# Patient Record
Sex: MALE | Race: OTHER | Employment: UNEMPLOYED | URBAN - METROPOLITAN AREA
[De-identification: names, ages, dates, MRNs, and addresses within clinical notes are randomized per-mention and may not be internally consistent; named-entity substitution may affect disease eponyms.]

---

## 2018-04-27 ENCOUNTER — HOSPITAL ENCOUNTER (OUTPATIENT)
Dept: LAB | Age: 58
Discharge: HOME | End: 2018-04-27

## 2018-04-27 LAB
ALBUMIN SERPL-MCNC: 4 G/DL (ref 3.5–5)
ALBUMIN/GLOB SERPL: 1.2 {RATIO} (ref 1.1–2.2)
ALP SERPL-CCNC: 93 U/L (ref 45–117)
ALT SERPL-CCNC: 31 U/L (ref 12–78)
ANION GAP SERPL CALC-SCNC: 6 MMOL/L (ref 5–15)
AST SERPL-CCNC: 19 U/L (ref 15–37)
BILIRUB SERPL-MCNC: 0.3 MG/DL (ref 0.2–1)
BUN SERPL-MCNC: 16 MG/DL (ref 6–20)
BUN/CREAT SERPL: 26 (ref 12–20)
CALCIUM SERPL-MCNC: 9 MG/DL (ref 8.5–10.1)
CHLORIDE SERPL-SCNC: 107 MMOL/L (ref 97–108)
CO2 SERPL-SCNC: 28 MMOL/L (ref 21–32)
CREAT SERPL-MCNC: 0.62 MG/DL (ref 0.7–1.3)
GLOBULIN SER CALC-MCNC: 3.4 G/DL (ref 2–4)
GLUCOSE SERPL-MCNC: 146 MG/DL (ref 65–100)
POTASSIUM SERPL-SCNC: 4.2 MMOL/L (ref 3.5–5.1)
PROT SERPL-MCNC: 7.4 G/DL (ref 6.4–8.2)
SODIUM SERPL-SCNC: 141 MMOL/L (ref 136–145)
TSH SERPL DL<=0.05 MIU/L-ACNC: 1.3 UIU/ML (ref 0.36–3.74)

## 2018-04-27 PROCEDURE — 80053 COMPREHEN METABOLIC PANEL: CPT | Performed by: NURSE PRACTITIONER

## 2018-04-27 PROCEDURE — 84443 ASSAY THYROID STIM HORMONE: CPT | Performed by: NURSE PRACTITIONER

## 2018-06-12 ENCOUNTER — HOSPITAL ENCOUNTER (OUTPATIENT)
Dept: DIABETES SERVICES | Age: 58
Discharge: HOME | End: 2018-06-12
Payer: MEDICAID

## 2018-06-12 DIAGNOSIS — E11.69 TYPE 2 DIABETES MELLITUS WITH OTHER SPECIFIED COMPLICATION, UNSPECIFIED WHETHER LONG TERM INSULIN USE (HCC): ICD-10-CM

## 2018-06-12 PROCEDURE — G0108 DIAB MANAGE TRN  PER INDIV: HCPCS | Performed by: DIETITIAN, REGISTERED

## 2018-11-14 ENCOUNTER — OFFICE VISIT (OUTPATIENT)
Dept: FAMILY MEDICINE CLINIC | Age: 58
End: 2018-11-14

## 2018-11-14 VITALS
HEART RATE: 75 BPM | WEIGHT: 158 LBS | SYSTOLIC BLOOD PRESSURE: 174 MMHG | TEMPERATURE: 98 F | OXYGEN SATURATION: 100 % | BODY MASS INDEX: 23.95 KG/M2 | RESPIRATION RATE: 16 BRPM | DIASTOLIC BLOOD PRESSURE: 80 MMHG | HEIGHT: 68 IN

## 2018-11-14 DIAGNOSIS — F32.9 REACTIVE DEPRESSION: ICD-10-CM

## 2018-11-14 DIAGNOSIS — E11.65 UNCONTROLLED TYPE 2 DIABETES MELLITUS WITH HYPERGLYCEMIA (HCC): ICD-10-CM

## 2018-11-14 DIAGNOSIS — Z23 ENCOUNTER FOR IMMUNIZATION: ICD-10-CM

## 2018-11-14 DIAGNOSIS — I10 ESSENTIAL HYPERTENSION: Primary | ICD-10-CM

## 2018-11-14 PROBLEM — G62.9 PERIPHERAL NEUROPATHY: Status: ACTIVE | Noted: 2018-11-14

## 2018-11-14 PROBLEM — Z79.4 CONTROLLED TYPE 2 DIABETES MELLITUS WITH OPHTHALMIC COMPLICATION, WITH LONG-TERM CURRENT USE OF INSULIN (HCC): Status: ACTIVE | Noted: 2018-11-14

## 2018-11-14 PROBLEM — E11.39 CONTROLLED TYPE 2 DIABETES MELLITUS WITH OPHTHALMIC COMPLICATION, WITH LONG-TERM CURRENT USE OF INSULIN (HCC): Status: RESOLVED | Noted: 2018-11-14 | Resolved: 2018-11-14

## 2018-11-14 PROBLEM — Z79.4 CONTROLLED TYPE 2 DIABETES MELLITUS WITH OPHTHALMIC COMPLICATION, WITH LONG-TERM CURRENT USE OF INSULIN (HCC): Status: RESOLVED | Noted: 2018-11-14 | Resolved: 2018-11-14

## 2018-11-14 PROBLEM — E11.39 CONTROLLED TYPE 2 DIABETES MELLITUS WITH OPHTHALMIC COMPLICATION, WITH LONG-TERM CURRENT USE OF INSULIN (HCC): Status: ACTIVE | Noted: 2018-11-14

## 2018-11-14 RX ORDER — ROSUVASTATIN CALCIUM 10 MG/1
10 TABLET, COATED ORAL
Qty: 90 TAB | Refills: 1 | Status: SHIPPED | OUTPATIENT
Start: 2018-11-14 | End: 2022-02-07

## 2018-11-14 RX ORDER — AMLODIPINE BESYLATE 10 MG/1
TABLET ORAL DAILY
COMMUNITY
End: 2018-11-14 | Stop reason: SDUPTHER

## 2018-11-14 RX ORDER — GABAPENTIN 300 MG/1
300 CAPSULE ORAL 3 TIMES DAILY
COMMUNITY
End: 2018-11-14 | Stop reason: SDUPTHER

## 2018-11-14 RX ORDER — LISINOPRIL 40 MG/1
40 TABLET ORAL DAILY
COMMUNITY
End: 2018-11-14 | Stop reason: SDUPTHER

## 2018-11-14 RX ORDER — LISINOPRIL 40 MG/1
40 TABLET ORAL DAILY
Qty: 90 TAB | Refills: 1 | Status: SHIPPED | OUTPATIENT
Start: 2018-11-14 | End: 2021-12-01

## 2018-11-14 RX ORDER — INSULIN GLARGINE 100 [IU]/ML
INJECTION, SOLUTION SUBCUTANEOUS
Qty: 1 VIAL | Refills: 2 | Status: SHIPPED | OUTPATIENT
Start: 2018-11-14 | End: 2019-02-21 | Stop reason: SDUPTHER

## 2018-11-14 RX ORDER — GABAPENTIN 300 MG/1
300 CAPSULE ORAL 3 TIMES DAILY
Qty: 270 CAP | Refills: 1 | Status: SHIPPED | OUTPATIENT
Start: 2018-11-14 | End: 2022-02-07

## 2018-11-14 RX ORDER — AMLODIPINE BESYLATE 10 MG/1
10 TABLET ORAL DAILY
Qty: 90 TAB | Refills: 1 | Status: ON HOLD | OUTPATIENT
Start: 2018-11-14 | End: 2021-11-30 | Stop reason: SDUPTHER

## 2018-11-14 RX ORDER — HYDROCHLOROTHIAZIDE 12.5 MG/1
12.5 TABLET ORAL DAILY
COMMUNITY
End: 2018-11-14 | Stop reason: SDUPTHER

## 2018-11-14 RX ORDER — METFORMIN HYDROCHLORIDE 1000 MG/1
1000 TABLET, FILM COATED, EXTENDED RELEASE ORAL 2 TIMES DAILY
Qty: 180 TAB | Refills: 1 | Status: SHIPPED | OUTPATIENT
Start: 2018-11-14 | End: 2018-11-26 | Stop reason: ALTCHOICE

## 2018-11-14 RX ORDER — SERTRALINE HYDROCHLORIDE 25 MG/1
TABLET, FILM COATED ORAL
Qty: 90 TAB | Refills: 1 | Status: SHIPPED | OUTPATIENT
Start: 2018-11-14 | End: 2018-12-18 | Stop reason: DRUGHIGH

## 2018-11-14 RX ORDER — INSULIN GLARGINE 100 [IU]/ML
INJECTION, SOLUTION SUBCUTANEOUS
Refills: 3 | COMMUNITY
Start: 2018-10-08 | End: 2018-11-14

## 2018-11-14 RX ORDER — HYDROCHLOROTHIAZIDE 12.5 MG/1
12.5 TABLET ORAL DAILY
Qty: 90 TAB | Refills: 1 | Status: SHIPPED | OUTPATIENT
Start: 2018-11-14 | End: 2018-12-18 | Stop reason: SDUPTHER

## 2018-11-14 RX ORDER — METFORMIN HYDROCHLORIDE 1000 MG/1
TABLET, FILM COATED, EXTENDED RELEASE ORAL 2 TIMES DAILY
COMMUNITY
End: 2018-11-14 | Stop reason: SDUPTHER

## 2018-11-14 NOTE — PROGRESS NOTES
Jeancarlos Tello is a 62 y.o. male      No chief complaint on file. 1. Have you been to the ER, urgent care clinic since your last visit? Hospitalized since your last visit? no      2. Have you seen or consulted any other health care providers outside of the [address] [place] [address] since your last visit? Include any pap smears or colon screening.   no

## 2018-11-14 NOTE — PROGRESS NOTES
[facility]    [place]   [address]  Phone:  [phone]  Fax:  [phone]    Name: Fish Interiano  :  1960  MRN:  [id number]  Encounter Date:  2018      History of Present Illness:  Fish Interiano is a 62 y.o. male. He is here for DM and HTN and depression. Diabetes Mellitus:  He has diabetes mellitus, and hypertension. Diabetic ROS - medication compliance: noncompliant much of the time, diabetic diet compliance: noncompliant some of the time, home glucose monitoring: fasting values range 89-270s , further diabetic ROS: no polyuria or polydipsia, no chest pain, dyspnea or TIA's, last eye exam approximately 1 week ago. Metformin 1000bid  Lantus 16 nightly  Peripheral neuropathy   A1c 2018 7.7  Micro/cr 2018 36.3  Sometimes he gets diarrhea from the metformin. He was on crestor 10mg daily but ran out several months ago so hasn't been taking it. Cardiovascular Review:  The patient has diabetes and hypertension. Diet and Lifestyle: generally follows a low fat low cholesterol diet  Home BP Monitoring: is not controlled at home, ranging 130s-160s's/100's. Pertinent ROS: not taking medications regularly as instructed, no TIA's, no chest pain on exertion, no dyspnea on exertion, no swelling of ankles. Meds include HCTZ 12.5, lisinopril 40, amlodipine 10  BP at previous visits were at goal.  He has not taken his PO medicines in about a month because he was feeling depressed. Depression  Patient complains of depression. He complains of depressed mood, anhedonia, insomnia, hypersomnia, fatigue, feelings of worthlessness/guilt, difficulty concentrating, and impaired memory. Onset was approximately 1 month ago, unchanged since that time. He denies current suicidal and homicidal plan or intent. Possible organic causes contributing are: endocrine/metabolic.   Risk factors: negative life event vision problems (is being followed by ophtho) Previous treatment includes none. He says he is depressed because of his poor vision due to cataracts and glaucoma. He saw the eye doctor last week and has eye surgery scheduled for December. He has had fleeting suicidal thoughts but he does not have a plan. He thinks of his children and his family and the thoughts go away. He does not currently have those thoughts. 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, with long-term current use of insulin ([facility]) E11.39, Z79.4    Essential hypertension I10    Reactive depression F32.9    Peripheral neuropathy G62.9       Past Medical History:   Diagnosis Date    Diabetes ([facility] [address]      Past Surgical History:   Procedure Laterality Date    HX OTHER SURGICAL      5th toe Metatarsal amputation 12/2017      Social History     Socioeconomic History    Marital status:      Spouse name: Not on file    Number of children: Not on file    Years of education: Not on file    Highest education level: Not on file   Social Needs    Financial resource strain: Not on file    Food insecurity - worry: Not on file    Food insecurity - inability: Not on file   [employer] [employer] needs - medical: Not on file   Workboard needs - non-medical: Not on file   Occupational History    Not on file   Tobacco Use    Smoking status: Never Smoker    Smokeless tobacco: Never Used   Substance and Sexual Activity    Alcohol use: No     Frequency: Never    Drug use: No    Sexual activity: Not Currently   Other Topics Concern    Not on file   Social History Narrative    Not on file     Family History   Problem Relation Age of Onset    Diabetes Mother     No Known Problems Father          Review of Systems   Constitutional: Negative for chills and fever. Eyes: Positive for blurred vision. Negative for redness. Respiratory: Negative for shortness of breath.     Cardiovascular: Negative for chest pain, palpitations and leg swelling. Gastrointestinal: Negative for abdominal pain, nausea and vomiting. Neurological: Negative for dizziness. Psychiatric/Behavioral: Positive for depression and suicidal ideas (fleeting). Negative for hallucinations and substance abuse. Physical Exam:  Visit Vitals  /80 (BP 1 Location: Right arm, BP Patient Position: Sitting)   Pulse 75   Temp 98 °F (36.7 °C) (Oral)   Resp 16   Ht 5' 8\" (1.727 m)   Wt 158 lb (71.7 kg)   SpO2 100%   BMI 24.02 kg/m²     Physical Exam   Constitutional: He is oriented to person, place, and time. No distress. Cardiovascular: Normal rate, regular rhythm and normal heart sounds. Pulmonary/Chest: Effort normal and breath sounds normal.   Neurological: He is alert and oriented to person, place, and time. Psychiatric: He exhibits a depressed mood. PHQ-9 score is 21    Reviewed: Medications, allergies, clinical lab test results and imaging results have been reviewed. Any abnormal findings have been addressed. Assessment/Plan:  1. Essential hypertension  Uncontrolled. He has been noncompliant with his medications. Discussed importance of taking his medications, and the risks of uncontrolled blood pressure including heart attack, stroke, and death.   - METABOLIC PANEL, BASIC  - amLODIPine (NORVASC) 10 mg tablet; Take 1 Tab by mouth daily. Dispense: 90 Tab; Refill: 1  - hydroCHLOROthiazide (HYDRODIURIL) 12.5 mg tablet; Take 1 Tab by mouth daily. Dispense: 90 Tab; Refill: 1  - lisinopril (PRINIVIL, ZESTRIL) 40 mg tablet; Take 1 Tab by mouth daily. Dispense: 90 Tab; Refill: 1    2. Unontrolled type 2 diabetes mellitus  Fasting sugars have been high. He has not been taking his metformin. Discussed importance of taking his medications. Will add back crestor.   - HEMOGLOBIN A1C WITH EAG  - MICROALBUMIN, UR, RAND W/ MICROALB/CREAT RATIO  - LIPID PANEL  - rosuvastatin (CRESTOR) 10 mg tablet; Take 1 Tab by mouth nightly. Dispense: 90 Tab;  Refill: 1  - gabapentin (NEURONTIN) 300 mg capsule; Take 1 Cap by mouth three (3) times daily. Dispense: 270 Cap; Refill: 1  - insulin glargine (LANTUS U-100 INSULIN) 100 unit/mL injection; Take 16 units daily  Dispense: 1 Vial; Refill: 2  - metFORMIN (GLUMETZA) 1,000 mg TG24 24 hour tablet; Take 1,000 mg by mouth two (2) times a day. Dispense: 180 Tab; Refill: 1    3. Encounter for immunization  - INFLUENZA VACCINE QUADRIVALENT VIAL, SPLIT, 3 YRS PLUS IM  - PNEUMOCOCCAL POLYSACCHARIDE VACCINE, 23-VALENT, ADULT OR IMMUNOSUPPRESSED PT DOSE,    4. Reactive depression  Fleeting suicidal thoughts but no active plans or current thoughts. Start sertraline. I gave him information for [address] [place] [address]. Close follow up. - sertraline (ZOLOFT) 25 mg tablet; Take 25mg (1 tablet) by mouth daily for one week then increase to 50mg (2 tablets) daily  Dispense: 90 Tab; Refill: 1      Follow-up Disposition:  Return in 2 weeks (on 11/28/2018). Diagnoses, tests, plan, and follow up discussed with patient. I have given to and reviewed with the patient the after visit summary. I have reviewed medication side effects and precautions. Patient expressed agreement and understanding. All questions were answered. 4 = completely dependent

## 2018-11-14 NOTE — PATIENT INSTRUCTIONS
Vaccine Information Statement    Influenza (Flu) Vaccine (Inactivated or Recombinant): What you need to know    Many Vaccine Information Statements are available in [place]ic and other languages. See www.immunize.org/vis  Hojas de Información Sobre Vacunas están disponibles en Español y en muchos otros idiomas. Visite www.immunize.org/vis    1. Why get vaccinated? Influenza (flu) is a contagious disease that spreads around the [place] every year, usually between October and May. Flu is caused by influenza viruses, and is spread mainly by coughing, sneezing, and close contact. Anyone can get flu. Flu strikes suddenly and can last several days. Symptoms vary by age, but can include:   fever/chills   sore throat   muscle aches   fatigue   cough   headache    runny or stuffy nose    Flu can also lead to pneumonia and blood infections, and cause diarrhea and seizures in children. If you have a medical condition, such as heart or lung disease, flu can make it worse. Flu is more dangerous for some people. Infants and young children, people 72years of age and older, pregnant women, and people with certain health conditions or a weakened immune system are at greatest risk. Each year thousands of people in the [facility] die from flu, and many more are hospitalized. Flu vaccine can:   keep you from getting flu,   make flu less severe if you do get it, and   keep you from spreading flu to your family and other people. 2. Inactivated and recombinant flu vaccines    A dose of flu vaccine is recommended every flu season. Children 6 months through 6years of age may need two doses during the same flu season. Everyone else needs only one dose each flu season.        Some inactivated flu vaccines contain a very small amount of a mercury-based preservative called thimerosal. Studies have not shown thimerosal in vaccines to be harmful, but flu vaccines that do not contain thimerosal are available. There is no live flu virus in flu shots. They cannot cause the flu. There are many flu viruses, and they are always changing. Each year a new flu vaccine is made to protect against three or four viruses that are likely to cause disease in the upcoming flu season. But even when the vaccine doesnt exactly match these viruses, it may still provide some protection    Flu vaccine cannot prevent:   flu that is caused by a virus not covered by the vaccine, or   illnesses that look like flu but are not. It takes about 2 weeks for protection to develop after vaccination, and protection lasts through the flu season. 3. Some people should not get this vaccine    Tell the person who is giving you the vaccine:     If you have any severe, life-threatening allergies. If you ever had a life-threatening allergic reaction after a dose of flu vaccine, or have a severe allergy to any part of this vaccine, you may be advised not to get vaccinated. Most, but not all, types of flu vaccine contain a small amount of egg protein.  If you ever had Guillain-Barré Syndrome (also called GBS). Some people with a history of GBS should not get this vaccine. This should be discussed with your doctor.  If you are not feeling well. It is usually okay to get flu vaccine when you have a mild illness, but you might be asked to come back when you feel better. 4. Risks of a vaccine reaction    With any medicine, including vaccines, there is a chance of reactions. These are usually mild and go away on their own, but serious reactions are also possible. Most people who get a flu shot do not have any problems with it.      Minor problems following a flu shot include:    soreness, redness, or swelling where the shot was given     hoarseness   sore, red or itchy eyes   cough   fever   aches   headache   itching   fatigue  If these problems occur, they usually begin soon after the shot and last 1 or 2 days. More serious problems following a flu shot can include the following:     There may be a small increased risk of Guillain-Barré Syndrome (GBS) after inactivated flu vaccine. This risk has been estimated at 1 or 2 additional cases per million people vaccinated. This is much lower than the risk of severe complications from flu, which can be prevented by flu vaccine.  Young children who get the flu shot along with pneumococcal vaccine (PCV13) and/or DTaP vaccine at the same time might be slightly more likely to have a seizure caused by fever. Ask your doctor for more information. Tell your doctor if a child who is getting flu vaccine has ever had a seizure. Problems that could happen after any injected vaccine:      People sometimes faint after a medical procedure, including vaccination. Sitting or lying down for about 15 minutes can help prevent fainting, and injuries caused by a fall. Tell your doctor if you feel dizzy, or have vision changes or ringing in the ears.  Some people get severe pain in the shoulder and have difficulty moving the arm where a shot was given. This happens very rarely.  Any medication can cause a severe allergic reaction. Such reactions from a vaccine are very rare, estimated at about 1 in a million doses, and would happen within a few minutes to a few hours after the vaccination. As with any medicine, there is a very remote chance of a vaccine causing a serious injury or death. The safety of vaccines is always being monitored. For more information, visit: www.cdc.gov/vaccinesafety/    5. What if there is a serious reaction? What should I look for?  Look for anything that concerns you, such as signs of a severe allergic reaction, very high fever, or unusual behavior.     Signs of a severe allergic reaction can include hives, swelling of the face and throat, difficulty breathing, a fast heartbeat, dizziness, and weakness - usually within a few minutes to a few hours after the vaccination. What should I do?  If you think it is a severe allergic reaction or other emergency that cant wait, call 9-1-1 and get the person to the nearest hospital. Otherwise, call your doctor.  Reactions should be reported to the Vaccine Adverse Event Reporting System (VAERS). Your doctor should file this report, or you can do it yourself through  the VAERS web site at www.vaers. [facility].gov, or by calling 1-124.917.1018. VAERS does not give medical advice. 6. The National Vaccine Injury Compensation Program    The [facility] Vaccine Injury Compensation Program (VICP) is a federal program that was created to compensate people who may have been injured by certain vaccines. Persons who believe they may have been injured by a vaccine can learn about the program and about filing a claim by calling 3-258.317.7354 or visiting the [employer] website at www.[facility].gov/vaccinecompensation. There is a time limit to file a claim for compensation. 7. How can I learn more?  Ask your healthcare provider. He or she can give you the vaccine package insert or suggest other sources of information.  Call your local or state health department.  Contact the Centers for Disease Control and Prevention (CDC):  - Call 8-705.691.9497 (6-252-QUS-INFO) or  - Visit CDCs website at www.cdc.gov/flu    Vaccine Information Statement   Inactivated Influenza Vaccine   8/7/2015  42 DOM Rosales 346CS-02    Department of Health and Human Services  Centers for Disease Control and Prevention    Office Use Only       Pneumococcal Polysaccharide Vaccine: Care Instructions  Your Care Instructions    The pneumococcal polysaccharide vaccine (PPSV) can prevent some of the serious complications of pneumonia. This includes infection in the bloodstream (bacteremia) or throughout the body (septicemia). PPSV is recommended for people ages 72 years and older.  People ages 3 to 59 who have a long-term illness should also get the vaccine. This includes people with diabetes, heart disease, liver disease, or lung disease. PPSV can also help people who have a weakened immune system. This includes cancer patients and people who don't have a spleen. The immune system helps your body fight infection and other illnesses. PPSV is given as a shot. It's usually given in the arm. Healthy older adults get the shot once. Other people may need to have a second dose. The shot may cause pain and redness at the site. It may also cause a mild fever for a short time. Follow-up care is a key part of your treatment and safety. Be sure to make and go to all appointments, and call your doctor if you are having problems. It's also a good idea to know your test results and keep a list of the medicines you take. How can you care for yourself at home? · Take an over-the-counter pain medicine, such as acetaminophen (Tylenol), ibuprofen (Advil, Motrin), or naproxen (Aleve), if your arm is sore after the shot. Be safe with medicines. Read and follow all instructions on the label. · Give acetaminophen (Tylenol) or ibuprofen (Advil, Motrin) to your child for pain or fussiness after the shot. Read and follow all instructions on the label. Do not give aspirin to anyone younger than 20. It has been linked to Reye syndrome, a serious illness. · Put ice or a cold pack on the sore area for 10 to 20 minutes at a time. Put a thin cloth between the ice and your skin. When should you call for help? Call 911 anytime you think you may need emergency care. For example, call if:    · You have a seizure.     · You have symptoms of a severe allergic reaction. These may include:  ? Sudden raised, red areas (hives) all over the body. ? Swelling of the throat, mouth, lips, or tongue. ? Trouble breathing. ? Passing out (losing consciousness).  Or you may feel very lightheaded or suddenly feel weak, confused, or restless.    Call your doctor now or seek immediate medical care if:    · You have symptoms of an allergic reaction, such as:  ? A rash or hives (raised, red areas on the skin). ? Itching. ? Swelling. ? Belly pain, nausea, or vomiting.     · You have a high fever.    Watch closely for changes in your health, and be sure to contact your doctor if you have any problems. Where can you learn more? Go to http://laura-joey.info/. Enter T225 in the search box to learn more about \"Pneumococcal Polysaccharide Vaccine: Care Instructions. \"  Current as of: June 18, 2018  Content Version: 11.8  © 8500-1707 [employer]. Care instructions adapted under license by [employer] (which disclaims liability or warranty for this information). If you have questions about a medical condition or this instruction, always ask your healthcare professional. [address] any warranty or liability for your use of this information. Sertraline (Zoloft) - (By mouth)   Why this medicine is used:   Treats depression, obsessive-compulsive disorder (OCD), posttraumatic stress disorder (PTSD), premenstrual dysphoric disorder (PMDD), social anxiety, and panic disorder. Contact a nurse or doctor right away if you have:  · Blistering, peeling, red skin rash  · Thoughts of hurting yourself, seeing or hearing things that are not there  · Anxiety, restlessness, fast heartbeat, fever, sweating  · Bloody vomit or vomit that looks like coffee grounds; bloody or black, tarry stools  · Tremors, muscle twitching, uncontrollable movements     Common side effects:  · Sleepiness, trouble sleeping  · Sexual problems  · Mild diarrhea, nausea, vomiting, dry mouth  © 2017 [facility] Information is for End User's use only and may not be sold, redistributed or otherwise used for commercial purposes.     [address]  110.730.6454    National Suicide Prevention Hotline  1-191.841.1475

## 2018-11-15 LAB
ALBUMIN/CREAT UR: 422.1 MG/G CREAT (ref 0–30)
BUN SERPL-MCNC: 16 MG/DL (ref 6–24)
BUN/CREAT SERPL: 22 (ref 9–20)
CALCIUM SERPL-MCNC: 9.5 MG/DL (ref 8.7–10.2)
CHLORIDE SERPL-SCNC: 100 MMOL/L (ref 96–106)
CHOLEST SERPL-MCNC: 165 MG/DL (ref 100–199)
CO2 SERPL-SCNC: 25 MMOL/L (ref 20–29)
CREAT SERPL-MCNC: 0.73 MG/DL (ref 0.76–1.27)
CREAT UR-MCNC: 72.4 MG/DL
EST. AVERAGE GLUCOSE BLD GHB EST-MCNC: 217 MG/DL
GLUCOSE SERPL-MCNC: 168 MG/DL (ref 65–99)
HBA1C MFR BLD: 9.2 % (ref 4.8–5.6)
HDLC SERPL-MCNC: 58 MG/DL
LDLC SERPL CALC-MCNC: 94 MG/DL (ref 0–99)
MICROALBUMIN UR-MCNC: 305.6 UG/ML
POTASSIUM SERPL-SCNC: 4.8 MMOL/L (ref 3.5–5.2)
SODIUM SERPL-SCNC: 138 MMOL/L (ref 134–144)
TRIGL SERPL-MCNC: 66 MG/DL (ref 0–149)
VLDLC SERPL CALC-MCNC: 13 MG/DL (ref 5–40)

## 2018-11-21 ENCOUNTER — TELEPHONE (OUTPATIENT)
Dept: FAMILY MEDICINE CLINIC | Age: 58
End: 2018-11-21

## 2018-11-21 NOTE — TELEPHONE ENCOUNTER
Attempted to call patient regarding lab results. Voice mailbox was full so was unable to leave message. He does have an appointment coming up 5 days from now.

## 2018-11-25 PROBLEM — Z79.4 TYPE 2 DIABETES MELLITUS WITH OPHTHALMIC COMPLICATION, WITH LONG-TERM CURRENT USE OF INSULIN (HCC): Status: ACTIVE | Noted: 2018-11-25

## 2018-11-25 PROBLEM — E11.39 TYPE 2 DIABETES MELLITUS WITH OPHTHALMIC COMPLICATION, WITH LONG-TERM CURRENT USE OF INSULIN (HCC): Status: ACTIVE | Noted: 2018-11-25

## 2018-11-26 ENCOUNTER — OFFICE VISIT (OUTPATIENT)
Dept: FAMILY MEDICINE CLINIC | Age: 58
End: 2018-11-26

## 2018-11-26 VITALS
TEMPERATURE: 97.9 F | BODY MASS INDEX: 23.95 KG/M2 | HEIGHT: 68 IN | RESPIRATION RATE: 16 BRPM | SYSTOLIC BLOOD PRESSURE: 109 MMHG | OXYGEN SATURATION: 100 % | WEIGHT: 158 LBS | HEART RATE: 78 BPM | DIASTOLIC BLOOD PRESSURE: 64 MMHG

## 2018-11-26 DIAGNOSIS — F32.9 REACTIVE DEPRESSION: ICD-10-CM

## 2018-11-26 DIAGNOSIS — I10 ESSENTIAL HYPERTENSION: Primary | ICD-10-CM

## 2018-11-26 DIAGNOSIS — R80.9 MICROALBUMINURIA: ICD-10-CM

## 2018-11-26 DIAGNOSIS — Z79.4 TYPE 2 DIABETES MELLITUS WITH DIABETIC CATARACT, WITH LONG-TERM CURRENT USE OF INSULIN (HCC): ICD-10-CM

## 2018-11-26 DIAGNOSIS — E11.36 TYPE 2 DIABETES MELLITUS WITH DIABETIC CATARACT, WITH LONG-TERM CURRENT USE OF INSULIN (HCC): ICD-10-CM

## 2018-11-26 RX ORDER — METFORMIN HYDROCHLORIDE 1000 MG/1
1000 TABLET ORAL 2 TIMES DAILY WITH MEALS
Qty: 180 TAB | Refills: 1 | Status: SHIPPED | OUTPATIENT
Start: 2018-11-26 | End: 2022-02-07

## 2018-11-26 NOTE — PATIENT INSTRUCTIONS
Albumin Urine Test: About This Test  What is it? An albumin urine test checks urine for a protein called albumin. This protein is normally found in the blood. When the kidneys are damaged, small amounts of albumin leak into the urine. This is called albuminuria. If the amount of albumin is very small, but still abnormal, it is called microalbuminuria. You might provide a urine sample for your doctor during a visit. Your doctor might also ask you for a one-time sample at home or over a specific period of time, such as over 4 hours or 24 hours. Your doctor will tell you what to do. Why is this test done? This test is done to check for albumin in the urine. It helps tell your doctor how well your kidneys are working. This test is done most often to check the kidneys in people with diabetes. Other conditions also cause albuminuria. These conditions include high blood pressure, heart failure, and cirrhosis. The sooner your doctor knows you have kidney damage, the more your doctor can do to protect your kidneys. How can you prepare for the test?  · Do not exercise just before the test.  · Tell your doctor if you are having your period or have vaginal discharge. · Tell your doctor about all the nonprescription and prescription medicines and herbs or other supplements you take. Some of these can affect the results of this test.  What happens before the test?  · Your doctor or the lab likely will give you the container you need to hold the urine. You will get instructions on when and how to collect the urine. This might be a one-time sample or a number of samples over a period of time. What happens during the test?  One-time urine collection  · Wash your hands before you start. · If the collection cup you are given has a lid, remove it carefully. Set it down with the inner surface up. Do not touch the inside of the cup with your fingers. · Clean the area around your genitals.   ? For men: Pull back the foreskin, if present, and clean the head of the penis with medicated towelettes or swabs. ? For women: Spread open the genital folds of skin with one hand. Then use medicated towelettes or swabs in your other hand to clean the area where urine comes out (the urethra). Wipe the area from front to back. · Start urinating into the toilet or urinal. A woman should hold apart the genital folds of skin while she urinates. · After the urine has flowed for several seconds, place the cup into the urine stream. Collect about 2 fluid ounces of this \"midstream\" urine without stopping your flow of urine. · Don't touch the rim of the cup to your genital area. Don't get toilet paper, pubic hair, stool (feces), menstrual blood, or anything else in the urine sample. · Finish urinating into the toilet or urinal.  · Carefully replace and tighten the lid on the cup, and then return it to the lab. If you are collecting the urine at home and can't get it to the lab in an hour, refrigerate it. Urine collection over time  You collect your urine for a period of time, such as over 4 or 24 hours. · You start collecting your urine in the morning. When you first get up, empty your bladder. But do not save this urine. Write down the time that you began. · For the set period of time, collect all your urine. Urinate into a small, clean container. Then pour the urine into the large container. Don't touch the inside of the container with your fingers. · Keep the collected urine in the refrigerator for the collection time. Empty your bladder for the last time at or just before the end of the collection period. Add this urine to the large container. Then write down the time. What else should you know about the test?  · If your results are higher than normal, your doctor may check your urine more often to watch for kidney damage. · If your test shows that you may have kidney damage, you may get other tests.   What happens after the test?  · Follow your doctor's instructions for taking the urine to the doctor's office or lab. · You can go back to your usual activities right away. When should you call for help? Watch closely for changes in your health, and be sure to contact your doctor if you have any problems. Follow-up care is a key part of your treatment and safety. Be sure to make and go to all appointments, and call your doctor if you are having problems. It's also a good idea to keep a list of the medicines you take. Ask your doctor when you can expect to have your test results. Where can you learn more? Go to http://laura-joey.info/. Enter L729 in the search box to learn more about \"Albumin Urine Test: About This Test.\"  Current as of: March 15, 2018  Content Version: 11.8  © 3989-7699 [employer]. Care instructions adapted under license by [employer] (which disclaims liability or warranty for this information). If you have questions about a medical condition or this instruction, always ask your healthcare professional. [address] any warranty or liability for your use of this information.

## 2018-11-26 NOTE — PROGRESS NOTES
Arlene Jewell is a 62 y.o. male      Chief Complaint   Patient presents with    Follow-up     medication and labs          1. Have you been to the ER, urgent care clinic since your last visit? Hospitalized since your last visit? No      2. Have you seen or consulted any other health care providers outside of the [address] [place] [address] since your last visit? Include any pap smears or colon screening.   No

## 2018-11-26 NOTE — PROGRESS NOTES
[facility]    [address]  Phone:  [phone]  Fax:  [phone]    Name: Gabriele NOYOLAO.B.:  1960  MRN:  [id number]  Encounter Date:  2018      History of Present Illness:  Gabriele Levin is a 62 y.o. male. He is here for diabetes, HTN and depression. Regarding his diabetes, at last visit he had not been taking his metformin due to depressive symptoms but had been taking his 16 units of lantus daily. He was also noncompliant with his diet. Today, he notes he has been taking his metformin 1000 bid and and lantus 16 nightly. His A1c in 2018 was 7.7. His A1c 2018 is 9.2. His creatinine was 0.73; his GFR was 102. His urine micro/cr was 422.1. His cholesterol panel was normal. Since last visit, his fasting sugars have been 120s-200s. He says he has been unable to get his metformin refill because it requires insurance approval. I have not received any prior auth for this. He is not yet out of metformin. Regarding his HTN, he had not been taking his medications, lisinopril, HCTZ, and amlodipine, at last visit due to depressive symptoms. He has been taking his medications this time. Home BP is 629T-801M systolic; he is unsure of what the diastolic has been. He denies headaches, chest pain, shortness of breath, lower extremity edema. His BP was up last time, though he was noncompliant with his medicines at that time. Today, his BP is 109/64. Regarding his depression, this was diagnosed at last visit almost two weeks ago. He was started on zoloft. He has been taking it. He denies side effects. The zoloft has been helping his symptoms a little bit. He attributed the symptoms largely to his vision problems reportedly due to cataracts and glaucoma; he has eye surgery scheduled for December. He denies substance abuse. He denies suicidal thoughts and homicidal thoughts; he has no suicidal or homicidal plans.  Symptoms today include feeling down, decreased energy, trouble concentrating. He did not set up an appointment with psychiatry/counseling. PHQ-9 score on 11/14/18 was 21; today it is 16. Not on File  Current Outpatient Medications   Medication Sig Dispense Refill    metFORMIN (GLUCOPHAGE) 1,000 mg tablet Take 1 Tab by mouth two (2) times daily (with meals). 180 Tab 1    sertraline (ZOLOFT) 25 mg tablet Take 25mg (1 tablet) by mouth daily for one week then increase to 50mg (2 tablets) daily 90 Tab 1    rosuvastatin (CRESTOR) 10 mg tablet Take 1 Tab by mouth nightly. 90 Tab 1    amLODIPine (NORVASC) 10 mg tablet Take 1 Tab by mouth daily. 90 Tab 1    gabapentin (NEURONTIN) 300 mg capsule Take 1 Cap by mouth three (3) times daily. 270 Cap 1    hydroCHLOROthiazide (HYDRODIURIL) 12.5 mg tablet Take 1 Tab by mouth daily. 90 Tab 1    insulin glargine (LANTUS U-100 INSULIN) 100 unit/mL injection Take 16 units daily 1 Vial 2    lisinopril (PRINIVIL, ZESTRIL) 40 mg tablet Take 1 Tab by mouth daily.  90 Tab 1     Patient Active Problem List   Diagnosis Code    Essential hypertension I10    Reactive depression F32.9    Peripheral neuropathy G62.9    Type 2 diabetes mellitus with ophthalmic complication, with long-term current use of insulin ([address] E11.39, Z79.4       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, with long-term current use of insulin ([address] 11/14/2018    Diabetes ([address]      Past Surgical History:   Procedure Laterality Date    HX OTHER SURGICAL      5th toe Metatarsal amputation 12/2017      Social History     Socioeconomic History    Marital status:      Spouse name: Not on file    Number of children: Not on file    Years of education: Not on file    Highest education level: Not on file   Tobacco Use    Smoking status: Never Smoker    Smokeless tobacco: Never Used   Substance and Sexual Activity    Alcohol use: No     Frequency: Never    Drug use: No    Sexual activity: Not Currently     Family History   Problem Relation Age of Onset    Diabetes Mother     No Known Problems Father          Review of Systems   Eyes: Positive for blurred vision. Respiratory: Negative for shortness of breath. Cardiovascular: Negative for chest pain. Gastrointestinal: Negative for abdominal pain, nausea and vomiting. Neurological: Negative for dizziness and headaches. Psychiatric/Behavioral: Positive for depression. Negative for substance abuse and suicidal ideas. Physical Exam:  Visit Vitals  /64 (BP 1 Location: Left arm, BP Patient Position: Sitting)   Pulse 78   Temp 97.9 °F (36.6 °C) (Oral)   Resp 16   Ht 5' 8\" (1.727 m)   Wt 158 lb (71.7 kg)   SpO2 100%   BMI 24.02 kg/m²     Physical Exam   Constitutional: No distress. Cardiovascular: Normal rate, regular rhythm and normal heart sounds. Pulmonary/Chest: Effort normal and breath sounds normal.   Psychiatric: He has a normal mood and affect. PHQ-9 score is 16. Reviewed: Medications, allergies, clinical lab test results and imaging results have been reviewed. Any abnormal findings have been addressed. Assessment/Plan:  1. Essential hypertension  Controlled    2. Reactive depression  Improving. Continue zoloft. Close follow up. Encouraged him to make an appointment psychiatry/counseling. 3. Type 2 diabetes mellitus with diabetic cataract, with long-term current use of insulin (HCC)  Uncontrolled. I suspect now that he has restarted his metformin, he will get better control of his diabetes. Check A1c again in 3 months. - metFORMIN (GLUCOPHAGE) 1,000 mg tablet; Take 1 Tab by mouth two (2) times daily (with meals). Dispense: 180 Tab; Refill: 1  - He will let me know if he is unable to fill the metformin. 4. Microalbuminuria  Likely due to diabetes. - REFERRAL TO NEPHROLOGY        Follow-up Disposition:  Return in about 4 weeks (around 12/24/2018) for depression.      Diagnoses, tests, plan, and follow up discussed with patient. I have given to and reviewed with the patient the after visit summary. I have reviewed medication side effects and precautions. Patient expressed agreement and understanding. All questions were answered.

## 2018-11-30 NOTE — TELEPHONE ENCOUNTER
Placed Metformin ER PA for [facility] on Dr. Hutchinson Kieler desk. Per Dr. Jose Sparks'  She has already given him a new medication for Metformin and the PA was not necessary. I spoke to Mr. Al Memos and he said the Pharmacy called him to let him know that it was ready to .

## 2018-12-17 NOTE — PROGRESS NOTES
[facility]    [facility]   [address]  Phone:  [phone]  Fax:  [phone]    Name: Valente Rajput  :  1960  MRN:  [id number]  Encounter Date:  2018      History of Present Illness:  Valente Rajput is a 62 y.o. male. He is here for depression follow up. Regarding his depression, this was diagnosed on 18 at which time he was started on zoloft. He has been taking 50mg daily. He denies side effects. It has been helping his symptoms. He attributed the symptoms largely to his vision problems reportedly due to cataracts and glaucoma; he has eye surgery scheduled for . He denies suicidal ideations and homicidal ideations. Symptoms today include [place], feeling down, decreased energy, feeling bad about himself, trouble concentrating. He has not set up an appointment with psychiatry/counseling. PHQ-9 score on 18 was 21, on 18 was 16; today it is, 19. He has not set up an appointment with nephrology for his microalbuminuria. BP is up today at 156/81. He says at home it's 603R-601F systolic. He says the diastolic at home as been 419L-369T (I suspect the diastolic is not actually this high). He notes a blister on the top of his left foot. He wore a new pair of boots about 3 weeks ago and then developed a blister on the top of his foot later that day. He denies fevers, chills, drainage. He says there was some redness around the site but this has improved. His last A1c was 9.2 on 18 though he was noncompliant with his medications at that time. Since restarting his meds his fasting sugars have mostly been in the 90s-130s. No Known Allergies  Current Outpatient Medications   Medication Sig Dispense Refill    hydroCHLOROthiazide (HYDRODIURIL) 12.5 mg tablet Take 1 Tabs by mouth daily. 90 Tab 1    sertraline (ZOLOFT) 25 mg tablet Take 2 Tab by mouth daily.  90 Tab 1    metFORMIN (GLUCOPHAGE) 1,000 mg tablet Take 1 Tab by mouth two (2) times daily (with meals). 180 Tab 1    rosuvastatin (CRESTOR) 10 mg tablet Take 1 Tab by mouth nightly. 90 Tab 1    amLODIPine (NORVASC) 10 mg tablet Take 1 Tab by mouth daily. 90 Tab 1    gabapentin (NEURONTIN) 300 mg capsule Take 1 Cap by mouth three (3) times daily. 270 Cap 1    insulin glargine (LANTUS U-100 INSULIN) 100 unit/mL injection Take 16 units daily 1 Vial 2    lisinopril (PRINIVIL, ZESTRIL) 40 mg tablet Take 1 Tab by mouth daily. 90 Tab 1     Patient Active Problem List   Diagnosis Code    Essential hypertension I10    Reactive depression F32.9    Peripheral neuropathy G62.9    Type 2 diabetes mellitus with ophthalmic complication, with long-term current use of insulin ([facility] E11.39, Z79.4       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, with long-term current use of insulin ([facility] 11/14/2018    Diabetes ([facility]      Past Surgical History:   Procedure Laterality Date    HX OTHER SURGICAL      5th toe Metatarsal amputation 12/2017      Social History     Socioeconomic History    Marital status:      Spouse name: Not on file    Number of children: Not on file    Years of education: Not on file    Highest education level: Not on file   Tobacco Use    Smoking status: Never Smoker    Smokeless tobacco: Never Used   Substance and Sexual Activity    Alcohol use: No     Frequency: Never    Drug use: No    Sexual activity: Not Currently     Family History   Problem Relation Age of Onset    Diabetes Mother     No Known Problems Father          Review of Systems   Constitutional: Negative for chills and fever. Respiratory: Negative for shortness of breath. Cardiovascular: Negative for chest pain. Skin:        See HPI   Psychiatric/Behavioral: Positive for depression. Negative for substance abuse and suicidal ideas.         See HPI       Physical Exam:  Visit Vitals  /81 (BP 1 Location: Right arm, BP Patient Position: Sitting)   Pulse 75   Temp 97.5 °F (36.4 °C) (Oral)   Resp 16   Ht 5' 8\" (1.727 m)   Wt 156 lb 8 oz (71 kg)   SpO2 100%   BMI 23.80 kg/m²     Physical Exam   Constitutional: He appears well-developed and well-nourished. No distress. Cardiovascular: Normal rate, regular rhythm and normal heart sounds. Pulmonary/Chest: Effort normal and breath sounds normal.   Skin:   Blister over bone prominence of left 3rd - 4th metatarsal; no drainage or erythema; normal color and temperature   Psychiatric: His speech is normal and behavior is normal. He exhibits a depressed mood. PHQ-9 score is 19. Reviewed: Medications, allergies, clinical lab test results and imaging results have been reviewed. Any abnormal findings have been addressed. Assessment/Plan:  1. Reactive depression  Still with symptoms and PHQ-9 score has gone slightly up. Will increase zoloft from 50mg daily to 100mg daily. Follow up in 4 weeks. - sertraline (ZOLOFT) 100 mg tablet; Take 1 Tab by mouth daily. Dispense: 90 Tab; Refill: 1    2. Type 2 diabetes mellitus with foot ulcer, with long-term current use of insulin (HCC)  No signs of infection. ADvised to keep clean with soap and water twice daily and can put vaseline over it and keep it covered. Wear shoes that fit well and do not cause pain or rub on any areas. Will refer to podiatry. REviewed reasons to RTC or go to urgent care or ER - fever, chills, drainage, surrounding redness.  - REFERRAL TO PODIATRY    3. Essential hypertension  Uncontrolled. Increase HCTZ from 12.5mg daily to 25mg daily. Follow up in 4 weeks with home BP log and cuff. - hydroCHLOROthiazide (HYDRODIURIL) 12.5 mg tablet; Take 2 Tabs by mouth daily. Dispense: 90 Tab; Refill: 1      Follow-up Disposition:  Return in about 4 weeks (around 1/15/2019). Diagnoses, tests, plan, and follow up discussed with patient. I have given to and reviewed with the patient the after visit summary.  I have reviewed medication side effects and precautions. Patient expressed agreement and understanding. All questions were answered. Discussed and examined with Dr. Mervin Soto who agrees.

## 2018-12-18 ENCOUNTER — TELEPHONE (OUTPATIENT)
Dept: FAMILY MEDICINE CLINIC | Age: 58
End: 2018-12-18

## 2018-12-18 ENCOUNTER — OFFICE VISIT (OUTPATIENT)
Dept: FAMILY MEDICINE CLINIC | Age: 58
End: 2018-12-18

## 2018-12-18 VITALS
DIASTOLIC BLOOD PRESSURE: 81 MMHG | OXYGEN SATURATION: 100 % | TEMPERATURE: 97.5 F | BODY MASS INDEX: 23.72 KG/M2 | SYSTOLIC BLOOD PRESSURE: 156 MMHG | HEIGHT: 68 IN | WEIGHT: 156.5 LBS | RESPIRATION RATE: 16 BRPM | HEART RATE: 75 BPM

## 2018-12-18 DIAGNOSIS — F32.9 REACTIVE DEPRESSION: Primary | ICD-10-CM

## 2018-12-18 DIAGNOSIS — E11.621 TYPE 2 DIABETES MELLITUS WITH FOOT ULCER, WITH LONG-TERM CURRENT USE OF INSULIN (HCC): ICD-10-CM

## 2018-12-18 DIAGNOSIS — Z79.4 TYPE 2 DIABETES MELLITUS WITH FOOT ULCER, WITH LONG-TERM CURRENT USE OF INSULIN (HCC): ICD-10-CM

## 2018-12-18 DIAGNOSIS — L97.509 TYPE 2 DIABETES MELLITUS WITH FOOT ULCER, WITH LONG-TERM CURRENT USE OF INSULIN (HCC): ICD-10-CM

## 2018-12-18 DIAGNOSIS — I10 ESSENTIAL HYPERTENSION: ICD-10-CM

## 2018-12-18 RX ORDER — SERTRALINE HYDROCHLORIDE 100 MG/1
100 TABLET, FILM COATED ORAL DAILY
Qty: 90 TAB | Refills: 1 | Status: SHIPPED | OUTPATIENT
Start: 2018-12-18 | End: 2019-01-22 | Stop reason: SDUPTHER

## 2018-12-18 RX ORDER — HYDROCHLOROTHIAZIDE 12.5 MG/1
25 TABLET ORAL DAILY
Qty: 90 TAB | Refills: 1 | Status: SHIPPED | OUTPATIENT
Start: 2018-12-18 | End: 2019-02-21 | Stop reason: SDUPTHER

## 2018-12-18 RX ORDER — SERTRALINE HYDROCHLORIDE 50 MG/1
50 TABLET, FILM COATED ORAL DAILY
Qty: 90 TAB | Refills: 1 | Status: SHIPPED | OUTPATIENT
Start: 2018-12-18 | End: 2018-12-18 | Stop reason: DRUGHIGH

## 2018-12-18 NOTE — PROGRESS NOTES
Identified pt with two pt identifiers(name and ). Reviewed record in preparation for visit and have obtained necessary documentation. Chief Complaint   Patient presents with    Depression    Blister     pt c/o blister on left foot        Coordination of Care Questionnaire:  :   1) Have you been to an emergency room, urgent care, or hospitalized since your last visit?   no   If yes, where when, and reason for visit? 2. Have seen or consulted any other health care provider since your last visit? NO  If yes, where when, and reason for visit? Patient is accompanied by self I have received verbal consent from Valente Rajput to discuss any/all medical information while they are present in the room.

## 2018-12-18 NOTE — PATIENT INSTRUCTIONS
Diabetes Foot Health: Care Instructions  Your Care Instructions    When you have diabetes, your feet need extra care and attention. Diabetes can damage the nerve endings and blood vessels in your feet, making you less likely to notice when your feet are injured. Diabetes also limits your body's ability to fight infection and get blood to areas that need it. If you get a minor foot injury, it could become an ulcer or a serious infection. With good foot care, you can prevent most of these problems. Caring for your feet can be quick and easy. Most of the care can be done when you are bathing or getting ready for bed. Follow-up care is a key part of your treatment and safety. Be sure to make and go to all appointments, and call your doctor if you are having problems. It's also a good idea to know your test results and keep a list of the medicines you take. How can you care for yourself at home? · Keep your blood sugar close to normal by watching what and how much you eat, monitoring blood sugar, taking medicines if prescribed, and getting regular exercise. · Do not smoke. Smoking affects blood flow and can make foot problems worse. If you need help quitting, talk to your doctor about stop-smoking programs and medicines. These can increase your chances of quitting for good. · Eat a diet that is low in fats. High fat intake can cause fat to build up in your blood vessels and decrease blood flow. · Inspect your feet daily for blisters, cuts, cracks, or sores. If you cannot see well, use a mirror or have someone help you. · Take care of your feet:  ? Wash your feet every day. Use warm (not hot) water. Check the water temperature with your wrists or other part of your body, not your feet. ? Dry your feet well. Pat them dry. Do not rub the skin on your feet too hard. Dry well between your toes. If the skin on your feet stays moist, bacteria or a fungus can grow, which can lead to infection. ?  Keep your skin soft. Use moisturizing skin cream to keep the skin on your feet soft and prevent calluses and cracks. But do not put the cream between your toes, and stop using any cream that causes a rash. ? Clean underneath your toenails carefully. Do not use a sharp object to clean underneath your toenails. Use the blunt end of a nail file or other rounded tool. ? Trim and file your toenails straight across to prevent ingrown toenails. Use a nail clipper, not scissors. Use an emery board to smooth the edges. · Change socks daily. Socks without seams are best, because seams often rub the feet. You can find socks for people with diabetes from specialty catalogs. · Look inside your shoes every day for things like gravel or torn linings, which could cause blisters or sores. · Buy shoes that fit well:  ? Look for shoes that have plenty of space around the toes. This helps prevent bunions and blisters. ? Try on shoes while wearing the kind of socks you will usually wear with the shoes. ? Avoid plastic shoes. They may rub your feet and cause blisters. Good shoes should be made of materials that are flexible and breathable, such as leather or cloth. ? Break in new shoes slowly by wearing them for no more than an hour a day for several days. Take extra time to check your feet for red areas, blisters, or other problems after you wear new shoes. · Do not go barefoot. Do not wear sandals, and do not wear shoes with very thin soles. Thin soles are easy to puncture. They also do not protect your feet from hot pavement or cold weather. · Have your doctor check your feet during each visit. If you have a foot problem, see your doctor. Do not try to treat an early foot problem at home. Home remedies or treatments that you can buy without a prescription (such as corn removers) can be harmful. · Always get early treatment for foot problems. A minor irritation can lead to a major problem if not properly cared for early.   When should you call for help? Call your doctor now or seek immediate medical care if:    · You have a foot sore, an ulcer or break in the skin that is not healing after 4 days, bleeding corns or calluses, or an ingrown toenail.     · You have blue or black areas, which can mean bruising or blood flow problems.     · You have peeling skin or tiny blisters between your toes or cracking or oozing of the skin.     · You have a fever for more than 24 hours and a foot sore.     · You have new numbness or tingling in your feet that does not go away after you move your feet or change positions.     · You have unexplained or unusual swelling of the foot or ankle.    Watch closely for changes in your health, and be sure to contact your doctor if:    · You cannot do proper foot care. Where can you learn more? Go to http://laura-joey.info/. Enter A739 in the search box to learn more about \"Diabetes Foot Health: Care Instructions. \"  Current as of: December 7, 2017  Content Version: 11.8  © 8268-2267 [employer]. Care instructions adapted under license by [employer] (which disclaims liability or warranty for this information). If you have questions about a medical condition or this instruction, always ask your healthcare professional. [address] any warranty or liability for your use of this information.

## 2019-01-22 ENCOUNTER — OFFICE VISIT (OUTPATIENT)
Dept: FAMILY MEDICINE CLINIC | Age: 59
End: 2019-01-22

## 2019-01-22 VITALS
SYSTOLIC BLOOD PRESSURE: 142 MMHG | RESPIRATION RATE: 18 BRPM | TEMPERATURE: 98 F | HEIGHT: 68 IN | DIASTOLIC BLOOD PRESSURE: 80 MMHG | BODY MASS INDEX: 24.55 KG/M2 | WEIGHT: 162 LBS | OXYGEN SATURATION: 97 % | HEART RATE: 78 BPM

## 2019-01-22 DIAGNOSIS — I10 ESSENTIAL HYPERTENSION: ICD-10-CM

## 2019-01-22 DIAGNOSIS — L30.0 NUMMULAR ECZEMA: ICD-10-CM

## 2019-01-22 DIAGNOSIS — E11.36 TYPE 2 DIABETES MELLITUS WITH DIABETIC CATARACT, WITH LONG-TERM CURRENT USE OF INSULIN (HCC): ICD-10-CM

## 2019-01-22 DIAGNOSIS — F32.9 REACTIVE DEPRESSION: Primary | ICD-10-CM

## 2019-01-22 DIAGNOSIS — R80.9 PROTEINURIA, UNSPECIFIED TYPE: ICD-10-CM

## 2019-01-22 DIAGNOSIS — Z79.4 TYPE 2 DIABETES MELLITUS WITH DIABETIC CATARACT, WITH LONG-TERM CURRENT USE OF INSULIN (HCC): ICD-10-CM

## 2019-01-22 RX ORDER — TRIAMCINOLONE ACETONIDE 1 MG/G
CREAM TOPICAL 2 TIMES DAILY
Qty: 30 G | Refills: 0 | Status: SHIPPED | OUTPATIENT
Start: 2019-01-22 | End: 2019-04-03

## 2019-01-22 RX ORDER — SERTRALINE HYDROCHLORIDE 100 MG/1
150 TABLET, FILM COATED ORAL DAILY
Qty: 90 TAB | Refills: 1
Start: 2019-01-22 | End: 2019-02-21 | Stop reason: SDUPTHER

## 2019-01-22 NOTE — PROGRESS NOTES
1. Have you been to the ER, urgent care clinic since your last visit? Hospitalized since your last visit? No    2. Have you seen or consulted any other health care providers outside of the [address] [place] [address] since your last visit? Include any pap smears or colon screening.  Yes, Dr Ion Casas

## 2019-01-22 NOTE — PROGRESS NOTES
CC: depression, HTN, rash    History of Present Illness:  Lydia Liu is a 62 y.o. male. Regarding his depression, this was diagnosed on 11/14/18 at which time he was started on zoloft. At last visit on 12/18/18, zoloft was increased from 50mg daily to 100mg daily; he has been taking it. He denies side effects. It has not been helping his symptoms. He attributed the symptoms largely to his vision problems reportedly due to cataracts and glaucoma; he has eye surgery scheduled for February 28th. He denies homicidal ideations. He does have fleeting suicidal thoughts which last only a few seconds; he thinks of his children which make his suicidal thoughts disappear. He has no plan and has not had a plan to harm himself. Symptoms today include an[place], feeling down, trouble sleeping, decreased energy, feeling bad about himself, trouble concentrating. He has not set up an appointment with psychiatry/counseling. PHQ-9 score on 11/14/18 was 21, on 11/26/18 was 16; on 12/18/18 was 19; today, it is 19. He has not set up an appointment with nephrology for his microalbuminuria. BP is 165/78. He says at home it's 100s-130s/50s-80s. At last visit on 12/18/18, HCTZ was increased from 12.5mg daily to 25mg daily. His BP cuff is used to check his BP here and it is not far off from our cuff here at the clinic. Clinic machine - 165/78, 123/72, 151/79  Clinic manual cuff - 126/80  Patient's manual cuff - 142/80    His fasting sugars at home have ranged from 100s - 300s, more often in the mid-upper 100s. He says he is taking his diabetes medicines. He does say he has had some diarrhea since restarting the metformin a couple months ago. He does not always take it with food. He denies abdominal pain, nausea, vomiting, fevers, blood in his stool, dark tarry stool. The blister he had on the top of his foot has resolved. He denies fevers, chills. He notes a rash on his left lower leg for a month.  It is not pruritic or painful. It does not drain. He has tried to put OTC lotion on it which has not helped. No Known Allergies  Current Outpatient Medications   Medication Sig Dispense Refill    hydroCHLOROthiazide (HYDRODIURIL) 12.5 mg tablet Take 2 Tabs by mouth daily. 90 Tab 1    sertraline (ZOLOFT) 100 mg tablet Take 1 Tab by mouth daily. 90 Tab 1    metFORMIN (GLUCOPHAGE) 1,000 mg tablet Take 1 Tab by mouth two (2) times daily (with meals). 180 Tab 1    rosuvastatin (CRESTOR) 10 mg tablet Take 1 Tab by mouth nightly. 90 Tab 1    amLODIPine (NORVASC) 10 mg tablet Take 1 Tab by mouth daily. 90 Tab 1    gabapentin (NEURONTIN) 300 mg capsule Take 1 Cap by mouth three (3) times daily. 270 Cap 1    insulin glargine (LANTUS U-100 INSULIN) 100 unit/mL injection Take 16 units daily 1 Vial 2    lisinopril (PRINIVIL, ZESTRIL) 40 mg tablet Take 1 Tab by mouth daily.  90 Tab 1     Patient Active Problem List   Diagnosis Code    Essential hypertension I10    Reactive depression F32.9    Peripheral neuropathy G62.9    Type 2 diabetes mellitus with ophthalmic complication, with long-term current use of insulin ([facility] E11.39, Z79.4       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, with long-term current use of insulin ([address] 11/14/2018    Diabetes ([facility] [address]      Past Surgical History:   Procedure Laterality Date    HX OTHER SURGICAL      5th toe Metatarsal amputation 12/2017      Social History     Socioeconomic History    Marital status:      Spouse name: Not on file    Number of children: Not on file    Years of education: Not on file    Highest education level: Not on file   Tobacco Use    Smoking status: Never Smoker    Smokeless tobacco: Never Used   Substance and Sexual Activity    Alcohol use: No     Frequency: Never    Drug use: No    Sexual activity: Not Currently     Family History   Problem Relation Age of Onset    Diabetes Mother     No Known Problems Father          Review of Systems   Constitutional: Negative for chills and fever. Respiratory: Negative for shortness of breath. Cardiovascular: Negative for chest pain, palpitations and leg swelling. Gastrointestinal: Negative for abdominal pain, nausea and vomiting. Skin: Positive for rash. Negative for itching. See HPI   Psychiatric/Behavioral: Positive for depression. Negative for substance abuse and suicidal ideas. The patient has insomnia. See HPI       Physical Exam:  Visit Vitals  /80 (BP 1 Location: Left arm, BP Patient Position: Sitting) Comment: patient's cuff   Pulse 78   Temp 98 °F (36.7 °C) (Oral)   Resp 18   Ht 5' 8\" (1.727 m)   Wt 162 lb (73.5 kg)   SpO2 97%   BMI 24.63 kg/m²     Physical Exam   Constitutional: He is oriented to person, place, and time. He appears well-developed and well-nourished. No distress. Cardiovascular: Normal rate, regular rhythm and normal heart sounds. Pulmonary/Chest: Effort normal and breath sounds normal.   Abdominal: Soft. Bowel sounds are normal. He exhibits no distension and no mass. There is no tenderness. There is no rebound and no guarding. Musculoskeletal: He exhibits no edema. Neurological: He is alert and oriented to person, place, and time. Skin: Rash (circular patches of dry skin on lateral and medial lower left leg) noted. Psychiatric: His speech is normal and behavior is normal. He exhibits a depressed mood. PHQ-9 score is 19. Assessment/Plan:  1. Reactive depression  Not controlled. Fleeting suicidal thoughts but no plan; he is not actively suicidal. I have given him the number for the suicide prevention hotline. Increase zoloft to 150mg daily. Close follow up. I stressed the importance of him seeing a therapist or psychiatrist for his depression. I gave him a list of behavioral health offices in the area and urged him to make an appointment with one of them. He tells me he will do so.  His son accompanies him to the visit today and says he will help make sure his father dose this. - sertraline (ZOLOFT) 100 mg tablet; Take 1.5 Tabs by mouth daily. Dispense: 90 Tab; Refill: 1    2. Proteinuria, unspecified type  I stressed the importance of seeing a nephrologist. I gave him another referral. He tells me he will call today.   - REFERRAL TO NEPHROLOGY    3. Nummular eczema  - triamcinolone acetonide (KENALOG) 0.1 % topical cream; Apply  to affected area two (2) times a day. use thin layer  Dispense: 30 g; Refill: 0    4. Essential hypertension  His home numbers are controlled, and his home cuff is reading not too far off some of our numbers obtained with clinic equipment though even some of these numbers are varying based on the time and method used. Will not adjust medicine at this time as some of his home numbers are actually on the lower side. 5. Type 2 diabetes mellitus with diabetic cataract, with long-term current use of insulin ([address]  Recommended trying to take the metformin with food to see if this helps with the diarrhea. Reviewed diabetic diet and importance of adherence. Again urged him to see nephrology. Will check labs at next visit. Follow-up Disposition:  Return in about 2 weeks (around 2/5/2019). Diagnoses, tests, plan, and follow up discussed with patient. I have given to and reviewed with the patient the after visit summary. I have reviewed medication side effects and precautions. Patient expressed agreement and understanding. All questions were answered. Discussed and examined with Dr. Vamshi Shirley who agrees.

## 2019-01-22 NOTE — PATIENT INSTRUCTIONS
Triamcinolone (On the skin)   Triamcinolone (trye-am-SIN-oh-lone)  Treats skin itching, swelling, and other discomfort. This medicine is a corticosteroid. Brand Name(s): DermaSilkRx SDS Frank, DermacinRx SilaPak, DermacinRx [employer], Dermasorb TA Complete Kit, Dermazone, MARTTILA, [facility], NuTriaRx, [place], [place], [place], [place] TDPak, [place] Trilasil Frank   There may be other brand names for this medicine. When This Medicine Should Not Be Used: You should not use this medicine if you have had an allergic reaction to triamcinolone. How to Use This Medicine:   Cream, Lotion, Ointment, Spray  · Your doctor will tell you how much medicine to use. Do not use more than directed. · Use this medicine only on your skin. Rinse it off right away if it gets on a cut or scrape. Do not get the medicine in your eyes, nose, or mouth. · If you or your child are using the spray form on or near the face, protect your nose to avoid breathing it in and make sure that your eyes are covered. · Do not use this medicine on the face, neck, groin, or underarms unless directed to do so by your doctor. · Wash your hands with soap and water before and after you use this medicine. · Apply a thin layer of the medicine to the affected area. Rub it in gently. · Do not cover the treated area with a bandage unless directed by your doctor. · If the medicine is applied to the diaper area of an infant, do not use tight-fitting diapers or plastic pants unless directed to do so by your doctor. · The spray form is flammable until it dries on the skin. Do not use it near heat, an open flame, or while smoking. Do not puncture, break, or burn the aerosol can. · Read and follow the patient instructions that come with this medicine. Talk to your doctor or pharmacist if you have any questions. If a dose is missed:   · Apply a dose as soon as you can.  If it is almost time for your next dose, wait until then and apply a regular dose. Do not apply extra medicine to make up for a missed dose. How to Store and Dispose of This Medicine:   · Store the medicine in a closed container at room temperature, away from heat, moisture, and direct light. · Ask your pharmacist or doctor how to dispose of the medicine container and any leftover or  medicine. · Keep all medicine out of the reach of children. Never share your medicine with anyone. Drugs and Foods to Avoid:   Ask your doctor or pharmacist before using any other medicine, including over-the-counter medicines, vitamins, and herbal products. · Do not put cosmetics or skin care products on the treated skin. · Do not use this medication with other corticosteroid (eg, hydrocortisone) containing products without checking with your doctor first.  Warnings While Using This Medicine:   · Make sure your doctor knows if you are pregnant or breastfeeding, or if you have diabetes, glaucoma, increased pressure in the head, skin infection or problems, or an adrenal problem called Cushing's syndrome. · Using too much of this medicine or using it for a long time may increase your risk of having adrenal gland problems. The risk is greater for children and patients who use large amounts for a long time. Talk to your doctor right away if you or your child have more than one of these symptoms while you are using this medicine: blurred vision; dizziness or fainting; a fast, uneven, or pounding heartbeat; increased thirst or urination; irritability; or unusual tiredness or weakness. · Stop using this medicine and check with your doctor right away if you or your child have a skin rash, burning, stinging, swelling, or irritation on the skin. · You should not use this medicine for your child without a doctor's approval.  · Do not use this medicine to treat a skin problem your doctor has not examined. · Call your doctor if your symptoms do not improve or if they get worse.   · Your doctor will check your progress and the effects of this medicine at regular visits. Keep all appointments. Blood and urine tests may be needed to check for unwanted effects. Possible Side Effects While Using This Medicine:   Call your doctor right away if you notice any of these side effects:  · Allergic reaction: Itching or hives, swelling in your face or hands, swelling or tingling in your mouth or throat, chest tightness, trouble breathing  · Itching, flaking, or dryness of the treated skin area. · Severe burning, pain, redness, swelling, or irritation of the treated skin areas. · Symptoms of skin infection such as redness, swelling, drainage, or pus. If you notice these less serious side effects, talk with your doctor:   · Acne or tiny pimples on the skin. · Changes in the color of the treated skin. · Excessive hair growth. · Itching and redness around your lips. · Mild burning, dryness, irritation, redness, or itching. · Mild, temporary stinging. · Raised spots on the skin. · Thinning of the skin or bruising. If you notice other side effects that you think are caused by this medicine, tell your doctor. Call your doctor for medical advice about side effects. You may report side effects to FDA at 9-205-FDA-9321  © 2017 [facility] Information is for End User's use only and may not be sold, redistributed or otherwise used for commercial purposes. The above information is an  only. It is not intended as medical advice for individual conditions or treatments. Talk to your doctor, nurse or pharmacist before following any medical regimen to see if it is safe and effective for you.

## 2019-01-23 NOTE — PROGRESS NOTES
I reviewed the patient's medical history, the resident's findings on physical examination, the patient's diagnoses, and treatment plan as documented in the resident note. I saw the patient with the resident reviewing history and physical findings in person and participating in assessment and patient instruction. I concur with the treatment plan as documented.

## 2019-02-07 ENCOUNTER — OFFICE VISIT (OUTPATIENT)
Dept: FAMILY MEDICINE CLINIC | Age: 59
End: 2019-02-07

## 2019-02-07 VITALS
OXYGEN SATURATION: 99 % | TEMPERATURE: 97.7 F | HEIGHT: 68 IN | WEIGHT: 162 LBS | HEART RATE: 72 BPM | BODY MASS INDEX: 24.55 KG/M2 | DIASTOLIC BLOOD PRESSURE: 71 MMHG | RESPIRATION RATE: 16 BRPM | SYSTOLIC BLOOD PRESSURE: 137 MMHG

## 2019-02-07 DIAGNOSIS — E11.36 TYPE 2 DIABETES MELLITUS WITH DIABETIC CATARACT, WITH LONG-TERM CURRENT USE OF INSULIN (HCC): Primary | ICD-10-CM

## 2019-02-07 DIAGNOSIS — Z79.4 TYPE 2 DIABETES MELLITUS WITH DIABETIC CATARACT, WITH LONG-TERM CURRENT USE OF INSULIN (HCC): Primary | ICD-10-CM

## 2019-02-07 DIAGNOSIS — F32.9 REACTIVE DEPRESSION: ICD-10-CM

## 2019-02-07 RX ORDER — SYRINGE AND NEEDLE,INSULIN,1ML 31GX15/64"
1 SYRINGE, EMPTY DISPOSABLE MISCELLANEOUS 3 TIMES DAILY
Qty: 90 PEN NEEDLE | Refills: 3 | Status: SHIPPED | OUTPATIENT
Start: 2019-02-07 | End: 2019-11-22 | Stop reason: SDUPTHER

## 2019-02-07 NOTE — PROGRESS NOTES
Chief Complaint   Patient presents with    Physical     PRE OP PE   LASIK SURGERY 02/28/19    [address]    BILATERAL EYES        Health Maintenance Due   Topic    Hepatitis C Screening     FOOT EXAM Q1     EYE EXAM RETINAL OR DILATED     Shingrix Vaccine Age 50> (1 of 2)    FOBT Q 1 YEAR AGE 50-75        Wt Readings from Last 3 Encounters:   02/07/19 162 lb (73.5 kg)   01/22/19 162 lb (73.5 kg)   12/18/18 156 lb 8 oz (71 kg)     Temp Readings from Last 3 Encounters:   02/07/19 97.7 °F (36.5 °C) (Oral)   01/22/19 98 °F (36.7 °C) (Oral)   12/18/18 97.5 °F (36.4 °C) (Oral)     BP Readings from Last 3 Encounters:   02/07/19 137/71   01/22/19 142/80   12/18/18 156/81     Pulse Readings from Last 3 Encounters:   02/07/19 72   01/22/19 78   12/18/18 75         Learning Assessment:  :     Learning Assessment 1/22/2019 11/14/2018   PRIMARY LEARNER Patient Patient   HIGHEST LEVEL OF EDUCATION - PRIMARY LEARNER  - GRADUATED HIGH SCHOOL OR GED   PRIMARY LANGUAGE ENGLISH ENGLISH   LEARNER PREFERENCE PRIMARY VIDEOS DEMONSTRATION   ANSWERED BY patinet self   RELATIONSHIP SELF SELF       Depression Screening:  :     PHQ over the last two weeks 1/22/2019   Little interest or pleasure in doing things Several days   Feeling down, depressed, irritable, or hopeless Nearly every day   Total Score PHQ 2 4   Trouble falling or staying asleep, or sleeping too much More than half the days   Feeling tired or having little energy Nearly every day   Poor appetite, weight loss, or overeating Nearly every day   Feeling bad about yourself - or that you are a failure or have let yourself or your family down Nearly every day   Trouble concentrating on things such as school, work, reading, or watching TV Nearly every day   Moving or speaking so slowly that other people could have noticed; or the opposite being so fidgety that others notice Nearly every day   Thoughts of being better off dead, or hurting yourself in some way Nearly every day   PHQ 9 Score 24   How difficult have these problems made it for you to do your work, take care of your home and get along with others Somewhat difficult       Fall Risk Assessment:  :     No flowsheet data found. Abuse Screening:  :     Abuse Screening Questionnaire 1/22/2019   Do you ever feel afraid of your partner? N   Are you in a relationship with someone who physically or mentally threatens you? N   Is it safe for you to go home? Y       Coordination of Care Questionnaire:  :     1) Have you been to an emergency room, urgent care clinic since your last visit? NO   Hospitalized since your last visit? NO             2) Have you seen or consulted any other health care providers outside of [address] [place] [address] since your last visit? NO  (Include any pap smears or colon screenings in this section.) NO    Patient is accompanied by son I have received verbal consent from Sara Pretty to discuss any/all medical information while they are present in the room.

## 2019-02-07 NOTE — PATIENT INSTRUCTIONS
Hemoglobin A1c: About This Test  What is it? Hemoglobin A1c is a blood test that checks your average blood sugar level over the past 2 to 3 months. This test also is called a glycohemoglobin test or an A1c test.  Why is this test done? The A1c test is done to check how well your diabetes has been controlled over the past 2 to 3 months. Your doctor can use this information to adjust your medicine and diabetes treatment, if needed. This test is also one of the tests used to diagnose diabetes in adults. How can you prepare for the test?  You don't need to stop eating before you have an A1c test. This test can be done at any time during the day, even after a meal.  What happens during the test?  The health professional taking a sample of your blood will:  · Wrap an elastic band around your upper arm. This makes the veins below the band larger so it is easier to put a needle into the vein. · Clean the needle site with alcohol. · Put the needle into the vein. · Attach a tube to the needle to fill it with blood. · Remove the band from your arm when enough blood is collected. · Put a gauze pad or cotton ball over the needle site as the needle is removed. · Put pressure on the site and then put on a bandage. What else should you know about the test?  The test result is usually given as a percentage. The normal A1c is less than 5.7%. You have a higher risk for diabetes if your A1c is 5.7% to 6.4%. If your level is 6.5% or higher, you have diabetes. The A1c test result also can be used to find your estimated average glucose, or eAG. Your eAG and A1c show the same thing in two different ways. They both help you learn more about your average blood sugar range over the past 2 to 3 months. A1c is shown as a percentage, while eAG uses the same units (mg/dl) as your glucose meter.   Examples:  · 6% A1c = 126 mg/dL  · 7% A1c = 154 mg/dL  · 8% A1c = 183 mg/dL  · 9% A1c = 212 mg/dL  · 10% A1c = 240 mg/dL  · 11% A1c = 269 mg/dL  · 12% A1c = 298 mg/dL  Where can you learn more? Go to http://laura-joey.info/. Enter U216 in the search box to learn more about \"Hemoglobin A1c: About This Test.\"  Current as of: July 25, 2018  Content Version: 11.9  © 7675-1693 [employer]. Care instructions adapted under license by [employer] (which disclaims liability or warranty for this information). If you have questions about a medical condition or this instruction, always ask your healthcare professional. [address] any warranty or liability for your use of this information.

## 2019-02-07 NOTE — PROGRESS NOTES
2/7/2019  Chief Complaint   Patient presents with    Physical     PRE OP PE   LEFT CATARACT SURGERY 02/28/19    [facility]         HPI:   Edelmira Vyas is a 62 y.o. male referred for evaluation by: Dr. Amilcar Davis for Pre- Op Evaluation. Please see encounter details and orders for consultative summary. Type of surgery and indication: Left cataract  Surgery site : left eye  Anesthesia type: regional  Date of procedure:  2/28/19    He has known depression treated with zoloft 150mg daily. He did go to [address] [place] [address] for counseling. He says he has seen them twice and he thinks it did help some. He plans to continue going there for counseling. He and his son both believe his depression symptoms have improved. Symptoms include sleep disturbance, feeling down, anhedonia. He does get fleeting suicidal thoughts; however, he adamantly denies to me any active suicidal or homicidal ideations. He says he has no plans to hurt himself and has not had any plans to hurt himself. He attributes much of his depression to his poor sight from his cataracts which he is hopeful will improve after his surgery. Review of Systems   Review of Systems   Constitutional: Negative for chills, fever and weight loss. Respiratory: Negative for cough and shortness of breath. Cardiovascular: Negative for chest pain, palpitations and leg swelling. Gastrointestinal: Negative for abdominal pain, nausea and vomiting. Genitourinary: Negative for dysuria. Skin: Negative for rash. Neurological: Negative for dizziness.        Inherent Risk of Surgery   Surgical risk:  Low  Low:  Cataract, breast, dental, endoscopy, superficial  Intermediate:  Orthopedic, abdominal, thoracic, carotid endarterctomy, prostate, head and neck  High:  Vascular, aortic, emergent, high risk of blood loss, anticipated prolonged    Patient Cardiac Risk Assessment   Revised Cardiac Risk Index (RCRI)  High Risk Surgery  Hx of Heart Failure  Hx of ischemic heart disease  Hx of CVD  DM requiring insulin therapy  Preoperative serum Cr >2.0 mg/dl    Rate if cardiac death, nonfatal MI, nonfatal cardiac arrest by number of risk factor  None - 0.4%  1  1.0%  2 2.4%  3+ 5.4%    RALPH/AHA 2007 Guidelines:   1) Surgery Emergency, Non-cardiac -> to surgery  2) If not, look at clinical predictors  Major Intermediate Minor   Unstable CAD (recent MI, severe angina) Mild angina Advanced Age   Decompensated CHF Prior MI Abnormal EKG   Severe Valvular Disease Compensated/Prior CHF Rhythm other than sinus   Arrhythmias (AV block, uncontrolled vent rate, sxs) Diabetes Low METS (<4)    Renal Insuficiency Hx of CVA     Uncontrolled HTN       METS     <4 METS >4 METS   Care for self Climb a flight of stairs or a hill   Walk indoors around housse Walk on level ground at 4 mph   Walk 2-3 blocks on level ground (2-3 mph) Run a short distance   Light work around house (dust, dishes) Heavy work around house (scrub floors, move furniture)       Other Risk Factors:   Screening for ETOH use:  Done and low risk  Smoking status:  Former smoker    Personal or FH of bleeding problems:  no  Personal or FH of blood clots:  no  Personal or FH of anesthesia problems:  no    Pulmonary Risk:  Asthma or COPD:  no  Surgery close to diaphragm:  no  Known WILFRED:  no  BUN normal      Past Medical, Surgical, Social History   Allergies: No Known Allergies  Latex allergy: no  Prior reactions to anesthesia:  None      Current Outpatient Medications   Medication Sig    sertraline (ZOLOFT) 100 mg tablet Take 1.5 Tabs by mouth daily.  triamcinolone acetonide (KENALOG) 0.1 % topical cream Apply  to affected area two (2) times a day. use thin layer    hydroCHLOROthiazide (HYDRODIURIL) 12.5 mg tablet Take 2 Tabs by mouth daily.  metFORMIN (GLUCOPHAGE) 1,000 mg tablet Take 1 Tab by mouth two (2) times daily (with meals).  rosuvastatin (CRESTOR) 10 mg tablet Take 1 Tab by mouth nightly.     amLODIPine (NORVASC) 10 mg tablet Take 1 Tab by mouth daily.  gabapentin (NEURONTIN) 300 mg capsule Take 1 Cap by mouth three (3) times daily.  insulin glargine (LANTUS U-100 INSULIN) 100 unit/mL injection Take 16 units daily    lisinopril (PRINIVIL, ZESTRIL) 40 mg tablet Take 1 Tab by mouth daily. No current facility-administered medications for this visit. 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, with long-term current use of insulin ([facility] 11/14/2018    Diabetes ([facility]      Past Surgical History:   Procedure Laterality Date    HX OTHER SURGICAL      5th toe Metatarsal amputation 12/2017      Social History     Tobacco Use    Smoking status: Never Smoker    Smokeless tobacco: Never Used   Substance Use Topics    Alcohol use: No     Frequency: Never    Drug use: No       Objective     Vitals:    02/07/19 0828   BP: 137/71   Pulse: 72   Resp: 16   Temp: 97.7 °F (36.5 °C)   TempSrc: Oral   SpO2: 99%   Weight: 162 lb (73.5 kg)   Height: 5' 8\" (1.727 m)       Constitutional:  Appears well,  No Acute Distress, Vitals noted  Psychiatric:   Affect normal, Alert and Oriented to person/place/time    Eyes:   Pupils equally round and reactive, EOMI, conjunctiva clear, eyelids normal  ENT:   External ears and nose normal/lips, teeth=OK/gums normal, TMs and Orophyarynx normal  Neck:   general inspection and Thyroid normal.  No abnormal cervical or supraclavicular nodes    Lungs:   clear to auscultation, good respiratory effort  Heart: Ausculation normal.  Regular rhythm. No cardiac murmurs.   No carotid bruits or palpable thrills  Chest wall normal    Extremities:   without edema, good peripheral pulses  Skin:   Warm to palpation, without rashes, bruising, or suspicious lesions       Assessment an PLan     Assessment/Plan:     1) Preoperative Clearance  Per RCRI, the patient has a 1.0% risk of cardiac death, nonfatal MI, nonfatal cardiac arrest based on risk factors of diabetes requiring insulin therapy. Per ACC/AHA guidelines, patient is intermediate risk for a(n) low risk surgery. His diabetes and is not under control at last check; will check A1c to better determine surgical risk. 2) Depression  Subjectively improving. Is in counseling now. Instructed to continue his zoloft and counseling. He adamantly denies active suicidal thoughts or plans; I have instructed him to go to the ER should this change in any way. Follow up in 2 weeks. Diagnoses, tests, plan, and follow up discussed with patient. I have given to and reviewed with the patient the after visit summary. Patient expressed agreement and understanding. All questions were answered. Discussed with Dr. Evelyn Head.

## 2019-02-08 ENCOUNTER — TELEPHONE (OUTPATIENT)
Dept: FAMILY MEDICINE CLINIC | Age: 59
End: 2019-02-08

## 2019-02-08 LAB
EST. AVERAGE GLUCOSE BLD GHB EST-MCNC: 232 MG/DL
HBA1C MFR BLD: 9.7 % (ref 4.8–5.6)

## 2019-02-08 NOTE — TELEPHONE ENCOUNTER
A1c is up. Increase Lantus from 16 units to 19 units daily. Call me in 3 days with fasting sugars from now until then. He understands and agrees. He was appreciative of the call.

## 2019-02-19 ENCOUNTER — TELEPHONE (OUTPATIENT)
Dept: FAMILY MEDICINE CLINIC | Age: 59
End: 2019-02-19

## 2019-02-19 NOTE — TELEPHONE ENCOUNTER
Called patient. In the morning fasting sugars are 220, 240 though he did have one in the 140s. He denies episodes of hypoglycemia. Increase lantus from 19 units to 22 units daily. Reviewed hypoglycemia and what to do should this occur. Emphasized importance of diabetic diet. Asked him to call me in two days with fasting sugars. Emphasized sugars need to be better to decrease surgical risk. He understands and agrees and was appreciative of the call.

## 2019-02-20 NOTE — PROGRESS NOTES
CC: diabetes    History of Present Illness:  Kate Correa is a 62 y.o. male. Has diabetes treated with metformin 1000mg bid and Lantus. Have been titrating lantus to get better diabetic control. Two days ago, Lantus was increased to 22 units daily. Since then his fasting sugars have been 267 and 241. He denies episodes of hypoglycemia. Has eye surgery scheduled for 2/28/19. He has HTN treated with HCTZ 25mg, amlodipine 10mg, lisinopril 40mg. BP is a little up today at 162/84. He tells me at he is only taking 12.5mg of of the HCTZ. He has depression treated with zoloft 150mg daily. He tells me he is only taking 100mg of the sertraline. He says his depression is doing better. He sees a counselor every two weeks which is helping. He denies suicidal and homicidal thoughts; he denies any plans to hurt himself or anyone else. Symptoms include feeling down, sleep disturbance, decreased energy, trouble concentrating. PHQ-9 on 1/22/19 was 19. Today, it is 18. No Known Allergies  Current Outpatient Medications   Medication Sig Dispense Refill    insulin syringe-needle U-100 (BD INSULIN SYRINGE ULTRA-FINE) 0.3 mL 31 gauge x 15/64\" syrg 1 Units by Does Not Apply route three (3) times daily. 90 Pen Needle 3    sertraline (ZOLOFT) 100 mg tablet Take 1.5 Tabs by mouth daily. 90 Tab 1    triamcinolone acetonide (KENALOG) 0.1 % topical cream Apply  to affected area two (2) times a day. use thin layer 30 g 0    hydroCHLOROthiazide (HYDRODIURIL) 12.5 mg tablet Take 2 Tabs by mouth daily. 90 Tab 1    metFORMIN (GLUCOPHAGE) 1,000 mg tablet Take 1 Tab by mouth two (2) times daily (with meals). 180 Tab 1    rosuvastatin (CRESTOR) 10 mg tablet Take 1 Tab by mouth nightly. 90 Tab 1    amLODIPine (NORVASC) 10 mg tablet Take 1 Tab by mouth daily. 90 Tab 1    gabapentin (NEURONTIN) 300 mg capsule Take 1 Cap by mouth three (3) times daily.  270 Cap 1    insulin glargine (LANTUS U-100 INSULIN) 100 unit/mL injection Take 22 units daily 1 Vial 2    lisinopril (PRINIVIL, ZESTRIL) 40 mg tablet Take 1 Tab by mouth daily. 90 Tab 1     Patient Active Problem List   Diagnosis Code    Essential hypertension I10    Reactive depression F32.9    Peripheral neuropathy G62.9    Type 2 diabetes mellitus with ophthalmic complication, with long-term current use of insulin ([facility] E11.39, Z79.4       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, with long-term current use of insulin ([facility] 11/14/2018    Diabetes ([facility]      Past Surgical History:   Procedure Laterality Date    HX OTHER SURGICAL      5th toe Metatarsal amputation 12/2017      Social History     Socioeconomic History    Marital status:      Spouse name: Not on file    Number of children: Not on file    Years of education: Not on file    Highest education level: Not on file   Tobacco Use    Smoking status: Never Smoker    Smokeless tobacco: Never Used   Substance and Sexual Activity    Alcohol use: No     Frequency: Never    Drug use: No    Sexual activity: Not Currently     Family History   Problem Relation Age of Onset    Diabetes Mother     No Known Problems Father          Review of Systems   Constitutional: Negative for chills and fever. Respiratory: Negative for shortness of breath. Cardiovascular: Negative for chest pain. Neurological: Negative for dizziness and tremors. Endo/Heme/Allergies: Negative for polydipsia. Psychiatric/Behavioral: Positive for depression. Negative for suicidal ideas. Physical Exam:  Visit Vitals  /82 (BP 1 Location: Left arm, BP Patient Position: Sitting)   Pulse 72   Temp 97.6 °F (36.4 °C) (Oral)   Resp 18   Ht 5' 8\" (1.727 m)   Wt 162 lb (73.5 kg)   SpO2 99%   BMI 24.63 kg/m²     Physical Exam   Constitutional: No distress. Cardiovascular: Normal rate, regular rhythm and normal heart sounds.    Pulmonary/Chest: Effort normal and breath sounds normal. Psychiatric: He has a normal mood and affect. PHQ-9: 18    Assessment/Plan:  1. Essential hypertension  Uncontrolled, though he has not been taking his HCTZ as prescribed. Instructed to take 25mg daily. - hydroCHLOROthiazide (HYDRODIURIL) 25 mg tablet; Take 1 Tab by mouth daily. Dispense: 90 Tab; Refill: 1    2. Depression, unspecified depression type  Subjectively improving with the counseling; PHQ-9 slightly better at 18 from 19. He has not been taking the zoloft correctly; instructed to take 150mg daily and to continue with counseling.   - sertraline (ZOLOFT) 100 mg tablet; Take 1.5 Tabs by mouth daily. Dispense: 135 Tab; Refill: 1    3. Uncontrolled type 2 diabetes mellitus with hyperglycemia (HCC)  Increase Lantus to 25 units daily. If fasting sugars are above 120 in three days, increase Lantus by 2 units. Call on Monday with fasting sugars and Lantus dose. Discussed the risks of surgery with uncontrolled blood sugars, including infection, complications, and increased risk of recurrence; he understands. - insulin glargine (LANTUS U-100 INSULIN) 100 unit/mL injection; Take 25 units daily  Dispense: 1 Vial; Refill: 2        Follow-up Disposition:  Return in about 4 weeks (around 3/21/2019). Diagnoses, tests, plan, and follow up discussed with patient. I have given to and reviewed with the patient the after visit summary. I have reviewed medication side effects and precautions. Patient expressed agreement and understanding. All questions were answered.      Discussed with Dr. Jonnathan Nicole.

## 2019-02-21 ENCOUNTER — OFFICE VISIT (OUTPATIENT)
Dept: FAMILY MEDICINE CLINIC | Age: 59
End: 2019-02-21

## 2019-02-21 VITALS
TEMPERATURE: 97.6 F | OXYGEN SATURATION: 99 % | SYSTOLIC BLOOD PRESSURE: 160 MMHG | HEIGHT: 68 IN | DIASTOLIC BLOOD PRESSURE: 82 MMHG | WEIGHT: 162 LBS | HEART RATE: 72 BPM | RESPIRATION RATE: 18 BRPM | BODY MASS INDEX: 24.55 KG/M2

## 2019-02-21 DIAGNOSIS — E11.65 UNCONTROLLED TYPE 2 DIABETES MELLITUS WITH HYPERGLYCEMIA (HCC): ICD-10-CM

## 2019-02-21 DIAGNOSIS — F32.A DEPRESSION, UNSPECIFIED DEPRESSION TYPE: ICD-10-CM

## 2019-02-21 DIAGNOSIS — I10 ESSENTIAL HYPERTENSION: Primary | ICD-10-CM

## 2019-02-21 RX ORDER — INSULIN GLARGINE 100 [IU]/ML
INJECTION, SOLUTION SUBCUTANEOUS
Qty: 1 VIAL | Refills: 2
Start: 2019-02-21 | End: 2019-04-03 | Stop reason: SDUPTHER

## 2019-02-21 RX ORDER — SERTRALINE HYDROCHLORIDE 100 MG/1
150 TABLET, FILM COATED ORAL DAILY
Qty: 135 TAB | Refills: 1 | Status: SHIPPED | OUTPATIENT
Start: 2019-02-21 | End: 2020-05-12 | Stop reason: SDUPTHER

## 2019-02-21 RX ORDER — HYDROCHLOROTHIAZIDE 25 MG/1
25 TABLET ORAL DAILY
Qty: 90 TAB | Refills: 1 | Status: SHIPPED | OUTPATIENT
Start: 2019-02-21 | End: 2020-08-07 | Stop reason: SDUPTHER

## 2019-02-21 NOTE — PROGRESS NOTES
Tita Severin is a 62 y.o. male    Chief Complaint   Patient presents with    Diabetes     follow up     Hypertension    Depression     reactive        1. Have you been to the ER, urgent care clinic since your last visit? Hospitalized since your last visit? No  M  2. Have you seen or consulted any other health care providers outside of the [address] [place] [address] since your last visit? Include any pap smears or colon screening. No      Visit Vitals  /82 (BP 1 Location: Left arm, BP Patient Position: Sitting)   Pulse 72   Temp 97.6 °F (36.4 °C) (Oral)   Resp 18   Ht 5' 8\" (1.727 m)   Wt 162 lb (73.5 kg)   SpO2 99%   BMI 24.63 kg/m²           Health Maintenance Due   Topic Date Due    Hepatitis C Screening  1960    FOOT EXAM Q1  10/26/1970    EYE EXAM RETINAL OR DILATED  10/26/1970    Shingrix Vaccine Age 50> (1 of 2) 10/26/2010    FOBT Q 1 YEAR AGE 50-75  10/26/2010         Medication Reconciliation completed, changes noted.   Please  Update medication list.

## 2019-02-21 NOTE — PROGRESS NOTES
[address] Medicine Residency Attending Addendum:  I saw and evaluated the patient on the day of the encounter with Evangelina Martin MD, performing the key elements of the service. I discussed the findings, assessment and plan with the resident and agree with the resident's findings and plan as documented in the resident's note.       Martine Schaumann, MD, CAQSM, RMSK

## 2019-02-21 NOTE — PATIENT INSTRUCTIONS
If fasting blood sugars are above 120 in three days, then increase Lantus by 2 units. Call on Monday with your blood sugars. Learning About Diabetes Food Guidelines  Your Care Instructions    Meal planning is important to manage diabetes. It helps keep your blood sugar at a target level (which you set with your doctor). You don't have to eat special foods. You can eat what your family eats, including sweets once in a while. But you do have to pay attention to how often you eat and how much you eat of certain foods. You may want to work with a dietitian or a certified diabetes educator (CDE) to help you plan meals and snacks. A dietitian or CDE can also help you lose weight if that is one of your goals. What should you know about eating carbs? Managing the amount of carbohydrate (carbs) you eat is an important part of healthy meals when you have diabetes. Carbohydrate is found in many foods. · Learn which foods have carbs. And learn the amounts of carbs in different foods. ? Bread, cereal, pasta, and rice have about 15 grams of carbs in a serving. A serving is 1 slice of bread (1 ounce), ½ cup of cooked cereal, or 1/3 cup of cooked pasta or rice. ? Fruits have 15 grams of carbs in a serving. A serving is 1 small fresh fruit, such as an apple or orange; ½ of a banana; ½ cup of cooked or canned fruit; ½ cup of fruit juice; 1 cup of melon or raspberries; or 2 tablespoons of dried fruit. ? Milk and no-sugar-added yogurt have 15 grams of carbs in a serving. A serving is 1 cup of milk or 2/3 cup of no-sugar-added yogurt. ? Starchy vegetables have 15 grams of carbs in a serving. A serving is ½ cup of mashed potatoes or sweet potato; 1 cup winter squash; ½ of a small baked potato; ½ cup of cooked beans; or ½ cup cooked corn or green peas. · Learn how much carbs to eat each day and at each meal. A dietitian or CDE can teach you how to keep track of the amount of carbs you eat.  This is called carbohydrate counting. · If you are not sure how to count carbohydrate grams, use the Plate Method to plan meals. It is a good, quick way to make sure that you have a balanced meal. It also helps you spread carbs throughout the day. ? Divide your plate by types of foods. Put non-starchy vegetables on half the plate, meat or other protein food on one-quarter of the plate, and a grain or starchy vegetable in the final quarter of the plate. To this you can add a small piece of fruit and 1 cup of milk or yogurt, depending on how many carbs you are supposed to eat at a meal.  · Try to eat about the same amount of carbs at each meal. Do not \"save up\" your daily allowance of carbs to eat at one meal.  · Proteins have very little or no carbs per serving. Examples of proteins are beef, chicken, turkey, fish, eggs, tofu, cheese, cottage cheese, and peanut butter. A serving size of meat is 3 ounces, which is about the size of a deck of cards. Examples of meat substitute serving sizes (equal to 1 ounce of meat) are 1/4 cup of cottage cheese, 1 egg, 1 tablespoon of peanut butter, and ½ cup of tofu. How can you eat out and still eat healthy? · Learn to estimate the serving sizes of foods that have carbohydrate. If you measure food at home, it will be easier to estimate the amount in a serving of restaurant food. · If the meal you order has too much carbohydrate (such as potatoes, corn, or baked beans), ask to have a low-carbohydrate food instead. Ask for a salad or green vegetables. · If you use insulin, check your blood sugar before and after eating out to help you plan how much to eat in the future. · If you eat more carbohydrate at a meal than you had planned, take a walk or do other exercise. This will help lower your blood sugar. What else should you know? · Limit saturated fat, such as the fat from meat and dairy products. This is a healthy choice because people who have diabetes are at higher risk of heart disease.  So choose lean cuts of meat and nonfat or low-fat dairy products. Use olive or canola oil instead of butter or shortening when cooking. · Don't skip meals. Your blood sugar may drop too low if you skip meals and take insulin or certain medicines for diabetes. · Check with your doctor before you drink alcohol. Alcohol can cause your blood sugar to drop too low. Alcohol can also cause a bad reaction if you take certain diabetes medicines. Follow-up care is a key part of your treatment and safety. Be sure to make and go to all appointments, and call your doctor if you are having problems. It's also a good idea to know your test results and keep a list of the medicines you take. Where can you learn more? Go to http://laura-joey.info/. Enter M233 in the search box to learn more about \"Learning About Diabetes Food Guidelines. \"  Current as of: July 25, 2018  Content Version: 11.9  © 4781-0296 [employer]. Care instructions adapted under license by [employer] (which disclaims liability or warranty for this information). If you have questions about a medical condition or this instruction, always ask your healthcare professional. [place] [address] any warranty or liability for your use of this information.

## 2019-03-20 NOTE — PROGRESS NOTES
Encounter Date:  3/21/2019    CC: rash, diabetes, chest pain    History of Present Illness:  Kate Correa is a 62 y.o. male. He has diabetes and had been titrating up his lantus. He is now taking 27 units daily. Fasting blood sugars have been 90s - 140s. A1c on 2/7/19 was 9.7. He has had two episodes of \"shaky and dizzy\" at which time sugar was 80; he ate some cereal and he felt errol.r     He notes a rash \"all over\" on his arms, chest, back, and legs. It was there yesterday and went away and came back this morning. It is pruritic. He has had it before in the distant past. His wife began using a new laundry detergent two days ago. No fevers, chills, joint pains. He notes some epigastric pain that radiates to his chest intermittently for about 5 days. Has had heartburn problems in the past. Does not notice that it is exacerbated by eating or by certain foods and certain positions. No chest pain or shortness of breath with exertion. He believes his depression is improving. Does have occasional fleeting suicidal thoughts but notes that it only lasts for \"a second and then goes out of my mind. \" He adamantly denies any plans of harming himself. He denies HI. He says he is taking the zoloft 150mg now. He is still seeing the counselor which he thinks is helping. He says going to [employer] with his wife also helps. He thinks once he is able to get his cataract surgery, now rescheduled for 4/11/19, his depression will get even better. He declines antidepressant adjustment today. No Known Allergies  Current Outpatient Medications   Medication Sig Dispense Refill    hydroCHLOROthiazide (HYDRODIURIL) 25 mg tablet Take 1 Tab by mouth daily. 90 Tab 1    sertraline (ZOLOFT) 100 mg tablet Take 1.5 Tabs by mouth daily.  135 Tab 1    insulin glargine (LANTUS U-100 INSULIN) 100 unit/mL injection Take 27 units daily 1 Vial 2    insulin syringe-needle U-100 (BD INSULIN SYRINGE ULTRA-FINE) 0.3 mL 31 gauge x 15/64\" syrg 1 Units by Does Not Apply route three (3) times daily. 90 Pen Needle 3    triamcinolone acetonide (KENALOG) 0.1 % topical cream Apply  to affected area two (2) times a day. use thin layer 30 g 0    metFORMIN (GLUCOPHAGE) 1,000 mg tablet Take 1 Tab by mouth two (2) times daily (with meals). 180 Tab 1    rosuvastatin (CRESTOR) 10 mg tablet Take 1 Tab by mouth nightly. 90 Tab 1    amLODIPine (NORVASC) 10 mg tablet Take 1 Tab by mouth daily. 90 Tab 1    gabapentin (NEURONTIN) 300 mg capsule Take 1 Cap by mouth three (3) times daily. 270 Cap 1    lisinopril (PRINIVIL, ZESTRIL) 40 mg tablet Take 1 Tab by mouth daily. 90 Tab 1     Patient Active Problem List   Diagnosis Code    Essential hypertension I10    Reactive depression F32.9    Peripheral neuropathy G62.9    Type 2 diabetes mellitus with ophthalmic complication, with long-term current use of insulin ([facility] [address] E11.39, Z79.4       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, with long-term current use of insulin ([address] 11/14/2018    Diabetes ([facility] [address]      Past Surgical History:   Procedure Laterality Date    HX OTHER SURGICAL      5th toe Metatarsal amputation 12/2017      Social History     Socioeconomic History    Marital status:      Spouse name: Not on file    Number of children: Not on file    Years of education: Not on file    Highest education level: Not on file   Tobacco Use    Smoking status: Never Smoker    Smokeless tobacco: Never Used   Substance and Sexual Activity    Alcohol use: No     Frequency: Never    Drug use: No    Sexual activity: Not Currently     Family History   Problem Relation Age of Onset    Diabetes Mother     No Known Problems Father          Review of Systems   Constitutional: Negative for chills and fever. Respiratory: Negative for cough, hemoptysis and shortness of breath. Cardiovascular: Positive for chest pain.  Negative for palpitations, orthopnea and leg swelling. Gastrointestinal: Positive for heartburn. Negative for abdominal pain, blood in stool, melena, nausea and vomiting. Musculoskeletal: Negative for joint pain. Skin: Positive for itching and rash. Neurological: Positive for dizziness (see HPI). Negative for tingling, sensory change, speech change, focal weakness, weakness and headaches. Psychiatric/Behavioral: Positive for depression. Physical Exam:  Visit Vitals  /78 (BP 1 Location: Right arm, BP Patient Position: Sitting)   Pulse 80   Temp 97.6 °F (36.4 °C) (Oral)   Resp 16   Ht 5' 8\" (1.727 m)   Wt 163 lb (73.9 kg)   SpO2 100%   BMI 24.78 kg/m²     Physical Exam   Constitutional: He is oriented to person, place, and time. No distress. Cardiovascular: Normal rate, regular rhythm and normal heart sounds. Pulmonary/Chest: Effort normal and breath sounds normal. No respiratory distress. He exhibits no tenderness. Abdominal: Soft. Bowel sounds are normal. He exhibits no distension and no mass. There is no tenderness. There is no rebound and no guarding. Musculoskeletal: He exhibits no edema. Neurological: He is alert and oriented to person, place, and time. Skin: Rash (ertyhematous plaques, some round, some oval, on arms, back, chest, and legs) noted. Psychiatric: He has a normal mood and affect. PHQ-9 :21  EKG: rate 74, normal sinus rhythm, normal axis, normal ST-T segments    Assessment/Plan:  1. Urticaria  Stop new laundry detergent. Cetirizine prn for itching.  - cetirizine (ZYRTEC) 5 mg tablet; Take 1 Tab by mouth daily as needed for Itching. Dispense: 30 Tab; Refill: 0    2. Atypical chest pain  With GERD symptoms. History of GERD. EKG is reassuring. Trial omeprazole with close follow up. ER precautions given. - AMB POC EKG ROUTINE W/ 12 LEADS, INTER & REP    4. Gastroesophageal reflux disease, esophagitis presence not specified  Trial omeprazole.  Avoid spicy foods, chocolate, alcohol.   - Omeprazole delayed release (PRILOSEC D/R) 20 mg tablet; Take 1 Tab by mouth daily. Dispense: 30 Tab; Refill: 0    5. Diabetes  Fasting sugars are improving with some up to the 140s. Two episodes of symptomatic hypoglycemia to the 80s. Will avoid increasing lantus more at this point to avoid hypoglycemia. If hypoglycemia becomes more frequent, decrease Lantus to 26 units and call. Reviewed signs and symptoms of hypoglycemia and what to do should this occur. Advised eating a high-protein snack before bed. 6. Depression  Subjectively improving though PHQ-9 has slightly increased. Still with some fleeting suicidal thoughts but without plans or intentions. He has told me he will not harm himself. I advised him to call 911 or go to the emergency room should this change in any way. Continue with counseling. He declines increase in zoloft at this time. Follow up in 2 weeks. Diagnoses, tests, plan, and follow up discussed with patient. I have given to and reviewed with the patient the after visit summary. I have reviewed medication side effects and precautions. Patient expressed agreement and understanding. All questions were answered. Discussed with Dr. Michael Pritchard.

## 2019-03-21 ENCOUNTER — OFFICE VISIT (OUTPATIENT)
Dept: FAMILY MEDICINE CLINIC | Age: 59
End: 2019-03-21

## 2019-03-21 VITALS
DIASTOLIC BLOOD PRESSURE: 78 MMHG | HEART RATE: 80 BPM | WEIGHT: 163 LBS | RESPIRATION RATE: 16 BRPM | SYSTOLIC BLOOD PRESSURE: 154 MMHG | OXYGEN SATURATION: 100 % | HEIGHT: 68 IN | BODY MASS INDEX: 24.71 KG/M2 | TEMPERATURE: 97.6 F

## 2019-03-21 DIAGNOSIS — F32.9 REACTIVE DEPRESSION: ICD-10-CM

## 2019-03-21 DIAGNOSIS — L50.9 URTICARIA: Primary | ICD-10-CM

## 2019-03-21 DIAGNOSIS — K21.9 GASTROESOPHAGEAL REFLUX DISEASE, ESOPHAGITIS PRESENCE NOT SPECIFIED: ICD-10-CM

## 2019-03-21 DIAGNOSIS — E11.36 TYPE 2 DIABETES MELLITUS WITH DIABETIC CATARACT, WITH LONG-TERM CURRENT USE OF INSULIN (HCC): ICD-10-CM

## 2019-03-21 DIAGNOSIS — R07.89 ATYPICAL CHEST PAIN: ICD-10-CM

## 2019-03-21 DIAGNOSIS — R19.8 SYMPTOMS OF GASTROESOPHAGEAL REFLUX: ICD-10-CM

## 2019-03-21 DIAGNOSIS — Z79.4 TYPE 2 DIABETES MELLITUS WITH DIABETIC CATARACT, WITH LONG-TERM CURRENT USE OF INSULIN (HCC): ICD-10-CM

## 2019-03-21 RX ORDER — CETIRIZINE HYDROCHLORIDE 5 MG/1
5 TABLET ORAL
Qty: 30 TAB | Refills: 0
Start: 2019-03-21 | End: 2022-02-07

## 2019-03-21 RX ORDER — PHENOL/SODIUM PHENOLATE
20 AEROSOL, SPRAY (ML) MUCOUS MEMBRANE DAILY
Qty: 30 TAB | Refills: 0
Start: 2019-03-21 | End: 2019-04-25 | Stop reason: ALTCHOICE

## 2019-04-02 NOTE — PROGRESS NOTES
4/3/2019  Chief Complaint   Patient presents with    Diabetes     FOLLOW UP ON DIABETES. NEXT WEEK HAVING EYE SURGERY. DR BISHOP       HPI:   Cintia Solorzano is a 62 y.o. male referred for evaluation by:Dr. Urban El for Pre- Op Evaluation. Please see encounter details and orders for consultative summary. Type of surgery and indication: left cataract   Surgery site : left eye  Anesthesia type: regional  Date of procedure:  4/11/19    Has diabetes. Takes Lantus 27 units daily. Last A1c on 2/7/19 was 9.7. Fasting glucoses at home have been 180 on average. No episodes of hypoglycemia. At last visit on 3/21/19 was started on omeprazole for GERD symptoms. Since then his symptoms have improved. Has depression treated with zoloft. He says it is doing \"pretty good. \" Is seeing counselor. Some fleeting suicidal thoughts but no plans. Protective factors include family. He goes to [employer] which he says is helping his depression. PHQ-9 on 3/21/19 was 21. He notes two wounds on his left anterior shin. One he hit on something. THe other he is unsure how it got there. Present for about 2 weeks. Has been using kenalog cream. No fevers, chills, surrounding redness, warmth, drainage. Review of Systems   Review of Systems   Constitutional: Negative for chills and fever. Respiratory: Negative for shortness of breath. Cardiovascular: Negative for chest pain, palpitations and leg swelling. Skin:        Wounds on left shin   Neurological: Negative for dizziness. Psychiatric/Behavioral: Positive for depression.        Inherent Risk of Surgery   Surgical risk:  low      Patient Cardiac Risk Assessment   Revised Cardiac Risk Index (RCRI)  High Risk Surgery  Hx of Heart Failure  Hx of ischemic heart disease  Hx of CVD  DM requiring insulin therapy  Preoperative serum Cr >2.0 mg/dl    Rate if cardiac death, nonfatal MI, nonfatal cardiac arrest by number of risk factor  None - 0.4%  1  1.0%  2 2.4%  3+ 5.4%    RALPH/AHA 2007 Guidelines:   1) Surgery Emergency, Non-cardiac -> to surgery  2) If not, look at clinical predictors  Major Intermediate Minor   Unstable CAD (recent MI, severe angina) Mild angina Advanced Age   Decompensated CHF Prior MI Abnormal EKG   Severe Valvular Disease Compensated/Prior CHF Rhythm other than sinus   Arrhythmias (AV block, uncontrolled vent rate, sxs) Diabetes Low METS (<4)    Renal Insuficiency Hx of CVA     Uncontrolled HTN       METS     <4 METS >4 METS   Care for self Climb a flight of stairs or a hill   Walk indoors around housse Walk on level ground at 4 mph   Walk 2-3 blocks on level ground (2-3 mph) Run a short distance   Light work around house (dust, dishes) Heavy work around house (scrub floors, move furniture)     Prior cardiac evaluation:   EKG 3/21/19: rate 74, normal sinus rhythm, normal axis, normal ST-T segments    Other Risk Factors:   Screening for ETOH use:  Done and low risk  Smoking status:  nonsmoker    Personal or FH of bleeding problems:  no  Personal or FH of blood clots:  no  Personal or FH of anesthesia problems:  no    Pulmonary Risk:  Asthma or COPD:  no  Surgery close to diaphragm:  no  Known WILFRED:  no        Past Medical, Surgical, Social History   Allergies: No Known Allergies  Latex allergy: no  Prior reactions to anesthesia:  None      Current Outpatient Medications   Medication Sig    insulin glargine (LANTUS U-100 INSULIN) 100 unit/mL injection Take 27 units daily    cetirizine (ZYRTEC) 5 mg tablet Take 1 Tab by mouth daily as needed for Itching.  Omeprazole delayed release (PRILOSEC D/R) 20 mg tablet Take 1 Tab by mouth daily.  hydroCHLOROthiazide (HYDRODIURIL) 25 mg tablet Take 1 Tab by mouth daily.  sertraline (ZOLOFT) 100 mg tablet Take 1.5 Tabs by mouth daily.  insulin syringe-needle U-100 (BD INSULIN SYRINGE ULTRA-FINE) 0.3 mL 31 gauge x 15/64\" syrg 1 Units by Does Not Apply route three (3) times daily.     metFORMIN (GLUCOPHAGE) 1,000 mg tablet Take 1 Tab by mouth two (2) times daily (with meals).  rosuvastatin (CRESTOR) 10 mg tablet Take 1 Tab by mouth nightly.  amLODIPine (NORVASC) 10 mg tablet Take 1 Tab by mouth daily.  gabapentin (NEURONTIN) 300 mg capsule Take 1 Cap by mouth three (3) times daily. Kenalog cream     lisinopril (PRINIVIL, ZESTRIL) 40 mg tablet Take 1 Tab by mouth daily. No current facility-administered medications for this visit. 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, with long-term current use of insulin ([facility] [address] 11/14/2018    Diabetes ([facility] [address]      Past Surgical History:   Procedure Laterality Date    HX OTHER SURGICAL      5th toe Metatarsal amputation 12/2017      Social History     Tobacco Use    Smoking status: Never Smoker    Smokeless tobacco: Never Used   Substance Use Topics    Alcohol use: No     Frequency: Never    Drug use: No       Objective     Vitals:    04/03/19 0831   BP: 135/66   Pulse: 74   Resp: 16   Temp: 98.1 °F (36.7 °C)   TempSrc: Oral   SpO2: 100%   Weight: 161 lb (73 kg)   Height: 5' 8\" (1.727 m)       Constitutional:  Appears well,  No Acute Distress, Vitals noted  Psychiatric:   Affect normal, Alert and Oriented to person/place/time    Eyes:   Pupils equally round and reactive, EOMI, conjunctiva clear, eyelids normal  ENT:   External ears and nose normal/lips, teeth, OK/gums normal, Orophyarynx normal  Neck:  general inspection and Thyroid normal.  No abnormal cervical or supraclavicular nodes    Lungs:  clear to auscultation, good respiratory effort  Heart: Ausculation normal.  Regular rhythm. No cardiac murmurs.   No carotid bruits or palpable thrills  Chest wall normal    Extremities:  without edema, good peripheral pulses; two wounds noted on left shin; one is circular, approximately 1cm in diameter covered in black eschar, no surrounding erythema or warmth; the other is linear approximately 2cm in length with new skin beginning to grow over top, no drainage, erythema or warmth    PHQ-9 score today is 16    Assessment an PLan     Assessment/Plan:     1. Preop examination  Per RCRI, the patient has a 1.0% risk of cardiac death, nonfatal MI, nonfatal cardiac arrest based on risk factors of DM requiring insulin therapy. Per ACC/AHA guidelines, patient is intermediate risk for a(n) low risk surgery; fasting sugars are slightly elevated. Need to get under better control prior to surgery. Increase lantus to 29 units daily. Call in two days with fasting sugars with plans to titrate insulin if needed. 2. Wound of left lower extremity, initial encounter  Stop Kenalog cream. No signs of infection today. Keep clean with soap and water twice daily. Can apply OTC bacitracin. He does have a podiatrist; call them for appt. Call if any fevers, chills, streaking, erythema, warmth, drainage.  - REFERRAL TO PODIATRY    3. Uncontrolled type 2 diabetes mellitus with hyperglycemia (HCC)  Increase Lantus to 29 units daily. Hypoglycemia precautions given. - insulin glargine (LANTUS U-100 INSULIN) 100 unit/mL injection; Take 29 units daily  Dispense: 1 Vial; Refill: 2    4. Reactive depression  PHQ-9 improving from 21 to 16. Some fleeting SI but no plan; is seeing therapy and has protective factors. Continue with zoloft. Continue with therapy. He is subjectively feeling better. 5. Polyneuropathy associated with underlying disease ([facility] [address]  He is to see podiatry    6. Gastroesophageal reflux disease, esophagitis presence not specified  Improved on omeprazole. Continue omeprazole for two more weeks. Plan to de-escalate at follow up. Consider GI referral for EGD. Follow up in 2 weeks. Diagnoses, tests, plan, and follow up discussed with patient. I have given to and reviewed with the patient the after visit summary. I have reviewed medication side effects and precautions. Patient expressed agreement and understanding.  All questions were answered. Discussed with Dr. Madonna Naqvi. ADDENDUM:  1:26 PM  04/08/19  Fasting sugars reportedly elevated to 160s. Increase Lantus to 32 units daily. Diabetes not well controlled at this time but Lantus is being increased today. Surgical risk otherwise as above.

## 2019-04-03 ENCOUNTER — OFFICE VISIT (OUTPATIENT)
Dept: FAMILY MEDICINE CLINIC | Age: 59
End: 2019-04-03

## 2019-04-03 VITALS
OXYGEN SATURATION: 100 % | WEIGHT: 161 LBS | HEART RATE: 74 BPM | RESPIRATION RATE: 16 BRPM | TEMPERATURE: 98.1 F | DIASTOLIC BLOOD PRESSURE: 66 MMHG | BODY MASS INDEX: 24.4 KG/M2 | HEIGHT: 68 IN | SYSTOLIC BLOOD PRESSURE: 135 MMHG

## 2019-04-03 DIAGNOSIS — K21.9 GASTROESOPHAGEAL REFLUX DISEASE, ESOPHAGITIS PRESENCE NOT SPECIFIED: ICD-10-CM

## 2019-04-03 DIAGNOSIS — F32.9 REACTIVE DEPRESSION: ICD-10-CM

## 2019-04-03 DIAGNOSIS — Z01.818 PREOP EXAMINATION: Primary | ICD-10-CM

## 2019-04-03 DIAGNOSIS — G63 POLYNEUROPATHY ASSOCIATED WITH UNDERLYING DISEASE (HCC): ICD-10-CM

## 2019-04-03 DIAGNOSIS — E11.65 UNCONTROLLED TYPE 2 DIABETES MELLITUS WITH HYPERGLYCEMIA (HCC): ICD-10-CM

## 2019-04-03 DIAGNOSIS — S81.802A WOUND OF LEFT LOWER EXTREMITY, INITIAL ENCOUNTER: ICD-10-CM

## 2019-04-03 RX ORDER — INSULIN GLARGINE 100 [IU]/ML
INJECTION, SOLUTION SUBCUTANEOUS
Qty: 1 VIAL | Refills: 2
Start: 2019-04-03 | End: 2019-11-22 | Stop reason: SDUPTHER

## 2019-04-03 NOTE — PATIENT INSTRUCTIONS
Wound Care: After Your Visit  Your Care Instructions  Taking good care of your wound at home will help it heal quickly and reduce your chance of infection. The doctor has checked you carefully, but problems can develop later. If you notice any problems or new symptoms, get medical treatment right away. Follow-up care is a key part of your treatment and safety. Be sure to make and go to all appointments, and call your doctor if you are having problems. It's also a good idea to know your test results and keep a list of the medicines you take. How can you care for yourself at home? · Clean the area with soap and water 2 times a day unless your doctor gives you different instructions. Don't use hydrogen peroxide or alcohol, which can slow healing. ¨ You may cover the wound with a thin layer of antibiotic ointment, such as bacitracin, and a nonstick bandage. ¨ Apply more ointment and replace the bandage as needed. · Take pain medicines exactly as directed. Some pain is normal with a wound, but do not ignore pain that is getting worse instead of better. You could have an infection. ¨ If the doctor gave you a prescription medicine for pain, take it as prescribed. ¨ If you are not taking a prescription pain medicine, ask your doctor if you can take an over-the-counter medicine. · Your doctor may have closed your wound with stitches (sutures), staples, or skin glue. ¨ If you have stitches, your doctor may remove them after several days to 2 weeks. Or you may have stitches that dissolve on their own. ¨ If you have staples, your doctor may remove them after 7 to 10 days. ¨ If your wound was closed with skin glue, the glue will wear off in a few days to 2 weeks. When should you call for help? Call your doctor now or seek immediate medical care if:  · You have signs of infection, such as:  ¨ Increased pain, swelling, warmth, or redness near the wound. ¨ Red streaks leading from the wound.   ¨ Pus draining from the wound. ¨ A fever. · You bleed so much from your incision that you soak one or more bandages over 2 to 4 hours. Watch closely for changes in your health, and be sure to contact your doctor if:  · The wound is not getting better each day. Where can you learn more? Go to [employer]  Enter M973 in the search box to learn more about \"Wound Care: After Your Visit. \"   © 2528-0126 [employer]. Care instructions adapted under license by [facility] (which disclaims liability or warranty for this information). This care instruction is for use with your licensed healthcare professional. If you have questions about a medical condition or this instruction, always ask your healthcare professional. [address] any warranty or liability for your use of this information. Content Version: 88.2.435085;  Last Revised: April 23, 2012

## 2019-04-03 NOTE — PROGRESS NOTES
Chief Complaint   Patient presents with    Diabetes     FOLLOW UP ON DIABETES. NEXT WEEK HAVING EYE SURGERY.    DR BISHOP        Health Maintenance Due   Topic    Hepatitis C Screening     FOOT EXAM Q1     EYE EXAM RETINAL OR DILATED     Shingrix Vaccine Age 50> (1 of 2)    FOBT Q 1 YEAR AGE 50-75        Wt Readings from Last 3 Encounters:   04/03/19 161 lb (73 kg)   03/21/19 163 lb (73.9 kg)   02/21/19 162 lb (73.5 kg)     Temp Readings from Last 3 Encounters:   03/21/19 97.6 °F (36.4 °C) (Oral)   02/21/19 97.6 °F (36.4 °C) (Oral)   02/07/19 97.7 °F (36.5 °C) (Oral)     BP Readings from Last 3 Encounters:   03/21/19 154/78   02/21/19 160/82   02/07/19 137/71     Pulse Readings from Last 3 Encounters:   03/21/19 80   02/21/19 72   02/07/19 72         Learning Assessment:  :     Learning Assessment 1/22/2019 11/14/2018   PRIMARY LEARNER Patient Patient   HIGHEST LEVEL OF EDUCATION - PRIMARY LEARNER  - GRADUATED HIGH SCHOOL OR GED   PRIMARY LANGUAGE ENGLISH ENGLISH   LEARNER PREFERENCE PRIMARY VIDEOS DEMONSTRATION   ANSWERED BY patinet self   RELATIONSHIP SELF SELF       Depression Screening:  :     3 most recent PHQ Screens 1/22/2019   Little interest or pleasure in doing things Several days   Feeling down, depressed, irritable, or hopeless Nearly every day   Total Score PHQ 2 4   Trouble falling or staying asleep, or sleeping too much More than half the days   Feeling tired or having little energy Nearly every day   Poor appetite, weight loss, or overeating Nearly every day   Feeling bad about yourself - or that you are a failure or have let yourself or your family down Nearly every day   Trouble concentrating on things such as school, work, reading, or watching TV Nearly every day   Moving or speaking so slowly that other people could have noticed; or the opposite being so fidgety that others notice Nearly every day   Thoughts of being better off dead, or hurting yourself in some way Nearly every day   PHQ 9 Score 24   How difficult have these problems made it for you to do your work, take care of your home and get along with others Somewhat difficult       Fall Risk Assessment:  :     No flowsheet data found. Abuse Screening:  :     Abuse Screening Questionnaire 1/22/2019   Do you ever feel afraid of your partner? N   Are you in a relationship with someone who physically or mentally threatens you? N   Is it safe for you to go home? Y       Coordination of Care Questionnaire:  :     1) Have you been to an emergency room, urgent care clinic since your last visit? Hospitalized since your last visit? NO             2) Have you seen or consulted any other health care providers outside of [address] [place] [address] since your last visit? NO    Patient is accompanied by son I have received verbal consent from Jess Jimenez to discuss any/all medical information while they are present in the room.

## 2019-04-08 ENCOUNTER — TELEPHONE (OUTPATIENT)
Dept: FAMILY MEDICINE CLINIC | Age: 59
End: 2019-04-08

## 2019-04-08 NOTE — TELEPHONE ENCOUNTER
Call to patient. Fasting BG 160s. Increase from 29 units of lantus to 32 units of lantus. Call in 2 days with fasting sugars.

## 2019-04-10 ENCOUNTER — TELEPHONE (OUTPATIENT)
Dept: FAMILY MEDICINE CLINIC | Age: 59
End: 2019-04-10

## 2019-04-24 NOTE — PROGRESS NOTES
CC: diabetes    History of Present Illness:  Pierrette Apley is a 62 y.o. male. Was last seen on 4/3/19. Lantus was increased to 29 units daily and then subsequently increased to 31 unis daily based on fasting sugars. He is now taking 31 uints. Fasting sugars have been 100s-190s. Denies episodes of hypoglycemia. Is on zoloft 150mg daily for depression. PHQ-9 at last visit had improved form 21 to 16. Since then, his depression has been \"better. \" It is worse when he is alone. Goes to counseling intermittently. Does feel somewhat better with the improvement in his eye sight. He denies suicidal and homicidal ideations. He was recently started on omperazole for GERD. Symptoms have improved. He did have his cataract eye surgery on 4/11/19. Reports his vision is better. Denies eye problems, pain. Is due to see the eye doctor next week. Was told he will need glasses. Had two wounds on his left anterior shin at last visit. Reportedly saw podiatry and was told to return if he develops any redness around the wounds. He noticed some redness this morning and plans to go to the podiatry office today after this appointment. Denies fevers, chills. No Known Allergies  Current Outpatient Medications   Medication Sig Dispense Refill    ketorolac (ACULAR) 0.5 % ophthalmic solution       insulin glargine (LANTUS U-100 INSULIN) 100 unit/mL injection Take 29 units daily 1 Vial 2    cetirizine (ZYRTEC) 5 mg tablet Take 1 Tab by mouth daily as needed for Itching. 30 Tab 0    Omeprazole delayed release (PRILOSEC D/R) 20 mg tablet Take 1 Tab by mouth daily. 30 Tab 0    hydroCHLOROthiazide (HYDRODIURIL) 25 mg tablet Take 1 Tab by mouth daily. 90 Tab 1    sertraline (ZOLOFT) 100 mg tablet Take 1.5 Tabs by mouth daily. 135 Tab 1    insulin syringe-needle U-100 (BD INSULIN SYRINGE ULTRA-FINE) 0.3 mL 31 gauge x 15/64\" syrg 1 Units by Does Not Apply route three (3) times daily.  90 Pen Needle 3    metFORMIN (GLUCOPHAGE) 1,000 mg tablet Take 1 Tab by mouth two (2) times daily (with meals). 180 Tab 1    rosuvastatin (CRESTOR) 10 mg tablet Take 1 Tab by mouth nightly. 90 Tab 1    amLODIPine (NORVASC) 10 mg tablet Take 1 Tab by mouth daily. 90 Tab 1    gabapentin (NEURONTIN) 300 mg capsule Take 1 Cap by mouth three (3) times daily. 270 Cap 1    lisinopril (PRINIVIL, ZESTRIL) 40 mg tablet Take 1 Tab by mouth daily. 90 Tab 1     Patient Active Problem List   Diagnosis Code    Essential hypertension I10    Reactive depression F32.9    Peripheral neuropathy G62.9    Type 2 diabetes mellitus with ophthalmic complication, with long-term current use of insulin ([facility] [address] E11.39, Z79.4       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, with long-term current use of insulin ([facility] [address] 11/14/2018    Diabetes ([facility] [address]      Past Surgical History:   Procedure Laterality Date    HX OTHER SURGICAL      5th toe Metatarsal amputation 12/2017      Social History     Socioeconomic History    Marital status:      Spouse name: Not on file    Number of children: Not on file    Years of education: Not on file    Highest education level: Not on file   Tobacco Use    Smoking status: Never Smoker    Smokeless tobacco: Never Used   Substance and Sexual Activity    Alcohol use: No     Frequency: Never    Drug use: No    Sexual activity: Not Currently     Family History   Problem Relation Age of Onset    Diabetes Mother     No Known Problems Father          Review of Systems   Constitutional: Negative for chills and fever. Eyes: Negative for photophobia, pain, discharge and redness. Respiratory: Negative for shortness of breath. Cardiovascular: Negative for chest pain, palpitations and leg swelling. Gastrointestinal: Positive for heartburn. Negative for abdominal pain, blood in stool, melena, nausea and vomiting. Skin:        Shin wounds   Neurological: Negative for dizziness. Psychiatric/Behavioral: Positive for depression. Negative for suicidal ideas. Physical Exam:  Visit Vitals  /68   Pulse 73   Temp 97.8 °F (36.6 °C) (Oral)   Resp 16   Ht 5' 8\" (1.727 m)   Wt 163 lb (73.9 kg)   SpO2 98%   BMI 24.78 kg/m²     Physical Exam   Constitutional: He is oriented to person, place, and time. No distress. Cardiovascular: Normal rate, regular rhythm and normal heart sounds. Pulmonary/Chest: Effort normal and breath sounds normal.   Musculoskeletal: He exhibits no edema. Neurological: He is alert and oriented to person, place, and time. Psychiatric: He has a normal mood and affect. His speech is normal and behavior is normal. Thought content normal.   Extremities:  without edema, good peripheral pulses; two wounds noted on left shin; one is circular, approximately 1cm in diameter covered in black eschar with moderate surrounding erythema and minimal warmth; the other is linear approximately 2cm in length with new skin beginning to grow over top, no drainage, erythema or warmth      PHQ-9 16     Assessment/Plan:  1. Type 2 diabetes mellitus with diabetic cataract, with long-term current use of insulin ([facility])  Fasting sugars improving. Continue current insulin regimen. Check A1c in about one month. 2. Multiple wounds of skin  One does appear to be infected; his plan is to go to podiatry after this appt to have this treated. I told him if for whatever reason he is unable to see podiatry today, to call the office here. 3. Gastroesophageal reflux disease, esophagitis presence not specified  De-escalate from omeprazole to ranitidine. Referral to GI for EGD considering his age.   - raNITIdine (ZANTAC) 150 mg tablet; Take 1 Tab by mouth two (2) times a day. Dispense: 30 Tab; Refill: 0  - REFERRAL TO GASTROENTEROLOGY    4. Reactive depression  Stable. He declines mediation increase at this time.  Although the PHQ-9 is unchanged from last visit, he does seem to be doing better subjectively and on my exam. Encouraged to continue to go to counseling. If any SI or HI, call 911 immediately. He understands. Follow-up and Dispositions    · Return in about 1 month (around 5/23/2019). Diagnoses, tests, plan, and follow up discussed with patient. I have given to and reviewed with the patient the after visit summary. I have reviewed medication side effects and precautions. Patient expressed agreement and understanding. All questions were answered. Discussed with Dr. Joseph Alva.

## 2019-04-25 ENCOUNTER — OFFICE VISIT (OUTPATIENT)
Dept: FAMILY MEDICINE CLINIC | Age: 59
End: 2019-04-25

## 2019-04-25 VITALS
RESPIRATION RATE: 16 BRPM | BODY MASS INDEX: 24.71 KG/M2 | HEIGHT: 68 IN | DIASTOLIC BLOOD PRESSURE: 68 MMHG | OXYGEN SATURATION: 98 % | TEMPERATURE: 97.8 F | HEART RATE: 73 BPM | WEIGHT: 163 LBS | SYSTOLIC BLOOD PRESSURE: 119 MMHG

## 2019-04-25 DIAGNOSIS — E11.36 TYPE 2 DIABETES MELLITUS WITH DIABETIC CATARACT, WITH LONG-TERM CURRENT USE OF INSULIN (HCC): Primary | ICD-10-CM

## 2019-04-25 DIAGNOSIS — Z79.4 TYPE 2 DIABETES MELLITUS WITH DIABETIC CATARACT, WITH LONG-TERM CURRENT USE OF INSULIN (HCC): Primary | ICD-10-CM

## 2019-04-25 DIAGNOSIS — T14.8XXA MULTIPLE WOUNDS OF SKIN: ICD-10-CM

## 2019-04-25 DIAGNOSIS — K21.9 GASTROESOPHAGEAL REFLUX DISEASE, ESOPHAGITIS PRESENCE NOT SPECIFIED: ICD-10-CM

## 2019-04-25 DIAGNOSIS — F32.9 REACTIVE DEPRESSION: ICD-10-CM

## 2019-04-25 RX ORDER — KETOROLAC TROMETHAMINE 5 MG/ML
SOLUTION OPHTHALMIC
COMMUNITY
Start: 2019-04-24 | End: 2019-11-22 | Stop reason: ALTCHOICE

## 2019-04-25 RX ORDER — RANITIDINE 150 MG/1
150 TABLET, FILM COATED ORAL 2 TIMES DAILY
Qty: 30 TAB | Refills: 0
Start: 2019-04-25 | End: 2021-12-01

## 2019-04-25 NOTE — PROGRESS NOTES
Chief Complaint   Patient presents with    Medication Refill     METFORMIN FOLLOW UP          Health Maintenance Due   Topic    Hepatitis C Screening     FOOT EXAM Q1     Shingrix Vaccine Age 50> (1 of 2)    FOBT Q 1 YEAR AGE 50-75        Wt Readings from Last 3 Encounters:   04/25/19 163 lb (73.9 kg)   04/03/19 161 lb (73 kg)   03/21/19 163 lb (73.9 kg)     Temp Readings from Last 3 Encounters:   04/25/19 97.8 °F (36.6 °C) (Oral)   04/03/19 98.1 °F (36.7 °C) (Oral)   03/21/19 97.6 °F (36.4 °C) (Oral)     BP Readings from Last 3 Encounters:   04/25/19 119/68   04/03/19 135/66   03/21/19 154/78     Pulse Readings from Last 3 Encounters:   04/25/19 73   04/03/19 74   03/21/19 80         Learning Assessment:  :     Learning Assessment 1/22/2019 11/14/2018   PRIMARY LEARNER Patient Patient   HIGHEST LEVEL OF EDUCATION - PRIMARY LEARNER  - GRADUATED HIGH SCHOOL OR GED   PRIMARY LANGUAGE ENGLISH ENGLISH   LEARNER PREFERENCE PRIMARY VIDEOS DEMONSTRATION   ANSWERED BY vince self   RELATIONSHIP SELF SELF       Depression Screening:  :     3 most recent PHQ Screens 1/22/2019   Little interest or pleasure in doing things Several days   Feeling down, depressed, irritable, or hopeless Nearly every day   Total Score PHQ 2 4   Trouble falling or staying asleep, or sleeping too much More than half the days   Feeling tired or having little energy Nearly every day   Poor appetite, weight loss, or overeating Nearly every day   Feeling bad about yourself - or that you are a failure or have let yourself or your family down Nearly every day   Trouble concentrating on things such as school, work, reading, or watching TV Nearly every day   Moving or speaking so slowly that other people could have noticed; or the opposite being so fidgety that others notice Nearly every day   Thoughts of being better off dead, or hurting yourself in some way Nearly every day   PHQ 9 Score 24   How difficult have these problems made it for you to do your work, take care of your home and get along with others Somewhat difficult       Fall Risk Assessment:  :     No flowsheet data found. Abuse Screening:  :     Abuse Screening Questionnaire 1/22/2019   Do you ever feel afraid of your partner? N   Are you in a relationship with someone who physically or mentally threatens you? N   Is it safe for you to go home? Y       Coordination of Care Questionnaire:  :     1) Have you been to an emergency room, urgent care clinic since your last visit? NO  Hospitalized since your last visit? NO             2) Have you seen or consulted any other health care providers outside of [address] [place] [address] since your last visit? NO    Patient is accompanied by son I have received verbal consent from Apolinar Walter to discuss any/all medical information while they are present in the room.

## 2019-04-25 NOTE — PATIENT INSTRUCTIONS
Ranitidine (By mouth)   Ranitidine Hydrochloride (bj-ZK-nf-donovan julio-droe-KLOR-porsche)  Treats and prevents heartburn. Also treats stomach ulcers, gastroesophageal reflux disease (GERD), and conditions that cause too much stomach acid. Brand Name(s): Acid Reducer, DermaSilkRx Anodynexa Frank, DermacinRx Inflammatral Frank, [facility] Acid Control 150, [facility] Acid Reducer, Good Sense Acid Reducer, Leader Acid Control, Leader raNITIdine HCl, [facility] Acid Reducer, [facility] Acid Reducer, [facility] Acid Reducer, TopCare Heartburn Relief 150, TopCare Heartburn Relief 75, Zantac, Zantac 150   There may be other brand names for this medicine. When This Medicine Should Not Be Used: This medicine is not right for everyone. Do not use it if you had an allergic reaction to ranitidine. How to Use This Medicine:   Capsule, Tablet, Liquid, Fizzy Tablet, Liquid Filled Capsule, Granule  · Your doctor will tell you how much medicine to use. Do not use more than directed. · Follow the instructions on the medicine label if you are using this medicine without a prescription. · Measure the oral liquid medicine with a marked measuring spoon, oral syringe, or medicine cup. · The effervescent tablet should not be chewed, swallowed whole, or dissolved on the tongue. The Zantac 25 EFFERdose Tablet should be mixed in 1 teaspoon (or more) of water. Do not drink the liquid until the tablet is completely dissolved. · If you are using this medicine to prevent heartburn, take it 30 to 60 minutes before you eat or drink anything that causes you to have heartburn. · Missed dose: Take a dose as soon as you remember. If it is almost time for your next dose, wait until then and take a regular dose. Do not take extra medicine to make up for a missed dose. · Store the medicine in a closed container at room temperature, away from heat, moisture, and direct light.  You may store the oral liquid in the refrigerator. Drugs and Foods to Avoid:   Ask your doctor or pharmacist before using any other medicine, including over-the-counter medicines, vitamins, and herbal products. · Some medicines can affect how ranitidine works. Tell your doctor if you are using the following:   ¨ Atazanavir  ¨ Delavirdine  ¨ Gefitinib  ¨ Glipizide  ¨ Ketoconazole  ¨ Midazolam  ¨ Triazolam  ¨ Warfarin  Warnings While Using This Medicine:   · Tell your doctor if you are pregnant or breastfeeding, or if you have kidney disease, liver disease, or a history of acute porphyria. · EFFERdose® tablets contain phenylalanine. If you have phenylketonuria (PKU), talk to your doctor before you use this medicine. · Tell any doctor or dentist who treats you that you are using this medicine. This medicine may affect certain medical test results. · Keep all medicine out of the reach of children. Never share your medicine with anyone. Possible Side Effects While Using This Medicine:   Call your doctor right away if you notice any of these side effects:  · Allergic reaction: Itching or hives, swelling in your face or hands, swelling or tingling in your mouth or throat, chest tightness, trouble breathing  · Blistering, peeling, red skin rash  · Dark urine or pale stools, nausea, vomiting, loss of appetite, stomach pain, yellow skin or eyes  · Fast, slow, or uneven heartbeat  · Unusual bleeding, bruising, or weakness  If you notice these less serious side effects, talk with your doctor:   · Constipation or diarrhea  · Headache  · Mild nausea, vomiting, or stomach pain  If you notice other side effects that you think are caused by this medicine, tell your doctor. Call your doctor for medical advice about side effects. You may report side effects to FDA at 5-193-FDA-9853  © 2017 [facility] Information is for End User's use only and may not be sold, redistributed or otherwise used for commercial purposes.   The above information is an  only. It is not intended as medical advice for individual conditions or treatments. Talk to your doctor, nurse or pharmacist before following any medical regimen to see if it is safe and effective for you.

## 2019-05-04 LAB — CREATININE, EXTERNAL: 0.97

## 2019-05-05 ENCOUNTER — TELEPHONE (OUTPATIENT)
Dept: OBGYN | Age: 59
End: 2019-05-05

## 2019-05-05 NOTE — TELEPHONE ENCOUNTER
Received phone call from [facility]    Patient called because his BG this evening was 500. He is a diabetic and he is currently on Lantus 31u at night, which he did take yesterday, but not today yet (usually before bed). He did not take his FBG yesterday or this morning because e has been feeling sick. He has not been feeling well for 2 days, thinks it is the cold with symptoms: fever (subj, did not take T), headache, sore throat, muscle ache. He took theraflu and aspirin. No chest pain, sob, urinary issues, changes in mentation, or blurry vision. Advised patient to take his nighttime insulin and go to the ED to be evaluated. Patient understands and agrees with the plan.

## 2019-05-22 ENCOUNTER — OFFICE VISIT (OUTPATIENT)
Dept: FAMILY MEDICINE CLINIC | Age: 59
End: 2019-05-22

## 2019-05-22 VITALS
TEMPERATURE: 97.7 F | HEART RATE: 69 BPM | BODY MASS INDEX: 24.86 KG/M2 | DIASTOLIC BLOOD PRESSURE: 76 MMHG | OXYGEN SATURATION: 100 % | RESPIRATION RATE: 16 BRPM | WEIGHT: 164 LBS | SYSTOLIC BLOOD PRESSURE: 142 MMHG | HEIGHT: 68 IN

## 2019-05-22 DIAGNOSIS — K21.9 GASTROESOPHAGEAL REFLUX DISEASE, ESOPHAGITIS PRESENCE NOT SPECIFIED: ICD-10-CM

## 2019-05-22 DIAGNOSIS — F32.A DEPRESSION, UNSPECIFIED DEPRESSION TYPE: ICD-10-CM

## 2019-05-22 DIAGNOSIS — E11.65 UNCONTROLLED TYPE 2 DIABETES MELLITUS WITH HYPERGLYCEMIA (HCC): Primary | ICD-10-CM

## 2019-05-22 DIAGNOSIS — R31.21 ASYMPTOMATIC MICROSCOPIC HEMATURIA: ICD-10-CM

## 2019-05-22 NOTE — PATIENT INSTRUCTIONS

## 2019-05-22 NOTE — PROGRESS NOTES
CC: hyperglycemia, GERD    History of Present Illness:  Leesa Almendarez is a 62 y.o. male. Went to the ER about two weeks ago with high blood sugar. Was \"feeling funny\" and checked his sugar and it was in the 450s so he took lantus and went to ER where he tells me they gave him fluids and it came down to 200s. We do not have the hospital note but we will request records. We do have the hospital labs which show glucose of 422, creatinine of 0.97, normal betahydroxybutyrate, and glucosuria. He is taking lantus 29 units daily; he says he does not miss a dose. Normally fasting glucose at home is 130s - 140s. It was 130s this morning. A1c in 2/2019 was 9.7. He has been taking the metformin tid. Is seeing podiatry for leg ulcer; has appointment tomorrow. Does have some sweets occasionally. Hospital labs also show microscopic hematuria, he has not seen any blood in his urine. He denies dysuria, increased frequency or urgency. Denies fevers, chills. He has GERD treated with zantac 150mg twice daily. He is not having GERD symptoms anymore. He saw GI and has EGD and colonoscopy scheduled for Jocelin 3. He says he was referred to cardiology by GI to rule out cardiac causes. He says his depression is doing well. Is taking zoloft 150mg daily. Denies SI/HI. Is going to counseling. No Known Allergies  Current Outpatient Medications   Medication Sig Dispense Refill    raNITIdine (ZANTAC) 150 mg tablet Take 1 Tab by mouth two (2) times a day. 30 Tab 0    insulin glargine (LANTUS U-100 INSULIN) 100 unit/mL injection Take 29 units daily (Patient taking differently: by SubCUTAneous route daily. Take 29 units daily) 1 Vial 2    cetirizine (ZYRTEC) 5 mg tablet Take 1 Tab by mouth daily as needed for Itching. 30 Tab 0    hydroCHLOROthiazide (HYDRODIURIL) 25 mg tablet Take 1 Tab by mouth daily. 90 Tab 1    sertraline (ZOLOFT) 100 mg tablet Take 1.5 Tabs by mouth daily.  135 Tab 1    insulin syringe-needle U-100 (BD INSULIN SYRINGE ULTRA-FINE) 0.3 mL 31 gauge x 15/64\" syrg 1 Units by Does Not Apply route three (3) times daily. 90 Pen Needle 3    metFORMIN (GLUCOPHAGE) 1,000 mg tablet Take 1 Tab by mouth two (2) times daily (with meals). (Patient taking differently: Take 1,000 mg by mouth two (2) times daily (with meals). Indications: Pt takes it 3 time daily) 180 Tab 1    rosuvastatin (CRESTOR) 10 mg tablet Take 1 Tab by mouth nightly. 90 Tab 1    amLODIPine (NORVASC) 10 mg tablet Take 1 Tab by mouth daily. 90 Tab 1    gabapentin (NEURONTIN) 300 mg capsule Take 1 Cap by mouth three (3) times daily. 270 Cap 1    lisinopril (PRINIVIL, ZESTRIL) 40 mg tablet Take 1 Tab by mouth daily.  90 Tab 1    ketorolac (ACULAR) 0.5 % ophthalmic solution        Patient Active Problem List   Diagnosis Code    Essential hypertension I10    Reactive depression F32.9    Peripheral neuropathy G62.9    Type 2 diabetes mellitus with ophthalmic complication, with long-term current use of insulin ([address] E11.39, Z79.4       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, with long-term current use of insulin ([address] 2018    Diabetes ([address]      Past Surgical History:   Procedure Laterality Date    HX OTHER SURGICAL      5th toe Metatarsal amputation 2017      Social History     Socioeconomic History    Marital status:      Spouse name: Not on file    Number of children: Not on file    Years of education: Not on file    Highest education level: Not on file   Tobacco Use    Smoking status: Former Smoker     Last attempt to quit: 2001     Years since quittin.0    Smokeless tobacco: Never Used   Substance and Sexual Activity    Alcohol use: No     Frequency: Never    Drug use: No    Sexual activity: Yes     Partners: Female     Birth control/protection: Condom     Family History   Problem Relation Age of Onset    Diabetes Mother     No Known Problems Father Review of Systems   Constitutional: Negative for chills, fever and weight loss. Respiratory: Negative for shortness of breath. Cardiovascular: Negative for chest pain and leg swelling. Genitourinary: Negative for dysuria and hematuria. Neurological: Negative for dizziness and headaches. Psychiatric/Behavioral: Negative for depression and suicidal ideas. Physical Exam:  Visit Vitals  /76 (BP 1 Location: Right arm, BP Patient Position: Sitting)   Pulse 69   Temp 97.7 °F (36.5 °C) (Oral)   Resp 16   Ht 5' 8\" (1.727 m)   Wt 164 lb (74.4 kg)   SpO2 100%   BMI 24.94 kg/m²     Physical Exam   Constitutional: He is oriented to person, place, and time. No distress. Cardiovascular: Normal rate, regular rhythm and normal heart sounds. Pulmonary/Chest: Effort normal and breath sounds normal.   Abdominal: Soft. Bowel sounds are normal. He exhibits no distension. There is no tenderness. Musculoskeletal: He exhibits no edema. Anterior left shin with bandage over ulcer   Neurological: He is alert and oriented to person, place, and time. Psychiatric: He has a normal mood and affect. Assessment/Plan:  1. Uncontrolled type 2 diabetes mellitus with hyperglycemia (HCC)  Update A1c. Instructed to take metformin twice daily rather than three times daily. Reinforced importance of diabetic diet.   - HEMOGLOBIN A1C WITH EAG  - CBC W/O DIFF    2. Asymptomatic microscopic hematuria  Recheck. If hematuria, will need to refer to urology. - URINALYSIS W/ RFLX MICROSCOPIC  - CBC W/O DIFF    3. Gastroesophageal reflux disease, esophagitis presence not specified  Has upcoming EGD and colonoscopy. Symptoms stable on zantac. 4. Depression, unspecified depression type  Stable symptoms. His affect does appear improved. No SI/HI. Continue with zoloft and counseling.        Follow-up and Dispositions    · Return in about 1 month (around 6/19/2019) for annual physical.          Diagnoses, tests, plan, and follow up discussed with patient. I have given to and reviewed with the patient the after visit summary. Patient expressed agreement and understanding. All questions were answered.      Discussed with Dr. Mikhail Gomez.

## 2019-05-22 NOTE — PROGRESS NOTES
Identified pt with two pt identifiers(name and ). Reviewed record in preparation for visit and have obtained necessary documentation. All patient medications has been reviewed. Chief Complaint   Patient presents with    Blood sugar problem    Heartburn     follow up , Pt states he is going to see cardiology on 19 to rule out any heart issues        Health Maintenance Due   Topic    Hepatitis C Screening     FOOT EXAM Q1     Shingrix Vaccine Age 50> (1 of 2)    FOBT Q 1 YEAR AGE 50-75        Vitals:    19 0903   BP: 142/76   Pulse: 69   Resp: 16   Temp: 97.7 °F (36.5 °C)   TempSrc: Oral   SpO2: 100%   Weight: 164 lb (74.4 kg)   Height: 5' 8\" (1.727 m)   PainSc:   0 - No pain       Coordination of Care Questionnaire:   1) Have you been to an emergency room, urgent care, or hospitalized since your last visit? [facility] ER 3 weeks ago, pt states he was having chills and shaking, pt states his blood sugar was 495.     2. Have seen or consulted any other health care provider since your last visit? No     3) Do you have an Advanced Directive/ Living Will in place? NO  If yes, do we have a copy on file NO  If no, would you like information YES    Patient is accompanied by self I have received verbal consent from Flor Jacobsen to discuss any/all medical information while they are present in the room.

## 2019-05-22 NOTE — PROGRESS NOTES
[address] Medicine Residency Attending Addendum:  Patient encounter was discussed on the day of the encounter with Genny Buchanan MD, performing the key elements of the service. I discussed the findings, assessment and plan with the resident and agree with the resident's findings and plan as documented in the resident's note.       Arlene Whelan MD, CAQSM, RMSK

## 2019-05-23 ENCOUNTER — TELEPHONE (OUTPATIENT)
Dept: FAMILY MEDICINE CLINIC | Age: 59
End: 2019-05-23

## 2019-05-23 LAB
APPEARANCE UR: CLEAR
BACTERIA #/AREA URNS HPF: NORMAL /[HPF]
BILIRUB UR QL STRIP: NEGATIVE
CASTS URNS QL MICRO: NORMAL /LPF
COLOR UR: YELLOW
EPI CELLS #/AREA URNS HPF: NORMAL /HPF (ref 0–10)
ERYTHROCYTE [DISTWIDTH] IN BLOOD BY AUTOMATED COUNT: 14.6 % (ref 12.3–15.4)
EST. AVERAGE GLUCOSE BLD GHB EST-MCNC: 217 MG/DL
GLUCOSE UR QL: ABNORMAL
HBA1C MFR BLD: 9.2 % (ref 4.8–5.6)
HCT VFR BLD AUTO: 36.6 % (ref 37.5–51)
HGB BLD-MCNC: 12.4 G/DL (ref 13–17.7)
HGB UR QL STRIP: NEGATIVE
KETONES UR QL STRIP: NEGATIVE
LEUKOCYTE ESTERASE UR QL STRIP: NEGATIVE
MCH RBC QN AUTO: 28.7 PG (ref 26.6–33)
MCHC RBC AUTO-ENTMCNC: 33.9 G/DL (ref 31.5–35.7)
MCV RBC AUTO: 85 FL (ref 79–97)
MICRO URNS: ABNORMAL
MUCOUS THREADS URNS QL MICRO: PRESENT
NITRITE UR QL STRIP: NEGATIVE
PH UR STRIP: 5.5 [PH] (ref 5–7.5)
PLATELET # BLD AUTO: 128 X10E3/UL (ref 150–450)
PROT UR QL STRIP: ABNORMAL
RBC # BLD AUTO: 4.32 X10E6/UL (ref 4.14–5.8)
RBC #/AREA URNS HPF: NORMAL /HPF (ref 0–2)
SP GR UR: 1.02 (ref 1–1.03)
UROBILINOGEN UR STRIP-MCNC: 0.2 MG/DL (ref 0.2–1)
WBC # BLD AUTO: 5 X10E3/UL (ref 3.4–10.8)
WBC #/AREA URNS HPF: NORMAL /HPF (ref 0–5)

## 2019-08-05 ENCOUNTER — OFFICE VISIT (OUTPATIENT)
Dept: FAMILY MEDICINE CLINIC | Age: 59
End: 2019-08-05

## 2019-08-05 VITALS
BODY MASS INDEX: 25.01 KG/M2 | WEIGHT: 165 LBS | DIASTOLIC BLOOD PRESSURE: 67 MMHG | HEIGHT: 68 IN | OXYGEN SATURATION: 100 % | SYSTOLIC BLOOD PRESSURE: 121 MMHG | TEMPERATURE: 98.1 F | RESPIRATION RATE: 16 BRPM | HEART RATE: 70 BPM

## 2019-08-05 DIAGNOSIS — E11.65 UNCONTROLLED TYPE 2 DIABETES MELLITUS WITH HYPERGLYCEMIA (HCC): Primary | ICD-10-CM

## 2019-08-05 DIAGNOSIS — R80.9 MICROALBUMINURIA: ICD-10-CM

## 2019-08-05 DIAGNOSIS — E11.21 TYPE 2 DIABETES WITH NEPHROPATHY (HCC): ICD-10-CM

## 2019-08-05 DIAGNOSIS — F32.A DEPRESSION, UNSPECIFIED DEPRESSION TYPE: ICD-10-CM

## 2019-08-05 PROBLEM — E11.40 TYPE 2 DIABETES MELLITUS WITH DIABETIC NEUROPATHY (HCC): Status: ACTIVE | Noted: 2019-08-05

## 2019-08-05 RX ORDER — GLIPIZIDE 5 MG/1
10 TABLET ORAL DAILY
Qty: 90 TAB | Refills: 1 | Status: SHIPPED | OUTPATIENT
Start: 2019-08-05 | End: 2022-02-07

## 2019-08-05 NOTE — PROGRESS NOTES
Assessment/Plan:     Diagnoses and all orders for this visit:    1. Uncontrolled type 2 diabetes mellitus with hyperglycemia (HCC)  -     HEMOGLOBIN A1C WITH EAG; Future  -     CBC WITH AUTOMATED DIFF; Future  -     LIPID PANEL; Future  -     glipiZIDE (GLUCOTROL) 5 mg tablet; Take 2 Tabs by mouth daily. - unchanged. Last A1c 9.2 in May. Labs for future. Start glipizide as directed, side effects discussed. Educated on risk for hypoglycemia and signs and symptoms. Continue to monitor BG. RTC in 3 months for follow up    2. Depression, unspecified depression type   -stable today. Continue medication. Make apt with psychiatrist as directed from [address] [place] [address]. Bring in medications. Reasons to go to ED discussed. 3. Microalbuminuria  -     MICROALBUMIN, UR, RAND W/ MICROALB/CREAT RATIO; Future  - Presumed stable, labs in 1 month. Follow-up and Dispositions    · Return in about 3 months (around 11/5/2019) for Follow Up. Discussed expected course/resolution/complications of diagnosis in detail with patient. Medication risks/benefits/costs/interactions/alternatives discussed with patient. Pt was given after visit summary which includes diagnoses, current medications & vitals. Pt expressed understanding with the diagnosis and plan        Subjective:      Jackeline Norris is a 62 y.o. male who presents for had concerns including Hospital Follow Up ([address]) and Diabetes (FOLLOW UP). Depression  Here today for follow up on depression. Was in [place] for 7 days. Was started on medication but forgot to bring the medication. Has family support system. Denies SI/HI today. States he has been trying to do better. He states he has been overwhelmed with life, bills etc.  Better today. Is supposed to make follow up apt with psychiatrist.  States he does not drink alcohol. Diabetes  Checks BG at home and gets 145, 180, 200. States depends on what he eats.   Takes Lantus 29 units at night. In [facility] was given meal time insulin but does not take that outside of the hospital.  Also takes metformin 2000mg daily. Last A1c in May was 9.2. Had some microalbuminuria. Denies CP, SOB, palpitations or leg swelling. Taking rosuvastatin 10mg daily as directed without side effects. Current Outpatient Medications   Medication Sig Dispense Refill    glipiZIDE (GLUCOTROL) 5 mg tablet Take 2 Tabs by mouth daily. 90 Tab 1    raNITIdine (ZANTAC) 150 mg tablet Take 1 Tab by mouth two (2) times a day. 30 Tab 0    insulin glargine (LANTUS U-100 INSULIN) 100 unit/mL injection Take 29 units daily (Patient taking differently: by SubCUTAneous route daily. Take 29 units daily) 1 Vial 2    cetirizine (ZYRTEC) 5 mg tablet Take 1 Tab by mouth daily as needed for Itching. 30 Tab 0    hydroCHLOROthiazide (HYDRODIURIL) 25 mg tablet Take 1 Tab by mouth daily. 90 Tab 1    sertraline (ZOLOFT) 100 mg tablet Take 1.5 Tabs by mouth daily. 135 Tab 1    metFORMIN (GLUCOPHAGE) 1,000 mg tablet Take 1 Tab by mouth two (2) times daily (with meals). (Patient taking differently: Take 1,000 mg by mouth two (2) times daily (with meals). Indications: Pt takes it 3 time daily) 180 Tab 1    rosuvastatin (CRESTOR) 10 mg tablet Take 1 Tab by mouth nightly. 90 Tab 1    amLODIPine (NORVASC) 10 mg tablet Take 1 Tab by mouth daily. 90 Tab 1    gabapentin (NEURONTIN) 300 mg capsule Take 1 Cap by mouth three (3) times daily. 270 Cap 1    lisinopril (PRINIVIL, ZESTRIL) 40 mg tablet Take 1 Tab by mouth daily. 90 Tab 1    ketorolac (ACULAR) 0.5 % ophthalmic solution       insulin syringe-needle U-100 (BD INSULIN SYRINGE ULTRA-FINE) 0.3 mL 31 gauge x 15/64\" syrg 1 Units by Does Not Apply route three (3) times daily.  90 Pen Needle 3       No Known Allergies  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, with long-term current use of insulin ([facility]) 2018    Diabetes ([facility] [address]      Past Surgical History:   Procedure Laterality Date    HX OTHER SURGICAL      5th toe Metatarsal amputation 2017      Family History   Problem Relation Age of Onset    Diabetes Mother     No Known Problems Father      Social History     Socioeconomic History    Marital status:      Spouse name: Not on file    Number of children: Not on file    Years of education: Not on file    Highest education level: Not on file   Occupational History    Not on file   Social Needs    Financial resource strain: Not on file    Food insecurity:     Worry: Not on file     Inability: Not on file    Transportation needs:     Medical: Not on file     Non-medical: Not on file   Tobacco Use    Smoking status: Former Smoker     Last attempt to quit: 2001     Years since quittin.2    Smokeless tobacco: Never Used   Substance and Sexual Activity    Alcohol use: No     Frequency: Never    Drug use: No    Sexual activity: Yes     Partners: Female     Birth control/protection: Condom   Lifestyle    Physical activity:     Days per week: Not on file     Minutes per session: Not on file    Stress: Not on file   Relationships    Social connections:     Talks on phone: Not on file     Gets together: Not on file     Attends Gnosticism service: Not on file     Active member of club or organization: Not on file     Attends meetings of clubs or organizations: Not on file     Relationship status: Not on file    Intimate partner violence:     Fear of current or ex partner: Not on file     Emotionally abused: Not on file     Physically abused: Not on file     Forced sexual activity: Not on file   Other Topics Concern    Not on file   Social History Narrative    Not on file       HPI      ROS:   Review of Systems   Constitutional: Negative for chills, fever, malaise/fatigue and weight loss. Eyes: Negative for blurred vision. Respiratory: Negative for cough and shortness of breath. Cardiovascular: Negative for chest pain, palpitations and leg swelling. Gastrointestinal: Negative for constipation, nausea and vomiting. Genitourinary: Negative for dysuria and frequency. Musculoskeletal: Negative for joint pain. Skin: Negative for rash. Neurological: Negative for dizziness and headaches. Endo/Heme/Allergies: Negative for polydipsia. Psychiatric/Behavioral: Positive for depression. Negative for hallucinations, substance abuse and suicidal ideas. The patient is not nervous/anxious and does not have insomnia. Objective:     Visit Vitals  /67   Pulse 70   Temp 98.1 °F (36.7 °C) (Oral)   Resp 16   Ht 5' 8\" (1.727 m)   Wt 165 lb (74.8 kg)   SpO2 100%   BMI 25.09 kg/m²         Vitals and Nurse Documentation reviewed. Physical Exam   Constitutional: He is oriented to person, place, and time and well-developed, well-nourished, and in no distress. Vital signs are normal. No distress. HENT:   Head: Normocephalic and atraumatic. Cardiovascular: Normal rate, regular rhythm and normal heart sounds. Exam reveals no gallop and no friction rub. No murmur heard. Pulmonary/Chest: Effort normal and breath sounds normal. No respiratory distress. He has no wheezes. He has no rales. Neurological: He is alert and oriented to person, place, and time. Skin: Skin is warm, dry and intact. He is not diaphoretic. No cyanosis. No pallor. Psychiatric: Affect normal. His mood appears not anxious. His affect is not inappropriate. He is not agitated. He does not exhibit a depressed mood. He expresses no homicidal and no suicidal ideation. He expresses no homicidal plans. He exhibits ordered thought content. He does not have a flat affect.        Results for orders placed or performed in visit on 05/22/19   HEMOGLOBIN A1C WITH EAG   Result Value Ref Range    Hemoglobin A1c 9.2 (H) 4.8 - 5.6 %    Estimated average glucose 217 mg/dL   URINALYSIS W/ RFLX MICROSCOPIC   Result Value Ref Range Specific Gravity 1.024 1.005 - 1.030    pH (UA) 5.5 5.0 - 7.5    Color Yellow Yellow    Appearance Clear Clear    Leukocyte Esterase Negative Negative    Protein 2+ (A) Negative/Trace    Glucose 3+ (A) Negative    Ketone Negative Negative    Blood Negative Negative    Bilirubin Negative Negative    Urobilinogen 0.2 0.2 - 1.0 mg/dL    Nitrites Negative Negative    Microscopic Examination See additional order    CBC W/O DIFF   Result Value Ref Range    WBC 5.0 3.4 - 10.8 x10E3/uL    RBC 4.32 4.14 - 5.80 x10E6/uL    HGB 12.4 (L) 13.0 - 17.7 g/dL    HCT 36.6 (L) 37.5 - 51.0 %    MCV 85 79 - 97 fL    MCH 28.7 26.6 - 33.0 pg    MCHC 33.9 31.5 - 35.7 g/dL    RDW 14.6 12.3 - 15.4 %    PLATELET 890 (L) 223 - 450 x10E3/uL   MICROSCOPIC EXAMINATION   Result Value Ref Range    WBC 0-5 0 - 5 /hpf    RBC 0-2 0 - 2 /hpf    Epithelial cells 0-10 0 - 10 /hpf    Casts None seen None seen /lpf    Mucus Present Not Estab.     Bacteria None seen None seen/Few

## 2019-08-05 NOTE — PROGRESS NOTES
PATIENT STATED NAME &     Chief Complaint   Patient presents with   [facility] Follow Up     [address]     Diabetes     FOLLOW UP          Health Maintenance Due   Topic    Hepatitis C Screening     FOOT EXAM Q1     Shingrix Vaccine Age 50> (1 of 2)    FOBT Q 1 YEAR AGE 54-65     Influenza Age 5 to Adult        Wt Readings from Last 3 Encounters:   19 165 lb (74.8 kg)   19 164 lb (74.4 kg)   19 163 lb (73.9 kg)     Temp Readings from Last 3 Encounters:   19 98.1 °F (36.7 °C) (Oral)   19 97.7 °F (36.5 °C) (Oral)   19 97.8 °F (36.6 °C) (Oral)     BP Readings from Last 3 Encounters:   19 121/67   19 142/76   19 119/68     Pulse Readings from Last 3 Encounters:   19 70   19 69   19 73         Learning Assessment:  :     Learning Assessment 2018   PRIMARY LEARNER Patient Patient Patient   HIGHEST LEVEL OF EDUCATION - PRIMARY LEARNER  - - GRADUATED HIGH SCHOOL OR GED   BARRIERS PRIMARY LEARNER NONE - -   CO-LEARNER CAREGIVER No - -   PRIMARY LANGUAGE ENGLISH ENGLISH ENGLISH   LEARNER PREFERENCE PRIMARY VIDEOS VIDEOS DEMONSTRATION   ANSWERED BY SELF patinet self   RELATIONSHIP SELF SELF SELF       Depression Screening:  :     3 most recent PHQ Screens 2019   Little interest or pleasure in doing things Several days   Feeling down, depressed, irritable, or hopeless Nearly every day   Total Score PHQ 2 4   Trouble falling or staying asleep, or sleeping too much More than half the days   Feeling tired or having little energy Nearly every day   Poor appetite, weight loss, or overeating Nearly every day   Feeling bad about yourself - or that you are a failure or have let yourself or your family down Nearly every day   Trouble concentrating on things such as school, work, reading, or watching TV Nearly every day   Moving or speaking so slowly that other people could have noticed; or the opposite being so fidgety that others notice Nearly every day   Thoughts of being better off dead, or hurting yourself in some way Nearly every day   PHQ 9 Score 24   How difficult have these problems made it for you to do your work, take care of your home and get along with others Somewhat difficult       Fall Risk Assessment:  :     No flowsheet data found. Abuse Screening:  :     Abuse Screening Questionnaire 1/22/2019   Do you ever feel afraid of your partner? N   Are you in a relationship with someone who physically or mentally threatens you? N   Is it safe for you to go home? Y       Coordination of Care Questionnaire:  :     1) Have you been to an emergency room, urgent care clinic since your last visit? NO    Hospitalized since your last visit? [facility] A[facility]             2) Have you seen or consulted any other health care providers outside of [address] [place] [address] since your last visit? (Include any pap smears or colon screenings in this section.)    Patient is accompanied by self I have received verbal consent from Frandy Angel to discuss any/all medical information while they are present in the room.

## 2019-08-05 NOTE — PATIENT INSTRUCTIONS
Learning About Diabetes Food Guidelines  Your Care Instructions    Meal planning is important to manage diabetes. It helps keep your blood sugar at a target level (which you set with your doctor). You don't have to eat special foods. You can eat what your family eats, including sweets once in a while. But you do have to pay attention to how often you eat and how much you eat of certain foods. You may want to work with a dietitian or a certified diabetes educator (CDE) to help you plan meals and snacks. A dietitian or CDE can also help you lose weight if that is one of your goals. What should you know about eating carbs? Managing the amount of carbohydrate (carbs) you eat is an important part of healthy meals when you have diabetes. Carbohydrate is found in many foods. · Learn which foods have carbs. And learn the amounts of carbs in different foods. ? Bread, cereal, pasta, and rice have about 15 grams of carbs in a serving. A serving is 1 slice of bread (1 ounce), ½ cup of cooked cereal, or 1/3 cup of cooked pasta or rice. ? Fruits have 15 grams of carbs in a serving. A serving is 1 small fresh fruit, such as an apple or orange; ½ of a banana; ½ cup of cooked or canned fruit; ½ cup of fruit juice; 1 cup of melon or raspberries; or 2 tablespoons of dried fruit. ? Milk and no-sugar-added yogurt have 15 grams of carbs in a serving. A serving is 1 cup of milk or 2/3 cup of no-sugar-added yogurt. ? Starchy vegetables have 15 grams of carbs in a serving. A serving is ½ cup of mashed potatoes or sweet potato; 1 cup winter squash; ½ of a small baked potato; ½ cup of cooked beans; or ½ cup cooked corn or green peas. · Learn how much carbs to eat each day and at each meal. A dietitian or CDE can teach you how to keep track of the amount of carbs you eat. This is called carbohydrate counting. · If you are not sure how to count carbohydrate grams, use the Plate Method to plan meals.  It is a good, quick way to make sure that you have a balanced meal. It also helps you spread carbs throughout the day. ? Divide your plate by types of foods. Put non-starchy vegetables on half the plate, meat or other protein food on one-quarter of the plate, and a grain or starchy vegetable in the final quarter of the plate. To this you can add a small piece of fruit and 1 cup of milk or yogurt, depending on how many carbs you are supposed to eat at a meal.  · Try to eat about the same amount of carbs at each meal. Do not \"save up\" your daily allowance of carbs to eat at one meal.  · Proteins have very little or no carbs per serving. Examples of proteins are beef, chicken, turkey, fish, eggs, tofu, cheese, cottage cheese, and peanut butter. A serving size of meat is 3 ounces, which is about the size of a deck of cards. Examples of meat substitute serving sizes (equal to 1 ounce of meat) are 1/4 cup of cottage cheese, 1 egg, 1 tablespoon of peanut butter, and ½ cup of tofu. How can you eat out and still eat healthy? · Learn to estimate the serving sizes of foods that have carbohydrate. If you measure food at home, it will be easier to estimate the amount in a serving of restaurant food. · If the meal you order has too much carbohydrate (such as potatoes, corn, or baked beans), ask to have a low-carbohydrate food instead. Ask for a salad or green vegetables. · If you use insulin, check your blood sugar before and after eating out to help you plan how much to eat in the future. · If you eat more carbohydrate at a meal than you had planned, take a walk or do other exercise. This will help lower your blood sugar. What else should you know? · Limit saturated fat, such as the fat from meat and dairy products. This is a healthy choice because people who have diabetes are at higher risk of heart disease. So choose lean cuts of meat and nonfat or low-fat dairy products.  Use olive or canola oil instead of butter or shortening when cooking. · Don't skip meals. Your blood sugar may drop too low if you skip meals and take insulin or certain medicines for diabetes. · Check with your doctor before you drink alcohol. Alcohol can cause your blood sugar to drop too low. Alcohol can also cause a bad reaction if you take certain diabetes medicines. Follow-up care is a key part of your treatment and safety. Be sure to make and go to all appointments, and call your doctor if you are having problems. It's also a good idea to know your test results and keep a list of the medicines you take. Where can you learn more? Go to http://laura-joey.info/. Enter T317 in the search box to learn more about \"Learning About Diabetes Food Guidelines. \"  Current as of: July 25, 2018  Content Version: 12.1  © 9243-4836 [employer]. Care instructions adapted under license by [employer] (which disclaims liability or warranty for this information). If you have questions about a medical condition or this instruction, always ask your healthcare professional. [address] any warranty or liability for your use of this information. Learning About Meal Planning for Diabetes  Why plan your meals? Meal planning can be a key part of managing diabetes. Planning meals and snacks with the right balance of carbohydrate, protein, and fat can help you keep your blood sugar at the target level you set with your doctor. You don't have to eat special foods. You can eat what your family eats, including sweets once in a while. But you do have to pay attention to how often you eat and how much you eat of certain foods. You may want to work with a dietitian or a certified diabetes educator. He or she can give you tips and meal ideas and can answer your questions about meal planning. This health professional can also help you reach a healthy weight if that is one of your goals. What plan is right for you?   Your dietitian or diabetes educator may suggest that you start with the plate format or carbohydrate counting. The plate format  The plate format is a simple way to help you manage how you eat. You plan meals by learning how much space each food should take on a plate. Using the plate format helps you spread carbohydrate throughout the day. It can make it easier to keep your blood sugar level within your target range. It also helps you see if you're eating healthy portion sizes. To use the plate format, you put non-starchy vegetables on half your plate. Add meat or meat substitutes on one-quarter of the plate. Put a grain or starchy vegetable (such as brown rice or a potato) on the final quarter of the plate. You can add a small piece of fruit and some low-fat or fat-free milk or yogurt, depending on your carbohydrate goal for each meal.  Here are some tips for using the plate format:  · Make sure that you are not using an oversized plate. A 9-inch plate is best. Many restaurants use larger plates. · Get used to using the plate format at home. Then you can use it when you eat out. · Write down your questions about using the plate format. Talk to your doctor, a dietitian, or a diabetes educator about your concerns. Carbohydrate counting  With carbohydrate counting, you plan meals based on the amount of carbohydrate in each food. Carbohydrate raises blood sugar higher and more quickly than any other nutrient. It is found in desserts, breads and cereals, and fruit. It's also found in starchy vegetables such as potatoes and corn, grains such as rice and pasta, and milk and yogurt. Spreading carbohydrate throughout the day helps keep your blood sugar levels within your target range. Your daily amount depends on several things, including your weight, how active you are, which diabetes medicines you take, and what your goals are for your blood sugar levels.  A registered dietitian or diabetes educator can help you plan how much carbohydrate to include in each meal and snack. A guideline for your daily amount of carbohydrate is:  · 45 to 60 grams at each meal. That's about the same as 3 to 4 carbohydrate servings. · 15 to 20 grams at each snack. That's about the same as 1 carbohydrate serving. The Nutrition Facts label on packaged foods tells you how much carbohydrate is in a serving of the food. First, look at the serving size on the food label. Is that the amount you eat in a serving? All of the nutrition information on a food label is based on that serving size. So if you eat more or less than that, you'll need to adjust the other numbers. Total carbohydrate is the next thing you need to look for on the label. If you count carbohydrate servings, one serving of carbohydrate is 15 grams. For foods that don't come with labels, such as fresh fruits and vegetables, you'll need a guide that lists carbohydrate in these foods. Ask your doctor, dietitian, or diabetes educator about books or other nutrition guides you can use. If you take insulin, you need to know how many grams of carbohydrate are in a meal. This lets you know how much rapid-acting insulin to take before you eat. If you use an insulin pump, you get a constant rate of insulin during the day. So the pump must be programmed at meals to give you extra insulin to cover the rise in blood sugar after meals. When you know how much carbohydrate you will eat, you can take the right amount of insulin. Or, if you always use the same amount of insulin, you need to make sure that you eat the same amount of carbohydrate at meals. If you need more help to understand carbohydrate counting and food labels, ask your doctor, dietitian, or diabetes educator. How do you get started with meal planning? Here are some tips to get started:  · Plan your meals a week at a time. Don't forget to include snacks too. · Use cookbooks or online recipes to plan several main meals.  Plan some quick meals for busy nights. You also can double some recipes that freeze well. Then you can save half for other busy nights when you don't have time to cook. · Make sure you have the ingredients you need for your recipes. If you're running low on basic items, put these items on your shopping list too. · List foods that you use to make breakfasts, lunches, and snacks. List plenty of fruits and vegetables. · Post this list on the refrigerator. Add to it as you think of more things you need. · Take the list to the store to do your weekly shopping. Follow-up care is a key part of your treatment and safety. Be sure to make and go to all appointments, and call your doctor if you are having problems. It's also a good idea to know your test results and keep a list of the medicines you take. Where can you learn more? Go to http://laura-joye.info/. Bonny Concepcion in the search box to learn more about \"Learning About Meal Planning for Diabetes. \"  Current as of: July 25, 2018  Content Version: 12.1  © 7104-4493 [employer]. Care instructions adapted under license by [employer] (which disclaims liability or warranty for this information). If you have questions about a medical condition or this instruction, always ask your healthcare professional. [address] any warranty or liability for your use of this information.

## 2019-08-23 ENCOUNTER — OFFICE VISIT (OUTPATIENT)
Dept: FAMILY MEDICINE CLINIC | Age: 59
End: 2019-08-23

## 2019-08-23 VITALS
DIASTOLIC BLOOD PRESSURE: 78 MMHG | TEMPERATURE: 98.1 F | HEIGHT: 68 IN | RESPIRATION RATE: 16 BRPM | OXYGEN SATURATION: 98 % | WEIGHT: 164 LBS | BODY MASS INDEX: 24.86 KG/M2 | SYSTOLIC BLOOD PRESSURE: 138 MMHG | HEART RATE: 72 BPM

## 2019-08-23 DIAGNOSIS — E11.9 ENCOUNTER FOR DIABETIC FOOT EXAM (HCC): ICD-10-CM

## 2019-08-23 DIAGNOSIS — E11.36 TYPE 2 DIABETES MELLITUS WITH DIABETIC CATARACT, WITH LONG-TERM CURRENT USE OF INSULIN (HCC): Primary | ICD-10-CM

## 2019-08-23 DIAGNOSIS — Z79.4 TYPE 2 DIABETES MELLITUS WITH DIABETIC CATARACT, WITH LONG-TERM CURRENT USE OF INSULIN (HCC): Primary | ICD-10-CM

## 2019-08-23 NOTE — PATIENT INSTRUCTIONS

## 2019-08-23 NOTE — PROGRESS NOTES
Assessment/Plan:     Diagnoses and all orders for this visit:    1. Type 2 diabetes mellitus with diabetic cataract, with long-term current use of insulin ([facility])  -      DIABETES FOOT EXAM    2. Encounter for diabetic foot exam ([facility]        Follow-up and Dispositions    · Return in about 3 months (around 11/23/2019) for Follow Up. Discussed expected course/resolution/complications of diagnosis in detail with patient. Medication risks/benefits/costs/interactions/alternatives discussed with patient. Pt was given after visit summary which includes diagnoses, current medications & vitals. Pt expressed understanding with the diagnosis and plan        Subjective:      Frandy Angel is a 62 y.o. male who presents for had concerns including Diabetes (f/u;  foot exam due, order pended). Here today for follow up on diabetes. Checks BG in the morning and gets 140-150. Takes 2000mg of metformin, takes 29 units of lantus, 10mg of glipizide daily as directed with no reported side effects. No reported hypoglycemias. Eats a healthy diet. Lives active lifestyle. Did not get labwork done from previous visit. Depression  States has improved and doing well. Denies SI/HI. States [facility] family support system. Takes medications as prescribed with no reported side effects. Current Outpatient Medications   Medication Sig Dispense Refill    glipiZIDE (GLUCOTROL) 5 mg tablet Take 2 Tabs by mouth daily. 90 Tab 1    raNITIdine (ZANTAC) 150 mg tablet Take 1 Tab by mouth two (2) times a day. 30 Tab 0    insulin glargine (LANTUS U-100 INSULIN) 100 unit/mL injection Take 29 units daily (Patient taking differently: by SubCUTAneous route daily. Take 29 units daily) 1 Vial 2    cetirizine (ZYRTEC) 5 mg tablet Take 1 Tab by mouth daily as needed for Itching. 30 Tab 0    hydroCHLOROthiazide (HYDRODIURIL) 25 mg tablet Take 1 Tab by mouth daily.  90 Tab 1    insulin syringe-needle U-100 (BD INSULIN SYRINGE ULTRA-FINE) 0.3 mL 31 gauge x 15/64\" syrg 1 Units by Does Not Apply route three (3) times daily. 90 Pen Needle 3    metFORMIN (GLUCOPHAGE) 1,000 mg tablet Take 1 Tab by mouth two (2) times daily (with meals). 180 Tab 1    rosuvastatin (CRESTOR) 10 mg tablet Take 1 Tab by mouth nightly. 90 Tab 1    amLODIPine (NORVASC) 10 mg tablet Take 1 Tab by mouth daily. 90 Tab 1    gabapentin (NEURONTIN) 300 mg capsule Take 1 Cap by mouth three (3) times daily. 270 Cap 1    lisinopril (PRINIVIL, ZESTRIL) 40 mg tablet Take 1 Tab by mouth daily. 90 Tab 1    ketorolac (ACULAR) 0.5 % ophthalmic solution       sertraline (ZOLOFT) 100 mg tablet Take 1.5 Tabs by mouth daily.  135 Tab 1       No Known Allergies  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, with long-term current use of insulin ([facility] 2018    Diabetes ([facility]      Past Surgical History:   Procedure Laterality Date    HX OTHER SURGICAL      5th toe Metatarsal amputation 2017      Family History   Problem Relation Age of Onset    Diabetes Mother     No Known Problems Father      Social History     Socioeconomic History    Marital status:      Spouse name: Not on file    Number of children: Not on file    Years of education: Not on file    Highest education level: Not on file   Occupational History    Not on file   Social Needs    Financial resource strain: Not on file    Food insecurity:     Worry: Not on file     Inability: Not on file    Transportation needs:     Medical: Not on file     Non-medical: Not on file   Tobacco Use    Smoking status: Former Smoker     Last attempt to quit: 2001     Years since quittin.2    Smokeless tobacco: Never Used   Substance and Sexual Activity    Alcohol use: No     Frequency: Never    Drug use: No    Sexual activity: Yes     Partners: Female     Birth control/protection: Condom   Lifestyle    Physical activity:     Days per week: Not on file     Minutes per session: Not on file    Stress: Not on file   Relationships    Social connections:     Talks on phone: Not on file     Gets together: Not on file     Attends Roman Catholic service: Not on file     Active member of club or organization: Not on file     Attends meetings of clubs or organizations: Not on file     Relationship status: Not on file    Intimate partner violence:     Fear of current or ex partner: Not on file     Emotionally abused: Not on file     Physically abused: Not on file     Forced sexual activity: Not on file   Other Topics Concern    Not on file   Social History Narrative    Not on file       HPI      ROS:   Review of Systems   Constitutional: Negative for chills, fever and malaise/fatigue. Respiratory: Negative for cough and shortness of breath. Cardiovascular: Negative for chest pain, palpitations and leg swelling. Neurological: Negative for dizziness and headaches. Psychiatric/Behavioral: Negative for depression. The patient is not nervous/anxious and does not have insomnia. Objective:     Visit Vitals  /78   Pulse 72   Temp 98.1 °F (36.7 °C) (Oral)   Resp 16   Ht 5' 8\" (1.727 m)   Wt 164 lb (74.4 kg)   SpO2 98%   BMI 24.94 kg/m²         Vitals and Nurse Documentation reviewed. Physical Exam   Constitutional: He is oriented to person, place, and time and well-developed, well-nourished, and in no distress. Vital signs are normal. No distress. HENT:   Head: Normocephalic and atraumatic. Cardiovascular: Normal rate, regular rhythm and normal heart sounds. Exam reveals no gallop and no friction rub. No murmur heard. Pulmonary/Chest: Effort normal and breath sounds normal. No respiratory distress. He has no wheezes. He has no rales. Neurological: He is alert and oriented to person, place, and time. Skin: Skin is warm, dry and intact. He is not diaphoretic. No cyanosis. No pallor.    Psychiatric: Affect normal. His mood appears not anxious. He is not agitated. He does not exhibit a depressed mood. He expresses no homicidal and no suicidal ideation. Diabetic foot exam performed by Christiano Lozano, NP     Measurement  Response Nurse Comment Physician Comment   Monofilament  R - 1/6  L - 0/6     Pulse DP R - 2+ (normal)  L - present     Pulse TP R - present  L - present     Structural deformity R - Yes - amputated last toe  L - None     Skin Integrity / Deformity R - None  L - None        Reviewed by:         Results for orders placed or performed in visit on 05/22/19   HEMOGLOBIN A1C WITH EAG   Result Value Ref Range    Hemoglobin A1c 9.2 (H) 4.8 - 5.6 %    Estimated average glucose 217 mg/dL   URINALYSIS W/ RFLX MICROSCOPIC   Result Value Ref Range    Specific Gravity 1.024 1.005 - 1.030    pH (UA) 5.5 5.0 - 7.5    Color Yellow Yellow    Appearance Clear Clear    Leukocyte Esterase Negative Negative    Protein 2+ (A) Negative/Trace    Glucose 3+ (A) Negative    Ketone Negative Negative    Blood Negative Negative    Bilirubin Negative Negative    Urobilinogen 0.2 0.2 - 1.0 mg/dL    Nitrites Negative Negative    Microscopic Examination See additional order    CBC W/O DIFF   Result Value Ref Range    WBC 5.0 3.4 - 10.8 x10E3/uL    RBC 4.32 4.14 - 5.80 x10E6/uL    HGB 12.4 (L) 13.0 - 17.7 g/dL    HCT 36.6 (L) 37.5 - 51.0 %    MCV 85 79 - 97 fL    MCH 28.7 26.6 - 33.0 pg    MCHC 33.9 31.5 - 35.7 g/dL    RDW 14.6 12.3 - 15.4 %    PLATELET 138 (L) 255 - 450 x10E3/uL   MICROSCOPIC EXAMINATION   Result Value Ref Range    WBC 0-5 0 - 5 /hpf    RBC 0-2 0 - 2 /hpf    Epithelial cells 0-10 0 - 10 /hpf    Casts None seen None seen /lpf    Mucus Present Not Estab.     Bacteria None seen None seen/Few

## 2019-08-23 NOTE — PROGRESS NOTES
Identified pt with two pt identifiers(name and ). Reviewed record in preparation for visit and have obtained necessary documentation. Chief Complaint   Patient presents with    Diabetes     f/u; HM foot exam due, order pended        Health Maintenance Due   Topic    Hepatitis C Screening     FOOT EXAM Q1     Shingrix Vaccine Age 50> (1 of 2)    FOBT Q 1 YEAR AGE 54-65     Influenza Age 5 to Adult        Coordination of Care Questionnaire:  :   1) Have you been to an emergency room, urgent care, or hospitalized since your last visit? If yes, where when, and reason for visit? no       2. Have seen or consulted any other health care provider since your last visit? If yes, where when, and reason for visit? NO      Patient is accompanied by self I have received verbal consent from Frandy Angel to discuss any/all medical information while they are present in the room.

## 2019-09-05 DIAGNOSIS — E11.65 UNCONTROLLED TYPE 2 DIABETES MELLITUS WITH HYPERGLYCEMIA (HCC): ICD-10-CM

## 2019-09-05 DIAGNOSIS — R80.9 MICROALBUMINURIA: ICD-10-CM

## 2019-11-22 ENCOUNTER — OFFICE VISIT (OUTPATIENT)
Dept: FAMILY MEDICINE CLINIC | Age: 59
End: 2019-11-22

## 2019-11-22 VITALS
HEART RATE: 74 BPM | DIASTOLIC BLOOD PRESSURE: 79 MMHG | WEIGHT: 162 LBS | SYSTOLIC BLOOD PRESSURE: 154 MMHG | HEIGHT: 68 IN | RESPIRATION RATE: 16 BRPM | BODY MASS INDEX: 24.55 KG/M2 | TEMPERATURE: 98.3 F | OXYGEN SATURATION: 100 %

## 2019-11-22 DIAGNOSIS — Z79.4 TYPE 2 DIABETES MELLITUS WITH DIABETIC CATARACT, WITH LONG-TERM CURRENT USE OF INSULIN (HCC): Primary | ICD-10-CM

## 2019-11-22 DIAGNOSIS — I10 ESSENTIAL HYPERTENSION: ICD-10-CM

## 2019-11-22 DIAGNOSIS — Z23 ENCOUNTER FOR IMMUNIZATION: ICD-10-CM

## 2019-11-22 DIAGNOSIS — R80.9 MICROALBUMINURIA: ICD-10-CM

## 2019-11-22 DIAGNOSIS — E11.36 TYPE 2 DIABETES MELLITUS WITH DIABETIC CATARACT, WITH LONG-TERM CURRENT USE OF INSULIN (HCC): Primary | ICD-10-CM

## 2019-11-22 RX ORDER — METOPROLOL SUCCINATE 25 MG/1
25 TABLET, EXTENDED RELEASE ORAL DAILY
Qty: 90 TAB | Refills: 1 | Status: SHIPPED | OUTPATIENT
Start: 2019-11-22 | End: 2020-07-01 | Stop reason: SDUPTHER

## 2019-11-22 RX ORDER — SYRINGE AND NEEDLE,INSULIN,1ML 31GX15/64"
1 SYRINGE, EMPTY DISPOSABLE MISCELLANEOUS 3 TIMES DAILY
Qty: 90 PEN NEEDLE | Refills: 3 | Status: SHIPPED | OUTPATIENT
Start: 2019-11-22 | End: 2020-05-13 | Stop reason: SDUPTHER

## 2019-11-22 RX ORDER — INSULIN GLARGINE 100 [IU]/ML
INJECTION, SOLUTION SUBCUTANEOUS
Qty: 3 VIAL | Refills: 4 | Status: SHIPPED | OUTPATIENT
Start: 2019-11-22 | End: 2020-07-01 | Stop reason: SDUPTHER

## 2019-11-22 NOTE — PROGRESS NOTES
Identified pt with two pt identifiers(name and ). Reviewed record in preparation for visit and have obtained necessary documentation. Chief Complaint   Patient presents with    Diabetes        Health Maintenance Due   Topic    Hepatitis C Screening     Shingrix Vaccine Age 50> (1 of 2)    FOBT Q 1 YEAR AGE 54-65     Influenza Age 5 to Adult     MICROALBUMIN Q1     LIPID PANEL Q1     HEMOGLOBIN A1C Q6M        Coordination of Care Questionnaire:  :   1) Have you been to an emergency room, urgent care, or hospitalized since your last visit? If yes, where when, and reason for visit? No    2. Have seen or consulted any other health care provider since your last visit? If yes, where when, and reason for visit?  No

## 2019-11-22 NOTE — PROGRESS NOTES
Assessment/Plan:     Diagnoses and all orders for this visit:    1. Type 2 diabetes mellitus with diabetic cataract, with long-term current use of insulin ([facility])  -     insulin syringe-needle U-100 (BD INSULIN SYRINGE ULTRA-FINE) 0.3 mL 31 gauge x 15/64\" syrg; 1 Units by Does Not Apply route three (3) times daily. -     insulin glargine (LANTUS U-100 INSULIN) 100 unit/mL injection; E11.65 Take 29 units daily SubQ at night  -     glucose blood VI test strips (ASCENSIA AUTODISC VI, ONE TOUCH ULTRA TEST VI) strip; E11.65 test fasting glucose in the morning and before meals as needed  - Presumed stable, last A1c was 9.2. Labs today. Refill of insulin please take as directed and monitor BG. Bring log back to next visit. 2. Microalbuminuria   -presumed stable, labs today    3. Essential hypertension  -     metoprolol succinate (TOPROL-XL) 25 mg XL tablet; Take 1 Tab by mouth daily. - Worsening, start metoprolol today as directed. Side effects discussed. Follow up in 2-3 weeks with BP log. 4. Encounter for immunization  -     INFLUENZA VIRUS VAC QUAD,SPLIT,PRESV FREE SYRINGE IM            Discussed expected course/resolution/complications of diagnosis in detail with patient. Medication risks/benefits/costs/interactions/alternatives discussed with patient. Pt was given after visit summary which includes diagnoses, current medications & vitals. Pt expressed understanding with the diagnosis and plan        Subjective:      Ezekiel Barajas is a 61 y.o. male who presents for had concerns including Diabetes. Here today to follow up on diabetes. States been without insulin for almost 2 weeks. Has not checked BG in 2 weeks. Also takes glipizide 5mg daily. HTN  BP is 154/79 today. States having elevated blood pressure readings at home and seeing some numbers in the 180's. Takes amlodipine 10mg, lisinopril 40mg and HCTZ 25 mg daily as directed with no reported side effects.   Denies CP, SOB, palpitations or leg swelling. State depression is doing well and stable. Current Outpatient Medications   Medication Sig Dispense Refill    insulin syringe-needle U-100 (BD INSULIN SYRINGE ULTRA-FINE) 0.3 mL 31 gauge x 15/64\" syrg 1 Units by Does Not Apply route three (3) times daily. 90 Pen Needle 3    insulin glargine (LANTUS U-100 INSULIN) 100 unit/mL injection E11.65 Take 29 units daily SubQ at night 3 Vial 4    glucose blood VI test strips (ASCENSIA AUTODISC VI, ONE TOUCH ULTRA TEST VI) strip E11.65 test fasting glucose in the morning and before meals as needed 200 Strip 3    metoprolol succinate (TOPROL-XL) 25 mg XL tablet Take 1 Tab by mouth daily. 90 Tab 1    glipiZIDE (GLUCOTROL) 5 mg tablet Take 2 Tabs by mouth daily. 90 Tab 1    raNITIdine (ZANTAC) 150 mg tablet Take 1 Tab by mouth two (2) times a day. 30 Tab 0    cetirizine (ZYRTEC) 5 mg tablet Take 1 Tab by mouth daily as needed for Itching. 30 Tab 0    hydroCHLOROthiazide (HYDRODIURIL) 25 mg tablet Take 1 Tab by mouth daily. 90 Tab 1    sertraline (ZOLOFT) 100 mg tablet Take 1.5 Tabs by mouth daily. 135 Tab 1    metFORMIN (GLUCOPHAGE) 1,000 mg tablet Take 1 Tab by mouth two (2) times daily (with meals). 180 Tab 1    rosuvastatin (CRESTOR) 10 mg tablet Take 1 Tab by mouth nightly. 90 Tab 1    amLODIPine (NORVASC) 10 mg tablet Take 1 Tab by mouth daily. 90 Tab 1    gabapentin (NEURONTIN) 300 mg capsule Take 1 Cap by mouth three (3) times daily. 270 Cap 1    lisinopril (PRINIVIL, ZESTRIL) 40 mg tablet Take 1 Tab by mouth daily.  80 Tab 1       No Known Allergies  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, with long-term current use of insulin ([address] 11/14/2018    Diabetes ([facility]      Past Surgical History:   Procedure Laterality Date    HX OTHER SURGICAL      5th toe Metatarsal amputation 12/2017      Family History   Problem Relation Age of Onset    Diabetes Mother     No Known Problems Father      Social History     Socioeconomic History    Marital status:      Spouse name: Not on file    Number of children: Not on file    Years of education: Not on file    Highest education level: Not on file   Occupational History    Not on file   Social Needs    Financial resource strain: Not on file    Food insecurity:     Worry: Not on file     Inability: Not on file    Transportation needs:     Medical: Not on file     Non-medical: Not on file   Tobacco Use    Smoking status: Former Smoker     Last attempt to quit: 2001     Years since quittin.5    Smokeless tobacco: Never Used   Substance and Sexual Activity    Alcohol use: No     Frequency: Never    Drug use: No    Sexual activity: Yes     Partners: Female     Birth control/protection: Condom   Lifestyle    Physical activity:     Days per week: Not on file     Minutes per session: Not on file    Stress: Not on file   Relationships    Social connections:     Talks on phone: Not on file     Gets together: Not on file     Attends Buddhism service: Not on file     Active member of club or organization: Not on file     Attends meetings of clubs or organizations: Not on file     Relationship status: Not on file    Intimate partner violence:     Fear of current or ex partner: Not on file     Emotionally abused: Not on file     Physically abused: Not on file     Forced sexual activity: Not on file   Other Topics Concern    Not on file   Social History Narrative    Not on file       HPI      ROS:   Review of Systems   Constitutional: Negative for chills, fever and malaise/fatigue. Respiratory: Negative for cough and shortness of breath. Cardiovascular: Negative for chest pain, palpitations and leg swelling. Genitourinary: Negative for frequency. Endo/Heme/Allergies: Negative for polydipsia. Psychiatric/Behavioral: Negative for depression.        Objective:     Visit Vitals  /79 (BP 1 Location: Left arm) Pulse 74   Temp 98.3 °F (36.8 °C) (Oral)   Resp 16   Ht 5' 8\" (1.727 m)   Wt 162 lb (73.5 kg)   SpO2 100%   BMI 24.63 kg/m²         Vitals and Nurse Documentation reviewed. Physical Exam  Constitutional:       General: He is not in acute distress. Appearance: He is not diaphoretic. HENT:      Head: Normocephalic and atraumatic. Cardiovascular:      Rate and Rhythm: Normal rate and regular rhythm. Heart sounds: Normal heart sounds. No murmur. No friction rub. No gallop. Pulmonary:      Effort: Pulmonary effort is normal. No respiratory distress. Breath sounds: Normal breath sounds. No wheezing or rales. Skin:     General: Skin is warm and dry. Coloration: Skin is not pale. Neurological:      Mental Status: He is alert and oriented to person, place, and time. Psychiatric:         Mood and Affect: Affect normal. Mood is not anxious or depressed. Behavior: Behavior is not agitated. Thought Content: Thought content does not include homicidal or suicidal ideation.          Results for orders placed or performed in visit on 05/22/19   HEMOGLOBIN A1C WITH EAG   Result Value Ref Range    Hemoglobin A1c 9.2 (H) 4.8 - 5.6 %    Estimated average glucose 217 mg/dL   URINALYSIS W/ RFLX MICROSCOPIC   Result Value Ref Range    Specific Gravity 1.024 1.005 - 1.030    pH (UA) 5.5 5.0 - 7.5    Color Yellow Yellow    Appearance Clear Clear    Leukocyte Esterase Negative Negative    Protein 2+ (A) Negative/Trace    Glucose 3+ (A) Negative    Ketone Negative Negative    Blood Negative Negative    Bilirubin Negative Negative    Urobilinogen 0.2 0.2 - 1.0 mg/dL    Nitrites Negative Negative    Microscopic Examination See additional order    CBC W/O DIFF   Result Value Ref Range    WBC 5.0 3.4 - 10.8 x10E3/uL    RBC 4.32 4.14 - 5.80 x10E6/uL    HGB 12.4 (L) 13.0 - 17.7 g/dL    HCT 36.6 (L) 37.5 - 51.0 %    MCV 85 79 - 97 fL    MCH 28.7 26.6 - 33.0 pg    MCHC 33.9 31.5 - 35.7 g/dL    RDW 14.6 12.3 - 15.4 %    PLATELET 902 (L) 118 - 450 x10E3/uL   MICROSCOPIC EXAMINATION   Result Value Ref Range    WBC 0-5 0 - 5 /hpf    RBC 0-2 0 - 2 /hpf    Epithelial cells 0-10 0 - 10 /hpf    Casts None seen None seen /lpf    Mucus Present Not Estab.     Bacteria None seen None seen/Few

## 2019-11-23 LAB
ALBUMIN/CREAT UR: 1731 MG/G CREAT (ref 0–30)
BASOPHILS # BLD AUTO: 0 X10E3/UL (ref 0–0.2)
BASOPHILS NFR BLD AUTO: 1 %
CHOLEST SERPL-MCNC: 176 MG/DL (ref 100–199)
CREAT UR-MCNC: 72.2 MG/DL
EOSINOPHIL # BLD AUTO: 0.1 X10E3/UL (ref 0–0.4)
EOSINOPHIL NFR BLD AUTO: 2 %
ERYTHROCYTE [DISTWIDTH] IN BLOOD BY AUTOMATED COUNT: 13 % (ref 12.3–15.4)
EST. AVERAGE GLUCOSE BLD GHB EST-MCNC: 255 MG/DL
HBA1C MFR BLD: 10.5 % (ref 4.8–5.6)
HCT VFR BLD AUTO: 38.2 % (ref 37.5–51)
HDLC SERPL-MCNC: 45 MG/DL
HGB BLD-MCNC: 12.9 G/DL (ref 13–17.7)
IMM GRANULOCYTES # BLD AUTO: 0 X10E3/UL (ref 0–0.1)
IMM GRANULOCYTES NFR BLD AUTO: 0 %
LDLC SERPL CALC-MCNC: 112 MG/DL (ref 0–99)
LYMPHOCYTES # BLD AUTO: 1.5 X10E3/UL (ref 0.7–3.1)
LYMPHOCYTES NFR BLD AUTO: 36 %
MCH RBC QN AUTO: 29.9 PG (ref 26.6–33)
MCHC RBC AUTO-ENTMCNC: 33.8 G/DL (ref 31.5–35.7)
MCV RBC AUTO: 89 FL (ref 79–97)
MICROALBUMIN UR-MCNC: 1249.8 UG/ML
MONOCYTES # BLD AUTO: 0.4 X10E3/UL (ref 0.1–0.9)
MONOCYTES NFR BLD AUTO: 9 %
NEUTROPHILS # BLD AUTO: 2.2 X10E3/UL (ref 1.4–7)
NEUTROPHILS NFR BLD AUTO: 52 %
PLATELET # BLD AUTO: 154 X10E3/UL (ref 150–450)
RBC # BLD AUTO: 4.31 X10E6/UL (ref 4.14–5.8)
TRIGL SERPL-MCNC: 94 MG/DL (ref 0–149)
VLDLC SERPL CALC-MCNC: 19 MG/DL (ref 5–40)
WBC # BLD AUTO: 4.1 X10E3/UL (ref 3.4–10.8)

## 2019-11-29 ENCOUNTER — TELEPHONE (OUTPATIENT)
Dept: FAMILY MEDICINE CLINIC | Age: 59
End: 2019-11-29

## 2019-11-29 NOTE — TELEPHONE ENCOUNTER
----- Message from Saintclair Lane, NP sent at 11/29/2019  6:40 AM EST -----  Please have patient return for an apt.   Kidney function and high A1c

## 2019-11-29 NOTE — TELEPHONE ENCOUNTER
TC- to Mr. Caryn Alvarez,        Patient is not available to accept phone calls, not able to leave a message

## 2019-12-06 NOTE — PROGRESS NOTES
LM- to call  Back recent results- Mj      We have tried  To get patient on the phone , if possible can you do a results letter  ?   [id number] [facility]

## 2020-05-12 ENCOUNTER — TELEPHONE (OUTPATIENT)
Dept: FAMILY MEDICINE CLINIC | Age: 60
End: 2020-05-12

## 2020-05-12 ENCOUNTER — VIRTUAL VISIT (OUTPATIENT)
Dept: FAMILY MEDICINE CLINIC | Age: 60
End: 2020-05-12

## 2020-05-12 DIAGNOSIS — F32.A DEPRESSION, UNSPECIFIED DEPRESSION TYPE: ICD-10-CM

## 2020-05-12 RX ORDER — ISOSORBIDE DINITRATE 20 MG/1
20 TABLET ORAL 3 TIMES DAILY
Status: ON HOLD | COMMUNITY
End: 2021-11-15

## 2020-05-12 RX ORDER — SERTRALINE HYDROCHLORIDE 100 MG/1
200 TABLET, FILM COATED ORAL DAILY
Qty: 180 TAB | Refills: 1 | Status: SHIPPED | OUTPATIENT
Start: 2020-05-12 | End: 2020-08-07 | Stop reason: SDUPTHER

## 2020-05-12 RX ORDER — FUROSEMIDE 40 MG/1
TABLET ORAL DAILY
COMMUNITY
End: 2020-08-07 | Stop reason: SDUPTHER

## 2020-05-12 NOTE — TELEPHONE ENCOUNTER
Stephanie Correia from [employer] called to report pts level - patient health questionnaire depression scale - he was at level 11, since it is over 10 they needed to report to a doctor  [phone] her biggest concern is he did say he thinks about hurting himself quite often- doesn't think he will but does think about it some.

## 2020-05-12 NOTE — PROGRESS NOTES
Patient stated  Name &   Chief Complaint   Patient presents with   [facility] Follow Up     Depression     Ayleen     Discharged 2 days ago        Health Maintenance Due   Topic    Hepatitis C Screening     Shingrix Vaccine Age 50> (1 of 2)    FOBT Q1Y Age 54-65     A1C test (Diabetic or Prediabetic)        Wt Readings from Last 3 Encounters:   19 162 lb (73.5 kg)   19 164 lb (74.4 kg)   19 165 lb (74.8 kg)     Temp Readings from Last 3 Encounters:   19 98.3 °F (36.8 °C) (Oral)   19 98.1 °F (36.7 °C) (Oral)   19 98.1 °F (36.7 °C) (Oral)     BP Readings from Last 3 Encounters:   19 154/79   19 138/78   19 121/67     Pulse Readings from Last 3 Encounters:   19 74   19 72   19 70         Learning Assessment:  :     Learning Assessment 2018   PRIMARY LEARNER Patient Patient Patient   HIGHEST LEVEL OF EDUCATION - PRIMARY LEARNER  - - GRADUATED HIGH SCHOOL OR GED   BARRIERS PRIMARY LEARNER NONE - -   CO-LEARNER CAREGIVER No - -   PRIMARY LANGUAGE ENGLISH ENGLISH ENGLISH   LEARNER PREFERENCE PRIMARY VIDEOS VIDEOS DEMONSTRATION   ANSWERED BY SELF patinet self   RELATIONSHIP SELF SELF SELF       Depression Screening:  :     3 most recent PHQ Screens 2019   Little interest or pleasure in doing things Several days   Feeling down, depressed, irritable, or hopeless Nearly every day   Total Score PHQ 2 4   Trouble falling or staying asleep, or sleeping too much More than half the days   Feeling tired or having little energy Nearly every day   Poor appetite, weight loss, or overeating Nearly every day   Feeling bad about yourself - or that you are a failure or have let yourself or your family down Nearly every day   Trouble concentrating on things such as school, work, reading, or watching TV Nearly every day   Moving or speaking so slowly that other people could have noticed; or the opposite being so fidgety that others notice Nearly every day   Thoughts of being better off dead, or hurting yourself in some way Nearly every day   PHQ 9 Score 24   How difficult have these problems made it for you to do your work, take care of your home and get along with others Somewhat difficult       Fall Risk Assessment:  :     No flowsheet data found. Abuse Screening:  :     Abuse Screening Questionnaire 1/22/2019   Do you ever feel afraid of your partner? N   Are you in a relationship with someone who physically or mentally threatens you? N   Is it safe for you to go home? Y       Coordination of Care Questionnaire:  :     1) Have you been to an emergency room, urgent care clinic since your last visit? No  Hospitalized since your last visit? Yes  Depression  D/C 2 days ago             2) Have you seen or consulted any other health care providers outside of [address] [place] [address] since your last visit? no  (Include any pap smears or colon screenings in this section.)    Patient is accompanied by virtual visit I have received verbal consent from Pato Rincon to discuss any/all medical information while they are present in the room.

## 2020-05-12 NOTE — PROGRESS NOTES
Marylu Rizzo is a 61 y.o. male who was seen by synchronous (real-time) audio-video technology on 5/12/2020. Consent: Mraylu Rizzo, who was seen by synchronous (real-time) audio-video technology, and/or his healthcare decision maker, is aware that this patient-initiated, Telehealth encounter on 5/12/2020 is a billable service, with coverage as determined by his insurance carrier. He is aware that he may receive a bill and has provided verbal consent to proceed: Yes. Assessment & Plan:   Diagnoses and all orders for this visit:    1. Depression, unspecified depression type  -     sertraline (ZOLOFT) 100 mg tablet; Take 2 Tabs by mouth daily. - Worsening, will increase zoloft to 200mg a day. Side effects discussed. , patient agreed to seek urgent care if he has SI again. Has family support and is doing better. Home health nurse is calling office and he has apt set up with surgeon. I spent at least 16 minutes on this visit with this established patient. Subjective:   Marylu Rizzo is a 61 y.o. male who was seen for Hospital Follow Up (Depression     [place]     Discharged 2 days ago)    VV today for depression. Was having chest pain and infection in blood and is currently on antibiotic for 10 days and will need bypass surgery with in 2 weeks. Has a PICC line. Has home health nurse coming. Lives with wife and kids and has supports. Seeing today for depression. States from all these health issues he feels useless and he can't go anywhere and his eyesight is bad (currently seeing ophthalmologist). These things make him sad. He has had some suicidal thoughts with no plan. Prior to Admission medications    Medication Sig Start Date End Date Taking? Authorizing Provider   pot bicarb/potassium cit/ca (POTASSIUM BICARBONATE PO) Take 20 mEq by mouth. Yes Provider, Historical   furosemide (LASIX) 40 mg tablet Take  by mouth daily.    Yes Provider, Historical   isosorbide dinitrate (ISORDIL) 20 mg tablet Take 20 mg by mouth three (3) times daily. Yes Provider, Historical   insulin glargine (LANTUS U-100 INSULIN) 100 unit/mL injection E11.65 Take 29 units daily SubQ at night 11/22/19  Yes Syed Wynne NP   metoprolol succinate (TOPROL-XL) 25 mg XL tablet Take 1 Tab by mouth daily. Patient taking differently: Take 50 mg by mouth daily. 11/22/19  Yes Syed Wynne NP   glipiZIDE (GLUCOTROL) 5 mg tablet Take 2 Tabs by mouth daily. 8/5/19  Yes Syed Wynne NP   raNITIdine (ZANTAC) 150 mg tablet Take 1 Tab by mouth two (2) times a day. 4/25/19  Yes Con Lomas MD   hydroCHLOROthiazide (HYDRODIURIL) 25 mg tablet Take 1 Tab by mouth daily. 2/21/19  Yes Con Lomas MD   sertraline (ZOLOFT) 100 mg tablet Take 1.5 Tabs by mouth daily. 2/21/19  Yes Con Lomas MD   metFORMIN (GLUCOPHAGE) 1,000 mg tablet Take 1 Tab by mouth two (2) times daily (with meals). 11/26/18  Yes Con Lomas MD   rosuvastatin (CRESTOR) 10 mg tablet Take 1 Tab by mouth nightly. 11/14/18  Yes Con Lomas MD   amLODIPine (NORVASC) 10 mg tablet Take 1 Tab by mouth daily. 11/14/18  Yes Con Lomas MD   gabapentin (NEURONTIN) 300 mg capsule Take 1 Cap by mouth three (3) times daily. 11/14/18  Yes Con Lomas MD   lisinopril (PRINIVIL, ZESTRIL) 40 mg tablet Take 1 Tab by mouth daily. 11/14/18  Yes Con Lomas MD   insulin syringe-needle U-100 (BD INSULIN SYRINGE ULTRA-FINE) 0.3 mL 31 gauge x 15/64\" syrg 1 Units by Does Not Apply route three (3) times daily.  11/22/19   Syed Wynne NP   glucose blood VI test strips (ASCENSIA AUTODISC VI, ONE TOUCH ULTRA TEST VI) strip E11.65 test fasting glucose in the morning and before meals as needed 11/22/19   Anthony Beltre NP   cetirizine (ZYRTEC) 5 mg tablet Take 1 Tab by mouth daily as needed for Itching. 3/21/19   Con Lomas MD     No Known Allergies    Patient Active Problem List   Diagnosis Code    Essential hypertension I10    Reactive depression F32.9    Peripheral neuropathy G62.9    Type 2 diabetes mellitus with ophthalmic complication, with long-term current use of insulin ([facility]) E11.39, Z79.4    Type 2 diabetes with nephropathy ([facility] [address] E11.21    Type 2 diabetes mellitus with diabetic neuropathy ([facility] E11.40       Review of Systems   Constitutional: Negative for chills, fever and malaise/fatigue. Eyes: Positive for blurred vision. Respiratory: Negative for cough and shortness of breath. Cardiovascular: Negative for chest pain, palpitations and leg swelling. Neurological: Negative for dizziness and headaches. Psychiatric/Behavioral: Positive for depression and suicidal ideas. Negative for hallucinations, memory loss and substance abuse. The patient is not nervous/anxious and does not have insomnia. Objective:   Vital Signs: (As obtained by patient/caregiver at home)  There were no vitals taken for this visit.      [INSTRUCTIONS:  \"[x]\" Indicates a positive item  \"[]\" Indicates a negative item  -- DELETE ALL ITEMS NOT EXAMINED]    Constitutional: [x] Appears well-developed and well-nourished [x] No apparent distress      [] Abnormal -     Mental status: [x] Alert and awake  [x] Oriented to person/place/time [x] Able to follow commands    [] Abnormal -     Eyes:   EOM    [x]  Normal    [] Abnormal -   Sclera  [x]  Normal    [] Abnormal -          Discharge [x]  None visible   [] Abnormal -     HENT: [x] Normocephalic, atraumatic  [] Abnormal -   [x] Mouth/Throat: Mucous membranes are moist    External Ears [x] Normal  [] Abnormal -    Neck: [x] No visualized mass [] Abnormal -     Pulmonary/Chest: [x] Respiratory effort normal   [x] No visualized signs of difficulty breathing or respiratory distress        [] Abnormal -      Musculoskeletal:   [x] Normal gait with no signs of ataxia         [x] Normal range of motion of neck        [] Abnormal -     Neurological:        [x] No Facial Asymmetry (Cranial nerve 7 motor function) (limited exam due to video visit)          [x] No gaze palsy        [] Abnormal -          Skin:        [x] No significant exanthematous lesions or discoloration noted on facial skin         [] Abnormal -            Psychiatric:       [x] Normal Affect [] Abnormal -        [x] No Hallucinations    Other pertinent observable physical exam findings:-        We discussed the expected course, resolution and complications of the diagnosis(es) in detail. Medication risks, benefits, costs, interactions, and alternatives were discussed as indicated. I advised him to contact the office if his condition worsens, changes or fails to improve as anticipated. He expressed understanding with the diagnosis(es) and plan. Pato Rincon is a 61 y.o. male who was evaluated by a video visit encounter for concerns as above. Patient identification was verified prior to start of the visit. A caregiver was present when appropriate. Due to this being a TeleHealth encounter (During CAOOQ-02 public health emergency), evaluation of the following organ systems was limited: Vitals/Constitutional/EENT/Resp/CV/GI//MS/Neuro/Skin/Heme-Lymph-Imm. Pursuant to the emergency declaration under the [facility], [facility]5 waiver authority and the [employer] Act, this Virtual  Visit was conducted, with patient's (and/or legal guardian's) consent, to reduce the patient's risk of exposure to COVID-19 and provide necessary medical care. Services were provided through a video synchronous discussion virtually to substitute for in-person clinic visit. Patient and provider were located at their individual homes.       Romeo Whyte NP

## 2020-05-13 DIAGNOSIS — E11.36 TYPE 2 DIABETES MELLITUS WITH DIABETIC CATARACT, WITH LONG-TERM CURRENT USE OF INSULIN (HCC): ICD-10-CM

## 2020-05-13 DIAGNOSIS — Z79.4 TYPE 2 DIABETES MELLITUS WITH DIABETIC CATARACT, WITH LONG-TERM CURRENT USE OF INSULIN (HCC): ICD-10-CM

## 2020-05-13 RX ORDER — SYRINGE AND NEEDLE,INSULIN,1ML 31GX15/64"
1 SYRINGE, EMPTY DISPOSABLE MISCELLANEOUS 3 TIMES DAILY
Qty: 90 PEN NEEDLE | Refills: 3 | Status: SHIPPED | OUTPATIENT
Start: 2020-05-13 | End: 2022-02-07

## 2020-06-03 LAB — SARS-COV-2, NAA: NEGATIVE

## 2020-07-01 ENCOUNTER — VIRTUAL VISIT (OUTPATIENT)
Dept: FAMILY MEDICINE CLINIC | Age: 60
End: 2020-07-01

## 2020-07-01 DIAGNOSIS — E11.36 TYPE 2 DIABETES MELLITUS WITH DIABETIC CATARACT, WITH LONG-TERM CURRENT USE OF INSULIN (HCC): Primary | ICD-10-CM

## 2020-07-01 DIAGNOSIS — G47.00 INSOMNIA, UNSPECIFIED TYPE: ICD-10-CM

## 2020-07-01 DIAGNOSIS — I10 ESSENTIAL HYPERTENSION: ICD-10-CM

## 2020-07-01 DIAGNOSIS — Z79.4 TYPE 2 DIABETES MELLITUS WITH DIABETIC CATARACT, WITH LONG-TERM CURRENT USE OF INSULIN (HCC): Primary | ICD-10-CM

## 2020-07-01 DIAGNOSIS — F41.9 ANXIETY: ICD-10-CM

## 2020-07-01 RX ORDER — CLOPIDOGREL BISULFATE 75 MG/1
75 TABLET ORAL
Status: ON HOLD | COMMUNITY
End: 2021-11-15

## 2020-07-01 RX ORDER — BUSPIRONE HYDROCHLORIDE 5 MG/1
5 TABLET ORAL 2 TIMES DAILY
Qty: 180 TAB | Refills: 1 | Status: SHIPPED | OUTPATIENT
Start: 2020-07-01 | End: 2021-11-14

## 2020-07-01 RX ORDER — BUSPIRONE HYDROCHLORIDE 5 MG/1
5 TABLET ORAL 2 TIMES DAILY
COMMUNITY
End: 2020-07-01 | Stop reason: SDUPTHER

## 2020-07-01 RX ORDER — TRAZODONE HYDROCHLORIDE 50 MG/1
50 TABLET ORAL
Qty: 90 TAB | Refills: 1 | Status: SHIPPED | OUTPATIENT
Start: 2020-07-01 | End: 2022-02-07

## 2020-07-01 RX ORDER — METOPROLOL SUCCINATE 50 MG/1
50 TABLET, EXTENDED RELEASE ORAL DAILY
Qty: 90 TAB | Refills: 1 | Status: SHIPPED | OUTPATIENT
Start: 2020-07-01 | End: 2022-02-07

## 2020-07-01 RX ORDER — INSULIN GLARGINE 100 [IU]/ML
INJECTION, SOLUTION SUBCUTANEOUS
Qty: 3 VIAL | Refills: 4 | Status: SHIPPED | OUTPATIENT
Start: 2020-07-01 | End: 2020-08-07

## 2020-07-01 NOTE — PROGRESS NOTES
Identified pt with two pt identifiers(name and ). Reviewed record in preparation for visit and have obtained necessary documentation. Chief Complaint   Patient presents with    High Blood Sugar     Pt states that sugar level readings 139,199,200    Medication Refill        Health Maintenance Due   Topic    Hepatitis C Screening     Shingrix Vaccine Age 50> (1 of 2)    FOBT Q1Y Age 54-65     A1C test (Diabetic or Prediabetic)        Coordination of Care Questionnaire:  :   1) Have you been to an emergency room, urgent care, or hospitalized since your last visit? If yes, where when, and reason for visit? Yes, [facility] for bypass of heart       2. Have seen or consulted any other health care provider since your last visit? If yes, where when, and reason for visit?  no        Patient is accompanied by self I have received verbal consent from Barbara Harris to discuss any/all medical information while they are present in the room.

## 2020-07-01 NOTE — PROGRESS NOTES
Kevin Dickey is a 61 y.o. male who was seen by synchronous (real-time) audio-video technology on 7/1/2020 for High Blood Sugar (Pt states that sugar level readings 139,199,200) and Medication Refill        Assessment & Plan:   Diagnoses and all orders for this visit:    1. Type 2 diabetes mellitus with diabetic cataract, with long-term current use of insulin (HCC)  -     insulin glargine (Lantus U-100 Insulin) 100 unit/mL injection; E11.65 Take 29 units daily SubQ at night  - Advised to work on diet and if no improvement will need to add meal time insulin. Continue medications as directed, could also consider splitting Lantus dose to morning and evening. Follow up 1 month    2. Essential hypertension  -     metoprolol succinate (TOPROL-XL) 50 mg XL tablet; Take 1 Tab by mouth daily.  - Presumed stable, refill, follow up with cardiology    3. Insomnia, unspecified type  -     traZODone (DESYREL) 50 mg tablet; Take 1 Tab by mouth nightly. - Stable, continue current therapy    4. Anxiety  -     busPIRone (BUSPAR) 5 mg tablet; Take 1 Tab by mouth two (2) times a day. - Stable, continue current therapy, refill today. I spent at least 15 minutes on this visit with this established patient. Subjective:   VV today for elevated blood sugar. Fasting blood sugars 130, yesterday 190, today is 200. States he did have cookies and a tortilla yesterday and typically only has one of the other. States a couple of weeks ago fasting BG was in the 90's and low 100's.     was started on Buspar in the hospital and would like to continue. Needs refill. Seeing cardiologist next week. 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 Currently in cardiac rehab. Prior to Admission medications    Medication Sig Start Date End Date Taking? Authorizing Provider   metoprolol succinate (TOPROL-XL) 50 mg XL tablet Take 1 Tab by mouth daily.  7/1/20  Yes Matt Wynne, NP   insulin glargine (Lantus U-100 Insulin) 100 unit/mL injection E11.65 Take 29 units daily SubQ at night 7/1/20  Yes Radha Wynne, NP   clopidogreL (PLAVIX) 75 mg tab Take 75 mg by mouth. Yes Provider, Historical   traZODone (DESYREL) 50 mg tablet Take 1 Tab by mouth nightly. 7/1/20  Yes Radha Wynne, NP   busPIRone (BUSPAR) 5 mg tablet Take 1 Tab by mouth two (2) times a day. 7/1/20  Yes Radha Wynne, NP   insulin syringe-needle U-100 (BD Insulin Syringe Ultra-Fine) 0.3 mL 31 gauge x 15/64\" syrg 1 Units by Does Not Apply route three (3) times daily. 5/13/20  Yes Radha Wynne NP   pot bicarb/potassium cit/ca (POTASSIUM BICARBONATE PO) Take 20 mEq by mouth. Yes Provider, Historical   furosemide (LASIX) 40 mg tablet Take  by mouth daily. Yes Provider, Historical   isosorbide dinitrate (ISORDIL) 20 mg tablet Take 20 mg by mouth three (3) times daily. Yes Provider, Historical   sertraline (ZOLOFT) 100 mg tablet Take 2 Tabs by mouth daily. 5/12/20  Yes Radha Wynne NP   glucose blood VI test strips (ASCENSIA AUTODISC VI, ONE TOUCH ULTRA TEST VI) strip E11.65 test fasting glucose in the morning and before meals as needed 11/22/19  Yes Radha Wynne NP   glipiZIDE (GLUCOTROL) 5 mg tablet Take 2 Tabs by mouth daily. 8/5/19  Yes aRdha Wynne NP   raNITIdine (ZANTAC) 150 mg tablet Take 1 Tab by mouth two (2) times a day. 4/25/19  Yes Orly Cooper MD   cetirizine (ZYRTEC) 5 mg tablet Take 1 Tab by mouth daily as needed for Itching. 3/21/19  Yes Orly Cooper MD   hydroCHLOROthiazide (HYDRODIURIL) 25 mg tablet Take 1 Tab by mouth daily. 2/21/19  Yes Orly Cooper MD   metFORMIN (GLUCOPHAGE) 1,000 mg tablet Take 1 Tab by mouth two (2) times daily (with meals). 11/26/18  Yes Orly Cooper MD   rosuvastatin (CRESTOR) 10 mg tablet Take 1 Tab by mouth nightly. 11/14/18  Yes Orly Cooper MD   amLODIPine (NORVASC) 10 mg tablet Take 1 Tab by mouth daily.  11/14/18  Yes Orly Cooper MD   gabapentin (NEURONTIN) 300 mg capsule Take 1 Cap by mouth three (3) times daily. 11/14/18  Yes Phillip De Luna MD   lisinopril (PRINIVIL, ZESTRIL) 40 mg tablet Take 1 Tab by mouth daily. 11/14/18  Yes Phillip De Luna MD   busPIRone (BUSPAR) 5 mg tablet Take 5 mg by mouth two (2) times a day. 7/1/20  Provider, Historical   insulin glargine (LANTUS U-100 INSULIN) 100 unit/mL injection E11.65 Take 29 units daily SubQ at night 11/22/19 7/1/20  Olesya Wynne, NP   metoprolol succinate (TOPROL-XL) 25 mg XL tablet Take 1 Tab by mouth daily. Patient taking differently: Take 50 mg by mouth daily. 11/22/19 7/1/20  Alec Markham NP     Patient Active Problem List   Diagnosis Code    Essential hypertension I10    Reactive depression F32.9    Peripheral neuropathy G62.9    Type 2 diabetes mellitus with ophthalmic complication, with long-term current use of insulin ([facility]) E11.39, Z79.4    Type 2 diabetes with nephropathy ([facility]) E11.21    Type 2 diabetes mellitus with diabetic neuropathy ([facility]) E11.40       Review of Systems   Constitutional: Negative for chills, fever and malaise/fatigue. Eyes: Negative for blurred vision. Respiratory: Negative for cough and shortness of breath. Cardiovascular: Negative for chest pain, palpitations and leg swelling. Neurological: Negative for dizziness and headaches.        Objective:     Patient-Reported Vitals 7/1/2020   Patient-Reported Weight 179lb   Patient-Reported Height 5ft8in        [INSTRUCTIONS:  \"[x]\" Indicates a positive item  \"[]\" Indicates a negative item  -- DELETE ALL ITEMS NOT EXAMINED]    Constitutional: [x] Appears well-developed and well-nourished [x] No apparent distress      [] Abnormal -     Mental status: [x] Alert and awake  [x] Oriented to person/place/time [x] Able to follow commands    [] Abnormal -     Eyes:   EOM    [x]  Normal    [] Abnormal -   Sclera  [x]  Normal    [] Abnormal -          Discharge [x]  None visible   [] Abnormal -     HENT: [x] Normocephalic, atraumatic  [] Abnormal -   [x] Mouth/Throat: Mucous membranes are moist    External Ears [x] Normal  [] Abnormal -    Neck: [x] No visualized mass [] Abnormal -     Pulmonary/Chest: [x] Respiratory effort normal   [x] No visualized signs of difficulty breathing or respiratory distress        [] Abnormal -      Musculoskeletal:   [x] Normal gait with no signs of ataxia         [x] Normal range of motion of neck        [] Abnormal -     Neurological:        [x] No Facial Asymmetry (Cranial nerve 7 motor function) (limited exam due to video visit)          [x] No gaze palsy        [] Abnormal -          Skin:        [x] No significant exanthematous lesions or discoloration noted on facial skin         [] Abnormal -            Psychiatric:       [x] Normal Affect [] Abnormal -        [x] No Hallucinations    Other pertinent observable physical exam findings:-        We discussed the expected course, resolution and complications of the diagnosis(es) in detail. Medication risks, benefits, costs, interactions, and alternatives were discussed as indicated. I advised him to contact the office if his condition worsens, changes or fails to improve as anticipated. He expressed understanding with the diagnosis(es) and plan. Jade Wallace, who was evaluated through a patient-initiated, synchronous (real-time) audio-video encounter, and/or his healthcare decision maker, is aware that it is a billable service, with coverage as determined by his insurance carrier. He provided verbal consent to proceed: Yes, and patient identification was verified. It was conducted pursuant to the emergency declaration under the [address] [place] [address], [address] [place] [address] authority and the [employer] Act. A caregiver was present when appropriate. Ability to conduct physical exam was limited. I was at home. The patient was at home.       Mervin Bauman NP

## 2020-08-07 ENCOUNTER — OFFICE VISIT (OUTPATIENT)
Dept: FAMILY MEDICINE CLINIC | Age: 60
End: 2020-08-07
Payer: MEDICAID

## 2020-08-07 VITALS
OXYGEN SATURATION: 100 % | SYSTOLIC BLOOD PRESSURE: 149 MMHG | HEIGHT: 68 IN | HEART RATE: 69 BPM | DIASTOLIC BLOOD PRESSURE: 69 MMHG | RESPIRATION RATE: 16 BRPM | WEIGHT: 184.2 LBS | BODY MASS INDEX: 27.92 KG/M2 | TEMPERATURE: 97.7 F

## 2020-08-07 DIAGNOSIS — I10 ESSENTIAL HYPERTENSION: ICD-10-CM

## 2020-08-07 DIAGNOSIS — M25.471 BILATERAL SWELLING OF FEET AND ANKLES: Primary | ICD-10-CM

## 2020-08-07 DIAGNOSIS — M25.475 BILATERAL SWELLING OF FEET AND ANKLES: Primary | ICD-10-CM

## 2020-08-07 DIAGNOSIS — F32.A DEPRESSION, UNSPECIFIED DEPRESSION TYPE: ICD-10-CM

## 2020-08-07 DIAGNOSIS — Z79.4 TYPE 2 DIABETES MELLITUS WITH DIABETIC CATARACT, WITH LONG-TERM CURRENT USE OF INSULIN (HCC): ICD-10-CM

## 2020-08-07 DIAGNOSIS — M25.472 BILATERAL SWELLING OF FEET AND ANKLES: Primary | ICD-10-CM

## 2020-08-07 DIAGNOSIS — M25.474 BILATERAL SWELLING OF FEET AND ANKLES: Primary | ICD-10-CM

## 2020-08-07 DIAGNOSIS — E11.36 TYPE 2 DIABETES MELLITUS WITH DIABETIC CATARACT, WITH LONG-TERM CURRENT USE OF INSULIN (HCC): ICD-10-CM

## 2020-08-07 PROCEDURE — 99214 OFFICE O/P EST MOD 30 MIN: CPT | Performed by: NURSE PRACTITIONER

## 2020-08-07 RX ORDER — TRAMADOL HYDROCHLORIDE 50 MG/1
TABLET ORAL
COMMUNITY
Start: 2020-06-10 | End: 2021-12-01

## 2020-08-07 RX ORDER — TRAMADOL HYDROCHLORIDE 50 MG/1
TABLET ORAL
Status: CANCELLED | OUTPATIENT
Start: 2020-08-07

## 2020-08-07 RX ORDER — FUROSEMIDE 40 MG/1
40 TABLET ORAL DAILY
Qty: 5 TAB | Refills: 0 | Status: SHIPPED | OUTPATIENT
Start: 2020-08-07 | End: 2021-12-01

## 2020-08-07 RX ORDER — INSULIN GLARGINE 100 [IU]/ML
INJECTION, SOLUTION SUBCUTANEOUS
Qty: 3 VIAL | Refills: 4 | Status: ON HOLD | OUTPATIENT
Start: 2020-08-07 | End: 2021-11-30 | Stop reason: SDUPTHER

## 2020-08-07 RX ORDER — SERTRALINE HYDROCHLORIDE 100 MG/1
200 TABLET, FILM COATED ORAL DAILY
Qty: 180 TAB | Refills: 1 | Status: SHIPPED | OUTPATIENT
Start: 2020-08-07 | End: 2022-02-07

## 2020-08-07 RX ORDER — POTASSIUM CHLORIDE 1500 MG/1
TABLET, FILM COATED, EXTENDED RELEASE ORAL
COMMUNITY
Start: 2020-05-10 | End: 2020-08-07 | Stop reason: SDUPTHER

## 2020-08-07 RX ORDER — HYDROCHLOROTHIAZIDE 25 MG/1
25 TABLET ORAL DAILY
Qty: 90 TAB | Refills: 1 | Status: SHIPPED | OUTPATIENT
Start: 2020-08-07 | End: 2021-12-01

## 2020-08-07 NOTE — PROGRESS NOTES
Identified pt with two pt identifiers(name and ). Reviewed record in preparation for visit and have obtained necessary documentation. Chief Complaint   Patient presents with    Diabetes     Pt states that bilateral calfs/foot get swollen         Health Maintenance Due   Topic    Hepatitis C Screening     Shingrix Vaccine Age 50> (1 of 2)    FOBT Q1Y Age 54-65     A1C test (Diabetic or Prediabetic)     Influenza Age 5 to Adult     Foot Exam Q1        Coordination of Care Questionnaire:  :   1) Have you been to an emergency room, urgent care, or hospitalized since your last visit? If yes, where when, and reason for visit? no      2. Have seen or consulted any other health care provider since your last visit? If yes, where when, and reason for visit? Yes, Cardiologist- unsure of Doctor name        Patient is accompanied by self I have received verbal consent from Fili Bors to discuss any/all medical information while they are present in the room.

## 2020-08-07 NOTE — PROGRESS NOTES
Assessment/Plan:     Diagnoses and all orders for this visit:    1. Bilateral swelling of feet and ankles  -     furosemide (LASIX) 40 mg tablet; Take 1 Tab by mouth daily. - Worsening, start Lasix as directed, call cardiologist ASAP for an apt for further evaluation. Elevated extremities    2. Type 2 diabetes mellitus with diabetic cataract, with long-term current use of insulin (HCC)  -     insulin glargine (Lantus U-100 Insulin) 100 unit/mL injection; E11.65 Take 25 units daily SubQ at night  -     REFERRAL TO ENDOCRINOLOGY  - Unchanged, A1c reflects jacobo BG, fasting sugars reported are within range. Referral to Endo for further evaluation.  sending records to Endo for apt. 3. Depression, unspecified depression type  -     sertraline (ZOLOFT) 100 mg tablet; Take 2 Tabs by mouth daily.  - Stable, continue current therapy, refill today    4. Essential hypertension  -     hydroCHLOROthiazide (HYDRODIURIL) 25 mg tablet; Take 1 Tab by mouth daily. -     potassium bicarbonate (KLYTE) 25 mEq tablet; Take 1 Tab by mouth daily. -stable, refill today, follow up with cardiologist            Discussed expected course/resolution/complications of diagnosis in detail with patient. Medication risks/benefits/costs/interactions/alternatives discussed with patient. Pt was given after visit summary which includes diagnoses, current medications & vitals. Pt expressed understanding with the diagnosis and plan        Subjective:      Simin Foley is a 61 y.o. male who presents for had concerns including Diabetes (Pt states that bilateral calfs/foot get swollen ). Bilateral foot swelling. States been going on a month. Was in the hospital for about a week for heart attack and had bypass surgery. States he wakes up in the morning and feet are swollen. Takes amlodipine and saw cardiologist and saw him last month. Has not gone back to work yet.   States when he walks to get his exercise the swelling gets worse.  States has occasional CP. Denies CP, fever. Diabetes  BG at home is running about 120. Has gotten 68-70 and has changed his insulin dose to 25 units and now getting 80's-120. A1c in the hospital in June 2020, was 11.4. Patient has never seen Endocrinologist but have discussed sending for further evaluation as A1c does not reflect reported readings       Current Outpatient Medications   Medication Sig Dispense Refill    traMADoL (ULTRAM) 50 mg tablet TAKE 1 TABLET BY MOUTH EVERY 4 HOURS AS NEEDED FOR PAIN      insulin glargine (Lantus U-100 Insulin) 100 unit/mL injection E11.65 Take 25 units daily SubQ at night 3 Vial 4    sertraline (ZOLOFT) 100 mg tablet Take 2 Tabs by mouth daily. 180 Tab 1    potassium bicarbonate (KLYTE) 25 mEq tablet Take 1 Tab by mouth daily. 90 Each 1    hydroCHLOROthiazide (HYDRODIURIL) 25 mg tablet Take 1 Tab by mouth daily. 90 Tab 1    furosemide (LASIX) 40 mg tablet Take 1 Tab by mouth daily. 5 Tab 0    metoprolol succinate (TOPROL-XL) 50 mg XL tablet Take 1 Tab by mouth daily. 90 Tab 1    clopidogreL (PLAVIX) 75 mg tab Take 75 mg by mouth.  traZODone (DESYREL) 50 mg tablet Take 1 Tab by mouth nightly. 90 Tab 1    busPIRone (BUSPAR) 5 mg tablet Take 1 Tab by mouth two (2) times a day. 180 Tab 1    insulin syringe-needle U-100 (BD Insulin Syringe Ultra-Fine) 0.3 mL 31 gauge x 15/64\" syrg 1 Units by Does Not Apply route three (3) times daily. 90 Pen Needle 3    isosorbide dinitrate (ISORDIL) 20 mg tablet Take 20 mg by mouth three (3) times daily.  glucose blood VI test strips (ASCENSIA AUTODISC VI, ONE TOUCH ULTRA TEST VI) strip E11.65 test fasting glucose in the morning and before meals as needed 200 Strip 3    glipiZIDE (GLUCOTROL) 5 mg tablet Take 2 Tabs by mouth daily. 90 Tab 1    raNITIdine (ZANTAC) 150 mg tablet Take 1 Tab by mouth two (2) times a day. 30 Tab 0    cetirizine (ZYRTEC) 5 mg tablet Take 1 Tab by mouth daily as needed for Itching. 30 Tab 0    metFORMIN (GLUCOPHAGE) 1,000 mg tablet Take 1 Tab by mouth two (2) times daily (with meals). 180 Tab 1    rosuvastatin (CRESTOR) 10 mg tablet Take 1 Tab by mouth nightly. 90 Tab 1    amLODIPine (NORVASC) 10 mg tablet Take 1 Tab by mouth daily. 90 Tab 1    gabapentin (NEURONTIN) 300 mg capsule Take 1 Cap by mouth three (3) times daily. 270 Cap 1    lisinopril (PRINIVIL, ZESTRIL) 40 mg tablet Take 1 Tab by mouth daily.  80 Tab 1       No Known Allergies  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, with long-term current use of insulin ([facility] 2018    Diabetes ([facility] [address]      Past Surgical History:   Procedure Laterality Date    HX ARTERIAL BYPASS      HX OTHER SURGICAL      5th toe Metatarsal amputation 2017      Family History   Problem Relation Age of Onset    Diabetes Mother     No Known Problems Father      Social History     Socioeconomic History    Marital status:      Spouse name: Not on file    Number of children: Not on file    Years of education: Not on file    Highest education level: Not on file   Occupational History    Not on file   Social Needs    Financial resource strain: Not on file    Food insecurity     Worry: Not on file     Inability: Not on file    Transportation needs     Medical: Not on file     Non-medical: Not on file   Tobacco Use    Smoking status: Former Smoker     Last attempt to quit: 2001     Years since quittin.2    Smokeless tobacco: Never Used   Substance and Sexual Activity    Alcohol use: No     Frequency: Never    Drug use: No    Sexual activity: Yes     Partners: Female     Birth control/protection: Condom   Lifestyle    Physical activity     Days per week: Not on file     Minutes per session: Not on file    Stress: Not on file   Relationships    Social connections     Talks on phone: Not on file     Gets together: Not on file     Attends Confucianism service: Not on file     Active member of club or organization: Not on file     Attends meetings of clubs or organizations: Not on file     Relationship status: Not on file    Intimate partner violence     Fear of current or ex partner: Not on file     Emotionally abused: Not on file     Physically abused: Not on file     Forced sexual activity: Not on file   Other Topics Concern    Not on file   Social History Narrative    Not on file       HPI      ROS:   Review of Systems   Constitutional: Negative for chills, fever and malaise/fatigue. Eyes: Negative for blurred vision. Respiratory: Negative for cough and shortness of breath. Cardiovascular: Positive for leg swelling. Negative for chest pain and palpitations. Neurological: Negative for dizziness and headaches. Objective:     Visit Vitals  /69   Pulse 69   Temp 97.7 °F (36.5 °C) (Oral)   Resp 16   Ht 5' 8\" (1.727 m)   Wt 184 lb 3.2 oz (83.6 kg)   SpO2 100%   BMI 28.01 kg/m²         Vitals and Nurse Documentation reviewed. Physical Exam  Constitutional:       General: He is not in acute distress. Appearance: He is not diaphoretic. HENT:      Head: Normocephalic and atraumatic. Cardiovascular:      Rate and Rhythm: Normal rate and regular rhythm. Pulses:           Dorsalis pedis pulses are 1+ on the right side and 1+ on the left side. Posterior tibial pulses are 1+ on the right side and 1+ on the left side. Heart sounds: Normal heart sounds. No murmur. No friction rub. No gallop. Comments: 1-2+ pitting edema bilater LE  Pulmonary:      Effort: Pulmonary effort is normal. No respiratory distress. Breath sounds: Normal breath sounds. No wheezing or rales. Musculoskeletal:      Right lower leg: Edema present. Left lower leg: Edema present. Skin:     General: Skin is warm and dry. Coloration: Skin is not pale. Neurological:      Mental Status: He is alert and oriented to person, place, and time. Psychiatric:         Mood and Affect: Affect normal. Mood is not anxious or depressed. Behavior: Behavior is not agitated. Thought Content: Thought content does not include homicidal or suicidal ideation.          Results for orders placed or performed in visit on 06/23/20   NOVEL CORONAVIRUS (COVID-19)   Result Value Ref Range    SARS-CoV-2, YARELIS Negative

## 2020-12-13 LAB
SARS-COV-2, NAA: NEGATIVE
SARS-COV-2, NAA: NORMAL

## 2021-05-07 ENCOUNTER — TELEPHONE (OUTPATIENT)
Dept: FAMILY MEDICINE CLINIC | Age: 61
End: 2021-05-07

## 2021-05-07 NOTE — TELEPHONE ENCOUNTER
Reached out to [address] to inform them that pt will need to schedule with provider befor Verbal is given or to sign off

## 2021-05-07 NOTE — TELEPHONE ENCOUNTER
----- Message from Patricia Pham sent at 5/7/2021  8:56 AM EDT -----  Regarding: Hugo Huffman / telephone  General Message/Vendor Calls    Caller's first and last name: Marko keating/Srikanth [facility]      Reason for call:pt about to be released wants to see if will follow and sign home health orders. Callback required yes/no and why: yes to advise      Best contact number(s): [phone]      Details to clarify the request:pt is at [facility] and will be released for home health. Wanting to make sure she will follow and sign home health orders.  Just needs a verbal.      Patricia Pham

## 2021-06-28 ENCOUNTER — TELEPHONE (OUTPATIENT)
Dept: FAMILY MEDICINE CLINIC | Age: 61
End: 2021-06-28

## 2021-06-28 NOTE — TELEPHONE ENCOUNTER
----- Message from Candace Forrester sent at 6/24/2021  2:58 PM EDT -----  Regarding: Cindi Farris / telephone  General Message/Vendor Calls    Caller's first and last name: Shadi Figueroa from [facility] and hosp[facility]      Reason for call: Pt is being discharged an they are wanting to know that If the doctor will follow and sign for home health care in the community.       Callback required yes/no and why:yes to advise       Best contact number(s): [phone]      Details to clarify the request:      Candace Forrester

## 2021-11-11 PROCEDURE — 5A1935Z RESPIRATORY VENTILATION, LESS THAN 24 CONSECUTIVE HOURS: ICD-10-PCS | Performed by: PSYCHIATRY & NEUROLOGY

## 2021-11-14 ENCOUNTER — APPOINTMENT (OUTPATIENT)
Dept: GENERAL RADIOLOGY | Age: 61
DRG: 194 | End: 2021-11-14
Attending: EMERGENCY MEDICINE
Payer: MEDICAID

## 2021-11-14 ENCOUNTER — APPOINTMENT (OUTPATIENT)
Dept: ULTRASOUND IMAGING | Age: 61
DRG: 194 | End: 2021-11-14
Attending: NURSE PRACTITIONER
Payer: MEDICAID

## 2021-11-14 ENCOUNTER — HOSPITAL ENCOUNTER (INPATIENT)
Age: 61
LOS: 17 days | Discharge: HOME | DRG: 194 | End: 2021-12-01
Attending: EMERGENCY MEDICINE | Admitting: HOSPITALIST
Payer: MEDICAID

## 2021-11-14 ENCOUNTER — APPOINTMENT (OUTPATIENT)
Dept: CT IMAGING | Age: 61
DRG: 194 | End: 2021-11-14
Attending: NURSE PRACTITIONER
Payer: MEDICAID

## 2021-11-14 DIAGNOSIS — R18.8 CIRRHOSIS OF LIVER WITH ASCITES, UNSPECIFIED HEPATIC CIRRHOSIS TYPE (HCC): ICD-10-CM

## 2021-11-14 DIAGNOSIS — K74.60 CIRRHOSIS OF LIVER WITH ASCITES, UNSPECIFIED HEPATIC CIRRHOSIS TYPE (HCC): ICD-10-CM

## 2021-11-14 DIAGNOSIS — D69.6 THROMBOCYTOPENIA (HCC): ICD-10-CM

## 2021-11-14 DIAGNOSIS — R60.1 ANASARCA: ICD-10-CM

## 2021-11-14 DIAGNOSIS — I10 ESSENTIAL HYPERTENSION: ICD-10-CM

## 2021-11-14 DIAGNOSIS — Z79.4 TYPE 2 DIABETES MELLITUS WITH DIABETIC CATARACT, WITH LONG-TERM CURRENT USE OF INSULIN (HCC): ICD-10-CM

## 2021-11-14 DIAGNOSIS — I20.9 ANGINA PECTORIS (HCC): ICD-10-CM

## 2021-11-14 DIAGNOSIS — I46.9 CARDIAC ARREST (HCC): ICD-10-CM

## 2021-11-14 DIAGNOSIS — I50.9 ACUTE ON CHRONIC CONGESTIVE HEART FAILURE, UNSPECIFIED HEART FAILURE TYPE (HCC): Primary | ICD-10-CM

## 2021-11-14 DIAGNOSIS — R10.84 ABDOMINAL PAIN, GENERALIZED: ICD-10-CM

## 2021-11-14 DIAGNOSIS — K76.1 CHRONIC PASSIVE HEPATIC CONGESTION: ICD-10-CM

## 2021-11-14 DIAGNOSIS — N18.9 CHRONIC KIDNEY DISEASE, UNSPECIFIED CKD STAGE: ICD-10-CM

## 2021-11-14 DIAGNOSIS — R18.8 OTHER ASCITES: ICD-10-CM

## 2021-11-14 DIAGNOSIS — E11.65 UNCONTROLLED TYPE 2 DIABETES MELLITUS WITH HYPERGLYCEMIA (HCC): ICD-10-CM

## 2021-11-14 DIAGNOSIS — R41.89 UNRESPONSIVE EPISODE: ICD-10-CM

## 2021-11-14 DIAGNOSIS — E11.36 TYPE 2 DIABETES MELLITUS WITH DIABETIC CATARACT, WITH LONG-TERM CURRENT USE OF INSULIN (HCC): ICD-10-CM

## 2021-11-14 LAB
ALBUMIN SERPL-MCNC: 3.1 G/DL (ref 3.5–5)
ALBUMIN/GLOB SERPL: 0.6 {RATIO} (ref 1.1–2.2)
ALP SERPL-CCNC: 150 U/L (ref 45–117)
ALT SERPL-CCNC: 27 U/L (ref 12–78)
ANION GAP SERPL CALC-SCNC: 9 MMOL/L (ref 5–15)
APPEARANCE UR: CLEAR
AST SERPL-CCNC: 20 U/L (ref 15–37)
ATRIAL RATE: 80 BPM
BACTERIA URNS QL MICRO: NEGATIVE /HPF
BASOPHILS # BLD: 0 K/UL (ref 0–0.1)
BASOPHILS NFR BLD: 1 % (ref 0–1)
BILIRUB SERPL-MCNC: 0.5 MG/DL (ref 0.2–1)
BILIRUB UR QL: NEGATIVE
BNP SERPL-MCNC: ABNORMAL PG/ML
BUN SERPL-MCNC: 69 MG/DL (ref 6–20)
BUN/CREAT SERPL: 21 (ref 12–20)
CALCIUM SERPL-MCNC: 8.9 MG/DL (ref 8.5–10.1)
CALCULATED P AXIS, ECG09: 79 DEGREES
CALCULATED R AXIS, ECG10: 65 DEGREES
CALCULATED T AXIS, ECG11: 62 DEGREES
CHLORIDE SERPL-SCNC: 111 MMOL/L (ref 97–108)
CO2 SERPL-SCNC: 19 MMOL/L (ref 21–32)
COLOR UR: ABNORMAL
COMMENT, HOLDF: NORMAL
COMMENT, HOLDF: NORMAL
COVID-19 RAPID TEST, COVR: NOT DETECTED
CREAT SERPL-MCNC: 3.34 MG/DL (ref 0.7–1.3)
DIAGNOSIS, 93000: NORMAL
DIFFERENTIAL METHOD BLD: ABNORMAL
EOSINOPHIL # BLD: 0.2 K/UL (ref 0–0.4)
EOSINOPHIL NFR BLD: 5 % (ref 0–7)
EPITH CASTS URNS QL MICRO: ABNORMAL /LPF
ERYTHROCYTE [DISTWIDTH] IN BLOOD BY AUTOMATED COUNT: 14.9 % (ref 11.5–14.5)
GLOBULIN SER CALC-MCNC: 4.8 G/DL (ref 2–4)
GLUCOSE SERPL-MCNC: 136 MG/DL (ref 65–100)
GLUCOSE UR STRIP.AUTO-MCNC: 500 MG/DL
HCT VFR BLD AUTO: 26.2 % (ref 36.6–50.3)
HEMOCCULT STL QL: NEGATIVE
HGB BLD-MCNC: 8.6 G/DL (ref 12.1–17)
HGB UR QL STRIP: ABNORMAL
IMM GRANULOCYTES # BLD AUTO: 0 K/UL (ref 0–0.04)
IMM GRANULOCYTES NFR BLD AUTO: 0 % (ref 0–0.5)
KETONES UR QL STRIP.AUTO: NEGATIVE MG/DL
LEUKOCYTE ESTERASE UR QL STRIP.AUTO: NEGATIVE
LIPASE SERPL-CCNC: 95 U/L (ref 73–393)
LYMPHOCYTES # BLD: 0.7 K/UL (ref 0.8–3.5)
LYMPHOCYTES NFR BLD: 17 % (ref 12–49)
MAGNESIUM SERPL-MCNC: 2.4 MG/DL (ref 1.6–2.4)
MCH RBC QN AUTO: 29.5 PG (ref 26–34)
MCHC RBC AUTO-ENTMCNC: 32.8 G/DL (ref 30–36.5)
MCV RBC AUTO: 89.7 FL (ref 80–99)
MONOCYTES # BLD: 0.4 K/UL (ref 0–1)
MONOCYTES NFR BLD: 8 % (ref 5–13)
NEUTS SEG # BLD: 3.1 K/UL (ref 1.8–8)
NEUTS SEG NFR BLD: 69 % (ref 32–75)
NITRITE UR QL STRIP.AUTO: NEGATIVE
NRBC # BLD: 0 K/UL (ref 0–0.01)
NRBC BLD-RTO: 0 PER 100 WBC
P-R INTERVAL, ECG05: 132 MS
PH UR STRIP: 5.5 [PH] (ref 5–8)
PLATELET # BLD AUTO: 93 K/UL (ref 150–400)
PMV BLD AUTO: 11.7 FL (ref 8.9–12.9)
POTASSIUM SERPL-SCNC: 4.3 MMOL/L (ref 3.5–5.1)
PROT SERPL-MCNC: 7.9 G/DL (ref 6.4–8.2)
PROT UR STRIP-MCNC: >300 MG/DL
Q-T INTERVAL, ECG07: 398 MS
QRS DURATION, ECG06: 86 MS
QTC CALCULATION (BEZET), ECG08: 459 MS
RBC # BLD AUTO: 2.92 M/UL (ref 4.1–5.7)
RBC #/AREA URNS HPF: ABNORMAL /HPF (ref 0–5)
RBC MORPH BLD: ABNORMAL
SAMPLES BEING HELD,HOLD: NORMAL
SAMPLES BEING HELD,HOLD: NORMAL
SODIUM SERPL-SCNC: 139 MMOL/L (ref 136–145)
SOURCE, COVRS: NORMAL
SP GR UR REFRACTOMETRY: 1.02 (ref 1–1.03)
TROPONIN-HIGH SENSITIVITY: 29 NG/L (ref 0–76)
UR CULT HOLD, URHOLD: NORMAL
UROBILINOGEN UR QL STRIP.AUTO: 0.2 EU/DL (ref 0.2–1)
VENTRICULAR RATE, ECG03: 80 BPM
WBC # BLD AUTO: 4.4 K/UL (ref 4.1–11.1)
WBC URNS QL MICRO: ABNORMAL /HPF (ref 0–4)

## 2021-11-14 PROCEDURE — 83735 ASSAY OF MAGNESIUM: CPT

## 2021-11-14 PROCEDURE — 74011000250 HC RX REV CODE- 250: Performed by: HOSPITALIST

## 2021-11-14 PROCEDURE — 76705 ECHO EXAM OF ABDOMEN: CPT

## 2021-11-14 PROCEDURE — 84484 ASSAY OF TROPONIN QUANT: CPT

## 2021-11-14 PROCEDURE — 93005 ELECTROCARDIOGRAM TRACING: CPT

## 2021-11-14 PROCEDURE — 96375 TX/PRO/DX INJ NEW DRUG ADDON: CPT

## 2021-11-14 PROCEDURE — 65660000000 HC RM CCU STEPDOWN

## 2021-11-14 PROCEDURE — 96374 THER/PROPH/DIAG INJ IV PUSH: CPT

## 2021-11-14 PROCEDURE — 71046 X-RAY EXAM CHEST 2 VIEWS: CPT

## 2021-11-14 PROCEDURE — 74011250637 HC RX REV CODE- 250/637: Performed by: NURSE PRACTITIONER

## 2021-11-14 PROCEDURE — 87635 SARS-COV-2 COVID-19 AMP PRB: CPT

## 2021-11-14 PROCEDURE — 80053 COMPREHEN METABOLIC PANEL: CPT

## 2021-11-14 PROCEDURE — 85025 COMPLETE CBC W/AUTO DIFF WBC: CPT

## 2021-11-14 PROCEDURE — 74176 CT ABD & PELVIS W/O CONTRAST: CPT

## 2021-11-14 PROCEDURE — 36415 COLL VENOUS BLD VENIPUNCTURE: CPT

## 2021-11-14 PROCEDURE — 82272 OCCULT BLD FECES 1-3 TESTS: CPT

## 2021-11-14 PROCEDURE — 83880 ASSAY OF NATRIURETIC PEPTIDE: CPT

## 2021-11-14 PROCEDURE — 74011250637 HC RX REV CODE- 250/637: Performed by: HOSPITALIST

## 2021-11-14 PROCEDURE — 74011250636 HC RX REV CODE- 250/636: Performed by: NURSE PRACTITIONER

## 2021-11-14 PROCEDURE — 81001 URINALYSIS AUTO W/SCOPE: CPT

## 2021-11-14 PROCEDURE — C9113 INJ PANTOPRAZOLE SODIUM, VIA: HCPCS | Performed by: HOSPITALIST

## 2021-11-14 PROCEDURE — 74011250636 HC RX REV CODE- 250/636: Performed by: HOSPITALIST

## 2021-11-14 PROCEDURE — 83690 ASSAY OF LIPASE: CPT

## 2021-11-14 PROCEDURE — 99285 EMERGENCY DEPT VISIT HI MDM: CPT

## 2021-11-14 RX ORDER — AMLODIPINE BESYLATE 5 MG/1
10 TABLET ORAL DAILY
Status: DISCONTINUED | OUTPATIENT
Start: 2021-11-15 | End: 2021-11-18

## 2021-11-14 RX ORDER — TRAZODONE HYDROCHLORIDE 50 MG/1
50 TABLET ORAL
Status: DISCONTINUED | OUTPATIENT
Start: 2021-11-14 | End: 2021-12-01 | Stop reason: HOSPADM

## 2021-11-14 RX ORDER — BUSPIRONE HYDROCHLORIDE 10 MG/1
10 TABLET ORAL 2 TIMES DAILY
COMMUNITY
End: 2022-02-07

## 2021-11-14 RX ORDER — MAGNESIUM SULFATE 100 %
4 CRYSTALS MISCELLANEOUS AS NEEDED
Status: DISCONTINUED | OUTPATIENT
Start: 2021-11-14 | End: 2021-12-01 | Stop reason: HOSPADM

## 2021-11-14 RX ORDER — FUROSEMIDE 10 MG/ML
40 INJECTION INTRAMUSCULAR; INTRAVENOUS 2 TIMES DAILY
Status: DISCONTINUED | OUTPATIENT
Start: 2021-11-14 | End: 2021-11-15

## 2021-11-14 RX ORDER — SERTRALINE HYDROCHLORIDE 50 MG/1
200 TABLET, FILM COATED ORAL DAILY
Status: DISCONTINUED | OUTPATIENT
Start: 2021-11-15 | End: 2021-12-01 | Stop reason: HOSPADM

## 2021-11-14 RX ORDER — TRAMADOL HYDROCHLORIDE 50 MG/1
50 TABLET ORAL
Status: DISCONTINUED | OUTPATIENT
Start: 2021-11-14 | End: 2021-12-01 | Stop reason: HOSPADM

## 2021-11-14 RX ORDER — INSULIN GLARGINE 100 [IU]/ML
10 INJECTION, SOLUTION SUBCUTANEOUS DAILY
Status: DISCONTINUED | OUTPATIENT
Start: 2021-11-15 | End: 2021-11-27

## 2021-11-14 RX ORDER — METOPROLOL SUCCINATE 50 MG/1
50 TABLET, EXTENDED RELEASE ORAL DAILY
Status: DISCONTINUED | OUTPATIENT
Start: 2021-11-15 | End: 2021-11-17

## 2021-11-14 RX ORDER — MORPHINE SULFATE 4 MG/ML
4 INJECTION INTRAVENOUS
Status: DISCONTINUED | OUTPATIENT
Start: 2021-11-14 | End: 2021-11-15 | Stop reason: SDUPTHER

## 2021-11-14 RX ORDER — BUSPIRONE HYDROCHLORIDE 10 MG/1
10 TABLET ORAL 2 TIMES DAILY
Status: DISCONTINUED | OUTPATIENT
Start: 2021-11-14 | End: 2021-12-01 | Stop reason: HOSPADM

## 2021-11-14 RX ORDER — DEXTROSE 50 % IN WATER (D50W) INTRAVENOUS SYRINGE
25-50 AS NEEDED
Status: DISCONTINUED | OUTPATIENT
Start: 2021-11-14 | End: 2021-12-01 | Stop reason: HOSPADM

## 2021-11-14 RX ORDER — ROSUVASTATIN CALCIUM 10 MG/1
10 TABLET, COATED ORAL
Status: DISCONTINUED | OUTPATIENT
Start: 2021-11-14 | End: 2021-12-01 | Stop reason: HOSPADM

## 2021-11-14 RX ORDER — ISOSORBIDE DINITRATE 20 MG/1
20 TABLET ORAL 3 TIMES DAILY
Status: DISCONTINUED | OUTPATIENT
Start: 2021-11-14 | End: 2021-11-16

## 2021-11-14 RX ORDER — MORPHINE SULFATE 4 MG/ML
4 INJECTION INTRAVENOUS
Status: COMPLETED | OUTPATIENT
Start: 2021-11-14 | End: 2021-11-14

## 2021-11-14 RX ORDER — ACETAMINOPHEN 325 MG/1
650 TABLET ORAL
Status: DISCONTINUED | OUTPATIENT
Start: 2021-11-14 | End: 2021-12-01 | Stop reason: HOSPADM

## 2021-11-14 RX ORDER — CLOPIDOGREL BISULFATE 75 MG/1
75 TABLET ORAL DAILY
Status: DISCONTINUED | OUTPATIENT
Start: 2021-11-15 | End: 2021-11-16

## 2021-11-14 RX ORDER — GABAPENTIN 300 MG/1
300 CAPSULE ORAL 3 TIMES DAILY
Status: DISCONTINUED | OUTPATIENT
Start: 2021-11-14 | End: 2021-11-19

## 2021-11-14 RX ORDER — INSULIN LISPRO 100 [IU]/ML
INJECTION, SOLUTION INTRAVENOUS; SUBCUTANEOUS
Status: DISCONTINUED | OUTPATIENT
Start: 2021-11-15 | End: 2021-11-18

## 2021-11-14 RX ORDER — HYDRALAZINE HYDROCHLORIDE 20 MG/ML
10 INJECTION INTRAMUSCULAR; INTRAVENOUS
Status: DISCONTINUED | OUTPATIENT
Start: 2021-11-14 | End: 2021-11-18

## 2021-11-14 RX ORDER — ONDANSETRON 2 MG/ML
4 INJECTION INTRAMUSCULAR; INTRAVENOUS
Status: DISCONTINUED | OUTPATIENT
Start: 2021-11-14 | End: 2021-12-01 | Stop reason: HOSPADM

## 2021-11-14 RX ORDER — ONDANSETRON 2 MG/ML
4 INJECTION INTRAMUSCULAR; INTRAVENOUS
Status: COMPLETED | OUTPATIENT
Start: 2021-11-14 | End: 2021-11-14

## 2021-11-14 RX ADMIN — TRAZODONE HYDROCHLORIDE 50 MG: 50 TABLET ORAL at 23:56

## 2021-11-14 RX ADMIN — HYDRALAZINE HYDROCHLORIDE 10 MG: 20 INJECTION INTRAMUSCULAR; INTRAVENOUS at 20:59

## 2021-11-14 RX ADMIN — GABAPENTIN 300 MG: 300 CAPSULE ORAL at 23:56

## 2021-11-14 RX ADMIN — MORPHINE SULFATE 4 MG: 4 INJECTION INTRAVENOUS at 10:23

## 2021-11-14 RX ADMIN — FUROSEMIDE 40 MG: 10 INJECTION, SOLUTION INTRAMUSCULAR; INTRAVENOUS at 23:56

## 2021-11-14 RX ADMIN — BUSPIRONE HYDROCHLORIDE 10 MG: 10 TABLET ORAL at 23:56

## 2021-11-14 RX ADMIN — MORPHINE SULFATE 4 MG: 4 INJECTION INTRAVENOUS at 15:15

## 2021-11-14 RX ADMIN — PANTOPRAZOLE SODIUM 40 MG: 40 INJECTION, POWDER, FOR SOLUTION INTRAVENOUS at 15:14

## 2021-11-14 RX ADMIN — PANTOPRAZOLE SODIUM 40 MG: 40 INJECTION, POWDER, FOR SOLUTION INTRAVENOUS at 20:59

## 2021-11-14 RX ADMIN — ONDANSETRON 4 MG: 2 INJECTION INTRAMUSCULAR; INTRAVENOUS at 17:27

## 2021-11-14 RX ADMIN — ONDANSETRON 4 MG: 2 INJECTION INTRAMUSCULAR; INTRAVENOUS at 10:23

## 2021-11-14 RX ADMIN — TRAMADOL HYDROCHLORIDE 50 MG: 50 TABLET ORAL at 23:56

## 2021-11-14 RX ADMIN — NITROGLYCERIN 1 INCH: 20 OINTMENT TOPICAL at 10:23

## 2021-11-14 NOTE — ED NOTES
Pt ambulatory with cane to ED with c/o CP, SOB, bilateral pedal edema, nausea and abdominal pain x 2 weeks. Pt reports \"I haven't been well since heart surgery 7/20\".  Hx of CHF

## 2021-11-14 NOTE — H&P
History & Physical    Primary Care Provider: Leonor Quiroz MD  Source of Information: Patient     History of Presenting Illness:   Esperanza Torres is a 64 y.o. male who presents with abdominal pain      This is a 59-year-old male with past medical history of type 2 diabetes, hypertension, CKD (baseline cr around 3.2 per lab in 7/3180), diastolic HF, and CAD (cardiac bypass about 1 year ago; currently on DAPT) who presents to the ER today for subsequent evaluation of abdominal pain. Pain locates epigastric and mid upper abd, worse by eating, has intermittent vomiting, denied blood in vomitus. Also with chronic exertional chest pain, shortness of breath after mild exertion, palpitations, and easy fatigability. He also reports formed dark black stool x 1 week in duration. Denied diarrhea, more constipated. Patient states that his symptoms started 2 to 3 weeks ago and have continued to progress in nature since its onset. He was hospitalized  at [facility] twice last month for CHF and SEVERIANO on CKD. Currently he is on lasix 40mg po daily at home, he feels not working well, not enough urine output and still swelling in legs. Patient also endorses left-sided chest pain that does not seem to be present at rest but becomes aggravated with minimal physical exertion.        Review of Systems:  General: HPI, no fever, low appetite,slightly weight gain  HEENT: no headache, no vision changes, no nose discharge, no hearing changes   RES: HPI   CVS: HPI   Muscular: no joint swelling, no muscle pain, chronic swelling  Skin: no rash, no itching   GI: HPI   : no dysuria, no hematuria  Hemo: no gum bleeding, no petechial   Neuro: no sensation changes, no focal weakness   Endo: no polydipsia   Psych: denied depression     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, with long-term current use of insulin [facility] 11/14/2018  Diabetes [facility])       Past Surgical History:   Procedure Laterality Date    HX ARTERIAL BYPASS      HX OTHER SURGICAL      5th toe Metatarsal amputation 12/2017      Prior to Admission medications    Medication Sig Start Date End Date Taking? Authorizing Provider   traMADoL (ULTRAM) 50 mg tablet TAKE 1 TABLET BY MOUTH EVERY 4 HOURS AS NEEDED FOR PAIN 6/10/20   Provider, Historical   insulin glargine (Lantus U-100 Insulin) 100 unit/mL injection E11.65 Take 25 units daily SubQ at night 8/7/20   Carmen Wynne NP   sertraline (ZOLOFT) 100 mg tablet Take 2 Tabs by mouth daily. 8/7/20   Tracy Wynne NP   potassium bicarbonate (KLYTE) 25 mEq tablet Take 1 Tab by mouth daily. 8/7/20   Combs, Sable Hashimoto, NP   hydroCHLOROthiazide (HYDRODIURIL) 25 mg tablet Take 1 Tab by mouth daily. 8/7/20   Combs, Sable Hashimoto, NP   furosemide (LASIX) 40 mg tablet Take 1 Tab by mouth daily. 8/7/20   Combs, Sable Hashimoto, NP   metoprolol succinate (TOPROL-XL) 50 mg XL tablet Take 1 Tab by mouth daily. 7/1/20   Combs, Sable Hashimoto, NP   clopidogreL (PLAVIX) 75 mg tab Take 75 mg by mouth. Provider, Historical   traZODone (DESYREL) 50 mg tablet Take 1 Tab by mouth nightly. 7/1/20   Combs, Sable Hashimoto, NP   busPIRone (BUSPAR) 5 mg tablet Take 1 Tab by mouth two (2) times a day. 7/1/20   Combs, Sable Hashimoto, NP   insulin syringe-needle U-100 (BD Insulin Syringe Ultra-Fine) 0.3 mL 31 gauge x 15/64\" syrg 1 Units by Does Not Apply route three (3) times daily. 5/13/20   Combs, Sable Hashimoto, NP   isosorbide dinitrate (ISORDIL) 20 mg tablet Take 20 mg by mouth three (3) times daily. Provider, Historical   glucose blood VI test strips (ASCENSIA AUTODISC VI, ONE TOUCH ULTRA TEST VI) strip E11.65 test fasting glucose in the morning and before meals as needed 11/22/19   Combs, Sable Hashimoto, NP   glipiZIDE (GLUCOTROL) 5 mg tablet Take 2 Tabs by mouth daily.  8/5/19   Combs, Sable Hashimoto, NP   raNITIdine (ZANTAC) 150 mg tablet Take 1 Tab by mouth two (2) times a day. 4/25/19   Chanda Salinas MD   cetirizine (ZYRTEC) 5 mg tablet Take 1 Tab by mouth daily as needed for Itching. 3/21/19   Chanda Salinas MD   metFORMIN (GLUCOPHAGE) 1,000 mg tablet Take 1 Tab by mouth two (2) times daily (with meals). 11/26/18   Chanda Salinas MD   rosuvastatin (CRESTOR) 10 mg tablet Take 1 Tab by mouth nightly. 11/14/18   Chanda Salinas MD   amLODIPine (NORVASC) 10 mg tablet Take 1 Tab by mouth daily. 11/14/18   Chanda Salinas MD   gabapentin (NEURONTIN) 300 mg capsule Take 1 Cap by mouth three (3) times daily. 11/14/18   Chanda Salinas MD   lisinopril (PRINIVIL, ZESTRIL) 40 mg tablet Take 1 Tab by mouth daily. 11/14/18   Chanda Salinas MD     No Known Allergies   Family History   Problem Relation Age of Onset    Diabetes Mother     No Known Problems Father         SOCIAL HISTORY:  Patient resides:  Independently x   Assisted Living    SNF    With family care       Smoking history:   None x   Former    Chronic      Alcohol history:   None x   Social    Chronic      Ambulates:   Independently    w/cane x   w/walker    w/wc    CODE STATUS:  DNR    Full x   Other      Objective:     Physical Exam:     Visit Vitals  BP (!) 177/78   Pulse 76   Temp 98.1 °F (36.7 °C)   Resp 24   Ht 5' 9\" (1.753 m)   Wt 89.3 kg (196 lb 13.9 oz)   SpO2 95%   BMI 29.07 kg/m²      O2 Device: None (Room air)    General:  Alert, cooperative, no distress, appears stated age. Head:  Normocephalic, without obvious abnormality, atraumatic. Eyes:  Conjunctivae/corneas clear. PERRL, EOMs intact. Nose: Nares normal. Septum midline. Mucosa normal. No drainage or sinus tenderness. Throat: Lips, mucosa, and tongue normal. Teeth and gums normal.   Neck: Supple, symmetrical, trachea midline, no adenopathy, thyroid: no enlargement/tenderness/nodules, no carotid bruit and no JVD. Back:   Symmetric, no curvature. ROM normal. No CVA tenderness. Lungs:   Clear to auscultation bilaterally.    Chest wall: No tenderness or deformity. Heart:  Regular rate and rhythm, S1, S2 normal, no murmur, click, rub or gallop. Abdomen:   Soft, epigastric tenderenss, mild distended, Bowel sounds normal. No masses    Extremities: Extremities normal, atraumatic, +-++ edema. Pulses: 2+ and symmetric all extremities. Skin: Skin color, texture, turgor normal. No rashes or lesions   Neurologic: CNII-XII intact. None focal                Data Review:     Recent Days:  Recent Labs     11/14/21 0922   WBC 4.4   HGB 8.6*   HCT 26.2*   PLT 93*     Recent Labs     11/14/21 0922      K 4.3   *   CO2 19*   *   BUN 69*   CREA 3.34*   CA 8.9   MG 2.4   ALB 3.1*   ALT 27     No results for input(s): PH, PCO2, PO2, HCO3, FIO2 in the last 72 hours. 24 Hour Results:  Recent Results (from the past 24 hour(s))   EKG, 12 LEAD, INITIAL    Collection Time: 11/14/21  9:18 AM   Result Value Ref Range    Ventricular Rate 80 BPM    Atrial Rate 80 BPM    P-R Interval 132 ms    QRS Duration 86 ms    Q-T Interval 398 ms    QTC Calculation (Bezet) 459 ms    Calculated P Axis 79 degrees    Calculated R Axis 65 degrees    Calculated T Axis 62 degrees    Diagnosis       Normal sinus rhythm  Normal ECG  No previous ECGs available     SAMPLES BEING HELD    Collection Time: 11/14/21  9:22 AM   Result Value Ref Range    SAMPLES BEING HELD 1 RED     COMMENT        Add-on orders for these samples will be processed based on acceptable specimen integrity and analyte stability, which may vary by analyte.    CBC WITH AUTOMATED DIFF    Collection Time: 11/14/21  9:22 AM   Result Value Ref Range    WBC 4.4 4.1 - 11.1 K/uL    RBC 2.92 (L) 4.10 - 5.70 M/uL    HGB 8.6 (L) 12.1 - 17.0 g/dL    HCT 26.2 (L) 36.6 - 50.3 %    MCV 89.7 80.0 - 99.0 FL    MCH 29.5 26.0 - 34.0 PG    MCHC 32.8 30.0 - 36.5 g/dL    RDW 14.9 (H) 11.5 - 14.5 %    PLATELET 93 (L) 725 - 400 K/uL    MPV 11.7 8.9 - 12.9 FL    NRBC 0.0 0  WBC    ABSOLUTE NRBC 0.00 0.00 - 0.01 K/uL NEUTROPHILS 69 32 - 75 %    LYMPHOCYTES 17 12 - 49 %    MONOCYTES 8 5 - 13 %    EOSINOPHILS 5 0 - 7 %    BASOPHILS 1 0 - 1 %    IMMATURE GRANULOCYTES 0 0.0 - 0.5 %    ABS. NEUTROPHILS 3.1 1.8 - 8.0 K/UL    ABS. LYMPHOCYTES 0.7 (L) 0.8 - 3.5 K/UL    ABS. MONOCYTES 0.4 0.0 - 1.0 K/UL    ABS. EOSINOPHILS 0.2 0.0 - 0.4 K/UL    ABS. BASOPHILS 0.0 0.0 - 0.1 K/UL    ABS. IMM. GRANS. 0.0 0.00 - 0.04 K/UL    DF SMEAR SCANNED      RBC COMMENTS NORMOCYTIC, NORMOCHROMIC     METABOLIC PANEL, COMPREHENSIVE    Collection Time: 11/14/21  9:22 AM   Result Value Ref Range    Sodium 139 136 - 145 mmol/L    Potassium 4.3 3.5 - 5.1 mmol/L    Chloride 111 (H) 97 - 108 mmol/L    CO2 19 (L) 21 - 32 mmol/L    Anion gap 9 5 - 15 mmol/L    Glucose 136 (H) 65 - 100 mg/dL    BUN 69 (H) 6 - 20 MG/DL    Creatinine 3.34 (H) 0.70 - 1.30 MG/DL    BUN/Creatinine ratio 21 (H) 12 - 20      GFR est AA 23 (L) >60 ml/min/1.73m2    GFR est non-AA 19 (L) >60 ml/min/1.73m2    Calcium 8.9 8.5 - 10.1 MG/DL    Bilirubin, total 0.5 0.2 - 1.0 MG/DL    ALT (SGPT) 27 12 - 78 U/L    AST (SGOT) 20 15 - 37 U/L    Alk.  phosphatase 150 (H) 45 - 117 U/L    Protein, total 7.9 6.4 - 8.2 g/dL    Albumin 3.1 (L) 3.5 - 5.0 g/dL    Globulin 4.8 (H) 2.0 - 4.0 g/dL    A-G Ratio 0.6 (L) 1.1 - 2.2     TROPONIN-HIGH SENSITIVITY    Collection Time: 11/14/21  9:22 AM   Result Value Ref Range    Troponin-High Sensitivity 29 0 - 76 ng/L   LIPASE    Collection Time: 11/14/21  9:22 AM   Result Value Ref Range    Lipase 95 73 - 393 U/L   MAGNESIUM    Collection Time: 11/14/21  9:22 AM   Result Value Ref Range    Magnesium 2.4 1.6 - 2.4 mg/dL   NT-PRO BNP    Collection Time: 11/14/21  9:22 AM   Result Value Ref Range    NT pro-BNP 11,967 (H) <125 PG/ML   URINALYSIS W/MICROSCOPIC    Collection Time: 11/14/21 10:31 AM   Result Value Ref Range    Color YELLOW/STRAW      Appearance CLEAR CLEAR      Specific gravity 1.019 1.003 - 1.030      pH (UA) 5.5 5.0 - 8.0      Protein >300 (A) NEG mg/dL    Glucose 500 (A) NEG mg/dL    Ketone Negative NEG mg/dL    Bilirubin Negative NEG      Blood SMALL (A) NEG      Urobilinogen 0.2 0.2 - 1.0 EU/dL    Nitrites Negative NEG      Leukocyte Esterase Negative NEG      WBC 0-4 0 - 4 /hpf    RBC 0-5 0 - 5 /hpf    Epithelial cells FEW FEW /lpf    Bacteria Negative NEG /hpf   URINE CULTURE HOLD SAMPLE    Collection Time: 11/14/21 10:31 AM    Specimen: Serum; Urine   Result Value Ref Range    Urine culture hold        Urine on hold in Microbiology dept for 2 days. If unpreserved urine is submitted, it cannot be used for addtional testing after 24 hours, recollection will be required. SAMPLES BEING HELD    Collection Time: 11/14/21 10:31 AM   Result Value Ref Range    SAMPLES BEING HELD 1PST,1LAV,1BLU     COMMENT        Add-on orders for these samples will be processed based on acceptable specimen integrity and analyte stability, which may vary by analyte. OCCULT BLOOD, STOOL    Collection Time: 11/14/21 10:55 AM   Result Value Ref Range    Occult blood, stool Negative NEG           Imaging:   XR CHEST PA LAT    Result Date: 11/14/2021  Mild pulmonary edema with small bilateral pleural effusions and underlying atelectasis. CT ABD PELV WO CONT    Result Date: 11/14/2021  1. Moderate volume ascites. Moderate bilateral pleural effusions. Anasarca. 2.  Several thick-walled loops of small bowel in the midabdomen, may be due to underdistention or hypoproteinemic state, correlate for infectious or inflammatory enteritis. US ABD LTD    Result Date: 11/14/2021  1. Hepatic parenchymal disease with cirrhotic morphology. Perihepatic ascites and right pleural effusion noted. Pulsatile main portal vein waveform, likely sequela of hepatic parenchymal disease. 2.  Evidence of right-sided medical renal disease without hydronephrosis. 3.  Normal sonographic evaluation of the gallbladder.        Assessment:     Active Problems:    CHF exacerbation ([address] (11/14/2021) Plan:     1. Abdominal pain: may due to none bleeding PUD, may due to CHF/passive live congestion. ABD CT and US result reviewed. Start IV PPI, pain control. GI consult, npo after midnight. 2. Acute on chronic bv diastolic congestive heart failure: NYHA3. HFpEF, had stress test in [facility], EF 50% per reprot. With diffuse anasarca, fluid retained due to his advanced CKD. Continue lasix 40mg bid and follow cr, weight, urine output. Cardiologist consult   3. Advanced CKD: cr mild worse than baseline likely, pt is still volume overloaded, continue lasix, renal consult. Hold lisinopirl due to advanced ckd.    4. Dm: LANTUS AND ssi, will adjust        Signed By: Avni Welch MD     November 14, 2021

## 2021-11-14 NOTE — ED PROVIDER NOTES
This is a 70-year-old male with past medical history of type 2 diabetes, hypertension, CKD (ukknown stage), diastolic HF, and CAD (cardiac bypass about 1 year ago; currently on DAPT) who presents to the ER today for subsequent evaluation of abdominal pain, intermittent vomiting, exertional chest pain, shortness of breath, palpitations, and easy fatigability. Patient states that his symptoms started 2 to 3 weeks ago and have continued to progress in nature since its onset. He was seen at [facility] twice and was told \"that there might be a problem with my kidneys. They gave me fluid pills which are making me pee more but do not seem to be helping with anything else\". He describes his abdominal pain as being epigastric with aggravating factors including eating meals. He reports occasional emesis. He also reports formed dark black stool x 1 week in duration. Patient also endorses left-sided chest pain that does not seem to be present at rest but becomes aggravated with minimal physical exertion. His cardiologist is Dr. Jesus Manuel Weaver.            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, with long-term current use of insulin ([address] 2018    Diabetes ([address]        Past Surgical History:   Procedure Laterality Date    HX ARTERIAL BYPASS      HX OTHER SURGICAL      5th toe Metatarsal amputation 2017          Family History:   Problem Relation Age of Onset    Diabetes Mother     No Known Problems Father        Social History     Socioeconomic History    Marital status:      Spouse name: Not on file    Number of children: Not on file    Years of education: Not on file    Highest education level: Not on file   Occupational History    Not on file   Tobacco Use    Smoking status: Former Smoker     Quit date: 2001     Years since quittin.4    Smokeless tobacco: Never Used   Substance and Sexual Activity    Alcohol use: No    Drug use: No    Sexual activity: Yes     Partners: Female     Birth control/protection: Condom   Other Topics Concern    Not on file   Social History Narrative    Not on file     Social Determinants of Health     Financial Resource Strain:     Difficulty of Paying Living Expenses: Not on file   Food Insecurity:     Worried About Running Out of Food in the Last Year: Not on file    Hardik of Food in the Last Year: Not on file   Transportation Needs:     Lack of Transportation (Medical): Not on file    Lack of Transportation (Non-Medical): Not on file   Physical Activity:     Days of Exercise per Week: Not on file    Minutes of Exercise per Session: Not on file   Stress:     Feeling of Stress : Not on file   Social Connections:     Frequency of Communication with Friends and Family: Not on file    Frequency of Social Gatherings with Friends and Family: Not on file    Attends [employer] Services: Not on file    Active Member of [address] [place] [address] or Organizations: Not on file    Attends Club or Organization Meetings: Not on file    Marital Status: Not on file   Intimate Partner Violence:     Fear of Current or Ex-Partner: Not on file    Emotionally Abused: Not on file    Physically Abused: Not on file    Sexually Abused: Not on file   Housing Stability:     Unable to Pay for Housing in the Last Year: Not on file    Number of [place] in the Last Year: Not on file    Unstable Housing in the Last Year: Not on file         ALLERGIES: Patient has no known allergies. Review of Systems   Constitutional: Positive for chills and fatigue. HENT: Negative for sore throat. Eyes: Negative for visual disturbance. Respiratory: Positive for shortness of breath. Cardiovascular: Positive for chest pain, palpitations and leg swelling. Gastrointestinal: Positive for abdominal distention, abdominal pain, nausea and vomiting. Negative for diarrhea. Genitourinary: Negative for dysuria. Musculoskeletal: Negative for myalgias. Skin: Negative for rash. Neurological: Negative for dizziness. Psychiatric/Behavioral: Positive for dysphoric mood and sleep disturbance. The patient is nervous/anxious. Vitals:    21 0914   BP: (!) 179/84   Pulse: 81   Resp: 22   Temp: 98.1 °F (36.7 °C)   SpO2: 96%   Weight: 85 kg (187 lb 6.3 oz)   Height: 5' 9\" (1.753 m)            Physical Exam  Vitals and nursing note reviewed. Constitutional:       General: He is in acute distress. Appearance: Normal appearance. He is obese. He is ill-appearing. HENT:      Head: Normocephalic and atraumatic. Nose: Nose normal.      Mouth/Throat:      Mouth: Mucous membranes are moist.      Pharynx: Oropharynx is clear. Eyes:      Extraocular Movements: Extraocular movements intact. Neck:      Vascular: Hepatojugular reflux and JVD present. Cardiovascular:      Rate and Rhythm: Normal rate and regular rhythm. Pulses: Normal pulses. Heart sounds: Murmur heard. Pulmonary:      Effort: Tachypnea present. Breath sounds: Decreased breath sounds and rales present. Chest:      Chest wall: No tenderness. Abdominal:      General: Bowel sounds are normal.      Palpations: Abdomen is soft. Tenderness: There is generalized abdominal tenderness and tenderness in the right upper quadrant, epigastric area and left upper quadrant. There is guarding. There is no right CVA tenderness or left CVA tenderness. Hernia: No hernia is present. Musculoskeletal:         General: Normal range of motion. Cervical back: Neck supple. Right lower le+ Pitting Edema present. Left lower le+ Pitting Edema present. Skin:     General: Skin is warm and dry. Neurological:      Mental Status: He is alert and oriented to person, place, and time. Mental status is at baseline. Psychiatric:         Mood and Affect: Mood is anxious.          Behavior: Behavior normal. [facility]      VITAL SIGNS:  Patient Vitals for the past 4 hrs:   Temp Pulse Resp BP SpO2   11/14/21 1030  76 24 (!) 177/78 95 %   11/14/21 0914 98.1 °F (36.7 °C) 81 22 (!) 179/84 96 %         LABS:  Recent Results (from the past 6 hour(s))   EKG, 12 LEAD, INITIAL    Collection Time: 11/14/21  9:18 AM   Result Value Ref Range    Ventricular Rate 80 BPM    Atrial Rate 80 BPM    P-R Interval 132 ms    QRS Duration 86 ms    Q-T Interval 398 ms    QTC Calculation (Bezet) 459 ms    Calculated P Axis 79 degrees    Calculated R Axis 65 degrees    Calculated T Axis 62 degrees    Diagnosis       Normal sinus rhythm  Normal ECG  No previous ECGs available     SAMPLES BEING HELD    Collection Time: 11/14/21  9:22 AM   Result Value Ref Range    SAMPLES BEING HELD 1 RED     COMMENT        Add-on orders for these samples will be processed based on acceptable specimen integrity and analyte stability, which may vary by analyte. CBC WITH AUTOMATED DIFF    Collection Time: 11/14/21  9:22 AM   Result Value Ref Range    WBC 4.4 4.1 - 11.1 K/uL    RBC 2.92 (L) 4.10 - 5.70 M/uL    HGB 8.6 (L) 12.1 - 17.0 g/dL    HCT 26.2 (L) 36.6 - 50.3 %    MCV 89.7 80.0 - 99.0 FL    MCH 29.5 26.0 - 34.0 PG    MCHC 32.8 30.0 - 36.5 g/dL    RDW 14.9 (H) 11.5 - 14.5 %    PLATELET 93 (L) 672 - 400 K/uL    MPV 11.7 8.9 - 12.9 FL    NRBC 0.0 0  WBC    ABSOLUTE NRBC 0.00 0.00 - 0.01 K/uL    NEUTROPHILS 69 32 - 75 %    LYMPHOCYTES 17 12 - 49 %    MONOCYTES 8 5 - 13 %    EOSINOPHILS 5 0 - 7 %    BASOPHILS 1 0 - 1 %    IMMATURE GRANULOCYTES 0 0.0 - 0.5 %    ABS. NEUTROPHILS 3.1 1.8 - 8.0 K/UL    ABS. LYMPHOCYTES 0.7 (L) 0.8 - 3.5 K/UL    ABS. MONOCYTES 0.4 0.0 - 1.0 K/UL    ABS. EOSINOPHILS 0.2 0.0 - 0.4 K/UL    ABS. BASOPHILS 0.0 0.0 - 0.1 K/UL    ABS. IMM.  GRANS. 0.0 0.00 - 0.04 K/UL    DF SMEAR SCANNED      RBC COMMENTS NORMOCYTIC, NORMOCHROMIC     METABOLIC PANEL, COMPREHENSIVE    Collection Time: 11/14/21  9:22 AM   Result Value Ref Range    Sodium 139 136 - 145 mmol/L    Potassium 4.3 3.5 - 5.1 mmol/L    Chloride 111 (H) 97 - 108 mmol/L    CO2 19 (L) 21 - 32 mmol/L    Anion gap 9 5 - 15 mmol/L    Glucose 136 (H) 65 - 100 mg/dL    BUN 69 (H) 6 - 20 MG/DL    Creatinine 3.34 (H) 0.70 - 1.30 MG/DL    BUN/Creatinine ratio 21 (H) 12 - 20      GFR est AA 23 (L) >60 ml/min/1.73m2    GFR est non-AA 19 (L) >60 ml/min/1.73m2    Calcium 8.9 8.5 - 10.1 MG/DL    Bilirubin, total 0.5 0.2 - 1.0 MG/DL    ALT (SGPT) 27 12 - 78 U/L    AST (SGOT) 20 15 - 37 U/L    Alk. phosphatase 150 (H) 45 - 117 U/L    Protein, total 7.9 6.4 - 8.2 g/dL    Albumin 3.1 (L) 3.5 - 5.0 g/dL    Globulin 4.8 (H) 2.0 - 4.0 g/dL    A-G Ratio 0.6 (L) 1.1 - 2.2     TROPONIN-HIGH SENSITIVITY    Collection Time: 11/14/21  9:22 AM   Result Value Ref Range    Troponin-High Sensitivity 29 0 - 76 ng/L   LIPASE    Collection Time: 11/14/21  9:22 AM   Result Value Ref Range    Lipase 95 73 - 393 U/L   MAGNESIUM    Collection Time: 11/14/21  9:22 AM   Result Value Ref Range    Magnesium 2.4 1.6 - 2.4 mg/dL   NT-PRO BNP    Collection Time: 11/14/21  9:22 AM   Result Value Ref Range    NT pro-BNP 11,967 (H) <125 PG/ML   URINALYSIS W/MICROSCOPIC    Collection Time: 11/14/21 10:31 AM   Result Value Ref Range    Color YELLOW/STRAW      Appearance CLEAR CLEAR      Specific gravity 1.019 1.003 - 1.030      pH (UA) 5.5 5.0 - 8.0      Protein >300 (A) NEG mg/dL    Glucose 500 (A) NEG mg/dL    Ketone Negative NEG mg/dL    Bilirubin Negative NEG      Blood SMALL (A) NEG      Urobilinogen 0.2 0.2 - 1.0 EU/dL    Nitrites Negative NEG      Leukocyte Esterase Negative NEG      WBC 0-4 0 - 4 /hpf    RBC 0-5 0 - 5 /hpf    Epithelial cells FEW FEW /lpf    Bacteria Negative NEG /hpf   URINE CULTURE HOLD SAMPLE    Collection Time: 11/14/21 10:31 AM    Specimen: Serum; Urine   Result Value Ref Range    Urine culture hold        Urine on hold in Microbiology dept for 2 days.   If unpreserved urine is submitted, it cannot be used for addtional testing after 24 hours, recollection will be required. SAMPLES BEING HELD    Collection Time: 11/14/21 10:31 AM   Result Value Ref Range    SAMPLES BEING HELD 1PST,1LAV,1BLU     COMMENT        Add-on orders for these samples will be processed based on acceptable specimen integrity and analyte stability, which may vary by analyte. OCCULT BLOOD, STOOL    Collection Time: 11/14/21 10:55 AM   Result Value Ref Range    Occult blood, stool Negative NEG          IMAGING:  US ABD LTD   Final Result   1. Hepatic parenchymal disease with cirrhotic morphology. Perihepatic ascites   and right pleural effusion noted. Pulsatile main portal vein waveform, likely   sequela of hepatic parenchymal disease. 2.  Evidence of right-sided medical renal disease without hydronephrosis. 3.  Normal sonographic evaluation of the gallbladder. CT ABD PELV WO CONT   Final Result   1. Moderate volume ascites. Moderate bilateral pleural effusions. Anasarca. 2.  Several thick-walled loops of small bowel in the midabdomen, may be due to   underdistention or hypoproteinemic state, correlate for infectious or   inflammatory enteritis. XR CHEST PA LAT   Final Result   Mild pulmonary edema with small bilateral pleural effusions and   underlying atelectasis. Medications During Visit:  Medications   morphine injection 4 mg (4 mg IntraVENous Given 11/14/21 1023)   ondansetron (ZOFRAN) injection 4 mg (4 mg IntraVENous Given 11/14/21 1023)   nitroglycerin (NITROBID) 2 % ointment 1 Inch (1 Inch Topical Given 11/14/21 1023)         DECISION MAKING:  Jacinto Butt is a 64 y.o. male who comes in as above. Work-up remarkable for CHF exacerbation and several chronic progressive problems including CKD, anasarca, and anemia. Patient has symptoms concerning for progressive angina and fluid retention not responding to outpatient therapy. Will consult medicine for admission.     Perfect Serve Consult for Admission  12:54 PM    ED Room Number: ER10/10  Patient Name and age:  Pato Rincon 64 y.o.  male  Working Diagnosis:   1. Acute on chronic congestive heart failure, unspecified heart failure type (HCC)    2. Abdominal pain, generalized    3. Angina pectoris ([address]    4. Anasarca    5. Chronic kidney disease, unspecified CKD stage        COVID-19 Suspicion:  no  Sepsis present:  no  Reassessment needed: no  Code Status:  Full Code  Readmission: no  Isolation Requirements:  no  Recommended Level of Care:  telemetry  Department:[facility] Adult ED - [phone]  Other:  CHF exacerbation, abd pain, concerns for worsening angina. CABG x 3 about 1 year ago. IMPRESSION:  1. Acute on chronic congestive heart failure, unspecified heart failure type (HCC)    2. Abdominal pain, generalized    3. Angina pectoris ([address]    4. Anasarca    5.  Chronic kidney disease, unspecified CKD stage        DISPOSITION:  Admitted

## 2021-11-15 LAB
ALBUMIN SERPL-MCNC: 2.8 G/DL (ref 3.5–5)
ALBUMIN/GLOB SERPL: 0.7 {RATIO} (ref 1.1–2.2)
ALP SERPL-CCNC: 111 U/L (ref 45–117)
ALT SERPL-CCNC: 15 U/L (ref 12–78)
ANION GAP SERPL CALC-SCNC: 6 MMOL/L (ref 5–15)
AST SERPL-CCNC: 14 U/L (ref 15–37)
BASOPHILS # BLD: 0 K/UL (ref 0–0.1)
BASOPHILS NFR BLD: 1 % (ref 0–1)
BILIRUB SERPL-MCNC: 0.5 MG/DL (ref 0.2–1)
BUN SERPL-MCNC: 65 MG/DL (ref 6–20)
BUN/CREAT SERPL: 19 (ref 12–20)
CALCIUM SERPL-MCNC: 8.6 MG/DL (ref 8.5–10.1)
CHLORIDE SERPL-SCNC: 115 MMOL/L (ref 97–108)
CO2 SERPL-SCNC: 21 MMOL/L (ref 21–32)
CREAT SERPL-MCNC: 3.36 MG/DL (ref 0.7–1.3)
DIFFERENTIAL METHOD BLD: ABNORMAL
EOSINOPHIL # BLD: 0.3 K/UL (ref 0–0.4)
EOSINOPHIL NFR BLD: 7 % (ref 0–7)
ERYTHROCYTE [DISTWIDTH] IN BLOOD BY AUTOMATED COUNT: 15.1 % (ref 11.5–14.5)
GLOBULIN SER CALC-MCNC: 4.3 G/DL (ref 2–4)
GLUCOSE BLD STRIP.AUTO-MCNC: 136 MG/DL (ref 65–117)
GLUCOSE BLD STRIP.AUTO-MCNC: 154 MG/DL (ref 65–117)
GLUCOSE BLD STRIP.AUTO-MCNC: 68 MG/DL (ref 65–117)
GLUCOSE BLD STRIP.AUTO-MCNC: 80 MG/DL (ref 65–117)
GLUCOSE BLD STRIP.AUTO-MCNC: 89 MG/DL (ref 65–117)
GLUCOSE SERPL-MCNC: 78 MG/DL (ref 65–100)
HCT VFR BLD AUTO: 24 % (ref 36.6–50.3)
HGB BLD-MCNC: 7.7 G/DL (ref 12.1–17)
IMM GRANULOCYTES # BLD AUTO: 0 K/UL (ref 0–0.04)
IMM GRANULOCYTES NFR BLD AUTO: 1 % (ref 0–0.5)
IRON SATN MFR SERPL: 21 % (ref 20–50)
IRON SERPL-MCNC: 55 UG/DL (ref 35–150)
LYMPHOCYTES # BLD: 0.8 K/UL (ref 0.8–3.5)
LYMPHOCYTES NFR BLD: 21 % (ref 12–49)
MAGNESIUM SERPL-MCNC: 2.5 MG/DL (ref 1.6–2.4)
MCH RBC QN AUTO: 29.5 PG (ref 26–34)
MCHC RBC AUTO-ENTMCNC: 32.1 G/DL (ref 30–36.5)
MCV RBC AUTO: 92 FL (ref 80–99)
MONOCYTES # BLD: 0.5 K/UL (ref 0–1)
MONOCYTES NFR BLD: 12 % (ref 5–13)
NEUTS SEG # BLD: 2.4 K/UL (ref 1.8–8)
NEUTS SEG NFR BLD: 58 % (ref 32–75)
NRBC # BLD: 0 K/UL (ref 0–0.01)
NRBC BLD-RTO: 0 PER 100 WBC
PHOSPHATE SERPL-MCNC: 4.6 MG/DL (ref 2.6–4.7)
PLATELET # BLD AUTO: 93 K/UL (ref 150–400)
PMV BLD AUTO: 11.2 FL (ref 8.9–12.9)
POTASSIUM SERPL-SCNC: 4.4 MMOL/L (ref 3.5–5.1)
PROT SERPL-MCNC: 7.1 G/DL (ref 6.4–8.2)
RBC # BLD AUTO: 2.61 M/UL (ref 4.1–5.7)
RBC MORPH BLD: ABNORMAL
SERVICE CMNT-IMP: ABNORMAL
SERVICE CMNT-IMP: ABNORMAL
SERVICE CMNT-IMP: NORMAL
SODIUM SERPL-SCNC: 142 MMOL/L (ref 136–145)
TIBC SERPL-MCNC: 268 UG/DL (ref 250–450)
WBC # BLD AUTO: 4 K/UL (ref 4.1–11.1)

## 2021-11-15 PROCEDURE — 74011250637 HC RX REV CODE- 250/637: Performed by: HOSPITALIST

## 2021-11-15 PROCEDURE — 82962 GLUCOSE BLOOD TEST: CPT

## 2021-11-15 PROCEDURE — 80053 COMPREHEN METABOLIC PANEL: CPT

## 2021-11-15 PROCEDURE — 74011250637 HC RX REV CODE- 250/637: Performed by: INTERNAL MEDICINE

## 2021-11-15 PROCEDURE — 65660000000 HC RM CCU STEPDOWN

## 2021-11-15 PROCEDURE — 83540 ASSAY OF IRON: CPT

## 2021-11-15 PROCEDURE — 36415 COLL VENOUS BLD VENIPUNCTURE: CPT

## 2021-11-15 PROCEDURE — 85025 COMPLETE CBC W/AUTO DIFF WBC: CPT

## 2021-11-15 PROCEDURE — 74011250636 HC RX REV CODE- 250/636: Performed by: INTERNAL MEDICINE

## 2021-11-15 PROCEDURE — 74011250636 HC RX REV CODE- 250/636: Performed by: HOSPITALIST

## 2021-11-15 PROCEDURE — 83735 ASSAY OF MAGNESIUM: CPT

## 2021-11-15 PROCEDURE — 74011000250 HC RX REV CODE- 250: Performed by: HOSPITALIST

## 2021-11-15 PROCEDURE — C9113 INJ PANTOPRAZOLE SODIUM, VIA: HCPCS | Performed by: HOSPITALIST

## 2021-11-15 PROCEDURE — 84100 ASSAY OF PHOSPHORUS: CPT

## 2021-11-15 RX ORDER — EPLERENONE 25 MG/1
25 TABLET, FILM COATED ORAL DAILY
Status: DISCONTINUED | OUTPATIENT
Start: 2021-11-16 | End: 2021-11-17

## 2021-11-15 RX ORDER — MORPHINE SULFATE 2 MG/ML
4 INJECTION, SOLUTION INTRAMUSCULAR; INTRAVENOUS
Status: DISCONTINUED | OUTPATIENT
Start: 2021-11-15 | End: 2021-11-21

## 2021-11-15 RX ORDER — CLONIDINE HYDROCHLORIDE 0.2 MG/1
0.2 TABLET ORAL
Status: COMPLETED | OUTPATIENT
Start: 2021-11-15 | End: 2021-11-15

## 2021-11-15 RX ORDER — FUROSEMIDE 10 MG/ML
80 INJECTION INTRAMUSCULAR; INTRAVENOUS 2 TIMES DAILY
Status: DISCONTINUED | OUTPATIENT
Start: 2021-11-15 | End: 2021-11-17

## 2021-11-15 RX ORDER — LOSARTAN POTASSIUM 50 MG/1
100 TABLET ORAL
Status: COMPLETED | OUTPATIENT
Start: 2021-11-15 | End: 2021-11-15

## 2021-11-15 RX ORDER — ISOSORBIDE MONONITRATE 30 MG/1
60 TABLET, EXTENDED RELEASE ORAL DAILY
COMMUNITY
End: 2021-12-01

## 2021-11-15 RX ORDER — LOSARTAN POTASSIUM 50 MG/1
100 TABLET ORAL DAILY
Status: DISCONTINUED | OUTPATIENT
Start: 2021-11-16 | End: 2021-11-19

## 2021-11-15 RX ORDER — HEPARIN SODIUM 5000 [USP'U]/ML
5000 INJECTION, SOLUTION INTRAVENOUS; SUBCUTANEOUS EVERY 8 HOURS
Status: DISCONTINUED | OUTPATIENT
Start: 2021-11-15 | End: 2021-11-19

## 2021-11-15 RX ORDER — BUTALBITAL, ACETAMINOPHEN AND CAFFEINE 50; 325; 40 MG/1; MG/1; MG/1
1 TABLET ORAL
Status: DISCONTINUED | OUTPATIENT
Start: 2021-11-15 | End: 2021-12-01 | Stop reason: HOSPADM

## 2021-11-15 RX ADMIN — MORPHINE SULFATE 4 MG: 2 INJECTION, SOLUTION INTRAMUSCULAR; INTRAVENOUS at 15:22

## 2021-11-15 RX ADMIN — FUROSEMIDE 40 MG: 10 INJECTION, SOLUTION INTRAMUSCULAR; INTRAVENOUS at 09:59

## 2021-11-15 RX ADMIN — HYDRALAZINE HYDROCHLORIDE 10 MG: 20 INJECTION INTRAMUSCULAR; INTRAVENOUS at 05:25

## 2021-11-15 RX ADMIN — PANTOPRAZOLE SODIUM 40 MG: 40 INJECTION, POWDER, FOR SOLUTION INTRAVENOUS at 20:18

## 2021-11-15 RX ADMIN — CLONIDINE HYDROCHLORIDE 0.2 MG: 0.2 TABLET ORAL at 17:18

## 2021-11-15 RX ADMIN — PANTOPRAZOLE SODIUM 40 MG: 40 INJECTION, POWDER, FOR SOLUTION INTRAVENOUS at 09:59

## 2021-11-15 RX ADMIN — EPOETIN ALFA-EPBX 20000 UNITS: 20000 INJECTION, SOLUTION INTRAVENOUS; SUBCUTANEOUS at 23:22

## 2021-11-15 RX ADMIN — ROSUVASTATIN 10 MG: 10 TABLET, FILM COATED ORAL at 02:59

## 2021-11-15 RX ADMIN — FUROSEMIDE 80 MG: 10 INJECTION, SOLUTION INTRAMUSCULAR; INTRAVENOUS at 17:17

## 2021-11-15 RX ADMIN — MORPHINE SULFATE 4 MG: 4 INJECTION INTRAVENOUS at 02:59

## 2021-11-15 RX ADMIN — MORPHINE SULFATE 4 MG: 2 INJECTION, SOLUTION INTRAMUSCULAR; INTRAVENOUS at 10:50

## 2021-11-15 RX ADMIN — LOSARTAN POTASSIUM 100 MG: 50 TABLET, FILM COATED ORAL at 17:18

## 2021-11-15 RX ADMIN — BUSPIRONE HYDROCHLORIDE 10 MG: 10 TABLET ORAL at 17:18

## 2021-11-15 RX ADMIN — HEPARIN SODIUM 5000 UNITS: 5000 INJECTION INTRAVENOUS; SUBCUTANEOUS at 12:00

## 2021-11-15 RX ADMIN — ROSUVASTATIN 10 MG: 10 TABLET, FILM COATED ORAL at 23:22

## 2021-11-15 RX ADMIN — GABAPENTIN 300 MG: 300 CAPSULE ORAL at 23:22

## 2021-11-15 RX ADMIN — HEPARIN SODIUM 5000 UNITS: 5000 INJECTION INTRAVENOUS; SUBCUTANEOUS at 20:18

## 2021-11-15 RX ADMIN — TRAZODONE HYDROCHLORIDE 50 MG: 50 TABLET ORAL at 23:22

## 2021-11-15 RX ADMIN — GABAPENTIN 300 MG: 300 CAPSULE ORAL at 17:18

## 2021-11-15 NOTE — CONSULTS
CARDIOLOGY CONSULT                 Assessment:     Assessment:       Active Problems:    CHF exacerbation ([facility] (11/14/2021)         Plan:    1. Abdominal pain improved   2. CKD 4   Baseline cr 3.5   -Biopsy-proven advanced diabetic nephropathy with nodular glomerulosclerosis and class IV interstitial nephritis  3. HTN  4. Anemia severe  hct 24   5. CAD  Hx cabg 5/2020  6. CHF acute on chronic diastolic  Last echo 95/07/0394   Left ventricle: The cavity size was normal. Wall thickness was     normal. Systolic function was normal. The estimated ejection     fraction was 60-65%. Wall motion was normal; there were no     regional wall motion abnormalities. Tricuspid valve: Moderate pulmonary hypertension with a     pulmonary artery systolic pressure of 50 mm Hg. There was     mild-moderate regurgitation. Pulmonary arteries: PA peak pressure: 50mm Hg (S). Pericardium, extracardiac: There was a left pleural effusion. Pro btnp 11,967    Rec:   Volume management   Now net 1500 cc out lasix 80 iv bid   Strict salt restriction. 7.  Thrombocytopenia plt 93K per Medicine  8. Chest discomfort: This is chronic and uncharged since cabg  No intervention or further eval indicated at this time        Subjective:    Date of  Admission: 11/14/2021  9:36 AM     Admission type:Emergency    Sergo Chi is a 64 y.o. male admitted for CHF exacerbation ([facility] [I50. 9]Hx of DM, diabetic nephropathy and cad with cabg 20220 with presentation with abdominal pain. Pain locates epigastric and mid upper abd, worse by eating, has intermittent vomiting, denied blood in vomitus. Also with chronic exertional chest pain, shortness of breath after mild exertion, palpitations, and easy fatigability. He also reports formed dark black stool x 1 week in duration. Denied diarrhea, more constipated. 1. HFpEF. EF 60% with grade III diastolic dysfunction  2. CAD s/p 3V CABG 6/5/2020  3. Hypertension  4. Hyperlipidemia  5.. CKD stage III  6.  DM Type II  7. Anemia of chronic disease. He was recently admitted at the beginning of ofor hypertensive urgency and acute on chronic diastolic heart failure. He has had multiple admissions over the past year for the same.         Patient Active Problem List    Diagnosis Date Noted    CHF exacerbation ([facility] 11/14/2021    Type 2 diabetes with nephropathy ([facility] 08/05/2019    Type 2 diabetes mellitus with diabetic neuropathy ([facility] 08/05/2019    Type 2 diabetes mellitus with ophthalmic complication, with long-term current use of insulin ([facility] 11/25/2018    Essential hypertension 11/14/2018    Reactive depression 11/14/2018    Peripheral neuropathy 11/14/2018      Asad Pike MD  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, with long-term current use of insulin ([facility] 11/14/2018    Diabetes ([facility]       Past Surgical History:   Procedure Laterality Date    HX ARTERIAL BYPASS      HX OTHER SURGICAL      5th toe Metatarsal amputation 12/2017      No Known Allergies   Family History   Problem Relation Age of Onset    Diabetes Mother     No Known Problems Father       Current Facility-Administered Medications   Medication Dose Route Frequency    morphine injection 4 mg  4 mg IntraVENous Q4H PRN    heparin (porcine) injection 5,000 Units  5,000 Units SubCUTAneous Q8H    [START ON 11/16/2021] losartan (COZAAR) tablet 100 mg  100 mg Oral DAILY    [START ON 11/16/2021] eplerenone (INSPRA) tablet 25 mg  25 mg Oral DAILY    [START ON 11/16/2021] sodium zirconium cyclosilicate (LOKELMA) powder packet 10 g  10 g Oral DAILY    epoetin ericka-epbx (RETACRIT) injection 20,000 Units  20,000 Units SubCUTAneous Q MON, WED & FRI    butalbital-acetaminophen-caffeine (FIORICET, ESGIC) -40 mg per tablet 1 Tablet  1 Tablet Oral Q4H PRN    furosemide (LASIX) injection 80 mg  80 mg IntraVENous BID    pantoprazole (PROTONIX) 40 mg in 0.9% sodium chloride 10 mL injection  40 mg IntraVENous Q12H    ondansetron (ZOFRAN) injection 4 mg  4 mg IntraVENous Q4H PRN    amLODIPine (NORVASC) tablet 10 mg  10 mg Oral DAILY    busPIRone (BUSPAR) tablet 10 mg  10 mg Oral BID    clopidogreL (PLAVIX) tablet 75 mg  75 mg Oral DAILY    gabapentin (NEURONTIN) capsule 300 mg  300 mg Oral TID    isosorbide dinitrate (ISORDIL) tablet 20 mg  20 mg Oral TID    metoprolol succinate (TOPROL-XL) XL tablet 50 mg  50 mg Oral DAILY    rosuvastatin (CRESTOR) tablet 10 mg  10 mg Oral QHS    sertraline (ZOLOFT) tablet 200 mg  200 mg Oral DAILY    traMADoL (ULTRAM) tablet 50 mg  50 mg Oral Q6H PRN    traZODone (DESYREL) tablet 50 mg  50 mg Oral QHS    acetaminophen (TYLENOL) tablet 650 mg  650 mg Oral Q6H PRN    glucose chewable tablet 16 g  4 Tablet Oral PRN    dextrose (D50W) injection syrg 12.5-25 g  25-50 mL IntraVENous PRN    glucagon (GLUCAGEN) injection 1 mg  1 mg IntraMUSCular PRN    insulin glargine (LANTUS) injection 10 Units  10 Units SubCUTAneous DAILY    insulin lispro (HUMALOG) injection   SubCUTAneous AC&HS    hydrALAZINE (APRESOLINE) 20 mg/mL injection 10 mg  10 mg IntraVENous Q6H PRN      SHX:  Quit smoking 5/22/2021. No etoh use      Review of Symptoms:  A comprehensive review of systems was negative. No hemoptysis, hematemesis, epistaxis, melena, hematuria. No fevers,  Rashes, seizures, visual disturbances, difficulty walking, no abdominal pain         Physical Exam    Visit Vitals  BP (!) 173/78 (BP 1 Location: Right upper arm, BP Patient Position: Sitting)   Pulse 82   Temp 97.6 °F (36.4 °C)   Resp 14   Ht 5' 9\" (1.753 m)   Wt 188 lb 1.6 oz (85.3 kg)   SpO2 94%   BMI 27.78 kg/m²     Skin warm and dry  PERRLA, EOMI  Oropharynx without exudate. Mallampati 2  Neck supple, thyroid not enlarged  Lungs clear  PMI non displaced.   Normal S1/ S2   No Mummurs, click or Rubs  No S3 or S4  Abdomen soft and non tender, No Hepatosplenomegaly  Pulses 2+ throughout,    Neuro: normal facial grimace,  Moves all extremities. AAAO  unanxious      Cardiographics    Telemetry: nsr  ECG:  Nsr.  Echocardiogram: Not done    Labs:   Recent Results (from the past 24 hour(s))   CBC WITH AUTOMATED DIFF    Collection Time: 11/15/21  6:34 AM   Result Value Ref Range    WBC 4.0 (L) 4.1 - 11.1 K/uL    RBC 2.61 (L) 4.10 - 5.70 M/uL    HGB 7.7 (L) 12.1 - 17.0 g/dL    HCT 24.0 (L) 36.6 - 50.3 %    MCV 92.0 80.0 - 99.0 FL    MCH 29.5 26.0 - 34.0 PG    MCHC 32.1 30.0 - 36.5 g/dL    RDW 15.1 (H) 11.5 - 14.5 %    PLATELET 93 (L) 157 - 400 K/uL    MPV 11.2 8.9 - 12.9 FL    NRBC 0.0 0  WBC    ABSOLUTE NRBC 0.00 0.00 - 0.01 K/uL    NEUTROPHILS 58 32 - 75 %    LYMPHOCYTES 21 12 - 49 %    MONOCYTES 12 5 - 13 %    EOSINOPHILS 7 0 - 7 %    BASOPHILS 1 0 - 1 %    IMMATURE GRANULOCYTES 1 (H) 0.0 - 0.5 %    ABS. NEUTROPHILS 2.4 1.8 - 8.0 K/UL    ABS. LYMPHOCYTES 0.8 0.8 - 3.5 K/UL    ABS. MONOCYTES 0.5 0.0 - 1.0 K/UL    ABS. EOSINOPHILS 0.3 0.0 - 0.4 K/UL    ABS. BASOPHILS 0.0 0.0 - 0.1 K/UL    ABS. IMM. GRANS. 0.0 0.00 - 0.04 K/UL    DF SMEAR SCANNED      RBC COMMENTS ANISOCYTOSIS  1+       METABOLIC PANEL, COMPREHENSIVE    Collection Time: 11/15/21  6:34 AM   Result Value Ref Range    Sodium 142 136 - 145 mmol/L    Potassium 4.4 3.5 - 5.1 mmol/L    Chloride 115 (H) 97 - 108 mmol/L    CO2 21 21 - 32 mmol/L    Anion gap 6 5 - 15 mmol/L    Glucose 78 65 - 100 mg/dL    BUN 65 (H) 6 - 20 MG/DL    Creatinine 3.36 (H) 0.70 - 1.30 MG/DL    BUN/Creatinine ratio 19 12 - 20      GFR est AA 23 (L) >60 ml/min/1.73m2    GFR est non-AA 19 (L) >60 ml/min/1.73m2    Calcium 8.6 8.5 - 10.1 MG/DL    Bilirubin, total 0.5 0.2 - 1.0 MG/DL    ALT (SGPT) 15 12 - 78 U/L    AST (SGOT) 14 (L) 15 - 37 U/L    Alk.  phosphatase 111 45 - 117 U/L    Protein, total 7.1 6.4 - 8.2 g/dL    Albumin 2.8 (L) 3.5 - 5.0 g/dL    Globulin 4.3 (H) 2.0 - 4.0 g/dL    A-G Ratio 0.7 (L) 1.1 - 2.2     MAGNESIUM    Collection Time: 11/15/21  6:34 AM   Result Value Ref Range    Magnesium 2.5 (H) 1.6 - 2.4 mg/dL   PHOSPHORUS    Collection Time: 11/15/21  6:34 AM   Result Value Ref Range    Phosphorus 4.6 2.6 - 4.7 MG/DL   IRON PROFILE    Collection Time: 11/15/21  6:34 AM   Result Value Ref Range    Iron 55 35 - 150 ug/dL    TIBC 268 250 - 450 ug/dL    Iron % saturation 21 20 - 50 %   GLUCOSE, POC    Collection Time: 11/15/21  8:06 AM   Result Value Ref Range    Glucose (POC) 80 65 - 117 mg/dL    Performed by Urban Brunner    GLUCOSE, POC    Collection Time: 11/15/21 10:10 AM   Result Value Ref Range    Glucose (POC) 89 65 - 117 mg/dL    Performed by Breanna Patel    GLUCOSE, POC    Collection Time: 11/15/21  5:28 PM   Result Value Ref Range    Glucose (POC) 68 65 - 117 mg/dL    Performed by Marleny Ko

## 2021-11-15 NOTE — PROGRESS NOTES
[facility]                                                                                          Hospitalist Progress Note  Christian Mullins MD  Answering service: [phone] from in house phone        Date of Service:  11/15/2021  NAME:  Esperanza Torres  :  1960  MRN:  [id number]      Admission Summary:   Copied form admit: \" Esperanza Torres is a 64 y.o. male who presents with abdominal pain       This is a 59-year-old male with past medical history of type 2 diabetes, hypertension, CKD (baseline cr around 3.2 per lab in 4310), diastolic HF, and CAD (cardiac bypass about 1 year ago; currently on DAPT) who presents to the ER today for subsequent evaluation of abdominal pain. Pain locates epigastric and mid upper abd, worse by eating, has intermittent vomiting, denied blood in vomitus. Also with chronic exertional chest pain, shortness of breath after mild exertion, palpitations, and easy fatigability. He also reports formed dark black stool x 1 week in duration. Denied diarrhea, more constipated.      Patient states that his symptoms started 2 to 3 weeks ago and have continued to progress in nature since its onset. He was hospitalized  at [facility] twice last month for CHF and SEVERIANO on CKD. Currently he is on lasix 40mg po daily at home, he feels not working well, not enough urine output and still swelling in legs.      Patient also endorses left-sided chest pain that does not seem to be present at rest but becomes aggravated with minimal physical exertion. \"    Interval history / Subjective:     11/15/2021 :    Cont w sob   No procedure in view by late afternoon rounds; for diet   Cont to monitor renal numbers w nephrology    abd pain of admission improved   Elias Davis as below        Assessment & Plan:       1. Abdominal pain: may due to none bleeding PUD, may due to CHF/passive live congestion. ABD CT and US result reviewed. Start IV PPI, pain control.  GI consult, npo after midnight. - improved pain as of rounds 11/15:  - stools neg for occult blood    - CT: \"IMPRESSION  1. Moderate volume ascites. Moderate bilateral pleural effusions. Anasarca. 2.  Several thick-walled loops of small bowel in the midabdomen, may be due to  underdistention or hypoproteinemic state, correlate for infectious or  inflammatory enteritis. - US:  \" Hepatic parenchymal disease with cirrhotic morphology. Perihepatic ascites  and right pleural effusion noted. Pulsatile main portal vein waveform, likely  sequela of hepatic parenchymal disease. \"  Will ask hepatology to follow     2. Acute on chronic bv diastolic congestive heart failure: NYHA3. HFpEF, had stress test in [facility], EF 50% per reprot. With diffuse anasarca, fluid retained due to his advanced CKD. Continue lasix 40mg bid and follow cr, weight, urine output. Cardiologist consult = done:   Per card:   \"CHF acute on chronic diastolic  Last echo 37/98/0353   Left ventricle: The cavity size was normal. Wall thickness was     normal. Systolic function was normal. The estimated ejection     fraction was 60-65%. Wall motion was normal; there were no     regional wall motion abnormalities. Tricuspid valve: Moderate pulmonary hypertension with a     pulmonary artery systolic pressure of 50 mm Hg. There was     mild-moderate regurgitation. Pulmonary arteries: PA peak pressure: 50mm Hg (S). Pericardium, extracardiac: There was a left pleural effusion. Pro btnp 11,967    Rec:   Volume management   Now net 1500 cc out lasix 80 iv bid   Strict salt restriction. \"    No additions to above after review 11/15   3. Advanced CKD: cr mild worse than baseline likely, pt is still volume overloaded, continue lasix, renal consult. Hold lisinopirl due to advanced ckd. - per nephrology stable, for cont on ARB's and eplerenone     4.  Dm: LANTUS AND ssi, will adjust           Hospital Problems  Date Reviewed: 8/7/2020          Codes Class Noted POA    CHF exacerbation ([facility] [address] ICD-10-CM: I50.9  ICD-9-CM: 428.0  11/14/2021 Unknown                  After personally interviewing pt at bedside the following is noted on 11/15/2021 :    Review of Systems   Constitutional: Positive for malaise/fatigue. Respiratory: Positive for shortness of breath. Cardiovascular: Negative. Gastrointestinal: Negative for abdominal pain, constipation, diarrhea and vomiting. Feels hungry    All other systems reviewed and are negative. I had a face to face encounter with patient on 11/15/2021 at bedside for the following physical exam:     PHYSICAL EXAM:    Visit Vitals  BP (!) 179/85 (BP 1 Location: Right upper arm, BP Patient Position: At rest)   Pulse 82   Temp 97.6 °F (36.4 °C)   Resp 14   Ht 5' 9\" (1.753 m)   Wt 89.3 kg (196 lb 13.9 oz)   SpO2 98%   BMI 29.07 kg/m²          Physical Exam  Constitutional:       General: He is not in acute distress. Appearance: Normal appearance. He is normal weight. He is not ill-appearing, toxic-appearing or diaphoretic. HENT:      Head: Normocephalic and atraumatic. Right Ear: External ear normal.      Left Ear: External ear normal.      Nose: Nose normal.      Mouth/Throat:      Mouth: Mucous membranes are moist.      Pharynx: Oropharynx is clear. Eyes:      Extraocular Movements: Extraocular movements intact. Conjunctiva/sclera: Conjunctivae normal.      Pupils: Pupils are equal, round, and reactive to light. Cardiovascular:      Rate and Rhythm: Normal rate and regular rhythm. Pulses: Normal pulses. Heart sounds: Normal heart sounds. Pulmonary:      Effort: Pulmonary effort is normal.      Breath sounds: Normal breath sounds. Abdominal:      General: Abdomen is flat. Bowel sounds are normal.      Palpations: Abdomen is soft. Musculoskeletal:         General: No swelling or deformity. Cervical back: Normal range of motion and neck supple.    Skin:     General: Skin is warm and dry. Neurological:      General: No focal deficit present. Mental Status: He is alert. Mental status is at baseline. Psychiatric:         Mood and Affect: Mood normal.         Behavior: Behavior normal.                     Data Review:    Review and/or order of clinical lab test      Labs:     Recent Labs     11/15/21  0634 11/14/21  0922   WBC 4.0* 4.4   HGB 7.7* 8.6*   HCT 24.0* 26.2*   PLT 93* 93*     Recent Labs     11/15/21  0634 11/14/21  0922    139   K 4.4 4.3   * 111*   CO2 21 19*   BUN 65* 69*   CREA 3.36* 3.34*   GLU 78 136*   CA 8.6 8.9   MG 2.5* 2.4   PHOS 4.6  --      Recent Labs     11/15/21  0634 11/14/21  0922   ALT 15 27    150*   TBILI 0.5 0.5   TP 7.1 7.9   ALB 2.8* 3.1*   GLOB 4.3* 4.8*   LPSE  --  95     No results for input(s): INR, PTP, APTT, INREXT in the last 72 hours. No results for input(s): FE, TIBC, PSAT, FERR in the last 72 hours. No results found for: FOL, RBCF   No results for input(s): PH, PCO2, PO2 in the last 72 hours. No results for input(s): CPK, CKNDX, TROIQ in the last 72 hours.     No lab exists for component: CPKMB  Lab Results   Component Value Date/Time    Cholesterol, total 176 11/22/2019 09:36 AM    HDL Cholesterol 45 11/22/2019 09:36 AM    LDL, calculated 112 (H) 11/22/2019 09:36 AM    Triglyceride 94 11/22/2019 09:36 AM     Lab Results   Component Value Date/Time    Glucose (POC) 89 11/15/2021 10:10 AM    Glucose (POC) 80 11/15/2021 08:06 AM     Lab Results   Component Value Date/Time    Color YELLOW/STRAW 11/14/2021 10:31 AM    Appearance CLEAR 11/14/2021 10:31 AM    Specific gravity 1.019 11/14/2021 10:31 AM    pH (UA) 5.5 11/14/2021 10:31 AM    Protein >300 (A) 11/14/2021 10:31 AM    Glucose 500 (A) 11/14/2021 10:31 AM    Ketone Negative 11/14/2021 10:31 AM    Bilirubin Negative 11/14/2021 10:31 AM    Urobilinogen 0.2 11/14/2021 10:31 AM    Nitrites Negative 11/14/2021 10:31 AM    Leukocyte Esterase Negative 11/14/2021 10:31 AM    Epithelial cells FEW 11/14/2021 10:31 AM    Bacteria Negative 11/14/2021 10:31 AM    WBC 0-4 11/14/2021 10:31 AM    RBC 0-5 11/14/2021 10:31 AM         Medications Reviewed:     Current Facility-Administered Medications   Medication Dose Route Frequency    morphine injection 4 mg  4 mg IntraVENous Q4H PRN    pantoprazole (PROTONIX) 40 mg in 0.9% sodium chloride 10 mL injection  40 mg IntraVENous Q12H    ondansetron (ZOFRAN) injection 4 mg  4 mg IntraVENous Q4H PRN    amLODIPine (NORVASC) tablet 10 mg  10 mg Oral DAILY    busPIRone (BUSPAR) tablet 10 mg  10 mg Oral BID    clopidogreL (PLAVIX) tablet 75 mg  75 mg Oral DAILY    furosemide (LASIX) injection 40 mg  40 mg IntraVENous BID    gabapentin (NEURONTIN) capsule 300 mg  300 mg Oral TID    isosorbide dinitrate (ISORDIL) tablet 20 mg  20 mg Oral TID    metoprolol succinate (TOPROL-XL) XL tablet 50 mg  50 mg Oral DAILY    rosuvastatin (CRESTOR) tablet 10 mg  10 mg Oral QHS    sertraline (ZOLOFT) tablet 200 mg  200 mg Oral DAILY    traMADoL (ULTRAM) tablet 50 mg  50 mg Oral Q6H PRN    traZODone (DESYREL) tablet 50 mg  50 mg Oral QHS    acetaminophen (TYLENOL) tablet 650 mg  650 mg Oral Q6H PRN    glucose chewable tablet 16 g  4 Tablet Oral PRN    dextrose (D50W) injection syrg 12.5-25 g  25-50 mL IntraVENous PRN    glucagon (GLUCAGEN) injection 1 mg  1 mg IntraMUSCular PRN    insulin glargine (LANTUS) injection 10 Units  10 Units SubCUTAneous DAILY    insulin lispro (HUMALOG) injection   SubCUTAneous AC&HS    hydrALAZINE (APRESOLINE) 20 mg/mL injection 10 mg  10 mg IntraVENous Q6H PRN     ______________________________________________________________________  EXPECTED LENGTH OF STAY: - - -  ACTUAL LENGTH OF STAY:          1                 Daquan Anton MD

## 2021-11-15 NOTE — ED NOTES
Emergency Room Nursing Communication Tool        Verbal shift change report given to Rosa RN (incoming nurse) by Clarita Vergara RN (outgoing nurse) on Ad Del Valle a 64 y.o. male and born 1960 who arrived at the hospital on 11/14/2021  9:36 AM. Report included the following information SBAR, Kardex, [facility] and Recent Results. Significant changes during shift: Patient's BP elevated given 2 doses of Hydralazine IV PRN. Issues for physician to address: none            Code Status: No Order     Admit Diagnosis: CHF exacerbation ([facility] [address] [I50.9]     Surgery: * No surgery found *     Infections: No current active infections     Allergies: Patient has no known allergies.      Current diet: DIET NPO     Lines:   Peripheral IV 11/14/21 Left Antecubital (Active)   Site Assessment Clean, dry, & intact 11/14/21 0927   Phlebitis Assessment 0 11/14/21 0927   Infiltration Assessment 0 11/14/21 0927   Dressing Status Clean, dry, & intact 11/14/21 0927   Dressing Type Transparent 11/14/21 0927   Hub Color/Line Status Pink 11/14/21 0927   Action Taken Blood drawn 11/14/21 0927   Alcohol Cap Used No 11/14/21 0927                Vital Signs:     Patient Vitals for the past 12 hrs:   Pulse Resp BP SpO2   11/15/21 0730 78 9 (!) 149/93 90 %   11/15/21 0700 84 14 (!) 165/84 92 %   11/15/21 0600 77 10 (!) 175/90 93 %   11/15/21 0530 81 24 (!) 178/96 95 %   11/15/21 0430 73 11 (!) 188/102 92 %   11/15/21 0300 77 16 (!) 175/71 94 %   11/15/21 0130 72 9 (!) 178/91    11/15/21 0000 79 13 (!) 176/77 95 %   11/14/21 2330 79 17 (!) 172/83 96 %   11/14/21 2309 81 21  97 %   11/14/21 2305 82 25  96 %   11/14/21 2230 77 12 (!) 157/88 97 %   11/14/21 2130 80 15 (!) 156/60 94 %   11/14/21 2118    (!) 88 %      Intake & Output:       Intake/Output Summary (Last 24 hours) at 11/15/2021 0800  Last data filed at 11/15/2021 0600  Gross per 24 hour   Intake    Output 1500 ml   Net -1500 ml      Laboratory Results: Recent Results (from the past 12 hour(s))   CBC WITH AUTOMATED DIFF    Collection Time: 11/15/21  6:34 AM   Result Value Ref Range    WBC 4.0 (L) 4.1 - 11.1 K/uL    RBC 2.61 (L) 4.10 - 5.70 M/uL    HGB 7.7 (L) 12.1 - 17.0 g/dL    HCT 24.0 (L) 36.6 - 50.3 %    MCV 92.0 80.0 - 99.0 FL    MCH 29.5 26.0 - 34.0 PG    MCHC 32.1 30.0 - 36.5 g/dL    RDW 15.1 (H) 11.5 - 14.5 %    PLATELET 93 (L) 221 - 400 K/uL    MPV 11.2 8.9 - 12.9 FL    NRBC 0.0 0  WBC    ABSOLUTE NRBC 0.00 0.00 - 0.01 K/uL    NEUTROPHILS 58 32 - 75 %    LYMPHOCYTES 21 12 - 49 %    MONOCYTES 12 5 - 13 %    EOSINOPHILS 7 0 - 7 %    BASOPHILS 1 0 - 1 %    IMMATURE GRANULOCYTES 1 (H) 0.0 - 0.5 %    ABS. NEUTROPHILS 2.4 1.8 - 8.0 K/UL    ABS. LYMPHOCYTES 0.8 0.8 - 3.5 K/UL    ABS. MONOCYTES 0.5 0.0 - 1.0 K/UL    ABS. EOSINOPHILS 0.3 0.0 - 0.4 K/UL    ABS. BASOPHILS 0.0 0.0 - 0.1 K/UL    ABS. IMM. GRANS. 0.0 0.00 - 0.04 K/UL    DF SMEAR SCANNED      RBC COMMENTS ANISOCYTOSIS  1+       METABOLIC PANEL, COMPREHENSIVE    Collection Time: 11/15/21  6:34 AM   Result Value Ref Range    Sodium 142 136 - 145 mmol/L    Potassium 4.4 3.5 - 5.1 mmol/L    Chloride 115 (H) 97 - 108 mmol/L    CO2 21 21 - 32 mmol/L    Anion gap 6 5 - 15 mmol/L    Glucose 78 65 - 100 mg/dL    BUN 65 (H) 6 - 20 MG/DL    Creatinine 3.36 (H) 0.70 - 1.30 MG/DL    BUN/Creatinine ratio 19 12 - 20      GFR est AA 23 (L) >60 ml/min/1.73m2    GFR est non-AA 19 (L) >60 ml/min/1.73m2    Calcium 8.6 8.5 - 10.1 MG/DL    Bilirubin, total 0.5 0.2 - 1.0 MG/DL    ALT (SGPT) 15 12 - 78 U/L    AST (SGOT) 14 (L) 15 - 37 U/L    Alk.  phosphatase 111 45 - 117 U/L    Protein, total 7.1 6.4 - 8.2 g/dL    Albumin 2.8 (L) 3.5 - 5.0 g/dL    Globulin 4.3 (H) 2.0 - 4.0 g/dL    A-G Ratio 0.7 (L) 1.1 - 2.2     MAGNESIUM    Collection Time: 11/15/21  6:34 AM   Result Value Ref Range    Magnesium 2.5 (H) 1.6 - 2.4 mg/dL   PHOSPHORUS    Collection Time: 11/15/21  6:34 AM   Result Value Ref Range    Phosphorus 4.6 2.6 - 4.7 MG/DL            Opportunity for questions and clarifications were given to the incoming nurse. Patient's bed locked and is in low position, side rails up x2, door open PRN, call bell within reach of patient and patient not in distress.       Signed by: Ricardo Chirinos, RN, BSN, [address] [place] [address] Drive                       11/15/2021 at 8:00 AM

## 2021-11-15 NOTE — NURSE NAVIGATOR
Chart reviewed by Heart Failure Nurse Navigator. Heart Failure database completed. EF:  Reported EF from [facility] 50%    ACEi/ARB/ARNi: lisinopril 40 mg daily    BB: toprol xl 50 mg daily    Aldosterone Antagonist: not currently indicated    Obstructive Sleep Apnea Screening: screening priority 1   STOP-BANG score:   Referred to Sleep Medicine:     CRT not currently indicated    NYHA Functional Class documentation requested. Heart Failure Teach Back in Patient Education. Heart Failure Avoiding Triggers on Discharge Instructions. Cardiologist: Dr. Arnoldo Bianchi ([facility]) consulted      Post discharge follow up phone call to be made within 48-72 hours of discharge.

## 2021-11-15 NOTE — ED NOTES
Bedside and Verbal shift change report given to Rosa (oncoming nurse) by Margret Huitron RN (offgoing nurse). Report given with SBAR, Kardex, Intake/Output, MAR and Recent Results.

## 2021-11-15 NOTE — PROGRESS NOTES
CM placed contact call to patient's room; roommate answered and stated patient is with the doctor at this time. CM to attempt to speak with patient again.      Wade Wilkins, CRM

## 2021-11-15 NOTE — ROUTINE PROCESS
TRANSFER - OUT REPORT:    Verbal report given to Sandra DE LA PAZ(name) on Omaira Enter  being transferred to (unit) for routine progression of care       Report consisted of patients Situation, Background, Assessment and   Recommendations(SBAR). Information from the following report(s) SBAR, ED Summary, Procedure Summary, MAR and Recent Results was reviewed with the receiving nurse. Lines:   Peripheral IV 11/14/21 Left Antecubital (Active)   Site Assessment Clean, dry, & intact 11/14/21 0927   Phlebitis Assessment 0 11/14/21 0927   Infiltration Assessment 0 11/14/21 0927   Dressing Status Clean, dry, & intact 11/14/21 0927   Dressing Type Transparent 11/14/21 0927   Hub Color/Line Status Pink 11/14/21 0927   Action Taken Blood drawn 11/14/21 0927   Alcohol Cap Used No 11/14/21 8773        Opportunity for questions and clarification was provided.       Patient transported with:  RN

## 2021-11-15 NOTE — CONSULTS
[facility]   [address] [facility], [address] [place] [address]  Phone: [phone]   [phone] NOTE     Patient: Esme Dumont MRN: [id number]  PCP: Asad Pike MD   :     1960  Age:   64 y.o. Sex:  male      Referring physician: Jeyson Hahn MD  Reason for consultation: 64 y.o. male with CHF exacerbation ([facility] [address] [M64.5] complicated by CKD 4  Admission Date: 2021  9:36 AM  LOS: 1 day      ASSESSMENT and PLAN :   CKD 4   -Biopsy-proven advanced diabetic nephropathy with nodular glomerulosclerosis and class IV interstitial nephritis  -Baseline creatinine 3.5 mg/dL  -Creatinine close to baseline  -Continue losartan and eplerenone  -No emergent need for dialysis  -Daily labs    Hyperkalemia:  -Resume daily Lokelma    Uncontrolled HTN  -Suspect noncompliance with meds  -Restarted ARB, MRB  -Goal BP less than 130/80 mmHg    Severe anemia:  -Suspect CKD playing a role  -Work-up per primary team  -If work-up negative, start RANDY    Recurrent abdominal pain  -Work-up per primary team    CAD s/p CABG May 2020  HFpEF  -Last echo: Preserved LVEF, moderate pulmonary hypertension PASP 50 mmHg        Care Plan discussed with: Patient        Thank you for consulting [facility] in the care of your patient. Subjective:   HPI: Esme Dumont is a 64 y.o.  male who has been admitted to the hospital for recurrent nausea and abdominal pain. In ER his serum creatinine was 3.3 mg/dL and nephrology has been asked to evaluate and manage her CKD stage IV. Mr. Leydi Joshi reports multiple hospitalizations at [facility] since  with the same symptoms. With regards to his CKD, he is followed by Dr. Daniela Sommers in our practice and underwent underwent CT-guided kidney biopsy in 2021 that showed advanced diabetic nephropathy with nodular glomerulosclerosis as well as a class IV interstitial fibrosis.   He had uncontrolled hypertension on all his hospitalizations. On his last admission he was started on minoxidil. He was also on eplerenone as well as losartan. He has developed hyperkalemia and has required ANGELICA SINGH [facility] maintenance therapy. Mr. Cameron Cortez has very poor insight into his medical problems. 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 He has never used smokeless tobacco. He reports that he does not drink alcohol and does not use drugs. Family History   Problem Relation Age of Onset    Diabetes Mother     No Known Problems Father        Review of Systems:  A thorough twelve point review of system was performed today. Pertinent positives and negatives are mentioned in the HPI. The reminder of the ROS is negative and noncontributory. Objective:    Vitals:    Vitals:    11/15/21 1205 11/15/21 1230 11/15/21 1403 11/15/21 1512   BP:  (!) 170/76     Pulse: 85 83 81    Resp:  14     Temp:  97.4 °F (36.3 °C)     SpO2:  95%     Weight:    85.3 kg (188 lb 1.6 oz)   Height:         I&O's:  11/14 0701 - 11/15 0700  In: -   Out: 1500 [Urine:1500]  Visit Vitals  BP (!) 170/76 (BP 1 Location: Right upper arm, BP Patient Position: At rest)   Pulse 81   Temp 97.4 °F (36.3 °C)   Resp 14   Ht 5' 9\" (1.753 m)   Wt 85.3 kg (188 lb 1.6 oz)   SpO2 95%   BMI 27.78 kg/m²       Physical Exam:  General:  No apparent Distress  HEENT: PERRL,  ++ Pallor , No Icterus  Neck: Supple,no mass palpable  Lungs : CTA  CVS: RRR, S1 S2 normal, No murmur   Abdomen: Soft, NT, BS +  Extremities:2+ Edema  Skin: No rash or lesions.   MS: No joint swelling, erythema, warmth  Neurologic: non focal, AAO x 3  Psych: normal affect/    Laboratory Results:    Recent Labs     11/15/21  0634 11/14/21 0922    139   K 4.4 4.3   * 111*   CO2 21 19*   GLU 78 136*   BUN 65* 69*   CREA 3.36* 3.34*   CA 8.6 8.9   MG 2.5* 2.4   PHOS 4.6  --    ALB 2.8* 3.1*   ALT 15 27     Recent Labs     11/15/21  0634 11/14/21  0922   WBC 4.0* 4.4   HGB 7.7* 8.6*   HCT 24.0* 26.2*   PLT 93* 93*     No results found for: SDES  No results found for: CULT  Recent Results (from the past 24 hour(s))   COVID-19 RAPID TEST    Collection Time: 11/14/21  5:30 PM   Result Value Ref Range    Specimen source Nasopharyngeal      COVID-19 rapid test Not detected NOTD     CBC WITH AUTOMATED DIFF    Collection Time: 11/15/21  6:34 AM   Result Value Ref Range    WBC 4.0 (L) 4.1 - 11.1 K/uL    RBC 2.61 (L) 4.10 - 5.70 M/uL    HGB 7.7 (L) 12.1 - 17.0 g/dL    HCT 24.0 (L) 36.6 - 50.3 %    MCV 92.0 80.0 - 99.0 FL    MCH 29.5 26.0 - 34.0 PG    MCHC 32.1 30.0 - 36.5 g/dL    RDW 15.1 (H) 11.5 - 14.5 %    PLATELET 93 (L) 576 - 400 K/uL    MPV 11.2 8.9 - 12.9 FL    NRBC 0.0 0  WBC    ABSOLUTE NRBC 0.00 0.00 - 0.01 K/uL    NEUTROPHILS 58 32 - 75 %    LYMPHOCYTES 21 12 - 49 %    MONOCYTES 12 5 - 13 %    EOSINOPHILS 7 0 - 7 %    BASOPHILS 1 0 - 1 %    IMMATURE GRANULOCYTES 1 (H) 0.0 - 0.5 %    ABS. NEUTROPHILS 2.4 1.8 - 8.0 K/UL    ABS. LYMPHOCYTES 0.8 0.8 - 3.5 K/UL    ABS. MONOCYTES 0.5 0.0 - 1.0 K/UL    ABS. EOSINOPHILS 0.3 0.0 - 0.4 K/UL    ABS. BASOPHILS 0.0 0.0 - 0.1 K/UL    ABS. IMM.  GRANS. 0.0 0.00 - 0.04 K/UL    DF SMEAR SCANNED      RBC COMMENTS ANISOCYTOSIS  1+       METABOLIC PANEL, COMPREHENSIVE    Collection Time: 11/15/21  6:34 AM   Result Value Ref Range    Sodium 142 136 - 145 mmol/L    Potassium 4.4 3.5 - 5.1 mmol/L    Chloride 115 (H) 97 - 108 mmol/L    CO2 21 21 - 32 mmol/L    Anion gap 6 5 - 15 mmol/L    Glucose 78 65 - 100 mg/dL    BUN 65 (H) 6 - 20 MG/DL    Creatinine 3.36 (H) 0.70 - 1.30 MG/DL BUN/Creatinine ratio 19 12 - 20      GFR est AA 23 (L) >60 ml/min/1.73m2    GFR est non-AA 19 (L) >60 ml/min/1.73m2    Calcium 8.6 8.5 - 10.1 MG/DL    Bilirubin, total 0.5 0.2 - 1.0 MG/DL    ALT (SGPT) 15 12 - 78 U/L    AST (SGOT) 14 (L) 15 - 37 U/L    Alk. phosphatase 111 45 - 117 U/L    Protein, total 7.1 6.4 - 8.2 g/dL    Albumin 2.8 (L) 3.5 - 5.0 g/dL    Globulin 4.3 (H) 2.0 - 4.0 g/dL    A-G Ratio 0.7 (L) 1.1 - 2.2     MAGNESIUM    Collection Time: 11/15/21  6:34 AM   Result Value Ref Range    Magnesium 2.5 (H) 1.6 - 2.4 mg/dL   PHOSPHORUS    Collection Time: 11/15/21  6:34 AM   Result Value Ref Range    Phosphorus 4.6 2.6 - 4.7 MG/DL   GLUCOSE, POC    Collection Time: 11/15/21  8:06 AM   Result Value Ref Range    Glucose (POC) 80 65 - 117 mg/dL    Performed by Henri Sneed    GLUCOSE, POC    Collection Time: 11/15/21 10:10 AM   Result Value Ref Range    Glucose (POC) 89 65 - 117 mg/dL    Performed by Mouna Jarvis          Urine dipstick:   Lab Results   Component Value Date/Time    Color YELLOW/STRAW 11/14/2021 10:31 AM    Appearance CLEAR 11/14/2021 10:31 AM    Specific gravity 1.019 11/14/2021 10:31 AM    pH (UA) 5.5 11/14/2021 10:31 AM    Protein >300 (A) 11/14/2021 10:31 AM    Glucose 500 (A) 11/14/2021 10:31 AM    Ketone Negative 11/14/2021 10:31 AM    Bilirubin Negative 11/14/2021 10:31 AM    Urobilinogen 0.2 11/14/2021 10:31 AM    Nitrites Negative 11/14/2021 10:31 AM    Leukocyte Esterase Negative 11/14/2021 10:31 AM    Epithelial cells FEW 11/14/2021 10:31 AM    Bacteria Negative 11/14/2021 10:31 AM    WBC 0-4 11/14/2021 10:31 AM    RBC 0-5 11/14/2021 10:31 AM       I have reviewed the following: All pertinent labs, microbiology data, radiology imaging for my assessment     Medications list Personally Reviewed   [x]      Yes     []               No       Medications:  Prior to Admission medications    Medication Sig Start Date End Date Taking?  Authorizing Provider   isosorbide mononitrate ER (IMDUR) 30 mg tablet Take 60 mg by mouth daily. Yes Provider, Historical   busPIRone (BUSPAR) 10 mg tablet Take 10 mg by mouth two (2) times a day. Yes Provider, Historical   traMADoL (ULTRAM) 50 mg tablet TAKE 1 TABLET BY MOUTH EVERY 4 HOURS AS NEEDED FOR PAIN 6/10/20   Provider, Historical   insulin glargine (Lantus U-100 Insulin) 100 unit/mL injection E11.65 Take 25 units daily SubQ at night 8/7/20   Carmen Wynne NP   sertraline (ZOLOFT) 100 mg tablet Take 2 Tabs by mouth daily. 8/7/20   Hanane Wynne NP   hydroCHLOROthiazide (HYDRODIURIL) 25 mg tablet Take 1 Tab by mouth daily. 8/7/20   Hanane Wynne NP   furosemide (LASIX) 40 mg tablet Take 1 Tab by mouth daily. 8/7/20   Hanane Wynne NP   metoprolol succinate (TOPROL-XL) 50 mg XL tablet Take 1 Tab by mouth daily. 7/1/20   Hanane Wynne NP   traZODone (DESYREL) 50 mg tablet Take 1 Tab by mouth nightly. 7/1/20   Hanane Wynne NP   insulin syringe-needle U-100 (BD Insulin Syringe Ultra-Fine) 0.3 mL 31 gauge x 15/64\" syrg 1 Units by Does Not Apply route three (3) times daily. 5/13/20   Ramy Wynne NP   glucose blood VI test strips (ASCENSIA AUTODISC VI, ONE TOUCH ULTRA TEST VI) strip E11.65 test fasting glucose in the morning and before meals as needed 11/22/19   Hanane Wynne NP   glipiZIDE (GLUCOTROL) 5 mg tablet Take 2 Tabs by mouth daily. 8/5/19   Hanane Wynne NP   raNITIdine (ZANTAC) 150 mg tablet Take 1 Tab by mouth two (2) times a day. 4/25/19   Drew Gong MD   cetirizine (ZYRTEC) 5 mg tablet Take 1 Tab by mouth daily as needed for Itching. 3/21/19   Drew Gong MD   metFORMIN (GLUCOPHAGE) 1,000 mg tablet Take 1 Tab by mouth two (2) times daily (with meals). 11/26/18   Drew Gong MD   rosuvastatin (CRESTOR) 10 mg tablet Take 1 Tab by mouth nightly. 11/14/18   Drew Gong MD   amLODIPine (NORVASC) 10 mg tablet Take 1 Tab by mouth daily.  11/14/18   Drew Gong MD   gabapentin (NEURONTIN) 300 mg capsule Take 1 Cap by mouth three (3) times daily. 11/14/18   Manisha Mcghee MD   lisinopril (PRINIVIL, ZESTRIL) 40 mg tablet Take 1 Tab by mouth daily. 11/14/18   Manisha Mcghee MD        Thank you for allowing us to participate in the care of this patient. We will follow patient. Please dont hesitate to call with any questions    Bishnu Davis MD  [facility] [address]  Phone - [phone]   Fax - [phone]  [email or web address]

## 2021-11-16 ENCOUNTER — ANESTHESIA EVENT (OUTPATIENT)
Dept: ENDOSCOPY | Age: 61
DRG: 194 | End: 2021-11-16
Payer: MEDICAID

## 2021-11-16 ENCOUNTER — ANESTHESIA (OUTPATIENT)
Dept: ENDOSCOPY | Age: 61
DRG: 194 | End: 2021-11-16
Payer: MEDICAID

## 2021-11-16 ENCOUNTER — APPOINTMENT (OUTPATIENT)
Dept: ULTRASOUND IMAGING | Age: 61
DRG: 194 | End: 2021-11-16
Attending: INTERNAL MEDICINE
Payer: MEDICAID

## 2021-11-16 LAB
ALBUMIN SERPL-MCNC: 2.7 G/DL (ref 3.5–5)
ALBUMIN/GLOB SERPL: 0.6 {RATIO} (ref 1.1–2.2)
ALP SERPL-CCNC: 105 U/L (ref 45–117)
ALT SERPL-CCNC: 21 U/L (ref 12–78)
ANION GAP SERPL CALC-SCNC: 6 MMOL/L (ref 5–15)
AST SERPL-CCNC: 18 U/L (ref 15–37)
BASOPHILS # BLD: 0 K/UL (ref 0–0.1)
BASOPHILS NFR BLD: 0 % (ref 0–1)
BILIRUB DIRECT SERPL-MCNC: 0.3 MG/DL (ref 0–0.2)
BILIRUB SERPL-MCNC: 0.5 MG/DL (ref 0.2–1)
BUN SERPL-MCNC: 69 MG/DL (ref 6–20)
BUN/CREAT SERPL: 19 (ref 12–20)
CALCIUM SERPL-MCNC: 8.4 MG/DL (ref 8.5–10.1)
CHLORIDE SERPL-SCNC: 114 MMOL/L (ref 97–108)
CO2 SERPL-SCNC: 20 MMOL/L (ref 21–32)
CREAT SERPL-MCNC: 3.6 MG/DL (ref 0.7–1.3)
DIFFERENTIAL METHOD BLD: ABNORMAL
EOSINOPHIL # BLD: 0.3 K/UL (ref 0–0.4)
EOSINOPHIL NFR BLD: 8 % (ref 0–7)
ERYTHROCYTE [DISTWIDTH] IN BLOOD BY AUTOMATED COUNT: 15.3 % (ref 11.5–14.5)
EST. AVERAGE GLUCOSE BLD GHB EST-MCNC: 194 MG/DL
GLOBULIN SER CALC-MCNC: 4.3 G/DL (ref 2–4)
GLUCOSE BLD STRIP.AUTO-MCNC: 112 MG/DL (ref 65–117)
GLUCOSE BLD STRIP.AUTO-MCNC: 152 MG/DL (ref 65–117)
GLUCOSE BLD STRIP.AUTO-MCNC: 182 MG/DL (ref 65–117)
GLUCOSE BLD STRIP.AUTO-MCNC: 55 MG/DL (ref 65–117)
GLUCOSE BLD STRIP.AUTO-MCNC: 81 MG/DL (ref 65–117)
GLUCOSE SERPL-MCNC: 81 MG/DL (ref 65–100)
HBA1C MFR BLD: 8.4 % (ref 4–5.6)
HCT VFR BLD AUTO: 23.3 % (ref 36.6–50.3)
HGB BLD-MCNC: 7.5 G/DL (ref 12.1–17)
IMM GRANULOCYTES # BLD AUTO: 0 K/UL (ref 0–0.04)
IMM GRANULOCYTES NFR BLD AUTO: 0 % (ref 0–0.5)
INR PPP: 1.1 (ref 0.9–1.1)
LYMPHOCYTES # BLD: 0.7 K/UL (ref 0.8–3.5)
LYMPHOCYTES NFR BLD: 21 % (ref 12–49)
MCH RBC QN AUTO: 29.5 PG (ref 26–34)
MCHC RBC AUTO-ENTMCNC: 32.2 G/DL (ref 30–36.5)
MCV RBC AUTO: 91.7 FL (ref 80–99)
MONOCYTES # BLD: 0.4 K/UL (ref 0–1)
MONOCYTES NFR BLD: 13 % (ref 5–13)
NEUTS SEG # BLD: 2 K/UL (ref 1.8–8)
NEUTS SEG NFR BLD: 58 % (ref 32–75)
NRBC # BLD: 0 K/UL (ref 0–0.01)
NRBC BLD-RTO: 0 PER 100 WBC
PLATELET # BLD AUTO: 81 K/UL (ref 150–400)
PMV BLD AUTO: 11 FL (ref 8.9–12.9)
POTASSIUM SERPL-SCNC: 4.8 MMOL/L (ref 3.5–5.1)
PROT SERPL-MCNC: 7 G/DL (ref 6.4–8.2)
PROTHROMBIN TIME: 11.9 SEC (ref 9–11.1)
RBC # BLD AUTO: 2.54 M/UL (ref 4.1–5.7)
RBC MORPH BLD: ABNORMAL
SERVICE CMNT-IMP: ABNORMAL
SERVICE CMNT-IMP: NORMAL
SERVICE CMNT-IMP: NORMAL
SODIUM SERPL-SCNC: 140 MMOL/L (ref 136–145)
WBC # BLD AUTO: 3.4 K/UL (ref 4.1–11.1)

## 2021-11-16 PROCEDURE — 2709999900 HC NON-CHARGEABLE SUPPLY: Performed by: INTERNAL MEDICINE

## 2021-11-16 PROCEDURE — 85610 PROTHROMBIN TIME: CPT

## 2021-11-16 PROCEDURE — 82962 GLUCOSE BLOOD TEST: CPT

## 2021-11-16 PROCEDURE — 83036 HEMOGLOBIN GLYCOSYLATED A1C: CPT

## 2021-11-16 PROCEDURE — 99223 1ST HOSP IP/OBS HIGH 75: CPT | Performed by: HOSPITALIST

## 2021-11-16 PROCEDURE — 80048 BASIC METABOLIC PNL TOTAL CA: CPT

## 2021-11-16 PROCEDURE — 0DJ08ZZ INSPECTION OF UPPER INTESTINAL TRACT, VIA NATURAL OR ARTIFICIAL OPENING ENDOSCOPIC: ICD-10-PCS | Performed by: INTERNAL MEDICINE

## 2021-11-16 PROCEDURE — 74011250637 HC RX REV CODE- 250/637: Performed by: HOSPITALIST

## 2021-11-16 PROCEDURE — 74011250636 HC RX REV CODE- 250/636: Performed by: INTERNAL MEDICINE

## 2021-11-16 PROCEDURE — 74011250636 HC RX REV CODE- 250/636: Performed by: HOSPITALIST

## 2021-11-16 PROCEDURE — 94760 N-INVAS EAR/PLS OXIMETRY 1: CPT

## 2021-11-16 PROCEDURE — 74011000258 HC RX REV CODE- 258: Performed by: ANESTHESIOLOGY

## 2021-11-16 PROCEDURE — 76040000019: Performed by: INTERNAL MEDICINE

## 2021-11-16 PROCEDURE — 74011250636 HC RX REV CODE- 250/636: Performed by: ANESTHESIOLOGY

## 2021-11-16 PROCEDURE — 80076 HEPATIC FUNCTION PANEL: CPT

## 2021-11-16 PROCEDURE — 65660000000 HC RM CCU STEPDOWN

## 2021-11-16 PROCEDURE — 77010033678 HC OXYGEN DAILY

## 2021-11-16 PROCEDURE — 85025 COMPLETE CBC W/AUTO DIFF WBC: CPT

## 2021-11-16 PROCEDURE — 76705 ECHO EXAM OF ABDOMEN: CPT

## 2021-11-16 PROCEDURE — 36415 COLL VENOUS BLD VENIPUNCTURE: CPT

## 2021-11-16 PROCEDURE — 74011000250 HC RX REV CODE- 250: Performed by: ANESTHESIOLOGY

## 2021-11-16 PROCEDURE — C9113 INJ PANTOPRAZOLE SODIUM, VIA: HCPCS | Performed by: HOSPITALIST

## 2021-11-16 PROCEDURE — 76060000031 HC ANESTHESIA FIRST 0.5 HR: Performed by: INTERNAL MEDICINE

## 2021-11-16 PROCEDURE — 74011250637 HC RX REV CODE- 250/637: Performed by: INTERNAL MEDICINE

## 2021-11-16 PROCEDURE — 74011000250 HC RX REV CODE- 250: Performed by: HOSPITALIST

## 2021-11-16 RX ORDER — SODIUM CHLORIDE 0.9 % (FLUSH) 0.9 %
5-40 SYRINGE (ML) INJECTION AS NEEDED
Status: DISCONTINUED | OUTPATIENT
Start: 2021-11-16 | End: 2021-12-01 | Stop reason: HOSPADM

## 2021-11-16 RX ORDER — EPHEDRINE SULFATE/0.9% NACL/PF 50 MG/5 ML
SYRINGE (ML) INTRAVENOUS AS NEEDED
Status: DISCONTINUED | OUTPATIENT
Start: 2021-11-16 | End: 2021-11-16 | Stop reason: HOSPADM

## 2021-11-16 RX ORDER — PANTOPRAZOLE SODIUM 40 MG/1
40 TABLET, DELAYED RELEASE ORAL
Status: DISCONTINUED | OUTPATIENT
Start: 2021-11-17 | End: 2021-11-18

## 2021-11-16 RX ORDER — FLUMAZENIL 0.1 MG/ML
0.2 INJECTION INTRAVENOUS
Status: DISCONTINUED | OUTPATIENT
Start: 2021-11-16 | End: 2021-11-16 | Stop reason: HOSPADM

## 2021-11-16 RX ORDER — EPINEPHRINE 0.1 MG/ML
1 INJECTION INTRACARDIAC; INTRAVENOUS
Status: DISCONTINUED | OUTPATIENT
Start: 2021-11-16 | End: 2021-11-16 | Stop reason: HOSPADM

## 2021-11-16 RX ORDER — ISOSORBIDE MONONITRATE 60 MG/1
60 TABLET, EXTENDED RELEASE ORAL DAILY
Status: DISCONTINUED | OUTPATIENT
Start: 2021-11-16 | End: 2021-11-24

## 2021-11-16 RX ORDER — NALOXONE HYDROCHLORIDE 0.4 MG/ML
0.4 INJECTION, SOLUTION INTRAMUSCULAR; INTRAVENOUS; SUBCUTANEOUS
Status: DISCONTINUED | OUTPATIENT
Start: 2021-11-16 | End: 2021-11-16 | Stop reason: HOSPADM

## 2021-11-16 RX ORDER — ATROPINE SULFATE 0.1 MG/ML
0.5 INJECTION INTRAVENOUS
Status: DISCONTINUED | OUTPATIENT
Start: 2021-11-16 | End: 2021-11-16 | Stop reason: HOSPADM

## 2021-11-16 RX ORDER — DEXTROMETHORPHAN/PSEUDOEPHED 2.5-7.5/.8
1.2 DROPS ORAL
Status: DISCONTINUED | OUTPATIENT
Start: 2021-11-16 | End: 2021-11-16 | Stop reason: HOSPADM

## 2021-11-16 RX ORDER — SODIUM CHLORIDE 9 MG/ML
50 INJECTION, SOLUTION INTRAVENOUS CONTINUOUS
Status: DISPENSED | OUTPATIENT
Start: 2021-11-16 | End: 2021-11-16

## 2021-11-16 RX ORDER — PROPOFOL 10 MG/ML
INJECTION, EMULSION INTRAVENOUS AS NEEDED
Status: DISCONTINUED | OUTPATIENT
Start: 2021-11-16 | End: 2021-11-16 | Stop reason: HOSPADM

## 2021-11-16 RX ORDER — LIDOCAINE HYDROCHLORIDE 20 MG/ML
INJECTION, SOLUTION EPIDURAL; INFILTRATION; INTRACAUDAL; PERINEURAL AS NEEDED
Status: DISCONTINUED | OUTPATIENT
Start: 2021-11-16 | End: 2021-11-16 | Stop reason: HOSPADM

## 2021-11-16 RX ORDER — SODIUM CHLORIDE 0.9 % (FLUSH) 0.9 %
5-40 SYRINGE (ML) INJECTION EVERY 8 HOURS
Status: DISCONTINUED | OUTPATIENT
Start: 2021-11-16 | End: 2021-12-01 | Stop reason: HOSPADM

## 2021-11-16 RX ORDER — SODIUM CHLORIDE 9 MG/ML
INJECTION, SOLUTION INTRAVENOUS
Status: DISCONTINUED | OUTPATIENT
Start: 2021-11-16 | End: 2021-11-16 | Stop reason: HOSPADM

## 2021-11-16 RX ADMIN — GABAPENTIN 300 MG: 300 CAPSULE ORAL at 09:40

## 2021-11-16 RX ADMIN — GABAPENTIN 300 MG: 300 CAPSULE ORAL at 15:40

## 2021-11-16 RX ADMIN — HEPARIN SODIUM 5000 UNITS: 5000 INJECTION INTRAVENOUS; SUBCUTANEOUS at 19:28

## 2021-11-16 RX ADMIN — FUROSEMIDE 80 MG: 10 INJECTION, SOLUTION INTRAMUSCULAR; INTRAVENOUS at 09:41

## 2021-11-16 RX ADMIN — HEPARIN SODIUM 5000 UNITS: 5000 INJECTION INTRAVENOUS; SUBCUTANEOUS at 05:26

## 2021-11-16 RX ADMIN — ONDANSETRON 4 MG: 2 INJECTION INTRAMUSCULAR; INTRAVENOUS at 19:32

## 2021-11-16 RX ADMIN — AMLODIPINE BESYLATE 10 MG: 5 TABLET ORAL at 09:40

## 2021-11-16 RX ADMIN — MORPHINE SULFATE 4 MG: 2 INJECTION, SOLUTION INTRAMUSCULAR; INTRAVENOUS at 15:40

## 2021-11-16 RX ADMIN — DEXTROSE MONOHYDRATE 25 G: 25 INJECTION, SOLUTION INTRAVENOUS at 11:46

## 2021-11-16 RX ADMIN — MORPHINE SULFATE 4 MG: 2 INJECTION, SOLUTION INTRAMUSCULAR; INTRAVENOUS at 05:39

## 2021-11-16 RX ADMIN — BUSPIRONE HYDROCHLORIDE 10 MG: 10 TABLET ORAL at 09:40

## 2021-11-16 RX ADMIN — PROPOFOL 30 MG: 10 INJECTION, EMULSION INTRAVENOUS at 12:53

## 2021-11-16 RX ADMIN — SODIUM CHLORIDE: 9 INJECTION, SOLUTION INTRAVENOUS at 12:38

## 2021-11-16 RX ADMIN — LOSARTAN POTASSIUM 100 MG: 50 TABLET, FILM COATED ORAL at 09:40

## 2021-11-16 RX ADMIN — ISOSORBIDE MONONITRATE 60 MG: 60 TABLET ORAL at 09:40

## 2021-11-16 RX ADMIN — GABAPENTIN 300 MG: 300 CAPSULE ORAL at 22:37

## 2021-11-16 RX ADMIN — FUROSEMIDE 80 MG: 10 INJECTION, SOLUTION INTRAMUSCULAR; INTRAVENOUS at 19:27

## 2021-11-16 RX ADMIN — MORPHINE SULFATE 4 MG: 2 INJECTION, SOLUTION INTRAMUSCULAR; INTRAVENOUS at 23:37

## 2021-11-16 RX ADMIN — PROPOFOL 20 MG: 10 INJECTION, EMULSION INTRAVENOUS at 12:46

## 2021-11-16 RX ADMIN — PROPOFOL 20 MG: 10 INJECTION, EMULSION INTRAVENOUS at 12:55

## 2021-11-16 RX ADMIN — PROPOFOL 20 MG: 10 INJECTION, EMULSION INTRAVENOUS at 12:49

## 2021-11-16 RX ADMIN — MORPHINE SULFATE 4 MG: 2 INJECTION, SOLUTION INTRAMUSCULAR; INTRAVENOUS at 19:34

## 2021-11-16 RX ADMIN — SODIUM ZIRCONIUM CYCLOSILICATE 10 G: 10 POWDER, FOR SUSPENSION ORAL at 09:41

## 2021-11-16 RX ADMIN — TRAZODONE HYDROCHLORIDE 50 MG: 50 TABLET ORAL at 22:37

## 2021-11-16 RX ADMIN — BUSPIRONE HYDROCHLORIDE 10 MG: 10 TABLET ORAL at 19:28

## 2021-11-16 RX ADMIN — PROPOFOL 50 MG: 10 INJECTION, EMULSION INTRAVENOUS at 12:39

## 2021-11-16 RX ADMIN — PROPOFOL 30 MG: 10 INJECTION, EMULSION INTRAVENOUS at 12:42

## 2021-11-16 RX ADMIN — ROSUVASTATIN 10 MG: 10 TABLET, FILM COATED ORAL at 22:37

## 2021-11-16 RX ADMIN — METOPROLOL SUCCINATE 50 MG: 50 TABLET, EXTENDED RELEASE ORAL at 09:40

## 2021-11-16 RX ADMIN — LIDOCAINE HYDROCHLORIDE 100 MG: 20 INJECTION, SOLUTION EPIDURAL; INFILTRATION; INTRACAUDAL; PERINEURAL at 12:38

## 2021-11-16 RX ADMIN — SERTRALINE 200 MG: 50 TABLET, FILM COATED ORAL at 09:40

## 2021-11-16 RX ADMIN — PANTOPRAZOLE SODIUM 40 MG: 40 INJECTION, POWDER, FOR SOLUTION INTRAVENOUS at 09:41

## 2021-11-16 RX ADMIN — Medication 10 MG: at 12:49

## 2021-11-16 RX ADMIN — MORPHINE SULFATE 4 MG: 2 INJECTION, SOLUTION INTRAMUSCULAR; INTRAVENOUS at 09:39

## 2021-11-16 RX ADMIN — EPLERENONE 25 MG: 25 TABLET, FILM COATED ORAL at 09:40

## 2021-11-16 NOTE — PROGRESS NOTES
Lab Results   Component Value Date/Time    Glucose 78 11/15/2021 06:34 AM    Glucose (POC) 68 11/15/2021 05:28 PM    provided patient 120 ml of orange juice and will recheck blood glucose

## 2021-11-16 NOTE — ANESTHESIA POSTPROCEDURE EVALUATION
Post-Anesthesia Evaluation and Assessment    Patient: Fabrizio Martin MRN: [id number]  SSN: [id number]    YOB: 1960  Age: 64 y.o. Sex: male      I have evaluated the patient and they are stable and ready for discharge from the PACU. Cardiovascular Function/Vital Signs  Visit Vitals  BP (!) 168/78 (BP 1 Location: Right upper arm, BP Patient Position: Sitting)   Pulse 68   Temp 36.3 °C (97.4 °F)   Resp 12   Ht 5' 9\" (1.753 m)   Wt 85 kg (187 lb 4.8 oz)   SpO2 94%   BMI 27.66 kg/m²       Patient is status post MAC anesthesia for Procedure(s):  ESOPHAGOGASTRODUODENOSCOPY (EGD). Nausea/Vomiting: None    Postoperative hydration reviewed and adequate. Pain:  Pain Scale 1: Numeric (0 - 10) (11/16/21 1320)  Pain Intensity 1: 0 (11/16/21 1320)   Managed    Neurological Status:   Neuro  Neurologic State: Alert (11/16/21 0150)  Orientation Level: Oriented X4 (11/16/21 0150)  Cognition: Appropriate decision making; Appropriate for age attention/concentration; Follows commands (11/16/21 0150)  Speech: Clear (11/16/21 0150)  LUE Motor Response: Purposeful (11/16/21 0150)  LLE Motor Response: Purposeful (11/16/21 0150)  RUE Motor Response: Purposeful (11/16/21 0150)  RLE Motor Response: Purposeful (11/16/21 0150)   At baseline    Mental Status, Level of Consciousness: Alert and  oriented to person, place, and time    Pulmonary Status:   O2 Device: Nasal cannula (11/16/21 1320)   Adequate oxygenation and airway patent    Complications related to anesthesia: None    Post-anesthesia assessment completed. No concerns    Signed By: Silvina Hwang MD     November 16, 2021              Procedure(s):  ESOPHAGOGASTRODUODENOSCOPY (EGD).     MAC    <BSHSIANPOST>    INITIAL Post-op Vital signs:   Vitals Value Taken Time   /78 11/16/21 1339   Temp 36.3 °C (97.4 °F) 11/16/21 1339   Pulse 68 11/16/21 1339   Resp 12 11/16/21 1339   SpO2 94 % 11/16/21 0567

## 2021-11-16 NOTE — PROGRESS NOTES
[facility]   [address] [facility], [address] [place] [address], [facility]  Phone: [phone]   [phone]       Nephrology Progress Note  Jennifer Graves     1960     [id number]  Date of Admission : 11/14/2021 11/16/21    CC: Follow up for CKD 4       Assessment and Plan   CKD 4   -Biopsy-proven advanced diabetic nephropathy with nodular glomerulosclerosis and class IV interstitial nephritis  -Baseline creatinine 3.5 mg/dL  -Creatinine close to baseline  -Continue losartan and eplerenone  -No emergent need for dialysis  -Daily labs     Hyperkalemia:  - daily Lokelma     Uncontrolled HTN  -Suspect noncompliance with meds  -Restarted ARB, MRB  - changed nitrate to imdur   -Goal BP less than 130/80 mmHg     Severe anemia:  -Suspect CKD playing a role  -Work-up per primary team  - started RANDY     Recurrent abdominal pain  -Work-up per primary team     CAD s/p CABG May 2020  HFpEF  -Last echo: Preserved LVEF, moderate pulmonary hypertension PASP 50 mmHg           Care Plan discussed with: Patient        Interval History:  Seen and examined. No new sx. BP high. Cr up a little after resumeing ARB, MRB      Review of Systems: A comprehensive review of systems was negative except for that written in the HPI.     Current Medications:   Current Facility-Administered Medications   Medication Dose Route Frequency    isosorbide mononitrate ER (IMDUR) tablet 60 mg  60 mg Oral DAILY    morphine injection 4 mg  4 mg IntraVENous Q4H PRN    heparin (porcine) injection 5,000 Units  5,000 Units SubCUTAneous Q8H    losartan (COZAAR) tablet 100 mg  100 mg Oral DAILY    eplerenone (INSPRA) tablet 25 mg  25 mg Oral DAILY    sodium zirconium cyclosilicate (LOKELMA) powder packet 10 g  10 g Oral DAILY    epoetin ericka-epbx (RETACRIT) injection 20,000 Units  20,000 Units SubCUTAneous Q MON, WED & FRI    butalbital-acetaminophen-caffeine (FIORICET, ESGIC) -40 mg per tablet 1 Tablet  1 Tablet Oral Q4H PRN    furosemide (LASIX) injection 80 mg  80 mg IntraVENous BID    pantoprazole (PROTONIX) 40 mg in 0.9% sodium chloride 10 mL injection  40 mg IntraVENous Q12H    ondansetron (ZOFRAN) injection 4 mg  4 mg IntraVENous Q4H PRN    amLODIPine (NORVASC) tablet 10 mg  10 mg Oral DAILY    busPIRone (BUSPAR) tablet 10 mg  10 mg Oral BID    clopidogreL (PLAVIX) tablet 75 mg  75 mg Oral DAILY    gabapentin (NEURONTIN) capsule 300 mg  300 mg Oral TID    metoprolol succinate (TOPROL-XL) XL tablet 50 mg  50 mg Oral DAILY    rosuvastatin (CRESTOR) tablet 10 mg  10 mg Oral QHS    sertraline (ZOLOFT) tablet 200 mg  200 mg Oral DAILY    traMADoL (ULTRAM) tablet 50 mg  50 mg Oral Q6H PRN    traZODone (DESYREL) tablet 50 mg  50 mg Oral QHS    acetaminophen (TYLENOL) tablet 650 mg  650 mg Oral Q6H PRN    glucose chewable tablet 16 g  4 Tablet Oral PRN    dextrose (D50W) injection syrg 12.5-25 g  25-50 mL IntraVENous PRN    glucagon (GLUCAGEN) injection 1 mg  1 mg IntraMUSCular PRN    insulin glargine (LANTUS) injection 10 Units  10 Units SubCUTAneous DAILY    insulin lispro (HUMALOG) injection   SubCUTAneous AC&HS    hydrALAZINE (APRESOLINE) 20 mg/mL injection 10 mg  10 mg IntraVENous Q6H PRN      No Known Allergies    Objective:  Vitals:    Vitals:    11/16/21 0354 11/16/21 0430 11/16/21 0557 11/16/21 0755   BP:  (!) 147/74  (!) 180/89   Pulse: 79 69 72 71   Resp:  18  18   Temp:  98.2 °F (36.8 °C)  97.9 °F (36.6 °C)   SpO2:  97%  99%   Weight:       Height:         Intake and Output:  11/16 0701 - 11/16 1900  In: -   Out: 600 [Urine:600]  11/14 1901 - 11/16 0700  In: -   Out: 4081 [Urine:1650]    Physical Examination:    General: NAD,Conversant   Neck:  Supple, no mass  Resp:  Lungs CTA B/L, no wheezing , normal respiratory effort  CV:  RRR,  no murmur or rub,trace LE edema  GI:  Soft, NT, + Bowel sounds, no hepatosplenomegaly  Neurologic:  Non focal  Psych:             AAO x 3 appropriate affect   Skin:  No Rash      [] High complexity decision making was performed  []    Patient is at high-risk of decompensation with multiple organ involvement    Lab Data Personally Reviewed: I have reviewed all the pertinent labs, microbiology data and radiology studies during assessment.     Recent Labs     11/16/21  0533 11/15/21  0634 11/14/21  0922    142 139   K 4.8 4.4 4.3   * 115* 111*   CO2 20* 21 19*   GLU 81 78 136*   BUN 69* 65* 69*   CREA 3.60* 3.36* 3.34*   CA 8.4* 8.6 8.9   MG  --  2.5* 2.4   PHOS  --  4.6  --    ALB  --  2.8* 3.1*   ALT  --  15 27     Recent Labs     11/16/21  0533 11/15/21  0634 11/14/21  0922   WBC 3.4* 4.0* 4.4   HGB 7.5* 7.7* 8.6*   HCT 23.3* 24.0* 26.2*   PLT 81* 93* 93*     No results found for: SDES  No results found for: CULT  Recent Results (from the past 24 hour(s))   GLUCOSE, POC    Collection Time: 11/15/21 10:10 AM   Result Value Ref Range    Glucose (POC) 89 65 - 117 mg/dL    Performed by Verrobert Martin    GLUCOSE, POC    Collection Time: 11/15/21  5:28 PM   Result Value Ref Range    Glucose (POC) 68 65 - 117 mg/dL    Performed by Jeancarlos Queen 1160, POC    Collection Time: 11/15/21  7:29 PM   Result Value Ref Range    Glucose (POC) 154 (H) 65 - 117 mg/dL    Performed by Verrobert Martin    GLUCOSE, POC    Collection Time: 11/15/21 10:11 PM   Result Value Ref Range    Glucose (POC) 136 (H) 65 - 117 mg/dL    Performed by Sara Quiroz    HEMOGLOBIN A1C WITH EAG    Collection Time: 11/16/21  5:33 AM   Result Value Ref Range    Hemoglobin A1c 8.4 (H) 4.0 - 5.6 %    Est. average glucose 498 mg/dL   METABOLIC PANEL, BASIC    Collection Time: 11/16/21  5:33 AM   Result Value Ref Range    Sodium 140 136 - 145 mmol/L    Potassium 4.8 3.5 - 5.1 mmol/L    Chloride 114 (H) 97 - 108 mmol/L    CO2 20 (L) 21 - 32 mmol/L    Anion gap 6 5 - 15 mmol/L    Glucose 81 65 - 100 mg/dL    BUN 69 (H) 6 - 20 MG/DL    Creatinine 3.60 (H) 0.70 - 1.30 MG/DL    BUN/Creatinine ratio 19 12 - 20      GFR est AA 21 (L) >60 ml/min/1.73m2    GFR est non-AA 17 (L) >60 ml/min/1.73m2    Calcium 8.4 (L) 8.5 - 10.1 MG/DL   CBC WITH AUTOMATED DIFF    Collection Time: 11/16/21  5:33 AM   Result Value Ref Range    WBC 3.4 (L) 4.1 - 11.1 K/uL    RBC 2.54 (L) 4.10 - 5.70 M/uL    HGB 7.5 (L) 12.1 - 17.0 g/dL    HCT 23.3 (L) 36.6 - 50.3 %    MCV 91.7 80.0 - 99.0 FL    MCH 29.5 26.0 - 34.0 PG    MCHC 32.2 30.0 - 36.5 g/dL    RDW 15.3 (H) 11.5 - 14.5 %    PLATELET 81 (L) 232 - 400 K/uL    MPV 11.0 8.9 - 12.9 FL    NRBC 0.0 0  WBC    ABSOLUTE NRBC 0.00 0.00 - 0.01 K/uL    NEUTROPHILS 58 32 - 75 %    LYMPHOCYTES 21 12 - 49 %    MONOCYTES 13 5 - 13 %    EOSINOPHILS 8 (H) 0 - 7 %    BASOPHILS 0 0 - 1 %    IMMATURE GRANULOCYTES 0 0.0 - 0.5 %    ABS. NEUTROPHILS 2.0 1.8 - 8.0 K/UL    ABS. LYMPHOCYTES 0.7 (L) 0.8 - 3.5 K/UL    ABS. MONOCYTES 0.4 0.0 - 1.0 K/UL    ABS. EOSINOPHILS 0.3 0.0 - 0.4 K/UL    ABS. BASOPHILS 0.0 0.0 - 0.1 K/UL    ABS. IMM. GRANS. 0.0 0.00 - 0.04 K/UL    DF SMEAR SCANNED      RBC COMMENTS ANISOCYTOSIS  1+       GLUCOSE, POC    Collection Time: 11/16/21  7:30 AM   Result Value Ref Range    Glucose (POC) 81 65 - 117 mg/dL    Performed by Eloy Clemens (PCT)            Total time spent with patient:  xxx   min. Care Plan discussed with:  Patient     Family      RN      Consulting Physician [facility],         I have reviewed the flowsheets. Chart and Pertinent Notes have been reviewed. No change in PMH ,family and social history from Consult note.       Saskia Benavides MD

## 2021-11-16 NOTE — PROGRESS NOTES
Debbie High  1960  [id number]    Situation:  Verbal report received from: Krupa procedural RN  Procedure: Procedure(s):  ESOPHAGOGASTRODUODENOSCOPY (EGD)    Background:    Preoperative diagnosis: Melena  Abdominal pain  Postoperative diagnosis:  Gastric erosion    :  Dr. Guillermo Naranjo  Assistant(s): Endoscopy RN-1: Anthony Harvey RN  Endoscopy RN-2: Nate Chandra RN    Specimens: * No specimens in log *  H. Pylori  no    Anesthesia gave intra-procedure sedation and medications, see anesthesia flow sheet yes    Intravenous fluids: NS@ KVO     Vital signs stable Yes    Abdominal assessment: round and soft Yes    Recommendation:    Return to floor 209

## 2021-11-16 NOTE — CONSULTS
[facility]  174 [facility], [address] [place] [address]                Gastrointestinal Abdominal Pain    Subjective:      Esperanza Torres is a 64 y.o. male with history of  1.  CAD S/P bypass on DAPT  2. Diastolic heart failure  3. DM  4. HTN  5.  CKD baseline 3.2    Presented to ED on 11/14 for abdominal pain localized to epigastrium and mid upper abdomen, partly worsened by eating and did endorse some vomiting. Was also having exertional chest pain and dyspnea. There is chart documentation of concerns for melena, patient is unsure if his stools were melenic or not this past few weeks. He is a very vague historian. He denies NSAIDs  His last Plavix was Saturday 11/13    Admission labs notable for Hgb 8.6, baseline 12.9 measured in 2019  Platelets 93, 590 in 2019  Serum creatinine 3.3, BUN 69  Total bili 0.5, AST 20 ALT 27 alk phos 150, lipase normal  Noncontrasted CT abdomen pelvis notable for moderate bilateral pleural effusions, atrophic pancreas, unremarkable stomach, thick walled small bowel loops in the midabdomen but otherwise no dilatation, no colonic abnormality, moderate volume ascites. Ultrasound abdomen had discordant interpretation of ascites; reported no ascites, but hepatic parenchymal disease concerning for cirrhosis, no bile duct abnormality or cholelithiasis      Home Medications:  Prior to Admission Medications   Prescriptions Last Dose Informant Patient Reported? Taking? amLODIPine (NORVASC) 10 mg tablet   No No   Sig: Take 1 Tab by mouth daily. busPIRone (BUSPAR) 10 mg tablet   Yes Yes   Sig: Take 10 mg by mouth two (2) times a day. cetirizine (ZYRTEC) 5 mg tablet   No No   Sig: Take 1 Tab by mouth daily as needed for Itching. furosemide (LASIX) 40 mg tablet   No No   Sig: Take 1 Tab by mouth daily. gabapentin (NEURONTIN) 300 mg capsule   No No   Sig: Take 1 Cap by mouth three (3) times daily.    glipiZIDE (GLUCOTROL) 5 mg tablet   No No   Sig: Take 2 Tabs by mouth daily. glucose blood VI test strips (ASCENSIA AUTODISC VI, ONE TOUCH ULTRA TEST VI) strip   No No   Sig: E11.65 test fasting glucose in the morning and before meals as needed   hydroCHLOROthiazide (HYDRODIURIL) 25 mg tablet   No No   Sig: Take 1 Tab by mouth daily. insulin glargine (Lantus U-100 Insulin) 100 unit/mL injection   No No   Sig: E11.65 Take 25 units daily SubQ at night   insulin syringe-needle U-100 (BD Insulin Syringe Ultra-Fine) 0.3 mL 31 gauge x 1564\" syrg   No No   Si Units by Does Not Apply route three (3) times daily. isosorbide mononitrate ER (IMDUR) 30 mg tablet   Yes Yes   Sig: Take 60 mg by mouth daily. lisinopril (PRINIVIL, ZESTRIL) 40 mg tablet   No No   Sig: Take 1 Tab by mouth daily. metFORMIN (GLUCOPHAGE) 1,000 mg tablet   No No   Sig: Take 1 Tab by mouth two (2) times daily (with meals). metoprolol succinate (TOPROL-XL) 50 mg XL tablet   No No   Sig: Take 1 Tab by mouth daily. raNITIdine (ZANTAC) 150 mg tablet   No No   Sig: Take 1 Tab by mouth two (2) times a day. rosuvastatin (CRESTOR) 10 mg tablet   No No   Sig: Take 1 Tab by mouth nightly. sertraline (ZOLOFT) 100 mg tablet   No No   Sig: Take 2 Tabs by mouth daily. traMADoL (ULTRAM) 50 mg tablet   Yes No   Sig: TAKE 1 TABLET BY MOUTH EVERY 4 HOURS AS NEEDED FOR PAIN   traZODone (DESYREL) 50 mg tablet   No No   Sig: Take 1 Tab by mouth nightly.       Facility-Administered Medications: None       Hospital Medications:  Current Facility-Administered Medications   Medication Dose Route Frequency    isosorbide mononitrate ER (IMDUR) tablet 60 mg  60 mg Oral DAILY    morphine injection 4 mg  4 mg IntraVENous Q4H PRN    heparin (porcine) injection 5,000 Units  5,000 Units SubCUTAneous Q8H    losartan (COZAAR) tablet 100 mg  100 mg Oral DAILY    eplerenone (INSPRA) tablet 25 mg  25 mg Oral DAILY    sodium zirconium cyclosilicate (LOKELMA) powder packet 10 g  10 g Oral DAILY    epoetin ericka-epbx (RETACRIT) injection 20,000 Units  20,000 Units SubCUTAneous Q MON, WED & FRI    butalbital-acetaminophen-caffeine (FIORICET, ESGIC) -40 mg per tablet 1 Tablet  1 Tablet Oral Q4H PRN    furosemide (LASIX) injection 80 mg  80 mg IntraVENous BID    pantoprazole (PROTONIX) 40 mg in 0.9% sodium chloride 10 mL injection  40 mg IntraVENous Q12H    ondansetron (ZOFRAN) injection 4 mg  4 mg IntraVENous Q4H PRN    amLODIPine (NORVASC) tablet 10 mg  10 mg Oral DAILY    busPIRone (BUSPAR) tablet 10 mg  10 mg Oral BID    clopidogreL (PLAVIX) tablet 75 mg  75 mg Oral DAILY    gabapentin (NEURONTIN) capsule 300 mg  300 mg Oral TID    metoprolol succinate (TOPROL-XL) XL tablet 50 mg  50 mg Oral DAILY    rosuvastatin (CRESTOR) tablet 10 mg  10 mg Oral QHS    sertraline (ZOLOFT) tablet 200 mg  200 mg Oral DAILY    traMADoL (ULTRAM) tablet 50 mg  50 mg Oral Q6H PRN    traZODone (DESYREL) tablet 50 mg  50 mg Oral QHS    acetaminophen (TYLENOL) tablet 650 mg  650 mg Oral Q6H PRN    glucose chewable tablet 16 g  4 Tablet Oral PRN    dextrose (D50W) injection syrg 12.5-25 g  25-50 mL IntraVENous PRN    glucagon (GLUCAGEN) injection 1 mg  1 mg IntraMUSCular PRN    insulin glargine (LANTUS) injection 10 Units  10 Units SubCUTAneous DAILY    insulin lispro (HUMALOG) injection   SubCUTAneous AC&HS    hydrALAZINE (APRESOLINE) 20 mg/mL injection 10 mg  10 mg IntraVENous Q6H PRN           Prior Surgeries:  Reportedly a history of a liver biopsy, results not obtainable at this time  No prior EGD  He did have a colonoscopy in the past but he is unsure when and what results were    Family History:  No history of colon, gastric, or pancreatic malignancy    Social History:  No ETOH  No Tobacco  No illicits    Review of Systems:  14 point review of systems completed and otherwise negative unless mentioned above.     Objective:        Patient Vitals for the past 8 hrs:   BP Temp Pulse Resp SpO2   11/16/21 0755 (!) 180/89 97.9 °F (36.6 °C) 71 18 99 %   11/16/21 0557   72     11/16/21 0430 (!) 147/74 98.2 °F (36.8 °C) 69 18 97 %   11/16/21 0354   79     11/16/21 0206   66     11/16/21 0115 (!) 181/87 98 °F (36.7 °C) 70 18 97 %       Physical Exam:  CONSTITUTIONAL: Conversant, well developed Male in NAD. EYES: Anicteric sclerae; no lid-lag or proptosis. RESPIRATORY: Normal respiratory effort. CARDIOVASCULAR: No peripheral edema. ABDOMEN: no abdominal tenderness,  Moderate abdominal distention  SKIN: No rash, lesions or ulcers. MUSCULOSKELETAL No digital cyanosis. Moving extremities spontaneously  NEURO: Cranial nerves IIXII grossly intact. PSYCH: Intact judgment and insight. A&OX3 with a cordial affect      Laboratory:   Reviewed in chart      Assessment:   27-year-old male with history of CHF is admitted for complaints of subacute abdominal pain, possible melena, exertional chest pain/dyspnea, and found to be anemic from baseline from 2019 labs with cross-sectional imaging and ultrasound discordant for abdominal ascites and hepatopathy.     Plan:   #1 abdominal pain  We will proceed with EGD today to exclude PUD or other obstructive process  May also have SBP considering ascites noted on imaging, will obtain ultrasound-guided paracentesis and review SAAG ratio with ascites protein to determine ascites etiology  Will obtain H pylori stool Ag    #2 anemia  Unclear if having overt bleeding based on his history and labs lack a recent hemoglobin to compare  Will evaluate for potential etiologies for blood loss anemia on EGD today  Will be still on antiplatelet therapy (last APA was on 11/13) so if endoscopic intervention required will need to delay    #3 hepatopathy  Based on imaging alone  Mild cholestasis pattern more suggestive of cardiac etiology  Will review paracentesis labs       Signed By: Hailey Joiner MD                       November 16, 2021

## 2021-11-16 NOTE — H&P
Pre-Endoscopy H&P   Chief complaint: abdominal symptoms    HPI:  Patient presents for procedure. The indication for the procedure, the patient's history and the patient's current medications are reviewed prior to the procedure and that data is reported on the endoscopy note in the chart to which this document is attached. Any significant complaints with regard to organ systems will be noted, and if not mentioned then a review of systems is not contributory. 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, with long-term current use of insulin ([facility] 2018    Diabetes ([facility] [address] [facility]       Past Surgical History:   Procedure Laterality Date    HX ARTERIAL BYPASS      HX OTHER SURGICAL      5th toe Metatarsal amputation 2017      Social   Social History     Tobacco Use    Smoking status: Former Smoker     Quit date: 2001     Years since quittin.5    Smokeless tobacco: Never Used   Substance Use Topics    Alcohol use: No      Family History   Problem Relation Age of Onset    Diabetes Mother     No Known Problems Father       No Known Allergies   Prior to Admission Medications   Prescriptions Last Dose Informant Patient Reported? Taking? amLODIPine (NORVASC) 10 mg tablet   No No   Sig: Take 1 Tab by mouth daily. busPIRone (BUSPAR) 10 mg tablet   Yes Yes   Sig: Take 10 mg by mouth two (2) times a day. cetirizine (ZYRTEC) 5 mg tablet   No No   Sig: Take 1 Tab by mouth daily as needed for Itching. furosemide (LASIX) 40 mg tablet   No No   Sig: Take 1 Tab by mouth daily. gabapentin (NEURONTIN) 300 mg capsule   No No   Sig: Take 1 Cap by mouth three (3) times daily. glipiZIDE (GLUCOTROL) 5 mg tablet   No No   Sig: Take 2 Tabs by mouth daily.    glucose blood VI test strips (ASCENSIA AUTODISC VI, ONE TOUCH ULTRA TEST VI) strip   No No   Sig: E11.65 test fasting glucose in the morning and before meals as needed   hydroCHLOROthiazide (HYDRODIURIL) 25 mg tablet   No No   Sig: Take 1 Tab by mouth daily. insulin glargine (Lantus U-100 Insulin) 100 unit/mL injection   No No   Sig: E11.65 Take 25 units daily SubQ at night   insulin syringe-needle U-100 (BD Insulin Syringe Ultra-Fine) 0.3 mL 31 gauge x 15/64\" syrg   No No   Si Units by Does Not Apply route three (3) times daily. isosorbide mononitrate ER (IMDUR) 30 mg tablet   Yes Yes   Sig: Take 60 mg by mouth daily. lisinopril (PRINIVIL, ZESTRIL) 40 mg tablet   No No   Sig: Take 1 Tab by mouth daily. metFORMIN (GLUCOPHAGE) 1,000 mg tablet   No No   Sig: Take 1 Tab by mouth two (2) times daily (with meals). metoprolol succinate (TOPROL-XL) 50 mg XL tablet   No No   Sig: Take 1 Tab by mouth daily. raNITIdine (ZANTAC) 150 mg tablet   No No   Sig: Take 1 Tab by mouth two (2) times a day. rosuvastatin (CRESTOR) 10 mg tablet   No No   Sig: Take 1 Tab by mouth nightly. sertraline (ZOLOFT) 100 mg tablet   No No   Sig: Take 2 Tabs by mouth daily. traMADoL (ULTRAM) 50 mg tablet   Yes No   Sig: TAKE 1 TABLET BY MOUTH EVERY 4 HOURS AS NEEDED FOR PAIN   traZODone (DESYREL) 50 mg tablet   No No   Sig: Take 1 Tab by mouth nightly. Facility-Administered Medications: None       PHYSICAL EXAM:  The patient is examined immediately prior to the procedure. Visit Vitals  BP (!) 180/89 (BP 1 Location: Right upper arm, BP Patient Position: At rest)   Pulse 68   Temp 97.9 °F (36.6 °C)   Resp 18   Ht 5' 9\" (1.753 m)   Wt 85 kg (187 lb 4.8 oz)   SpO2 99%   BMI 27.66 kg/m²     Gen: Appears comfortable, no distress. Pulm: comfortable respirations with no abnormal audible breath sounds  CV: heart regular, well perfused  GI: abdomen flat. ASSESSMENT:  Patient here for procedure.  The indication for the procedure, the patient's history and the patient's current medications are reviewed prior to the procedure and that data is reported on the endoscopy note in the chart to which this document is attached. PLAN:  Informed consent discussion held, patient afforded an opportunity to ask questions and all questions answered. After being advised of the risks, benefits, and alternatives, the patient requested that we proceed and indicated so on a written consent form. Will proceed with procedure as planned.   Ceci Figueroa MD

## 2021-11-16 NOTE — PERIOP NOTES
Embermelitonn Snare TRANSFER - IN REPORT:    1130: Verbal report received from Lincoln(name) on Garland Dixon  being received from [address](unit) for ordered procedure      Report consisted of patients Situation, Background, Assessment and   Recommendations(SBAR). Information from the following report(s) SBAR, Kardex and MAR was reviewed with the receiving nurse. Opportunity for questions and clarification was provided. Pt's blood sugar will be treated before pt comes to endo. 1220: Pt brought to endo via stretcher and oxygen    1230: Pt uses toilet pre procedure: staw colored urine noted in toilet. 1240: . Patient has been evaluated by anesthesia pre-procedure. Patient alert and oriented. Vital signs will not be charted by the Endoscopy nurse. All vitals, airway, and loc are monitored by anesthesia staff throughout procedure. .Endoscope will pre-cleaned at bedside immediately following procedure by Gaurang Lucio    : US informed that pt is in recovery. 1310: US delays procedure due to timing. 1311: TRANSFER - OUT REPORT:    Verbal report given to Lincoln(name) on Garland Dixon  being transferred to 209(unit) for routine post - op       Report consisted of patients Situation, Background, Assessment and   Recommendations(SBAR). Information from the following report(s) Procedure Summary was reviewed with the receiving nurse. Lines:   Peripheral IV 11/14/21 Left Antecubital (Active)   Site Assessment Clean, dry, & intact 11/16/21 0430   Phlebitis Assessment 0 11/16/21 0430   Infiltration Assessment 0 11/16/21 0430   Dressing Status Clean, dry, & intact 11/16/21 0430   Dressing Type Transparent 11/16/21 0430   Hub Color/Line Status Pink 11/16/21 0430   Action Taken Blood drawn 11/14/21 5348   Alcohol Cap Used No 11/14/21 2047        Opportunity for questions and clarification was provided.

## 2021-11-16 NOTE — PROCEDURES
[facility].  [address] [facility] [address]  [place], [address]  [phone]                            NAME:  Casandra Orozco   :   1960   MRN:   [id number]     Date/Time:  2021 1:05 PM    Esophagogastroduodenoscopy (EGD) Procedure Note    :  Jovita Curtis MD    Staff: Endoscopy RN-1: Roopa Pablo RN  Endoscopy RN-2: Emil Malik RN    Referring Provider:  Renata Dodge MD    Anethesia/Sedation:  RAFAELA CAROLINE [facility]    Procedure Details   After infomed consent was obtained for the procedure, with all risks and benefits of procedure explained the patient was taken to the endoscopy suite and placed in the left lateral decubitus position. Following sequential administration of sedation as per above, the gastroscope was inserted into the mouth and advanced under direct vision to second portion of the duodenum. A careful inspection was made as the gastroscope was withdrawn, including a retroflexed view of the proximal stomach; findings and interventions are described below. Findings:  Esophagus: No varices. No ulceration or hiatal hernia. Otherwise normal.  Stomach: Patchy erythema with mild atrophy in antrum and body. Single, 2-3mm erosion in the proximal body without bleeding or overt ulceration. No cardiofundal varices. Duodenum: acute angulation of duodenum required multiple, careful withdrawals for complete examination, no ulcer identified, no bleeding    Interventions:  none           Specimens Removed:  * No specimens in log *    Complications: None.      EBL:  none    Impression:    See Postoperative diagnosis above    Recommendations:   - Follow up H pylori stool antigen  - No gross etiology for abdominal pain identified on this exam  - Follow up paracentesis cell counts to exclude SBP and determine etiology of ascites (cardiac vs hepatic vs non-portal HTN)  - Will keep on a standard dose oral PPI for gastric erosions pending H pylori  - Resume normal medications. - Resume previous diet.     Discharge disposition:  floor    Steve Figueroa MD

## 2021-11-16 NOTE — PROGRESS NOTES
Bedside shift change report given to Kiana Hardy RN (oncoming nurse) by Weston Tate RN (offgoing nurse). Report included the following information SBAR and Kardex.

## 2021-11-16 NOTE — ANESTHESIA PREPROCEDURE EVALUATION
Relevant Problems   NEUROLOGY   (+) Reactive depression      CARDIOVASCULAR   (+) CHF exacerbation (HCC)   (+) Essential hypertension      ENDOCRINE   (+) Type 2 diabetes mellitus with diabetic neuropathy (HCC)   (+) Type 2 diabetes mellitus with ophthalmic complication, with long-term current use of insulin (HCC)   (+) Type 2 diabetes with nephropathy (HCC)       Anesthetic History   No history of anesthetic complications            Review of Systems / Medical History  Patient summary reviewed, nursing notes reviewed and pertinent labs reviewed    Pulmonary          Smoker         Neuro/Psych         Psychiatric history     Cardiovascular    Hypertension          CABG    Exercise tolerance: <4 METS     GI/Hepatic/Renal         Renal disease: CRI  Liver disease     Endo/Other    Diabetes: type 2, using insulin    Anemia     Other Findings              Physical Exam    Airway  Mallampati: II  TM Distance: > 6 cm  Neck ROM: normal range of motion   Mouth opening: Normal     Cardiovascular  Regular rate and rhythm,  S1 and S2 normal,  no murmur, click, rub, or gallop             Dental    Dentition: Poor dentition     Pulmonary  Breath sounds clear to auscultation               Abdominal  GI exam deferred       Other Findings            Anesthetic Plan    ASA: 3  Anesthesia type: MAC          Induction: Intravenous  Anesthetic plan and risks discussed with: Patient

## 2021-11-16 NOTE — PROGRESS NOTES
Rounded on [facility] patients and provided Anointing of the Sick at request of patient. Fr. Renata Cooper.

## 2021-11-16 NOTE — PROGRESS NOTES
Lab Results   Component Value Date/Time    Glucose 78 11/15/2021 06:34 AM    Glucose (POC) 154 (H) 11/15/2021 07:29 PM

## 2021-11-16 NOTE — PROGRESS NOTES
[address] [facility] Adult  Hospitalist Group                                                                                          Hospitalist Progress Note  Susan Diaz MD  Answering service: [phone] or [phone] from in house phone        Date of Service:  2021  NAME:  Mary Youngblood  :  1960  MRN:  [id number]      Admission Summary:   Copied form admit: \" Mary Youngblood is a 64 y.o. male who presents with abdominal pain       This is a 27-year-old male with past medical history of type 2 diabetes, hypertension, CKD (baseline cr around 3.2 per lab in 1515), diastolic HF, and CAD (cardiac bypass about 1 year ago; currently on DAPT) who presents to the ER today for subsequent evaluation of abdominal pain. Pain locates epigastric and mid upper abd, worse by eating, has intermittent vomiting, denied blood in vomitus. Also with chronic exertional chest pain, shortness of breath after mild exertion, palpitations, and easy fatigability. He also reports formed dark black stool x 1 week in duration. Denied diarrhea, more constipated.      Patient states that his symptoms started 2 to 3 weeks ago and have continued to progress in nature since its onset. He was hospitalized  at [facility] twice last month for CHF and SEVERIANO on CKD. Currently he is on lasix 40mg po daily at home, he feels not working well, not enough urine output and still swelling in legs.      Patient also endorses left-sided chest pain that does not seem to be present at rest but becomes aggravated with minimal physical exertion. \"    Interval history / Subjective:     2021 :    Sob better   Leg edema w strong statis component as worst w sitting/standing    For f/u w hepatology, had liver bx a [facility] 2 months ago    This writer will try to discuss both liver and renal and general health w wife and pt tomorrow at 2 pm    Pt w poor understanding of his condition        Assessment & Plan:       1.  Abdominal pain: may due to none bleeding PUD, may due to CHF/passive live congestion. ABD CT and US result reviewed. Start IV PPI, pain control. GI consult, npo after midnight. - improved pain as of rounds 11/15:  - stools neg for occult blood    - CT: \"IMPRESSION  1. Moderate volume ascites. Moderate bilateral pleural effusions. Anasarca. 2.  Several thick-walled loops of small bowel in the midabdomen, may be due to  underdistention or hypoproteinemic state, correlate for infectious or  inflammatory enteritis. - US:  \" Hepatic parenchymal disease with cirrhotic morphology. Perihepatic ascites  and right pleural effusion noted. Pulsatile main portal vein waveform, likely  sequela of hepatic parenchymal disease. \"  Will ask hepatology to follow = had bx liver 2 months ago at [facility]     2. Acute on chronic bv diastolic congestive heart failure: NYHA3. HFpEF, had stress test in [facility], EF 50% per reprot. With diffuse anasarca, fluid retained due to his advanced CKD. Continue lasix 40mg bid and follow cr, weight, urine output. Cardiologist consult = done:   Per card:   \"CHF acute on chronic diastolic  Last echo 66/45/6616   Left ventricle: The cavity size was normal. Wall thickness was     normal. Systolic function was normal. The estimated ejection     fraction was 60-65%. Wall motion was normal; there were no     regional wall motion abnormalities. Tricuspid valve: Moderate pulmonary hypertension with a     pulmonary artery systolic pressure of 50 mm Hg. There was     mild-moderate regurgitation. Pulmonary arteries: PA peak pressure: 50mm Hg (S). Pericardium, extracardiac: There was a left pleural effusion. Pro btnp 11,967    Rec:   Volume management   Now net 1500 cc out lasix 80 iv bid   Strict salt restriction. \"  - no acute hf s/s 11/16/2021 on am rounds. 3. Advanced CKD: cr mild worse than baseline likely, pt is still volume overloaded, continue lasix, renal consult.  Hold lisinopirl due to advanced ckd. - per nephrology stable, for cont on ARB's and eplerenone   - case discussed with nephrolog,  Lab Results   Component Value Date/Time    Creatinine 3.60 (H) 11/16/2021 05:33 AM     lasix dose increased  -> S Cr rises in response, edema/sob better. 4. Dm: LANTUS AND ssi, will adjust   Lab Results   Component Value Date/Time    Glucose 81 11/16/2021 05:33 AM    Glucose (POC) 81 11/16/2021 07:30 AM              Hospital Problems  Date Reviewed: 8/7/2020          Codes Class Noted POA    CHF exacerbation ([facility] [address] ICD-10-CM: I50.9  ICD-9-CM: 428.0  11/14/2021 Unknown                  After personally interviewing pt at bedside the following is noted on 11/16/2021 :    Review of Systems   Constitutional: Positive for malaise/fatigue. Respiratory: Negative for shortness of breath. Cardiovascular: Positive for leg swelling. Gastrointestinal: Negative for abdominal pain, constipation, diarrhea and vomiting. All other systems reviewed and are negative. I had a face to face encounter with patient on 11/16/2021 at bedside for the following physical exam:     PHYSICAL EXAM:    Visit Vitals  BP (!) 147/74 (BP 1 Location: Right upper arm, BP Patient Position: At rest)   Pulse 72   Temp 98.2 °F (36.8 °C)   Resp 18   Ht 5' 9\" (1.753 m)   Wt 85.3 kg (188 lb 1.6 oz)   SpO2 97%   BMI 27.78 kg/m²          Physical Exam  Constitutional:       General: He is not in acute distress. Appearance: Normal appearance. He is normal weight. He is not ill-appearing, toxic-appearing or diaphoretic. HENT:      Head: Normocephalic and atraumatic. Right Ear: External ear normal.      Left Ear: External ear normal.      Nose: Nose normal.      Mouth/Throat:      Mouth: Mucous membranes are moist.      Pharynx: Oropharynx is clear. Eyes:      Extraocular Movements: Extraocular movements intact.       Conjunctiva/sclera: Conjunctivae normal.      Pupils: Pupils are equal, round, and reactive to light. Cardiovascular:      Rate and Rhythm: Normal rate and regular rhythm. Pulses: Normal pulses. Heart sounds: Normal heart sounds. Pulmonary:      Effort: Pulmonary effort is normal.      Breath sounds: Normal breath sounds. Abdominal:      General: Abdomen is flat. Bowel sounds are normal.      Palpations: Abdomen is soft. Musculoskeletal:         General: Swelling present. No tenderness or deformity. Cervical back: Normal range of motion and neck supple. Skin:     General: Skin is warm and dry. Neurological:      General: No focal deficit present. Mental Status: He is alert. Mental status is at baseline. Psychiatric:         Mood and Affect: Mood normal.         Behavior: Behavior normal.                     Data Review:    Review and/or order of clinical lab test      Labs:     Recent Labs     11/16/21  0533 11/15/21  0634   WBC 3.4* 4.0*   HGB 7.5* 7.7*   HCT 23.3* 24.0*   PLT 81* 93*     Recent Labs     11/16/21  0533 11/15/21  0634 11/14/21  0922    142 139   K 4.8 4.4 4.3   * 115* 111*   CO2 20* 21 19*   BUN 69* 65* 69*   CREA 3.60* 3.36* 3.34*   GLU 81 78 136*   CA 8.4* 8.6 8.9   MG  --  2.5* 2.4   PHOS  --  4.6  --      Recent Labs     11/15/21  0634 11/14/21  0922   ALT 15 27    150*   TBILI 0.5 0.5   TP 7.1 7.9   ALB 2.8* 3.1*   GLOB 4.3* 4.8*   LPSE  --  95     No results for input(s): INR, PTP, APTT, INREXT, INREXT in the last 72 hours. Recent Labs     11/15/21  0634   TIBC 268   PSAT 21      No results found for: FOL, RBCF   No results for input(s): PH, PCO2, PO2 in the last 72 hours. No results for input(s): CPK, CKNDX, TROIQ in the last 72 hours.     No lab exists for component: CPKMB  Lab Results   Component Value Date/Time    Cholesterol, total 176 11/22/2019 09:36 AM    HDL Cholesterol 45 11/22/2019 09:36 AM    LDL, calculated 112 (H) 11/22/2019 09:36 AM    Triglyceride 94 11/22/2019 09:36 AM     Lab Results   Component Value Date/Time Glucose (POC) 81 11/16/2021 07:30 AM    Glucose (POC) 136 (H) 11/15/2021 10:11 PM    Glucose (POC) 154 (H) 11/15/2021 07:29 PM    Glucose (POC) 68 11/15/2021 05:28 PM    Glucose (POC) 89 11/15/2021 10:10 AM     Lab Results   Component Value Date/Time    Color YELLOW/STRAW 11/14/2021 10:31 AM    Appearance CLEAR 11/14/2021 10:31 AM    Specific gravity 1.019 11/14/2021 10:31 AM    pH (UA) 5.5 11/14/2021 10:31 AM    Protein >300 (A) 11/14/2021 10:31 AM    Glucose 500 (A) 11/14/2021 10:31 AM    Ketone Negative 11/14/2021 10:31 AM    Bilirubin Negative 11/14/2021 10:31 AM    Urobilinogen 0.2 11/14/2021 10:31 AM    Nitrites Negative 11/14/2021 10:31 AM    Leukocyte Esterase Negative 11/14/2021 10:31 AM    Epithelial cells FEW 11/14/2021 10:31 AM    Bacteria Negative 11/14/2021 10:31 AM    WBC 0-4 11/14/2021 10:31 AM    RBC 0-5 11/14/2021 10:31 AM         Medications Reviewed:     Current Facility-Administered Medications   Medication Dose Route Frequency    morphine injection 4 mg  4 mg IntraVENous Q4H PRN    heparin (porcine) injection 5,000 Units  5,000 Units SubCUTAneous Q8H    losartan (COZAAR) tablet 100 mg  100 mg Oral DAILY    eplerenone (INSPRA) tablet 25 mg  25 mg Oral DAILY    sodium zirconium cyclosilicate (LOKELMA) powder packet 10 g  10 g Oral DAILY    epoetin ericka-epbx (RETACRIT) injection 20,000 Units  20,000 Units SubCUTAneous Q MON, WED & FRI    butalbital-acetaminophen-caffeine (FIORICET, ESGIC) -40 mg per tablet 1 Tablet  1 Tablet Oral Q4H PRN    furosemide (LASIX) injection 80 mg  80 mg IntraVENous BID    pantoprazole (PROTONIX) 40 mg in 0.9% sodium chloride 10 mL injection  40 mg IntraVENous Q12H    ondansetron (ZOFRAN) injection 4 mg  4 mg IntraVENous Q4H PRN    amLODIPine (NORVASC) tablet 10 mg  10 mg Oral DAILY    busPIRone (BUSPAR) tablet 10 mg  10 mg Oral BID    clopidogreL (PLAVIX) tablet 75 mg  75 mg Oral DAILY    gabapentin (NEURONTIN) capsule 300 mg  300 mg Oral TID    isosorbide dinitrate (ISORDIL) tablet 20 mg  20 mg Oral TID    metoprolol succinate (TOPROL-XL) XL tablet 50 mg  50 mg Oral DAILY    rosuvastatin (CRESTOR) tablet 10 mg  10 mg Oral QHS    sertraline (ZOLOFT) tablet 200 mg  200 mg Oral DAILY    traMADoL (ULTRAM) tablet 50 mg  50 mg Oral Q6H PRN    traZODone (DESYREL) tablet 50 mg  50 mg Oral QHS    acetaminophen (TYLENOL) tablet 650 mg  650 mg Oral Q6H PRN    glucose chewable tablet 16 g  4 Tablet Oral PRN    dextrose (D50W) injection syrg 12.5-25 g  25-50 mL IntraVENous PRN    glucagon (GLUCAGEN) injection 1 mg  1 mg IntraMUSCular PRN    insulin glargine (LANTUS) injection 10 Units  10 Units SubCUTAneous DAILY    insulin lispro (HUMALOG) injection   SubCUTAneous AC&HS    hydrALAZINE (APRESOLINE) 20 mg/mL injection 10 mg  10 mg IntraVENous Q6H PRN     ______________________________________________________________________  EXPECTED LENGTH OF STAY: - - -  ACTUAL LENGTH OF STAY:          2                 Asif Fuentes MD

## 2021-11-16 NOTE — CONSULTS
Elsi Gonzales MD, Leesa Jon MD, MPH      MARY Gross, [facility]-BC     April S Rony, [facility]   Abhijeet Abdiel, FNP-C    Morris Greenfield, [facility]       Reji Lowe [facility] [address]    at [address], [address] Jaya Baldwin  22.    [phone]    FAX: [address] [place] [address], [address]    [phone]    FAX: [phone]     HEPATOLOGY CONSULT NOTE  I was asked to see this patient in consultation by Dr Lucie Thayer for management of cirrhosis  I have reviewed the Emergency room note, Hospital admission note,  Notes by all other physicians who have seen the patient during this hospitalization to date. I have reviewed the problem list and the reason for this hospitalization. I have reviewed the allergies and the medications the patient was taking at home prior to this hospitalization. HISTORY:  Evens Caraballo is a 64year old male without history of prior liver disease. He tells me he had a liver biopsy and a kidney biopsy at [facility]. He was told he did not have liver disease    He presented to the ER on 11/14 due to shortness of breath, nausea, and abdominal pain. Labs performed in the ER showed Hb 8.6, PLT 93, BUN 69, Scr 3.34, total bili 0.5, ALT 27, AST 20,     Cross sectional imaging of the liver performed with CT scan demonstrated moderate ascites, bilateral pleural effusion, anasarca. Ultrasound of the liver showed hepatic parenchymal disease with cirrhotic morphology. Peripheral ascites and right pleural effusion. Pulsatile main portal vein waveform. The most recent echocardiogram performed in 10/2021 demonstrated EF 60-65%.  There was mod pulmonary HTN with PAP of 50 mm Hg. He has been started on Lasix 80 mg IV BID and diuresing very well. Abdominal and lower extremity swelling improving      ASSESSMENT AND PLAN:    Ascites  The etiology of ascites is not particularly clear. Suspect due to CHF  Ideally we would have obtained diagnostic paracentesis with ascites fluid analysis including total protein, albumin, cell count and differential, gram stain and culture prior to starting lasix  It appears there may not be enough ascites fluid to tap  We will obtain fibro scan of the liver on an outpatient basis to assess for fibrosis. Continue lasix 80 mg I.V BID as per cardiology  Serial BMP, mag  Strict I's and O's  Daily weight    Passive hepatic congestion  Suspect patient may have passive hepatic congestion due to pulmonary hypertension, RV dysfunction, and TR. It is not clear if the patient also has cirrhosis. Imaging of the liver in the setting of passive hepatic congestion can make the liver look like cirrhosis is present when it is not. He previously had a liver biopsy 2-3 months ago. Recommend obtaining result/slides of Liver biopsy for review   F/u on repeat 2 D echocardiography    Abdominal pain:   Based on CT abdomen, there are several thick walled loops of small bowel in the midabdomen ? Due to infectious enteritis vs other  GI in consult    Bilateral pleural effusion  Consider thoracentesis if pleural effusion does not improve on aggressive diuresis  Suspect ascites to be transudate due to CHF    Anemia   This is due to anemia of kidney disease    Will obtain FE panel to assess for iron stores. Check stool occult blood  Transfuse PRN Hb < 7  We will perform EGD to evaluate for Esophageal varices    Thrombocytopenia  This is possibly due to portal hypertension as a consequence of cirrhosis.   Ultrasound of liver shows cirrhosis morphology  Obtain result of liver biopsy  No evidence of active bleeding    CAD s/p CABG    Bleeding: No current evidence of bleeding. Hepatic encephalopathy prophylaxis:  Monitor electrolytes, specifically goal K> 4 as hypokalemia can lead to increased ammonia production. Minimize opiates, benzodiazepines, and other sedating/constipating medications. # Nutrition  - 2 g sodium diet  - Ensure 1 to 1.5 g/kg protein daily  - Fluid restriction if sodium less than 125  - Consult nutrition for optimization of diets and patient education    # Health maintenance:  -Hepatitis A virus vaccination:  -Hepatitis B virus vaccination  -Variceal screening  -Hepatology follow-up appointment    Thank you for this consult. Please do not hesitate to call with questions or concerns    SYSTEM REVIEW:  Constitution systems: Negative for fever, chills, weight gain, weight loss. Eyes: Negative for visual changes. ENT: Negative for sore throat, painful swallowing. Respiratory: Negative for cough, hemoptysis, SOB. Cardiology: Negative for chest pain, palpitations. GI:  Negative for constipation or diarrhea. : Negative for urinary frequency, dysuria, hematuria, nocturia. Skin: Negative for rash. Hematology: Negative for easy bruising, blood clots. Musculo-skelatal: Negative for back pain, muscle pain, weakness. Neurologic: Negative for headaches, dizziness, vertigo, memory problems not related to HE. Psychology: Negative for anxiety, depression. FAMILY HISTORY:  There is no family history of liver disease. There is no family history of immune disorders. SOCIAL HISTORY:  The patient is . The patient has 2  children. The patient does not smoke cigarettes  The patient has previously consumed significant amount of alcohol. He stopped drinking 6 years ago. The patient does not work    PHYSICAL EXAMINATION:  VS: per nursing note  General: No acute distress. Eyes: Sclera anicteric. ENT:  No oral lesions. Thyroid normal.  Nodes:  No adenopathy. Skin: No spider angiomata. No jaundice.  No palmar erythema. Respiratory: Reduced breath sounds bi basal lung field  Cardiovascular: Regular heart rate. No JVD. Abdomen: Soft non-tender, Distended with some ascites. Extremities: 3+ lower extremity edema. Neurologic: Alert and oriented. Cranial nerves grossly intact. No asterixis. LABORATORY:  Results for Malou Sheets (MRN [id number]) as of 11/16/2021 09:53   Ref. Range 11/15/2021 06:34 11/16/2021 05:33   WBC Latest Ref Range: 4.1 - 11.1 K/uL 4.0 (L) 3.4 (L)   HGB Latest Ref Range: 12.1 - 17.0 g/dL 7.7 (L) 7.5 (L)   PLATELET Latest Ref Range: 150 - 400 K/uL 93 (L) 81 (L)   Sodium Latest Ref Range: 136 - 145 mmol/L 142 140   Potassium Latest Ref Range: 3.5 - 5.1 mmol/L 4.4 4.8   Chloride Latest Ref Range: 97 - 108 mmol/L 115 (H) 114 (H)   CO2 Latest Ref Range: 21 - 32 mmol/L 21 20 (L)   Anion gap Latest Ref Range: 5 - 15 mmol/L 6 6   Glucose Latest Ref Range: 65 - 100 mg/dL 78 81   BUN Latest Ref Range: 6 - 20 MG/DL 65 (H) 69 (H)   Creatinine Latest Ref Range: 0.70 - 1.30 MG/DL 3.36 (H) 3.60 (H)   Magnesium Latest Ref Range: 1.6 - 2.4 mg/dL 2.5 (H)    Bilirubin, total Latest Ref Range: 0.2 - 1.0 MG/DL 0.5 0.5   Bilirubin, direct Latest Ref Range: 0.0 - 0.2 MG/DL  0.3 (H)   Protein, total Latest Ref Range: 6.4 - 8.2 g/dL 7.1 7.0   Albumin Latest Ref Range: 3.5 - 5.0 g/dL 2.8 (L) 2.7 (L)   ALT Latest Ref Range: 12 - 78 U/L 15 21   AST Latest Ref Range: 15 - 37 U/L 14 (L) 18   Alk. phosphatase Latest Ref Range: 45 - 117 U/L 111 105   Hemoglobin A1c, (calculated) Latest Ref Range: 4.0 - 5.6 %  8.4 (H)   Iron Latest Ref Range: 35 - 150 ug/dL 55    TIBC Latest Ref Range: 250 - 450 ug/dL 268    Iron % saturation Latest Ref Range: 20 - 50 % 21        RADIOLOGY:  11/14: Ct abdomen: Moderate volume ascites. Moderate bilateral pleural effusions. Anasarca.   2.  Several thick-walled loops of small bowel in the midabdomen, may be due to underdistention or hypoproteinemic state, correlate for infectious or inflammatory enteritis. 11/14: Liver US:  Hepatic parenchymal disease with cirrhotic morphology. Perihepatic ascites  and right pleural effusion noted. Pulsatile main portal vein waveform, likely sequela of hepatic parenchymal disease. Evidence of right-sided medical renal disease without hydronephrosis. Normal sonographic evaluation of the gallbladder.     Thalia Watson MD, MPH  Advanced Hepatology  [facility] [address] [address] [place] [address].  [phone]  [address] [place] [address]

## 2021-11-17 ENCOUNTER — APPOINTMENT (OUTPATIENT)
Dept: NON INVASIVE DIAGNOSTICS | Age: 61
DRG: 194 | End: 2021-11-17
Attending: INTERNAL MEDICINE
Payer: MEDICAID

## 2021-11-17 LAB
ALBUMIN SERPL-MCNC: 2.7 G/DL (ref 3.5–5)
ANION GAP SERPL CALC-SCNC: 5 MMOL/L (ref 5–15)
BASOPHILS # BLD: 0 K/UL (ref 0–0.1)
BASOPHILS NFR BLD: 0 % (ref 0–1)
BUN SERPL-MCNC: 68 MG/DL (ref 6–20)
BUN/CREAT SERPL: 17 (ref 12–20)
CALCIUM SERPL-MCNC: 8.3 MG/DL (ref 8.5–10.1)
CHLORIDE SERPL-SCNC: 112 MMOL/L (ref 97–108)
CO2 SERPL-SCNC: 21 MMOL/L (ref 21–32)
CREAT SERPL-MCNC: 3.94 MG/DL (ref 0.7–1.3)
DIFFERENTIAL METHOD BLD: ABNORMAL
ECHO AO ROOT DIAM: 2.67 CM
ECHO AV PEAK GRADIENT: 8.41 MMHG
ECHO AV PEAK VELOCITY: 145.01 CM/S
ECHO EST RA PRESSURE: 15 MMHG
ECHO LA MAJOR AXIS: 4.89 CM
ECHO LA MINOR AXIS: 2.43 CM
ECHO LV EDV A2C: 74.76 ML
ECHO LV EDV A4C: 102.3 ML
ECHO LV EDV BP: 93.14 ML (ref 67–155)
ECHO LV EDV INDEX A4C: 50.9 ML/M2
ECHO LV EDV INDEX BP: 46.3 ML/M2
ECHO LV EDV NDEX A2C: 37.2 ML/M2
ECHO LV EJECTION FRACTION A2C: 48 PERCENT
ECHO LV EJECTION FRACTION A4C: 53 PERCENT
ECHO LV EJECTION FRACTION BIPLANE: 52.7 PERCENT (ref 55–100)
ECHO LV ESV A2C: 38.62 ML
ECHO LV ESV A4C: 48.02 ML
ECHO LV ESV BP: 44.1 ML (ref 22–58)
ECHO LV ESV INDEX A2C: 19.2 ML/M2
ECHO LV ESV INDEX A4C: 23.9 ML/M2
ECHO LV ESV INDEX BP: 21.9 ML/M2
ECHO LV INTERNAL DIMENSION DIASTOLIC: 4.89 CM (ref 4.2–5.9)
ECHO LV INTERNAL DIMENSION SYSTOLIC: 3.61 CM
ECHO LV IVSD: 1.29 CM (ref 0.6–1)
ECHO LV MASS 2D: 233.7 G (ref 88–224)
ECHO LV MASS INDEX 2D: 116.3 G/M2 (ref 49–115)
ECHO LV POSTERIOR WALL DIASTOLIC: 1.17 CM (ref 0.6–1)
ECHO LVOT PEAK GRADIENT: 3.47 MMHG
ECHO LVOT PEAK VELOCITY: 93.08 CM/S
ECHO MV A VELOCITY: 45.33 CM/S
ECHO MV AREA PHT: 3.62 CM2
ECHO MV E DECELERATION TIME (DT): 209.49 MS
ECHO MV E VELOCITY: 109.86 CM/S
ECHO MV E/A RATIO: 2.42
ECHO MV PRESSURE HALF TIME (PHT): 60.75 MS
ECHO PV PEAK INSTANTANEOUS GRADIENT SYSTOLIC: 6.26 MMHG
ECHO RIGHT VENTRICULAR SYSTOLIC PRESSURE (RVSP): 50.23 MMHG
ECHO RV INTERNAL DIMENSION: 4.07 CM
ECHO RV TAPSE: 0.96 CM (ref 1.5–2)
ECHO TV REGURGITANT MAX VELOCITY: 296.76 CM/S
ECHO TV REGURGITANT PEAK GRADIENT: 35.23 MMHG
EOSINOPHIL # BLD: 0.2 K/UL (ref 0–0.4)
EOSINOPHIL NFR BLD: 5 % (ref 0–7)
ERYTHROCYTE [DISTWIDTH] IN BLOOD BY AUTOMATED COUNT: 15.1 % (ref 11.5–14.5)
GLUCOSE BLD STRIP.AUTO-MCNC: 103 MG/DL (ref 65–117)
GLUCOSE BLD STRIP.AUTO-MCNC: 134 MG/DL (ref 65–117)
GLUCOSE BLD STRIP.AUTO-MCNC: 159 MG/DL (ref 65–117)
GLUCOSE BLD STRIP.AUTO-MCNC: 92 MG/DL (ref 65–117)
GLUCOSE SERPL-MCNC: 127 MG/DL (ref 65–100)
HCT VFR BLD AUTO: 23.3 % (ref 36.6–50.3)
HGB BLD-MCNC: 7.4 G/DL (ref 12.1–17)
IMM GRANULOCYTES # BLD AUTO: 0 K/UL (ref 0–0.04)
IMM GRANULOCYTES NFR BLD AUTO: 0 % (ref 0–0.5)
LYMPHOCYTES # BLD: 0.7 K/UL (ref 0.8–3.5)
LYMPHOCYTES NFR BLD: 16 % (ref 12–49)
MCH RBC QN AUTO: 29.6 PG (ref 26–34)
MCHC RBC AUTO-ENTMCNC: 31.8 G/DL (ref 30–36.5)
MCV RBC AUTO: 93.2 FL (ref 80–99)
MONOCYTES # BLD: 0.5 K/UL (ref 0–1)
MONOCYTES NFR BLD: 11 % (ref 5–13)
NEUTS SEG # BLD: 2.9 K/UL (ref 1.8–8)
NEUTS SEG NFR BLD: 68 % (ref 32–75)
NRBC # BLD: 0 K/UL (ref 0–0.01)
NRBC BLD-RTO: 0 PER 100 WBC
PHOSPHATE SERPL-MCNC: 5.3 MG/DL (ref 2.6–4.7)
PLATELET # BLD AUTO: 70 K/UL (ref 150–400)
PMV BLD AUTO: 11.8 FL (ref 8.9–12.9)
POTASSIUM SERPL-SCNC: 4.8 MMOL/L (ref 3.5–5.1)
RBC # BLD AUTO: 2.5 M/UL (ref 4.1–5.7)
RBC MORPH BLD: ABNORMAL
SERVICE CMNT-IMP: ABNORMAL
SERVICE CMNT-IMP: ABNORMAL
SERVICE CMNT-IMP: NORMAL
SERVICE CMNT-IMP: NORMAL
SODIUM SERPL-SCNC: 138 MMOL/L (ref 136–145)
WBC # BLD AUTO: 4.3 K/UL (ref 4.1–11.1)

## 2021-11-17 PROCEDURE — 77010033678 HC OXYGEN DAILY

## 2021-11-17 PROCEDURE — 36415 COLL VENOUS BLD VENIPUNCTURE: CPT

## 2021-11-17 PROCEDURE — 74011250637 HC RX REV CODE- 250/637: Performed by: INTERNAL MEDICINE

## 2021-11-17 PROCEDURE — 93306 TTE W/DOPPLER COMPLETE: CPT

## 2021-11-17 PROCEDURE — 74011250636 HC RX REV CODE- 250/636: Performed by: INTERNAL MEDICINE

## 2021-11-17 PROCEDURE — 74011636637 HC RX REV CODE- 636/637: Performed by: HOSPITALIST

## 2021-11-17 PROCEDURE — P9047 ALBUMIN (HUMAN), 25%, 50ML: HCPCS | Performed by: INTERNAL MEDICINE

## 2021-11-17 PROCEDURE — 65660000000 HC RM CCU STEPDOWN

## 2021-11-17 PROCEDURE — 85025 COMPLETE CBC W/AUTO DIFF WBC: CPT

## 2021-11-17 PROCEDURE — 74011000250 HC RX REV CODE- 250: Performed by: INTERNAL MEDICINE

## 2021-11-17 PROCEDURE — 93306 TTE W/DOPPLER COMPLETE: CPT | Performed by: SPECIALIST

## 2021-11-17 PROCEDURE — 82962 GLUCOSE BLOOD TEST: CPT

## 2021-11-17 PROCEDURE — 74011250637 HC RX REV CODE- 250/637: Performed by: HOSPITALIST

## 2021-11-17 PROCEDURE — 74011250637 HC RX REV CODE- 250/637: Performed by: NURSE PRACTITIONER

## 2021-11-17 PROCEDURE — 74011250636 HC RX REV CODE- 250/636: Performed by: HOSPITALIST

## 2021-11-17 PROCEDURE — 80069 RENAL FUNCTION PANEL: CPT

## 2021-11-17 PROCEDURE — 74011636637 HC RX REV CODE- 636/637: Performed by: INTERNAL MEDICINE

## 2021-11-17 PROCEDURE — 94760 N-INVAS EAR/PLS OXIMETRY 1: CPT

## 2021-11-17 RX ORDER — CALCIUM CARBONATE 200(500)MG
200 TABLET,CHEWABLE ORAL
Status: DISCONTINUED | OUTPATIENT
Start: 2021-11-17 | End: 2021-12-01 | Stop reason: HOSPADM

## 2021-11-17 RX ORDER — METOPROLOL SUCCINATE 50 MG/1
100 TABLET, EXTENDED RELEASE ORAL DAILY
Status: DISCONTINUED | OUTPATIENT
Start: 2021-11-17 | End: 2021-11-18

## 2021-11-17 RX ORDER — ALBUMIN HUMAN 250 G/1000ML
25 SOLUTION INTRAVENOUS EVERY 6 HOURS
Status: COMPLETED | OUTPATIENT
Start: 2021-11-17 | End: 2021-11-19

## 2021-11-17 RX ORDER — FUROSEMIDE 10 MG/ML
60 INJECTION INTRAMUSCULAR; INTRAVENOUS 3 TIMES DAILY
Status: DISCONTINUED | OUTPATIENT
Start: 2021-11-17 | End: 2021-11-17

## 2021-11-17 RX ORDER — BUMETANIDE 0.25 MG/ML
2 INJECTION INTRAMUSCULAR; INTRAVENOUS EVERY 12 HOURS
Status: DISCONTINUED | OUTPATIENT
Start: 2021-11-17 | End: 2021-11-26

## 2021-11-17 RX ORDER — EPLERENONE 25 MG/1
25 TABLET, FILM COATED ORAL DAILY
Status: DISCONTINUED | OUTPATIENT
Start: 2021-11-18 | End: 2021-12-01 | Stop reason: HOSPADM

## 2021-11-17 RX ORDER — POLYETHYLENE GLYCOL 3350 17 G/17G
17 POWDER, FOR SOLUTION ORAL 2 TIMES DAILY
Status: DISCONTINUED | OUTPATIENT
Start: 2021-11-17 | End: 2021-12-01 | Stop reason: HOSPADM

## 2021-11-17 RX ORDER — EPLERENONE 25 MG/1
50 TABLET, FILM COATED ORAL DAILY
Status: DISCONTINUED | OUTPATIENT
Start: 2021-11-17 | End: 2021-11-17

## 2021-11-17 RX ORDER — GUAIFENESIN 100 MG/5ML
81 LIQUID (ML) ORAL DAILY
Status: DISCONTINUED | OUTPATIENT
Start: 2021-11-17 | End: 2021-12-01 | Stop reason: HOSPADM

## 2021-11-17 RX ADMIN — HEPARIN SODIUM 5000 UNITS: 5000 INJECTION INTRAVENOUS; SUBCUTANEOUS at 12:47

## 2021-11-17 RX ADMIN — FUROSEMIDE 60 MG: 10 INJECTION, SOLUTION INTRAMUSCULAR; INTRAVENOUS at 09:25

## 2021-11-17 RX ADMIN — EPOETIN ALFA-EPBX 20000 UNITS: 20000 INJECTION, SOLUTION INTRAVENOUS; SUBCUTANEOUS at 21:35

## 2021-11-17 RX ADMIN — ISOSORBIDE MONONITRATE 60 MG: 60 TABLET ORAL at 09:24

## 2021-11-17 RX ADMIN — INSULIN LISPRO 2 UNITS: 100 INJECTION, SOLUTION INTRAVENOUS; SUBCUTANEOUS at 12:49

## 2021-11-17 RX ADMIN — ROSUVASTATIN 10 MG: 10 TABLET, FILM COATED ORAL at 21:34

## 2021-11-17 RX ADMIN — BUTALBITAL, ACETAMINOPHEN, AND CAFFEINE 1 TABLET: 50; 325; 40 TABLET ORAL at 12:37

## 2021-11-17 RX ADMIN — MORPHINE SULFATE 4 MG: 2 INJECTION, SOLUTION INTRAMUSCULAR; INTRAVENOUS at 18:35

## 2021-11-17 RX ADMIN — MORPHINE SULFATE 4 MG: 2 INJECTION, SOLUTION INTRAMUSCULAR; INTRAVENOUS at 22:36

## 2021-11-17 RX ADMIN — HEPARIN SODIUM 5000 UNITS: 5000 INJECTION INTRAVENOUS; SUBCUTANEOUS at 04:28

## 2021-11-17 RX ADMIN — TRAZODONE HYDROCHLORIDE 50 MG: 50 TABLET ORAL at 21:34

## 2021-11-17 RX ADMIN — Medication 10 ML: at 04:29

## 2021-11-17 RX ADMIN — LOSARTAN POTASSIUM 100 MG: 50 TABLET, FILM COATED ORAL at 09:22

## 2021-11-17 RX ADMIN — INSULIN GLARGINE 10 UNITS: 100 INJECTION, SOLUTION SUBCUTANEOUS at 09:20

## 2021-11-17 RX ADMIN — EPLERENONE 50 MG: 25 TABLET, FILM COATED ORAL at 09:23

## 2021-11-17 RX ADMIN — ALBUMIN (HUMAN) 25 G: 0.25 INJECTION, SOLUTION INTRAVENOUS at 18:35

## 2021-11-17 RX ADMIN — GABAPENTIN 300 MG: 300 CAPSULE ORAL at 18:34

## 2021-11-17 RX ADMIN — AMLODIPINE BESYLATE 10 MG: 5 TABLET ORAL at 09:23

## 2021-11-17 RX ADMIN — Medication 10 ML: at 21:34

## 2021-11-17 RX ADMIN — SODIUM ZIRCONIUM CYCLOSILICATE 10 G: 5 POWDER, FOR SUSPENSION ORAL at 09:18

## 2021-11-17 RX ADMIN — GABAPENTIN 300 MG: 300 CAPSULE ORAL at 21:34

## 2021-11-17 RX ADMIN — METOPROLOL SUCCINATE 100 MG: 50 TABLET, EXTENDED RELEASE ORAL at 09:23

## 2021-11-17 RX ADMIN — MORPHINE SULFATE 4 MG: 2 INJECTION, SOLUTION INTRAMUSCULAR; INTRAVENOUS at 09:14

## 2021-11-17 RX ADMIN — Medication 10 ML: at 14:50

## 2021-11-17 RX ADMIN — MORPHINE SULFATE 4 MG: 2 INJECTION, SOLUTION INTRAMUSCULAR; INTRAVENOUS at 12:38

## 2021-11-17 RX ADMIN — POLYETHYLENE GLYCOL 3350 17 G: 17 POWDER, FOR SOLUTION ORAL at 18:34

## 2021-11-17 RX ADMIN — PANTOPRAZOLE SODIUM 40 MG: 40 TABLET, DELAYED RELEASE ORAL at 07:33

## 2021-11-17 RX ADMIN — BUSPIRONE HYDROCHLORIDE 10 MG: 10 TABLET ORAL at 09:24

## 2021-11-17 RX ADMIN — ASPIRIN 81 MG CHEWABLE TABLET 81 MG: 81 TABLET CHEWABLE at 09:24

## 2021-11-17 RX ADMIN — BUMETANIDE 2 MG: 0.25 INJECTION INTRAMUSCULAR; INTRAVENOUS at 14:49

## 2021-11-17 RX ADMIN — TRAMADOL HYDROCHLORIDE 50 MG: 50 TABLET ORAL at 14:49

## 2021-11-17 RX ADMIN — CALCIUM CARBONATE (ANTACID) CHEW TAB 500 MG 200 MG: 500 CHEW TAB at 16:51

## 2021-11-17 RX ADMIN — POLYETHYLENE GLYCOL 3350 17 G: 17 POWDER, FOR SOLUTION ORAL at 12:45

## 2021-11-17 RX ADMIN — BUMETANIDE 2 MG: 0.25 INJECTION INTRAMUSCULAR; INTRAVENOUS at 21:34

## 2021-11-17 RX ADMIN — BUSPIRONE HYDROCHLORIDE 10 MG: 10 TABLET ORAL at 18:32

## 2021-11-17 RX ADMIN — SERTRALINE 200 MG: 50 TABLET, FILM COATED ORAL at 09:22

## 2021-11-17 RX ADMIN — GABAPENTIN 300 MG: 300 CAPSULE ORAL at 09:25

## 2021-11-17 NOTE — PROGRESS NOTES
[address]  [address]  [place], [address]  [phone]                     GI PROGRESS NOTE    Patient Name: Dawit Vaughn      : 1960      MRN: [id number]  Admit Date: 2021  Today's Date: 2021    Subjective:     Eating breakfast with no issue. No change in epigastric discomfort; no N, V.  C/o no BM since admission    Objective:     Blood pressure (!) 175/85, pulse 74, temperature 97.8 °F (36.6 °C), resp. rate 16, height 5' 9\" (1.753 m), weight 85 kg (187 lb 4.8 oz), SpO2 96 %. Physical Exam:  General appearance: cooperative, no distress  Skin: No jaundice, pallor, rashes   HEENT: Sclerae anicteric. Cardiovascular: Regular rate and rhythm. Respiratory: Comfortable breathing . GI: Abdomen nondistended, soft, and nontender. Normal active bowel sounds. No enlargement of the liver or spleen. No masses palpable. Neurological:  Patient is alert and oriented. Psychiatric: Mood appropriate with good judgement. No anxiety or agitation.     Data Review:    Recent Results (from the past 24 hour(s))   GLUCOSE, POC    Collection Time: 21 11:37 AM   Result Value Ref Range    Glucose (POC) 55 (L) 65 - 117 mg/dL    Performed by Deidra Roque    GLUCOSE, POC    Collection Time: 21 12:05 PM   Result Value Ref Range    Glucose (POC) 152 (H) 65 - 117 mg/dL    Performed by Alvino De La Vega    PROTHROMBIN TIME + INR    Collection Time: 21  2:30 PM   Result Value Ref Range    INR 1.1 0.9 - 1.1      Prothrombin time 11.9 (H) 9.0 - 11.1 sec   GLUCOSE, POC    Collection Time: 21  5:18 PM   Result Value Ref Range    Glucose (POC) 112 65 - 117 mg/dL    Performed by Alvino De La Vega    GLUCOSE, POC    Collection Time: 21 10:17 PM   Result Value Ref Range    Glucose (POC) 182 (H) 65 - 117 mg/dL    Performed by Raffy Barron    GLUCOSE, POC    Collection Time: 21  6:41 AM   Result Value Ref Range    Glucose (POC) 134 (H) 65 - 117 mg/dL    Performed by Merline Linsey / Plan :     Abdominal pain  Melena  60-year-old male with history of CHF is admitted for complaints of subacute abdominal pain, possible melena, exertional chest pain/dyspnea, and found to be anemic from baseline from 2019 labs with cross-sectional imaging and ultrasound discordant for abdominal ascites and hepatopathy    #1 abdominal pain  We will proceed with EGD today to exclude PUD or other obstructive process  May also have SBP considering ascites noted on imaging, will obtain ultrasound-guided paracentesis and review SAAG ratio with ascites protein to determine ascites etiology  Will obtain H pylori stool Ag  EGD 11/16/21: Esophagus normal. Patchy erythema with mild atrophy in antrum and body. Single, 2-3mm erosion in the proximal body without bleeding or overt ulceration. No cardiofundal varices. Acute angulation of duodenum required multiple, careful withdrawals for complete examination, no ulcer identified, no bleeding  US 11/16/21: No significant abdominopelvic free fluid suitable for paracentesis    - We will follow-up gastric bx for H. pylori (on PPI)  - Cotninue once dialy PPI pending H. pylori result  - Will adde Miralax for c/o no BM since admission     #2 anemia  Unclear if having overt bleeding based on his history and labs lack a recent hemoglobin to compare  - No active bleeding on EGD, one 2-3mm gastric erosion not likely to cause this level of anemia  - Monitor Hgb and for signs overt bleeding    #3 hepatopathy  Based on imaging alone  Mild cholestasis pattern more suggestive of cardiac etiology  Hepatology following, will defer management to their expertise     GI will signoff.  Please do not hesitate to call with other GI issues this admission        Patient [address] Problem List:   Active Problems:    CHF exacerbation ([address] (11/14/2021)      ATTENDING ADDENDUM OF PARIRS NOTE:  I saw and evaluated Fabrizio Forteeron on the above date of service and discussed the care with the nurse practitioner. I agree with the findings and plan as documented in the note above and have edited it to reflect my findings and plan.

## 2021-11-17 NOTE — CARDIO/PULMONARY
Cardiac Rehab: Consult received and chart reviewed. Echo on 10/22/2021 reports EF 60-65% (per note on 11/15/2021 from Dr. Elayne Scott) therefore patient does not meet the following criteria and is not a candidate for cardiac rehab.  EF ? 35% at time of enrollment   Stable class 2-4 NYHA heart failure   Optimal medical therapy for > 6 weeks   No major hospitalizations within the last 6 weeks   No major hospitalizations within the next 6 weeks  Sheryle Loron, RN

## 2021-11-17 NOTE — PROGRESS NOTES
Bedside and Verbal shift change report given to Elsy Maldonado RN (oncoming nurse) by Paul Gann (offgoing nurse). Report included the following information SBAR, Kardex, MAR, Recent Results and Cardiac Rhythm NSR.

## 2021-11-17 NOTE — PROGRESS NOTES
Spiritual Care Assessment/Progress Note  [facility], [place]      NAME: Beryle Marseille      MRN: [id number]  AGE: 64 y.o. SEX: male  Rastafari Affiliation: Amish   Language: English     11/17/2021     Total Time (in minutes): 5     Spiritual Assessment begun in [address] [place] [address] MED SURG through conversation with:         [x]Patient        [] Family    [] Friend(s)        Reason for Consult: [facility]     Spiritual beliefs: (Please include comment if needed)     [x] Identifies with a jelly tradition:  Amish       [x] Supported by a jelly community:            [] Claims no spiritual orientation:           [] Seeking spiritual identity:                [] Adheres to an individual form of spirituality:           [] Not able to assess:                           Identified resources for coping:      [x] Prayer                               [x] Music                  [] Guided Imagery     [x] Family/friends                 [] Pet visits     [] Devotional reading                         [] Unknown     [] Other:                                               Interventions offered during this visit: (See comments for more details)    Patient Interventions: Affirmation of jelly, Communion (Amish), Initial/Spiritual assessment, patient floor, Prayer (actual), Prayer (assurance of)           Plan of Care:     [x] Support spiritual and/or cultural needs    [] Support AMD and/or advance care planning process      [] Support grieving process   [] Coordinate Rites and/or Rituals    [] Coordination with community clergy   [] No spiritual needs identified at this time   [] Detailed Plan of Care below (See Comments)  [] Make referral to Music Therapy  [] Make referral to Pet Therapy     [] Make referral to Addiction services  [] Make referral to [facility]  [] Make referral to Spiritual Care Partner  [] No future visits requested        [] Contact Spiritual Care for further referrals     Comments: Mr. Jennette Meigs was in bed.   Prayer and communion offered. He does understand [place].     MAGGY Erwin, RN, ACSW, LCSW   Page:  [phone]

## 2021-11-17 NOTE — PROGRESS NOTES
Problem: Falls - Risk of  Goal: *Absence of Falls  Description: Document Ajithkailash Morales Fall Risk and appropriate interventions in the flowsheet. Outcome: Progressing Towards Goal  Note: Fall Risk Interventions:  Mobility Interventions: Utilize walker, cane, or other assistive device         Medication Interventions: Patient to call before getting OOB                   Problem: Pressure Injury - Risk of  Goal: *Prevention of pressure injury  Description: Document Erik Scale and appropriate interventions in the flowsheet. Outcome: Progressing Towards Goal  Note: Pressure Injury Interventions:             Activity Interventions: PT/OT evaluation    Mobility Interventions: PT/OT evaluation    Nutrition Interventions: Offer support with meals,snacks and hydration

## 2021-11-17 NOTE — PROGRESS NOTES
Bedside shift change report given to Petra Wheeler (oncoming nurse) by Carlos Salomon, RN (offgoing nurse). Report included the following information SBAR and Kardex.

## 2021-11-17 NOTE — PROGRESS NOTES
TRANSITION OF CARE  RUR--16%  Disposition--Home with family assist.  Resume home oxygen (patient could not remember provider name). Transport--Family    Patient's primary family contact: girl friend Shanti Sanchez. Care Management Interventions  PCP Verified by CM: Yes  Transition of Care Consult (CM Consult): Other (None)  Physical Therapy Consult: No  Occupational Therapy Consult: No  Speech Therapy Consult: No  Support Systems: Spouse/Significant Other  Confirm Follow Up Transport: Family  The Plan for Transition of Care is Related to the Following Treatment Goals : Home with family assist.  Discharge Location  Discharge Placement: Home with family assistance    Reason for Admission:  Abdominal pain with EGD 11/16/21            CHF exacerbate            Advanced CKD, DM                   RUR Score:            16%             Plan for utilizing home health:    TBD      PCP: First and Last name:   Patient is need of a new PCP. CM gave patient a copy of the [address] [facility])  physician list as a means of facilitating selection of a new PCP. Name of Practice:    Are you a current patient:     Approximate date of last visit:    Can you participate in a virtual visit with your PCP:                     Current Advanced Directive/Advance Care Plan: No Order      Healthcare Decision Maker:   Click here to complete [address] including selection of the Healthcare Decision Maker Relationship (ie \"Primary\")                           Transition of Care Plan:      CM met with patient who lives with his significant other Dustin Alonso in a one story home. Patient reports good family /social support. Patient is ambulatory and expects return to independent functioning. Patient confirmed PCP, health insurance, and prescription coverage.    Previous home health : [facility]  Previous IPR/ SNF rehab : [address] [facility]  DME : cane, walker [employer]

## 2021-11-17 NOTE — PROGRESS NOTES
[facility]   [address] [facility], [address] [place] [address] Rd [address], [facility]  Phone: [phone]   [phone]       Nephrology Progress Note  Lola Ramesh     1960     [id number]  Date of Admission : 11/14/2021 11/17/21    CC: Follow up for CKD 4       Assessment and Plan   CKD 4   -Biopsy-proven advanced diabetic nephropathy with nodular glomerulosclerosis and class IV interstitial nephritis  -Baseline creatinine 3.5 mg/dL  -Creatinine close to baseline  -Continue losartan and eplerenone  -No emergent need for dialysis  -Daily labs==> LABS PENDING      Hyperkalemia:  - daily Lokelma    Volume overload:  - 2/2 Nephrotic syndrome   - changed lasix to Bumex and added IV albumin   - continue Eplerenone      Uncontrolled HTN  -improving      Anemia in CKD   - continue RANDY     Recurrent abdominal pain  -Work-up per primary team     CAD s/p CABG May 2020  HFpEF  -Last echo: Preserved LVEF, moderate pulmonary hypertension PASP 50 mmHg           Care Plan discussed with: Patient        Interval History:  Seen and examined. No ascites on US and not needed tap. Thoracentesis planned per nursing staff. No renal labs today. Noted changes mace to diuretic regimen. BP improving . Continues to have R sided flan/ chest discomfort    Review of Systems: A comprehensive review of systems was negative except for that written in the HPI.     Current Medications:   Current Facility-Administered Medications   Medication Dose Route Frequency    metoprolol succinate (TOPROL-XL) XL tablet 100 mg  100 mg Oral DAILY    aspirin chewable tablet 81 mg  81 mg Oral DAILY    polyethylene glycol (MIRALAX) packet 17 g  17 g Oral BID    [START ON 11/18/2021] eplerenone (INSPRA) tablet 25 mg  25 mg Oral DAILY    bumetanide (BUMEX) injection 2 mg  2 mg IntraVENous Q12H    albumin human 25% (BUMINATE) solution 25 g  25 g IntraVENous Q6H    isosorbide mononitrate ER (IMDUR) tablet 60 mg  60 mg Oral DAILY    sodium chloride (NS) flush 5-40 mL  5-40 mL IntraVENous Q8H    sodium chloride (NS) flush 5-40 mL  5-40 mL IntraVENous PRN    pantoprazole (PROTONIX) tablet 40 mg  40 mg Oral ACB    sodium zirconium cyclosilicate (LOKELMA) powder packet 10 g  10 g Oral DAILY    morphine injection 4 mg  4 mg IntraVENous Q4H PRN    heparin (porcine) injection 5,000 Units  5,000 Units SubCUTAneous Q8H    losartan (COZAAR) tablet 100 mg  100 mg Oral DAILY    epoetin ericka-epbx (RETACRIT) injection 20,000 Units  20,000 Units SubCUTAneous Q MON, WED & FRI    butalbital-acetaminophen-caffeine (FIORICET, ESGIC) -40 mg per tablet 1 Tablet  1 Tablet Oral Q4H PRN    ondansetron (ZOFRAN) injection 4 mg  4 mg IntraVENous Q4H PRN    amLODIPine (NORVASC) tablet 10 mg  10 mg Oral DAILY    busPIRone (BUSPAR) tablet 10 mg  10 mg Oral BID    gabapentin (NEURONTIN) capsule 300 mg  300 mg Oral TID    rosuvastatin (CRESTOR) tablet 10 mg  10 mg Oral QHS    sertraline (ZOLOFT) tablet 200 mg  200 mg Oral DAILY    traMADoL (ULTRAM) tablet 50 mg  50 mg Oral Q6H PRN    traZODone (DESYREL) tablet 50 mg  50 mg Oral QHS    acetaminophen (TYLENOL) tablet 650 mg  650 mg Oral Q6H PRN    glucose chewable tablet 16 g  4 Tablet Oral PRN    dextrose (D50W) injection syrg 12.5-25 g  25-50 mL IntraVENous PRN    glucagon (GLUCAGEN) injection 1 mg  1 mg IntraMUSCular PRN    insulin glargine (LANTUS) injection 10 Units  10 Units SubCUTAneous DAILY    insulin lispro (HUMALOG) injection   SubCUTAneous AC&HS    hydrALAZINE (APRESOLINE) 20 mg/mL injection 10 mg  10 mg IntraVENous Q6H PRN      No Known Allergies    Objective:  Vitals:    Vitals:    11/17/21 0821 11/17/21 1000 11/17/21 1126 11/17/21 1200   BP: (!) 175/85  (!) 145/70    Pulse: 74 67  62   Resp: 16  16    Temp: 97.8 °F (36.6 °C)      SpO2: 96%  97%    Weight:       Height:         Intake and Output:  11/17 0701 - 11/17 1900  In: -   Out: 500 [Urine:500]  11/15 1901 - 11/17 0700  In: 100 [I.V.:100]  Out: 600 [Urine:600]    Physical Examination:    General: NAD,Conversant   Neck:  Supple, no mass  Resp:  Lungs CTA B/L, no wheezing , normal respiratory effort  CV:  RRR,  no murmur or rub,trace LE edema  GI:  Soft, NT, + Bowel sounds, no hepatosplenomegaly  Neurologic:  Non focal  Psych:             AAO x 3 appropriate affect   Skin:  No Rash      []    High complexity decision making was performed  []    Patient is at high-risk of decompensation with multiple organ involvement    Lab Data Personally Reviewed: I have reviewed all the pertinent labs, microbiology data and radiology studies during assessment.     Recent Labs     11/16/21  1430 11/16/21  0533 11/15/21  0634   NA  --  140 142   K  --  4.8 4.4   CL  --  114* 115*   CO2  --  20* 21   GLU  --  81 78   BUN  --  69* 65*   CREA  --  3.60* 3.36*   CA  --  8.4* 8.6   MG  --   --  2.5*   PHOS  --   --  4.6   ALB  --  2.7* 2.8*   ALT  --  21 15   INR 1.1  --   --      Recent Labs     11/17/21  1151 11/16/21  0533 11/15/21  0634   WBC 4.3 3.4* 4.0*   HGB 7.4* 7.5* 7.7*   HCT 23.3* 23.3* 24.0*   PLT 70* 81* 93*     No results found for: SDES  No results found for: CULT  Recent Results (from the past 24 hour(s))   PROTHROMBIN TIME + INR    Collection Time: 11/16/21  2:30 PM   Result Value Ref Range    INR 1.1 0.9 - 1.1      Prothrombin time 11.9 (H) 9.0 - 11.1 sec   GLUCOSE, POC    Collection Time: 11/16/21  5:18 PM   Result Value Ref Range    Glucose (POC) 112 65 - 117 mg/dL    Performed by Natacha Avila    GLUCOSE, POC    Collection Time: 11/16/21 10:17 PM   Result Value Ref Range    Glucose (POC) 182 (H) 65 - 117 mg/dL    Performed by Taurus Shelley    GLUCOSE, POC    Collection Time: 11/17/21  6:41 AM   Result Value Ref Range    Glucose (POC) 134 (H) 65 - 117 mg/dL    Performed by Taurus Shelley    GLUCOSE, POC    Collection Time: 11/17/21 11:23 AM   Result Value Ref Range    Glucose (POC) 159 (H) 65 - 117 mg/dL    Performed by Raquel Rodriguez    CBC WITH AUTOMATED DIFF Collection Time: 11/17/21 11:51 AM   Result Value Ref Range    WBC 4.3 4.1 - 11.1 K/uL    RBC 2.50 (L) 4.10 - 5.70 M/uL    HGB 7.4 (L) 12.1 - 17.0 g/dL    HCT 23.3 (L) 36.6 - 50.3 %    MCV 93.2 80.0 - 99.0 FL    MCH 29.6 26.0 - 34.0 PG    MCHC 31.8 30.0 - 36.5 g/dL    RDW 15.1 (H) 11.5 - 14.5 %    PLATELET 70 (L) 477 - 400 K/uL    MPV 11.8 8.9 - 12.9 FL    NRBC 0.0 0  WBC    ABSOLUTE NRBC 0.00 0.00 - 0.01 K/uL    NEUTROPHILS 68 32 - 75 %    LYMPHOCYTES 16 12 - 49 %    MONOCYTES 11 5 - 13 %    EOSINOPHILS 5 0 - 7 %    BASOPHILS 0 0 - 1 %    IMMATURE GRANULOCYTES 0 0.0 - 0.5 %    ABS. NEUTROPHILS 2.9 1.8 - 8.0 K/UL    ABS. LYMPHOCYTES 0.7 (L) 0.8 - 3.5 K/UL    ABS. MONOCYTES 0.5 0.0 - 1.0 K/UL    ABS. EOSINOPHILS 0.2 0.0 - 0.4 K/UL    ABS. BASOPHILS 0.0 0.0 - 0.1 K/UL    ABS. IMM. GRANS. 0.0 0.00 - 0.04 K/UL    DF SMEAR SCANNED      RBC COMMENTS ANISOCYTOSIS  1+               Total time spent with patient:  xxx   min. Care Plan discussed with:  Patient     Family      RN      Consulting Physician [facility]        I have reviewed the flowsheets. Chart and Pertinent Notes have been reviewed. No change in PMH ,family and social history from Consult note.       Alicia Orta MD

## 2021-11-17 NOTE — PROGRESS NOTES
[facility]  Hospitalist Group                                                                                          Hospitalist Progress Note  Kelvin Elkins MD  Answering service: [phone] from in house phone        Date of Service:  2021  NAME:  Pato Rincon  :  1960  MRN:  [id number]      Admission Summary:   Copied form admit: \" Pato Rincon is a 64 y.o. male who presents with abdominal pain       This is a 75-year-old male with past medical history of type 2 diabetes, hypertension, CKD (baseline cr around 3.2 per lab in 9304), diastolic HF, and CAD (cardiac bypass about 1 year ago; currently on DAPT) who presents to the ER today for subsequent evaluation of abdominal pain. Pain locates epigastric and mid upper abd, worse by eating, has intermittent vomiting, denied blood in vomitus. Also with chronic exertional chest pain, shortness of breath after mild exertion, palpitations, and easy fatigability. He also reports formed dark black stool x 1 week in duration. Denied diarrhea, more constipated.      Patient states that his symptoms started 2 to 3 weeks ago and have continued to progress in nature since its onset. He was hospitalized  at [facility] twice last month for CHF and SEVERIANO on CKD. Currently he is on lasix 40mg po daily at home, he feels not working well, not enough urine output and still swelling in legs.      Patient also endorses left-sided chest pain that does not seem to be present at rest but becomes aggravated with minimal physical exertion. \"    Interval history / Subjective:   2021. No acute events overnight. Still complaining of orthopnea and paroxysmal nocturnal dyspnea, reporting mild improvement of epigastric pain, p.o. tolerant, having normal bowel bladder movements, denies any fever, chills, nausea, vomiting, reporting adequate urine output, patient worried about being on too many medications. Patient is pending thoracentesis. Assessment & Plan:       Dyspepsia:   Improving. P.o. tolerant. Likely due to gastritis, passive hepatic congestion due to acute CHF is also possible. Status post EGD 11/16/2021. Patchy erythema with mild atrophy in antrum and body. Single to 3 mm erosions in the proximal body without bleeding or overt ulceration. Continue supportive measures, follow-up H. pylori antigen and biopsy results. CAD:  History of CABG x1 year. Denies any anginal symptoms. Continue antiplatelets, ARB, beta-blockers, statins. Hepatic cirrhosis:  No history of cirrhosis, likely due to passive hepatic congestion caused by underlying   Reports recent liver biopsy, as outpatient. Unsuccessful paracentesis. Pending thoracentesis. CT abdomen on admission:  1. Moderate volume ascites. Moderate bilateral pleural effusions. Anasarca. 2.  Several thick-walled loops of small bowel in the midabdomen, may be due to  underdistention or hypoproteinemic state, correlate for infectious or inflammatory enteritis. US abdomen on admission: Hepatic parenchymal disease with cirrhotic morphology. Perihepatic ascites and right pleural effusion noted. Pulsatile main portal vein waveform, likely sequela of hepatic parenchymal disease. \"    Acute on chronic diastolic congestive heart failure:   NYHA class III, HFpEF. Reporting orthopnea proximal nocturnal dyspnea, evidenced by anasarca. Presented with diffuse anasarca, hypertension, shortness of breath in the setting of worsening CKD. Reporting recent stressors and in [facility], EF 50% per reprot. Continue Lasix 60 mg IV 3 times daily. Continue ARB, uptitrate as tolerated. Nephrology okay with continuing ARB. Continue beta-blockers, uptitrate as tolerates. Continue with strict ins and outs, fluid restriction, daily weight, low-sodium diet. Cardiology on board, recommendations noted. CKD stage IV. Nonoliguric, denies any uremic symptoms. Electrolytes WNL, volume overloaded. Nephrology on board, no hemodialysis planned. Renally dose medications, monitor volume status and electrolytes, replace electrolytes as needed. Nephrology on board. Recommendations noted. Diabetes type 2:  Diabetic diet, basal, sliding scale, prandial insulin. Accu-Chek, hypoglycemia protocol. Resume home meds upon discharge. Outpatient PCP follow-up. Lab Results   Component Value Date/Time    Creatinine 3.94 (H) 11/17/2021 01:25 PM     lasix dose increased  -> S Cr rises in response, edema/sob better. 4. Dm: LANTUS AND ssi, will adjust   Lab Results   Component Value Date/Time    Glucose 127 (H) 11/17/2021 01:25 PM    Glucose (POC) 159 (H) 11/17/2021 11:23 AM              Hospital Problems  Date Reviewed: 8/7/2020          Codes Class Noted POA    CHF exacerbation ([facility] [address] ICD-10-CM: I50.9  ICD-9-CM: 428.0  11/14/2021 Unknown                  After personally interviewing pt at bedside the following is noted on 11/17/2021 :    Review of Systems   Constitutional: Positive for malaise/fatigue. Respiratory: Negative for shortness of breath. Cardiovascular: Positive for orthopnea, leg swelling and PND. Gastrointestinal: Positive for abdominal pain. Genitourinary: Negative for dysuria, flank pain, frequency, hematuria and urgency. All other systems reviewed and are negative. I had a face to face encounter with patient on 11/17/2021 at bedside for the following physical exam:     PHYSICAL EXAM:    Visit Vitals  BP (!) 145/70 (BP 1 Location: Right upper arm, BP Patient Position: At rest)   Pulse 62   Temp 97.8 °F (36.6 °C)   Resp 16   Ht 5' 9\" (1.753 m)   Wt 85 kg (187 lb 4.8 oz)   SpO2 97%   BMI 27.66 kg/m²          Physical Exam  Constitutional:       General: He is not in acute distress. Appearance: Normal appearance. He is normal weight. He is not ill-appearing, toxic-appearing or diaphoretic.    HENT:      Head: Normocephalic and atraumatic. Right Ear: External ear normal.      Left Ear: External ear normal.      Nose: Nose normal.      Mouth/Throat:      Mouth: Mucous membranes are moist.      Pharynx: Oropharynx is clear. Eyes:      Extraocular Movements: Extraocular movements intact. Conjunctiva/sclera: Conjunctivae normal.      Pupils: Pupils are equal, round, and reactive to light. Cardiovascular:      Rate and Rhythm: Normal rate and regular rhythm. Pulses: Normal pulses. Heart sounds: Normal heart sounds, S1 normal and S2 normal.   Pulmonary:      Effort: Pulmonary effort is normal.      Breath sounds: Decreased breath sounds present. Abdominal:      General: Abdomen is flat. Bowel sounds are normal.      Palpations: Abdomen is soft. Tenderness: There is abdominal tenderness in the epigastric area. Musculoskeletal:         General: Swelling present. No tenderness or deformity. Cervical back: Normal range of motion and neck supple. Right lower le+ Edema present. Left lower le+ Edema present. Skin:     General: Skin is warm and dry. Neurological:      General: No focal deficit present. Mental Status: He is alert. Mental status is at baseline.    Psychiatric:         Mood and Affect: Mood normal.         Behavior: Behavior normal.                     Data Review:    Review and/or order of clinical lab test      Labs:     Recent Labs     21  1151 21  0533   WBC 4.3 3.4*   HGB 7.4* 7.5*   HCT 23.3* 23.3*   PLT 70* 81*     Recent Labs     21  1325 21  0533 11/15/21  0634    140 142   K 4.8 4.8 4.4   * 114* 115*   CO2 21 20* 21   BUN 68* 69* 65*   CREA 3.94* 3.60* 3.36*   * 81 78   CA 8.3* 8.4* 8.6   MG  --   --  2.5*   PHOS 5.3*  --  4.6     Recent Labs     21  1325 21  0533 11/15/21  0634   ALT  --   15   AP  --  105 111   TBILI  --  0.5 0.5   TP  --  7.0 7.1   ALB 2.7* 2.7* 2.8*   GLOB  --  4.3* 4.3* Recent Labs     11/16/21  1430   INR 1.1   PTP 11.9*      Recent Labs     11/15/21  0634   TIBC 268   PSAT 21      No results found for: FOL, RBCF   No results for input(s): PH, PCO2, PO2 in the last 72 hours. No results for input(s): CPK, CKNDX, TROIQ in the last 72 hours.     No lab exists for component: CPKMB  Lab Results   Component Value Date/Time    Cholesterol, total 176 11/22/2019 09:36 AM    HDL Cholesterol 45 11/22/2019 09:36 AM    LDL, calculated 112 (H) 11/22/2019 09:36 AM    Triglyceride 94 11/22/2019 09:36 AM     Lab Results   Component Value Date/Time    Glucose (POC) 159 (H) 11/17/2021 11:23 AM    Glucose (POC) 134 (H) 11/17/2021 06:41 AM    Glucose (POC) 182 (H) 11/16/2021 10:17 PM    Glucose (POC) 112 11/16/2021 05:18 PM    Glucose (POC) 152 (H) 11/16/2021 12:05 PM     Lab Results   Component Value Date/Time    Color YELLOW/STRAW 11/14/2021 10:31 AM    Appearance CLEAR 11/14/2021 10:31 AM    Specific gravity 1.019 11/14/2021 10:31 AM    pH (UA) 5.5 11/14/2021 10:31 AM    Protein >300 (A) 11/14/2021 10:31 AM    Glucose 500 (A) 11/14/2021 10:31 AM    Ketone Negative 11/14/2021 10:31 AM    Bilirubin Negative 11/14/2021 10:31 AM    Urobilinogen 0.2 11/14/2021 10:31 AM    Nitrites Negative 11/14/2021 10:31 AM    Leukocyte Esterase Negative 11/14/2021 10:31 AM    Epithelial cells FEW 11/14/2021 10:31 AM    Bacteria Negative 11/14/2021 10:31 AM    WBC 0-4 11/14/2021 10:31 AM    RBC 0-5 11/14/2021 10:31 AM         Medications Reviewed:     Current Facility-Administered Medications   Medication Dose Route Frequency    metoprolol succinate (TOPROL-XL) XL tablet 100 mg  100 mg Oral DAILY    aspirin chewable tablet 81 mg  81 mg Oral DAILY    polyethylene glycol (MIRALAX) packet 17 g  17 g Oral BID    [START ON 11/18/2021] eplerenone (INSPRA) tablet 25 mg  25 mg Oral DAILY    bumetanide (BUMEX) injection 2 mg  2 mg IntraVENous Q12H    albumin human 25% (BUMINATE) solution 25 g  25 g IntraVENous Q6H  isosorbide mononitrate ER (IMDUR) tablet 60 mg  60 mg Oral DAILY    sodium chloride (NS) flush 5-40 mL  5-40 mL IntraVENous Q8H    sodium chloride (NS) flush 5-40 mL  5-40 mL IntraVENous PRN    pantoprazole (PROTONIX) tablet 40 mg  40 mg Oral ACB    sodium zirconium cyclosilicate (LOKELMA) powder packet 10 g  10 g Oral DAILY    morphine injection 4 mg  4 mg IntraVENous Q4H PRN    heparin (porcine) injection 5,000 Units  5,000 Units SubCUTAneous Q8H    losartan (COZAAR) tablet 100 mg  100 mg Oral DAILY    epoetin ericka-epbx (RETACRIT) injection 20,000 Units  20,000 Units SubCUTAneous Q MON, WED & FRI    butalbital-acetaminophen-caffeine (FIORICET, ESGIC) -40 mg per tablet 1 Tablet  1 Tablet Oral Q4H PRN    ondansetron (ZOFRAN) injection 4 mg  4 mg IntraVENous Q4H PRN    amLODIPine (NORVASC) tablet 10 mg  10 mg Oral DAILY    busPIRone (BUSPAR) tablet 10 mg  10 mg Oral BID    gabapentin (NEURONTIN) capsule 300 mg  300 mg Oral TID    rosuvastatin (CRESTOR) tablet 10 mg  10 mg Oral QHS    sertraline (ZOLOFT) tablet 200 mg  200 mg Oral DAILY    traMADoL (ULTRAM) tablet 50 mg  50 mg Oral Q6H PRN    traZODone (DESYREL) tablet 50 mg  50 mg Oral QHS    acetaminophen (TYLENOL) tablet 650 mg  650 mg Oral Q6H PRN    glucose chewable tablet 16 g  4 Tablet Oral PRN    dextrose (D50W) injection syrg 12.5-25 g  25-50 mL IntraVENous PRN    glucagon (GLUCAGEN) injection 1 mg  1 mg IntraMUSCular PRN    insulin glargine (LANTUS) injection 10 Units  10 Units SubCUTAneous DAILY    insulin lispro (HUMALOG) injection   SubCUTAneous AC&HS    hydrALAZINE (APRESOLINE) 20 mg/mL injection 10 mg  10 mg IntraVENous Q6H PRN     ______________________________________________________________________  EXPECTED LENGTH OF STAY: 3d 19h  ACTUAL LENGTH OF STAY:          3                 Bright Ferris MD

## 2021-11-17 NOTE — PROGRESS NOTES
HYPOGLYCEMIC EPISODE DOCUMENTATION    Patient with hypoglycemic episode(s) at 1137(time) on 11/16(date). BG value(s) pre-treatment 54    Was patient symptomatic? [] yes, [x] no  Patient was treated with the following rescue medications/treatments: [x] D50                [] Glucose tablets                [] Glucagon                [] 4oz juice                [] 6oz reg soda                [] 8oz low fat milk  BG value post-treatment: 152  Once BG treated and value greater than 80mg/dl, pt was provided with the following:  [] snack  [] meal  NPO for procedure.     Name of MD notified:[place]  The following orders were received: Per protocol

## 2021-11-18 ENCOUNTER — APPOINTMENT (OUTPATIENT)
Dept: GENERAL RADIOLOGY | Age: 61
DRG: 194 | End: 2021-11-18
Attending: INTERNAL MEDICINE
Payer: MEDICAID

## 2021-11-18 ENCOUNTER — APPOINTMENT (OUTPATIENT)
Dept: ULTRASOUND IMAGING | Age: 61
DRG: 194 | End: 2021-11-18
Attending: INTERNAL MEDICINE
Payer: MEDICAID

## 2021-11-18 ENCOUNTER — APPOINTMENT (OUTPATIENT)
Dept: CT IMAGING | Age: 61
DRG: 194 | End: 2021-11-18
Payer: MEDICAID

## 2021-11-18 ENCOUNTER — APPOINTMENT (OUTPATIENT)
Dept: GENERAL RADIOLOGY | Age: 61
DRG: 194 | End: 2021-11-18
Attending: PHYSICIAN ASSISTANT
Payer: MEDICAID

## 2021-11-18 ENCOUNTER — APPOINTMENT (OUTPATIENT)
Dept: GENERAL RADIOLOGY | Age: 61
DRG: 194 | End: 2021-11-18
Payer: MEDICAID

## 2021-11-18 LAB
ALBUMIN FLD-MCNC: 1.1 G/DL
ALBUMIN SERPL-MCNC: 2.9 G/DL (ref 3.5–5)
ALBUMIN SERPL-MCNC: 3.2 G/DL (ref 3.5–5)
ALBUMIN/GLOB SERPL: 0.7 {RATIO} (ref 1.1–2.2)
ALBUMIN/GLOB SERPL: 0.8 {RATIO} (ref 1.1–2.2)
ALP SERPL-CCNC: 104 U/L (ref 45–117)
ALP SERPL-CCNC: 117 U/L (ref 45–117)
ALT SERPL-CCNC: 21 U/L (ref 12–78)
ALT SERPL-CCNC: 57 U/L (ref 12–78)
ANION GAP SERPL CALC-SCNC: 6 MMOL/L (ref 5–15)
ANION GAP SERPL CALC-SCNC: 8 MMOL/L (ref 5–15)
APPEARANCE FLD: ABNORMAL
APTT PPP: 26.8 SEC (ref 22.1–31)
ARTERIAL PATENCY WRIST A: ABNORMAL
ARTERIAL PATENCY WRIST A: YES
AST SERPL-CCNC: 16 U/L (ref 15–37)
AST SERPL-CCNC: 69 U/L (ref 15–37)
BASE DEFICIT BLD-SCNC: 5.4 MMOL/L
BASE DEFICIT BLDA-SCNC: 7.4 MMOL/L
BASOPHILS # BLD: 0 K/UL (ref 0–0.1)
BASOPHILS NFR BLD: 0 % (ref 0–1)
BDY SITE: ABNORMAL
BDY SITE: ABNORMAL
BILIRUB SERPL-MCNC: 0.5 MG/DL (ref 0.2–1)
BILIRUB SERPL-MCNC: 0.8 MG/DL (ref 0.2–1)
BNP SERPL-MCNC: 9531 PG/ML
BUN SERPL-MCNC: 71 MG/DL (ref 6–20)
BUN SERPL-MCNC: 74 MG/DL (ref 6–20)
BUN/CREAT SERPL: 16 (ref 12–20)
BUN/CREAT SERPL: 17 (ref 12–20)
CALCIUM SERPL-MCNC: 7.9 MG/DL (ref 8.5–10.1)
CALCIUM SERPL-MCNC: 8.1 MG/DL (ref 8.5–10.1)
CHLORIDE SERPL-SCNC: 110 MMOL/L (ref 97–108)
CHLORIDE SERPL-SCNC: 111 MMOL/L (ref 97–108)
CK SERPL-CCNC: 189 U/L (ref 39–308)
CO2 SERPL-SCNC: 21 MMOL/L (ref 21–32)
CO2 SERPL-SCNC: 21 MMOL/L (ref 21–32)
COLOR FLD: YELLOW
CREAT SERPL-MCNC: 4.34 MG/DL (ref 0.7–1.3)
CREAT SERPL-MCNC: 4.45 MG/DL (ref 0.7–1.3)
D DIMER PPP FEU-MCNC: 7.36 MG/L FEU (ref 0–0.65)
DIFFERENTIAL METHOD BLD: ABNORMAL
EOSINOPHIL # BLD: 0.1 K/UL (ref 0–0.4)
EOSINOPHIL NFR BLD: 2 % (ref 0–7)
ERYTHROCYTE [DISTWIDTH] IN BLOOD BY AUTOMATED COUNT: 14.9 % (ref 11.5–14.5)
ERYTHROCYTE [DISTWIDTH] IN BLOOD BY AUTOMATED COUNT: 15.2 % (ref 11.5–14.5)
FIBRINOGEN PPP-MCNC: 417 MG/DL (ref 200–475)
GAS FLOW.O2 O2 DELIVERY SYS: ABNORMAL L/MIN
GAS FLOW.O2 SETTING OXYMISER: 20 BPM
GAS FLOW.O2 SETTING OXYMISER: 24 L/MIN
GLOBULIN SER CALC-MCNC: 3.8 G/DL (ref 2–4)
GLOBULIN SER CALC-MCNC: 4.4 G/DL (ref 2–4)
GLUCOSE BLD STRIP.AUTO-MCNC: 105 MG/DL (ref 65–117)
GLUCOSE BLD STRIP.AUTO-MCNC: 65 MG/DL (ref 65–117)
GLUCOSE BLD STRIP.AUTO-MCNC: 73 MG/DL (ref 65–117)
GLUCOSE BLD STRIP.AUTO-MCNC: 79 MG/DL (ref 65–117)
GLUCOSE BLD STRIP.AUTO-MCNC: 96 MG/DL (ref 65–117)
GLUCOSE SERPL-MCNC: 72 MG/DL (ref 65–100)
GLUCOSE SERPL-MCNC: 95 MG/DL (ref 65–100)
HCO3 BLD-SCNC: 22.2 MMOL/L (ref 22–26)
HCO3 BLDA-SCNC: 19 MMOL/L (ref 22–26)
HCT VFR BLD AUTO: 22.7 % (ref 36.6–50.3)
HCT VFR BLD AUTO: 26.1 % (ref 36.6–50.3)
HGB BLD-MCNC: 7 G/DL (ref 12.1–17)
HGB BLD-MCNC: 8.2 G/DL (ref 12.1–17)
IMM GRANULOCYTES # BLD AUTO: 0 K/UL (ref 0–0.04)
IMM GRANULOCYTES NFR BLD AUTO: 0 % (ref 0–0.5)
INR PPP: 1.2 (ref 0.9–1.1)
LACTATE SERPL-SCNC: 0.3 MMOL/L (ref 0.4–2)
LACTATE SERPL-SCNC: 2.1 MMOL/L (ref 0.4–2)
LYMPHOCYTES # BLD: 1 K/UL (ref 0.8–3.5)
LYMPHOCYTES NFR BLD: 21 % (ref 12–49)
LYMPHOCYTES NFR FLD: 14 %
MAGNESIUM SERPL-MCNC: 2.4 MG/DL (ref 1.6–2.4)
MAGNESIUM SERPL-MCNC: 2.4 MG/DL (ref 1.6–2.4)
MCH RBC QN AUTO: 29.3 PG (ref 26–34)
MCH RBC QN AUTO: 30 PG (ref 26–34)
MCHC RBC AUTO-ENTMCNC: 30.8 G/DL (ref 30–36.5)
MCHC RBC AUTO-ENTMCNC: 31.4 G/DL (ref 30–36.5)
MCV RBC AUTO: 95 FL (ref 80–99)
MCV RBC AUTO: 95.6 FL (ref 80–99)
MESOTHL CELL NFR FLD: 1 %
MONOCYTES # BLD: 0.6 K/UL (ref 0–1)
MONOCYTES NFR BLD: 12 % (ref 5–13)
MONOS+MACROS NFR FLD: 82 %
NEUTROPHILS NFR FLD: 3 %
NEUTS SEG # BLD: 3.1 K/UL (ref 1.8–8)
NEUTS SEG NFR BLD: 65 % (ref 32–75)
NRBC # BLD: 0 K/UL (ref 0–0.01)
NRBC # BLD: 0 K/UL (ref 0–0.01)
NRBC BLD-RTO: 0 PER 100 WBC
NRBC BLD-RTO: 0 PER 100 WBC
NUC CELL # FLD: 223 /CU MM
O2/TOTAL GAS SETTING VFR VENT: 100 %
PCO2 BLD: 55.1 MMHG (ref 35–45)
PCO2 BLDA: 39 MMHG (ref 35–45)
PEEP RESPIRATORY: 5 CMH2O
PH BLD: 7.21 [PH] (ref 7.35–7.45)
PH BLDA: 7.29 [PH] (ref 7.35–7.45)
PHOSPHATE SERPL-MCNC: 6.6 MG/DL (ref 2.6–4.7)
PHOSPHATE SERPL-MCNC: 7.1 MG/DL (ref 2.6–4.7)
PLATELET # BLD AUTO: 68 K/UL (ref 150–400)
PLATELET # BLD AUTO: 78 K/UL (ref 150–400)
PMV BLD AUTO: 11.5 FL (ref 8.9–12.9)
PMV BLD AUTO: 11.9 FL (ref 8.9–12.9)
PO2 BLD: 346 MMHG (ref 80–100)
PO2 BLDA: 68 MMHG (ref 80–100)
POTASSIUM SERPL-SCNC: 4.6 MMOL/L (ref 3.5–5.1)
POTASSIUM SERPL-SCNC: 5.3 MMOL/L (ref 3.5–5.1)
PROT FLD-MCNC: 2.7 G/DL
PROT SERPL-MCNC: 7 G/DL (ref 6.4–8.2)
PROT SERPL-MCNC: 7.3 G/DL (ref 6.4–8.2)
PROTHROMBIN TIME: 12.2 SEC (ref 9–11.1)
RBC # BLD AUTO: 2.39 M/UL (ref 4.1–5.7)
RBC # BLD AUTO: 2.73 M/UL (ref 4.1–5.7)
RBC # FLD: >100 /CU MM
RBC MORPH BLD: ABNORMAL
RBC MORPH BLD: ABNORMAL
SAO2 % BLD: 92 % (ref 92–97)
SAO2 % BLD: 99.9 % (ref 92–97)
SAO2% DEVICE SAO2% SENSOR NAME: ABNORMAL
SERVICE CMNT-IMP: ABNORMAL
SERVICE CMNT-IMP: NORMAL
SODIUM SERPL-SCNC: 137 MMOL/L (ref 136–145)
SODIUM SERPL-SCNC: 140 MMOL/L (ref 136–145)
SPECIMEN SITE: ABNORMAL
SPECIMEN SOURCE FLD: ABNORMAL
SPECIMEN SOURCE FLD: NORMAL
SPECIMEN SOURCE FLD: NORMAL
SPECIMEN TYPE: ABNORMAL
THERAPEUTIC RANGE,PTTT: NORMAL SECS (ref 58–77)
TROPONIN-HIGH SENSITIVITY: 1447 NG/L (ref 0–76)
TROPONIN-HIGH SENSITIVITY: 167 NG/L (ref 0–76)
TROPONIN-HIGH SENSITIVITY: 815 NG/L (ref 0–76)
TSH SERPL DL<=0.05 MIU/L-ACNC: 3.52 UIU/ML (ref 0.36–3.74)
VENTILATION MODE VENT: ABNORMAL
VENTILATION MODE VENT: ABNORMAL
VT SETTING VENT: 500 ML
WBC # BLD AUTO: 3.6 K/UL (ref 4.1–11.1)
WBC # BLD AUTO: 4.8 K/UL (ref 4.1–11.1)

## 2021-11-18 PROCEDURE — 95816 EEG AWAKE AND DROWSY: CPT | Performed by: NURSE PRACTITIONER

## 2021-11-18 PROCEDURE — 65610000006 HC RM INTENSIVE CARE

## 2021-11-18 PROCEDURE — 94002 VENT MGMT INPAT INIT DAY: CPT

## 2021-11-18 PROCEDURE — 36415 COLL VENOUS BLD VENIPUNCTURE: CPT

## 2021-11-18 PROCEDURE — 74018 RADEX ABDOMEN 1 VIEW: CPT

## 2021-11-18 PROCEDURE — 84100 ASSAY OF PHOSPHORUS: CPT

## 2021-11-18 PROCEDURE — 74011250636 HC RX REV CODE- 250/636: Performed by: INTERNAL MEDICINE

## 2021-11-18 PROCEDURE — 74011000250 HC RX REV CODE- 250: Performed by: INTERNAL MEDICINE

## 2021-11-18 PROCEDURE — 74011250637 HC RX REV CODE- 250/637: Performed by: NURSE PRACTITIONER

## 2021-11-18 PROCEDURE — 74011250636 HC RX REV CODE- 250/636: Performed by: NURSE PRACTITIONER

## 2021-11-18 PROCEDURE — 85730 THROMBOPLASTIN TIME PARTIAL: CPT

## 2021-11-18 PROCEDURE — 71045 X-RAY EXAM CHEST 1 VIEW: CPT

## 2021-11-18 PROCEDURE — 74011000250 HC RX REV CODE- 250: Performed by: NURSE PRACTITIONER

## 2021-11-18 PROCEDURE — 85610 PROTHROMBIN TIME: CPT

## 2021-11-18 PROCEDURE — 82550 ASSAY OF CK (CPK): CPT

## 2021-11-18 PROCEDURE — 82803 BLOOD GASES ANY COMBINATION: CPT

## 2021-11-18 PROCEDURE — 85384 FIBRINOGEN ACTIVITY: CPT

## 2021-11-18 PROCEDURE — 83735 ASSAY OF MAGNESIUM: CPT

## 2021-11-18 PROCEDURE — 88112 CYTOPATH CELL ENHANCE TECH: CPT

## 2021-11-18 PROCEDURE — 88305 TISSUE EXAM BY PATHOLOGIST: CPT

## 2021-11-18 PROCEDURE — P9047 ALBUMIN (HUMAN), 25%, 50ML: HCPCS | Performed by: INTERNAL MEDICINE

## 2021-11-18 PROCEDURE — 82962 GLUCOSE BLOOD TEST: CPT

## 2021-11-18 PROCEDURE — 83605 ASSAY OF LACTIC ACID: CPT

## 2021-11-18 PROCEDURE — 74011000250 HC RX REV CODE- 250: Performed by: HOSPITALIST

## 2021-11-18 PROCEDURE — 95816 EEG AWAKE AND DROWSY: CPT | Performed by: PSYCHIATRY & NEUROLOGY

## 2021-11-18 PROCEDURE — 70450 CT HEAD/BRAIN W/O DYE: CPT

## 2021-11-18 PROCEDURE — C1729 CATH, DRAINAGE: HCPCS

## 2021-11-18 PROCEDURE — 84484 ASSAY OF TROPONIN QUANT: CPT

## 2021-11-18 PROCEDURE — 82042 OTHER SOURCE ALBUMIN QUAN EA: CPT

## 2021-11-18 PROCEDURE — 77030040212 HC SYS EVAC ASEPT DISP BBMI -A

## 2021-11-18 PROCEDURE — 80053 COMPREHEN METABOLIC PANEL: CPT

## 2021-11-18 PROCEDURE — 74011250637 HC RX REV CODE- 250/637: Performed by: HOSPITALIST

## 2021-11-18 PROCEDURE — 74011000250 HC RX REV CODE- 250: Performed by: PHYSICIAN ASSISTANT

## 2021-11-18 PROCEDURE — 93005 ELECTROCARDIOGRAM TRACING: CPT

## 2021-11-18 PROCEDURE — 87015 SPECIMEN INFECT AGNT CONCNTJ: CPT

## 2021-11-18 PROCEDURE — 87205 SMEAR GRAM STAIN: CPT

## 2021-11-18 PROCEDURE — 83615 LACTATE (LD) (LDH) ENZYME: CPT

## 2021-11-18 PROCEDURE — 84157 ASSAY OF PROTEIN OTHER: CPT

## 2021-11-18 PROCEDURE — 99223 1ST HOSP IP/OBS HIGH 75: CPT | Performed by: PSYCHIATRY & NEUROLOGY

## 2021-11-18 PROCEDURE — 89050 BODY FLUID CELL COUNT: CPT

## 2021-11-18 PROCEDURE — 32555 ASPIRATE PLEURA W/ IMAGING: CPT

## 2021-11-18 PROCEDURE — 85027 COMPLETE CBC AUTOMATED: CPT

## 2021-11-18 PROCEDURE — C9113 INJ PANTOPRAZOLE SODIUM, VIA: HCPCS | Performed by: INTERNAL MEDICINE

## 2021-11-18 PROCEDURE — 36600 WITHDRAWAL OF ARTERIAL BLOOD: CPT

## 2021-11-18 PROCEDURE — 84443 ASSAY THYROID STIM HORMONE: CPT

## 2021-11-18 PROCEDURE — 83880 ASSAY OF NATRIURETIC PEPTIDE: CPT

## 2021-11-18 PROCEDURE — 85379 FIBRIN DEGRADATION QUANT: CPT

## 2021-11-18 PROCEDURE — 85025 COMPLETE CBC W/AUTO DIFF WBC: CPT

## 2021-11-18 RX ORDER — FENTANYL CITRATE 50 UG/ML
INJECTION, SOLUTION INTRAMUSCULAR; INTRAVENOUS
Status: DISPENSED
Start: 2021-11-18 | End: 2021-11-18

## 2021-11-18 RX ORDER — FENTANYL CITRATE 50 UG/ML
50 INJECTION, SOLUTION INTRAMUSCULAR; INTRAVENOUS ONCE
Status: COMPLETED | OUTPATIENT
Start: 2021-11-18 | End: 2021-11-18

## 2021-11-18 RX ORDER — CHLORHEXIDINE GLUCONATE 1.2 MG/ML
15 RINSE ORAL EVERY 12 HOURS
Status: DISCONTINUED | OUTPATIENT
Start: 2021-11-18 | End: 2021-11-18 | Stop reason: SDUPTHER

## 2021-11-18 RX ORDER — LIDOCAINE HYDROCHLORIDE 10 MG/ML
10 INJECTION, SOLUTION EPIDURAL; INFILTRATION; INTRACAUDAL; PERINEURAL
Status: COMPLETED | OUTPATIENT
Start: 2021-11-18 | End: 2021-11-18

## 2021-11-18 RX ORDER — SODIUM BICARBONATE 42 MG/ML
2 INJECTION, SOLUTION INTRAVENOUS
Status: COMPLETED | OUTPATIENT
Start: 2021-11-18 | End: 2021-11-18

## 2021-11-18 RX ORDER — CHLORHEXIDINE GLUCONATE 0.12 MG/ML
15 RINSE ORAL EVERY 12 HOURS
Status: DISCONTINUED | OUTPATIENT
Start: 2021-11-18 | End: 2021-11-19

## 2021-11-18 RX ORDER — LABETALOL HYDROCHLORIDE 5 MG/ML
10 INJECTION, SOLUTION INTRAVENOUS ONCE
Status: COMPLETED | OUTPATIENT
Start: 2021-11-18 | End: 2021-11-18

## 2021-11-18 RX ORDER — PROPOFOL 10 MG/ML
0-50 VIAL (ML) INTRAVENOUS
Status: DISCONTINUED | OUTPATIENT
Start: 2021-11-18 | End: 2021-11-19

## 2021-11-18 RX ORDER — AMLODIPINE BESYLATE 5 MG/1
10 TABLET ORAL DAILY
Status: DISCONTINUED | OUTPATIENT
Start: 2021-11-18 | End: 2021-11-19

## 2021-11-18 RX ORDER — METOPROLOL TARTRATE 25 MG/1
25 TABLET, FILM COATED ORAL EVERY 12 HOURS
Status: DISCONTINUED | OUTPATIENT
Start: 2021-11-18 | End: 2021-11-27

## 2021-11-18 RX ORDER — HYDRALAZINE HYDROCHLORIDE 20 MG/ML
10 INJECTION INTRAMUSCULAR; INTRAVENOUS
Status: DISCONTINUED | OUTPATIENT
Start: 2021-11-18 | End: 2021-11-19

## 2021-11-18 RX ORDER — INSULIN LISPRO 100 [IU]/ML
INJECTION, SOLUTION INTRAVENOUS; SUBCUTANEOUS EVERY 6 HOURS
Status: DISCONTINUED | OUTPATIENT
Start: 2021-11-18 | End: 2021-11-19

## 2021-11-18 RX ADMIN — Medication 10 ML: at 13:32

## 2021-11-18 RX ADMIN — ALBUMIN (HUMAN) 25 G: 0.25 INJECTION, SOLUTION INTRAVENOUS at 00:54

## 2021-11-18 RX ADMIN — POLYETHYLENE GLYCOL 3350 17 G: 17 POWDER, FOR SOLUTION ORAL at 18:10

## 2021-11-18 RX ADMIN — SODIUM BICARBONATE 84 MG: 42 INJECTION, SOLUTION INTRAVENOUS at 11:37

## 2021-11-18 RX ADMIN — METOPROLOL TARTRATE 25 MG: 25 TABLET, FILM COATED ORAL at 21:57

## 2021-11-18 RX ADMIN — ALBUMIN (HUMAN) 25 G: 0.25 INJECTION, SOLUTION INTRAVENOUS at 13:30

## 2021-11-18 RX ADMIN — CHLORHEXIDINE GLUCONATE 15 ML: 1.2 RINSE ORAL at 08:22

## 2021-11-18 RX ADMIN — Medication 10 ML: at 06:00

## 2021-11-18 RX ADMIN — LIDOCAINE HYDROCHLORIDE 10 ML: 10 INJECTION, SOLUTION EPIDURAL; INFILTRATION; INTRACAUDAL; PERINEURAL at 11:38

## 2021-11-18 RX ADMIN — ALBUMIN (HUMAN) 25 G: 0.25 INJECTION, SOLUTION INTRAVENOUS at 08:06

## 2021-11-18 RX ADMIN — HYDRALAZINE HYDROCHLORIDE 10 MG: 20 INJECTION INTRAMUSCULAR; INTRAVENOUS at 14:31

## 2021-11-18 RX ADMIN — Medication 10 ML: at 21:57

## 2021-11-18 RX ADMIN — LABETALOL HYDROCHLORIDE 10 MG: 5 INJECTION INTRAVENOUS at 18:12

## 2021-11-18 RX ADMIN — AMLODIPINE BESYLATE 10 MG: 5 TABLET ORAL at 13:40

## 2021-11-18 RX ADMIN — ALBUMIN (HUMAN) 25 G: 0.25 INJECTION, SOLUTION INTRAVENOUS at 18:08

## 2021-11-18 RX ADMIN — FENTANYL CITRATE 50 MCG: 50 INJECTION INTRAMUSCULAR; INTRAVENOUS at 11:36

## 2021-11-18 RX ADMIN — DEXTROSE MONOHYDRATE 25 G: 25 INJECTION, SOLUTION INTRAVENOUS at 18:06

## 2021-11-18 RX ADMIN — PANTOPRAZOLE SODIUM 40 MG: 40 INJECTION, POWDER, FOR SOLUTION INTRAVENOUS at 08:06

## 2021-11-18 RX ADMIN — ROSUVASTATIN 10 MG: 10 TABLET, FILM COATED ORAL at 21:57

## 2021-11-18 RX ADMIN — CHLORHEXIDINE GLUCONATE 15 ML: 0.12 RINSE ORAL at 21:00

## 2021-11-18 NOTE — PROGRESS NOTES
[facility]  Hospitalist Group     ICU Transfer/Accept Summary     This patient is being transferred INTO THE ICU  DATE OF TRANSFER: 11/18/2021       PATIENT ID: Torin Nieves  MRN: [id number]   YOB: 1960    PRIMARY CARE PROVIDER: Saundra Magdaleno MD   DATE OF ADMISSION: 11/14/2021  9:36 AM    ATTENDING PHYSICIAN: Raquel Zendejas NP  CONSULTATIONS:   IP CONSULT TO CARDIOLOGY  IP CONSULT TO NEPHROLOGY  IP CONSULT TO GASTROENTEROLOGY  IP CONSULT TO HEPATOLOGY    PROCEDURES/SURGERIES:   Procedure(s):  ESOPHAGOGASTRODUODENOSCOPY (EGD)    REASON FOR ADMISSION: <principal problem not specified>     HOSPITAL PROBLEM LIST:  Patient Active Problem List   Diagnosis Code    Essential hypertension I10    Reactive depression F32.9    Peripheral neuropathy G62.9    Type 2 diabetes mellitus with ophthalmic complication, with long-term current use of insulin (HCC) E11.39, Z79.4    Type 2 diabetes with nephropathy ([facility] [address] E11.21    Type 2 diabetes mellitus with diabetic neuropathy ([facility] [address] E11.40    CHF exacerbation ([facility] [address] I50.9         Brief HPI and Hospital Course:      Rapid response was called at 0636 for unresponsiveness. Upon initial evaluation, patient was found to be pulseless and CPR was initiated. After several rounds of ACLS and high-quality CPR, ROSC was achieved. Patient was intubated by anesthesia. 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 He had a CABG approximately a year ago. He was originally admitted 11/14/2021 for abdominal pain and chronic exertional chest pain with shortness of breath after mild exertion palpitations. He was hospitalized at [facility] twice last month for CHF and SEVERIANO on CKD. Cardiology was consulted for assistance with management of volume overload.   No intervention for chest discomfort was indicated. GI was consulted for evaluation management of abdominal pain. EGD was done. Erosions in the proximal body of the stomach without bleeding or ulceration was noted there were no varices. H. pylori is pending, PPI was initiated    Hepatology is following. Recent ultrasound of the liver showed hepatic parenchymal disease with cirrhotic morphology, peripheral ascites and right pleural effusion. Plan is for outpatient FibroScan of the liver to assess for fibrosis. Unable to do diagnostic paracentesis    Nephrology is following    Assessment and Plan:    Exacerbation of CHF. HFpEF.  EF 60% with grade III diastolic dysfunction  CKD 4  Hypertension  Anemia of chronic disease  CAD  CHF  Thrombocytopenia  T2DM  Hepatic encephalopathy  Bilateral pleural effusions  Gastric erosions without evidence of varices                     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, with long-term current use of insulin ([address] 2018    Diabetes ([address]        Past Surgical History:   Procedure Laterality Date    HX ARTERIAL BYPASS      HX OTHER SURGICAL      5th toe Metatarsal amputation 2017        Family History   Problem Relation Age of Onset    Diabetes Mother     No Known Problems Father        Social History     Socioeconomic History    Marital status:      Spouse name: Not on file    Number of children: Not on file    Years of education: Not on file    Highest education level: Not on file   Occupational History    Not on file   Tobacco Use    Smoking status: Former Smoker     Quit date: 2001     Years since quittin.5    Smokeless tobacco: Never Used   Substance and Sexual Activity    Alcohol use: No    Drug use: No    Sexual activity: Yes     Partners: Female     Birth control/protection: Condom   Other Topics Concern    Not on file   Social History Narrative    Not on file     Social Determinants of Health     Financial Resource Strain:     Difficulty of Paying Living Expenses: Not on file   Food Insecurity:     Worried About Running Out of Food in the Last Year: Not on file    Hardik of Food in the Last Year: Not on file   Transportation Needs:     Lack of Transportation (Medical): Not on file    Lack of Transportation (Non-Medical): Not on file   Physical Activity:     Days of Exercise per Week: Not on file    Minutes of Exercise per Session: Not on file   Stress:     Feeling of Stress : Not on file   Social Connections:     Frequency of Communication with Friends and Family: Not on file    Frequency of Social Gatherings with Friends and Family: Not on file    Attends Taoism Services: Not on file    Active Member of [address] [place] [address] [employer] or Organizations: Not on file    Attends Club or Organization Meetings: Not on file    Marital Status: Not on file   Intimate Partner Violence:     Fear of Current or Ex-Partner: Not on file    Emotionally Abused: Not on file    Physically Abused: Not on file    Sexually Abused: Not on file   Housing Stability:     Unable to Pay for Housing in the Last Year: Not on file    Number of [place] in the Last Year: Not on file    Unstable Housing in the Last Year: Not on file       Patients last menstrual period was      PHYSICAL EXAMINATION:  Visit Vitals  /66 (BP 1 Location: Left upper arm, BP Patient Position: At rest)   Pulse (!) 44   Temp 97.7 °F (36.5 °C)   Resp 16   Ht 5' 9\" (1.753 m)   Wt 85 kg (187 lb 6.3 oz)   SpO2 94%   BMI 27.67 kg/m²       General:           Obtunded, post cardiac arrest  HEENT:           Atraumatic, MMM            Neck:               Supple, symmetrical,  thyroid: non tender  Lungs:             Clear to auscultation bilaterally. No Wheezing or Rhonchi. No rales. Heart:              Regular  rhythm,  No  murmur   No edema  Abdomen:        Softly distended, Bowel sounds hypoactive  Extremities:     No cyanosis.   No clubbing,   thready pulses, no edema  Skin:                Not pale. Not Jaundiced  No rashes   Psych:             Not anxious or agitated. Neurologic:       Unresponsive post cardiac arrest  CODE STATUS:  x Full Code    DNR    Partial    Comfort Care       IMPENDING DETERIORATION -Airway, Respiratory and Cardiovascular    ASSOCIATED RISK FACTORS - Hypotension, Dysrhythmia and CNS Decompensation    MANAGEMENT- Bedside Assessment, Supervision of Care and Transfer    INTERPRETATION -  ECG and Blood Pressure    INTERVENTIONS - hemodynamic mngmt, vent mngmt and CPR    CASE REVIEW - Hospitalist/Intensivist, Nursing and Family    TREATMENT RESPONSE -Improved    PERFORMED BY - Self      This patient is unstable and critically ill. Due to a high probability of clinically significant, life threatening deterioration, the patient required my highest level of preparedness to intervene emergently and I personally spent   31 minutes of critical care time directly and personally managing the patient, and was immediately available to the patient. This critical care time included obtaining a history; examining the patient; pulse oximetry; ordering and review of labs/studies; arranging urgent treatment with development of a management plan; evaluation of patient's response to treatment; frequent reassessment; and, discussions with other providers and/or family. This critical care time was performed to assess and manage the high probability of imminent, life-threatening deterioration that could result in multi-organ failure and death. Gayle Seip, MPH, MSN, RN, NP-C  [phone] or via Perfect [employer]         Signed:   Gayle Seip, NP  Date of Service:  11/18/2021  6:57 AM    This document was created using voice dictation software. Any misspellings and/or medical errors should be excused.   If clarification on any part of this document is needed, please do not hesitate to contact the provider directly

## 2021-11-18 NOTE — PROGRESS NOTES
4374 Patient's heart rate sustaining in the 30's. Went into patient  room to assess. Patient found unresponsive. 6587 Initiated RRT. Upon arrival of the RRT, patient was found pulseless and CPR was initiated. 8034 Epi and Bicarb administered. ROSC was obtained but patient lost pulse again. Another round of CPR initiated. 5231 patient is intubated and another epi and bicarb was administered. ROSC is achieved. Patient is transferred to room 6 in the ICU for further management.   Bedside report given to \[facility]\""

## 2021-11-18 NOTE — PROCEDURES
Intubation Note    Called to bedside secondary to Code Blue. Patient pre-oxygenated with 100% oxygen. Patient unresponsive and chest compressions in progress. DVL x 1 atraumatic with Pereyra. Posterior pharynx was clear at the time of OETT placement. 8.0 ETT taped and secured at 22 cm at the teeth.    + Bilateral BS, + Chest rise, + ETCO2, dentition unchanged. VSS. CXR pending.     Care turned over to covering ICU Attending MD.

## 2021-11-18 NOTE — PROGRESS NOTES
0730 Bedside and Verbal shift change report given to [address] (oncoming nurse) by Jun Davis RN (offgoing nurse). Report included the following information SBAR, Kardex, Procedure Summary, Intake/Output, MAR, Recent Results and Dual Neuro Assessment. Grant, OGT, IV's placed post respiratory arrest and transferred to ICU intubated. Family aware. [id number]   -Stat head CT completed. Uneventful, fairly tolerated. (** negative)  -Neuro consult placed, pt. Seen at bedside. EEG pending. Possible MRI if needed in the future.  -Cardiology paged to make aware of occurrences. Will be by to see today. Trending troponin's, continued to increase, Card MD stated expected this increase based on today's events. -EEG at bedside being completed. Awaiting results. -KUB to confirm OGT. (**correct placement)  -thoracentesis consent obtained by RICKEY RODAS, procedure completed. 750 mL obtained and specemin sent to laboratory. Follow up chest xray completed. -Nephro MD obtained consent for HD and Alexandre line placement, will possibly start in next 24 hrs. Will continue to monitor.   -trending lactic's. (0721 lactic 2.1, at 1206 lactic 0.3)  -echo ordered and awaiting completion. 1800 Patient becoming more alert. Eyes opening to voice and tracking. Nodding intermittently appropriately. Pupils equal and reactive. Intermittently following simple commands such as wiggling toes or fingers. NP aware. 1930 Bedside and Verbal shift change report given to BRAIN Monsalve (oncoming nurse) by JOHNNY [address] (offgoing nurse). Report included the following information SBAR, Kardex, Procedure Summary, Intake/Output, MAR, Recent Results and Dual Neuro Assessment.

## 2021-11-18 NOTE — CONSULTS
[facility]    ICU TEAM Consult Note    Name: Heather Wilson   : 1960   MRN: [id number]   Date: 2021      Patient is asked to be seen by Dr. Hans Turcios for cardiac arrest, necessitating the need for possible ICU care. Reason for ICU Admission: Cardiac arrest    HPI:  Heather Wilson is a 64 y.o.  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 He had a CABG approximately a year ago. He was originally admitted 2021 for abdominal pain and chronic exertional chest pain with shortness of breath after mild exertion palpitations. He was hospitalized at [facility] twice last month for CHF and SEVERIANO on CKD. Cardiology was consulted for assistance with management of volume overload. No intervention for chest discomfort was indicated. GI was consulted for evaluation management of abdominal pain. EGD was done. Erosions in the proximal body of the stomach without bleeding or ulceration was noted there were no varices. H. pylori is pending, PPI was initiated. Hepatology is following. Recent ultrasound of the liver showed hepatic parenchymal disease with cirrhotic morphology, peripheral ascites and right pleural effusion. Plan was for outpatient FibroScan of the liver to assess for fibrosis. Unable to do diagnostic paracentesis. Diagnostic thoracentesis scheduled for today.  - EGD done by GI. Erosions in the proximal body of the stomach without bleeding or ulceration was noted there were no varices. 21  This am patient was found unresponsive when checked for a HR sustained in the 30's. Found to be in PEA arrest. Code Blue was called and patient was resuscitated, see code note for details. 12 minutes to ROSC documented. Patient was intubated by anesthesia and transferred to ICU. Girlfriend is at bedside.  Daughter is next of kin and has been updated on events. Subjective:   Overnight Events:   11/18/2021     POD:  2 Days Post-Op    S/P:   Procedure(s):  ESOPHAGOGASTRODUODENOSCOPY (EGD)    Assessment:     ICU Problems:  PEA Arrest 12 mins to ROSC  Acute respiratory failure 2' to above on mechanical ventilation  Exacerbation of CHF. HFpEF. EF 60% with grade III diastolic dysfunction  CKD 4  Anemia of chronic disease  CAD  Thrombocytopenia  T2DM  Hepatic encephalopathy  Bilateral pleural effusions  Gastric erosions without evidence of varices  Hx Hypertension    ICU Comprehensive Plan of Care:     Plans for this Shift:     Admit to ICU   1. Neuro:   a. Stat Head CT  b. EEG today  a. Neurology consult  b. Keep normothermic, prevent hyperthermia  2. Pulm:   a. Mechanical ventilation   b. Chest xray and ABG now.  c. Vent adjusted, Repeat ABG 1200  d. Pleural effusions - US to eval for thoracentesis today  2. Cardiac:   a. Call cardiology to update - re-consult  b. Limited Echo   c. Epi gtt at 1, wean off as able  d. Trend trops x3  e. EKG - NSR - no ST elevation  3. Renal:  a. Nephrology following.   b. Trend renal indices  c. May need dialysis if worsening indices and or becomes oliguric. 4. GI:   a. OGT clamp, may give meds and start on TF this evening.  b. Hepatology following  c. PPI for erosions. d. H. pylori is pending  e. Hepatology is following. Recent ultrasound of the liver showed hepatic parenchymal disease with cirrhotic morphology. 5. ID:   a. Send sputum cx   b. Plural fluid for cx  6. Heme:  a. Trend CBC and coags  b. Monitor platelet count daily  7. Endo:   a. SSI q 6hrs PRN   8. MSK:    a. check CK     9. SBP Goal of: > 90 mmHg  10. MAP Goal of: > 65 mmHg  11. Epinephrine - For above SBP/MAP goals  12. IVFs: None  13. Transfusion Trigger (Hgb): <7 g/dL  14. Respiratory Goals:  a. Chlorhexidine   b. Optimize PEEP/Ventilation/Oxygenation  c. Goal Tidal Volume 6 cc/kg based on IBW  d. Aim for lung protective ventilation  e.  Head of bed > 30 degrees  15. Pulmonary toilet: PRN   16. SpO2 Goal: > 92%  17. Keep K>4; Mg>2   18. PT/OT: NA   19. Discussed Plan of Care/Code Status: Full Code  20. [facility]  Neurology, Nephrology, Cardiology  21. Discussed Care Plan with Bedside RN  22. Documentation of Current Medications  23. Rest of Plan Below:    F - Feeding:  Yes Start TF  A - Analgesia: None  S - Sedation: None  T - DVT Prophylaxis: SCD's or Sequential Compression Device   H - Head of Bed: > 30 Degrees  U - Ulcer Prophylaxis: Protonix (pantoprazole)   G - Glycemic Control: Insulin q 6hrs PRN SSI  S - Spontaneous Breathing Trial: No  B - Bowel Regimen: MiraLax  I - Indwelling Catheter:   Tubes: ETT and Orogastric Tube  Lines: Peripheral IV  Drains: Grant Catheter  D - De-escalation of Antibiotics: None    Active Problem List:     Problem List  Date Reviewed: 8/7/2020          Codes Class    CHF exacerbation (HCC) ICD-10-CM: I50.9  ICD-9-CM: 428.0         Type 2 diabetes with nephropathy (HCC) ICD-10-CM: E11.21  ICD-9-CM: 250.40, 583.81         Type 2 diabetes mellitus with diabetic neuropathy (HCC) ICD-10-CM: E11.40  ICD-9-CM: 250.60, 357.2         Type 2 diabetes mellitus with ophthalmic complication, with long-term current use of insulin (HCC) ICD-10-CM: E11.39, Z79.4  ICD-9-CM: 250.50, V58.67         Essential hypertension ICD-10-CM: I10  ICD-9-CM: 401.9         Reactive depression ICD-10-CM: F32.9  ICD-9-CM: 300.4         Peripheral neuropathy ICD-10-CM: G62.9  ICD-9-CM: 356.9               Past Medical History:      has a past medical history of Controlled type 2 diabetes mellitus with ophthalmic complication, with long-term current use of insulin ([facility] [address] (11/14/2018) and Diabetes ([facility] [address] Past Surgical History:      has a past surgical history that includes hx other surgical and hx arterial bypass. Home Medications:     Prior to Admission medications    Medication Sig Start Date End Date Taking?  Authorizing Provider   isosorbide mononitrate ER (IMDUR) 30 mg tablet Take 60 mg by mouth daily. Yes Provider, Historical   busPIRone (BUSPAR) 10 mg tablet Take 10 mg by mouth two (2) times a day. Yes Provider, Historical   traMADoL (ULTRAM) 50 mg tablet TAKE 1 TABLET BY MOUTH EVERY 4 HOURS AS NEEDED FOR PAIN 6/10/20   Provider, Historical   insulin glargine (Lantus U-100 Insulin) 100 unit/mL injection E11.65 Take 25 units daily SubQ at night 8/7/20   Carmen Wynne NP   sertraline (ZOLOFT) 100 mg tablet Take 2 Tabs by mouth daily. 8/7/20   Berta Wynne NP   hydroCHLOROthiazide (HYDRODIURIL) 25 mg tablet Take 1 Tab by mouth daily. 8/7/20   Berta Wynne NP   furosemide (LASIX) 40 mg tablet Take 1 Tab by mouth daily. 8/7/20   Berta Wynne NP   metoprolol succinate (TOPROL-XL) 50 mg XL tablet Take 1 Tab by mouth daily. 7/1/20   Berta Wynne NP   traZODone (DESYREL) 50 mg tablet Take 1 Tab by mouth nightly. 7/1/20   Berta Wynne NP   insulin syringe-needle U-100 (BD Insulin Syringe Ultra-Fine) 0.3 mL 31 gauge x 15/64\" syrg 1 Units by Does Not Apply route three (3) times daily. 5/13/20   Michelle Wynne NP   glucose blood VI test strips (ASCENSIA AUTODISC VI, ONE TOUCH ULTRA TEST VI) strip E11.65 test fasting glucose in the morning and before meals as needed 11/22/19   Berta Wynne NP   glipiZIDE (GLUCOTROL) 5 mg tablet Take 2 Tabs by mouth daily. 8/5/19   Berta Wynne NP   raNITIdine (ZANTAC) 150 mg tablet Take 1 Tab by mouth two (2) times a day. 4/25/19   Jean Shankar MD   cetirizine (ZYRTEC) 5 mg tablet Take 1 Tab by mouth daily as needed for Itching. 3/21/19   Jean Shankar MD   metFORMIN (GLUCOPHAGE) 1,000 mg tablet Take 1 Tab by mouth two (2) times daily (with meals). 11/26/18   Jean Shankar MD   rosuvastatin (CRESTOR) 10 mg tablet Take 1 Tab by mouth nightly. 11/14/18   Jean Shankar MD   amLODIPine (NORVASC) 10 mg tablet Take 1 Tab by mouth daily.  11/14/18   Jean Shankar MD gabapentin (NEURONTIN) 300 mg capsule Take 1 Cap by mouth three (3) times daily. 18   Justina Clement MD   lisinopril (PRINIVIL, ZESTRIL) 40 mg tablet Take 1 Tab by mouth daily. 18   Justina Clement MD       Allergies/Social/Family History:     No Known Allergies   Social History     Tobacco Use    Smoking status: Former Smoker     Quit date: 2001     Years since quittin.5    Smokeless tobacco: Never Used   Substance Use Topics    Alcohol use: No      Family History   Problem Relation Age of Onset    Diabetes Mother     No Known Problems Father        Review of Systems:     Review of systems not obtained due to patient factors. Objective:   Vital Signs:  Visit Vitals  /60   Pulse 65   Temp 98 °F (36.7 °C)   Resp 20   Ht 5' 9\" (1.753 m)   Wt 85 kg (187 lb 6.3 oz)   SpO2 100%   BMI 27.67 kg/m²    O2 Flow Rate (L/min): 2 l/min O2 Device: Ventilator Temp (24hrs), Av.8 °F (36.6 °C), Min:97.6 °F (36.4 °C), Max:98 °F (36.7 °C)           Intake/Output:     Intake/Output Summary (Last 24 hours) at 2021 1015  Last data filed at 2021 0900  Gross per 24 hour   Intake 540 ml   Output 600 ml   Net -60 ml       Physical Exam:  General: appears stated age, unresponsive  Eye: conjunctivae/corneas clear. Pupils are equal, round and very sluggish. Neurologic: decerebrate posturing noted on arrival to ICU, does not respond to voice, does not open eyes. Lymphatic: Cervical, supraclavicular, and axillary nodes normal.   Neck:  normal and no erythema or exudates noted. Lungs:  clear to auscultation bilaterally  Heart:  regular rate and rhythm, S1, S2 normal, no murmur, click, rub or gallop  Abdomen:  soft, non-tender.  Bowel sounds normal. No masses,  no organomegaly  Cardiovascular:  Regular rate and rhythm, S1S2 present, without murmur or extra heart sounds, pedal pulses normal and no edema  Skin:  Normal. and no rash or abnormalities    LABS AND  DATA: Personally reviewed  Recent Labs     11/18/21 0721 11/18/21  0059   WBC 4.8 3.6*   HGB 8.2* 7.0*   HCT 26.1* 22.7*   PLT 78* 68*     Recent Labs     11/18/21 0721 11/18/21  0059    137   K 4.6 5.3*   * 110*   CO2 21 21   BUN 71* 74*   CREA 4.45* 4.34*   GLU 95 72   CA 7.9* 8.1*   MG 2.4 2.4   PHOS 7.1* 6.6*     Recent Labs     11/18/21 0721 11/18/21 0059    104   TP 7.0 7.3   ALB 3.2* 2.9*   GLOB 3.8 4.4*     Recent Labs     11/18/21 0721 11/16/21  1430   INR 1.2* 1.1   PTP 12.2* 11.9*   APTT 26.8  --       No results for input(s): PHI, PCO2I, PO2I, FIO2I in the last 72 hours. No results for input(s): CPK, CKMB, TROIQ, BNPP in the last 72 hours. Hemodynamics:   PAP:   CO:     Wedge:   CI:     CVP:    SVR:       PVR:       Ventilator Settings:  Mode Rate Tidal Volume Pressure FiO2 PEEP   Assist control, Volume control   500 ml    60 % 5 cm H20     Peak airway pressure: 25 cm H2O    Minute ventilation: 9.86 l/min        MEDS: Reviewed    Imaging:    CXR Results  (Last 48 hours)               11/18/21 0718  XR CHEST PORT Final result    Impression:  1. ET tube is in satisfactory position. 2. Left pleural effusion has increased as has atelectasis at the left lung base. There is persistent interstitial edema. Narrative:  EXAM: XR CHEST PORT       INDICATION: ett placement       COMPARISON: 11/14/2021       FINDINGS: A portable AP radiograph of the chest was obtained at 0716 hours. The   patient is on a cardiac monitor. The ET tube is in satisfactory position. There   is a small left pleural effusion which has increased in the interval. Small   right pleural effusion is unchanged. Perihilar pulmonary edema is unchanged. There is increasing atelectasis in left lower lobe. CT Results  (Last 48 hours)               11/18/21 0849  CT HEAD WO CONT Final result    Impression:      No acute process.        Narrative:  INDICATION: Unresponsive, seizure like activity       EXAM:  HEAD CT WITHOUT CONTRAST COMPARISON: None       TECHNIQUE:  Routine noncontrast axial head CT was performed. Sagittal and   coronal reconstructions were generated. CT dose reduction was achieved through use of a standardized protocol tailored   for this examination and automatic exposure control for dose modulation. FINDINGS:       Ventricles: Midline, no hydrocephalus. Intracranial Hemorrhage: None. Brain Parenchyma/Brainstem: Normal for age. Basal Cisterns: Normal.   Paranasal Sinuses: Chronic peripheral mucosal thickening in the maxillary and   sphenoid sinuses. Additional Comments: N/A.               ECHO: (Repeat echo pending)  Interpretation Summary    Result status: Final result  · LV: Calculated LVEF is 53%. Normal cavity size. Mild concentric hypertrophy. Globally reduced systolic function. Low normal systolic function. Wall motion: normal.  · RV: Moderately reduced systolic function. · TV: Moderate to severe tricuspid valve regurgitation is present. Right Ventricular Arterial Pressure (RVSP) is 35 mmHg. Pulmonary hypertension not suggested by Doppler findings. · LA: Moderately dilated left atrium. · Pericardium: There is a large left pleural effusion. Multidisciplinary Rounds Completed: Yes    ABCDEF Bundle/Checklist Completed:  Yes    SPECIAL EQUIPMENT  None    DISPOSITION  Stay in ICU    CRITICAL CARE CONSULTANT NOTE  I had a face to face encounter with the patient, reviewed and interpreted patient data including clinical events, labs, images, vital signs, I/O's, and examined patient. I have discussed the case and the plan and management of the patient's care with the consulting services, the bedside nurses and the respiratory therapist.      NOTE OF PERSONAL INVOLVEMENT IN CARE   This patient has a high probability of imminent, clinically significant deterioration, which requires the highest level of preparedness to intervene urgently.  I participated in the decision-making and personally managed or directed the management of the following life and organ supporting interventions that required my frequent assessment to treat or prevent imminent deterioration. I personally spent 60 minutes of critical care time. This is time spent at this critically ill patient's bedside actively involved in patient care as well as the coordination of care and discussions with the patient's family. This does not include any procedural time which has been billed separately.     Duyen Holt [facility]     [address] [facility]

## 2021-11-18 NOTE — CONSULTS
INPATIENT NEUROLOGY CONSULTATION  2021     Consulted by: Christianne Vegas MD        Patient ID:  Casandra Orozco  [id number]  64 y.o.  1960    CC: Richardean Eisenmenger HPI    Aime Laboy is a 55-year-old gentleman who was admitted on  for abdominal pain. Neurology was consulted because of the rapid response earlier this morning in which he was found unresponsive for an unknown period of time. He had no pulse. CPR initiated for about 12 minutes and he had ROSC. He is now in the ICU intubated. No sedation. Girlfriend at the bedside. He had EGD done without bleed prior to this. Head CT about an hour ago completely benign. 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, with long-term current use of insulin ([facility] [address] [facility] 2018    Diabetes ([facility] [address] [facility]      Family History   Problem Relation Age of Onset    Diabetes Mother     No Known Problems Father      Social History     Socioeconomic History    Marital status:      Spouse name: Not on file    Number of children: Not on file    Years of education: Not on file    Highest education level: Not on file   Occupational History    Not on file   Tobacco Use    Smoking status: Former Smoker     Quit date: 2001     Years since quittin.5    Smokeless tobacco: Never Used   Substance and Sexual Activity    Alcohol use: No    Drug use: No    Sexual activity: Yes     Partners: Female     Birth control/protection: Condom   Other Topics Concern    Not on file   Social History Narrative    Not on file     Social Determinants of Health     Financial Resource Strain:     Difficulty of Paying Living Expenses: Not on file   Food Insecurity:     Worried About [address] in the Last Year: Not on file    [address] in the Last Year: Not on file   Transportation Needs:     Lack of Transportation (Medical):  Not on file    Lack of Transportation (Non-Medical):  Not on file   Physical Activity:     Days of Exercise per Week: Not on file    Minutes of Exercise per Session: Not on file   Stress:     Feeling of Stress : Not on file   Social Connections:     Frequency of Communication with Friends and Family: Not on file    Frequency of Social Gatherings with Friends and Family: Not on file    Attends Taoist Services: Not on file    Active Member of [address] [place] [address] or Organizations: Not on file    Attends Club or Organization Meetings: Not on file    Marital Status: Not on file   Intimate Partner Violence:     Fear of Current or Ex-Partner: Not on file    Emotionally Abused: Not on file    Physically Abused: Not on file    Sexually Abused: Not on file   Housing Stability:     Unable to Pay for Housing in the Last Year: Not on file    Number of [place] in the Last Year: Not on file    Unstable Housing in the Last Year: Not on file     Current Facility-Administered Medications   Medication Dose Route Frequency    chlorhexidine (PERIDEX) 0.12 % mouthwash 15 mL  15 mL Oral Q12H    pantoprazole (PROTONIX) 40 mg in 0.9% sodium chloride 10 mL injection  40 mg IntraVENous DAILY    lidocaine (PF) (XYLOCAINE) 10 mg/mL (1 %) injection 10 mL  10 mL SubCUTAneous RAD ONCE    sodium bicarbonate (4.2%) injection 84 mg  2 mL SubCUTAneous RAD ONCE    [Held by provider] metoprolol succinate (TOPROL-XL) XL tablet 100 mg  100 mg Oral DAILY    aspirin chewable tablet 81 mg  81 mg Oral DAILY    polyethylene glycol (MIRALAX) packet 17 g  17 g Oral BID    [Held by provider] eplerenone (INSPRA) tablet 25 mg  25 mg Oral DAILY    [Held by provider] bumetanide (BUMEX) injection 2 mg  2 mg IntraVENous Q12H    albumin human 25% (BUMINATE) solution 25 g  25 g IntraVENous Q6H    [Held by provider] calcium carbonate (TUMS) chewable tablet 200 mg [elemental]  200 mg Oral TID WITH MEALS    [Held by provider] isosorbide mononitrate ER (IMDUR) tablet 60 mg  60 mg Oral DAILY    sodium chloride (NS) flush 5-40 mL  5-40 mL IntraVENous Q8H    sodium chloride (NS) flush 5-40 mL  5-40 mL IntraVENous PRN    [Held by provider] sodium zirconium cyclosilicate (LOKELMA) powder packet 10 g  10 g Oral DAILY    morphine injection 4 mg  4 mg IntraVENous Q4H PRN    [Held by provider] heparin (porcine) injection 5,000 Units  5,000 Units SubCUTAneous Q8H    [Held by provider] losartan (COZAAR) tablet 100 mg  100 mg Oral DAILY    epoetin ericka-epbx (RETACRIT) injection 20,000 Units  20,000 Units SubCUTAneous Q MON, WED & FRI    butalbital-acetaminophen-caffeine (FIORICET, ESGIC) -40 mg per tablet 1 Tablet  1 Tablet Oral Q4H PRN    ondansetron (ZOFRAN) injection 4 mg  4 mg IntraVENous Q4H PRN    [Held by provider] amLODIPine (NORVASC) tablet 10 mg  10 mg Oral DAILY    [Held by provider] busPIRone (BUSPAR) tablet 10 mg  10 mg Oral BID    [Held by provider] gabapentin (NEURONTIN) capsule 300 mg  300 mg Oral TID    rosuvastatin (CRESTOR) tablet 10 mg  10 mg Oral QHS    [Held by provider] sertraline (ZOLOFT) tablet 200 mg  200 mg Oral DAILY    traMADoL (ULTRAM) tablet 50 mg  50 mg Oral Q6H PRN    [Held by provider] traZODone (DESYREL) tablet 50 mg  50 mg Oral QHS    acetaminophen (TYLENOL) tablet 650 mg  650 mg Oral Q6H PRN    glucose chewable tablet 16 g  4 Tablet Oral PRN    dextrose (D50W) injection syrg 12.5-25 g  25-50 mL IntraVENous PRN    glucagon (GLUCAGEN) injection 1 mg  1 mg IntraMUSCular PRN    insulin glargine (LANTUS) injection 10 Units  10 Units SubCUTAneous DAILY    insulin lispro (HUMALOG) injection   SubCUTAneous AC&HS    hydrALAZINE (APRESOLINE) 20 mg/mL injection 10 mg  10 mg IntraVENous Q6H PRN     No Known Allergies    Visit Vitals  /60   Pulse 65   Temp 98 °F (36.7 °C)   Resp 20   Ht 5' 9\" (1.753 m)   Wt 187 lb 6.3 oz (85 kg)   SpO2 100%   BMI 27.67 kg/m²     Physical Exam  Vitals reviewed.    Constitutional: Appearance: Normal appearance. Neurologic Exam     Mental Status   Adult gentleman in bed eyes closed. Nonverbal.  Intubated. No sedation. Pupils appear slightly asymmetric right more than left but reactive minimally. Face appears grossly symmetric  He has occasional posturing of the arms and legs more noticeable with pain testing. No abnormal movements otherwise. Lab Results   Component Value Date/Time    WBC 4.8 11/18/2021 07:21 AM    HGB 8.2 (L) 11/18/2021 07:21 AM    HCT 26.1 (L) 11/18/2021 07:21 AM    PLATELET 78 (L) 66/86/5022 07:21 AM    MCV 95.6 11/18/2021 07:21 AM     Lab Results   Component Value Date/Time    Hemoglobin A1c 8.4 (H) 11/16/2021 05:33 AM    Hemoglobin A1c 10.5 (H) 11/22/2019 09:36 AM    Hemoglobin A1c 9.2 (H) 05/22/2019 09:46 AM    Glucose 95 11/18/2021 07:21 AM    Glucose (POC) 96 11/18/2021 06:39 AM    Microalb/Creat ratio (ug/mg creat.) 1,731.0 (H) 11/22/2019 09:36 AM    LDL, calculated 112 (H) 11/22/2019 09:36 AM    Creatinine 4.45 (H) 11/18/2021 07:21 AM      Lab Results   Component Value Date/Time    Cholesterol, total 176 11/22/2019 09:36 AM    HDL Cholesterol 45 11/22/2019 09:36 AM    LDL, calculated 112 (H) 11/22/2019 09:36 AM    Triglyceride 94 11/22/2019 09:36 AM        CT Results (maximum last 3): Results from [place] encounter on 11/14/21    CT HEAD WO CONT    Narrative  INDICATION: Unresponsive, seizure like activity    EXAM:  HEAD CT WITHOUT CONTRAST    COMPARISON: None    TECHNIQUE:  Routine noncontrast axial head CT was performed. Sagittal and  coronal reconstructions were generated. CT dose reduction was achieved through use of a standardized protocol tailored  for this examination and automatic exposure control for dose modulation. FINDINGS:    Ventricles: Midline, no hydrocephalus. Intracranial Hemorrhage: None. Brain Parenchyma/Brainstem: Normal for age.   Basal Cisterns: Normal.  Paranasal Sinuses: Chronic peripheral mucosal thickening in the maxillary and  sphenoid sinuses. Additional Comments: N/A. Impression  No acute process. CT ABD PELV WO CONT    Narrative  EXAM: CT ABD PELV WO CONT    INDICATION: Abd pain, vomiting    COMPARISON: None available    CONTRAST:  None. TECHNIQUE:  Thin axial images were obtained through the abdomen and pelvis. Coronal and  sagittal reformats were generated. Oral contrast was not administered. CT dose  reduction was achieved through use of a standardized protocol tailored for this  examination and automatic exposure control for dose modulation. The absence of intravenous contrast material reduces the sensitivity for  evaluation of the vasculature and solid organs. FINDINGS:  LOWER THORAX: Moderate bilateral pleural effusions  LIVER: No definite mass. BILIARY TREE: Gallbladder is within normal limits. CBD is not dilated. SPLEEN: within normal limits. PANCREAS: Atrophic. No focal abnormality. ADRENALS: Unremarkable. KIDNEYS/URETERS: No calculus or hydronephrosis. STOMACH: Unremarkable. SMALL BOWEL: Periampullary duodenal diverticulum. No dilatation. Several  thick-walled loops of small bowel in the midabdomen. COLON: No dilatation or wall thickening. APPENDIX: Normal  PERITONEUM: Moderate volume ascites. No pneumoperitoneum. RETROPERITONEUM: No lymphadenopathy or aortic aneurysm. Atherosclerosis. REPRODUCTIVE ORGANS: Prostate is not enlarged. URINARY BLADDER: No mass or calculus. BONES: No destructive bone lesion. ABDOMINAL WALL: No mass or hernia. Anasarca. ADDITIONAL COMMENTS: N/A    Impression  1. Moderate volume ascites. Moderate bilateral pleural effusions. Anasarca. 2.  Several thick-walled loops of small bowel in the midabdomen, may be due to  underdistention or hypoproteinemic state, correlate for infectious or  inflammatory enteritis. MRI Results (maximum last 3): No results found for this or any previous visit.       VAS/US/Carotid Doppler Results (maximum last 3): No results found for this or any previous visit. PET Results (maximum last 3): No results found for this or any previous visit. Assessment and Plan        58-year-old gentleman who has suffered cardiac arrest.  He is now in ICU without sedation in a coma. He is having some posturing with pain. I am highly concerned for an anoxic event. His daughter reportedly is the decision maker in this case. He might benefit from an MRI brain for further diagnostic and prognostic clarification but we should hold off until daughter arrives and make further decisions. EEG to be done this morning to rule out status. This clinical note was dictated with an electronic dictation software that can make unintentional errors. If there are any questions, please contact me directly for clarification.       [facility],   NEUROLOGIST  [employer]ipl[employer]  11/18/2021

## 2021-11-18 NOTE — PROGRESS NOTES
Provided pastoral care visit to [facility] patient. Did not include sacramental care.     Marie Fitzgerald

## 2021-11-18 NOTE — PROGRESS NOTES
PCCM Code Blue    Responded to [employer] called overhead. Arrived w/ CPR in progress. See nursing notes, Code flow sheets and documentation for details and timing. Pt rec'd 2 amps epi, bicarb w/ ROSC. Intubated by Anesthesia w/ 8.0 ETT to 24 cm @ teeth. Epi gtt started. Txfr'd to ICU for further mgmnt.      Code time 12 min    CCT = 42 min    Turner Cazares DO

## 2021-11-18 NOTE — PROGRESS NOTES
HYPOGLYCEMIC EPISODE DOCUMENTATION    Patient with hypoglycemic episode(s) at 1806(time) on 11/18/21(date). BG value(s) pre-treatment 61    Was patient symptomatic? [] yes, [x] no  Patient was treated with the following rescue medications/treatments: [x] D50                [] Glucose tablets                [] Glucagon                [] 4oz juice                [] 6oz reg soda                [] 8oz low fat milk  BG value post-treatment: 105  Once BG treated and value greater than 80mg/dl, pt was provided with the following:  [] snack  [] meal  Name of MD notified:KALEIGH Sarmiento  The following orders were received: Start tube feedings when available and continue to monitor.

## 2021-11-18 NOTE — PROGRESS NOTES
[facility]   [address] [facility], [facility] [address], [facility]  Phone: [phone]   [phone]       Nephrology Progress Note  Jennifer Graves     1960     [id number]  Date of Admission : 11/14/2021 11/18/21    CC: Follow up for CKD 4       Assessment and Plan   SEVERIANO on CKD  -Progressive diabetic nephropathy  -Anticipate needing dialysis in the next 24 hours  -Discussed with daughter Asha over the phone and consented for Alexandre catheter and RRT  -Currently no emergent need for dialysis  -Repeat labs in p.m.  -Strict I's and O's  -Continue to hold losartan and eplerenone  -Keep Grant catheter    CKD 4   -Biopsy-proven advanced diabetic nephropathy with nodular glomerulosclerosis and class IV interstitial nephritis  -Baseline creatinine 3.5 mg/dL     Volume overload:  Pleural effusions  - 2/2 Nephrotic syndrome   -Paracentesis planned     HTN  -Stable     Anemia in CKD   - continue RANDY     Recurrent abdominal pain  -Work-up per primary team     CAD s/p CABG May 2020  HFpEF  -Last echo: Preserved LVEF, moderate pulmonary hypertension PASP 50 mmHg     Acute respiratory failure: On vent        Care Plan discussed with: Patient        Interval History:  Seen and examined. Patient is currently intubated and ventilated. He suffered a respiratory arrest earlier this morning. Stroke code was called and excluded. He is currently getting EEG to rule out seizures. Patient reported some dizziness yesterday. He has been hemodynamically stable. His creatinine was worse on a.m. labs. This was after attempted diuresis for bilateral pleural effusions in the last 24 hours. I had a long discussion with the patient's daughter over the phone as well as his girlfriend at bedside. Daughter mentioned that dialysis has been discussed in the past.    Review of Systems: Review of systems not obtained due to patient factors.     Current Medications:   Current Facility-Administered Medications   Medication Dose Route Frequency    chlorhexidine (PERIDEX) 0.12 % mouthwash 15 mL  15 mL Oral Q12H    insulin lispro (HUMALOG) injection   SubCUTAneous Q6H    [START ON 11/19/2021] pantoprazole (PROTONIX) 40 mg in 0.9% sodium chloride 10 mL injection  40 mg IntraVENous DAILY    [Held by provider] metoprolol succinate (TOPROL-XL) XL tablet 100 mg  100 mg Oral DAILY    aspirin chewable tablet 81 mg  81 mg Oral DAILY    polyethylene glycol (MIRALAX) packet 17 g  17 g Oral BID    [Held by provider] eplerenone (INSPRA) tablet 25 mg  25 mg Oral DAILY    [Held by provider] bumetanide (BUMEX) injection 2 mg  2 mg IntraVENous Q12H    albumin human 25% (BUMINATE) solution 25 g  25 g IntraVENous Q6H    [Held by provider] calcium carbonate (TUMS) chewable tablet 200 mg [elemental]  200 mg Oral TID WITH MEALS    [Held by provider] isosorbide mononitrate ER (IMDUR) tablet 60 mg  60 mg Oral DAILY    sodium chloride (NS) flush 5-40 mL  5-40 mL IntraVENous Q8H    sodium chloride (NS) flush 5-40 mL  5-40 mL IntraVENous PRN    [Held by provider] sodium zirconium cyclosilicate (LOKELMA) powder packet 10 g  10 g Oral DAILY    morphine injection 4 mg  4 mg IntraVENous Q4H PRN    [Held by provider] heparin (porcine) injection 5,000 Units  5,000 Units SubCUTAneous Q8H    [Held by provider] losartan (COZAAR) tablet 100 mg  100 mg Oral DAILY    epoetin ericka-epbx (RETACRIT) injection 20,000 Units  20,000 Units SubCUTAneous Q MON, WED & FRI    butalbital-acetaminophen-caffeine (FIORICET, ESGIC) -40 mg per tablet 1 Tablet  1 Tablet Oral Q4H PRN    ondansetron (ZOFRAN) injection 4 mg  4 mg IntraVENous Q4H PRN    [Held by provider] amLODIPine (NORVASC) tablet 10 mg  10 mg Oral DAILY    [Held by provider] busPIRone (BUSPAR) tablet 10 mg  10 mg Oral BID    [Held by provider] gabapentin (NEURONTIN) capsule 300 mg  300 mg Oral TID    rosuvastatin (CRESTOR) tablet 10 mg  10 mg Oral QHS    [Held by provider] sertraline (ZOLOFT) tablet 200 mg  200 mg Oral DAILY    traMADoL (ULTRAM) tablet 50 mg  50 mg Oral Q6H PRN    [Held by provider] traZODone (DESYREL) tablet 50 mg  50 mg Oral QHS    acetaminophen (TYLENOL) tablet 650 mg  650 mg Oral Q6H PRN    glucose chewable tablet 16 g  4 Tablet Oral PRN    dextrose (D50W) injection syrg 12.5-25 g  25-50 mL IntraVENous PRN    glucagon (GLUCAGEN) injection 1 mg  1 mg IntraMUSCular PRN    [Held by provider] insulin glargine (LANTUS) injection 10 Units  10 Units SubCUTAneous DAILY    hydrALAZINE (APRESOLINE) 20 mg/mL injection 10 mg  10 mg IntraVENous Q6H PRN      No Known Allergies    Objective:  Vitals:    Vitals:    11/18/21 0800 11/18/21 0830 11/18/21 0909 11/18/21 1137   BP: (!) 129/55  133/60    Pulse: 69 67 65 65   Resp: 17 20 20 24   Temp: 98 °F (36.7 °C)      SpO2: 100% 100% 100% 98%   Weight:       Height:         Intake and Output:  11/18 0701 - 11/18 1900  In: 300 [I.V.:300]  Out: 100 [Urine:100]  11/16 1901 - 11/18 0700  In: 240 [P.O.:240]  Out: 500 [Urine:500]    Physical Examination:    General: On vent  Neck:  Supple, no mass  Resp:  Lungs CTA B/L, no wheezing , normal respiratory effort  CV:  RRR,  no murmur or rub,trace LE edema  GI:  Soft, NT, + Bowel sounds, no hepatosplenomegaly  Neurologic:  Non focal  Psych:             Unable to assess  : Grant      []    High complexity decision making was performed  []    Patient is at high-risk of decompensation with multiple organ involvement    Lab Data Personally Reviewed: I have reviewed all the pertinent labs, microbiology data and radiology studies during assessment.     Recent Labs     11/18/21  0721 11/18/21  0059 11/17/21  1325 11/16/21  1430 11/16/21  0533    137 138  --  140   K 4.6 5.3* 4.8  --  4.8   * 110* 112*  --  114*   CO2 21 21 21  --  20*   GLU 95 72 127*  --  81   BUN 71* 74* 68*  --  69*   CREA 4.45* 4.34* 3.94*  --  3.60*   CA 7.9* 8.1* 8.3*  --  8.4*   MG 2.4 2.4  --   --   --    PHOS 7.1* 6.6* 5.3* --   --    ALB 3.2* 2.9* 2.7*  --  2.7*   ALT 57 21  --   --  21   INR 1.2*  --   --  1.1  --      Recent Labs     11/18/21  0721 11/18/21  0059 11/17/21  1151 11/16/21  0533   WBC 4.8 3.6* 4.3 3.4*   HGB 8.2* 7.0* 7.4* 7.5*   HCT 26.1* 22.7* 23.3* 23.3*   PLT 78* 68* 70* 81*     No results found for: SDES  No results found for: CULT  Recent Results (from the past 24 hour(s))   CBC WITH AUTOMATED DIFF    Collection Time: 11/17/21 11:51 AM   Result Value Ref Range    WBC 4.3 4.1 - 11.1 K/uL    RBC 2.50 (L) 4.10 - 5.70 M/uL    HGB 7.4 (L) 12.1 - 17.0 g/dL    HCT 23.3 (L) 36.6 - 50.3 %    MCV 93.2 80.0 - 99.0 FL    MCH 29.6 26.0 - 34.0 PG    MCHC 31.8 30.0 - 36.5 g/dL    RDW 15.1 (H) 11.5 - 14.5 %    PLATELET 70 (L) 410 - 400 K/uL    MPV 11.8 8.9 - 12.9 FL    NRBC 0.0 0  WBC    ABSOLUTE NRBC 0.00 0.00 - 0.01 K/uL    NEUTROPHILS 68 32 - 75 %    LYMPHOCYTES 16 12 - 49 %    MONOCYTES 11 5 - 13 %    EOSINOPHILS 5 0 - 7 %    BASOPHILS 0 0 - 1 %    IMMATURE GRANULOCYTES 0 0.0 - 0.5 %    ABS. NEUTROPHILS 2.9 1.8 - 8.0 K/UL    ABS. LYMPHOCYTES 0.7 (L) 0.8 - 3.5 K/UL    ABS. MONOCYTES 0.5 0.0 - 1.0 K/UL    ABS. EOSINOPHILS 0.2 0.0 - 0.4 K/UL    ABS. BASOPHILS 0.0 0.0 - 0.1 K/UL    ABS. IMM.  GRANS. 0.0 0.00 - 0.04 K/UL    DF SMEAR SCANNED      RBC COMMENTS ANISOCYTOSIS  1+       RENAL FUNCTION PANEL    Collection Time: 11/17/21  1:25 PM   Result Value Ref Range    Sodium 138 136 - 145 mmol/L    Potassium 4.8 3.5 - 5.1 mmol/L    Chloride 112 (H) 97 - 108 mmol/L    CO2 21 21 - 32 mmol/L    Anion gap 5 5 - 15 mmol/L    Glucose 127 (H) 65 - 100 mg/dL    BUN 68 (H) 6 - 20 MG/DL    Creatinine 3.94 (H) 0.70 - 1.30 MG/DL    BUN/Creatinine ratio 17 12 - 20      GFR est AA 19 (L) >60 ml/min/1.73m2    GFR est non-AA 16 (L) >60 ml/min/1.73m2    Calcium 8.3 (L) 8.5 - 10.1 MG/DL    Phosphorus 5.3 (H) 2.6 - 4.7 MG/DL    Albumin 2.7 (L) 3.5 - 5.0 g/dL   ECHO ADULT COMPLETE    Collection Time: 11/17/21  3:11 PM   Result Value Ref Range IVSd 1.29 (A) 0.6 - 1.0 cm    LVIDd 4.89 4.2 - 5.9 cm    LVIDs 3.61 cm    LVPWd 1.17 (A) 0.6 - 1.0 cm    BP EF 52.7 (A) 55 - 100 percent    LV Ejection Fraction MOD 2C 48 percent    LV Ejection Fraction MOD 4C 53 percent    LV ED Vol A2C 74.76 mL    LV ED Vol A4C 102.30 mL    LV ED Vol BP 93.14 67 - 155 mL    LV ES Vol A2C 38.62 mL    LV ES Vol A4C 48.02 mL    LV ES Vol BP 44.10 22 - 58 mL    LVOT Peak Gradient 3.47 mmHg    LVOT Peak Velocity 93.08 cm/s    RVIDd 4.07 cm    RVSP 50.23 mmHg    Left Atrium Major Axis 4.89 cm    Est. RA Pressure 15.00 mmHg    AoV PG 8.41 mmHg    Aortic Valve Systolic Peak Velocity 382.38 cm/s    MV A Jamari 45.33 cm/s    Mitral Valve E Wave Deceleration Time 209.49 ms    MV E Jamari 109.86 cm/s    Mitral Valve Pressure Half-time 60.75 ms    MVA (PHT) 3.62 cm2    Pulmonic Valve Systolic Peak Instantaneous Gradient 6.26 mmHg    Tapse 0.96 (A) 1.5 - 2.0 cm    Triscuspid Valve Regurgitation Peak Gradient 35.23 mmHg    TR Max Velocity 296.76 cm/s    Ao Root D 2.67 cm    MV E/A 2.42     LV Mass .7 88 - 224 g    LV Mass AL Index 116.3 49 - 115 g/m2    LVES Vol Index BP 21.9 mL/m2    LVED Vol Index BP 46.3 mL/m2    Left Atrium Minor Axis 2.43 cm    LVED Vol Index A4C 50.9 mL/m2    LVED Vol Index A2C 37.2 mL/m2    LVES Vol Index A4C 23.9 mL/m2    LVES Vol Index A2C 19.2 mL/m2   GLUCOSE, POC    Collection Time: 11/17/21  4:40 PM   Result Value Ref Range    Glucose (POC) 103 65 - 117 mg/dL    Performed by Jaylon Aldridge    GLUCOSE, POC    Collection Time: 11/17/21  9:06 PM   Result Value Ref Range    Glucose (POC) 92 65 - 117 mg/dL    Performed by Radhika Valerio, COMPREHENSIVE    Collection Time: 11/18/21 12:59 AM   Result Value Ref Range    Sodium 137 136 - 145 mmol/L    Potassium 5.3 (H) 3.5 - 5.1 mmol/L    Chloride 110 (H) 97 - 108 mmol/L    CO2 21 21 - 32 mmol/L    Anion gap 6 5 - 15 mmol/L    Glucose 72 65 - 100 mg/dL    BUN 74 (H) 6 - 20 MG/DL    Creatinine 4.34 (H) 0.70 - 1.30 MG/DL    BUN/Creatinine ratio 17 12 - 20      GFR est AA 17 (L) >60 ml/min/1.73m2    GFR est non-AA 14 (L) >60 ml/min/1.73m2    Calcium 8.1 (L) 8.5 - 10.1 MG/DL    Bilirubin, total 0.5 0.2 - 1.0 MG/DL    ALT (SGPT) 21 12 - 78 U/L    AST (SGOT) 16 15 - 37 U/L    Alk. phosphatase 104 45 - 117 U/L    Protein, total 7.3 6.4 - 8.2 g/dL    Albumin 2.9 (L) 3.5 - 5.0 g/dL    Globulin 4.4 (H) 2.0 - 4.0 g/dL    A-G Ratio 0.7 (L) 1.1 - 2.2     CBC W/O DIFF    Collection Time: 11/18/21 12:59 AM   Result Value Ref Range    WBC 3.6 (L) 4.1 - 11.1 K/uL    RBC 2.39 (L) 4.10 - 5.70 M/uL    HGB 7.0 (L) 12.1 - 17.0 g/dL    HCT 22.7 (L) 36.6 - 50.3 %    MCV 95.0 80.0 - 99.0 FL    MCH 29.3 26.0 - 34.0 PG    MCHC 30.8 30.0 - 36.5 g/dL    RDW 14.9 (H) 11.5 - 14.5 %    PLATELET 68 (L) 103 - 400 K/uL    MPV 11.5 8.9 - 12.9 FL    NRBC 0.0 0  WBC    ABSOLUTE NRBC 0.00 0.00 - 0.01 K/uL   MAGNESIUM    Collection Time: 11/18/21 12:59 AM   Result Value Ref Range    Magnesium 2.4 1.6 - 2.4 mg/dL   PHOSPHORUS    Collection Time: 11/18/21 12:59 AM   Result Value Ref Range    Phosphorus 6.6 (H) 2.6 - 4.7 MG/DL   GLUCOSE, POC    Collection Time: 11/18/21  6:39 AM   Result Value Ref Range    Glucose (POC) 96 65 - 117 mg/dL    Performed by Cecille Huerta    CBC WITH AUTOMATED DIFF    Collection Time: 11/18/21  7:21 AM   Result Value Ref Range    WBC 4.8 4.1 - 11.1 K/uL    RBC 2.73 (L) 4.10 - 5.70 M/uL    HGB 8.2 (L) 12.1 - 17.0 g/dL    HCT 26.1 (L) 36.6 - 50.3 %    MCV 95.6 80.0 - 99.0 FL    MCH 30.0 26.0 - 34.0 PG    MCHC 31.4 30.0 - 36.5 g/dL    RDW 15.2 (H) 11.5 - 14.5 %    PLATELET 78 (L) 337 - 400 K/uL    MPV 11.9 8.9 - 12.9 FL    NRBC 0.0 0  WBC    ABSOLUTE NRBC 0.00 0.00 - 0.01 K/uL    NEUTROPHILS 65 32 - 75 %    LYMPHOCYTES 21 12 - 49 %    MONOCYTES 12 5 - 13 %    EOSINOPHILS 2 0 - 7 %    BASOPHILS 0 0 - 1 %    IMMATURE GRANULOCYTES 0 0.0 - 0.5 %    ABS. NEUTROPHILS 3.1 1.8 - 8.0 K/UL    ABS.  LYMPHOCYTES 1.0 0.8 - 3.5 K/UL ABS. MONOCYTES 0.6 0.0 - 1.0 K/UL    ABS. EOSINOPHILS 0.1 0.0 - 0.4 K/UL    ABS. BASOPHILS 0.0 0.0 - 0.1 K/UL    ABS. IMM. GRANS. 0.0 0.00 - 0.04 K/UL    DF SMEAR SCANNED      RBC COMMENTS ANISOCYTOSIS  1+        RBC COMMENTS MACROCYTOSIS  1+       MAGNESIUM    Collection Time: 11/18/21  7:21 AM   Result Value Ref Range    Magnesium 2.4 1.6 - 2.4 mg/dL   METABOLIC PANEL, COMPREHENSIVE    Collection Time: 11/18/21  7:21 AM   Result Value Ref Range    Sodium 140 136 - 145 mmol/L    Potassium 4.6 3.5 - 5.1 mmol/L    Chloride 111 (H) 97 - 108 mmol/L    CO2 21 21 - 32 mmol/L    Anion gap 8 5 - 15 mmol/L    Glucose 95 65 - 100 mg/dL    BUN 71 (H) 6 - 20 MG/DL    Creatinine 4.45 (H) 0.70 - 1.30 MG/DL    BUN/Creatinine ratio 16 12 - 20      GFR est AA 16 (L) >60 ml/min/1.73m2    GFR est non-AA 14 (L) >60 ml/min/1.73m2    Calcium 7.9 (L) 8.5 - 10.1 MG/DL    Bilirubin, total 0.8 0.2 - 1.0 MG/DL    ALT (SGPT) 57 12 - 78 U/L    AST (SGOT) 69 (H) 15 - 37 U/L    Alk.  phosphatase 117 45 - 117 U/L    Protein, total 7.0 6.4 - 8.2 g/dL    Albumin 3.2 (L) 3.5 - 5.0 g/dL    Globulin 3.8 2.0 - 4.0 g/dL    A-G Ratio 0.8 (L) 1.1 - 2.2     PHOSPHORUS    Collection Time: 11/18/21  7:21 AM   Result Value Ref Range    Phosphorus 7.1 (H) 2.6 - 4.7 MG/DL   LACTIC ACID    Collection Time: 11/18/21  7:21 AM   Result Value Ref Range    Lactic acid 2.1 (HH) 0.4 - 2.0 MMOL/L   D DIMER    Collection Time: 11/18/21  7:21 AM   Result Value Ref Range    D-dimer 7.36 (H) 0.00 - 0.65 mg/L FEU   FIBRINOGEN    Collection Time: 11/18/21  7:21 AM   Result Value Ref Range    Fibrinogen 417 200 - 475 mg/dL   PTT    Collection Time: 11/18/21  7:21 AM   Result Value Ref Range    aPTT 26.8 22.1 - 31.0 sec    aPTT, therapeutic range     58.0 - 77.0 SECS   PROTHROMBIN TIME + INR    Collection Time: 11/18/21  7:21 AM   Result Value Ref Range    INR 1.2 (H) 0.9 - 1.1      Prothrombin time 12.2 (H) 9.0 - 11.1 sec   NT-PRO BNP    Collection Time: 11/18/21  7:21 AM Result Value Ref Range    NT pro-BNP 9,531 (H) <125 PG/ML   TROPONIN-HIGH SENSITIVITY    Collection Time: 11/18/21  7:23 AM   Result Value Ref Range    Troponin-High Sensitivity 167 (HH) 0 - 76 ng/L   EKG, 12 LEAD, INITIAL    Collection Time: 11/18/21  7:55 AM   Result Value Ref Range    Ventricular Rate 70 BPM    Atrial Rate 70 BPM    P-R Interval 114 ms    QRS Duration 88 ms    Q-T Interval 406 ms    QTC Calculation (Bezet) 438 ms    Calculated P Axis 45 degrees    Calculated R Axis 47 degrees    Calculated T Axis 55 degrees    Diagnosis       Normal sinus rhythm  Low voltage QRS  Nonspecific T wave abnormality  When compared with ECG of 14-NOV-2021 09:18,  Nonspecific T wave abnormality, worse in Anterolateral leads     POC G3 - PUL    Collection Time: 11/18/21  9:45 AM   Result Value Ref Range    FIO2 (POC) 100 %    pH (POC) 7.21 (LL) 7.35 - 7.45      pCO2 (POC) 55.1 (H) 35.0 - 45.0 MMHG    pO2 (POC) 346 (H) 80 - 100 MMHG    HCO3 (POC) 22.2 22 - 26 MMOL/L    sO2 (POC) 99.9 (H) 92 - 97 %    Base deficit (POC) 5.4 mmol/L    Site RIGHT RADIAL      Device: ET TUBE      Mode ASSIST CONTROL      Tidal volume 500 ml    Set Rate 20 bpm    PEEP/CPAP (POC) 5 cmH2O    Allens test (POC) NOT APPLICABLE      Specimen type (POC) ARTERIAL      Critical value read back DENNIS FARRIS    TROPONIN-HIGH SENSITIVITY    Collection Time: 11/18/21 10:03 AM   Result Value Ref Range    Troponin-High Sensitivity 815 (HH) 0 - 76 ng/L   CK    Collection Time: 11/18/21 10:03 AM   Result Value Ref Range     39 - 308 U/L   TSH 3RD GENERATION    Collection Time: 11/18/21 10:03 AM   Result Value Ref Range    TSH 3.52 0.36 - 3.74 uIU/mL           Total time spent with patient:  xxx   min. Care Plan discussed with:  Patient     Family      RN      Consulting Physician [facility],         I have reviewed the flowsheets. Chart and Pertinent Notes have been reviewed.    No change in PMH ,family and social history from Consult note.       Stephan Francois MD

## 2021-11-18 NOTE — PROGRESS NOTES
Cardiology Progress Note  2021     Admit Date: 2021  Admit Diagnosis: CHF exacerbation ([facility] [address] [I50.9]  CC: none currently    Assessment:   Active Problems:    CHF exacerbation ([facility] [address] (2021)      Plan:     Limited echo ordered  Cont to trend troponin, elevation thus far is appropriate due to cardiac arrest  EKG does not show any injury/ischemic changes  Restarting cardiac meds as able    CCT 31-74 minutes    Subjective:      Omaira Enter intubated    Objective:    Physical Exam:  Overall VSSAF;    Visit Vitals  BP (!) 156/63   Pulse 70   Temp 97.6 °F (36.4 °C)   Resp 24   Ht 5' 9\" (1.753 m)   Wt 85 kg (187 lb 6.3 oz)   SpO2 100%   BMI 27.67 kg/m²     Temp (24hrs), Av.7 °F (36.5 °C), Min:97.6 °F (36.4 °C), Max:98 °F (36.7 °C)    Patient Vitals for the past 8 hrs:   Pulse   21 1540 70   21 1500 69   21 1400 72   21 1300 67   21 1200 66   21 1137 65   21 1100 68   21 1000 65   21 0909 65   21 0900 68   21 0830 67   21 0800 69    Patient Vitals for the past 8 hrs:   Resp   21 1540 24   21 1500 11   21 1400 24   21 1300 (!) 0   21 1200 24   21 1137 24   21 1100 24   21 1000 (!) 0   21 0909 20   21 0830 20   21 0800 17    Patient Vitals for the past 8 hrs:   BP   21 1500 (!) 156/63   21 1400 (!) 173/71   21 1300 (!) 165/71   21 1200 (!) 152/69   21 1100 (!) 152/67   21 1000 135/68   21 0909 133/60   21 0800 (!) 129/55       1901 -  0700  In: 240 [P.O.:240]  Out: 500 [Urine:500]      General Appearance: Well developed, well nourished, no acute distress. Ears/Nose/Mouth/Throat:   Normal MM; anicteric. JVP: WNL   Resp:   Lungs clear to auscultation bilaterally. Nl resp effort. Cardiovascular:  RRR, S1, S2 normal, no new murmur. No gallop or rub. Abdomen:   Soft, non-tender, bowel sounds are present. Extremities: No edema bilaterally. Skin:  Neuro: Warm and dry. Unresponsive                         Data Review:     Telemetry independently reviewed :   normal sinus rhythm       ECG independently reviewed: NSR  Labs:   Recent Results (from the past 24 hour(s))   GLUCOSE, POC    Collection Time: 11/17/21  4:40 PM   Result Value Ref Range    Glucose (POC) 103 65 - 117 mg/dL    Performed by Elliott Pritchett    GLUCOSE, POC    Collection Time: 11/17/21  9:06 PM   Result Value Ref Range    Glucose (POC) 92 65 - 117 mg/dL    Performed by Radhika Valerio COMPREHENSIVE    Collection Time: 11/18/21 12:59 AM   Result Value Ref Range    Sodium 137 136 - 145 mmol/L    Potassium 5.3 (H) 3.5 - 5.1 mmol/L    Chloride 110 (H) 97 - 108 mmol/L    CO2 21 21 - 32 mmol/L    Anion gap 6 5 - 15 mmol/L    Glucose 72 65 - 100 mg/dL    BUN 74 (H) 6 - 20 MG/DL    Creatinine 4.34 (H) 0.70 - 1.30 MG/DL    BUN/Creatinine ratio 17 12 - 20      GFR est AA 17 (L) >60 ml/min/1.73m2    GFR est non-AA 14 (L) >60 ml/min/1.73m2    Calcium 8.1 (L) 8.5 - 10.1 MG/DL    Bilirubin, total 0.5 0.2 - 1.0 MG/DL    ALT (SGPT) 21 12 - 78 U/L    AST (SGOT) 16 15 - 37 U/L    Alk.  phosphatase 104 45 - 117 U/L    Protein, total 7.3 6.4 - 8.2 g/dL    Albumin 2.9 (L) 3.5 - 5.0 g/dL    Globulin 4.4 (H) 2.0 - 4.0 g/dL    A-G Ratio 0.7 (L) 1.1 - 2.2     CBC W/O DIFF    Collection Time: 11/18/21 12:59 AM   Result Value Ref Range    WBC 3.6 (L) 4.1 - 11.1 K/uL    RBC 2.39 (L) 4.10 - 5.70 M/uL    HGB 7.0 (L) 12.1 - 17.0 g/dL    HCT 22.7 (L) 36.6 - 50.3 %    MCV 95.0 80.0 - 99.0 FL    MCH 29.3 26.0 - 34.0 PG    MCHC 30.8 30.0 - 36.5 g/dL    RDW 14.9 (H) 11.5 - 14.5 %    PLATELET 68 (L) 437 - 400 K/uL    MPV 11.5 8.9 - 12.9 FL    NRBC 0.0 0  WBC    ABSOLUTE NRBC 0.00 0.00 - 0.01 K/uL   MAGNESIUM    Collection Time: 11/18/21 12:59 AM   Result Value Ref Range    Magnesium 2.4 1.6 - 2.4 mg/dL   PHOSPHORUS    Collection Time: 11/18/21 12:59 AM Result Value Ref Range    Phosphorus 6.6 (H) 2.6 - 4.7 MG/DL   GLUCOSE, POC    Collection Time: 11/18/21  6:39 AM   Result Value Ref Range    Glucose (POC) 96 65 - 117 mg/dL    Performed by Cecille Huerta    CBC WITH AUTOMATED DIFF    Collection Time: 11/18/21  7:21 AM   Result Value Ref Range    WBC 4.8 4.1 - 11.1 K/uL    RBC 2.73 (L) 4.10 - 5.70 M/uL    HGB 8.2 (L) 12.1 - 17.0 g/dL    HCT 26.1 (L) 36.6 - 50.3 %    MCV 95.6 80.0 - 99.0 FL    MCH 30.0 26.0 - 34.0 PG    MCHC 31.4 30.0 - 36.5 g/dL    RDW 15.2 (H) 11.5 - 14.5 %    PLATELET 78 (L) 346 - 400 K/uL    MPV 11.9 8.9 - 12.9 FL    NRBC 0.0 0  WBC    ABSOLUTE NRBC 0.00 0.00 - 0.01 K/uL    NEUTROPHILS 65 32 - 75 %    LYMPHOCYTES 21 12 - 49 %    MONOCYTES 12 5 - 13 %    EOSINOPHILS 2 0 - 7 %    BASOPHILS 0 0 - 1 %    IMMATURE GRANULOCYTES 0 0.0 - 0.5 %    ABS. NEUTROPHILS 3.1 1.8 - 8.0 K/UL    ABS. LYMPHOCYTES 1.0 0.8 - 3.5 K/UL    ABS. MONOCYTES 0.6 0.0 - 1.0 K/UL    ABS. EOSINOPHILS 0.1 0.0 - 0.4 K/UL    ABS. BASOPHILS 0.0 0.0 - 0.1 K/UL    ABS. IMM. GRANS. 0.0 0.00 - 0.04 K/UL    DF SMEAR SCANNED      RBC COMMENTS ANISOCYTOSIS  1+        RBC COMMENTS MACROCYTOSIS  1+       MAGNESIUM    Collection Time: 11/18/21  7:21 AM   Result Value Ref Range    Magnesium 2.4 1.6 - 2.4 mg/dL   METABOLIC PANEL, COMPREHENSIVE    Collection Time: 11/18/21  7:21 AM   Result Value Ref Range    Sodium 140 136 - 145 mmol/L    Potassium 4.6 3.5 - 5.1 mmol/L    Chloride 111 (H) 97 - 108 mmol/L    CO2 21 21 - 32 mmol/L    Anion gap 8 5 - 15 mmol/L    Glucose 95 65 - 100 mg/dL    BUN 71 (H) 6 - 20 MG/DL    Creatinine 4.45 (H) 0.70 - 1.30 MG/DL    BUN/Creatinine ratio 16 12 - 20      GFR est AA 16 (L) >60 ml/min/1.73m2    GFR est non-AA 14 (L) >60 ml/min/1.73m2    Calcium 7.9 (L) 8.5 - 10.1 MG/DL    Bilirubin, total 0.8 0.2 - 1.0 MG/DL    ALT (SGPT) 57 12 - 78 U/L    AST (SGOT) 69 (H) 15 - 37 U/L    Alk.  phosphatase 117 45 - 117 U/L    Protein, total 7.0 6.4 - 8.2 g/dL    Albumin 3.2 (L) 3.5 - 5.0 g/dL    Globulin 3.8 2.0 - 4.0 g/dL    A-G Ratio 0.8 (L) 1.1 - 2.2     PHOSPHORUS    Collection Time: 11/18/21  7:21 AM   Result Value Ref Range    Phosphorus 7.1 (H) 2.6 - 4.7 MG/DL   LACTIC ACID    Collection Time: 11/18/21  7:21 AM   Result Value Ref Range    Lactic acid 2.1 (HH) 0.4 - 2.0 MMOL/L   D DIMER    Collection Time: 11/18/21  7:21 AM   Result Value Ref Range    D-dimer 7.36 (H) 0.00 - 0.65 mg/L FEU   FIBRINOGEN    Collection Time: 11/18/21  7:21 AM   Result Value Ref Range    Fibrinogen 417 200 - 475 mg/dL   PTT    Collection Time: 11/18/21  7:21 AM   Result Value Ref Range    aPTT 26.8 22.1 - 31.0 sec    aPTT, therapeutic range     58.0 - 77.0 SECS   PROTHROMBIN TIME + INR    Collection Time: 11/18/21  7:21 AM   Result Value Ref Range    INR 1.2 (H) 0.9 - 1.1      Prothrombin time 12.2 (H) 9.0 - 11.1 sec   NT-PRO BNP    Collection Time: 11/18/21  7:21 AM   Result Value Ref Range    NT pro-BNP 9,531 (H) <125 PG/ML   TROPONIN-HIGH SENSITIVITY    Collection Time: 11/18/21  7:23 AM   Result Value Ref Range    Troponin-High Sensitivity 167 (HH) 0 - 76 ng/L   EKG, 12 LEAD, INITIAL    Collection Time: 11/18/21  7:55 AM   Result Value Ref Range    Ventricular Rate 70 BPM    Atrial Rate 70 BPM    P-R Interval 114 ms    QRS Duration 88 ms    Q-T Interval 406 ms    QTC Calculation (Bezet) 438 ms    Calculated P Axis 45 degrees    Calculated R Axis 47 degrees    Calculated T Axis 55 degrees    Diagnosis       Normal sinus rhythm  Low voltage QRS  Nonspecific T wave abnormality  When compared with ECG of 14-NOV-2021 09:18,  Nonspecific T wave abnormality, worse in Anterolateral leads     POC G3 - PUL    Collection Time: 11/18/21  9:45 AM   Result Value Ref Range    FIO2 (POC) 100 %    pH (POC) 7.21 (LL) 7.35 - 7.45      pCO2 (POC) 55.1 (H) 35.0 - 45.0 MMHG    pO2 (POC) 346 (H) 80 - 100 MMHG    HCO3 (POC) 22.2 22 - 26 MMOL/L    sO2 (POC) 99.9 (H) 92 - 97 %    Base deficit (POC) 5.4 mmol/L    Site RIGHT RADIAL      Device: ET TUBE      Mode ASSIST CONTROL      Tidal volume 500 ml    Set Rate 20 bpm    PEEP/CPAP (POC) 5 cmH2O    Allens test (POC) NOT APPLICABLE      Specimen type (POC) ARTERIAL      Critical value read back DENNIS KALEIGH    TROPONIN-HIGH SENSITIVITY    Collection Time: 11/18/21 10:03 AM   Result Value Ref Range    Troponin-High Sensitivity 815 (HH) 0 - 76 ng/L   CK    Collection Time: 11/18/21 10:03 AM   Result Value Ref Range     39 - 308 U/L   TSH 3RD GENERATION    Collection Time: 11/18/21 10:03 AM   Result Value Ref Range    TSH 3.52 0.36 - 3.74 uIU/mL   CULTURE, BODY FLUID W GRAM STAIN    Collection Time: 11/18/21 11:40 AM    Specimen: Pericardial Fluid; Body Fluid   Result Value Ref Range    Special Requests: NO SPECIAL REQUESTS      GRAM STAIN FEW WBCS SEEN      GRAM STAIN NO DEFINITE ORGANISM SEEN      Culture result: PENDING    CELL COUNT, BODY FLUID    Collection Time: 11/18/21 11:40 AM   Result Value Ref Range    BODY FLUID TYPE PLEURAL FLUID      FLUID COLOR YELLOW      FLUID APPEARANCE CLOUDY      FLUID RBC CT. >100 (H) 0 /cu mm    FLUID NUCLEATED CELLS 223 /cu mm    FLD NEUTROPHILS 3 (A) NRRE %    FLD LYMPHS 14 (A) NRRE %    FLD MONO/MACROPHAGES 82 (A) NRRE %    FLUID MESOTHELIAL 1 (A) NRRE %   PROTEIN TOTAL, FLUID    Collection Time: 11/18/21 11:40 AM   Result Value Ref Range    Fluid Type: PLEURAL FLUID      Protein total, body fld. 2.7 g/dL   ALBUMIN, FLUID    Collection Time: 11/18/21 11:40 AM   Result Value Ref Range    Fluid Type: PLEURAL FLUID      Albumin, body fld.  1.1 g/dL   BLOOD GAS, ARTERIAL    Collection Time: 11/18/21 11:55 AM   Result Value Ref Range    pH 7.29 (L) 7.35 - 7.45      PCO2 39 35 - 45 mmHg    PO2 68 (L) 80 - 100 mmHg    O2 SAT 92 92 - 97 %    BICARBONATE 19 (L) 22 - 26 mmol/L    BASE DEFICIT 7.4 mmol/L    O2 METHOD VENT      MODE ASSIST CONTROL      SET RATE 24      Sample source ARTERIAL      SITE RIGHT RADIAL      PAT'S TEST YES     GLUCOSE, POC    Collection Time: 11/18/21 12:01 PM   Result Value Ref Range    Glucose (POC) 79 65 - 117 mg/dL    Performed by Ramsey Wood SENSITIVITY    Collection Time: 11/18/21 12:05 PM   Result Value Ref Range    Troponin-High Sensitivity 1,447 (HH) 0 - 76 ng/L   LACTIC ACID    Collection Time: 11/18/21 12:06 PM   Result Value Ref Range    Lactic acid 0.3 (L) 0.4 - 2.0 MMOL/L   GLUCOSE, POC    Collection Time: 11/18/21  2:31 PM   Result Value Ref Range    Glucose (POC) 73 65 - 117 mg/dL    Performed by Purnima Heard       Current medications reviewed       Dorothye Siemens, MD

## 2021-11-18 NOTE — PROGRESS NOTES
Transition of Care Plan   RUR-   Medium    DISPOSITION: pending medical progression   F/U with PCP/Specialist     Transport: TBD   Patient transferred to ICU s/p PEA arrest patient is intubated. Neurology and cardiology have been consulted. Patient's daughter Kristina Mendoza is [phone] is Duc Mak. Care Management will continue to follow.  Lucy Chavez RN,Care Management

## 2021-11-18 NOTE — PROGRESS NOTES
Renal labs noted   Losartan and Inspra held   Will see him later today      [facility]  Office :[phone]  Fax: [phone]

## 2021-11-18 NOTE — PROGRESS NOTES
for follow up visit as pt came to ICU this morning. Pt's girlfriend was at bedside and appreciative of  support. Let her know of  availability. Please contact [phone] [facility] for further support.      [address] [place], M[place]   [phone]

## 2021-11-18 NOTE — PROGRESS NOTES
6192: TRANSFER - IN REPORT:    Verbal report received from [employer] (name) on Kevyn Patient  being received from 2N (unit) for change in patient condition(s/p cardiac arrest)      Report consisted of patients Situation, Background, Assessment and   Recommendations(SBAR). Information from the following report(s) SBAR, Intake/Output, MAR and Recent Results was reviewed with the receiving nurse. Opportunity for questions and clarification was provided. Assessment completed upon patients arrival to unit and care assumed. Shift Summary: Patient arrived s/p cardiac arrest on 2N. Patient on no sedation. Patient does not withdraw in any extremity and showing concerning seizure/posturing movements. Doneta Arnol, NP aware. EEG & STAT head CT ordered. OGT, 2nd PIV, and Folely placed. Thick yellow secretions suctioned from ETT. Daughter Darren Perez updated by RN. Per Darren Perez the patient is not legally  and she is the oldest child. The other children are much younger - 25-23s per Darren Perez. 0730: Bedside shift change report given to Sarkis Bettencourt RN (oncoming nurse) by Joy Vee (offgoing nurse). Report included the following information SBAR, Intake/Output, MAR and Recent Results.

## 2021-11-18 NOTE — PROGRESS NOTES
11/18/21 0909   Vent Settings   FIO2 (%) 60 %   SpO2/FIO2 Ratio 166.67   CMV Rate Set 24   Back-Up Rate 24   Changers per NP Jamar.

## 2021-11-19 ENCOUNTER — APPOINTMENT (OUTPATIENT)
Dept: GENERAL RADIOLOGY | Age: 61
DRG: 194 | End: 2021-11-19
Payer: MEDICAID

## 2021-11-19 ENCOUNTER — APPOINTMENT (OUTPATIENT)
Dept: INTERVENTIONAL RADIOLOGY/VASCULAR | Age: 61
DRG: 194 | End: 2021-11-19
Attending: INTERNAL MEDICINE
Payer: MEDICAID

## 2021-11-19 ENCOUNTER — APPOINTMENT (OUTPATIENT)
Dept: NON INVASIVE DIAGNOSTICS | Age: 61
DRG: 194 | End: 2021-11-19
Payer: MEDICAID

## 2021-11-19 LAB
ALBUMIN SERPL-MCNC: 3.4 G/DL (ref 3.5–5)
ALBUMIN SERPL-MCNC: 3.4 G/DL (ref 3.5–5)
ALBUMIN/GLOB SERPL: 0.9 {RATIO} (ref 1.1–2.2)
ALP SERPL-CCNC: 95 U/L (ref 45–117)
ALT SERPL-CCNC: 40 U/L (ref 12–78)
ANION GAP SERPL CALC-SCNC: 10 MMOL/L (ref 5–15)
ANION GAP SERPL CALC-SCNC: 10 MMOL/L (ref 5–15)
ARTERIAL PATENCY WRIST A: POSITIVE
AST SERPL-CCNC: 41 U/L (ref 15–37)
BASE DEFICIT BLD-SCNC: 4.8 MMOL/L
BDY SITE: ABNORMAL
BILIRUB SERPL-MCNC: 0.8 MG/DL (ref 0.2–1)
BUN SERPL-MCNC: 76 MG/DL (ref 6–20)
BUN SERPL-MCNC: 79 MG/DL (ref 6–20)
BUN/CREAT SERPL: 16 (ref 12–20)
BUN/CREAT SERPL: 17 (ref 12–20)
CALCIUM SERPL-MCNC: 7.9 MG/DL (ref 8.5–10.1)
CALCIUM SERPL-MCNC: 8.2 MG/DL (ref 8.5–10.1)
CHLORIDE SERPL-SCNC: 109 MMOL/L (ref 97–108)
CHLORIDE SERPL-SCNC: 109 MMOL/L (ref 97–108)
CO2 SERPL-SCNC: 20 MMOL/L (ref 21–32)
CO2 SERPL-SCNC: 21 MMOL/L (ref 21–32)
CREAT SERPL-MCNC: 4.59 MG/DL (ref 0.7–1.3)
CREAT SERPL-MCNC: 4.81 MG/DL (ref 0.7–1.3)
ECHO LV INTERNAL DIMENSION DIASTOLIC: 5.43 CM (ref 4.2–5.9)
ECHO LV INTERNAL DIMENSION SYSTOLIC: 3.49 CM
ERYTHROCYTE [DISTWIDTH] IN BLOOD BY AUTOMATED COUNT: 15.3 % (ref 11.5–14.5)
GAS FLOW.O2 O2 DELIVERY SYS: ABNORMAL L/MIN
GLOBULIN SER CALC-MCNC: 3.6 G/DL (ref 2–4)
GLUCOSE BLD STRIP.AUTO-MCNC: 113 MG/DL (ref 65–117)
GLUCOSE BLD STRIP.AUTO-MCNC: 127 MG/DL (ref 65–117)
GLUCOSE BLD STRIP.AUTO-MCNC: 62 MG/DL (ref 65–117)
GLUCOSE BLD STRIP.AUTO-MCNC: 73 MG/DL (ref 65–117)
GLUCOSE BLD STRIP.AUTO-MCNC: 93 MG/DL (ref 65–117)
GLUCOSE SERPL-MCNC: 79 MG/DL (ref 65–100)
GLUCOSE SERPL-MCNC: 97 MG/DL (ref 65–100)
HAV IGM SER QL: NONREACTIVE
HBV CORE IGM SER QL: NONREACTIVE
HBV SURFACE AB SER QL: NONREACTIVE
HBV SURFACE AB SER-ACNC: <3.1 MIU/ML
HBV SURFACE AG SER QL: <0.1 INDEX
HBV SURFACE AG SER QL: NEGATIVE
HCO3 BLD-SCNC: 21.5 MMOL/L (ref 22–26)
HCT VFR BLD AUTO: 24 % (ref 36.6–50.3)
HCV AB SERPL QL IA: NONREACTIVE
HGB BLD-MCNC: 7.7 G/DL (ref 12.1–17)
INR PPP: 1.3 (ref 0.9–1.1)
LDH FLD L TO P-CCNC: 87 U/L
LDH SERPL L TO P-CCNC: 169 U/L (ref 85–241)
MCH RBC QN AUTO: 29.5 PG (ref 26–34)
MCHC RBC AUTO-ENTMCNC: 32.1 G/DL (ref 30–36.5)
MCV RBC AUTO: 92 FL (ref 80–99)
NRBC # BLD: 0 K/UL (ref 0–0.01)
NRBC BLD-RTO: 0 PER 100 WBC
O2/TOTAL GAS SETTING VFR VENT: 30 %
PCO2 BLD: 44.7 MMHG (ref 35–45)
PEEP RESPIRATORY: 5 CMH2O
PH BLD: 7.29 [PH] (ref 7.35–7.45)
PHOSPHATE SERPL-MCNC: 6.2 MG/DL (ref 2.6–4.7)
PLATELET # BLD AUTO: 91 K/UL (ref 150–400)
PMV BLD AUTO: 11.1 FL (ref 8.9–12.9)
PO2 BLD: 79 MMHG (ref 80–100)
POTASSIUM SERPL-SCNC: 4.4 MMOL/L (ref 3.5–5.1)
POTASSIUM SERPL-SCNC: 4.4 MMOL/L (ref 3.5–5.1)
PRESSURE SUPPORT SETTING VENT: 5 CMH2O
PROT SERPL-MCNC: 7 G/DL (ref 6.4–8.2)
PROTHROMBIN TIME: 13.9 SEC (ref 9–11.1)
RBC # BLD AUTO: 2.61 M/UL (ref 4.1–5.7)
SAO2 % BLD: 93.9 % (ref 92–97)
SERVICE CMNT-IMP: ABNORMAL
SERVICE CMNT-IMP: ABNORMAL
SERVICE CMNT-IMP: NORMAL
SODIUM SERPL-SCNC: 139 MMOL/L (ref 136–145)
SODIUM SERPL-SCNC: 140 MMOL/L (ref 136–145)
SP1: NORMAL
SP2: NORMAL
SP3: NORMAL
SPECIMEN SOURCE FLD: NORMAL
SPECIMEN TYPE: ABNORMAL
TROPONIN-HIGH SENSITIVITY: 2064 NG/L (ref 0–76)
VENTILATION MODE VENT: ABNORMAL
WBC # BLD AUTO: 5.1 K/UL (ref 4.1–11.1)

## 2021-11-19 PROCEDURE — 85610 PROTHROMBIN TIME: CPT

## 2021-11-19 PROCEDURE — 82803 BLOOD GASES ANY COMBINATION: CPT

## 2021-11-19 PROCEDURE — 74011000250 HC RX REV CODE- 250: Performed by: HOSPITALIST

## 2021-11-19 PROCEDURE — 74011250637 HC RX REV CODE- 250/637: Performed by: INTERNAL MEDICINE

## 2021-11-19 PROCEDURE — 82962 GLUCOSE BLOOD TEST: CPT

## 2021-11-19 PROCEDURE — 77010033678 HC OXYGEN DAILY

## 2021-11-19 PROCEDURE — 93308 TTE F-UP OR LMTD: CPT

## 2021-11-19 PROCEDURE — 74011250636 HC RX REV CODE- 250/636: Performed by: NURSE PRACTITIONER

## 2021-11-19 PROCEDURE — APPSS30 APP SPLIT SHARED TIME 16-30 MINUTES: Performed by: NURSE PRACTITIONER

## 2021-11-19 PROCEDURE — 2709999900 HC NON-CHARGEABLE SUPPLY

## 2021-11-19 PROCEDURE — C1894 INTRO/SHEATH, NON-LASER: HCPCS

## 2021-11-19 PROCEDURE — 86704 HEP B CORE ANTIBODY TOTAL: CPT

## 2021-11-19 PROCEDURE — 5A1D70Z PERFORMANCE OF URINARY FILTRATION, INTERMITTENT, LESS THAN 6 HOURS PER DAY: ICD-10-PCS | Performed by: PSYCHIATRY & NEUROLOGY

## 2021-11-19 PROCEDURE — 80074 ACUTE HEPATITIS PANEL: CPT

## 2021-11-19 PROCEDURE — 74011250637 HC RX REV CODE- 250/637: Performed by: NURSE PRACTITIONER

## 2021-11-19 PROCEDURE — 0W993ZZ DRAINAGE OF RIGHT PLEURAL CAVITY, PERCUTANEOUS APPROACH: ICD-10-PCS | Performed by: RADIOLOGY

## 2021-11-19 PROCEDURE — 74011250637 HC RX REV CODE- 250/637: Performed by: HOSPITALIST

## 2021-11-19 PROCEDURE — 84484 ASSAY OF TROPONIN QUANT: CPT

## 2021-11-19 PROCEDURE — 74011250636 HC RX REV CODE- 250/636: Performed by: STUDENT IN AN ORGANIZED HEALTH CARE EDUCATION/TRAINING PROGRAM

## 2021-11-19 PROCEDURE — 77030002996 HC SUT SLK J&J -A

## 2021-11-19 PROCEDURE — 99232 SBSQ HOSP IP/OBS MODERATE 35: CPT | Performed by: PSYCHIATRY & NEUROLOGY

## 2021-11-19 PROCEDURE — 74011000250 HC RX REV CODE- 250: Performed by: STUDENT IN AN ORGANIZED HEALTH CARE EDUCATION/TRAINING PROGRAM

## 2021-11-19 PROCEDURE — 83615 LACTATE (LD) (LDH) ENZYME: CPT

## 2021-11-19 PROCEDURE — 85027 COMPLETE CBC AUTOMATED: CPT

## 2021-11-19 PROCEDURE — 80053 COMPREHEN METABOLIC PANEL: CPT

## 2021-11-19 PROCEDURE — 90935 HEMODIALYSIS ONE EVALUATION: CPT

## 2021-11-19 PROCEDURE — 71045 X-RAY EXAM CHEST 1 VIEW: CPT

## 2021-11-19 PROCEDURE — 74011250636 HC RX REV CODE- 250/636: Performed by: INTERNAL MEDICINE

## 2021-11-19 PROCEDURE — 74011000250 HC RX REV CODE- 250: Performed by: NURSE PRACTITIONER

## 2021-11-19 PROCEDURE — P9047 ALBUMIN (HUMAN), 25%, 50ML: HCPCS | Performed by: INTERNAL MEDICINE

## 2021-11-19 PROCEDURE — C9113 INJ PANTOPRAZOLE SODIUM, VIA: HCPCS | Performed by: NURSE PRACTITIONER

## 2021-11-19 PROCEDURE — 36415 COLL VENOUS BLD VENIPUNCTURE: CPT

## 2021-11-19 PROCEDURE — C1752 CATH,HEMODIALYSIS,SHORT-TERM: HCPCS

## 2021-11-19 PROCEDURE — 36600 WITHDRAWAL OF ARTERIAL BLOOD: CPT

## 2021-11-19 PROCEDURE — 74011000250 HC RX REV CODE- 250: Performed by: INTERNAL MEDICINE

## 2021-11-19 PROCEDURE — 80069 RENAL FUNCTION PANEL: CPT

## 2021-11-19 PROCEDURE — 74011250636 HC RX REV CODE- 250/636: Performed by: HOSPITALIST

## 2021-11-19 PROCEDURE — 65610000006 HC RM INTENSIVE CARE

## 2021-11-19 PROCEDURE — 86706 HEP B SURFACE ANTIBODY: CPT

## 2021-11-19 PROCEDURE — 76937 US GUIDE VASCULAR ACCESS: CPT

## 2021-11-19 RX ORDER — LOSARTAN POTASSIUM 50 MG/1
50 TABLET ORAL
Status: DISCONTINUED | OUTPATIENT
Start: 2021-11-19 | End: 2021-11-20

## 2021-11-19 RX ORDER — GABAPENTIN 100 MG/1
100 CAPSULE ORAL 3 TIMES DAILY
Status: DISCONTINUED | OUTPATIENT
Start: 2021-11-19 | End: 2021-12-01 | Stop reason: HOSPADM

## 2021-11-19 RX ORDER — LIDOCAINE HYDROCHLORIDE 20 MG/ML
20 INJECTION, SOLUTION INFILTRATION; PERINEURAL
Status: COMPLETED | OUTPATIENT
Start: 2021-11-19 | End: 2021-11-19

## 2021-11-19 RX ORDER — INSULIN LISPRO 100 [IU]/ML
INJECTION, SOLUTION INTRAVENOUS; SUBCUTANEOUS
Status: DISCONTINUED | OUTPATIENT
Start: 2021-11-19 | End: 2021-12-01 | Stop reason: HOSPADM

## 2021-11-19 RX ORDER — DEXTROSE MONOHYDRATE 100 MG/ML
30 INJECTION, SOLUTION INTRAVENOUS CONTINUOUS
Status: DISCONTINUED | OUTPATIENT
Start: 2021-11-19 | End: 2021-11-19

## 2021-11-19 RX ORDER — HEPARIN SODIUM 1000 [USP'U]/ML
10000 INJECTION, SOLUTION INTRAVENOUS; SUBCUTANEOUS
Status: COMPLETED | OUTPATIENT
Start: 2021-11-19 | End: 2021-11-19

## 2021-11-19 RX ORDER — HEPARIN SODIUM 1000 [USP'U]/ML
1400 INJECTION, SOLUTION INTRAVENOUS; SUBCUTANEOUS
Status: DISCONTINUED | OUTPATIENT
Start: 2021-11-19 | End: 2021-11-24

## 2021-11-19 RX ORDER — HEPARIN SODIUM 1000 [USP'U]/ML
1100 INJECTION, SOLUTION INTRAVENOUS; SUBCUTANEOUS
Status: DISCONTINUED | OUTPATIENT
Start: 2021-11-19 | End: 2021-11-24

## 2021-11-19 RX ORDER — HYDRALAZINE HYDROCHLORIDE 20 MG/ML
10-20 INJECTION INTRAMUSCULAR; INTRAVENOUS
Status: DISCONTINUED | OUTPATIENT
Start: 2021-11-19 | End: 2021-12-01 | Stop reason: HOSPADM

## 2021-11-19 RX ADMIN — Medication 10 ML: at 22:00

## 2021-11-19 RX ADMIN — METOPROLOL TARTRATE 25 MG: 25 TABLET, FILM COATED ORAL at 08:16

## 2021-11-19 RX ADMIN — LOSARTAN POTASSIUM 50 MG: 50 TABLET, FILM COATED ORAL at 21:13

## 2021-11-19 RX ADMIN — POLYETHYLENE GLYCOL 3350 17 G: 17 POWDER, FOR SOLUTION ORAL at 08:16

## 2021-11-19 RX ADMIN — ROSUVASTATIN 10 MG: 10 TABLET, FILM COATED ORAL at 21:13

## 2021-11-19 RX ADMIN — TRAMADOL HYDROCHLORIDE 50 MG: 50 TABLET ORAL at 04:18

## 2021-11-19 RX ADMIN — BUTALBITAL, ACETAMINOPHEN, AND CAFFEINE 1 TABLET: 50; 325; 40 TABLET ORAL at 01:01

## 2021-11-19 RX ADMIN — LIDOCAINE HYDROCHLORIDE 400 MG: 20 INJECTION, SOLUTION INFILTRATION; PERINEURAL at 08:41

## 2021-11-19 RX ADMIN — TRAMADOL HYDROCHLORIDE 50 MG: 50 TABLET ORAL at 20:16

## 2021-11-19 RX ADMIN — HEPARIN SODIUM 10000 UNITS: 1000 INJECTION, SOLUTION INTRAVENOUS; SUBCUTANEOUS at 09:00

## 2021-11-19 RX ADMIN — ONDANSETRON 4 MG: 2 INJECTION INTRAMUSCULAR; INTRAVENOUS at 05:33

## 2021-11-19 RX ADMIN — DEXTROSE MONOHYDRATE 12.5 G: 25 INJECTION, SOLUTION INTRAVENOUS at 00:56

## 2021-11-19 RX ADMIN — BUTALBITAL, ACETAMINOPHEN, AND CAFFEINE 1 TABLET: 50; 325; 40 TABLET ORAL at 16:48

## 2021-11-19 RX ADMIN — METOPROLOL TARTRATE 25 MG: 25 TABLET, FILM COATED ORAL at 20:16

## 2021-11-19 RX ADMIN — ASPIRIN 81 MG CHEWABLE TABLET 81 MG: 81 TABLET CHEWABLE at 08:16

## 2021-11-19 RX ADMIN — CALCIUM CARBONATE (ANTACID) CHEW TAB 500 MG 200 MG: 500 CHEW TAB at 16:49

## 2021-11-19 RX ADMIN — BUTALBITAL, ACETAMINOPHEN, AND CAFFEINE 1 TABLET: 50; 325; 40 TABLET ORAL at 22:01

## 2021-11-19 RX ADMIN — BUMETANIDE 2 MG: 0.25 INJECTION INTRAMUSCULAR; INTRAVENOUS at 20:16

## 2021-11-19 RX ADMIN — EPOETIN ALFA-EPBX 20000 UNITS: 20000 INJECTION, SOLUTION INTRAVENOUS; SUBCUTANEOUS at 21:57

## 2021-11-19 RX ADMIN — HYDRALAZINE HYDROCHLORIDE 20 MG: 20 INJECTION INTRAMUSCULAR; INTRAVENOUS at 15:06

## 2021-11-19 RX ADMIN — HEPARIN SODIUM 1400 UNITS: 1000 INJECTION INTRAVENOUS; SUBCUTANEOUS at 14:18

## 2021-11-19 RX ADMIN — ALBUMIN (HUMAN) 25 G: 0.25 INJECTION, SOLUTION INTRAVENOUS at 00:58

## 2021-11-19 RX ADMIN — CALCIUM CARBONATE (ANTACID) CHEW TAB 500 MG 200 MG: 500 CHEW TAB at 08:16

## 2021-11-19 RX ADMIN — SODIUM CHLORIDE 40 MG: 9 INJECTION INTRAMUSCULAR; INTRAVENOUS; SUBCUTANEOUS at 08:15

## 2021-11-19 RX ADMIN — BUMETANIDE 2 MG: 0.25 INJECTION INTRAMUSCULAR; INTRAVENOUS at 08:15

## 2021-11-19 RX ADMIN — CALCIUM CARBONATE (ANTACID) CHEW TAB 500 MG 200 MG: 500 CHEW TAB at 12:08

## 2021-11-19 RX ADMIN — Medication 10 ML: at 13:03

## 2021-11-19 RX ADMIN — HEPARIN SODIUM 1100 UNITS: 1000 INJECTION INTRAVENOUS; SUBCUTANEOUS at 14:17

## 2021-11-19 RX ADMIN — POLYETHYLENE GLYCOL 3350 17 G: 17 POWDER, FOR SOLUTION ORAL at 18:44

## 2021-11-19 RX ADMIN — DEXTROSE MONOHYDRATE 30 ML/HR: 100 INJECTION, SOLUTION INTRAVENOUS at 05:32

## 2021-11-19 NOTE — PROGRESS NOTES
Patient placed on spontaneous mode with pressure support of 5 and PEEP 5 per intensivist and nurse's recommendation due to the patient being awake. Intensivist recommends 20 minutes on spontaneous mode and ABG post to assess respiratory status to indicate if patient is extubatable.

## 2021-11-19 NOTE — PROGRESS NOTES
Transition of Care Plan   RUR-  Medium    DISPOSITION: pending medical progression   F/U with PCP/Specialist     Transport: Family  Patient transferred to ICU s/p PEA arrest. Patient is now extubated, awake and alert. Will request PT/OT to evaluate. Care management will continue to follow.   Lucy Chavez RN,Care Management

## 2021-11-19 NOTE — PROGRESS NOTES
[facility]   [address] [facility], [address] [place] [address], [facility]  Phone: [phone]   [phone]       Nephrology Progress Note  Debbie High     1960     [id number]  Date of Admission : 11/14/2021 11/19/21    CC: Follow up for CKD 4       Assessment and Plan   SEVERIANO on CKD  - progressive diabetic Nephropathy   - Now w/ superimposed ATN from PEA/ resp arrest   - Non oliguric w/ significant volume overload   - Place Alexandre and start RRT   - d/w daughter and patient   - Daily HD X3  - daily labs     CKD 4   -Biopsy-proven advanced diabetic nephropathy  -Baseline creatinine 3.5 mg/dL     Volume overload:  Pleural effusions  - 2/2 Nephrotic syndrome   - ? Thoracentesis      HTN  -Stable     Anemia in CKD   - continue RANDY      CAD s/p CABG May 2020  HFpEF  -Last echo: Preserved LVEF, Mod Pulm HTN-PASP 50 mmHg     Resp / PEA arrest   - Intubated and extubated 11/18    AMS   ? Hypoxic Brain injury         Care Plan discussed with: Patient        Interval History:  Seen and examined. Extubated. On high flow. When discussed about initiating dialysis, he was able to understand. He was hoping he wont need dialysis. Discussed w/ daughter Murry Spatz and agreed to proceed with dialysis . Non oliguric     Review of Systems: Review of systems not obtained due to patient factors.     Current Medications:   Current Facility-Administered Medications   Medication Dose Route Frequency    dextrose 10% infusion  30 mL/hr IntraVENous CONTINUOUS    insulin lispro (HUMALOG) injection   SubCUTAneous Q6H    pantoprazole (PROTONIX) 40 mg in 0.9% sodium chloride 10 mL injection  40 mg IntraVENous DAILY    chlorhexidine (ORAL CARE KIT) 0.12 % mouthwash 15 mL  15 mL Oral Q12H    amLODIPine (NORVASC) tablet 10 mg  10 mg Oral DAILY    hydrALAZINE (APRESOLINE) 20 mg/mL injection 10 mg  10 mg IntraVENous Q6H PRN    metoprolol tartrate (LOPRESSOR) tablet 25 mg  25 mg Oral Q12H    propofol (DIPRIVAN) 10 mg/mL infusion 0-50 mcg/kg/min IntraVENous TITRATE    aspirin chewable tablet 81 mg  81 mg Oral DAILY    polyethylene glycol (MIRALAX) packet 17 g  17 g Oral BID    [Held by provider] eplerenone (INSPRA) tablet 25 mg  25 mg Oral DAILY    [Held by provider] bumetanide (BUMEX) injection 2 mg  2 mg IntraVENous Q12H    [Held by provider] calcium carbonate (TUMS) chewable tablet 200 mg [elemental]  200 mg Oral TID WITH MEALS    [Held by provider] isosorbide mononitrate ER (IMDUR) tablet 60 mg  60 mg Oral DAILY    sodium chloride (NS) flush 5-40 mL  5-40 mL IntraVENous Q8H    sodium chloride (NS) flush 5-40 mL  5-40 mL IntraVENous PRN    [Held by provider] sodium zirconium cyclosilicate (LOKELMA) powder packet 10 g  10 g Oral DAILY    morphine injection 4 mg  4 mg IntraVENous Q4H PRN    [Held by provider] heparin (porcine) injection 5,000 Units  5,000 Units SubCUTAneous Q8H    [Held by provider] losartan (COZAAR) tablet 100 mg  100 mg Oral DAILY    epoetin ericka-epbx (RETACRIT) injection 20,000 Units  20,000 Units SubCUTAneous Q MON, WED & FRI    butalbital-acetaminophen-caffeine (FIORICET, ESGIC) -40 mg per tablet 1 Tablet  1 Tablet Oral Q4H PRN    ondansetron (ZOFRAN) injection 4 mg  4 mg IntraVENous Q4H PRN    [Held by provider] busPIRone (BUSPAR) tablet 10 mg  10 mg Oral BID    [Held by provider] gabapentin (NEURONTIN) capsule 300 mg  300 mg Oral TID    rosuvastatin (CRESTOR) tablet 10 mg  10 mg Oral QHS    [Held by provider] sertraline (ZOLOFT) tablet 200 mg  200 mg Oral DAILY    traMADoL (ULTRAM) tablet 50 mg  50 mg Oral Q6H PRN    [Held by provider] traZODone (DESYREL) tablet 50 mg  50 mg Oral QHS    acetaminophen (TYLENOL) tablet 650 mg  650 mg Oral Q6H PRN    glucose chewable tablet 16 g  4 Tablet Oral PRN    dextrose (D50W) injection syrg 12.5-25 g  25-50 mL IntraVENous PRN    glucagon (GLUCAGEN) injection 1 mg  1 mg IntraMUSCular PRN    [Held by provider] insulin glargine (LANTUS) injection 10 Units  10 Units SubCUTAneous DAILY      No Known Allergies    Objective:  Vitals:    Vitals:    11/19/21 0216 11/19/21 0300 11/19/21 0400 11/19/21 0500   BP:  (!) 150/59 (!) 145/74 (!) 147/66   Pulse:  68 68 68   Resp:  11 10 12   Temp:   98.3 °F (36.8 °C)    SpO2: 96% 95% 96% 96%   Weight:       Height:         Intake and Output:  11/18 1901 - 11/19 0700  In: 200 [I.V.:200]  Out: 1025 [Urine:1025]  11/17 0701 - 11/18 1900  In: 640 [P.O.:240; I.V.:400]  Out: 56 [Urine:630]    Physical Examination:    General: On high flow   Neck:  Supple, no mass  Resp:  Diminished at bases   CV:  RRR,  no murmur or rub,trace LE edema  GI:  Soft, NT, + Bowel sounds, no hepatosplenomegaly  Neurologic:  Confused   : Grant      []    High complexity decision making was performed  []    Patient is at high-risk of decompensation with multiple organ involvement    Lab Data Personally Reviewed: I have reviewed all the pertinent labs, microbiology data and radiology studies during assessment.     Recent Labs     11/19/21 0245 11/18/21 0721 11/18/21 0059 11/17/21  1325 11/16/21  1430    140 137 138  --    K 4.4 4.6 5.3* 4.8  --    * 111* 110* 112*  --    CO2 20* 21 21 21  --    GLU 79 95 72 127*  --    BUN 76* 71* 74* 68*  --    CREA 4.59* 4.45* 4.34* 3.94*  --    CA 8.2* 7.9* 8.1* 8.3*  --    MG  --  2.4 2.4  --   --    PHOS  --  7.1* 6.6* 5.3*  --    ALB 3.4* 3.2* 2.9* 2.7*  --    ALT 40 57 21  --   --    INR 1.3* 1.2*  --   --  1.1     Recent Labs     11/19/21 0245 11/18/21 0721 11/18/21 0059 11/17/21  1151   WBC 5.1 4.8 3.6* 4.3   HGB 7.7* 8.2* 7.0* 7.4*   HCT 24.0* 26.1* 22.7* 23.3*   PLT 91* 78* 68* 70*     No results found for: SDES  Lab Results   Component Value Date/Time    Culture result: PENDING 11/18/2021 11:40 AM     Recent Results (from the past 24 hour(s))   CBC WITH AUTOMATED DIFF    Collection Time: 11/18/21  7:21 AM   Result Value Ref Range    WBC 4.8 4.1 - 11.1 K/uL    RBC 2.73 (L) 4.10 - 5.70 M/uL HGB 8.2 (L) 12.1 - 17.0 g/dL    HCT 26.1 (L) 36.6 - 50.3 %    MCV 95.6 80.0 - 99.0 FL    MCH 30.0 26.0 - 34.0 PG    MCHC 31.4 30.0 - 36.5 g/dL    RDW 15.2 (H) 11.5 - 14.5 %    PLATELET 78 (L) 282 - 400 K/uL    MPV 11.9 8.9 - 12.9 FL    NRBC 0.0 0  WBC    ABSOLUTE NRBC 0.00 0.00 - 0.01 K/uL    NEUTROPHILS 65 32 - 75 %    LYMPHOCYTES 21 12 - 49 %    MONOCYTES 12 5 - 13 %    EOSINOPHILS 2 0 - 7 %    BASOPHILS 0 0 - 1 %    IMMATURE GRANULOCYTES 0 0.0 - 0.5 %    ABS. NEUTROPHILS 3.1 1.8 - 8.0 K/UL    ABS. LYMPHOCYTES 1.0 0.8 - 3.5 K/UL    ABS. MONOCYTES 0.6 0.0 - 1.0 K/UL    ABS. EOSINOPHILS 0.1 0.0 - 0.4 K/UL    ABS. BASOPHILS 0.0 0.0 - 0.1 K/UL    ABS. IMM. GRANS. 0.0 0.00 - 0.04 K/UL    DF SMEAR SCANNED      RBC COMMENTS ANISOCYTOSIS  1+        RBC COMMENTS MACROCYTOSIS  1+       MAGNESIUM    Collection Time: 11/18/21  7:21 AM   Result Value Ref Range    Magnesium 2.4 1.6 - 2.4 mg/dL   METABOLIC PANEL, COMPREHENSIVE    Collection Time: 11/18/21  7:21 AM   Result Value Ref Range    Sodium 140 136 - 145 mmol/L    Potassium 4.6 3.5 - 5.1 mmol/L    Chloride 111 (H) 97 - 108 mmol/L    CO2 21 21 - 32 mmol/L    Anion gap 8 5 - 15 mmol/L    Glucose 95 65 - 100 mg/dL    BUN 71 (H) 6 - 20 MG/DL    Creatinine 4.45 (H) 0.70 - 1.30 MG/DL    BUN/Creatinine ratio 16 12 - 20      GFR est AA 16 (L) >60 ml/min/1.73m2    GFR est non-AA 14 (L) >60 ml/min/1.73m2    Calcium 7.9 (L) 8.5 - 10.1 MG/DL    Bilirubin, total 0.8 0.2 - 1.0 MG/DL    ALT (SGPT) 57 12 - 78 U/L    AST (SGOT) 69 (H) 15 - 37 U/L    Alk.  phosphatase 117 45 - 117 U/L    Protein, total 7.0 6.4 - 8.2 g/dL    Albumin 3.2 (L) 3.5 - 5.0 g/dL    Globulin 3.8 2.0 - 4.0 g/dL    A-G Ratio 0.8 (L) 1.1 - 2.2     PHOSPHORUS    Collection Time: 11/18/21  7:21 AM   Result Value Ref Range    Phosphorus 7.1 (H) 2.6 - 4.7 MG/DL   LACTIC ACID    Collection Time: 11/18/21  7:21 AM   Result Value Ref Range    Lactic acid 2.1 (HH) 0.4 - 2.0 MMOL/L   D DIMER    Collection Time: 11/18/21 7:21 AM   Result Value Ref Range    D-dimer 7.36 (H) 0.00 - 0.65 mg/L FEU   FIBRINOGEN    Collection Time: 11/18/21  7:21 AM   Result Value Ref Range    Fibrinogen 417 200 - 475 mg/dL   PTT    Collection Time: 11/18/21  7:21 AM   Result Value Ref Range    aPTT 26.8 22.1 - 31.0 sec    aPTT, therapeutic range     58.0 - 77.0 SECS   PROTHROMBIN TIME + INR    Collection Time: 11/18/21  7:21 AM   Result Value Ref Range    INR 1.2 (H) 0.9 - 1.1      Prothrombin time 12.2 (H) 9.0 - 11.1 sec   NT-PRO BNP    Collection Time: 11/18/21  7:21 AM   Result Value Ref Range    NT pro-BNP 9,531 (H) <125 PG/ML   TROPONIN-HIGH SENSITIVITY    Collection Time: 11/18/21  7:23 AM   Result Value Ref Range    Troponin-High Sensitivity 167 (HH) 0 - 76 ng/L   EKG, 12 LEAD, INITIAL    Collection Time: 11/18/21  7:55 AM   Result Value Ref Range    Ventricular Rate 70 BPM    Atrial Rate 70 BPM    P-R Interval 114 ms    QRS Duration 88 ms    Q-T Interval 406 ms    QTC Calculation (Bezet) 438 ms    Calculated P Axis 45 degrees    Calculated R Axis 47 degrees    Calculated T Axis 55 degrees    Diagnosis       Normal sinus rhythm  Low voltage QRS  Nonspecific T wave abnormality  When compared with ECG of 14-NOV-2021 09:18,  Nonspecific T wave abnormality, worse in Anterolateral leads     POC G3 - PUL    Collection Time: 11/18/21  9:45 AM   Result Value Ref Range    FIO2 (POC) 100 %    pH (POC) 7.21 (LL) 7.35 - 7.45      pCO2 (POC) 55.1 (H) 35.0 - 45.0 MMHG    pO2 (POC) 346 (H) 80 - 100 MMHG    HCO3 (POC) 22.2 22 - 26 MMOL/L    sO2 (POC) 99.9 (H) 92 - 97 %    Base deficit (POC) 5.4 mmol/L    Site RIGHT RADIAL      Device: ET TUBE      Mode ASSIST CONTROL      Tidal volume 500 ml    Set Rate 20 bpm    PEEP/CPAP (POC) 5 cmH2O    Allens test (POC) NOT APPLICABLE      Specimen type (POC) ARTERIAL      Critical value read back DENNIS FARRIS    TROPONIN-HIGH SENSITIVITY    Collection Time: 11/18/21 10:03 AM   Result Value Ref Range    Troponin-High Sensitivity 815 (HH) 0 - 76 ng/L   CK    Collection Time: 11/18/21 10:03 AM   Result Value Ref Range     39 - 308 U/L   TSH 3RD GENERATION    Collection Time: 11/18/21 10:03 AM   Result Value Ref Range    TSH 3.52 0.36 - 3.74 uIU/mL   CULTURE, BODY FLUID W GRAM STAIN    Collection Time: 11/18/21 11:40 AM    Specimen: Pericardial Fluid; Body Fluid   Result Value Ref Range    Special Requests: NO SPECIAL REQUESTS      GRAM STAIN FEW WBCS SEEN      GRAM STAIN NO DEFINITE ORGANISM SEEN      Culture result: PENDING    CELL COUNT, BODY FLUID    Collection Time: 11/18/21 11:40 AM   Result Value Ref Range    BODY FLUID TYPE PLEURAL FLUID      FLUID COLOR YELLOW      FLUID APPEARANCE CLOUDY      FLUID RBC CT. >100 (H) 0 /cu mm    FLUID NUCLEATED CELLS 223 /cu mm    FLD NEUTROPHILS 3 (A) NRRE %    FLD LYMPHS 14 (A) NRRE %    FLD MONO/MACROPHAGES 82 (A) NRRE %    FLUID MESOTHELIAL 1 (A) NRRE %   PROTEIN TOTAL, FLUID    Collection Time: 11/18/21 11:40 AM   Result Value Ref Range    Fluid Type: PLEURAL FLUID      Protein total, body fld. 2.7 g/dL   ALBUMIN, FLUID    Collection Time: 11/18/21 11:40 AM   Result Value Ref Range    Fluid Type: PLEURAL FLUID      Albumin, body fld.  1.1 g/dL   BLOOD GAS, ARTERIAL    Collection Time: 11/18/21 11:55 AM   Result Value Ref Range    pH 7.29 (L) 7.35 - 7.45      PCO2 39 35 - 45 mmHg    PO2 68 (L) 80 - 100 mmHg    O2 SAT 92 92 - 97 %    BICARBONATE 19 (L) 22 - 26 mmol/L    BASE DEFICIT 7.4 mmol/L    O2 METHOD VENT      MODE ASSIST CONTROL      SET RATE 24      Sample source ARTERIAL      SITE RIGHT RADIAL      PAT'S TEST YES     GLUCOSE, POC    Collection Time: 11/18/21 12:01 PM   Result Value Ref Range    Glucose (POC) 79 65 - 117 mg/dL    Performed by Bela Morris SENSITIVITY    Collection Time: 11/18/21 12:05 PM   Result Value Ref Range    Troponin-High Sensitivity 1,447 (HH) 0 - 76 ng/L   LACTIC ACID    Collection Time: 11/18/21 12:06 PM   Result Value Ref Range    Lactic acid 0.3 (L) 0.4 - 2.0 MMOL/L   GLUCOSE, POC    Collection Time: 11/18/21  2:31 PM   Result Value Ref Range    Glucose (POC) 73 65 - 117 mg/dL    Performed by Roma Palma. GLUCOSE, POC    Collection Time: 11/18/21  6:03 PM   Result Value Ref Range    Glucose (POC) 65 65 - 117 mg/dL    Performed by Roma Palma. GLUCOSE, POC    Collection Time: 11/18/21  6:22 PM   Result Value Ref Range    Glucose (POC) 105 65 - 117 mg/dL    Performed by Roma Palma. POC G3 - PUL    Collection Time: 11/19/21 12:34 AM   Result Value Ref Range    FIO2 (POC) 30 %    pH (POC) 7.29 (L) 7.35 - 7.45      pCO2 (POC) 44.7 35.0 - 45.0 MMHG    pO2 (POC) 79 (L) 80 - 100 MMHG    HCO3 (POC) 21.5 (L) 22 - 26 MMOL/L    sO2 (POC) 93.9 92 - 97 %    Base deficit (POC) 4.8 mmol/L    Site LEFT RADIAL      Device: ADULT VENT      Mode CPAP/PS      PEEP/CPAP (POC) 5 cmH2O    Pressure support 5 cmH2O    Allens test (POC) Positive      Specimen type (POC) ARTERIAL     GLUCOSE, POC    Collection Time: 11/19/21 12:49 AM   Result Value Ref Range    Glucose (POC) 62 (L) 65 - 117 mg/dL    Performed by Veronica MCGINNIS    Collection Time: 11/19/21  2:45 AM   Result Value Ref Range     85 - 846 U/L   METABOLIC PANEL, COMPREHENSIVE    Collection Time: 11/19/21  2:45 AM   Result Value Ref Range    Sodium 139 136 - 145 mmol/L    Potassium 4.4 3.5 - 5.1 mmol/L    Chloride 109 (H) 97 - 108 mmol/L    CO2 20 (L) 21 - 32 mmol/L    Anion gap 10 5 - 15 mmol/L    Glucose 79 65 - 100 mg/dL    BUN 76 (H) 6 - 20 MG/DL    Creatinine 4.59 (H) 0.70 - 1.30 MG/DL    BUN/Creatinine ratio 17 12 - 20      GFR est AA 16 (L) >60 ml/min/1.73m2    GFR est non-AA 13 (L) >60 ml/min/1.73m2    Calcium 8.2 (L) 8.5 - 10.1 MG/DL    Bilirubin, total 0.8 0.2 - 1.0 MG/DL    ALT (SGPT) 40 12 - 78 U/L    AST (SGOT) 41 (H) 15 - 37 U/L    Alk.  phosphatase 95 45 - 117 U/L    Protein, total 7.0 6.4 - 8.2 g/dL    Albumin 3.4 (L) 3.5 - 5.0 g/dL    Globulin 3.6 2.0 - 4.0 g/dL    A-G Ratio 0.9 (L) 1.1 - 2.2     CBC W/O DIFF    Collection Time: 11/19/21  2:45 AM   Result Value Ref Range    WBC 5.1 4.1 - 11.1 K/uL    RBC 2.61 (L) 4.10 - 5.70 M/uL    HGB 7.7 (L) 12.1 - 17.0 g/dL    HCT 24.0 (L) 36.6 - 50.3 %    MCV 92.0 80.0 - 99.0 FL    MCH 29.5 26.0 - 34.0 PG    MCHC 32.1 30.0 - 36.5 g/dL    RDW 15.3 (H) 11.5 - 14.5 %    PLATELET 91 (L) 801 - 400 K/uL    MPV 11.1 8.9 - 12.9 FL    NRBC 0.0 0  WBC    ABSOLUTE NRBC 0.00 0.00 - 0.01 K/uL   PROTHROMBIN TIME + INR    Collection Time: 11/19/21  2:45 AM   Result Value Ref Range    INR 1.3 (H) 0.9 - 1.1      Prothrombin time 13.9 (H) 9.0 - 11.1 sec   TROPONIN-HIGH SENSITIVITY    Collection Time: 11/19/21  2:45 AM   Result Value Ref Range    Troponin-High Sensitivity 2,064 (HH) 0 - 76 ng/L   GLUCOSE, POC    Collection Time: 11/19/21  6:21 AM   Result Value Ref Range    Glucose (POC) 73 65 - 117 mg/dL    Performed by Rehana Nathan            Total time spent with patient:  xxx   min. Care Plan discussed with:  Patient     Family      RN      Consulting Physician [address] [place] [address]        I have reviewed the flowsheets. Chart and Pertinent Notes have been reviewed. No change in PMH ,family and social history from Consult note.       Kodak Farfan MD

## 2021-11-19 NOTE — PROGRESS NOTES
Patient extubated to high flow nasal cannula 40 liters 95% with the plan of weaning throughout the night. Spontaneous trial completed with some difficulty in instruction due to a language barrier. Patient is tolerating high flow cannula well with no respiratory distress at this time. Will continue to monitor. Ventilator on standby bed bedside.

## 2021-11-19 NOTE — PROGRESS NOTES
0730 Bedside and Verbal shift change report given to JOHNNY [address] (oncoming nurse) by BRAIN Monsalve (offgoing nurse). Report included the following information SBAR, Kardex, Procedure Summary, Intake/Output, MAR, Recent Results and Dual Neuro Assessment. 0900 Zhao catheter placed at bedside by IR. Chest xray ordered to confirm placement. (**confirmed, ready to use)    1100 HD at bedside. (** 1500: 2L removed by dialysis. )    1530 Bedside and Verbal shift change report given to Leonora Young RN (oncoming nurse) by JOHNNY [address] (offgoing nurse). Report included the following information SBAR, Kardex, Procedure Summary, Intake/Output, MAR, Recent Results and Dual Neuro Assessment.

## 2021-11-19 NOTE — PROGRESS NOTES
11/19/21 0820   Oxygen Therapy   O2 Sat (%) 97 %   Pulse via Oximetry 72 beats per minute   O2 Device Nasal cannula   O2 Flow Rate (L/min) 6 l/min   FIO2 (%) 44 %   Patient weaned to 6 liters   RR 15

## 2021-11-19 NOTE — DIALYSIS
Hemodialysis / [phone]    Vitals Pre Post Assessment Pre Post   /86 156/87 LOC axox4 No change   HR 66 71 Lungs clear No change   Resp 16 16 Cardiac nsr No change   Temp 98.5 98.6 Skin Wdi No change   Weight    Edema generalized Decreased lower extremity   Tele status remote remote Pain Pain Intensity 1: 0 (11/19/21 0800)      Orders   Duration: Start: 1115 End: 1415 Total: 3.0   Dialyzer:  revaclear   K Bath: 3.0   Ca Bath:  2.5   Na:  140   Bicarb:  35   Target Fluid Removal:  2500     Access   Type & Location:  RIJ CVC: Dressing CDI. No s/s of infection. Both lumens aspirate & flush well. Running well at . Comments:                                        Labs   HBsAg (Antigen) / date: Neg 11/19/21     HBsAb (Antibody) / date: Susc 11/19/21   Source:    Obtained/Reviewed  Critical Results Called HGB   Date Value Ref Range Status   11/19/2021 7.7 (L) 12.1 - 17.0 g/dL Final     Potassium   Date Value Ref Range Status   11/19/2021 4.4 3.5 - 5.1 mmol/L Final     Calcium   Date Value Ref Range Status   11/19/2021 7.9 (L) 8.5 - 10.1 MG/DL Final     BUN   Date Value Ref Range Status   11/19/2021 79 (H) 6 - 20 MG/DL Final     Creatinine   Date Value Ref Range Status   11/19/2021 4.81 (H) 0.70 - 1.30 MG/DL Final        Meds Given   Name Dose Route   Heparin 1.1ml art   Heparin 1.4ml duane          Adequacy / Fluid    Total Liters Process: 59   Net Fluid Removed: 2500 ml      Comments   Time Out Done:   (Time) 1045 Yes   Admitting Diagnosis: SOB   Consent obtained/signed:  Yes   Machine / RO #  [id number]   Primary Nurse Rpt Pre: Valente Salas RN   Primary Nurse Rpt Post: Valente Salas RN   Pt Education: Access care   Care Plan: Follow MD [address]   Pts outpatient clinic: [facility]     Tx Summary     SBAR received from Primary RN. Pt  A&Ox4. Consent signed & on file. 1100: Each catheter limb disinfected per p&p, caps removed, hubs disinfected per p&p.  Each lumen aspirated for blood return and flushed with Normal Saline per policy. 1115 VSS. Dialysis Tx initiated. 1415: Tx ended. VSS. Each dialysis catheter limb disinfected per p&p, all possible blood returned per p&p, and each dialysis hub disinfected per p&p. Each lumen flushed, post dialysis catheter Heparin dwell instilled per order, and caps applied. Bed locked and in the lowest position, call bell and belongings in reach. SBAR given to Primary, RN. Patient is stable at time of my departure. All Dialysis related medications have been reviewed.        Comments:

## 2021-11-19 NOTE — PROCEDURES
[address] [place]   EEG    Name:  Pedrito Gonzales  MR#:  [id number]  :  1960  ACCOUNT #:  [de-identified]  DATE OF SERVICE:  2021      REQUESTING PHYSICIAN:  Carlos Salomon NP.    HISTORY:  The patient is a 59-year-old male who is being evaluated after recent cardiac arrest with CPR for approximately 12 minutes. DESCRIPTION:  This is an 18-channel EEG performed on an intubated and unresponsive patient. There is no clear dominant background rhythm. Background activity essentially consists of electrical interference artifact throughout the recording. No true biological rhythms were seen over two to three microvolts. Drowsiness and sleep architecture were not noted. Photic stimulation did not elicit driving response. Stimulation of the patient did not produce any clear reactivity on EEG. EEG SUMMARY:  Markedly abnormal EEG due to severe attenuation of the background rhythms and voltage. CLINICAL INTERPRETATION:  This EEG is suggestive of severe encephalopathic process and may represent brain injury related to hypoxia/ischemia. No lateralizing or epileptiform features were noted. No seizure was recorded. Please correlate clinically.         Yoselyn Hopkins MD      [id number] [id number]  D:  2021 14:17  T:  2021 16:21  JOB #:  [id number]

## 2021-11-19 NOTE — PROGRESS NOTES
Neurology Progress Note  Benji Diggs NP    Admit Date: 2021   LOS: 5 days      Daily Progress Note: 2021    HPI: Beryle Marseille is a 64 y.o. M with a pmh of type 2 diabetes, hypertension, CKD (baseline cr around 3.2 per lab in ), diastolic HF, and CAD (cardiac bypass about 1 year ago; currently on DAPT)  who presented to the ED on 21 due to abd pain. He was hospitalized  at [facility] twice last month for CHF and SEVERIANO on CKD. On 21 a RR was called after pt was found unresponsive for an unknown period of time, was then found to be in PEA arrest so Code Blue was then called with ROSC after approx 12 minutes of ACLS. He was then transferred to ICU and neurology has been consulted as he was in a comatose state post arrest concerning for anoxic brain injury. Subjective:     Pt was extubated overnight and is doing well, is alert and interactive with staff. Is Lao speaking but appears he has returned his is baseline. No Known Allergies    Review of Systems:  Review of systems not obtained due to patient factors. 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, with long-term current use of insulin ([facility] [address] 2018    Diabetes ([facility] [address]      Family History   Problem Relation Age of Onset    Diabetes Mother     No Known Problems Father      Social History     Tobacco Use    Smoking status: Former Smoker     Quit date: 2001     Years since quittin.5    Smokeless tobacco: Never Used   Substance Use Topics    Alcohol use: No      Prior to Admission Medications   Prescriptions Last Dose Informant Patient Reported? Taking? amLODIPine (NORVASC) 10 mg tablet   No No   Sig: Take 1 Tab by mouth daily. busPIRone (BUSPAR) 10 mg tablet   Yes Yes   Sig: Take 10 mg by mouth two (2) times a day. cetirizine (ZYRTEC) 5 mg tablet   No No   Sig: Take 1 Tab by mouth daily as needed for Itching.    furosemide (LASIX) 40 mg tablet   No No   Sig: Take 1 Tab by mouth daily. gabapentin (NEURONTIN) 300 mg capsule   No No   Sig: Take 1 Cap by mouth three (3) times daily. glipiZIDE (GLUCOTROL) 5 mg tablet   No No   Sig: Take 2 Tabs by mouth daily. glucose blood VI test strips (ASCENSIA AUTODISC VI, ONE TOUCH ULTRA TEST VI) strip   No No   Sig: E11.65 test fasting glucose in the morning and before meals as needed   hydroCHLOROthiazide (HYDRODIURIL) 25 mg tablet   No No   Sig: Take 1 Tab by mouth daily. insulin glargine (Lantus U-100 Insulin) 100 unit/mL injection   No No   Sig: E11.65 Take 25 units daily SubQ at night   insulin syringe-needle U-100 (BD Insulin Syringe Ultra-Fine) 0.3 mL 31 gauge x 15\" syrg   No No   Si Units by Does Not Apply route three (3) times daily. isosorbide mononitrate ER (IMDUR) 30 mg tablet   Yes Yes   Sig: Take 60 mg by mouth daily. lisinopril (PRINIVIL, ZESTRIL) 40 mg tablet   No No   Sig: Take 1 Tab by mouth daily. metFORMIN (GLUCOPHAGE) 1,000 mg tablet   No No   Sig: Take 1 Tab by mouth two (2) times daily (with meals). metoprolol succinate (TOPROL-XL) 50 mg XL tablet   No No   Sig: Take 1 Tab by mouth daily. raNITIdine (ZANTAC) 150 mg tablet   No No   Sig: Take 1 Tab by mouth two (2) times a day. rosuvastatin (CRESTOR) 10 mg tablet   No No   Sig: Take 1 Tab by mouth nightly. sertraline (ZOLOFT) 100 mg tablet   No No   Sig: Take 2 Tabs by mouth daily. traMADoL (ULTRAM) 50 mg tablet   Yes No   Sig: TAKE 1 TABLET BY MOUTH EVERY 4 HOURS AS NEEDED FOR PAIN   traZODone (DESYREL) 50 mg tablet   No No   Sig: Take 1 Tab by mouth nightly. Facility-Administered Medications: None       Objective:   Vital signs  Temp (24hrs), Av °F (36.7 °C), Min:97.6 °F (36.4 °C), Max:98.9 °F (37.2 °C)   11/1901 -  0700  In: 100 [I.V.:100]  Out: 800 [Urine:800]  701 - 1900  In: 640 [P.O.:240;  I.V.:400]  Out: 630 [Urine:630]  Visit Vitals  BP (!) 164/64 (BP 1 Location: Left upper arm, BP Patient Position: At rest)   Pulse 69   Temp 98 °F (36.7 °C)   Resp 18   Ht 5' 9\" (1.753 m)   Wt 187 lb 6.3 oz (85 kg)   SpO2 96%   BMI 27.67 kg/m²    O2 Flow Rate (L/min): 40 l/min O2 Device: Heated, Hi flow nasal cannula   Vitals:    11/19/21 0005 11/19/21 0045 11/19/21 0125 11/19/21 0216   BP:       Pulse: 70 69     Resp: 18 18     Temp:       SpO2: 97% 100% 100% 96%   Weight:       Height:            Physical Exam:  GENERAL: Calm, cooperative, NAD  SKIN: Warm, dry, color appropriate for ethnicity. NEURO: A&Ox4, although Macedonian speaking so difficult to follow assess. Follows commands. Speech appears fluent in [address] and nod head yes/no appropriately. Affect normal. PERRL, 3 mm bilaterally. Blinks to threat. No disconjugate gaze present. EOMI. Face symmetric. Urszula spontaneously and with equal strength. Bulk and tone normal. No involuntary movements. Gait deferred. Labs:  Lab Results   Component Value Date/Time    WBC 4.8 11/18/2021 07:21 AM    HGB 8.2 (L) 11/18/2021 07:21 AM    HCT 26.1 (L) 11/18/2021 07:21 AM    PLATELET 78 (L) 12/86/7307 07:21 AM    MCV 95.6 11/18/2021 07:21 AM     Lab Results   Component Value Date/Time    Sodium 140 11/18/2021 07:21 AM    Potassium 4.6 11/18/2021 07:21 AM    Chloride 111 (H) 11/18/2021 07:21 AM    CO2 21 11/18/2021 07:21 AM    Anion gap 8 11/18/2021 07:21 AM    Glucose 95 11/18/2021 07:21 AM    BUN 71 (H) 11/18/2021 07:21 AM    Creatinine 4.45 (H) 11/18/2021 07:21 AM    BUN/Creatinine ratio 16 11/18/2021 07:21 AM    GFR est AA 16 (L) 11/18/2021 07:21 AM    GFR est non-AA 14 (L) 11/18/2021 07:21 AM    Calcium 7.9 (L) 11/18/2021 07:21 AM     Imaging:  CT Results (maximum last 3): Results from [facility] encounter on 11/14/21    CT HEAD WO CONT    Narrative  INDICATION: Unresponsive, seizure like activity    EXAM:  HEAD CT WITHOUT CONTRAST    COMPARISON: None    TECHNIQUE:  Routine noncontrast axial head CT was performed.   Sagittal and  coronal reconstructions were generated. CT dose reduction was achieved through use of a standardized protocol tailored  for this examination and automatic exposure control for dose modulation. FINDINGS:    Ventricles: Midline, no hydrocephalus. Intracranial Hemorrhage: None. Brain Parenchyma/Brainstem: Normal for age. Basal Cisterns: Normal.  Paranasal Sinuses: Chronic peripheral mucosal thickening in the maxillary and  sphenoid sinuses. Additional Comments: N/A. Impression  No acute process. Assessment:   Active Problems:    CHF exacerbation ([facility] [address] (11/14/2021)      Plan:     Hypoxic ischemic encephalopathy   - s/p PEA arrest with unknown downtime, unresponsive post arrest   - CT head negative for acute process   - EEG 11/18/21 showed suggestive of severe encephalopathic process and may represent brain injury related to hypoxia/ischemia with no lateralizing or epileptiform features noted   - Mental status improving, will hold off on MRI brain     Further recommendations to follow from Dr. Carmelo Duvall. Author KALEIGH Calhoun  Neurocritical Care Nurse Practitioner    Neurology staff:    I examined the patient at bedside. I agree with the plans and documentation above from NP Caterina. Pascual Fabian is a 60-year-old gentleman I evaluated yesterday when he was found unresponsive and pulseless. He received CPR. Yesterday morning he was comatose. As of late yesterday he has regained consciousness. On exam, he is awake and alert on room air. Talking. Follows commands. Pupils equal reactive symmetric. Face appears grossly symmetric on smile. Moving extremities on command but globally weak 4+. Gait deferred due to condition. 60-year-old gentleman who was found pulseless requiring CPR. I do think he had some transient anoxia to the brain going to the comatose state yesterday but fortunately has recovered neurologically. No need for MRI at this time. No need for repeat EEG. We will sign off.   Please call if needed.   2 [facility],   Neurologist  Diplomate, American Board of Psychiatry and Neurology  Board Certified, Adult Neurology and Brain Injury Medicine

## 2021-11-19 NOTE — PROGRESS NOTES
I prayed with Kwan Ibarra and his girl friend and celebrated with Kwan Ibarra the [address].   Father Kalen Kessler

## 2021-11-19 NOTE — PROGRESS NOTES
Cardiology Progress Note                                        Admit Date: 11/14/2021    Assessment/Plan:     1. PEA arrest   Most likely respiratory related   Now awake and alert / extubated   2. 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 HTN  4. Anemia severe  hct 24   5. CAD  Hx cabg 5/2020  6. CHF acute on chronic diastolic  Last echo 19/03/1079   ( pending new study)   Left ventricle: The cavity size was normal. Wall thickness was     normal. Systolic function was normal. The estimated ejection     fraction was 60-65%. Wall motion was normal; there were no     regional wall motion abnormalities. Tricuspid valve: Moderate pulmonary hypertension with a     pulmonary artery systolic pressure of 50 mm Hg. There was     mild-moderate regurgitation. Pulmonary arteries: PA peak pressure: 50mm Hg (S). Pericardium, extracardiac: There was a left pleural effusion. Pro btnp 11,967    Rec:   Volume management      7. Thrombocytopenia plt 91K per Medicine  8. Prob passive congestion of liver   Has phtn  9. Elevated hs troponin:   Most  likely secondary to arrest   Pending echo          Ad Del Valle is a 64 y.o. male with      PROBLEM LIST:  Patient Active Problem List    Diagnosis Date Noted    CHF exacerbation ([facility] [address] 11/14/2021    Type 2 diabetes with nephropathy ([facility] [address] 08/05/2019    Type 2 diabetes mellitus with diabetic neuropathy ([facility] [address] 08/05/2019    Type 2 diabetes mellitus with ophthalmic complication, with long-term current use of insulin ([facility] [address] 11/25/2018    Essential hypertension 11/14/2018    Reactive depression 11/14/2018    Peripheral neuropathy 11/14/2018         Subjective:     Ad Del Valle denies chest pain.     Visit Vitals  BP (!) 147/66   Pulse 68   Temp 98.3 °F (36.8 °C)   Resp 12   Ht 5' 9\" (1.753 m)   Wt 187 lb 6.3 oz (85 kg)   SpO2 96%   BMI 27.67 kg/m² Intake/Output Summary (Last 24 hours) at 11/19/2021 0742  Last data filed at 11/19/2021 0400  Gross per 24 hour   Intake 600 ml   Output 1155 ml   Net -555 ml       Objective:      Physical Exam:  HEENT: Perrla, EOMI  Neck: No JVD,  No thyroidmegaly  Resp: CTA bilaterally;  No wheezes or rales  CV: RRR s1s2 No murmur no s3  Abd:Soft, Nontender  Ext: No edema  Neuro: Alert and oriented; Nonfocal  Skin: Warm, Dry, Intact  Pulses: 2+ DP/PT/Rad      Telemetry: normal sinus rhythm    Current Facility-Administered Medications   Medication Dose Route Frequency    dextrose 10% infusion  30 mL/hr IntraVENous CONTINUOUS    losartan (COZAAR) tablet 50 mg  50 mg Oral QHS    insulin lispro (HUMALOG) injection   SubCUTAneous Q6H    pantoprazole (PROTONIX) 40 mg in 0.9% sodium chloride 10 mL injection  40 mg IntraVENous DAILY    chlorhexidine (ORAL CARE KIT) 0.12 % mouthwash 15 mL  15 mL Oral Q12H    hydrALAZINE (APRESOLINE) 20 mg/mL injection 10 mg  10 mg IntraVENous Q6H PRN    metoprolol tartrate (LOPRESSOR) tablet 25 mg  25 mg Oral Q12H    propofol (DIPRIVAN) 10 mg/mL infusion  0-50 mcg/kg/min IntraVENous TITRATE    aspirin chewable tablet 81 mg  81 mg Oral DAILY    polyethylene glycol (MIRALAX) packet 17 g  17 g Oral BID    [Held by provider] eplerenone (INSPRA) tablet 25 mg  25 mg Oral DAILY    bumetanide (BUMEX) injection 2 mg  2 mg IntraVENous Q12H    calcium carbonate (TUMS) chewable tablet 200 mg [elemental]  200 mg Oral TID WITH MEALS    [Held by provider] isosorbide mononitrate ER (IMDUR) tablet 60 mg  60 mg Oral DAILY    sodium chloride (NS) flush 5-40 mL  5-40 mL IntraVENous Q8H    sodium chloride (NS) flush 5-40 mL  5-40 mL IntraVENous PRN    [Held by provider] sodium zirconium cyclosilicate (LOKELMA) powder packet 10 g  10 g Oral DAILY    morphine injection 4 mg  4 mg IntraVENous Q4H PRN    [Held by provider] heparin (porcine) injection 5,000 Units  5,000 Units SubCUTAneous Q8H    epoetin ericka-epbx (RETACRIT) injection 20,000 Units  20,000 Units SubCUTAneous Q MON, WED & FRI    butalbital-acetaminophen-caffeine (FIORICET, ESGIC) -40 mg per tablet 1 Tablet  1 Tablet Oral Q4H PRN    ondansetron (ZOFRAN) injection 4 mg  4 mg IntraVENous Q4H PRN    [Held by provider] busPIRone (BUSPAR) tablet 10 mg  10 mg Oral BID    [Held by provider] gabapentin (NEURONTIN) capsule 300 mg  300 mg Oral TID    rosuvastatin (CRESTOR) tablet 10 mg  10 mg Oral QHS    [Held by provider] sertraline (ZOLOFT) tablet 200 mg  200 mg Oral DAILY    traMADoL (ULTRAM) tablet 50 mg  50 mg Oral Q6H PRN    [Held by provider] traZODone (DESYREL) tablet 50 mg  50 mg Oral QHS    acetaminophen (TYLENOL) tablet 650 mg  650 mg Oral Q6H PRN    glucose chewable tablet 16 g  4 Tablet Oral PRN    dextrose (D50W) injection syrg 12.5-25 g  25-50 mL IntraVENous PRN    glucagon (GLUCAGEN) injection 1 mg  1 mg IntraMUSCular PRN    [Held by provider] insulin glargine (LANTUS) injection 10 Units  10 Units SubCUTAneous DAILY         Data Review:   Labs:    Recent Results (from the past 24 hour(s))   EKG, 12 LEAD, INITIAL    Collection Time: 11/18/21  7:55 AM   Result Value Ref Range    Ventricular Rate 70 BPM    Atrial Rate 70 BPM    P-R Interval 114 ms    QRS Duration 88 ms    Q-T Interval 406 ms    QTC Calculation (Bezet) 438 ms    Calculated P Axis 45 degrees    Calculated R Axis 47 degrees    Calculated T Axis 55 degrees    Diagnosis       Normal sinus rhythm  Low voltage QRS  Nonspecific T wave abnormality  When compared with ECG of 14-NOV-2021 09:18,  Nonspecific T wave abnormality, worse in Anterolateral leads     POC G3 - PUL    Collection Time: 11/18/21  9:45 AM   Result Value Ref Range    FIO2 (POC) 100 %    pH (POC) 7.21 (LL) 7.35 - 7.45      pCO2 (POC) 55.1 (H) 35.0 - 45.0 MMHG    pO2 (POC) 346 (H) 80 - 100 MMHG    HCO3 (POC) 22.2 22 - 26 MMOL/L    sO2 (POC) 99.9 (H) 92 - 97 %    Base deficit (POC) 5.4 mmol/L    Site RIGHT RADIAL      Device: ET TUBE      Mode ASSIST CONTROL      Tidal volume 500 ml    Set Rate 20 bpm    PEEP/CPAP (POC) 5 cmH2O    Allens test (POC) NOT APPLICABLE      Specimen type (POC) ARTERIAL      Critical value read back DENNIS KALEIGH    TROPONIN-HIGH SENSITIVITY    Collection Time: 11/18/21 10:03 AM   Result Value Ref Range    Troponin-High Sensitivity 815 (HH) 0 - 76 ng/L   CK    Collection Time: 11/18/21 10:03 AM   Result Value Ref Range     39 - 308 U/L   TSH 3RD GENERATION    Collection Time: 11/18/21 10:03 AM   Result Value Ref Range    TSH 3.52 0.36 - 3.74 uIU/mL   CULTURE, BODY FLUID W GRAM STAIN    Collection Time: 11/18/21 11:40 AM    Specimen: Pericardial Fluid; Body Fluid   Result Value Ref Range    Special Requests: NO SPECIAL REQUESTS      GRAM STAIN FEW WBCS SEEN      GRAM STAIN NO DEFINITE ORGANISM SEEN      Culture result: PENDING    CELL COUNT, BODY FLUID    Collection Time: 11/18/21 11:40 AM   Result Value Ref Range    BODY FLUID TYPE PLEURAL FLUID      FLUID COLOR YELLOW      FLUID APPEARANCE CLOUDY      FLUID RBC CT. >100 (H) 0 /cu mm    FLUID NUCLEATED CELLS 223 /cu mm    FLD NEUTROPHILS 3 (A) NRRE %    FLD LYMPHS 14 (A) NRRE %    FLD MONO/MACROPHAGES 82 (A) NRRE %    FLUID MESOTHELIAL 1 (A) NRRE %   PROTEIN TOTAL, FLUID    Collection Time: 11/18/21 11:40 AM   Result Value Ref Range    Fluid Type: PLEURAL FLUID      Protein total, body fld. 2.7 g/dL   ALBUMIN, FLUID    Collection Time: 11/18/21 11:40 AM   Result Value Ref Range    Fluid Type: PLEURAL FLUID      Albumin, body fld.  1.1 g/dL   BLOOD GAS, ARTERIAL    Collection Time: 11/18/21 11:55 AM   Result Value Ref Range    pH 7.29 (L) 7.35 - 7.45      PCO2 39 35 - 45 mmHg    PO2 68 (L) 80 - 100 mmHg    O2 SAT 92 92 - 97 %    BICARBONATE 19 (L) 22 - 26 mmol/L    BASE DEFICIT 7.4 mmol/L    O2 METHOD VENT      MODE ASSIST CONTROL      SET RATE 24      Sample source ARTERIAL      SITE RIGHT RADIAL      PAT'S TEST YES     GLUCOSE, POC Collection Time: 11/18/21 12:01 PM   Result Value Ref Range    Glucose (POC) 79 65 - 117 mg/dL    Performed by Annel Piper SENSITIVITY    Collection Time: 11/18/21 12:05 PM   Result Value Ref Range    Troponin-High Sensitivity 1,447 (HH) 0 - 76 ng/L   LACTIC ACID    Collection Time: 11/18/21 12:06 PM   Result Value Ref Range    Lactic acid 0.3 (L) 0.4 - 2.0 MMOL/L   GLUCOSE, POC    Collection Time: 11/18/21  2:31 PM   Result Value Ref Range    Glucose (POC) 73 65 - 117 mg/dL    Performed by Pamela Din. GLUCOSE, POC    Collection Time: 11/18/21  6:03 PM   Result Value Ref Range    Glucose (POC) 65 65 - 117 mg/dL    Performed by Pamela Din. GLUCOSE, POC    Collection Time: 11/18/21  6:22 PM   Result Value Ref Range    Glucose (POC) 105 65 - 117 mg/dL    Performed by Pamela Din.     POC G3 - PUL    Collection Time: 11/19/21 12:34 AM   Result Value Ref Range    FIO2 (POC) 30 %    pH (POC) 7.29 (L) 7.35 - 7.45      pCO2 (POC) 44.7 35.0 - 45.0 MMHG    pO2 (POC) 79 (L) 80 - 100 MMHG    HCO3 (POC) 21.5 (L) 22 - 26 MMOL/L    sO2 (POC) 93.9 92 - 97 %    Base deficit (POC) 4.8 mmol/L    Site LEFT RADIAL      Device: ADULT VENT      Mode CPAP/PS      PEEP/CPAP (POC) 5 cmH2O    Pressure support 5 cmH2O    Allens test (POC) Positive      Specimen type (POC) ARTERIAL     GLUCOSE, POC    Collection Time: 11/19/21 12:49 AM   Result Value Ref Range    Glucose (POC) 62 (L) 65 - 117 mg/dL    Performed by [facility]    LD    Collection Time: 11/19/21  2:45 AM   Result Value Ref Range     85 - 903 U/L   METABOLIC PANEL, COMPREHENSIVE    Collection Time: 11/19/21  2:45 AM   Result Value Ref Range    Sodium 139 136 - 145 mmol/L    Potassium 4.4 3.5 - 5.1 mmol/L    Chloride 109 (H) 97 - 108 mmol/L    CO2 20 (L) 21 - 32 mmol/L    Anion gap 10 5 - 15 mmol/L    Glucose 79 65 - 100 mg/dL    BUN 76 (H) 6 - 20 MG/DL    Creatinine 4.59 (H) 0.70 - 1.30 MG/DL    BUN/Creatinine ratio 17 12 - 20 GFR est AA 16 (L) >60 ml/min/1.73m2    GFR est non-AA 13 (L) >60 ml/min/1.73m2    Calcium 8.2 (L) 8.5 - 10.1 MG/DL    Bilirubin, total 0.8 0.2 - 1.0 MG/DL    ALT (SGPT) 40 12 - 78 U/L    AST (SGOT) 41 (H) 15 - 37 U/L    Alk.  phosphatase 95 45 - 117 U/L    Protein, total 7.0 6.4 - 8.2 g/dL    Albumin 3.4 (L) 3.5 - 5.0 g/dL    Globulin 3.6 2.0 - 4.0 g/dL    A-G Ratio 0.9 (L) 1.1 - 2.2     CBC W/O DIFF    Collection Time: 11/19/21  2:45 AM   Result Value Ref Range    WBC 5.1 4.1 - 11.1 K/uL    RBC 2.61 (L) 4.10 - 5.70 M/uL    HGB 7.7 (L) 12.1 - 17.0 g/dL    HCT 24.0 (L) 36.6 - 50.3 %    MCV 92.0 80.0 - 99.0 FL    MCH 29.5 26.0 - 34.0 PG    MCHC 32.1 30.0 - 36.5 g/dL    RDW 15.3 (H) 11.5 - 14.5 %    PLATELET 91 (L) 440 - 400 K/uL    MPV 11.1 8.9 - 12.9 FL    NRBC 0.0 0  WBC    ABSOLUTE NRBC 0.00 0.00 - 0.01 K/uL   PROTHROMBIN TIME + INR    Collection Time: 11/19/21  2:45 AM   Result Value Ref Range    INR 1.3 (H) 0.9 - 1.1      Prothrombin time 13.9 (H) 9.0 - 11.1 sec   TROPONIN-HIGH SENSITIVITY    Collection Time: 11/19/21  2:45 AM   Result Value Ref Range    Troponin-High Sensitivity 2,064 (HH) 0 - 76 ng/L   GLUCOSE, POC    Collection Time: 11/19/21  6:21 AM   Result Value Ref Range    Glucose (POC) 73 65 - 117 mg/dL    Performed by Damir Stockton

## 2021-11-19 NOTE — PROGRESS NOTES
[facility]    ICU TEAM Progress Note    Name: Kevyn Rain   : 1960   MRN: [id number]   Date: 2021      Patient is asked to be seen by Dr. Maddy Scruggs for cardiac arrest, necessitating the need for possible ICU care. Reason for ICU Admission: Cardiac arrest    HPI:  Kevyn Rain is a 64 y.o.  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 He had a CABG approximately a year ago. He was originally admitted 2021 for abdominal pain and chronic exertional chest pain with shortness of breath after mild exertion palpitations. He was hospitalized at [facility] twice last month for CHF and SEVERIANO on CKD. Cardiology was consulted for assistance with management of volume overload. No intervention for chest discomfort was indicated. GI was consulted for evaluation management of abdominal pain. EGD was done. Erosions in the proximal body of the stomach without bleeding or ulceration was noted there were no varices. H. pylori is pending, PPI was initiated. Hepatology is following. Recent ultrasound of the liver showed hepatic parenchymal disease with cirrhotic morphology, peripheral ascites and right pleural effusion. Plan was for outpatient FibroScan of the liver to assess for fibrosis. Unable to do diagnostic paracentesis. Code Blue/Intubation . Diagnostic thoracentesis . Extubated .     - EGD done by GI. Erosions in the proximal body of the stomach without bleeding or ulceration was noted there were no varices.  - Code Blue,12 minutes to ROSC. Intubated by anesthesia and transferred to ICU. CT head neg, EEG done. Neurology and cardiology consulted.  - patient woke up overnight. Extubated to NC. Passed swallow and eating this am. Dialysis started today. Line placed by IR.     Subjective:   Overnight Events:   2021 POD:  2 Days Post-Op    S/P:   Procedure(s):  ESOPHAGOGASTRODUODENOSCOPY (EGD)    Assessment:     ICU Problems:  PEA Arrest 12 mins to ROSC  Acute respiratory failure - now extubated on NC  Exacerbation of CHF. HFpEF. EF 60% with grade III diastolic dysfunction  CKD 4 - started Dialysis 11/19  Anemia of chronic disease  CAD  Thrombocytopenia  T2DM  Hepatic encephalopathy  Bilateral pleural effusions  Gastric erosions without evidence of varices  Hx Hypertension    ICU Comprehensive Plan of Care:     Plans for this Shift:   Neuro:   Neurology following  Keep normothermic, prevent hyperthermia  Pulm:   Extubated to NC  Pleural effusions - Left thoracentesis 11/18 - 750 cc drained  Send pleural LDH  Follow up cultures, cytology and labs  Cardiac:   Cardiology following  Echo post arrest: LV: Estimated LVEF is 60 - 65%. Mild concentric hypertrophy. RV: Mildly dilated right ventricle. Mildly reduced systolic function. Off Epi  Renal:  Nephrology following. Trend renal indices  Dialysis started today. IR placed right IJ Alexandre for access. GI:   Passed STAND, may have diet. PPI for erosions. H. pylori is pending  Hepatology is following. Recent ultrasound of the liver showed hepatic parenchymal disease with cirrhotic morphology. ID:   Follow plural fluid for cx and cytology  Heme:  Trend CBC and coags  Monitor platelet count daily  Endo:   SSI ACHS  MSK:    PT as tolerated  OOB to chair daily    1. SBP Goal of: > 90 mmHg  2. MAP Goal of: > 65 mmHg  3. Epinephrine - For above SBP/MAP goals  4. IVFs: None  5. Transfusion Trigger (Hgb): <7 g/dL  6. Respiratory Goals:  a. Aggressive bronchopulmonary hygiene  b. Incentive spirometry  7. Pulmonary toilet: PRN   8. SpO2 Goal: > 92%  9. Keep K>4; Mg>2   10. PT/OT: PT consulted and on board   11. Discussed Plan of Care/Code Status: Full Code  12. [facility]  Neurology, Nephrology, Cardiology  13. Discussed Care Plan with Bedside RN  14.  Documentation of Current Medications  15. Rest of Plan Below:    F - Feeding:  Yes Regular   A - Analgesia: None  S - Sedation: None  T - DVT Prophylaxis: SCD's or Sequential Compression Device   H - Head of Bed: > 30 Degrees  U - Ulcer Prophylaxis: Protonix (pantoprazole)   G - Glycemic Control: Insulin q 6hrs PRN SSI  S - Spontaneous Breathing Trial: No  B - Bowel Regimen: MiraLax  I - Indwelling Catheter:   Tubes: None  Lines: Peripheral IV and Alexandre  Drains: Grant Catheter  D - De-escalation of Antibiotics: None    Active Problem List:     Problem List  Date Reviewed: 8/7/2020          Codes Class    CHF exacerbation (HCC) ICD-10-CM: I50.9  ICD-9-CM: 428.0         Type 2 diabetes with nephropathy (HCC) ICD-10-CM: E11.21  ICD-9-CM: 250.40, 583.81         Type 2 diabetes mellitus with diabetic neuropathy ([facility]) ICD-10-CM: E11.40  ICD-9-CM: 250.60, 357.2         Type 2 diabetes mellitus with ophthalmic complication, with long-term current use of insulin ([facility]) ICD-10-CM: E11.39, Z79.4  ICD-9-CM: 250.50, V58.67         Essential hypertension ICD-10-CM: I10  ICD-9-CM: 401.9         Reactive depression ICD-10-CM: F32.9  ICD-9-CM: 300.4         Peripheral neuropathy ICD-10-CM: G62.9  ICD-9-CM: 356.9               Past Medical History:      has a past medical history of Controlled type 2 diabetes mellitus with ophthalmic complication, with long-term current use of insulin ([address] (11/14/2018) and Diabetes ([address] Past Surgical History:      has a past surgical history that includes hx other surgical; hx arterial bypass; and ir insert non tunl cvc over 5 yrs (11/19/2021). Home Medications:     Prior to Admission medications    Medication Sig Start Date End Date Taking? Authorizing Provider   isosorbide mononitrate ER (IMDUR) 30 mg tablet Take 60 mg by mouth daily. Yes Provider, Historical   busPIRone (BUSPAR) 10 mg tablet Take 10 mg by mouth two (2) times a day.    Yes Provider, Historical   traMADoL (ULTRAM) 50 mg tablet TAKE 1 TABLET BY MOUTH EVERY 4 HOURS AS NEEDED FOR PAIN 6/10/20   Provider, Katherine   insulin glargine (Lantus U-100 Insulin) 100 unit/mL injection E11.65 Take 25 units daily SubQ at night 8/7/20   Oneyda Wynne NP   sertraline (ZOLOFT) 100 mg tablet Take 2 Tabs by mouth daily. 8/7/20   Dimitris Wynne NP   hydroCHLOROthiazide (HYDRODIURIL) 25 mg tablet Take 1 Tab by mouth daily. 8/7/20   Dimitris Wynne NP   furosemide (LASIX) 40 mg tablet Take 1 Tab by mouth daily. 8/7/20   Dimitris Wynne NP   metoprolol succinate (TOPROL-XL) 50 mg XL tablet Take 1 Tab by mouth daily. 7/1/20   Dimitris Wynne NP   traZODone (DESYREL) 50 mg tablet Take 1 Tab by mouth nightly. 7/1/20   Dimitris Wynne NP   insulin syringe-needle U-100 (BD Insulin Syringe Ultra-Fine) 0.3 mL 31 gauge x 15/64\" syrg 1 Units by Does Not Apply route three (3) times daily. 5/13/20   Oneyda Wynne NP   glucose blood VI test strips (ASCENSIA AUTODISC VI, ONE TOUCH ULTRA TEST VI) strip E11.65 test fasting glucose in the morning and before meals as needed 11/22/19   Dimitris Wynne NP   glipiZIDE (GLUCOTROL) 5 mg tablet Take 2 Tabs by mouth daily. 8/5/19   Dimitris Wynne NP   raNITIdine (ZANTAC) 150 mg tablet Take 1 Tab by mouth two (2) times a day. 4/25/19   Chanda Salinas MD   cetirizine (ZYRTEC) 5 mg tablet Take 1 Tab by mouth daily as needed for Itching. 3/21/19   Chanda Salinas MD   metFORMIN (GLUCOPHAGE) 1,000 mg tablet Take 1 Tab by mouth two (2) times daily (with meals). 11/26/18   Chanda Salinas MD   rosuvastatin (CRESTOR) 10 mg tablet Take 1 Tab by mouth nightly. 11/14/18   Chanda Salinas MD   amLODIPine (NORVASC) 10 mg tablet Take 1 Tab by mouth daily. 11/14/18   Chanda Salinas MD   gabapentin (NEURONTIN) 300 mg capsule Take 1 Cap by mouth three (3) times daily. 11/14/18   Chanda Salinas MD   lisinopril (PRINIVIL, ZESTRIL) 40 mg tablet Take 1 Tab by mouth daily.  11/14/18   Chanda Salinas MD Allergies/Social/Family History:     No Known Allergies   Social History     Tobacco Use    Smoking status: Former Smoker     Quit date: 2001     Years since quittin.5    Smokeless tobacco: Never Used   Substance Use Topics    Alcohol use: No      Family History   Problem Relation Age of Onset    Diabetes Mother     No Known Problems Father        Review of Systems:     Review of systems not obtained due to patient factors. Objective:   Vital Signs:  Visit Vitals  BP (!) 157/68   Pulse 73   Temp 98.6 °F (37 °C) Comment: pt resging, eyes closed, family in room   Resp 17   Ht 5' 9\" (1.753 m)   Wt 84.8 kg (187 lb)   SpO2 97%   BMI 27.62 kg/m²    O2 Flow Rate (L/min): 6 l/min O2 Device: Nasal cannula Temp (24hrs), Av.5 °F (36.9 °C), Min:98 °F (36.7 °C), Max:98.9 °F (37.2 °C)           Intake/Output:     Intake/Output Summary (Last 24 hours) at 2021 1707  Last data filed at 2021 1649  Gross per 24 hour   Intake 684 ml   Output 3825 ml   Net -3141 ml       Physical Exam:  General: appears stated age, awake alert, oriented  Eye: conjunctivae/corneas clear. PERLLA  Neurologic: Opens eyes, ROSARIO, follows commands appropriately. Lymphatic: Cervical, supraclavicular, and axillary nodes normal.   Neck:  normal and no erythema or exudates noted. Lungs:  clear to auscultation bilaterally  Heart:  regular rate and rhythm, S1, S2 normal, no murmur, click, rub or gallop  Abdomen:  soft, non-tender.  Bowel sounds normal. No masses,  no organomegaly  Cardiovascular:  Regular rate and rhythm, S1S2 present, without murmur or extra heart sounds, pedal pulses normal and no edema  Skin:  Normal. and no rash or abnormalities    LABS AND  DATA: Personally reviewed  Recent Labs     21  0245 21  0721   WBC 5.1 4.8   HGB 7.7* 8.2*   HCT 24.0* 26.1*   PLT 91* 78*     Recent Labs     21  0840 21  0245 21  0721 21  0721 21  0059 21  0059    139   < > 140 < > 137   K 4.4 4.4   < > 4.6   < > 5.3*   * 109*   < > 111*   < > 110*   CO2 21 20*   < > 21   < > 21   BUN 79* 76*   < > 71*   < > 74*   CREA 4.81* 4.59*   < > 4.45*   < > 4.34*   GLU 97 79   < > 95   < > 72   CA 7.9* 8.2*   < > 7.9*   < > 8.1*   MG  --   --   --  2.4  --  2.4   PHOS 6.2*  --   --  7.1*   < > 6.6*    < > = values in this interval not displayed. Recent Labs     11/19/21  0840 11/19/21  0245 11/18/21  0721 11/18/21  0721   AP  --  95  --  117   TP  --  7.0  --  7.0   ALB 3.4* 3.4*   < > 3.2*   GLOB  --  3.6  --  3.8    < > = values in this interval not displayed. Recent Labs     11/19/21  0245 11/18/21  0721   INR 1.3* 1.2*   PTP 13.9* 12.2*   APTT  --  26.8      Recent Labs     11/19/21  0034 11/18/21  0945   PHI 7.29* 7.21*   PCO2I 44.7 55.1*   PO2I 79* 346*   FIO2I 30 100     Recent Labs     11/18/21  1003          Hemodynamics:   PAP:   CO:     Wedge:   CI:     CVP:    SVR:       PVR:       Ventilator Settings:  Mode Rate Tidal Volume Pressure FiO2 PEEP   Spontaneous   500 ml  5 cm H2O 44 % 5 cm H20     Peak airway pressure: 11 cm H2O    Minute ventilation: 10.4 l/min        MEDS: Reviewed    Imaging:    CXR Results  (Last 48 hours)               11/19/21 0920  XR CHEST PORT Final result    Impression:      Increased moderate bilateral pulmonary edema. Dialysis catheter terminates in   the right atrium. No pneumothorax. Narrative:  EXAM: XR CHEST PORT       INDICATION: Dialysis catheter placement. Recent extubation. Hospitalization for   respiratory failure, CHF exacerbation, renal failure, and hepatic   encephalopathy. COMPARISON: Portable chest on 11/18/2021. Chest views on 11/14/2021. TECHNIQUE: Upright portable chest AP view       FINDINGS: Right jugular dialysis catheter extends into the right atrium. Cardiac   monitoring wires overlie the thorax. Sternotomy wires are unchanged. Cardiomegaly is unchanged. Aortic arch is not enlarged.  Pulmonary vascular   moderate prominence is unchanged. Bilateral moderate pulmonary edema is increased since yesterday and 5 days ago. No pneumothorax. Low lung volumes. Endotracheal tube has been removed. Osteopenia is unchanged. 11/18/21 1235  XR CHEST PORT Final result    Impression:  1. No pneumothorax   2. Persistent left pleural effusion and atelectasis in left lower lobe. Left   pleural effusion has increased when compared with earlier the same day. 3. The ET tube is 2 cm above the naomi this could be retracted 1 to 2 cm. Narrative:  EXAM: XR CHEST PORT       INDICATION: s/p thoracentesis right       COMPARISON: 11/18/2021 at 0716       FINDINGS: A portable AP radiograph of the chest was obtained at 1235 hours. The   patient is on a cardiac monitor. The ET tube is 2 cm above the naomi. This could be retracted 1 to 2 cm. NG tube   courses into the stomach. The distal tip is not seen. Right hemithorax reveals   no significant pleural fluid present. No pneumothorax. There continues to be a small moderate left pleural effusion which has increased   when compared with the examination from earlier the same day. This causes   increasing atelectasis in left lower lobe. 11/18/21 0718  XR CHEST PORT Final result    Impression:  1. ET tube is in satisfactory position. 2. Left pleural effusion has increased as has atelectasis at the left lung base. There is persistent interstitial edema. Narrative:  EXAM: XR CHEST PORT       INDICATION: ett placement       COMPARISON: 11/14/2021       FINDINGS: A portable AP radiograph of the chest was obtained at 0716 hours. The   patient is on a cardiac monitor. The ET tube is in satisfactory position. There   is a small left pleural effusion which has increased in the interval. Small   right pleural effusion is unchanged. Perihilar pulmonary edema is unchanged. There is increasing atelectasis in left lower lobe.                  CT Results  (Last 48 hours)               11/18/21 0849  CT HEAD WO CONT Final result    Impression:      No acute process. Narrative:  INDICATION: Unresponsive, seizure like activity       EXAM:  HEAD CT WITHOUT CONTRAST       COMPARISON: None       TECHNIQUE:  Routine noncontrast axial head CT was performed. Sagittal and   coronal reconstructions were generated. CT dose reduction was achieved through use of a standardized protocol tailored   for this examination and automatic exposure control for dose modulation. FINDINGS:       Ventricles: Midline, no hydrocephalus. Intracranial Hemorrhage: None. Brain Parenchyma/Brainstem: Normal for age. Basal Cisterns: Normal.   Paranasal Sinuses: Chronic peripheral mucosal thickening in the maxillary and   sphenoid sinuses. Additional Comments: N/A.               ECHO: (Repeat echo pending)  Interpretation Summary    Result status: Final result  · LV: Calculated LVEF is 53%. Normal cavity size. Mild concentric hypertrophy. Globally reduced systolic function. Low normal systolic function. Wall motion: normal.  · RV: Moderately reduced systolic function. · TV: Moderate to severe tricuspid valve regurgitation is present. Right Ventricular Arterial Pressure (RVSP) is 35 mmHg. Pulmonary hypertension not suggested by Doppler findings. · LA: Moderately dilated left atrium. · Pericardium: There is a large left pleural effusion. Multidisciplinary Rounds Completed: Yes    ABCDEF Bundle/Checklist Completed:  Yes    SPECIAL EQUIPMENT  None    DISPOSITION  Stay in ICU    CRITICAL CARE CONSULTANT NOTE  I had a face to face encounter with the patient, reviewed and interpreted patient data including clinical events, labs, images, vital signs, I/O's, and examined patient.   I have discussed the case and the plan and management of the patient's care with the consulting services, the bedside nurses and the respiratory therapist.      NOTE OF PERSONAL INVOLVEMENT IN CARE   This patient has a high probability of imminent, clinically significant deterioration, which requires the highest level of preparedness to intervene urgently. I participated in the decision-making and personally managed or directed the management of the following life and organ supporting interventions that required my frequent assessment to treat or prevent imminent deterioration. I personally spent 60 minutes of critical care time. This is time spent at this critically ill patient's bedside actively involved in patient care as well as the coordination of care and discussions with the patient's family. This does not include any procedural time which has been billed separately.     Pilar De Luna [facility]-BC     [facility]

## 2021-11-20 ENCOUNTER — APPOINTMENT (OUTPATIENT)
Dept: GENERAL RADIOLOGY | Age: 61
DRG: 194 | End: 2021-11-20
Attending: HOSPITALIST
Payer: MEDICAID

## 2021-11-20 LAB
ANION GAP SERPL CALC-SCNC: 6 MMOL/L (ref 5–15)
ATRIAL RATE: 69 BPM
ATRIAL RATE: 70 BPM
BUN SERPL-MCNC: 45 MG/DL (ref 6–20)
BUN/CREAT SERPL: 14 (ref 12–20)
CALCIUM SERPL-MCNC: 8.3 MG/DL (ref 8.5–10.1)
CALCULATED P AXIS, ECG09: 45 DEGREES
CALCULATED P AXIS, ECG09: 55 DEGREES
CALCULATED R AXIS, ECG10: 35 DEGREES
CALCULATED R AXIS, ECG10: 47 DEGREES
CALCULATED T AXIS, ECG11: 45 DEGREES
CALCULATED T AXIS, ECG11: 55 DEGREES
CHLORIDE SERPL-SCNC: 104 MMOL/L (ref 97–108)
CO2 SERPL-SCNC: 26 MMOL/L (ref 21–32)
CREAT SERPL-MCNC: 3.28 MG/DL (ref 0.7–1.3)
DIAGNOSIS, 93000: NORMAL
DIAGNOSIS, 93000: NORMAL
ERYTHROCYTE [DISTWIDTH] IN BLOOD BY AUTOMATED COUNT: 15.2 % (ref 11.5–14.5)
GLUCOSE BLD STRIP.AUTO-MCNC: 123 MG/DL (ref 65–117)
GLUCOSE BLD STRIP.AUTO-MCNC: 137 MG/DL (ref 65–117)
GLUCOSE BLD STRIP.AUTO-MCNC: 170 MG/DL (ref 65–117)
GLUCOSE BLD STRIP.AUTO-MCNC: 201 MG/DL (ref 65–117)
GLUCOSE SERPL-MCNC: 126 MG/DL (ref 65–100)
HBV CORE AB SERPL QL IA: NEGATIVE
HCT VFR BLD AUTO: 23.3 % (ref 36.6–50.3)
HGB BLD-MCNC: 7.5 G/DL (ref 12.1–17)
MCH RBC QN AUTO: 29.3 PG (ref 26–34)
MCHC RBC AUTO-ENTMCNC: 32.2 G/DL (ref 30–36.5)
MCV RBC AUTO: 91 FL (ref 80–99)
NRBC # BLD: 0 K/UL (ref 0–0.01)
NRBC BLD-RTO: 0 PER 100 WBC
P-R INTERVAL, ECG05: 114 MS
P-R INTERVAL, ECG05: 144 MS
PLATELET # BLD AUTO: 83 K/UL (ref 150–400)
PMV BLD AUTO: 11.4 FL (ref 8.9–12.9)
POTASSIUM SERPL-SCNC: 4 MMOL/L (ref 3.5–5.1)
Q-T INTERVAL, ECG07: 406 MS
Q-T INTERVAL, ECG07: 414 MS
QRS DURATION, ECG06: 82 MS
QRS DURATION, ECG06: 88 MS
QTC CALCULATION (BEZET), ECG08: 438 MS
QTC CALCULATION (BEZET), ECG08: 443 MS
RBC # BLD AUTO: 2.56 M/UL (ref 4.1–5.7)
SERVICE CMNT-IMP: ABNORMAL
SODIUM SERPL-SCNC: 136 MMOL/L (ref 136–145)
VENTRICULAR RATE, ECG03: 69 BPM
VENTRICULAR RATE, ECG03: 70 BPM
WBC # BLD AUTO: 3.3 K/UL (ref 4.1–11.1)

## 2021-11-20 PROCEDURE — 82962 GLUCOSE BLOOD TEST: CPT

## 2021-11-20 PROCEDURE — 74011250637 HC RX REV CODE- 250/637: Performed by: NURSE PRACTITIONER

## 2021-11-20 PROCEDURE — 85027 COMPLETE CBC AUTOMATED: CPT

## 2021-11-20 PROCEDURE — 74011250637 HC RX REV CODE- 250/637: Performed by: INTERNAL MEDICINE

## 2021-11-20 PROCEDURE — 71110 X-RAY EXAM RIBS BIL 3 VIEWS: CPT

## 2021-11-20 PROCEDURE — 74011000250 HC RX REV CODE- 250: Performed by: INTERNAL MEDICINE

## 2021-11-20 PROCEDURE — 97530 THERAPEUTIC ACTIVITIES: CPT

## 2021-11-20 PROCEDURE — 74011636637 HC RX REV CODE- 636/637: Performed by: NURSE PRACTITIONER

## 2021-11-20 PROCEDURE — 65660000001 HC RM ICU INTERMED STEPDOWN

## 2021-11-20 PROCEDURE — 74011250636 HC RX REV CODE- 250/636: Performed by: NURSE PRACTITIONER

## 2021-11-20 PROCEDURE — 80048 BASIC METABOLIC PNL TOTAL CA: CPT

## 2021-11-20 PROCEDURE — 77010033678 HC OXYGEN DAILY

## 2021-11-20 PROCEDURE — 74011000250 HC RX REV CODE- 250: Performed by: NURSE PRACTITIONER

## 2021-11-20 PROCEDURE — 74011250637 HC RX REV CODE- 250/637: Performed by: HOSPITALIST

## 2021-11-20 PROCEDURE — 97162 PT EVAL MOD COMPLEX 30 MIN: CPT

## 2021-11-20 PROCEDURE — 74011250636 HC RX REV CODE- 250/636: Performed by: HOSPITALIST

## 2021-11-20 PROCEDURE — 36415 COLL VENOUS BLD VENIPUNCTURE: CPT

## 2021-11-20 PROCEDURE — C9113 INJ PANTOPRAZOLE SODIUM, VIA: HCPCS | Performed by: NURSE PRACTITIONER

## 2021-11-20 PROCEDURE — 99232 SBSQ HOSP IP/OBS MODERATE 35: CPT | Performed by: INTERNAL MEDICINE

## 2021-11-20 RX ORDER — HYDRALAZINE HYDROCHLORIDE 50 MG/1
50 TABLET, FILM COATED ORAL 3 TIMES DAILY
Status: DISCONTINUED | OUTPATIENT
Start: 2021-11-20 | End: 2021-11-21

## 2021-11-20 RX ORDER — LOSARTAN POTASSIUM 50 MG/1
100 TABLET ORAL
Status: DISCONTINUED | OUTPATIENT
Start: 2021-11-20 | End: 2021-12-01 | Stop reason: HOSPADM

## 2021-11-20 RX ADMIN — Medication 10 ML: at 21:11

## 2021-11-20 RX ADMIN — Medication 10 ML: at 13:20

## 2021-11-20 RX ADMIN — HYDRALAZINE HYDROCHLORIDE 50 MG: 50 TABLET, FILM COATED ORAL at 13:19

## 2021-11-20 RX ADMIN — ROSUVASTATIN 10 MG: 10 TABLET, FILM COATED ORAL at 21:11

## 2021-11-20 RX ADMIN — Medication 10 ML: at 07:45

## 2021-11-20 RX ADMIN — HYDRALAZINE HYDROCHLORIDE 50 MG: 50 TABLET, FILM COATED ORAL at 21:11

## 2021-11-20 RX ADMIN — CALCIUM CARBONATE (ANTACID) CHEW TAB 500 MG 200 MG: 500 CHEW TAB at 11:00

## 2021-11-20 RX ADMIN — METOPROLOL TARTRATE 25 MG: 25 TABLET, FILM COATED ORAL at 21:11

## 2021-11-20 RX ADMIN — BUSPIRONE HYDROCHLORIDE 10 MG: 10 TABLET ORAL at 17:59

## 2021-11-20 RX ADMIN — HYDRALAZINE HYDROCHLORIDE 20 MG: 20 INJECTION INTRAMUSCULAR; INTRAVENOUS at 04:28

## 2021-11-20 RX ADMIN — SERTRALINE 200 MG: 50 TABLET, FILM COATED ORAL at 09:05

## 2021-11-20 RX ADMIN — POLYETHYLENE GLYCOL 3350 17 G: 17 POWDER, FOR SOLUTION ORAL at 09:05

## 2021-11-20 RX ADMIN — MORPHINE SULFATE 4 MG: 2 INJECTION, SOLUTION INTRAMUSCULAR; INTRAVENOUS at 09:41

## 2021-11-20 RX ADMIN — METOPROLOL TARTRATE 25 MG: 25 TABLET, FILM COATED ORAL at 09:05

## 2021-11-20 RX ADMIN — POLYETHYLENE GLYCOL 3350 17 G: 17 POWDER, FOR SOLUTION ORAL at 18:00

## 2021-11-20 RX ADMIN — INSULIN LISPRO 3 UNITS: 100 INJECTION, SOLUTION INTRAVENOUS; SUBCUTANEOUS at 18:00

## 2021-11-20 RX ADMIN — BUMETANIDE 2 MG: 0.25 INJECTION INTRAMUSCULAR; INTRAVENOUS at 21:11

## 2021-11-20 RX ADMIN — CALCIUM CARBONATE (ANTACID) CHEW TAB 500 MG 200 MG: 500 CHEW TAB at 09:05

## 2021-11-20 RX ADMIN — CALCIUM CARBONATE (ANTACID) CHEW TAB 500 MG 200 MG: 500 CHEW TAB at 17:59

## 2021-11-20 RX ADMIN — HYDRALAZINE HYDROCHLORIDE 20 MG: 20 INJECTION INTRAMUSCULAR; INTRAVENOUS at 23:25

## 2021-11-20 RX ADMIN — BUMETANIDE 2 MG: 0.25 INJECTION INTRAMUSCULAR; INTRAVENOUS at 09:04

## 2021-11-20 RX ADMIN — MORPHINE SULFATE 4 MG: 2 INJECTION, SOLUTION INTRAMUSCULAR; INTRAVENOUS at 21:17

## 2021-11-20 RX ADMIN — ASPIRIN 81 MG CHEWABLE TABLET 81 MG: 81 TABLET CHEWABLE at 09:05

## 2021-11-20 RX ADMIN — LOSARTAN POTASSIUM 100 MG: 50 TABLET, FILM COATED ORAL at 21:11

## 2021-11-20 RX ADMIN — ISOSORBIDE MONONITRATE 60 MG: 60 TABLET ORAL at 09:05

## 2021-11-20 RX ADMIN — HYDRALAZINE HYDROCHLORIDE 50 MG: 50 TABLET, FILM COATED ORAL at 17:59

## 2021-11-20 RX ADMIN — TRAMADOL HYDROCHLORIDE 50 MG: 50 TABLET ORAL at 02:41

## 2021-11-20 RX ADMIN — ACETAMINOPHEN 650 MG: 325 TABLET ORAL at 02:41

## 2021-11-20 RX ADMIN — SODIUM CHLORIDE 40 MG: 9 INJECTION INTRAMUSCULAR; INTRAVENOUS; SUBCUTANEOUS at 09:05

## 2021-11-20 NOTE — CONSULTS
Beverly Howard MD, Marj Jaramillo MD, MPH      Faye Abdul, MARY Alvarez, [facility] [place]     April S Rony, [facility]   Bala Bell, FNSUZY-EULALIO Toscano, [facility]       Reji Lowe [facility] [address]    at [address], [address] Jaya Baldwin  22.    [phone]    FAX: [address] [place] [address]    at [address], [address]    [phone]    FAX: [phone]       HEPATOLOGY PROGRESS NOTE  Scott Agustin is a 64year old male with CAD, CABG in 2020 and recent onset SEVERIANO. He is not know to have liver disease. He was recently hospitalized at [facility] and apparently had a liver and a kidney biopsy. He was told he did not have any liver disease. The current hospitalization was for shortness of breath, nausea, and abdominal pain. Labs performed in the ER showed Hb 8.6, PLT 93, BUN 69, Scr 3.34, total bili 0.5, ALT 27, AST 20,   . Cross sectional imaging of the liver performed with CT scan demonstrated moderate ascites, bilateral pleural effusion, anasarca. Ultrasound of the liver showed hepatic parenchymal disease with cirrhotic morphology, ascites,  right pleural effusion and pulsatile main portal vein waveform. The most recent echocardiogram performed in 10/2021 demonstrated EF 60-65%. There was mod pulmonary HTN with PAP of 50 mm Hg and RV dilation. Hospital course:  He has treated with diuretics. Abdominal distention and abdominal pain have resolved. Lower edema has resolved. We have requested that clerical staff obtain the liver biopsy result from [facility] but that has never arrived.       ASSESSMENT AND PLAN:  Passive hepatic congestion  Suspect the patient has passive hepatic congestion due to pulmonary hypertension, RV dysfunction, and TR. It is not clear if the patient also has cirrhosis. Imaging of the liver in the setting of passive hepatic congestion can make the liver look like cirrhosis is present when it is not. Repeat ECHO showed RV dilation but no TR. He previously had a liver biopsy 2-3 months ago. There was no reason to repeat this. All we need to do is communicate with [facility] and get a copy of the Pathology report. Ascites  Suspect this is due to passive hepatic congestion   There was not enough ascites for paracentesis. Continue diuretics per cardiology. Abdominal pain:   Based on CT abdomen, there are several thick walled loops of small bowel in the midabdomen ? Due to infectious enteritis vs other  GI in consult    Bilateral pleural effusion  He had a thoracentesis. SAAG of pleural fluid was 3.2-1.1=2.1 which is consistent with transudate from either CHF or ascites. Anemia   This is due to anemia of kidney disease    FE saturation was 21%  EGD was performed by GI.  NO esophageal varices present. Thrombocytopenia  This is possibly due to portal hypertension as a consequence of cirrhosis or passive hepatic congestion  Ultrasound of liver shows cirrhosis morphology  We need results of liver biopsy to know if he has cirrhosis or not. No evidence of active bleeding    CAD s/p CABG      PHYSICAL EXAMINATION:  VS: per nursing note  General: No acute distress. Eyes: Sclera anicteric. ENT:  No oral lesions. Thyroid normal.  Nodes:  No adenopathy. Skin: No spider angiomata. No jaundice. No palmar erythema. Respiratory: Reduced breath sounds bi basal lung field  Cardiovascular: Regular heart rate. No JVD. Abdomen: Soft non-tender, Distended with some ascites. Extremities: 3+ lower extremity edema. Neurologic: Alert and oriented. Cranial nerves grossly intact. No asterixis.       LABORATORY:  Results for Toyin Dec (MRN [id number]) as of 11/20/2021 14:44   Ref. Range 11/19/2021 02:45 11/19/2021 08:40 11/20/2021 01:20   WBC Latest Ref Range: 4.1 - 11.1 K/uL 5.1  3.3 (L)   HGB Latest Ref Range: 12.1 - 17.0 g/dL 7.7 (L)  7.5 (L)   PLATELET Latest Ref Range: 150 - 400 K/uL 91 (L)  83 (L)   INR Latest Ref Range: 0.9 - 1.1   1.3 (H)     Sodium Latest Ref Range: 136 - 145 mmol/L 139 140 136   Potassium Latest Ref Range: 3.5 - 5.1 mmol/L 4.4 4.4 4.0   Chloride Latest Ref Range: 97 - 108 mmol/L 109 (H) 109 (H) 104   CO2 Latest Ref Range: 21 - 32 mmol/L 20 (L) 21 26   Glucose Latest Ref Range: 65 - 100 mg/dL 79 97 126 (H)   BUN Latest Ref Range: 6 - 20 MG/DL 76 (H) 79 (H) 45 (H)   Creatinine Latest Ref Range: 0.70 - 1.30 MG/DL 4.59 (H) 4.81 (H) 3.28 (H)   Bilirubin, total Latest Ref Range: 0.2 - 1.0 MG/DL 0.8     Albumin Latest Ref Range: 3.5 - 5.0 g/dL 3.4 (L) 3.4 (L)    ALT Latest Ref Range: 12 - 78 U/L 40     AST Latest Ref Range: 15 - 37 U/L 41 (H)     Alk. phosphatase Latest Ref Range: 45 - 117 U/L 95         RADIOLOGY:  11/14: Ct abdomen: Moderate volume ascites. Moderate bilateral pleural effusions. Anasarca. 2.  Several thick-walled loops of small bowel in the midabdomen, may be due to underdistention or hypoproteinemic state, correlate for infectious or inflammatory enteritis. 11/14: Liver US:  Hepatic parenchymal disease with cirrhotic morphology. Perihepatic ascites  and right pleural effusion noted. Pulsatile main portal vein waveform, likely sequela of hepatic parenchymal disease. Evidence of right-sided medical renal disease without hydronephrosis. Normal sonographic evaluation of the gallbladder.       Valma Dakins, MD  [facility] of [address] [place] [address] A, [address] [place] [address], [facility] 22.  [phone]  [address] [facility]

## 2021-11-20 NOTE — PROGRESS NOTES
0210: TRANSFER - IN REPORT:    Verbal report received from [place] (name) on Heather Wilson  being received from ICU (unit) for routine progression of care      Report consisted of patients Situation, Background, Assessment and   Recommendations(SBAR). Information from the following report(s) SBAR, Kardex, Intake/Output, MAR, Recent Results and Cardiac Rhythm NSR was reviewed with the receiving nurse. Opportunity for questions and clarification was provided. Assessment completed upon patients arrival to unit and care assumed. 0430: Pt's /80. PRN hydralazine given. 0519: Patient's /59.    0730: Bedside and Verbal shift change report given to Martha ePnn RN (oncoming nurse) by Carlos Epstein RN (offgoing nurse). Report included the following information SBAR, Kardex, Intake/Output, MAR, Recent Results and Cardiac Rhythm NSR.

## 2021-11-20 NOTE — PROGRESS NOTES
1930: Bedside shift change report given to Jadon Oviedo (oncoming nurse) by Nitesh Campoverde (offgoing nurse). Report included the following information SBAR, Kardex, Intake/Output, MAR, Accordion, Recent Results, Med Rec Status and Cardiac Rhythm SR.     0140: TRANSFER - OUT REPORT:    Verbal report given to Nayely(name) casper Carcamo  being transferred to CVSU(unit) for routine progression of care       Report consisted of patients Situation, Background, Assessment and   Recommendations(SBAR). Information from the following report(s) SBAR, Kardex, Intake/Output, MAR, Accordion, Recent Results, Med Rec Status and Cardiac Rhythm SR was reviewed with the receiving nurse. Lines:   Clifford [place] Zhao Catheter 11/19/21 Anterior; Right Neck (Active)   Central Line Being Utilized Yes 11/19/21 2000   Criteria for Appropriate Use Dialysis/apheresis 11/19/21 2000   Site Assessment Clean, dry, & intact 11/19/21 2000   Infiltration Assessment 0 11/19/21 2000   Affected Extremity/Extremities Color distal to insertion site pink (or appropriate for race); Pulses palpable; Range of motion performed 11/19/21 2000   Date of Last Dressing Change 11/19/21 11/19/21 2000   Dressing Status Clean, dry, & intact 11/19/21 2000   Dressing Type Disk with Chlorhexadine gluconate (CHG); Transparent; Tape 11/19/21 2000   Action Taken Open ports on tubing capped 11/19/21 2000   Proximal Hub Color/Line Status Red; Capped 11/19/21 2000   Medial 1 Hub Color/Line Status Blue; Capped 11/19/21 2000   Alcohol Cap Used Yes 11/19/21 2000       Peripheral IV 11/19/21 Anterior;  Left Forearm (Active)   Site Assessment Clean, dry, & intact 11/19/21 2000   Phlebitis Assessment 0 11/19/21 2000   Infiltration Assessment 0 11/19/21 2000   Dressing Status Clean, dry, & intact 11/19/21 2000   Dressing Type Transparent; Tape 11/19/21 2000   Hub Color/Line Status Infusing; Green 11/19/21 2000   Action Taken Open ports on tubing capped 11/19/21 2000   Alcohol Cap Used Yes 11/19/21 2000       Peripheral IV 11/20/21 Anterior; Left; Proximal Forearm (Active)   Site Assessment Clean, dry, & intact 11/20/21 0117   Phlebitis Assessment 0 11/20/21 0117   Infiltration Assessment 0 11/20/21 0117   Dressing Status Clean, dry, & intact 11/20/21 0117   Dressing Type Transparent; Tape 11/20/21 0117   Hub Color/Line Status Green; Flushed; Capped 11/20/21 0117   Action Taken Open ports on tubing capped 11/20/21 0117   Alcohol Cap Used Yes 11/20/21 0117        Opportunity for questions and clarification was provided.       Patient transported with:   Monitor  O2 @ 6 liters  Registered Nurse  [facility]

## 2021-11-20 NOTE — PROGRESS NOTES
[facility] Adult  Hospitalist Group     ICU Transfer/Accept Summary     [place] [address] [place]  DATE OF TRANSFER: 11/20/2021       PATIENT ID: Kevyn Patient  MRN: [id number]   YOB: 1960    PRIMARY CARE PROVIDER: Susi Arteaga MD   DATE OF ADMISSION: 11/14/2021  9:36 AM    ATTENDING PHYSICIAN: Miranda Enriquez MD  CONSULTATIONS:   IP CONSULT TO CARDIOLOGY  IP CONSULT TO NEPHROLOGY  IP CONSULT TO GASTROENTEROLOGY  IP CONSULT TO HEPATOLOGY  IP CONSULT TO NEUROLOGY    PROCEDURES/SURGERIES:   Procedure(s):  ESOPHAGOGASTRODUODENOSCOPY (EGD)    REASON FOR ADMISSION: GI bleed, and volume overload secondary to cardiorenal syndrome    HOSPITAL PROBLEM LIST:  Patient Active Problem List   Diagnosis Code    Essential hypertension I10    Reactive depression F32.9    Peripheral neuropathy G62.9    Type 2 diabetes mellitus with ophthalmic complication, with long-term current use of insulin ([facility] [address] E11.39, Z79.4    Type 2 diabetes with nephropathy ([facility] [address] E11.21    Type 2 diabetes mellitus with diabetic neuropathy ([facility] [address] E11.40    CHF exacerbation (HCC) I50.9         Brief HPI and Hospital Course:      Mr. Erik Blake is a 66-year-old male with a past medical history of CAD status post CABG on dAPT, HFpEF,  DM 2, and CKD V who was admitted on November 14 for GI bleed, and overload secondary to cardiorenal syndrome. He had been hospitalized at [facility] prior to this for acute on chronic heart failure, and started to develop bilateral lower extremity edema with melena upon discharge. He underwent EGD which showed nonbleeding erosive gastritis, and was started on PPI. Nephrology, and cardiology were consulted for volume management, and he was diuresed with IV Lasix. On November 18, patient sustained PEA arrest.  After 12 minutes of CPR, ROSC was achieved, and he was intubated and transferred to the ICU.   His volume status did not improve with IV Lasix, and Alexandre catheter was placed with plan for 3 days of dialysis. He was extubated, and neurology was consulted due altered mental status suspected secondary to anoxic brain injury, but this improved, and his mental status ultimately returned back to baseline with no imaging or intervention recommended. Assessment and Plan:    #Cardiorenal syndrome  #HFpEF  #CKD 5  Continue IV Bumex, TUMS, retacrit, and losartan  Cardiology, nephrology on board, appreciate recommendations  Currently with right IJ dialysis catheter for inpatient dialysis per nephrology  Avoid renal toxins, and hypotension. #Erosive gastritis  Presented with melena, and normocytic anemia with hemoglobin 7-8  EGD on 11/17 with nonbleeding erosive gastritis  Continue PPI per gastroenterology  -now on ASA monotherapy    #DM II  -lispro ISS    #CAD s/p CABG  -continue metoprolol, crestor, imdur, and ASA    #Depression  -continue zoloft    #MSK pain   -s/p CPR  -has tramadol, and morphine on board    FEN: caridac  dvt ppx: SCD  MPOA: daughter  Code: Full                       PHYSICAL EXAMINATION:  Visit Vitals  BP (!) 143/75   Pulse 67   Temp 97.5 °F (36.4 °C)   Resp 15   Ht 5' 9\" (1.753 m)   Wt 84.8 kg (187 lb)   SpO2 96%   BMI 27.62 kg/m²       General:          Alert, cooperative, no distress  HEENT:           Atraumatic, MMM            Neck:               Supple, symmetrical,  thyroid: non tender  Lungs:             Clear to auscultation bilaterally. No Wheezing or Rhonchi. No rales. Heart:              Regular  rhythm,  No  murmur   No edema  Abdomen:       Soft, non-tender. Not distended. Bowel sounds normal  Extremities:     No cyanosis. No clubbing,  +2 distal pulses  Skin:                Not pale. Not Jaundiced  No rashes   Psych:             Not anxious or agitated. Neurologic:      Alert, moves all extremities, oriented X 3.      Labs:     Recent Labs     11/19/21  0245 11/18/21  0721   WBC 5.1 4.8   HGB 7.7* 8.2*   HCT 24.0* 26.1*   PLT 91* 78* Recent Labs     11/19/21  0840 11/19/21 0245 11/18/21 0721 11/18/21 0059 11/18/21 0059    139 140   < > 137   K 4.4 4.4 4.6   < > 5.3*   * 109* 111*   < > 110*   CO2 21 20* 21   < > 21   BUN 79* 76* 71*   < > 74*   CREA 4.81* 4.59* 4.45*   < > 4.34*   GLU 97 79 95   < > 72   CA 7.9* 8.2* 7.9*   < > 8.1*   MG  --   --  2.4  --  2.4   PHOS 6.2*  --  7.1*  --  6.6*    < > = values in this interval not displayed. Recent Labs     11/19/21  0840 11/19/21 0245 11/18/21 0721 11/18/21 0059 11/18/21 0059   ALT  --  40 57  --  21   AP  --  95 117  --  104   TBILI  --  0.8 0.8  --  0.5   TP  --  7.0 7.0  --  7.3   ALB 3.4* 3.4* 3.2*   < > 2.9*   GLOB  --  3.6 3.8  --  4.4*    < > = values in this interval not displayed. Recent Labs     11/19/21 0245 11/18/21 0721   INR 1.3* 1.2*   PTP 13.9* 12.2*   APTT  --  26.8      No results for input(s): FE, TIBC, PSAT, FERR in the last 72 hours.    No results found for: FOL, RBCF   Recent Labs     11/18/21  1155   PH 7.29*   PCO2 39   PO2 68*     Recent Labs     11/18/21  1003        Lab Results   Component Value Date/Time    Cholesterol, total 176 11/22/2019 09:36 AM    HDL Cholesterol 45 11/22/2019 09:36 AM    LDL, calculated 112 (H) 11/22/2019 09:36 AM    Triglyceride 94 11/22/2019 09:36 AM     Lab Results   Component Value Date/Time    Glucose (POC) 127 (H) 11/19/2021 09:16 PM    Glucose (POC) 113 11/19/2021 04:45 PM    Glucose (POC) 93 11/19/2021 12:04 PM    Glucose (POC) 73 11/19/2021 06:21 AM    Glucose (POC) 62 (L) 11/19/2021 12:49 AM     Lab Results   Component Value Date/Time    Color YELLOW/STRAW 11/14/2021 10:31 AM    Appearance CLEAR 11/14/2021 10:31 AM    Specific gravity 1.019 11/14/2021 10:31 AM    pH (UA) 5.5 11/14/2021 10:31 AM    Protein >300 (A) 11/14/2021 10:31 AM    Glucose 500 (A) 11/14/2021 10:31 AM    Ketone Negative 11/14/2021 10:31 AM    Bilirubin Negative 11/14/2021 10:31 AM    Urobilinogen 0.2 11/14/2021 10:31 AM Nitrites Negative 11/14/2021 10:31 AM    Leukocyte Esterase Negative 11/14/2021 10:31 AM    Epithelial cells FEW 11/14/2021 10:31 AM    Bacteria Negative 11/14/2021 10:31 AM    WBC 0-4 11/14/2021 10:31 AM    RBC 0-5 11/14/2021 10:31 AM         CODE STATUS:  x Full Code    DNR    Partial    Comfort Care       Signed:   Lenny Mahmood MD  Date of Service:  11/20/2021  2:13 AM

## 2021-11-20 NOTE — PROGRESS NOTES
[facility]   [address] [address], [address] [facility]  Phone: [phone]   [phone]       Nephrology Progress Note  Radha Wu     1960     [id number]  Date of Admission : 11/14/2021 11/20/21    CC: Follow up for nic ON CKD 4       Assessment and Plan   NIC on CKD  - progressive diabetic Nephropathy   - Now w/ superimposed ATN from PEA/ resp arrest   - HD TODAY  - OKAY TO REMOVE LUCAS CATH    CKD 4   -Biopsy-proven advanced diabetic nephropathy  -Baseline creatinine 3.5 mg/dL     Volume overload:  Pleural effusions  - 2/2 Nephrotic syndrome   - ? Thoracentesis      HTN  -Stable     Anemia in CKD   - continue RANDY      CAD s/p CABG May 2020  HFpEF  -Last echo: Preserved LVEF, Mod Pulm HTN-PASP 50 mmHg     Resp / PEA arrest   - Intubated and extubated 11/18    AMS   ? Hypoxic Brain injury         Care Plan discussed with: Patient        Interval History:  Seen and examined. Out of icu  Received hd yesterday  Review of Systems: Review of systems not obtained due to patient factors.     Current Medications:   Current Facility-Administered Medications   Medication Dose Route Frequency    losartan (COZAAR) tablet 100 mg  100 mg Oral QHS    hydrALAZINE (APRESOLINE) tablet 50 mg  50 mg Oral TID    [Held by provider] gabapentin (NEURONTIN) capsule 100 mg  100 mg Oral TID    insulin lispro (HUMALOG) injection   SubCUTAneous AC&HS    hydrALAZINE (APRESOLINE) 20 mg/mL injection 10-20 mg  10-20 mg IntraVENous Q6H PRN    heparin (porcine) 1,000 unit/mL injection 1,100 Units  1,100 Units Hemodialysis DIALYSIS PRN    heparin (porcine) 1,000 unit/mL injection 1,400 Units  1,400 Units Hemodialysis DIALYSIS PRN    pantoprazole (PROTONIX) 40 mg in 0.9% sodium chloride 10 mL injection  40 mg IntraVENous DAILY    metoprolol tartrate (LOPRESSOR) tablet 25 mg  25 mg Oral Q12H    aspirin chewable tablet 81 mg  81 mg Oral DAILY    polyethylene glycol (MIRALAX) packet 17 g  17 g Oral BID    [Held by provider] eplerenone (INSPRA) tablet 25 mg  25 mg Oral DAILY    bumetanide (BUMEX) injection 2 mg  2 mg IntraVENous Q12H    calcium carbonate (TUMS) chewable tablet 200 mg [elemental]  200 mg Oral TID WITH MEALS    isosorbide mononitrate ER (IMDUR) tablet 60 mg  60 mg Oral DAILY    sodium chloride (NS) flush 5-40 mL  5-40 mL IntraVENous Q8H    sodium chloride (NS) flush 5-40 mL  5-40 mL IntraVENous PRN    morphine injection 4 mg  4 mg IntraVENous Q4H PRN    epoetin ericka-epbx (RETACRIT) injection 20,000 Units  20,000 Units SubCUTAneous Q MON, WED & FRI    butalbital-acetaminophen-caffeine (FIORICET, ESGIC) -40 mg per tablet 1 Tablet  1 Tablet Oral Q4H PRN    ondansetron (ZOFRAN) injection 4 mg  4 mg IntraVENous Q4H PRN    busPIRone (BUSPAR) tablet 10 mg  10 mg Oral BID    rosuvastatin (CRESTOR) tablet 10 mg  10 mg Oral QHS    sertraline (ZOLOFT) tablet 200 mg  200 mg Oral DAILY    traMADoL (ULTRAM) tablet 50 mg  50 mg Oral Q6H PRN    [Held by provider] traZODone (DESYREL) tablet 50 mg  50 mg Oral QHS    acetaminophen (TYLENOL) tablet 650 mg  650 mg Oral Q6H PRN    glucose chewable tablet 16 g  4 Tablet Oral PRN    dextrose (D50W) injection syrg 12.5-25 g  25-50 mL IntraVENous PRN    glucagon (GLUCAGEN) injection 1 mg  1 mg IntraMUSCular PRN    [Held by provider] insulin glargine (LANTUS) injection 10 Units  10 Units SubCUTAneous DAILY      No Known Allergies    Objective:  Vitals:    Vitals:    11/20/21 0555 11/20/21 0740 11/20/21 1000 11/20/21 1100   BP:  (!) 171/78  (!) 189/83   Pulse: 65 70 66 63   Resp:  11  14   Temp:    97.7 °F (36.5 °C)   SpO2:    96%   Weight:       Height:         Intake and Output:  11/20 0701 - 11/20 1900  In: 240 [P.O.:240]  Out: 900 [Urine:900]  11/18 1901 - 11/20 0700  In: 5186 [P.O.:550;  I.V.:683]  Out: 4725 [Urine:2225]    Physical Examination:    General: NAD  Neck:  Supple, no mass  Resp:  Clear b/l  CV:  RRR, s1,s2  Neurologic:  Non focal  Grant +        []    High complexity decision making was performed  []    Patient is at high-risk of decompensation with multiple organ involvement    Lab Data Personally Reviewed: I have reviewed all the pertinent labs, microbiology data and radiology studies during assessment. Recent Labs     11/20/21  0120 11/19/21  0840 11/19/21  0245 11/18/21  0721 11/18/21  0059 11/17/21  1325 11/17/21  1325    140 139 140 137   < > 138   K 4.0 4.4 4.4 4.6 5.3*   < > 4.8    109* 109* 111* 110*   < > 112*   CO2 26 21 20* 21 21   < > 21   * 97 79 95 72   < > 127*   BUN 45* 79* 76* 71* 74*   < > 68*   CREA 3.28* 4.81* 4.59* 4.45* 4.34*   < > 3.94*   CA 8.3* 7.9* 8.2* 7.9* 8.1*   < > 8.3*   MG  --   --   --  2.4 2.4  --   --    PHOS  --  6.2*  --  7.1* 6.6*  --  5.3*   ALB  --  3.4* 3.4* 3.2* 2.9*  --  2.7*   ALT  --   --  40 57 21  --   --    INR  --   --  1.3* 1.2*  --   --   --     < > = values in this interval not displayed.      Recent Labs     11/20/21  0120 11/19/21  0245 11/18/21 0721 11/18/21 0059   WBC 3.3* 5.1 4.8 3.6*   HGB 7.5* 7.7* 8.2* 7.0*   HCT 23.3* 24.0* 26.1* 22.7*   PLT 83* 91* 78* 68*     No results found for: SDES  Lab Results   Component Value Date/Time    Culture result: NO GROWTH THUS FAR 11/18/2021 11:40 AM     Recent Results (from the past 24 hour(s))   GLUCOSE, POC    Collection Time: 11/19/21  4:45 PM   Result Value Ref Range    Glucose (POC) 113 65 - 117 mg/dL    Performed by Lesli Beltran    GLUCOSE, POC    Collection Time: 11/19/21  9:16 PM   Result Value Ref Range    Glucose (POC) 127 (H) 65 - 117 mg/dL    Performed by Nayeli Shell, BASIC    Collection Time: 11/20/21  1:20 AM   Result Value Ref Range    Sodium 136 136 - 145 mmol/L    Potassium 4.0 3.5 - 5.1 mmol/L    Chloride 104 97 - 108 mmol/L    CO2 26 21 - 32 mmol/L    Anion gap 6 5 - 15 mmol/L    Glucose 126 (H) 65 - 100 mg/dL    BUN 45 (H) 6 - 20 MG/DL    Creatinine 3.28 (H) 0.70 - 1.30 MG/DL    BUN/Creatinine ratio 14 12 - 20      GFR est AA 23 (L) >60 ml/min/1.73m2    GFR est non-AA 19 (L) >60 ml/min/1.73m2    Calcium 8.3 (L) 8.5 - 10.1 MG/DL   CBC W/O DIFF    Collection Time: 11/20/21  1:20 AM   Result Value Ref Range    WBC 3.3 (L) 4.1 - 11.1 K/uL    RBC 2.56 (L) 4.10 - 5.70 M/uL    HGB 7.5 (L) 12.1 - 17.0 g/dL    HCT 23.3 (L) 36.6 - 50.3 %    MCV 91.0 80.0 - 99.0 FL    MCH 29.3 26.0 - 34.0 PG    MCHC 32.2 30.0 - 36.5 g/dL    RDW 15.2 (H) 11.5 - 14.5 %    PLATELET 83 (L) 871 - 400 K/uL    MPV 11.4 8.9 - 12.9 FL    NRBC 0.0 0  WBC    ABSOLUTE NRBC 0.00 0.00 - 0.01 K/uL   GLUCOSE, POC    Collection Time: 11/20/21  7:45 AM   Result Value Ref Range    Glucose (POC) 123 (H) 65 - 117 mg/dL    Performed by Cathie Seals            Total time spent with patient:  xxx   min. Care Plan discussed with:  Patient     Family      RN      Consulting Physician [facility],         I have reviewed the flowsheets. Chart and Pertinent Notes have been reviewed. No change in PMH ,family and social history from Consult note.       Kay Leiva MD

## 2021-11-20 NOTE — PROGRESS NOTES
[facility] Cardiology Progress Note    Date of service: 11/20/2021    Subjective:   Mr Nic Delatorre has no complaints this AM.    Objective:    Visit Vitals  BP (!) 171/78 (BP 1 Location: Right arm, BP Patient Position: At rest)   Pulse 70   Temp 97.3 °F (36.3 °C)   Resp 11   Ht 5' 9\" (1.753 m)   Wt 82.5 kg (181 lb 14.1 oz)   SpO2 99%   BMI 26.86 kg/m²       Physical Exam  Constitutional:       Appearance: He is well-developed. HENT:      Head: Normocephalic and atraumatic. Eyes:      General: No scleral icterus. Conjunctiva/sclera: Conjunctivae normal.   Neck:      Vascular: No JVD. Cardiovascular:      Rate and Rhythm: Normal rate and regular rhythm. Heart sounds: Normal heart sounds. No murmur heard. No gallop. Pulmonary:      Effort: Pulmonary effort is normal. No respiratory distress. Breath sounds: Normal breath sounds. No stridor. No wheezing or rales. Abdominal:      General: There is no distension. Palpations: Abdomen is soft. Musculoskeletal:         General: No deformity. Skin:     General: Skin is warm and dry. Neurological:      Mental Status: He is alert and oriented to person, place, and time.    Psychiatric:         Behavior: Behavior normal.         Data reviewed:  Recent Results (from the past 12 hour(s))   GLUCOSE, POC    Collection Time: 11/19/21  9:16 PM   Result Value Ref Range    Glucose (POC) 127 (H) 65 - 117 mg/dL    Performed by Nayeli Shell, BASIC    Collection Time: 11/20/21  1:20 AM   Result Value Ref Range    Sodium 136 136 - 145 mmol/L    Potassium 4.0 3.5 - 5.1 mmol/L    Chloride 104 97 - 108 mmol/L    CO2 26 21 - 32 mmol/L    Anion gap 6 5 - 15 mmol/L    Glucose 126 (H) 65 - 100 mg/dL    BUN 45 (H) 6 - 20 MG/DL    Creatinine 3.28 (H) 0.70 - 1.30 MG/DL    BUN/Creatinine ratio 14 12 - 20      GFR est AA 23 (L) >60 ml/min/1.73m2    GFR est non-AA 19 (L) >60 ml/min/1.73m2    Calcium 8.3 (L) 8.5 - 10.1 MG/DL   CBC W/O DIFF    Collection Time: 11/20/21  1:20 AM   Result Value Ref Range    WBC 3.3 (L) 4.1 - 11.1 K/uL    RBC 2.56 (L) 4.10 - 5.70 M/uL    HGB 7.5 (L) 12.1 - 17.0 g/dL    HCT 23.3 (L) 36.6 - 50.3 %    MCV 91.0 80.0 - 99.0 FL    MCH 29.3 26.0 - 34.0 PG    MCHC 32.2 30.0 - 36.5 g/dL    RDW 15.2 (H) 11.5 - 14.5 %    PLATELET 83 (L) 656 - 400 K/uL    MPV 11.4 8.9 - 12.9 FL    NRBC 0.0 0  WBC    ABSOLUTE NRBC 0.00 0.00 - 0.01 K/uL   GLUCOSE, POC    Collection Time: 11/20/21  7:45 AM   Result Value Ref Range    Glucose (POC) 123 (H) 65 - 117 mg/dL    Performed by Neha Gaspar      Telemetry: NSR    11/14/21    ECHO ADULT FOLLOW-UP OR LIMITED 11/19/2021 11/19/2021    Interpretation Summary  · LV: Estimated LVEF is 60 - 65%. Mild concentric hypertrophy. · RV: Mildly dilated right ventricle. Mildly reduced systolic function. Signed by: Anai Gregg MD on 11/19/2021 11:32 AM        Assessment:    1. PEA cardiac arrest - likely from ARF  2. Non-MI troponin elevation, likely from cardiac arrest  3. Hypertensive kidney disease with CKD stage 4  BP not well controlled  4. Diabetic kidney disease  5. SEVERIANO on CKD stage IV   Has been started on HD  6. Pancytopenia  7. CAD s/p CABG    Plan:    Continue medical therapies  ASA, statin, metoprolol, losartan, ISMN    Signed:  Carrillo Gaviria MD  Interventional Cardiology  11/20/2021

## 2021-11-20 NOTE — PROGRESS NOTES
[id number] [facility] Adult  Hospitalist Group                                                                                          Hospitalist Progress Note  Lasha Gao MD  Answering service: [phone] from in house phone        Date of Service:  2021  NAME:  Chiara Figueroa  :  1960  MRN:  [id number]      Admission Summary:   70-year-old male with a past medical history of CAD status post CABG on dAPT, HFpEF,  DM 2, and CKD V who was admitted on  for GI bleed, and overload secondary to cardiorenal syndrome. He had been hospitalized at [facility] prior to this for acute on chronic heart failure, and started to develop bilateral lower extremity edema with melena upon discharge. He underwent EGD which showed nonbleeding erosive gastritis, and was started on PPI. Nephrology, and cardiology were consulted for volume management, and he was diuresed with IV Lasix. On , patient sustained PEA arrest.  After 12 minutes of CPR, ROSC was achieved, and he was intubated and transferred to the ICU. His volume status did not improve with IV Lasix, and Alexandre catheter was placed with plan for 3 days of dialysis. He was extubated, and neurology was consulted due altered mental status suspected secondary to anoxic brain injury, but this improved, and his mental status ultimately returned back to baseline with no imaging or intervention recommended. Transferred out of ICU on 21    Interval history / Subjective:     Pt seen and examined  C/o chest pain from CPR  No n/v, headaches     Assessment & Plan:     #SEVERIANO on CKD 4, now on HD due to ATN from PEA arrest  #HFpEF  Continue IV Bumex, TUMS, retacrit, and losartan  Cardiology, nephrology on board, appreciate recommendations  Currently with right IJ dialysis catheter for inpatient dialysis per nephrology  Avoid renal toxins, and hypotension.   --c/w HD     S/p PEA Arrest suspect from respiratory distress  --s/p intubation/extubation      #Erosive gastritis  Presented with melena, and normocytic anemia with hemoglobin 7-8  EGD on 11/17 with nonbleeding erosive gastritis  Continue PPI per gastroenterology  -now on ASA monotherapy    Probable Anoxic brain injury due to Cardiac arrest  - down for 12 minutes  - seen by Neurology  - supportive care     #DM II  -lispro ISS     #CAD s/p CABG  -continue metoprolol, crestor, imdur, and ASA     #Depression  -continue zoloft     #MSK pain   -s/p CPR  -has tramadol, and morphine on board     FEN: caridac  dvt ppx: SCD  MPOA: daughter  Code: Full    Care Plan discussed with: Patient/Family and Nurse  Anticipated Disposition: [facility]/Rehab  Anticipated Discharge: Greater than 48 hours     Hospital Problems  Date Reviewed: 8/7/2020          Codes Class Noted POA    CHF exacerbation ([facility] [address] ICD-10-CM: I50.9  ICD-9-CM: 428.0  11/14/2021 Unknown                Review of Systems:   A comprehensive review of systems was negative except for that written in the HPI. Vital Signs:    Last 24hrs VS reviewed since prior progress note. Most recent are:  Visit Vitals  BP (!) 189/83 (BP 1 Location: Right arm, BP Patient Position: At rest)   Pulse 63   Temp 97.7 °F (36.5 °C)   Resp 14   Ht 5' 9\" (1.753 m)   Wt 82.5 kg (181 lb 14.1 oz)   SpO2 96%   BMI 26.86 kg/m²         Intake/Output Summary (Last 24 hours) at 11/20/2021 1142  Last data filed at 11/20/2021 1100  Gross per 24 hour   Intake 1199 ml   Output 4475 ml   Net -3276 ml        Physical Examination:     I had a face to face encounter with this patient and independently examined them on 11/20/2021 as outlined below:          Constitutional:  No acute distress, cooperative, pleasant    ENT:  Oral mucosa moist, oropharynx benign. Resp:  CTA bilaterally. CV:  Regular rhythm, normal rate,+CHEST WALL TENDERNESS    GI:  Soft, non distended, non tender.  normoactive bowel sounds,    Musculoskeletal:  No edema, warm, 2+ pulses throughout Neurologic:  Moves all extremities. AAOx3, CN II-XII reviewed            Data Review:    Review and/or order of clinical lab test  Review and/or order of tests in the radiology section of CPT  Review and/or order of tests in the medicine section of CPT      Labs:     Recent Labs     11/20/21 0120 11/19/21 0245   WBC 3.3* 5.1   HGB 7.5* 7.7*   HCT 23.3* 24.0*   PLT 83* 91*     Recent Labs     11/20/21  0120 11/19/21  0840 11/19/21 0245 11/18/21 0721 11/18/21 0721 11/18/21 0059 11/18/21 0059    140 139   < > 140   < > 137   K 4.0 4.4 4.4   < > 4.6   < > 5.3*    109* 109*   < > 111*   < > 110*   CO2 26 21 20*   < > 21   < > 21   BUN 45* 79* 76*   < > 71*   < > 74*   CREA 3.28* 4.81* 4.59*   < > 4.45*   < > 4.34*   * 97 79   < > 95   < > 72   CA 8.3* 7.9* 8.2*   < > 7.9*   < > 8.1*   MG  --   --   --   --  2.4  --  2.4   PHOS  --  6.2*  --   --  7.1*  --  6.6*    < > = values in this interval not displayed. Recent Labs     11/19/21  0840 11/19/21 0245 11/18/21 0721 11/18/21 0059 11/18/21 0059   ALT  --  40 57  --  21   AP  --  95 117  --  104   TBILI  --  0.8 0.8  --  0.5   TP  --  7.0 7.0  --  7.3   ALB 3.4* 3.4* 3.2*   < > 2.9*   GLOB  --  3.6 3.8  --  4.4*    < > = values in this interval not displayed. Recent Labs     11/19/21 0245 11/18/21 0721   INR 1.3* 1.2*   PTP 13.9* 12.2*   APTT  --  26.8      No results for input(s): FE, TIBC, PSAT, FERR in the last 72 hours.    No results found for: FOL, RBCF   Recent Labs     11/18/21  1155   PH 7.29*   PCO2 39   PO2 68*     Recent Labs     11/18/21  1003        Lab Results   Component Value Date/Time    Cholesterol, total 176 11/22/2019 09:36 AM    HDL Cholesterol 45 11/22/2019 09:36 AM    LDL, calculated 112 (H) 11/22/2019 09:36 AM    Triglyceride 94 11/22/2019 09:36 AM     Lab Results   Component Value Date/Time    Glucose (POC) 123 (H) 11/20/2021 07:45 AM    Glucose (POC) 127 (H) 11/19/2021 09:16 PM    Glucose (POC) 113 11/19/2021 04:45 PM    Glucose (POC) 93 11/19/2021 12:04 PM    Glucose (POC) 73 11/19/2021 06:21 AM     Lab Results   Component Value Date/Time    Color YELLOW/STRAW 11/14/2021 10:31 AM    Appearance CLEAR 11/14/2021 10:31 AM    Specific gravity 1.019 11/14/2021 10:31 AM    pH (UA) 5.5 11/14/2021 10:31 AM    Protein >300 (A) 11/14/2021 10:31 AM    Glucose 500 (A) 11/14/2021 10:31 AM    Ketone Negative 11/14/2021 10:31 AM    Bilirubin Negative 11/14/2021 10:31 AM    Urobilinogen 0.2 11/14/2021 10:31 AM    Nitrites Negative 11/14/2021 10:31 AM    Leukocyte Esterase Negative 11/14/2021 10:31 AM    Epithelial cells FEW 11/14/2021 10:31 AM    Bacteria Negative 11/14/2021 10:31 AM    WBC 0-4 11/14/2021 10:31 AM    RBC 0-5 11/14/2021 10:31 AM         Medications Reviewed:     Current Facility-Administered Medications   Medication Dose Route Frequency    losartan (COZAAR) tablet 100 mg  100 mg Oral QHS    hydrALAZINE (APRESOLINE) tablet 50 mg  50 mg Oral TID    [Held by provider] gabapentin (NEURONTIN) capsule 100 mg  100 mg Oral TID    insulin lispro (HUMALOG) injection   SubCUTAneous AC&HS    hydrALAZINE (APRESOLINE) 20 mg/mL injection 10-20 mg  10-20 mg IntraVENous Q6H PRN    heparin (porcine) 1,000 unit/mL injection 1,100 Units  1,100 Units Hemodialysis DIALYSIS PRN    heparin (porcine) 1,000 unit/mL injection 1,400 Units  1,400 Units Hemodialysis DIALYSIS PRN    pantoprazole (PROTONIX) 40 mg in 0.9% sodium chloride 10 mL injection  40 mg IntraVENous DAILY    metoprolol tartrate (LOPRESSOR) tablet 25 mg  25 mg Oral Q12H    aspirin chewable tablet 81 mg  81 mg Oral DAILY    polyethylene glycol (MIRALAX) packet 17 g  17 g Oral BID    [Held by provider] eplerenone (INSPRA) tablet 25 mg  25 mg Oral DAILY    bumetanide (BUMEX) injection 2 mg  2 mg IntraVENous Q12H    calcium carbonate (TUMS) chewable tablet 200 mg [elemental]  200 mg Oral TID WITH MEALS    isosorbide mononitrate ER (IMDUR) tablet 60 mg  60 mg Oral DAILY    sodium chloride (NS) flush 5-40 mL  5-40 mL IntraVENous Q8H    sodium chloride (NS) flush 5-40 mL  5-40 mL IntraVENous PRN    morphine injection 4 mg  4 mg IntraVENous Q4H PRN    epoetin ericka-epbx (RETACRIT) injection 20,000 Units  20,000 Units SubCUTAneous Q MON, WED & FRI    butalbital-acetaminophen-caffeine (FIORICET, ESGIC) -40 mg per tablet 1 Tablet  1 Tablet Oral Q4H PRN    ondansetron (ZOFRAN) injection 4 mg  4 mg IntraVENous Q4H PRN    busPIRone (BUSPAR) tablet 10 mg  10 mg Oral BID    rosuvastatin (CRESTOR) tablet 10 mg  10 mg Oral QHS    sertraline (ZOLOFT) tablet 200 mg  200 mg Oral DAILY    traMADoL (ULTRAM) tablet 50 mg  50 mg Oral Q6H PRN    [Held by provider] traZODone (DESYREL) tablet 50 mg  50 mg Oral QHS    acetaminophen (TYLENOL) tablet 650 mg  650 mg Oral Q6H PRN    glucose chewable tablet 16 g  4 Tablet Oral PRN    dextrose (D50W) injection syrg 12.5-25 g  25-50 mL IntraVENous PRN    glucagon (GLUCAGEN) injection 1 mg  1 mg IntraMUSCular PRN    [Held by provider] insulin glargine (LANTUS) injection 10 Units  10 Units SubCUTAneous DAILY     ______________________________________________________________________  EXPECTED LENGTH OF STAY: 3d 19h  ACTUAL LENGTH OF STAY:          6                 Eloise Garcia MD

## 2021-11-20 NOTE — PROGRESS NOTES
Problem: Mobility Impaired (Adult and Pediatric)  Goal: *Acute Goals and Plan of Care (Insert Text)  Outcome: Not Met  Note: FUNCTIONAL STATUS PRIOR TO ADMISSION: Patient was independent without use of DME. However, patient's sons assist with lawn care, cooking, cleaning, grocery shopping, and transportation. Poor vision at baseline per patient report. HOME SUPPORT PRIOR TO ADMISSION: The patient lived with his 3 sons and girlfriend. Physical Therapy Goals  Initiated 11/20/2021  1. Patient will move from supine to sit and sit to supine, scoot up and down, and roll side to side in bed with supervision/set-up within 7 day(s). 2.  Patient will transfer from bed to chair and chair to bed with minimal assistance/contact guard assist using the least restrictive device within 7 day(s). 3.  Patient will perform sit to stand with minimal assistance/contact guard assist within 7 day(s). 4.  Patient will ambulate with moderate assistance for 50 feet with the least restrictive device within 7 day(s). 5.  Patient will ascend/descend 2 stairs with bilateral handrail(s) with maximal assistance within 7 day(s). PHYSICAL THERAPY EVALUATION  Patient: Mary Youngblood (17 y.o. male)  Date: 11/20/2021  Primary Diagnosis: CHF exacerbation (HCC) [I50.9]  Procedure(s) (LRB):  ESOPHAGOGASTRODUODENOSCOPY (EGD) (N/A) 4 Days Post-Op   Precautions:  Fall, Bed Alarm    ASSESSMENT  Based on the objective data described below, the patient presents with impaired balance, gross LE weakness (~ 3/5 bilaterally), impaired cardiopulmonary tolerance, elevated BP, intermittent drowsiness, inability to displace feet from floor in standing with assisted weight-shifting, and decreased functional endurance (standing tolerance ~ 1 minute, ++ LBP). Patient transferred from the ICU following PEA arrest (+ 12 minutes CPR). Anoxic brain injury (noted slower cognitive processing and inability to recall hospital events).  Overall, patient required minimal assistance x 1-2 for bed mobility, minimal assistance x 2 for sit <> stand transfers, and up to moderate assist x 2 for dynamic standing balance. Noted bilateral knee buckling (R > L) with prolonged static standing and with attempted side-stepping. Anticipate extensive rehabilitation needs at this time. Current Level of Function Impacting Discharge (mobility/balance): A x 2    Functional Outcome Measure: The patient scored 25/100 on the Barthel Index outcome measure which is indicative of a a 75% impaired ability to independently perform ADLs and functional tasks. Other factors to consider for discharge: Requiring assist x 2 for transfers, Unable to ambulate 2* weakness and significant gait deficits, HIGH fall risk, Significantly below functional baseline     Patient will benefit from skilled therapy intervention to address the above noted impairments. PLAN :  Recommendations and Planned Interventions: bed mobility training, transfer training, gait training, therapeutic exercises, neuromuscular re-education, patient and family training/education, and therapeutic activities      Frequency/Duration: Patient will be followed by physical therapy:  5 times a week to address goals. Recommendation for discharge: (in order for the patient to meet his/her long term goals)  Therapy 3 hours per day 5-7 days per week    This discharge recommendation:  Has not yet been discussed the attending provider and/or case management    IF patient discharges home will need the following DME: to be determined (TBD)         SUBJECTIVE:   Patient stated I am just so weak. 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, with long-term current use of insulin ([address] 11/14/2018    Diabetes ([address]      Past Surgical History:   Procedure Laterality Date    HX ARTERIAL BYPASS      HX OTHER SURGICAL      5th toe Metatarsal amputation 2017     IR INSERT NON TUNL CVC OVER 5 YRS  2021       Personal factors and/or comorbidities impacting plan of care: PMH    Home Situation  Living Alone: No  Support Systems: Spouse/Significant Other  Patient Expects to be Discharged to[de-identified] Rehabilitation facility  Current DME Used/Available at Home: None    EXAMINATION/PRESENTATION/DECISION MAKING:   Critical Behavior:  Neurologic State: Drowsy  Orientation Level: Oriented X4  Cognition: Appropriate decision making, Follows commands  Safety/Judgement: Decreased insight into deficits, Awareness of environment    Range Of Motion:  AROM: Within functional limits           PROM: Within functional limits           Strength:    Strength: Generally decreased, functional (Grossly 3/5 B LEs)                    Tone & Sensation:                  Sensation: Impaired (B feet tingling (intermittent))               Coordination:  Coordination: Generally decreased, functional  Vision:   Reading glasses  Functional Mobility:  Bed Mobility:  Rolling: Minimum assistance; Additional time  Supine to Sit: Minimum assistance; Additional time  Sit to Supine: Minimum assistance; Additional time     Transfers:  Sit to Stand: Assist x2; Minimum assistance (+ elevated bed height; B knee buckling + post LOB)  Stand to Sit: Assist x2; Minimum assistance        Bed to Chair:  (Unable to displace feet from floor)              Balance:   Sitting: Impaired; Without support  Sitting - Static: Good (unsupported)  Sitting - Dynamic: Fair (occasional)  Standing: Impaired;  Without support  Standing - Static: Fair; Unsupported  Standing - Dynamic : Poor; Constant support    Therapeutic Exercises:   Seated:  LAQs x 5 each   Heel raises x 5 each    Functional Measure:  Barthel Index:    Bathin  Bladder: 0  Bowels: 5  Groomin  Dressin  Feedin  Mobility: 0  Stairs: 0  Toilet Use: 0  Transfer (Bed to Chair and Back): 5  Total: 25/100       The Barthel ADL Index: Guidelines  1. The index should be used as a record of what a patient does, not as a record of what a patient could do. 2. The main aim is to establish degree of independence from any help, physical or verbal, however minor and for whatever reason. 3. The need for supervision renders the patient not independent. 4. A patient's performance should be established using the best available evidence. Asking the patient, friends/relatives and nurses are the usual sources, but direct observation and common sense are also important. However direct testing is not needed. 5. Usually the patient's performance over the preceding 24-48 hours is important, but occasionally longer periods will be relevant. 6. Middle categories imply that the patient supplies over 50 per cent of the effort. 7. Use of aids to be independent is allowed. Score Interpretation (from [address] [address])    Independent   60-79 Minimally independent   40-59 Partially dependent   20-39 Very dependent   <20 Totally dependent     -Kalyan Lyn., Barthel, DGeneW. (1965). Functional evaluation: the Barthel Index. [address] [facility] ([address] (1997). The Barthel activities of daily living index: self-reporting versus actual performance in the old (> or = 75 years). Journal of [address] [place] [address] 45(7), [facility] [employer], JJESSICA, Anjum Goodman., Estela Lyle. (1999). Measuring the change in disability after inpatient rehabilitation; comparison of the responsiveness of the Barthel Index and Functional Daggett Measure. Journal of Neurology, Neurosurgery, and Psychiatry, 66(4), 443-641. CARLENE Bentley.ELENITA, MATT Brown, & Meliton Barrios MGeneA. (2004) Assessment of post-stroke quality of life in cost-effectiveness studies: The usefulness of the Barthel Index and the EuroQoL-5D.  Quality of Life Research, 13, 610-10       Based on the above components, the patient evaluation is determined to be of the following complexity level: MEDIUM    Pain Rating:  LBP: Up to 6/10 (RN aware of same)    Activity Tolerance:   Fair, desaturates with exertion and requires oxygen, and observed SOB with activity - On 4 L/min O2 via NC    After treatment patient left in no apparent distress:   Call bell within reach and Bed / chair alarm activated, Sitting on EOB (RN aware)    COMMUNICATION/EDUCATION:   The patients plan of care was discussed with: Registered nurse and Rehabilitation technician. Fall prevention education was provided and the patient/caregiver indicated understanding., Patient/family have participated as able in goal setting and plan of care. , and Patient/family agree to work toward stated goals and plan of care.     Thank you for this referral.  Dilip Mcclure, PT, DPT   Time Calculation: 34 mins

## 2021-11-20 NOTE — PROGRESS NOTES
0730 Bedside and Verbal shift change report given to BRAIN Edmonds (oncoming nurse) by Carlos Gregg (offgoing nurse). Report included the following information SBAR, Kardex, Intake/Output, MAR, Recent Results and Cardiac Rhythm SR .     1310 Since 0800 this morning; I have been weaning pt off the O2 slowly. Patient has been tolerating well. Pt is now on 3L NC oxygen, saturation at 100%. 200 Weaned pt off the 02. He is saturating at 97% on RA.     1930 Bedside and Verbal shift change report given to Carlos Gregg (oncoming nurse) by Rivka Rao (offgoing nurse).  Report included the following information SBAR, Kardex, Intake/Output, MAR, Recent Results and Cardiac Rhythm SR.

## 2021-11-21 LAB
ALBUMIN SERPL-MCNC: 3.2 G/DL (ref 3.5–5)
ANION GAP SERPL CALC-SCNC: 5 MMOL/L (ref 5–15)
ANION GAP SERPL CALC-SCNC: 7 MMOL/L (ref 5–15)
BUN SERPL-MCNC: 29 MG/DL (ref 6–20)
BUN SERPL-MCNC: 30 MG/DL (ref 6–20)
BUN/CREAT SERPL: 13 (ref 12–20)
BUN/CREAT SERPL: 13 (ref 12–20)
CALCIUM SERPL-MCNC: 8.8 MG/DL (ref 8.5–10.1)
CALCIUM SERPL-MCNC: 9 MG/DL (ref 8.5–10.1)
CHLORIDE SERPL-SCNC: 101 MMOL/L (ref 97–108)
CHLORIDE SERPL-SCNC: 101 MMOL/L (ref 97–108)
CO2 SERPL-SCNC: 27 MMOL/L (ref 21–32)
CO2 SERPL-SCNC: 29 MMOL/L (ref 21–32)
CREAT SERPL-MCNC: 2.24 MG/DL (ref 0.7–1.3)
CREAT SERPL-MCNC: 2.25 MG/DL (ref 0.7–1.3)
ERYTHROCYTE [DISTWIDTH] IN BLOOD BY AUTOMATED COUNT: 15 % (ref 11.5–14.5)
GLUCOSE BLD STRIP.AUTO-MCNC: 114 MG/DL (ref 65–117)
GLUCOSE BLD STRIP.AUTO-MCNC: 117 MG/DL (ref 65–117)
GLUCOSE BLD STRIP.AUTO-MCNC: 117 MG/DL (ref 65–117)
GLUCOSE BLD STRIP.AUTO-MCNC: 153 MG/DL (ref 65–117)
GLUCOSE SERPL-MCNC: 110 MG/DL (ref 65–100)
GLUCOSE SERPL-MCNC: 114 MG/DL (ref 65–100)
HCT VFR BLD AUTO: 25.5 % (ref 36.6–50.3)
HGB BLD-MCNC: 8.2 G/DL (ref 12.1–17)
MCH RBC QN AUTO: 29.2 PG (ref 26–34)
MCHC RBC AUTO-ENTMCNC: 32.2 G/DL (ref 30–36.5)
MCV RBC AUTO: 90.7 FL (ref 80–99)
NRBC # BLD: 0.03 K/UL (ref 0–0.01)
NRBC BLD-RTO: 0.8 PER 100 WBC
PHOSPHATE SERPL-MCNC: 2.3 MG/DL (ref 2.6–4.7)
PLATELET # BLD AUTO: 108 K/UL (ref 150–400)
PMV BLD AUTO: 11.6 FL (ref 8.9–12.9)
POTASSIUM SERPL-SCNC: 3.6 MMOL/L (ref 3.5–5.1)
POTASSIUM SERPL-SCNC: 3.6 MMOL/L (ref 3.5–5.1)
RBC # BLD AUTO: 2.81 M/UL (ref 4.1–5.7)
SERVICE CMNT-IMP: ABNORMAL
SERVICE CMNT-IMP: NORMAL
SODIUM SERPL-SCNC: 135 MMOL/L (ref 136–145)
SODIUM SERPL-SCNC: 135 MMOL/L (ref 136–145)
WBC # BLD AUTO: 3.8 K/UL (ref 4.1–11.1)

## 2021-11-21 PROCEDURE — 82962 GLUCOSE BLOOD TEST: CPT

## 2021-11-21 PROCEDURE — C9113 INJ PANTOPRAZOLE SODIUM, VIA: HCPCS | Performed by: NURSE PRACTITIONER

## 2021-11-21 PROCEDURE — 36415 COLL VENOUS BLD VENIPUNCTURE: CPT

## 2021-11-21 PROCEDURE — 74011000250 HC RX REV CODE- 250: Performed by: NURSE PRACTITIONER

## 2021-11-21 PROCEDURE — 74011000250 HC RX REV CODE- 250: Performed by: INTERNAL MEDICINE

## 2021-11-21 PROCEDURE — 74011250637 HC RX REV CODE- 250/637: Performed by: INTERNAL MEDICINE

## 2021-11-21 PROCEDURE — 85027 COMPLETE CBC AUTOMATED: CPT

## 2021-11-21 PROCEDURE — 74011250637 HC RX REV CODE- 250/637: Performed by: NURSE PRACTITIONER

## 2021-11-21 PROCEDURE — 74011250637 HC RX REV CODE- 250/637: Performed by: HOSPITALIST

## 2021-11-21 PROCEDURE — 74011250636 HC RX REV CODE- 250/636: Performed by: NURSE PRACTITIONER

## 2021-11-21 PROCEDURE — 90935 HEMODIALYSIS ONE EVALUATION: CPT

## 2021-11-21 PROCEDURE — 65660000000 HC RM CCU STEPDOWN

## 2021-11-21 PROCEDURE — 74011000250 HC RX REV CODE- 250: Performed by: HOSPITALIST

## 2021-11-21 PROCEDURE — 65660000001 HC RM ICU INTERMED STEPDOWN

## 2021-11-21 PROCEDURE — 74011250636 HC RX REV CODE- 250/636: Performed by: HOSPITALIST

## 2021-11-21 PROCEDURE — 74011250636 HC RX REV CODE- 250/636: Performed by: INTERNAL MEDICINE

## 2021-11-21 PROCEDURE — 80048 BASIC METABOLIC PNL TOTAL CA: CPT

## 2021-11-21 PROCEDURE — 80069 RENAL FUNCTION PANEL: CPT

## 2021-11-21 RX ORDER — CLONIDINE HYDROCHLORIDE 0.2 MG/1
0.2 TABLET ORAL 3 TIMES DAILY
Status: DISCONTINUED | OUTPATIENT
Start: 2021-11-21 | End: 2021-12-01 | Stop reason: HOSPADM

## 2021-11-21 RX ORDER — LABETALOL HYDROCHLORIDE 5 MG/ML
15 INJECTION, SOLUTION INTRAVENOUS
Status: DISCONTINUED | OUTPATIENT
Start: 2021-11-21 | End: 2021-12-01 | Stop reason: HOSPADM

## 2021-11-21 RX ORDER — CLONIDINE HYDROCHLORIDE 0.1 MG/1
0.1 TABLET ORAL 3 TIMES DAILY
Status: DISCONTINUED | OUTPATIENT
Start: 2021-11-21 | End: 2021-11-21

## 2021-11-21 RX ORDER — HYDRALAZINE HYDROCHLORIDE 50 MG/1
100 TABLET, FILM COATED ORAL 3 TIMES DAILY
Status: DISCONTINUED | OUTPATIENT
Start: 2021-11-21 | End: 2021-12-01 | Stop reason: HOSPADM

## 2021-11-21 RX ORDER — HYDRALAZINE HYDROCHLORIDE 20 MG/ML
20 INJECTION INTRAMUSCULAR; INTRAVENOUS ONCE
Status: COMPLETED | OUTPATIENT
Start: 2021-11-21 | End: 2021-11-21

## 2021-11-21 RX ORDER — SUCRALFATE 1 G/1
1 TABLET ORAL
Status: DISCONTINUED | OUTPATIENT
Start: 2021-11-21 | End: 2021-12-01 | Stop reason: HOSPADM

## 2021-11-21 RX ADMIN — HYDRALAZINE HYDROCHLORIDE 100 MG: 50 TABLET, FILM COATED ORAL at 09:34

## 2021-11-21 RX ADMIN — METOPROLOL TARTRATE 25 MG: 25 TABLET, FILM COATED ORAL at 20:48

## 2021-11-21 RX ADMIN — HYDRALAZINE HYDROCHLORIDE 100 MG: 50 TABLET, FILM COATED ORAL at 15:53

## 2021-11-21 RX ADMIN — ONDANSETRON 4 MG: 2 INJECTION INTRAMUSCULAR; INTRAVENOUS at 16:26

## 2021-11-21 RX ADMIN — ONDANSETRON 4 MG: 2 INJECTION INTRAMUSCULAR; INTRAVENOUS at 08:13

## 2021-11-21 RX ADMIN — METOPROLOL TARTRATE 25 MG: 25 TABLET, FILM COATED ORAL at 09:30

## 2021-11-21 RX ADMIN — CALCIUM CARBONATE (ANTACID) CHEW TAB 500 MG 200 MG: 500 CHEW TAB at 16:26

## 2021-11-21 RX ADMIN — BUMETANIDE 2 MG: 0.25 INJECTION INTRAMUSCULAR; INTRAVENOUS at 08:07

## 2021-11-21 RX ADMIN — POLYETHYLENE GLYCOL 3350 17 G: 17 POWDER, FOR SOLUTION ORAL at 17:33

## 2021-11-21 RX ADMIN — MORPHINE SULFATE 4 MG: 2 INJECTION, SOLUTION INTRAMUSCULAR; INTRAVENOUS at 15:53

## 2021-11-21 RX ADMIN — ROSUVASTATIN 10 MG: 10 TABLET, FILM COATED ORAL at 21:37

## 2021-11-21 RX ADMIN — NICARDIPINE HYDROCHLORIDE 5 MG/HR: 25 INJECTION, SOLUTION INTRAVENOUS at 16:21

## 2021-11-21 RX ADMIN — Medication 10 ML: at 11:55

## 2021-11-21 RX ADMIN — BUMETANIDE 2 MG: 0.25 INJECTION INTRAMUSCULAR; INTRAVENOUS at 20:48

## 2021-11-21 RX ADMIN — BUSPIRONE HYDROCHLORIDE 10 MG: 10 TABLET ORAL at 17:33

## 2021-11-21 RX ADMIN — ASPIRIN 81 MG CHEWABLE TABLET 81 MG: 81 TABLET CHEWABLE at 09:30

## 2021-11-21 RX ADMIN — ISOSORBIDE MONONITRATE 60 MG: 60 TABLET ORAL at 09:30

## 2021-11-21 RX ADMIN — POLYETHYLENE GLYCOL 3350 17 G: 17 POWDER, FOR SOLUTION ORAL at 09:30

## 2021-11-21 RX ADMIN — HEPARIN SODIUM 1400 UNITS: 1000 INJECTION INTRAVENOUS; SUBCUTANEOUS at 02:54

## 2021-11-21 RX ADMIN — NICARDIPINE HYDROCHLORIDE 5 MG/HR: 25 INJECTION, SOLUTION INTRAVENOUS at 10:14

## 2021-11-21 RX ADMIN — CLONIDINE HYDROCHLORIDE 0.2 MG: 0.2 TABLET ORAL at 22:00

## 2021-11-21 RX ADMIN — HYDRALAZINE HYDROCHLORIDE 20 MG: 20 INJECTION INTRAMUSCULAR; INTRAVENOUS at 04:37

## 2021-11-21 RX ADMIN — SERTRALINE 200 MG: 50 TABLET, FILM COATED ORAL at 09:30

## 2021-11-21 RX ADMIN — Medication 10 ML: at 21:37

## 2021-11-21 RX ADMIN — HYDRALAZINE HYDROCHLORIDE 100 MG: 50 TABLET, FILM COATED ORAL at 21:48

## 2021-11-21 RX ADMIN — SUCRALFATE 1 G: 1 TABLET ORAL at 21:36

## 2021-11-21 RX ADMIN — LOSARTAN POTASSIUM 100 MG: 50 TABLET, FILM COATED ORAL at 21:48

## 2021-11-21 RX ADMIN — CALCIUM CARBONATE (ANTACID) CHEW TAB 500 MG 200 MG: 500 CHEW TAB at 11:55

## 2021-11-21 RX ADMIN — CALCIUM CARBONATE (ANTACID) CHEW TAB 500 MG 200 MG: 500 CHEW TAB at 09:30

## 2021-11-21 RX ADMIN — CLONIDINE HYDROCHLORIDE 0.1 MG: 0.1 TABLET ORAL at 15:53

## 2021-11-21 RX ADMIN — NICARDIPINE HYDROCHLORIDE 5 MG/HR: 25 INJECTION, SOLUTION INTRAVENOUS at 21:33

## 2021-11-21 RX ADMIN — HYDRALAZINE HYDROCHLORIDE 20 MG: 20 INJECTION INTRAMUSCULAR; INTRAVENOUS at 04:03

## 2021-11-21 RX ADMIN — TRAMADOL HYDROCHLORIDE 50 MG: 50 TABLET ORAL at 01:38

## 2021-11-21 RX ADMIN — CLONIDINE HYDROCHLORIDE 0.1 MG: 0.1 TABLET ORAL at 09:34

## 2021-11-21 RX ADMIN — HEPARIN SODIUM 1100 UNITS: 1000 INJECTION INTRAVENOUS; SUBCUTANEOUS at 02:54

## 2021-11-21 RX ADMIN — ONDANSETRON 4 MG: 2 INJECTION INTRAMUSCULAR; INTRAVENOUS at 04:00

## 2021-11-21 RX ADMIN — NICARDIPINE HYDROCHLORIDE 7.5 MG/HR: 25 INJECTION, SOLUTION INTRAVENOUS at 15:18

## 2021-11-21 RX ADMIN — BUSPIRONE HYDROCHLORIDE 10 MG: 10 TABLET ORAL at 09:30

## 2021-11-21 RX ADMIN — SODIUM CHLORIDE 40 MG: 9 INJECTION INTRAMUSCULAR; INTRAVENOUS; SUBCUTANEOUS at 08:07

## 2021-11-21 RX ADMIN — Medication 10 ML: at 05:16

## 2021-11-21 NOTE — PROGRESS NOTES
0730: Bedside and verbal shift change report received by Nayely (offgoing nurse). Report included the following information SBAR, Kardex, Procedure Summary, Intake/Output, MAR, Recent Results and Cardiac Rhythm SR.     0829: Notified Dr. Campos Ventura via [employer] that /81, but when going to go give him his AM PO BP meds, the patient was dry heaving. Administered IV protonix, Zofran, and bumex. Pt is c/o epigastric pain as well. Per the orders, IV hydralazine can't be administered again until 1030. MD verbalized understanding; orders received. 1930: Pt moved from 451 to 464 this afternoon to be closer to the nurses' station due to pt self-disconnecting IV lines and witnessed attempts to mess with the pump. Pt is also on the bed alarm, is impulsive at times and is unsteady on his feet. Bedside and Verbal shift change report given to Kelsey (oncoming nurse).  Report included the following information SBAR, Kardex, Procedure Summary, Intake/Output, MAR, Recent Results and Cardiac Rhythm SR.

## 2021-11-21 NOTE — PROGRESS NOTES
[id number] Adult  Hospitalist Group                                                                                          Hospitalist Progress Note  Kee Mejia MD  Answering service: [phone] OR 36 from in house phone        Date of Service:  2021  NAME:  Serog Chi  :  1960  MRN:  [id number]      Admission Summary:   57-year-old male with a past medical history of CAD status post CABG on dAPT, HFpEF,  DM 2, and CKD V who was admitted on  for GI bleed, and overload secondary to cardiorenal syndrome. He had been hospitalized at [facility] prior to this for acute on chronic heart failure, and started to develop bilateral lower extremity edema with melena upon discharge. He underwent EGD which showed nonbleeding erosive gastritis, and was started on PPI. Nephrology, and cardiology were consulted for volume management, and he was diuresed with IV Lasix. On , patient sustained PEA arrest.  After 12 minutes of CPR, ROSC was achieved, and he was intubated and transferred to the ICU. His volume status did not improve with IV Lasix, and Alexandre catheter was placed with plan for 3 days of dialysis. He was extubated, and neurology was consulted due altered mental status suspected secondary to anoxic brain injury, but this improved, and his mental status ultimately returned back to baseline with no imaging or intervention recommended. Transferred out of ICU on 21    Interval history / Subjective:     Pt seen and examined  C/o chest pain from CPR  No n/v, headaches     Assessment & Plan:     #SEVERIANO on CKD 4, now on HD due to ATN from PEA arrest  #HFpEF  Continue IV Bumex, TUMS, retacrit, and losartan  Cardiology, nephrology on board, appreciate recommendations  Currently with right IJ dialysis catheter for inpatient dialysis per nephrology  Avoid renal toxins, and hypotension.   --c/w HD     Headache/Nausea/Vomiting  - Hypertensive urgency  - c/w Clonidine, Hydralazine, Lopressor, cozaar  - cardene gtt due to SBP>200 persistently and symptomatic  - monitor     S/p PEA Arrest suspect from respiratory distress  --s/p intubation/extubation      #Erosive gastritis  Presented with melena, and normocytic anemia with hemoglobin 7-8  EGD on 11/17 with nonbleeding erosive gastritis  Continue PPI per gastroenterology  -now on ASA monotherapy    Probable Anoxic brain injury due to Cardiac arrest  - down for 12 minutes  - seen by Neurology  - supportive care     #DM II  -lispro ISS     #CAD s/p CABG  -continue metoprolol, crestor, imdur, and ASA     #Depression  -continue zoloft     #MSK pain   -s/p CPR  -has tramadol, and morphine on board     FEN: caridac  dvt ppx: SCD  MPOA: daughter  Code: Full    Care Plan discussed with: Patient/Family and Nurse  Anticipated Disposition: [facility]/Rehab  Anticipated Discharge: Greater than 48 hours     Hospital Problems  Date Reviewed: 8/7/2020          Codes Class Noted POA    CHF exacerbation ([facility] [address] ICD-10-CM: I50.9  ICD-9-CM: 428.0  11/14/2021 Unknown                Review of Systems:   A comprehensive review of systems was negative except for that written in the HPI. Vital Signs:    Last 24hrs VS reviewed since prior progress note. Most recent are:  Visit Vitals  BP (!) 155/52   Pulse 64   Temp 97.8 °F (36.6 °C)   Resp 9   Ht 5' 9\" (1.753 m)   Wt 88 kg (194 lb 0.1 oz)   SpO2 95%   BMI 28.65 kg/m²         Intake/Output Summary (Last 24 hours) at 11/21/2021 1203  Last data filed at 11/21/2021 1100  Gross per 24 hour   Intake 1408.33 ml   Output 4175 ml   Net -2766.67 ml        Physical Examination:     I had a face to face encounter with this patient and independently examined them on 11/21/2021 as outlined below:          Constitutional:  No acute distress, cooperative, pleasant    ENT:  Oral mucosa moist, oropharynx benign. Resp:  CTA bilaterally.     CV:  Regular rhythm, normal rate,+CHEST WALL TENDERNESS    GI:  Soft, non distended, non tender. normoactive bowel sounds,    Musculoskeletal:  No edema, warm, 2+ pulses throughout    Neurologic:  Moves all extremities. AAOx3, CN II-XII reviewed            Data Review:    Review and/or order of clinical lab test  Review and/or order of tests in the radiology section of CPT  Review and/or order of tests in the medicine section of [facility]      Labs:     Recent Labs     11/21/21 0431 11/20/21  0120   WBC 3.8* 3.3*   HGB 8.2* 7.5*   HCT 25.5* 23.3*   * 83*     Recent Labs     11/21/21 0435 11/21/21 0431 11/20/21  0120 11/19/21  0840 11/19/21  0840   * 135* 136   < > 140   K 3.6 3.6 4.0   < > 4.4    101 104   < > 109*   CO2 29 27 26   < > 21   BUN 29* 30* 45*   < > 79*   CREA 2.25* 2.24* 3.28*   < > 4.81*   * 114* 126*   < > 97   CA 8.8 9.0 8.3*   < > 7.9*   PHOS 2.3*  --   --   --  6.2*    < > = values in this interval not displayed. Recent Labs     11/21/21 0435 11/19/21  0840 11/19/21  0245   ALT  --   --  40   AP  --   --  95   TBILI  --   --  0.8   TP  --   --  7.0   ALB 3.2* 3.4* 3.4*   GLOB  --   --  3.6     Recent Labs     11/19/21  0245   INR 1.3*   PTP 13.9*      No results for input(s): FE, TIBC, PSAT, FERR in the last 72 hours. No results found for: FOL, RBCF   No results for input(s): PH, PCO2, PO2 in the last 72 hours. No results for input(s): CPK, CKNDX, TROIQ in the last 72 hours.     No lab exists for component: CPKMB  Lab Results   Component Value Date/Time    Cholesterol, total 176 11/22/2019 09:36 AM    HDL Cholesterol 45 11/22/2019 09:36 AM    LDL, calculated 112 (H) 11/22/2019 09:36 AM    Triglyceride 94 11/22/2019 09:36 AM     Lab Results   Component Value Date/Time    Glucose (POC) 117 11/21/2021 11:23 AM    Glucose (POC) 117 11/21/2021 06:05 AM    Glucose (POC) 170 (H) 11/20/2021 09:08 PM    Glucose (POC) 201 (H) 11/20/2021 05:56 PM    Glucose (POC) 137 (H) 11/20/2021 01:05 PM     Lab Results   Component Value Date/Time    Color YELLOW/STRAW 11/14/2021 10:31 AM    Appearance CLEAR 11/14/2021 10:31 AM    Specific gravity 1.019 11/14/2021 10:31 AM    pH (UA) 5.5 11/14/2021 10:31 AM    Protein >300 (A) 11/14/2021 10:31 AM    Glucose 500 (A) 11/14/2021 10:31 AM    Ketone Negative 11/14/2021 10:31 AM    Bilirubin Negative 11/14/2021 10:31 AM    Urobilinogen 0.2 11/14/2021 10:31 AM    Nitrites Negative 11/14/2021 10:31 AM    Leukocyte Esterase Negative 11/14/2021 10:31 AM    Epithelial cells FEW 11/14/2021 10:31 AM    Bacteria Negative 11/14/2021 10:31 AM    WBC 0-4 11/14/2021 10:31 AM    RBC 0-5 11/14/2021 10:31 AM         Medications Reviewed:     Current Facility-Administered Medications   Medication Dose Route Frequency    cloNIDine HCL (CATAPRES) tablet 0.1 mg  0.1 mg Oral TID    labetaloL (NORMODYNE;TRANDATE) injection 15 mg  15 mg IntraVENous Q4H PRN    hydrALAZINE (APRESOLINE) tablet 100 mg  100 mg Oral TID    niCARdipine (CARDENE) 25 mg in 0.9% sodium chloride 250 mL infusion  0-15 mg/hr IntraVENous TITRATE    losartan (COZAAR) tablet 100 mg  100 mg Oral QHS    [Held by provider] gabapentin (NEURONTIN) capsule 100 mg  100 mg Oral TID    insulin lispro (HUMALOG) injection   SubCUTAneous AC&HS    hydrALAZINE (APRESOLINE) 20 mg/mL injection 10-20 mg  10-20 mg IntraVENous Q6H PRN    heparin (porcine) 1,000 unit/mL injection 1,100 Units  1,100 Units Hemodialysis DIALYSIS PRN    heparin (porcine) 1,000 unit/mL injection 1,400 Units  1,400 Units Hemodialysis DIALYSIS PRN    pantoprazole (PROTONIX) 40 mg in 0.9% sodium chloride 10 mL injection  40 mg IntraVENous DAILY    metoprolol tartrate (LOPRESSOR) tablet 25 mg  25 mg Oral Q12H    aspirin chewable tablet 81 mg  81 mg Oral DAILY    polyethylene glycol (MIRALAX) packet 17 g  17 g Oral BID    [Held by provider] eplerenone (INSPRA) tablet 25 mg  25 mg Oral DAILY    bumetanide (BUMEX) injection 2 mg  2 mg IntraVENous Q12H    calcium carbonate (TUMS) chewable tablet 200 mg [elemental]  200 mg Oral TID WITH MEALS    isosorbide mononitrate ER (IMDUR) tablet 60 mg  60 mg Oral DAILY    sodium chloride (NS) flush 5-40 mL  5-40 mL IntraVENous Q8H    sodium chloride (NS) flush 5-40 mL  5-40 mL IntraVENous PRN    morphine injection 4 mg  4 mg IntraVENous Q4H PRN    epoetin ericka-epbx (RETACRIT) injection 20,000 Units  20,000 Units SubCUTAneous Q MON, WED & FRI    butalbital-acetaminophen-caffeine (FIORICET, ESGIC) -40 mg per tablet 1 Tablet  1 Tablet Oral Q4H PRN    ondansetron (ZOFRAN) injection 4 mg  4 mg IntraVENous Q4H PRN    busPIRone (BUSPAR) tablet 10 mg  10 mg Oral BID    rosuvastatin (CRESTOR) tablet 10 mg  10 mg Oral QHS    sertraline (ZOLOFT) tablet 200 mg  200 mg Oral DAILY    traMADoL (ULTRAM) tablet 50 mg  50 mg Oral Q6H PRN    [Held by provider] traZODone (DESYREL) tablet 50 mg  50 mg Oral QHS    acetaminophen (TYLENOL) tablet 650 mg  650 mg Oral Q6H PRN    glucose chewable tablet 16 g  4 Tablet Oral PRN    dextrose (D50W) injection syrg 12.5-25 g  25-50 mL IntraVENous PRN    glucagon (GLUCAGEN) injection 1 mg  1 mg IntraMUSCular PRN    [Held by provider] insulin glargine (LANTUS) injection 10 Units  10 Units SubCUTAneous DAILY     ______________________________________________________________________  EXPECTED LENGTH OF STAY: 3d 19h  ACTUAL LENGTH OF STAY:          7                 Yeny Puga MD

## 2021-11-21 NOTE — PROGRESS NOTES
[facility] Cardiology Progress Note    Date of service: 11/21/2021    Subjective:   Mr Cameron Older complaints of headache and pain in the left axillary / lateral rib area. BP running high. Objective:    Visit Vitals  BP (!) 215/81   Pulse 75   Temp 98.6 °F (37 °C)   Resp 13   Ht 5' 9\" (1.753 m)   Wt 88 kg (194 lb 0.1 oz)   SpO2 96%   BMI 28.65 kg/m²       Physical Exam  Constitutional:       Appearance: He is well-developed. HENT:      Head: Normocephalic and atraumatic. Eyes:      General: No scleral icterus. Conjunctiva/sclera: Conjunctivae normal.   Neck:      Vascular: No JVD. Cardiovascular:      Rate and Rhythm: Normal rate and regular rhythm. Heart sounds: Normal heart sounds. No murmur heard. No gallop. Pulmonary:      Effort: Pulmonary effort is normal. No respiratory distress. Breath sounds: Normal breath sounds. No stridor. No wheezing or rales. Abdominal:      General: There is no distension. Palpations: Abdomen is soft. Musculoskeletal:         General: No deformity. Skin:     General: Skin is warm and dry. Neurological:      Mental Status: He is alert and oriented to person, place, and time.    Psychiatric:         Behavior: Behavior normal.         Data reviewed:  Recent Results (from the past 12 hour(s))   GLUCOSE, POC    Collection Time: 11/20/21  9:08 PM   Result Value Ref Range    Glucose (POC) 170 (H) 65 - 117 mg/dL    Performed by Escobar Hood (JAGDEEP)    METABOLIC PANEL, BASIC    Collection Time: 11/21/21  4:31 AM   Result Value Ref Range    Sodium 135 (L) 136 - 145 mmol/L    Potassium 3.6 3.5 - 5.1 mmol/L    Chloride 101 97 - 108 mmol/L    CO2 27 21 - 32 mmol/L    Anion gap 7 5 - 15 mmol/L    Glucose 114 (H) 65 - 100 mg/dL    BUN 30 (H) 6 - 20 MG/DL    Creatinine 2.24 (H) 0.70 - 1.30 MG/DL    BUN/Creatinine ratio 13 12 - 20      GFR est AA 36 (L) >60 ml/min/1.73m2    GFR est non-AA 30 (L) >60 ml/min/1.73m2    Calcium 9.0 8.5 - 10.1 MG/DL   CBC W/O DIFF    Collection Time: 11/21/21  4:31 AM   Result Value Ref Range    WBC 3.8 (L) 4.1 - 11.1 K/uL    RBC 2.81 (L) 4.10 - 5.70 M/uL    HGB 8.2 (L) 12.1 - 17.0 g/dL    HCT 25.5 (L) 36.6 - 50.3 %    MCV 90.7 80.0 - 99.0 FL    MCH 29.2 26.0 - 34.0 PG    MCHC 32.2 30.0 - 36.5 g/dL    RDW 15.0 (H) 11.5 - 14.5 %    PLATELET 549 (L) 510 - 400 K/uL    MPV 11.6 8.9 - 12.9 FL    NRBC 0.8 (H) 0  WBC    ABSOLUTE NRBC 0.03 (H) 0.00 - 0.01 K/uL   RENAL FUNCTION PANEL    Collection Time: 11/21/21  4:35 AM   Result Value Ref Range    Sodium 135 (L) 136 - 145 mmol/L    Potassium 3.6 3.5 - 5.1 mmol/L    Chloride 101 97 - 108 mmol/L    CO2 29 21 - 32 mmol/L    Anion gap 5 5 - 15 mmol/L    Glucose 110 (H) 65 - 100 mg/dL    BUN 29 (H) 6 - 20 MG/DL    Creatinine 2.25 (H) 0.70 - 1.30 MG/DL    BUN/Creatinine ratio 13 12 - 20      GFR est AA 36 (L) >60 ml/min/1.73m2    GFR est non-AA 30 (L) >60 ml/min/1.73m2    Calcium 8.8 8.5 - 10.1 MG/DL    Phosphorus 2.3 (L) 2.6 - 4.7 MG/DL    Albumin 3.2 (L) 3.5 - 5.0 g/dL   GLUCOSE, POC    Collection Time: 11/21/21  6:05 AM   Result Value Ref Range    Glucose (POC) 117 65 - 117 mg/dL    Performed by Jose Raul Tenorio (JAGDEEP)      Telemetry: NSR    11/14/21    ECHO ADULT FOLLOW-UP OR LIMITED 11/19/2021 11/19/2021    Interpretation Summary  · LV: Estimated LVEF is 60 - 65%. Mild concentric hypertrophy. · RV: Mildly dilated right ventricle. Mildly reduced systolic function. Signed by: Leopoldo Moreno MD on 11/19/2021 11:32 AM        Assessment:    1. PEA cardiac arrest - likely from acute respiratory failre  2. Non-MI troponin elevation, likely from cardiac arrest  3. Hypertensive kidney disease with CKD stage 4  BP not well controlled  4. Diabetic kidney disease  5. SEVERIANO on CKD stage IV   Has been started on HD  6. Pancytopenia  7.  CAD s/p CABG    Plan:    Continue medical therapies  ASA, statin, metoprolol, losartan, ISMN  Increase hydralazine to 100mg tid  Continue clonidine  Anti-emetics, pain meds as needed. Signed:  Avelina Osler L. Loraine Sol, MD  Interventional Cardiology  11/21/2021

## 2021-11-21 NOTE — PROCEDURES
Hemodialysis / [phone]    Vitals Pre Post Assessment Pre Post   BP BP: (!) 183/74 (11/21/21 0038) 206/82 LOC A/Ox4 A/Ox4   HR Pulse (Heart Rate): 65 (11/21/21 0038) 72 Lungs Diminished Diminished   Resp Resp Rate: 17 (11/21/21 0038) 14 Cardiac Steady RR Steady RR   Temp Temp: 98.3 °F (36.8 °C) (11/21/21 0038) 97.9 Skin Warm/Dry Warm/Dry   Weight    Edema Trace Trace   Tele status NSR NSR Pain Pain Intensity 1: 0 (11/20/21 2305) 0     Orders   Duration: Start: 0038 End: 0321 Total: 3hrs   Dialyzer: Dialyzer/Set Up Inspection: Revaclear (11/21/21 0038)   K Bath: Dialysate K (mEq/L): 3 (11/21/21 0038)   Ca Bath: Dialysate CA (mEq/L): 2.5 (11/21/21 0038)   Na: Dialysate NA (mEq/L): 138 (11/21/21 0038)   Bicarb: Dialysate HCO3 (mEq/L): 35 (11/21/21 0038)   Target Fluid Removal: Goal/Amount of Fluid to Remove (mL): 2500 mL (11/21/21 0038)     Access   Type & Location:  RIJ CVC: Dressing CDI. No s/s of infection. Both lumens aspirate & flush well. Running well at .     Comments:                                        Labs   HBsAg (Antigen) / date: Negative 11/19/2021                                              HBsAb (Antibody) / date: Susceptible 11/19/2021   Source: Epic   Obtained/Reviewed  Critical Results Called HGB   Date Value Ref Range Status   11/20/2021 7.5 (L) 12.1 - 17.0 g/dL Final     Potassium   Date Value Ref Range Status   11/20/2021 4.0 3.5 - 5.1 mmol/L Final     Calcium   Date Value Ref Range Status   11/20/2021 8.3 (L) 8.5 - 10.1 MG/DL Final     BUN   Date Value Ref Range Status   11/20/2021 45 (H) 6 - 20 MG/DL Final     Comment:     INVESTIGATED PER DELTA CHECK PROTOCOL     Creatinine   Date Value Ref Range Status   11/20/2021 3.28 (H) 0.70 - 1.30 MG/DL Final     Comment:     INVESTIGATED PER DELTA CHECK PROTOCOL        Meds Given   Name Dose Route   Heparin 1:1000 1.1ml & 1.4ml Catheter Dwell               Adequacy / Fluid    Total Liters Process: 58.5L   Net Fluid Removed: 2500ml Comments   Time Out Done:   (Time) 0035   Admitting Diagnosis: CHF Exacerbation   Consent obtained/signed: Informed Consent Verified: Yes (11/21/21 0038)   Machine / RO # Machine Number: [id number] (11/21/21 0038)   Primary Nurse Rpt Pre: Jeff Sullivan RN   Primary Nurse Rpt Post: Jeff Sullivan RN   Pt Education: Procedural   Care Plan: Ongoing   Pts outpatient clinic: N/A     Tx Summary   SBAR received from Primary RN. Arrived to pt room, pt A&Ox4. Consent signed & on file. 0038: Each catheter limb disinfected per p&p, caps removed, hubs disinfected per p&p. Each lumen aspirated for blood return and flushed with Normal Saline per policy. VSS. Dialysis Tx initiated. 0130: Pt c/o of back and side pain. Notified primary RN, who brought pain medication. 0200: Pt's BP elevating with systolic in 156'G/323'X, primary RN made aware. 4328: Tx ended. VSS. Each dialysis catheter limb disinfected per p&p, all possible blood returned per p&p, and each dialysis hub disinfected per p&p. Each lumen flushed, post dialysis catheter Heparin dwell instilled per order, and caps applied. Bed locked and in the lowest position, call bell and belongings in reach. SBAR given to Primary, RN. Patient is stable at time of their/ my departure. All Dialysis related medications have been reviewed.

## 2021-11-22 ENCOUNTER — TELEPHONE (OUTPATIENT)
Dept: HEMATOLOGY | Age: 61
End: 2021-11-22

## 2021-11-22 LAB
ANION GAP SERPL CALC-SCNC: 7 MMOL/L (ref 5–15)
BACTERIA SPEC CULT: NORMAL
BASOPHILS # BLD: 0 K/UL (ref 0–0.1)
BASOPHILS NFR BLD: 0 % (ref 0–1)
BUN SERPL-MCNC: 40 MG/DL (ref 6–20)
BUN/CREAT SERPL: 14 (ref 12–20)
CALCIUM SERPL-MCNC: 8.2 MG/DL (ref 8.5–10.1)
CHLORIDE SERPL-SCNC: 103 MMOL/L (ref 97–108)
CO2 SERPL-SCNC: 25 MMOL/L (ref 21–32)
CREAT SERPL-MCNC: 2.96 MG/DL (ref 0.7–1.3)
DIFFERENTIAL METHOD BLD: ABNORMAL
EOSINOPHIL # BLD: 0 K/UL (ref 0–0.4)
EOSINOPHIL NFR BLD: 1 % (ref 0–7)
ERYTHROCYTE [DISTWIDTH] IN BLOOD BY AUTOMATED COUNT: 15.3 % (ref 11.5–14.5)
GLUCOSE BLD STRIP.AUTO-MCNC: 118 MG/DL (ref 65–117)
GLUCOSE BLD STRIP.AUTO-MCNC: 137 MG/DL (ref 65–117)
GLUCOSE BLD STRIP.AUTO-MCNC: 145 MG/DL (ref 65–117)
GLUCOSE BLD STRIP.AUTO-MCNC: 185 MG/DL (ref 65–117)
GLUCOSE SERPL-MCNC: 135 MG/DL (ref 65–100)
GRAM STN SPEC: NORMAL
GRAM STN SPEC: NORMAL
HCT VFR BLD AUTO: 24.5 % (ref 36.6–50.3)
HGB BLD-MCNC: 7.8 G/DL (ref 12.1–17)
IMM GRANULOCYTES # BLD AUTO: 0 K/UL (ref 0–0.04)
IMM GRANULOCYTES NFR BLD AUTO: 1 % (ref 0–0.5)
LYMPHOCYTES # BLD: 0.6 K/UL (ref 0.8–3.5)
LYMPHOCYTES NFR BLD: 14 % (ref 12–49)
MCH RBC QN AUTO: 29.4 PG (ref 26–34)
MCHC RBC AUTO-ENTMCNC: 31.8 G/DL (ref 30–36.5)
MCV RBC AUTO: 92.5 FL (ref 80–99)
MONOCYTES # BLD: 0.5 K/UL (ref 0–1)
MONOCYTES NFR BLD: 12 % (ref 5–13)
NEUTS SEG # BLD: 3.3 K/UL (ref 1.8–8)
NEUTS SEG NFR BLD: 72 % (ref 32–75)
NRBC # BLD: 0.09 K/UL (ref 0–0.01)
NRBC BLD-RTO: 2 PER 100 WBC
PLATELET # BLD AUTO: 121 K/UL (ref 150–400)
PMV BLD AUTO: 10.6 FL (ref 8.9–12.9)
POTASSIUM SERPL-SCNC: 3.7 MMOL/L (ref 3.5–5.1)
RBC # BLD AUTO: 2.65 M/UL (ref 4.1–5.7)
RBC MORPH BLD: ABNORMAL
RBC MORPH BLD: ABNORMAL
SERVICE CMNT-IMP: ABNORMAL
SERVICE CMNT-IMP: NORMAL
SODIUM SERPL-SCNC: 135 MMOL/L (ref 136–145)
WBC # BLD AUTO: 4.4 K/UL (ref 4.1–11.1)

## 2021-11-22 PROCEDURE — 74011250636 HC RX REV CODE- 250/636: Performed by: INTERNAL MEDICINE

## 2021-11-22 PROCEDURE — C9113 INJ PANTOPRAZOLE SODIUM, VIA: HCPCS | Performed by: NURSE PRACTITIONER

## 2021-11-22 PROCEDURE — 65660000001 HC RM ICU INTERMED STEPDOWN

## 2021-11-22 PROCEDURE — 90935 HEMODIALYSIS ONE EVALUATION: CPT

## 2021-11-22 PROCEDURE — 74011000250 HC RX REV CODE- 250: Performed by: NURSE PRACTITIONER

## 2021-11-22 PROCEDURE — 74011636637 HC RX REV CODE- 636/637: Performed by: NURSE PRACTITIONER

## 2021-11-22 PROCEDURE — 74011250636 HC RX REV CODE- 250/636: Performed by: HOSPITALIST

## 2021-11-22 PROCEDURE — 74011250637 HC RX REV CODE- 250/637: Performed by: HOSPITALIST

## 2021-11-22 PROCEDURE — 74011250636 HC RX REV CODE- 250/636: Performed by: NURSE PRACTITIONER

## 2021-11-22 PROCEDURE — 74011000250 HC RX REV CODE- 250: Performed by: INTERNAL MEDICINE

## 2021-11-22 PROCEDURE — 74011250637 HC RX REV CODE- 250/637: Performed by: NURSE PRACTITIONER

## 2021-11-22 PROCEDURE — 74011250637 HC RX REV CODE- 250/637: Performed by: INTERNAL MEDICINE

## 2021-11-22 PROCEDURE — 36415 COLL VENOUS BLD VENIPUNCTURE: CPT

## 2021-11-22 PROCEDURE — 80048 BASIC METABOLIC PNL TOTAL CA: CPT

## 2021-11-22 PROCEDURE — 85025 COMPLETE CBC W/AUTO DIFF WBC: CPT

## 2021-11-22 PROCEDURE — 82962 GLUCOSE BLOOD TEST: CPT

## 2021-11-22 PROCEDURE — 74011000250 HC RX REV CODE- 250: Performed by: HOSPITALIST

## 2021-11-22 PROCEDURE — 65660000000 HC RM CCU STEPDOWN

## 2021-11-22 RX ADMIN — SUCRALFATE 1 G: 1 TABLET ORAL at 07:13

## 2021-11-22 RX ADMIN — NICARDIPINE HYDROCHLORIDE 5 MG/HR: 25 INJECTION, SOLUTION INTRAVENOUS at 02:15

## 2021-11-22 RX ADMIN — HYDRALAZINE HYDROCHLORIDE 100 MG: 50 TABLET, FILM COATED ORAL at 17:27

## 2021-11-22 RX ADMIN — INSULIN LISPRO 2 UNITS: 100 INJECTION, SOLUTION INTRAVENOUS; SUBCUTANEOUS at 17:07

## 2021-11-22 RX ADMIN — BUMETANIDE 2 MG: 0.25 INJECTION INTRAMUSCULAR; INTRAVENOUS at 11:55

## 2021-11-22 RX ADMIN — EPOETIN ALFA-EPBX 20000 UNITS: 20000 INJECTION, SOLUTION INTRAVENOUS; SUBCUTANEOUS at 21:56

## 2021-11-22 RX ADMIN — METOPROLOL TARTRATE 25 MG: 25 TABLET, FILM COATED ORAL at 21:39

## 2021-11-22 RX ADMIN — BUMETANIDE 2 MG: 0.25 INJECTION INTRAMUSCULAR; INTRAVENOUS at 21:39

## 2021-11-22 RX ADMIN — SODIUM CHLORIDE 40 MG: 9 INJECTION INTRAMUSCULAR; INTRAVENOUS; SUBCUTANEOUS at 11:57

## 2021-11-22 RX ADMIN — NICARDIPINE HYDROCHLORIDE 7.5 MG/HR: 25 INJECTION, SOLUTION INTRAVENOUS at 18:17

## 2021-11-22 RX ADMIN — HYDRALAZINE HYDROCHLORIDE 100 MG: 50 TABLET, FILM COATED ORAL at 12:02

## 2021-11-22 RX ADMIN — SUCRALFATE 1 G: 1 TABLET ORAL at 11:50

## 2021-11-22 RX ADMIN — Medication 10 ML: at 21:39

## 2021-11-22 RX ADMIN — ASPIRIN 81 MG CHEWABLE TABLET 81 MG: 81 TABLET CHEWABLE at 11:50

## 2021-11-22 RX ADMIN — CLONIDINE HYDROCHLORIDE 0.2 MG: 0.2 TABLET ORAL at 17:05

## 2021-11-22 RX ADMIN — CLONIDINE HYDROCHLORIDE 0.2 MG: 0.2 TABLET ORAL at 11:51

## 2021-11-22 RX ADMIN — LOSARTAN POTASSIUM 100 MG: 50 TABLET, FILM COATED ORAL at 21:39

## 2021-11-22 RX ADMIN — METOPROLOL TARTRATE 25 MG: 25 TABLET, FILM COATED ORAL at 12:02

## 2021-11-22 RX ADMIN — ROSUVASTATIN 10 MG: 10 TABLET, FILM COATED ORAL at 21:39

## 2021-11-22 RX ADMIN — Medication 10 ML: at 14:00

## 2021-11-22 RX ADMIN — BUSPIRONE HYDROCHLORIDE 10 MG: 10 TABLET ORAL at 17:05

## 2021-11-22 RX ADMIN — HYDRALAZINE HYDROCHLORIDE 100 MG: 50 TABLET, FILM COATED ORAL at 21:39

## 2021-11-22 RX ADMIN — BUSPIRONE HYDROCHLORIDE 10 MG: 10 TABLET ORAL at 11:50

## 2021-11-22 RX ADMIN — ISOSORBIDE MONONITRATE 60 MG: 60 TABLET ORAL at 11:50

## 2021-11-22 RX ADMIN — SUCRALFATE 1 G: 1 TABLET ORAL at 21:39

## 2021-11-22 RX ADMIN — SUCRALFATE 1 G: 1 TABLET ORAL at 17:05

## 2021-11-22 RX ADMIN — CLONIDINE HYDROCHLORIDE 0.2 MG: 0.2 TABLET ORAL at 21:39

## 2021-11-22 RX ADMIN — Medication 10 ML: at 07:13

## 2021-11-22 RX ADMIN — HEPARIN SODIUM 1100 UNITS: 1000 INJECTION INTRAVENOUS; SUBCUTANEOUS at 11:20

## 2021-11-22 RX ADMIN — CALCIUM CARBONATE (ANTACID) CHEW TAB 500 MG 200 MG: 500 CHEW TAB at 17:06

## 2021-11-22 RX ADMIN — SERTRALINE 200 MG: 50 TABLET, FILM COATED ORAL at 11:51

## 2021-11-22 RX ADMIN — NICARDIPINE HYDROCHLORIDE 7.5 MG/HR: 25 INJECTION, SOLUTION INTRAVENOUS at 22:40

## 2021-11-22 RX ADMIN — HEPARIN SODIUM 1400 UNITS: 1000 INJECTION INTRAVENOUS; SUBCUTANEOUS at 11:21

## 2021-11-22 RX ADMIN — NICARDIPINE HYDROCHLORIDE 5 MG/HR: 25 INJECTION, SOLUTION INTRAVENOUS at 11:48

## 2021-11-22 NOTE — PROGRESS NOTES
Physical Therapy - defer   Chart reviewed, RN cleared for activity though notes pt declined OT earlier. Received patient sleeping in bed, wakes to voice. Pt declined therapy activity stating he's tired, prefers to hold off until tomorrow. Offered to assist repositioning him in the bed to eat lunch. Pt declined stating he does not want to eat. Will follow up tomorrow for therapy session.

## 2021-11-22 NOTE — PROGRESS NOTES
[facility]   [address] [facility], [address] [facility]  Phone: [phone]   [phone]       Nephrology Progress Note  Jennifer Graves     1960     [id number]  Date of Admission : 11/14/2021 11/22/21    CC: Follow up for nic ON CKD 4       Assessment and Plan   NIC on CKD  - progressive diabetic Nephropathy   - Now w/ superimposed ATN from PEA/ resp arrest   - HD today then MWF this week  - UF goal 2kg    CKD 4   -Biopsy-proven advanced diabetic nephropathy  -Baseline creatinine 3.5 mg/dL     Volume overload:  Pleural effusions  - 2/2 Nephrotic syndrome   - ? Thoracentesis      HTN  - wean off cardene today     Anemia in CKD   - continue RANDY      CAD s/p CABG May 2020  HFpEF  -Last echo: Preserved LVEF, Mod Pulm HTN-PASP 50 mmHg     Resp / PEA arrest   - Intubated and extubated 11/18    AMS   ? Hypoxic Brain injury      Interval History:  Seen and examined on dialysis. Awake, alert. On cardene drip. Tolerating HD. Poor UOP. Review of Systems: Review of systems not obtained due to patient factors.     Current Medications:   Current Facility-Administered Medications   Medication Dose Route Frequency    labetaloL (NORMODYNE;TRANDATE) injection 15 mg  15 mg IntraVENous Q4H PRN    hydrALAZINE (APRESOLINE) tablet 100 mg  100 mg Oral TID    niCARdipine (CARDENE) 25 mg in 0.9% sodium chloride 250 mL infusion  0-15 mg/hr IntraVENous TITRATE    cloNIDine HCL (CATAPRES) tablet 0.2 mg  0.2 mg Oral TID    sucralfate (CARAFATE) tablet 1 g  1 g Oral AC&HS    losartan (COZAAR) tablet 100 mg  100 mg Oral QHS    [Held by provider] gabapentin (NEURONTIN) capsule 100 mg  100 mg Oral TID    insulin lispro (HUMALOG) injection   SubCUTAneous AC&HS    hydrALAZINE (APRESOLINE) 20 mg/mL injection 10-20 mg  10-20 mg IntraVENous Q6H PRN    heparin (porcine) 1,000 unit/mL injection 1,100 Units  1,100 Units Hemodialysis DIALYSIS PRN    heparin (porcine) 1,000 unit/mL injection 1,400 Units 1,400 Units Hemodialysis DIALYSIS PRN    pantoprazole (PROTONIX) 40 mg in 0.9% sodium chloride 10 mL injection  40 mg IntraVENous DAILY    metoprolol tartrate (LOPRESSOR) tablet 25 mg  25 mg Oral Q12H    aspirin chewable tablet 81 mg  81 mg Oral DAILY    polyethylene glycol (MIRALAX) packet 17 g  17 g Oral BID    [Held by provider] eplerenone (INSPRA) tablet 25 mg  25 mg Oral DAILY    bumetanide (BUMEX) injection 2 mg  2 mg IntraVENous Q12H    calcium carbonate (TUMS) chewable tablet 200 mg [elemental]  200 mg Oral TID WITH MEALS    isosorbide mononitrate ER (IMDUR) tablet 60 mg  60 mg Oral DAILY    sodium chloride (NS) flush 5-40 mL  5-40 mL IntraVENous Q8H    sodium chloride (NS) flush 5-40 mL  5-40 mL IntraVENous PRN    epoetin ericka-epbx (RETACRIT) injection 20,000 Units  20,000 Units SubCUTAneous Q MON, WED & FRI    butalbital-acetaminophen-caffeine (FIORICET, ESGIC) -40 mg per tablet 1 Tablet  1 Tablet Oral Q4H PRN    ondansetron (ZOFRAN) injection 4 mg  4 mg IntraVENous Q4H PRN    busPIRone (BUSPAR) tablet 10 mg  10 mg Oral BID    rosuvastatin (CRESTOR) tablet 10 mg  10 mg Oral QHS    sertraline (ZOLOFT) tablet 200 mg  200 mg Oral DAILY    traMADoL (ULTRAM) tablet 50 mg  50 mg Oral Q6H PRN    [Held by provider] traZODone (DESYREL) tablet 50 mg  50 mg Oral QHS    acetaminophen (TYLENOL) tablet 650 mg  650 mg Oral Q6H PRN    glucose chewable tablet 16 g  4 Tablet Oral PRN    dextrose (D50W) injection syrg 12.5-25 g  25-50 mL IntraVENous PRN    glucagon (GLUCAGEN) injection 1 mg  1 mg IntraMUSCular PRN    [Held by provider] insulin glargine (LANTUS) injection 10 Units  10 Units SubCUTAneous DAILY      No Known Allergies    Objective:  Vitals:    Vitals:    11/22/21 0828 11/22/21 0830 11/22/21 0845 11/22/21 0900   BP: (!) 175/57 (!) 175/57 (!) 177/57 (!) 172/65   Pulse: 68 68 69 70   Resp: 16 11 16 17   Temp: 98 °F (36.7 °C)      TempSrc: Oral      SpO2: 98% 95% 98% 95%   Weight: Height:         Intake and Output:  11/22 0701 - 11/22 1900  In: -   Out: 481 [AZWXQ:960]  11/20 1901 - 11/22 0700  In: 888.3 [P.O.:840; I.V.:48.3]  Out: 4200 [Urine:1700]    Physical Examination:    General: NAD  Neck:  Supple, no mass  Resp:  Clear b/l  CV:  RRR, s1,s2  Neurologic:  Non focal  :                  Grant +        []    High complexity decision making was performed  []    Patient is at high-risk of decompensation with multiple organ involvement    Lab Data Personally Reviewed: I have reviewed all the pertinent labs, microbiology data and radiology studies during assessment.     Recent Labs     11/22/21 0319 11/21/21 0435 11/21/21 0431 11/20/21  0120   * 135* 135* 136   K 3.7 3.6 3.6 4.0    101 101 104   CO2 25 29 27 26   * 110* 114* 126*   BUN 40* 29* 30* 45*   CREA 2.96* 2.25* 2.24* 3.28*   CA 8.2* 8.8 9.0 8.3*   PHOS  --  2.3*  --   --    ALB  --  3.2*  --   --      Recent Labs     11/22/21 0318 11/21/21 0431 11/20/21  0120   WBC 4.4 3.8* 3.3*   HGB 7.8* 8.2* 7.5*   HCT 24.5* 25.5* 23.3*   * 108* 83*     No results found for: SDES  Lab Results   Component Value Date/Time    Culture result: NO GROWTH 4 DAYS 11/18/2021 11:40 AM     Recent Results (from the past 24 hour(s))   GLUCOSE, POC    Collection Time: 11/21/21 11:23 AM   Result Value Ref Range    Glucose (POC) 117 65 - 117 mg/dL    Performed by Daniele BENSON    GLUCOSE, POC    Collection Time: 11/21/21  4:12 PM   Result Value Ref Range    Glucose (POC) 114 65 - 117 mg/dL    Performed by Flako POST, POC    Collection Time: 11/21/21  9:42 PM   Result Value Ref Range    Glucose (POC) 153 (H) 65 - 117 mg/dL    Performed by Debo ARITA (CON)    CBC WITH AUTOMATED DIFF    Collection Time: 11/22/21  3:18 AM   Result Value Ref Range    WBC 4.4 4.1 - 11.1 K/uL    RBC 2.65 (L) 4.10 - 5.70 M/uL    HGB 7.8 (L) 12.1 - 17.0 g/dL    HCT 24.5 (L) 36.6 - 50.3 %    MCV 92.5 80.0 - 99.0 FL    MCH 29.4 26.0 - 34.0 PG    MCHC 31.8 30.0 - 36.5 g/dL    RDW 15.3 (H) 11.5 - 14.5 %    PLATELET 561 (L) 560 - 400 K/uL    MPV 10.6 8.9 - 12.9 FL    NRBC 2.0 (H) 0  WBC    ABSOLUTE NRBC 0.09 (H) 0.00 - 0.01 K/uL    NEUTROPHILS 72 32 - 75 %    LYMPHOCYTES 14 12 - 49 %    MONOCYTES 12 5 - 13 %    EOSINOPHILS 1 0 - 7 %    BASOPHILS 0 0 - 1 %    IMMATURE GRANULOCYTES 1 (H) 0.0 - 0.5 %    ABS. NEUTROPHILS 3.3 1.8 - 8.0 K/UL    ABS. LYMPHOCYTES 0.6 (L) 0.8 - 3.5 K/UL    ABS. MONOCYTES 0.5 0.0 - 1.0 K/UL    ABS. EOSINOPHILS 0.0 0.0 - 0.4 K/UL    ABS. BASOPHILS 0.0 0.0 - 0.1 K/UL    ABS. IMM. GRANS. 0.0 0.00 - 0.04 K/UL    DF SMEAR SCANNED      RBC COMMENTS ANISOCYTOSIS  1+        RBC COMMENTS POLYCHROMASIA  1+       METABOLIC PANEL, BASIC    Collection Time: 11/22/21  3:19 AM   Result Value Ref Range    Sodium 135 (L) 136 - 145 mmol/L    Potassium 3.7 3.5 - 5.1 mmol/L    Chloride 103 97 - 108 mmol/L    CO2 25 21 - 32 mmol/L    Anion gap 7 5 - 15 mmol/L    Glucose 135 (H) 65 - 100 mg/dL    BUN 40 (H) 6 - 20 MG/DL    Creatinine 2.96 (H) 0.70 - 1.30 MG/DL    BUN/Creatinine ratio 14 12 - 20      GFR est AA 26 (L) >60 ml/min/1.73m2    GFR est non-AA 22 (L) >60 ml/min/1.73m2    Calcium 8.2 (L) 8.5 - 10.1 MG/DL   GLUCOSE, POC    Collection Time: 11/22/21  6:42 AM   Result Value Ref Range    Glucose (POC) 137 (H) 65 - 117 mg/dL    Performed by Abelardo Howell    PCT            Total time spent with patient:  xxx   min. Care Plan discussed with:  Patient     Family      RN      Consulting Physician [facility],         I have reviewed the flowsheets. Chart and Pertinent Notes have been reviewed. No change in PMH ,family and social history from Consult note.       Ruma Fraga MD

## 2021-11-22 NOTE — TELEPHONE ENCOUNTER
----- Message from Stevenson Edwards MD sent at 11/22/2021  8:27 AM EST -----  Regarding: He is In-Pt. Had LBX at [facility] within past 1 month. Please get that report and slides sent. Need report now. Give  report to NA when it comes in.        [email or web address] Faxed over STAT request for liver bx slides and report form [address] at [phone]. (EVON)    [email or web address] Faxed order again to obtain liver bx report and slides from. [facility]. (EVON)    [email or web address] Rec'd biopsy report only no slides afte x3 request. Will given to  in the morning when in office. (EVON)

## 2021-11-22 NOTE — PROGRESS NOTES
Transitions of Care Plan   RUR: 16% - moderate   Admission Dx:  CHF   Consults: Cardiology; Nephrology; Therapy   Baseline: ambulates with cane; resides with girlfriend and her sons   Barrier(s) to Discharge: medical; OP HD set up   Disposition: home w home health and OP HD set up   Estimated Discharge Date: 11/24/21      Clinical update per chart review and patient discussed during Interdisciplinary Rounds:    Patient is not medically stable for discharge due to ongoing medical needs - HD today; therapy ordered for disposition needs; HTN; anoxic brain injury s/p cardiac arrest.    Disposition:    HD - Attending MD advised CM that patient will need OP HD set up. CM spoke with patient and girlfriend Amy Chino) - both communicated understanding of OP HD chair. Patient's girlfriend prefers [facility] for HD and TTS schedule if possible. She would like to transport patient but knows that he has option for Medicaid transportation. IPR vs HH - CM advised patient and girlfriend that treatment team recommends inpatient rehab after hospital discharge. Both the patient and his girlfriend would like patient to discharge home. Girlfriend states she has three grown sons that will help patient along with herself after discharge. Agreeable to home health if ordered. CM sent referral to [facility] via [employer].     Chiquis Anand, MPH  Care Manager l [employer]  Available via [employer] or

## 2021-11-22 NOTE — PROGRESS NOTES
Occupational Therapy:   11/22/2021    Orders acknowledged, chart reviewed in preparation for OT evaluation. RN cleared for therapy and noted having received dialysis this AM. Pt received with bed in modified chair position, set up for lunch. Educated on benefits of OT services and given multiple attempts to engage in ADL tasks, however pt refusing d/t nausea and generalized fatigue. RN notified. Will follow up as able and appropriate.      Thank you,   Angie Crocker, OTD, OTR/L

## 2021-11-22 NOTE — PROCEDURES
[facility]                      [phone]  Vitals Pre Post Assessment Pre Post   BP BP: (!) 175/57 (11/22/21 0828) 192/60 LOC A/OX4 Same as Pre   HR Pulse (Heart Rate): 68 (11/22/21 0828) 73 Lungs Clear. Not in distress Clear   Resp Resp Rate: 16 (11/22/21 0828) 16 Cardiac NRRR Same   Temp Temp: 98 °F (36.7 °C) (11/22/21 0828) 98.1 Skin WDI WDI   Weight 80.2 kg 78.2 kg Edema BLL +2 BLL+2   Pain Pain Intensity 1:  (unable to quantify besides \"better\") (11/21/21 1650)  Tele Status On cardiac monitor On cardiac monitor     Orders   Duration: Start: 0825 End: 1130 Total: 3 hr   Dialyzer: Dialyzer/Set Up Inspection: Valdemar Vallejo (11/22/21 0828)   Anival Buffy Bath: Dialysate K (mEq/L): 3 (11/22/21 0828)   Ca Bath: Dialysate CA (mEq/L): 2.5 (11/22/21 0828)   Na: Dialysate NA (mEq/L): 140 (11/22/21 0828)   Bicarb:  35   Target Fluid Removal: Goal/Amount of Fluid to Remove (mL): 2000 mL (11/22/21 0828)     Access   Type & Location: [address] [place] [address]   Comments: Access Dressing is clean and intact, no evidence of used and dated. Each catheter limb disinfected per p&p, caps removed, hubs disinfected per p&p. Both lumen flushes without issues.                                        Labs   HBsAg (Antigen) / date:  Negative (11/19/2021)                                 HBsAb (Antibody) / date: Susceptible (11/19/2021)   Source: [facility]   Obtained/Reviewed  Critical Results Called HGB   Date Value Ref Range Status   11/22/2021 7.8 (L) 12.1 - 17.0 g/dL Final     Potassium   Date Value Ref Range Status   11/22/2021 3.7 3.5 - 5.1 mmol/L Final     Calcium   Date Value Ref Range Status   11/22/2021 8.2 (L) 8.5 - 10.1 MG/DL Final     BUN   Date Value Ref Range Status   11/22/2021 40 (H) 6 - 20 MG/DL Final     Creatinine   Date Value Ref Range Status   11/22/2021 2.96 (H) 0.70 - 1.30 MG/DL Final        Meds Given   Name Dose Route   Heparin 1100 unit  1400 unit Arterial Dwel  Venous dwell               Adequacy / Fluid    Total Liters Process: 72. 7 Liters   Net Fluid Removed: 2000 ml      Comments   Time Out Done: 0820   Admitting Diagnosis: CHF exacerbation, CKD 4, Hypertension   Consent obtained/signed: Informed Consent Verified: Yes (11/22/21 0359)   Machine / RO # Machine Number: [id number] (11/22/21 0086)   Primary Nurse Rpt Pre:  Ligia Hdz RN   Primary Nurse Rpt Post: Thomas Luis RN   Pt Education: Procedural   Care Plan: HD s ordered   Pts outpatient clinic: unknown     Tx Summary   Comments:       Time out done. 0825: Treatment started with slow flow, gradually increased to ordered BF. Monitored closely. 8351: S/B Dr. Segundo Urban, patient might have another session on Wednesday. Cardene drip is temporarily off as suggested by Dr. Segundo Urban. BP monitored  After 30 mins, BP was high, informed PN, started with Cardene again again. Extracorporeal lines flushed with 100CC NS to monitor for clots. None seen. 1000: Patient is able to eat breakfast without feeling nauseated. Patients vitally stable. 1130: Treatment completed, all blood returned. Each dialysis catheter limb disinfected per p&p,  post dialysis catheter dwell instilled per order, and caps applied. Dressing kept intact and dated. Treatment tolerated well. Comfort and care provided, needs attended, questions answered. Call bell within reach, bed to lowest position. Patient is awake and coherent when I left. 1140: Reports given to Thomas Luis RN.

## 2021-11-22 NOTE — PROGRESS NOTES
[address] [facility]  Hospitalist Group                                                                                          Hospitalist Progress Note  Gilbert Saunders MD  Answering service: [phone] from in house phone        Date of Service:  2021  NAME:  Pato Rincon  :  1960  MRN:  [id number]      Admission Summary:   22-year-old male with a past medical history of CAD status post CABG on dAPT, HFpEF,  DM 2, and CKD V who was admitted on  for GI bleed, and overload secondary to cardiorenal syndrome. He had been hospitalized at [facility] prior to this for acute on chronic heart failure, and started to develop bilateral lower extremity edema with melena upon discharge. He underwent EGD which showed nonbleeding erosive gastritis, and was started on PPI. Nephrology, and cardiology were consulted for volume management, and he was diuresed with IV Lasix. On , patient sustained PEA arrest.  After 12 minutes of CPR, ROSC was achieved, and he was intubated and transferred to the ICU. His volume status did not improve with IV Lasix, and Alexandre catheter was placed with plan for 3 days of dialysis. He was extubated, and neurology was consulted due altered mental status suspected secondary to anoxic brain injury, but this improved, and his mental status ultimately returned back to baseline with no imaging or intervention recommended. Transferred out of ICU on 21    Interval history / Subjective:     Pt seen and examined  C/o chest pain from CPR  Seen at HD  Headache and nausea improved thisAM        Assessment & Plan:     #SEVERIANO on CKD 4, now on HD due to ATN from PEA arrest  #HFpEF  Continue IV Bumex, TUMS, retacrit, and losartan  Cardiology, nephrology on board, appreciate recommendations  Currently with right IJ dialysis catheter for inpatient dialysis per nephrology  Avoid renal toxins, and hypotension.   --c/w HD , d/w renal, will start looking into outpatient HD unit    Headache/Nausea/Vomiting  - Hypertensive urgency  - c/w Clonidine, Hydralazine, Lopressor, cozaar  - cardene gtt on hold this AM while on HD , d/w Renal to adjust meds   - monitor     S/p PEA Arrest suspect from respiratory distress  --s/p intubation/extubation      #Erosive gastritis  Presented with melena, and normocytic anemia with hemoglobin 7-8  EGD on 11/17 with nonbleeding erosive gastritis  Continue PPI per gastroenterology  -now on ASA monotherapy    Probable Anoxic brain injury due to Cardiac arrest  - down for 12 minutes  - seen by Neurology  - supportive care     #DM II  -lispro ISS     #CAD s/p CABG  -continue metoprolol, crestor, imdur, and ASA     #Depression  -continue zoloft     #MSK pain   -s/p CPR  -has tramadol, and morphine on board     FEN: caridac  dvt ppx: SCD  MPOA: daughter  Code: Full    Care Plan discussed with: Patient/Family and Nurse  Anticipated Disposition: SAH/Rehab  Anticipated Discharge: Greater than 48 hours     Hospital Problems  Date Reviewed: 8/7/2020          Codes Class Noted POA    CHF exacerbation ([address] [address] ICD-10-CM: I50.9  ICD-9-CM: 428.0  11/14/2021 Unknown                Review of Systems:   A comprehensive review of systems was negative except for that written in the HPI. Vital Signs:    Last 24hrs VS reviewed since prior progress note. Most recent are:  Visit Vitals  BP (!) 188/61   Pulse 70   Temp 98 °F (36.7 °C) (Oral)   Resp 21   Ht 5' 9\" (1.753 m)   Wt 80.2 kg (176 lb 12.9 oz)   SpO2 95%   BMI 26.11 kg/m²         Intake/Output Summary (Last 24 hours) at 11/22/2021 1121  Last data filed at 11/22/2021 0931  Gross per 24 hour   Intake 200 ml   Output 800 ml   Net -600 ml        Physical Examination:     I had a face to face encounter with this patient and independently examined them on 11/22/2021 as outlined below:          Constitutional:  No acute distress, cooperative, pleasant    ENT:  Oral mucosa moist, oropharynx benign. Resp: CTA bilaterally. CV:  Regular rhythm, normal rate    GI:  Soft, non distended, non tender. normoactive bowel sounds,    Musculoskeletal:  trace edema, warm, 2+ pulses throughout    Neurologic:  Moves all extremities. AAOx3, CN II-XII reviewed            Data Review:    Review and/or order of clinical lab test  Review and/or order of tests in the radiology section of [facility]  Review and/or order of tests in the medicine section of [facility]      Labs:     Recent Labs     11/22/21 0318 11/21/21 0431   WBC 4.4 3.8*   HGB 7.8* 8.2*   HCT 24.5* 25.5*   * 108*     Recent Labs     11/22/21 0319 11/21/21 0435 11/21/21 0431   * 135* 135*   K 3.7 3.6 3.6    101 101   CO2 25 29 27   BUN 40* 29* 30*   CREA 2.96* 2.25* 2.24*   * 110* 114*   CA 8.2* 8.8 9.0   PHOS  --  2.3*  --      Recent Labs     11/21/21 0435   ALB 3.2*     No results for input(s): INR, PTP, APTT, INREXT, INREXT in the last 72 hours. No results for input(s): FE, TIBC, PSAT, FERR in the last 72 hours. No results found for: FOL, RBCF   No results for input(s): PH, PCO2, PO2 in the last 72 hours. No results for input(s): CPK, CKNDX, TROIQ in the last 72 hours.     No lab exists for component: CPKMB  Lab Results   Component Value Date/Time    Cholesterol, total 176 11/22/2019 09:36 AM    HDL Cholesterol 45 11/22/2019 09:36 AM    LDL, calculated 112 (H) 11/22/2019 09:36 AM    Triglyceride 94 11/22/2019 09:36 AM     Lab Results   Component Value Date/Time    Glucose (POC) 118 (H) 11/22/2021 11:16 AM    Glucose (POC) 137 (H) 11/22/2021 06:42 AM    Glucose (POC) 153 (H) 11/21/2021 09:42 PM    Glucose (POC) 114 11/21/2021 04:12 PM    Glucose (POC) 117 11/21/2021 11:23 AM     Lab Results   Component Value Date/Time    Color YELLOW/STRAW 11/14/2021 10:31 AM    Appearance CLEAR 11/14/2021 10:31 AM    Specific gravity 1.019 11/14/2021 10:31 AM    pH (UA) 5.5 11/14/2021 10:31 AM    Protein >300 (A) 11/14/2021 10:31 AM    Glucose 500 (A) 11/14/2021 10:31 AM    Ketone Negative 11/14/2021 10:31 AM    Bilirubin Negative 11/14/2021 10:31 AM    Urobilinogen 0.2 11/14/2021 10:31 AM    Nitrites Negative 11/14/2021 10:31 AM    Leukocyte Esterase Negative 11/14/2021 10:31 AM    Epithelial cells FEW 11/14/2021 10:31 AM    Bacteria Negative 11/14/2021 10:31 AM    WBC 0-4 11/14/2021 10:31 AM    RBC 0-5 11/14/2021 10:31 AM         Medications Reviewed:     Current Facility-Administered Medications   Medication Dose Route Frequency    labetaloL (NORMODYNE;TRANDATE) injection 15 mg  15 mg IntraVENous Q4H PRN    hydrALAZINE (APRESOLINE) tablet 100 mg  100 mg Oral TID    niCARdipine (CARDENE) 25 mg in 0.9% sodium chloride 250 mL infusion  0-15 mg/hr IntraVENous TITRATE    cloNIDine HCL (CATAPRES) tablet 0.2 mg  0.2 mg Oral TID    sucralfate (CARAFATE) tablet 1 g  1 g Oral AC&HS    losartan (COZAAR) tablet 100 mg  100 mg Oral QHS    [Held by provider] gabapentin (NEURONTIN) capsule 100 mg  100 mg Oral TID    insulin lispro (HUMALOG) injection   SubCUTAneous AC&HS    hydrALAZINE (APRESOLINE) 20 mg/mL injection 10-20 mg  10-20 mg IntraVENous Q6H PRN    heparin (porcine) 1,000 unit/mL injection 1,100 Units  1,100 Units Hemodialysis DIALYSIS PRN    heparin (porcine) 1,000 unit/mL injection 1,400 Units  1,400 Units Hemodialysis DIALYSIS PRN    pantoprazole (PROTONIX) 40 mg in 0.9% sodium chloride 10 mL injection  40 mg IntraVENous DAILY    metoprolol tartrate (LOPRESSOR) tablet 25 mg  25 mg Oral Q12H    aspirin chewable tablet 81 mg  81 mg Oral DAILY    polyethylene glycol (MIRALAX) packet 17 g  17 g Oral BID    [Held by provider] eplerenone (INSPRA) tablet 25 mg  25 mg Oral DAILY    bumetanide (BUMEX) injection 2 mg  2 mg IntraVENous Q12H    calcium carbonate (TUMS) chewable tablet 200 mg [elemental]  200 mg Oral TID WITH MEALS    isosorbide mononitrate ER (IMDUR) tablet 60 mg  60 mg Oral DAILY    sodium chloride (NS) flush 5-40 mL  5-40 mL IntraVENous Q8H    sodium chloride (NS) flush 5-40 mL  5-40 mL IntraVENous PRN    epoetin ericka-epbx (RETACRIT) injection 20,000 Units  20,000 Units SubCUTAneous Q MON, WED & FRI    butalbital-acetaminophen-caffeine (FIORICET, ESGIC) -40 mg per tablet 1 Tablet  1 Tablet Oral Q4H PRN    ondansetron (ZOFRAN) injection 4 mg  4 mg IntraVENous Q4H PRN    busPIRone (BUSPAR) tablet 10 mg  10 mg Oral BID    rosuvastatin (CRESTOR) tablet 10 mg  10 mg Oral QHS    sertraline (ZOLOFT) tablet 200 mg  200 mg Oral DAILY    traMADoL (ULTRAM) tablet 50 mg  50 mg Oral Q6H PRN    [Held by provider] traZODone (DESYREL) tablet 50 mg  50 mg Oral QHS    acetaminophen (TYLENOL) tablet 650 mg  650 mg Oral Q6H PRN    glucose chewable tablet 16 g  4 Tablet Oral PRN    dextrose (D50W) injection syrg 12.5-25 g  25-50 mL IntraVENous PRN    glucagon (GLUCAGEN) injection 1 mg  1 mg IntraMUSCular PRN    [Held by provider] insulin glargine (LANTUS) injection 10 Units  10 Units SubCUTAneous DAILY     ______________________________________________________________________  EXPECTED LENGTH OF STAY: 3d 19h  ACTUAL LENGTH OF STAY:          8                 Kee Mejia MD

## 2021-11-23 ENCOUNTER — APPOINTMENT (OUTPATIENT)
Dept: GENERAL RADIOLOGY | Age: 61
DRG: 194 | End: 2021-11-23
Attending: HOSPITALIST
Payer: MEDICAID

## 2021-11-23 PROBLEM — E43 SEVERE PROTEIN-CALORIE MALNUTRITION (HCC): Status: ACTIVE | Noted: 2021-11-23

## 2021-11-23 LAB
ANION GAP SERPL CALC-SCNC: 8 MMOL/L (ref 5–15)
BUN SERPL-MCNC: 26 MG/DL (ref 6–20)
BUN/CREAT SERPL: 11 (ref 12–20)
CALCIUM SERPL-MCNC: 8.2 MG/DL (ref 8.5–10.1)
CHLORIDE SERPL-SCNC: 102 MMOL/L (ref 97–108)
CO2 SERPL-SCNC: 25 MMOL/L (ref 21–32)
COMMENT, HOLDF: NORMAL
CREAT SERPL-MCNC: 2.43 MG/DL (ref 0.7–1.3)
GLUCOSE BLD STRIP.AUTO-MCNC: 138 MG/DL (ref 65–117)
GLUCOSE BLD STRIP.AUTO-MCNC: 184 MG/DL (ref 65–117)
GLUCOSE BLD STRIP.AUTO-MCNC: 199 MG/DL (ref 65–117)
GLUCOSE BLD STRIP.AUTO-MCNC: 234 MG/DL (ref 65–117)
GLUCOSE SERPL-MCNC: 142 MG/DL (ref 65–100)
POTASSIUM SERPL-SCNC: 3.5 MMOL/L (ref 3.5–5.1)
SAMPLES BEING HELD,HOLD: NORMAL
SERVICE CMNT-IMP: ABNORMAL
SODIUM SERPL-SCNC: 135 MMOL/L (ref 136–145)

## 2021-11-23 PROCEDURE — 74011000250 HC RX REV CODE- 250: Performed by: HOSPITALIST

## 2021-11-23 PROCEDURE — 74011250636 HC RX REV CODE- 250/636: Performed by: NURSE PRACTITIONER

## 2021-11-23 PROCEDURE — 80048 BASIC METABOLIC PNL TOTAL CA: CPT

## 2021-11-23 PROCEDURE — 97116 GAIT TRAINING THERAPY: CPT

## 2021-11-23 PROCEDURE — 36415 COLL VENOUS BLD VENIPUNCTURE: CPT

## 2021-11-23 PROCEDURE — 97530 THERAPEUTIC ACTIVITIES: CPT

## 2021-11-23 PROCEDURE — 74011250637 HC RX REV CODE- 250/637: Performed by: HOSPITALIST

## 2021-11-23 PROCEDURE — 74011250636 HC RX REV CODE- 250/636: Performed by: HOSPITALIST

## 2021-11-23 PROCEDURE — 74011636637 HC RX REV CODE- 636/637: Performed by: NURSE PRACTITIONER

## 2021-11-23 PROCEDURE — 74011250637 HC RX REV CODE- 250/637: Performed by: NURSE PRACTITIONER

## 2021-11-23 PROCEDURE — 65660000001 HC RM ICU INTERMED STEPDOWN

## 2021-11-23 PROCEDURE — C9113 INJ PANTOPRAZOLE SODIUM, VIA: HCPCS | Performed by: NURSE PRACTITIONER

## 2021-11-23 PROCEDURE — 74011250637 HC RX REV CODE- 250/637: Performed by: INTERNAL MEDICINE

## 2021-11-23 PROCEDURE — 74011000250 HC RX REV CODE- 250: Performed by: NURSE PRACTITIONER

## 2021-11-23 PROCEDURE — 71045 X-RAY EXAM CHEST 1 VIEW: CPT

## 2021-11-23 PROCEDURE — 74011000250 HC RX REV CODE- 250: Performed by: INTERNAL MEDICINE

## 2021-11-23 PROCEDURE — 82962 GLUCOSE BLOOD TEST: CPT

## 2021-11-23 PROCEDURE — 65660000000 HC RM CCU STEPDOWN

## 2021-11-23 PROCEDURE — 97165 OT EVAL LOW COMPLEX 30 MIN: CPT

## 2021-11-23 RX ORDER — LIDOCAINE 4 G/100G
1 PATCH TOPICAL EVERY 24 HOURS
Status: DISCONTINUED | OUTPATIENT
Start: 2021-11-23 | End: 2021-12-01 | Stop reason: HOSPADM

## 2021-11-23 RX ORDER — PRAZOSIN HYDROCHLORIDE 1 MG/1
2 CAPSULE ORAL 2 TIMES DAILY
Status: DISCONTINUED | OUTPATIENT
Start: 2021-11-23 | End: 2021-11-29

## 2021-11-23 RX ORDER — NIFEDIPINE 60 MG/1
60 TABLET, EXTENDED RELEASE ORAL DAILY
Status: DISCONTINUED | OUTPATIENT
Start: 2021-11-23 | End: 2021-11-24

## 2021-11-23 RX ADMIN — METOPROLOL TARTRATE 25 MG: 25 TABLET, FILM COATED ORAL at 08:41

## 2021-11-23 RX ADMIN — ACETAMINOPHEN 650 MG: 325 TABLET ORAL at 03:50

## 2021-11-23 RX ADMIN — INSULIN LISPRO 2 UNITS: 100 INJECTION, SOLUTION INTRAVENOUS; SUBCUTANEOUS at 11:52

## 2021-11-23 RX ADMIN — SERTRALINE 200 MG: 50 TABLET, FILM COATED ORAL at 08:44

## 2021-11-23 RX ADMIN — CLONIDINE HYDROCHLORIDE 0.2 MG: 0.2 TABLET ORAL at 21:53

## 2021-11-23 RX ADMIN — INSULIN LISPRO 2 UNITS: 100 INJECTION, SOLUTION INTRAVENOUS; SUBCUTANEOUS at 21:54

## 2021-11-23 RX ADMIN — HYDRALAZINE HYDROCHLORIDE 100 MG: 50 TABLET, FILM COATED ORAL at 08:41

## 2021-11-23 RX ADMIN — CLONIDINE HYDROCHLORIDE 0.2 MG: 0.2 TABLET ORAL at 08:41

## 2021-11-23 RX ADMIN — ONDANSETRON 4 MG: 2 INJECTION INTRAMUSCULAR; INTRAVENOUS at 09:09

## 2021-11-23 RX ADMIN — NICARDIPINE HYDROCHLORIDE 7.5 MG/HR: 25 INJECTION, SOLUTION INTRAVENOUS at 04:03

## 2021-11-23 RX ADMIN — SUCRALFATE 1 G: 1 TABLET ORAL at 07:24

## 2021-11-23 RX ADMIN — TRAMADOL HYDROCHLORIDE 50 MG: 50 TABLET ORAL at 20:12

## 2021-11-23 RX ADMIN — INSULIN LISPRO 2 UNITS: 100 INJECTION, SOLUTION INTRAVENOUS; SUBCUTANEOUS at 17:06

## 2021-11-23 RX ADMIN — BUMETANIDE 2 MG: 0.25 INJECTION INTRAMUSCULAR; INTRAVENOUS at 21:54

## 2021-11-23 RX ADMIN — ROSUVASTATIN 10 MG: 10 TABLET, FILM COATED ORAL at 21:52

## 2021-11-23 RX ADMIN — SODIUM CHLORIDE 40 MG: 9 INJECTION INTRAMUSCULAR; INTRAVENOUS; SUBCUTANEOUS at 08:50

## 2021-11-23 RX ADMIN — PRAZOSIN HYDROCHLORIDE 2 MG: 1 CAPSULE ORAL at 21:53

## 2021-11-23 RX ADMIN — BUSPIRONE HYDROCHLORIDE 10 MG: 10 TABLET ORAL at 08:43

## 2021-11-23 RX ADMIN — BUMETANIDE 2 MG: 0.25 INJECTION INTRAMUSCULAR; INTRAVENOUS at 08:47

## 2021-11-23 RX ADMIN — SUCRALFATE 1 G: 1 TABLET ORAL at 17:03

## 2021-11-23 RX ADMIN — ASPIRIN 81 MG CHEWABLE TABLET 81 MG: 81 TABLET CHEWABLE at 08:42

## 2021-11-23 RX ADMIN — SUCRALFATE 1 G: 1 TABLET ORAL at 21:53

## 2021-11-23 RX ADMIN — LOSARTAN POTASSIUM 100 MG: 50 TABLET, FILM COATED ORAL at 21:53

## 2021-11-23 RX ADMIN — NICARDIPINE HYDROCHLORIDE 7.5 MG/HR: 25 INJECTION, SOLUTION INTRAVENOUS at 08:33

## 2021-11-23 RX ADMIN — CALCIUM CARBONATE (ANTACID) CHEW TAB 500 MG 200 MG: 500 CHEW TAB at 11:51

## 2021-11-23 RX ADMIN — TRAMADOL HYDROCHLORIDE 50 MG: 50 TABLET ORAL at 11:49

## 2021-11-23 RX ADMIN — Medication 10 ML: at 21:54

## 2021-11-23 RX ADMIN — Medication 10 ML: at 07:24

## 2021-11-23 RX ADMIN — PRAZOSIN HYDROCHLORIDE 2 MG: 1 CAPSULE ORAL at 11:49

## 2021-11-23 RX ADMIN — Medication 10 ML: at 14:00

## 2021-11-23 RX ADMIN — NIFEDIPINE 60 MG: 60 TABLET, EXTENDED RELEASE ORAL at 08:43

## 2021-11-23 RX ADMIN — HYDRALAZINE HYDROCHLORIDE 100 MG: 50 TABLET, FILM COATED ORAL at 17:03

## 2021-11-23 RX ADMIN — BUSPIRONE HYDROCHLORIDE 10 MG: 10 TABLET ORAL at 17:03

## 2021-11-23 RX ADMIN — HYDRALAZINE HYDROCHLORIDE 100 MG: 50 TABLET, FILM COATED ORAL at 21:53

## 2021-11-23 RX ADMIN — POLYETHYLENE GLYCOL 3350 17 G: 17 POWDER, FOR SOLUTION ORAL at 17:05

## 2021-11-23 RX ADMIN — SUCRALFATE 1 G: 1 TABLET ORAL at 11:49

## 2021-11-23 RX ADMIN — ISOSORBIDE MONONITRATE 60 MG: 60 TABLET ORAL at 08:41

## 2021-11-23 RX ADMIN — CLONIDINE HYDROCHLORIDE 0.2 MG: 0.2 TABLET ORAL at 17:03

## 2021-11-23 RX ADMIN — NICARDIPINE HYDROCHLORIDE 7.5 MG/HR: 25 INJECTION, SOLUTION INTRAVENOUS at 07:25

## 2021-11-23 NOTE — PROGRESS NOTES
[facility]   [address] [address], [address] [facility]  Phone: [phone]   [id number]       Nephrology Progress Note  Beryle Marseille     1960     [id number]  Date of Admission : 11/14/2021 11/23/21    CC: Follow up for severiano ON CKD 4       Assessment and Plan   SEVERIANO on CKD  - progressive diabetic Nephropathy   - Now w/ superimposed ATN from PEA/ resp arrest   - HD MWF  - will make NPO for PC tomorrow  - CM to start working on outpt chair    CKD 4 at baseline:  -Biopsy-proven advanced diabetic nephropathy  -Baseline creatinine 3.5 mg/dL     Volume overload:  Pleural effusions  - 2/2 Nephrotic syndrome   - ? Thoracentesis      HTN:  - add prazosin today  - cont his other meds and try to wean off cardene     Anemia in CKD   - continue RANDY      CAD s/p CABG May 2020  HFpEF  -Last echo: Preserved LVEF, Mod Pulm HTN-PASP 50 mmHg     Resp / PEA arrest   - Intubated and extubated 11/18    AMS   ? Hypoxic Brain injury      Interval History:  Seen and examined. Feeling ok, no HA. C/o chest discomfort. Back on cardene. No SOB, n/v/d reported. Review of Systems: Review of systems not obtained due to patient factors.     Current Medications:   Current Facility-Administered Medications   Medication Dose Route Frequency    NIFEdipine ER (PROCARDIA XL) tablet 60 mg  60 mg Oral DAILY    lidocaine 4 % patch 1 Patch  1 Patch TransDERmal Q24H    prazosin (MINIPRESS) capsule 2 mg  2 mg Oral BID    labetaloL (NORMODYNE;TRANDATE) injection 15 mg  15 mg IntraVENous Q4H PRN    hydrALAZINE (APRESOLINE) tablet 100 mg  100 mg Oral TID    niCARdipine (CARDENE) 25 mg in 0.9% sodium chloride 250 mL infusion  0-15 mg/hr IntraVENous TITRATE    cloNIDine HCL (CATAPRES) tablet 0.2 mg  0.2 mg Oral TID    sucralfate (CARAFATE) tablet 1 g  1 g Oral AC&HS    losartan (COZAAR) tablet 100 mg  100 mg Oral QHS    [Held by provider] gabapentin (NEURONTIN) capsule 100 mg  100 mg Oral TID    insulin lispro (HUMALOG) injection   SubCUTAneous AC&HS    hydrALAZINE (APRESOLINE) 20 mg/mL injection 10-20 mg  10-20 mg IntraVENous Q6H PRN    heparin (porcine) 1,000 unit/mL injection 1,100 Units  1,100 Units Hemodialysis DIALYSIS PRN    heparin (porcine) 1,000 unit/mL injection 1,400 Units  1,400 Units Hemodialysis DIALYSIS PRN    pantoprazole (PROTONIX) 40 mg in 0.9% sodium chloride 10 mL injection  40 mg IntraVENous DAILY    metoprolol tartrate (LOPRESSOR) tablet 25 mg  25 mg Oral Q12H    aspirin chewable tablet 81 mg  81 mg Oral DAILY    polyethylene glycol (MIRALAX) packet 17 g  17 g Oral BID    [Held by provider] eplerenone (INSPRA) tablet 25 mg  25 mg Oral DAILY    bumetanide (BUMEX) injection 2 mg  2 mg IntraVENous Q12H    calcium carbonate (TUMS) chewable tablet 200 mg [elemental]  200 mg Oral TID WITH MEALS    isosorbide mononitrate ER (IMDUR) tablet 60 mg  60 mg Oral DAILY    sodium chloride (NS) flush 5-40 mL  5-40 mL IntraVENous Q8H    sodium chloride (NS) flush 5-40 mL  5-40 mL IntraVENous PRN    epoetin ericka-epbx (RETACRIT) injection 20,000 Units  20,000 Units SubCUTAneous Q MON, WED & FRI    butalbital-acetaminophen-caffeine (FIORICET, ESGIC) -40 mg per tablet 1 Tablet  1 Tablet Oral Q4H PRN    ondansetron (ZOFRAN) injection 4 mg  4 mg IntraVENous Q4H PRN    busPIRone (BUSPAR) tablet 10 mg  10 mg Oral BID    rosuvastatin (CRESTOR) tablet 10 mg  10 mg Oral QHS    sertraline (ZOLOFT) tablet 200 mg  200 mg Oral DAILY    traMADoL (ULTRAM) tablet 50 mg  50 mg Oral Q6H PRN    [Held by provider] traZODone (DESYREL) tablet 50 mg  50 mg Oral QHS    acetaminophen (TYLENOL) tablet 650 mg  650 mg Oral Q6H PRN    glucose chewable tablet 16 g  4 Tablet Oral PRN    dextrose (D50W) injection syrg 12.5-25 g  25-50 mL IntraVENous PRN    glucagon (GLUCAGEN) injection 1 mg  1 mg IntraMUSCular PRN    [Held by provider] insulin glargine (LANTUS) injection 10 Units  10 Units SubCUTAneous DAILY      No Known Allergies    Objective:  Vitals:    Vitals:    11/23/21 0205 11/23/21 0407 11/23/21 0604 11/23/21 0759   BP:    (!) 158/57   Pulse: 60 61 60 61   Resp:    20   Temp:       TempSrc:       SpO2:    98%   Weight:       Height:         Intake and Output:  No intake/output data recorded. 11/21 1901 - 11/23 0700  In: 200 [P.O.:200]  Out: 875 [Urine:875]    Physical Examination:    General: NAD  Neck:  Supple, no mass  Resp:  Clear b/l  CV:  RRR, s1,s2  Neurologic:  Non focal  :                  Grant +        []    High complexity decision making was performed  []    Patient is at high-risk of decompensation with multiple organ involvement    Lab Data Personally Reviewed: I have reviewed all the pertinent labs, microbiology data and radiology studies during assessment.     Recent Labs     11/23/21  0347 11/22/21 0319 11/21/21  0435 11/21/21  0431   * 135* 135* 135*   K 3.5 3.7 3.6 3.6    103 101 101   CO2 25 25 29 27   * 135* 110* 114*   BUN 26* 40* 29* 30*   CREA 2.43* 2.96* 2.25* 2.24*   CA 8.2* 8.2* 8.8 9.0   PHOS  --   --  2.3*  --    ALB  --   --  3.2*  --      Recent Labs     11/22/21 0318 11/21/21 0431   WBC 4.4 3.8*   HGB 7.8* 8.2*   HCT 24.5* 25.5*   * 108*     No results found for: SDES  Lab Results   Component Value Date/Time    Culture result: NO GROWTH 4 DAYS 11/18/2021 11:40 AM     Recent Results (from the past 24 hour(s))   GLUCOSE, POC    Collection Time: 11/22/21 11:16 AM   Result Value Ref Range    Glucose (POC) 118 (H) 65 - 117 mg/dL    Performed by Taylor Aly, POC    Collection Time: 11/22/21  4:01 PM   Result Value Ref Range    Glucose (POC) 145 (H) 65 - 117 mg/dL    Performed by Sam Gudino (CON)    GLUCOSE, POC    Collection Time: 11/22/21  9:38 PM   Result Value Ref Range    Glucose (POC) 185 (H) 65 - 117 mg/dL    Performed by Malachi ARITA (CON)    METABOLIC PANEL, BASIC    Collection Time: 11/23/21  3:47 AM   Result Value Ref Range Sodium 135 (L) 136 - 145 mmol/L    Potassium 3.5 3.5 - 5.1 mmol/L    Chloride 102 97 - 108 mmol/L    CO2 25 21 - 32 mmol/L    Anion gap 8 5 - 15 mmol/L    Glucose 142 (H) 65 - 100 mg/dL    BUN 26 (H) 6 - 20 MG/DL    Creatinine 2.43 (H) 0.70 - 1.30 MG/DL    BUN/Creatinine ratio 11 (L) 12 - 20      GFR est AA 33 (L) >60 ml/min/1.73m2    GFR est non-AA 27 (L) >60 ml/min/1.73m2    Calcium 8.2 (L) 8.5 - 10.1 MG/DL   SAMPLES BEING HELD    Collection Time: 11/23/21  3:47 AM   Result Value Ref Range    SAMPLES BEING HELD 1LAV     COMMENT        Add-on orders for these samples will be processed based on acceptable specimen integrity and analyte stability, which may vary by analyte. GLUCOSE, POC    Collection Time: 11/23/21  6:33 AM   Result Value Ref Range    Glucose (POC) 138 (H) 65 - 117 mg/dL    Performed by Imelda Moctezuma            Total time spent with patient:  xxx   min. Care Plan discussed with:  Patient     Family      RN      Consulting Physician [address] [facility],         I have reviewed the flowsheets. Chart and Pertinent Notes have been reviewed. No change in PMH ,family and social history from Consult note.       Kirill Davis MD

## 2021-11-23 NOTE — PROGRESS NOTES
Comprehensive Nutrition Assessment    Type and Reason for Visit: Reassess    Nutrition Recommendations/Plan:    1. Encourage PO intake with all meals and supplements    - family may bring foods from home  2. Weekly weights     Nutrition Assessment:    Pt admitted with CHF exacerbation. PMHx: CAD, DM2, HTN, CKD4, CHF. GI symptoms 2-3 weeks PTA including melena, abdominal pain and intermittent vomiting. EGD with gastritis - PPI in place. Ascites and (R) pleural effusion - s/p diagnostic thoracentesis on 11/18. PEA arrest on 11/18, extubated on 11/19 with HD started that same day. Permacath planned tomorrow with HD on discharge. Pt visited today. Eating well until 3 weeks PTA with abd pain. Nausea this admit intermittently, mostly in the morning per discussion with MD. Getting zofran only intermittently over past week. Spoke with pt to ask for anti-emetics PRN and verbalizing understanding. He confirms PO intake has been poor but unable to site any reason. Has drank some strawberry Ensure brought in by family so will add BID (700kcal, 40g protein) and encouraged family to bring foods from home. Will also liberalize diet to just consistent CHO, low sodium for more options. Noted he doesn't work d/t blindness 2/2 diabetes. A1c indicates poor BG control. Wt down from admit with improvement in fluid status since starting HD. Temporal wasting observed.      Malnutrition Assessment:  Malnutrition Status:  Severe malnutrition  Context:  Acute illness     Findings of the 6 clinical characteristics of malnutrition:   Energy Intake:  7 - 50% or less of est energy requirements for 5 or more days  Weight Loss:  Unable to assess     Body Fat Loss:  No significant body fat loss,     Muscle Mass Loss:  7 - Moderate muscle mass loss, Temples (temporalis)  Fluid Accumulation:  1 - Mild, Extremities   Strength:  Not performed     Nutritionally Significant Medications: ASA, bumex, tums, retacrit, SSI, procardia, protonix, miralax, zoloft, carafate, cardene drip    Estimated Daily Nutrient Needs:  Energy (kcal): 2000-2400kcal (25-30kcal/kg); Weight Used for Energy Requirements: Current (80kg)  Protein (g): 96-112g (1.2-1.4g/kg); Increase to 1.2-2.0g/kg if starts RRT Weight Used for Protein Requirements: Current (89kg)  Fluid (ml/day): 2000 - or per renal; Method Used for Fluid Requirements: 1 ml/kcal    Nutrition Related Findings:       BM: 11/20  Edema: 1+ BLE  Wounds:  None       Current Nutrition Therapies:   Diet: cardiac  Supplements: none  Meal intake:   Patient Vitals for the past 168 hrs:   % Diet Eaten   11/20/21 1800 1 - 25%   11/20/21 1319 26 - 50%   11/20/21 0904 76 - 100%   11/19/21 1500 1 - 25%   11/19/21 1200 0%   11/19/21 0800 0%   11/17/21 1432 51 - 75%     Anthropometric Measures:  · Height:  5' 9\" (175.3 cm)  · Current Body Wt:  80 kg (176 lb 5.9 oz)   · Admission Body Wt:  187 lb 6.3 oz    · Ideal Body Wt:  160:  110.2 %   · BMI Categories:  Overweight (BMI 25.0-29. 9)     Wt Readings from Last 10 Encounters:   11/22/21 80.2 kg (176 lb 12.9 oz)   08/07/20 83.6 kg (184 lb 3.2 oz)   11/22/19 73.5 kg (162 lb)   08/23/19 74.4 kg (164 lb)   08/05/19 74.8 kg (165 lb)   05/22/19 74.4 kg (164 lb)   04/25/19 73.9 kg (163 lb)   04/03/19 73 kg (161 lb)   03/21/19 73.9 kg (163 lb)   02/21/19 73.5 kg (162 lb)     Nutrition Diagnosis:   · Inadequate oral intake related to cognitive or neurological impairment, impaired respiratory function as evidenced by intake 26-50%    · Increased nutrient needs related to renal dysfunction, increased demand for energy/nutrients as evidenced by dialysis    Nutrition Interventions:   Food and/or Nutrient Delivery: Modify current diet, Start oral nutrition supplement  Nutrition Education and Counseling: No recommendations at this time  Coordination of Nutrition Care: Continue to monitor while inpatient    Goals:  Consumption of at least 60% meals and 2 supplements per day in 5-7 days Nutrition Monitoring and Evaluation:   Behavioral-Environmental Outcomes: None identified  Food/Nutrient Intake Outcomes: Food and nutrient intake, Supplement intake  Physical Signs/Symptoms Outcomes: Weight, Biochemical data, Nausea/vomiting    Discharge Planning:    Continue current diet, Continue oral nutrition supplement     Jeremy Quinones RD CNSC  Contact: [employer]

## 2021-11-23 NOTE — PROGRESS NOTES
Rounded on [employer] and provided Anointing of the Sick at request of patient.     Mehul Sabillon

## 2021-11-23 NOTE — PROGRESS NOTES
[address] [facility]  Hospitalist Group                                                                                          Hospitalist Progress Note  Sabi Robledo MD  Answering service: [phone] from in house phone        Date of Service:  2021  NAME:  Yusuf Lugo  :  1960  MRN:  [id number]      Admission Summary:   59-year-old male with a past medical history of CAD status post CABG on dAPT, HFpEF,  DM 2, and CKD V who was admitted on  for GI bleed, and overload secondary to cardiorenal syndrome. He had been hospitalized at [facility] prior to this for acute on chronic heart failure, and started to develop bilateral lower extremity edema with melena upon discharge. He underwent EGD which showed nonbleeding erosive gastritis, and was started on PPI. Nephrology, and cardiology were consulted for volume management, and he was diuresed with IV Lasix. On , patient sustained PEA arrest.  After 12 minutes of CPR, ROSC was achieved, and he was intubated and transferred to the ICU. His volume status did not improve with IV Lasix, and Alexandre catheter was placed with plan for 3 days of dialysis. He was extubated, and neurology was consulted due altered mental status suspected secondary to anoxic brain injury, but this improved, and his mental status ultimately returned back to baseline with no imaging or intervention recommended.  Transferred out of ICU on 21    Interval history / Subjective:     Pt seen and examined  C/o chest pain from CPR  Headache better  BP okay on cardene gtt       Assessment & Plan:     #SEVERIANO on CKD 4, now on HD due to ATN from PEA arrest  #HFpEF  Continue IV Bumex, TUMS, retacrit, and losartan  Cardiology, nephrology on board, appreciate recommendations  Currently with right IJ dialysis catheter for inpatient dialysis per nephrology  Avoid renal toxins, and hypotension.  --HD MWF  --c/w HD , d/w renal, will start looking into outpatient HD unit  --Perm-a-cath in AM , npo at MN    Headache/Nausea/Vomiting  - Hypertensive urgency  - c/w Clonidine, Hydralazine, Lopressor, cozaar  - added Procardia XL and Prazosin 11/23  - wean cardene today    S/p PEA Arrest suspect from respiratory distress  --s/p intubation/extubation      #Erosive gastritis  Presented with melena, and normocytic anemia with hemoglobin 7-8  EGD on 11/17 with nonbleeding erosive gastritis  Continue PPI per gastroenterology  -now on ASA monotherapy    Probable Anoxic brain injury due to Cardiac arrest  - down for 12 minutes  - seen by Neurology  - supportive care     #DM II  -lispro ISS     #CAD s/p CABG  -continue metoprolol, crestor, imdur, and ASA     #Depression  -continue zoloft     #MSK pain   -s/p CPR  -has tramadol,  -Lidocaine patch added     FEN: caridac  dvt ppx: SCD  MPOA: daughter  Code: Full    Care Plan discussed with: Patient/Family and Nurse  Anticipated Disposition: [facility]/Rehab  Anticipated Discharge: Greater than 48 hours     Hospital Problems  Date Reviewed: 8/7/2020          Codes Class Noted POA    CHF exacerbation ([facility] [address] ICD-10-CM: I50.9  ICD-9-CM: 428.0  11/14/2021 Unknown                Review of Systems:   A comprehensive review of systems was negative except for that written in the HPI. Vital Signs:    Last 24hrs VS reviewed since prior progress note.  Most recent are:  Visit Vitals  BP (!) 158/57 (BP 1 Location: Right arm, BP Patient Position: At rest)   Pulse 61   Temp 98 °F (36.7 °C)   Resp 20   Ht 5' 9\" (1.753 m)   Wt 80.2 kg (176 lb 12.9 oz)   SpO2 98%   BMI 26.11 kg/m²         Intake/Output Summary (Last 24 hours) at 11/23/2021 1904  Last data filed at 11/22/2021 1727  Gross per 24 hour   Intake    Output 200 ml   Net -200 ml        Physical Examination:     I had a face to face encounter with this patient and independently examined them on 11/23/2021 as outlined below:          Constitutional:  No acute distress, cooperative, pleasant    ENT:  Oral mucosa moist, oropharynx benign. Resp:  CTA bilaterally. CV:  Regular rhythm, normal rate    GI:  Soft, non distended, non tender. normoactive bowel sounds,    Musculoskeletal:  trace edema, warm, 2+ pulses throughout    Neurologic:  Moves all extremities. AAOx3, CN II-XII reviewed            Data Review:    Review and/or order of clinical lab test  Review and/or order of tests in the radiology section of CPT  Review and/or order of tests in the medicine section of CPT      Labs:     Recent Labs     11/22/21 0318 11/21/21 0431   WBC 4.4 3.8*   HGB 7.8* 8.2*   HCT 24.5* 25.5*   * 108*     Recent Labs     11/23/21 0347 11/22/21 0319 11/21/21 0435   * 135* 135*   K 3.5 3.7 3.6    103 101   CO2 25 25 29   BUN 26* 40* 29*   CREA 2.43* 2.96* 2.25*   * 135* 110*   CA 8.2* 8.2* 8.8   PHOS  --   --  2.3*     Recent Labs     11/21/21 0435   ALB 3.2*     No results for input(s): INR, PTP, APTT, INREXT, INREXT in the last 72 hours. No results for input(s): FE, TIBC, PSAT, FERR in the last 72 hours. No results found for: FOL, RBCF   No results for input(s): PH, PCO2, PO2 in the last 72 hours. No results for input(s): CPK, CKNDX, TROIQ in the last 72 hours.     No lab exists for component: CPKMB  Lab Results   Component Value Date/Time    Cholesterol, total 176 11/22/2019 09:36 AM    HDL Cholesterol 45 11/22/2019 09:36 AM    LDL, calculated 112 (H) 11/22/2019 09:36 AM    Triglyceride 94 11/22/2019 09:36 AM     Lab Results   Component Value Date/Time    Glucose (POC) 138 (H) 11/23/2021 06:33 AM    Glucose (POC) 185 (H) 11/22/2021 09:38 PM    Glucose (POC) 145 (H) 11/22/2021 04:01 PM    Glucose (POC) 118 (H) 11/22/2021 11:16 AM    Glucose (POC) 137 (H) 11/22/2021 06:42 AM     Lab Results   Component Value Date/Time    Color YELLOW/STRAW 11/14/2021 10:31 AM    Appearance CLEAR 11/14/2021 10:31 AM    Specific gravity 1.019 11/14/2021 10:31 AM    pH (UA) 5.5 11/14/2021 10:31 AM    Protein >300 (A) 11/14/2021 10:31 AM    Glucose 500 (A) 11/14/2021 10:31 AM    Ketone Negative 11/14/2021 10:31 AM    Bilirubin Negative 11/14/2021 10:31 AM    Urobilinogen 0.2 11/14/2021 10:31 AM    Nitrites Negative 11/14/2021 10:31 AM    Leukocyte Esterase Negative 11/14/2021 10:31 AM    Epithelial cells FEW 11/14/2021 10:31 AM    Bacteria Negative 11/14/2021 10:31 AM    WBC 0-4 11/14/2021 10:31 AM    RBC 0-5 11/14/2021 10:31 AM         Medications Reviewed:     Current Facility-Administered Medications   Medication Dose Route Frequency    NIFEdipine ER (PROCARDIA XL) tablet 60 mg  60 mg Oral DAILY    lidocaine 4 % patch 1 Patch  1 Patch TransDERmal Q24H    prazosin (MINIPRESS) capsule 2 mg  2 mg Oral BID    labetaloL (NORMODYNE;TRANDATE) injection 15 mg  15 mg IntraVENous Q4H PRN    hydrALAZINE (APRESOLINE) tablet 100 mg  100 mg Oral TID    niCARdipine (CARDENE) 25 mg in 0.9% sodium chloride 250 mL infusion  0-15 mg/hr IntraVENous TITRATE    cloNIDine HCL (CATAPRES) tablet 0.2 mg  0.2 mg Oral TID    sucralfate (CARAFATE) tablet 1 g  1 g Oral AC&HS    losartan (COZAAR) tablet 100 mg  100 mg Oral QHS    [Held by provider] gabapentin (NEURONTIN) capsule 100 mg  100 mg Oral TID    insulin lispro (HUMALOG) injection   SubCUTAneous AC&HS    hydrALAZINE (APRESOLINE) 20 mg/mL injection 10-20 mg  10-20 mg IntraVENous Q6H PRN    heparin (porcine) 1,000 unit/mL injection 1,100 Units  1,100 Units Hemodialysis DIALYSIS PRN    heparin (porcine) 1,000 unit/mL injection 1,400 Units  1,400 Units Hemodialysis DIALYSIS PRN    pantoprazole (PROTONIX) 40 mg in 0.9% sodium chloride 10 mL injection  40 mg IntraVENous DAILY    metoprolol tartrate (LOPRESSOR) tablet 25 mg  25 mg Oral Q12H    aspirin chewable tablet 81 mg  81 mg Oral DAILY    polyethylene glycol (MIRALAX) packet 17 g  17 g Oral BID    [Held by provider] eplerenone (INSPRA) tablet 25 mg  25 mg Oral DAILY    bumetanide (BUMEX) injection 2 mg  2 mg IntraVENous Q12H    calcium carbonate (TUMS) chewable tablet 200 mg [elemental]  200 mg Oral TID WITH MEALS    isosorbide mononitrate ER (IMDUR) tablet 60 mg  60 mg Oral DAILY    sodium chloride (NS) flush 5-40 mL  5-40 mL IntraVENous Q8H    sodium chloride (NS) flush 5-40 mL  5-40 mL IntraVENous PRN    epoetin ericka-epbx (RETACRIT) injection 20,000 Units  20,000 Units SubCUTAneous Q MON, WED & FRI    butalbital-acetaminophen-caffeine (FIORICET, ESGIC) -40 mg per tablet 1 Tablet  1 Tablet Oral Q4H PRN    ondansetron (ZOFRAN) injection 4 mg  4 mg IntraVENous Q4H PRN    busPIRone (BUSPAR) tablet 10 mg  10 mg Oral BID    rosuvastatin (CRESTOR) tablet 10 mg  10 mg Oral QHS    sertraline (ZOLOFT) tablet 200 mg  200 mg Oral DAILY    traMADoL (ULTRAM) tablet 50 mg  50 mg Oral Q6H PRN    [Held by provider] traZODone (DESYREL) tablet 50 mg  50 mg Oral QHS    acetaminophen (TYLENOL) tablet 650 mg  650 mg Oral Q6H PRN    glucose chewable tablet 16 g  4 Tablet Oral PRN    dextrose (D50W) injection syrg 12.5-25 g  25-50 mL IntraVENous PRN    glucagon (GLUCAGEN) injection 1 mg  1 mg IntraMUSCular PRN    [Held by provider] insulin glargine (LANTUS) injection 10 Units  10 Units SubCUTAneous DAILY     ______________________________________________________________________  EXPECTED LENGTH OF STAY: 3d 19h  ACTUAL LENGTH OF STAY:          9                 Noble Carmichael MD

## 2021-11-23 NOTE — PROGRESS NOTES
Problem: Mobility Impaired (Adult and Pediatric)  Goal: *Acute Goals and Plan of Care (Insert Text)  Outcome: Progressing Towards Goal    PHYSICAL THERAPY TREATMENT  Patient: Torin Nieves (89 y.o. male)  Date: 11/23/2021  Diagnosis: CHF exacerbation (HCC) [I50.9]   <principal problem not specified>  Procedure(s) (LRB):  ESOPHAGOGASTRODUODENOSCOPY (EGD) (N/A) 7 Days Post-Op  Precautions: Fall, Bed Alarm  Chart, physical therapy assessment, plan of care and goals were reviewed. ASSESSMENT  Patient continues with skilled PT services and is progressing towards goals. Pt received supine in bed, agreeable to therapy. Pt. Performed bed mobility with SBA, able to assist with donning gown. Pt. Seated at EOB with good static balance, Pt. Performed sit to stand with min A x 2 after taking a couple steps with HHA treatment was modified and RW was used to increase steadiness and improve gait. Pt. Ambulated 20ft with noted LE buckling and complaints of dizziness. Pt. Returned to recliner BP taken in seated: 117/47. Pt performed sit to stand, BP taken in standing 112/42. Pt. Returned to seated. Current Level of Function Impacting Discharge (mobility/balance): min A x 2 to CGA with RW for mobility, balance fair with constant support    Other factors to consider for discharge: vision, decreased activity tolerance, LE weakness         PLAN :  Patient continues to benefit from skilled intervention to address the above impairments. Continue treatment per established plan of care. to address goals.     Recommendation for discharge: (in order for the patient to meet his/her long term goals)  Therapy 3 hours per day 5-7 days per week if pt is agreeable,  Other wise HHPT with assistance with safety with mobility    This discharge recommendation:  Has not yet been discussed the attending provider and/or case management    IF patient discharges home will need the following DME: patient owns DME required for discharge SUBJECTIVE:   Patient stated if you left the room I would not be able to recognize you again.  (re: his vision)    OBJECTIVE DATA SUMMARY:   Critical Behavior:  Neurologic State: Alert, Drowsy  Orientation Level: Oriented X4  Cognition: Follows commands  Safety/Judgement: Decreased insight into deficits  Functional Mobility Training:  Bed Mobility:     Supine to Sit: Stand-by assistance              Transfers:  Sit to Stand: Minimum assistance; Assist x2  Stand to Sit: Contact guard assistance        Bed to Chair: Minimum assistance                    Balance:  Sitting: Impaired; Without support  Sitting - Static: Good (unsupported)  Standing: Impaired; With support  Standing - Static: Good  Standing - Dynamic : Fair  Ambulation/Gait Training:  Distance (ft): 20 Feet (ft)  Assistive Device: Gait belt; Walker, rolling  Ambulation - Level of Assistance: Contact guard assistance; Minimal assistance                 Base of Support: Narrowed     Speed/Myla: Slow  Step Length: Left shortened; Right shortened                    Stairs: Therapeutic Exercises:     Pain Rating:  Chest pain / upper back pain no rating given    Activity Tolerance:   Poor    After treatment patient left in no apparent distress:   Sitting in chair, Call bell within reach, and Bed / chair alarm activated    COMMUNICATION/COLLABORATION:   The patients plan of care was discussed with: Registered nurse.      MOE Green

## 2021-11-23 NOTE — PROGRESS NOTES
Transitions of Care Plan  · RUR: 14% - moderate  · Admission Dx:  CHF  · Consults: Cardiology; Nephrology; Therapy  · Baseline: ambulates with cane; resides with girlfriend and her sons  · Barrier(s) to Discharge: medical; OP HD set up  · Disposition: home w home health and OP HD set up  · Estimated Discharge Date: 11/25/21  · SMAART-E Discharge Patient - does not apply if going to acute rehab    CM participated in [address] [place] [address] with attending MD and therapy - goal is for patient to discharge to acute rehab facility that can provide HD. CM and MD to follow up with patient after IDR. CM spoke with patient - deferred acute rehab and provided CM permission to speak with family. CM spoke with patient's daughter, Erlinda Still, who is agreeable for patient to go to inpatient rehab. Queenie Puga has good insight into the concerns should patient go home with home health at this standpoint. She advised CM that patient would not have the adequate support at home that he believes he will have because patient's girlfriend and 2 of 3 children work and the third child will not assist patient as needed. Queenie Puga provided consent for CM to send referrals to any acute rehabs for review and start authorization process this afternoon. CM also explained that patient will still need insurance authorization if accepted by inpatient rehab facility which could take a day or more. IPR - referrals sent to [address], [facility]. HD - patient accepted to [facility] on [facility] 2nd shift with start date of 11/29/21. This will be pushed back should patient discharge to IPR. Disposition:  IPR - referrals sent and authorization needed  [facility] - backup plan if patient/rehab/insurance decline patient    CM will continue to follow.     Corinne Thompson, MPH  Care Manager [facility]  Available via [address] [place] [address] or

## 2021-11-23 NOTE — PROGRESS NOTES
Problem: Self Care Deficits Care Plan (Adult)  Goal: *Acute Goals and Plan of Care (Insert Text)  Description: FUNCTIONAL STATUS PRIOR TO ADMISSION: Patient was independent without use of DME. However, patient's sons assist with lawn care, cooking, cleaning, grocery shopping, and transportation. Poor vision at baseline per patient report. Pt wears reading glasses. He used to work on cars. Pt is independent in ADLs. HOME SUPPORT PRIOR TO ADMISSION: The patient lived with his 3 sons and girlfriend. Occupational Therapy Goals  Initiated 11/23/2021  1. Patient will perform standing ADLs in bathroom with least restrictive DME with modified independence within 7 day(s). 2.  Patient will perform upper body dressing and lower body dressing with modified independence within 7 day(s). 3.  Patient will perform bathing with modified independence within 7 day(s). 4.  Patient will perform toilet transfers with modified independence within 7 day(s). 5.  Patient will perform all aspects of toileting with modified independence within 7 day(s). 6.  Patient will utilize energy conservation techniques during functional activities with verbal cues within 7 day(s). Outcome: Progressing Towards Goal     OCCUPATIONAL THERAPY EVALUATION  Patient: Radha Wu (15 y.o. male)  Date: 11/23/2021  Primary Diagnosis: CHF exacerbation (HCC) [I50.9]  Procedure(s) (LRB):  ESOPHAGOGASTRODUODENOSCOPY (EGD) (N/A) 7 Days Post-Op   Precautions:   Fall, Bed Alarm    ASSESSMENT  Based on the objective data described below, the patient presents with c/o muscular pain of chest and pain from chest compressions, impaired higher level balance, decreased activity tolerance, dizziness and decline in functional status for ADL. Pt with code blue 11/18 resulting in CPR x 12 minutes. This date, pt required MIN A x 2 to stand and progressing to [address] from chair.  He currently requires RW for stability and balance, as well as assist in standing for dynamic ADLs. BP fluctuating with low DBP (see below). At this time, pt would best benefit from IPR to address above deficits. Patient Vitals for the past 4 hrs:   Pulse Resp BP SpO2   11/23/21 1129 (!) 57  (!) 112/42 97 %   11/23/21 1121 (!) 57  (!) 117/47 97 %   11/23/21 1100 (!) 57 15 (!) 136/46 95 %        Current Level of Function Impacting Discharge (ADLs/self-care): CGA to min A to stand, CGA to min A ADLs, balance deficits, fall risk    Functional Outcome Measure: The patient scored Total: 55/100 on the Barthel Index outcome measure which is indicative of being partialyl dpeendent in basic self-care. Other factors to consider for discharge: from home     Patient will benefit from skilled therapy intervention to address the above noted impairments. PLAN :  Recommendations and Planned Interventions: self care training, functional mobility training, therapeutic exercise, balance training, therapeutic activities, endurance activities, patient education and home safety training    Frequency/Duration: Patient will be followed by occupational therapy 5 times a week to address goals. Recommendation for discharge: (in order for the patient to meet his/her long term goals)  Therapy 3 hours per day 5-7 days per week    This discharge recommendation:  Has not yet been discussed the attending provider and/or case management    IF patient discharges home will need the following DME: None       SUBJECTIVE:   Patient stated I am sore here and over here.     OBJECTIVE DATA SUMMARY:   HISTORY:   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, with long-term current use of insulin ([address] 11/14/2018    Diabetes ([address]      Past Surgical History:   Procedure Laterality Date    HX ARTERIAL BYPASS      HX OTHER SURGICAL      5th toe Metatarsal amputation 12/2017     IR INSERT NON TUNL CVC OVER 5 YRS  11/19/2021       Expanded or extensive additional review of patient history:     Home Situation  Living Alone: No  Support Systems: Spouse/Significant Other  Patient Expects to be Discharged to[de-identified] Rehabilitation facility  Current DME Used/Available at Home: None    Hand dominance: Right    EXAMINATION OF PERFORMANCE DEFICITS:  Cognitive/Behavioral Status:        Cognition: Follows commands  Perception: Appears intact  Perseveration: No perseveration noted  Safety/Judgement: Decreased insight into deficits    Skin: intact    Edema: none noted    Hearing:       Vision/Perceptual:                           Acuity: Impaired near vision    Corrective Lenses: Reading glasses    Range of Motion:    AROM: Within functional limits                         Strength:  Strength: Generally decreased, functional                    Coordination:  Coordination: Generally decreased, functional  Fine Motor Skills-Upper: Left Intact; Right Intact    Gross Motor Skills-Upper: Left Intact; Right Intact    Tone & Sensation:       Sensation: Impaired (c/o B feet tingling)                      Balance:  Sitting: Impaired; Without support  Sitting - Static: Good (unsupported)  Standing: Impaired; With support  Standing - Static: Good  Standing - Dynamic : Fair    Functional Mobility and Transfers for ADLs:  Bed Mobility:  Supine to Sit: Stand-by assistance    Transfers:  Sit to Stand: Minimum assistance; Assist x2; Additional time (from EOB, progressing to CGA from chair)  Stand to Sit: Contact guard assistance; Adaptive equipment; Additional time (cues to reach back)  Bed to Chair: Minimum assistance; Assist x1; Additional time;  Adaptive equipment (fatigue, c/o dizziness)  Toilet Transfer : Minimum assistance (inferred)    ADL Assessment:  Feeding: Independent    Oral Facial Hygiene/Grooming: Contact guard assistance    Bathing: Minimum assistance    Upper Body Dressing: Setup    Lower Body Dressing: Minimum assistance (setup don sock in bed, A in standing for safety/balance)    Toileting: Minimum assistance (inferred, from lower surfaces)                ADL Intervention and task modifications:                                     Cognitive Retraining  Safety/Judgement: Decreased insight into deficits    Functional Measure:    Barthel Index:  Bathin  Bladder: 10  Bowels: 10  Groomin  Dressin  Feeding: 10  Mobility: 0  Stairs: 0  Toilet Use: 5  Transfer (Bed to Chair and Back): 10  Total: 55/100      The Barthel ADL Index: Guidelines  1. The index should be used as a record of what a patient does, not as a record of what a patient could do. 2. The main aim is to establish degree of independence from any help, physical or verbal, however minor and for whatever reason. 3. The need for supervision renders the patient not independent. 4. A patient's performance should be established using the best available evidence. Asking the patient, friends/relatives and nurses are the usual sources, but direct observation and common sense are also important. However direct testing is not needed. 5. Usually the patient's performance over the preceding 24-48 hours is important, but occasionally longer periods will be relevant. 6. Middle categories imply that the patient supplies over 50 per cent of the effort. 7. Use of aids to be independent is allowed. Score Interpretation (from [facility])    Independent   60-79 Minimally independent   40-59 Partially dependent   20-39 Very dependent   <20 Totally dependent     -Kalyan Lyn., Barthel, D.W. (1965). Functional evaluation: the Barthel Index. [facility] ([address] (1997). The Barthel activities of daily living index: self-reporting versus actual performance in the old (> or = 75 years). Journal of [address] [place] [address] 45(7), 14 [facility], JANDREF, Myrla Holter., Meño Haley. (1999).  Measuring the change in disability after inpatient rehabilitation; comparison of the responsiveness of the Barthel Index and Functional [place] Measure. Journal of Neurology, Neurosurgery, and Psychiatry, 66(4), 444-486. PERFECTO Bailey, MATT Brown, & Vicente Nettles M.A. (2004) Assessment of post-stroke quality of life in cost-effectiveness studies: The usefulness of the Barthel Index and the EuroQoL-5D. Quality of Life Research, 15, 431-18     Occupational Therapy Evaluation Charge Determination   History Examination Decision-Making   MEDIUM Complexity : Expanded review of history including physical, cognitive and psychosocial  history  MEDIUM Complexity : 3-5 performance deficits relating to physical, cognitive , or psychosocial skils that result in activity limitations and / or participation restrictions MEDIUM Complexity : Patient may present with comorbidities that affect occupational performnce. Miniml to moderate modification of tasks or assistance (eg, physical or verbal ) with assesment(s) is necessary to enable patient to complete evaluation       Based on the above components, the patient evaluation is determined to be of the following complexity level: MEDIUM  Pain Rating:  10/10     Activity Tolerance:   Fair    After treatment patient left in no apparent distress:    Sitting in chair, Call bell within reach and Bed / chair alarm activated    COMMUNICATION/EDUCATION:   The patients plan of care was discussed with: Physical therapy assistant and Registered nurse. Patient/family have participated as able in goal setting and plan of care. This patients plan of care is appropriate for delegation to [facility] [place].     Thank you for this referral.  Apoorva Rebolledo OT  Time Calculation: 25 mins

## 2021-11-24 ENCOUNTER — HOSPITAL ENCOUNTER (OUTPATIENT)
Dept: INTERVENTIONAL RADIOLOGY/VASCULAR | Age: 61
Discharge: HOME | DRG: 194 | End: 2021-11-24
Attending: INTERNAL MEDICINE
Payer: MEDICAID

## 2021-11-24 LAB
ALBUMIN SERPL-MCNC: 2.6 G/DL (ref 3.5–5)
ANION GAP SERPL CALC-SCNC: 9 MMOL/L (ref 5–15)
ANION GAP SERPL CALC-SCNC: 9 MMOL/L (ref 5–15)
BUN SERPL-MCNC: 31 MG/DL (ref 6–20)
BUN SERPL-MCNC: 32 MG/DL (ref 6–20)
BUN/CREAT SERPL: 10 (ref 12–20)
BUN/CREAT SERPL: 10 (ref 12–20)
CALCIUM SERPL-MCNC: 7.9 MG/DL (ref 8.5–10.1)
CALCIUM SERPL-MCNC: 8 MG/DL (ref 8.5–10.1)
CHLORIDE SERPL-SCNC: 100 MMOL/L (ref 97–108)
CHLORIDE SERPL-SCNC: 99 MMOL/L (ref 97–108)
CO2 SERPL-SCNC: 23 MMOL/L (ref 21–32)
CO2 SERPL-SCNC: 23 MMOL/L (ref 21–32)
COMMENT, HOLDF: NORMAL
CREAT SERPL-MCNC: 3.09 MG/DL (ref 0.7–1.3)
CREAT SERPL-MCNC: 3.11 MG/DL (ref 0.7–1.3)
GLUCOSE BLD STRIP.AUTO-MCNC: 121 MG/DL (ref 65–117)
GLUCOSE BLD STRIP.AUTO-MCNC: 135 MG/DL (ref 65–117)
GLUCOSE BLD STRIP.AUTO-MCNC: 148 MG/DL (ref 65–117)
GLUCOSE BLD STRIP.AUTO-MCNC: 181 MG/DL (ref 65–117)
GLUCOSE SERPL-MCNC: 170 MG/DL (ref 65–100)
GLUCOSE SERPL-MCNC: 173 MG/DL (ref 65–100)
PHOSPHATE SERPL-MCNC: 2.9 MG/DL (ref 2.6–4.7)
POTASSIUM SERPL-SCNC: 3.7 MMOL/L (ref 3.5–5.1)
POTASSIUM SERPL-SCNC: 3.8 MMOL/L (ref 3.5–5.1)
SAMPLES BEING HELD,HOLD: NORMAL
SERVICE CMNT-IMP: ABNORMAL
SODIUM SERPL-SCNC: 131 MMOL/L (ref 136–145)
SODIUM SERPL-SCNC: 132 MMOL/L (ref 136–145)

## 2021-11-24 PROCEDURE — 90935 HEMODIALYSIS ONE EVALUATION: CPT

## 2021-11-24 PROCEDURE — 65660000000 HC RM CCU STEPDOWN

## 2021-11-24 PROCEDURE — 80069 RENAL FUNCTION PANEL: CPT

## 2021-11-24 PROCEDURE — C1750 CATH, HEMODIALYSIS,LONG-TERM: HCPCS

## 2021-11-24 PROCEDURE — 74011250637 HC RX REV CODE- 250/637: Performed by: NURSE PRACTITIONER

## 2021-11-24 PROCEDURE — 80048 BASIC METABOLIC PNL TOTAL CA: CPT

## 2021-11-24 PROCEDURE — 74011250637 HC RX REV CODE- 250/637: Performed by: HOSPITALIST

## 2021-11-24 PROCEDURE — 74011250637 HC RX REV CODE- 250/637: Performed by: INTERNAL MEDICINE

## 2021-11-24 PROCEDURE — 74011000250 HC RX REV CODE- 250: Performed by: HOSPITALIST

## 2021-11-24 PROCEDURE — 77030002996 HC SUT SLK J&J -A

## 2021-11-24 PROCEDURE — 74011250636 HC RX REV CODE- 250/636: Performed by: STUDENT IN AN ORGANIZED HEALTH CARE EDUCATION/TRAINING PROGRAM

## 2021-11-24 PROCEDURE — 74011000250 HC RX REV CODE- 250: Performed by: INTERNAL MEDICINE

## 2021-11-24 PROCEDURE — 97116 GAIT TRAINING THERAPY: CPT

## 2021-11-24 PROCEDURE — 97530 THERAPEUTIC ACTIVITIES: CPT

## 2021-11-24 PROCEDURE — 36415 COLL VENOUS BLD VENIPUNCTURE: CPT

## 2021-11-24 PROCEDURE — 02HV33Z INSERTION OF INFUSION DEVICE INTO SUPERIOR VENA CAVA, PERCUTANEOUS APPROACH: ICD-10-PCS | Performed by: STUDENT IN AN ORGANIZED HEALTH CARE EDUCATION/TRAINING PROGRAM

## 2021-11-24 PROCEDURE — 74011250636 HC RX REV CODE- 250/636: Performed by: INTERNAL MEDICINE

## 2021-11-24 PROCEDURE — C9113 INJ PANTOPRAZOLE SODIUM, VIA: HCPCS | Performed by: NURSE PRACTITIONER

## 2021-11-24 PROCEDURE — 77030010507 HC ADH SKN DERMBND J&J -B

## 2021-11-24 PROCEDURE — 74011000250 HC RX REV CODE- 250: Performed by: STUDENT IN AN ORGANIZED HEALTH CARE EDUCATION/TRAINING PROGRAM

## 2021-11-24 PROCEDURE — 74011636637 HC RX REV CODE- 636/637: Performed by: NURSE PRACTITIONER

## 2021-11-24 PROCEDURE — 65660000001 HC RM ICU INTERMED STEPDOWN

## 2021-11-24 PROCEDURE — 74011250636 HC RX REV CODE- 250/636: Performed by: NURSE PRACTITIONER

## 2021-11-24 PROCEDURE — 0JH63XZ INSERTION OF TUNNELED VASCULAR ACCESS DEVICE INTO CHEST SUBCUTANEOUS TISSUE AND FASCIA, PERCUTANEOUS APPROACH: ICD-10-PCS | Performed by: STUDENT IN AN ORGANIZED HEALTH CARE EDUCATION/TRAINING PROGRAM

## 2021-11-24 PROCEDURE — 74011000250 HC RX REV CODE- 250: Performed by: NURSE PRACTITIONER

## 2021-11-24 PROCEDURE — 82962 GLUCOSE BLOOD TEST: CPT

## 2021-11-24 PROCEDURE — 2709999900 HC NON-CHARGEABLE SUPPLY

## 2021-11-24 PROCEDURE — 36558 INSERT TUNNELED CV CATH: CPT

## 2021-11-24 RX ORDER — HEPARIN SODIUM 1000 [USP'U]/ML
1900 INJECTION, SOLUTION INTRAVENOUS; SUBCUTANEOUS
Status: DISCONTINUED | OUTPATIENT
Start: 2021-11-24 | End: 2021-12-01 | Stop reason: HOSPADM

## 2021-11-24 RX ORDER — HEPARIN SODIUM 1000 [USP'U]/ML
10000 INJECTION, SOLUTION INTRAVENOUS; SUBCUTANEOUS
Status: COMPLETED | OUTPATIENT
Start: 2021-11-24 | End: 2021-11-24

## 2021-11-24 RX ORDER — NALOXONE HYDROCHLORIDE 0.4 MG/ML
0.4 INJECTION, SOLUTION INTRAMUSCULAR; INTRAVENOUS; SUBCUTANEOUS AS NEEDED
Status: DISCONTINUED | OUTPATIENT
Start: 2021-11-24 | End: 2021-11-24 | Stop reason: HOSPADM

## 2021-11-24 RX ORDER — MIDAZOLAM HYDROCHLORIDE 1 MG/ML
5 INJECTION, SOLUTION INTRAMUSCULAR; INTRAVENOUS
Status: DISCONTINUED | OUTPATIENT
Start: 2021-11-24 | End: 2021-11-24 | Stop reason: HOSPADM

## 2021-11-24 RX ORDER — NALOXONE HYDROCHLORIDE 0.4 MG/ML
0.4 INJECTION, SOLUTION INTRAMUSCULAR; INTRAVENOUS; SUBCUTANEOUS AS NEEDED
Status: CANCELLED | OUTPATIENT
Start: 2021-11-24

## 2021-11-24 RX ORDER — FENTANYL CITRATE 50 UG/ML
200 INJECTION, SOLUTION INTRAMUSCULAR; INTRAVENOUS
Status: CANCELLED | OUTPATIENT
Start: 2021-11-24

## 2021-11-24 RX ORDER — NIFEDIPINE 30 MG/1
30 TABLET, EXTENDED RELEASE ORAL DAILY
Status: DISCONTINUED | OUTPATIENT
Start: 2021-11-25 | End: 2021-11-26

## 2021-11-24 RX ORDER — LIDOCAINE HYDROCHLORIDE 20 MG/ML
20 INJECTION, SOLUTION INFILTRATION; PERINEURAL
Status: COMPLETED | OUTPATIENT
Start: 2021-11-24 | End: 2021-11-24

## 2021-11-24 RX ORDER — MIDAZOLAM HYDROCHLORIDE 1 MG/ML
5 INJECTION, SOLUTION INTRAMUSCULAR; INTRAVENOUS
Status: CANCELLED | OUTPATIENT
Start: 2021-11-24

## 2021-11-24 RX ORDER — SODIUM CHLORIDE 9 MG/ML
50 INJECTION, SOLUTION INTRAVENOUS CONTINUOUS
Status: DISCONTINUED | OUTPATIENT
Start: 2021-11-24 | End: 2021-11-24 | Stop reason: HOSPADM

## 2021-11-24 RX ORDER — FLUMAZENIL 0.1 MG/ML
0.5 INJECTION INTRAVENOUS ONCE
Status: DISCONTINUED | OUTPATIENT
Start: 2021-11-24 | End: 2021-11-24 | Stop reason: HOSPADM

## 2021-11-24 RX ORDER — FLUMAZENIL 0.1 MG/ML
0.5 INJECTION INTRAVENOUS ONCE
Status: CANCELLED | OUTPATIENT
Start: 2021-11-24 | End: 2021-11-24

## 2021-11-24 RX ORDER — SODIUM CHLORIDE 9 MG/ML
50 INJECTION, SOLUTION INTRAVENOUS CONTINUOUS
Status: CANCELLED | OUTPATIENT
Start: 2021-11-24

## 2021-11-24 RX ORDER — ISOSORBIDE MONONITRATE 60 MG/1
120 TABLET, EXTENDED RELEASE ORAL DAILY
Status: DISCONTINUED | OUTPATIENT
Start: 2021-11-24 | End: 2021-12-01 | Stop reason: HOSPADM

## 2021-11-24 RX ORDER — CEFAZOLIN SODIUM 1 G/3ML
INJECTION, POWDER, FOR SOLUTION INTRAMUSCULAR; INTRAVENOUS
Status: DISPENSED
Start: 2021-11-24 | End: 2021-11-24

## 2021-11-24 RX ORDER — FENTANYL CITRATE 50 UG/ML
200 INJECTION, SOLUTION INTRAMUSCULAR; INTRAVENOUS
Status: DISCONTINUED | OUTPATIENT
Start: 2021-11-24 | End: 2021-11-24 | Stop reason: HOSPADM

## 2021-11-24 RX ORDER — HEPARIN SODIUM 1000 [USP'U]/ML
INJECTION, SOLUTION INTRAVENOUS; SUBCUTANEOUS
Status: DISPENSED
Start: 2021-11-24 | End: 2021-11-24

## 2021-11-24 RX ADMIN — CLONIDINE HYDROCHLORIDE 0.2 MG: 0.2 TABLET ORAL at 18:15

## 2021-11-24 RX ADMIN — INSULIN LISPRO 2 UNITS: 100 INJECTION, SOLUTION INTRAVENOUS; SUBCUTANEOUS at 11:52

## 2021-11-24 RX ADMIN — SUCRALFATE 1 G: 1 TABLET ORAL at 10:43

## 2021-11-24 RX ADMIN — Medication 10 ML: at 21:57

## 2021-11-24 RX ADMIN — FENTANYL CITRATE 25 MCG: 0.05 INJECTION, SOLUTION INTRAMUSCULAR; INTRAVENOUS at 09:41

## 2021-11-24 RX ADMIN — HEPARIN SODIUM 1900 UNITS: 1000 INJECTION INTRAVENOUS; SUBCUTANEOUS at 17:21

## 2021-11-24 RX ADMIN — ROSUVASTATIN 10 MG: 10 TABLET, FILM COATED ORAL at 21:56

## 2021-11-24 RX ADMIN — Medication 10 ML: at 07:23

## 2021-11-24 RX ADMIN — PRAZOSIN HYDROCHLORIDE 2 MG: 1 CAPSULE ORAL at 10:46

## 2021-11-24 RX ADMIN — BUMETANIDE 2 MG: 0.25 INJECTION INTRAMUSCULAR; INTRAVENOUS at 10:49

## 2021-11-24 RX ADMIN — LOSARTAN POTASSIUM 100 MG: 50 TABLET, FILM COATED ORAL at 21:56

## 2021-11-24 RX ADMIN — PRAZOSIN HYDROCHLORIDE 2 MG: 1 CAPSULE ORAL at 21:56

## 2021-11-24 RX ADMIN — Medication 10 ML: at 14:00

## 2021-11-24 RX ADMIN — HEPARIN SODIUM 3200 UNITS: 1000 INJECTION INTRAVENOUS; SUBCUTANEOUS at 09:52

## 2021-11-24 RX ADMIN — METOPROLOL TARTRATE 25 MG: 25 TABLET, FILM COATED ORAL at 21:56

## 2021-11-24 RX ADMIN — CEFAZOLIN SODIUM 2 G: 1 INJECTION, POWDER, FOR SOLUTION INTRAMUSCULAR; INTRAVENOUS at 08:37

## 2021-11-24 RX ADMIN — SUCRALFATE 1 G: 1 TABLET ORAL at 18:15

## 2021-11-24 RX ADMIN — HYDRALAZINE HYDROCHLORIDE 100 MG: 50 TABLET, FILM COATED ORAL at 18:15

## 2021-11-24 RX ADMIN — HYDRALAZINE HYDROCHLORIDE 100 MG: 50 TABLET, FILM COATED ORAL at 23:39

## 2021-11-24 RX ADMIN — BUSPIRONE HYDROCHLORIDE 10 MG: 10 TABLET ORAL at 10:42

## 2021-11-24 RX ADMIN — BUMETANIDE 2 MG: 0.25 INJECTION INTRAMUSCULAR; INTRAVENOUS at 21:56

## 2021-11-24 RX ADMIN — POLYETHYLENE GLYCOL 3350 17 G: 17 POWDER, FOR SOLUTION ORAL at 18:16

## 2021-11-24 RX ADMIN — ASPIRIN 81 MG CHEWABLE TABLET 81 MG: 81 TABLET CHEWABLE at 10:42

## 2021-11-24 RX ADMIN — CLONIDINE HYDROCHLORIDE 0.2 MG: 0.2 TABLET ORAL at 23:39

## 2021-11-24 RX ADMIN — SUCRALFATE 1 G: 1 TABLET ORAL at 21:56

## 2021-11-24 RX ADMIN — HYDRALAZINE HYDROCHLORIDE 100 MG: 50 TABLET, FILM COATED ORAL at 10:45

## 2021-11-24 RX ADMIN — SERTRALINE 200 MG: 50 TABLET, FILM COATED ORAL at 10:43

## 2021-11-24 RX ADMIN — SODIUM CHLORIDE 40 MG: 9 INJECTION INTRAMUSCULAR; INTRAVENOUS; SUBCUTANEOUS at 10:52

## 2021-11-24 RX ADMIN — MIDAZOLAM HYDROCHLORIDE 1 MG: 1 INJECTION, SOLUTION INTRAMUSCULAR; INTRAVENOUS at 09:41

## 2021-11-24 RX ADMIN — HEPARIN SODIUM 1900 UNITS: 1000 INJECTION INTRAVENOUS; SUBCUTANEOUS at 17:19

## 2021-11-24 RX ADMIN — LIDOCAINE HYDROCHLORIDE 10 ML: 20 INJECTION, SOLUTION INFILTRATION; PERINEURAL at 09:52

## 2021-11-24 RX ADMIN — FENTANYL CITRATE 25 MCG: 0.05 INJECTION, SOLUTION INTRAMUSCULAR; INTRAVENOUS at 09:44

## 2021-11-24 RX ADMIN — INSULIN LISPRO 2 UNITS: 100 INJECTION, SOLUTION INTRAVENOUS; SUBCUTANEOUS at 07:28

## 2021-11-24 RX ADMIN — BUSPIRONE HYDROCHLORIDE 10 MG: 10 TABLET ORAL at 18:15

## 2021-11-24 RX ADMIN — ISOSORBIDE MONONITRATE 120 MG: 60 TABLET, EXTENDED RELEASE ORAL at 10:43

## 2021-11-24 RX ADMIN — HYDRALAZINE HYDROCHLORIDE 20 MG: 20 INJECTION INTRAMUSCULAR; INTRAVENOUS at 03:38

## 2021-11-24 RX ADMIN — SUCRALFATE 1 G: 1 TABLET ORAL at 07:23

## 2021-11-24 RX ADMIN — NIFEDIPINE 60 MG: 60 TABLET, EXTENDED RELEASE ORAL at 07:23

## 2021-11-24 RX ADMIN — SODIUM CHLORIDE 50 ML/HR: 9 INJECTION, SOLUTION INTRAVENOUS at 08:36

## 2021-11-24 RX ADMIN — EPOETIN ALFA-EPBX 20000 UNITS: 20000 INJECTION, SOLUTION INTRAVENOUS; SUBCUTANEOUS at 22:04

## 2021-11-24 RX ADMIN — CLONIDINE HYDROCHLORIDE 0.2 MG: 0.2 TABLET ORAL at 07:23

## 2021-11-24 NOTE — PROGRESS NOTES
INTERVENTIONAL RADIOLOGY  Preoperative History and Physical      Patient:  Radha Wu  :  1960  Age:  64 y.o. MRN:  [id number]  Today's Date:  2021      CC / HPI   Radha Wu is a 64 y.o. male with a history of renal failure who presents for permcath placement. PAST MEDICAL HISTORY  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, with long-term current use of insulin ([address] 2018    Diabetes ([facility] [address]      PAST SURGICAL HISTORY  Past Surgical History:   Procedure Laterality Date    HX ARTERIAL BYPASS      HX OTHER SURGICAL      5th toe Metatarsal amputation 2017     IR INSERT NON TUNL CVC OVER 5 YRS  2021     SOCIAL HISTORY  Social History     Socioeconomic History    Marital status:      Spouse name: Not on file    Number of children: Not on file    Years of education: Not on file    Highest education level: Not on file   Occupational History    Not on file   Tobacco Use    Smoking status: Former Smoker     Quit date: 2001     Years since quittin.5    Smokeless tobacco: Never Used   Substance and Sexual Activity    Alcohol use: No    Drug use: No    Sexual activity: Yes     Partners: Female     Birth control/protection: Condom   Other Topics Concern    Not on file   Social History Narrative    Not on file     Social Determinants of Health     Financial Resource Strain:     Difficulty of Paying Living Expenses: Not on file   Food Insecurity:     Worried About Running Out of Food in the Last Year: Not on file    Hardik of Food in the Last Year: Not on file   Transportation Needs:     Lack of Transportation (Medical): Not on file    Lack of Transportation (Non-Medical):  Not on file   Physical Activity:     Days of Exercise per Week: Not on file    Minutes of Exercise per Session: Not on file   Stress:     Feeling of Stress : Not on file   Social Connections:     Frequency of Communication with Friends and Family: Not on file    Frequency of Social Gatherings with Friends and Family: Not on file    Attends Congregational Services: Not on file    Active Member of Clubs or Organizations: Not on file    Attends Club or Organization Meetings: Not on file    Marital Status: Not on file   Intimate Partner Violence:     Fear of Current or Ex-Partner: Not on file    Emotionally Abused: Not on file    Physically Abused: Not on file    Sexually Abused: Not on file   Housing Stability:     Unable to Pay for Housing in the Last Year: Not on file    Number of [place] in the Last Year: Not on file    Unstable Housing in the Last Year: Not on file     FAMILY HISTORY  Family History   Problem Relation Age of Onset    Diabetes Mother     No Known Problems Father      CURRENT MEDICATIONS  Current Facility-Administered Medications   Medication Dose Route Frequency Provider Last Rate Last Admin    lidocaine (XYLOCAINE) 20 mg/mL (2 %) injection 400 mg  20 mL SubCUTAneous RAD ONCE Doyle Paul MD        heparin (porcine) 1,000 unit/mL injection 10,000 Units  10,000 Units Hemodialysis RAD ONCE Doyle Paul MD        heparin (porcine) 1,000 unit/mL injection              Facility-Administered Medications Ordered in Other Encounters   Medication Dose Route Frequency Provider Last Rate Last Admin    isosorbide mononitrate ER (IMDUR) tablet 120 mg  120 mg Oral DAILY Daniella Walsh MD        0.9% sodium chloride infusion  50 mL/hr IntraVENous CONTINUOUS Doyle Paul MD        ceFAZolin (ANCEF) 2 g in sterile water (preservative free) 20 mL IV syringe  2 g IntraVENous ONCE Liban Pérez MD        fentaNYL citrate (PF) injection 200 mcg  200 mcg IntraVENous Rad Multiple Liban Pérez MD        flumazeniL (ROMAZICON) 0.1 mg/mL injection 0.5 mg  0.5 mg IntraVENous ONCE Liban Pérez MD        midazolam (VERSED) injection 5 mg  5 mg IntraVENous Rad Multiple Beto Leslie Ross MD        naloxone [facility]) injection 0.4 mg  0.4 mg IntraVENous PRN Heath Brooks MD        ceFAZolin (ANCEF) 1 gram injection             NIFEdipine ER (PROCARDIA XL) tablet 60 mg  60 mg Oral DAILY Turner Joel MD   60 mg at 11/24/21 0723    lidocaine 4 % patch 1 Patch  1 Patch TransDERmal Q24H Violeta Bang MD   1 Patch at 11/23/21 0905    prazosin (MINIPRESS) capsule 2 mg  2 mg Oral BID Gia Matthews MD   2 mg at 11/23/21 2153    labetaloL (NORMODYNE;TRANDATE) injection 15 mg  15 mg IntraVENous Q4H PRN Violeta Bang MD        hydrALAZINE (APRESOLINE) tablet 100 mg  100 mg Oral TID Leo Bianchi MD   100 mg at 11/23/21 2153    niCARdipine (CARDENE) 25 mg in 0.9% sodium chloride 250 mL infusion  0-15 mg/hr IntraVENous TITRATE Violeta Bang MD   Paused at 11/23/21 1234    cloNIDine HCL (CATAPRES) tablet 0.2 mg  0.2 mg Oral TID Violeta Bang MD   0.2 mg at 11/24/21 0723    sucralfate (CARAFATE) tablet 1 g  1 g Oral AC&HS Violeta Bang MD   1 g at 11/24/21 0723    losartan (COZAAR) tablet 100 mg  100 mg Oral QHS Turner Joel MD   100 mg at 11/23/21 2153    [Held by provider] gabapentin (NEURONTIN) capsule 100 mg  100 mg Oral TID [place] Dose, NP-C        insulin lispro (HUMALOG) injection   SubCUTAneous AC&HS [place] Dose, NP-C   2 Units at 11/24/21 4102    hydrALAZINE (APRESOLINE) 20 mg/mL injection 10-20 mg  10-20 mg IntraVENous Q6H PRN Clearance Brunette R, NP-C   20 mg at 11/24/21 0338    heparin (porcine) 1,000 unit/mL injection 1,100 Units  1,100 Units Hemodialysis DIALYSIS PRN Porfirio Hdz MD   1,100 Units at 11/22/21 1120    heparin (porcine) 1,000 unit/mL injection 1,400 Units  1,400 Units Hemodialysis DIALYSIS PRN Porfirio Hdz MD   1,400 Units at 11/22/21 1121    pantoprazole (PROTONIX) 40 mg in 0.9% sodium chloride 10 mL injection  40 mg IntraVENous DAILY Stan Mayo, NP-C 40 mg at 11/23/21 0850    metoprolol tartrate (LOPRESSOR) tablet 25 mg  25 mg Oral Q12H KEM Shepherd   25 mg at 11/23/21 4273    aspirin chewable tablet 81 mg  81 mg Oral DAILY Terrance Mcknight MD   81 mg at 11/23/21 0842    polyethylene glycol (MIRALAX) packet 17 g  17 g Oral BID Johannah Bence, NP   17 g at 11/23/21 1705    [Held by provider] eplerenone (INSPRA) tablet 25 mg  25 mg Oral DAILY George Covarrubias MD        bumetanide (BUMEX) injection 2 mg  2 mg IntraVENous Q12H George Covarrubias MD   2 mg at 11/23/21 2154    calcium carbonate (TUMS) chewable tablet 200 mg [elemental]  200 mg Oral TID WITH MEALS Bright Ferris MD   200 mg at 11/23/21 1151    sodium chloride (NS) flush 5-40 mL  5-40 mL IntraVENous Q8H Mercedes Wyatt MD   10 mL at 11/24/21 0723    sodium chloride (NS) flush 5-40 mL  5-40 mL IntraVENous PRN Mercedes Wyatt MD        epoetin ericka-epbx (RETACRIT) injection 20,000 Units  20,000 Units SubCUTAneous Q MON, WED & Jose Luis Villanueva MD   20,000 Units at 11/22/21 2156    butalbital-acetaminophen-caffeine (FIORICET, ESGIC) -40 mg per tablet 1 Tablet  1 Tablet Oral Q4H PRN Roman Landis MD   1 Tablet at 11/19/21 2201    ondansetron (ZOFRAN) injection 4 mg  4 mg IntraVENous Q4H PRN Christina Magana MD   4 mg at 11/23/21 3586    busPIRone (BUSPAR) tablet 10 mg  10 mg Oral BID Nupur Jacob MD   10 mg at 11/23/21 1703    rosuvastatin (CRESTOR) tablet 10 mg  10 mg Oral QHS Nupur Jacob MD   10 mg at 11/23/21 2152    sertraline (ZOLOFT) tablet 200 mg  200 mg Oral DAILY Christina Magana MD   200 mg at 11/23/21 0844    traMADoL (ULTRAM) tablet 50 mg  50 mg Oral Q6H PRN Christina Magana MD   50 mg at 11/23/21 2012    [Held by provider] traZODone (DESYREL) tablet 50 mg  50 mg Oral QHS Christina Magana MD   50 mg at 11/17/21 2134    acetaminophen (TYLENOL) tablet 650 mg  650 mg Oral Q6H PRN Christina Magana MD   650 mg at 11/23/21 0350    glucose chewable tablet 16 g  4 Tablet Oral PRN Christina Magana, MD        dextrose (D50W) injection syrg 12.5-25 g  25-50 mL IntraVENous PRN Jeyson Hahn MD   12.5 g at 21 0056    glucagon (GLUCAGEN) injection 1 mg  1 mg IntraMUSCular PRN Jeyson Hahn MD        [Held by provider] insulin glargine (LANTUS) injection 10 Units  10 Units SubCUTAneous DAILY Radha Moon MD   10 Units at 21 0920     ALLERGIES  No Known Allergies    DIAGNOSTIC STUDIES   IMAGING STUDIES  I personally reviewed the following imaging studies:    IR Results (most recent):  Results from [place] encounter on 21    IR INSERT NON TUNL CVC OVER 5 YRS    Narrative  PATIENT NAME: Jacinto Broderick  AGE, : 64 years, 1960  MRN: [id number]  DATE OF SERVICE: 2021 10:55 AM    SUPERVISING PHYSICIAN: Aneta Suresh MD  PERFORMING PROVIDER: Raeann Romano PA-C    PROCEDURE(S): ULTRASOUND-GUIDED TEMPORARY HEMODIALYSIS CATHETER PLACEMENT  WITHOUT FLUOROSCOPY    HISTORY: Acute renal insufficiency    SEDATION: None. TECHNIQUE:  Informed written consent was obtained from the patient's consenting party  following discussion of the risks, benefits, and alternatives to the procedure. This procedure was performed at the bedside without fluoroscopic guidance. A  timeout was performed. The right neck was prepped and draped using maximum sterile barrier technique  which includes: cap and mask, sterile gown, sterile gloves, and sterile body  drape. The ultrasound transducer was prepped using sterile ultrasound technique  which includes: sterile gel and probe cover. A sonographic evaluation was performed which demonstrated a patent and  compressible right internal jugular vein deemed suitable for central venous  access attempt. Permanent images were saved. The skin and underlying soft tissues were anesthetized with 3cc of 2% lidocaine. Using ultrasound guidance, a micropuncture needle was advanced into the target  vessel.  A two part transitional sheath was advanced over the microwire allowing  for the subsequent advancement of a J-wire into the central circulation. The  tract and venotomy were serially dilated. Subsequently, a 20 cm temporary  hemodialysis catheter was advanced over the wire into the central circulation. The lumens of the catheter were aspirated, flushed, and locked with heparin. The  catheter was then secured to the skin with silk suture. A Biopatch and sterile  dressing was applied. A postprocedure chest x-ray was obtained demonstrating the catheter in the right  atrium. COMPLICATIONS: None. Impression  Technically successful placement of an ultrasound guided temporary  dialysis catheter via the right internal jugular vein, as described above. LABS  Lab Results   Component Value Date/Time    WBC 4.4 11/22/2021 03:18 AM    HGB 7.8 (L) 11/22/2021 03:18 AM    HCT 24.5 (L) 11/22/2021 03:18 AM    PLATELET 805 (L) 75/89/2956 03:18 AM    MCV 92.5 11/22/2021 03:18 AM     Lab Results   Component Value Date/Time    Sodium 132 (L) 11/24/2021 03:30 AM    Potassium 3.8 11/24/2021 03:30 AM    Chloride 100 11/24/2021 03:30 AM    CO2 23 11/24/2021 03:30 AM    Anion gap 9 11/24/2021 03:30 AM    Glucose 173 (H) 11/24/2021 03:30 AM    BUN 31 (H) 11/24/2021 03:30 AM    Creatinine 3.11 (H) 11/24/2021 03:30 AM    BUN/Creatinine ratio 10 (L) 11/24/2021 03:30 AM    GFR est AA 25 (L) 11/24/2021 03:30 AM    GFR est non-AA 21 (L) 11/24/2021 03:30 AM    Calcium 7.9 (L) 11/24/2021 03:30 AM     Lab Results   Component Value Date/Time    INR 1.3 (H) 11/19/2021 02:45 AM    Prothrombin time 13.9 (H) 11/19/2021 02:45 AM     PHYSICAL EXAM   There were no vitals taken for this visit. General:  NAD  Heart:  RRR  Lungs:  NWOB  Neurological:  AAOX3    PLAN   Procedure to be performed:   Tunneled dialysis catheter placement  Plan for sedation:  moderate  Post procedure plan:  observation per protocol  Informed consent:  risks, benefits, and alternatives reviewed with the patient / family who agree to proceed  Code status:  Full Code      Cleo Tsai., NP  [facility] Radiology, [place].

## 2021-11-24 NOTE — DIALYSIS
Hemodialysis / [phone]    Vitals Pre Post Assessment Pre Post   BP BP: (!) 124/56 (11/24/21 1351) 182/59 LOC Alert and oriented *4 No change   HR Pulse (Heart Rate): (!) 57 (11/24/21 1351) 66   Lungs coarse No change   Resp Resp Rate: 14 (11/24/21 1351) 16 Cardiac Regular monitored in room No change   Temp Temp: 97.3 °F (36.3 °C) (11/24/21 1351) 98.3 Skin Warm dry and intact No change   Weight Pre-Dialysis Weight: 80.2 kg (176 lb 12.9 oz) (11/22/21 0828)  Edema In lower ext No change   Tele status   Pain Pain Intensity 1: 0 (11/24/21 1015)      Orders   Duration: Start: 9790 End: 1721 Total: 3.5 hours   Dialyzer: Dialyzer/Set Up Inspection: Revaclear (11/24/21 1351)   K Bath: Dialysate K (mEq/L): 2 (11/24/21 1351)   Ca Bath: Dialysate CA (mEq/L): 2.5 (11/24/21 1351)   Na: Dialysate NA (mEq/L): 138 (11/24/21 1351)   Bicarb: Dialysate HCO3 (mEq/L): 35 (11/24/21 1351)   Target Fluid Removal: Goal/Amount of Fluid to Remove (mL): 3000 mL (11/24/21 1351)     Access   Type & Location: RIJ CVC: Dressing CDI. No s/s of infection. Both lumens aspirate & flush well. Running well at .       Comments:                                        Labs   HBsAg (Antigen) / date:  Negative as of 11/19/21                                             HBsAb (Antibody) / date: suspectible as of 11/19/21   Source:    Obtained/Reviewed  Critical Results Called HGB   Date Value Ref Range Status   11/22/2021 7.8 (L) 12.1 - 17.0 g/dL Final     Potassium   Date Value Ref Range Status   11/24/2021 3.8 3.5 - 5.1 mmol/L Final     Calcium   Date Value Ref Range Status   11/24/2021 7.9 (L) 8.5 - 10.1 MG/DL Final     BUN   Date Value Ref Range Status   11/24/2021 31 (H) 6 - 20 MG/DL Final     Creatinine   Date Value Ref Range Status   11/24/2021 3.11 (H) 0.70 - 1.30 MG/DL Final        Meds Given   Name Dose Route                    Adequacy / Fluid    Total Liters Process: 80.2   Net Fluid Removed: 3000 ml      Comments   Time Out Done: (8538)    Admitting Diagnosis:    Consent obtained/signed: Informed Consent Verified: Yes (11/24/21 1351)   Machine / Kalpesh Mantel # Machine Number: [facility] (11/24/21 1351)   Primary Nurse Rpt Pre: Keyon Gordon   Primary Nurse Rpt Post: Keyon Gordon   Pt Education: yes   Care Plan: Continue hd   Pts outpatient clinic:      Tx Summary    SBAR received from Primary RN. Consent signed & on file. 1351: Each catheter limb disinfected per p&p, caps removed, hubs disinfected per p&p. Each lumen aspirated for blood return and flushed with Normal Saline per policy. Labs drawn per request/ order. VSS. Dialysis Tx initiated. 1721: Tx ended. VSS. Each dialysis catheter limb disinfected per p&p, all possible blood returned per p&p, and each dialysis hub disinfected per p&p. Each lumen flushed, post dialysis catheter Heparin dwell instilled per order, and caps applied. Bed locked and in the lowest position, call bell and belongings in reach. SBAR given to Primary, RN. Patient is stable at time of their/ my departure. All Dialysis related medications have been reviewed.        Comments:

## 2021-11-24 NOTE — TELEPHONE ENCOUNTER
----- Message from Cynthia Arriaga MD sent at 11/22/2021  8:27 AM EST -----  Regarding: He is In-Pt. Had LBX at [facility] within past 1 month. Please get that report and slides sent. Need report now. Give  report to NA when it comes in.

## 2021-11-24 NOTE — PROGRESS NOTES
[facility]   [address] [address], [address] [facility]  Phone: [phone]   [phone]       Nephrology Progress Note  Jacinto Longest     1960     [id number]  Date of Admission : 11/14/2021 11/24/21    CC: Follow up for severiano ON CKD 4       Assessment and Plan   SEVERIANO on CKD  - progressive diabetic Nephropathy   - Now w/ superimposed ATN from PEA/ resp arrest   - HD MWF  - PC placed 11/24  - CM to start working on outpt chair    CKD 4 at baseline:  -Biopsy-proven advanced diabetic nephropathy  -Baseline creatinine 3.5 mg/dL     HTN:  - much better  - cont present meds     Anemia in CKD   - continue RANDY      CAD s/p CABG May 2020  HFpEF  -Last echo: Preserved LVEF, Mod Pulm HTN-PASP 50 mmHg     Resp / PEA arrest     AMS   ? Hypoxic Brain injury   - improving     Interval History:  Seen and examined. BP much better. Off cardene. Had PC placed this AM.  Working on rehab and dialysis unit placement now. Review of Systems: Review of systems not obtained due to patient factors.     Current Medications:   Current Facility-Administered Medications   Medication Dose Route Frequency    isosorbide mononitrate ER (IMDUR) tablet 120 mg  120 mg Oral DAILY    ceFAZolin (ANCEF) 1 gram injection        NIFEdipine ER (PROCARDIA XL) tablet 60 mg  60 mg Oral DAILY    lidocaine 4 % patch 1 Patch  1 Patch TransDERmal Q24H    prazosin (MINIPRESS) capsule 2 mg  2 mg Oral BID    labetaloL (NORMODYNE;TRANDATE) injection 15 mg  15 mg IntraVENous Q4H PRN    hydrALAZINE (APRESOLINE) tablet 100 mg  100 mg Oral TID    niCARdipine (CARDENE) 25 mg in 0.9% sodium chloride 250 mL infusion  0-15 mg/hr IntraVENous TITRATE    cloNIDine HCL (CATAPRES) tablet 0.2 mg  0.2 mg Oral TID    sucralfate (CARAFATE) tablet 1 g  1 g Oral AC&HS    losartan (COZAAR) tablet 100 mg  100 mg Oral QHS    [Held by provider] gabapentin (NEURONTIN) capsule 100 mg  100 mg Oral TID    insulin lispro (HUMALOG) injection SubCUTAneous AC&HS    hydrALAZINE (APRESOLINE) 20 mg/mL injection 10-20 mg  10-20 mg IntraVENous Q6H PRN    heparin (porcine) 1,000 unit/mL injection 1,100 Units  1,100 Units Hemodialysis DIALYSIS PRN    heparin (porcine) 1,000 unit/mL injection 1,400 Units  1,400 Units Hemodialysis DIALYSIS PRN    pantoprazole (PROTONIX) 40 mg in 0.9% sodium chloride 10 mL injection  40 mg IntraVENous DAILY    metoprolol tartrate (LOPRESSOR) tablet 25 mg  25 mg Oral Q12H    aspirin chewable tablet 81 mg  81 mg Oral DAILY    polyethylene glycol (MIRALAX) packet 17 g  17 g Oral BID    [Held by provider] eplerenone (INSPRA) tablet 25 mg  25 mg Oral DAILY    bumetanide (BUMEX) injection 2 mg  2 mg IntraVENous Q12H    calcium carbonate (TUMS) chewable tablet 200 mg [elemental]  200 mg Oral TID WITH MEALS    sodium chloride (NS) flush 5-40 mL  5-40 mL IntraVENous Q8H    sodium chloride (NS) flush 5-40 mL  5-40 mL IntraVENous PRN    epoetin ericka-epbx (RETACRIT) injection 20,000 Units  20,000 Units SubCUTAneous Q MON, WED & FRI    butalbital-acetaminophen-caffeine (FIORICET, ESGIC) -40 mg per tablet 1 Tablet  1 Tablet Oral Q4H PRN    ondansetron (ZOFRAN) injection 4 mg  4 mg IntraVENous Q4H PRN    busPIRone (BUSPAR) tablet 10 mg  10 mg Oral BID    rosuvastatin (CRESTOR) tablet 10 mg  10 mg Oral QHS    sertraline (ZOLOFT) tablet 200 mg  200 mg Oral DAILY    traMADoL (ULTRAM) tablet 50 mg  50 mg Oral Q6H PRN    [Held by provider] traZODone (DESYREL) tablet 50 mg  50 mg Oral QHS    acetaminophen (TYLENOL) tablet 650 mg  650 mg Oral Q6H PRN    glucose chewable tablet 16 g  4 Tablet Oral PRN    dextrose (D50W) injection syrg 12.5-25 g  25-50 mL IntraVENous PRN    glucagon (GLUCAGEN) injection 1 mg  1 mg IntraMUSCular PRN    [Held by provider] insulin glargine (LANTUS) injection 10 Units  10 Units SubCUTAneous DAILY     Facility-Administered Medications Ordered in Other Encounters   Medication Dose Route Frequency  heparin (porcine) 1,000 unit/mL injection          No Known Allergies    Objective:  Vitals:    Vitals:    11/24/21 0955 11/24/21 1000 11/24/21 1015 11/24/21 1045   BP: (!) 163/68 (!) 144/68 127/60 (!) 133/59   Pulse: (!) 53 (!) 54 (!) 53 (!) 54   Resp: 15 14 12 18   Temp:       TempSrc:       SpO2: 98% 96% 96%    Weight:       Height:         Intake and Output:  No intake/output data recorded. 11/22 1901 - 11/24 0700  In: 300 [P.O.:300]  Out: 300 [Urine:300]    Physical Examination:    General: NAD  Neck:  Supple, no mass  Resp:  Clear b/l  CV:  RRR, s1,s2  Neurologic:  Non focal  :                  Grant +        []    High complexity decision making was performed  []    Patient is at high-risk of decompensation with multiple organ involvement    Lab Data Personally Reviewed: I have reviewed all the pertinent labs, microbiology data and radiology studies during assessment.     Recent Labs     11/24/21  0330 11/24/21  0328 11/23/21  0347 11/22/21 0319   * 131* 135* 135*   K 3.8 3.7 3.5 3.7    99 102 103   CO2 23 23 25 25   * 170* 142* 135*   BUN 31* 32* 26* 40*   CREA 3.11* 3.09* 2.43* 2.96*   CA 7.9* 8.0* 8.2* 8.2*   PHOS 2.9  --   --   --    ALB 2.6*  --   --   --      Recent Labs     11/22/21 0318   WBC 4.4   HGB 7.8*   HCT 24.5*   *     No results found for: SDES  Lab Results   Component Value Date/Time    Culture result: NO GROWTH 4 DAYS 11/18/2021 11:40 AM     Recent Results (from the past 24 hour(s))   GLUCOSE, POC    Collection Time: 11/23/21 11:42 AM   Result Value Ref Range    Glucose (POC) 184 (H) 65 - 117 mg/dL    Performed by Dayana Shook (CON)    GLUCOSE, POC    Collection Time: 11/23/21  4:18 PM   Result Value Ref Range    Glucose (POC) 199 (H) 65 - 117 mg/dL    Performed by Slackeron Kris  PCT    GLUCOSE, POC    Collection Time: 11/23/21  9:26 PM   Result Value Ref Range    Glucose (POC) 234 (H) 65 - 117 mg/dL    Performed by Leonardo Caban PANEL, BASIC    Collection Time: 11/24/21  3:28 AM   Result Value Ref Range    Sodium 131 (L) 136 - 145 mmol/L    Potassium 3.7 3.5 - 5.1 mmol/L    Chloride 99 97 - 108 mmol/L    CO2 23 21 - 32 mmol/L    Anion gap 9 5 - 15 mmol/L    Glucose 170 (H) 65 - 100 mg/dL    BUN 32 (H) 6 - 20 MG/DL    Creatinine 3.09 (H) 0.70 - 1.30 MG/DL    BUN/Creatinine ratio 10 (L) 12 - 20      GFR est AA 25 (L) >60 ml/min/1.73m2    GFR est non-AA 21 (L) >60 ml/min/1.73m2    Calcium 8.0 (L) 8.5 - 10.1 MG/DL   SAMPLES BEING HELD    Collection Time: 11/24/21  3:28 AM   Result Value Ref Range    SAMPLES BEING HELD 1LAV     COMMENT        Add-on orders for these samples will be processed based on acceptable specimen integrity and analyte stability, which may vary by analyte. RENAL FUNCTION PANEL    Collection Time: 11/24/21  3:30 AM   Result Value Ref Range    Sodium 132 (L) 136 - 145 mmol/L    Potassium 3.8 3.5 - 5.1 mmol/L    Chloride 100 97 - 108 mmol/L    CO2 23 21 - 32 mmol/L    Anion gap 9 5 - 15 mmol/L    Glucose 173 (H) 65 - 100 mg/dL    BUN 31 (H) 6 - 20 MG/DL    Creatinine 3.11 (H) 0.70 - 1.30 MG/DL    BUN/Creatinine ratio 10 (L) 12 - 20      GFR est AA 25 (L) >60 ml/min/1.73m2    GFR est non-AA 21 (L) >60 ml/min/1.73m2    Calcium 7.9 (L) 8.5 - 10.1 MG/DL    Phosphorus 2.9 2.6 - 4.7 MG/DL    Albumin 2.6 (L) 3.5 - 5.0 g/dL   GLUCOSE, POC    Collection Time: 11/24/21  7:22 AM   Result Value Ref Range    Glucose (POC) 181 (H) 65 - 117 mg/dL    Performed by Anayeli joya RN    GLUCOSE, POC    Collection Time: 11/24/21 11:05 AM   Result Value Ref Range    Glucose (POC) 148 (H) 65 - 117 mg/dL    Performed by Mohinder Campoverde            Total time spent with patient:  xxx   min. Care Plan discussed with:  Patient     Family      RN      Consulting Physician [facility],         I have reviewed the flowsheets. Chart and Pertinent Notes have been reviewed.    No change in PMH ,family and social history from Consult note.       Steven Staff, MD

## 2021-11-24 NOTE — PROGRESS NOTES
Cardiology Progress Note                                        Admit Date: 11/14/2021    Assessment/Plan:     1. PEA arrest   Most likely respiratory related   Resolved   2. SEVERIANO on CKD   -Biopsy-proven advanced diabetic nephropathy with nodular glomerulosclerosis and class IV interstitial nephritis  Now with need for dialysis  3. HTN:  On hydralazine 100 tid,  Clonidine 0.2 tid,nifeddopine 60 xl daily, imdur 60, praxosin 2 bid ,      Per nephrology   4. Anemia    hct 24   5. CAD  Hx cabg 5/2020  6. CHF acute on chronic diastolic  Last echo 95/18/8409   Ef 60%      Volume per hemodialysis      7.  Thrombocytopenia plt 121   Improved     Will see as needed          Torin Nieves is a 64 y.o. male with     PROBLEM LIST:  Patient Active Problem List    Diagnosis Date Noted    Severe protein-calorie malnutrition ([facility] 11/23/2021    CHF exacerbation ([facility] 11/14/2021    Type 2 diabetes with nephropathy ([facility] 08/05/2019    Type 2 diabetes mellitus with diabetic neuropathy ([facility] 08/05/2019    Type 2 diabetes mellitus with ophthalmic complication, with long-term current use of insulin ([facility] 11/25/2018    Essential hypertension 11/14/2018    Reactive depression 11/14/2018    Peripheral neuropathy 11/14/2018         Subjective:     Torin Nieves denies chest pain. Visit Vitals  BP (!) 178/58 (BP 1 Location: Left arm, BP Patient Position: At rest;Sitting)   Pulse 61   Temp 98 °F (36.7 °C)   Resp 18   Ht 5' 9\" (1.753 m)   Wt 193 lb 9 oz (87.8 kg)   SpO2 96%   BMI 28.58 kg/m²       Intake/Output Summary (Last 24 hours) at 11/24/2021 0807  Last data filed at 11/24/2021 0319  Gross per 24 hour   Intake 300 ml   Output 300 ml   Net 0 ml       Objective:      Physical Exam:  HEENT: Perrla, EOMI  Neck: No JVD,  No thyroidmegaly  Resp: CTA bilaterally;  No wheezes or rales  CV: RRR s1s2 No murmur no s3  Abd:Soft, Nontender  Ext: No edema  Neuro: Alert and oriented; Nonfocal  Skin: Warm, Dry, Intact  Pulses: 2+ DP/PT/Rad      Telemetry: normal sinus rhythm    Current Facility-Administered Medications   Medication Dose Route Frequency    NIFEdipine ER (PROCARDIA XL) tablet 60 mg  60 mg Oral DAILY    lidocaine 4 % patch 1 Patch  1 Patch TransDERmal Q24H    prazosin (MINIPRESS) capsule 2 mg  2 mg Oral BID    labetaloL (NORMODYNE;TRANDATE) injection 15 mg  15 mg IntraVENous Q4H PRN    hydrALAZINE (APRESOLINE) tablet 100 mg  100 mg Oral TID    niCARdipine (CARDENE) 25 mg in 0.9% sodium chloride 250 mL infusion  0-15 mg/hr IntraVENous TITRATE    cloNIDine HCL (CATAPRES) tablet 0.2 mg  0.2 mg Oral TID    sucralfate (CARAFATE) tablet 1 g  1 g Oral AC&HS    losartan (COZAAR) tablet 100 mg  100 mg Oral QHS    [Held by provider] gabapentin (NEURONTIN) capsule 100 mg  100 mg Oral TID    insulin lispro (HUMALOG) injection   SubCUTAneous AC&HS    hydrALAZINE (APRESOLINE) 20 mg/mL injection 10-20 mg  10-20 mg IntraVENous Q6H PRN    heparin (porcine) 1,000 unit/mL injection 1,100 Units  1,100 Units Hemodialysis DIALYSIS PRN    heparin (porcine) 1,000 unit/mL injection 1,400 Units  1,400 Units Hemodialysis DIALYSIS PRN    pantoprazole (PROTONIX) 40 mg in 0.9% sodium chloride 10 mL injection  40 mg IntraVENous DAILY    metoprolol tartrate (LOPRESSOR) tablet 25 mg  25 mg Oral Q12H    aspirin chewable tablet 81 mg  81 mg Oral DAILY    polyethylene glycol (MIRALAX) packet 17 g  17 g Oral BID    [Held by provider] eplerenone (INSPRA) tablet 25 mg  25 mg Oral DAILY    bumetanide (BUMEX) injection 2 mg  2 mg IntraVENous Q12H    calcium carbonate (TUMS) chewable tablet 200 mg [elemental]  200 mg Oral TID WITH MEALS    isosorbide mononitrate ER (IMDUR) tablet 60 mg  60 mg Oral DAILY    sodium chloride (NS) flush 5-40 mL  5-40 mL IntraVENous Q8H    sodium chloride (NS) flush 5-40 mL  5-40 mL IntraVENous PRN    epoetin ericka-epbx (RETACRIT) injection 20,000 Units  20,000 Units SubCUTAneous Q MON, WED & FRI    butalbital-acetaminophen-caffeine (FIORICET, ESGIC) -40 mg per tablet 1 Tablet  1 Tablet Oral Q4H PRN    ondansetron (ZOFRAN) injection 4 mg  4 mg IntraVENous Q4H PRN    busPIRone (BUSPAR) tablet 10 mg  10 mg Oral BID    rosuvastatin (CRESTOR) tablet 10 mg  10 mg Oral QHS    sertraline (ZOLOFT) tablet 200 mg  200 mg Oral DAILY    traMADoL (ULTRAM) tablet 50 mg  50 mg Oral Q6H PRN    [Held by provider] traZODone (DESYREL) tablet 50 mg  50 mg Oral QHS    acetaminophen (TYLENOL) tablet 650 mg  650 mg Oral Q6H PRN    glucose chewable tablet 16 g  4 Tablet Oral PRN    dextrose (D50W) injection syrg 12.5-25 g  25-50 mL IntraVENous PRN    glucagon (GLUCAGEN) injection 1 mg  1 mg IntraMUSCular PRN    [Held by provider] insulin glargine (LANTUS) injection 10 Units  10 Units SubCUTAneous DAILY     Facility-Administered Medications Ordered in Other Encounters   Medication Dose Route Frequency    lidocaine (XYLOCAINE) 20 mg/mL (2 %) injection 400 mg  20 mL SubCUTAneous RAD ONCE    heparin (porcine) 1,000 unit/mL injection 10,000 Units  10,000 Units Hemodialysis RAD ONCE    heparin (porcine) 1,000 unit/mL injection             Data Review:   Labs:    Recent Results (from the past 24 hour(s))   GLUCOSE, POC    Collection Time: 11/23/21 11:42 AM   Result Value Ref Range    Glucose (POC) 184 (H) 65 - 117 mg/dL    Performed by Samaria Conklin (CON)    GLUCOSE, POC    Collection Time: 11/23/21  4:18 PM   Result Value Ref Range    Glucose (POC) 199 (H) 65 - 117 mg/dL    Performed by Pari BENSON    GLUCOSE, POC    Collection Time: 11/23/21  9:26 PM   Result Value Ref Range    Glucose (POC) 234 (H) 65 - 117 mg/dL    Performed by Denys Pelletier, BASIC    Collection Time: 11/24/21  3:28 AM   Result Value Ref Range    Sodium 131 (L) 136 - 145 mmol/L    Potassium 3.7 3.5 - 5.1 mmol/L    Chloride 99 97 - 108 mmol/L    CO2 23 21 - 32 mmol/L    Anion gap 9 5 - 15 mmol/L    Glucose 170 (H) 65 - 100 mg/dL    BUN 32 (H) 6 - 20 MG/DL    Creatinine 3.09 (H) 0.70 - 1.30 MG/DL    BUN/Creatinine ratio 10 (L) 12 - 20      GFR est AA 25 (L) >60 ml/min/1.73m2    GFR est non-AA 21 (L) >60 ml/min/1.73m2    Calcium 8.0 (L) 8.5 - 10.1 MG/DL   SAMPLES BEING HELD    Collection Time: 11/24/21  3:28 AM   Result Value Ref Range    SAMPLES BEING HELD 1LAV     COMMENT        Add-on orders for these samples will be processed based on acceptable specimen integrity and analyte stability, which may vary by analyte.    RENAL FUNCTION PANEL    Collection Time: 11/24/21  3:30 AM   Result Value Ref Range    Sodium 132 (L) 136 - 145 mmol/L    Potassium 3.8 3.5 - 5.1 mmol/L    Chloride 100 97 - 108 mmol/L    CO2 23 21 - 32 mmol/L    Anion gap 9 5 - 15 mmol/L    Glucose 173 (H) 65 - 100 mg/dL    BUN 31 (H) 6 - 20 MG/DL    Creatinine 3.11 (H) 0.70 - 1.30 MG/DL    BUN/Creatinine ratio 10 (L) 12 - 20      GFR est AA 25 (L) >60 ml/min/1.73m2    GFR est non-AA 21 (L) >60 ml/min/1.73m2    Calcium 7.9 (L) 8.5 - 10.1 MG/DL    Phosphorus 2.9 2.6 - 4.7 MG/DL    Albumin 2.6 (L) 3.5 - 5.0 g/dL   GLUCOSE, POC    Collection Time: 11/24/21  7:22 AM   Result Value Ref Range    Glucose (POC) 181 (H) 65 - 117 mg/dL    Performed by Robina joya RN

## 2021-11-24 NOTE — ROUTINE PROCESS
Pt arrives via stretcher to angio department accompanied by transport for tunneled dialysis catheter insertion procedure. All assessments completed and consent was reviewed. Education given was regarding procedure, moderate sedation, post-procedure care and  management/follow-up. Opportunity for questions was provided and all questions and concerns were addressed. 1008: TRANSFER - OUT REPORT:    Verbal report given to Mingo Adam RN(name) on Ashely Hicks  being transferred to back to room 464(unit) for routine post - op       Report consisted of patients Situation, Background, Assessment and   Recommendations(SBAR). Information from the following report(s) SBAR, Procedure Summary and MAR was reviewed with the receiving nurse. Lines:   Peripheral IV 11/19/21 Anterior; Left Forearm (Active)   Site Assessment Clean, dry, & intact 11/24/21 0319   Phlebitis Assessment 0 11/24/21 0319   Infiltration Assessment 0 11/24/21 0319   Dressing Status Clean, dry, & intact 11/24/21 0319   Dressing Type Transparent; Tape 11/24/21 0319   Hub Color/Line Status Capped 11/24/21 0319   Action Taken Open ports on tubing capped 11/24/21 0319   Alcohol Cap Used Yes 11/24/21 0319       Peripheral IV 11/20/21 Anterior; Left; Proximal Forearm (Active)   Site Assessment Clean, dry, & intact 11/24/21 0319   Phlebitis Assessment 0 11/24/21 0319   Infiltration Assessment 0 11/24/21 0319   Dressing Status Clean, dry, & intact 11/24/21 0319   Dressing Type Tape; Transparent 11/24/21 0319   Hub Color/Line Status Capped 11/24/21 0319   Action Taken Open ports on tubing capped 11/24/21 0319   Alcohol Cap Used Yes 11/24/21 0319        Opportunity for questions and clarification was provided. Patient transported with:   Tech/Transport    1019: Transport at bedside at this time. Patient in NAD.

## 2021-11-24 NOTE — PROGRESS NOTES
Problem: Mobility Impaired (Adult and Pediatric)  Goal: *Acute Goals and Plan of Care (Insert Text)  Outcome: Progressing Towards Goal    Note: FUNCTIONAL STATUS PRIOR TO ADMISSION: Patient was independent without use of DME. However, patient's sons assist with lawn care, cooking, cleaning, grocery shopping, and transportation. Poor vision at baseline per patient report. HOME SUPPORT PRIOR TO ADMISSION: The patient lived with his 3 sons and girlfriend. Physical Therapy Goals  Initiated 11/20/2021  1. Patient will move from supine to sit and sit to supine, scoot up and down, and roll side to side in bed with supervision/set-up within 7 day(s). 2.  Patient will transfer from bed to chair and chair to bed with minimal assistance/contact guard assist using the least restrictive device within 7 day(s). 3.  Patient will perform sit to stand with minimal assistance/contact guard assist within 7 day(s). 4.  Patient will ambulate with moderate assistance for 50 feet with the least restrictive device within 7 day(s). 5.  Patient will ascend/descend 2 stairs with bilateral handrail(s) with maximal assistance within 7 day(s). PHYSICAL THERAPY TREATMENT  Patient: Jacinto Butt (22 y.o. male)  Date: 11/24/2021  Diagnosis: CHF exacerbation ([facility] [I50.9]   CHF exacerbation (HCC)  Procedure(s) (LRB):  ESOPHAGOGASTRODUODENOSCOPY (EGD) (N/A) 8 Days Post-Op  Precautions: Fall, Bed Alarm  Chart, physical therapy assessment, plan of care and goals were reviewed. ASSESSMENT  Patient continues with skilled PT services and is progressing towards goals. Pt reports fatigued and slight dizziness. Pt was able to display improve mobility with decrease assistance. Pt reports LE weakness and decrease stability. Pt is tolerating short distance due to fatigue. PTA pt was independent with no A.D. and is below baseline. Pt could benefit from inpt rehab at discharge.      Current Level of Function Impacting Discharge (mobility/balance): min A     Other factors to consider for discharge: decrease balance, decrease mobility, decrease independence          PLAN :  Patient continues to benefit from skilled intervention to address the above impairments. Continue treatment per established plan of care. to address goals. Recommendation for discharge: (in order for the patient to meet his/her long term goals)  Therapy 3 hours per day 5-7 days per week    This discharge recommendation:  Has not yet been discussed the attending provider and/or case management    IF patient discharges home will need the following DME: patient owns DME required for discharge       SUBJECTIVE:   Patient stated I am hungry .     OBJECTIVE DATA SUMMARY:   Critical Behavior:  Neurologic State: Alert  Orientation Level: Oriented X4  Cognition: Follows commands  Safety/Judgement: Decreased insight into deficits  Functional Mobility Training:  Bed Mobility:                    Transfers:  Sit to Stand: Contact guard assistance  Stand to Sit: Contact guard assistance                             Balance:  Sitting: Intact; With support  Standing: Impaired  Standing - Static: Fair  Standing - Dynamic : Fair  Ambulation/Gait Training:  Distance (ft): 50 Feet (ft) (x2 trials seated rest break)  Assistive Device: Cane, straight; Gait belt; Walker, rolling  Ambulation - Level of Assistance: Contact guard assistance; Minimal assistance        Gait Abnormalities: Decreased step clearance              Speed/Myla: Pace decreased (<100 feet/min); Slow  Step Length: Left shortened; Right shortened                   Stairs: Therapeutic Exercises:     Pain Rating:  Left scapula     Activity Tolerance:   requires rest breaks    After treatment patient left in no apparent distress:   Sitting in chair, Call bell within reach, and Bed / chair alarm activated    COMMUNICATION/COLLABORATION:   The patients plan of care was discussed with: Registered nurse VINAY Sue Nino, PTA   Time Calculation: 25 mins

## 2021-11-24 NOTE — PROGRESS NOTES
Physical therapy   Chart reviewed, discussed with RN pt currently off the floor, will continue to follow.     MOE Marte

## 2021-11-24 NOTE — PROGRESS NOTES
[facility] [facility]  Hospitalist Group                                                                                          Hospitalist Progress Note  Yeny Puga MD  Answering service: [phone] from in house phone        Date of Service:  2021  NAME:  Esperanza Torres  :  1960  MRN:  [id number]      Admission Summary:   19-year-old male with a past medical history of CAD status post CABG on dAPT, HFpEF,  DM 2, and CKD V who was admitted on  for GI bleed, and overload secondary to cardiorenal syndrome. He had been hospitalized at [facility] prior to this for acute on chronic heart failure, and started to develop bilateral lower extremity edema with melena upon discharge. He underwent EGD which showed nonbleeding erosive gastritis, and was started on PPI. Nephrology, and cardiology were consulted for volume management, and he was diuresed with IV Lasix. On , patient sustained PEA arrest.  After 12 minutes of CPR, ROSC was achieved, and he was intubated and transferred to the ICU. His volume status did not improve with IV Lasix, and Alexandre catheter was placed with plan for 3 days of dialysis. He was extubated, and neurology was consulted due altered mental status suspected secondary to anoxic brain injury, but this improved, and his mental status ultimately returned back to baseline with no imaging or intervention recommended.  Transferred out of ICU on 21    Interval history / Subjective:     Pt seen and examined  C/o chest pain from CPR  No headache today, nausea improving   S/p perm-a-cah        Assessment & Plan:     #SEVERIANO on CKD 4, now on HD due to ATN from PEA arrest  #HFpEF  Continue IV Bumex, TUMS, retacrit, and losartan  Cardiology, nephrology on board, appreciate recommendations  Avoid renal toxins, and hypotension.  --HD MWF  --c/w HD , d/w renal, will start looking into outpatient HD unit  --Perm-a-cath placed on 11/24    Headache/Nausea/Vomiting  - Hypertensive urgency  - c/w Clonidine 0.2mg TID, Cozaar 100mg, Imdur increased to 120mg daily, Lopressor, Hydralazine   - added Procardia XL and Prazosin 11/23, procardia decreased to 30mg daily   - off Cardene gtt    S/p PEA Arrest suspect from respiratory distress  --s/p intubation/extubation      #Erosive gastritis  Presented with melena, and normocytic anemia with hemoglobin 7-8  EGD on 11/17 with nonbleeding erosive gastritis  Continue PPI per gastroenterology  -now on ASA monotherapy    Probable Anoxic brain injury due to Cardiac arrest  - down for 12 minutes  - seen by Neurology  - supportive care     #DM II  -lispro ISS     #CAD s/p CABG  -continue metoprolol, crestor, imdur, and ASA     #Depression  -continue zoloft     #MSK pain   -s/p CPR  -has tramadol,  -Lidocaine patch added     FEN: caridac  dvt ppx: SCD  MPOA: daughter  Code: Full    Care Plan discussed with: Patient/Family and Nurse  Anticipated Disposition: SAH/Rehab  Anticipated Discharge: Greater than 48 hours, IPR pending, needs [facility] Problems  Date Reviewed: 8/7/2020          Codes Class Noted POA    Severe protein-calorie malnutrition ([facility] ICD-10-CM: N21  ICD-9-CM: 357  11/23/2021 Yes        * (Principal) CHF exacerbation ([facility] ICD-10-CM: I50.9  ICD-9-CM: 428.0  11/14/2021 Unknown                Review of Systems:   A comprehensive review of systems was negative except for that written in the HPI. Vital Signs:    Last 24hrs VS reviewed since prior progress note.  Most recent are:  Visit Vitals  BP (!) 101/45 (BP Patient Position: Sitting)   Pulse (!) 58   Temp 98 °F (36.7 °C)   Resp 18   Ht 5' 9\" (1.753 m)   Wt 87.8 kg (193 lb 9 oz)   SpO2 96%   BMI 28.58 kg/m²         Intake/Output Summary (Last 24 hours) at 11/24/2021 1252  Last data filed at 11/24/2021 0319  Gross per 24 hour   Intake 300 ml   Output 300 ml   Net 0 ml        Physical Examination:     I had a face to face encounter with this patient and independently examined them on 11/24/2021 as outlined below:          Constitutional:  No acute distress, cooperative, pleasant    ENT:  Oral mucosa moist, oropharynx benign. Resp:  CTA bilaterally. CV:  Regular rhythm, normal rate    GI:  Soft, non distended, non tender. normoactive bowel sounds,    Musculoskeletal:  trace edema, warm, 2+ pulses throughout    Neurologic:  Moves all extremities. AAOx3, CN II-XII reviewed            Data Review:    Review and/or order of clinical lab test  Review and/or order of tests in the radiology section of CPT  Review and/or order of tests in the medicine section of CPT      Labs:     Recent Labs     11/22/21 0318   WBC 4.4   HGB 7.8*   HCT 24.5*   *     Recent Labs     11/24/21  0330 11/24/21  0328 11/23/21  0347   * 131* 135*   K 3.8 3.7 3.5    99 102   CO2 23 23 25   BUN 31* 32* 26*   CREA 3.11* 3.09* 2.43*   * 170* 142*   CA 7.9* 8.0* 8.2*   PHOS 2.9  --   --      Recent Labs     11/24/21 0330   ALB 2.6*     No results for input(s): INR, PTP, APTT, INREXT, INREXT in the last 72 hours. No results for input(s): FE, TIBC, PSAT, FERR in the last 72 hours. No results found for: FOL, RBCF   No results for input(s): PH, PCO2, PO2 in the last 72 hours. No results for input(s): CPK, CKNDX, TROIQ in the last 72 hours.     No lab exists for component: CPKMB  Lab Results   Component Value Date/Time    Cholesterol, total 176 11/22/2019 09:36 AM    HDL Cholesterol 45 11/22/2019 09:36 AM    LDL, calculated 112 (H) 11/22/2019 09:36 AM    Triglyceride 94 11/22/2019 09:36 AM     Lab Results   Component Value Date/Time    Glucose (POC) 148 (H) 11/24/2021 11:05 AM    Glucose (POC) 181 (H) 11/24/2021 07:22 AM    Glucose (POC) 234 (H) 11/23/2021 09:26 PM    Glucose (POC) 199 (H) 11/23/2021 04:18 PM    Glucose (POC) 184 (H) 11/23/2021 11:42 AM     Lab Results   Component Value Date/Time    Color YELLOW/STRAW 11/14/2021 10:31 AM Appearance CLEAR 11/14/2021 10:31 AM    Specific gravity 1.019 11/14/2021 10:31 AM    pH (UA) 5.5 11/14/2021 10:31 AM    Protein >300 (A) 11/14/2021 10:31 AM    Glucose 500 (A) 11/14/2021 10:31 AM    Ketone Negative 11/14/2021 10:31 AM    Bilirubin Negative 11/14/2021 10:31 AM    Urobilinogen 0.2 11/14/2021 10:31 AM    Nitrites Negative 11/14/2021 10:31 AM    Leukocyte Esterase Negative 11/14/2021 10:31 AM    Epithelial cells FEW 11/14/2021 10:31 AM    Bacteria Negative 11/14/2021 10:31 AM    WBC 0-4 11/14/2021 10:31 AM    RBC 0-5 11/14/2021 10:31 AM         Medications Reviewed:     Current Facility-Administered Medications   Medication Dose Route Frequency    isosorbide mononitrate ER (IMDUR) tablet 120 mg  120 mg Oral DAILY    ceFAZolin (ANCEF) 1 gram injection        [START ON 11/25/2021] NIFEdipine ER (PROCARDIA XL) tablet 30 mg  30 mg Oral DAILY    lidocaine 4 % patch 1 Patch  1 Patch TransDERmal Q24H    prazosin (MINIPRESS) capsule 2 mg  2 mg Oral BID    labetaloL (NORMODYNE;TRANDATE) injection 15 mg  15 mg IntraVENous Q4H PRN    hydrALAZINE (APRESOLINE) tablet 100 mg  100 mg Oral TID    niCARdipine (CARDENE) 25 mg in 0.9% sodium chloride 250 mL infusion  0-15 mg/hr IntraVENous TITRATE    cloNIDine HCL (CATAPRES) tablet 0.2 mg  0.2 mg Oral TID    sucralfate (CARAFATE) tablet 1 g  1 g Oral AC&HS    losartan (COZAAR) tablet 100 mg  100 mg Oral QHS    [Held by provider] gabapentin (NEURONTIN) capsule 100 mg  100 mg Oral TID    insulin lispro (HUMALOG) injection   SubCUTAneous AC&HS    hydrALAZINE (APRESOLINE) 20 mg/mL injection 10-20 mg  10-20 mg IntraVENous Q6H PRN    heparin (porcine) 1,000 unit/mL injection 1,100 Units  1,100 Units Hemodialysis DIALYSIS PRN    heparin (porcine) 1,000 unit/mL injection 1,400 Units  1,400 Units Hemodialysis DIALYSIS PRN    pantoprazole (PROTONIX) 40 mg in 0.9% sodium chloride 10 mL injection  40 mg IntraVENous DAILY    metoprolol tartrate (LOPRESSOR) tablet 25 mg  25 mg Oral Q12H    aspirin chewable tablet 81 mg  81 mg Oral DAILY    polyethylene glycol (MIRALAX) packet 17 g  17 g Oral BID    [Held by provider] eplerenone (INSPRA) tablet 25 mg  25 mg Oral DAILY    bumetanide (BUMEX) injection 2 mg  2 mg IntraVENous Q12H    calcium carbonate (TUMS) chewable tablet 200 mg [elemental]  200 mg Oral TID WITH MEALS    sodium chloride (NS) flush 5-40 mL  5-40 mL IntraVENous Q8H    sodium chloride (NS) flush 5-40 mL  5-40 mL IntraVENous PRN    epoetin ericka-epbx (RETACRIT) injection 20,000 Units  20,000 Units SubCUTAneous Q MON, WED & FRI    butalbital-acetaminophen-caffeine (FIORICET, ESGIC) -40 mg per tablet 1 Tablet  1 Tablet Oral Q4H PRN    ondansetron (ZOFRAN) injection 4 mg  4 mg IntraVENous Q4H PRN    busPIRone (BUSPAR) tablet 10 mg  10 mg Oral BID    rosuvastatin (CRESTOR) tablet 10 mg  10 mg Oral QHS    sertraline (ZOLOFT) tablet 200 mg  200 mg Oral DAILY    traMADoL (ULTRAM) tablet 50 mg  50 mg Oral Q6H PRN    [Held by provider] traZODone (DESYREL) tablet 50 mg  50 mg Oral QHS    acetaminophen (TYLENOL) tablet 650 mg  650 mg Oral Q6H PRN    glucose chewable tablet 16 g  4 Tablet Oral PRN    dextrose (D50W) injection syrg 12.5-25 g  25-50 mL IntraVENous PRN    glucagon (GLUCAGEN) injection 1 mg  1 mg IntraMUSCular PRN    [Held by provider] insulin glargine (LANTUS) injection 10 Units  10 Units SubCUTAneous DAILY     Facility-Administered Medications Ordered in Other Encounters   Medication Dose Route Frequency    heparin (porcine) 1,000 unit/mL injection         ______________________________________________________________________  EXPECTED LENGTH OF STAY: 3d 19h  ACTUAL LENGTH OF STAY:          Marcus Ramírez MD

## 2021-11-24 NOTE — PROGRESS NOTES
Chart reviewed, patient on dialysis at this time. Patient is performing ADLs with nursing as able, therefore tolerating 3 hours of therapy. Patient still too weak to return home alone during day, complete basic ADLs, and dialysis MWF, family works. Recommend IPR. Thank you.

## 2021-11-24 NOTE — PROGRESS NOTES
Transitions of Care Plan   RUR: 14% - low   Admission Dx: CHF Exacerbation   Consults: Cardiology; Nephrology; Therapy   Baseline: ambulates with cane; resides with girlfriend and her sons   Barriers to Discharge: medical; insurance authorization   Disposition: [facility] pending insurance authorization vs [facility]   Estimated Discharge Date: 11/26/21  [facility] Discharge should patient discharge home    CM received update from [facility] facilities:  [facility] - no bed available  [facility] - medically accepted and starting insurance authorization    CM spoke with Laxmi oKng with [facility] - confirmed patient medically accepted for inpatient rehab and facility is starting Medicaid authorization today for patient. CM spoke with PT - patient is doing well - could progress home with home health by the weekend if he continues to do well. CM updated attending MD. Dang Yuan should patient not receive Medicaid authorization - home with home health Friday after HD. CM attempted to call daughter, no answer. CM spoke with [facility]. Adivsed patient will likely discharge Friday afternoon either to inpatient rehab pending insurance authorization or home with home health pending patient progression. CM will notify [facility] on Friday of patient's status. For CM covering Friday:  If patient does not receive insurance authorization for [facility] Gailen Cockayne is covering), please set up home health (order is in the system) and notify [facility] that patient will be there Monday for [facility] chair time at 11am. Family or Medicaid to transport patient. If patient receives Medicaid authorization - please notify [facility] through [employer] that patient is going to [facility].     Shahid Ji, MPH  Care Manager [facility] [employer]  Available via [address] [place] [address] or

## 2021-11-25 LAB
ANION GAP SERPL CALC-SCNC: 7 MMOL/L (ref 5–15)
BUN SERPL-MCNC: 19 MG/DL (ref 6–20)
BUN/CREAT SERPL: 8 (ref 12–20)
CALCIUM SERPL-MCNC: 8.5 MG/DL (ref 8.5–10.1)
CHLORIDE SERPL-SCNC: 99 MMOL/L (ref 97–108)
CO2 SERPL-SCNC: 27 MMOL/L (ref 21–32)
CREAT SERPL-MCNC: 2.37 MG/DL (ref 0.7–1.3)
GLUCOSE BLD STRIP.AUTO-MCNC: 144 MG/DL (ref 65–117)
GLUCOSE BLD STRIP.AUTO-MCNC: 186 MG/DL (ref 65–117)
GLUCOSE BLD STRIP.AUTO-MCNC: 195 MG/DL (ref 65–117)
GLUCOSE BLD STRIP.AUTO-MCNC: 203 MG/DL (ref 65–117)
GLUCOSE SERPL-MCNC: 143 MG/DL (ref 65–100)
POTASSIUM SERPL-SCNC: 3.6 MMOL/L (ref 3.5–5.1)
SERVICE CMNT-IMP: ABNORMAL
SODIUM SERPL-SCNC: 133 MMOL/L (ref 136–145)

## 2021-11-25 PROCEDURE — 80048 BASIC METABOLIC PNL TOTAL CA: CPT

## 2021-11-25 PROCEDURE — 65660000000 HC RM CCU STEPDOWN

## 2021-11-25 PROCEDURE — 74011636637 HC RX REV CODE- 636/637: Performed by: NURSE PRACTITIONER

## 2021-11-25 PROCEDURE — 82962 GLUCOSE BLOOD TEST: CPT

## 2021-11-25 PROCEDURE — 74011250637 HC RX REV CODE- 250/637: Performed by: HOSPITALIST

## 2021-11-25 PROCEDURE — 36415 COLL VENOUS BLD VENIPUNCTURE: CPT

## 2021-11-25 PROCEDURE — 74011250637 HC RX REV CODE- 250/637: Performed by: INTERNAL MEDICINE

## 2021-11-25 PROCEDURE — 74011000250 HC RX REV CODE- 250: Performed by: HOSPITALIST

## 2021-11-25 PROCEDURE — 74011250636 HC RX REV CODE- 250/636: Performed by: NURSE PRACTITIONER

## 2021-11-25 PROCEDURE — 65660000001 HC RM ICU INTERMED STEPDOWN

## 2021-11-25 PROCEDURE — C9113 INJ PANTOPRAZOLE SODIUM, VIA: HCPCS | Performed by: NURSE PRACTITIONER

## 2021-11-25 PROCEDURE — 74011250637 HC RX REV CODE- 250/637: Performed by: NURSE PRACTITIONER

## 2021-11-25 PROCEDURE — 74011250636 HC RX REV CODE- 250/636: Performed by: INTERNAL MEDICINE

## 2021-11-25 PROCEDURE — 74011250636 HC RX REV CODE- 250/636: Performed by: HOSPITALIST

## 2021-11-25 PROCEDURE — 74011000250 HC RX REV CODE- 250: Performed by: NURSE PRACTITIONER

## 2021-11-25 PROCEDURE — 74011000250 HC RX REV CODE- 250: Performed by: INTERNAL MEDICINE

## 2021-11-25 RX ORDER — HYDRALAZINE HYDROCHLORIDE 20 MG/ML
20 INJECTION INTRAMUSCULAR; INTRAVENOUS ONCE
Status: COMPLETED | OUTPATIENT
Start: 2021-11-25 | End: 2021-11-25

## 2021-11-25 RX ORDER — MORPHINE SULFATE 2 MG/ML
2 INJECTION, SOLUTION INTRAMUSCULAR; INTRAVENOUS
Status: DISCONTINUED | OUTPATIENT
Start: 2021-11-25 | End: 2021-12-01 | Stop reason: HOSPADM

## 2021-11-25 RX ADMIN — SUCRALFATE 1 G: 1 TABLET ORAL at 06:34

## 2021-11-25 RX ADMIN — MORPHINE SULFATE 2 MG: 2 INJECTION, SOLUTION INTRAMUSCULAR; INTRAVENOUS at 18:47

## 2021-11-25 RX ADMIN — BUSPIRONE HYDROCHLORIDE 10 MG: 10 TABLET ORAL at 08:22

## 2021-11-25 RX ADMIN — NICARDIPINE HYDROCHLORIDE 5 MG/HR: 25 INJECTION, SOLUTION INTRAVENOUS at 18:36

## 2021-11-25 RX ADMIN — BUMETANIDE 2 MG: 0.25 INJECTION INTRAMUSCULAR; INTRAVENOUS at 21:45

## 2021-11-25 RX ADMIN — HYDRALAZINE HYDROCHLORIDE 100 MG: 50 TABLET, FILM COATED ORAL at 21:45

## 2021-11-25 RX ADMIN — POLYETHYLENE GLYCOL 3350 17 G: 17 POWDER, FOR SOLUTION ORAL at 08:22

## 2021-11-25 RX ADMIN — CALCIUM CARBONATE (ANTACID) CHEW TAB 500 MG 200 MG: 500 CHEW TAB at 11:33

## 2021-11-25 RX ADMIN — CLONIDINE HYDROCHLORIDE 0.2 MG: 0.2 TABLET ORAL at 21:45

## 2021-11-25 RX ADMIN — ISOSORBIDE MONONITRATE 120 MG: 60 TABLET, EXTENDED RELEASE ORAL at 08:22

## 2021-11-25 RX ADMIN — SUCRALFATE 1 G: 1 TABLET ORAL at 21:45

## 2021-11-25 RX ADMIN — LABETALOL HYDROCHLORIDE 15 MG: 5 INJECTION INTRAVENOUS at 14:50

## 2021-11-25 RX ADMIN — CALCIUM CARBONATE (ANTACID) CHEW TAB 500 MG 200 MG: 500 CHEW TAB at 16:57

## 2021-11-25 RX ADMIN — TRAMADOL HYDROCHLORIDE 50 MG: 50 TABLET ORAL at 15:37

## 2021-11-25 RX ADMIN — Medication 10 ML: at 21:46

## 2021-11-25 RX ADMIN — HYDRALAZINE HYDROCHLORIDE 20 MG: 20 INJECTION INTRAMUSCULAR; INTRAVENOUS at 02:44

## 2021-11-25 RX ADMIN — SUCRALFATE 1 G: 1 TABLET ORAL at 11:33

## 2021-11-25 RX ADMIN — METOPROLOL TARTRATE 25 MG: 25 TABLET, FILM COATED ORAL at 21:45

## 2021-11-25 RX ADMIN — SODIUM CHLORIDE 40 MG: 9 INJECTION INTRAMUSCULAR; INTRAVENOUS; SUBCUTANEOUS at 08:22

## 2021-11-25 RX ADMIN — ROSUVASTATIN 10 MG: 10 TABLET, FILM COATED ORAL at 21:45

## 2021-11-25 RX ADMIN — PRAZOSIN HYDROCHLORIDE 2 MG: 1 CAPSULE ORAL at 21:45

## 2021-11-25 RX ADMIN — BUSPIRONE HYDROCHLORIDE 10 MG: 10 TABLET ORAL at 17:00

## 2021-11-25 RX ADMIN — Medication 10 ML: at 11:40

## 2021-11-25 RX ADMIN — INSULIN LISPRO 2 UNITS: 100 INJECTION, SOLUTION INTRAVENOUS; SUBCUTANEOUS at 16:57

## 2021-11-25 RX ADMIN — Medication 10 ML: at 06:01

## 2021-11-25 RX ADMIN — CLONIDINE HYDROCHLORIDE 0.2 MG: 0.2 TABLET ORAL at 08:22

## 2021-11-25 RX ADMIN — BUMETANIDE 2 MG: 0.25 INJECTION INTRAMUSCULAR; INTRAVENOUS at 08:22

## 2021-11-25 RX ADMIN — SERTRALINE 200 MG: 50 TABLET, FILM COATED ORAL at 08:22

## 2021-11-25 RX ADMIN — SUCRALFATE 1 G: 1 TABLET ORAL at 16:57

## 2021-11-25 RX ADMIN — HYDRALAZINE HYDROCHLORIDE 100 MG: 50 TABLET, FILM COATED ORAL at 08:22

## 2021-11-25 RX ADMIN — CLONIDINE HYDROCHLORIDE 0.2 MG: 0.2 TABLET ORAL at 15:30

## 2021-11-25 RX ADMIN — ASPIRIN 81 MG CHEWABLE TABLET 81 MG: 81 TABLET CHEWABLE at 08:22

## 2021-11-25 RX ADMIN — TRAMADOL HYDROCHLORIDE 50 MG: 50 TABLET ORAL at 02:36

## 2021-11-25 RX ADMIN — CALCIUM CARBONATE (ANTACID) CHEW TAB 500 MG 200 MG: 500 CHEW TAB at 08:22

## 2021-11-25 RX ADMIN — MORPHINE SULFATE 2 MG: 2 INJECTION, SOLUTION INTRAMUSCULAR; INTRAVENOUS at 06:00

## 2021-11-25 RX ADMIN — HYDRALAZINE HYDROCHLORIDE 100 MG: 50 TABLET, FILM COATED ORAL at 15:30

## 2021-11-25 RX ADMIN — PRAZOSIN HYDROCHLORIDE 2 MG: 1 CAPSULE ORAL at 08:22

## 2021-11-25 RX ADMIN — NIFEDIPINE 30 MG: 30 TABLET, FILM COATED, EXTENDED RELEASE ORAL at 08:22

## 2021-11-25 RX ADMIN — LOSARTAN POTASSIUM 100 MG: 50 TABLET, FILM COATED ORAL at 21:45

## 2021-11-25 RX ADMIN — HYDRALAZINE HYDROCHLORIDE 20 MG: 20 INJECTION INTRAMUSCULAR; INTRAVENOUS at 06:00

## 2021-11-25 RX ADMIN — INSULIN LISPRO 3 UNITS: 100 INJECTION, SOLUTION INTRAVENOUS; SUBCUTANEOUS at 11:33

## 2021-11-25 RX ADMIN — MORPHINE SULFATE 2 MG: 2 INJECTION, SOLUTION INTRAMUSCULAR; INTRAVENOUS at 11:39

## 2021-11-25 RX ADMIN — METOPROLOL TARTRATE 25 MG: 25 TABLET, FILM COATED ORAL at 08:22

## 2021-11-25 RX ADMIN — INSULIN LISPRO 2 UNITS: 100 INJECTION, SOLUTION INTRAVENOUS; SUBCUTANEOUS at 06:34

## 2021-11-25 RX ADMIN — LABETALOL HYDROCHLORIDE 15 MG: 5 INJECTION INTRAVENOUS at 04:59

## 2021-11-25 NOTE — PROGRESS NOTES
0730 Bedside shift change report given to BRAIN Edmonds (oncoming nurse) by Alejandra Varner (offgoing nurse). Report included the following information SBAR, Kardex, Intake/Output, MAR, Recent Results and Cardiac Rhythm SR.     0800 Notified MD of patient's /71. MD said to give scheduled morning meds and reassess. 0900 Pt's /67 post scheduled meds. 1450 PRN Labetalol given due to SBP in the 200's. MD notified of Patient's BP trends. 1515 Gave patient his scheduled BP meds. Rechecked BP in 30 mins; BP is 209/69. MD notified. [address] started; pt's /58.    1915 Cardene gtt stopped, /58.     1930 Bedside and Verbal shift change report given to Alejandra Varner (oncoming nurse) by Luly Knowles (offgoing nurse).  Report included the following information SBAR, Kardex, Intake/Output, MAR, Recent Results, Med Rec Status and Cardiac Rhythm SR.

## 2021-11-25 NOTE — ROUTINE PROCESS
Pt hypertensive all night despite use of prn meds. Pain 10/10 with use of tramadol. MD Danyel Esparza notified. 1x dose hydralazine ordered and morphine ordered.

## 2021-11-26 LAB
ANION GAP SERPL CALC-SCNC: 6 MMOL/L (ref 5–15)
BUN SERPL-MCNC: 23 MG/DL (ref 6–20)
BUN/CREAT SERPL: 8 (ref 12–20)
CALCIUM SERPL-MCNC: 7.9 MG/DL (ref 8.5–10.1)
CHLORIDE SERPL-SCNC: 99 MMOL/L (ref 97–108)
CO2 SERPL-SCNC: 27 MMOL/L (ref 21–32)
CREAT SERPL-MCNC: 2.81 MG/DL (ref 0.7–1.3)
GLUCOSE BLD STRIP.AUTO-MCNC: 142 MG/DL (ref 65–117)
GLUCOSE BLD STRIP.AUTO-MCNC: 160 MG/DL (ref 65–117)
GLUCOSE BLD STRIP.AUTO-MCNC: 160 MG/DL (ref 65–117)
GLUCOSE BLD STRIP.AUTO-MCNC: 242 MG/DL (ref 65–117)
GLUCOSE SERPL-MCNC: 182 MG/DL (ref 65–100)
POTASSIUM SERPL-SCNC: 3.4 MMOL/L (ref 3.5–5.1)
SERVICE CMNT-IMP: ABNORMAL
SODIUM SERPL-SCNC: 132 MMOL/L (ref 136–145)

## 2021-11-26 PROCEDURE — 74011250636 HC RX REV CODE- 250/636: Performed by: NURSE PRACTITIONER

## 2021-11-26 PROCEDURE — 74011250637 HC RX REV CODE- 250/637: Performed by: HOSPITALIST

## 2021-11-26 PROCEDURE — 97116 GAIT TRAINING THERAPY: CPT

## 2021-11-26 PROCEDURE — 74011250636 HC RX REV CODE- 250/636: Performed by: INTERNAL MEDICINE

## 2021-11-26 PROCEDURE — 97535 SELF CARE MNGMENT TRAINING: CPT

## 2021-11-26 PROCEDURE — 74011636637 HC RX REV CODE- 636/637: Performed by: NURSE PRACTITIONER

## 2021-11-26 PROCEDURE — C9113 INJ PANTOPRAZOLE SODIUM, VIA: HCPCS | Performed by: NURSE PRACTITIONER

## 2021-11-26 PROCEDURE — 74011250637 HC RX REV CODE- 250/637: Performed by: INTERNAL MEDICINE

## 2021-11-26 PROCEDURE — 74011000250 HC RX REV CODE- 250: Performed by: NURSE PRACTITIONER

## 2021-11-26 PROCEDURE — 65660000001 HC RM ICU INTERMED STEPDOWN

## 2021-11-26 PROCEDURE — 80048 BASIC METABOLIC PNL TOTAL CA: CPT

## 2021-11-26 PROCEDURE — 90935 HEMODIALYSIS ONE EVALUATION: CPT

## 2021-11-26 PROCEDURE — 74011000250 HC RX REV CODE- 250: Performed by: INTERNAL MEDICINE

## 2021-11-26 PROCEDURE — 82962 GLUCOSE BLOOD TEST: CPT

## 2021-11-26 PROCEDURE — 74011250637 HC RX REV CODE- 250/637: Performed by: NURSE PRACTITIONER

## 2021-11-26 PROCEDURE — 36415 COLL VENOUS BLD VENIPUNCTURE: CPT

## 2021-11-26 PROCEDURE — 74011250636 HC RX REV CODE- 250/636: Performed by: HOSPITALIST

## 2021-11-26 PROCEDURE — 74011000250 HC RX REV CODE- 250: Performed by: HOSPITALIST

## 2021-11-26 PROCEDURE — 97530 THERAPEUTIC ACTIVITIES: CPT

## 2021-11-26 RX ORDER — NIFEDIPINE 60 MG/1
60 TABLET, EXTENDED RELEASE ORAL 2 TIMES DAILY
Status: DISCONTINUED | OUTPATIENT
Start: 2021-11-26 | End: 2021-11-29

## 2021-11-26 RX ORDER — BUMETANIDE 1 MG/1
2 TABLET ORAL 2 TIMES DAILY
Status: DISCONTINUED | OUTPATIENT
Start: 2021-11-26 | End: 2021-11-30

## 2021-11-26 RX ADMIN — ISOSORBIDE MONONITRATE 120 MG: 60 TABLET, EXTENDED RELEASE ORAL at 08:27

## 2021-11-26 RX ADMIN — SUCRALFATE 1 G: 1 TABLET ORAL at 21:38

## 2021-11-26 RX ADMIN — MORPHINE SULFATE 2 MG: 2 INJECTION, SOLUTION INTRAMUSCULAR; INTRAVENOUS at 01:28

## 2021-11-26 RX ADMIN — CALCIUM CARBONATE (ANTACID) CHEW TAB 500 MG 200 MG: 500 CHEW TAB at 16:23

## 2021-11-26 RX ADMIN — HYDRALAZINE HYDROCHLORIDE 100 MG: 50 TABLET, FILM COATED ORAL at 21:38

## 2021-11-26 RX ADMIN — BUMETANIDE 2 MG: 0.25 INJECTION INTRAMUSCULAR; INTRAVENOUS at 08:26

## 2021-11-26 RX ADMIN — MORPHINE SULFATE 2 MG: 2 INJECTION, SOLUTION INTRAMUSCULAR; INTRAVENOUS at 07:28

## 2021-11-26 RX ADMIN — BUSPIRONE HYDROCHLORIDE 10 MG: 10 TABLET ORAL at 17:25

## 2021-11-26 RX ADMIN — PRAZOSIN HYDROCHLORIDE 2 MG: 1 CAPSULE ORAL at 21:38

## 2021-11-26 RX ADMIN — BUMETANIDE 2 MG: 1 TABLET ORAL at 17:25

## 2021-11-26 RX ADMIN — Medication 10 ML: at 07:19

## 2021-11-26 RX ADMIN — EPOETIN ALFA-EPBX 20000 UNITS: 20000 INJECTION, SOLUTION INTRAVENOUS; SUBCUTANEOUS at 22:00

## 2021-11-26 RX ADMIN — HYDRALAZINE HYDROCHLORIDE 100 MG: 50 TABLET, FILM COATED ORAL at 08:27

## 2021-11-26 RX ADMIN — HYDRALAZINE HYDROCHLORIDE 20 MG: 20 INJECTION INTRAMUSCULAR; INTRAVENOUS at 18:06

## 2021-11-26 RX ADMIN — NICARDIPINE HYDROCHLORIDE 5 MG/HR: 25 INJECTION, SOLUTION INTRAVENOUS at 01:26

## 2021-11-26 RX ADMIN — SUCRALFATE 1 G: 1 TABLET ORAL at 12:06

## 2021-11-26 RX ADMIN — HYDRALAZINE HYDROCHLORIDE 100 MG: 50 TABLET, FILM COATED ORAL at 16:23

## 2021-11-26 RX ADMIN — CLONIDINE HYDROCHLORIDE 0.2 MG: 0.2 TABLET ORAL at 21:38

## 2021-11-26 RX ADMIN — INSULIN LISPRO 2 UNITS: 100 INJECTION, SOLUTION INTRAVENOUS; SUBCUTANEOUS at 22:00

## 2021-11-26 RX ADMIN — Medication 10 ML: at 12:06

## 2021-11-26 RX ADMIN — CALCIUM CARBONATE (ANTACID) CHEW TAB 500 MG 200 MG: 500 CHEW TAB at 12:06

## 2021-11-26 RX ADMIN — MORPHINE SULFATE 2 MG: 2 INJECTION, SOLUTION INTRAMUSCULAR; INTRAVENOUS at 21:38

## 2021-11-26 RX ADMIN — Medication 10 ML: at 21:39

## 2021-11-26 RX ADMIN — CLONIDINE HYDROCHLORIDE 0.2 MG: 0.2 TABLET ORAL at 16:23

## 2021-11-26 RX ADMIN — LOSARTAN POTASSIUM 100 MG: 50 TABLET, FILM COATED ORAL at 21:38

## 2021-11-26 RX ADMIN — LABETALOL HYDROCHLORIDE 15 MG: 5 INJECTION INTRAVENOUS at 15:20

## 2021-11-26 RX ADMIN — LABETALOL HYDROCHLORIDE 15 MG: 5 INJECTION INTRAVENOUS at 23:34

## 2021-11-26 RX ADMIN — SERTRALINE 200 MG: 50 TABLET, FILM COATED ORAL at 08:27

## 2021-11-26 RX ADMIN — NIFEDIPINE 60 MG: 60 TABLET, EXTENDED RELEASE ORAL at 17:25

## 2021-11-26 RX ADMIN — SUCRALFATE 1 G: 1 TABLET ORAL at 07:19

## 2021-11-26 RX ADMIN — BUMETANIDE 2 MG: 1 TABLET ORAL at 12:06

## 2021-11-26 RX ADMIN — INSULIN LISPRO 2 UNITS: 100 INJECTION, SOLUTION INTRAVENOUS; SUBCUTANEOUS at 17:25

## 2021-11-26 RX ADMIN — PRAZOSIN HYDROCHLORIDE 2 MG: 1 CAPSULE ORAL at 08:27

## 2021-11-26 RX ADMIN — CALCIUM CARBONATE (ANTACID) CHEW TAB 500 MG 200 MG: 500 CHEW TAB at 07:19

## 2021-11-26 RX ADMIN — NICARDIPINE HYDROCHLORIDE 5 MG/HR: 25 INJECTION, SOLUTION INTRAVENOUS at 05:19

## 2021-11-26 RX ADMIN — NIFEDIPINE 30 MG: 30 TABLET, FILM COATED, EXTENDED RELEASE ORAL at 08:27

## 2021-11-26 RX ADMIN — ROSUVASTATIN 10 MG: 10 TABLET, FILM COATED ORAL at 21:38

## 2021-11-26 RX ADMIN — METOPROLOL TARTRATE 25 MG: 25 TABLET, FILM COATED ORAL at 08:27

## 2021-11-26 RX ADMIN — BUSPIRONE HYDROCHLORIDE 10 MG: 10 TABLET ORAL at 08:27

## 2021-11-26 RX ADMIN — SUCRALFATE 1 G: 1 TABLET ORAL at 16:23

## 2021-11-26 RX ADMIN — ASPIRIN 81 MG CHEWABLE TABLET 81 MG: 81 TABLET CHEWABLE at 08:27

## 2021-11-26 RX ADMIN — INSULIN LISPRO 2 UNITS: 100 INJECTION, SOLUTION INTRAVENOUS; SUBCUTANEOUS at 07:19

## 2021-11-26 RX ADMIN — CLONIDINE HYDROCHLORIDE 0.2 MG: 0.2 TABLET ORAL at 08:27

## 2021-11-26 RX ADMIN — INSULIN LISPRO 2 UNITS: 100 INJECTION, SOLUTION INTRAVENOUS; SUBCUTANEOUS at 12:05

## 2021-11-26 RX ADMIN — SODIUM CHLORIDE 40 MG: 9 INJECTION INTRAMUSCULAR; INTRAVENOUS; SUBCUTANEOUS at 08:26

## 2021-11-26 NOTE — PROGRESS NOTES
Problem: Self Care Deficits Care Plan (Adult)  Goal: *Acute Goals and Plan of Care (Insert Text)  Description: FUNCTIONAL STATUS PRIOR TO ADMISSION: Patient was independent without use of DME. However, patient's sons assist with lawn care, cooking, cleaning, grocery shopping, and transportation. Poor vision at baseline per patient report. Pt wears reading glasses. He used to work on cars. Pt is independent in ADLs. HOME SUPPORT PRIOR TO ADMISSION: The patient lived with his 3 sons and girlfriend. Occupational Therapy Goals  Initiated 11/23/2021  1. Patient will perform standing ADLs in bathroom with least restrictive DME with modified independence within 7 day(s). 2.  Patient will perform upper body dressing and lower body dressing with modified independence within 7 day(s). 3.  Patient will perform bathing with modified independence within 7 day(s). 4.  Patient will perform toilet transfers with modified independence within 7 day(s). 5.  Patient will perform all aspects of toileting with modified independence within 7 day(s). 6.  Patient will utilize energy conservation techniques during functional activities with verbal cues within 7 day(s). Outcome: Progressing Towards Goal   OCCUPATIONAL THERAPY TREATMENT  Patient: Jero Philippe (4 y.o. male)  Date: 11/26/2021  [facility]: [facility] [I50.9]   CHF exacerbation (HCC)  Procedure(s) (LRB):  ESOPHAGOGASTRODUODENOSCOPY (EGD) (N/A) 10 Days Post-Op  Precautions: Fall, Bed Alarm  Chart, occupational therapy assessment, plan of care, and goals were reviewed. ASSESSMENT  Patient continues with skilled OT services and is progressing towards goals. Patient sleeping supine in bed upon OT arrival, easily arousable but initially groggy, agreeable to participate with therapy.  Patient BP taken in supine, patient then transferred to chair and taken again, taken in chair for third time following ambulation to and from bathroom, see below:     Vitals:   11/26/21 1000 11/26/21 1029 11/26/21 1030 11/26/21 1033   BP:   (!) 168/68 (!) 159/63 (!) 159/61   BP 1 Location:   Right upper arm Right upper arm Right upper arm   BP Patient Position:   Supine; At rest Sitting  Comment: after transfer bed to chair Sitting  Comment: after ambulating to and from bathroom   Pulse: (!) 58         Temp:           TempSrc:           Resp:           Height:           Weight:           SpO2:       95%     Patient able to perform lower extremity dressing sitting in recliner with cueing for grippers on bottom of socks for safety during mobility. Patient ambulating to and from bathroom and stood at sink ~5 minutes for grooming to brush teeth and wash face, then performed toilet transfer and grooming at sink additional minute to wash hands following toileting. Patient overall with fair standing balance but requiring increased time for transfers and reaching out for surfaces during ambulation, no LOB noted. Patient would benefit from skilled OT services during admission to improve independence with self care and functional mobility/transfers. Recommend discharge to [facility] at this time. Current Level of Function Impacting Discharge (ADLs): SBA to CGA for activities of daily living, SBA to CGA with increased time for functional mobility/transfers    Other factors to consider for discharge: time since onset, prior level of function, family support         PLAN :  Patient continues to benefit from skilled intervention to address the above impairments. Continue treatment per established plan of care to address goals. Recommend with staff: Recommend with nursing, ADLs with assist PRN, OOB to chair 3x/day via gait belt and toileting via functional mobility to and from bathroom. Thank you for completing as able in order to maintain patient strength, endurance and independence.      Recommendation for discharge: (in order for the patient to meet his/her long term goals)  Therapy 3 hours per day 5-7 days per week    This discharge recommendation:  Has been made in collaboration with the attending provider and/or case management    IF patient discharges home will need the following DME: TBD       SUBJECTIVE:   Patient stated I feel a little dizzy.  Initially upon being woken up. BP taken with positional changes. OBJECTIVE DATA SUMMARY:   Cognitive/Behavioral Status:  Neurologic State: Alert  Orientation Level: Oriented X4  Cognition: Appropriate for age attention/concentration; Follows commands             Functional Mobility and Transfers for ADLs:  Bed Mobility:  Rolling: Stand-by assistance  Supine to Sit: Stand-by assistance    Transfers:  Sit to Stand: Contact guard assistance  Functional Transfers  Bathroom Mobility: Contact guard assistance  Toilet Transfer : Contact guard assistance (using grab bar)  Bed to Chair: Contact guard assistance    Balance:  Sitting: Intact; Without support  Standing: Impaired; Without support  Standing - Static: Fair; Occasional  Standing - Dynamic : Fair; Occasional    ADL Intervention:       Grooming  Grooming Assistance: Contact guard assistance (for standing balance)  Position Performed: Standing (at sink)  Washing Face: Contact guard assistance  Washing Hands: Contact guard assistance  Brushing Teeth: Contact guard assistance  Cues: Verbal cues provided                   Lower Body Dressing Assistance  Socks: Stand-by assistance  Leg Crossed Method Used: Yes  Position Performed: Seated in chair    Toileting  Bladder Hygiene: Stand-by assistance  Clothing Management: Stand-by assistance  Cues: Verbal cues provided           Pain:  0/10 reported    Activity Tolerance:   Fair and requires rest breaks    After treatment patient left in no apparent distress:   Sitting in chair, Call bell within reach and Bed / chair alarm activated    COMMUNICATION/COLLABORATION:   The patients plan of care was discussed with: Registered nurseGene Rojas Calculation: 23 mins

## 2021-11-26 NOTE — PROGRESS NOTES
[address] [facility] Adult  Hospitalist Group                                                                                          Hospitalist Progress Note  Lissette Singh MD  Answering service: [phone] from in house phone        Date of Service:  2021  NAME:  Sergo Chi  :  1960  MRN:  [id number]      Admission Summary:   70-year-old male with a past medical history of CAD status post CABG on dAPT, HFpEF,  DM 2, and CKD V who was admitted on  for GI bleed, and overload secondary to cardiorenal syndrome. He had been hospitalized at [facility] prior to this for acute on chronic heart failure, and started to develop bilateral lower extremity edema with melena upon discharge. He underwent EGD which showed nonbleeding erosive gastritis, and was started on PPI. Nephrology, and cardiology were consulted for volume management, and he was diuresed with IV Lasix. On , patient sustained PEA arrest.  After 12 minutes of CPR, ROSC was achieved, and he was intubated and transferred to the ICU. His volume status did not improve with IV Lasix, and Alexandre catheter was placed with plan for 3 days of dialysis. He was extubated, and neurology was consulted due altered mental status suspected secondary to anoxic brain injury, but this improved, and his mental status ultimately returned back to baseline with no imaging or intervention recommended.  Transferred out of ICU on 21    Interval history / Subjective:     Pt seen and examined  C/o chest pain from CPR  Mild headache         Assessment & Plan:     #SEVERIANO on CKD 4, now on HD due to ATN from PEA arrest  #HFpEF  Continue IV Bumex, TUMS, retacrit, and losartan  Cardiology, nephrology on board, appreciate recommendations  Avoid renal toxins, and hypotension.  --HD MWF  --c/w HD , d/w renal, will start looking into outpatient HD unit  --Perm-a-cath placed on     Headache/Nausea/Vomiting  - Hypertensive urgency  - c/w Clonidine 0.2mg TID, Cozaar 100mg, Imdur increased to 120mg daily, Lopressor, Hydralazine   - added Procardia XL and Prazosin 11/23, procardia decreased to 30mg daily   - resume  Cardene gtt as BP still high    S/p PEA Arrest suspect from respiratory distress  --s/p intubation/extubation      #Erosive gastritis  Presented with melena, and normocytic anemia with hemoglobin 7-8  EGD on 11/17 with nonbleeding erosive gastritis  Continue PPI per gastroenterology  -now on ASA monotherapy    Probable Anoxic brain injury due to Cardiac arrest  - down for 12 minutes  - seen by Neurology  - supportive care     #DM II  -lispro ISS     #CAD s/p CABG  -continue metoprolol, crestor, imdur, and ASA     #Depression  -continue zoloft     #MSK pain   -s/p CPR  -has tramadol,  -Lidocaine patch added     FEN: caridac  dvt ppx: SCD  MPOA: daughter  Code: Full    Care Plan discussed with: Patient/Family and Nurse  Anticipated Disposition: [facility]/Rehab  Anticipated Discharge: Greater than 48 hours, IPR pending, needs [facility] Problems  Date Reviewed: 8/7/2020          Codes Class Noted POA    Severe protein-calorie malnutrition ([facility] ICD-10-CM: I38  ICD-9-CM: 523  11/23/2021 Yes        * (Principal) [facility] ICD-10-CM: I50.9  ICD-9-CM: 428.0  11/14/2021 Unknown                Review of Systems:   A comprehensive review of systems was negative except for that written in the HPI. Vital Signs:    Last 24hrs VS reviewed since prior progress note.  Most recent are:  Visit Vitals  BP (!) 181/69   Pulse 64   Temp 98 °F (36.7 °C)   Resp 18   Ht 5' 9\" (1.753 m)   Wt 77.8 kg (171 lb 8.3 oz)   SpO2 96%   BMI 25.33 kg/m²         Intake/Output Summary (Last 24 hours) at 11/25/2021 2334  Last data filed at 11/25/2021 2259  Gross per 24 hour   Intake 960 ml   Output 1715 ml   Net -755 ml        Physical Examination:     I had a face to face encounter with this patient and independently examined them on 11/25/2021 as outlined below:          Constitutional:  No acute distress, cooperative, pleasant    ENT:  Oral mucosa moist, oropharynx benign. Resp:  CTA bilaterally. CV:  Regular rhythm, normal rate    GI:  Soft, non distended, non tender. normoactive bowel sounds,    Musculoskeletal:  trace edema, warm, 2+ pulses throughout    Neurologic:  Moves all extremities. AAO            Data Review:    Review and/or order of clinical lab test  Review and/or order of tests in the radiology section of CPT  Review and/or order of tests in the medicine section of CPT      Labs:     No results for input(s): WBC, HGB, HCT, PLT, HGBEXT, HCTEXT, PLTEXT, HGBEXT, HCTEXT, PLTEXT in the last 72 hours. Recent Labs     11/25/21  0236 11/24/21  0330 11/24/21  0328   * 132* 131*   K 3.6 3.8 3.7   CL 99 100 99   CO2 27 23 23   BUN 19 31* 32*   CREA 2.37* 3.11* 3.09*   * 173* 170*   CA 8.5 7.9* 8.0*   PHOS  --  2.9  --      Recent Labs     11/24/21  0330   ALB 2.6*     No results for input(s): INR, PTP, APTT, INREXT, INREXT in the last 72 hours. No results for input(s): FE, TIBC, PSAT, FERR in the last 72 hours. No results found for: FOL, RBCF   No results for input(s): PH, PCO2, PO2 in the last 72 hours. No results for input(s): CPK, CKNDX, TROIQ in the last 72 hours.     No lab exists for component: CPKMB  Lab Results   Component Value Date/Time    Cholesterol, total 176 11/22/2019 09:36 AM    HDL Cholesterol 45 11/22/2019 09:36 AM    LDL, calculated 112 (H) 11/22/2019 09:36 AM    Triglyceride 94 11/22/2019 09:36 AM     Lab Results   Component Value Date/Time    Glucose (POC) 195 (H) 11/25/2021 09:56 PM    Glucose (POC) 186 (H) 11/25/2021 04:14 PM    Glucose (POC) 203 (H) 11/25/2021 11:18 AM    Glucose (POC) 144 (H) 11/25/2021 06:25 AM    Glucose (POC) 135 (H) 11/24/2021 09:25 PM     Lab Results   Component Value Date/Time    Color YELLOW/STRAW 11/14/2021 10:31 AM    Appearance CLEAR 11/14/2021 10:31 AM Specific gravity 1.019 11/14/2021 10:31 AM    pH (UA) 5.5 11/14/2021 10:31 AM    Protein >300 (A) 11/14/2021 10:31 AM    Glucose 500 (A) 11/14/2021 10:31 AM    Ketone Negative 11/14/2021 10:31 AM    Bilirubin Negative 11/14/2021 10:31 AM    Urobilinogen 0.2 11/14/2021 10:31 AM    Nitrites Negative 11/14/2021 10:31 AM    Leukocyte Esterase Negative 11/14/2021 10:31 AM    Epithelial cells FEW 11/14/2021 10:31 AM    Bacteria Negative 11/14/2021 10:31 AM    WBC 0-4 11/14/2021 10:31 AM    RBC 0-5 11/14/2021 10:31 AM         Medications Reviewed:     Current Facility-Administered Medications   Medication Dose Route Frequency    morphine injection 2 mg  2 mg IntraVENous Q3H PRN    isosorbide mononitrate ER (IMDUR) tablet 120 mg  120 mg Oral DAILY    NIFEdipine ER (PROCARDIA XL) tablet 30 mg  30 mg Oral DAILY    heparin (porcine) 1,000 unit/mL injection 1,900 Units  1,900 Units Hemodialysis DIALYSIS PRN    heparin (porcine) 1,000 unit/mL injection 1,900 Units  1,900 Units Hemodialysis DIALYSIS PRN    lidocaine 4 % patch 1 Patch  1 Patch TransDERmal Q24H    prazosin (MINIPRESS) capsule 2 mg  2 mg Oral BID    labetaloL (NORMODYNE;TRANDATE) injection 15 mg  15 mg IntraVENous Q4H PRN    hydrALAZINE (APRESOLINE) tablet 100 mg  100 mg Oral TID    niCARdipine (CARDENE) 25 mg in 0.9% sodium chloride 250 mL infusion  0-15 mg/hr IntraVENous TITRATE    cloNIDine HCL (CATAPRES) tablet 0.2 mg  0.2 mg Oral TID    sucralfate (CARAFATE) tablet 1 g  1 g Oral AC&HS    losartan (COZAAR) tablet 100 mg  100 mg Oral QHS    [Held by provider] gabapentin (NEURONTIN) capsule 100 mg  100 mg Oral TID    insulin lispro (HUMALOG) injection   SubCUTAneous AC&HS    hydrALAZINE (APRESOLINE) 20 mg/mL injection 10-20 mg  10-20 mg IntraVENous Q6H PRN    pantoprazole (PROTONIX) 40 mg in 0.9% sodium chloride 10 mL injection  40 mg IntraVENous DAILY    metoprolol tartrate (LOPRESSOR) tablet 25 mg  25 mg Oral Q12H    aspirin chewable tablet 81 mg  81 mg Oral DAILY    polyethylene glycol (MIRALAX) packet 17 g  17 g Oral BID    [Held by provider] eplerenone (INSPRA) tablet 25 mg  25 mg Oral DAILY    bumetanide (BUMEX) injection 2 mg  2 mg IntraVENous Q12H    calcium carbonate (TUMS) chewable tablet 200 mg [elemental]  200 mg Oral TID WITH MEALS    sodium chloride (NS) flush 5-40 mL  5-40 mL IntraVENous Q8H    sodium chloride (NS) flush 5-40 mL  5-40 mL IntraVENous PRN    epoetin ericka-epbx (RETACRIT) injection 20,000 Units  20,000 Units SubCUTAneous Q MON, WED & FRI    butalbital-acetaminophen-caffeine (FIORICET, ESGIC) -40 mg per tablet 1 Tablet  1 Tablet Oral Q4H PRN    ondansetron (ZOFRAN) injection 4 mg  4 mg IntraVENous Q4H PRN    busPIRone (BUSPAR) tablet 10 mg  10 mg Oral BID    rosuvastatin (CRESTOR) tablet 10 mg  10 mg Oral QHS    sertraline (ZOLOFT) tablet 200 mg  200 mg Oral DAILY    traMADoL (ULTRAM) tablet 50 mg  50 mg Oral Q6H PRN    [Held by provider] traZODone (DESYREL) tablet 50 mg  50 mg Oral QHS    acetaminophen (TYLENOL) tablet 650 mg  650 mg Oral Q6H PRN    glucose chewable tablet 16 g  4 Tablet Oral PRN    dextrose (D50W) injection syrg 12.5-25 g  25-50 mL IntraVENous PRN    glucagon (GLUCAGEN) injection 1 mg  1 mg IntraMUSCular PRN    [Held by provider] insulin glargine (LANTUS) injection 10 Units  10 Units SubCUTAneous DAILY     ______________________________________________________________________  EXPECTED LENGTH OF STAY: 3d 19h  ACTUAL LENGTH OF STAY:          11                 Cally Meredith MD

## 2021-11-26 NOTE — PROGRESS NOTES
Problem: Mobility Impaired (Adult and Pediatric)  Goal: *Acute Goals and Plan of Care (Insert Text)  Outcome: Progressing Towards Goal     PHYSICAL THERAPY TREATMENT  Patient: Daya Leiva (50 y.o. male)  Date: 11/26/2021  Diagnosis: CHF exacerbation ([facility] [I50.9]   CHF exacerbation (HCC)  Procedure(s) (LRB):  ESOPHAGOGASTRODUODENOSCOPY (EGD) (N/A) 10 Days Post-Op  Precautions: Fall, Bed Alarm  Chart, physical therapy assessment, plan of care and goals were reviewed. ASSESSMENT  Patient continues with skilled PT services and is progressing towards goals. Pt displayed improved mobility. Pt still present with decrease stability and unable to ambulate with cane. Pt was able to ambulate with rolling walker with no LOB. Pt mobilized to the bathroom and toilet with supervision. Dialysis present pt returned supine. Current Level of Function Impacting Discharge (mobility/balance): *CGA    Other factors to consider for discharge: *decrease mobility          PLAN :  Patient continues to benefit from skilled intervention to address the above impairments. Continue treatment per established plan of care. to address goals. Recommendation for discharge: (in order for the patient to meet his/her long term goals)  Pt has progressed with mobility and too high level with for inpt rehab. Pt could benefit from HHPT with assistance as needed     This discharge recommendation:  Has not yet been discussed the attending provider and/or case management    IF patient discharges home will need the following DME: patient owns DME required for discharge       SUBJECTIVE:   Patient stated I am always cold .     OBJECTIVE DATA SUMMARY:   Critical Behavior:  Neurologic State: Alert  Orientation Level: Oriented X4  Cognition: Appropriate for age attention/concentration, Follows commands  Safety/Judgement: Decreased insight into deficits  Functional Mobility Training:  Bed Mobility:  Rolling: Stand-by assistance  Supine to Sit: Stand-by assistance  Sit to Supine: Stand-by assistance           Transfers:  Sit to Stand: Stand-by assistance  Stand to Sit: Stand-by assistance        Bed to Chair: Contact guard assistance                    Balance:  Sitting: Intact  Standing: Impaired  Standing - Static: Good  Standing - Dynamic : Good  Ambulation/Gait Training:  Distance (ft): 150 Feet (ft)  Assistive Device: Gait belt; Walker, rolling  Ambulation - Level of Assistance: Stand-by assistance; Contact guard assistance        Gait Abnormalities: Decreased step clearance              Speed/Myla: Pace decreased (<100 feet/min)  Step Length: Left shortened; Right shortened            Therapeutic Exercises:     Pain Rating:  No complaints     Activity Tolerance:   Improving     After treatment patient left in no apparent distress:   Supine in bed, Call bell within reach, and dialysis present     COMMUNICATION/COLLABORATION:   The patients plan of care was discussed with: Registered nurse.      Christopher Llanos PTA   Time Calculation: 24 mins

## 2021-11-26 NOTE — PROGRESS NOTES
[facility]   [address] [address], [address] [facility]  Phone: [phone]   [phone]       Nephrology Progress Note  Jenni Carcamo     1960     [id number]  Date of Admission : 11/14/2021 11/26/21    CC: Follow up for severiano ON CKD 4       Assessment and Plan   SEVERIANO on CKD  - progressive diabetic Nephropathy   - Now w/ superimposed ATN from PEA/ resp arrest   - HD MWF, plan for HD today  - PC placed 11/24  - CM to start working on outpt chair  -changed bumex to PO    CKD 4 at baseline:  -Biopsy-proven advanced diabetic nephropathy  -Baseline creatinine 3.5 mg/dL     HTN:  - Will change procardia XL to 60 mg BID     Anemia in CKD   - continue RANDY      CAD s/p CABG May 2020  HFpEF  -Last echo: Preserved LVEF, Mod Pulm HTN-PASP 50 mmHg     Resp / PEA arrest     AMS   ? Hypoxic Brain injury   - improving     Interval History:  Seen and examined. BP much better. Off cardene. Review of Systems: Review of systems not obtained due to patient factors.     Current Medications:   Current Facility-Administered Medications   Medication Dose Route Frequency    morphine injection 2 mg  2 mg IntraVENous Q3H PRN    isosorbide mononitrate ER (IMDUR) tablet 120 mg  120 mg Oral DAILY    NIFEdipine ER (PROCARDIA XL) tablet 30 mg  30 mg Oral DAILY    heparin (porcine) 1,000 unit/mL injection 1,900 Units  1,900 Units Hemodialysis DIALYSIS PRN    heparin (porcine) 1,000 unit/mL injection 1,900 Units  1,900 Units Hemodialysis DIALYSIS PRN    lidocaine 4 % patch 1 Patch  1 Patch TransDERmal Q24H    prazosin (MINIPRESS) capsule 2 mg  2 mg Oral BID    labetaloL (NORMODYNE;TRANDATE) injection 15 mg  15 mg IntraVENous Q4H PRN    hydrALAZINE (APRESOLINE) tablet 100 mg  100 mg Oral TID    niCARdipine (CARDENE) 25 mg in 0.9% sodium chloride 250 mL infusion  0-15 mg/hr IntraVENous TITRATE    cloNIDine HCL (CATAPRES) tablet 0.2 mg  0.2 mg Oral TID    sucralfate (CARAFATE) tablet 1 g  1 g Oral AC&HS    losartan (COZAAR) tablet 100 mg  100 mg Oral QHS    [Held by provider] gabapentin (NEURONTIN) capsule 100 mg  100 mg Oral TID    insulin lispro (HUMALOG) injection   SubCUTAneous AC&HS    hydrALAZINE (APRESOLINE) 20 mg/mL injection 10-20 mg  10-20 mg IntraVENous Q6H PRN    pantoprazole (PROTONIX) 40 mg in 0.9% sodium chloride 10 mL injection  40 mg IntraVENous DAILY    metoprolol tartrate (LOPRESSOR) tablet 25 mg  25 mg Oral Q12H    aspirin chewable tablet 81 mg  81 mg Oral DAILY    polyethylene glycol (MIRALAX) packet 17 g  17 g Oral BID    [Held by provider] eplerenone (INSPRA) tablet 25 mg  25 mg Oral DAILY    bumetanide (BUMEX) injection 2 mg  2 mg IntraVENous Q12H    calcium carbonate (TUMS) chewable tablet 200 mg [elemental]  200 mg Oral TID WITH MEALS    sodium chloride (NS) flush 5-40 mL  5-40 mL IntraVENous Q8H    sodium chloride (NS) flush 5-40 mL  5-40 mL IntraVENous PRN    epoetin ericka-epbx (RETACRIT) injection 20,000 Units  20,000 Units SubCUTAneous Q MON, WED & FRI    butalbital-acetaminophen-caffeine (FIORICET, ESGIC) -40 mg per tablet 1 Tablet  1 Tablet Oral Q4H PRN    ondansetron (ZOFRAN) injection 4 mg  4 mg IntraVENous Q4H PRN    busPIRone (BUSPAR) tablet 10 mg  10 mg Oral BID    rosuvastatin (CRESTOR) tablet 10 mg  10 mg Oral QHS    sertraline (ZOLOFT) tablet 200 mg  200 mg Oral DAILY    traMADoL (ULTRAM) tablet 50 mg  50 mg Oral Q6H PRN    [Held by provider] traZODone (DESYREL) tablet 50 mg  50 mg Oral QHS    acetaminophen (TYLENOL) tablet 650 mg  650 mg Oral Q6H PRN    glucose chewable tablet 16 g  4 Tablet Oral PRN    dextrose (D50W) injection syrg 12.5-25 g  25-50 mL IntraVENous PRN    glucagon (GLUCAGEN) injection 1 mg  1 mg IntraMUSCular PRN    [Held by provider] insulin glargine (LANTUS) injection 10 Units  10 Units SubCUTAneous DAILY      No Known Allergies    Objective:  Vitals:    Vitals:    11/26/21 1000 11/26/21 1029 11/26/21 1030 11/26/21 1033   BP: (!) 168/68 (!) 159/63 (!) 159/61   Pulse: (!) 58      Resp:       Temp:       TempSrc:       SpO2:    95%   Weight:       Height:         Intake and Output:  11/26 0701 - 11/26 1900  In: 240 [P.O.:240]  Out: 400 [Urine:400]  11/24 1901 - 11/26 0700  In: 1680 [P.O.:1680]  Out: 2015 [Urine:2015]    Physical Examination:    General: NAD  Neck:  Supple, no mass  Resp:  Clear b/l  CV:  RRR, s1,s2  Neurologic:  Non focal  :                  Grant +        []    High complexity decision making was performed  []    Patient is at high-risk of decompensation with multiple organ involvement    Lab Data Personally Reviewed: I have reviewed all the pertinent labs, microbiology data and radiology studies during assessment. Recent Labs     11/26/21  0125 11/25/21  0236 11/24/21  0330 11/24/21  0328   * 133* 132* 131*   K 3.4* 3.6 3.8 3.7   CL 99 99 100 99   CO2 27 27 23 23   * 143* 173* 170*   BUN 23* 19 31* 32*   CREA 2.81* 2.37* 3.11* 3.09*   CA 7.9* 8.5 7.9* 8.0*   PHOS  --   --  2.9  --    ALB  --   --  2.6*  --      No results for input(s): WBC, HGB, HCT, PLT, HGBEXT, HCTEXT, PLTEXT, HGBEXT, HCTEXT, PLTEXT in the last 72 hours.   No results found for: [facility]  Lab Results   Component Value Date/Time    Culture result: NO GROWTH 4 DAYS 11/18/2021 11:40 AM     Recent Results (from the past 24 hour(s))   GLUCOSE, POC    Collection Time: 11/25/21 11:18 AM   Result Value Ref Range    Glucose (POC) 203 (H) 65 - 117 mg/dL    Performed by Олег Shanks, POC    Collection Time: 11/25/21  4:14 PM   Result Value Ref Range    Glucose (POC) 186 (H) 65 - 117 mg/dL    Performed by Claudine Franks (LPN)    GLUCOSE, POC    Collection Time: 11/25/21  9:56 PM   Result Value Ref Range    Glucose (POC) 195 (H) 65 - 117 mg/dL    Performed by [facility]    METABOLIC PANEL, BASIC    Collection Time: 11/26/21  1:25 AM   Result Value Ref Range    Sodium 132 (L) 136 - 145 mmol/L    Potassium 3.4 (L) 3.5 - 5.1 mmol/L Chloride 99 97 - 108 mmol/L    CO2 27 21 - 32 mmol/L    Anion gap 6 5 - 15 mmol/L    Glucose 182 (H) 65 - 100 mg/dL    BUN 23 (H) 6 - 20 MG/DL    Creatinine 2.81 (H) 0.70 - 1.30 MG/DL    BUN/Creatinine ratio 8 (L) 12 - 20      GFR est AA 28 (L) >60 ml/min/1.73m2    GFR est non-AA 23 (L) >60 ml/min/1.73m2    Calcium 7.9 (L) 8.5 - 10.1 MG/DL   GLUCOSE, POC    Collection Time: 11/26/21  6:41 AM   Result Value Ref Range    Glucose (POC) 160 (H) 65 - 117 mg/dL    Performed by Kimi Ferrer            Total time spent with patient:  xxx   min. Care Plan discussed with:  Patient     Family      RN      Consulting Physician [facility]        I have reviewed the flowsheets. Chart and Pertinent Notes have been reviewed. No change in PMH ,family and social history from Consult note.       Juan Sprague MD

## 2021-11-26 NOTE — PROGRESS NOTES
0730 Bedside and Verbal shift change report given to BRAIN Edmonds (oncoming nurse) by Edith Peabody (offgoing nurse). Report included the following information SBAR, Kardex, Intake/Output, MAR, Recent Results, Med Rec Status and Cardiac Rhythm SR.     0737  Cardene gtt; Rate change to 2.5 mg/hr. 7510 Stopped cardene gtt due to HR in the 50's. Francisco Olivarezy notified. BP is 171/65.     1029 Pt worked with PT/OT. Pt is sitting up in the chair. 1521 PRN Labetalol given; /69.     1600 Post Labetalol /64. MD notified. MD said, Nephrology will adjust  meds to help with BP.     1807 PRN hydralazine 20mg IV given; /67. Pt is also complaining of headache. MD notified. No orders received. 1930 Bedside and Verbal shift change report given to [facility] (oncoming nurse) by Annika Cary (offgoing nurse).  Report included the following information SBAR, Kardex, Intake/Output, MAR, Recent Results and Cardiac Rhythm SR.

## 2021-11-26 NOTE — PROCEDURES
Hemodialysis / [phone]    Vitals Pre Post Assessment Pre Post   BP BP: (!) 189/71 (11/26/21 1200) 197/65 LOC AxOx4 AxOx4   HR Pulse (Heart Rate): (!) 59 (11/26/21 1200) 72 Lungs CTA Even and unlabored   Resp Resp Rate: 20 (11/26/21 1200) 20 Cardiac SB --> NSR NSR / RRR   Temp Temp: 97.6 °F (36.4 °C) (11/26/21 1200) 98.4 F Skin CDI CDI   Weight Pre-Dialysis Weight: 80.2 kg (176 lb 12.9 oz) (11/22/21 0828) n/a Edema Generalized Generalized   Tele status Bedside monitoring Bedside monitoring Pain Pain Intensity 1: 0 (11/26/21 1100) 0     Orders   Duration: Start: 1200 End: 1535 Total: 3.5 hrs   Dialyzer: Dialyzer/Set Up Inspection: Revaclear (11/26/21 1200)   K Bath: Dialysate K (mEq/L): 4 (11/26/21 1200)   Ca Bath: Dialysate CA (mEq/L): 2.5 (11/26/21 1200)   Na: Dialysate NA (mEq/L): 140 (11/26/21 1200)   Bicarb: Dialysate HCO3 (mEq/L): 35 (11/26/21 1200)   Target Fluid Removal: Goal/Amount of Fluid to Remove (mL): 3000 mL (11/26/21 1200)     Access   Type & Location: R chest CVC   Comments:                                     Right tunneled CVC, placed Wednesday. Post procedure dressing removed and new applied; dated 11/26/21. Each catheter limb disinfected for 60 seconds per limb with alcohol swabs.  Caps removed, dialysis CVC hub scrubbed with Prevantics for 5 seconds, followed by a 5 second dry time per Hospital P&P.   +aspirated/+flushed       Labs   HBsAg (Antigen) / date: 11/19/21 negative                                              HBsAb (Antibody) / date: 11/19/21 susceptible   Source: Epic   Obtained/Reviewed  Critical Results Called HGB   Date Value Ref Range Status   11/22/2021 7.8 (L) 12.1 - 17.0 g/dL Final     Potassium   Date Value Ref Range Status   11/26/2021 3.4 (L) 3.5 - 5.1 mmol/L Final     Calcium   Date Value Ref Range Status   11/26/2021 7.9 (L) 8.5 - 10.1 MG/DL Final     BUN   Date Value Ref Range Status   11/26/2021 23 (H) 6 - 20 MG/DL Final     Creatinine   Date Value Ref Range Status 11/26/2021 2.81 (H) 0.70 - 1.30 MG/DL Final        Meds Given   Name Dose Route   Heparin 1:1000 A 1.9 mL / V 1.9 mL               Adequacy / Fluid    Total Liters Process: 78.1 L   Net Fluid Removed: 3000 mL      Comments   Time Out Done:   (Time) 1155   Admitting Diagnosis: CHF exacerbation   Consent obtained/signed: Informed Consent Verified: Yes (11/26/21 1200)   Machine / RO # Machine Number: [id number] (11/26/21 1200)   Primary Nurse Rpt Pre: Vick Farrar RN   Primary Nurse Rpt Post: Vick Farrar RN   Pt Education: Procedural / infection control   Care Plan: Continue current HD plan of care   Pts outpatient clinic: TBD     Tx Summary   Comments:                         1200 HD treatment initiated per physicians order. 32 61 16 HD treatment completed per physician order. All possible blood returned to patient. Each catheter limb disinfected for 60 seconds per limb with alcohol swabs. Dialysis CVC hub scrubbed with Prevantics for 15 seconds, followed by a 5 second dry time per Hospital P&P.   +flushed/+heplock/+capped.

## 2021-11-27 LAB
ANION GAP SERPL CALC-SCNC: 3 MMOL/L (ref 5–15)
BUN SERPL-MCNC: 14 MG/DL (ref 6–20)
BUN/CREAT SERPL: 6 (ref 12–20)
CALCIUM SERPL-MCNC: 7.9 MG/DL (ref 8.5–10.1)
CHLORIDE SERPL-SCNC: 101 MMOL/L (ref 97–108)
CO2 SERPL-SCNC: 29 MMOL/L (ref 21–32)
CREAT SERPL-MCNC: 2.16 MG/DL (ref 0.7–1.3)
GLUCOSE BLD STRIP.AUTO-MCNC: 173 MG/DL (ref 65–117)
GLUCOSE BLD STRIP.AUTO-MCNC: 177 MG/DL (ref 65–117)
GLUCOSE BLD STRIP.AUTO-MCNC: 204 MG/DL (ref 65–117)
GLUCOSE BLD STRIP.AUTO-MCNC: 233 MG/DL (ref 65–117)
GLUCOSE SERPL-MCNC: 220 MG/DL (ref 65–100)
POTASSIUM SERPL-SCNC: 3.6 MMOL/L (ref 3.5–5.1)
SERVICE CMNT-IMP: ABNORMAL
SODIUM SERPL-SCNC: 133 MMOL/L (ref 136–145)

## 2021-11-27 PROCEDURE — 74011250637 HC RX REV CODE- 250/637: Performed by: INTERNAL MEDICINE

## 2021-11-27 PROCEDURE — 74011250637 HC RX REV CODE- 250/637: Performed by: NURSE PRACTITIONER

## 2021-11-27 PROCEDURE — 80048 BASIC METABOLIC PNL TOTAL CA: CPT

## 2021-11-27 PROCEDURE — C9113 INJ PANTOPRAZOLE SODIUM, VIA: HCPCS | Performed by: NURSE PRACTITIONER

## 2021-11-27 PROCEDURE — 74011000250 HC RX REV CODE- 250: Performed by: NURSE PRACTITIONER

## 2021-11-27 PROCEDURE — 74011250636 HC RX REV CODE- 250/636: Performed by: INTERNAL MEDICINE

## 2021-11-27 PROCEDURE — 65660000001 HC RM ICU INTERMED STEPDOWN

## 2021-11-27 PROCEDURE — 74011000250 HC RX REV CODE- 250: Performed by: HOSPITALIST

## 2021-11-27 PROCEDURE — 74011250637 HC RX REV CODE- 250/637: Performed by: HOSPITALIST

## 2021-11-27 PROCEDURE — 82962 GLUCOSE BLOOD TEST: CPT

## 2021-11-27 PROCEDURE — 74011250636 HC RX REV CODE- 250/636: Performed by: NURSE PRACTITIONER

## 2021-11-27 PROCEDURE — 74011250636 HC RX REV CODE- 250/636: Performed by: HOSPITALIST

## 2021-11-27 PROCEDURE — 74011636637 HC RX REV CODE- 636/637: Performed by: NURSE PRACTITIONER

## 2021-11-27 PROCEDURE — 36415 COLL VENOUS BLD VENIPUNCTURE: CPT

## 2021-11-27 RX ORDER — INSULIN GLARGINE 100 [IU]/ML
6 INJECTION, SOLUTION SUBCUTANEOUS DAILY
Status: DISCONTINUED | OUTPATIENT
Start: 2021-11-28 | End: 2021-12-01 | Stop reason: HOSPADM

## 2021-11-27 RX ORDER — METOPROLOL TARTRATE 25 MG/1
12.5 TABLET, FILM COATED ORAL EVERY 12 HOURS
Status: DISCONTINUED | OUTPATIENT
Start: 2021-11-27 | End: 2021-12-01 | Stop reason: HOSPADM

## 2021-11-27 RX ADMIN — NICARDIPINE HYDROCHLORIDE 5 MG/HR: 25 INJECTION, SOLUTION INTRAVENOUS at 00:44

## 2021-11-27 RX ADMIN — ISOSORBIDE MONONITRATE 120 MG: 60 TABLET, EXTENDED RELEASE ORAL at 09:21

## 2021-11-27 RX ADMIN — SERTRALINE 200 MG: 50 TABLET, FILM COATED ORAL at 09:21

## 2021-11-27 RX ADMIN — NIFEDIPINE 60 MG: 60 TABLET, EXTENDED RELEASE ORAL at 17:45

## 2021-11-27 RX ADMIN — CALCIUM CARBONATE (ANTACID) CHEW TAB 500 MG 200 MG: 500 CHEW TAB at 08:00

## 2021-11-27 RX ADMIN — BUMETANIDE 2 MG: 1 TABLET ORAL at 17:45

## 2021-11-27 RX ADMIN — BUSPIRONE HYDROCHLORIDE 10 MG: 10 TABLET ORAL at 09:21

## 2021-11-27 RX ADMIN — NIFEDIPINE 60 MG: 60 TABLET, EXTENDED RELEASE ORAL at 09:23

## 2021-11-27 RX ADMIN — TRAZODONE HYDROCHLORIDE 50 MG: 50 TABLET ORAL at 21:37

## 2021-11-27 RX ADMIN — HYDRALAZINE HYDROCHLORIDE 100 MG: 50 TABLET, FILM COATED ORAL at 09:21

## 2021-11-27 RX ADMIN — CLONIDINE HYDROCHLORIDE 0.2 MG: 0.2 TABLET ORAL at 16:03

## 2021-11-27 RX ADMIN — LOSARTAN POTASSIUM 100 MG: 50 TABLET, FILM COATED ORAL at 21:37

## 2021-11-27 RX ADMIN — SUCRALFATE 1 G: 1 TABLET ORAL at 11:52

## 2021-11-27 RX ADMIN — Medication 10 ML: at 06:00

## 2021-11-27 RX ADMIN — HYDRALAZINE HYDROCHLORIDE 100 MG: 50 TABLET, FILM COATED ORAL at 16:03

## 2021-11-27 RX ADMIN — POLYETHYLENE GLYCOL 3350 17 G: 17 POWDER, FOR SOLUTION ORAL at 17:44

## 2021-11-27 RX ADMIN — CALCIUM CARBONATE (ANTACID) CHEW TAB 500 MG 200 MG: 500 CHEW TAB at 11:59

## 2021-11-27 RX ADMIN — CALCIUM CARBONATE (ANTACID) CHEW TAB 500 MG 200 MG: 500 CHEW TAB at 17:45

## 2021-11-27 RX ADMIN — BUMETANIDE 2 MG: 1 TABLET ORAL at 09:21

## 2021-11-27 RX ADMIN — PRAZOSIN HYDROCHLORIDE 2 MG: 1 CAPSULE ORAL at 09:21

## 2021-11-27 RX ADMIN — Medication 10 ML: at 15:49

## 2021-11-27 RX ADMIN — PRAZOSIN HYDROCHLORIDE 2 MG: 1 CAPSULE ORAL at 21:37

## 2021-11-27 RX ADMIN — ROSUVASTATIN 10 MG: 10 TABLET, FILM COATED ORAL at 21:37

## 2021-11-27 RX ADMIN — MORPHINE SULFATE 2 MG: 2 INJECTION, SOLUTION INTRAMUSCULAR; INTRAVENOUS at 09:46

## 2021-11-27 RX ADMIN — CLONIDINE HYDROCHLORIDE 0.2 MG: 0.2 TABLET ORAL at 09:21

## 2021-11-27 RX ADMIN — INSULIN LISPRO 3 UNITS: 100 INJECTION, SOLUTION INTRAVENOUS; SUBCUTANEOUS at 11:58

## 2021-11-27 RX ADMIN — Medication 10 ML: at 21:38

## 2021-11-27 RX ADMIN — BUSPIRONE HYDROCHLORIDE 10 MG: 10 TABLET ORAL at 17:45

## 2021-11-27 RX ADMIN — ASPIRIN 81 MG CHEWABLE TABLET 81 MG: 81 TABLET CHEWABLE at 09:21

## 2021-11-27 RX ADMIN — HYDRALAZINE HYDROCHLORIDE 100 MG: 50 TABLET, FILM COATED ORAL at 21:37

## 2021-11-27 RX ADMIN — POLYETHYLENE GLYCOL 3350 17 G: 17 POWDER, FOR SOLUTION ORAL at 09:24

## 2021-11-27 RX ADMIN — SUCRALFATE 1 G: 1 TABLET ORAL at 21:37

## 2021-11-27 RX ADMIN — SUCRALFATE 1 G: 1 TABLET ORAL at 17:45

## 2021-11-27 RX ADMIN — MORPHINE SULFATE 2 MG: 2 INJECTION, SOLUTION INTRAMUSCULAR; INTRAVENOUS at 21:38

## 2021-11-27 RX ADMIN — SUCRALFATE 1 G: 1 TABLET ORAL at 06:49

## 2021-11-27 RX ADMIN — MORPHINE SULFATE 2 MG: 2 INJECTION, SOLUTION INTRAMUSCULAR; INTRAVENOUS at 16:04

## 2021-11-27 RX ADMIN — SODIUM CHLORIDE 40 MG: 9 INJECTION INTRAMUSCULAR; INTRAVENOUS; SUBCUTANEOUS at 09:20

## 2021-11-27 RX ADMIN — CLONIDINE HYDROCHLORIDE 0.2 MG: 0.2 TABLET ORAL at 21:37

## 2021-11-27 RX ADMIN — METOPROLOL TARTRATE 12.5 MG: 25 TABLET, FILM COATED ORAL at 21:38

## 2021-11-27 RX ADMIN — INSULIN LISPRO 3 UNITS: 100 INJECTION, SOLUTION INTRAVENOUS; SUBCUTANEOUS at 06:51

## 2021-11-27 RX ADMIN — INSULIN LISPRO 2 UNITS: 100 INJECTION, SOLUTION INTRAVENOUS; SUBCUTANEOUS at 17:45

## 2021-11-27 NOTE — PROGRESS NOTES
[address] [facility]                                                                                          Hospitalist Progress Note  Ezra Russo MD  Answering service: [phone] from in house phone        Date of Service:  2021  NAME:  Carolee Carey  :  1960  MRN:  [id number]      Admission Summary:   70-year-old male with a past medical history of CAD status post CABG on dAPT, HFpEF,  DM 2, and CKD V who was admitted on  for GI bleed, and overload secondary to cardiorenal syndrome. He had been hospitalized at [facility] prior to this for acute on chronic heart failure, and started to develop bilateral lower extremity edema with melena upon discharge. He underwent EGD which showed nonbleeding erosive gastritis, and was started on PPI. Nephrology, and cardiology were consulted for volume management, and he was diuresed with IV Lasix. On , patient sustained PEA arrest.  After 12 minutes of CPR, ROSC was achieved, and he was intubated and transferred to the ICU. His volume status did not improve with IV Lasix, and Alexandre catheter was placed with plan for 3 days of dialysis. He was extubated, and neurology was consulted due altered mental status suspected secondary to anoxic brain injury, but this improved, and his mental status ultimately returned back to baseline with no imaging or intervention recommended.  Transferred out of ICU on 21    Interval history / Subjective:     Pt seen and examined  Blood pressure currently better today discussed with the patient and RN at bedside  Patient is resistant hypertension on multiple medication     Assessment & Plan:     #SEVERIANO on CKD 4, now on HD due to ATN from PEA arrest  #HFpEF  on  Bumex, TUMS, retacrit, and losartan  Cardiology, nephrology on board, appreciate recommendations  Avoid renal toxins, and hypotension.  --HD MWF  --c/w HD , need outpatient HD unit set up case management consult  --Perm-a-cath placed on 11/24 hemodialysis per nephrology    Headache/Nausea/Vomiting-improved  - Hypertensive urgency-resistant HTN  -BP high despite Clonidine 0.2mg TID, Cozaar 100mg, Imdur 120mg daily, Lopressor, Hydralazine , Procardia XL and Prazosin   -discussed with nephrologist - meds adjusted -increased doses today  -HR borderline,hold metoprolol may resume if heart rate goes up above 70      S/p PEA Arrest suspect from respiratory distress  --s/p intubation/extubation      #Erosive gastritis  Presented with melena, and normocytic anemia with hemoglobin 7-8  EGD on 11/17 with nonbleeding erosive gastritis  Continue PPI per gastroenterology  - now on ASA monotherapy    Probable Anoxic brain injury due to Cardiac arrest  - down for 12 minutes  - seen by Neurology  - supportive care     #DM II  -lispro ISS     #CAD s/p CABG  -continue metoprolol, crestor, imdur, and ASA     #Depression  -continue zoloft     #MSK pain   -s/p CPR  -has tramadol,  -Lidocaine patch added     FEN: caridac  DVT  ppx: SCD  MPOA: daughter  Code: Full    Care Plan discussed with: Patient/Family and Nurse  Anticipated Disposition: [facility]/Rehab  Anticipated Discharge: Greater than 48 hours, IPR pending, needs auth discussed in length with the patient and RN at bedside verbalized understanding     Hospital Problems  Date Reviewed: 8/7/2020          Codes Class Noted POA    Severe protein[facility]cal[facility] ICD-10-CM: B82  ICD-9-CM: 968  11/23/2021 Yes        * (Principal) CHF exacerbation ([facility] ICD-10-CM: I50.9  ICD-9-CM: 428.0  11/14/2021 Unknown                Review of Systems:   A comprehensive review of systems was negative except for that written in the HPI. Vital Signs:    Last 24hrs VS reviewed since prior progress note.  Most recent are:  Visit Vitals  BP (!) 141/56   Pulse 66   Temp 98.4 °F (36.9 °C)   Resp 16   Ht 5' 9\" (1.753 m)   Wt 74.3 kg (163 lb 12.8 oz)   SpO2 96%   BMI 24.19 kg/m² Intake/Output Summary (Last 24 hours) at 11/27/2021 1004  Last data filed at 11/26/2021 1814  Gross per 24 hour   Intake 720 ml   Output 200 ml   Net 520 ml        Physical Examination:     I had a face to face encounter with this patient and independently examined them on 11/27/2021 as outlined below:          Constitutional:  No acute distress, cooperative, pleasant    ENT:  Oral mucosa moist, EOMI,anicteric sclera    Resp:  CTA bilaterally. CV:  Regular rhythm, normal rate, S1,S 2 wnl    GI:  Soft, non distended, non tender, normoactive bowel sounds,    Musculoskeletal:  trace edema    Neurologic:  Moves all extremities. AAO            Data Review:    Review and/or order of clinical lab test  Review and/or order of tests in the medicine section of CPT      Labs:     No results for input(s): WBC, HGB, HCT, PLT, HGBEXT, HCTEXT, PLTEXT, HGBEXT, HCTEXT, PLTEXT in the last 72 hours. Recent Labs     11/27/21  0045 11/26/21  0125 11/25/21  0236   * 132* 133*   K 3.6 3.4* 3.6    99 99   CO2 29 27 27   BUN 14 23* 19   CREA 2.16* 2.81* 2.37*   * 182* 143*   CA 7.9* 7.9* 8.5     No results for input(s): ALT, AP, TBIL, TBILI, TP, ALB, GLOB, GGT, AML, LPSE in the last 72 hours. No lab exists for component: SGOT, GPT, AMYP, HLPSE  No results for input(s): INR, PTP, APTT, INREXT, INREXT in the last 72 hours. No results for input(s): FE, TIBC, PSAT, FERR in the last 72 hours. No results found for: FOL, RBCF   No results for input(s): PH, PCO2, PO2 in the last 72 hours. No results for input(s): CPK, CKNDX, TROIQ in the last 72 hours.     No lab exists for component: CPKMB  Lab Results   Component Value Date/Time    Cholesterol, total 176 11/22/2019 09:36 AM    HDL Cholesterol 45 11/22/2019 09:36 AM    LDL, calculated 112 (H) 11/22/2019 09:36 AM    Triglyceride 94 11/22/2019 09:36 AM     Lab Results   Component Value Date/Time    Glucose (POC) 233 (H) 11/27/2021 06:48 AM    Glucose (POC) 242 (H) 11/26/2021 09:56 PM    Glucose (POC) 142 (H) 11/26/2021 04:47 PM    Glucose (POC) 160 (H) 11/26/2021 11:52 AM    Glucose (POC) 160 (H) 11/26/2021 06:41 AM     Lab Results   Component Value Date/Time    Color YELLOW/STRAW 11/14/2021 10:31 AM    Appearance CLEAR 11/14/2021 10:31 AM    Specific gravity 1.019 11/14/2021 10:31 AM    pH (UA) 5.5 11/14/2021 10:31 AM    Protein >300 (A) 11/14/2021 10:31 AM    Glucose 500 (A) 11/14/2021 10:31 AM    Ketone Negative 11/14/2021 10:31 AM    Bilirubin Negative 11/14/2021 10:31 AM    Urobilinogen 0.2 11/14/2021 10:31 AM    Nitrites Negative 11/14/2021 10:31 AM    Leukocyte Esterase Negative 11/14/2021 10:31 AM    Epithelial cells FEW 11/14/2021 10:31 AM    Bacteria Negative 11/14/2021 10:31 AM    WBC 0-4 11/14/2021 10:31 AM    RBC 0-5 11/14/2021 10:31 AM         Medications Reviewed:     Current Facility-Administered Medications   Medication Dose Route Frequency    bumetanide (BUMEX) tablet 2 mg  2 mg Oral BID    NIFEdipine ER (PROCARDIA XL) tablet 60 mg  60 mg Oral BID    morphine injection 2 mg  2 mg IntraVENous Q3H PRN    isosorbide mononitrate ER (IMDUR) tablet 120 mg  120 mg Oral DAILY    heparin (porcine) 1,000 unit/mL injection 1,900 Units  1,900 Units Hemodialysis DIALYSIS PRN    heparin (porcine) 1,000 unit/mL injection 1,900 Units  1,900 Units Hemodialysis DIALYSIS PRN    lidocaine 4 % patch 1 Patch  1 Patch TransDERmal Q24H    prazosin (MINIPRESS) capsule 2 mg  2 mg Oral BID    labetaloL (NORMODYNE;TRANDATE) injection 15 mg  15 mg IntraVENous Q4H PRN    hydrALAZINE (APRESOLINE) tablet 100 mg  100 mg Oral TID    niCARdipine (CARDENE) 25 mg in 0.9% sodium chloride 250 mL infusion  0-15 mg/hr IntraVENous TITRATE    cloNIDine HCL (CATAPRES) tablet 0.2 mg  0.2 mg Oral TID    sucralfate (CARAFATE) tablet 1 g  1 g Oral AC&HS    losartan (COZAAR) tablet 100 mg  100 mg Oral QHS    [Held by provider] gabapentin (NEURONTIN) capsule 100 mg  100 mg Oral TID    insulin lispro (HUMALOG) injection   SubCUTAneous AC&HS    hydrALAZINE (APRESOLINE) 20 mg/mL injection 10-20 mg  10-20 mg IntraVENous Q6H PRN    pantoprazole (PROTONIX) 40 mg in 0.9% sodium chloride 10 mL injection  40 mg IntraVENous DAILY    [Held by provider] metoprolol tartrate (LOPRESSOR) tablet 25 mg  25 mg Oral Q12H    aspirin chewable tablet 81 mg  81 mg Oral DAILY    polyethylene glycol (MIRALAX) packet 17 g  17 g Oral BID    [Held by provider] eplerenone (INSPRA) tablet 25 mg  25 mg Oral DAILY    calcium carbonate (TUMS) chewable tablet 200 mg [elemental]  200 mg Oral TID WITH MEALS    sodium chloride (NS) flush 5-40 mL  5-40 mL IntraVENous Q8H    sodium chloride (NS) flush 5-40 mL  5-40 mL IntraVENous PRN    epoetin ericka-epbx (RETACRIT) injection 20,000 Units  20,000 Units SubCUTAneous Q MON, WED & FRI    butalbital-acetaminophen-caffeine (FIORICET, ESGIC) -40 mg per tablet 1 Tablet  1 Tablet Oral Q4H PRN    ondansetron (ZOFRAN) injection 4 mg  4 mg IntraVENous Q4H PRN    busPIRone (BUSPAR) tablet 10 mg  10 mg Oral BID    rosuvastatin (CRESTOR) tablet 10 mg  10 mg Oral QHS    sertraline (ZOLOFT) tablet 200 mg  200 mg Oral DAILY    traMADoL (ULTRAM) tablet 50 mg  50 mg Oral Q6H PRN    [Held by provider] traZODone (DESYREL) tablet 50 mg  50 mg Oral QHS    acetaminophen (TYLENOL) tablet 650 mg  650 mg Oral Q6H PRN    glucose chewable tablet 16 g  4 Tablet Oral PRN    dextrose (D50W) injection syrg 12.5-25 g  25-50 mL IntraVENous PRN    glucagon (GLUCAGEN) injection 1 mg  1 mg IntraMUSCular PRN    [Held by provider] insulin glargine (LANTUS) injection 10 Units  10 Units SubCUTAneous DAILY     ______________________________________________________________________  EXPECTED LENGTH OF STAY: 3d 19h  ACTUAL LENGTH OF STAY:          Nalini Carmichael MD

## 2021-11-27 NOTE — PROGRESS NOTES
0730: Bedside shift change report given to Mil Henao (oncoming nurse) by Adore Shine (offgoing nurse). Report included the following information SBAR, Kardex, ED Summary, OR Summary, Intake/Output, MAR and Recent Results. 1204: Restarted Cardene drip due to SBP greater than 160.    1415: Stopped Cardene drip. SPB less than 160.    1930: Bedside shift change report given to Natan (oncoming nurse) by Mil Henao (offgoing nurse). Report included the following information SBAR, Kardex, ED Summary, Intake/Output, MAR and Recent Results.

## 2021-11-28 LAB
ALBUMIN SERPL-MCNC: 2.8 G/DL (ref 3.5–5)
ANION GAP SERPL CALC-SCNC: 6 MMOL/L (ref 5–15)
ANION GAP SERPL CALC-SCNC: 7 MMOL/L (ref 5–15)
BUN SERPL-MCNC: 18 MG/DL (ref 6–20)
BUN SERPL-MCNC: 18 MG/DL (ref 6–20)
BUN/CREAT SERPL: 7 (ref 12–20)
BUN/CREAT SERPL: 7 (ref 12–20)
CALCIUM SERPL-MCNC: 8.5 MG/DL (ref 8.5–10.1)
CALCIUM SERPL-MCNC: 8.5 MG/DL (ref 8.5–10.1)
CHLORIDE SERPL-SCNC: 101 MMOL/L (ref 97–108)
CHLORIDE SERPL-SCNC: 102 MMOL/L (ref 97–108)
CO2 SERPL-SCNC: 25 MMOL/L (ref 21–32)
CO2 SERPL-SCNC: 26 MMOL/L (ref 21–32)
CREAT SERPL-MCNC: 2.67 MG/DL (ref 0.7–1.3)
CREAT SERPL-MCNC: 2.71 MG/DL (ref 0.7–1.3)
GLUCOSE BLD STRIP.AUTO-MCNC: 142 MG/DL (ref 65–117)
GLUCOSE BLD STRIP.AUTO-MCNC: 165 MG/DL (ref 65–117)
GLUCOSE BLD STRIP.AUTO-MCNC: 174 MG/DL (ref 65–117)
GLUCOSE BLD STRIP.AUTO-MCNC: 176 MG/DL (ref 65–117)
GLUCOSE SERPL-MCNC: 173 MG/DL (ref 65–100)
GLUCOSE SERPL-MCNC: 174 MG/DL (ref 65–100)
PHOSPHATE SERPL-MCNC: 1.7 MG/DL (ref 2.6–4.7)
POTASSIUM SERPL-SCNC: 4.1 MMOL/L (ref 3.5–5.1)
POTASSIUM SERPL-SCNC: 4.1 MMOL/L (ref 3.5–5.1)
SERVICE CMNT-IMP: ABNORMAL
SODIUM SERPL-SCNC: 133 MMOL/L (ref 136–145)
SODIUM SERPL-SCNC: 134 MMOL/L (ref 136–145)

## 2021-11-28 PROCEDURE — 74011250637 HC RX REV CODE- 250/637: Performed by: INTERNAL MEDICINE

## 2021-11-28 PROCEDURE — 74011250636 HC RX REV CODE- 250/636: Performed by: HOSPITALIST

## 2021-11-28 PROCEDURE — 82962 GLUCOSE BLOOD TEST: CPT

## 2021-11-28 PROCEDURE — 36415 COLL VENOUS BLD VENIPUNCTURE: CPT

## 2021-11-28 PROCEDURE — 80048 BASIC METABOLIC PNL TOTAL CA: CPT

## 2021-11-28 PROCEDURE — 74011250636 HC RX REV CODE- 250/636: Performed by: INTERNAL MEDICINE

## 2021-11-28 PROCEDURE — 65660000001 HC RM ICU INTERMED STEPDOWN

## 2021-11-28 PROCEDURE — 74011250636 HC RX REV CODE- 250/636: Performed by: NURSE PRACTITIONER

## 2021-11-28 PROCEDURE — 74011250637 HC RX REV CODE- 250/637: Performed by: NURSE PRACTITIONER

## 2021-11-28 PROCEDURE — 74011250637 HC RX REV CODE- 250/637: Performed by: HOSPITALIST

## 2021-11-28 PROCEDURE — 80069 RENAL FUNCTION PANEL: CPT

## 2021-11-28 PROCEDURE — 74011000250 HC RX REV CODE- 250: Performed by: HOSPITALIST

## 2021-11-28 PROCEDURE — 74011636637 HC RX REV CODE- 636/637: Performed by: HOSPITALIST

## 2021-11-28 PROCEDURE — 74011636637 HC RX REV CODE- 636/637: Performed by: NURSE PRACTITIONER

## 2021-11-28 PROCEDURE — 74011000250 HC RX REV CODE- 250: Performed by: NURSE PRACTITIONER

## 2021-11-28 PROCEDURE — C9113 INJ PANTOPRAZOLE SODIUM, VIA: HCPCS | Performed by: NURSE PRACTITIONER

## 2021-11-28 RX ADMIN — SUCRALFATE 1 G: 1 TABLET ORAL at 06:59

## 2021-11-28 RX ADMIN — Medication 10 ML: at 21:28

## 2021-11-28 RX ADMIN — Medication 10 ML: at 07:00

## 2021-11-28 RX ADMIN — MORPHINE SULFATE 2 MG: 2 INJECTION, SOLUTION INTRAMUSCULAR; INTRAVENOUS at 10:51

## 2021-11-28 RX ADMIN — NICARDIPINE HYDROCHLORIDE 5 MG/HR: 25 INJECTION, SOLUTION INTRAVENOUS at 00:00

## 2021-11-28 RX ADMIN — TRAZODONE HYDROCHLORIDE 50 MG: 50 TABLET ORAL at 21:28

## 2021-11-28 RX ADMIN — INSULIN LISPRO 2 UNITS: 100 INJECTION, SOLUTION INTRAVENOUS; SUBCUTANEOUS at 12:55

## 2021-11-28 RX ADMIN — ONDANSETRON 4 MG: 2 INJECTION INTRAMUSCULAR; INTRAVENOUS at 03:07

## 2021-11-28 RX ADMIN — CLONIDINE HYDROCHLORIDE 0.2 MG: 0.2 TABLET ORAL at 10:15

## 2021-11-28 RX ADMIN — INSULIN LISPRO 2 UNITS: 100 INJECTION, SOLUTION INTRAVENOUS; SUBCUTANEOUS at 18:09

## 2021-11-28 RX ADMIN — NIFEDIPINE 60 MG: 60 TABLET, EXTENDED RELEASE ORAL at 10:17

## 2021-11-28 RX ADMIN — POLYETHYLENE GLYCOL 3350 17 G: 17 POWDER, FOR SOLUTION ORAL at 18:09

## 2021-11-28 RX ADMIN — PRAZOSIN HYDROCHLORIDE 2 MG: 1 CAPSULE ORAL at 21:29

## 2021-11-28 RX ADMIN — CALCIUM CARBONATE (ANTACID) CHEW TAB 500 MG 200 MG: 500 CHEW TAB at 18:09

## 2021-11-28 RX ADMIN — ISOSORBIDE MONONITRATE 120 MG: 60 TABLET, EXTENDED RELEASE ORAL at 10:17

## 2021-11-28 RX ADMIN — ASPIRIN 81 MG CHEWABLE TABLET 81 MG: 81 TABLET CHEWABLE at 10:15

## 2021-11-28 RX ADMIN — CLONIDINE HYDROCHLORIDE 0.2 MG: 0.2 TABLET ORAL at 21:29

## 2021-11-28 RX ADMIN — SODIUM CHLORIDE 40 MG: 9 INJECTION INTRAMUSCULAR; INTRAVENOUS; SUBCUTANEOUS at 10:15

## 2021-11-28 RX ADMIN — HYDRALAZINE HYDROCHLORIDE 100 MG: 50 TABLET, FILM COATED ORAL at 21:29

## 2021-11-28 RX ADMIN — CLONIDINE HYDROCHLORIDE 0.2 MG: 0.2 TABLET ORAL at 16:35

## 2021-11-28 RX ADMIN — INSULIN LISPRO 2 UNITS: 100 INJECTION, SOLUTION INTRAVENOUS; SUBCUTANEOUS at 06:59

## 2021-11-28 RX ADMIN — CALCIUM CARBONATE (ANTACID) CHEW TAB 500 MG 200 MG: 500 CHEW TAB at 12:43

## 2021-11-28 RX ADMIN — PRAZOSIN HYDROCHLORIDE 2 MG: 1 CAPSULE ORAL at 10:15

## 2021-11-28 RX ADMIN — CALCIUM CARBONATE (ANTACID) CHEW TAB 500 MG 200 MG: 500 CHEW TAB at 07:00

## 2021-11-28 RX ADMIN — LOSARTAN POTASSIUM 100 MG: 50 TABLET, FILM COATED ORAL at 21:28

## 2021-11-28 RX ADMIN — METOPROLOL TARTRATE 12.5 MG: 25 TABLET, FILM COATED ORAL at 21:29

## 2021-11-28 RX ADMIN — SUCRALFATE 1 G: 1 TABLET ORAL at 21:29

## 2021-11-28 RX ADMIN — POLYETHYLENE GLYCOL 3350 17 G: 17 POWDER, FOR SOLUTION ORAL at 10:15

## 2021-11-28 RX ADMIN — SUCRALFATE 1 G: 1 TABLET ORAL at 16:35

## 2021-11-28 RX ADMIN — MORPHINE SULFATE 2 MG: 2 INJECTION, SOLUTION INTRAMUSCULAR; INTRAVENOUS at 03:11

## 2021-11-28 RX ADMIN — HYDRALAZINE HYDROCHLORIDE 100 MG: 50 TABLET, FILM COATED ORAL at 10:15

## 2021-11-28 RX ADMIN — BUMETANIDE 2 MG: 1 TABLET ORAL at 18:11

## 2021-11-28 RX ADMIN — ROSUVASTATIN 10 MG: 10 TABLET, FILM COATED ORAL at 21:29

## 2021-11-28 RX ADMIN — BUSPIRONE HYDROCHLORIDE 10 MG: 10 TABLET ORAL at 18:11

## 2021-11-28 RX ADMIN — SUCRALFATE 1 G: 1 TABLET ORAL at 12:43

## 2021-11-28 RX ADMIN — INSULIN GLARGINE 6 UNITS: 100 INJECTION, SOLUTION SUBCUTANEOUS at 10:16

## 2021-11-28 RX ADMIN — BUSPIRONE HYDROCHLORIDE 10 MG: 10 TABLET ORAL at 10:16

## 2021-11-28 RX ADMIN — BUMETANIDE 2 MG: 1 TABLET ORAL at 10:15

## 2021-11-28 RX ADMIN — HYDRALAZINE HYDROCHLORIDE 100 MG: 50 TABLET, FILM COATED ORAL at 16:35

## 2021-11-28 RX ADMIN — HYDRALAZINE HYDROCHLORIDE 20 MG: 20 INJECTION INTRAMUSCULAR; INTRAVENOUS at 23:26

## 2021-11-28 RX ADMIN — MORPHINE SULFATE 2 MG: 2 INJECTION, SOLUTION INTRAMUSCULAR; INTRAVENOUS at 21:40

## 2021-11-28 RX ADMIN — ONDANSETRON 4 MG: 2 INJECTION INTRAMUSCULAR; INTRAVENOUS at 10:50

## 2021-11-28 RX ADMIN — METOPROLOL TARTRATE 12.5 MG: 25 TABLET, FILM COATED ORAL at 10:16

## 2021-11-28 RX ADMIN — NIFEDIPINE 60 MG: 60 TABLET, EXTENDED RELEASE ORAL at 18:12

## 2021-11-28 RX ADMIN — HYDRALAZINE HYDROCHLORIDE 20 MG: 20 INJECTION INTRAMUSCULAR; INTRAVENOUS at 07:09

## 2021-11-28 RX ADMIN — SERTRALINE 200 MG: 50 TABLET, FILM COATED ORAL at 10:16

## 2021-11-28 RX ADMIN — Medication 10 ML: at 16:16

## 2021-11-28 NOTE — PROGRESS NOTES
[address] [facility] Adult  Hospitalist Group                                                                                          Hospitalist Progress Note  Abdulaziz Sky MD  Answering service: [phone] from in house phone        Date of Service:  2021  NAME:  Garland Dixon  :  1960  MRN:  [id number]      Admission Summary:   55-year-old male with a past medical history of CAD status post CABG on dAPT, HFpEF,  DM 2, and CKD V who was admitted on  for GI bleed, and overload secondary to cardiorenal syndrome. He had been hospitalized at [facility] prior to this for acute on chronic heart failure, and started to develop bilateral lower extremity edema with melena upon discharge. He underwent EGD which showed nonbleeding erosive gastritis, and was started on PPI. Nephrology, and cardiology were consulted for volume management, and he was diuresed with IV Lasix. On , patient sustained PEA arrest.  After 12 minutes of CPR, ROSC was achieved, and he was intubated and transferred to the ICU. His volume status did not improve with IV Lasix, and Alexandre catheter was placed with plan for 3 days of dialysis. He was extubated, and neurology was consulted due altered mental status suspected secondary to anoxic brain injury, but this improved, and his mental status ultimately returned back to baseline with no imaging or intervention recommended.  Transferred out of ICU on 21    Interval history / Subjective:     Pt seen and examined  Blood pressure  again went up  Patient is resistant hypertension on multiple medication and on dialysis 3 times a week     Assessment & Plan:     #SEVERIANO on CKD 4, now on HD due to ATN from PEA arrest  #HFpEF  on  Bumex, TUMS, retacrit, and losartan  Cardiology, nephrology on board, appreciate recommendations  Avoid renal toxins, and hypotension.  --HD MWF hemodialysis tomorrow  --c/w HD , need outpatient HD unit set up case management consult  --Perm-a-cath placed on 11/24 hemodialysis per nephrology    Headache/Nausea/Vomiting-improved  - Hypertensive urgency-resistant HTN  -BP high despite Clonidine 0.2mg TID, Cozaar 100mg, Imdur 120mg daily, Lopressor, Hydralazine , Procardia XL and Prazosin   -discussed with nephrologist - meds adjusted  -HR borderline he is on metoprolol    S/p PEA Arrest suspect from respiratory distress  --s/p intubation/extubation      #Erosive gastritis  Presented with melena, and normocytic anemia with hemoglobin 7-8  EGD on 11/17 with nonbleeding erosive gastritis  Continue PPI per gastroenterology  - now on ASA monotherapy    Probable Anoxic brain injury due to Cardiac arrest  - down for 12 minutes  - seen by Neurology  - supportive care     #DM II  -lispro ISS     #CAD s/p CABG  -continue metoprolol, crestor, imdur, and ASA     #Depression  -continue zoloft     #MSK pain   -s/p CPR  -has tramadol,  -Lidocaine patch added     FEN: caridac  DVT  ppx: SCD  MPOA: daughter  Code: Full    Care Plan discussed with: Patient/Family and Nurse  Anticipated Disposition: SAH/Rehab  Anticipated Discharge: Greater than 48 hours, IPR pending, needs auth discussed in length with the patient and RN at bedside verbalized understanding     Hospital Problems  Date Reviewed: 8/7/2020          Codes Class Noted POA    Se[facility] protein[facility]cal[facility] ICD-10-CM: E48  ICD-9-CM: 677  11/23/2021 Yes        * (Principal) CHF exacerbation ([facility] ICD-10-CM: I50.9  ICD-9-CM: 428.0  11/14/2021 Unknown                Review of Systems:   A comprehensive review of systems was negative except for that written in the HPI. Vital Signs:    Last 24hrs VS reviewed since prior progress note.  Most recent are:  Visit Vitals  BP (!) 166/57 (BP 1 Location: Right arm, BP Patient Position: Lying)   Pulse 64   Temp 98 °F (36.7 °C)   Resp 18   Ht 5' 9\" (1.753 m)   Wt 74.4 kg (164 lb 0.4 oz)   SpO2 98%   BMI 24.22 kg/m²         Intake/Output Summary (Last 24 hours) at 11/28/2021 1055  Last data filed at 11/28/2021 0950  Gross per 24 hour   Intake 1061.67 ml   Output 400 ml   Net 661.67 ml        Physical Examination:     I had a face to face encounter with this patient and independently examined them on 11/28/2021 as outlined below:          Constitutional:  No acute distress, cooperative, pleasant    ENT:  Oral mucosa moist, EOMI,anicteric sclera    Resp:  CTA bilaterally. CV:  Regular rhythm, normal rate, S1,S 2 wnl    GI:  Soft, non distended, non tender, normoactive bowel sounds,    Musculoskeletal:  trace edema    Neurologic:  Moves all extremities. AAO            Data Review:    Review and/or order of clinical lab test  Review and/or order of tests in the medicine section of CPT      Labs:     No results for input(s): WBC, HGB, HCT, PLT, HGBEXT, HCTEXT, PLTEXT, HGBEXT, HCTEXT, PLTEXT in the last 72 hours. Recent Labs     11/28/21  0328 11/27/21  0045 11/26/21  0125   *  133* 133* 132*   K 4.1  4.1 3.6 3.4*     101 101 99   CO2 25  26 29 27   BUN 18  18 14 23*   CREA 2.67*  2.71* 2.16* 2.81*   *  174* 220* 182*   CA 8.5  8.5 7.9* 7.9*   PHOS 1.7*  --   --      Recent Labs     11/28/21 0328   ALB 2.8*     No results for input(s): INR, PTP, APTT, INREXT, INREXT in the last 72 hours. No results for input(s): FE, TIBC, PSAT, FERR in the last 72 hours. No results found for: FOL, RBCF   No results for input(s): PH, PCO2, PO2 in the last 72 hours. No results for input(s): CPK, CKNDX, TROIQ in the last 72 hours.     No lab exists for component: CPKMB  Lab Results   Component Value Date/Time    Cholesterol, total 176 11/22/2019 09:36 AM    HDL Cholesterol 45 11/22/2019 09:36 AM    LDL, calculated 112 (H) 11/22/2019 09:36 AM    Triglyceride 94 11/22/2019 09:36 AM     Lab Results   Component Value Date/Time    Glucose (POC) 176 (H) 11/28/2021 06:16 AM    Glucose (POC) 173 (H) 11/27/2021 09:29 PM    Glucose (POC) 177 (H) 11/27/2021 05:35 PM    Glucose (POC) 204 (H) 11/27/2021 11:54 AM    Glucose (POC) 233 (H) 11/27/2021 06:48 AM     Lab Results   Component Value Date/Time    Color YELLOW/STRAW 11/14/2021 10:31 AM    Appearance CLEAR 11/14/2021 10:31 AM    Specific gravity 1.019 11/14/2021 10:31 AM    pH (UA) 5.5 11/14/2021 10:31 AM    Protein >300 (A) 11/14/2021 10:31 AM    Glucose 500 (A) 11/14/2021 10:31 AM    Ketone Negative 11/14/2021 10:31 AM    Bilirubin Negative 11/14/2021 10:31 AM    Urobilinogen 0.2 11/14/2021 10:31 AM    Nitrites Negative 11/14/2021 10:31 AM    Leukocyte Esterase Negative 11/14/2021 10:31 AM    Epithelial cells FEW 11/14/2021 10:31 AM    Bacteria Negative 11/14/2021 10:31 AM    WBC 0-4 11/14/2021 10:31 AM    RBC 0-5 11/14/2021 10:31 AM         Medications Reviewed:     Current Facility-Administered Medications   Medication Dose Route Frequency    insulin glargine (LANTUS) injection 6 Units  6 Units SubCUTAneous DAILY    metoprolol tartrate (LOPRESSOR) tablet 12.5 mg  12.5 mg Oral Q12H    bumetanide (BUMEX) tablet 2 mg  2 mg Oral BID    NIFEdipine ER (PROCARDIA XL) tablet 60 mg  60 mg Oral BID    morphine injection 2 mg  2 mg IntraVENous Q3H PRN    isosorbide mononitrate ER (IMDUR) tablet 120 mg  120 mg Oral DAILY    heparin (porcine) 1,000 unit/mL injection 1,900 Units  1,900 Units Hemodialysis DIALYSIS PRN    heparin (porcine) 1,000 unit/mL injection 1,900 Units  1,900 Units Hemodialysis DIALYSIS PRN    lidocaine 4 % patch 1 Patch  1 Patch TransDERmal Q24H    prazosin (MINIPRESS) capsule 2 mg  2 mg Oral BID    labetaloL (NORMODYNE;TRANDATE) injection 15 mg  15 mg IntraVENous Q4H PRN    hydrALAZINE (APRESOLINE) tablet 100 mg  100 mg Oral TID    niCARdipine (CARDENE) 25 mg in 0.9% sodium chloride 250 mL infusion  0-15 mg/hr IntraVENous TITRATE    cloNIDine HCL (CATAPRES) tablet 0.2 mg  0.2 mg Oral TID    sucralfate (CARAFATE) tablet 1 g  1 g Oral AC&HS    losartan (COZAAR) tablet 100 mg  100 mg Oral QHS    [Held by provider] gabapentin (NEURONTIN) capsule 100 mg  100 mg Oral TID    insulin lispro (HUMALOG) injection   SubCUTAneous AC&HS    hydrALAZINE (APRESOLINE) 20 mg/mL injection 10-20 mg  10-20 mg IntraVENous Q6H PRN    pantoprazole (PROTONIX) 40 mg in 0.9% sodium chloride 10 mL injection  40 mg IntraVENous DAILY    aspirin chewable tablet 81 mg  81 mg Oral DAILY    polyethylene glycol (MIRALAX) packet 17 g  17 g Oral BID    [Held by provider] eplerenone (INSPRA) tablet 25 mg  25 mg Oral DAILY    calcium carbonate (TUMS) chewable tablet 200 mg [elemental]  200 mg Oral TID WITH MEALS    sodium chloride (NS) flush 5-40 mL  5-40 mL IntraVENous Q8H    sodium chloride (NS) flush 5-40 mL  5-40 mL IntraVENous PRN    epoetin ericka-epbx (RETACRIT) injection 20,000 Units  20,000 Units SubCUTAneous Q MON, WED & FRI    butalbital-acetaminophen-caffeine (FIORICET, ESGIC) -40 mg per tablet 1 Tablet  1 Tablet Oral Q4H PRN    ondansetron (ZOFRAN) injection 4 mg  4 mg IntraVENous Q4H PRN    busPIRone (BUSPAR) tablet 10 mg  10 mg Oral BID    rosuvastatin (CRESTOR) tablet 10 mg  10 mg Oral QHS    sertraline (ZOLOFT) tablet 200 mg  200 mg Oral DAILY    traMADoL (ULTRAM) tablet 50 mg  50 mg Oral Q6H PRN    traZODone (DESYREL) tablet 50 mg  50 mg Oral QHS    acetaminophen (TYLENOL) tablet 650 mg  650 mg Oral Q6H PRN    glucose chewable tablet 16 g  4 Tablet Oral PRN    dextrose (D50W) injection syrg 12.5-25 g  25-50 mL IntraVENous PRN    glucagon (GLUCAGEN) injection 1 mg  1 mg IntraMUSCular PRN     ______________________________________________________________________  EXPECTED LENGTH OF STAY: 3d 19h  ACTUAL LENGTH OF STAY:          Flynn Elena MD

## 2021-11-29 LAB
ANION GAP SERPL CALC-SCNC: 7 MMOL/L (ref 5–15)
BASOPHILS # BLD: 0 K/UL (ref 0–0.1)
BASOPHILS NFR BLD: 0 % (ref 0–1)
BUN SERPL-MCNC: 23 MG/DL (ref 6–20)
BUN/CREAT SERPL: 8 (ref 12–20)
CALCIUM SERPL-MCNC: 8.9 MG/DL (ref 8.5–10.1)
CHLORIDE SERPL-SCNC: 101 MMOL/L (ref 97–108)
CO2 SERPL-SCNC: 27 MMOL/L (ref 21–32)
COMMENT, HOLDF: NORMAL
CREAT SERPL-MCNC: 2.92 MG/DL (ref 0.7–1.3)
DIFFERENTIAL METHOD BLD: ABNORMAL
EOSINOPHIL # BLD: 0.2 K/UL (ref 0–0.4)
EOSINOPHIL NFR BLD: 4 % (ref 0–7)
ERYTHROCYTE [DISTWIDTH] IN BLOOD BY AUTOMATED COUNT: 18.2 % (ref 11.5–14.5)
GLUCOSE BLD STRIP.AUTO-MCNC: 142 MG/DL (ref 65–117)
GLUCOSE BLD STRIP.AUTO-MCNC: 142 MG/DL (ref 65–117)
GLUCOSE BLD STRIP.AUTO-MCNC: 170 MG/DL (ref 65–117)
GLUCOSE BLD STRIP.AUTO-MCNC: 90 MG/DL (ref 65–117)
GLUCOSE SERPL-MCNC: 129 MG/DL (ref 65–100)
HCT VFR BLD AUTO: 29 % (ref 36.6–50.3)
HGB BLD-MCNC: 9 G/DL (ref 12.1–17)
IMM GRANULOCYTES # BLD AUTO: 0 K/UL (ref 0–0.04)
IMM GRANULOCYTES NFR BLD AUTO: 0 % (ref 0–0.5)
LYMPHOCYTES # BLD: 0.9 K/UL (ref 0.8–3.5)
LYMPHOCYTES NFR BLD: 17 % (ref 12–49)
MCH RBC QN AUTO: 29.7 PG (ref 26–34)
MCHC RBC AUTO-ENTMCNC: 31 G/DL (ref 30–36.5)
MCV RBC AUTO: 95.7 FL (ref 80–99)
MONOCYTES # BLD: 0.4 K/UL (ref 0–1)
MONOCYTES NFR BLD: 8 % (ref 5–13)
NEUTS SEG # BLD: 3.7 K/UL (ref 1.8–8)
NEUTS SEG NFR BLD: 71 % (ref 32–75)
NRBC # BLD: 0 K/UL (ref 0–0.01)
NRBC BLD-RTO: 0 PER 100 WBC
PLATELET # BLD AUTO: 113 K/UL (ref 150–400)
PMV BLD AUTO: 10.6 FL (ref 8.9–12.9)
POTASSIUM SERPL-SCNC: 3.9 MMOL/L (ref 3.5–5.1)
RBC # BLD AUTO: 3.03 M/UL (ref 4.1–5.7)
RBC MORPH BLD: ABNORMAL
SAMPLES BEING HELD,HOLD: NORMAL
SERVICE CMNT-IMP: ABNORMAL
SERVICE CMNT-IMP: NORMAL
SODIUM SERPL-SCNC: 135 MMOL/L (ref 136–145)
WBC # BLD AUTO: 5.2 K/UL (ref 4.1–11.1)

## 2021-11-29 PROCEDURE — 74011250637 HC RX REV CODE- 250/637: Performed by: INTERNAL MEDICINE

## 2021-11-29 PROCEDURE — 85025 COMPLETE CBC W/AUTO DIFF WBC: CPT

## 2021-11-29 PROCEDURE — 74011250637 HC RX REV CODE- 250/637: Performed by: HOSPITALIST

## 2021-11-29 PROCEDURE — 90935 HEMODIALYSIS ONE EVALUATION: CPT

## 2021-11-29 PROCEDURE — 65660000001 HC RM ICU INTERMED STEPDOWN

## 2021-11-29 PROCEDURE — 74011000250 HC RX REV CODE- 250: Performed by: HOSPITALIST

## 2021-11-29 PROCEDURE — 80048 BASIC METABOLIC PNL TOTAL CA: CPT

## 2021-11-29 PROCEDURE — 74011250636 HC RX REV CODE- 250/636: Performed by: HOSPITALIST

## 2021-11-29 PROCEDURE — 74011250636 HC RX REV CODE- 250/636: Performed by: NURSE PRACTITIONER

## 2021-11-29 PROCEDURE — 82962 GLUCOSE BLOOD TEST: CPT

## 2021-11-29 PROCEDURE — 74011636637 HC RX REV CODE- 636/637: Performed by: NURSE PRACTITIONER

## 2021-11-29 PROCEDURE — 74011250636 HC RX REV CODE- 250/636: Performed by: INTERNAL MEDICINE

## 2021-11-29 PROCEDURE — 97535 SELF CARE MNGMENT TRAINING: CPT

## 2021-11-29 PROCEDURE — 36415 COLL VENOUS BLD VENIPUNCTURE: CPT

## 2021-11-29 RX ORDER — NIFEDIPINE 60 MG/1
120 TABLET, EXTENDED RELEASE ORAL DAILY
Status: DISCONTINUED | OUTPATIENT
Start: 2021-11-29 | End: 2021-12-01 | Stop reason: HOSPADM

## 2021-11-29 RX ORDER — DOXAZOSIN 2 MG/1
4 TABLET ORAL
Status: DISCONTINUED | OUTPATIENT
Start: 2021-11-29 | End: 2021-11-30

## 2021-11-29 RX ADMIN — INSULIN LISPRO 2 UNITS: 100 INJECTION, SOLUTION INTRAVENOUS; SUBCUTANEOUS at 17:43

## 2021-11-29 RX ADMIN — CLONIDINE HYDROCHLORIDE 0.2 MG: 0.2 TABLET ORAL at 16:45

## 2021-11-29 RX ADMIN — NIFEDIPINE 120 MG: 60 TABLET, EXTENDED RELEASE ORAL at 11:28

## 2021-11-29 RX ADMIN — SUCRALFATE 1 G: 1 TABLET ORAL at 21:01

## 2021-11-29 RX ADMIN — MORPHINE SULFATE 2 MG: 2 INJECTION, SOLUTION INTRAMUSCULAR; INTRAVENOUS at 12:38

## 2021-11-29 RX ADMIN — EPLERENONE 25 MG: 25 TABLET, FILM COATED ORAL at 11:27

## 2021-11-29 RX ADMIN — ROSUVASTATIN 10 MG: 10 TABLET, FILM COATED ORAL at 21:01

## 2021-11-29 RX ADMIN — SERTRALINE 200 MG: 50 TABLET, FILM COATED ORAL at 11:29

## 2021-11-29 RX ADMIN — HYDRALAZINE HYDROCHLORIDE 100 MG: 50 TABLET, FILM COATED ORAL at 11:27

## 2021-11-29 RX ADMIN — HYDRALAZINE HYDROCHLORIDE 100 MG: 50 TABLET, FILM COATED ORAL at 16:44

## 2021-11-29 RX ADMIN — DOXAZOSIN 4 MG: 2 TABLET ORAL at 21:01

## 2021-11-29 RX ADMIN — CALCIUM CARBONATE (ANTACID) CHEW TAB 500 MG 200 MG: 500 CHEW TAB at 11:26

## 2021-11-29 RX ADMIN — CLONIDINE HYDROCHLORIDE 0.2 MG: 0.2 TABLET ORAL at 11:26

## 2021-11-29 RX ADMIN — MORPHINE SULFATE 2 MG: 2 INJECTION, SOLUTION INTRAMUSCULAR; INTRAVENOUS at 22:59

## 2021-11-29 RX ADMIN — ASPIRIN 81 MG CHEWABLE TABLET 81 MG: 81 TABLET CHEWABLE at 11:25

## 2021-11-29 RX ADMIN — SUCRALFATE 1 G: 1 TABLET ORAL at 11:29

## 2021-11-29 RX ADMIN — NICARDIPINE HYDROCHLORIDE 5 MG/HR: 25 INJECTION, SOLUTION INTRAVENOUS at 00:31

## 2021-11-29 RX ADMIN — SUCRALFATE 1 G: 1 TABLET ORAL at 07:16

## 2021-11-29 RX ADMIN — METOPROLOL TARTRATE 12.5 MG: 25 TABLET, FILM COATED ORAL at 11:28

## 2021-11-29 RX ADMIN — SUCRALFATE 1 G: 1 TABLET ORAL at 16:50

## 2021-11-29 RX ADMIN — Medication 10 ML: at 07:15

## 2021-11-29 RX ADMIN — HYDRALAZINE HYDROCHLORIDE 20 MG: 20 INJECTION INTRAMUSCULAR; INTRAVENOUS at 20:29

## 2021-11-29 RX ADMIN — BUMETANIDE 2 MG: 1 TABLET ORAL at 09:00

## 2021-11-29 RX ADMIN — ISOSORBIDE MONONITRATE 120 MG: 60 TABLET, EXTENDED RELEASE ORAL at 11:27

## 2021-11-29 RX ADMIN — BUSPIRONE HYDROCHLORIDE 10 MG: 10 TABLET ORAL at 11:26

## 2021-11-29 RX ADMIN — Medication 10 ML: at 16:44

## 2021-11-29 RX ADMIN — Medication 10 ML: at 21:03

## 2021-11-29 RX ADMIN — TRAZODONE HYDROCHLORIDE 50 MG: 50 TABLET ORAL at 21:01

## 2021-11-29 RX ADMIN — LOSARTAN POTASSIUM 100 MG: 50 TABLET, FILM COATED ORAL at 21:00

## 2021-11-29 RX ADMIN — HYDRALAZINE HYDROCHLORIDE 100 MG: 50 TABLET, FILM COATED ORAL at 21:01

## 2021-11-29 RX ADMIN — CLONIDINE HYDROCHLORIDE 0.2 MG: 0.2 TABLET ORAL at 21:01

## 2021-11-29 RX ADMIN — NICARDIPINE HYDROCHLORIDE 5 MG/HR: 25 INJECTION, SOLUTION INTRAVENOUS at 22:59

## 2021-11-29 RX ADMIN — BUMETANIDE 2 MG: 1 TABLET ORAL at 17:01

## 2021-11-29 RX ADMIN — METOPROLOL TARTRATE 12.5 MG: 25 TABLET, FILM COATED ORAL at 21:01

## 2021-11-29 RX ADMIN — BUSPIRONE HYDROCHLORIDE 10 MG: 10 TABLET ORAL at 17:02

## 2021-11-29 RX ADMIN — EPOETIN ALFA-EPBX 20000 UNITS: 20000 INJECTION, SOLUTION INTRAVENOUS; SUBCUTANEOUS at 21:09

## 2021-11-29 RX ADMIN — INSULIN LISPRO 2 UNITS: 100 INJECTION, SOLUTION INTRAVENOUS; SUBCUTANEOUS at 07:16

## 2021-11-29 RX ADMIN — HYDRALAZINE HYDROCHLORIDE 20 MG: 20 INJECTION INTRAMUSCULAR; INTRAVENOUS at 12:38

## 2021-11-29 RX ADMIN — CALCIUM CARBONATE (ANTACID) CHEW TAB 500 MG 200 MG: 500 CHEW TAB at 16:44

## 2021-11-29 NOTE — PROGRESS NOTES
1930 Bedside and Verbal shift change report given to Dirk Rajput RN (oncoming nurse) by Raina Juarez RN (offgoing nurse). Report included the following information SBAR, Kardex, Intake/Output, MAR, Recent Results and Cardiac Rhythm NSR.     2326 HR 67, /64. PRN IV Hydralazine 20mg administered. 0007 /66  0011 Restarted Cardene at 5 mg/hr  0051 HR 61, /61. Cardene dose decreased to 2.5 mg/hr. 0447 HR 58, /58. Cardene held. 0730 Bedside and Verbal shift change report given to Nathaniel Munson RN (oncoming nurse) by Dirk Rajput RN (offgoing nurse). Report included the following information SBAR, Kardex, Intake/Output, MAR, Recent Results and Cardiac Rhythm NSR, SB. Problem: Falls - Risk of  Goal: *Absence of Falls  Description: Document Jasmine Fontana Fall Risk and appropriate interventions in the flowsheet. Outcome: Progressing Towards Goal  Note: Fall Risk Interventions:  Mobility Interventions: Bed/chair exit alarm         Medication Interventions: Bed/chair exit alarm    Elimination Interventions: Call light in reach, Bed/chair exit alarm              Problem: Diabetes Self-Management  Goal: *Disease process and treatment process  Description: Define diabetes and identify own type of diabetes; list 3 options for treating diabetes. Outcome: Progressing Towards Goal  Goal: *Developing strategies to promote health/change behavior  Description: Define the ABC's of diabetes; identify appropriate screenings, schedule and personal plan for screenings. Outcome: Progressing Towards Goal  Goal: *Using medications safely  Description: State effect of diabetes medications on diabetes; name diabetes medication taking, action and side effects. Outcome: Progressing Towards Goal  Goal: *Prevention, detection, treatment of acute complications  Description: List symptoms of hyper- and hypoglycemia; describe how to treat low blood sugar and actions for lowering  high blood glucose level.   Outcome: Progressing Towards Goal Problem: Pressure Injury - Risk of  Goal: *Prevention of pressure injury  Description: Document Erik Scale and appropriate interventions in the flowsheet.   Outcome: Progressing Towards Goal  Note: Pressure Injury Interventions:  Sensory Interventions: Float heels, Minimize linen layers    Moisture Interventions: Minimize layers    Activity Interventions: Increase time out of bed, PT/OT evaluation    Mobility Interventions: Chair cushion    Nutrition Interventions: Document food/fluid/supplement intake    Friction and Shear Interventions: HOB 30 degrees or less, Minimize layers                Problem: Patient Education: Go to Patient Education Activity  Goal: Patient/Family Education  Outcome: Progressing Towards Goal     Problem: Nutrition Deficit  Goal: *Optimize nutritional status  Outcome: Progressing Towards Goal

## 2021-11-29 NOTE — PROGRESS NOTES
Physical Therapy  11/29/2021    Chart reviewed. Patient currently on HD at bedside. Hold PT and f/u later as able. Thank you.     Vera Benson, PT, DPT

## 2021-11-29 NOTE — PROGRESS NOTES
0730: Bedside shift change report given to Gris Du (oncoming nurse) by Walter Dickey (offgoing nurse). Report included the following information SBAR, Kardex, MAR, Recent Results and Cardiac Rhythm SR.     0800: Pt in dialysis. All AM meds held. 1235: HR 65 /69. PRN 20mg hydralazine given. 1400: /58.    1930: Bedside shift change report given to [address] [place] [address] (oncoming nurse) by Gris Du (offgoing nurse). Report included the following information SBAR, Kardex, MAR, Recent Results and Cardiac Rhythm SR. Unable to have accurate output as pt has been using bathroom. Pt educated on importance of accurate I/O. Pt verbalized understanding.

## 2021-11-29 NOTE — PROCEDURES
Hemodialysis / [phone]    Vitals Pre Post Assessment Pre Post   BP BP: (!) 149/68 (11/29/21 0841) 184/64 LOC AxOx4 AxOx4   HR Pulse (Heart Rate): (!) 59 (11/29/21 0841) 66 Lungs CTA Even and unlabored   Resp Resp Rate: 18 (11/29/21 0841) 18 Cardiac SB - NSR BSR   Temp Temp: 97.8 °F (36.6 °C) (11/29/21 0716) 97.8 Skin CDI CDI   Weight Pre-Dialysis Weight: 80.2 kg (176 lb 12.9 oz) (11/22/21 0828) n/a Edema Warm/dry Warm/dry   Tele status remote Remote Pain Pain Intensity 1: 4 (11/28/21 2241) 0     Orders   Duration: Start: 7583 End: 8327 Total: 3.5 hrs   Dialyzer: Dialyzer/Set Up Inspection: Germain Aguiar (11/29/21 0841)   K Bath: Dialysate K (mEq/L): 4 (11/29/21 0841)   Ca Bath: Dialysate CA (mEq/L): 2.5 (11/29/21 0841)   Na: Dialysate NA (mEq/L): 140 (11/29/21 0841)   Bicarb: Dialysate HCO3 (mEq/L): 35 (11/29/21 0841)   Target Fluid Removal: Goal/Amount of Fluid to Remove (mL): 3000 mL (11/29/21 0841)     Access   Type & Location: R chest CVC   Comments:                                     Right tunneled CVC, dressing dated 11/26/21. Each catheter limb disinfected for 60 seconds per limb with alcohol swabs.  Caps removed, dialysis CVC hub scrubbed with Prevantics for 15 seconds, followed by a 5 second dry time per Hospital P&P.   +aspirated/+flushed     Labs   HBsAg (Antigen) / date: 11/19/21 negative                                              HBsAb (Antibody) / date: 11/19/21 susceptible   Source: Epic   Obtained/Reviewed  Critical Results Called HGB   Date Value Ref Range Status   11/29/2021 9.0 (L) 12.1 - 17.0 g/dL Final     Potassium   Date Value Ref Range Status   11/29/2021 3.9 3.5 - 5.1 mmol/L Final     Calcium   Date Value Ref Range Status   11/29/2021 8.9 8.5 - 10.1 MG/DL Final     BUN   Date Value Ref Range Status   11/29/2021 23 (H) 6 - 20 MG/DL Final     Creatinine   Date Value Ref Range Status   11/29/2021 2.92 (H) 0.70 - 1.30 MG/DL Final        Meds Given   Name Dose Route   Heparin 1:1000 A 1.9 mL / V 1.9 mL               Adequacy / Fluid    Total Liters Process: 78.1 L   Net Fluid Removed: 3000 mL      Comments   Time Out Done:   (Time) 4435   Admitting Diagnosis: CHF Exacerbation   Consent obtained/signed: Informed Consent Verified: Yes (11/29/21 4270)   Machine / [place] [facility] # Machine Number: [id number] (11/29/21 1354)   Primary Nurse Rpt Pre: Syd Epps RN   Primary Nurse Rpt Post: Amanda Ross RN   Pt Education: Procedural / infection control   Care Plan: Continue current HD plan of care   Pts outpatient clinic: TBD     Tx Summary   Comments:            2601 HD treatment initiated per physicians order. 1125 RN at bedside to give morning meds. 1150 Patient c/o nausea. UF paused. BP stable. 183/71  1152 Patient vomiting. 1155 UF resumed. 1215 HD treatment completed per physician order. All possible blood returned to patient. Each catheter limb disinfected for 60 seconds per limb with alcohol swabs. Dialysis CVC hub scrubbed with Prevantics for 15 seconds, followed by a 5 second dry time per Hospital P&P.   +flushed/+heplock/+capped.

## 2021-11-29 NOTE — PROGRESS NOTES
[facility]   [address], [address] [facility]  Phone: [phone]   [phone]       Nephrology Progress Note  Chiara Figueroa     1960     [id number]  Date of Admission : 11/14/2021 11/29/21    CC: Follow up for severiano ON CKD 4       Assessment and Plan   SEVERIANO on CKD  - progressive diabetic Nephropathy   - Now w/ superimposed ATN from PEA/ resp arrest   - HD MWF, plan for HD today. Check CBC  - PC placed 11/24  - CM to arrange out pt dialysis at [facility] w/ Dr Brissa Byrd      HTN:  - restarted Inspra and changed Prazosin to Cardura      Anemia in CKD   - continue RANDY      CAD s/p CABG May 2020  HFpEF  -Last echo: Preserved LVEF, Mod Pulm HTN-PASP 50 mmHg     Resp / PEA arrest   - encephalopathy resolved      Interval History:  Seen and examined. BP remains elevated. No new sx. No CBC in a week - ordered labs     Review of Systems: Review of systems not obtained due to patient factors.     Current Medications:   Current Facility-Administered Medications   Medication Dose Route Frequency    doxazosin (CARDURA) tablet 4 mg  4 mg Oral QHS    insulin glargine (LANTUS) injection 6 Units  6 Units SubCUTAneous DAILY    metoprolol tartrate (LOPRESSOR) tablet 12.5 mg  12.5 mg Oral Q12H    bumetanide (BUMEX) tablet 2 mg  2 mg Oral BID    NIFEdipine ER (PROCARDIA XL) tablet 60 mg  60 mg Oral BID    morphine injection 2 mg  2 mg IntraVENous Q3H PRN    isosorbide mononitrate ER (IMDUR) tablet 120 mg  120 mg Oral DAILY    heparin (porcine) 1,000 unit/mL injection 1,900 Units  1,900 Units Hemodialysis DIALYSIS PRN    heparin (porcine) 1,000 unit/mL injection 1,900 Units  1,900 Units Hemodialysis DIALYSIS PRN    lidocaine 4 % patch 1 Patch  1 Patch TransDERmal Q24H    labetaloL (NORMODYNE;TRANDATE) injection 15 mg  15 mg IntraVENous Q4H PRN    hydrALAZINE (APRESOLINE) tablet 100 mg  100 mg Oral TID    niCARdipine (CARDENE) 25 mg in 0.9% sodium chloride 250 mL infusion  0-15 mg/hr IntraVENous TITRATE    cloNIDine HCL (CATAPRES) tablet 0.2 mg  0.2 mg Oral TID    sucralfate (CARAFATE) tablet 1 g  1 g Oral AC&HS    losartan (COZAAR) tablet 100 mg  100 mg Oral QHS    [Held by provider] gabapentin (NEURONTIN) capsule 100 mg  100 mg Oral TID    insulin lispro (HUMALOG) injection   SubCUTAneous AC&HS    hydrALAZINE (APRESOLINE) 20 mg/mL injection 10-20 mg  10-20 mg IntraVENous Q6H PRN    pantoprazole (PROTONIX) 40 mg in 0.9% sodium chloride 10 mL injection  40 mg IntraVENous DAILY    aspirin chewable tablet 81 mg  81 mg Oral DAILY    polyethylene glycol (MIRALAX) packet 17 g  17 g Oral BID    eplerenone (INSPRA) tablet 25 mg  25 mg Oral DAILY    calcium carbonate (TUMS) chewable tablet 200 mg [elemental]  200 mg Oral TID WITH MEALS    sodium chloride (NS) flush 5-40 mL  5-40 mL IntraVENous Q8H    sodium chloride (NS) flush 5-40 mL  5-40 mL IntraVENous PRN    epoetin ericka-epbx (RETACRIT) injection 20,000 Units  20,000 Units SubCUTAneous Q MON, WED & FRI    butalbital-acetaminophen-caffeine (FIORICET, ESGIC) -40 mg per tablet 1 Tablet  1 Tablet Oral Q4H PRN    ondansetron (ZOFRAN) injection 4 mg  4 mg IntraVENous Q4H PRN    busPIRone (BUSPAR) tablet 10 mg  10 mg Oral BID    rosuvastatin (CRESTOR) tablet 10 mg  10 mg Oral QHS    sertraline (ZOLOFT) tablet 200 mg  200 mg Oral DAILY    traMADoL (ULTRAM) tablet 50 mg  50 mg Oral Q6H PRN    traZODone (DESYREL) tablet 50 mg  50 mg Oral QHS    acetaminophen (TYLENOL) tablet 650 mg  650 mg Oral Q6H PRN    glucose chewable tablet 16 g  4 Tablet Oral PRN    dextrose (D50W) injection syrg 12.5-25 g  25-50 mL IntraVENous PRN    glucagon (GLUCAGEN) injection 1 mg  1 mg IntraMUSCular PRN      No Known Allergies    Objective:  Vitals:    Vitals:    11/29/21 0355 11/29/21 0447 11/29/21 0554 11/29/21 0716   BP:  (!) 133/58  (!) 143/68   Pulse: (!) 59 (!) 58 60 60   Resp:    16   Temp:    97.8 °F (36.6 °C)   TempSrc:       SpO2: 95%   Weight:       Height:         Intake and Output:  No intake/output data recorded. 11/27 1901 - 11/29 0700  In: 1551.7 [P.O.:1340; I.V.:211.7]  Out: 400 [Urine:400]    Physical Examination:    General: NAD  Neck:  Supple, no mass  Resp:  Clear b/l  CV:  RRR, s1,s2  Neurologic:  Non focal  :                  Grant +        []    High complexity decision making was performed  []    Patient is at high-risk of decompensation with multiple organ involvement    Lab Data Personally Reviewed: I have reviewed all the pertinent labs, microbiology data and radiology studies during assessment. Recent Labs     11/29/21  0353 11/28/21  0328 11/27/21  0045   * 134*  133* 133*   K 3.9 4.1  4.1 3.6    102  101 101   CO2 27 25  26 29   * 173*  174* 220*   BUN 23* 18  18 14   CREA 2.92* 2.67*  2.71* 2.16*   CA 8.9 8.5  8.5 7.9*   PHOS  --  1.7*  --    ALB  --  2.8*  --      No results for input(s): WBC, HGB, HCT, PLT, HGBEXT, HCTEXT, PLTEXT, HGBEXT, HCTEXT, PLTEXT in the last 72 hours.   No results found for: [facility]  Lab Results   Component Value Date/Time    Culture result: NO GROWTH 4 DAYS 11/18/2021 11:40 AM     Recent Results (from the past 24 hour(s))   GLUCOSE, POC    Collection Time: 11/28/21 12:36 PM   Result Value Ref Range    Glucose (POC) 165 (H) 65 - 117 mg/dL    Performed by Toma Darden, POC    Collection Time: 11/28/21  5:51 PM   Result Value Ref Range    Glucose (POC) 174 (H) 65 - 117 mg/dL    Performed by Nisha Ruiz    GLUCOSE, POC    Collection Time: 11/28/21  9:35 PM   Result Value Ref Range    Glucose (POC) 142 (H) 65 - 117 mg/dL    Performed by Tirso Gamez, BASIC    Collection Time: 11/29/21  3:53 AM   Result Value Ref Range    Sodium 135 (L) 136 - 145 mmol/L    Potassium 3.9 3.5 - 5.1 mmol/L    Chloride 101 97 - 108 mmol/L    CO2 27 21 - 32 mmol/L    Anion gap 7 5 - 15 mmol/L    Glucose 129 (H) 65 - 100 mg/dL    BUN 23 (H) 6 - 20 MG/DL Creatinine 2.92 (H) 0.70 - 1.30 MG/DL    BUN/Creatinine ratio 8 (L) 12 - 20      GFR est AA 27 (L) >60 ml/min/1.73m2    GFR est non-AA 22 (L) >60 ml/min/1.73m2    Calcium 8.9 8.5 - 10.1 MG/DL   SAMPLES BEING HELD    Collection Time: 11/29/21  3:53 AM   Result Value Ref Range    SAMPLES BEING HELD 1LAV     COMMENT        Add-on orders for these samples will be processed based on acceptable specimen integrity and analyte stability, which may vary by analyte. GLUCOSE, POC    Collection Time: 11/29/21  7:10 AM   Result Value Ref Range    Glucose (POC) 142 (H) 65 - 117 mg/dL    Performed by Denver Peat            Total time spent with patient:  xxx   min. Care Plan discussed with:  Patient     Family      RN      Consulting Physician [facility],         I have reviewed the flowsheets. Chart and Pertinent Notes have been reviewed. No change in PMH ,family and social history from Consult note.       Ben Winkler MD

## 2021-11-29 NOTE — PROGRESS NOTES
Problem: Self Care Deficits Care Plan (Adult)  Goal: *Acute Goals and Plan of Care (Insert Text)  Description: FUNCTIONAL STATUS PRIOR TO ADMISSION: Patient was independent without use of DME. However, patient's sons assist with lawn care, cooking, cleaning, grocery shopping, and transportation. Poor vision at baseline per patient report. Pt wears reading glasses. He used to work on cars. Pt is independent in ADLs. HOME SUPPORT PRIOR TO ADMISSION: The patient lived with his 3 sons and girlfriend. Occupational Therapy Goals  Initiated 11/23/2021  1. Patient will perform standing ADLs in bathroom with least restrictive DME with modified independence within 7 day(s). 2.  Patient will perform upper body dressing and lower body dressing with modified independence within 7 day(s). 3.  Patient will perform bathing with modified independence within 7 day(s). 4.  Patient will perform toilet transfers with modified independence within 7 day(s). 5.  Patient will perform all aspects of toileting with modified independence within 7 day(s). 6.  Patient will utilize energy conservation techniques during functional activities with verbal cues within 7 day(s). Outcome: Progressing Towards Goal   OCCUPATIONAL THERAPY TREATMENT  Patient: Chiara Figueroa (89 y.o. male)  Date: 11/29/2021  Diagnosis: CHF exacerbation ([facility] [I50.9]   CHF exacerbation ([facility]  Procedure(s) (LRB):  ESOPHAGOGASTRODUODENOSCOPY (EGD) (N/A) 13 Days Post-Op  Precautions: Fall, Bed Alarm  Chart, occupational therapy assessment, plan of care, and goals were reviewed. ASSESSMENT  Patient continues with skilled OT services and is progressing towards goals. Patient is largely supervision for all ADL and IADL tasks at this time. Anticipate no OT at discharge.       Current Level of Function Impacting Discharge (ADLs): supervision     Other factors to consider for discharge: supportive family         PLAN :  Patient continues to benefit from skilled intervention to address the above impairments. Continue treatment per established plan of care to address goals. Recommend with staff: OOB x 3 to chair    Recommend next OT session: standing bathing task    Recommendation for discharge: (in order for the patient to meet his/her long term goals)  No skilled occupational therapy/ follow up rehabilitation needs identified at this time. This discharge recommendation:  Has been made in collaboration with the attending provider and/or case management    IF patient discharges home will need the following DME: none       SUBJECTIVE:   Patient stated I am ready to go home.     OBJECTIVE DATA SUMMARY:   Cognitive/Behavioral Status:  Neurologic State: Alert  Orientation Level: Oriented X4  Cognition: Appropriate for age attention/concentration  Perception: Appears intact  Perseveration: No perseveration noted  Safety/Judgement: Insight into deficits    Functional Mobility and Transfers for ADLs:  Bed Mobility:  Supine to Sit: Supervision    Transfers:  Sit to Stand: Supervision     Bed to Chair: Supervision    Balance:  Sitting: Intact  Sitting - Static: Good (unsupported)  Sitting - Dynamic: Good (unsupported)  Standing: Intact  Standing - Static: Good  Standing - Dynamic : Good    ADL Intervention:     Grooming  Grooming Assistance: Supervision  Position Performed: Standing  Washing Hands: Supervision    Cognitive Retraining  Safety/Judgement: Insight into deficits    Pain:  No complaints    Activity Tolerance:   Good    After treatment patient left in no apparent distress:   Sitting in chair, Call bell within reach, and Bed / chair alarm activated    COMMUNICATION/COLLABORATION:   The patients plan of care was discussed with: Physical therapist and Registered nurse.      Sean Montaño  Time Calculation: 15 mins

## 2021-11-29 NOTE — PROGRESS NOTES
0730: Bedside shift change report given to Mil Henao (oncoming nurse) by Wagner Castillo (offgoing nurse). Report included the following information SBAR, Kardex, Intake/Output, MAR and Recent Results. 1057: Restarted Cardene drip at 5mg/hr for increased BP.    1230: Stopped Cardene drip. BP stable. 1930: Bedside shift change report given to Mae Carver (oncoming nurse) by Mil Henao (offgoing nurse). Report included the following information SBAR, Kardex, ED Summary, Procedure Summary, Intake/Output, MAR and Recent Results.

## 2021-11-29 NOTE — PROGRESS NOTES
[address] visit. Mr. Kimberly Kelley asked for communion later in day. Returned and Mr. Kimberly Kelley was asleep in his chair. Prayer for spiritual communion offered.     MAGGY Samuel, RN, ACSW, LCSW   Page:  195-[facility]([phone])

## 2021-11-29 NOTE — PROGRESS NOTES
[address] [facility] Adult  Hospitalist Group                                                                                          Hospitalist Progress Note  Maria D Varma MD  Answering service: [phone] from in house phone        Date of Service:  2021  NAME:  Maxi Lerner  :  1960  MRN:  [id number]      Admission Summary:   49-year-old male with a past medical history of CAD status post CABG on dAPT, HFpEF,  DM 2, and CKD V who was admitted on  for GI bleed, and overload secondary to cardiorenal syndrome. He had been hospitalized at [facility] prior to this for acute on chronic heart failure, and started to develop bilateral lower extremity edema with melena upon discharge. He underwent EGD which showed nonbleeding erosive gastritis, and was started on PPI. Nephrology, and cardiology were consulted for volume management, and he was diuresed with IV Lasix. On , patient sustained PEA arrest.  After 12 minutes of CPR, ROSC was achieved, and he was intubated and transferred to the ICU. His volume status did not improve with IV Lasix, and Alexandre catheter was placed with plan for 3 days of dialysis. He was extubated, and neurology was consulted due altered mental status suspected secondary to anoxic brain injury, but this improved, and his mental status ultimately returned back to baseline with no imaging or intervention recommended. Transferred out of ICU on 21    Interval history / Subjective:     Pt seen and examined  For dialysis today,.   To adjust blood pressure meds  CM to arrange out pt dialysis at [facility] w/ Dr Oshea Danger:     #SEVERIANO on CKD 4, now on HD due to ATN from PEA arrest  #HFpEF  Cardiology, nephrology on board, appreciate recommendations  Avoid renal toxins, and hypotension.  --HD MWF   - On Bumex clonidine doxazosin Inspra hydralazine Imdur losartan metoprolol and nifedipine  --c/w HD , need outpatient HD unit set up case management consult-- CM to arrange out pt dialysis at [facility] w/ Dr Singh Lopez   --Perm-a-cath placed on 11/24 hemodialysis per nephrology    Headache/Nausea/Vomiting-improved  - Hypertensive urgency-resistant HTN  -BP high despite Clonidine 0.2mg TID, Cozaar 100mg, Imdur 120mg daily, Lopressor, Hydralazine , Procardia XL and Prazosin   -discussed with nephrologist - meds adjusted    S/p PEA Arrest suspect from respiratory distress  --s/p intubation/extubation      #Erosive gastritis  Presented with melena, and normocytic anemia with hemoglobin 9  EGD on 11/17 with nonbleeding erosive gastritis  Continue PPI per gastroenterology  - now on ASA monotherapy    Probable Anoxic brain injury due to Cardiac arrest  - down for 12 minutes  - seen by Neurology  - supportive care     #DM II  -lispro ISS     #CAD s/p CABG  -continue metoprolol, crestor, imdur, and ASA     #Depression  -continue zoloft     #MSK pain   -s/p CPR  -has tramadol,  -Lidocaine patch added     FEN: caridac  DVT  ppx: SCD  MPOA: daughter  Code: Full    Care Plan discussed with: Patient/Family and Nurse  Anticipated Disposition: SAH/Rehab  Anticipated Discharge: Greater than 48 hours, IPR pending, needs auth discussed in length with the patient and RN at bedside verbalized understanding     Hospital Problems  Date Reviewed: 8/7/2020          Codes Class Noted POA    Severe protein-calorie malnutrition ([facility] ICD-10-CM: H05  ICD-9-CM: 688  11/23/2021 Yes        * (Principal) CHF exacerbation ([facility] ICD-10-CM: I50.9  ICD-9-CM: 428.0  11/14/2021 Unknown                Review of Systems:   A comprehensive review of systems was negative except for that written in the HPI. Vital Signs:    Last 24hrs VS reviewed since prior progress note.  Most recent are:  Visit Vitals  BP (!) 182/68   Pulse 61   Temp 97.8 °F (36.6 °C)   Resp 18   Ht 5' 9\" (1.753 m)   Wt 74.4 kg (164 lb 0.4 oz)   SpO2 95%   BMI 24.22 kg/m²         Intake/Output Summary (Last 24 hours) at 11/29/2021 1044  Last data filed at 11/29/2021 0345  Gross per 24 hour   Intake 1166.67 ml   Output    Net 1166.67 ml        Physical Examination:     I had a face to face encounter with this patient and independently examined them on 11/29/2021 as outlined below:          Constitutional:  No acute distress, cooperative, pleasant    ENT:  Oral mucosa moist, EOMI,anicteric sclera    Resp:  CTA bilaterally. CV:  Regular rhythm, normal rate, S1,S 2 wnl    GI:  Soft, non distended, non tender, normoactive bowel sounds,    Musculoskeletal:  trace edema    Neurologic:  Moves all extremities. AAO            Data Review:    Review and/or order of clinical lab test  Review and/or order of tests in the medicine section of CPT      Labs:     Recent Labs     11/29/21 0353   WBC 5.2   HGB 9.0*   HCT 29.0*   *     Recent Labs     11/29/21  0353 11/28/21  0328 11/27/21  0045   * 134*  133* 133*   K 3.9 4.1  4.1 3.6    102  101 101   CO2 27 25  26 29   BUN 23* 18  18 14   CREA 2.92* 2.67*  2.71* 2.16*   * 173*  174* 220*   CA 8.9 8.5  8.5 7.9*   PHOS  --  1.7*  --      Recent Labs     11/28/21 0328   ALB 2.8*     No results for input(s): INR, PTP, APTT, INREXT, INREXT in the last 72 hours. No results for input(s): FE, TIBC, PSAT, FERR in the last 72 hours. No results found for: FOL, RBCF   No results for input(s): PH, PCO2, PO2 in the last 72 hours. No results for input(s): CPK, CKNDX, TROIQ in the last 72 hours.     No lab exists for component: CPKMB  Lab Results   Component Value Date/Time    Cholesterol, total 176 11/22/2019 09:36 AM    HDL Cholesterol 45 11/22/2019 09:36 AM    LDL, calculated 112 (H) 11/22/2019 09:36 AM    Triglyceride 94 11/22/2019 09:36 AM     Lab Results   Component Value Date/Time    Glucose (POC) 142 (H) 11/29/2021 07:10 AM    Glucose (POC) 142 (H) 11/28/2021 09:35 PM    Glucose (POC) 174 (H) 11/28/2021 05:51 PM    Glucose (POC) 165 (H) 11/28/2021 12:36 PM    Glucose (POC) 176 (H) 11/28/2021 06:16 AM     Lab Results   Component Value Date/Time    Color YELLOW/STRAW 11/14/2021 10:31 AM    Appearance CLEAR 11/14/2021 10:31 AM    Specific gravity 1.019 11/14/2021 10:31 AM    pH (UA) 5.5 11/14/2021 10:31 AM    Protein >300 (A) 11/14/2021 10:31 AM    Glucose 500 (A) 11/14/2021 10:31 AM    Ketone Negative 11/14/2021 10:31 AM    Bilirubin Negative 11/14/2021 10:31 AM    Urobilinogen 0.2 11/14/2021 10:31 AM    Nitrites Negative 11/14/2021 10:31 AM    Leukocyte Esterase Negative 11/14/2021 10:31 AM    Epithelial cells FEW 11/14/2021 10:31 AM    Bacteria Negative 11/14/2021 10:31 AM    WBC 0-4 11/14/2021 10:31 AM    RBC 0-5 11/14/2021 10:31 AM         Medications Reviewed:     Current Facility-Administered Medications   Medication Dose Route Frequency    doxazosin (CARDURA) tablet 4 mg  4 mg Oral QHS    NIFEdipine ER (PROCARDIA XL) tablet 120 mg  120 mg Oral DAILY    insulin glargine (LANTUS) injection 6 Units  6 Units SubCUTAneous DAILY    metoprolol tartrate (LOPRESSOR) tablet 12.5 mg  12.5 mg Oral Q12H    bumetanide (BUMEX) tablet 2 mg  2 mg Oral BID    morphine injection 2 mg  2 mg IntraVENous Q3H PRN    isosorbide mononitrate ER (IMDUR) tablet 120 mg  120 mg Oral DAILY    heparin (porcine) 1,000 unit/mL injection 1,900 Units  1,900 Units Hemodialysis DIALYSIS PRN    heparin (porcine) 1,000 unit/mL injection 1,900 Units  1,900 Units Hemodialysis DIALYSIS PRN    lidocaine 4 % patch 1 Patch  1 Patch TransDERmal Q24H    labetaloL (NORMODYNE;TRANDATE) injection 15 mg  15 mg IntraVENous Q4H PRN    hydrALAZINE (APRESOLINE) tablet 100 mg  100 mg Oral TID    niCARdipine (CARDENE) 25 mg in 0.9% sodium chloride 250 mL infusion  0-15 mg/hr IntraVENous TITRATE    cloNIDine HCL (CATAPRES) tablet 0.2 mg  0.2 mg Oral TID    sucralfate (CARAFATE) tablet 1 g  1 g Oral AC&HS    losartan (COZAAR) tablet 100 mg  100 mg Oral QHS    [Held by provider] gabapentin (NEURONTIN) capsule 100 mg  100 mg Oral TID    insulin lispro (HUMALOG) injection   SubCUTAneous AC&HS    hydrALAZINE (APRESOLINE) 20 mg/mL injection 10-20 mg  10-20 mg IntraVENous Q6H PRN    pantoprazole (PROTONIX) 40 mg in 0.9% sodium chloride 10 mL injection  40 mg IntraVENous DAILY    aspirin chewable tablet 81 mg  81 mg Oral DAILY    polyethylene glycol (MIRALAX) packet 17 g  17 g Oral BID    eplerenone (INSPRA) tablet 25 mg  25 mg Oral DAILY    calcium carbonate (TUMS) chewable tablet 200 mg [elemental]  200 mg Oral TID WITH MEALS    sodium chloride (NS) flush 5-40 mL  5-40 mL IntraVENous Q8H    sodium chloride (NS) flush 5-40 mL  5-40 mL IntraVENous PRN    epoetin ericka-epbx (RETACRIT) injection 20,000 Units  20,000 Units SubCUTAneous Q MON, WED & FRI    butalbital-acetaminophen-caffeine (FIORICET, ESGIC) -40 mg per tablet 1 Tablet  1 Tablet Oral Q4H PRN    ondansetron (ZOFRAN) injection 4 mg  4 mg IntraVENous Q4H PRN    busPIRone (BUSPAR) tablet 10 mg  10 mg Oral BID    rosuvastatin (CRESTOR) tablet 10 mg  10 mg Oral QHS    sertraline (ZOLOFT) tablet 200 mg  200 mg Oral DAILY    traMADoL (ULTRAM) tablet 50 mg  50 mg Oral Q6H PRN    traZODone (DESYREL) tablet 50 mg  50 mg Oral QHS    acetaminophen (TYLENOL) tablet 650 mg  650 mg Oral Q6H PRN    glucose chewable tablet 16 g  4 Tablet Oral PRN    dextrose (D50W) injection syrg 12.5-25 g  25-50 mL IntraVENous PRN    glucagon (GLUCAGEN) injection 1 mg  1 mg IntraMUSCular PRN     ______________________________________________________________________  EXPECTED LENGTH OF STAY: 3d 19h  ACTUAL LENGTH OF STAY:          15                 Marcela Tellez MD

## 2021-11-30 ENCOUNTER — APPOINTMENT (OUTPATIENT)
Dept: VASCULAR SURGERY | Age: 61
DRG: 194 | End: 2021-11-30
Attending: INTERNAL MEDICINE
Payer: MEDICAID

## 2021-11-30 ENCOUNTER — TELEPHONE (OUTPATIENT)
Dept: FAMILY MEDICINE CLINIC | Age: 61
End: 2021-11-30

## 2021-11-30 LAB
ALBUMIN SERPL-MCNC: 2.9 G/DL (ref 3.5–5)
ANION GAP SERPL CALC-SCNC: 11 MMOL/L (ref 5–15)
BUN SERPL-MCNC: 17 MG/DL (ref 6–20)
BUN/CREAT SERPL: 8 (ref 12–20)
CALCIUM SERPL-MCNC: 8.6 MG/DL (ref 8.5–10.1)
CHLORIDE SERPL-SCNC: 103 MMOL/L (ref 97–108)
CO2 SERPL-SCNC: 22 MMOL/L (ref 21–32)
COMMENT, HOLDF: NORMAL
CREAT SERPL-MCNC: 2.16 MG/DL (ref 0.7–1.3)
GLUCOSE BLD STRIP.AUTO-MCNC: 145 MG/DL (ref 65–117)
GLUCOSE BLD STRIP.AUTO-MCNC: 153 MG/DL (ref 65–117)
GLUCOSE BLD STRIP.AUTO-MCNC: 163 MG/DL (ref 65–117)
GLUCOSE BLD STRIP.AUTO-MCNC: 188 MG/DL (ref 65–117)
GLUCOSE SERPL-MCNC: 136 MG/DL (ref 65–100)
PHOSPHATE SERPL-MCNC: 2 MG/DL (ref 2.6–4.7)
POTASSIUM SERPL-SCNC: 4.1 MMOL/L (ref 3.5–5.1)
SAMPLES BEING HELD,HOLD: NORMAL
SERVICE CMNT-IMP: ABNORMAL
SODIUM SERPL-SCNC: 136 MMOL/L (ref 136–145)

## 2021-11-30 PROCEDURE — 74011250636 HC RX REV CODE- 250/636: Performed by: NURSE PRACTITIONER

## 2021-11-30 PROCEDURE — 36415 COLL VENOUS BLD VENIPUNCTURE: CPT

## 2021-11-30 PROCEDURE — 74011250637 HC RX REV CODE- 250/637: Performed by: HOSPITALIST

## 2021-11-30 PROCEDURE — 65660000001 HC RM ICU INTERMED STEPDOWN

## 2021-11-30 PROCEDURE — 74011250637 HC RX REV CODE- 250/637: Performed by: INTERNAL MEDICINE

## 2021-11-30 PROCEDURE — 74011250636 HC RX REV CODE- 250/636: Performed by: INTERNAL MEDICINE

## 2021-11-30 PROCEDURE — C9113 INJ PANTOPRAZOLE SODIUM, VIA: HCPCS | Performed by: NURSE PRACTITIONER

## 2021-11-30 PROCEDURE — 74011636637 HC RX REV CODE- 636/637: Performed by: NURSE PRACTITIONER

## 2021-11-30 PROCEDURE — 80069 RENAL FUNCTION PANEL: CPT

## 2021-11-30 PROCEDURE — 74011250636 HC RX REV CODE- 250/636: Performed by: HOSPITALIST

## 2021-11-30 PROCEDURE — 82962 GLUCOSE BLOOD TEST: CPT

## 2021-11-30 PROCEDURE — 74011000250 HC RX REV CODE- 250: Performed by: HOSPITALIST

## 2021-11-30 PROCEDURE — 93975 VASCULAR STUDY: CPT

## 2021-11-30 PROCEDURE — 97116 GAIT TRAINING THERAPY: CPT

## 2021-11-30 PROCEDURE — 74011000250 HC RX REV CODE- 250: Performed by: NURSE PRACTITIONER

## 2021-11-30 RX ORDER — DOXAZOSIN 4 MG/1
4 TABLET ORAL
Qty: 30 TABLET | Refills: 0 | Status: SHIPPED | OUTPATIENT
Start: 2021-11-30 | End: 2021-12-01

## 2021-11-30 RX ORDER — LOSARTAN POTASSIUM 100 MG/1
100 TABLET ORAL
Qty: 30 TABLET | Refills: 0 | Status: SHIPPED | OUTPATIENT
Start: 2021-11-30 | End: 2021-12-30

## 2021-11-30 RX ORDER — PANTOPRAZOLE SODIUM 40 MG/1
40 TABLET, DELAYED RELEASE ORAL DAILY
Qty: 30 TABLET | Refills: 0 | Status: SHIPPED | OUTPATIENT
Start: 2021-11-30 | End: 2021-12-30

## 2021-11-30 RX ORDER — BUMETANIDE 2 MG/1
2 TABLET ORAL 2 TIMES DAILY
Qty: 60 TABLET | Refills: 0 | Status: SHIPPED | OUTPATIENT
Start: 2021-11-30 | End: 2021-12-01

## 2021-11-30 RX ORDER — ISOSORBIDE MONONITRATE 120 MG/1
120 TABLET, EXTENDED RELEASE ORAL DAILY
Qty: 30 TABLET | Refills: 0 | Status: SHIPPED | OUTPATIENT
Start: 2021-11-30 | End: 2021-12-30

## 2021-11-30 RX ORDER — GUAIFENESIN 100 MG/5ML
81 LIQUID (ML) ORAL DAILY
Qty: 30 TABLET | Refills: 0 | Status: SHIPPED | OUTPATIENT
Start: 2021-11-30 | End: 2021-12-30

## 2021-11-30 RX ORDER — MINOXIDIL 2.5 MG/1
2.5 TABLET ORAL DAILY
Qty: 30 TABLET | Refills: 0 | Status: SHIPPED | OUTPATIENT
Start: 2021-12-01 | End: 2021-12-01

## 2021-11-30 RX ORDER — NIFEDIPINE 60 MG/1
120 TABLET, EXTENDED RELEASE ORAL DAILY
Qty: 60 TABLET | Refills: 0 | Status: SHIPPED | OUTPATIENT
Start: 2021-11-30 | End: 2021-12-30

## 2021-11-30 RX ORDER — SUCRALFATE 1 G/1
1 TABLET ORAL
Qty: 60 TABLET | Refills: 0 | Status: SHIPPED | OUTPATIENT
Start: 2021-11-30 | End: 2021-12-15

## 2021-11-30 RX ORDER — DOXAZOSIN 2 MG/1
4 TABLET ORAL 2 TIMES DAILY
Status: DISCONTINUED | OUTPATIENT
Start: 2021-11-30 | End: 2021-12-01 | Stop reason: HOSPADM

## 2021-11-30 RX ORDER — BUMETANIDE 1 MG/1
2 TABLET ORAL DAILY
Status: DISCONTINUED | OUTPATIENT
Start: 2021-11-30 | End: 2021-12-01 | Stop reason: HOSPADM

## 2021-11-30 RX ORDER — MINOXIDIL 2.5 MG/1
5 TABLET ORAL 2 TIMES DAILY
Status: DISCONTINUED | OUTPATIENT
Start: 2021-11-30 | End: 2021-11-30

## 2021-11-30 RX ORDER — AMLODIPINE BESYLATE 10 MG/1
10 TABLET ORAL DAILY
Qty: 30 TABLET | Refills: 0 | Status: SHIPPED | OUTPATIENT
Start: 2021-11-30 | End: 2021-12-30

## 2021-11-30 RX ORDER — EPLERENONE 25 MG/1
25 TABLET, FILM COATED ORAL DAILY
Qty: 30 TABLET | Refills: 0 | Status: SHIPPED | OUTPATIENT
Start: 2021-11-30 | End: 2021-12-30

## 2021-11-30 RX ORDER — CLONIDINE HYDROCHLORIDE 0.2 MG/1
0.2 TABLET ORAL 3 TIMES DAILY
Qty: 90 TABLET | Refills: 0 | Status: SHIPPED | OUTPATIENT
Start: 2021-11-30 | End: 2021-12-30

## 2021-11-30 RX ORDER — MINOXIDIL 2.5 MG/1
5 TABLET ORAL 2 TIMES DAILY
Status: DISCONTINUED | OUTPATIENT
Start: 2021-11-30 | End: 2021-12-01 | Stop reason: HOSPADM

## 2021-11-30 RX ORDER — INSULIN GLARGINE 100 [IU]/ML
10 INJECTION, SOLUTION SUBCUTANEOUS
Qty: 3 ML | Refills: 0 | Status: SHIPPED | OUTPATIENT
Start: 2021-11-30 | End: 2021-12-30

## 2021-11-30 RX ORDER — HYDRALAZINE HYDROCHLORIDE 100 MG/1
100 TABLET, FILM COATED ORAL 3 TIMES DAILY
Qty: 90 TABLET | Refills: 0 | Status: SHIPPED | OUTPATIENT
Start: 2021-11-30 | End: 2021-12-30

## 2021-11-30 RX ORDER — MINOXIDIL 2.5 MG/1
2.5 TABLET ORAL DAILY
Status: DISCONTINUED | OUTPATIENT
Start: 2021-11-30 | End: 2021-11-30

## 2021-11-30 RX ORDER — MINOXIDIL 2.5 MG/1
2.5 TABLET ORAL
Status: COMPLETED | OUTPATIENT
Start: 2021-11-30 | End: 2021-11-30

## 2021-11-30 RX ADMIN — HYDRALAZINE HYDROCHLORIDE 100 MG: 50 TABLET, FILM COATED ORAL at 10:01

## 2021-11-30 RX ADMIN — Medication 10 ML: at 16:19

## 2021-11-30 RX ADMIN — HYDRALAZINE HYDROCHLORIDE 20 MG: 20 INJECTION INTRAMUSCULAR; INTRAVENOUS at 04:19

## 2021-11-30 RX ADMIN — LOSARTAN POTASSIUM 100 MG: 50 TABLET, FILM COATED ORAL at 22:33

## 2021-11-30 RX ADMIN — ISOSORBIDE MONONITRATE 120 MG: 60 TABLET, EXTENDED RELEASE ORAL at 10:01

## 2021-11-30 RX ADMIN — HYDRALAZINE HYDROCHLORIDE 100 MG: 50 TABLET, FILM COATED ORAL at 22:33

## 2021-11-30 RX ADMIN — BUSPIRONE HYDROCHLORIDE 10 MG: 10 TABLET ORAL at 18:03

## 2021-11-30 RX ADMIN — SODIUM CHLORIDE 40 MG: 9 INJECTION INTRAMUSCULAR; INTRAVENOUS; SUBCUTANEOUS at 10:04

## 2021-11-30 RX ADMIN — BUMETANIDE 2 MG: 1 TABLET ORAL at 10:02

## 2021-11-30 RX ADMIN — ROSUVASTATIN 10 MG: 10 TABLET, FILM COATED ORAL at 22:33

## 2021-11-30 RX ADMIN — LABETALOL HYDROCHLORIDE 15 MG: 5 INJECTION INTRAVENOUS at 02:14

## 2021-11-30 RX ADMIN — DOXAZOSIN 4 MG: 2 TABLET ORAL at 10:01

## 2021-11-30 RX ADMIN — MORPHINE SULFATE 2 MG: 2 INJECTION, SOLUTION INTRAMUSCULAR; INTRAVENOUS at 10:23

## 2021-11-30 RX ADMIN — SUCRALFATE 1 G: 1 TABLET ORAL at 16:28

## 2021-11-30 RX ADMIN — DOXAZOSIN 4 MG: 2 TABLET ORAL at 18:03

## 2021-11-30 RX ADMIN — BUSPIRONE HYDROCHLORIDE 10 MG: 10 TABLET ORAL at 10:01

## 2021-11-30 RX ADMIN — INSULIN LISPRO 2 UNITS: 100 INJECTION, SOLUTION INTRAVENOUS; SUBCUTANEOUS at 12:37

## 2021-11-30 RX ADMIN — NICARDIPINE HYDROCHLORIDE 10.5 MG/HR: 25 INJECTION, SOLUTION INTRAVENOUS at 02:47

## 2021-11-30 RX ADMIN — CALCIUM CARBONATE (ANTACID) CHEW TAB 500 MG 200 MG: 500 CHEW TAB at 10:01

## 2021-11-30 RX ADMIN — MINOXIDIL 2.5 MG: 2.5 TABLET ORAL at 10:01

## 2021-11-30 RX ADMIN — NIFEDIPINE 120 MG: 60 TABLET, EXTENDED RELEASE ORAL at 10:04

## 2021-11-30 RX ADMIN — SERTRALINE 200 MG: 50 TABLET, FILM COATED ORAL at 10:00

## 2021-11-30 RX ADMIN — METOPROLOL TARTRATE 12.5 MG: 25 TABLET, FILM COATED ORAL at 10:00

## 2021-11-30 RX ADMIN — Medication 10 ML: at 07:35

## 2021-11-30 RX ADMIN — HYDRALAZINE HYDROCHLORIDE 100 MG: 50 TABLET, FILM COATED ORAL at 16:28

## 2021-11-30 RX ADMIN — Medication 10 ML: at 22:34

## 2021-11-30 RX ADMIN — METOPROLOL TARTRATE 12.5 MG: 25 TABLET, FILM COATED ORAL at 22:33

## 2021-11-30 RX ADMIN — MORPHINE SULFATE 2 MG: 2 INJECTION, SOLUTION INTRAMUSCULAR; INTRAVENOUS at 22:33

## 2021-11-30 RX ADMIN — MINOXIDIL 2.5 MG: 2.5 TABLET ORAL at 04:19

## 2021-11-30 RX ADMIN — SUCRALFATE 1 G: 1 TABLET ORAL at 07:35

## 2021-11-30 RX ADMIN — SUCRALFATE 1 G: 1 TABLET ORAL at 22:33

## 2021-11-30 RX ADMIN — MORPHINE SULFATE 2 MG: 2 INJECTION, SOLUTION INTRAMUSCULAR; INTRAVENOUS at 16:27

## 2021-11-30 RX ADMIN — CLONIDINE HYDROCHLORIDE 0.2 MG: 0.2 TABLET ORAL at 22:33

## 2021-11-30 RX ADMIN — CLONIDINE HYDROCHLORIDE 0.2 MG: 0.2 TABLET ORAL at 16:28

## 2021-11-30 RX ADMIN — EPLERENONE 25 MG: 25 TABLET, FILM COATED ORAL at 10:01

## 2021-11-30 RX ADMIN — ASPIRIN 81 MG CHEWABLE TABLET 81 MG: 81 TABLET CHEWABLE at 10:00

## 2021-11-30 RX ADMIN — TRAZODONE HYDROCHLORIDE 50 MG: 50 TABLET ORAL at 22:33

## 2021-11-30 RX ADMIN — NICARDIPINE HYDROCHLORIDE 9 MG/HR: 25 INJECTION, SOLUTION INTRAVENOUS at 01:40

## 2021-11-30 RX ADMIN — CLONIDINE HYDROCHLORIDE 0.2 MG: 0.2 TABLET ORAL at 10:00

## 2021-11-30 RX ADMIN — CALCIUM CARBONATE (ANTACID) CHEW TAB 500 MG 200 MG: 500 CHEW TAB at 16:28

## 2021-11-30 RX ADMIN — INSULIN LISPRO 2 UNITS: 100 INJECTION, SOLUTION INTRAVENOUS; SUBCUTANEOUS at 18:04

## 2021-11-30 RX ADMIN — INSULIN LISPRO 2 UNITS: 100 INJECTION, SOLUTION INTRAVENOUS; SUBCUTANEOUS at 07:35

## 2021-11-30 RX ADMIN — MINOXIDIL 5 MG: 2.5 TABLET ORAL at 18:04

## 2021-11-30 NOTE — PROGRESS NOTES
[facility]   [address], [address] [facility]  Phone: [phone]   [phone]       Nephrology Progress Note  Ashely Hicks     1960     [id number]  Date of Admission : 11/14/2021 11/30/21    CC: Follow up for severiano ON CKD 4       Assessment and Plan   SEVERIANO on CKD  - progressive diabetic Nephropathy   - Now w/ superimposed ATN from PEA/ resp arrest   - HD MWF  - PC placed 11/24  - out pt HD set up at [facility]      HTN:  -agree w/ adding Minoxidil   - check duplex scan   - wean off cardene      Anemia in CKD   - continue RANDY      CAD s/p CABG May 2020  HFpEF  -Last echo: Preserved LVEF, Mod Pulm HTN-PASP 50 mmHg     Resp / PEA arrest   - encephalopathy resolved      Interval History:  Seen and examined. BP high since dialysis yesterday and back on cardene gtt. Asymptomatic . Edema resolved     Review of Systems: Review of systems not obtained due to patient factors.     Current Medications:   Current Facility-Administered Medications   Medication Dose Route Frequency    bumetanide (BUMEX) tablet 2 mg  2 mg Oral DAILY    doxazosin (CARDURA) tablet 4 mg  4 mg Oral BID    minoxidiL (LONITEN) tablet 5 mg  5 mg Oral BID    NIFEdipine ER (PROCARDIA XL) tablet 120 mg  120 mg Oral DAILY    insulin glargine (LANTUS) injection 6 Units  6 Units SubCUTAneous DAILY    metoprolol tartrate (LOPRESSOR) tablet 12.5 mg  12.5 mg Oral Q12H    morphine injection 2 mg  2 mg IntraVENous Q3H PRN    isosorbide mononitrate ER (IMDUR) tablet 120 mg  120 mg Oral DAILY    heparin (porcine) 1,000 unit/mL injection 1,900 Units  1,900 Units Hemodialysis DIALYSIS PRN    heparin (porcine) 1,000 unit/mL injection 1,900 Units  1,900 Units Hemodialysis DIALYSIS PRN    lidocaine 4 % patch 1 Patch  1 Patch TransDERmal Q24H    labetaloL (NORMODYNE;TRANDATE) injection 15 mg  15 mg IntraVENous Q4H PRN    hydrALAZINE (APRESOLINE) tablet 100 mg  100 mg Oral TID    niCARdipine (CARDENE) 25 mg in 0.9% sodium chloride 250 mL infusion  0-15 mg/hr IntraVENous TITRATE    cloNIDine HCL (CATAPRES) tablet 0.2 mg  0.2 mg Oral TID    sucralfate (CARAFATE) tablet 1 g  1 g Oral AC&HS    losartan (COZAAR) tablet 100 mg  100 mg Oral QHS    [Held by provider] gabapentin (NEURONTIN) capsule 100 mg  100 mg Oral TID    insulin lispro (HUMALOG) injection   SubCUTAneous AC&HS    hydrALAZINE (APRESOLINE) 20 mg/mL injection 10-20 mg  10-20 mg IntraVENous Q6H PRN    pantoprazole (PROTONIX) 40 mg in 0.9% sodium chloride 10 mL injection  40 mg IntraVENous DAILY    aspirin chewable tablet 81 mg  81 mg Oral DAILY    polyethylene glycol (MIRALAX) packet 17 g  17 g Oral BID    eplerenone (INSPRA) tablet 25 mg  25 mg Oral DAILY    calcium carbonate (TUMS) chewable tablet 200 mg [elemental]  200 mg Oral TID WITH MEALS    sodium chloride (NS) flush 5-40 mL  5-40 mL IntraVENous Q8H    sodium chloride (NS) flush 5-40 mL  5-40 mL IntraVENous PRN    epoetin ericka-epbx (RETACRIT) injection 20,000 Units  20,000 Units SubCUTAneous Q MON, WED & FRI    butalbital-acetaminophen-caffeine (FIORICET, ESGIC) -40 mg per tablet 1 Tablet  1 Tablet Oral Q4H PRN    ondansetron (ZOFRAN) injection 4 mg  4 mg IntraVENous Q4H PRN    busPIRone (BUSPAR) tablet 10 mg  10 mg Oral BID    rosuvastatin (CRESTOR) tablet 10 mg  10 mg Oral QHS    sertraline (ZOLOFT) tablet 200 mg  200 mg Oral DAILY    traMADoL (ULTRAM) tablet 50 mg  50 mg Oral Q6H PRN    traZODone (DESYREL) tablet 50 mg  50 mg Oral QHS    acetaminophen (TYLENOL) tablet 650 mg  650 mg Oral Q6H PRN    glucose chewable tablet 16 g  4 Tablet Oral PRN    dextrose (D50W) injection syrg 12.5-25 g  25-50 mL IntraVENous PRN    glucagon (GLUCAGEN) injection 1 mg  1 mg IntraMUSCular PRN      No Known Allergies    Objective:  Vitals:    Vitals:    11/30/21 0800 11/30/21 0900 11/30/21 1000 11/30/21 1030   BP: (!) 189/62 (!) 189/63     Pulse: 70 68 70    Resp:       Temp:       TempSrc: SpO2:       Weight:    70.8 kg (156 lb 1.4 oz)   Height:         Intake and Output:  11/30 0701 - 11/30 1900  In: 480 [P.O.:480]  Out: -   11/28 1901 - 11/30 0700  In: 1336.7 [P.O.:1120; I.V.:216.7]  Out: 3100 [Urine:100]    Physical Examination:    General: NAD  Neck:  Permacath   Resp:  Clear b/l  CV:  RRR, s1,s2  Neurologic:  Non focal  :             No fam        []    High complexity decision making was performed  []    Patient is at high-risk of decompensation with multiple organ involvement    Lab Data Personally Reviewed: I have reviewed all the pertinent labs, microbiology data and radiology studies during assessment.     Recent Labs     11/30/21  0411 11/29/21  0353 11/28/21  0328    135* 134*  133*   K 4.1 3.9 4.1  4.1    101 102  101   CO2 22 27 25  26   * 129* 173*  174*   BUN 17 23* 18  18   CREA 2.16* 2.92* 2.67*  2.71*   CA 8.6 8.9 8.5  8.5   PHOS 2.0*  --  1.7*   ALB 2.9*  --  2.8*     Recent Labs     11/29/21  0353   WBC 5.2   HGB 9.0*   HCT 29.0*   *     No results found for: SDES  Lab Results   Component Value Date/Time    Culture result: NO GROWTH 4 DAYS 11/18/2021 11:40 AM     Recent Results (from the past 24 hour(s))   GLUCOSE, POC    Collection Time: 11/29/21  5:04 PM   Result Value Ref Range    Glucose (POC) 170 (H) 65 - 117 mg/dL    Performed by Sonali Michelle (CON)    GLUCOSE, POC    Collection Time: 11/29/21  9:49 PM   Result Value Ref Range    Glucose (POC) 142 (H) 65 - 117 mg/dL    Performed by Grace Russell    RENAL FUNCTION PANEL    Collection Time: 11/30/21  4:11 AM   Result Value Ref Range    Sodium 136 136 - 145 mmol/L    Potassium 4.1 3.5 - 5.1 mmol/L    Chloride 103 97 - 108 mmol/L    CO2 22 21 - 32 mmol/L    Anion gap 11 5 - 15 mmol/L    Glucose 136 (H) 65 - 100 mg/dL    BUN 17 6 - 20 MG/DL    Creatinine 2.16 (H) 0.70 - 1.30 MG/DL    BUN/Creatinine ratio 8 (L) 12 - 20      GFR est AA 38 (L) >60 ml/min/1.73m2    GFR est non-AA 31 (L) >60 ml/min/1.73m2    Calcium 8.6 8.5 - 10.1 MG/DL    Phosphorus 2.0 (L) 2.6 - 4.7 MG/DL    Albumin 2.9 (L) 3.5 - 5.0 g/dL   SAMPLES BEING HELD    Collection Time: 11/30/21  4:11 AM   Result Value Ref Range    SAMPLES BEING HELD 1 LAV, 1PST     COMMENT        Add-on orders for these samples will be processed based on acceptable specimen integrity and analyte stability, which may vary by analyte. GLUCOSE, POC    Collection Time: 11/30/21  7:30 AM   Result Value Ref Range    Glucose (POC) 145 (H) 65 - 117 mg/dL    Performed by Yosi Abdul            Total time spent with patient:  xxx   min. Care Plan discussed with:  Patient     Family      RN      Consulting Physician [facility],         I have reviewed the flowsheets. Chart and Pertinent Notes have been reviewed. No change in PMH ,family and social history from Consult note.       Amos Claros MD

## 2021-11-30 NOTE — PROGRESS NOTES
[address] [facility] Adult  Hospitalist Group                                                                                          Hospitalist Progress Note  Edson Colón MD  Answering service: [phone] OR [phone] from in house phone        Date of Service:  2021  NAME:  Torin Nieves  :  1960  MRN:  [id number]      Admission Summary:   70-year-old male with a past medical history of CAD status post CABG on dAPT, HFpEF,  DM 2, and CKD V who was admitted on  for GI bleed, and overload secondary to cardiorenal syndrome. He had been hospitalized at [facility] prior to this for acute on chronic heart failure, and started to develop bilateral lower extremity edema with melena upon discharge. He underwent EGD which showed nonbleeding erosive gastritis, and was started on PPI. Nephrology, and cardiology were consulted for volume management, and he was diuresed with IV Lasix. On , patient sustained PEA arrest.  After 12 minutes of CPR, ROSC was achieved, and he was intubated and transferred to the ICU. His volume status did not improve with IV Lasix, and Alexandre catheter was placed with plan for 3 days of dialysis. He was extubated, and neurology was consulted due altered mental status suspected secondary to anoxic brain injury, but this improved, and his mental status ultimately returned back to baseline with no imaging or intervention recommended.  Transferred out of ICU on 21    Interval history / Subjective:     Pt seen and examined continues to have high blood pressure and on and off Cardene drip for blood pressure systolic around 672 discussed with nephrology will try today adjust the blood pressure medication even further and if can be controlled may be able to go home this afternoon else will be tomorrow after dialysis    CM to arrange out pt dialysis at [facility] w/ Dr Jay Henry:     #SEVERIANO on CKD 4, now on HD due to ATN from PEA arrest  #HFpEF  Cardiology, nephrology on board, appreciate recommendations  Avoid renal toxins, and hypotension.  --HD MWF --out pt dialysis at [facility] w/ Dr Milo Bauman   - On Bumex clonidine doxazosin Inspra hydralazine Imdur losartan metoprolol and nifedipine  --c/w HD , need outpatient HD unit set up case management consult-- CM to arrange out pt dialysis at [facility] w/ Dr Milo Bauman   --Louana Grates placed on 11/24 hemodialysis per nephrology    Headache/Nausea/Vomiting-improved  - Hypertensive urgency-resistant HTN  -BP high despite Clonidine 0.2mg TID, Cozaar 100mg, Imdur 120mg daily, Lopressor, Hydralazine , Procardia XL and Prazosin continue readjusting meds  -discussed with nephrologist    S/p PEA Arrest suspect from respiratory distress  --s/p intubation/extubation      #Erosive gastritis  Presented with melena, and normocytic anemia with hemoglobin 9  EGD on 11/17 with nonbleeding erosive gastritis  Continue PPI and Carafate per gastroenterology  - now on ASA monotherapy    Probable Anoxic brain injury due to Cardiac arrest  - down for 12 minutes  - seen by Neurology  - supportive care patient now alert oriented x3     #DM II  -lispro ISS     #CAD s/p CABG  -continue metoprolol, crestor, imdur, and ASA     #Depression  -continue zoloft     #MSK pain   -s/p CPR  -has tramadol,  -Lidocaine patch added     FEN: caridac  DVT  ppx: SCD  MPOA: daughter  Code: Full    Care Plan discussed with: Patient/Family and Nurse  Anticipated Disposition: With home health tomorrow  Anticipated Discharge:  In 24 hours patient now cleared by physical therapy for going home with home health     Hospital Problems  Date Reviewed: 8/7/2020          Codes Class Noted POA    Severe protein-calorie malnutrition ([facility] [address] ICD-10-CM: N70  ICD-9-CM: 490  11/23/2021 Yes        * (Principal) CHF exacerbation ([facility] [address] ICD-10-CM: I50.9  ICD-9-CM: 428.0  11/14/2021 Unknown                Review of Systems:   A comprehensive review of systems was negative except for that written in the HPI. Vital Signs:    Last 24hrs VS reviewed since prior progress note. Most recent are:  Visit Vitals  BP (!) 189/63   Pulse 68   Temp 98.1 °F (36.7 °C)   Resp 16   Ht 5' 9\" (1.753 m)   Wt 74.4 kg (164 lb 0.4 oz)   SpO2 96%   BMI 24.22 kg/m²         Intake/Output Summary (Last 24 hours) at 11/30/2021 1008  Last data filed at 11/30/2021 0400  Gross per 24 hour   Intake 650 ml   Output 3100 ml   Net -2450 ml        Physical Examination:     I had a face to face encounter with this patient and independently examined them on 11/30/2021 as outlined below:          Constitutional:  No acute distress, cooperative, pleasant    ENT:  Oral mucosa moist, EOMI,anicteric sclera    Resp:  CTA bilaterally. CV:  Regular rhythm, normal rate, S1,S 2 wnl    GI:  Soft, non distended, non tender, normoactive bowel sounds,    Musculoskeletal:  trace edema    Neurologic:  Moves all extremities. AAO            Data Review:    Review and/or order of clinical lab test  Review and/or order of tests in the medicine section of CPT      Labs:     Recent Labs     11/29/21 0353   WBC 5.2   HGB 9.0*   HCT 29.0*   *     Recent Labs     11/30/21 0411 11/29/21 0353 11/28/21 0328    135* 134*  133*   K 4.1 3.9 4.1  4.1    101 102  101   CO2 22 27 25  26   BUN 17 23* 18  18   CREA 2.16* 2.92* 2.67*  2.71*   * 129* 173*  174*   CA 8.6 8.9 8.5  8.5   PHOS 2.0*  --  1.7*     Recent Labs     11/30/21 0411 11/28/21 0328   ALB 2.9* 2.8*     No results for input(s): INR, PTP, APTT, INREXT, INREXT in the last 72 hours. No results for input(s): FE, TIBC, PSAT, FERR in the last 72 hours. No results found for: FOL, RBCF   No results for input(s): PH, PCO2, PO2 in the last 72 hours. No results for input(s): CPK, CKNDX, TROIQ in the last 72 hours.     No lab exists for component: CPKMB  Lab Results   Component Value Date/Time    Cholesterol, total 176 11/22/2019 09:36 AM    HDL Cholesterol 45 11/22/2019 09:36 AM    LDL, calculated 112 (H) 11/22/2019 09:36 AM    Triglyceride 94 11/22/2019 09:36 AM     Lab Results   Component Value Date/Time    Glucose (POC) 145 (H) 11/30/2021 07:30 AM    Glucose (POC) 142 (H) 11/29/2021 09:49 PM    Glucose (POC) 170 (H) 11/29/2021 05:04 PM    Glucose (POC) 90 11/29/2021 11:09 AM    Glucose (POC) 142 (H) 11/29/2021 07:10 AM     Lab Results   Component Value Date/Time    Color YELLOW/STRAW 11/14/2021 10:31 AM    Appearance CLEAR 11/14/2021 10:31 AM    Specific gravity 1.019 11/14/2021 10:31 AM    pH (UA) 5.5 11/14/2021 10:31 AM    Protein >300 (A) 11/14/2021 10:31 AM    Glucose 500 (A) 11/14/2021 10:31 AM    Ketone Negative 11/14/2021 10:31 AM    Bilirubin Negative 11/14/2021 10:31 AM    Urobilinogen 0.2 11/14/2021 10:31 AM    Nitrites Negative 11/14/2021 10:31 AM    Leukocyte Esterase Negative 11/14/2021 10:31 AM    Epithelial cells FEW 11/14/2021 10:31 AM    Bacteria Negative 11/14/2021 10:31 AM    WBC 0-4 11/14/2021 10:31 AM    RBC 0-5 11/14/2021 10:31 AM         Medications Reviewed:     Current Facility-Administered Medications   Medication Dose Route Frequency    bumetanide (BUMEX) tablet 2 mg  2 mg Oral DAILY    doxazosin (CARDURA) tablet 4 mg  4 mg Oral BID    minoxidiL (LONITEN) tablet 5 mg  5 mg Oral BID    NIFEdipine ER (PROCARDIA XL) tablet 120 mg  120 mg Oral DAILY    insulin glargine (LANTUS) injection 6 Units  6 Units SubCUTAneous DAILY    metoprolol tartrate (LOPRESSOR) tablet 12.5 mg  12.5 mg Oral Q12H    morphine injection 2 mg  2 mg IntraVENous Q3H PRN    isosorbide mononitrate ER (IMDUR) tablet 120 mg  120 mg Oral DAILY    heparin (porcine) 1,000 unit/mL injection 1,900 Units  1,900 Units Hemodialysis DIALYSIS PRN    heparin (porcine) 1,000 unit/mL injection 1,900 Units  1,900 Units Hemodialysis DIALYSIS PRN    lidocaine 4 % patch 1 Patch  1 Patch TransDERmal Q24H    labetaloL (NORMODYNE;TRANDATE) injection 15 mg  15 mg IntraVENous Q4H PRN    hydrALAZINE (APRESOLINE) tablet 100 mg  100 mg Oral TID    niCARdipine (CARDENE) 25 mg in 0.9% sodium chloride 250 mL infusion  0-15 mg/hr IntraVENous TITRATE    cloNIDine HCL (CATAPRES) tablet 0.2 mg  0.2 mg Oral TID    sucralfate (CARAFATE) tablet 1 g  1 g Oral AC&HS    losartan (COZAAR) tablet 100 mg  100 mg Oral QHS    [Held by provider] gabapentin (NEURONTIN) capsule 100 mg  100 mg Oral TID    insulin lispro (HUMALOG) injection   SubCUTAneous AC&HS    hydrALAZINE (APRESOLINE) 20 mg/mL injection 10-20 mg  10-20 mg IntraVENous Q6H PRN    pantoprazole (PROTONIX) 40 mg in 0.9% sodium chloride 10 mL injection  40 mg IntraVENous DAILY    aspirin chewable tablet 81 mg  81 mg Oral DAILY    polyethylene glycol (MIRALAX) packet 17 g  17 g Oral BID    eplerenone (INSPRA) tablet 25 mg  25 mg Oral DAILY    calcium carbonate (TUMS) chewable tablet 200 mg [elemental]  200 mg Oral TID WITH MEALS    sodium chloride (NS) flush 5-40 mL  5-40 mL IntraVENous Q8H    sodium chloride (NS) flush 5-40 mL  5-40 mL IntraVENous PRN    epoetin ericka-epbx (RETACRIT) injection 20,000 Units  20,000 Units SubCUTAneous Q MON, WED & FRI    butalbital-acetaminophen-caffeine (FIORICET, ESGIC) -40 mg per tablet 1 Tablet  1 Tablet Oral Q4H PRN    ondansetron (ZOFRAN) injection 4 mg  4 mg IntraVENous Q4H PRN    busPIRone (BUSPAR) tablet 10 mg  10 mg Oral BID    rosuvastatin (CRESTOR) tablet 10 mg  10 mg Oral QHS    sertraline (ZOLOFT) tablet 200 mg  200 mg Oral DAILY    traMADoL (ULTRAM) tablet 50 mg  50 mg Oral Q6H PRN    traZODone (DESYREL) tablet 50 mg  50 mg Oral QHS    acetaminophen (TYLENOL) tablet 650 mg  650 mg Oral Q6H PRN    glucose chewable tablet 16 g  4 Tablet Oral PRN    dextrose (D50W) injection syrg 12.5-25 g  25-50 mL IntraVENous PRN    glucagon (GLUCAGEN) injection 1 mg  1 mg IntraMUSCular PRN ______________________________________________________________________  EXPECTED LENGTH OF STAY: 3d 19h  ACTUAL LENGTH OF STAY:          Jacob Vu MD

## 2021-11-30 NOTE — CALL BACK NOTE
minoxidil 2.5 mg PO daily added as patient continues to have uncontrolled blood pressure despite several interventions. Titrate down Cardene drip if/when  the blood pressure starts responding.     Patient Vitals for the past 24 hrs:   BP Temp Temp src Pulse Resp SpO2   11/30/21 0300 (!) 199/67   73     11/30/21 0250 (!) 187/67   73     11/30/21 0240 (!) 192/72   72     11/30/21 0230 (!) 194/70   72     11/30/21 0220 (!) 179/55   71     11/30/21 0210 (!) 179/80   64     11/30/21 0200 (!) 203/69   65     11/30/21 0150 (!) 199/69   64     11/30/21 0140 (!) 203/68   64     11/30/21 0130 (!) 197/72   67     11/30/21 0120 (!) 201/69   65     11/30/21 0110 (!) 197/64   65     11/30/21 0100 (!) 186/66   64     11/30/21 0050 (!) 175/54   64     11/30/21 0040 (!) 183/54   63     11/30/21 0030 (!) 182/56   65     11/30/21 0020 (!) 199/62   63     11/30/21 0010 (!) 197/61   63     11/30/21 0000 (!) 200/62   63     11/29/21 2350 (!) 197/63   63     11/29/21 2340 (!) 196/67   63     11/29/21 2330 (!) 196/64   63     11/29/21 2320 (!) 203/72   64     11/29/21 2310 (!) 200/61   64     11/29/21 2300 (!) 200/70   66     11/29/21 2259 (!) 200/70   66     11/29/21 2230 (!) 205/71   66     11/29/21 2200 (!) 193/73   66     11/29/21 2130 (!) 205/74   65     11/29/21 2100 (!) 197/66   66     11/29/21 2040 (!) 209/66      11/29/21 2039 (!) 209/66   64     11/29/21 2031 (!) 212/71   64     11/29/21 2029 (!) 212/71   64     11/29/21 2005    64     11/29/21 2000 (!) 194/75   63     11/29/21 1906 (!) 170/54 98.1 °F (36.7 °C)  64 16 96 %   11/29/21 1800    65     11/29/21 1630 (!) 188/64   65     11/29/21 1513 (!) 164/65 98 °F (36.7 °C)  64 18 98 %   11/29/21 1407 (!) 130/58   67     11/29/21 1400    67     11/29/21 1300 (!) 154/60   62     11/29/21 1238 (!) 188/69   65     11/29/21 1215 (!) 184/64 97.8 °F (36.6 °C) Oral 66 18    11/29/21 1200 (!) 180/71   79 18    11/29/21 1145 (!) 194/85   71 18    11/29/21 1130 (!) 196/75   65 18    11/29/21 1115 (!) 191/66   64 18    11/29/21 1113  98 °F (36.7 °C)    97 %   11/29/21 1100 (!) 184/69   62 18    11/29/21 1045 (!) 184/76   61 18    11/29/21 1030 (!) 182/68   61 18    11/29/21 1015 (!) 167/68   67 18    11/29/21 1000 (!) 167/66   60 18    11/29/21 0945 (!) 158/66   61 18    11/29/21 0930 (!) 150/68   60 18    11/29/21 0915 (!) 151/63   60 18    11/29/21 0900 (!) 151/68   (!) 59 18    11/29/21 0845 (!) 151/65   (!) 59 18    11/29/21 0841 (!) 149/68   (!) 59 18    11/29/21 0805    68     11/29/21 0716 (!) 143/68 97.8 °F (36.6 °C)  60 16 95 %   11/29/21 0554    60     11/29/21 0447 (!) 133/58   (!) 58     11/29/21 0355    (!) 59           Current Facility-Administered Medications:     minoxidiL (LONITEN) tablet 2.5 mg, 2.5 mg, Oral, DAILY, Irasema Teixeira H, DO    doxazosin (CARDURA) tablet 4 mg, 4 mg, Oral, QHS, George Covarrubias MD, 4 mg at 11/29/21 2101    NIFEdipine ER (PROCARDIA XL) tablet 120 mg, 120 mg, Oral, DAILY, George Covarrubias MD, 120 mg at 11/29/21 1128    insulin glargine (LANTUS) injection 6 Units, 6 Units, SubCUTAneous, DAILY, Skyler Wagoner MD, 6 Units at 11/28/21 1016    metoprolol tartrate (LOPRESSOR) tablet 12.5 mg, 12.5 mg, Oral, Q12H, Skyler Wagoner MD, 12.5 mg at 11/29/21 2101    bumetanide (BUMEX) tablet 2 mg, 2 mg, Oral, BID, Man Fernandez MD, 2 mg at 11/29/21 1701    morphine injection 2 mg, 2 mg, IntraVENous, Q3H PRN, Sera ARITA DO, 2 mg at 11/29/21 2259    isosorbide mononitrate ER (IMDUR) tablet 120 mg, 120 mg, Oral, DAILY, Turner Joel MD, 120 mg at 11/29/21 1127    heparin (porcine) 1,000 unit/mL injection 1,900 Units, 1,900 Units, Hemodialysis, DIALYSIS PRN, Denton Bauman MD, 1,900 Units at 11/24/21 1721    heparin (porcine) 1,000 unit/mL injection 1,900 Units, 1,900 Units, Hemodialysis, DIALYSIS PRN, Ty Brown MD, 1,900 Units at 11/24/21 1719    lidocaine 4 % patch 1 Patch, 1 Patch, TransDERmal, Q24H, Turner Joel MD, 1 Patch at 11/28/21 1015    labetaloL (NORMODYNE;TRANDATE) injection 15 mg, 15 mg, IntraVENous, Q4H PRN, Turner Joel MD, 15 mg at 11/30/21 0214    hydrALAZINE (APRESOLINE) tablet 100 mg, 100 mg, Oral, TID, Carrillo Greco MD, 100 mg at 11/29/21 2101    niCARdipine (CARDENE) 25 mg in 0.9% sodium chloride 250 mL infusion, 0-15 mg/hr, IntraVENous, TITRATE, Turner Joel MD, Last Rate: 105 mL/hr at 11/30/21 0247, 10.5 mg/hr at 11/30/21 0247    cloNIDine HCL (CATAPRES) tablet 0.2 mg, 0.2 mg, Oral, TID, Turner Joel MD, 0.2 mg at 11/29/21 2101    sucralfate (CARAFATE) tablet 1 g, 1 g, Oral, AC&HS, Katherine Palacio MD, 1 g at 11/29/21 2101    losartan (COZAAR) tablet 100 mg, 100 mg, Oral, QHS, Turner Joel MD, 100 mg at 11/29/21 2100    [Held by provider] gabapentin (NEURONTIN) capsule 100 mg, 100 mg, Oral, TID, Paola GARCIA, NP-C    insulin lispro (HUMALOG) injection, , SubCUTAneous, AC&HS, Paola GARCIA NP-C, 2 Units at 11/29/21 1743    hydrALAZINE (APRESOLINE) 20 mg/mL injection 10-20 mg, 10-20 mg, IntraVENous, Q6H PRN, Paola GARCIA NP-C, 20 mg at 11/29/21 2029    pantoprazole (PROTONIX) 40 mg in 0.9% sodium chloride 10 mL injection, 40 mg, IntraVENous, DAILY, KEM West, 40 mg at 11/28/21 1015    aspirin chewable tablet 81 mg, 81 mg, Oral, DAILY, Bright Ferris MD, 81 mg at 11/29/21 1125    polyethylene glycol (MIRALAX) packet 17 g, 17 g, Oral, BID, Blazeers, Heathermiguel Conteh NP, 17 g at 11/28/21 1809    eplerenone (INSPRA) tablet 25 mg, 25 mg, Oral, DAILY, George Covarrubias MD, 25 mg at 11/29/21 1127    calcium carbonate (TUMS) chewable tablet 200 mg [elemental], 200 mg, Oral, TID WITH MEALS, Bright Ferris MD, 200 mg at 11/29/21 1644    sodium chloride (NS) flush 5-40 mL, 5-40 mL, IntraVENous, Q8H, Russell Srinivasan MD, 10 mL at 11/29/21 2103    sodium chloride (NS) flush 5-40 mL, 5-40 mL, IntraVENous, PRN, Mercedes Wyatt MD    epoetin ericka-epbx (RETACRIT) injection 20,000 Units, 20,000 Units, SubCUTAneous, Q MON, WED & FRI, Marci, George GARCIA MD, 20,000 Units at 11/29/21 2109    butalbital-acetaminophen-caffeine (FIORICET, ESGIC) -40 mg per tablet 1 Tablet, 1 Tablet, Oral, Q4H PRN, Roman Landis MD, 1 Tablet at 11/19/21 2201    ondansetron [facility]) injection 4 mg, 4 mg, IntraVENous, Q4H PRN, Nupur Jacob MD, 4 mg at 11/28/21 1050    busPIRone (BUSPAR) tablet 10 mg, 10 mg, Oral, BID, Nupur Jacob MD, 10 mg at 11/29/21 1702    rosuvastatin (CRESTOR) tablet 10 mg, 10 mg, Oral, QHS, Nupur Jacob MD, 10 mg at 11/29/21 2101    sertraline (ZOLOFT) tablet 200 mg, 200 mg, Oral, DAILY, Uriel Jacob MD, 200 mg at 11/29/21 1129    traMADoL (ULTRAM) tablet 50 mg, 50 mg, Oral, Q6H PRN, Nupur Jacob MD, 50 mg at 11/25/21 1537    traZODone (DESYREL) tablet 50 mg, 50 mg, Oral, QHS, Uriel Jacob MD, 50 mg at 11/29/21 2101    acetaminophen (TYLENOL) tablet 650 mg, 650 mg, Oral, Q6H PRN, Nupur Jacob MD, 650 mg at 11/23/21 0350    glucose chewable tablet 16 g, 4 Tablet, Oral, PRN, Nupur Jacob MD    dextrose (D50W) injection syrg 12.5-25 g, 25-50 mL, IntraVENous, PRN, Uriel Jacob MD, 12.5 g at 11/19/21 0056    glucagon (GLUCAGEN) injection 1 mg, 1 mg, IntraMUSCular, PRN, Christina Magana MD

## 2021-11-30 NOTE — PROGRESS NOTES
Problem: Mobility Impaired (Adult and Pediatric)  Goal: *Acute Goals and Plan of Care (Insert Text)  Description: FUNCTIONAL STATUS PRIOR TO ADMISSION: Patient was independent without use of DME. However, patient's sons assist with lawn care, cooking, cleaning, grocery shopping, and transportation. Poor vision at baseline per patient report. HOME SUPPORT PRIOR TO ADMISSION: The patient lived with his 3 sons and girlfriend. Physical Therapy Goals  Initiated 11/30/2021  1. Patient will move from supine to sit and sit to supine , scoot up and down, and roll side to side in bed with independence within 7 day(s). 2.  Patient will transfer from bed to chair and chair to bed with modified independence using the least restrictive device within 7 day(s). 3.  Patient will perform sit to stand with modified independence within 7 day(s). 4.  Patient will ambulate with modified independence for 150 feet with the least restrictive device within 7 day(s). 5.  Patient will ascend/descend 2 stairs with bilateral handrail(s) with modified independence within 7 day(s). Initiated 11/20/2021  1. Patient will move from supine to sit and sit to supine, scoot up and down, and roll side to side in bed with supervision/set-up within 7 day(s). 2.  Patient will transfer from bed to chair and chair to bed with minimal assistance/contact guard assist using the least restrictive device within 7 day(s). 3.  Patient will perform sit to stand with minimal assistance/contact guard assist within 7 day(s). 4.  Patient will ambulate with moderate assistance for 50 feet with the least restrictive device within 7 day(s). 5.  Patient will ascend/descend 2 stairs with bilateral handrail(s) with maximal assistance within 7 day(s).      Outcome: Progressing Towards Goal   PHYSICAL THERAPY TREATMENT: WEEKLY REASSESSMENT  Patient: Debbie High (44 y.o. male)  Date: 11/30/2021  Primary Diagnosis: CHF exacerbation ([facility] [address] [I50.9]  Procedure(s) (LRB):  ESOPHAGOGASTRODUODENOSCOPY (EGD) (N/A) 14 Days Post-Op   Precautions:   Fall, Bed Alarm      ASSESSMENT  Patient continues with skilled PT services and is progressing towards goals. Patient received sitting EOB with RN present, agreeable to mobility. BP high and patient on cardene gtt throughout. Overall mod I-SBA for mobility including gait with RW. Ambulated to bathroom without device but utilized RW for hallway ambulation. Balance intact with RW and patient without need for standing rest breaks. SpO2 stable on room air and /51 at end of session. Patient's progression toward goals since last assessment: excellent progress towards goals, all goals achieved and updated as above    Current Level of Function Impacting Discharge (mobility/balance): SBA-supervision    Other factors to consider for discharge: lives with many family members, outpatient HD patient         PLAN :  Goals have been updated based on progression since last assessment. Patient continues to benefit from skilled intervention to address the above impairments. Recommendations and Planned Interventions: bed mobility training, transfer training, gait training, therapeutic exercises, neuromuscular re-education, edema management/control, patient and family training/education and therapeutic activities      Frequency/Duration: Patient will be followed by physical therapy:  2 times a week to address goals. Recommendation for discharge: (in order for the patient to meet his/her long term goals)  Physical therapy at least 2 days/week in the home     This discharge recommendation:  Has been made in collaboration with the attending provider and/or case management    IF patient discharges home will need the following DME: patient owns DME required for discharge         SUBJECTIVE:   Patient stated Magalie Gerardo I have one. 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, with long-term current use of insulin ([address] 11/14/2018    Diabetes ([address]      Past Surgical History:   Procedure Laterality Date    HX ARTERIAL BYPASS      HX OTHER SURGICAL      5th toe Metatarsal amputation 12/2017     IR INSERT NON TUNL CVC OVER 5 YRS  11/19/2021    IR INSERT TUNL CVC W/O PORT OVER 5 YR  11/24/2021       Personal factors and/or comorbidities impacting plan of care: PMH    Home Situation  Living Alone: No  Support Systems: Spouse/Significant Other  Patient Expects to be Discharged to[de-identified] Rehabilitation facility  Current DME Used/Available at Home: None    EXAMINATION/PRESENTATION/DECISION MAKING:   Critical Behavior:  Neurologic State: Alert  Orientation Level: Oriented X4  Cognition: Appropriate decision making, Appropriate for age attention/concentration  Safety/Judgement: Insight into deficits  Range Of Motion:  AROM: Within functional limits  Strength:    Strength: Generally decreased, functional  Tone & Sensation:   Tone: Normal  Sensation: Impaired (neuropathy)   Coordination:  Coordination: Within functional limits  Functional Mobility:  Bed Mobility:  Supine to Sit: Modified independent  Transfers:  Sit to Stand: Supervision  Stand to Sit: Supervision  Bed to Chair: Supervision  Balance:   Sitting: Intact  Standing: Intact;  With support  Ambulation/Gait Training:  Distance (ft): 130 Feet (ft)  Assistive Device: Gait belt; Walker, rolling  Ambulation - Level of Assistance: Stand-by assistance; Supervision  Gait Abnormalities: Decreased step clearance  Base of Support: Narrowed  Speed/Myla: Pace decreased (<100 feet/min)  Step Length: Left shortened; Right shortened      Pain Rating:  Denied pain    Activity Tolerance:   Good and SpO2 stable on RA    After treatment patient left in no apparent distress:   Sitting in chair, Call bell within reach, and Bed / chair alarm activated    COMMUNICATION/EDUCATION:   The patients plan of care was discussed with: Registered nurse. Fall prevention education was provided and the patient/caregiver indicated understanding., Patient/family have participated as able in goal setting and plan of care. , and Patient/family agree to work toward stated goals and plan of care.     Thank you for this referral.  Forrest Ferrer, PT, DPT   Time Calculation: 16 mins

## 2021-11-30 NOTE — PROGRESS NOTES
Chart reviewed and noted patient plans to discharge today however BP elevated. Noted new plan to discharge in 24 hours. Entered patient room to attempt OT intervention and patient stated he worked with PT this morning, did not sleep last night, and requested to rest at this time. Will follow up for OT intervention. Thank you.

## 2021-11-30 NOTE — TELEPHONE ENCOUNTER
Needs to be seen first.  We have not seen this patient since 2020.   [facility]

## 2021-11-30 NOTE — PROGRESS NOTES
1930: Bedside and Verbal shift change report given to Weston Chaudhari, RN  (oncoming nurse) by Alyssa Florian RN  (offgoing nurse). Report included the following information SBAR, Intake/Output, MAR, Recent Results, Med Rec Status and Cardiac Rhythm NSR .     2029: /71; 20 mg IV hydralazine given     2040: /66    2100: evening BP meds given (see MAR)      2300: morphine given (see MAR)   Cardene gtt restarted at 5 mg    /70    0040: Cardene increased to 7.5 mg; /54    0140: Cardene increased to 9 mg; /68    0214: 15 mg IV labetalol given; /80    0247: Cardene increased to 10.5 mg: /72    0315: perfect serve sent to on call MD; notified of patient condition; no orders at this time. 0330: asked for advice from CVICU RN about patient condition; advised to talk to ICU NP.     0340: talked to ICU about patient condition; advised to give hydralazine if BP is >190 and the importance of not dropping BP too much too quickly.       0350: on call MD ordered 2.5 mg minoxidil daily; given (see MAR)    0419: hydralazine given; /60    0530: /49

## 2021-11-30 NOTE — PROGRESS NOTES
Provided pastoral care visit to [facility] 5 patient. Did not include sacramental care.     Andrea Owen

## 2021-11-30 NOTE — TELEPHONE ENCOUNTER
Pt was at [facility] and is being discharged.     They would like to know if you will follow pt for Physical Therapy upon discharge    [phone]

## 2021-11-30 NOTE — PROGRESS NOTES
Hospital follow-up PCP transitional care appointment has been scheduled with Valeria Favre, NP for Monday, 12/6/21 at 9:00 a.m. Pending patient discharge.   Herrera Urbina, Care Management Specialist.

## 2021-11-30 NOTE — PROGRESS NOTES
0730: Bedside shift change report given to Mil Henao (oncoming nurse) by Darroll Heatkodi (offgoing nurse). Report included the following information SBAR, Kardex, Intake/Output, MAR and Recent Results. 1413: Cardene turned off.     1930: Bedside shift change report given to Darroll Heatkodi (oncoming nurse) by Mil Henao (offgoing nurse). Report included the following information SBAR, Kardex, Intake/Output, MAR and Recent Results.

## 2021-11-30 NOTE — PROGRESS NOTES
Transitions of Care Plan   RUR: 14% - moderate   Admission Dx: CHF Exacerbation   Consults: Cardiology; Nephrology; Therapy   Baseline:ambulates with cane; resides with girlfriend and her sons   Barrier(s) to Discharge: medical   Disposition: home health & OP HD chair at [address] Estimated Discharge Date: 12/1/21      Clinical update per chart review and patient discussed during Interdisciplinary Rounds:    Patient is not medically stable for discharge due to ongoing medical needs - currently with high BP and on cardene gtt (attempting to wean today); anticipate possible d/c tomorrow. CM continues to follow treatment plan for medical progress and disposition needs. Disposition:  Home Health - referrals sent to [facility]; [facility]; [facility]  OP HD - accepted to [facility] MWF at 11:30am chair time    CM will continue to follow.     Charity Hamilton, MPH  Care Manager [facility]  Available via [address] [place] [address] or

## 2021-12-01 ENCOUNTER — TELEPHONE (OUTPATIENT)
Dept: HEMATOLOGY | Age: 61
End: 2021-12-01

## 2021-12-01 VITALS
BODY MASS INDEX: 23.31 KG/M2 | SYSTOLIC BLOOD PRESSURE: 147 MMHG | DIASTOLIC BLOOD PRESSURE: 48 MMHG | HEART RATE: 69 BPM | HEIGHT: 69 IN | RESPIRATION RATE: 18 BRPM | TEMPERATURE: 98.1 F | WEIGHT: 157.41 LBS | OXYGEN SATURATION: 96 %

## 2021-12-01 LAB
ALBUMIN SERPL-MCNC: 2.6 G/DL (ref 3.5–5)
ANION GAP SERPL CALC-SCNC: 8 MMOL/L (ref 5–15)
BUN SERPL-MCNC: 24 MG/DL (ref 6–20)
BUN/CREAT SERPL: 9 (ref 12–20)
CALCIUM SERPL-MCNC: 8.6 MG/DL (ref 8.5–10.1)
CHLORIDE SERPL-SCNC: 103 MMOL/L (ref 97–108)
CO2 SERPL-SCNC: 24 MMOL/L (ref 21–32)
CREAT SERPL-MCNC: 2.56 MG/DL (ref 0.7–1.3)
GLUCOSE BLD STRIP.AUTO-MCNC: 119 MG/DL (ref 65–117)
GLUCOSE BLD STRIP.AUTO-MCNC: 142 MG/DL (ref 65–117)
GLUCOSE BLD STRIP.AUTO-MCNC: 149 MG/DL (ref 65–117)
GLUCOSE SERPL-MCNC: 148 MG/DL (ref 65–100)
PHOSPHATE SERPL-MCNC: 2.8 MG/DL (ref 2.6–4.7)
POTASSIUM SERPL-SCNC: 4.2 MMOL/L (ref 3.5–5.1)
SERVICE CMNT-IMP: ABNORMAL
SODIUM SERPL-SCNC: 135 MMOL/L (ref 136–145)

## 2021-12-01 PROCEDURE — 80069 RENAL FUNCTION PANEL: CPT

## 2021-12-01 PROCEDURE — 74011000250 HC RX REV CODE- 250: Performed by: NURSE PRACTITIONER

## 2021-12-01 PROCEDURE — 74011250637 HC RX REV CODE- 250/637: Performed by: HOSPITALIST

## 2021-12-01 PROCEDURE — 74011250637 HC RX REV CODE- 250/637: Performed by: INTERNAL MEDICINE

## 2021-12-01 PROCEDURE — 74011250636 HC RX REV CODE- 250/636: Performed by: HOSPITALIST

## 2021-12-01 PROCEDURE — 74011000250 HC RX REV CODE- 250: Performed by: HOSPITALIST

## 2021-12-01 PROCEDURE — C9113 INJ PANTOPRAZOLE SODIUM, VIA: HCPCS | Performed by: NURSE PRACTITIONER

## 2021-12-01 PROCEDURE — 90935 HEMODIALYSIS ONE EVALUATION: CPT

## 2021-12-01 PROCEDURE — 74011250636 HC RX REV CODE- 250/636: Performed by: INTERNAL MEDICINE

## 2021-12-01 PROCEDURE — 74011636637 HC RX REV CODE- 636/637: Performed by: NURSE PRACTITIONER

## 2021-12-01 PROCEDURE — 82962 GLUCOSE BLOOD TEST: CPT

## 2021-12-01 PROCEDURE — 36415 COLL VENOUS BLD VENIPUNCTURE: CPT

## 2021-12-01 PROCEDURE — 74011250636 HC RX REV CODE- 250/636: Performed by: NURSE PRACTITIONER

## 2021-12-01 RX ORDER — BUMETANIDE 2 MG/1
2 TABLET ORAL DAILY
Qty: 30 TABLET | Refills: 0 | Status: SHIPPED | OUTPATIENT
Start: 2021-12-01 | End: 2021-12-31

## 2021-12-01 RX ORDER — DOXAZOSIN 4 MG/1
4 TABLET ORAL 2 TIMES DAILY
Qty: 60 TABLET | Refills: 0 | Status: SHIPPED | OUTPATIENT
Start: 2021-12-01 | End: 2021-12-31

## 2021-12-01 RX ORDER — MINOXIDIL 2.5 MG/1
5 TABLET ORAL 2 TIMES DAILY
Qty: 120 TABLET | Refills: 0 | Status: SHIPPED | OUTPATIENT
Start: 2021-12-01 | End: 2021-12-31

## 2021-12-01 RX ADMIN — MORPHINE SULFATE 2 MG: 2 INJECTION, SOLUTION INTRAMUSCULAR; INTRAVENOUS at 10:36

## 2021-12-01 RX ADMIN — ISOSORBIDE MONONITRATE 120 MG: 60 TABLET, EXTENDED RELEASE ORAL at 15:50

## 2021-12-01 RX ADMIN — BUSPIRONE HYDROCHLORIDE 10 MG: 10 TABLET ORAL at 18:55

## 2021-12-01 RX ADMIN — INSULIN LISPRO 2 UNITS: 100 INJECTION, SOLUTION INTRAVENOUS; SUBCUTANEOUS at 07:16

## 2021-12-01 RX ADMIN — HEPARIN SODIUM 1900 UNITS: 1000 INJECTION INTRAVENOUS; SUBCUTANEOUS at 14:49

## 2021-12-01 RX ADMIN — SUCRALFATE 1 G: 1 TABLET ORAL at 18:56

## 2021-12-01 RX ADMIN — MINOXIDIL 5 MG: 2.5 TABLET ORAL at 15:50

## 2021-12-01 RX ADMIN — HYDRALAZINE HYDROCHLORIDE 20 MG: 20 INJECTION INTRAMUSCULAR; INTRAVENOUS at 18:07

## 2021-12-01 RX ADMIN — CALCIUM CARBONATE (ANTACID) CHEW TAB 500 MG 200 MG: 500 CHEW TAB at 18:55

## 2021-12-01 RX ADMIN — BUSPIRONE HYDROCHLORIDE 10 MG: 10 TABLET ORAL at 15:50

## 2021-12-01 RX ADMIN — MINOXIDIL 5 MG: 2.5 TABLET ORAL at 18:56

## 2021-12-01 RX ADMIN — ONDANSETRON 4 MG: 2 INJECTION INTRAMUSCULAR; INTRAVENOUS at 10:36

## 2021-12-01 RX ADMIN — SERTRALINE 200 MG: 50 TABLET, FILM COATED ORAL at 15:50

## 2021-12-01 RX ADMIN — DOXAZOSIN 4 MG: 2 TABLET ORAL at 10:37

## 2021-12-01 RX ADMIN — CLONIDINE HYDROCHLORIDE 0.2 MG: 0.2 TABLET ORAL at 15:50

## 2021-12-01 RX ADMIN — SUCRALFATE 1 G: 1 TABLET ORAL at 07:16

## 2021-12-01 RX ADMIN — CLONIDINE HYDROCHLORIDE 0.2 MG: 0.2 TABLET ORAL at 19:02

## 2021-12-01 RX ADMIN — EPLERENONE 25 MG: 25 TABLET, FILM COATED ORAL at 10:39

## 2021-12-01 RX ADMIN — METOPROLOL TARTRATE 12.5 MG: 25 TABLET, FILM COATED ORAL at 15:50

## 2021-12-01 RX ADMIN — HYDRALAZINE HYDROCHLORIDE 100 MG: 50 TABLET, FILM COATED ORAL at 19:02

## 2021-12-01 RX ADMIN — NIFEDIPINE 120 MG: 60 TABLET, EXTENDED RELEASE ORAL at 15:50

## 2021-12-01 RX ADMIN — Medication 10 ML: at 07:16

## 2021-12-01 RX ADMIN — SODIUM CHLORIDE 40 MG: 9 INJECTION INTRAMUSCULAR; INTRAVENOUS; SUBCUTANEOUS at 15:50

## 2021-12-01 RX ADMIN — HYDRALAZINE HYDROCHLORIDE 100 MG: 50 TABLET, FILM COATED ORAL at 15:50

## 2021-12-01 RX ADMIN — BUMETANIDE 2 MG: 1 TABLET ORAL at 15:50

## 2021-12-01 RX ADMIN — HEPARIN SODIUM 1900 UNITS: 1000 INJECTION INTRAVENOUS; SUBCUTANEOUS at 14:50

## 2021-12-01 RX ADMIN — ASPIRIN 81 MG CHEWABLE TABLET 81 MG: 81 TABLET CHEWABLE at 15:50

## 2021-12-01 RX ADMIN — DOXAZOSIN 4 MG: 2 TABLET ORAL at 18:56

## 2021-12-01 NOTE — DISCHARGE INSTRUCTIONS
Discharge Instructions       PATIENT ID: Daya Leiva  MRN: [id number]   YOB: 1960    DATE OF ADMISSION: 11/14/2021  9:36 AM    DATE OF DISCHARGE: 12/1/21  PRIMARY CARE PROVIDER: Katherine Garland MD     ATTENDING PHYSICIAN: Sepideh Purvis MD  DISCHARGING PROVIDER: Yoko Melissa MD    To contact this individual call [phone] and ask the  to page. If unavailable ask to be transferred the Adult Hospitalist Department. DISCHARGE DIAGNOSES  ESRD  Now on HD PEA arrest    CONSULTATIONS: IP CONSULT TO CARDIOLOGY  IP CONSULT TO NEPHROLOGY  IP CONSULT TO GASTROENTEROLOGY  IP CONSULT TO HEPATOLOGY  IP CONSULT TO NEUROLOGY    PROCEDURES/SURGERIES: Procedure(s):  ESOPHAGOGASTRODUODENOSCOPY (EGD)    PENDING TEST RESULTS:   At the time of discharge the following test results are still pending:     FOLLOW UP APPOINTMENTS:   Follow-up Information     Follow up With Specialties Details Why Contact Info    Katherine Garland MD Family Medicine In 1 week  [address] [place] [address]      Kira Walker MD Nephrology In 2 weeks  [facility]  [phone]             ADDITIONAL CARE RECOMMENDATIONS:     DIET: Renal Diet    ACTIVITY: Activity as tolerated    WOUND CARE:     EQUIPMENT needed:       Radiology      DISCHARGE MEDICATIONS:   See Medication Reconciliation Form    · It is important that you take the medication exactly as they are prescribed. · Keep your medication in the bottles provided by the pharmacist and keep a list of the medication names, dosages, and times to be taken in your wallet. · Do not take other medications without consulting your doctor. NOTIFY YOUR PHYSICIAN FOR ANY OF THE FOLLOWING:   Fever over 101 degrees for 24 hours. Chest pain, shortness of breath, fever, chills, nausea, vomiting, diarrhea, change in mentation, falling, weakness, bleeding.  Severe pain or pain not relieved by medications. Or, any other signs or symptoms that you may have questions about.       DISPOSITION:  x  Home With:  x OT x PT x HH  RN       SNF/Inpatient Rehab/LTAC    Independent/assisted living    Hospice    Other:     CDMP Checked:   Yes x     PROBLEM LIST Updated:  Yes x       Signed:   Pennie Saucedo MD  11/30/2021  7:44 AM

## 2021-12-01 NOTE — TELEPHONE ENCOUNTER
----- Message from Nolan Cushing, MD sent at 11/22/2021  8:27 AM EST -----  Regarding: He is In-Pt. Had LBX at [facility] within past 1 month. Please get that report and slides sent. Need report now. Give  report to NA when it comes in.

## 2021-12-01 NOTE — PROGRESS NOTES
Transitions of Care Plan    · RUR: 16% - moderate  · Admission Dx: CHF Exacerbation  · Consults: Cardiology; Nephrology; Therapy  · Baseline:ambulates with cane; resides with girlfriend and her sons  · Barrier(s) to Discharge: medical  · Disposition: home health & OP HD chair at [facility]  · Estimated Discharge Date: 12/1/21    CM spoke with attending MD. Patient medically stable for hospital discharge today. CM participated in [facility] Discharge for HF dx with bedside nursing and Attending MD.  All questions answered. SMAART-E checklist completed and placed in appropriate folder.     Disposition:    1) Home Health: none - unable to obtain because patient was lost to follow up with his PCP; PCP is only agreeable to following and writing [place] orders after patient sees him on 12/6/21    2) Oupatient HD: [facility] on MWF at 11:30am - notified facility today of anticipated discharge    3) Transportation: Family Laraie Paget, [address]  Available via [facility] or

## 2021-12-01 NOTE — PROGRESS NOTES
Attempted to see patient for OT intervention however patient on dialysis at bedside prior to discharge at this time. Will follow up as able. Thank you.

## 2021-12-01 NOTE — PROGRESS NOTES
[facility] visit. Mr Allen Her was asleep and undergoing a procedure in his room. Prayer for spiritual communion offered for his well-being.     MAGGY Lou, RN, ACSW, LCSW   Page:  [phone]

## 2021-12-01 NOTE — DISCHARGE SUMMARY
Discharge Summary       PATIENT ID: Diego Nathan  MRN: [id number]   YOB: 1960    DATE OF ADMISSION: 11/14/2021  9:36 AM    DATE OF DISCHARGE: 12/1/21   PRIMARY CARE PROVIDER: Rose Lara MD     ATTENDING PHYSICIAN: Bishnu Garduno  DISCHARGING PROVIDER: Yajaira Barnett MD    To contact this individual call [phone] and ask the  to page. If unavailable ask to be transferred the Adult Hospitalist Department. CONSULTATIONS: IP CONSULT TO CARDIOLOGY  IP CONSULT TO NEPHROLOGY  IP CONSULT TO GASTROENTEROLOGY  IP CONSULT TO HEPATOLOGY  IP CONSULT TO NEUROLOGY    PROCEDURES/SURGERIES: Procedure(s):  ESOPHAGOGASTRODUODENOSCOPY (EGD)    ADMITTING DIAGNOSES & HOSPITAL COURSE:   43-year-old male with a past medical history of CAD status post CABG on dAPT, HFpEF,  DM 2, and CKD V who was admitted on November 14 for GI bleed, and overload secondary to cardiorenal syndrome.  He had been hospitalized at [facility] prior to this for acute on chronic heart failure, and started to develop bilateral lower extremity edema with melena upon discharge. Lauraine Holter underwent EGD which showed nonbleeding erosive gastritis, and was started on PPI.  Nephrology, and cardiology were consulted for volume management, and he was diuresed with IV Lasix.  On November 18, patient sustained PEA arrest.  After 12 minutes of CPR, ROSC was achieved, and he was intubated and transferred to the ICU.  His volume status did not improve with IV Lasix, and Alexandre catheter was placed with plan for 3 days of dialysis. Lauraine Holter was extubated, and neurology was consulted due altered mental status suspected secondary to anoxic brain injury, but this improved, and his mental status ultimately returned back to baseline with no imaging or intervention recommended.  Transferred out of ICU on 11/20/21      Assessment & Plan:      #SEVERIANO on CKD 4, now on HD due to ATN from PEA arrest  #HFpEF  --HD MWF --out pt dialysis at [facility] w/  [facility]   --Perm-a-cath placed on 11/24 hemodialysis per nephrology     Headache/Nausea/Vomiting-improved  - Hypertensive urgency-resistant HTN  -Blood pressure well controlled now with multiple medication see discharge medication list  --Patient able to manage medication by himself using a pillbox discussed with him     S/p PEA Arrest suspect from respiratory distress  --s/p intubation/extubation      #Erosive gastritis  Discharged on PPI and Carafate  - now on ASA monotherapy     Probable Anoxic brain injury due to Cardiac arrest  - down for 12 minutes  -Supportive care resolved encephalopathy     #DM II  -lispro ISS     #CAD s/p CABG  -continue metoprolol, crestor, imdur, and ASA     #Depression  -continue zoloft     #MSK pain   -s/p CPR now resolved         DISCHARGE DIAGNOSES / PLAN:      1. Discharge home follow-up with PCP follow-up with nephrology     ADDITIONAL CARE RECOMMENDATIONS:        PENDING TEST RESULTS:   At the time of discharge the following test results are still pending:     FOLLOW UP APPOINTMENTS:    Follow-up Information     Follow up With Specialties Details Why Contact Info    Trinda Boxer, MD Family Medicine On 12/6/2021 Hospital follow up PCP appointment Monday, 12/6/21 at 9:00 a.m. with David Tejada NP [address]      Sanjiv Choi MD Nephrology In 2 weeks  [address] [place] [address] on 12/3/2021 Outpatient dialysis; arrive at 11am for your first appointment; HD is MWF at 11:30am [address]             DIET: Renal Diet    ACTIVITY: Activity as tolerated    WOUND CARE:     EQUIPMENT needed:       DISCHARGE MEDICATIONS:  Current Discharge Medication List      START taking these medications    Details   doxazosin (CARDURA) 4 mg tablet Take 1 Tablet by mouth two (2) times a day for 30 days.   Qty: 60 Tablet, Refills: 0  Start date: 12/1/2021, End date: 12/31/2021      minoxidiL (LONITEN) 2.5 mg tablet Take 2 Tablets by mouth two (2) times a day for 30 days. Qty: 120 Tablet, Refills: 0  Start date: 12/1/2021, End date: 12/31/2021      bumetanide (BUMEX) 2 mg tablet Take 1 Tablet by mouth daily for 30 days. Qty: 30 Tablet, Refills: 0  Start date: 12/1/2021, End date: 12/31/2021      aspirin 81 mg chewable tablet Take 1 Tablet by mouth daily for 30 days. Qty: 30 Tablet, Refills: 0  Start date: 11/30/2021, End date: 12/30/2021      cloNIDine HCL (CATAPRES) 0.2 mg tablet Take 1 Tablet by mouth three (3) times daily for 30 days. Qty: 90 Tablet, Refills: 0  Start date: 11/30/2021, End date: 12/30/2021      eplerenone (INSPRA) 25 mg tablet Take 1 Tablet by mouth daily for 30 days. Qty: 30 Tablet, Refills: 0  Start date: 11/30/2021, End date: 12/30/2021      hydrALAZINE (APRESOLINE) 100 mg tablet Take 1 Tablet by mouth three (3) times daily for 30 days. Qty: 90 Tablet, Refills: 0  Start date: 11/30/2021, End date: 12/30/2021      losartan (COZAAR) 100 mg tablet Take 1 Tablet by mouth nightly for 30 days. Qty: 30 Tablet, Refills: 0  Start date: 11/30/2021, End date: 12/30/2021      NIFEdipine ER (PROCARDIA XL) 60 mg ER tablet Take 2 Tablets by mouth daily for 30 days. Qty: 60 Tablet, Refills: 0  Start date: 11/30/2021, End date: 12/30/2021      !! pantoprazole (PROTONIX) 40 mg tablet Take 1 Tablet by mouth daily for 30 days. Qty: 30 Tablet, Refills: 0  Start date: 11/30/2021, End date: 12/30/2021      sucralfate (CARAFATE) 1 gram tablet Take 1 Tablet by mouth Before breakfast, lunch, dinner and at bedtime for 15 days. Qty: 60 Tablet, Refills: 0  Start date: 11/30/2021, End date: 12/15/2021      !! pantoprazole (PROTONIX) 40 mg tablet Take 1 Tablet by mouth daily for 30 days. Qty: 30 Tablet, Refills: 0  Start date: 11/30/2021, End date: 12/30/2021       !! - Potential duplicate medications found.  Please discuss with provider. CONTINUE these medications which have CHANGED    Details   amLODIPine (NORVASC) 10 mg tablet Take 1 Tablet by mouth daily for 30 days. Qty: 30 Tablet, Refills: 0  Start date: 11/30/2021, End date: 12/30/2021    Associated Diagnoses: Essential hypertension      insulin glargine (Lantus U-100 Insulin) 100 unit/mL injection 10 Units by SubCUTAneous route nightly for 30 days. E11.65 Take 25 units daily SubQ at night  Qty: 3 mL, Refills: 0  Start date: 11/30/2021, End date: 12/30/2021    Associated Diagnoses: Type 2 diabetes mellitus with diabetic cataract, with long-term current use of insulin ([facility])      isosorbide mononitrate ER (IMDUR) 120 mg CR tablet Take 1 Tablet by mouth daily for 30 days. Qty: 30 Tablet, Refills: 0  Start date: 11/30/2021, End date: 12/30/2021         CONTINUE these medications which have NOT CHANGED    Details   busPIRone (BUSPAR) 10 mg tablet Take 10 mg by mouth two (2) times a day. sertraline (ZOLOFT) 100 mg tablet Take 2 Tabs by mouth daily. Qty: 180 Tab, Refills: 1    Associated Diagnoses: Depression, unspecified depression type      metoprolol succinate (TOPROL-XL) 50 mg XL tablet Take 1 Tab by mouth daily. Qty: 90 Tab, Refills: 1    Associated Diagnoses: Essential hypertension      traZODone (DESYREL) 50 mg tablet Take 1 Tab by mouth nightly. Qty: 90 Tab, Refills: 1    Associated Diagnoses: Insomnia, unspecified type      insulin syringe-needle U-100 (BD Insulin Syringe Ultra-Fine) 0.3 mL 31 gauge x 15/64\" syrg 1 Units by Does Not Apply route three (3) times daily.   Qty: 90 Pen Needle, Refills: 3    Associated Diagnoses: Type 2 diabetes mellitus with diabetic cataract, with long-term current use of insulin ([facility])      glucose blood VI test strips (ASCENSIA AUTODISC VI, ONE TOUCH ULTRA TEST VI) strip E11.65 test fasting glucose in the morning and before meals as needed  Qty: 200 Strip, Refills: 3    Associated Diagnoses: Type 2 diabetes mellitus with diabetic cataract, with long-term current use of insulin (HCC)      glipiZIDE (GLUCOTROL) 5 mg tablet Take 2 Tabs by mouth daily. Qty: 90 Tab, Refills: 1    Associated Diagnoses: Uncontrolled type 2 diabetes mellitus with hyperglycemia (HCC)      cetirizine (ZYRTEC) 5 mg tablet Take 1 Tab by mouth daily as needed for Itching. Qty: 30 Tab, Refills: 0    Associated Diagnoses: Urticaria      metFORMIN (GLUCOPHAGE) 1,000 mg tablet Take 1 Tab by mouth two (2) times daily (with meals). Qty: 180 Tab, Refills: 1    Associated Diagnoses: Type 2 diabetes mellitus with diabetic cataract, with long-term current use of insulin (HCC)      rosuvastatin (CRESTOR) 10 mg tablet Take 1 Tab by mouth nightly. Qty: 90 Tab, Refills: 1    Associated Diagnoses: Uncontrolled type 2 diabetes mellitus with hyperglycemia (HCC)      gabapentin (NEURONTIN) 300 mg capsule Take 1 Cap by mouth three (3) times daily. Qty: 270 Cap, Refills: 1    Associated Diagnoses: Uncontrolled type 2 diabetes mellitus with hyperglycemia (HCC)         STOP taking these medications       traMADoL (ULTRAM) 50 mg tablet Comments:   Reason for Stopping:         hydroCHLOROthiazide (HYDRODIURIL) 25 mg tablet Comments:   Reason for Stopping:         furosemide (LASIX) 40 mg tablet Comments:   Reason for Stopping:         potassium bicarbonate (KLYTE) 25 mEq tablet Comments:   Reason for Stopping:         raNITIdine (ZANTAC) 150 mg tablet Comments:   Reason for Stopping:         lisinopril (PRINIVIL, ZESTRIL) 40 mg tablet Comments:   Reason for Stopping:                 NOTIFY YOUR PHYSICIAN FOR ANY OF THE FOLLOWING:   Fever over 101 degrees for 24 hours. Chest pain, shortness of breath, fever, chills, nausea, vomiting, diarrhea, change in mentation, falling, weakness, bleeding. Severe pain or pain not relieved by medications. Or, any other signs or symptoms that you may have questions about.     DISPOSITION:   x Home With:   OT  PT  HH  RN       Long term SNF/Inpatient Rehab    Independent/assisted living    Hospice    Other:       PATIENT CONDITION AT DISCHARGE:     Functional status    Poor    x Deconditioned     Independent      Cognition   x  Lucid     Forgetful     Dementia      Catheters/lines (plus indication)    Grant     PICC     PEG    x None      Code status    x Full code     DNR      PHYSICAL EXAMINATION AT DISCHARGE:  General:          Alert, cooperative, no distress, appears stated age. HEENT:           Atraumatic, anicteric sclerae, pink conjunctivae                          No oral ulcers, mucosa moist, throat clear, dentition fair  Neck:               Supple, symmetrical  Lungs:             Clear to auscultation bilaterally. No Wheezing or Rhonchi. No rales. Chest wall:      No tenderness  No Accessory muscle use. Heart:              Regular  rhythm,  No  murmur   No edema  Abdomen:        Soft, non-tender. Not distended. Bowel sounds normal  Extremities:     No cyanosis. No clubbing,                            Skin turgor normal, Capillary refill normal  Skin:                Not pale. Not Jaundiced  No rashes   Psych:             Not anxious or agitated.   Neurologic:      Alert, moves all extremities, answers questions appropriately and responds to commands       CHRONIC MEDICAL DIAGNOSES:  Problem List as of 12/1/2021 Date Reviewed: 8/7/2020          Codes Class Noted - Resolved    Severe protein-calorie malnutrition ([facility] ICD-10-CM: E43  ICD-9-CM: 262  11/23/2021 - Present        * (Principal) CHF exacerbation ([facility] ICD-10-CM: I50.9  ICD-9-CM: 428.0  11/14/2021 - Present        Type 2 diabetes with nephropathy ([facility] ICD-10-CM: E11.21  ICD-9-CM: 250.40, 583.81  8/5/2019 - Present        Type 2 diabetes mellitus with diabetic neuropathy ([facility] ICD-10-CM: E11.40  ICD-9-CM: 250.60, 357.2  8/5/2019 - Present        Type 2 diabetes mellitus with ophthalmic complication, with long-term current use of insulin (HCC) ICD-10-CM: E11.39, Z79.4  ICD-9-CM: 250.50, V58.67  11/25/2018 - Present        Essential hypertension ICD-10-CM: I10  ICD-9-CM: 401.9  11/14/2018 - Present        Reactive depression ICD-10-CM: F32.9  ICD-9-CM: 300.4  11/14/2018 - Present        Peripheral neuropathy ICD-10-CM: G62.9  ICD-9-CM: 356.9  11/14/2018 - Present        RESOLVED: Controlled type 2 diabetes mellitus with ophthalmic complication, with long-term current use of insulin ([facility] ICD-10-CM: E11.39, Z79.4  ICD-9-CM: 250.50, V58.67  11/14/2018 - 11/14/2018              Greater than 30  minutes were spent with the patient on counseling and coordination of care    Signed:   Marcela Tellez MD  12/1/2021  9:50 AM

## 2021-12-01 NOTE — PROGRESS NOTES
0730: Bedside shift change report given to Channing Sewell (oncoming nurse) by Joslyn David (offgoing nurse). Report included the following information SBAR, Kardex, Intake/Output, MAR and Recent Results. 1854: Pt off tele for discharge. I have reviewed discharge instructions with the patient. The patient verbalized understanding. Discharge medications reviewed with patient and appropriate educational materials and side effects teaching were provided. Current Discharge Medication List      START taking these medications    Details   doxazosin (CARDURA) 4 mg tablet Take 1 Tablet by mouth two (2) times a day for 30 days. Qty: 60 Tablet, Refills: 0  Start date: 12/1/2021, End date: 12/31/2021      minoxidiL (LONITEN) 2.5 mg tablet Take 2 Tablets by mouth two (2) times a day for 30 days. Qty: 120 Tablet, Refills: 0  Start date: 12/1/2021, End date: 12/31/2021      bumetanide (BUMEX) 2 mg tablet Take 1 Tablet by mouth daily for 30 days. Qty: 30 Tablet, Refills: 0  Start date: 12/1/2021, End date: 12/31/2021      aspirin 81 mg chewable tablet Take 1 Tablet by mouth daily for 30 days. Qty: 30 Tablet, Refills: 0  Start date: 11/30/2021, End date: 12/30/2021      cloNIDine HCL (CATAPRES) 0.2 mg tablet Take 1 Tablet by mouth three (3) times daily for 30 days. Qty: 90 Tablet, Refills: 0  Start date: 11/30/2021, End date: 12/30/2021      eplerenone (INSPRA) 25 mg tablet Take 1 Tablet by mouth daily for 30 days. Qty: 30 Tablet, Refills: 0  Start date: 11/30/2021, End date: 12/30/2021      hydrALAZINE (APRESOLINE) 100 mg tablet Take 1 Tablet by mouth three (3) times daily for 30 days. Qty: 90 Tablet, Refills: 0  Start date: 11/30/2021, End date: 12/30/2021      losartan (COZAAR) 100 mg tablet Take 1 Tablet by mouth nightly for 30 days. Qty: 30 Tablet, Refills: 0  Start date: 11/30/2021, End date: 12/30/2021      NIFEdipine ER (PROCARDIA XL) 60 mg ER tablet Take 2 Tablets by mouth daily for 30 days.   Qty: 60 Tablet, Refills: 0  Start date: 11/30/2021, End date: 12/30/2021      !! pantoprazole (PROTONIX) 40 mg tablet Take 1 Tablet by mouth daily for 30 days. Qty: 30 Tablet, Refills: 0  Start date: 11/30/2021, End date: 12/30/2021      sucralfate (CARAFATE) 1 gram tablet Take 1 Tablet by mouth Before breakfast, lunch, dinner and at bedtime for 15 days. Qty: 60 Tablet, Refills: 0  Start date: 11/30/2021, End date: 12/15/2021      !! pantoprazole (PROTONIX) 40 mg tablet Take 1 Tablet by mouth daily for 30 days. Qty: 30 Tablet, Refills: 0  Start date: 11/30/2021, End date: 12/30/2021       !! - Potential duplicate medications found. Please discuss with provider. CONTINUE these medications which have CHANGED    Details   amLODIPine (NORVASC) 10 mg tablet Take 1 Tablet by mouth daily for 30 days. Qty: 30 Tablet, Refills: 0  Start date: 11/30/2021, End date: 12/30/2021    Associated Diagnoses: Essential hypertension      insulin glargine (Lantus U-100 Insulin) 100 unit/mL injection 10 Units by SubCUTAneous route nightly for 30 days. E11.65 Take 25 units daily SubQ at night  Qty: 3 mL, Refills: 0  Start date: 11/30/2021, End date: 12/30/2021    Associated Diagnoses: Type 2 diabetes mellitus with diabetic cataract, with long-term current use of insulin ([facility])      isosorbide mononitrate ER (IMDUR) 120 mg CR tablet Take 1 Tablet by mouth daily for 30 days. Qty: 30 Tablet, Refills: 0  Start date: 11/30/2021, End date: 12/30/2021         CONTINUE these medications which have NOT CHANGED    Details   busPIRone (BUSPAR) 10 mg tablet Take 10 mg by mouth two (2) times a day. sertraline (ZOLOFT) 100 mg tablet Take 2 Tabs by mouth daily. Qty: 180 Tab, Refills: 1    Associated Diagnoses: Depression, unspecified depression type      metoprolol succinate (TOPROL-XL) 50 mg XL tablet Take 1 Tab by mouth daily.   Qty: 90 Tab, Refills: 1    Associated Diagnoses: Essential hypertension      traZODone (DESYREL) 50 mg tablet Take 1 Tab by mouth nightly. Qty: 90 Tab, Refills: 1    Associated Diagnoses: Insomnia, unspecified type      insulin syringe-needle U-100 (BD Insulin Syringe Ultra-Fine) 0.3 mL 31 gauge x 15/64\" syrg 1 Units by Does Not Apply route three (3) times daily. Qty: 90 Pen Needle, Refills: 3    Associated Diagnoses: Type 2 diabetes mellitus with diabetic cataract, with long-term current use of insulin ([facility])      glucose blood VI test strips (ASCENSIA AUTODISC VI, ONE TOUCH ULTRA TEST VI) strip E11.65 test fasting glucose in the morning and before meals as needed  Qty: 200 Strip, Refills: 3    Associated Diagnoses: Type 2 diabetes mellitus with diabetic cataract, with long-term current use of insulin ([facility])      glipiZIDE (GLUCOTROL) 5 mg tablet Take 2 Tabs by mouth daily. Qty: 90 Tab, Refills: 1    Associated Diagnoses: Uncontrolled type 2 diabetes mellitus with hyperglycemia ([facility])      cetirizine (ZYRTEC) 5 mg tablet Take 1 Tab by mouth daily as needed for Itching. Qty: 30 Tab, Refills: 0    Associated Diagnoses: Urticaria      metFORMIN (GLUCOPHAGE) 1,000 mg tablet Take 1 Tab by mouth two (2) times daily (with meals). Qty: 180 Tab, Refills: 1    Associated Diagnoses: Type 2 diabetes mellitus with diabetic cataract, with long-term current use of insulin ([facility])      rosuvastatin (CRESTOR) 10 mg tablet Take 1 Tab by mouth nightly. Qty: 90 Tab, Refills: 1    Associated Diagnoses: Uncontrolled type 2 diabetes mellitus with hyperglycemia ([facility])      gabapentin (NEURONTIN) 300 mg capsule Take 1 Cap by mouth three (3) times daily.   Qty: 270 Cap, Refills: 1    Associated Diagnoses: Uncontrolled type 2 diabetes mellitus with hyperglycemia ([facility])         STOP taking these medications       traMADoL (ULTRAM) 50 mg tablet Comments:   Reason for Stopping:         hydroCHLOROthiazide (HYDRODIURIL) 25 mg tablet Comments:   Reason for Stopping:         furosemide (LASIX) 40 mg tablet Comments:   Reason for Stopping:         potassium bicarbonate (KLYTE) 25 mEq tablet Comments:   Reason for Stopping:         raNITIdine (ZANTAC) 150 mg tablet Comments:   Reason for Stopping:         lisinopril (PRINIVIL, ZESTRIL) 40 mg tablet Comments:   Reason for Stopping:

## 2021-12-01 NOTE — DIALYSIS
Hemodialysis / [phone]    Vitals Pre Post Assessment Pre Post   BP BP: (!) 221/71 (12/01/21 1205) 236/86 LOC AxO X4 AxO X4   HR Pulse (Heart Rate): 63 (12/01/21 1205) 74 Lungs Unlabored, RA Unlabored, RA   Resp Resp Rate: 16 (12/01/21 1205) 16 Cardiac RRR RRR   Temp Temp: 98 °F (36.7 °C) (12/01/21 1205) 98.1 Skin Warm, dry Warm, dry   Weight Pre-Dialysis Weight: 80.2 kg (176 lb 12.9 oz) (11/22/21 0828) NA Edema Genralized Generalized   Tele status Bedside Tele Bedside Tele Pain 0 0     Orders   Duration: Start: 1369 End: 1535 Total: 3.5 hrs   Dialyzer: Dialyzer/Set Up Inspection: Revaclear (12/01/21 1205)   K Bath: Dialysate K (mEq/L): 4 (12/01/21 1205)   Ca Bath: Dialysate CA (mEq/L): 2.5 (12/01/21 1205)   Na: Dialysate NA (mEq/L): 140 (12/01/21 1205)   Bicarb: Dialysate HCO3 (mEq/L): 35 (12/01/21 1205)   Target Fluid Removal: Goal/Amount of Fluid to Remove (mL): 2000 mL (12/01/21 1205)     Access   Type & Location: R chest permacath   Comments: CVC dressing CDI.  Each catheter limb disinfected per p&p, caps removed, hubs disinfected per p&p. +aspiration/+fush x2 ports                                 Labs   HBsAg (Antigen) / date: Negative 11/19/21                                              HBsAb (Antibody) / date: Susceptible 11/19/21   Source: [facility]   Obtained/Reviewed  Critical Results Called HGB   Date Value Ref Range Status   11/29/2021 9.0 (L) 12.1 - 17.0 g/dL Final     Potassium   Date Value Ref Range Status   12/01/2021 4.2 3.5 - 5.1 mmol/L Final     Calcium   Date Value Ref Range Status   12/01/2021 8.6 8.5 - 10.1 MG/DL Final     BUN   Date Value Ref Range Status   12/01/2021 24 (H) 6 - 20 MG/DL Final     Creatinine   Date Value Ref Range Status   12/01/2021 2.56 (H) 0.70 - 1.30 MG/DL Final        Meds Given   Name Dose Route   Heparin 1.9 ml Red port dwell   Heparin 1.9 ml Blue port dwell          Adequacy / Fluid    Total Liters Process:    Net Fluid Removed: 2000 ml      Comments   Time Out Done:   (Time) 1130   Admitting Diagnosis: CHF   Consent obtained/signed: Informed Consent Verified: Yes (12/01/21 1205)   Machine / RO # Machine Number: [id number] (12/01/21 9573)   Primary Nurse Rpt Pre: Terence Rodriguez RN   Primary Nurse Rpt Post: Terence Rodriguez, RN   Pt Education: CVC infection prevention & control. Care Plan: Continue current hemodialysis plan of care. Pts outpatient clinic: [facility]   Comments: 3487 Hemodialysis initiated. 1535 Hemodialysis completed. All possible blood returned. Each dialysis catheter limb disinfected per p&p, blood returned per p&p, each dialysis hub disinfected per p&p, post dialysis catheter Heparin dwell instilled per order, and caps applied. CVC dressing CDI. SBAR report given to primary RN. Pt to discharge home today and start HD outpatient Friday @ [address] [facility].

## 2021-12-01 NOTE — PROGRESS NOTES
12/01/21 1205   Vitals   Temp 98 °F (36.7 °C)   Temp Source Oral   Pulse (Heart Rate) 63   Heart Rate Source Monitor   Resp Rate 16   O2 Sat (%) 97 %   Level of Consciousness Alert (0)   BP (!) 221/71   MAP (Calculated) 121   BP 1 Location Right upper arm   BP 1 Method Automatic   BP Patient Position At rest   Cardiac Rhythm Sinus Rhythm   MEWS Score 3   Alarms Set and Audible Cardiac alarms   Box Number 12   Electrodes Replaced No   Pain 1   Pain Scale 1 Numeric (0 - 10)   Pain Intensity 1 0   Patient Stated Pain Goal 0   Oxygen Therapy   O2 Device None (Room air)   Patient Observation   Special Appliances/Equipment Bed (comment)   Repositioned Head of bed elevated (degrees)   Patient Turned Turns self   Observations Reassessment   Ambulate No (Comment)   Activity In bed   Mode of Transportation Wheelchair     MD aware.

## 2021-12-01 NOTE — PROGRESS NOTES
[facility]   [address] [address] [facility], [address] [facility]  Phone: [phone]   [phone]       Nephrology Progress Note  Maxi Lerner     1960     [id number]  Date of Admission : 11/14/2021 12/01/21    CC: Follow up for severiano ON CKD 4       Assessment and Plan   SEVERIANO on CKD  - progressive diabetic Nephropathy   - Now w/ superimposed ATN from PEA/ resp arrest   - HD MWF  -HD this morning prior to discharge  - PC placed 11/24  - out pt HD set up at [facility]      HTN:  -Discharged home on current antihypertensives     Anemia in CKD   - continue RANDY      CAD s/p CABG May 2020  HFpEF  -Last echo: Preserved LVEF, Mod Pulm HTN-PASP 50 mmHg     Resp / PEA arrest   - encephalopathy resolved      Interval History:  Blood pressure improved. Denies any new symptoms. He is for discharge today. Jacki notified about dialysis in a.m. prior to discharge    Review of Systems: Review of systems not obtained due to patient factors.     Current Medications:   Current Facility-Administered Medications   Medication Dose Route Frequency    bumetanide (BUMEX) tablet 2 mg  2 mg Oral DAILY    doxazosin (CARDURA) tablet 4 mg  4 mg Oral BID    minoxidiL (LONITEN) tablet 5 mg  5 mg Oral BID    NIFEdipine ER (PROCARDIA XL) tablet 120 mg  120 mg Oral DAILY    insulin glargine (LANTUS) injection 6 Units  6 Units SubCUTAneous DAILY    metoprolol tartrate (LOPRESSOR) tablet 12.5 mg  12.5 mg Oral Q12H    morphine injection 2 mg  2 mg IntraVENous Q3H PRN    isosorbide mononitrate ER (IMDUR) tablet 120 mg  120 mg Oral DAILY    heparin (porcine) 1,000 unit/mL injection 1,900 Units  1,900 Units Hemodialysis DIALYSIS PRN    heparin (porcine) 1,000 unit/mL injection 1,900 Units  1,900 Units Hemodialysis DIALYSIS PRN    lidocaine 4 % patch 1 Patch  1 Patch TransDERmal Q24H    labetaloL (NORMODYNE;TRANDATE) injection 15 mg  15 mg IntraVENous Q4H PRN    hydrALAZINE (APRESOLINE) tablet 100 mg  100 mg Oral TID    niCARdipine (CARDENE) 25 mg in 0.9% sodium chloride 250 mL infusion  0-15 mg/hr IntraVENous TITRATE    cloNIDine HCL (CATAPRES) tablet 0.2 mg  0.2 mg Oral TID    sucralfate (CARAFATE) tablet 1 g  1 g Oral AC&HS    losartan (COZAAR) tablet 100 mg  100 mg Oral QHS    [Held by provider] gabapentin (NEURONTIN) capsule 100 mg  100 mg Oral TID    insulin lispro (HUMALOG) injection   SubCUTAneous AC&HS    hydrALAZINE (APRESOLINE) 20 mg/mL injection 10-20 mg  10-20 mg IntraVENous Q6H PRN    pantoprazole (PROTONIX) 40 mg in 0.9% sodium chloride 10 mL injection  40 mg IntraVENous DAILY    aspirin chewable tablet 81 mg  81 mg Oral DAILY    polyethylene glycol (MIRALAX) packet 17 g  17 g Oral BID    eplerenone (INSPRA) tablet 25 mg  25 mg Oral DAILY    calcium carbonate (TUMS) chewable tablet 200 mg [elemental]  200 mg Oral TID WITH MEALS    sodium chloride (NS) flush 5-40 mL  5-40 mL IntraVENous Q8H    sodium chloride (NS) flush 5-40 mL  5-40 mL IntraVENous PRN    epoetin ericka-epbx (RETACRIT) injection 20,000 Units  20,000 Units SubCUTAneous Q MON, WED & FRI    butalbital-acetaminophen-caffeine (FIORICET, ESGIC) -40 mg per tablet 1 Tablet  1 Tablet Oral Q4H PRN    ondansetron (ZOFRAN) injection 4 mg  4 mg IntraVENous Q4H PRN    busPIRone (BUSPAR) tablet 10 mg  10 mg Oral BID    rosuvastatin (CRESTOR) tablet 10 mg  10 mg Oral QHS    sertraline (ZOLOFT) tablet 200 mg  200 mg Oral DAILY    traMADoL (ULTRAM) tablet 50 mg  50 mg Oral Q6H PRN    traZODone (DESYREL) tablet 50 mg  50 mg Oral QHS    acetaminophen (TYLENOL) tablet 650 mg  650 mg Oral Q6H PRN    glucose chewable tablet 16 g  4 Tablet Oral PRN    dextrose (D50W) injection syrg 12.5-25 g  25-50 mL IntraVENous PRN    glucagon (GLUCAGEN) injection 1 mg  1 mg IntraMUSCular PRN      No Known Allergies    Objective:  Vitals:    Vitals:    12/01/21 0359 12/01/21 0400 12/01/21 0556 12/01/21 0717   BP:    (!) 174/58   Pulse: 63  62 65 Resp:       Temp:    98.4 °F (36.9 °C)   TempSrc:       SpO2:    95%   Weight:  71.4 kg (157 lb 6.5 oz)     Height:         Intake and Output:  No intake/output data recorded. 11/29 1901 - 12/01 0700  In: 1110 [P.O.:1110]  Out: 200 [Urine:200]    Physical Examination:    General: NAD  Neck:  Permacath   Resp:  Clear b/l  CV:  RRR, s1,s2  Neurologic:  Non focal  :             No fam        []    High complexity decision making was performed  []    Patient is at high-risk of decompensation with multiple organ involvement    Lab Data Personally Reviewed: I have reviewed all the pertinent labs, microbiology data and radiology studies during assessment.     Recent Labs     12/01/21  0331 11/30/21  0411 11/29/21  0353   * 136 135*   K 4.2 4.1 3.9    103 101   CO2 24 22 27   * 136* 129*   BUN 24* 17 23*   CREA 2.56* 2.16* 2.92*   CA 8.6 8.6 8.9   PHOS 2.8 2.0*  --    ALB 2.6* 2.9*  --      Recent Labs     11/29/21  0353   WBC 5.2   HGB 9.0*   HCT 29.0*   *     No results found for: SDES  Lab Results   Component Value Date/Time    Culture result: NO GROWTH 4 DAYS 11/18/2021 11:40 AM     Recent Results (from the past 24 hour(s))   GLUCOSE, POC    Collection Time: 11/30/21 11:46 AM   Result Value Ref Range    Glucose (POC) 188 (H) 65 - 117 mg/dL    Performed by Amrita WEINSTEIN (CON)    DUPLEX RENAL ART/MANDI BILATERAL    Collection Time: 11/30/21  3:58 PM   Result Value Ref Range    Right renal upper parenchyma max 21.0 cm/s    Right renal upper parenchyma min 4.4 cm/s    Right renal middle parenchyma max 20.4 cm/s    Right renal middle parenchyma min 6.3 cm/s    Right renal lower parenchyma max 16.1 cm/s    Right renal lower parenchyma min 5.6 cm/s    Right renal prox sys 136.5 cm/s    Right renal prox mora 20.2 cm/s    Right renal mid sys 112.4 cm/s    Right renal mid mora 20.2 cm/s    Right renal dist sys 105.8 cm/s    Right renal dist mora 20.2 cm/s    Left renal upper parenchyma max 25.3 cm/s    Left renal upper parenchyma min 7.5 cm/s    Left renal middle parenchyma max 30.3 cm/s    Left renal middle parenchyma min 9.3 cm/s    Left renal lower parenchyma max 25.9 cm/s    Left renal lower parenchyma min 6.3 cm/s    Left renal prox sys 75.3 cm/s    Left renal prox mora 18.6 cm/s    Left renal mid sys 143.0 cm/s    Left renal mid mora 23.8 cm/s    Left renal dist sys 66.1 cm/s    Left renal dist mora 20.5 cm/s    Abdominal prox aorta brenna 97.2 cm/s    Mid aortic trans 1.33 cm    Right kidney length 11.79 cm    Right kidney width 6.10 cm    Left kidney length 11.80 cm    Left kidney width 6.00 cm    Right renal prox rar 1.40     Right renal mid rar 1.16     Right renal dist rar 1.09     Right renal upper parenchyma RI 0.79     Right renal middle parenchyma RI 0.69     Right renal lower parenchyma RI 0.65     Left renal prox rar 0.77     Left renal mid rar 1.47     Left renal dist rar 0.68     Left renal upper parenchyma RI 0.70     Left renal middle parenchyma RI 0.69     Left renal lower parenchyma RI 0.76     Mid Ao Long Diam 1.57 cm    Right Renal Prox RI 0.85     Right Renal Mid RI 0.82     Right Renal Dist RI 0.81     Left Renal Prox RI 0.75     Left Renal Mid RI 0.83     Left Renal Dist RI 0.69    GLUCOSE, POC    Collection Time: 11/30/21  4:26 PM   Result Value Ref Range    Glucose (POC) 163 (H) 65 - 117 mg/dL    Performed by Sergey WEINSTEIN (JAGDEEP)    GLUCOSE, POC    Collection Time: 11/30/21 10:29 PM   Result Value Ref Range    Glucose (POC) 153 (H) 65 - 117 mg/dL    Performed by Stephan Daily    RENAL FUNCTION PANEL    Collection Time: 12/01/21  3:31 AM   Result Value Ref Range    Sodium 135 (L) 136 - 145 mmol/L    Potassium 4.2 3.5 - 5.1 mmol/L    Chloride 103 97 - 108 mmol/L    CO2 24 21 - 32 mmol/L    Anion gap 8 5 - 15 mmol/L    Glucose 148 (H) 65 - 100 mg/dL    BUN 24 (H) 6 - 20 MG/DL    Creatinine 2.56 (H) 0.70 - 1.30 MG/DL    BUN/Creatinine ratio 9 (L) 12 - 20      GFR est AA 31 (L) >60 ml/min/1.73m2    GFR est non-AA 26 (L) >60 ml/min/1.73m2    Calcium 8.6 8.5 - 10.1 MG/DL    Phosphorus 2.8 2.6 - 4.7 MG/DL    Albumin 2.6 (L) 3.5 - 5.0 g/dL   GLUCOSE, POC    Collection Time: 12/01/21  6:52 AM   Result Value Ref Range    Glucose (POC) 142 (H) 65 - 117 mg/dL    Performed by Mary Anne Delarosa            Total time spent with patient:  xxx   min. Care Plan discussed with:  Patient     Family      RN      Consulting Physician [facility]        I have reviewed the flowsheets. Chart and Pertinent Notes have been reviewed. No change in PMH ,family and social history from Consult note.       Carol Chavez MD

## 2021-12-02 ENCOUNTER — TELEPHONE (OUTPATIENT)
Dept: CASE MANAGEMENT | Age: 61
End: 2021-12-02

## 2021-12-02 NOTE — TELEPHONE ENCOUNTER
[facility] attempted to contact patient for post discharge follow up phone call. Patient's phone has message stating [facility] is not able to take calls at this time\", and there was no ability to leave voicemail message.

## 2021-12-03 ENCOUNTER — TELEPHONE (OUTPATIENT)
Dept: CASE MANAGEMENT | Age: 61
End: 2021-12-03

## 2021-12-04 LAB
ABDOMINAL PROX AORTA VEL: 97.2 CM/S
LEFT KIDNEY LENGTH: 11.8 CM
LEFT KIDNEY WIDTH: 6 CM
LEFT RENAL DIST DIAS: 20.5 CM/S
LEFT RENAL DIST RAR: 0.68
LEFT RENAL DIST RI: 0.69
LEFT RENAL DIST SYS: 66.1 CM/S
LEFT RENAL LOWER PARENCHYMA MAX: 25.9 CM/S
LEFT RENAL LOWER PARENCHYMA MIN: 6.3 CM/S
LEFT RENAL LOWER PARENCHYMA RI: 0.76
LEFT RENAL MID DIAS: 23.8 CM/S
LEFT RENAL MID RAR: 1.47
LEFT RENAL MID RI: 0.83
LEFT RENAL MID SYS: 143 CM/S
LEFT RENAL MIDDLE PARENCHYMA MAX: 30.3 CM/S
LEFT RENAL MIDDLE PARENCHYMA MIN: 9.3 CM/S
LEFT RENAL MIDDLE PARENCHYMA RI: 0.69
LEFT RENAL PROX DIAS: 18.6 CM/S
LEFT RENAL PROX RAR: 0.77
LEFT RENAL PROX RI: 0.75
LEFT RENAL PROX SYS: 75.3 CM/S
LEFT RENAL UPPER PARENCHYMA MAX: 25.3 CM/S
LEFT RENAL UPPER PARENCHYMA MIN: 7.5 CM/S
LEFT RENAL UPPER PARENCHYMA RI: 0.7
MID AORTA LONG DIAM: 1.57 CM
MID AORTIC TRANS: 1.33 CM
RIGHT KIDNEY LENGTH: 11.79 CM
RIGHT KIDNEY WIDTH: 6.1 CM
RIGHT RENAL DIST DIAS: 20.2 CM/S
RIGHT RENAL DIST RAR: 1.09
RIGHT RENAL DIST RI: 0.81
RIGHT RENAL DIST SYS: 105.8 CM/S
RIGHT RENAL LOWER PARENCHYMA MAX: 16.1 CM/S
RIGHT RENAL LOWER PARENCHYMA MIN: 5.6 CM/S
RIGHT RENAL LOWER PARENCHYMA RI: 0.65
RIGHT RENAL MID DIAS: 20.2 CM/S
RIGHT RENAL MID RAR: 1.16
RIGHT RENAL MID RI: 0.82
RIGHT RENAL MID SYS: 112.4 CM/S
RIGHT RENAL MIDDLE PARENCHYMA MAX: 20.4 CM/S
RIGHT RENAL MIDDLE PARENCHYMA MIN: 6.3 CM/S
RIGHT RENAL MIDDLE PARENCHYMA RI: 0.69
RIGHT RENAL PROX DIAS: 20.2 CM/S
RIGHT RENAL PROX RAR: 1.4
RIGHT RENAL PROX RI: 0.85
RIGHT RENAL PROX SYS: 136.5 CM/S
RIGHT RENAL UPPER PARENCHYMA MAX: 21 CM/S
RIGHT RENAL UPPER PARENCHYMA MIN: 4.4 CM/S
RIGHT RENAL UPPER PARENCHYMA RI: 0.79

## 2021-12-06 ENCOUNTER — VIRTUAL VISIT (OUTPATIENT)
Dept: FAMILY MEDICINE CLINIC | Age: 61
End: 2021-12-06
Payer: MEDICAID

## 2021-12-06 DIAGNOSIS — Z09 HOSPITAL DISCHARGE FOLLOW-UP: Primary | ICD-10-CM

## 2021-12-06 DIAGNOSIS — E11.40 TYPE 2 DIABETES MELLITUS WITH DIABETIC NEUROPATHY, WITH LONG-TERM CURRENT USE OF INSULIN (HCC): ICD-10-CM

## 2021-12-06 DIAGNOSIS — Z79.4 TYPE 2 DIABETES MELLITUS WITH DIABETIC NEUROPATHY, WITH LONG-TERM CURRENT USE OF INSULIN (HCC): ICD-10-CM

## 2021-12-06 DIAGNOSIS — I50.9 ACUTE ON CHRONIC CONGESTIVE HEART FAILURE, UNSPECIFIED HEART FAILURE TYPE (HCC): ICD-10-CM

## 2021-12-06 DIAGNOSIS — Z99.2 STAGE 5 CHRONIC KIDNEY DISEASE ON CHRONIC DIALYSIS (HCC): ICD-10-CM

## 2021-12-06 DIAGNOSIS — N18.6 STAGE 5 CHRONIC KIDNEY DISEASE ON CHRONIC DIALYSIS (HCC): ICD-10-CM

## 2021-12-06 PROCEDURE — 1111F DSCHRG MED/CURRENT MED MERGE: CPT | Performed by: NURSE PRACTITIONER

## 2021-12-06 PROCEDURE — 99214 OFFICE O/P EST MOD 30 MIN: CPT | Performed by: NURSE PRACTITIONER

## 2021-12-06 NOTE — PROGRESS NOTES
Virtual Video Transitional Care Management Progress Note    Patient: Yadiel Casarez  : 1960  PCP: Leonel Crenshaw MD    Date of office visit: 2021   Date of admission: 21  Date of discharge: 21  Hospital: [facility]    Call initiated w/i 2 business dates of discharge: *No response documented in the last 14 days   Date of the most recent call to the patient: *No documented post hospital discharge outreach found in the last 14 days        Assessment/Plan:   Diagnoses and all orders for this visit:    1. Hospital discharge follow-up  -     SC DISCHARGE MEDS RECONCILED W/ CURRENT OUTPATIENT MED LIST  - Stable, seems to have mild improvement with symptoms advised that symptoms each day should continue to improve if he develops any worsening shortness of breath or chest pain needs to seek urgent care immediately. He states he has a good support system at home. Discussed medication changes that were made in the hospital per hospital note patient verbalized the understanding and taking them correctly    2. Acute on chronic congestive heart failure, unspecified heart failure type ([address]  - Presumed stable today has a follow-up appoint with cardiology advised to keep all follow-up appointments    3. Stage 5 chronic kidney disease on chronic dialysis ([address]  - Has appointment with dialysis this afternoon and is making an appointment with nephrology    4. Type 2 diabetes mellitus with diabetic neuropathy, with long-term current use of insulin ([facility])  -     glucose blood VI test strips (ASCENSIA AUTODISC VI, ONE TOUCH ULTRA TEST VI) strip; E11.65 test fasting glucose in the morning and before meals as needed  - Presumed stable, monitor BG at home, keep all follow-up appointments             Subjective:   Yadiel Casarez is a 64 y.o. male presenting today for follow-up after hospital discharge. This encounter and supporting documentation was reviewed if available.   Medication reconciliation was performed today.  The main problem requiring admission was GI bleed and CHF. Complications during admission: PEA arrest with CPR and coma    Probable Anoxic brain injury due to Cardiac arrest for 12 min. Weight today is 153 lbs. BP last night 134/80. Denies CP, SOB today     Discharged on hemodialysis, making an apt with nephrologist and cardiologist     Diabetes  Checking BG    Discharged on PPE and carafate, now on ASA monotherapy  Interval history/Current status: thanks    Admitting symptoms have: improved      Medications marked \"taking\" at this time:    Review of Systems   Constitutional: Positive for malaise/fatigue. Negative for chills and fever. Eyes: Negative for blurred vision. Respiratory: Negative for cough and shortness of breath. Cardiovascular: Negative for chest pain, palpitations and leg swelling. Neurological: Negative for dizziness and headaches. Home Medications    Medication Sig Start Date End Date Taking? Authorizing Provider   glucose blood VI test strips (ASCENSIA AUTODISC VI, ONE TOUCH ULTRA TEST VI) strip E11.65 test fasting glucose in the morning and before meals as needed 12/6/21  Yes Kalyani Wynne, NP   doxazosin (CARDURA) 4 mg tablet Take 1 Tablet by mouth two (2) times a day for 30 days. 12/1/21 12/31/21  Sophia Rocha MD   minoxidiL (LONITEN) 2.5 mg tablet Take 2 Tablets by mouth two (2) times a day for 30 days. 12/1/21 12/31/21  Sophia Rocha MD   bumetanide (BUMEX) 2 mg tablet Take 1 Tablet by mouth daily for 30 days. 12/1/21 12/31/21  Sophia Rocha MD   amLODIPine (NORVASC) 10 mg tablet Take 1 Tablet by mouth daily for 30 days. 11/30/21 12/30/21  Sophia Rocha MD   aspirin 81 mg chewable tablet Take 1 Tablet by mouth daily for 30 days. 11/30/21 12/30/21  Sophia Rocha MD   cloNIDine HCL (CATAPRES) 0.2 mg tablet Take 1 Tablet by mouth three (3) times daily for 30 days.  11/30/21 12/30/21  Sophai Rocha MD   eplerenone (INSPRA) 25 mg tablet Take 1 Tablet by mouth daily for 30 days. 11/30/21 12/30/21  Luann Hitchcock MD   hydrALAZINE (APRESOLINE) 100 mg tablet Take 1 Tablet by mouth three (3) times daily for 30 days. 11/30/21 12/30/21  Luann Hitchcock MD   insulin glargine (Lantus U-100 Insulin) 100 unit/mL injection 10 Units by SubCUTAneous route nightly for 30 days. E11.65 Take 25 units daily SubQ at night 11/30/21 12/30/21  Luann Hitchcock MD   isosorbide mononitrate ER (IMDUR) 120 mg CR tablet Take 1 Tablet by mouth daily for 30 days. 11/30/21 12/30/21  Luann Hitchcock MD   losartan (COZAAR) 100 mg tablet Take 1 Tablet by mouth nightly for 30 days. 11/30/21 12/30/21  Luann Hitchcock MD   NIFEdipine ER (PROCARDIA XL) 60 mg ER tablet Take 2 Tablets by mouth daily for 30 days. 11/30/21 12/30/21  Luann Hitchcock MD   pantoprazole (PROTONIX) 40 mg tablet Take 1 Tablet by mouth daily for 30 days. 11/30/21 12/30/21  Luann Hitchcock MD   sucralfate (CARAFATE) 1 gram tablet Take 1 Tablet by mouth Before breakfast, lunch, dinner and at bedtime for 15 days. 11/30/21 12/15/21  Luann Hitchcock MD   pantoprazole (PROTONIX) 40 mg tablet Take 1 Tablet by mouth daily for 30 days. 11/30/21 12/30/21  Luann Hitchcock MD   busPIRone (BUSPAR) 10 mg tablet Take 10 mg by mouth two (2) times a day. Provider, Historical   sertraline (ZOLOFT) 100 mg tablet Take 2 Tabs by mouth daily. 8/7/20   Wynne, Karina Schooling, NP   metoprolol succinate (TOPROL-XL) 50 mg XL tablet Take 1 Tab by mouth daily. 7/1/20   Wynne, Karina Schooling, NP   traZODone (DESYREL) 50 mg tablet Take 1 Tab by mouth nightly. 7/1/20   Wynne Karina Lubna, NP   insulin syringe-needle U-100 (BD Insulin Syringe Ultra-Fine) 0.3 mL 31 gauge x 15/64\" syrg 1 Units by Does Not Apply route three (3) times daily.  5/13/20   Wynne, Karina Calvo, NP   glucose blood VI test strips (ASCENSIA AUTODISC VI, ONE TOUCH ULTRA TEST VI) strip E11.65 test fasting glucose in the morning and before meals as needed 11/22/19 12/6/21  Wynne, Tonia Kenny NP   glipiZIDE (GLUCOTROL) 5 mg tablet Take 2 Tabs by mouth daily. 8/5/19   Kenisha Orta NP   cetirizine (ZYRTEC) 5 mg tablet Take 1 Tab by mouth daily as needed for Itching. 3/21/19   Chestine Loss, MD   metFORMIN (GLUCOPHAGE) 1,000 mg tablet Take 1 Tab by mouth two (2) times daily (with meals). 11/26/18   Chestine Loss, MD   rosuvastatin (CRESTOR) 10 mg tablet Take 1 Tab by mouth nightly. 11/14/18   Chestine Loss, MD   gabapentin (NEURONTIN) 300 mg capsule Take 1 Cap by mouth three (3) times daily. 11/14/18   Chestine MD Gume        Review of Systems:         Patient Active Problem List   Diagnosis Code    Essential hypertension I10    Reactive depression F32.9    Peripheral neuropathy G62.9    Type 2 diabetes mellitus with ophthalmic complication, with long-term current use of insulin ([address] E11.39, Z79.4    Type 2 diabetes with nephropathy ([address] E11.21    Type 2 diabetes mellitus with diabetic neuropathy ([address] E11.40    CHF exacerbation ([address] I50.9    Severe protein-calorie malnutrition ([address] E43         Objective:     Patient-Reported Vitals 7/1/2020   Patient-Reported Weight 179lb   Patient-Reported Height 5ft8in      General: alert, cooperative, no distress   Mental  status: normal mood, behavior, speech, dress, motor activity, and thought processes, able to follow commands   HENT: NCAT   Neck: no visualized mass   Resp: no respiratory distress   Neuro: no gross deficits   Skin: no discoloration or lesions of concern on visible areas   Psychiatric: normal affect, consistent with stated mood, no evidence of hallucinations     Additional exam findings: We discussed the expected course, resolution and complications of the diagnosis(es) in detail. Medication risks, benefits, costs, interactions, and alternatives were discussed as indicated. I advised him to contact the office if his condition worsens, changes or fails to improve as anticipated.  He expressed understanding with the diagnosis(es) and plan. Lola Ramesh, who was evaluated through a synchronous (real-time) audio-video encounter and/or his healthcare decision maker, is aware that it is a billable service, with coverage as determined by his insurance carrier. He provided verbal consent to proceed: Yes, and patient identification was verified. It was conducted pursuant to the emergency declaration under the [address] [place] [address] and the [employer] Act. A caregiver was present when appropriate. Ability to conduct physical exam was limited. I was at home. The patient was at home.       Cheyanne Hearing, NP

## 2022-01-19 ENCOUNTER — NURSE TRIAGE (OUTPATIENT)
Dept: OTHER | Facility: CLINIC | Age: 62
End: 2022-01-19

## 2022-01-19 NOTE — TELEPHONE ENCOUNTER
Received call from [address] at [facility] with Red Flag Complaint. Subjective: Caller states \"My stomach feels like it is splitting apart and getting worse and worse. I went to the hospital on Sunday, got a pain killer and the pain went away. Monday I went to Dialysis and I was ok, but yesterday I felt bad again. I don't know if I can do dialysis today\"     Current Symptoms: Vomiting every time eating, black / coffee ground in color. Abdominal pain on upper left side. Dialysis M,W,F. Not able to intake enough fluids due to fluid restrictions and vomiting. Onset: 5 days ago; worsening    Associated Symptoms: reduced activity, reduced food and fluid intake    Pain Severity: 9/10; sharp; constant    Temperature: None at this time N/A    What has been tried: Medication prescribed from last ER visit for pain relief- not working    LMP: NA Pregnant: NA    Recommended disposition: Go to ER NOW    Care advice provided, patient verbalizes understanding; denies any other questions or concerns; instructed to call back for any new or worsening symptoms. Patient proceeding to nearest Emergency Department    Attention Provider: Thank you for allowing me to participate in the care of your patient. The patient was connected to triage in response to information provided to the [facility]. Please do not respond through this encounter as the response is not directed to a shared pool.     Reason for Disposition   Vomiting red blood or black (coffee ground) material    Protocols used: VOMITING-ADULT-[place]

## 2022-02-05 ENCOUNTER — HOSPITAL ENCOUNTER (INPATIENT)
Age: 62
LOS: 1 days | Discharge: HOME | DRG: 199 | End: 2022-02-07
Attending: EMERGENCY MEDICINE | Admitting: FAMILY MEDICINE
Payer: MEDICAID

## 2022-02-05 ENCOUNTER — APPOINTMENT (OUTPATIENT)
Dept: CT IMAGING | Age: 62
DRG: 199 | End: 2022-02-05
Attending: EMERGENCY MEDICINE
Payer: MEDICAID

## 2022-02-05 ENCOUNTER — APPOINTMENT (OUTPATIENT)
Dept: GENERAL RADIOLOGY | Age: 62
DRG: 199 | End: 2022-02-05
Attending: EMERGENCY MEDICINE
Payer: MEDICAID

## 2022-02-05 DIAGNOSIS — I16.0 HYPERTENSIVE URGENCY: ICD-10-CM

## 2022-02-05 DIAGNOSIS — Z99.2 ESRD (END STAGE RENAL DISEASE) ON DIALYSIS (HCC): ICD-10-CM

## 2022-02-05 DIAGNOSIS — R10.819 ABDOMINAL TENDERNESS, REBOUND TENDERNESS PRESENCE NOT SPECIFIED, UNSPECIFIED LOCATION: Primary | ICD-10-CM

## 2022-02-05 DIAGNOSIS — N18.6 ESRD (END STAGE RENAL DISEASE) ON DIALYSIS (HCC): ICD-10-CM

## 2022-02-05 DIAGNOSIS — R60.1 ANASARCA: ICD-10-CM

## 2022-02-05 LAB
ALBUMIN SERPL-MCNC: 2.6 G/DL (ref 3.5–5)
ALBUMIN/GLOB SERPL: 0.7 {RATIO} (ref 1.1–2.2)
ALP SERPL-CCNC: 166 U/L (ref 45–117)
ALT SERPL-CCNC: 22 U/L (ref 12–78)
ANION GAP SERPL CALC-SCNC: 7 MMOL/L (ref 5–15)
APPEARANCE UR: CLEAR
AST SERPL-CCNC: 25 U/L (ref 15–37)
BACTERIA URNS QL MICRO: NEGATIVE /HPF
BASOPHILS # BLD: 0 K/UL (ref 0–0.1)
BASOPHILS NFR BLD: 0 % (ref 0–1)
BILIRUB SERPL-MCNC: 0.8 MG/DL (ref 0.2–1)
BILIRUB UR QL: NEGATIVE
BUN SERPL-MCNC: 40 MG/DL (ref 6–20)
BUN/CREAT SERPL: 19 (ref 12–20)
CALCIUM SERPL-MCNC: 8.3 MG/DL (ref 8.5–10.1)
CHLORIDE SERPL-SCNC: 107 MMOL/L (ref 97–108)
CO2 SERPL-SCNC: 24 MMOL/L (ref 21–32)
COLOR UR: ABNORMAL
CREAT SERPL-MCNC: 2.08 MG/DL (ref 0.7–1.3)
DIFFERENTIAL METHOD BLD: ABNORMAL
EOSINOPHIL # BLD: 0.1 K/UL (ref 0–0.4)
EOSINOPHIL NFR BLD: 2 % (ref 0–7)
EPITH CASTS URNS QL MICRO: ABNORMAL /LPF
ERYTHROCYTE [DISTWIDTH] IN BLOOD BY AUTOMATED COUNT: 15 % (ref 11.5–14.5)
GLOBULIN SER CALC-MCNC: 3.8 G/DL (ref 2–4)
GLUCOSE SERPL-MCNC: 114 MG/DL (ref 65–100)
GLUCOSE UR STRIP.AUTO-MCNC: 250 MG/DL
HCT VFR BLD AUTO: 30.6 % (ref 36.6–50.3)
HGB BLD-MCNC: 10.2 G/DL (ref 12.1–17)
HGB UR QL STRIP: ABNORMAL
HYALINE CASTS URNS QL MICRO: ABNORMAL /LPF (ref 0–5)
IMM GRANULOCYTES # BLD AUTO: 0 K/UL (ref 0–0.04)
IMM GRANULOCYTES NFR BLD AUTO: 1 % (ref 0–0.5)
KETONES UR QL STRIP.AUTO: NEGATIVE MG/DL
LACTATE SERPL-SCNC: 0.6 MMOL/L (ref 0.4–2)
LEUKOCYTE ESTERASE UR QL STRIP.AUTO: NEGATIVE
LIPASE SERPL-CCNC: 70 U/L (ref 73–393)
LYMPHOCYTES # BLD: 1 K/UL (ref 0.8–3.5)
LYMPHOCYTES NFR BLD: 14 % (ref 12–49)
MCH RBC QN AUTO: 27.8 PG (ref 26–34)
MCHC RBC AUTO-ENTMCNC: 33.3 G/DL (ref 30–36.5)
MCV RBC AUTO: 83.4 FL (ref 80–99)
MONOCYTES # BLD: 0.5 K/UL (ref 0–1)
MONOCYTES NFR BLD: 7 % (ref 5–13)
NEUTS SEG # BLD: 5.7 K/UL (ref 1.8–8)
NEUTS SEG NFR BLD: 77 % (ref 32–75)
NITRITE UR QL STRIP.AUTO: NEGATIVE
NRBC # BLD: 0 K/UL (ref 0–0.01)
NRBC BLD-RTO: 0 PER 100 WBC
PH UR STRIP: 7.5 [PH] (ref 5–8)
PLATELET # BLD AUTO: 99 K/UL (ref 150–400)
PMV BLD AUTO: 9 FL (ref 8.9–12.9)
POTASSIUM SERPL-SCNC: 3.6 MMOL/L (ref 3.5–5.1)
PROT SERPL-MCNC: 6.4 G/DL (ref 6.4–8.2)
PROT UR STRIP-MCNC: >300 MG/DL
RBC # BLD AUTO: 3.67 M/UL (ref 4.1–5.7)
RBC #/AREA URNS HPF: ABNORMAL /HPF (ref 0–5)
SODIUM SERPL-SCNC: 138 MMOL/L (ref 136–145)
SP GR UR REFRACTOMETRY: 1.02 (ref 1–1.03)
TROPONIN-HIGH SENSITIVITY: 32 NG/L (ref 0–76)
UR CULT HOLD, URHOLD: NORMAL
UROBILINOGEN UR QL STRIP.AUTO: 1 EU/DL (ref 0.2–1)
WBC # BLD AUTO: 7.4 K/UL (ref 4.1–11.1)
WBC URNS QL MICRO: ABNORMAL /HPF (ref 0–4)

## 2022-02-05 PROCEDURE — 74011250636 HC RX REV CODE- 250/636: Performed by: EMERGENCY MEDICINE

## 2022-02-05 PROCEDURE — 71275 CT ANGIOGRAPHY CHEST: CPT

## 2022-02-05 PROCEDURE — 36415 COLL VENOUS BLD VENIPUNCTURE: CPT

## 2022-02-05 PROCEDURE — 71045 X-RAY EXAM CHEST 1 VIEW: CPT

## 2022-02-05 PROCEDURE — 93005 ELECTROCARDIOGRAM TRACING: CPT

## 2022-02-05 PROCEDURE — 85025 COMPLETE CBC W/AUTO DIFF WBC: CPT

## 2022-02-05 PROCEDURE — 74176 CT ABD & PELVIS W/O CONTRAST: CPT

## 2022-02-05 PROCEDURE — 96372 THER/PROPH/DIAG INJ SC/IM: CPT

## 2022-02-05 PROCEDURE — 74011000636 HC RX REV CODE- 636: Performed by: RADIOLOGY

## 2022-02-05 PROCEDURE — 84484 ASSAY OF TROPONIN QUANT: CPT

## 2022-02-05 PROCEDURE — 99285 EMERGENCY DEPT VISIT HI MDM: CPT

## 2022-02-05 PROCEDURE — 81001 URINALYSIS AUTO W/SCOPE: CPT

## 2022-02-05 PROCEDURE — 96375 TX/PRO/DX INJ NEW DRUG ADDON: CPT

## 2022-02-05 PROCEDURE — 80053 COMPREHEN METABOLIC PANEL: CPT

## 2022-02-05 PROCEDURE — 83735 ASSAY OF MAGNESIUM: CPT

## 2022-02-05 PROCEDURE — 96374 THER/PROPH/DIAG INJ IV PUSH: CPT

## 2022-02-05 PROCEDURE — 80307 DRUG TEST PRSMV CHEM ANLYZR: CPT

## 2022-02-05 PROCEDURE — 83690 ASSAY OF LIPASE: CPT

## 2022-02-05 PROCEDURE — 83605 ASSAY OF LACTIC ACID: CPT

## 2022-02-05 RX ORDER — DICYCLOMINE HYDROCHLORIDE 20 MG/1
20 TABLET ORAL
Qty: 20 TABLET | Refills: 0 | Status: SHIPPED | OUTPATIENT
Start: 2022-02-05 | End: 2022-02-10

## 2022-02-05 RX ORDER — LABETALOL HCL 20 MG/4 ML
20 SYRINGE (ML) INTRAVENOUS ONCE
Status: COMPLETED | OUTPATIENT
Start: 2022-02-05 | End: 2022-02-05

## 2022-02-05 RX ORDER — HYDROMORPHONE HYDROCHLORIDE 1 MG/ML
0.5 INJECTION, SOLUTION INTRAMUSCULAR; INTRAVENOUS; SUBCUTANEOUS
Status: COMPLETED | OUTPATIENT
Start: 2022-02-05 | End: 2022-02-05

## 2022-02-05 RX ORDER — DICYCLOMINE HYDROCHLORIDE 10 MG/ML
20 INJECTION INTRAMUSCULAR
Status: DISCONTINUED | OUTPATIENT
Start: 2022-02-05 | End: 2022-02-07 | Stop reason: HOSPADM

## 2022-02-05 RX ORDER — ONDANSETRON 2 MG/ML
4 INJECTION INTRAMUSCULAR; INTRAVENOUS
Status: COMPLETED | OUTPATIENT
Start: 2022-02-05 | End: 2022-02-05

## 2022-02-05 RX ORDER — MORPHINE SULFATE 4 MG/ML
4 INJECTION INTRAVENOUS
Status: COMPLETED | OUTPATIENT
Start: 2022-02-05 | End: 2022-02-05

## 2022-02-05 RX ORDER — HYDRALAZINE HYDROCHLORIDE 20 MG/ML
20 INJECTION INTRAMUSCULAR; INTRAVENOUS ONCE
Status: COMPLETED | OUTPATIENT
Start: 2022-02-05 | End: 2022-02-05

## 2022-02-05 RX ADMIN — MORPHINE SULFATE 4 MG: 4 INJECTION, SOLUTION INTRAMUSCULAR; INTRAVENOUS at 20:15

## 2022-02-05 RX ADMIN — IOPAMIDOL 100 ML: 755 INJECTION, SOLUTION INTRAVENOUS at 23:47

## 2022-02-05 RX ADMIN — HYDROMORPHONE HYDROCHLORIDE 0.5 MG: 1 INJECTION, SOLUTION INTRAMUSCULAR; INTRAVENOUS; SUBCUTANEOUS at 22:55

## 2022-02-05 RX ADMIN — LABETALOL HYDROCHLORIDE 20 MG: 5 INJECTION, SOLUTION INTRAVENOUS at 22:16

## 2022-02-05 RX ADMIN — HYDRALAZINE HYDROCHLORIDE 20 MG: 20 INJECTION INTRAMUSCULAR; INTRAVENOUS at 22:52

## 2022-02-05 RX ADMIN — ONDANSETRON 4 MG: 2 INJECTION INTRAMUSCULAR; INTRAVENOUS at 22:51

## 2022-02-05 RX ADMIN — DICYCLOMINE HYDROCHLORIDE 20 MG: 20 INJECTION, SOLUTION INTRAMUSCULAR at 21:49

## 2022-02-06 ENCOUNTER — APPOINTMENT (OUTPATIENT)
Dept: GENERAL RADIOLOGY | Age: 62
DRG: 199 | End: 2022-02-06
Attending: STUDENT IN AN ORGANIZED HEALTH CARE EDUCATION/TRAINING PROGRAM
Payer: MEDICAID

## 2022-02-06 PROBLEM — D69.6 THROMBOCYTOPENIA (HCC): Status: ACTIVE | Noted: 2022-02-06

## 2022-02-06 PROBLEM — R10.13 EPIGASTRIC PAIN: Status: ACTIVE | Noted: 2022-02-06

## 2022-02-06 PROBLEM — R77.8 ELEVATED TROPONIN: Status: ACTIVE | Noted: 2022-02-06

## 2022-02-06 PROBLEM — I16.0 HYPERTENSIVE URGENCY: Status: ACTIVE | Noted: 2022-02-06

## 2022-02-06 PROBLEM — Z99.2 ESRD (END STAGE RENAL DISEASE) ON DIALYSIS (HCC): Status: ACTIVE | Noted: 2022-02-06

## 2022-02-06 PROBLEM — N18.6 ESRD (END STAGE RENAL DISEASE) ON DIALYSIS (HCC): Status: ACTIVE | Noted: 2022-02-06

## 2022-02-06 PROBLEM — I50.30 HEART FAILURE WITH PRESERVED EJECTION FRACTION (HCC): Status: ACTIVE | Noted: 2022-02-06

## 2022-02-06 PROBLEM — R60.1 ANASARCA: Status: ACTIVE | Noted: 2022-02-06

## 2022-02-06 PROBLEM — E66.9 OBESITY: Status: ACTIVE | Noted: 2022-02-06

## 2022-02-06 PROBLEM — D64.9 ANEMIA: Status: ACTIVE | Noted: 2022-02-06

## 2022-02-06 PROBLEM — R10.9 ABDOMINAL PAIN: Status: ACTIVE | Noted: 2022-02-06

## 2022-02-06 LAB
ALBUMIN SERPL-MCNC: 2.4 G/DL (ref 3.5–5)
ALBUMIN/GLOB SERPL: 0.6 {RATIO} (ref 1.1–2.2)
ALP SERPL-CCNC: 139 U/L (ref 45–117)
ALT SERPL-CCNC: 21 U/L (ref 12–78)
AMPHET UR QL SCN: NEGATIVE
ANION GAP SERPL CALC-SCNC: 6 MMOL/L (ref 5–15)
AST SERPL-CCNC: 24 U/L (ref 15–37)
BARBITURATES UR QL SCN: NEGATIVE
BASOPHILS # BLD: 0 K/UL (ref 0–0.1)
BASOPHILS NFR BLD: 0 % (ref 0–1)
BENZODIAZ UR QL: NEGATIVE
BILIRUB SERPL-MCNC: 1 MG/DL (ref 0.2–1)
BUN SERPL-MCNC: 39 MG/DL (ref 6–20)
BUN/CREAT SERPL: 18 (ref 12–20)
CALCIUM SERPL-MCNC: 8.1 MG/DL (ref 8.5–10.1)
CANNABINOIDS UR QL SCN: NEGATIVE
CHLORIDE SERPL-SCNC: 107 MMOL/L (ref 97–108)
CO2 SERPL-SCNC: 25 MMOL/L (ref 21–32)
COCAINE UR QL SCN: NEGATIVE
COVID-19 RAPID TEST, COVR: NOT DETECTED
CREAT SERPL-MCNC: 2.14 MG/DL (ref 0.7–1.3)
DIFFERENTIAL METHOD BLD: ABNORMAL
DRUG SCRN COMMENT,DRGCM: ABNORMAL
EOSINOPHIL # BLD: 0.2 K/UL (ref 0–0.4)
EOSINOPHIL NFR BLD: 3 % (ref 0–7)
ERYTHROCYTE [DISTWIDTH] IN BLOOD BY AUTOMATED COUNT: 15.1 % (ref 11.5–14.5)
FERRITIN SERPL-MCNC: 743 NG/ML (ref 26–388)
FOLATE SERPL-MCNC: 8.1 NG/ML (ref 5–21)
GLOBULIN SER CALC-MCNC: 3.7 G/DL (ref 2–4)
GLUCOSE BLD STRIP.AUTO-MCNC: 103 MG/DL (ref 65–117)
GLUCOSE BLD STRIP.AUTO-MCNC: 157 MG/DL (ref 65–117)
GLUCOSE BLD STRIP.AUTO-MCNC: 99 MG/DL (ref 65–117)
GLUCOSE SERPL-MCNC: 102 MG/DL (ref 65–100)
HCT VFR BLD AUTO: 28.3 % (ref 36.6–50.3)
HGB BLD-MCNC: 9.2 G/DL (ref 12.1–17)
IMM GRANULOCYTES # BLD AUTO: 0 K/UL (ref 0–0.04)
IMM GRANULOCYTES NFR BLD AUTO: 0 % (ref 0–0.5)
IRON SATN MFR SERPL: 38 % (ref 20–50)
IRON SERPL-MCNC: 71 UG/DL (ref 35–150)
LYMPHOCYTES # BLD: 0.8 K/UL (ref 0.8–3.5)
LYMPHOCYTES NFR BLD: 12 % (ref 12–49)
MAGNESIUM SERPL-MCNC: 2 MG/DL (ref 1.6–2.4)
MAGNESIUM SERPL-MCNC: 2 MG/DL (ref 1.6–2.4)
MCH RBC QN AUTO: 27.7 PG (ref 26–34)
MCHC RBC AUTO-ENTMCNC: 32.5 G/DL (ref 30–36.5)
MCV RBC AUTO: 85.2 FL (ref 80–99)
METHADONE UR QL: NEGATIVE
MONOCYTES # BLD: 0.5 K/UL (ref 0–1)
MONOCYTES NFR BLD: 8 % (ref 5–13)
NEUTS SEG # BLD: 4.9 K/UL (ref 1.8–8)
NEUTS SEG NFR BLD: 77 % (ref 32–75)
NRBC # BLD: 0 K/UL (ref 0–0.01)
NRBC BLD-RTO: 0 PER 100 WBC
OPIATES UR QL: POSITIVE
PCP UR QL: NEGATIVE
PHOSPHATE SERPL-MCNC: 3.2 MG/DL (ref 2.6–4.7)
PLATELET # BLD AUTO: 88 K/UL (ref 150–400)
PMV BLD AUTO: 9.7 FL (ref 8.9–12.9)
POTASSIUM SERPL-SCNC: 3.4 MMOL/L (ref 3.5–5.1)
PROT SERPL-MCNC: 6.1 G/DL (ref 6.4–8.2)
RBC # BLD AUTO: 3.32 M/UL (ref 4.1–5.7)
RBC MORPH BLD: ABNORMAL
RETICS # AUTO: 0.06 M/UL (ref 0.03–0.1)
RETICS/RBC NFR AUTO: 1.8 % (ref 0.7–2.1)
SERVICE CMNT-IMP: ABNORMAL
SERVICE CMNT-IMP: NORMAL
SERVICE CMNT-IMP: NORMAL
SODIUM SERPL-SCNC: 138 MMOL/L (ref 136–145)
SOURCE, COVRS: NORMAL
TIBC SERPL-MCNC: 186 UG/DL (ref 250–450)
TROPONIN-HIGH SENSITIVITY: 30 NG/L (ref 0–76)
TROPONIN-HIGH SENSITIVITY: 39 NG/L (ref 0–76)
VIT B12 SERPL-MCNC: 407 PG/ML (ref 193–986)
WBC # BLD AUTO: 6.4 K/UL (ref 4.1–11.1)

## 2022-02-06 PROCEDURE — 74011000250 HC RX REV CODE- 250: Performed by: NURSE PRACTITIONER

## 2022-02-06 PROCEDURE — 74011250637 HC RX REV CODE- 250/637: Performed by: STUDENT IN AN ORGANIZED HEALTH CARE EDUCATION/TRAINING PROGRAM

## 2022-02-06 PROCEDURE — 87635 SARS-COV-2 COVID-19 AMP PRB: CPT

## 2022-02-06 PROCEDURE — 94761 N-INVAS EAR/PLS OXIMETRY MLT: CPT

## 2022-02-06 PROCEDURE — 82962 GLUCOSE BLOOD TEST: CPT

## 2022-02-06 PROCEDURE — 74011250636 HC RX REV CODE- 250/636: Performed by: STUDENT IN AN ORGANIZED HEALTH CARE EDUCATION/TRAINING PROGRAM

## 2022-02-06 PROCEDURE — 84484 ASSAY OF TROPONIN QUANT: CPT

## 2022-02-06 PROCEDURE — 74011000250 HC RX REV CODE- 250: Performed by: STUDENT IN AN ORGANIZED HEALTH CARE EDUCATION/TRAINING PROGRAM

## 2022-02-06 PROCEDURE — 74011250637 HC RX REV CODE- 250/637: Performed by: NURSE PRACTITIONER

## 2022-02-06 PROCEDURE — 84100 ASSAY OF PHOSPHORUS: CPT

## 2022-02-06 PROCEDURE — 83735 ASSAY OF MAGNESIUM: CPT

## 2022-02-06 PROCEDURE — 83540 ASSAY OF IRON: CPT

## 2022-02-06 PROCEDURE — 85045 AUTOMATED RETICULOCYTE COUNT: CPT

## 2022-02-06 PROCEDURE — 74011250636 HC RX REV CODE- 250/636: Performed by: EMERGENCY MEDICINE

## 2022-02-06 PROCEDURE — 65660000000 HC RM CCU STEPDOWN

## 2022-02-06 PROCEDURE — 90935 HEMODIALYSIS ONE EVALUATION: CPT

## 2022-02-06 PROCEDURE — 82607 VITAMIN B-12: CPT

## 2022-02-06 PROCEDURE — 85025 COMPLETE CBC W/AUTO DIFF WBC: CPT

## 2022-02-06 PROCEDURE — 71045 X-RAY EXAM CHEST 1 VIEW: CPT

## 2022-02-06 PROCEDURE — 80053 COMPREHEN METABOLIC PANEL: CPT

## 2022-02-06 PROCEDURE — C9113 INJ PANTOPRAZOLE SODIUM, VIA: HCPCS | Performed by: STUDENT IN AN ORGANIZED HEALTH CARE EDUCATION/TRAINING PROGRAM

## 2022-02-06 PROCEDURE — 93005 ELECTROCARDIOGRAM TRACING: CPT

## 2022-02-06 PROCEDURE — 74011636637 HC RX REV CODE- 636/637: Performed by: STUDENT IN AN ORGANIZED HEALTH CARE EDUCATION/TRAINING PROGRAM

## 2022-02-06 PROCEDURE — 5A1D70Z PERFORMANCE OF URINARY FILTRATION, INTERMITTENT, LESS THAN 6 HOURS PER DAY: ICD-10-PCS | Performed by: STUDENT IN AN ORGANIZED HEALTH CARE EDUCATION/TRAINING PROGRAM

## 2022-02-06 PROCEDURE — 82728 ASSAY OF FERRITIN: CPT

## 2022-02-06 PROCEDURE — 36415 COLL VENOUS BLD VENIPUNCTURE: CPT

## 2022-02-06 PROCEDURE — 82746 ASSAY OF FOLIC ACID SERUM: CPT

## 2022-02-06 RX ORDER — LOSARTAN POTASSIUM 50 MG/1
100 TABLET ORAL DAILY
COMMUNITY
Start: 2021-10-30 | End: 2022-02-06

## 2022-02-06 RX ORDER — ISOSORBIDE MONONITRATE 30 MG/1
120 TABLET, EXTENDED RELEASE ORAL
Status: DISCONTINUED | OUTPATIENT
Start: 2022-02-06 | End: 2022-02-07 | Stop reason: HOSPADM

## 2022-02-06 RX ORDER — BUSPIRONE HYDROCHLORIDE 10 MG/1
10 TABLET ORAL 2 TIMES DAILY
Status: DISCONTINUED | OUTPATIENT
Start: 2022-02-06 | End: 2022-02-07

## 2022-02-06 RX ORDER — CLONIDINE HYDROCHLORIDE 0.1 MG/1
0.2 TABLET ORAL 3 TIMES DAILY
Status: DISCONTINUED | OUTPATIENT
Start: 2022-02-06 | End: 2022-02-07 | Stop reason: HOSPADM

## 2022-02-06 RX ORDER — SODIUM CHLORIDE 0.9 % (FLUSH) 0.9 %
5-40 SYRINGE (ML) INJECTION AS NEEDED
Status: DISCONTINUED | OUTPATIENT
Start: 2022-02-06 | End: 2022-02-07 | Stop reason: HOSPADM

## 2022-02-06 RX ORDER — TERAZOSIN 5 MG/1
5 CAPSULE ORAL 2 TIMES DAILY
COMMUNITY
End: 2022-02-07

## 2022-02-06 RX ORDER — NIFEDIPINE 60 MG/1
120 TABLET, EXTENDED RELEASE ORAL DAILY
COMMUNITY
Start: 2021-08-24 | End: 2022-02-06

## 2022-02-06 RX ORDER — ACETAMINOPHEN 325 MG/1
650 TABLET ORAL
Status: DISCONTINUED | OUTPATIENT
Start: 2022-02-06 | End: 2022-02-07 | Stop reason: HOSPADM

## 2022-02-06 RX ORDER — ROSUVASTATIN CALCIUM 10 MG/1
10 TABLET, COATED ORAL
Status: DISCONTINUED | OUTPATIENT
Start: 2022-02-06 | End: 2022-02-06

## 2022-02-06 RX ORDER — ACETAMINOPHEN 650 MG/1
650 SUPPOSITORY RECTAL
Status: DISCONTINUED | OUTPATIENT
Start: 2022-02-06 | End: 2022-02-07 | Stop reason: HOSPADM

## 2022-02-06 RX ORDER — BUMETANIDE 1 MG/1
1 TABLET ORAL 2 TIMES DAILY
COMMUNITY
Start: 2021-10-31 | End: 2022-02-07

## 2022-02-06 RX ORDER — DEXTROSE 50 % IN WATER (D50W) INTRAVENOUS SYRINGE
12.5-25 AS NEEDED
Status: DISCONTINUED | OUTPATIENT
Start: 2022-02-06 | End: 2022-02-07 | Stop reason: HOSPADM

## 2022-02-06 RX ORDER — GABAPENTIN 300 MG/1
300 CAPSULE ORAL 3 TIMES DAILY
Status: DISCONTINUED | OUTPATIENT
Start: 2022-02-06 | End: 2022-02-07 | Stop reason: HOSPADM

## 2022-02-06 RX ORDER — EPLERENONE 25 MG/1
25 TABLET, FILM COATED ORAL DAILY
Status: DISCONTINUED | OUTPATIENT
Start: 2022-02-06 | End: 2022-02-07 | Stop reason: HOSPADM

## 2022-02-06 RX ORDER — BUMETANIDE 1 MG/1
1 TABLET ORAL 2 TIMES DAILY
Status: DISCONTINUED | OUTPATIENT
Start: 2022-02-06 | End: 2022-02-07 | Stop reason: HOSPADM

## 2022-02-06 RX ORDER — TERAZOSIN 5 MG/1
5 CAPSULE ORAL 2 TIMES DAILY
COMMUNITY
End: 2022-02-06

## 2022-02-06 RX ORDER — SUCRALFATE 1 G/1
1 TABLET ORAL 2 TIMES DAILY
Status: DISCONTINUED | OUTPATIENT
Start: 2022-02-06 | End: 2022-02-07 | Stop reason: HOSPADM

## 2022-02-06 RX ORDER — EPLERENONE 25 MG/1
25 TABLET, FILM COATED ORAL DAILY
COMMUNITY
End: 2022-07-29

## 2022-02-06 RX ORDER — BUMETANIDE 0.25 MG/ML
1 INJECTION INTRAMUSCULAR; INTRAVENOUS ONCE
Status: COMPLETED | OUTPATIENT
Start: 2022-02-06 | End: 2022-02-06

## 2022-02-06 RX ORDER — INSULIN LISPRO 100 [IU]/ML
INJECTION, SOLUTION INTRAVENOUS; SUBCUTANEOUS
Status: DISCONTINUED | OUTPATIENT
Start: 2022-02-06 | End: 2022-02-07 | Stop reason: HOSPADM

## 2022-02-06 RX ORDER — ROSUVASTATIN CALCIUM 10 MG/1
10 TABLET, COATED ORAL
Status: DISCONTINUED | OUTPATIENT
Start: 2022-02-06 | End: 2022-02-07 | Stop reason: HOSPADM

## 2022-02-06 RX ORDER — ONDANSETRON 2 MG/ML
4 INJECTION INTRAMUSCULAR; INTRAVENOUS
Status: DISCONTINUED | OUTPATIENT
Start: 2022-02-06 | End: 2022-02-07 | Stop reason: HOSPADM

## 2022-02-06 RX ORDER — SUCRALFATE 1 G/1
1 TABLET ORAL 2 TIMES DAILY
Status: DISCONTINUED | OUTPATIENT
Start: 2022-02-06 | End: 2022-02-06

## 2022-02-06 RX ORDER — BUMETANIDE 1 MG/1
1 TABLET ORAL 2 TIMES DAILY
Status: DISCONTINUED | OUTPATIENT
Start: 2022-02-06 | End: 2022-02-06

## 2022-02-06 RX ORDER — TRAZODONE HYDROCHLORIDE 50 MG/1
50 TABLET ORAL
Status: DISCONTINUED | OUTPATIENT
Start: 2022-02-07 | End: 2022-02-07 | Stop reason: HOSPADM

## 2022-02-06 RX ORDER — METOPROLOL SUCCINATE 25 MG/1
50 TABLET, EXTENDED RELEASE ORAL DAILY
Status: DISCONTINUED | OUTPATIENT
Start: 2022-02-06 | End: 2022-02-07 | Stop reason: HOSPADM

## 2022-02-06 RX ORDER — TRAZODONE HYDROCHLORIDE 50 MG/1
50 TABLET ORAL
Status: DISCONTINUED | OUTPATIENT
Start: 2022-02-06 | End: 2022-02-06

## 2022-02-06 RX ORDER — MAGNESIUM SULFATE 100 %
4 CRYSTALS MISCELLANEOUS AS NEEDED
Status: DISCONTINUED | OUTPATIENT
Start: 2022-02-06 | End: 2022-02-07 | Stop reason: HOSPADM

## 2022-02-06 RX ORDER — ONDANSETRON 4 MG/1
4 TABLET, ORALLY DISINTEGRATING ORAL
Status: DISCONTINUED | OUTPATIENT
Start: 2022-02-06 | End: 2022-02-07 | Stop reason: HOSPADM

## 2022-02-06 RX ORDER — DOXAZOSIN 4 MG/1
4 TABLET ORAL 2 TIMES DAILY
COMMUNITY
End: 2022-02-06

## 2022-02-06 RX ORDER — CLONIDINE HYDROCHLORIDE 0.1 MG/1
0.2 TABLET ORAL 3 TIMES DAILY
Status: DISCONTINUED | OUTPATIENT
Start: 2022-02-06 | End: 2022-02-06

## 2022-02-06 RX ORDER — TERAZOSIN 5 MG/1
5 CAPSULE ORAL 2 TIMES DAILY
Status: DISCONTINUED | OUTPATIENT
Start: 2022-02-06 | End: 2022-02-06

## 2022-02-06 RX ORDER — SERTRALINE HYDROCHLORIDE 50 MG/1
200 TABLET, FILM COATED ORAL DAILY
Status: DISCONTINUED | OUTPATIENT
Start: 2022-02-06 | End: 2022-02-07

## 2022-02-06 RX ORDER — TERAZOSIN 5 MG/1
5 CAPSULE ORAL 2 TIMES DAILY
Status: DISCONTINUED | OUTPATIENT
Start: 2022-02-06 | End: 2022-02-07

## 2022-02-06 RX ORDER — CLONIDINE HYDROCHLORIDE 0.3 MG/1
0.2 TABLET ORAL 3 TIMES DAILY
COMMUNITY
Start: 2021-08-17 | End: 2022-02-07

## 2022-02-06 RX ORDER — HEPARIN SODIUM 5000 [USP'U]/ML
5000 INJECTION, SOLUTION INTRAVENOUS; SUBCUTANEOUS EVERY 8 HOURS
Status: DISCONTINUED | OUTPATIENT
Start: 2022-02-06 | End: 2022-02-07 | Stop reason: HOSPADM

## 2022-02-06 RX ORDER — HYDRALAZINE HYDROCHLORIDE 20 MG/ML
20 INJECTION INTRAMUSCULAR; INTRAVENOUS
Status: DISCONTINUED | OUTPATIENT
Start: 2022-02-06 | End: 2022-02-07 | Stop reason: HOSPADM

## 2022-02-06 RX ORDER — HYDRALAZINE HYDROCHLORIDE 20 MG/ML
10 INJECTION INTRAMUSCULAR; INTRAVENOUS
Status: DISCONTINUED | OUTPATIENT
Start: 2022-02-06 | End: 2022-02-06

## 2022-02-06 RX ORDER — SODIUM CHLORIDE 0.9 % (FLUSH) 0.9 %
5-40 SYRINGE (ML) INJECTION EVERY 8 HOURS
Status: DISCONTINUED | OUTPATIENT
Start: 2022-02-06 | End: 2022-02-07 | Stop reason: HOSPADM

## 2022-02-06 RX ORDER — ISOSORBIDE MONONITRATE 120 MG/1
120 TABLET, EXTENDED RELEASE ORAL
Status: ON HOLD | COMMUNITY
End: 2022-02-15 | Stop reason: SDUPTHER

## 2022-02-06 RX ADMIN — DICYCLOMINE HYDROCHLORIDE 20 MG: 20 INJECTION, SOLUTION INTRAMUSCULAR at 07:51

## 2022-02-06 RX ADMIN — ACETAMINOPHEN 650 MG: 325 TABLET ORAL at 01:45

## 2022-02-06 RX ADMIN — SUCRALFATE 1 G: 1 TABLET ORAL at 08:02

## 2022-02-06 RX ADMIN — HEPARIN SODIUM 5000 UNITS: 5000 INJECTION INTRAVENOUS; SUBCUTANEOUS at 05:23

## 2022-02-06 RX ADMIN — Medication 10 ML: at 22:00

## 2022-02-06 RX ADMIN — SERTRALINE 200 MG: 50 TABLET, FILM COATED ORAL at 08:02

## 2022-02-06 RX ADMIN — BUMETANIDE 1 MG: 1 TABLET ORAL at 08:02

## 2022-02-06 RX ADMIN — HYDRALAZINE HYDROCHLORIDE 10 MG: 20 INJECTION INTRAMUSCULAR; INTRAVENOUS at 12:50

## 2022-02-06 RX ADMIN — DICYCLOMINE HYDROCHLORIDE 20 MG: 20 INJECTION, SOLUTION INTRAMUSCULAR at 12:49

## 2022-02-06 RX ADMIN — ISOSORBIDE MONONITRATE 120 MG: 30 TABLET, EXTENDED RELEASE ORAL at 07:50

## 2022-02-06 RX ADMIN — LIDOCAINE HYDROCHLORIDE 40 ML: 20 SOLUTION OROPHARYNGEAL at 12:49

## 2022-02-06 RX ADMIN — Medication 10 ML: at 05:23

## 2022-02-06 RX ADMIN — CLONIDINE HYDROCHLORIDE 0.2 MG: 0.1 TABLET ORAL at 18:41

## 2022-02-06 RX ADMIN — SODIUM CHLORIDE, PRESERVATIVE FREE 40 MG: 5 INJECTION INTRAVENOUS at 08:02

## 2022-02-06 RX ADMIN — CLONIDINE HYDROCHLORIDE 0.2 MG: 0.1 TABLET ORAL at 08:02

## 2022-02-06 RX ADMIN — BUMETANIDE 1 MG: 0.25 INJECTION INTRAMUSCULAR; INTRAVENOUS at 00:46

## 2022-02-06 RX ADMIN — Medication 10 ML: at 01:46

## 2022-02-06 RX ADMIN — TERAZOSIN HYDROCHLORIDE 5 MG: 5 CAPSULE ORAL at 10:21

## 2022-02-06 RX ADMIN — BUSPIRONE HYDROCHLORIDE 10 MG: 10 TABLET ORAL at 18:41

## 2022-02-06 RX ADMIN — GABAPENTIN 300 MG: 300 CAPSULE ORAL at 18:41

## 2022-02-06 RX ADMIN — LIDOCAINE HYDROCHLORIDE 40 ML: 20 SOLUTION OROPHARYNGEAL at 01:45

## 2022-02-06 RX ADMIN — SUCRALFATE 1 G: 1 TABLET ORAL at 10:21

## 2022-02-06 RX ADMIN — EPLERENONE 25 MG: 25 TABLET, FILM COATED ORAL at 10:32

## 2022-02-06 RX ADMIN — INSULIN LISPRO 2 UNITS: 100 INJECTION, SOLUTION INTRAVENOUS; SUBCUTANEOUS at 12:49

## 2022-02-06 RX ADMIN — HYDRALAZINE HYDROCHLORIDE 20 MG: 20 INJECTION INTRAMUSCULAR; INTRAVENOUS at 21:22

## 2022-02-06 RX ADMIN — CLONIDINE HYDROCHLORIDE 0.2 MG: 0.1 TABLET ORAL at 21:29

## 2022-02-06 RX ADMIN — BUSPIRONE HYDROCHLORIDE 10 MG: 10 TABLET ORAL at 08:02

## 2022-02-06 RX ADMIN — GABAPENTIN 300 MG: 300 CAPSULE ORAL at 21:29

## 2022-02-06 RX ADMIN — ROSUVASTATIN CALCIUM 10 MG: 10 TABLET, COATED ORAL at 21:29

## 2022-02-06 RX ADMIN — BUMETANIDE 1 MG: 1 TABLET ORAL at 18:41

## 2022-02-06 RX ADMIN — SUCRALFATE 1 G: 1 TABLET ORAL at 18:41

## 2022-02-06 RX ADMIN — LIDOCAINE HYDROCHLORIDE 40 ML: 20 SOLUTION OROPHARYNGEAL at 05:22

## 2022-02-06 RX ADMIN — LIDOCAINE HYDROCHLORIDE 40 ML: 20 SOLUTION OROPHARYNGEAL at 22:16

## 2022-02-06 RX ADMIN — HEPARIN SODIUM 5000 UNITS: 5000 INJECTION INTRAVENOUS; SUBCUTANEOUS at 22:15

## 2022-02-06 RX ADMIN — HYDRALAZINE HYDROCHLORIDE 10 MG: 20 INJECTION INTRAMUSCULAR; INTRAVENOUS at 05:31

## 2022-02-06 RX ADMIN — SODIUM CHLORIDE, PRESERVATIVE FREE 40 MG: 5 INJECTION INTRAVENOUS at 18:41

## 2022-02-06 RX ADMIN — METOPROLOL SUCCINATE 50 MG: 25 TABLET, FILM COATED, EXTENDED RELEASE ORAL at 08:02

## 2022-02-06 RX ADMIN — Medication 10 ML: at 15:26

## 2022-02-06 RX ADMIN — TERAZOSIN HYDROCHLORIDE 5 MG: 5 CAPSULE ORAL at 19:11

## 2022-02-06 RX ADMIN — GABAPENTIN 300 MG: 300 CAPSULE ORAL at 08:02

## 2022-02-06 RX ADMIN — HEPARIN SODIUM 5000 UNITS: 5000 INJECTION INTRAVENOUS; SUBCUTANEOUS at 15:12

## 2022-02-06 RX ADMIN — SODIUM CHLORIDE 40 MG: 9 INJECTION, SOLUTION INTRAMUSCULAR; INTRAVENOUS; SUBCUTANEOUS at 01:43

## 2022-02-06 NOTE — ED NOTES
Patient complaining of pain/nausea.  Patient blood pressure still elevated after treatment, Dr Jeannie Guerrero aware, new orders entered

## 2022-02-06 NOTE — ED PROVIDER NOTES
70-year-old male with a history of diabetes, ESRD on dialysis presents with a chief complaint of abdominal pain. Patient reports the pain started on Wednesday. It was initially associated with diarrhea although he states this is now resolved. His last bowel movement was yesterday. He endorses nausea but has not vomited. He denies prior abdominal surgeries. He did not go to dialysis yesterday due to the pain. He has also had some shortness of breath which is chronic. He denies chest pain, black or bloody stools.   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, with long-term current use of insulin ([address] 2018    Diabetes ([address]        Past Surgical History:   Procedure Laterality Date    HX ARTERIAL BYPASS      HX OTHER SURGICAL      5th toe Metatarsal amputation 2017     IR INSERT NON TUNL CVC OVER 5 YRS  2021    IR INSERT TUNL CVC W/O PORT OVER 5 YR  2021         Family History:   Problem Relation Age of Onset    Diabetes Mother     No Known Problems Father        Social History     Socioeconomic History    Marital status:      Spouse name: Not on file    Number of children: Not on file    Years of education: Not on file    Highest education level: Not on file   Occupational History    Not on file   Tobacco Use    Smoking status: Former Smoker     Quit date: 2001     Years since quittin.7    Smokeless tobacco: Never Used   Substance and Sexual Activity    Alcohol use: No    Drug use: No    Sexual activity: Yes     Partners: Female     Birth control/protection: Condom   Other Topics Concern    Not on file   Social History Narrative    Not on file     Social Determinants of Health     Financial Resource Strain:     Difficulty of Paying Living Expenses: Not on file   Food Insecurity:     Worried About [facility] in the Last Year: Not on file    Ran Out of Food in the Last Year: Not on file   Transportation Needs:     Lack of Transportation (Medical): Not on file    Lack of Transportation (Non-Medical): Not on file   Physical Activity:     Days of Exercise per Week: Not on file    Minutes of Exercise per Session: Not on file   Stress:     Feeling of Stress : Not on file   Social Connections:     Frequency of Communication with Friends and Family: Not on file    Frequency of Social Gatherings with Friends and Family: Not on file    Attends Sabianism Services: Not on file    Active Member of [address] [place] [address] Virtual DBS or Organizations: Not on file    Attends Club or Organization Meetings: Not on file    Marital Status: Not on file   Intimate Partner Violence:     Fear of Current or Ex-Partner: Not on file    Emotionally Abused: Not on file    Physically Abused: Not on file    Sexually Abused: Not on file   Housing Stability:     Unable to Pay for Housing in the Last Year: Not on file    Number of [place] in the Last Year: Not on file    Unstable Housing in the Last Year: Not on file         ALLERGIES: Patient has no known allergies. Review of Systems   Constitutional: Negative for fever. HENT: Negative for rhinorrhea. Respiratory: Positive for shortness of breath. Negative for cough. Cardiovascular: Negative for chest pain. Gastrointestinal: Positive for abdominal pain. Genitourinary: Negative for dysuria. Musculoskeletal: Negative for back pain. Skin: Negative for wound. Neurological: Negative for headaches. Psychiatric/Behavioral: Negative for confusion. There were no vitals filed for this visit. Physical Exam  Vitals and nursing note reviewed. Constitutional:       General: He is not in acute distress. Appearance: Normal appearance. He is well-developed. He is not ill-appearing, toxic-appearing or diaphoretic. HENT:      Head: Normocephalic.    Eyes:      Extraocular Movements: Extraocular movements intact. Cardiovascular:      Rate and Rhythm: Normal rate. Pulses: Normal pulses. Heart sounds: Normal heart sounds. Pulmonary:      Effort: Pulmonary effort is normal. No respiratory distress. Breath sounds: Normal breath sounds. Abdominal:      General: Abdomen is flat. There is no distension. Palpations: Abdomen is soft. Tenderness: There is generalized abdominal tenderness. Musculoskeletal:         General: Normal range of motion. Cervical back: Normal range of motion. Skin:     General: Skin is dry. Capillary Refill: Capillary refill takes less than 2 seconds. Neurological:      General: No focal deficit present. Mental Status: He is alert and oriented to person, place, and time. Psychiatric:         Mood and Affect: Mood normal.          MDM  Number of Diagnoses or Management Options  Abdominal tenderness, rebound tenderness presence not specified, unspecified location  Diagnosis management comments:     Patient presents with abdominal pain. Differentials include but not limited to gastroenteritis, abdominal aortic aneurysm, bowel obstruction, urinary tract infection among others. CT was obtained and shows bowel edema consistent with third spacing. There is no other acute pathology noted in the abdomen or pelvis. Labs are consistent with patient's known renal dysfunction and are otherwise unremarkable. Upon reevaluation, the patient continues to complain of severe abdominal pain and states that it feels like someone is stabbing him in the abdomen with a knife and that there is a heartbeat in his abdomen. Given this description I will obtain a CTA of the chest abdomen pelvis to rule out dissection. Patient will be given IV labetalol for blood pressure control    EKG shows sinus rhythm at a rate of 79, normal intervals, normal axis, no ischemic changes    10:12 PM  Change of shift. Care of patient signed over to Dr. Jorie Snellen.   Bedside handoff complete. Awaiting CTA.           Amount and/or Complexity of Data Reviewed  Clinical lab tests: ordered and reviewed  Tests in the radiology section of CPT®: ordered and reviewed           Procedures

## 2022-02-06 NOTE — PROGRESS NOTES
Spoke with nurse about pt c/o abd pain and metallic taste in mouth.  Will give additional GI cocktail    Dimitri Bragg MD

## 2022-02-06 NOTE — PROGRESS NOTES
Senior Resident Admission Note     CC: Abd pain    HPI:  Kip Wallace is a 64 y.o. male w/ a PMHX significant for erosive gastritis, HTN, ESRD on HD MWF, HFpEF who presents to the ER complaining of worsening chronic epigastric abd pain for last 3 days worsened by eating. Does not radiate or worsen with exertion. +Postprandial nausea. Diarrhea earlier in the week has resolved. Denies CP, SOB, dizziness, edema, vision changes, weakness. Missed dialysis on Friday. Chart reviewed. Patient seen, examined, and discussed with Dr. Nam Guzmán (PGY-1). See H&P for more details. A/P: Admit to tele. Code status: Full Code. 1.  HTN urgency: POA /100. No sx of end organ dysfnx. Neuro exam wnl.    - Decrease BP slowly, max 25-30% in first several hours   - Restart home meds: clonidine 0.2mg TID, eplerenone 25mg daily, Imdur 120mg daily, Toprol XL 50mg daily, Terazosin 5mg BID   - Hydralazine 10mg IV q6H prn    - Expect BP to decrease with dialysis in AM  2. Epigastric pain: 2/2 erosive gastritis found on EGD (11/2021). TTP in epigastrium on PE. Non complaint with Protonix & Carafate rec'd after EGD.    - Protonix BID, Carafate   - GI cocktail  3. Non MI trop elevation: Likely 2/2 ESRD. POA Trop 32. EKG with no acute ischemic changes. No CP or SOB. - Trend trop  4. ESRD on HD: Missed HD on Friday. CT c/a/p with diffuse subcutaneous edema, mild free fluid, and mesenteric edema, compatible with third spacing. Bilateral pleural effusions and atelectasis also present.    - Nephrology consulted: HD in AM      I agree with remaining assessment and plan as documented in Dr. Em Carpenter Full H&P. Pt discussed with Dr. Adrian Wyman (on-call attending physician).     Hollie Cary MD  Family Medicine Resident, PGY-2

## 2022-02-06 NOTE — PROGRESS NOTES
CARE MANAGEMENT INITIAL ASSESSEMENT      NAME:   Farideh Persaud   :     1960   MRN:     [id number]       Emergency Contact:  Extended Emergency Contact Information  Primary Emergency Contact: Corwin Fields  Mobile Phone: [phone]  Relation: Daughter  Secondary Emergency Contact: Mone  Mobile Phone: [phone]  Relation: Girlfriend    Advance Directive:  Full Code, does not have an advance directive. Healthcare Decision Maker:   Bharti Smith- [facility]- [phone]    Reason for Admission:  Mr. Dennis Joiner is a 64 y.o. male with history that includes depression, DM, CABG, HTN and ESRD  who was emergently admitted for:  abdominal pain    Patient Active Problem List   Diagnosis Code    Essential hypertension I10    Reactive depression F32.9    Peripheral neuropathy G62.9    Type 2 diabetes mellitus with ophthalmic complication, with long-term current use of insulin ([facility] E11.39, Z79.4    Type 2 diabetes with nephropathy ([facility] E11.21    Type 2 diabetes mellitus with diabetic neuropathy ([facility]) E11.40    CHF exacerbation ([facility]) I50.9    Severe protein-calorie malnutrition ([facility] E43    Anasarca R60.1    Abdominal pain R10.9    ESRD (end stage renal disease) on dialysis ([facility] N18.6, Z99.2    Heart failure with preserved ejection fraction ([facility]) I50.30    Elevated troponin R77.8    Anemia D64.9    Thrombocytopenia ([facility]) D69.6    Epigastric pain R10.13    Hypertensive urgency I16.0    Obesity E66.9       Assessment: In person with patient. RUR:  19%  Risk Level: Moderate  Value-based purchasing:   No  Bundle patient:  No    Residency:  Private residence  Exterior Steps:  3  Interior Steps:  None    Lives With:  Spouse/Significant Other and Adult children Koleen Alford    Prior functioning:  Independent.   Patient requires assistance with:  N/A    Prior DME required:  [employer] Home O2 (2L/Provider Guille)    DME available:  Same as above    Rehab history:  None    Discharge Concerns/Barriers Identified:  None      Insurer:  Payor: Torie Ron / Plan: Alejandro Rasmussen / Product Type: Managed Care Medicaid /     PCP: Jerry Nolen MD   Name of Practice:  [facility]   Current patient: Yes   Approximate date of last visit: 12/6/21   Access to virtual PCP visits:  Yes    Pharmacy:  [facility]   Financial/Difficulty affording medications:  None identified    COVID-19 vaccination status:  Fully vaccinated in July. DC Transport:  Family      Transition of care plan:  Home with outpatient follow-up     Comments:   Pt admitted on 2/6/22 for abdominal pain. CM met with Pt to complete initial assessment. Pt states that he lives at home with his spouse and family. Pt has hx of HH. Pt has home O2 at night through [address] (he thinks). Pt has a cane that he uses outside the house. Pt is independent with ADLs. Pt denies problems with ADLs. Pt [facility]. Pt has a MWF at 11am. Pt reports that his family has been transporting him. Pt states that his son is no longer able in the mornings but his dialysis social worker is working on arranging morning transport. His wife will still transport him in the afternoon. Discharge plan is for Pt to return home. Pt states family will transport him home.   _____________________________________  Trona Rosalio, [facility] Management  2/6/2022   4:19 PM      Care Management Interventions  PCP Verified by CM: Yes Marie Banks MD)  Mode of Transport at Discharge:  Other (see comment) (family)  Transition of Care Consult (CM Consult): Discharge Planning  MyChart Signup: No  Discharge Durable Medical Equipment: No  Physical Therapy Consult: Yes  Occupational Therapy Consult: No  Speech Therapy Consult: No  Support Systems: Spouse/Significant Other,Child(fidelia)  Confirm Follow Up Transport: Family  Discharge Location  Patient Expects to be Discharged to[de-identified] Home with outpatient services

## 2022-02-06 NOTE — CONSULTS
NEPHROLOGY CONSULT NOTE     Patient: Dudley Renee MRN: [id number]  PCP: Carrington Carbone MD   :     1960  Age:   64 y.o. Sex:  male      Referring physician: Vilma Ta MD  Reason for consultation: 64 y.o. male with ESRD on HD  Admission Date: 2022  7:28 PM  LOS: 0 days      ASSESSMENT and PLAN :   ESRD on HD  -Dialyzes MWF at [facility], follows with Dr. Raquel Best  -Secondary to progressive diabetic nephropathy, superimposed ATN from PEA/respiratory arrest in November. CKD progressed to ESRD now  -Missed dialysis Friday due to abdominal pain  -No indication for urgent hemodialysis at this time, not requiring supplemental oxygen, no electrolyte derangement. Received contrast load in the emergency department  -Will arrange for hemodialysis in the morning. HD orders placed, [facility]  notified  -Right permacath    Hypertension  -Above goal at present  -Resume home antihypertensive regimen, as needed IV antihypertensives  -Hemodialysis as above    Anemia of CKD  -RANDY deferred due to hypertensive urgency    Abdominal pain  -Evaluation by the emergency department in progress  -CT abdomen pelvis with contrast formed in the emergency department, awaiting results    CAD status post CABG May 2020  HFpEF  -Last echo preserved LVEF, moderate pulmonary hypertension      Care Plan discussed with: ER physician, ER nurse, [facility]  notified        Thank you for consulting [facility] in the care of your patient. Subjective:   HPI: Dudley Renee is a 64 y.o.  male with past medical history of diabetes, ESRD on HD MWF, hypertension who presented to the emergency department with chief complaint of diffuse abdominal pain beginning Wednesday. Associated diarrhea that has now resolved, nausea without vomiting. Missed hemodialysis on Friday due to abdominal pain. He is being evaluated emergency department, CT abdomen pelvis with contrast in progress.   Nephrology is consulted for management of ESRD on hemodialysis  -Dialyzes Monday Wednesday Friday at [facility], Dr. Hernesto Fox  -Hemodialysis started in November of last year, progression of diabetic nephropathy, status post ATN from PEA/respiratory arrest.  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 He has never used smokeless tobacco. He reports that he does not drink alcohol and does not use drugs. Family History   Problem Relation Age of Onset    Diabetes Mother     No Known Problems Father        Review of Systems   Constitutional: Negative for chills, fever and malaise/fatigue. HENT: Negative for congestion and sore throat. Respiratory: Negative for cough, shortness of breath and wheezing. Cardiovascular: Negative for chest pain, palpitations, orthopnea and leg swelling. Gastrointestinal: Positive for abdominal pain and nausea. Negative for blood in stool, diarrhea, heartburn and vomiting. Genitourinary: Negative for dysuria, flank pain, frequency, hematuria and urgency. Neurological: Negative for dizziness, weakness and headaches. Objective:      I&O's:  No intake/output data recorded. Visit Vitals  BP (!) 200/82   Pulse 81   Temp 98 °F (36.7 °C)   Resp 18   Wt 64.9 kg (143 lb)   SpO2 93%   BMI 21.12 kg/m²       Physical Exam  Vitals and nursing note reviewed. Constitutional:       General: He is not in acute distress. Appearance: He is well-developed. He is not diaphoretic. Comments: Mild pain distress   HENT:      Head: Normocephalic. Eyes:      General:         Right eye: No discharge. Left eye: No discharge. Extraocular Movements: Extraocular movements intact. Cardiovascular:      Rate and Rhythm: Normal rate and regular rhythm. Heart sounds: Normal heart sounds. No murmur heard. No friction rub. No gallop. Pulmonary:      Effort: Pulmonary effort is normal. No respiratory distress. Breath sounds: Normal breath sounds. No wheezing or rales. Abdominal:      General: Bowel sounds are normal.      Palpations: Abdomen is soft. Tenderness: There is abdominal tenderness. Genitourinary:     Comments: No fam  Musculoskeletal:      Right lower leg: No edema. Left lower leg: No edema. Skin:     General: Skin is warm and dry. Capillary Refill: Capillary refill takes less than 2 seconds. Coloration: Skin is not pale. Findings: No erythema or rash. Comments: Right PC   Neurological:      Mental Status: He is alert and oriented to person, place, and time. Psychiatric:         Behavior: Behavior normal.         Thought Content:  Thought content normal.         Judgment: Judgment normal.         Laboratory Results:    Recent Labs     02/05/22 2003      K 3.6      CO2 24   *   BUN 40*   CREA 2.08*   CA 8.3*   ALB 2.6*   ALT 22     Recent Labs     02/05/22 2003   WBC 7.4   HGB 10.2*   HCT 30.6*   PLT 99*     Lab Results   Component Value Date/Time    Color YELLOW/STRAW 02/05/2022 09:34 PM    Appearance CLEAR 02/05/2022 09:34 PM    Specific gravity 1.017 02/05/2022 09:34 PM    pH (UA) 7.5 02/05/2022 09:34 PM    Protein >300 (A) 02/05/2022 09:34 PM    Glucose 250 (A) 02/05/2022 09:34 PM    Ketone Negative 02/05/2022 09:34 PM    Bilirubin Negative 02/05/2022 09:34 PM    Urobilinogen 1.0 02/05/2022 09:34 PM    Nitrites Negative 02/05/2022 09:34 PM    Leukocyte Esterase Negative 02/05/2022 09:34 PM    Epithelial cells FEW 02/05/2022 09:34 PM    Bacteria Negative 02/05/2022 09:34 PM    WBC 0-4 02/05/2022 09:34 PM    RBC 20-50 02/05/2022 09:34 PM     Recent Results (from the past 24 hour(s))   CBC WITH AUTOMATED DIFF    Collection Time: 02/05/22  8:03 PM   Result Value Ref Range    WBC 7.4 4.1 - 11.1 K/uL    RBC 3.67 (L) 4.10 - 5.70 M/uL    HGB 10.2 (L) 12.1 - 17.0 g/dL    HCT 30.6 (L) 36.6 - 50.3 %    MCV 83.4 80.0 - 99.0 FL    MCH 27.8 26.0 - 34.0 PG    MCHC 33.3 30.0 - 36.5 g/dL    RDW 15.0 (H) 11.5 - 14.5 %    PLATELET 99 (L) 732 - 400 K/uL    MPV 9.0 8.9 - 12.9 FL    NRBC 0.0 0  WBC    ABSOLUTE NRBC 0.00 0.00 - 0.01 K/uL    NEUTROPHILS 77 (H) 32 - 75 %    LYMPHOCYTES 14 12 - 49 %    MONOCYTES 7 5 - 13 %    EOSINOPHILS 2 0 - 7 %    BASOPHILS 0 0 - 1 %    IMMATURE GRANULOCYTES 1 (H) 0.0 - 0.5 %    ABS. NEUTROPHILS 5.7 1.8 - 8.0 K/UL    ABS. LYMPHOCYTES 1.0 0.8 - 3.5 K/UL    ABS. MONOCYTES 0.5 0.0 - 1.0 K/UL    ABS. EOSINOPHILS 0.1 0.0 - 0.4 K/UL    ABS. BASOPHILS 0.0 0.0 - 0.1 K/UL    ABS. IMM.  GRANS. 0.0 0.00 - 0.04 K/UL    DF AUTOMATED     METABOLIC PANEL, COMPREHENSIVE    Collection Time: 02/05/22  8:03 PM   Result Value Ref Range    Sodium 138 136 - 145 mmol/L    Potassium 3.6 3.5 - 5.1 mmol/L    Chloride 107 97 - 108 mmol/L    CO2 24 21 - 32 mmol/L    Anion gap 7 5 - 15 mmol/L    Glucose 114 (H) 65 - 100 mg/dL    BUN 40 (H) 6 - 20 MG/DL    Creatinine 2.08 (H) 0.70 - 1.30 MG/DL    BUN/Creatinine ratio 19 12 - 20      GFR est AA 40 (L) >60 ml/min/1.73m2    GFR est non-AA 33 (L) >60 ml/min/1.73m2    Calcium 8.3 (L) 8.5 - 10.1 MG/DL    Bilirubin, total 0.8 0.2 - 1.0 MG/DL    ALT (SGPT) 22 12 - 78 U/L    AST (SGOT) 25 15 - 37 U/L    Alk. phosphatase 166 (H) 45 - 117 U/L    Protein, total 6.4 6.4 - 8.2 g/dL    Albumin 2.6 (L) 3.5 - 5.0 g/dL    Globulin 3.8 2.0 - 4.0 g/dL    A-G Ratio 0.7 (L) 1.1 - 2.2     LIPASE    Collection Time: 02/05/22  8:03 PM   Result Value Ref Range    Lipase 70 (L) 73 - 393 U/L   LACTIC ACID    Collection Time: 02/05/22  8:03 PM   Result Value Ref Range    Lactic acid 0.6 0.4 - 2.0 MMOL/L   TROPONIN-HIGH SENSITIVITY    Collection Time: 02/05/22  8:03 PM   Result Value Ref Range    Troponin-High Sensitivity 32 0 - 76 ng/L   URINALYSIS W/MICROSCOPIC    Collection Time: 02/05/22  9:34 PM   Result Value Ref Range    Color YELLOW/STRAW      Appearance CLEAR CLEAR      Specific gravity 1.017 1.003 - 1.030      pH (UA) 7.5 5.0 - 8.0      Protein >300 (A) NEG mg/dL    Glucose 250 (A) NEG mg/dL    Ketone Negative NEG mg/dL    Bilirubin Negative NEG      Blood MODERATE (A) NEG      Urobilinogen 1.0 0.2 - 1.0 EU/dL    Nitrites Negative NEG      Leukocyte Esterase Negative NEG      WBC 0-4 0 - 4 /hpf    RBC 20-50 0 - 5 /hpf    Epithelial cells FEW FEW /lpf    Bacteria Negative NEG /hpf    Hyaline cast 0-2 0 - 5 /lpf   URINE CULTURE HOLD SAMPLE    Collection Time: 02/05/22  9:34 PM    Specimen: Serum; Urine   Result Value Ref Range    Urine culture hold        Urine on hold in Microbiology dept for 2 days. If unpreserved urine is submitted, it cannot be used for addtional testing after 24 hours, recollection will be required. I have reviewed the following all pertinent labs, microbiology data, radiology imaging, and home medications for my assessment     We will follow patient. Please dont hesitate to call with any questions.     Harry Dong NP, MARY  [facility]      [address]  Phone: [phone]     [phone] [address] [facility] [address], [address] [facility]  Phone: [phone]     [phone]   [facility]  [address] [place], [address] [facility],[address] [place] [address]  Phone: [phone]     [phone]   [address] [place] [address],[address] [facility], [address] [place] [address]  Phone: [phone]     [phone]

## 2022-02-06 NOTE — ED NOTES
Patient's abdominal pain level 5/10, patient requesting more pain medication, Dr Ree De Luna aware, no new orders at this time

## 2022-02-06 NOTE — PROGRESS NOTES
NEPHROLOGY PROGRESS NOTE         Rosan Dubin                   DQN:[id number]   :1960    Chief complaints: f/u [facility] HD MWF    Subjective: c/o abdominal pain    24 hr interval history: BP elevated and good UOP      Objective:  Vitals:    22 0930 22 0945 22 1200 22 1300   BP: (!) 178/73 (!) 184/75 (!) 186/79 (!) 184/71   Pulse: 69 69 69 68   Resp: 13 14 16 17   Temp:       SpO2: 96% 95%  93%   Weight:           Intake/Output Summary (Last 24 hours) at 2022 1430  Last data filed at 2022 1304  Gross per 24 hour   Intake 240 ml   Output 1800 ml   Net -1560 ml       PHYSICAL EXAM:  GENERAL : Lying down in bed with no acute distress  HEENT: AT NC PEERLA   NECK: Supple no JVP  CVS: S1 S2 RRR, no murmur or gallops heard  RS: diminished BS +  ABDOMEN: soft NT ND positive BS  EXTREMITY: Trace edema no clubbing or cyanosis, pedal pulse +  NEUROLOGY: AAA X3, no focal deficit or asterixis  VASCULAR ACCESS: Perm cath    Labs:  CBC:    Lab Results   Component Value Date/Time    WBC 6.4 2022 04:46 AM    HGB 9.2 (L) 2022 04:46 AM    HCT 28.3 (L) 2022 04:46 AM    PLT 88 (L) 2022 04:46 AM     BMP:   Lab Results   Component Value Date/Time     2022 04:46 AM    K 3.4 (L) 2022 04:46 AM     2022 04:46 AM    CO2 25 2022 04:46 AM    AGAP 6 2022 04:46 AM     (H) 2022 04:46 AM    BUN 39 (H) 2022 04:46 AM    CREA 2.14 (H) 2022 04:46 AM    GFRAA 38 (L) 2022 04:46 AM    GFRNA 32 (L) 2022 04:46 AM        Lab Results   Component Value Date/Time    Color YELLOW/STRAW 2022 09:34 PM    Appearance CLEAR 2022 09:34 PM    Specific gravity 1.017 2022 09:34 PM    pH (UA) 7.5 2022 09:34 PM    Protein >300 (A) 2022 09:34 PM    Glucose 250 (A) 2022 09:34 PM    Ketone Negative 2022 09:34 PM    Bilirubin Negative 2022 09:34 PM    Urobilinogen 1.0 2022 09:34 PM Nitrites Negative 02/05/2022 09:34 PM    Leukocyte Esterase Negative 02/05/2022 09:34 PM    Epithelial cells FEW 02/05/2022 09:34 PM    Bacteria Negative 02/05/2022 09:34 PM    WBC 0-4 02/05/2022 09:34 PM    RBC 20-50 02/05/2022 09:34 PM       Imaging study:    Assessment &Plan    [facility] HD MWF  HTN  Volume overload  Anemia of CKD    -Plan for HD today and will attempt net UF:4 kg  -c/w anti hypertensive med's  -order placed in computer and [facility] notified        Care Plan discussed with:   Medical Team    Ольга Perea MD  2/6/2022    [facility]:  [email or web address]  [place] office:  [address] [place] [address], [address] [place] [address],[address]  [address]  Phone: [phone]  Fax :     [phone]     [place] office:  [address] [facility], [address] [place] [address]  Phone - [phone]  Fax - [phone]

## 2022-02-06 NOTE — ED NOTES
10:25  Pt signed out to me by Dr. Iona Florez pending CTA and reevaluation. 11:10 PM  Discussed with nephrology NP, Johan Bal. Advised pt will be getting CT with contrast and will require dialysis after. No emergent need for dialysis at this time. Will arrange for dialysis in the hospital tomorrow. Perfect Serve Consult for Admission  12:11 AM    ED Room Number: ER07/07  Patient Name and age:  Aniya Guerrero 64 y.o.  male  Working Diagnosis:   1. Abdominal tenderness, rebound tenderness presence not specified, unspecified location    2. Anasarca    3.  ESRD (end stage renal disease) on dialysis ([facility] [address]        COVID-19 Suspicion:  no  Sepsis present:  no  Reassessment needed: no  Code Status:  Full Code  Readmission: no  Isolation Requirements:  no  Recommended Level of Care:  telemetry  Department:[facility] ED - [phone]

## 2022-02-06 NOTE — PROGRESS NOTES
Physical Therapy Note:    09:49 Orders acknowledged, chart reviewed, discussed with RN. PT evaluation deferred. Pt with HTN, most recently 184/75 at rest per RN verbal report and not yet due to for PRN BP medication. HD not yet initiated and per chart BP is expected to decrease with HD. Pt not yet appropriate for PT interventions. 16:15 Pt undergoing hemodialysis and not available to participate in PT evaluation. Will continue to follow and proceed with PT evaluation when appropriate.     Campos Fulton, PT, DPT, [place]

## 2022-02-06 NOTE — PROCEDURES
Hemodialysis / [phone]    Vitals Pre Post Assessment Pre Post   BP BP: (!) 188/73 (02/06/22 1500) 203/80 LOC Alert and oriented x 4 No change   HR Pulse (Heart Rate): 66 (02/06/22 1500) 68 Lungs Clear, on room air No change   Resp Resp Rate: 13 (02/06/22 1500) 26 Cardiac IIrregular No change   Temp Temp: 98 °F (36.7 °C) (02/06/22 1500) 98.4 Skin Dry and intact No chage   Weight  n/a n/a Edema none No change   Tele status Bedside Bedside Pain Pain Intensity 1: 10 (02/05/22 1921)      Orders   Duration: Start: 1500 End: 1830 Total: 3.5 hours   Dialyzer: Dialyzer/Set Up Inspection: Revaclear (02/06/22 1500)   K Bath: Dialysate K (mEq/L): 3 (02/06/22 1500)   Ca Bath: Dialysate CA (mEq/L): 2.5 (02/06/22 1500)   Na: Dialysate NA (mEq/L): 138 (02/06/22 1500)   Bicarb: Dialysate HCO3 (mEq/L): 38 (02/06/22 1500)   Target Fluid Removal: Goal/Amount of Fluid to Remove (mL): 2000 mL (02/06/22 1500)     Access   Type & Location: RIJ cvc tunneled, no s/s of infection, flushed and aspirated well, Each catheter limb disinfected per p&p, caps removed, hubs disinfected per p&p.        Comments:                                        Labs   HBsAg (Antigen) / date: Negative 1/17/2022                                              HBsAb (Antibody) / date: UNK   Source: Portal   Obtained/Reviewed  Critical Results Called HGB   Date Value Ref Range Status   02/06/2022 9.2 (L) 12.1 - 17.0 g/dL Final     Potassium   Date Value Ref Range Status   02/06/2022 3.4 (L) 3.5 - 5.1 mmol/L Final     Calcium   Date Value Ref Range Status   02/06/2022 8.1 (L) 8.5 - 10.1 MG/DL Final     BUN   Date Value Ref Range Status   02/06/2022 39 (H) 6 - 20 MG/DL Final     Creatinine   Date Value Ref Range Status   02/06/2022 2.14 (H) 0.70 - 1.30 MG/DL Final        Meds Given   Name Dose Route        none            Adequacy / Fluid    Total Liters Process: 78.9   Net Fluid Removed: 2000 ml      Comments   Time Out Done:   (Time) Yes 6324   Admitting Diagnosis: Abdominal pain   Consent obtained/signed: Informed Consent Verified: Yes (02/06/22 1500)   Machine / RO # Machine Number: [id number] (02/06/22 1500)   Primary Nurse Rpt Pre: Sukhi Maurer Rn   Primary Nurse Rpt Post: Sukhi Maurer RN   Pt Education: PRocedural   Care Plan: Continue scheduled HD   Pts outpatient clinic: [facility]     Tx Summary   Comments:   1500-Labs, order and medications were reviewed. Sbar was given by primary nurse. HD was initiated using RIJ cvc with no issues    1600-Pt tolerating HD well, no complaints    1830-HD was completed and tolerated well. All possible blood was returned without any issues. Each dialysis catheter limb disinfected per p&p, blood returned per p&p, each dialysis hub disinfected per p&p, post dialysis catheter dwell instilled per order, and caps applied. The primary nurse was given a handoff report. Pt remained stable but still hypertensive post HD.

## 2022-02-06 NOTE — ED NOTES
Called and spoke with Dr Jose Alfredo Oreilly regarding Crestor and Trazodone medication order. Also regarding patient request for pain medication. Will change order and order pain medication.

## 2022-02-06 NOTE — ED TRIAGE NOTES
Pt. States  Having abd pain for the past few days, states vomiting, diarrhea, states unable to hold anything down. Pt. Dialysis, MWF. Missed yesterday.

## 2022-02-06 NOTE — H&P
[address] [facility] [address] [place] [address]   Office [phone]  Fax [phone]       Admission H&P     Name: Aniya Guerrero MRN: [id number]  Sex: Male   YOB: 1960  Age: 64 y.o. PCP: Yessenia Bajwa MD     Source of Information: patient, medical records    Chief complaint: abdominal pain     History of Present Illness  Aniya Guerrero is a 64 y.o. male with known ESRD on HD, HTN, HFpEF, T2DM, CAD s/p CABG (May 2020), h/o PEA arrest, erosive gastritis, depression who presents to the ER complaining of worsening epigastric abdominal pain onset three days ago. Patient is a poor historian. States this pain is chronic and intermittent. Pain exacerbated by eating and described as sharp. Does not radiate or worsen with exertion. Also endorses postprandial n/v and diarrhea. Went to [facility] Friday for symptoms, was prescribed pain medication and sent home. Has used Maalox and TUMS without relief. Denies fevers, cough, chest pain, shortness of breath, congestion, blood in stool, lower extremity edema, or dizziness. No new foods. No sick contacts. Thinks may have had EGD but unsure what results were. Does have h/o heavy alcohol use but quit 6-7 years ago. Patient missed dialysis yesterday due to acute pain. Reports being compliant with home BP medication and vomited them yesterday. Has been vaccinated for COVID (J&J). In the ER, vital signs were remarkable for 98F, 224/99, RR 18, 98% RA. Labs were remarkable for WBC 7.4, Hgb 10.2, Plt 99. Na 138, K 3.6, Cl 107, HCO3 24, BUN 40, Cr 2.08 (bl 2.5), ALT 22, AST 25, . Trop 35. LA 0.6. Ca 8.3 (corrected 9.4). Albumin 2.6. CTA c/a/p showed moderate bilateral pleural effusions with underlying atelectasis and minimal pulmonary edema. Mild ascites, mesenteric edema, and subcutaneous edema c/w third spacing. No evidence of aortic dissection or aneurysm.  Pt was treated with S/p Hydral 20mg, Labetalol 20mg, dilaudid 0.5mg, morphine 4mg, and zofran 4mg in the ED. 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, with long-term current use of insulin ([facility] 11/14/2018    Diabetes ([facility]       Patient Vitals for the past 12 hrs:   Temp Pulse Resp BP SpO2   02/06/22 0046 -- 79 -- (!) 184/81 --   02/06/22 0030 -- -- -- (!) 194/64 93 %   02/06/22 0000 -- -- -- (!) 203/62 96 %   02/05/22 2330 -- -- -- (!) 200/82 93 %   02/05/22 2252 -- 81 -- (!) 217/74 --   02/05/22 2236 -- 81 -- (!) 221/71 99 %   02/05/22 2216 -- 80 -- (!) 235/74 94 %   02/05/22 2149 -- -- -- (!) 235/98 95 %   02/05/22 2049 -- -- -- (!) 243/100 95 %   02/05/22 2019 -- -- -- -- 99 %   02/05/22 1921 98 °F (36.7 °C) 81 18 (!) 224/99 98 %       Home Medications   Prior to Admission medications    Medication Sig Start Date End Date Taking? Authorizing Provider   cloNIDine HCL (CATAPRES) 0.3 mg tablet Take 0.2 mg by mouth three (3) times daily. 8/17/21  Yes Provider, Historical   eplerenone (Inspra) 25 mg tablet Take 25 mg by mouth daily. Yes Provider, Historical   isosorbide mononitrate ER (IMDUR) 120 mg CR tablet Take 120 mg by mouth every morning. Yes Provider, Historical   terazosin (HYTRIN) 5 mg capsule Take 5 mg by mouth two (2) times a day. Yes Provider, Historical   dicyclomine (BENTYL) 20 mg tablet Take 1 Tablet by mouth every six (6) hours as needed for Abdominal Cramps or Pain for up to 5 days. 2/5/22 2/10/22 Yes Merly Mccord MD   bumetanide (BUMEX) 1 mg tablet Take 1 mg by mouth two (2) times a day. 10/31/21   Provider, Historical   losartan (COZAAR) 50 mg tablet Take 100 mg by mouth daily. 10/30/21 2/6/22  Provider, Historical   NIFEdipine ER (PROCARDIA XL) 60 mg ER tablet Take 120 mg by mouth daily. 8/24/21 2/6/22  Provider, Historical   terazosin (HYTRIN) 5 mg capsule Take 5 mg by mouth two (2) times a day. 2/6/22  Provider, Historical   doxazosin (CARDURA) 4 mg tablet Take 4 mg by mouth two (2) times a day.   2/6/22 Provider, Historical   glucose blood VI test strips (ASCENSIA AUTODISC VI, ONE TOUCH ULTRA TEST VI) strip E11.65 test fasting glucose in the morning and before meals as needed 12/6/21   Dominga Wynne NP   busPIRone (BUSPAR) 10 mg tablet Take 10 mg by mouth two (2) times a day. Provider, Historical   sertraline (ZOLOFT) 100 mg tablet Take 2 Tabs by mouth daily. 8/7/20   Dominga Wynne NP   metoprolol succinate (TOPROL-XL) 50 mg XL tablet Take 1 Tab by mouth daily. 7/1/20   Dominga Wynne NP   traZODone (DESYREL) 50 mg tablet Take 1 Tab by mouth nightly. 7/1/20   Dominga Wynne NP   insulin syringe-needle U-100 (BD Insulin Syringe Ultra-Fine) 0.3 mL 31 gauge x 15/64\" syrg 1 Units by Does Not Apply route three (3) times daily. 5/13/20   Dominga Wynne NP   glipiZIDE (GLUCOTROL) 5 mg tablet Take 2 Tabs by mouth daily. 8/5/19   Sudhir Houser NP   cetirizine (ZYRTEC) 5 mg tablet Take 1 Tab by mouth daily as needed for Itching. 3/21/19   Gt Adrian MD   metFORMIN (GLUCOPHAGE) 1,000 mg tablet Take 1 Tab by mouth two (2) times daily (with meals). 11/26/18   Gt Adrian MD   rosuvastatin (CRESTOR) 10 mg tablet Take 1 Tab by mouth nightly. 11/14/18   Gt Adrian MD   gabapentin (NEURONTIN) 300 mg capsule Take 1 Cap by mouth three (3) times daily.  11/14/18   Gt Adrian MD       Allergies  No Known Allergies    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, with long-term current use of insulin ([address] 11/14/2018    Diabetes ([address]        Past Surgical History:   Procedure Laterality Date    HX ARTERIAL BYPASS      HX OTHER SURGICAL      5th toe Metatarsal amputation 12/2017     IR INSERT NON TUNL CVC OVER 5 YRS  11/19/2021    IR INSERT TUNL CVC W/O PORT OVER 5 YR  11/24/2021       Family History   Problem Relation Age of Onset    Diabetes Mother     No Known Problems Father        Social History  Social History Socioeconomic History    Marital status:      Spouse name: Not on file    Number of children: Not on file    Years of education: Not on file    Highest education level: Not on file   Occupational History    Not on file   Tobacco Use    Smoking status: Former Smoker     Quit date: 2001     Years since quittin.7    Smokeless tobacco: Never Used   Substance and Sexual Activity    Alcohol use: No    Drug use: No    Sexual activity: Yes     Partners: Female     Birth control/protection: Condom   Other Topics Concern    Not on file   Social History Narrative    Not on file     Social Determinants of Health     Financial Resource Strain:     Difficulty of Paying Living Expenses: Not on file   Food Insecurity:     Worried About [address] in the Last Year: Not on file    Hardik of Food in the Last Year: Not on file   Transportation Needs:     Lack of Transportation (Medical): Not on file    Lack of Transportation (Non-Medical): Not on file   Physical Activity:     Days of Exercise per Week: Not on file    Minutes of Exercise per Session: Not on file   Stress:     Feeling of Stress : Not on file   Social Connections:     Frequency of Communication with Friends and Family: Not on file    Frequency of Social Gatherings with Friends and Family: Not on file    Attends Mu-ism Services: Not on file    Active Member of [address] [place] [address] [employer] or Organizations: Not on file    Attends Club or Organization Meetings: Not on file    Marital Status: Not on file   Intimate Partner Violence:     Fear of Current or Ex-Partner: Not on file    Emotionally Abused: Not on file    Physically Abused: Not on file    Sexually Abused: Not on file   Housing Stability:     Unable to Pay for Housing in the Last Year: Not on file    Number of [place] in the Last Year: Not on file    Unstable Housing in the Last Year: Not on file       Alcohol history: Former, quit 6-7 years ago.  Prior to that drank ~12 beers per day. Smoking history: Non-smoker  Illicit drug history: Not at all    Living arrangement: patient lives with their partner. Ambulates: Independently     Review of Systems   Constitutional: Negative for chills and fever. HENT: Negative for congestion and sore throat. Eyes: Negative for pain and discharge. Respiratory: Negative for cough and shortness of breath. Cardiovascular: Negative for chest pain and leg swelling. Gastrointestinal: Positive for abdominal pain, diarrhea, nausea and vomiting. Negative for blood in stool and constipation. Genitourinary: Negative for dysuria, frequency and urgency. Musculoskeletal: Negative for back pain and neck pain. Skin: Negative for itching and rash. Neurological: Negative for dizziness and headaches. All other systems reviewed and are negative. Physical Exam  Objective:  Vital signs: (most recent): Blood pressure (!) 184/81, pulse 79, temperature 98 °F (36.7 °C), resp. rate 18, weight 143 lb (64.9 kg), SpO2 93 %. No fever. O2 Device: None (Room air)     General:   Alert, cooperative, no acute distress   Head:   Atraumatic   Eyes:   Conjunctivae clear   ENT:  Oral mucosa normal   Neck:  Supple, trachea midline, no adenopathy   No JVD   Back:    No CVA tenderness    Lungs:   Clear to auscultation bilaterally    Heart:   Regular rhythm, no murmur   Abdomen:    Soft, has tenderness to palpation of the epigastrium, no rebound tenderness, or guarding    No obvious masses or organomegaly    Extremities:  No edema or DVT signs   Skin:  Warm and dry    No rashes or lesions   Neurologic:  Alert, Oriented. No focal deficits. CN 2-12 intact. No deficits in strength or sensation.           Laboratory Data  Recent Results (from the past 8 hour(s))   CBC WITH AUTOMATED DIFF    Collection Time: 02/05/22  8:03 PM   Result Value Ref Range    WBC 7.4 4.1 - 11.1 K/uL    RBC 3.67 (L) 4.10 - 5.70 M/uL    HGB 10.2 (L) 12.1 - 17.0 g/dL    HCT 30.6 (L) 36.6 - 50.3 %    MCV 83.4 80.0 - 99.0 FL    MCH 27.8 26.0 - 34.0 PG    MCHC 33.3 30.0 - 36.5 g/dL    RDW 15.0 (H) 11.5 - 14.5 %    PLATELET 99 (L) 403 - 400 K/uL    MPV 9.0 8.9 - 12.9 FL    NRBC 0.0 0  WBC    ABSOLUTE NRBC 0.00 0.00 - 0.01 K/uL    NEUTROPHILS 77 (H) 32 - 75 %    LYMPHOCYTES 14 12 - 49 %    MONOCYTES 7 5 - 13 %    EOSINOPHILS 2 0 - 7 %    BASOPHILS 0 0 - 1 %    IMMATURE GRANULOCYTES 1 (H) 0.0 - 0.5 %    ABS. NEUTROPHILS 5.7 1.8 - 8.0 K/UL    ABS. LYMPHOCYTES 1.0 0.8 - 3.5 K/UL    ABS. MONOCYTES 0.5 0.0 - 1.0 K/UL    ABS. EOSINOPHILS 0.1 0.0 - 0.4 K/UL    ABS. BASOPHILS 0.0 0.0 - 0.1 K/UL    ABS. IMM. GRANS. 0.0 0.00 - 0.04 K/UL    DF AUTOMATED     METABOLIC PANEL, COMPREHENSIVE    Collection Time: 02/05/22  8:03 PM   Result Value Ref Range    Sodium 138 136 - 145 mmol/L    Potassium 3.6 3.5 - 5.1 mmol/L    Chloride 107 97 - 108 mmol/L    CO2 24 21 - 32 mmol/L    Anion gap 7 5 - 15 mmol/L    Glucose 114 (H) 65 - 100 mg/dL    BUN 40 (H) 6 - 20 MG/DL    Creatinine 2.08 (H) 0.70 - 1.30 MG/DL    BUN/Creatinine ratio 19 12 - 20      GFR est AA 40 (L) >60 ml/min/1.73m2    GFR est non-AA 33 (L) >60 ml/min/1.73m2    Calcium 8.3 (L) 8.5 - 10.1 MG/DL    Bilirubin, total 0.8 0.2 - 1.0 MG/DL    ALT (SGPT) 22 12 - 78 U/L    AST (SGOT) 25 15 - 37 U/L    Alk.  phosphatase 166 (H) 45 - 117 U/L    Protein, total 6.4 6.4 - 8.2 g/dL    Albumin 2.6 (L) 3.5 - 5.0 g/dL    Globulin 3.8 2.0 - 4.0 g/dL    A-G Ratio 0.7 (L) 1.1 - 2.2     LIPASE    Collection Time: 02/05/22  8:03 PM   Result Value Ref Range    Lipase 70 (L) 73 - 393 U/L   LACTIC ACID    Collection Time: 02/05/22  8:03 PM   Result Value Ref Range    Lactic acid 0.6 0.4 - 2.0 MMOL/L   TROPONIN-HIGH SENSITIVITY    Collection Time: 02/05/22  8:03 PM   Result Value Ref Range    Troponin-High Sensitivity 32 0 - 76 ng/L   URINALYSIS W/MICROSCOPIC    Collection Time: 02/05/22  9:34 PM   Result Value Ref Range    Color YELLOW/STRAW      Appearance CLEAR CLEAR Specific gravity 1.017 1.003 - 1.030      pH (UA) 7.5 5.0 - 8.0      Protein >300 (A) NEG mg/dL    Glucose 250 (A) NEG mg/dL    Ketone Negative NEG mg/dL    Bilirubin Negative NEG      Blood MODERATE (A) NEG      Urobilinogen 1.0 0.2 - 1.0 EU/dL    Nitrites Negative NEG      Leukocyte Esterase Negative NEG      WBC 0-4 0 - 4 /hpf    RBC 20-50 0 - 5 /hpf    Epithelial cells FEW FEW /lpf    Bacteria Negative NEG /hpf    Hyaline cast 0-2 0 - 5 /lpf   URINE CULTURE HOLD SAMPLE    Collection Time: 02/05/22  9:34 PM    Specimen: Serum; Urine   Result Value Ref Range    Urine culture hold        Urine on hold in Microbiology dept for 2 days. If unpreserved urine is submitted, it cannot be used for addtional testing after 24 hours, recollection will be required. Imaging  CXR Results  (Last 48 hours)               02/05/22 1944  XR CHEST PORT Final result    Impression:  Double lumen catheter in place. Blunting left costophrenic angle. Narrative:  Clinical indication: Shortness of breath. Portable AP view of the chest obtained. Comparison November 23, 2021. A double   lumen catheter is in the right atrium. There is blunting of the left   costophrenic angle. The heart size is normal. There is no acute infiltrate. CT Results  (Last 48 hours)               02/05/22 2352  CTA CHEST ABD PELV W CONT Final result    Impression:  Moderate bilateral pleural effusions with underlying atelectasis and minimal   pulmonary edema. Mild ascites, mesenteric edema, and subcutaneous edema   compatible with third spacing of fluids. No evidence of aortic dissection or   aneurysm. Narrative: Indication: Abdominal pain, evaluate for dissection       2.5 mm axial thin sections were obtained from the lung apices to the pubic   symphysis following the rapid bolus administration of 100 cc Isovue-370. 3D post   processing was performed including MIP and volume rendered imaging.   CT dose   reduction was achieved through use of a standardized protocol tailored for this   examination and automatic exposure control for dose modulation. Comparison to noncontrast abdomen pelvis CT performed earlier the same day       FINDINGS:       THYROID: No nodule. MEDIASTINUM: No mass or lymphadenopathy. RENEE: No mass or lymphadenopathy. THORACIC AORTA: No dissection or aneurysm. PULMONARY ARTERIES: Main pulmonary artery is normal in caliber. No evidence of   acute pulmonary emboli. TRACHEA/BRONCHI: Patent. ESOPHAGUS: No wall thickening or dilatation. HEART: Normal in size. PLEURA: Moderate bilateral pleural effusions are noted. LUNGS: Bilateral lower lobe atelectasis. Minimal pulmonary edema. LIVER: No mass or biliary dilatation. GALLBLADDER: Unremarkable. SPLEEN: No mass. PANCREAS: No mass or ductal dilatation. ADRENALS: Unremarkable. KIDNEYS: No mass, calculus, or hydronephrosis. STOMACH: Unremarkable. SMALL BOWEL: No dilatation or wall thickening. COLON: No dilatation or wall thickening. APPENDIX: Within normal limits. PERITONEUM: Mild ascites and mesenteric edema. RETROPERITONEUM: The abdominal aorta is atherosclerotic without evidence of   aneurysm or dissection. Origins of the celiac axis, SMA, single renal arteries   bilaterally, and MIL are widely patent. REPRODUCTIVE ORGANS: There is no pelvic mass or lymphadenopathy. URINARY BLADDER: No mass or calculus. BONES: No destructive bone lesion. ADDITIONAL COMMENTS: Soft tissue edema is diffusely noted. 02/05/22 2032  CT ABD PELV WO CONT Final result    Impression:  Diffuse subcutaneous edema, mild free fluid, and mesenteric edema, compatible   with third spacing of fluids. Bilateral pleural effusions and underlying   atelectasis. No acute intra-abdominal abnormality.        Narrative:  EXAM: CT ABD PELV WO CONT       INDICATION: Abdominal pain and diarrhea       COMPARISON: 11/14/2021       IV CONTRAST: None. ORAL CONTRAST: None       TECHNIQUE:    Thin axial images were obtained through the abdomen and pelvis. Coronal and   sagittal reformats were generated. CT dose reduction was achieved through use of   a standardized protocol tailored for this examination and automatic exposure   control for dose modulation. The absence of intravenous contrast material reduces the sensitivity for   evaluation of the vasculature and solid organs. FINDINGS:    LOWER THORAX: Moderate bilateral pleural effusions and underlying atelectasis   again noted. LIVER: No mass. BILIARY TREE: Gallbladder is within normal limits. CBD is not dilated. SPLEEN: within normal limits. PANCREAS: No focal abnormality. ADRENALS: Unremarkable. KIDNEYS/URETERS: No calculus or hydronephrosis. STOMACH: Unremarkable. SMALL BOWEL: No dilatation or wall thickening. COLON: No dilatation or wall thickening. APPENDIX: Within normal limits. PERITONEUM: Mild free fluid and mesenteric edema. RETROPERITONEUM: No lymphadenopathy or aortic aneurysm. REPRODUCTIVE ORGANS: There is no pelvic mass or lymphadenopathy. URINARY BLADDER: No mass or calculus. BONES: No destructive bone lesion. ABDOMINAL WALL: Diffuse subcutaneous edema is noted. ADDITIONAL COMMENTS: N/A               EKG: normal sinus rhythm, rate 79, normal axis, normal intervals, no ischemic changes. QTc 451. Assessment and Plan     Durga Rios is a 64 y.o. male with h/o ESRD on HD, HTN, HFpEF, T2DM, CAD s/p CABG (May 2020), h/o PEA arrest, erosive gastritis, depression who is admitted for hypertensive urgency in s/o ESRD on HD and epigastric pain in s/o known erosive esophagitis. Hypertensive urgency: /99. Currently on BP regimen as noted below. Trop elevated likely secondary to renal disease. S/p Hydral 20mg, Labetalol 20mg, dilaudid 0.5mg, morphine 4mg, and zofran 4mg in the ED.  BP improving.  - Admit to telemetry   - VS per unit routine   - Restart home meds (clonidine 0.2mg TID, eplerenone 25mg daily, Imdur 120mg daily, Toprol XL 50mg daily, Terazosin 5mg BID)   - IV Hydral 10mg q6h prn   - daily cbc, cmp, mg, phos   - UDS    ESRD on HD: Permacath placed 11/24. Missed HD yesterday secondary to acute illness. Follows with Dr. Mansi Hair (Nepho). Receives HD MWF. CT c/a/p with diffuse subcutaneous edema, mild free fluid, and mesenteric edema, compatible with third spacing. Bilateral pleural effusions and atelectasis also present. - nephro consulted, appreciate rec's. Plan to receive HD in the morning. Epigastric pain in the setting of known erosive gastritis: Prior admission for GI bleed in 11/2021. 11/16/21 EGD w/ nonbleeding erosive esophagitis. Noncompliant with protonix and carafate. No evidence of active bleeding. S/p GI cocktail.   - Restart Protonix 40mg daily   - Carafate 1g BID   - Follow up with GI as outpatient     Concern for cirrhosis: past h/o heavy alcohol use, stopped 6-7 years ago. US 11/20/21 showing hepatic parenchymal disease with cirrhotic morphology, perihepatic ascites and right pleural effusion. Per chart review, had liver biopsy completed awaiting results. - following with Dr. Darrion Perez (hepatology)   - outpatient follow up with GI     Tropinemia: POA 7. Suspect secondary to poor kidney function. EKG w/ NSR, rate 79. No obvious ischemic changes. No chest pain or shortness of breath. - Trend trop     Diarrhea: Now resolved. Will rule out COVID.   - Rapid COVID pending     Anemia/thrombocytopenia: This is a chronic condition. POA Hgb 10.2 (bl ~7.5) and Plt 99 (bl ~90). Likely secondary to ESRD.   - daily cbc   - iron profile, ferritin, folate, peripheral smear, reticulocyte count, B12 pending   - nephro consulted, holding RANDY due to hypertensive urgency     CAD s/p CABG: May 2020. Follows with Dr. Ally Crews (Cardiology).    - Rosuvastatin 10mg daily     HFpEF:  Echo 11/19/21 with EF 60-65% and reduced systolic function and moderate pulm HTN.   - Bumex 1mg BID   - follow up with cards as outpt     H/o PEA arrest: During previous admission to [facility] (11/20[facility]) suspected secondary to respiratory distress. ROSC achieved after 12 minutes CPR. S/p intubation and extubation with mental status return to baseline. T2DM: Last A1c 8.4% (11/2021). Currently on Glipizide 10mg daily (5mg x2) and Metformin 1000mg BID. - hold Metformin and glipizide   - Lispro sliding scale   - POC gluc ACHS   - hypoglycemic protocols ordered     Diabetic neuropathy: This is a chronic condition.   - Gabapentin 300mg TID     Depression: Chronic condition.    - Zoloft 100mg, 2 tabs daily   - Buspar 10mg BID   - Trazodone 50mg qhs       FEN/GI - GI bland. Potassium/low phos. Activity - Ambulate as tolerated  DVT prophylaxis - Sub Q Heparin  GI prophylaxis - Protonix  Fall prophylaxis - Not indicated at this time. Disposition - Admit to Telemetry. Plan to d/c to Home. Consulting PT/OT/CM  Code Status - Full, discussed with patient / caregivers. Next of Kin Name and [address] [place] [address] [phone]    Patient Dean Dominique will be discussed Dr. Yesenia Seay.     12:55 AM, 02/06/22  Homero Tariq MD  Family Medicine Resident       For Billing    Chief Complaint   Patient presents with    Abdominal Pain    [facility] Problems  Date Reviewed: 12/6/2021          Codes Class Noted POA    Anasarca ICD-10-CM: R60.1  ICD-9-CM: 782.3  2/6/2022 Unknown        Abdominal pain ICD-10-CM: R10.9  ICD-9-CM: 789.00  2/6/2022 Unknown        ESRD (end stage renal disease) on dialysis [facility]) ICD-10-CM: N18.6, Z99.2  ICD-9-CM: 585.6, V45.11  2/6/2022 Unknown        Heart failure with preserved ejection fraction ([facility] ICD-10-CM: I50.30  ICD-9-CM: 428.9  2/6/2022 Unknown        Elevated troponin ICD-10-CM: R77.8  ICD-9-CM: 790.6  2/6/2022 Unknown        Anemia ICD-10-CM: D64.9  ICD-9-CM: 285.9  2/6/2022 Unknown        Thrombocytopenia ([facility] ICD-10-CM: D69.6  ICD-9-CM: 287.5  2/6/2022 Unknown        Epigastric pain ICD-10-CM: R10.13  ICD-9-CM: 789.06  2/6/2022 Unknown        Hypertensive urgency ICD-10-CM: I16.0  ICD-9-CM: 401.9  2/6/2022 Unknown        Obesity ICD-10-CM: E66.9  ICD-9-CM: 278.00  2/6/2022 Unknown        Type 2 diabetes mellitus with diabetic neuropathy ([facility] ICD-10-CM: E11.40  ICD-9-CM: 250.60, 357.2  8/5/2019 Yes        Reactive depression ICD-10-CM: F32.9  ICD-9-CM: 300.4  11/14/2018 Yes

## 2022-02-07 VITALS
BODY MASS INDEX: 21.12 KG/M2 | WEIGHT: 143 LBS | TEMPERATURE: 97.8 F | HEART RATE: 64 BPM | OXYGEN SATURATION: 97 % | SYSTOLIC BLOOD PRESSURE: 139 MMHG | RESPIRATION RATE: 13 BRPM | DIASTOLIC BLOOD PRESSURE: 54 MMHG

## 2022-02-07 LAB
ALBUMIN SERPL-MCNC: 2.2 G/DL (ref 3.5–5)
ALBUMIN/GLOB SERPL: 0.6 {RATIO} (ref 1.1–2.2)
ALP SERPL-CCNC: 134 U/L (ref 45–117)
ALT SERPL-CCNC: 23 U/L (ref 12–78)
ANION GAP SERPL CALC-SCNC: 5 MMOL/L (ref 5–15)
AST SERPL-CCNC: 29 U/L (ref 15–37)
ATRIAL RATE: 69 BPM
ATRIAL RATE: 79 BPM
BASOPHILS # BLD: 0 K/UL (ref 0–0.1)
BASOPHILS NFR BLD: 0 % (ref 0–1)
BILIRUB SERPL-MCNC: 0.9 MG/DL (ref 0.2–1)
BUN SERPL-MCNC: 18 MG/DL (ref 6–20)
BUN/CREAT SERPL: 12 (ref 12–20)
CALCIUM SERPL-MCNC: 7.7 MG/DL (ref 8.5–10.1)
CALCULATED P AXIS, ECG09: 61 DEGREES
CALCULATED P AXIS, ECG09: 65 DEGREES
CALCULATED R AXIS, ECG10: 42 DEGREES
CALCULATED R AXIS, ECG10: 47 DEGREES
CALCULATED T AXIS, ECG11: 28 DEGREES
CALCULATED T AXIS, ECG11: 56 DEGREES
CHLORIDE SERPL-SCNC: 102 MMOL/L (ref 97–108)
CO2 SERPL-SCNC: 31 MMOL/L (ref 21–32)
CREAT SERPL-MCNC: 1.55 MG/DL (ref 0.7–1.3)
DIAGNOSIS, 93000: NORMAL
DIAGNOSIS, 93000: NORMAL
DIFFERENTIAL METHOD BLD: ABNORMAL
EOSINOPHIL # BLD: 0.2 K/UL (ref 0–0.4)
EOSINOPHIL NFR BLD: 4 % (ref 0–7)
ERYTHROCYTE [DISTWIDTH] IN BLOOD BY AUTOMATED COUNT: 15.1 % (ref 11.5–14.5)
GLOBULIN SER CALC-MCNC: 3.6 G/DL (ref 2–4)
GLUCOSE BLD STRIP.AUTO-MCNC: 103 MG/DL (ref 65–117)
GLUCOSE BLD STRIP.AUTO-MCNC: 115 MG/DL (ref 65–117)
GLUCOSE BLD STRIP.AUTO-MCNC: 121 MG/DL (ref 65–117)
GLUCOSE SERPL-MCNC: 88 MG/DL (ref 65–100)
HCT VFR BLD AUTO: 26.7 % (ref 36.6–50.3)
HGB BLD-MCNC: 8.7 G/DL (ref 12.1–17)
IMM GRANULOCYTES # BLD AUTO: 0 K/UL (ref 0–0.04)
IMM GRANULOCYTES NFR BLD AUTO: 0 % (ref 0–0.5)
LYMPHOCYTES # BLD: 1 K/UL (ref 0.8–3.5)
LYMPHOCYTES NFR BLD: 18 % (ref 12–49)
MAGNESIUM SERPL-MCNC: 2.1 MG/DL (ref 1.6–2.4)
MCH RBC QN AUTO: 27.5 PG (ref 26–34)
MCHC RBC AUTO-ENTMCNC: 32.6 G/DL (ref 30–36.5)
MCV RBC AUTO: 84.5 FL (ref 80–99)
MONOCYTES # BLD: 0.5 K/UL (ref 0–1)
MONOCYTES NFR BLD: 9 % (ref 5–13)
NEUTS SEG # BLD: 3.6 K/UL (ref 1.8–8)
NEUTS SEG NFR BLD: 69 % (ref 32–75)
NRBC # BLD: 0 K/UL (ref 0–0.01)
NRBC BLD-RTO: 0 PER 100 WBC
P-R INTERVAL, ECG05: 128 MS
P-R INTERVAL, ECG05: 128 MS
PHOSPHATE SERPL-MCNC: 2.1 MG/DL (ref 2.6–4.7)
PLATELET # BLD AUTO: 90 K/UL (ref 150–400)
PMV BLD AUTO: 9.4 FL (ref 8.9–12.9)
POTASSIUM SERPL-SCNC: 3.4 MMOL/L (ref 3.5–5.1)
PROT SERPL-MCNC: 5.8 G/DL (ref 6.4–8.2)
Q-T INTERVAL, ECG07: 394 MS
Q-T INTERVAL, ECG07: 450 MS
QRS DURATION, ECG06: 76 MS
QRS DURATION, ECG06: 84 MS
QTC CALCULATION (BEZET), ECG08: 451 MS
QTC CALCULATION (BEZET), ECG08: 482 MS
RBC # BLD AUTO: 3.16 M/UL (ref 4.1–5.7)
RBC MORPH BLD: ABNORMAL
SERVICE CMNT-IMP: ABNORMAL
SERVICE CMNT-IMP: NORMAL
SERVICE CMNT-IMP: NORMAL
SODIUM SERPL-SCNC: 138 MMOL/L (ref 136–145)
TROPONIN-HIGH SENSITIVITY: 39 NG/L (ref 0–76)
VENTRICULAR RATE, ECG03: 69 BPM
VENTRICULAR RATE, ECG03: 79 BPM
WBC # BLD AUTO: 5.3 K/UL (ref 4.1–11.1)

## 2022-02-07 PROCEDURE — 74011250637 HC RX REV CODE- 250/637: Performed by: NURSE PRACTITIONER

## 2022-02-07 PROCEDURE — C9113 INJ PANTOPRAZOLE SODIUM, VIA: HCPCS | Performed by: STUDENT IN AN ORGANIZED HEALTH CARE EDUCATION/TRAINING PROGRAM

## 2022-02-07 PROCEDURE — 90935 HEMODIALYSIS ONE EVALUATION: CPT

## 2022-02-07 PROCEDURE — 74011250636 HC RX REV CODE- 250/636: Performed by: STUDENT IN AN ORGANIZED HEALTH CARE EDUCATION/TRAINING PROGRAM

## 2022-02-07 PROCEDURE — 80053 COMPREHEN METABOLIC PANEL: CPT

## 2022-02-07 PROCEDURE — 74011250636 HC RX REV CODE- 250/636: Performed by: EMERGENCY MEDICINE

## 2022-02-07 PROCEDURE — 84484 ASSAY OF TROPONIN QUANT: CPT

## 2022-02-07 PROCEDURE — 74011250637 HC RX REV CODE- 250/637: Performed by: STUDENT IN AN ORGANIZED HEALTH CARE EDUCATION/TRAINING PROGRAM

## 2022-02-07 PROCEDURE — 84100 ASSAY OF PHOSPHORUS: CPT

## 2022-02-07 PROCEDURE — 36415 COLL VENOUS BLD VENIPUNCTURE: CPT

## 2022-02-07 PROCEDURE — 99235 HOSP IP/OBS SAME DATE MOD 70: CPT | Performed by: FAMILY MEDICINE

## 2022-02-07 PROCEDURE — 82962 GLUCOSE BLOOD TEST: CPT

## 2022-02-07 PROCEDURE — 74011250637 HC RX REV CODE- 250/637

## 2022-02-07 PROCEDURE — 99223 1ST HOSP IP/OBS HIGH 75: CPT | Performed by: SPECIALIST

## 2022-02-07 PROCEDURE — 83735 ASSAY OF MAGNESIUM: CPT

## 2022-02-07 PROCEDURE — 74011000250 HC RX REV CODE- 250: Performed by: STUDENT IN AN ORGANIZED HEALTH CARE EDUCATION/TRAINING PROGRAM

## 2022-02-07 PROCEDURE — 85025 COMPLETE CBC W/AUTO DIFF WBC: CPT

## 2022-02-07 RX ORDER — GABAPENTIN 300 MG/1
300 CAPSULE ORAL 2 TIMES DAILY
Status: ON HOLD | COMMUNITY
End: 2022-04-02

## 2022-02-07 RX ORDER — MINOXIDIL 2.5 MG/1
5 TABLET ORAL 2 TIMES DAILY
Status: ON HOLD | COMMUNITY
End: 2022-04-02

## 2022-02-07 RX ORDER — HYDRALAZINE HYDROCHLORIDE 20 MG/ML
20 INJECTION INTRAMUSCULAR; INTRAVENOUS ONCE
Status: COMPLETED | OUTPATIENT
Start: 2022-02-07 | End: 2022-02-07

## 2022-02-07 RX ORDER — HYDRALAZINE HYDROCHLORIDE 25 MG/1
25 TABLET, FILM COATED ORAL 3 TIMES DAILY
Status: DISCONTINUED | OUTPATIENT
Start: 2022-02-07 | End: 2022-02-07 | Stop reason: HOSPADM

## 2022-02-07 RX ORDER — DOXAZOSIN 4 MG/1
4 TABLET ORAL 2 TIMES DAILY
Status: ON HOLD | COMMUNITY
End: 2022-04-02

## 2022-02-07 RX ORDER — CALCIUM CARBONATE 200(500)MG
1 TABLET,CHEWABLE ORAL
Status: ON HOLD | COMMUNITY
End: 2022-04-02

## 2022-02-07 RX ORDER — METOPROLOL SUCCINATE 50 MG/1
50 TABLET, EXTENDED RELEASE ORAL DAILY
Qty: 30 TABLET | Refills: 0 | Status: ON HOLD | OUTPATIENT
Start: 2022-02-08 | End: 2022-04-02

## 2022-02-07 RX ORDER — NIFEDIPINE 30 MG/1
30 TABLET, EXTENDED RELEASE ORAL EVERY 24 HOURS
Status: DISCONTINUED | OUTPATIENT
Start: 2022-02-07 | End: 2022-02-07

## 2022-02-07 RX ORDER — ONDANSETRON 8 MG/1
8 TABLET, ORALLY DISINTEGRATING ORAL
Status: ON HOLD | COMMUNITY
End: 2022-04-02

## 2022-02-07 RX ORDER — BUMETANIDE 2 MG/1
2 TABLET ORAL DAILY
Status: ON HOLD | COMMUNITY
End: 2022-04-02

## 2022-02-07 RX ORDER — NIFEDIPINE 60 MG/1
60 TABLET, EXTENDED RELEASE ORAL DAILY
Status: ON HOLD | COMMUNITY
End: 2022-02-15 | Stop reason: SDUPTHER

## 2022-02-07 RX ORDER — CLONIDINE HYDROCHLORIDE 0.2 MG/1
0.2 TABLET ORAL 3 TIMES DAILY
Status: ON HOLD | COMMUNITY
End: 2022-04-02

## 2022-02-07 RX ORDER — NIFEDIPINE 60 MG/1
60 TABLET, EXTENDED RELEASE ORAL EVERY 12 HOURS
Status: DISCONTINUED | OUTPATIENT
Start: 2022-02-07 | End: 2022-02-07

## 2022-02-07 RX ORDER — AMLODIPINE BESYLATE 10 MG/1
10 TABLET ORAL EVERY MORNING
Status: ON HOLD | COMMUNITY
End: 2022-04-02

## 2022-02-07 RX ORDER — INSULIN GLARGINE 100 [IU]/ML
10 INJECTION, SOLUTION SUBCUTANEOUS DAILY
Status: ON HOLD | COMMUNITY
End: 2022-04-02

## 2022-02-07 RX ORDER — NIFEDIPINE 30 MG/1
30 TABLET, EXTENDED RELEASE ORAL DAILY
Status: DISCONTINUED | OUTPATIENT
Start: 2022-02-07 | End: 2022-02-07

## 2022-02-07 RX ORDER — TRAZODONE HYDROCHLORIDE 50 MG/1
50 TABLET ORAL
COMMUNITY
End: 2022-02-07

## 2022-02-07 RX ORDER — IBUPROFEN 200 MG
200 TABLET ORAL
COMMUNITY
End: 2022-02-07

## 2022-02-07 RX ORDER — METOPROLOL SUCCINATE 25 MG/1
25 TABLET, EXTENDED RELEASE ORAL EVERY MORNING
COMMUNITY
End: 2022-02-07

## 2022-02-07 RX ORDER — ACETAMINOPHEN 500 MG
1000 TABLET ORAL
Status: ON HOLD | COMMUNITY
End: 2022-04-02

## 2022-02-07 RX ORDER — GUAIFENESIN 100 MG/5ML
81 LIQUID (ML) ORAL DAILY
Status: ON HOLD | COMMUNITY
End: 2022-04-02

## 2022-02-07 RX ORDER — ROSUVASTATIN CALCIUM 10 MG/1
10 TABLET, COATED ORAL EVERY MORNING
COMMUNITY

## 2022-02-07 RX ORDER — SUCRALFATE 1 G/1
1 TABLET ORAL 4 TIMES DAILY
Status: ON HOLD | COMMUNITY
End: 2022-04-02

## 2022-02-07 RX ORDER — NIFEDIPINE 60 MG/1
60 TABLET, EXTENDED RELEASE ORAL DAILY
Status: DISCONTINUED | OUTPATIENT
Start: 2022-02-07 | End: 2022-02-07

## 2022-02-07 RX ORDER — PANTOPRAZOLE SODIUM 40 MG/1
40 TABLET, DELAYED RELEASE ORAL DAILY
COMMUNITY
End: 2022-06-21 | Stop reason: SDUPTHER

## 2022-02-07 RX ORDER — HYDRALAZINE HYDROCHLORIDE 100 MG/1
100 TABLET, FILM COATED ORAL 3 TIMES DAILY
Status: ON HOLD | COMMUNITY
End: 2022-04-02

## 2022-02-07 RX ADMIN — DICYCLOMINE HYDROCHLORIDE 20 MG: 20 INJECTION, SOLUTION INTRAMUSCULAR at 17:01

## 2022-02-07 RX ADMIN — SUCRALFATE 1 G: 1 TABLET ORAL at 17:01

## 2022-02-07 RX ADMIN — ACETAMINOPHEN 650 MG: 325 TABLET ORAL at 14:23

## 2022-02-07 RX ADMIN — SODIUM CHLORIDE, PRESERVATIVE FREE 40 MG: 5 INJECTION INTRAVENOUS at 17:02

## 2022-02-07 RX ADMIN — ISOSORBIDE MONONITRATE 120 MG: 30 TABLET, EXTENDED RELEASE ORAL at 08:18

## 2022-02-07 RX ADMIN — GABAPENTIN 300 MG: 300 CAPSULE ORAL at 08:18

## 2022-02-07 RX ADMIN — POTASSIUM BICARBONATE 20 MEQ: 782 TABLET, EFFERVESCENT ORAL at 09:53

## 2022-02-07 RX ADMIN — SERTRALINE 200 MG: 50 TABLET, FILM COATED ORAL at 08:17

## 2022-02-07 RX ADMIN — BUSPIRONE HYDROCHLORIDE 10 MG: 10 TABLET ORAL at 08:18

## 2022-02-07 RX ADMIN — CLONIDINE HYDROCHLORIDE 0.2 MG: 0.1 TABLET ORAL at 17:01

## 2022-02-07 RX ADMIN — Medication 10 ML: at 14:26

## 2022-02-07 RX ADMIN — BUMETANIDE 1 MG: 1 TABLET ORAL at 08:18

## 2022-02-07 RX ADMIN — METOPROLOL SUCCINATE 50 MG: 25 TABLET, FILM COATED, EXTENDED RELEASE ORAL at 08:18

## 2022-02-07 RX ADMIN — HYDRALAZINE HYDROCHLORIDE 20 MG: 20 INJECTION INTRAMUSCULAR; INTRAVENOUS at 01:32

## 2022-02-07 RX ADMIN — NITROGLYCERIN 0.5 INCH: 20 OINTMENT TOPICAL at 02:54

## 2022-02-07 RX ADMIN — HEPARIN SODIUM 5000 UNITS: 5000 INJECTION INTRAVENOUS; SUBCUTANEOUS at 14:23

## 2022-02-07 RX ADMIN — GABAPENTIN 300 MG: 300 CAPSULE ORAL at 17:01

## 2022-02-07 RX ADMIN — CLONIDINE HYDROCHLORIDE 0.2 MG: 0.1 TABLET ORAL at 08:18

## 2022-02-07 RX ADMIN — NIFEDIPINE 30 MG: 30 TABLET, FILM COATED, EXTENDED RELEASE ORAL at 05:27

## 2022-02-07 RX ADMIN — SUCRALFATE 1 G: 1 TABLET ORAL at 08:18

## 2022-02-07 RX ADMIN — SODIUM CHLORIDE, PRESERVATIVE FREE 40 MG: 5 INJECTION INTRAVENOUS at 09:53

## 2022-02-07 RX ADMIN — HEPARIN SODIUM 5000 UNITS: 5000 INJECTION INTRAVENOUS; SUBCUTANEOUS at 05:27

## 2022-02-07 NOTE — DISCHARGE SUMMARY
[address] [facility] [address] [place] [address]   Office [phone]  Fax [phone]       Discharge / Transfer / Off-Service Note     Name: Aniya Guerrero MRN: [id number]  Sex: Male   YOB: 1960  Age: 64 y.o. PCP: Yessenia Bajwa MD     Date of admission: 2/5/2022     Date of discharge/transfer: 2/7/2022    Attending physician at admission: Dr. Zechariah Arellano    Attending physician at discharge/transfer: Dr. Zechariah Arellano    Resident physician at discharge/transfer: Manda Paula DO     Consultants during hospitalization  IP [address]     Admission diagnoses   Abdominal pain, unspecified abdominal location [R10.9]  Anasarca [R60.1]  ESRD (end stage renal disease) on dialysis ([facility] [N18.6, Z99.2]  Hypertensive Urgency     Recommended follow-up after discharge  1. PCP: Yessenia Bajwa MD  2. Hepatology - Dr. Rene Govea   3. Nephrology: Dr. Sonja Garcia      Things to follow up on with PCP:  - Follow up on Anemia labs  - Blood pressure check   - Diabetes medication: Patient reportedly not taking Metformin or Glipizide and taking insulin as needed. Re-evaluate treatment needs. Things to follow up on with GI  - Concern for cirrhosis      Medication Changes:  1. New Medications: None. Ensure that patient is taking his Protonix and Sucralfate. 2. Modified Medications: Now prescribed Metoprolol 50 XL daily      History of Present Illness  Per admitting provider,     Aniya Guerrero is a 64 y.o. male with known ESRD on HD, HTN, HFpEF, T2DM, CAD s/p CABG (May 2020), h/o PEA arrest, erosive gastritis, depression who presents to the ER complaining of worsening epigastric abdominal pain onset three days ago. Patient is a poor historian. States this pain is chronic and intermittent. Pain exacerbated by eating and described as sharp. Does not radiate or worsen with exertion. Also endorses postprandial n/v and diarrhea. Went to [facility] Friday for symptoms, was prescribed pain medication and sent home. Has used Maalox and TUMS without relief. Denies fevers, cough, chest pain, shortness of breath, congestion, blood in stool, lower extremity edema, or dizziness. No new foods. No sick contacts. Thinks may have had EGD but unsure what results were. Does have h/o heavy alcohol use but quit 6-7 years ago. Patient missed dialysis yesterday due to acute pain. Reports being compliant with home BP medication and vomited them yesterday. HOSPITAL COURSE      Dudley Renee was admitted into the Family Medicine Service from 02/06 for HTN urgency. During the course of this admission, the following conditions were addressed/managed; Hypertensive urgency: /99. Trop elevated likely secondary to renal disease. S/p Hydral 20mg, Labetalol 20mg, dilaudid 0.5mg, morphine 4mg, and zofran 4mg in the ED.   - Continue home meds (clonidine 0.2mg TID, eplerenone 25mg daily, Imdur 120mg daily, Toprol XL 50mg daily, Hydralazine 100mg TID)      ESRD on HD: Permacath placed 11/24. Missed HD on Friday (2/4)  CT c/a/p with diffuse subcutaneous edema, mild free fluid, and mesenteric edema, compatible with third spacing. Bilateral pleural effusions and atelectasis also present. HD on 02/06  - Follow op with Dr. Raquel Best (Nepho). Epigastric pain in s/o known erosive gastritis: Improved. Prior admission for GI bleed in 11/2021. 11/16/21 EGD w/ nonbleeding erosive esophagitis. Noncompliant with protonix and carafate. No evidence of active bleeding. S/p GI cocktail.   - Restart Protonix 40mg daily   - Carafate 1g BID   - Follow up with GI as outpatient      Concern for cirrhosis: past h/o heavy alcohol use, stopped 6-7 years ago. US 11/20/21 showing hepatic parenchymal disease with cirrhotic morphology, perihepatic ascites and right pleural effusion.  - Following with Dr. Dav Whelan (hepatology)   - outpatient follow up with GI      Tropinemia: POA 7. Suspect secondary to poor kidney function. EKG w/ NSR, rate 79.  No obvious ischemic changes. No chest pain or shortness of breath. Diarrhea: Now resolved. Will rule out COVID. rCovid negative. Anemia/thrombocytopenia: This is a chronic condition. POA Hgb 10.2 (bl ~7.5) and Plt 99 (bl ~90). Likely secondary to ESRD. - Follow up op on Anemia labs    CAD s/p CABG: May 2020. Follows with Dr. Jefry Newby (Cardiology). - Continue Rosuvastatin 10mg daily      HFpEF:  Echo 11/19/21 with EF 60-65% and reduced systolic function and moderate pulm HTN.   - Bumex 1mg BID - patient not taking  - follow up with cards as outpt      H/o PEA arrest: During previous admission to [facility] (11/2021) suspected secondary to respiratory distress. ROSC achieved after 12 minutes CPR. S/p intubation and extubation with mental status return to baseline. T2DM: Last A1c 8.4% (11/2021). Not on Glipizide 10mg daily (5mg x2) and Metformin 1000mg BID. Patient reports he is taking insulin as needed. - Not taking per patient. Re-evaluate for treatment needs    Diabetic neuropathy: This is a chronic condition.   - Continue home Gabapentin 300mg TID      Depression: Chronic condition.    - Continue Zoloft 100mg, 2 tabs daily   - Continue Buspar 10mg BID     Physical exam at discharge:    Vitals Reviewed. Visit Vitals  BP (!) 139/54   Pulse 64   Temp 97.8 °F (36.6 °C)   Resp 13   Wt 143 lb (64.9 kg)   SpO2 97%   BMI 21.12 kg/m²        General: No acute distress. Alert. Cooperative. Head: Normocephalic. Atraumatic  Respiratory: CTAB. No w/r/r/c.  Cardiovascular: RRR. Normal S1,S2. No m/r/g. Pulses 2+ throughout. GI: + bowel sounds. Tenderness in the epigastric region. . No rebound tenderness or guarding. Nondistended. Neuro: No focal deficit     Condition at discharge: Stable.     Labs  Recent Labs     02/07/22 0227 02/06/22  0446 02/05/22 2003   WBC 5.3 6.4 7.4   HGB 8.7* 9.2* 10.2*   HCT 26.7* 28.3* 30.6*   PLT 90* 88* 99*     Recent Labs     02/07/22  0227 02/06/22  0446 02/05/22 2003    138 138   K 3. 4* 3.4* 3.6    107 107   CO2 31 25 24   BUN 18 39* 40*   CREA 1.55* 2.14* 2.08*   GLU 88 102* 114*   CA 7.7* 8.1* 8.3*   MG 2.1 2.0 2.0   PHOS 2.1* 3.2  --      Recent Labs     02/07/22  0227 02/06/22  0446 02/05/22 2003   ALT 23 21 22   * 139* 166*   TBILI 0.9 1.0 0.8   TP 5.8* 6.1* 6.4   ALB 2.2* 2.4* 2.6*   GLOB 3.6 3.7 3.8   LPSE  --   --  70*     Recent Labs     02/07/22  1619 02/07/22  1150 02/07/22  0748 02/06/22  2220 02/06/22  1149 02/06/22  0737 02/06/22 0446   TIBC  --   --   --   --   --   --  186*   PSAT  --   --   --   --   --   --  38   FERR  --   --   --   --   --   --  743*   GLUCPOC 103 121* 115 99 157*   < >  --     < > = values in this interval not displayed. Imaging  Results from Hospital Encounter encounter on 02/05/22    XR CHEST PORT    Narrative  INDICATION: CP    EXAMINATION:  AP CHEST, PORTABLE    COMPARISON: 2/5/2022    FINDINGS: Single AP portable view of the chest demonstrates unchanged right IJ  permacath. Prior median sternotomy. The cardiomediastinal silhouette is  unchanged. Mild interstitial edema and small pleural effusions left greater than  right, unchanged. No pneumothorax. Impression  No significant change. Results from Hospital Encounter encounter on 02/05/22    CTA CHEST ABD PELV W CONT    Narrative  Indication: Abdominal pain, evaluate for dissection    2.5 mm axial thin sections were obtained from the lung apices to the pubic  symphysis following the rapid bolus administration of 100 cc Isovue-370. 3D post  processing was performed including MIP and volume rendered imaging. CT dose  reduction was achieved through use of a standardized protocol tailored for this  examination and automatic exposure control for dose modulation. Comparison to noncontrast abdomen pelvis CT performed earlier the same day    FINDINGS:    THYROID: No nodule. MEDIASTINUM: No mass or lymphadenopathy. RENEE: No mass or lymphadenopathy.   THORACIC AORTA: No dissection or aneurysm. PULMONARY ARTERIES: Main pulmonary artery is normal in caliber. No evidence of  acute pulmonary emboli. TRACHEA/BRONCHI: Patent. ESOPHAGUS: No wall thickening or dilatation. HEART: Normal in size. PLEURA: Moderate bilateral pleural effusions are noted. LUNGS: Bilateral lower lobe atelectasis. Minimal pulmonary edema. LIVER: No mass or biliary dilatation. GALLBLADDER: Unremarkable. SPLEEN: No mass. PANCREAS: No mass or ductal dilatation. ADRENALS: Unremarkable. KIDNEYS: No mass, calculus, or hydronephrosis. STOMACH: Unremarkable. SMALL BOWEL: No dilatation or wall thickening. COLON: No dilatation or wall thickening. APPENDIX: Within normal limits. PERITONEUM: Mild ascites and mesenteric edema. RETROPERITONEUM: The abdominal aorta is atherosclerotic without evidence of  aneurysm or dissection. Origins of the celiac axis, SMA, single renal arteries  bilaterally, and MIL are widely patent. REPRODUCTIVE ORGANS: There is no pelvic mass or lymphadenopathy. URINARY BLADDER: No mass or calculus. BONES: No destructive bone lesion. ADDITIONAL COMMENTS: Soft tissue edema is diffusely noted. Impression  Moderate bilateral pleural effusions with underlying atelectasis and minimal  pulmonary edema. Mild ascites, mesenteric edema, and subcutaneous edema  compatible with third spacing of fluids. No evidence of aortic dissection or  aneurysm.                Chronic diagnoses   Problem List as of 2/7/2022 Date Reviewed: 12/6/2021          Codes Class Noted - Resolved    Anasarca ICD-10-CM: R60.1  ICD-9-CM: 782.3  2/6/2022 - Present        Abdominal pain ICD-10-CM: R10.9  ICD-9-CM: 789.00  2/6/2022 - Present        ESRD (end stage renal disease) on dialysis [facility]) ICD-10-CM: N18.6, Z99.2  ICD-9-CM: 585.6, V45.11  2/6/2022 - Present        Heart failure with preserved ejection fraction ([facility] ICD-10-CM: I50.30  ICD-9-CM: 428.9  2/6/2022 - Present        Elevated troponin ICD-10-CM: R77.8  ICD-9-CM: 790.6  2/6/2022 - Present        Anemia ICD-10-CM: D64.9  ICD-9-CM: 285.9  2/6/2022 - Present        Thrombocytopenia ([place] [address].) ICD-10-CM: D69.6  ICD-9-CM: 287.5  2/6/2022 - Present        Epigastric pain ICD-10-CM: R10.13  ICD-9-CM: 789.06  2/6/2022 - Present        * (Principal) Hypertensive urgency ICD-10-CM: I16.0  ICD-9-CM: 401.9  2/6/2022 - Present        Obesity ICD-10-CM: E66.9  ICD-9-CM: 278.00  2/6/2022 - Present        Severe protein-calorie malnutrition ([facility] ICD-10-CM: E43  ICD-9-CM: 355  11/23/2021 - Present        CHF exacerbation ([facility] ICD-10-CM: I50.9  ICD-9-CM: 428.0  11/14/2021 - Present        Type 2 diabetes with nephropathy ([facility] ICD-10-CM: E11.21  ICD-9-CM: 250.40, 583.81  8/5/2019 - Present        Type 2 diabetes mellitus with diabetic neuropathy ([facility] ICD-10-CM: E11.40  ICD-9-CM: 250.60, 357.2  8/5/2019 - Present        Type 2 diabetes mellitus with ophthalmic complication, with long-term current use of insulin ([facility] ICD-10-CM: E11.39, Z79.4  ICD-9-CM: 250.50, V58.67  11/25/2018 - Present        Essential hypertension ICD-10-CM: I10  ICD-9-CM: 401.9  11/14/2018 - Present        Reactive depression ICD-10-CM: F32.9  ICD-9-CM: 300.4  11/14/2018 - Present        Peripheral neuropathy ICD-10-CM: G62.9  ICD-9-CM: 356.9  11/14/2018 - Present        RESOLVED: Controlled type 2 diabetes mellitus with ophthalmic complication, with long-term current use of insulin (HCC) ICD-10-CM: E11.39, Z79.4  ICD-9-CM: 250.50, V58.67  11/14/2018 - 11/14/2018              Discharge/Transfer Medications  Discharge Medication List as of 2/7/2022  5:09 PM      START taking these medications    Details   dicyclomine (BENTYL) 20 mg tablet Take 1 Tablet by mouth every six (6) hours as needed for Abdominal Cramps or Pain for up to 5 days. , Normal, Disp-20 Tablet, R-0         CONTINUE these medications which have CHANGED    Details   metoprolol succinate (TOPROL-XL) 50 mg XL tablet Take 1 Tablet by mouth daily., Normal, Disp-30 Tablet, R-0         CONTINUE these medications which have NOT CHANGED    Details   bumetanide (BUMEX) 2 mg tablet Take 2 mg by mouth daily. , Historical Med      pantoprazole (PROTONIX) 40 mg tablet Take 40 mg by mouth daily. , Historical Med      insulin glargine (Lantus U-100 Insulin) 100 unit/mL injection 10 Units by SubCUTAneous route daily. , Historical Med      acetaminophen (Tylenol Extra Strength) 500 mg tablet Take 1,000 mg by mouth every six (6) hours as needed for Pain., Historical Med      rosuvastatin (CRESTOR) 10 mg tablet Take 10 mg by mouth Every morning., Historical Med      cloNIDine HCL (CATAPRES) 0.2 mg tablet Take 0.2 mg by mouth three (3) times daily. , Historical Med      gabapentin (NEURONTIN) 300 mg capsule Take 300 mg by mouth two (2) times a day., Historical Med      amLODIPine (NORVASC) 10 mg tablet Take 10 mg by mouth Every morning., Historical Med      aspirin 81 mg chewable tablet Take 81 mg by mouth daily. , Historical Med      doxazosin (CARDURA) 4 mg tablet Take 4 mg by mouth two (2) times a day., Historical Med      hydrALAZINE (APRESOLINE) 100 mg tablet Take 100 mg by mouth three (3) times daily. , Historical Med      minoxidiL (LONITEN) 2.5 mg tablet Take 5 mg by mouth two (2) times a day., Historical Med      NIFEdipine ER (PROCARDIA XL) 60 mg ER tablet Take 60 mg by mouth daily. , Historical Med      sucralfate (CARAFATE) 1 gram tablet Take 1 g by mouth four (4) times daily. , Historical Med      ondansetron (ZOFRAN ODT) 8 mg disintegrating tablet Take 8 mg by mouth every eight (8) hours as needed for Nausea or Vomiting., Historical Med      calcium carbonate (TUMS) 200 mg calcium (500 mg) chew Take 1 Tablet by mouth daily as needed., Historical Med      eplerenone (Inspra) 25 mg tablet Take 25 mg by mouth daily. , Historical Med      isosorbide mononitrate ER (IMDUR) 120 mg CR tablet Take 120 mg by mouth every morning., Historical Med         STOP taking these medications       traZODone (DESYREL) 50 mg tablet Comments:   Reason for Stopping:         ibuprofen (AdviL) 200 mg tablet Comments:   Reason for Stopping:         traZODone (DESYREL) 50 mg tablet Comments:   Reason for Stopping:                Diet:   Renal diet .     Activity:  As tolerated    Disposition: Home or Self Care    Discharge instructions to patient/family  Please seek medical attention for any new or worsening symptoms particularly fever, chest pain, shortness of breath, abdominal pain, nausea, vomiting    Follow up plans/appointments  Follow-up Information     Follow up With Specialties Details Why Contact Info    Alma Sawant NP Nurse Practitioner Schedule an appointment as soon as possible for a visit on 2/10/2022 Hospital follow up appointment at [facility]  [phone]      [facility] EMERGENCY DEPT Emergency Medicine  As needed, If symptoms worsen [facility]  [phone]    [facility]  Schedule an appointment as soon as possible for a visit  [address] [facility],[address]    Dionisio Gil MD Family Medicine   [address] [facility]  [address] [facility]             Kyleigh Thomson DO  Family Medicine Resident     For Billing    Chief Complaint   Patient presents with    Abdominal Pain    Shortness of Breath       Hospital Problems  Date Reviewed: 12/6/2021          Codes Class Noted POA    Anasarca ICD-10-CM: R60.1  ICD-9-CM: 782.3  2/6/2022 Unknown        Abdominal pain ICD-10-CM: R10.9  ICD-9-CM: 789.00  2/6/2022 Unknown        ESRD (end stage renal disease) on dialysis [facility]) ICD-10-CM: N18.6, Z99.2  ICD-9-CM: 585.6, V45.11  2/6/2022 Unknown        Heart failure with preserved ejection fraction ([facility] ICD-10-CM: I50.30  ICD-9-CM: 428.9  2/6/2022 Unknown        Elevated troponin ICD-10-CM: R77.8  ICD-9-CM: 790.6  2/6/2022 Unknown        Anemia ICD-10-CM: D64.9  ICD-9-CM: 285.9  2/6/2022 Unknown        Thrombocytopenia (HCC) ICD-10-CM: D69.6  ICD-9-CM: 287.5  2/6/2022 Unknown        Epigastric pain ICD-10-CM: R10.13  ICD-9-CM: 789.06  2/6/2022 Unknown        * (Principal) Hypertensive urgency ICD-10-CM: I16.0  ICD-9-CM: 401.9  2/6/2022 Unknown        Obesity ICD-10-CM: E66.9  ICD-9-CM: 278.00  2/6/2022 Unknown        Type 2 diabetes mellitus with diabetic neuropathy ([facility] ICD-10-CM: E11.40  ICD-9-CM: 250.60, 357.2  8/5/2019 Yes        Reactive depression ICD-10-CM: F32.9  ICD-9-CM: 300.4  11/14/2018 Yes

## 2022-02-07 NOTE — PROGRESS NOTES
NEPHROLOGY PROGRESS NOTE         Naeem Canela                   MRW:[id number]   :1960    Chief complaints: f/u ESKD HD MWF    Subjective: c/o abdominal pain    24 hr interval history: BP elevated and good UOP  Had HD 22    Objective:  Vitals:    22 0600 22 0700 22 0800 22 0900   BP: (!) 218/83 (!) 219/76 (!) 207/81 (!) 178/70   Pulse: 70 71 72 70   Resp: 20 16 12 10   Temp:       SpO2: 94% 93%  95%   Weight:           Intake/Output Summary (Last 24 hours) at 2022 0946  Last data filed at 2022 1830  Gross per 24 hour   Intake --   Output 2600 ml   Net -2600 ml       PHYSICAL EXAM:  GENERAL : Lying down in bed with no acute distress  HEENT: AT NC PEERLA   NECK: Supple no JVP  CVS: S1 S2 RRR, no murmur or gallops heard  RS: diminished BS +  ABDOMEN: soft NT ND positive BS  EXTREMITY: Trace edema no clubbing or cyanosis, pedal pulse +  NEUROLOGY: AAA X3, no focal deficit or asterixis  VASCULAR ACCESS: Perm cath    Labs:  CBC:    Lab Results   Component Value Date/Time    WBC 5.3 2022 02:27 AM    HGB 8.7 (L) 2022 02:27 AM    HCT 26.7 (L) 2022 02:27 AM    PLT 90 (L) 2022 02:27 AM     BMP:   Lab Results   Component Value Date/Time     2022 02:27 AM    K 3.4 (L) 2022 02:27 AM     2022 02:27 AM    CO2 31 2022 02:27 AM    AGAP 5 2022 02:27 AM    GLU 88 2022 02:27 AM    BUN 18 2022 02:27 AM    CREA 1.55 (H) 2022 02:27 AM    GFRAA 56 (L) 2022 02:27 AM    GFRNA 46 (L) 2022 02:27 AM        Lab Results   Component Value Date/Time    Color YELLOW/STRAW 2022 09:34 PM    Appearance CLEAR 2022 09:34 PM    Specific gravity 1.017 2022 09:34 PM    pH (UA) 7.5 2022 09:34 PM    Protein >300 (A) 2022 09:34 PM    Glucose 250 (A) 2022 09:34 PM    Ketone Negative 2022 09:34 PM    Bilirubin Negative 2022 09:34 PM    Urobilinogen 1.0 2022 09:34 PM Nitrites Negative 02/05/2022 09:34 PM    Leukocyte Esterase Negative 02/05/2022 09:34 PM    Epithelial cells FEW 02/05/2022 09:34 PM    Bacteria Negative 02/05/2022 09:34 PM    WBC 0-4 02/05/2022 09:34 PM    RBC 20-50 02/05/2022 09:34 PM       Imaging study:    Assessment &Plan    [facility] HD MWF  HTN  Volume overload  Anemia of CKD    -had HD 2/6 with 2 kg off  -Plan for HD today and will attempt net UF:4 kg  -increase Nifedipine 60 mg daily  -order placed in computer and davita notified        Care Plan discussed with:   Medical Team    Ольга Ryan MD  2/7/2022    [facility]:  [email or web address]  Steven Adjutant office:  [address], [address] [place] [address],[address]  [place], [address] [facility]  Phone: [phone]  Fax :     [phone]     [place] office:  [address] [facility], [facility]  Phone - [phone]  Fax - [phone]

## 2022-02-07 NOTE — PROCEDURES
Hemodialysis / [phone]    Vitals Pre Post Assessment Pre Post   BP BP: (!) 183/67 (02/07/22 1030) 106/64 LOC A&OX4 A&OX4   HR Pulse (Heart Rate): 63 (02/07/22 1030) 65 Lungs CTA CTA   Resp Resp Rate: 11 (02/07/22 1030) 13 Cardiac NSR/renny  NSR/renny   Temp Temp: 98.2 °F (36.8 °C) (02/07/22 1019) 97.8 Skin W/D W/D   Weight    Edema None noted None noted   Tele status   Pain Pain Intensity 1: 10 (02/05/22 1921)      Orders   Duration: Start: 10:30 End: 13:57 Total: 3.5   Dialyzer: Dialyzer/Set Up Inspection: Revaclear (02/07/22 1030)   K Bath: Dialysate K (mEq/L): 4 (02/07/22 1030)   Ca Bath: Dialysate CA (mEq/L): 2.5 (02/07/22 1030)   Na: Dialysate NA (mEq/L): 138 (02/07/22 1030)   Bicarb: Dialysate HCO3 (mEq/L): 35 (02/07/22 1030)   Target Fluid Removal: Goal/Amount of Fluid to Remove (mL): 4000 mL (02/07/22 1030)     Access   Type & Location: Right chest port   Comments:                                        Labs   HBsAg (Antigen) / date:      1/17/22 Negative                                         HBsAb (Antibody) / date: unknown   Source: Physicians portal   Obtained/Reviewed  Critical Results Called HGB   Date Value Ref Range Status   02/07/2022 8.7 (L) 12.1 - 17.0 g/dL Final     Potassium   Date Value Ref Range Status   02/07/2022 3.4 (L) 3.5 - 5.1 mmol/L Final     Calcium   Date Value Ref Range Status   02/07/2022 7.7 (L) 8.5 - 10.1 MG/DL Final     BUN   Date Value Ref Range Status   02/07/2022 18 6 - 20 MG/DL Final     Creatinine   Date Value Ref Range Status   02/07/2022 1.55 (H) 0.70 - 1.30 MG/DL Final        Meds Given   Name Dose Route                    Adequacy / Fluid    Total Liters Process: 74.4   Net Fluid Removed: 4000      Comments   Time Out Done:   (Time) 10:28   Admitting Diagnosis: Abdominal pain   Consent obtained/signed: Informed Consent Verified: Yes (02/07/22 1030)   Machine / RO # Machine Number: [id number] (02/07/22 1030)   Primary Nurse Rpt Pre: Michael Bucio RN   Primary Nurse Rpt Post: Franky Mixon RN   Pt Education: Procedural/ kidney function   Care Plan: Possible discharged after HD if BP improves   Pts outpatient clinic: [facility] Summary  Report received from primary RN. Time out and assessment performed and documented. Each catheter limb disinfected per p&p, caps removed, hubs disinfected per p&p. Treatment started at 10:30  No Issues during treatment. Treatment ended at 13:57 All possible blood returned to patient. Each dialysis catheter limb disinfected per p&p, blood returned per p&p, each dialysis hub disinfected per p&p, post dialysis catheter dwell instilled per order, and caps applied.      Comments:

## 2022-02-07 NOTE — ED NOTES
Bedside and Verbal shift change report given to Nathan Pelletier (oncoming nurse) by Toney Flores (offgoing nurse). Report included the following information SBAR and ED Summary.

## 2022-02-07 NOTE — DISCHARGE INSTRUCTIONS
HOME DISCHARGE INSTRUCTIONS    Paty Howell / [id number] : 1960    Admission date: 2022 Discharge date: 2022 4:52 PM     Please bring this form with you to show your care provider at your follow-up appointment. Primary care provider:  Shikha Mercedes MD    Discharging provider:  Lo Conti DO  - Family Medicine Resident  Rich Brewer MD - Family Medicine Attending      You have been admitted to the hospital with the following diagnoses:    ACUTE DIAGNOSES:  Abdominal pain, unspecified abdominal location [R10.9]  Anasarca [R60.1]  ESRD (end stage renal disease) on dialysis ([facility] [address] [N18.6, Z99.2]   Hypertensive Urgency    . . . . . . . . . . . . . . . . . . . . . . . . . . . . . . . . . . . . . . . . . . . . . . . . . . . . . . . . . . . . . . . . . . . . . . . .   You are well enough to be discharged from the hospital. However, because you were inpatient in a hospital, you are at greater risk of having been exposed to the coronavirus. PLEASE stay inside and self-quarantine for 14 days to prevent further spread of the coronavirus. . . . . . . . . . . . . . . . . . . . . . . . . . . . . . . . . . . . . . . . . . . . . . . . . . . . . . . . . . . . . . . . . . . . . . . . .   Medications to [address] all of your home blood pressure medications    Medications to STOP   - Ibuprofen, Motrin, NSAIDs (These medications are not good for your kidneys)   . . . . . . . . . . . . . . . . . . . . . . . . . . . . . . . . . . . . . . . . . . . . . . . . . . . . . . . . . . . . . . . . . . . . . . . [place]      FOLLOW-UP CARE RECOMMENDATIONS:    - IF YOU'RE HAVING STOMACH PAIN - MAKE SURE YOU TAKE YOUR [address] AND YOUR CARAFATE  - Please follow up with your Nephrologist (kidney doctor)  - Please follow up with your Gastroenterologist (stomach doctor)    Appointments  Follow-up Information     Follow up With Specialties Details Why Contact Info    Shikha Mercedes MD Family Medicine Schedule an appointment as soon as possible for a visit   [address] [facility] [address]      [facility] EMERGENCY DEPT Emergency Medicine  As needed, If symptoms worsen [address] [facility]  [phone]    [facility]  Schedule an appointment as soon as possible for a visit  [address] [facility]  [phone]             Follow-up tests needed:   - Complete blood count to monitor your platelet count  - Basic metabolic panel to monitor your potassium level. - Blood pressure check with your primary care physician    Pending test results: At the time of your discharge the following test results are still pending: Peripheral smear. Please make sure you review these results with your outpatient follow-up provider(s). DIET/what to eat:  Renal Diet    ACTIVITY:  Activity as tolerated    Wound care: None    Equipment needed:  None     Specific symptoms to watch for: chest pain, shortness of breath, fever, chills, nausea, vomiting, diarrhea, change in mentation, falling, weakness, bleeding. What to do if new or unexpected symptoms occur? If you experience any of the above symptoms (or should other concerns or questions arise after discharge) please call your primary care physician. Return to the emergency room if you cannot get hold of your doctor. · It is very important that you keep your follow-up appointment(s). · Please bring discharge papers, medication list (and/or medication bottles) to your follow-up appointments for review by your outpatient provider(s). · Please check the list of medications and be sure it includes every medication (even non-prescription medications) that your provider wants you to take. · It is important that you take the medication exactly as they are prescribed.    · Keep your medication in the bottles provided by the pharmacist and keep a list of the medication names, dosages, and times to be taken in your wallet. · Do not take other medications without consulting your doctor. · If you have any questions about your medications or other instructions, please talk to your nurse or care provider before you leave the hospital.     Information obtained by:     I understand that if any problems occur once I am at home I am to contact my physician. These instructions were explained to me and I had the opportunity to ask questions. I understand and acknowledge receipt of the instructions indicated above.                                                                                                                                                Physician's or R.N.'s Signature                                                                  Date/Time                                                                                                                                              Patient or Representative Signature                                                          Date/Time

## 2022-02-07 NOTE — PROGRESS NOTES
[address] [place] [address]   Office [phone]  Fax [phone]          Assessment and Plan     Aniya Guerrero is a 64 y.o. male with h/o ESRD on HD, HTN, HFpEF, T2DM, CAD s/p CABG (May 2020), h/o PEA arrest, erosive gastritis, depression who is admitted for hypertensive urgency in s/o ESRD on HD and epigastric pain in s/o known erosive esophagitis. Patient was admitted on 2/5/2022.    24 Hour Events: HTN emergency overnight, given IV Hydral 20mg x2. Chest pain at the time that had some improvement with lidocaine patch, GI cocktail and Nitrobid. Trops flat, CXR NAP. Cardiology consulted and recommended to add Procardia 30 XL daily.      Hypertensive urgency: /99. Currently on BP regimen as noted below. Trop elevated likely secondary to renal disease. S/p Hydral 20mg, Labetalol 20mg, dilaudid 0.5mg, morphine 4mg, and zofran 4mg in the ED. BP improving.  - Admit to telemetry   - VS per unit routine   - Restart home meds (clonidine 0.2mg TID, eplerenone 25mg daily, Imdur 120mg daily, Toprol XL 50mg daily, Terazosin 5mg BID)   - Procardia incrased to 90mg XL by cardiology this AM and added Hydralazine 25mg tid  - IV Hydral 10mg q6h prn   - daily cbc, cmp, mg, phos   - UDS     ESRD on HD: Permacath placed 11/24. Missed HD yesterday secondary to acute illness. Follows with Dr. Sonja Garcia (Nepho). Receives HD MWF. CT c/a/p with diffuse subcutaneous edema, mild free fluid, and mesenteric edema, compatible with third spacing. Bilateral pleural effusions and atelectasis also present. - nephro consulted, appreciate rec's. Plan to receive HD today to be back on schedule. - Possible d/c after dialysis       Epigastric pain in the setting of known erosive gastritis: Prior admission for GI bleed in 11/2021. 11/16/21 EGD w/ nonbleeding erosive esophagitis. Noncompliant with protonix and carafate. No evidence of active bleeding.  S/p GI cocktail.   - Restart Protonix 40mg daily   - Carafate 1g BID   - Follow up with GI as outpatient      Concern for cirrhosis: past h/o heavy alcohol use, stopped 6-7 years ago. US 11/20/21 showing hepatic parenchymal disease with cirrhotic morphology, perihepatic ascites and right pleural effusion. Per chart review, had liver biopsy completed awaiting results. - following with Dr. Alon Hernandez (hepatology)   - outpatient follow up with GI      Tropinemia: POA 7. Suspect secondary to poor kidney function. EKG w/ NSR, rate 79. No obvious ischemic changes. No chest pain or shortness of breath. - Trend trop      Diarrhea: Now resolved. Will rule out COVID.   - Rapid COVID pending      Anemia/thrombocytopenia: This is a chronic condition. POA Hgb 10.2 (bl ~7.5) and Plt 99 (bl ~90). Likely secondary to ESRD.   - daily cbc   - iron profile, ferritin, folate, peripheral smear, reticulocyte count, B12 pending   - nephro consulted, holding RANDY due to hypertensive urgency      CAD s/p CABG: May 2020. Follows with Dr. Colletta Rings (Cardiology). - Rosuvastatin 10mg daily      HFpEF:  Echo 11/19/21 with EF 60-65% and reduced systolic function and moderate pulm HTN.   - Bumex 1mg BID   - follow up with cards as outpt      H/o PEA arrest: During previous admission to [facility] (11/2021) suspected secondary to respiratory distress. ROSC achieved after 12 minutes CPR. S/p intubation and extubation with mental status return to baseline.      T2DM: Last A1c 8.4% (11/2021). Currently on Glipizide 10mg daily (5mg x2) and Metformin 1000mg BID. - hold Metformin and glipizide   - Lispro sliding scale   - POC gluc ACHS   - hypoglycemic protocols ordered      Diabetic neuropathy: This is a chronic condition.   - Gabapentin 300mg TID      Depression: Chronic condition.    - Zoloft 100mg, 2 tabs daily   - Buspar 10mg BID   - Trazodone 50mg qhs         FEN/GI - GI bland. Potassium/low phos.    Activity - Ambulate as tolerated  DVT prophylaxis - Sub Q Heparin  GI prophylaxis - Protonix  Fall prophylaxis - Not indicated at this time. Disposition - Admit to Telemetry. Plan to d/c to Home. Consulting PT/OT/CM  Code Status - Full, discussed with patient / caregivers. Next of Kin Name and [address] [facility] [phone]     Patient Sheila Mean will be discussed Dr. Lazarus Silversmith, DO  Family Medicine Resident         Subjective / Objective     Subjective  Patient resting comfortably. Reports chest pain still that is worse with palpation. Also having epigastric pain. Respiratory:   O2 Device: None (Room air)   Visit Vitals  BP (!) 183/67   Pulse 63   Temp 98.2 °F (36.8 °C)   Resp 11   Wt 143 lb (64.9 kg)   SpO2 97%   BMI 21.12 kg/m²       General: No acute distress. Alert. Cooperative. Head: Normocephalic. Atraumatic. Eyes: Conjunctiva pink. Sclera white. PERRL. Ears: Ear canals patent. TM non-erythematous. Nose: Septum midline. Mucosa pink. No drainage. Throat: Mucosa pink. Moist mucous membranes. No tonsillar exudates or erythema. Palate movement equal bilaterally. Neck: Supple. Normal ROM. No stiffness. Respiratory: CTAB. No w/r/r/c.  Cardiovascular: RRR. Normal S1,S2. No m/r/g.   GI: + bowel sounds. Nontender. No rebound tenderness or guarding. Nondistended. Extremities:  No LE edema. Distal pulses present. Musculoskeletal: Full ROM in all extremities. Skin: Warm, dry. No rashes. Neuro: CN II-XII grossly intact. Strength 5/5 in all extremities. I/O:  Date 02/06/22 0700 - 02/07/22 0659 02/07/22 0700 - 02/08/22 0659   Shift 6833-4947 8516-1193 24 Hour Total 7205-8371 6480-3891 24 Hour Total   INTAKE   P.O. 240  240        P. O. 240  240      Shift Total(mL/kg) 240(3.7)  240(3.7)      OUTPUT   Urine(mL/kg/hr) 2129(5.4)  2100(1.3)        Urine Voided 2100  2100      Dialysis 2000 2000        NET Fluid Removed (mL) 2000 2000      Shift Total(mL/kg) 4100(63.2)  6580(42.1)      NET -3860  -3860      Weight (kg) 64.9 64.9 64.9 64.9 64.9 64.9       CBC:  Recent Labs     02/07/22  0225 02/06/22 0446 02/05/22 2003   WBC 5.3 6.4 7.4   HGB 8.7* 9.2* 10.2*   HCT 26.7* 28.3* 30.6*   PLT 90* 88* 99*       Metabolic Panel:  Recent Labs     02/07/22 0227 02/06/22 0446 02/05/22 2003    138 138   K 3.4* 3.4* 3.6    107 107   CO2 31 25 24   BUN 18 39* 40*   CREA 1.55* 2.14* 2.08*   GLU 88 102* 114*   CA 7.7* 8.1* 8.3*   MG 2.1 2.0 2.0   PHOS 2.1* 3.2  --    ALB 2.2* 2.4* 2.6*   ALT 23 21 22          For Billing    Chief Complaint   Patient presents with    Abdominal Pain    Shortness of Breath       Hospital Problems  Date Reviewed: 12/6/2021          Codes Class Noted POA    Anasarca ICD-10-CM: R60.1  ICD-9-CM: 782.3  2/6/2022 Unknown        Abdominal pain ICD-10-CM: R10.9  ICD-9-CM: 789.00  2/6/2022 Unknown        ESRD (end stage renal disease) on dialysis [facility]) ICD-10-CM: N18.6, Z99.2  ICD-9-CM: 585.6, V45.11  2/6/2022 Unknown        Heart failure with preserved ejection fraction (HCC) ICD-10-CM: I50.30  ICD-9-CM: 428.9  2/6/2022 Unknown        Elevated troponin ICD-10-CM: R77.8  ICD-9-CM: 790.6  2/6/2022 Unknown        Anemia ICD-10-CM: D64.9  ICD-9-CM: 285.9  2/6/2022 Unknown        Thrombocytopenia ([facility] [address] ICD-10-CM: D69.6  ICD-9-CM: 287.5  2/6/2022 Unknown        Epigastric pain ICD-10-CM: R10.13  ICD-9-CM: 789.06  2/6/2022 Unknown        * (Principal) Hypertensive urgency ICD-10-CM: I16.0  ICD-9-CM: 401.9  2/6/2022 Unknown        Obesity ICD-10-CM: E66.9  ICD-9-CM: 278.00  2/6/2022 Unknown        Type 2 diabetes mellitus with diabetic neuropathy ([facility] ICD-10-CM: E11.40  ICD-9-CM: 250.60, 357.2  8/5/2019 Yes        Reactive depression ICD-10-CM: F32.9  ICD-9-CM: 300.4  11/14/2018 Yes

## 2022-02-07 NOTE — PROGRESS NOTES
[facility]: MED RECONCILIATION    Comments/Recommendations:   Patient is a poor historian. Mr. Yaa Junior says that his wife takes care of his medications, but she will not be reachable until after 3pm. Called and left a HIPAA-compliant voicemail with the pharmacy office phone number. Called his primary pharmacy, [facility], for a list of current medications. He has not filled nifedipine or losartan since November, but he reports taking nifedipine 60 mg ER daily. He was not familiar with losartan. Lantus is prescribed for 10 units SQ every night, but the patient reports using it as needed (BG >200 mg/dL, he takes 5 units), if it is 120 mg/dL, he doesn't take any insulin. It has not been filled since October of 2021. His last dose was 1/31. He reports taking Advil for pain (about 1 tablet once a week, sometimes 2 tablets). Counseled him on the risks of NSAID use in ESRD. He verbalized understanding and says that he usually takes 1000 mg of Tylenol for pain about three days a week, but if he is out of Tylenol, he takes NSAIDs. He does not combine acetaminophen and NSAIDs. Dicyclomine 20 mg prescription filled at his pharmacy, but the patient reports not taking that medication.  It was removed from his med list.      Medications added:     Acetaminophen 1000 mg PRN pain  Advil 200 mg PRN pain  Amlodipine 10 mg every morning  Aspirin 81 mg chewable -- patient was unsure if he's taking this  Doxazosin 4 mg BID  Hydralazine 100 mg TID  Lantus insulin 10 units qHS -- he takes it PRN BG > 200 mg/dL  Minoxidil 5 mg BID    Medications removed:    Buspirone 10 mg every day - patient unfamiliar with medication  Cetirizine 10 mg every day   Dicyclomine 20 mg every 6 hours for abdominal cramps (up to 5 days)  Glipizide 5 mg every day  Metformin 1000 mg BID -- ESRD  Sertraline 200 mg every day (last filled November 2020)  Terazosin 5 mg BID    Medications adjusted:    Clonidine 0.2 mg TID -- not 0.3 mg TID  Gabapentin 300 mg BID -- not taking TID  Nifedipine ER 60 mg every day -- patient reports taking 1 tablet once a day, 60 tablets for 30 day supply filled 11/30/21  Rosuvastatin 10 mg -- takes in the morning, not at night    Information obtained from: Patient, [facility] pharmacy, RX Query    Allergies: Patient has no known allergies. - confirmed    Prior to Admission Medications:   Medication Documentation Review Audit       Reviewed by Talib Lara (Pharmacy Student) on 02/07/22 at 1250      Medication Sig Documenting Provider Last Dose Status Taking?   acetaminophen (Tylenol Extra Strength) 500 mg tablet Take 1,000 mg by mouth every six (6) hours as needed for Pain. Provider, Historical  Active Yes   amLODIPine (NORVASC) 10 mg tablet Take 10 mg by mouth Every morning. Provider, Historical 2/4/2022 0800 Active Yes   aspirin 81 mg chewable tablet Take 81 mg by mouth daily. Provider, Historical  Active Yes   bumetanide (BUMEX) 2 mg tablet Take 2 mg by mouth daily. Provider, Historical 2/4/2022 0800 Active Yes   calcium carbonate (TUMS) 200 mg calcium (500 mg) chew Take 1 Tablet by mouth daily as needed. Provider, Historical  Active Yes   cloNIDine HCL (CATAPRES) 0.2 mg tablet Take 0.2 mg by mouth three (3) times daily. Provider, Historical  Active Yes   doxazosin (CARDURA) 4 mg tablet Take 4 mg by mouth two (2) times a day. Provider, Historical  Active Yes   eplerenone (Inspra) 25 mg tablet Take 25 mg by mouth daily. Provider, Historical  Active Yes   gabapentin (NEURONTIN) 300 mg capsule Take 300 mg by mouth two (2) times a day. Provider, Historical 2/4/2022 0800 Active Yes   hydrALAZINE (APRESOLINE) 100 mg tablet Take 100 mg by mouth three (3) times daily. Provider, Historical  Active Yes   ibuprofen (AdviL) 200 mg tablet Take 200 mg by mouth daily as needed for Pain. Provider, Historical  Active Yes   insulin glargine (Lantus U-100 Insulin) 100 unit/mL injection 10 Units by SubCUTAneous route daily.  Provider, Historical 1/31/2022 0800 Active Yes isosorbide mononitrate ER (IMDUR) 120 mg CR tablet Take 120 mg by mouth every morning. Provider, Historical  Active Yes   metoprolol succinate (Toprol XL) 25 mg XL tablet Take 25 mg by mouth Every morning. Provider, Historical  Active Yes   minoxidiL (LONITEN) 2.5 mg tablet Take 5 mg by mouth two (2) times a day. Provider, Historical  Active Yes   NIFEdipine ER (PROCARDIA XL) 60 mg ER tablet Take 60 mg by mouth daily. Provider, Historical  Active Yes   ondansetron (ZOFRAN ODT) 8 mg disintegrating tablet Take 8 mg by mouth every eight (8) hours as needed for Nausea or Vomiting. Provider, Historical  Active Yes   pantoprazole (PROTONIX) 40 mg tablet Take 40 mg by mouth daily. Provider, Historical 2/4/2022 0800 Active Yes   rosuvastatin (CRESTOR) 10 mg tablet Take 10 mg by mouth Every morning. Provider, Historical 2/4/2022 0800 Active Yes   sucralfate (CARAFATE) 1 gram tablet Take 1 g by mouth four (4) times daily. Provider, Historical  Active Yes   traZODone (DESYREL) 50 mg tablet Take 50 mg by mouth nightly as needed for Sleep.  Provider, Historical 1/28/2022 Unknown time Active Yes                    Paulina Lindquist, PharmD Candidate [id number]

## 2022-02-07 NOTE — PROGRESS NOTES
Pt sleeping when entered room to reevaluate. On awakening c/o \"pain all over\", denies specific location. BP back up to 220/70. No focal neurologic deficits. Gave Hydral 20mg IV (2nd dose tonight). BP initially improved, however continues to be persistently elevated. Cased discussed with on call Cardiologist Dr. Cassie Peralta who rec'd starting po Procardia 30mg XL now.      Yanely Guzman MD

## 2022-02-07 NOTE — PROGRESS NOTES
Spiritual Care Assessment/Progress Note  [address]      NAME: Gary Hall      MRN: [id number]  AGE: 64 y.o. SEX: male  [place] Affiliation: Cheondoism   Language: English     2/7/2022     Total Time (in minutes): 11     Spiritual Assessment begun in [facility] EMERGENCY DEPT through conversation with:         []Patient        [] Family    [] Friend(s)        Reason for Consult: Emergency Department visit     Spiritual beliefs: (Please include comment if needed)     [x] Identifies with a jelly tradition:    Cheondoism according to chart      [] Supported by a jelly community:            [] Claims no spiritual orientation:           [] Seeking spiritual identity:                [] Adheres to an individual form of spirituality:           [x] Not able to assess:                           Identified resources for coping:      [] Prayer                               [] Music                  [] Guided Imagery     [] Family/friends                 [] Pet visits     [] Devotional reading                         [x] Unknown     [] Other:                                              Interventions offered during this visit: (See comments for more details)                Plan of Care:     [] Support spiritual and/or cultural needs    [] Support AMD and/or advance care planning process      [] Support grieving process   [] Coordinate Rites and/or Rituals    [] Coordination with community clergy   [] No spiritual needs identified at this time   [] Detailed Plan of Care below (See Comments)  [] Make referral to Music Therapy  [] Make referral to Pet Therapy     [] Make referral to Addiction services  [] Make referral to [facility]  [] Make referral to Spiritual Care Partner  [] No future visits requested        [x] Contact Spiritual Care for further referrals     Comments:  Spiritual care assessment for Mr. Gary Hall in Emergency Department (ED). Mr. Kevin Sales is sleeping while receiving dialysis treatment.   consulted with attending nurse and advised nurse to contact [facility] for any further referrals.     Geovany Bruce Done ([id number])

## 2022-02-07 NOTE — PROGRESS NOTES
Reviewed chart and noted patient's BP still elevated. Spoke with dialylsis nurse, she is set up in the patient's room and will run dialysis for next 3.5 hrs. Will continue to follow and attempt Physical Therapy Eval at a later time.

## 2022-02-07 NOTE — CONSULTS
[address] [place] [address]., [address] [place] [address], [id number] [place] [address] [phone]; Fax [phone]      Patient: eDan Dominique  : 1960      Today's Date: 2022      HISTORY OF PRESENT ILLNESS:     History of Present Illness:  61yo male with PMH of ESRD on HD, HTN, HFpEF, anemia, T2DM, CAD s/p CABG, erosive gastritis, hx of PEA arrest who presented with epigastric abdominal pain thought to be 2/2 hx of erosive gastritis and found to have hypertensive urgency with very difficult to control HTN. Patient reportedly has not missed any of his medications, but did have emesis shortly after taking his BP meds the day of admission. Has some intermittent, sharp CP that radiates to his back with associated PEARCE. No CP at rest. Denies current nausea, vomiting, abdominal pain, LE edema.           PAST MEDICAL HISTORY:     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, with long-term current use of insulin ([address] 2018    Diabetes ([address]            Past Surgical History:   Procedure Laterality Date    HX ARTERIAL BYPASS      HX OTHER SURGICAL      5th toe Metatarsal amputation 2017     IR INSERT NON TUNL CVC OVER 5 YRS  2021    IR INSERT TUNL CVC W/O PORT OVER 5 YR  2021           Patient Active Problem List   Diagnosis Code    Essential hypertension I10    Reactive depression F32.9    Peripheral neuropathy G62.9    Type 2 diabetes mellitus with ophthalmic complication, with long-term current use of insulin ([address] E11.39, Z79.4    Type 2 diabetes with nephropathy ([address] E11.21    Type 2 diabetes mellitus with diabetic neuropathy ([address] E11.40    CHF exacerbation ([facility]) I50.9    Severe protein-calorie malnutrition ([address] E43    Anasarca R60.1    Abdominal pain R10.9    ESRD (end stage renal disease) on dialysis (HCC) N18.6, Z99.2    Heart failure with preserved ejection fraction (HCC) I50.30    Elevated troponin R77.8    Anemia D64.9    Thrombocytopenia (HCC) D69.6    Epigastric pain R10.13    Hypertensive urgency I16.0    Obesity E66.9             FAMILY HISTORY:     Family History   Problem Relation Age of Onset    Diabetes Mother     No Known Problems Father              SOCIAL HISTORY:     Social History     Tobacco Use    Smoking status: Former Smoker     Quit date: 2001     Years since quittin.7    Smokeless tobacco: Never Used   Substance Use Topics    Alcohol use: No    Drug use: No               REVIEW OF SYMPTOMS:     Review of Symptoms:  Constitutional: Negative for fever, chills  HEENT: Negative for nosebleeds, tinnitus, and vision changes. Respiratory: Negative for cough, wheezing  Cardiovascular: Negative for orthopnea, claudication, syncope, and PND. Positive CP and PEARCE. Gastrointestinal: Negative for abdominal pain, diarrhea, melena. Genitourinary: Negative for dysuria  Musculoskeletal: Negative for myalgias. Skin: Negative for rash  Heme: No problems bleeding. Neurological: Negative for speech change and focal weakness. PHYSICAL EXAM:     Physical Exam:  Visit Vitals  BP (!) 219/76   Pulse 71   Temp 98.4 °F (36.9 °C)   Resp 16   Wt 143 lb (64.9 kg)   SpO2 93%   BMI 21.12 kg/m²     Patient appears generally well, mood and affect are appropriate and pleasant. HEENT:  Hearing intact, non-icteric, normocephalic, atraumatic. Neck Exam: Supple, No JVD or carotid bruits. Lung Exam: Clear to auscultation, even breath sounds. Cardiac Exam: Regular rate and rhythm with no murmur  Abdomen: Soft, non-tender, normal bowel sounds. No bruits or masses. Extremities: Moves all ext well. No lower extremity edema. Vascular: 2+ dorsalis pedis pulses bilaterally.   Psych: Appropriate affect  Neuro - Grossly intact          LABS / OTHER STUDIES reviewed:     Recent Results (from the past 24 hour(s))   GLUCOSE, POC    Collection Time: 22 11:49 AM   Result Value Ref Range    Glucose (POC) 157 (H) 65 - 117 mg/dL    Performed by Landon Parrish    TROPONIN-HIGH SENSITIVITY    Collection Time: 02/06/22 10:10 PM   Result Value Ref Range    Troponin-High Sensitivity 30 0 - 76 ng/L   GLUCOSE, POC    Collection Time: 02/06/22 10:20 PM   Result Value Ref Range    Glucose (POC) 99 65 - 117 mg/dL    Performed by Trent Dickey RN    METABOLIC PANEL, COMPREHENSIVE    Collection Time: 02/07/22  2:27 AM   Result Value Ref Range    Sodium 138 136 - 145 mmol/L    Potassium 3.4 (L) 3.5 - 5.1 mmol/L    Chloride 102 97 - 108 mmol/L    CO2 31 21 - 32 mmol/L    Anion gap 5 5 - 15 mmol/L    Glucose 88 65 - 100 mg/dL    BUN 18 6 - 20 MG/DL    Creatinine 1.55 (H) 0.70 - 1.30 MG/DL    BUN/Creatinine ratio 12 12 - 20      GFR est AA 56 (L) >60 ml/min/1.73m2    GFR est non-AA 46 (L) >60 ml/min/1.73m2    Calcium 7.7 (L) 8.5 - 10.1 MG/DL    Bilirubin, total 0.9 0.2 - 1.0 MG/DL    ALT (SGPT) 23 12 - 78 U/L    AST (SGOT) 29 15 - 37 U/L    Alk. phosphatase 134 (H) 45 - 117 U/L    Protein, total 5.8 (L) 6.4 - 8.2 g/dL    Albumin 2.2 (L) 3.5 - 5.0 g/dL    Globulin 3.6 2.0 - 4.0 g/dL    A-G Ratio 0.6 (L) 1.1 - 2.2     CBC WITH AUTOMATED DIFF    Collection Time: 02/07/22  2:27 AM   Result Value Ref Range    WBC 5.3 4.1 - 11.1 K/uL    RBC 3.16 (L) 4.10 - 5.70 M/uL    HGB 8.7 (L) 12.1 - 17.0 g/dL    HCT 26.7 (L) 36.6 - 50.3 %    MCV 84.5 80.0 - 99.0 FL    MCH 27.5 26.0 - 34.0 PG    MCHC 32.6 30.0 - 36.5 g/dL    RDW 15.1 (H) 11.5 - 14.5 %    PLATELET 90 (L) 153 - 400 K/uL    MPV 9.4 8.9 - 12.9 FL    NRBC 0.0 0  WBC    ABSOLUTE NRBC 0.00 0.00 - 0.01 K/uL    NEUTROPHILS 69 32 - 75 %    LYMPHOCYTES 18 12 - 49 %    MONOCYTES 9 5 - 13 %    EOSINOPHILS 4 0 - 7 %    BASOPHILS 0 0 - 1 %    IMMATURE GRANULOCYTES 0 0.0 - 0.5 %    ABS. NEUTROPHILS 3.6 1.8 - 8.0 K/UL    ABS. LYMPHOCYTES 1.0 0.8 - 3.5 K/UL    ABS. MONOCYTES 0.5 0.0 - 1.0 K/UL    ABS. EOSINOPHILS 0.2 0.0 - 0.4 K/UL    ABS. BASOPHILS 0.0 0.0 - 0.1 K/UL    ABS. IMM. GRANS. 0.0 0.00 - 0.04 K/UL    DF SMEAR SCANNED      RBC COMMENTS NORMOCYTIC, NORMOCHROMIC     PHOSPHORUS    Collection Time: 02/07/22  2:27 AM   Result Value Ref Range    Phosphorus 2.1 (L) 2.6 - 4.7 MG/DL   TROPONIN-HIGH SENSITIVITY    Collection Time: 02/07/22  2:27 AM   Result Value Ref Range    Troponin-High Sensitivity 39 0 - 76 ng/L   GLUCOSE, POC    Collection Time: 02/07/22  7:48 AM   Result Value Ref Range    Glucose (POC) 115 65 - 117 mg/dL    Performed by Christi Olivares              CARDIAC DIAGNOSTICS:     Cardiac Evaluation Includes:    ECHO 11/14/21: EF 60-65%. Mild concentric hypertrophy, mildly dilated right ventricle, mildly reduced systolic function. EKG 2/5/22: NSR, rate 80s, normal axis (~45)        ASSESSMENT AND PLAN:     Assessment and Plan:    1. HTN Urgency: continue clonidine 0.2mg TID, eplerenone 25mg daily, imdur 120mg daily, toprol-XL 50mg daily, terazosin 5mg BID. Increase procardia XL to 90mg daily and add Hydralazine 25mg TID. Needs good pharmacy med rec. HTN should be managed by Nephrology as he sees them most often. If headache persists, can consider cutting back on imdur. 2. ESRD on HD: Nephro following. 3. CAD s/p CABG: on metoprolol, rosuvastatin. Recommend starting asa if no contraindications. Follow up with Dr. Tita Jarrett. 4. T2DM: A1C 8.4. On metformin and glipizide outpatient. Patient discussed with Dr. Yemi Rouse, Cardiology Attending. Nia Mcallister DO  Family Medicine Resident      CARDIOLOGY ATTENDING  Patient personally seen and examined. All the elements of history and examination were personally performed. Assessment and plan was discussed and agree. Hrt RRR, no murmur. Lungs clear. Abd soft, NT    1) Uncontrolled HTN  - high BP in admission likely related to missing dialysis session and inability to keep down PO meds.    - Since BP remains elevated today, have increased Nifedipine and added hydralazine  - Patient is unaware of what meds he is on -- needs Pharmacy to perform med recon   - Long term, antihypertensives best managed by Nephrology as they mange his HD sessions and keep track of him more closely     2) CAD  - has chronic atypical CP without changes lately   - no ischemic EKG changes   - cont BB, statin,   - consider starting ASA 81 mg daily if no contraindication     3) I will sign off and patient can FU with Dr. Lopez Rita outpatient. Please call if any questions.      Soo Marvin MD, [facility]

## 2022-02-07 NOTE — PROGRESS NOTES
Blood pressure elevated at time of discharge to 203/80, with subsequent elevation to 221/78. Gave Hydralazine 20mg IV. Patient also c/o left side chest pain described as sharp and radiating to the back. Obtained EKG with NSR and no acute ischemic changes, QTc 482. Also ordered CXR and troponin. Will consider cards c/s to adjust BP regimen.  No focal neurologic deficits on exam.     Debora Rincon MD  PGY1 Family Medicine Resident

## 2022-02-08 LAB — PERIPHERAL SMEAR,PSM: NORMAL

## 2022-02-09 DIAGNOSIS — N18.6 ESRD (END STAGE RENAL DISEASE) ON DIALYSIS (HCC): ICD-10-CM

## 2022-02-09 DIAGNOSIS — I16.0 HYPERTENSIVE URGENCY: ICD-10-CM

## 2022-02-09 DIAGNOSIS — R10.84 GENERALIZED ABDOMINAL PAIN: ICD-10-CM

## 2022-02-09 DIAGNOSIS — Z99.2 ESRD (END STAGE RENAL DISEASE) ON DIALYSIS (HCC): ICD-10-CM

## 2022-02-09 DIAGNOSIS — I50.9 ACUTE ON CHRONIC CONGESTIVE HEART FAILURE, UNSPECIFIED HEART FAILURE TYPE (HCC): ICD-10-CM

## 2022-02-09 DIAGNOSIS — E11.21 TYPE 2 DIABETES WITH NEPHROPATHY (HCC): ICD-10-CM

## 2022-02-09 DIAGNOSIS — I10 ESSENTIAL HYPERTENSION: ICD-10-CM

## 2022-02-14 ENCOUNTER — APPOINTMENT (OUTPATIENT)
Dept: CT IMAGING | Age: 62
End: 2022-02-14
Attending: EMERGENCY MEDICINE
Payer: MEDICAID

## 2022-02-14 ENCOUNTER — HOSPITAL ENCOUNTER (OUTPATIENT)
Age: 62
Setting detail: OBSERVATION
Discharge: HOME | End: 2022-02-15
Attending: EMERGENCY MEDICINE | Admitting: FAMILY MEDICINE
Payer: MEDICAID

## 2022-02-14 ENCOUNTER — APPOINTMENT (OUTPATIENT)
Dept: GENERAL RADIOLOGY | Age: 62
End: 2022-02-14
Attending: EMERGENCY MEDICINE
Payer: MEDICAID

## 2022-02-14 DIAGNOSIS — E11.36 TYPE 2 DIABETES MELLITUS WITH DIABETIC CATARACT, WITH LONG-TERM CURRENT USE OF INSULIN (HCC): ICD-10-CM

## 2022-02-14 DIAGNOSIS — Z99.2 ESRD (END STAGE RENAL DISEASE) ON DIALYSIS (HCC): Primary | ICD-10-CM

## 2022-02-14 DIAGNOSIS — I16.0 HYPERTENSIVE URGENCY: ICD-10-CM

## 2022-02-14 DIAGNOSIS — N18.6 ESRD (END STAGE RENAL DISEASE) ON DIALYSIS (HCC): Primary | ICD-10-CM

## 2022-02-14 DIAGNOSIS — R60.1 ANASARCA: ICD-10-CM

## 2022-02-14 DIAGNOSIS — R51.9 ACUTE NONINTRACTABLE HEADACHE, UNSPECIFIED HEADACHE TYPE: ICD-10-CM

## 2022-02-14 DIAGNOSIS — Z79.4 TYPE 2 DIABETES MELLITUS WITH DIABETIC CATARACT, WITH LONG-TERM CURRENT USE OF INSULIN (HCC): ICD-10-CM

## 2022-02-14 DIAGNOSIS — R10.9 ABDOMINAL PAIN, UNSPECIFIED ABDOMINAL LOCATION: ICD-10-CM

## 2022-02-14 PROBLEM — E66.9 OBESITY: Status: RESOLVED | Noted: 2022-02-06 | Resolved: 2022-02-14

## 2022-02-14 PROBLEM — I16.1 HYPERTENSIVE EMERGENCY: Status: ACTIVE | Noted: 2022-02-14

## 2022-02-14 LAB
ALBUMIN SERPL-MCNC: 2.6 G/DL (ref 3.5–5)
ALBUMIN/GLOB SERPL: 0.6 {RATIO} (ref 1.1–2.2)
ALP SERPL-CCNC: 172 U/L (ref 45–117)
ALT SERPL-CCNC: 39 U/L (ref 12–78)
ANION GAP SERPL CALC-SCNC: 8 MMOL/L (ref 5–15)
AST SERPL-CCNC: 32 U/L (ref 15–37)
BASOPHILS # BLD: 0.1 K/UL (ref 0–0.1)
BASOPHILS NFR BLD: 1 % (ref 0–1)
BILIRUB SERPL-MCNC: 0.4 MG/DL (ref 0.2–1)
BUN SERPL-MCNC: 48 MG/DL (ref 6–20)
BUN/CREAT SERPL: 21 (ref 12–20)
CALCIUM SERPL-MCNC: 8.5 MG/DL (ref 8.5–10.1)
CHLORIDE SERPL-SCNC: 102 MMOL/L (ref 97–108)
CO2 SERPL-SCNC: 22 MMOL/L (ref 21–32)
COMMENT, HOLDF: NORMAL
CREAT SERPL-MCNC: 2.34 MG/DL (ref 0.7–1.3)
D DIMER PPP FEU-MCNC: 13.86 MG/L FEU (ref 0–0.65)
DIFFERENTIAL METHOD BLD: ABNORMAL
EOSINOPHIL # BLD: 0.2 K/UL (ref 0–0.4)
EOSINOPHIL NFR BLD: 3 % (ref 0–7)
ERYTHROCYTE [DISTWIDTH] IN BLOOD BY AUTOMATED COUNT: 15.7 % (ref 11.5–14.5)
GLOBULIN SER CALC-MCNC: 4.6 G/DL (ref 2–4)
GLUCOSE BLD STRIP.AUTO-MCNC: 271 MG/DL (ref 65–117)
GLUCOSE SERPL-MCNC: 272 MG/DL (ref 65–100)
HCT VFR BLD AUTO: 31.4 % (ref 36.6–50.3)
HGB BLD-MCNC: 10.9 G/DL (ref 12.1–17)
IMM GRANULOCYTES # BLD AUTO: 0.1 K/UL (ref 0–0.04)
IMM GRANULOCYTES NFR BLD AUTO: 1 % (ref 0–0.5)
LIPASE SERPL-CCNC: 123 U/L (ref 73–393)
LYMPHOCYTES # BLD: 1 K/UL (ref 0.8–3.5)
LYMPHOCYTES NFR BLD: 17 % (ref 12–49)
MAGNESIUM SERPL-MCNC: 2.1 MG/DL (ref 1.6–2.4)
MCH RBC QN AUTO: 28.2 PG (ref 26–34)
MCHC RBC AUTO-ENTMCNC: 34.7 G/DL (ref 30–36.5)
MCV RBC AUTO: 81.1 FL (ref 80–99)
MONOCYTES # BLD: 0.5 K/UL (ref 0–1)
MONOCYTES NFR BLD: 8 % (ref 5–13)
NEUTS SEG # BLD: 4.1 K/UL (ref 1.8–8)
NEUTS SEG NFR BLD: 70 % (ref 32–75)
NRBC # BLD: 0 K/UL (ref 0–0.01)
NRBC BLD-RTO: 0 PER 100 WBC
PHOSPHATE SERPL-MCNC: 4.2 MG/DL (ref 2.6–4.7)
PLATELET # BLD AUTO: 98 K/UL (ref 150–400)
PMV BLD AUTO: 9.9 FL (ref 8.9–12.9)
POTASSIUM SERPL-SCNC: 3.7 MMOL/L (ref 3.5–5.1)
PROT SERPL-MCNC: 7.2 G/DL (ref 6.4–8.2)
RBC # BLD AUTO: 3.87 M/UL (ref 4.1–5.7)
RBC MORPH BLD: ABNORMAL
SAMPLES BEING HELD,HOLD: NORMAL
SERVICE CMNT-IMP: ABNORMAL
SODIUM SERPL-SCNC: 132 MMOL/L (ref 136–145)
TROPONIN-HIGH SENSITIVITY: 36 NG/L (ref 0–76)
TROPONIN-HIGH SENSITIVITY: 36 NG/L (ref 0–76)
WBC # BLD AUTO: 6 K/UL (ref 4.1–11.1)

## 2022-02-14 PROCEDURE — 84484 ASSAY OF TROPONIN QUANT: CPT

## 2022-02-14 PROCEDURE — 74011250636 HC RX REV CODE- 250/636: Performed by: STUDENT IN AN ORGANIZED HEALTH CARE EDUCATION/TRAINING PROGRAM

## 2022-02-14 PROCEDURE — 71046 X-RAY EXAM CHEST 2 VIEWS: CPT

## 2022-02-14 PROCEDURE — 74011000250 HC RX REV CODE- 250: Performed by: STUDENT IN AN ORGANIZED HEALTH CARE EDUCATION/TRAINING PROGRAM

## 2022-02-14 PROCEDURE — 94761 N-INVAS EAR/PLS OXIMETRY MLT: CPT

## 2022-02-14 PROCEDURE — 84100 ASSAY OF PHOSPHORUS: CPT

## 2022-02-14 PROCEDURE — 85379 FIBRIN DEGRADATION QUANT: CPT

## 2022-02-14 PROCEDURE — 82962 GLUCOSE BLOOD TEST: CPT

## 2022-02-14 PROCEDURE — 74011636637 HC RX REV CODE- 636/637

## 2022-02-14 PROCEDURE — 74011000636 HC RX REV CODE- 636: Performed by: EMERGENCY MEDICINE

## 2022-02-14 PROCEDURE — 65270000029 HC RM PRIVATE

## 2022-02-14 PROCEDURE — 83690 ASSAY OF LIPASE: CPT

## 2022-02-14 PROCEDURE — 99282 EMERGENCY DEPT VISIT SF MDM: CPT

## 2022-02-14 PROCEDURE — 74011250637 HC RX REV CODE- 250/637: Performed by: STUDENT IN AN ORGANIZED HEALTH CARE EDUCATION/TRAINING PROGRAM

## 2022-02-14 PROCEDURE — 70450 CT HEAD/BRAIN W/O DYE: CPT

## 2022-02-14 PROCEDURE — 85025 COMPLETE CBC W/AUTO DIFF WBC: CPT

## 2022-02-14 PROCEDURE — 74011250637 HC RX REV CODE- 250/637

## 2022-02-14 PROCEDURE — 65660000000 HC RM CCU STEPDOWN

## 2022-02-14 PROCEDURE — 83735 ASSAY OF MAGNESIUM: CPT

## 2022-02-14 PROCEDURE — 36415 COLL VENOUS BLD VENIPUNCTURE: CPT

## 2022-02-14 PROCEDURE — 80053 COMPREHEN METABOLIC PANEL: CPT

## 2022-02-14 PROCEDURE — 71275 CT ANGIOGRAPHY CHEST: CPT

## 2022-02-14 PROCEDURE — 93005 ELECTROCARDIOGRAM TRACING: CPT

## 2022-02-14 PROCEDURE — 74177 CT ABD & PELVIS W/CONTRAST: CPT

## 2022-02-14 RX ORDER — NIFEDIPINE 60 MG/1
60 TABLET, EXTENDED RELEASE ORAL DAILY
Status: DISCONTINUED | OUTPATIENT
Start: 2022-02-15 | End: 2022-02-14

## 2022-02-14 RX ORDER — SODIUM CHLORIDE 0.9 % (FLUSH) 0.9 %
5-40 SYRINGE (ML) INJECTION EVERY 8 HOURS
Status: DISCONTINUED | OUTPATIENT
Start: 2022-02-14 | End: 2022-02-15 | Stop reason: HOSPADM

## 2022-02-14 RX ORDER — PANTOPRAZOLE SODIUM 40 MG/1
40 TABLET, DELAYED RELEASE ORAL
Status: DISCONTINUED | OUTPATIENT
Start: 2022-02-15 | End: 2022-02-15 | Stop reason: HOSPADM

## 2022-02-14 RX ORDER — GABAPENTIN 100 MG/1
100 CAPSULE ORAL 2 TIMES DAILY
Status: DISCONTINUED | OUTPATIENT
Start: 2022-02-14 | End: 2022-02-15 | Stop reason: HOSPADM

## 2022-02-14 RX ORDER — ISOSORBIDE MONONITRATE 30 MG/1
120 TABLET, EXTENDED RELEASE ORAL
Status: DISCONTINUED | OUTPATIENT
Start: 2022-02-15 | End: 2022-02-14

## 2022-02-14 RX ORDER — ROSUVASTATIN CALCIUM 10 MG/1
10 TABLET, COATED ORAL EVERY MORNING
Status: DISCONTINUED | OUTPATIENT
Start: 2022-02-15 | End: 2022-02-15 | Stop reason: HOSPADM

## 2022-02-14 RX ORDER — GUAIFENESIN 100 MG/5ML
81 LIQUID (ML) ORAL DAILY
Status: DISCONTINUED | OUTPATIENT
Start: 2022-02-15 | End: 2022-02-15 | Stop reason: HOSPADM

## 2022-02-14 RX ORDER — LABETALOL HYDROCHLORIDE 5 MG/ML
10 INJECTION, SOLUTION INTRAVENOUS
Status: DISCONTINUED | OUTPATIENT
Start: 2022-02-14 | End: 2022-02-15 | Stop reason: HOSPADM

## 2022-02-14 RX ORDER — SUCRALFATE 1 G/1
1 TABLET ORAL
Status: DISCONTINUED | OUTPATIENT
Start: 2022-02-14 | End: 2022-02-15 | Stop reason: HOSPADM

## 2022-02-14 RX ORDER — MAGNESIUM SULFATE 100 %
4 CRYSTALS MISCELLANEOUS AS NEEDED
Status: DISCONTINUED | OUTPATIENT
Start: 2022-02-14 | End: 2022-02-15 | Stop reason: HOSPADM

## 2022-02-14 RX ORDER — BUTALBITAL, ACETAMINOPHEN AND CAFFEINE 50; 325; 40 MG/1; MG/1; MG/1
1 TABLET ORAL ONCE
Status: COMPLETED | OUTPATIENT
Start: 2022-02-14 | End: 2022-02-14

## 2022-02-14 RX ORDER — HEPARIN SODIUM 5000 [USP'U]/ML
5000 INJECTION, SOLUTION INTRAVENOUS; SUBCUTANEOUS EVERY 8 HOURS
Status: DISCONTINUED | OUTPATIENT
Start: 2022-02-14 | End: 2022-02-15 | Stop reason: HOSPADM

## 2022-02-14 RX ORDER — BUMETANIDE 1 MG/1
2 TABLET ORAL DAILY
Status: DISCONTINUED | OUTPATIENT
Start: 2022-02-15 | End: 2022-02-14

## 2022-02-14 RX ORDER — EPLERENONE 25 MG/1
25 TABLET, FILM COATED ORAL DAILY
Status: DISCONTINUED | OUTPATIENT
Start: 2022-02-14 | End: 2022-02-15 | Stop reason: HOSPADM

## 2022-02-14 RX ORDER — METOPROLOL SUCCINATE 50 MG/1
50 TABLET, EXTENDED RELEASE ORAL DAILY
Status: DISCONTINUED | OUTPATIENT
Start: 2022-02-14 | End: 2022-02-15 | Stop reason: HOSPADM

## 2022-02-14 RX ORDER — DEXTROSE 50 % IN WATER (D50W) INTRAVENOUS SYRINGE
12.5-25 AS NEEDED
Status: DISCONTINUED | OUTPATIENT
Start: 2022-02-14 | End: 2022-02-15 | Stop reason: HOSPADM

## 2022-02-14 RX ORDER — GABAPENTIN 300 MG/1
300 CAPSULE ORAL 2 TIMES DAILY
Status: DISCONTINUED | OUTPATIENT
Start: 2022-02-14 | End: 2022-02-14

## 2022-02-14 RX ORDER — ISOSORBIDE MONONITRATE 60 MG/1
60 TABLET, EXTENDED RELEASE ORAL
Status: DISCONTINUED | OUTPATIENT
Start: 2022-02-14 | End: 2022-02-15 | Stop reason: HOSPADM

## 2022-02-14 RX ORDER — AMLODIPINE BESYLATE 5 MG/1
10 TABLET ORAL EVERY MORNING
Status: DISCONTINUED | OUTPATIENT
Start: 2022-02-14 | End: 2022-02-15 | Stop reason: HOSPADM

## 2022-02-14 RX ORDER — ACETAMINOPHEN 650 MG/1
650 SUPPOSITORY RECTAL
Status: DISCONTINUED | OUTPATIENT
Start: 2022-02-14 | End: 2022-02-15 | Stop reason: HOSPADM

## 2022-02-14 RX ORDER — EPLERENONE 25 MG/1
25 TABLET, FILM COATED ORAL DAILY
Status: DISCONTINUED | OUTPATIENT
Start: 2022-02-15 | End: 2022-02-14

## 2022-02-14 RX ORDER — NIFEDIPINE 30 MG/1
90 TABLET, EXTENDED RELEASE ORAL DAILY
Status: DISCONTINUED | OUTPATIENT
Start: 2022-02-14 | End: 2022-02-15 | Stop reason: HOSPADM

## 2022-02-14 RX ORDER — BUMETANIDE 1 MG/1
2 TABLET ORAL DAILY
Status: DISCONTINUED | OUTPATIENT
Start: 2022-02-14 | End: 2022-02-15 | Stop reason: HOSPADM

## 2022-02-14 RX ORDER — AMLODIPINE BESYLATE 5 MG/1
10 TABLET ORAL EVERY MORNING
Status: DISCONTINUED | OUTPATIENT
Start: 2022-02-15 | End: 2022-02-14

## 2022-02-14 RX ORDER — PROCHLORPERAZINE EDISYLATE 5 MG/ML
5 INJECTION INTRAMUSCULAR; INTRAVENOUS
Status: DISCONTINUED | OUTPATIENT
Start: 2022-02-14 | End: 2022-02-15 | Stop reason: HOSPADM

## 2022-02-14 RX ORDER — INSULIN GLARGINE 100 [IU]/ML
10 INJECTION, SOLUTION SUBCUTANEOUS
Status: DISCONTINUED | OUTPATIENT
Start: 2022-02-14 | End: 2022-02-15 | Stop reason: HOSPADM

## 2022-02-14 RX ORDER — INSULIN LISPRO 100 [IU]/ML
INJECTION, SOLUTION INTRAVENOUS; SUBCUTANEOUS
Status: DISCONTINUED | OUTPATIENT
Start: 2022-02-14 | End: 2022-02-15 | Stop reason: HOSPADM

## 2022-02-14 RX ORDER — SODIUM CHLORIDE 0.9 % (FLUSH) 0.9 %
5-40 SYRINGE (ML) INJECTION AS NEEDED
Status: DISCONTINUED | OUTPATIENT
Start: 2022-02-14 | End: 2022-02-15 | Stop reason: HOSPADM

## 2022-02-14 RX ORDER — CLONIDINE HYDROCHLORIDE 0.1 MG/1
0.2 TABLET ORAL 3 TIMES DAILY
Status: DISCONTINUED | OUTPATIENT
Start: 2022-02-14 | End: 2022-02-15 | Stop reason: HOSPADM

## 2022-02-14 RX ORDER — ACETAMINOPHEN 325 MG/1
650 TABLET ORAL
Status: DISCONTINUED | OUTPATIENT
Start: 2022-02-14 | End: 2022-02-15 | Stop reason: HOSPADM

## 2022-02-14 RX ORDER — METOPROLOL SUCCINATE 25 MG/1
50 TABLET, EXTENDED RELEASE ORAL DAILY
Status: DISCONTINUED | OUTPATIENT
Start: 2022-02-15 | End: 2022-02-14

## 2022-02-14 RX ORDER — HYDRALAZINE HYDROCHLORIDE 25 MG/1
100 TABLET, FILM COATED ORAL 3 TIMES DAILY
Status: DISCONTINUED | OUTPATIENT
Start: 2022-02-14 | End: 2022-02-15 | Stop reason: HOSPADM

## 2022-02-14 RX ADMIN — SODIUM CHLORIDE, PRESERVATIVE FREE 10 ML: 5 INJECTION INTRAVENOUS at 22:20

## 2022-02-14 RX ADMIN — NIFEDIPINE 90 MG: 30 TABLET, FILM COATED, EXTENDED RELEASE ORAL at 18:52

## 2022-02-14 RX ADMIN — GABAPENTIN 100 MG: 100 CAPSULE ORAL at 22:06

## 2022-02-14 RX ADMIN — HYDRALAZINE HYDROCHLORIDE 100 MG: 25 TABLET, FILM COATED ORAL at 22:07

## 2022-02-14 RX ADMIN — SUCRALFATE 1 G: 1 TABLET ORAL at 18:45

## 2022-02-14 RX ADMIN — ACETAMINOPHEN 650 MG: 325 TABLET ORAL at 18:51

## 2022-02-14 RX ADMIN — EPLERENONE 25 MG: 25 TABLET, FILM COATED ORAL at 18:52

## 2022-02-14 RX ADMIN — BUTALBITAL, ACETAMINOPHEN, AND CAFFEINE 1 TABLET: 50; 325; 40 TABLET ORAL at 22:58

## 2022-02-14 RX ADMIN — INSULIN GLARGINE 10 UNITS: 100 INJECTION, SOLUTION SUBCUTANEOUS at 22:18

## 2022-02-14 RX ADMIN — HYDRALAZINE HYDROCHLORIDE 100 MG: 25 TABLET, FILM COATED ORAL at 18:46

## 2022-02-14 RX ADMIN — HEPARIN SODIUM 5000 UNITS: 5000 INJECTION INTRAVENOUS; SUBCUTANEOUS at 22:09

## 2022-02-14 RX ADMIN — CLONIDINE HYDROCHLORIDE 0.2 MG: 0.1 TABLET ORAL at 22:06

## 2022-02-14 RX ADMIN — AMLODIPINE BESYLATE 10 MG: 5 TABLET ORAL at 18:40

## 2022-02-14 RX ADMIN — LIDOCAINE HYDROCHLORIDE 40 ML: 20 SOLUTION TOPICAL at 22:59

## 2022-02-14 RX ADMIN — SUCRALFATE 1 G: 1 TABLET ORAL at 22:08

## 2022-02-14 RX ADMIN — METOPROLOL SUCCINATE 50 MG: 25 TABLET, EXTENDED RELEASE ORAL at 18:42

## 2022-02-14 RX ADMIN — SODIUM CHLORIDE, PRESERVATIVE FREE 10 ML: 5 INJECTION INTRAVENOUS at 18:42

## 2022-02-14 RX ADMIN — IOPAMIDOL 100 ML: 755 INJECTION, SOLUTION INTRAVENOUS at 16:27

## 2022-02-14 RX ADMIN — CLONIDINE HYDROCHLORIDE 0.2 MG: 0.1 TABLET ORAL at 18:41

## 2022-02-14 RX ADMIN — BUMETANIDE 2 MG: 1 TABLET ORAL at 18:45

## 2022-02-14 RX ADMIN — ISOSORBIDE MONONITRATE 60 MG: 60 TABLET, EXTENDED RELEASE ORAL at 18:43

## 2022-02-14 RX ADMIN — INSULIN LISPRO 3 UNITS: 100 INJECTION, SOLUTION INTRAVENOUS; SUBCUTANEOUS at 22:19

## 2022-02-14 NOTE — ED TRIAGE NOTES
Pt presents to ER with c/o worsening abd pain x7 days with chest pain and HA x3 days. Reports that he called his PCP today and skipped dialysis today because of his headache. His  at home and doctor instructed to come to the ER. Denies vision changes, denies dizziness, denies SOB. He has dialysis Mon, Wed, Fri in right chest catheter.

## 2022-02-14 NOTE — PROGRESS NOTES
Care Management Readmission Assessment        NAME:   Florecita Ochoa   :     1960   MRN:     [id number]             RUR Score/Risk Level:       RUR:  22%  Risk Level:  High    Assessment: In person with patient    Reason for Readmission:  Mr. Luis Fernando Silver is a 64 y.o. male with history that includes DM, CAD, CABG, CHF, HTN and ESRD  who was emergently admitted for:  hypertensive emergency c/b elevated trop    Patient Active Problem List   Diagnosis Code    Essential hypertension I10    Reactive depression F32.9    Peripheral neuropathy G62.9    Type 2 diabetes mellitus with ophthalmic complication, with long-term current use of insulin ([facility] E11.39, Z79.4    Type 2 diabetes with nephropathy ([facility] E11.21    Type 2 diabetes mellitus with diabetic neuropathy (HCC) E11.40    CHF exacerbation ([facility]) I50.9    Severe protein-calorie malnutrition ([facility] E43    Anasarca R60.1    ESRD (end stage renal disease) on dialysis ([facility] N18.6, Z99.2    Heart failure with preserved ejection fraction (HCC) I50.30    Elevated troponin R77.8    Anemia D64.9    Thrombocytopenia ([facility]) D69.6    Epigastric pain R10.13    ESRD (end stage renal disease) ([facility] N18.6    Hypertensive emergency I16.1       Has any pertinent information changed since previous Care Management Assessment? Living arrangements:  Pt lives at home with his spouse and children. Pt reports he lives in a single story house with 3 steps to enter. ADLs:  Pt reports he is independent with ADLs and ambulation. Pt denies problems with either. DME:   [employer] [facility] and O[employer]   Pharmacy:  [facility] [place]. Pt denies any problems obtaining and taking meds.      Insurer:  Payor: [facility] / Plan: [place] / Product Type: Managed Care Medicaid /     PCP: Dionisio Gil MD   Name of Practice:  [facility]   Current patient: Yes   Approximate date of last visit: today (virtual visit)   Access to virtual PCP visits:  Yes    Is a Care Conference indicated:   No    Did you attend your follow up appointment(s):  Yes  If not, why not:  N/A    Resources/supports as identified by patient/family:  Pt has supportive family         Top Challenges facing patient (as identified by patient/family and CM): Finances/Medication cost?  None identified  Transportation? None identified       Support system or lack thereof? None identified     Living arrangements? None identified         Self-care/ADLs/Cognition? None identified       Advance Directive:  Full Code, does not have an advance directive. Plan for utilizing home health:  None identified             Transition of Care Plan:      Home with outpatient follow-up    Additional information:  Pt admitted on 2/14/22 for hypertensive emergency c/b elevated trop. CM met with Pt to complete initial assessment. Pt lives at home with his spouse. Pt has no hx of HH. Pt has home O2 through [address] Pt has a cane that he uses outside of the house. Pt reports that he is independent with ADLs and ambulation. Pt receives OP HD at [facility]. Pt has a [facility] 11am chair. Pt states family transports him to/from dialysis. Discharge plan is for Pt to return home.  Pt states family will transport him home.     _____________________________________  Itzel Potts[address] Management  2/14/2022   5:39 PM     Readmission Assessment  Number of days since last admission?: 1-7 days  Previous disposition: Home with Family  Who is being interviewed?: Patient  What was the patient's/caregiver's perception as to why they think they needed to return back to the hospital?: Other (Comment) (MD told him to come)  Did you visit your Primary Care Physician after you left the hospital, before you returned this time?: Yes  Did you see a specialist, such as Cardiac, Pulmonary, Orthopedic Physician, etc. after you left the hospital?: No  Who advised the patient to return to the hospital?: Physician  Does the patient report anything that got in the way of taking their medications?: No  In our efforts to provide the best possible care to you and others like you, can you think of anything that we could have done to help you after you left the hospital the first time, so that you might not have needed to return so soon?: Other (Comment) (no)        Care Management Interventions  PCP Verified by CM: Yes Teresa Lynch MD)  Mode of Transport at Discharge:  Other (see comment) (family)  Transition of Care Consult (CM Consult): Discharge Planning  MyChart Signup: No  Discharge Durable Medical Equipment: No  Physical Therapy Consult: No  Occupational Therapy Consult: No  Speech Therapy Consult: No  Support Systems: Spouse/Significant Other,Child(fidelia)  Confirm Follow Up Transport: Family  Discharge Location  Patient Expects to be Discharged to[de-identified] Home with outpatient services

## 2022-02-14 NOTE — H&P
[address] [address] [place] [address]   Office [phone]  Fax [phone]       Admission H&P     Date of admission: 2/14/2022    Patient name: Juan M Matthew  MRN: [id number]  YOB: 1960  Age: 64 y.o. Primary care provider:  Tori Alexandra MD     Source of Information: patient, medical records    Chief complaint:  Elevated BP, Headache    History of Present Illness  Juan M Matthew is a 64 y.o. male with a known h/o ESRD on HD, HTN, HFpEF, T2DM, CAD s/p CABG (May 2020), h/o PEA arrest, Consuelo Bernheim is admitted for hypertensive emergency in s/o ESRD on HD (MWF) and a band-like headache who presented to the ED complaining of for evaluation of elevated BP and a band-like frontal-parietal headache. Patient was previously admitted from 2/5 to 2/7 with a similar presentation. On that visit, was found to be in hypertensive urgency that was treated with hydral, labetalol, and restarting his home medications. He appears to have poor compliance, despite stating that he takes all his medications as prescribed. Today (2/14), patient states he was expected to be seen by his PCP prior to going to dialysis at 1030. However, he checked his BP at home, found to have a SBP > 200 when he contacted on-call physician at [facility] who advised him to seek evaluation in the ED. Patient subsequently ended up missing his dialysis today. He presents with a headache that is band-like across his frontal-parietal region that has been chronic, but acutely worsened over the last 2-3 days. He describes it as a \"crown\" that constricts in the distribution described. Currently 8-9/10  In terms of severity, improved with tylenol at home. No loss of vision, history of migraines, aura, increased sensitivity to light/sound, acute weakness, or changes to sensation.      Patient otherwise reports mild to moderate epigastric, waxing/waning, chronic pain that is generally improved \"when I drink milk\". He describes the discomfort as \"burning\" to his stomach. Further notes improvement with Pepto-Bismol at home. Similar discomfort extending from R to L chest in linear distribution, non-radiating (to back, jaw, upper extremities). Denies: fever, chills, CP, palpitations, n/v/d, increased urinary frequency/urgency, dysuria, hematuria, SOB, diaphoresis. COVID Vaccinated (J&J x1)    In the ED:   Vitals: 97.3, 70, 192/91, 18, 99% on RA  Labs: Notable for Cr 2.34 (b/l 2.5), GFR 28, glucose 272, Na 132 (corrected to 135). Imaging:  - CT Head w/o Contrast: No acute intracranial process. - CXR: Persistent left pleural effusion.  - CTA Chest w/ and w/o cont and Abd:   1. Bilateral pleural effusions which are slightly decreased compared to February 5, with minimal dependent atelectasis. 2. Anasarca, including edema of the subcutaneous tissues, mesentery, and pelvis. 3. Previously identified pulmonary edema is resolved. 4. Prostatomegaly with findings which raise the possibility of chronic bladder  outlet obstruction. 5. No pulmonary embolism, no acute vascular abnormality. EKG: NSR 69, significant artifact, appears to have normal intervals, no evidence of ischemic changes. Treatment: None in ED    Review of Systems  Review of Systems   Constitutional: Negative. Negative for chills and fever. HENT: Negative. Eyes: Negative. Respiratory: Negative. Negative for cough and shortness of breath. Cardiovascular: Negative. Negative for chest pain. Gastrointestinal: Positive for abdominal pain. Negative for diarrhea, nausea and vomiting. Genitourinary: Negative. Negative for dysuria, frequency and urgency. Musculoskeletal: Negative. Negative for myalgias. Skin: Negative. Neurological: Positive for headaches. Negative for dizziness, focal weakness and weakness. Psychiatric/Behavioral: Negative. All other systems reviewed and are negative.        Home Medications   Prior to Admission medications    Medication Sig Start Date End Date Taking? Authorizing Provider   bumetanide (BUMEX) 2 mg tablet Take 2 mg by mouth daily. Provider, Historical   pantoprazole (PROTONIX) 40 mg tablet Take 40 mg by mouth daily. Provider, Historical   insulin glargine (Lantus U-100 Insulin) 100 unit/mL injection 10 Units by SubCUTAneous route daily. Provider, Historical   acetaminophen (Tylenol Extra Strength) 500 mg tablet Take 1,000 mg by mouth every six (6) hours as needed for Pain. Provider, Historical   rosuvastatin (CRESTOR) 10 mg tablet Take 10 mg by mouth Every morning. Provider, Historical   cloNIDine HCL (CATAPRES) 0.2 mg tablet Take 0.2 mg by mouth three (3) times daily. Provider, Historical   gabapentin (NEURONTIN) 300 mg capsule Take 300 mg by mouth two (2) times a day. Provider, Historical   amLODIPine (NORVASC) 10 mg tablet Take 10 mg by mouth Every morning. Provider, Historical   aspirin 81 mg chewable tablet Take 81 mg by mouth daily. Provider, Historical   doxazosin (CARDURA) 4 mg tablet Take 4 mg by mouth two (2) times a day. Provider, Historical   hydrALAZINE (APRESOLINE) 100 mg tablet Take 100 mg by mouth three (3) times daily. Provider, Historical   minoxidiL (LONITEN) 2.5 mg tablet Take 5 mg by mouth two (2) times a day. Provider, Historical   NIFEdipine ER (PROCARDIA XL) 60 mg ER tablet Take 60 mg by mouth daily. Provider, Historical   sucralfate (CARAFATE) 1 gram tablet Take 1 g by mouth four (4) times daily. Provider, Historical   ondansetron (ZOFRAN ODT) 8 mg disintegrating tablet Take 8 mg by mouth every eight (8) hours as needed for Nausea or Vomiting. Provider, Historical   calcium carbonate (TUMS) 200 mg calcium (500 mg) chew Take 1 Tablet by mouth daily as needed. Provider, Historical   metoprolol succinate (TOPROL-XL) 50 mg XL tablet Take 1 Tablet by mouth daily.  2/8/22   Herminia Adorno,    eplerenone (Inspra) 25 mg tablet Take 25 mg by mouth daily. Provider, Historical   isosorbide mononitrate ER (IMDUR) 120 mg CR tablet Take 120 mg by mouth every morning. 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, with long-term current use of insulin ([address] 2018    Diabetes ([facility] [address]        Past Surgical History:   Procedure Laterality Date    HX ARTERIAL BYPASS      HX OTHER SURGICAL      5th toe Metatarsal amputation 2017     IR INSERT NON TUNL CVC OVER 5 YRS  2021    IR INSERT TUNL CVC W/O PORT OVER 5 YR  2021       Family History   Problem Relation Age of Onset    Diabetes Mother     No Known Problems Father        Social History   Patient resides  x  Lives with wife     With family care      Assisted living      SNF      Ambulates    Independently    x  With cane       Assisted walker      Alcohol history   x  Former (quit 6-7 years ago, previously used to drink 12 beers/day)     Social     Chronic     Smoking history  x  None     Former smoker      Current smoker     Social History     Tobacco Use   Smoking Status Former Smoker    Quit date: 2001    Years since quittin.7   Smokeless Tobacco Never Used       Drug history  x  None     Former drug user     Current drug user     Code status  x  Full code     DNR/DNI     Partial    Code status discussed with the patient/caregivers. Physical Exam  Visit Vitals  BP (!) 179/71   Pulse 74   Temp 97.3 °F (36.3 °C)   Resp 23   Ht 5' 7\" (1.702 m)   Wt 140 lb (63.5 kg)   SpO2 98%   BMI 21.93 kg/m²        General: No acute distress. Alert. Cooperative. Appears older than stated age. Head: Normocephalic. Atraumatic. Eyes:              Conjunctiva pink. Sclera white. PERRL. Ears:              Hearing grossly intact. Nose:             Septum midline. Mucosa pink. No drainage. Throat: Mucosa pink. Moist mucous membranes. Neck: Supple. Normal ROM. No stiffness. Respiratory: CTAB. No w/r/r/c.   Cardiovascular: RRR. Audible 2/6 systolic murmur most audible along LUSB. Pulses 2+ throughout. GI: + bowel sounds. Minimally tender to epigastrium w/o rebound tenderness or guarding. Nondistended. Extremities: No obvious BLE edema. Distal pulses intact. Musculoskeletal: Full ROM in all extremities. Skin: Warm, dry. No rashes. Neuro: CN II-XII grossly intact. Strength and sensation grossly intact to all extremities. Laboratory Data  Recent Results (from the past 24 hour(s))   CBC WITH AUTOMATED DIFF    Collection Time: 02/14/22  2:12 PM   Result Value Ref Range    WBC 6.0 4.1 - 11.1 K/uL    RBC 3.87 (L) 4.10 - 5.70 M/uL    HGB 10.9 (L) 12.1 - 17.0 g/dL    HCT 31.4 (L) 36.6 - 50.3 %    MCV 81.1 80.0 - 99.0 FL    MCH 28.2 26.0 - 34.0 PG    MCHC 34.7 30.0 - 36.5 g/dL    RDW 15.7 (H) 11.5 - 14.5 %    PLATELET 98 (L) 615 - 400 K/uL    MPV 9.9 8.9 - 12.9 FL    NRBC 0.0 0  WBC    ABSOLUTE NRBC 0.00 0.00 - 0.01 K/uL    NEUTROPHILS 70 32 - 75 %    LYMPHOCYTES 17 12 - 49 %    MONOCYTES 8 5 - 13 %    EOSINOPHILS 3 0 - 7 %    BASOPHILS 1 0 - 1 %    IMMATURE GRANULOCYTES 1 (H) 0.0 - 0.5 %    ABS. NEUTROPHILS 4.1 1.8 - 8.0 K/UL    ABS. LYMPHOCYTES 1.0 0.8 - 3.5 K/UL    ABS. MONOCYTES 0.5 0.0 - 1.0 K/UL    ABS. EOSINOPHILS 0.2 0.0 - 0.4 K/UL    ABS. BASOPHILS 0.1 0.0 - 0.1 K/UL    ABS. IMM.  GRANS. 0.1 (H) 0.00 - 0.04 K/UL    DF SMEAR SCANNED      RBC COMMENTS ANISOCYTOSIS  1+       METABOLIC PANEL, COMPREHENSIVE    Collection Time: 02/14/22  2:12 PM   Result Value Ref Range    Sodium 132 (L) 136 - 145 mmol/L    Potassium 3.7 3.5 - 5.1 mmol/L    Chloride 102 97 - 108 mmol/L    CO2 22 21 - 32 mmol/L    Anion gap 8 5 - 15 mmol/L    Glucose 272 (H) 65 - 100 mg/dL    BUN 48 (H) 6 - 20 MG/DL    Creatinine 2.34 (H) 0.70 - 1.30 MG/DL    BUN/Creatinine ratio 21 (H) 12 - 20      GFR est AA 35 (L) >60 ml/min/1.73m2    GFR est non-AA 28 (L) >60 ml/min/1.73m2    Calcium 8.5 8.5 - 10.1 MG/DL    Bilirubin, total 0.4 0.2 - 1.0 MG/DL    ALT (SGPT) 39 12 - 78 U/L    AST (SGOT) 32 15 - 37 U/L    Alk. phosphatase 172 (H) 45 - 117 U/L    Protein, total 7.2 6.4 - 8.2 g/dL    Albumin 2.6 (L) 3.5 - 5.0 g/dL    Globulin 4.6 (H) 2.0 - 4.0 g/dL    A-G Ratio 0.6 (L) 1.1 - 2.2     LIPASE    Collection Time: 02/14/22  2:12 PM   Result Value Ref Range    Lipase 123 73 - 393 U/L   TROPONIN-HIGH SENSITIVITY    Collection Time: 02/14/22  2:12 PM   Result Value Ref Range    Troponin-High Sensitivity 36 0 - 76 ng/L   D DIMER    Collection Time: 02/14/22  2:12 PM   Result Value Ref Range    D-dimer 13.86 (H) 0.00 - 0.65 mg/L FEU   SAMPLES BEING HELD    Collection Time: 02/14/22  2:12 PM   Result Value Ref Range    SAMPLES BEING HELD 1RD,1SST     COMMENT        Add-on orders for these samples will be processed based on acceptable specimen integrity and analyte stability, which may vary by analyte. Imaging  XR CHEST PA LAT    Result Date: 2/14/2022  Persistent left pleural effusion. CT HEAD WO CONT    Result Date: 2/14/2022  No acute intracranial process. CTA CHEST W OR W WO CONT    Result Date: 2/14/2022  1. Bilateral pleural effusions which are slightly decreased compared to February 5, with minimal dependent atelectasis. 2. Anasarca, including edema of the subcutaneous tissues, mesentery, and pelvis. 3. Previously identified pulmonary edema is resolved. 4. Prostatomegaly with findings which raise the possibility of chronic bladder outlet obstruction. 5. No pulmonary embolism, no acute vascular abnormality. CT ABD PELV W CONT    Result Date: 2/14/2022  1. Bilateral pleural effusions which are slightly decreased compared to February 5, with minimal dependent atelectasis. 2. Anasarca, including edema of the subcutaneous tissues, mesentery, and pelvis. 3. Previously identified pulmonary edema is resolved.  4. Prostatomegaly with findings which raise the possibility of chronic bladder outlet obstruction. 5. No pulmonary embolism, no acute vascular abnormality. Assessment and Plan   Mitul Atkinson a 64 y. o. male with a known h/o ESRD on HD, HTN, HFpEF, T2DM, CAD s/p CABG (May 2020), h/o PEA arrest, Kathy Kumar is admitted for hypertensive emergency in s/o ESRD on HD (MWF) and a band-like headache.     Hypertensive Emergency c/b elevated trop: /91, trop 36, w/ headache and CP. Currently on BP regimen as noted below. Unclear if truly compliant. Trop elevated likely secondary to renal disease. No medications in ED. BP rechecked on admission exam with proper technique; 179/71.   - Admit to telemetry, VS per unit routine   - Restart home meds: Norvasc 10 mg daily, Bumex 2 mg daily, clonidine 0.2mg TID, eplerenone 25mg daily, Hydral 100 mg TID, Imdur 60 mg daily, Toprol XL 50mg daily, Procardia 90 mg daily, Terazosin 5mg BID  - IV Labetalol 10mg q4h prn   - Trend trop (repeat pending, then q6h if elevated)  - daily cbc, cmp, mg      ESRD on HD: Permacath placed 11/24. Missed HD on AM of 2/14. Follows with Dr. Rekha Mackenzie (Nepho). Receives HD MWF. CT c/a/p with anasarca, B/L pleural effusions decreased from prior scan on 2/5, prostatomegaly, no PE.   - Nephro consulted, appreciate rec's. Plan to receive HD in the morning.      Epigastric pain in the setting of known erosive gastritis: Prior admission for GI bleed in 11/2021. 11/16/21 EGD w/ nonbleeding erosive esophagitis. Noncompliant with protonix and carafate. No evidence of active bleeding.   - Home Protonix 40m g daily   - Home Carafate 1g QID   - Follow up with GI as outpatient     T2DM c/b peripheral neuropathy: Last A1c 8.4% (11/2021). Currently on Glipizide 10mg daily (5mg x2) and Metformin 1000mg BID.    - hold Metformin and glipizide   - Home glargine 10u qHS  - Lispro sliding scale   - POC gluc ACHS   - hypoglycemic protocols ordered   - For neuropathy: Gabapentin renally dosed to ~ 50% reduction, 300 BID > 200 BID.     Concern for cirrhosis: past h/o heavy alcohol use, stopped 6-7 years ago. US 11/20/21 showing hepatic parenchymal disease with cirrhotic morphology, perihepatic ascites and right pleural effusion. Per chart review, had liver biopsy completed awaiting results. - following with Dr. Barbara Rudd (hepatology)   - outpatient follow up with GI     HFpEF:  Echo 11/19/21 with EF 60-65% and reduced systolic function and moderate pulm HTN.   - Bumex 1mg BID   - follow up with cards as outpt      Anemia/thrombocytopenia: This is a chronic condition. POA Hgb 10.2 (bl ~7.5), normocytic and Plt 99 (bl ~90). Likely secondary to ESRD.   - F/u anemia labs w/ PCP    CAD s/p CABG: May 2020. Follows with Dr. Willow Ledbetter (Cardiology). - Rosuvastatin 10 mg daily     HLD: Chronic. Lab Results   Component Value Date/Time    Cholesterol, total 176 11/22/2019 09:36 AM    HDL Cholesterol 45 11/22/2019 09:36 AM    LDL, calculated 112 (H) 11/22/2019 09:36 AM    VLDL, calculated 19 11/22/2019 09:36 AM    Triglyceride 94 11/22/2019 09:36 AM   - Consider repeat lipid panel outpatient  - Home rosuvastatin 10 mg daily     H/o PEA arrest: During previous admission to [facility] (11/2021) suspected secondary to respiratory distress. ROSC achieved after 12 minutes CPR. S/p intubation and extubation with mental status return to baseline.      FEN/GI - GI bland/Diabetic. Low sodium. FR 1000 mL. Activity - Ambulate as tolerated  DVT prophylaxis - Sub Q Heparin  GI prophylaxis - Protonix  Fall prophylaxis - Not indicated at this time. Disposition - Admit to Telemetry. Plan to d/c to Home. Code Status - Full, discussed with patient / caregivers.   Next of Kin Name and Megha Saver [phone]    CODE STATUS:  Full Code       Patient case will be discussed with Dr. William Staples (Family Medicine Attending)       Laura Urbina, [address] Problems  Date Reviewed: 12/6/2021          Codes Class Noted POA    ESRD (end stage renal disease) ([facility] [address] ICD-10-CM: N18.6  ICD-9-CM: 585.6  2/14/2022 Unknown        Hypertensive urgency ICD-10-CM: I16.0  ICD-9-CM: 401.9  2/6/2022 Unknown

## 2022-02-14 NOTE — CONSULTS
[facility]   [address] [facility], [address] [facility]  Phone: [phone]   [phone]    [email or web address]    NEPHROLOGY CONSULT NOTE     Patient: Gary Hall MRN: [id number]  PCP: Stephon Hou MD   :     1960  Age:   64 y.o. Sex:  male      Referring physician: Trang Benedict MD  Reason for consultation: 64 y.o. male with ESRD (end stage renal disease) ([facility] [address] [N18.6]  Hypertensive urgency [O78.5] complicated by H/A  Admission Date: 2022  1:59 PM  LOS: 0 days      ASSESSMENT and PLAN :   ESKD  accel HTN  Missed HD  · For Hd tonight. · Orders in.  · Home after is fine unless BP no better. Care Plan discussed with:  Dr. Brynn Erazo        Thank you for consulting [facility] in the care of your patient. Subjective:   HPI: Gary Hall is a 64 y.o.  male who has been admitted to the hospital for HTN, H/A and need for RRT. PMH: ESRD on HD, HTN, HFpEF, T2DM, CAD s/p CABG (May 2020), h/o PEA arrest, erosive gastritis, depression  · admitted for hypertensive emergency in s/o ESRD on HD (MWF) and a band-like headache who presented to the ED complaining of for evaluation of elevated BP and a band-like frontal-parietal headache. Missed HD today. Usually runs at [place] [address]. 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 Authorizing Provider   bumetanide (BUMEX) 2 mg tablet Take 2 mg by mouth daily.     Provider, Historical   pantoprazole (PROTONIX) 40 mg tablet Take 40 mg by mouth daily. Provider, Historical   insulin glargine (Lantus U-100 Insulin) 100 unit/mL injection 10 Units by SubCUTAneous route daily. Provider, Historical   acetaminophen (Tylenol Extra Strength) 500 mg tablet Take 1,000 mg by mouth every six (6) hours as needed for Pain. Provider, Historical   rosuvastatin (CRESTOR) 10 mg tablet Take 10 mg by mouth Every morning. Provider, Historical   cloNIDine HCL (CATAPRES) 0.2 mg tablet Take 0.2 mg by mouth three (3) times daily. Provider, Historical   gabapentin (NEURONTIN) 300 mg capsule Take 300 mg by mouth two (2) times a day. Provider, Historical   amLODIPine (NORVASC) 10 mg tablet Take 10 mg by mouth Every morning. Provider, Historical   aspirin 81 mg chewable tablet Take 81 mg by mouth daily. Provider, Historical   doxazosin (CARDURA) 4 mg tablet Take 4 mg by mouth two (2) times a day. Provider, Historical   hydrALAZINE (APRESOLINE) 100 mg tablet Take 100 mg by mouth three (3) times daily. Provider, Historical   minoxidiL (LONITEN) 2.5 mg tablet Take 5 mg by mouth two (2) times a day. Provider, Historical   NIFEdipine ER (PROCARDIA XL) 60 mg ER tablet Take 60 mg by mouth daily. Provider, Historical   sucralfate (CARAFATE) 1 gram tablet Take 1 g by mouth four (4) times daily. Provider, Historical   ondansetron (ZOFRAN ODT) 8 mg disintegrating tablet Take 8 mg by mouth every eight (8) hours as needed for Nausea or Vomiting. Provider, Historical   calcium carbonate (TUMS) 200 mg calcium (500 mg) chew Take 1 Tablet by mouth daily as needed. Provider, Historical   metoprolol succinate (TOPROL-XL) 50 mg XL tablet Take 1 Tablet by mouth daily. 2/8/22   Herminia Adorno DO   eplerenone (Inspra) 25 mg tablet Take 25 mg by mouth daily. Provider, Historical   isosorbide mononitrate ER (IMDUR) 120 mg CR tablet Take 120 mg by mouth every morning.     Provider, Historical No Known Allergies    Social Hx:  reports that he quit smoking about 20 years ago. He has never used smokeless tobacco. He reports that he does not drink alcohol and does not use drugs. Family History   Problem Relation Age of Onset    Diabetes Mother     No Known Problems Father        Review of Systems:  A twelve point review of system was performed today. Pertinent positives and negatives are mentioned in the HPI. The reminder of the ROS is negative and noncontributory. Objective:    Vitals:    Vitals:    02/14/22 1622 02/14/22 1637 02/14/22 1640 02/14/22 1652   BP: (!) 206/91 (!) 198/78  (!) 179/71   Pulse: 89 73  74   Resp: 17 14  23   Temp: 97.3 °F (36.3 °C)      SpO2: 98% 96% 98%    Weight:       Height:         I&O's:  No intake/output data recorded. Visit Vitals  BP (!) 179/71   Pulse 74   Temp 97.3 °F (36.3 °C)   Resp 23   Ht 5' 7\" (1.702 m)   Wt 63.5 kg (140 lb)   SpO2 98%   BMI 21.93 kg/m²       Physical Exam:  General:Alert, No distress,   HEENT: Eyes are PERRL. Conjunctiva without pallor ,erythema. Sclerae: +/- Icterus  Neck: Supple,no mass palpable  Lungs : Clears to auscultation Bilaterally, Normal respiratory effort  CVS: RRR, S1 S2 normal, No rub, no LE edema  Abdomen: Soft, Non tender, No hepatosplenomegaly, bowel sounds present  Extremities: No cyanosis, No clubbing  Skin: No rash or lesions.   Lymph nodes: No palpable nodes  MS: No joint swelling, erythema, warmth  Neurologic: non focal, AAO x 3  Psych: normal affect    Laboratory Results:    Lab Results   Component Value Date    BUN 48 (H) 02/14/2022     (L) 02/14/2022    K 3.7 02/14/2022     02/14/2022    CO2 22 02/14/2022       Lab Results   Component Value Date    BUN 48 (H) 02/14/2022    BUN 18 02/07/2022    BUN 39 (H) 02/06/2022    BUN 40 (H) 02/05/2022    BUN 24 (H) 12/01/2021    K 3.7 02/14/2022    K 3.4 (L) 02/07/2022    K 3.4 (L) 02/06/2022    K 3.6 02/05/2022    K 4.2 12/01/2021       Lab Results Component Value Date    WBC 6.0 02/14/2022    RBC 3.87 (L) 02/14/2022    HGB 10.9 (L) 02/14/2022    HCT 31.4 (L) 02/14/2022    MCV 81.1 02/14/2022    MCH 28.2 02/14/2022    RDW 15.7 (H) 02/14/2022    PLT 98 (L) 02/14/2022       Lab Results   Component Value Date    PHOS 2.1 (L) 02/07/2022       Urine dipstick:   Lab Results   Component Value Date/Time    Color YELLOW/STRAW 02/05/2022 09:34 PM    Appearance CLEAR 02/05/2022 09:34 PM    Specific gravity 1.017 02/05/2022 09:34 PM    pH (UA) 7.5 02/05/2022 09:34 PM    Protein >300 (A) 02/05/2022 09:34 PM    Glucose 250 (A) 02/05/2022 09:34 PM    Ketone Negative 02/05/2022 09:34 PM    Bilirubin Negative 02/05/2022 09:34 PM    Urobilinogen 1.0 02/05/2022 09:34 PM    Nitrites Negative 02/05/2022 09:34 PM    Leukocyte Esterase Negative 02/05/2022 09:34 PM    Epithelial cells FEW 02/05/2022 09:34 PM    Bacteria Negative 02/05/2022 09:34 PM    WBC 0-4 02/05/2022 09:34 PM    RBC 20-50 02/05/2022 09:34 PM       I have reviewed the following: All pertinent labs, microbiology data, radiology imaging for my assessment     ECG- Rev: Yes / No  Xray/CT/US/MRI REV: Yes/ No    Care Plan discussed with:  Pt, Dr. Lee Dhillon. Chart reviewed. Total time spent with patient:  20 minutes, total time 35 minutes. Medications list Personally Reviewed   [x]      Yes     []               No      Thank you for allowing us to participate in the care of this patient. We will follow patient. Please dont hesitate to call with any questions    Gordy Johnson MD  2/14/2022    [facility]  [address], [address]  Phone - [phone]  Fax - [phone]  [email or web address]

## 2022-02-14 NOTE — ED PROVIDER NOTES
Naila Castro is a 65 yo M with chest pain, upper abdominal pain, headache and high blood pressure. HE states the has had upper abdominal pain for the past week and chest pain and headache for the past 3 days. The pains feel sharp and stabbing. He skipped his dialysis today because of the pain. He checked his BP and it was 224 at home. He called his PCP and was advised to come to the ED. 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, with long-term current use of insulin ([address] 2018    Diabetes ([facility]        Past Surgical History:   Procedure Laterality Date    HX ARTERIAL BYPASS      HX OTHER SURGICAL      5th toe Metatarsal amputation 2017     IR INSERT NON TUNL CVC OVER 5 YRS  2021    IR INSERT TUNL CVC W/O PORT OVER 5 YR  2021         Family History:   Problem Relation Age of Onset    Diabetes Mother     No Known Problems Father        Social History     Socioeconomic History    Marital status:      Spouse name: Not on file    Number of children: Not on file    Years of education: Not on file    Highest education level: Not on file   Occupational History    Not on file   Tobacco Use    Smoking status: Former Smoker     Quit date: 2001     Years since quittin.7    Smokeless tobacco: Never Used   Substance and Sexual Activity    Alcohol use: No    Drug use: No    Sexual activity: Yes     Partners: Female     Birth control/protection: Condom   Other Topics Concern    Not on file   Social History Narrative    Not on file     Social Determinants of Health     Financial Resource Strain:     Difficulty of Paying Living Expenses: Not on file   Food Insecurity:     Worried About Running Out of Food in the Last Year: Not on file    Hardik of Food in the Last Year: Not on file   Transportation Needs:     Lack of Transportation (Medical):  Not on file    Lack of Transportation (Non-Medical): Not on file   Physical Activity:     Days of Exercise per Week: Not on file    Minutes of Exercise per Session: Not on file   Stress:     Feeling of Stress : Not on file   Social Connections:     Frequency of Communication with Friends and Family: Not on file    Frequency of Social Gatherings with Friends and Family: Not on file    Attends Orthodoxy Services: Not on file    Active Member of [address] [place] [address] or Organizations: Not on file    Attends Club or Organization Meetings: Not on file    Marital Status: Not on file   Intimate Partner Violence:     Fear of Current or Ex-Partner: Not on file    Emotionally Abused: Not on file    Physically Abused: Not on file    Sexually Abused: Not on file   Housing Stability:     Unable to Pay for Housing in the Last Year: Not on file    Number of [place] in the Last Year: Not on file    Unstable Housing in the Last Year: Not on file         ALLERGIES: Patient has no known allergies. Review of Systems   Constitutional: Negative for fever. HENT: Negative for sore throat. Eyes: Negative for visual disturbance. Respiratory: Negative for cough and shortness of breath. Cardiovascular: Positive for chest pain. Gastrointestinal: Positive for abdominal pain. Genitourinary: Negative for dysuria. Musculoskeletal: Negative for back pain. Skin: Negative for rash. Neurological: Positive for headaches. Vitals:    02/14/22 1354   BP: (!) 192/91   Pulse: 70   Resp: 18   Temp: 97.3 °F (36.3 °C)   SpO2: 99%   Weight: 63.5 kg (140 lb)   Height: 5' 7\" (1.702 m)            Physical Exam  Vitals and nursing note reviewed. Constitutional:       General: He is not in acute distress. Appearance: He is well-developed. HENT:      Head: Normocephalic and atraumatic. Eyes:      Conjunctiva/sclera: Conjunctivae normal.   Neck:      Trachea: Phonation normal.   Cardiovascular:      Rate and Rhythm: Normal rate. Heart sounds: Normal heart sounds. Pulmonary:      Effort: Pulmonary effort is normal. No respiratory distress. Breath sounds: No wheezing or rales. Abdominal:      General: There is no distension. Musculoskeletal:         General: No tenderness. Normal range of motion. Cervical back: Normal range of motion. Skin:     General: Skin is warm and dry. Neurological:      Mental Status: He is alert. He is not disoriented. Motor: No abnormal muscle tone. MDM       Perfect Serve Consult for Admission  3:39 PM    ED Room Number: CW/CW  Patient Name and age:  Namrata [address] 64 y.o.  male  Working Diagnosis:   1. ESRD (end stage renal disease) on dialysis ([facility] [address]    2. Acute nonintractable headache, unspecified headache type    3. Hypertensive urgency        COVID-19 Suspicion:  no  Sepsis present:  no  Reassessment needed: N/A  Code Status:  Full Code  Readmission: yes  Isolation Requirements:  no  Recommended Level of Care:  telemetry  Department:[facility] ED - [phone]  Other:  PAtient with increased abdominal pain as well as chest pain and headache preventing him from sleeping. Skipped dialysis this morning due to pain and states PCP advised him to come to the ED. Trop 36, ddimer 13.86.  at home. 192/91 in the ED. CTA chest, abd pelvis pending.   CT head normal.   Procedures

## 2022-02-15 ENCOUNTER — TELEPHONE (OUTPATIENT)
Dept: FAMILY MEDICINE CLINIC | Age: 62
End: 2022-02-15

## 2022-02-15 VITALS
DIASTOLIC BLOOD PRESSURE: 75 MMHG | HEART RATE: 59 BPM | RESPIRATION RATE: 14 BRPM | OXYGEN SATURATION: 99 % | WEIGHT: 144.7 LBS | BODY MASS INDEX: 22.71 KG/M2 | SYSTOLIC BLOOD PRESSURE: 149 MMHG | HEIGHT: 67 IN | TEMPERATURE: 97.4 F

## 2022-02-15 LAB
ALBUMIN SERPL-MCNC: 2.2 G/DL (ref 3.5–5)
ALBUMIN/GLOB SERPL: 0.6 {RATIO} (ref 1.1–2.2)
ALP SERPL-CCNC: 155 U/L (ref 45–117)
ALT SERPL-CCNC: 32 U/L (ref 12–78)
ANION GAP SERPL CALC-SCNC: 8 MMOL/L (ref 5–15)
AST SERPL-CCNC: 26 U/L (ref 15–37)
ATRIAL RATE: 69 BPM
BILIRUB SERPL-MCNC: 0.6 MG/DL (ref 0.2–1)
BUN SERPL-MCNC: 52 MG/DL (ref 6–20)
BUN/CREAT SERPL: 22 (ref 12–20)
CALCIUM SERPL-MCNC: 7.9 MG/DL (ref 8.5–10.1)
CALCULATED P AXIS, ECG09: 52 DEGREES
CALCULATED R AXIS, ECG10: 41 DEGREES
CALCULATED T AXIS, ECG11: 72 DEGREES
CHLORIDE SERPL-SCNC: 104 MMOL/L (ref 97–108)
CO2 SERPL-SCNC: 23 MMOL/L (ref 21–32)
CREAT SERPL-MCNC: 2.36 MG/DL (ref 0.7–1.3)
DIAGNOSIS, 93000: NORMAL
ERYTHROCYTE [DISTWIDTH] IN BLOOD BY AUTOMATED COUNT: 15.7 % (ref 11.5–14.5)
GLOBULIN SER CALC-MCNC: 3.4 G/DL (ref 2–4)
GLUCOSE BLD STRIP.AUTO-MCNC: 158 MG/DL (ref 65–117)
GLUCOSE BLD STRIP.AUTO-MCNC: 82 MG/DL (ref 65–117)
GLUCOSE SERPL-MCNC: 202 MG/DL (ref 65–100)
HCT VFR BLD AUTO: 21.3 % (ref 36.6–50.3)
HGB BLD-MCNC: 7.4 G/DL (ref 12.1–17)
MAGNESIUM SERPL-MCNC: 2.2 MG/DL (ref 1.6–2.4)
MCH RBC QN AUTO: 28.4 PG (ref 26–34)
MCHC RBC AUTO-ENTMCNC: 34.7 G/DL (ref 30–36.5)
MCV RBC AUTO: 81.6 FL (ref 80–99)
NRBC # BLD: 0 K/UL (ref 0–0.01)
NRBC BLD-RTO: 0 PER 100 WBC
P-R INTERVAL, ECG05: 116 MS
PHOSPHATE SERPL-MCNC: 4.1 MG/DL (ref 2.6–4.7)
PLATELET # BLD AUTO: 72 K/UL (ref 150–400)
PMV BLD AUTO: 9.9 FL (ref 8.9–12.9)
POTASSIUM SERPL-SCNC: 3.6 MMOL/L (ref 3.5–5.1)
PROT SERPL-MCNC: 5.6 G/DL (ref 6.4–8.2)
Q-T INTERVAL, ECG07: 434 MS
QRS DURATION, ECG06: 84 MS
QTC CALCULATION (BEZET), ECG08: 465 MS
RBC # BLD AUTO: 2.61 M/UL (ref 4.1–5.7)
SERVICE CMNT-IMP: ABNORMAL
SERVICE CMNT-IMP: NORMAL
SODIUM SERPL-SCNC: 135 MMOL/L (ref 136–145)
VENTRICULAR RATE, ECG03: 69 BPM
WBC # BLD AUTO: 4.5 K/UL (ref 4.1–11.1)

## 2022-02-15 PROCEDURE — 74011250637 HC RX REV CODE- 250/637

## 2022-02-15 PROCEDURE — 74011250637 HC RX REV CODE- 250/637: Performed by: STUDENT IN AN ORGANIZED HEALTH CARE EDUCATION/TRAINING PROGRAM

## 2022-02-15 PROCEDURE — 74011636637 HC RX REV CODE- 636/637

## 2022-02-15 PROCEDURE — 74011000250 HC RX REV CODE- 250: Performed by: STUDENT IN AN ORGANIZED HEALTH CARE EDUCATION/TRAINING PROGRAM

## 2022-02-15 PROCEDURE — 83735 ASSAY OF MAGNESIUM: CPT

## 2022-02-15 PROCEDURE — 82962 GLUCOSE BLOOD TEST: CPT

## 2022-02-15 PROCEDURE — 36415 COLL VENOUS BLD VENIPUNCTURE: CPT

## 2022-02-15 PROCEDURE — 99222 1ST HOSP IP/OBS MODERATE 55: CPT | Performed by: FAMILY MEDICINE

## 2022-02-15 PROCEDURE — 90935 HEMODIALYSIS ONE EVALUATION: CPT

## 2022-02-15 PROCEDURE — 85027 COMPLETE CBC AUTOMATED: CPT

## 2022-02-15 PROCEDURE — 80053 COMPREHEN METABOLIC PANEL: CPT

## 2022-02-15 PROCEDURE — 74011250636 HC RX REV CODE- 250/636: Performed by: STUDENT IN AN ORGANIZED HEALTH CARE EDUCATION/TRAINING PROGRAM

## 2022-02-15 PROCEDURE — 84100 ASSAY OF PHOSPHORUS: CPT

## 2022-02-15 RX ORDER — NIFEDIPINE 90 MG/1
90 TABLET, EXTENDED RELEASE ORAL DAILY
Qty: 30 TABLET | Refills: 0 | Status: SHIPPED | OUTPATIENT
Start: 2022-02-15 | End: 2022-03-17

## 2022-02-15 RX ORDER — ISOSORBIDE MONONITRATE 120 MG/1
60 TABLET, EXTENDED RELEASE ORAL
Qty: 15 TABLET | Refills: 0 | Status: SHIPPED | OUTPATIENT
Start: 2022-02-15 | End: 2022-03-17

## 2022-02-15 RX ORDER — BUTALBITAL, ACETAMINOPHEN AND CAFFEINE 50; 325; 40 MG/1; MG/1; MG/1
1 TABLET ORAL
Status: COMPLETED | OUTPATIENT
Start: 2022-02-15 | End: 2022-02-15

## 2022-02-15 RX ADMIN — SODIUM CHLORIDE, PRESERVATIVE FREE 10 ML: 5 INJECTION INTRAVENOUS at 06:07

## 2022-02-15 RX ADMIN — ISOSORBIDE MONONITRATE 60 MG: 60 TABLET, EXTENDED RELEASE ORAL at 06:07

## 2022-02-15 RX ADMIN — INSULIN LISPRO 2 UNITS: 100 INJECTION, SOLUTION INTRAVENOUS; SUBCUTANEOUS at 11:26

## 2022-02-15 RX ADMIN — PANTOPRAZOLE SODIUM 40 MG: 40 TABLET, DELAYED RELEASE ORAL at 06:07

## 2022-02-15 RX ADMIN — ACETAMINOPHEN 650 MG: 325 TABLET ORAL at 08:37

## 2022-02-15 RX ADMIN — SUCRALFATE 1 G: 1 TABLET ORAL at 11:26

## 2022-02-15 RX ADMIN — CLONIDINE HYDROCHLORIDE 0.2 MG: 0.1 TABLET ORAL at 08:33

## 2022-02-15 RX ADMIN — BUTALBITAL, ACETAMINOPHEN, AND CAFFEINE 1 TABLET: 50; 325; 40 TABLET ORAL at 06:07

## 2022-02-15 RX ADMIN — GABAPENTIN 100 MG: 100 CAPSULE ORAL at 08:33

## 2022-02-15 RX ADMIN — ASPIRIN 81 MG: 81 TABLET, CHEWABLE ORAL at 08:33

## 2022-02-15 RX ADMIN — SUCRALFATE 1 G: 1 TABLET ORAL at 06:07

## 2022-02-15 RX ADMIN — ROSUVASTATIN 10 MG: 10 TABLET, FILM COATED ORAL at 08:34

## 2022-02-15 RX ADMIN — HEPARIN SODIUM 5000 UNITS: 5000 INJECTION INTRAVENOUS; SUBCUTANEOUS at 06:07

## 2022-02-15 NOTE — PROGRESS NOTES
Nutrition Assessment     Type and Reason for Visit: Positive nutrition screen    Nutrition Recommendations/Plan:   Continue supplements after discharge    Encouraged higher protein intake      Nutrition Assessment:     64year old male admitted for ESRD (end stage renal disease) ([facility] [address] [N18.6]  Hypertensive urgency [I16.0] who  has a past medical history of Controlled type 2 diabetes mellitus with ophthalmic complication, with long-term current use of [facility] (11/14/2018) and Diabetes ([facility] [address] , ESRD on HD (M/W/F). >10 lb wt loss noted since last admit 2 months ago could be related to fluid balance. Visited with pt & reports his wife has gotten him a supplement recently at home but couldn't recall the name. Pt endorses wt loss & reports his wife has been cooking more vegetables lately. Pt has planned discharge today. RD provided education & encouragement to increase protein & calorie intake. Noted elevated BG with hx of DM, however a1c% inaccurate with HD.      Malnutrition Assessment:  Malnutrition Status:  Severe malnutrition    Context:  Acute illness     Findings of the 6 clinical characteristics of malnutrition:   Energy Intake:  Mild decrease in energy intake (specify)  Weight Loss:  7.00 - Greater than 7.5% over 3 months (8.2% wt loss over 3 months)     Body Fat Loss:  7 - Moderate body fat loss, Buccal region,Triceps   Muscle Mass Loss:  7 - Moderate muscle mass loss, Calf,Temples (temporalis),Scapula (trapezius)  Fluid Accumulation:  No significant fluid accumulation,     Strength:  Not performed     Estimated Daily Nutrient Needs:  Energy (kcal):  1640 - 1970 kcal/d (25-30 kcal/kg)  Protein (g):  78 - 92 g (1.2-1.4 g/kg)       Fluid (ml/day):  1500 mL    Nutrition Related Findings:       Lab Results   Component Value Date/Time    Glucose 202 (H) 02/15/2022 12:29 AM    Glucose (POC) 158 (H) 02/15/2022 10:42 AM     Lab Results   Component Value Date/Time    Hemoglobin A1c 8.4 (H) 11/16/2021 05:33 AM Hemoglobin A1c 10.5 (H) 11/22/2019 09:36 AM    Hemoglobin A1c 9.2 (H) 05/22/2019 09:46 AM     Lab Results   Component Value Date/Time    GFR est AA 34 (L) 02/15/2022 12:29 AM    GFR est non-AA 28 (L) 02/15/2022 12:29 AM    Creatinine 2.36 (H) 02/15/2022 12:29 AM    BUN 52 (H) 02/15/2022 12:29 AM    Sodium 135 (L) 02/15/2022 12:29 AM    Potassium 3.6 02/15/2022 12:29 AM    Chloride 104 02/15/2022 12:29 AM    CO2 23 02/15/2022 12:29 AM     Current Nutrition Therapies:  ADULT DIET Regular; 3 carb choices (45 gm/meal); No Salt Added (3-4 gm); 1000 ml;  No Concentrated Sweets    Documented Meal intake:  Patient Vitals for the past 168 hrs:   % Diet Eaten   02/15/22 0400 0%   02/15/22 0001 0%     Documentation of supplement intake:  Patient Vitals for the past 168 hrs:   Supplement intake %   02/15/22 0400 0%   02/15/22 0001 0%     Anthropometric Measures:  · Height:  5' 7\" (170.2 cm)  · Current Body Wt:  65.6 kg (144 lb 10 oz)  · BMI: 22.6    Last 3 Recorded Weights in this Encounter    02/14/22 1354 02/15/22 0001   Weight: 63.5 kg (140 lb) 65.6 kg (144 lb 11.2 oz)     Wt Readings from Last 6 Encounters:   02/15/22 65.6 kg (144 lb 11.2 oz)   02/05/22 64.9 kg (143 lb)   12/01/21 71.4 kg (157 lb 6.5 oz)   08/07/20 83.6 kg (184 lb 3.2 oz)   11/22/19 73.5 kg (162 lb)   08/23/19 74.4 kg (164 lb)     Nutrition Diagnosis:   · Inadequate protein intake,Severe malnutrition related to increased demand for energy/nutrients,renal dysfunction,early satiety as evidenced by weight loss,poor intake prior to admission,weight loss 7.5% in 3 months,moderate loss of subcutaneous fat,moderate muscle loss    Nutrition Intervention:  Food and/or Nutrient Delivery: Continue current diet,Start oral nutrition supplement  Nutrition Education and Counseling: Education completed  Coordination of Nutrition Care: Interdisciplinary rounds    Goals:  Consume at least 1 renal supplement daily       Nutrition Monitoring and Evaluation: Behavioral-Environmental Outcomes: Knowledge or skill  Food/Nutrient Intake Outcomes: Supplement intake  Physical Signs/Symptoms Outcomes: Biochemical data,Meal time behavior,Nutrition focused physical findings,Weight    Discharge Planning:    Continue oral nutrition supplement     Electronically signed by Fitz Red RD, MS on 2/15/2022 at 10:01 AM  Contact Number: [phone]

## 2022-02-15 NOTE — TELEPHONE ENCOUNTER
----- Message from Ariel Dockery sent at 2/15/2022  9:31 AM EST -----  Subject: Appointment Request    Reason for Call: Urgent (Patient Request) Hospital Follow Up    QUESTIONS  Type of Appointment? Established Patient  Reason for appointment request? No appointments available during search  Additional Information for Provider? Dr. Sarah Cote is calling from [facility] to schedule patient for a hospital f/u for HTN emergency. Dr. Sarah Cote is requesting that he is seen this week   ---------------------------------------------------------------------------  --------------  CALL BACK INFO  What is the best way for the office to contact you? OK to leave message on   voicemail  Preferred Call Back Phone Number? [phone]  ---------------------------------------------------------------------------  --------------  SCRIPT ANSWERS  Relationship to Patient? Third Party  Representative Name? Dr. Ramon Vincent  (Patient requests to see provider urgently. )? Yes  (Has the patient been discharged from the hospital within 2 business days   AND does not have a Telephone Encounter  Follow Up From [address] [place] [address]   documented in [address]?)? No  Do you have any questions for your primary care provider that need to be   answered prior to your appointment? (Use RN Triage if question pertains to   anything on the red flag list)? No  (Patient needs follow up visit after hospital discharge) Book first   available appointment within 7 days OF DISCHARGE, if no appt, proceed to   book the next available time slot within 14 days OF DISCHARGE AND Send   Message to Provider. [address] Follow Up appointment cannot be booked   beyond 14 Days and should result in a Message to Provider. ? Yes   Have you been diagnosed with, awaiting test results for, or told that you   are suspected of having COVID-19 (Coronavirus)?  (If patient has tested   negative or was tested as a requirement for work, school, or travel and   not based on symptoms, answer no)? Yes  Did your symptoms begin within the past 10 days or was your positive test   result within the past 10 days? No  Within the past 10 days have you developed any of the following symptoms   (answer no if symptoms have been present longer than 10 days or began   more than 10 days ago)? Fever or Chills, Cough, Shortness of breath or   difficulty breathing, Loss of taste or smell, Sore throat, Nasal   congestion, Sneezing or runny nose, Fatigue or generalized body aches   (answer no if pain is specific to a body part e.g. back pain), Diarrhea,   Headache? No  Have you had close contact with someone with COVID-19 in the last 7 days? No  (Service Expert  click yes below to proceed with [facility] As Usual   Scheduling)?  Yes

## 2022-02-15 NOTE — PROCEDURES
Hemodialysis / [phone]    Vitals Pre Post Assessment Pre Post   /61 132/62 LOC A&O x4 A&O x4   HR 60 62 Lungs clear clear   Resp 16 16 Cardiac regular regular   Temp 97.5 97.5 Skin Dry, warm Dry, warm   Weight    Edema none none   Tele status Monitored remotely Monitored remotely Pain 0 0     Orders   Duration: Start: 0150 End: 7005 Total: 3.5 hrs   Dialyzer: Dialyzer/Set Up Inspection: Revaclear (02/15/22 0150)   K Bath: Dialysate K (mEq/L): 3 (02/15/22 0150)   Ca Bath: Dialysate CA (mEq/L): 2.5 (02/15/22 0150)   Na: Dialysate NA (mEq/L): 138 (02/15/22 0150)   Bicarb: Dialysate HCO3 (mEq/L): 35 (02/15/22 0150)   Target Fluid Removal: Goal/Amount of Fluid to Remove (mL): 3000 mL (02/15/22 0150)     Access   Type & Location: Right PC:Each catheter limb disinfected per p&p, caps removed, hubs disinfected per p&p. Dressing missing. No S&S of infection.    Comments:                                        Labs   HBsAg (Antigen) / date: 01/17/22 negative                                              HBsAb (Antibody) / date: 12/10/21 susceptible   Source:    Obtained/Reviewed  Critical Results Called HGB   Date Value Ref Range Status   02/15/2022 7.4 (L) 12.1 - 17.0 g/dL Final     Comment:     INVESTIGATED PER DELTA CHECK PROTOCOL     Potassium   Date Value Ref Range Status   02/15/2022 3.6 3.5 - 5.1 mmol/L Final     Calcium   Date Value Ref Range Status   02/15/2022 7.9 (L) 8.5 - 10.1 MG/DL Final     BUN   Date Value Ref Range Status   02/15/2022 52 (H) 6 - 20 MG/DL Final     Creatinine   Date Value Ref Range Status   02/15/2022 2.36 (H) 0.70 - 1.30 MG/DL Final        Meds Given   Name Dose Route                    Adequacy / Fluid    Total Liters Process: 86 L   Net Fluid Removed: 3000 cc      Comments   Time Out Done:   (Time) 0145   Admitting Diagnosis: HTN   Consent obtained/signed: Informed Consent Verified: Yes (02/15/22 0150)   Machine / RO # Machine Number: [id number] (02/15/22 0150)   Primary Nurse Rpt Pre: Apoorva Stark RN   Primary Nurse Rpt Post: Apoorva Stark RN   Pt Education: Access care, procedure   Care Plan: Continue HD tx as per MD order   Pts outpatient clinic:      Tx Summary   Comments: Tolerated tx well, at the end remaining blood in circuit rinsed back, port flushed with NS, cleaned per P&P, caps applied.  Dressing changed

## 2022-02-15 NOTE — PROGRESS NOTES
CM Discharge note:    Patient ready for discharge to home. Patient will be transported home via wife by personal car. Home with family assist.  Patient goes to outpatient hemodialysis MWF at [facility]. [employer] medicaid transportation contact information provided to patient. Patient has follow up appointments with PCP. Updated clinicals sent to [facility] via [employer]. Care Management Interventions  PCP Verified by CM: Yes Katharina Nowak MD)  Mode of Transport at Discharge:  Other (see comment) (family)  Transition of Care Consult (CM Consult): Discharge Planning  MyChart Signup: No  Discharge Durable Medical Equipment: No  Physical Therapy Consult: No  Occupational Therapy Consult: No  Speech Therapy Consult: No  Support Systems: Spouse/Significant Other,Child(fidelia)  Confirm Follow Up Transport: Family  Discharge Location  Patient Expects to be Discharged to[de-identified] Home    Jia Osman RN, MSN/Care manager

## 2022-02-15 NOTE — ROUTINE PROCESS
2315 - TRANSFER - IN REPORT:    Verbal report received from Mounika Randolph RN (name) on Paula Poster  being received from ED 06 (unit) for routine progression of care      Report consisted of patients Situation, Background, Assessment and   Recommendations(SBAR). Information from the following report(s) SBAR, Kardex, Intake/Output, MAR and Accordion was reviewed with the receiving nurse. Opportunity for questions and clarification was provided. Assessment completed upon patients arrival to unit and care assumed. 8911 - Patient complaining of abdominal, chest and head pain. RN reached out to [facility] about pain. Per MD, \"wait to see if Saintclair Niemann will help before trying something else. If pain persists, MD will come examine patient. \"    56 - RN reached out to [facility] about patients complaints of head pain. Per patient, he does not want tylenol. It does not work for him. Would like something else. Per family practice, will review chart and put orders in.    0700 - Bedside shift change report given to Wilmar Drake RN (oncoming nurse) by Alonso Ignacio RN (offgoing nurse). Report included the following information SBAR, Kardex, Intake/Output, MAR, Accordion and Recent Results.

## 2022-02-15 NOTE — PROGRESS NOTES
[phone], Jalen Boateng. Scheduled for  at or before 1325 today.  to call [facility] nurses station in route to notify staff. Conf #[id number].     Erika Villalobos RN, MSN/Care manager  [phone]

## 2022-02-15 NOTE — ED NOTES
TRANSFER - OUT REPORT:    Verbal report given to Osvaldo Ware Rn(name) on Asen[address] Route  being transferred to 3rd floor Med surg Rm 317 (unit) for routine progression of care       Report consisted of patients Situation, Background, Assessment and   Recommendations(SBAR). Information from the following report(s) SBAR, ED Summary, [facility] and Recent Results was reviewed with the receiving nurse. Lines:   Peripheral IV 02/14/22 Anterior;Right Forearm (Active)        Opportunity for questions and clarification was provided.       Patient transported with:   [employer]ar

## 2022-02-15 NOTE — PROGRESS NOTES
[facility] Visit for 02/21/2022 at 12:00pm. Dr Hernesto Ceballos will Call Patient on his Cell Phone

## 2022-02-15 NOTE — DISCHARGE INSTRUCTIONS
HOME DISCHARGE INSTRUCTIONS    Danilela Goss / [id number] : 1960    Admission date: 2022 Discharge date: 2/15/2022 7:29 AM     Please bring this form with you to show your care provider at your follow-up appointment. Primary care provider:  Clarita Kenny MD    Discharging provider:  Estela Rincon MD  - Family Medicine Resident  Julien Fonseca MD - Family Medicine Attending      You have been admitted to the hospital with the following diagnoses:    ACUTE DIAGNOSES:  ESRD (end stage renal disease) ([facility] [address] [N18.6]  Hypertensive urgency [I16.0]    . . . . . . . . . . . . . . . . . . . . . . . . . . . . . . . . . . . . . . . . . . . . . . . . . . . . . . . . . . . . . . . . . . . . . . . .   You are well enough to be discharged from the hospital. However, because you were inpatient in a hospital, you are at greater risk of having been exposed to the coronavirus. PLEASE stay inside and self-quarantine for 14 days to prevent further spread of the coronavirus. Medication Changes:  - Imdur 120 mg to 60 mg (please take 1/2 a tablet every morning)  - Nifedipine 90 mg (previously 60 mg, please take 1 tablet daily)  . . . . . . . . . . . . . . . . . . . . . . . . . . . . . . . . . . . . . . . . . . . . . . . . . . . . . . . . . . . . . . . . . . . . . . . Alok Ashby      FOLLOW-UP CARE RECOMMENDATIONS:    Please follow up with your primary care doctor for management of your blood pressure  Please follow up with your nephrologist for further management of Kidney Disease and Dialysis  Please continue to go to Dialysis on a regular basis to avoid hospitalization  Please follow up with your Liver Specialist    We have asked case management to set up referrals for a  and Pharmacist to help with outpatient needs if any for transportation, medication education and cost.    It is very important that you are compliant with your medications, especially clonidine, because missing a dose of clonidine can cause a rebound hypertension, another words just missing one dose of clonidine can cause you blood pressure to be very high after that missed dose. Appointments  Follow-up Information     Follow up With Specialties Details Why Contact Info    Yehuda Mike MD Hepatology Schedule an appointment as soon as possible for a visit in 2 weeks For continued evaluation of findings concerning for cirrhosis [address] [facility] [address]      Billy Cowart MD Urology Schedule an appointment as soon as possible for a visit in 2 weeks For evaluation of prostatomegaly  [address]  [phone]      Clarita Kenny MD Family Medicine Schedule an appointment as soon as possible for a visit Hospital discharge [address]      Sana Almaguer MD Family Medicine Go on 2/18/2022 at 46PM for hospital followup [address] [place] [address] [facility]  [phone]               Follow-up tests needed: None    Pending test results: At the time of your discharge the following test results are still pending: None. Please make sure you review these results with your outpatient follow-up provider(s). DIET/what to eat:  Diabetic Diet    ACTIVITY:  Activity as tolerated    Wound care: N/A    Equipment needed:  N/A    Specific symptoms to watch for: chest pain, shortness of breath, fever, chills, nausea, vomiting, diarrhea, change in mentation, falling, weakness, bleeding. What to do if new or unexpected symptoms occur? If you experience any of the above symptoms (or should other concerns or questions arise after discharge) please call your primary care physician. Return to the emergency room if you cannot get hold of your doctor. · It is very important that you keep your follow-up appointment(s).   · Please bring discharge papers, medication list (and/or medication bottles) to your follow-up appointments for review by your outpatient provider(s). · Please check the list of medications and be sure it includes every medication (even non-prescription medications) that your provider wants you to take. · It is important that you take the medication exactly as they are prescribed. · Keep your medication in the bottles provided by the pharmacist and keep a list of the medication names, dosages, and times to be taken in your wallet. · Do not take other medications without consulting your doctor. · If you have any questions about your medications or other instructions, please talk to your nurse or care provider before you leave the hospital.     Information obtained by:     I understand that if any problems occur once I am at home I am to contact my physician. These instructions were explained to me and I had the opportunity to ask questions. I understand and acknowledge receipt of the instructions indicated above.                                                                                                                                                Physician's or R.N.'s Signature                                                                  Date/Time                                                                                                                                              Patient or Representative Signature                                                          Date/Time

## 2022-02-15 NOTE — DISCHARGE SUMMARY
[facility] [address]   Office [phone]  Fax [phone]       Discharge / Transfer / Off-Service Note     Name: Delroy Hadley MRN: [id number]  Sex: Male   YOB: 1960  Age: 64 y.o. PCP: Perry Mejia MD     Date of admission: 2/14/2022  Date of discharge/transfer: 2/15/2022    Attending physician at admission: Tawanda Benítez. Attending physician at discharge/transfer: Dr. Tawanda Benítez MD  Resident physician at discharge/transfer: Yesenia Davis MD     Consultants during hospitalization  IP CONSULT  [address]     Admission diagnoses   ESRD (end stage renal disease) ([facility] [N18.6]  Hypertensive urgency [I16.0]    Recommended follow-up after discharge  1. Rivka Junior MD  2. Dr. Carmina Reyes (hepatology)   3. Dr. Joe Holman (nephrology) / Continued Dialysis MW  4. Dr. Olesya Aguilera (cardiology)    Things to Follow-up with PCP:  - Routine care following hospitalization  - Continued BP management -- optimize home medications  - Further work-up of resistant HTN  - Discussion regarding prostatomegaly (found incidentally) w/ referral to Urology  - Consider adding EPO for chronic anemia likely 2/2 CKD (Nephro)  - Update lipid panel (last checked 2019)     ----------------------------------------------------------------------------------------------------------------------------  The patient was well enough to be discharged from the hospital. However, because they were inpatient in a hospital, they are at greater risk of having been exposed to the coronavirus.  PLEASE stay inside and self-quarantine for 14 days to prevent further spread of the coronavirus.  ------------------------------------------------------------------------------------------------------------------    Medication Changes:  START None     STOP None     CHANGES   - Imdur 120 mg to 60 mg daily  - Nifedipine 60 to 90 mg daily     No other changes were made to your medications, please take all your other home medicines as previously prescribed. History of Present Illness    As per admitting provider, Dr. Arcos:   Marielena Cox is a 64 y.o. male with a known h/o ESRD on HD, HTN, HFpEF, T2DM, CAD s/p CABG (May 2020), h/o PEA arrest, Renetta Ferminlist is admitted for hypertensive emergency in s/o ESRD on HD (MWF) and a band-like headache who presented to the ED complaining of for evaluation of elevated BP and a band-like frontal-parietal headache.      Patient was previously admitted from 2/5 to 2/7 with a similar presentation. On that visit, was found to be in hypertensive urgency that was treated with hydral, labetalol, and restarting his home medications. He appears to have poor compliance, despite stating that he takes all his medications as prescribed.     Today (2/14), patient states he was expected to be seen by his PCP prior to going to dialysis at 1030. However, he checked his BP at home, found to have a SBP > 200 when he contacted on-call physician at [facility] who advised him to seek evaluation in the ED. Patient subsequently ended up missing his dialysis today. He presents with a headache that is band-like across his frontal-parietal region that has been chronic, but acutely worsened over the last 2-3 days. He describes it as a \"crown\" that constricts in the distribution described. Currently 8-9/10  In terms of severity, improved with tylenol at home. No loss of vision, history of migraines, aura, increased sensitivity to light/sound, acute weakness, or changes to sensation.      Patient otherwise reports mild to moderate epigastric, waxing/waning, chronic pain that is generally improved \"when I drink milk\". He describes the discomfort as \"burning\" to his stomach. Further notes improvement with Pepto-Bismol at home.  Similar discomfort extending from R to L chest in linear distribution, non-radiating (to back, jaw, upper extremities).       Denies: fever, chills, CP, palpitations, n/v/d, increased urinary frequency/urgency, dysuria, hematuria, SOB, diaphoresis. [facility] course  Mr. Pooja Huerta was admitted to the [address] from 2/14 to 2/15 shortly after missing dialysis on 2/14 d/t a markedly elevated BP reading at home. Patient states he was advised to seek evaluation per a NP that works with Dr. Char Andre who advised him to be evaluated in the ED for a SBP > 200. He subsequently missed his HD on 2/14 and presented in hypertensive emergency with an elevated Trop, CP, and a band-like headache. Patient was otherwise negative with regards to an acute MI work-up. He received dialysis on 2/14 and was stable enough for discharge the AM of 2/15 with normotensive BP's for > 8 hours. Appears he likely was likely non-compliant with his home BP medications. Lengthy discussion with patient day of discharge regarding importance of compliance and dialysis. Patient confirms understanding and is agreeable with discharge home.     Hypertensive Emergency c/b elevated trop (Resolved): POA 192/91, trop 36>36. Most recent BP checks normotensive. Likely 2/2 poor compliance with home meds. - Continue home meds: Norvasc 10 mg daily, Bumex 2 mg daily, clonidine 0.2mg TID, eplerenone 25mg daily, Hydral 100 mg TID, Imdur 120>60 mg daily, Toprol XL 50mg daily, Procardia 60>90 mg daily, Terazosin 5mg BID  - F/u with PCP     ESRD on HD: Permacath placed 11/24. Missed HD on AM of 2/14. Follows with Dr. William Fajardo (Nepho). Receives HD MWF. CT c/a/p with anasarca, B/L pleural effusions decreased from prior scan on 2/5, prostatomegaly, no PE.   - S/p dialysis on 2/14  - Continue Dialysis MWF  - F/u with PCP and Nephrologist     Epigastric pain in the setting of known erosive gastritis: Prior admission for GI bleed in 11/2021. 11/16/21 EGD w/ nonbleeding erosive esophagitis. Noncompliant with protonix and carafate.  No evidence of active bleeding.   - Home Protonix 40m g daily   - Home Carafate 1g QID   - Follow up with GI as outpatient      T2DM c/b peripheral neuropathy: Last A1c 8.4% (11/2021). Currently on Glipizide 10mg daily (5mg x2) and Metformin 1000mg BID. Gabapentin for neuropathy.   - Continue home meds  - F/u with PCP     Concern for cirrhosis: past h/o heavy alcohol use, stopped 6-7 years ago. US 11/20/21 showing hepatic parenchymal disease with cirrhotic morphology, perihepatic ascites and right pleural effusion. Per chart review, had liver biopsy completed awaiting results. - following with Dr. Dav Whelan (hepatology)   - outpatient follow up with GI      HFpEF:  Echo 11/19/21 with EF 60-65% and reduced systolic function and moderate pulm HTN.   - Bumex 1mg BID   - follow up with cards as outpt      Anemia/thrombocytopenia: This is a chronic condition. POA Hgb 10.2 (bl ~7.5), normocytic and Plt 99 (bl ~90). Reticulocyte index 0.83 c/w hypoproliferation. Appears to be c/w CKD, advise nephro consider adding EPO agent.    - F/u with nephro     CAD s/p CABG: May 2020. Follows with Dr. Jb Gaitan (Cardiology). - Rosuvastatin 10 mg daily      HLD: Chronic.             Lab Results   Component Value Date/Time     Cholesterol, total 176 11/22/2019 09:36 AM     HDL Cholesterol 45 11/22/2019 09:36 AM     LDL, calculated 112 (H) 11/22/2019 09:36 AM     VLDL, calculated 19 11/22/2019 09:36 AM     Triglyceride 94 11/22/2019 09:36 AM   - Consider repeat lipid panel outpatient  - Home rosuvastatin 10 mg daily     H/o PEA arrest: During previous admission to [facility] (11/2021) suspected secondary to respiratory distress. ROSC achieved after 12 minutes CPR. S/p intubation and extubation with mental status return to baseline. Physical exam at discharge:    Vitals Reviewed.    Patient Vitals for the past 12 hrs:   Temp Pulse Resp BP SpO2   02/15/22 1200  (!) 59      02/15/22 1052 97.4 °F (36.3 °C) (!) 59 14 (!) 149/75 99 %   02/15/22 0750 97.4 °F (36.3 °C) 65 17 121/60 97 %   02/15/22 0525  62 16 132/62    02/15/22 0520  61 16 121/65    02/15/22 0500  (!) 59 16 129/69    02/15/22 0445  60 16 121/66    02/15/22 0430  60 16 124/68    02/15/22 0415  60 16 125/64    02/15/22 0400 97.4 °F (36.3 °C) 60 16 121/66 98 %   02/15/22 0345  61 16 112/63         General: No acute distress. Alert. Cooperative. Appears older than stated age. Head: Normocephalic. Atraumatic. Neck: Supple. Normal ROM. No stiffness. Respiratory: CTAB. No w/r/r/c.   Cardiovascular: RRR. Audible 2/6 systolic murmur most audible along LUSB. Pulses 2+ throughout. GI: + bowel sounds. No sig abdominal TTP. No rebound tenderness or guarding. Nondistended. Extremities: No obvious BLE edema. Distal pulses intact. Skin: Warm, dry. No rashes. Neuro: Awake. Alert     Condition at discharge: Stable. Labs  Recent Labs     02/15/22  0029 02/14/22  1412   WBC 4.5 6.0   HGB 7.4* 10.9*   HCT 21.3* 31.4*   PLT 72* 98*     Recent Labs     02/15/22  0029 02/14/22  1726 02/14/22  1412   *  --  132*   K 3.6  --  3.7     --  102   CO2 23  --  22   BUN 52*  --  48*   CREA 2.36*  --  2.34*   *  --  272*   CA 7.9*  --  8.5   MG 2.2 2.1  --    PHOS 4.1 4.2  --      Recent Labs     02/15/22  0029 02/14/22  1412   ALT 32 39   * 172*   TBILI 0.6 0.4   TP 5.6* 7.2   ALB 2.2* 2.6*   GLOB 3.4 4.6*   LPSE  --  123     Recent Labs     02/15/22  1042 02/15/22  0812 02/14/22  2204   GLUCPOC 158* 82 271*       Micro:  Lab Results   Component Value Date/Time    Culture result: NO GROWTH 4 DAYS 11/18/2021 11:40 AM       Imaging:  XR CHEST PA LAT    Result Date: 2/14/2022  History: Chest pain. 2 views of the chest demonstrate a dialysis catheter in place. The patient is status post median sternotomy. There is atherosclerosis of the aorta. There is a persistent left pleural effusion. There are degenerative changes of the spine. Persistent left pleural effusion.     CT HEAD WO CONT    Result Date: 2/14/2022  CLINICAL HISTORY: headache INDICATION: headache COMPARISON: 11/18/2021. CT dose reduction was achieved through use of a standardized protocol tailored for this examination and automatic exposure control for dose modulation. TECHNIQUE: Serial axial images with a collimation of 5 mm were obtained from the skull base through the vertex  FINDINGS: The sulci and ventricles are within normal limits for patient age. There is no evidence of an acute infarction, hemorrhage, or mass-effect. There is no evidence of midline shift or hydrocephalus. Posterior fossa structures are unremarkable. No extra-axial collections are seen. Mastoid air cells are well pneumatized and clear. There is no evidence of depressed skull fractures of soft tissue swelling. No acute intracranial process. CTA CHEST W OR W WO CONT    Result Date: 2/14/2022  EXAM: CTA CHEST W OR W WO CONT, CT ABD PELV W CONT INDICATION: chest pain, ABD pain, HTN, elevated ddimer, on hemodialysis COMPARISON: CTA chest abdomen pelvis every 5/20/2022 CONTRAST: 100 mL of Isovue-370. TECHNIQUE: CTA chest: Helical thin section chest CT following uneventful intravenous administration of nonionic contrast 100 mL of isovue 370 according to departmental PE protocol. Coronal and sagittal reformats were performed. 3D post processing was performed. CT dose reduction was achieved through the use of a standardized protocol tailored for this examination and automatic exposure control for dose modulation. CT abdomen/pelvis: Following the uneventful intravenous administration of contrast, thin axial images were obtained through the abdomen and pelvis. Coronal and sagittal reconstructions were generated. Oral contrast was not administered. CT dose reduction was achieved through use of a standardized protocol tailored for this examination and automatic exposure control for dose modulation. FINDINGS: This is a good quality study for the evaluation of pulmonary embolism to the first subsegmental arterial level.  There is no pulmonary embolism to this level. Chest wall: Prior sternotomy. Anasarca. THYROID: No nodule. MEDIASTINUM: No mass or lymphadenopathy. RENEE: No mass or lymphadenopathy. THORACIC AORTA: No aneurysm. HEART: Normal in size. Right IJ dialysis catheter terminates in the right atrium. Coronary artery calcifications. Status post coronary artery bypass grafting. ESOPHAGUS: No wall thickening or dilatation. TRACHEA/BRONCHI: Patent. PLEURA: Small bilateral pleural effusions with minimal bibasilar atelectasis, and a few areas of peripheral scarring or subsegmental atelectasis. LUNGS: No nodule, mass, or airspace disease. LIVER: No mass. BILIARY TREE: Gallbladder is within normal limits. CBD is not dilated. SPLEEN: within normal limits. PANCREAS: No mass or ductal dilatation. ADRENALS: Unremarkable. KIDNEYS: No mass, calculus, or hydronephrosis. STOMACH: Unremarkable. SMALL BOWEL: No dilatation or wall thickening. COLON: No dilatation or wall thickening. APPENDIX: Unremarkable. PERITONEUM: No ascites or pneumoperitoneum. Mild mesenteric and presacral edema. RETROPERITONEUM: No lymphadenopathy or aortic aneurysm. REPRODUCTIVE ORGANS: Prostatomegaly. URINARY BLADDER: No mass or calculus. Mild diffuse bladder wall thickening. BONES: No destructive bone lesion. Chronic fracture of left rib 5 ABDOMINAL WALL: No mass or hernia. Anasarca. ADDITIONAL COMMENTS: N/A     1. Bilateral pleural effusions which are slightly decreased compared to February 5, with minimal dependent atelectasis. 2. Anasarca, including edema of the subcutaneous tissues, mesentery, and pelvis. 3. Previously identified pulmonary edema is resolved. 4. Prostatomegaly with findings which raise the possibility of chronic bladder outlet obstruction. 5. No pulmonary embolism, no acute vascular abnormality. CT ABD PELV WO CONT    Result Date: 2/5/2022  EXAM: CT ABD PELV WO CONT INDICATION: Abdominal pain and diarrhea COMPARISON: 11/14/2021 IV CONTRAST: None.  ORAL CONTRAST: None TECHNIQUE: Thin axial images were obtained through the abdomen and pelvis. Coronal and sagittal reformats were generated. CT dose reduction was achieved through use of a standardized protocol tailored for this examination and automatic exposure control for dose modulation. The absence of intravenous contrast material reduces the sensitivity for evaluation of the vasculature and solid organs. FINDINGS: LOWER THORAX: Moderate bilateral pleural effusions and underlying atelectasis again noted. LIVER: No mass. BILIARY TREE: Gallbladder is within normal limits. CBD is not dilated. SPLEEN: within normal limits. PANCREAS: No focal abnormality. ADRENALS: Unremarkable. KIDNEYS/URETERS: No calculus or hydronephrosis. STOMACH: Unremarkable. SMALL BOWEL: No dilatation or wall thickening. COLON: No dilatation or wall thickening. APPENDIX: Within normal limits. PERITONEUM: Mild free fluid and mesenteric edema. RETROPERITONEUM: No lymphadenopathy or aortic aneurysm. REPRODUCTIVE ORGANS: There is no pelvic mass or lymphadenopathy. URINARY BLADDER: No mass or calculus. BONES: No destructive bone lesion. ABDOMINAL WALL: Diffuse subcutaneous edema is noted. ADDITIONAL COMMENTS: N/A     Diffuse subcutaneous edema, mild free fluid, and mesenteric edema, compatible with third spacing of fluids. Bilateral pleural effusions and underlying atelectasis. No acute intra-abdominal abnormality. CT ABD PELV W CONT    Result Date: 2/14/2022  EXAM: CTA CHEST W OR W WO CONT, CT ABD PELV W CONT INDICATION: chest pain, ABD pain, HTN, elevated ddimer, on hemodialysis COMPARISON: CTA chest abdomen pelvis every 5/20/2022 CONTRAST: 100 mL of Isovue-370. TECHNIQUE: CTA chest: Helical thin section chest CT following uneventful intravenous administration of nonionic contrast 100 mL of isovue 370 according to departmental PE protocol. Coronal and sagittal reformats were performed. 3D post processing was performed.   CT dose reduction was achieved through the use of a standardized protocol tailored for this examination and automatic exposure control for dose modulation. CT abdomen/pelvis: Following the uneventful intravenous administration of contrast, thin axial images were obtained through the abdomen and pelvis. Coronal and sagittal reconstructions were generated. Oral contrast was not administered. CT dose reduction was achieved through use of a standardized protocol tailored for this examination and automatic exposure control for dose modulation. FINDINGS: This is a good quality study for the evaluation of pulmonary embolism to the first subsegmental arterial level. There is no pulmonary embolism to this level. Chest wall: Prior sternotomy. Anasarca. THYROID: No nodule. MEDIASTINUM: No mass or lymphadenopathy. RENEE: No mass or lymphadenopathy. THORACIC AORTA: No aneurysm. HEART: Normal in size. Right IJ dialysis catheter terminates in the right atrium. Coronary artery calcifications. Status post coronary artery bypass grafting. ESOPHAGUS: No wall thickening or dilatation. TRACHEA/BRONCHI: Patent. PLEURA: Small bilateral pleural effusions with minimal bibasilar atelectasis, and a few areas of peripheral scarring or subsegmental atelectasis. LUNGS: No nodule, mass, or airspace disease. LIVER: No mass. BILIARY TREE: Gallbladder is within normal limits. CBD is not dilated. SPLEEN: within normal limits. PANCREAS: No mass or ductal dilatation. ADRENALS: Unremarkable. KIDNEYS: No mass, calculus, or hydronephrosis. STOMACH: Unremarkable. SMALL BOWEL: No dilatation or wall thickening. COLON: No dilatation or wall thickening. APPENDIX: Unremarkable. PERITONEUM: No ascites or pneumoperitoneum. Mild mesenteric and presacral edema. RETROPERITONEUM: No lymphadenopathy or aortic aneurysm. REPRODUCTIVE ORGANS: Prostatomegaly. URINARY BLADDER: No mass or calculus. Mild diffuse bladder wall thickening. BONES: No destructive bone lesion.  Chronic fracture of left rib 5 ABDOMINAL WALL: No mass or hernia. Anasarca. ADDITIONAL COMMENTS: N/A     1. Bilateral pleural effusions which are slightly decreased compared to February 5, with minimal dependent atelectasis. 2. Anasarca, including edema of the subcutaneous tissues, mesentery, and pelvis. 3. Previously identified pulmonary edema is resolved. 4. Prostatomegaly with findings which raise the possibility of chronic bladder outlet obstruction. 5. No pulmonary embolism, no acute vascular abnormality. XR CHEST PORT    Result Date: 2/6/2022  INDICATION: CP EXAMINATION:  AP CHEST, PORTABLE COMPARISON: 2/5/2022 FINDINGS: Single AP portable view of the chest demonstrates unchanged right IJ permacath. Prior median sternotomy. The cardiomediastinal silhouette is unchanged. Mild interstitial edema and small pleural effusions left greater than right, unchanged. No pneumothorax. No significant change. XR CHEST PORT    Result Date: 2/5/2022  Clinical indication: Shortness of breath. Portable AP view of the chest obtained. Comparison November 23, 2021. A double lumen catheter is in the right atrium. There is blunting of the left costophrenic angle. The heart size is normal. There is no acute infiltrate. Double lumen catheter in place. Blunting left costophrenic angle. CTA CHEST ABD PELV W CONT    Result Date: 2/6/2022  Indication: Abdominal pain, evaluate for dissection 2.5 mm axial thin sections were obtained from the lung apices to the pubic symphysis following the rapid bolus administration of 100 cc Isovue-370. 3D post processing was performed including MIP and volume rendered imaging. CT dose reduction was achieved through use of a standardized protocol tailored for this examination and automatic exposure control for dose modulation. Comparison to noncontrast abdomen pelvis CT performed earlier the same day FINDINGS: THYROID: No nodule. MEDIASTINUM: No mass or lymphadenopathy. RENEE: No mass or lymphadenopathy.  THORACIC AORTA: No dissection or aneurysm. PULMONARY ARTERIES: Main pulmonary artery is normal in caliber. No evidence of acute pulmonary emboli. TRACHEA/BRONCHI: Patent. ESOPHAGUS: No wall thickening or dilatation. HEART: Normal in size. PLEURA: Moderate bilateral pleural effusions are noted. LUNGS: Bilateral lower lobe atelectasis. Minimal pulmonary edema. LIVER: No mass or biliary dilatation. GALLBLADDER: Unremarkable. SPLEEN: No mass. PANCREAS: No mass or ductal dilatation. ADRENALS: Unremarkable. KIDNEYS: No mass, calculus, or hydronephrosis. STOMACH: Unremarkable. SMALL BOWEL: No dilatation or wall thickening. COLON: No dilatation or wall thickening. APPENDIX: Within normal limits. PERITONEUM: Mild ascites and mesenteric edema. RETROPERITONEUM: The abdominal aorta is atherosclerotic without evidence of aneurysm or dissection. Origins of the celiac axis, SMA, single renal arteries bilaterally, and MIL are widely patent. REPRODUCTIVE ORGANS: There is no pelvic mass or lymphadenopathy. URINARY BLADDER: No mass or calculus. BONES: No destructive bone lesion. ADDITIONAL COMMENTS: Soft tissue edema is diffusely noted. Moderate bilateral pleural effusions with underlying atelectasis and minimal pulmonary edema. Mild ascites, mesenteric edema, and subcutaneous edema compatible with third spacing of fluids. No evidence of aortic dissection or aneurysm.      Chronic diagnoses   Problem List as of 2/15/2022 Date Reviewed: 2/14/2022          Codes Class Noted - Resolved    ESRD [facility]end stage [facility] disease) ([facility] ICD-10-CM: N18.6  ICD-9-CM: 585.6  2/14/2022 - Present        * (Principal) Hypertensive emergency ICD-10-CM: I16.1  ICD-9-CM: 401.9  2/14/2022 - Present        Anasarca ICD-10-CM: R60.1  ICD-9-CM: 782.3  2/6/2022 - Present        ESRD (end stage renal disease) on dialysis [facility]) ICD-10-CM: N18.6, Z99.2  ICD-9-CM: 585.6, V45.11  2/6/2022 - Present        Heart failure with preserved ejection fraction ([facility] ICD-10-CM: I50.30  ICD-9-CM: 428.9  2/6/2022 - Present        Elevated troponin ICD-10-CM: R77.8  ICD-9-CM: 790.6  2/6/2022 - Present        Anemia ICD-10-CM: D64.9  ICD-9-CM: 285.9  2/6/2022 - Present        Thrombocytopenia ([facility]) ICD-10-CM: D69.6  ICD-9-CM: 287.5  2/6/2022 - Present        Epigastric pain ICD-10-CM: R10.13  ICD-9-CM: 789.06  2/6/2022 - Present        Severe protein-calorie malnutrition ([facility] ICD-10-CM: E43  ICD-9-CM: 386  11/23/2021 - Present        CHF exacerbation ([facility] ICD-10-CM: I50.9  ICD-9-CM: 428.0  11/14/2021 - Present        Type 2 diabetes with nephropathy ([facility] ICD-10-CM: E11.21  ICD-9-CM: 250.40, 583.81  8/5/2019 - Present        Type 2 diabetes mellitus with diabetic neuropathy ([facility] ICD-10-CM: E11.40  ICD-9-CM: 250.60, 357.2  8/5/2019 - Present        Type 2 diabetes mellitus with ophthalmic complication, with long-term current use of insulin ([facility] ICD-10-CM: E11.39, Z79.4  ICD-9-CM: 250.50, V58.67  11/25/2018 - Present        Essential hypertension ICD-10-CM: I10  ICD-9-CM: 401.9  11/14/2018 - Present        Reactive depression ICD-10-CM: F32.9  ICD-9-CM: 300.4  11/14/2018 - Present        Peripheral neuropathy ICD-10-CM: G62.9  ICD-9-CM: 356.9  11/14/2018 - Present        RESOLVED: Abdominal pain ICD-10-CM: R10.9  ICD-9-CM: 789.00  2/6/2022 - 2/14/2022        RESOLVED: Hypertensive urgency ICD-10-CM: I16.0  ICD-9-CM: 401.9  2/6/2022 - 2/14/2022        RESOLVED: Obesity ICD-10-CM: E66.9  ICD-9-CM: 278.00  2/6/2022 - 2/14/2022        RESOLVED: Controlled type 2 diabetes mellitus with ophthalmic complication, with long-term current use of insulin ([facility] ICD-10-CM: E11.39, Z79.4  ICD-9-CM: 250.50, V58.67  11/14/2018 - 11/14/2018              Discharge Medication List as of 2/15/2022 10:37 AM      CONTINUE these medications which have CHANGED    Details   NIFEdipine ER (PROCARDIA XL) 90 mg ER tablet Take 1 Tablet by mouth daily for 30 days. , Normal, Disp-30 Tablet, R-0      isosorbide mononitrate ER (IMDUR) 120 mg CR tablet Take 0.5 Tablets by mouth every morning for 30 days. , Normal, Disp-15 Tablet, R-0         CONTINUE these medications which have NOT CHANGED    Details   bumetanide (BUMEX) 2 mg tablet Take 2 mg by mouth daily. , Historical Med      pantoprazole (PROTONIX) 40 mg tablet Take 40 mg by mouth daily. , Historical Med      insulin glargine (Lantus U-100 Insulin) 100 unit/mL injection 10 Units by SubCUTAneous route daily. , Historical Med      acetaminophen (Tylenol Extra Strength) 500 mg tablet Take 1,000 mg by mouth every six (6) hours as needed for Pain., Historical Med      rosuvastatin (CRESTOR) 10 mg tablet Take 10 mg by mouth Every morning., Historical Med      cloNIDine HCL (CATAPRES) 0.2 mg tablet Take 0.2 mg by mouth three (3) times daily. , Historical Med      gabapentin (NEURONTIN) 300 mg capsule Take 300 mg by mouth two (2) times a day., Historical Med      amLODIPine (NORVASC) 10 mg tablet Take 10 mg by mouth Every morning., Historical Med      aspirin 81 mg chewable tablet Take 81 mg by mouth daily. , Historical Med      doxazosin (CARDURA) 4 mg tablet Take 4 mg by mouth two (2) times a day., Historical Med      hydrALAZINE (APRESOLINE) 100 mg tablet Take 100 mg by mouth three (3) times daily. , Historical Med      minoxidiL (LONITEN) 2.5 mg tablet Take 5 mg by mouth two (2) times a day., Historical Med      sucralfate (CARAFATE) 1 gram tablet Take 1 g by mouth four (4) times daily. , Historical Med      ondansetron (ZOFRAN ODT) 8 mg disintegrating tablet Take 8 mg by mouth every eight (8) hours as needed for Nausea or Vomiting., Historical Med      calcium carbonate (TUMS) 200 mg calcium (500 mg) chew Take 1 Tablet by mouth daily as needed., Historical Med      metoprolol succinate (TOPROL-XL) 50 mg XL tablet Take 1 Tablet by mouth daily. , Normal, Disp-30 Tablet, R-0      eplerenone (Inspra) 25 mg tablet Take 25 mg by mouth daily. , Historical Med              Diet:  Diabetic diet    Activity:  As tolerated     Disposition: Home or Self Care    Discharge instructions to patient/family  Please seek medical attention for any new or worsening symptoms particularly fever, chest pain, shortness of breath, abdominal pain, nausea, vomiting    Follow up plans/appointments  Follow-up Information     Follow up With Specialties Details Why Contact Info    Pat Smith MD Hepatology Schedule an appointment as soon as possible for a visit in 2 weeks For continued evaluation of findings concerning for cirrhosis [address] [place] [address] 9      Cally Monet MD Urology Schedule an appointment as soon as possible for a visit in 2 weeks For evaluation of prostatomegaly  [id number] [facility] [employer]  [phone]      Vlad Bustillos MD Family Medicine On 2/21/2022 Hospital discharge Follow Up Appointment at 12:00pm. Dr Nanda Acevedo will Call You on Your Cell Phone [employer]  [phone]      Altaf Myers MD Family Medicine Go on 2/18/2022 at 46PM for hospital followup [address] [place] [address]  [phone]      [employer] transport   Medicaid transportation services [phone]           Mary Aldridge MD  Family Medicine Resident     For Billing    Chief Complaint   Patient presents with    Chest Pain    Headache    Abdominal Pain       Hospital Problems  Date Reviewed: 2/14/2022          Codes Class Noted POA    ESRD (end stage renal disease) ([facility] ICD-10-CM: N18.6  ICD-9-CM: 585.6  2/14/2022 Unknown        * (Principal) Hypertensive emergency ICD-10-CM: I16.1  ICD-9-CM: 401.9  2/14/2022 Unknown        Anasarca ICD-10-CM: R60.1  ICD-9-CM: 782.3  2/6/2022 Yes        ESRD (end stage renal disease) on dialysis [facility]) ICD-10-CM: N18.6, Z99.2  ICD-9-CM: 585.6, V45.11  2/6/2022 Yes        Heart failure with preserved ejection fraction ([facility] ICD-10-CM: I50.30  ICD-9-CM: 428.9 2/6/2022 Yes        Elevated troponin ICD-10-CM: R77.8  ICD-9-CM: 790.6  2/6/2022 Yes        Anemia ICD-10-CM: D64.9  ICD-9-CM: 285.9  2/6/2022 Yes        Thrombocytopenia ([address] ICD-10-CM: D69.6  ICD-9-CM: 287.5  2/6/2022 Yes        Epigastric pain ICD-10-CM: R10.13  ICD-9-CM: 789.06  2/6/2022 Yes        Essential hypertension ICD-10-CM: I10  ICD-9-CM: 401.9  11/14/2018 Yes

## 2022-02-15 NOTE — PROGRESS NOTES
0700: Bedside shift change report given to Michael Robins (oncoming nurse) by Fransisco Stevenson RN (offgoing nurse). Report included the following information SBAR, Kardex, MAR, Accordion and Cardiac Rhythm NSR. This patient was assisted with Intentional Toileting every 2 hours during this shift as appropriate. Documentation of ambulation and output reflected on Flowsheet as appropriate. Purposeful hourly rounding was completed using AIDET and 5Ps. Outcomes of PHR documented as they occurred. Bed alarm in use as appropriate. Dual Suction and ambubag in place. [phone]: Discharge medications reviewed with patient and appropriate educational materials and side effects teaching were provided. Patient has no further questions and is waiting for ride. 1310: Unit called saying patients ride is here. PCT promptly transports patient downstairs to find no car. Patient and PCT waited 10 minutes, no ride was there. Case management notified. Call placed for another ride to come.

## 2022-02-15 NOTE — PROGRESS NOTES
[address] visit attempted. Mr. Kevin Sales was not in his room. He is Zoroastrian. Prayer for spiritual communion offered.     MAGGY Hunt, RN, ACSW, LCSW   Page:  [phone]

## 2022-02-15 NOTE — TELEPHONE ENCOUNTER
Called pt to discuss apt for HFU. Nolberto Orlando said we could schedule him this Friday with Dr. Camilo Zavala or Dr. David Herr and I was calling him to offer but didn't realize he already has a VV scheduled with Elizabeth on Monday for HFU.

## 2022-02-15 NOTE — PROGRESS NOTES
Progress Note     2/15/2022  PCP: Theresa Villanueva MD     Assessment/Plan:     Abdulkadir Gaitan a 64 y. o. male with a known h/o ESRD on HD, HTN, HFpEF, T2DM, CAD s/p CABG (May 2020), h/o PEA arrest, Isabelle Pierce is admitted for hypertensive emergency in s/o ESRD on HD (MWF) and a band-like headache. Overnight Events: NAEO. Tele: Sinus rhythm to 70's. Hypertensive Emergency c/b elevated trop (Resolved): /91, trop 36>36. Most recent BP checks normotensive. Likely 2/2 poor compliance with home meds. - Continue home meds: Norvasc 10 mg daily, Bumex 2 mg daily, clonidine 0.2mg TID, eplerenone 25mg daily, Hydral 100 mg TID, Imdur 120>60 mg daily, Toprol XL 50mg daily, Procardia 60>90 mg daily, Terazosin 5mg BID  - IV Labetalol 10mg q4h prn   - daily cbc, cmp, mg      ESRD on HD: Permacath placed 11/24. Missed HD on AM of 2/14. Follows with Dr. Constanza Melo (Nepho). Receives HD MWF. CT c/a/p with anasarca, B/L pleural effusions decreased from prior scan on 2/5, prostatomegaly, no PE.   - S/p dialysis on 2/14, okay for d/c from nephro standpoint     Epigastric pain in the setting of known erosive gastritis: Prior admission for GI bleed in 11/2021. 11/16/21 EGD w/ nonbleeding erosive esophagitis. Noncompliant with protonix and carafate. No evidence of active bleeding.   - Home Protonix 40m g daily   - Home Carafate 1g QID   - Follow up with GI as outpatient      T2DM c/b peripheral neuropathy: Last A1c 8.4% (11/2021). Currently on Glipizide 10mg daily (5mg x2) and Metformin 1000mg BID. - hold Metformin and glipizide   - Home glargine 10u qHS  - Lispro sliding scale   - POC gluc ACHS   - hypoglycemic protocols ordered   - For neuropathy: Continue home gabapentin    Concern for cirrhosis: past h/o heavy alcohol use, stopped 6-7 years ago. US 11/20/21 showing hepatic parenchymal disease with cirrhotic morphology, perihepatic ascites and right pleural effusion.  Per chart review, had liver biopsy completed awaiting results. - following with Dr. Dav Whelan (hepatology)   - outpatient follow up with GI     HFpEF:  Echo 11/19/21 with EF 60-65% and reduced systolic function and moderate pulm HTN.   - Bumex 1mg BID   - follow up with cards as outpt      Anemia/thrombocytopenia: This is a chronic condition. POA Hgb 10.2 (bl ~7.5), normocytic and Plt 99 (bl ~90). Likely secondary to ESRD.   - F/u anemia labs w/ PCP     CAD s/p CABG: May 2020. Follows with Dr. Jb Gaitan (Cardiology). - Rosuvastatin 10 mg daily      HLD: Chronic. Lab Results   Component Value Date/Time     Cholesterol, total 176 11/22/2019 09:36 AM     HDL Cholesterol 45 11/22/2019 09:36 AM     LDL, calculated 112 (H) 11/22/2019 09:36 AM     VLDL, calculated 19 11/22/2019 09:36 AM     Triglyceride 94 11/22/2019 09:36 AM   - Consider repeat lipid panel outpatient  - Home rosuvastatin 10 mg daily     H/o PEA arrest: During previous admission to [facility] (11/2021) suspected secondary to respiratory distress. ROSC achieved after 12 minutes CPR. S/p intubation and extubation with mental status return to baseline.      FEN/GI - GI bland/Diabetic. Low sodium. FR 1000 mL. Activity - Ambulate as tolerated  DVT prophylaxis - Sub Q Heparin  GI prophylaxis - Protonix  Fall prophylaxis - Not indicated at this time. Disposition - Admitted to Telemetry. Plan to d/c to Home. Code Status - Full, discussed with patient / caregivers. Next of Kin Name Eduardo Kelley [phone]     CODE STATUS:  Full Code         Dudley Renee discussed with Dr. Denys Garcia. Subjective:   Pt was seen and examined at bedside. Afebrile and hemodynamically stable. Concerns overnight include: No significant concerns. Reports band-like headache, slightly improved with fioricet given last night. Requesting morphine, states \"that's the only thing that works\". Denies chest pain, SOB, nausea, vomiting, abdominal pain, dizziness.      Objective:   Physical examination  Patient Vitals for the past 24 hrs:   Temp Pulse Resp BP SpO2   02/15/22 0750 97.4 °F (36.3 °C) 65 17 121/60 97 %   02/15/22 0525  62 16 132/62    02/15/22 0520  61 16 121/65    02/15/22 0500  (!) 59 16 129/69    02/15/22 0445  60 16 121/66    02/15/22 0430  60 16 124/68    02/15/22 0415  60 16 125/64    02/15/22 0400 97.4 °F (36.3 °C) 60 16 121/66 98 %   02/15/22 0345  61 16 112/63    02/15/22 0330  60 16 112/67    02/15/22 0315  60 16 110/65    02/15/22 0300  60 16 113/63    02/15/22 0245  60 16 106/62    02/15/22 0230  60 16 (!) 104/59    02/15/22 0215  60 16 104/60    02/15/22 0200  (!) 59 16 119/63    02/15/22 0150 97.5 °F (36.4 °C) 60 16 131/61    02/15/22 0026  64      02/15/22 0001 97.4 °F (36.3 °C) 63 16 (!) 154/74 99 %   22 2303 97.4 °F (36.3 °C) 66 16 (!) 138/59    22 2107  71 16 (!) 168/65 99 %   22  67 16  97 %   227  66 15 (!) 185/81 97 %   22  67 13 (!) 174/77 97 %   22  68 25 (!) 189/79 97 %   22  70 13 (!) 183/77    22 1937  73 14 (!) 202/84    22 1922  78 15 (!) 211/86    22 1907  79 16 (!) 213/90 99 %   22 1855     100 %   22 1852  81 23 (!) 202 100 %   22 1843  75  (!) 191/83    22 1840  78  (!) 221/89    22 1737  82 22 (!) 194/74 99 %   22 1722  78 20  98 %   22 1707  74 14  100 %   22 1652  74 23 (!) 179/71    22 1640     98 %   22 1637  73 14 (!) 198/78 96 %   22 1622 97.3 °F (36.3 °C) 89 17 (!) 206/91 98 %   22 1354 97.3 °F (36.3 °C) 70 18 (!) 192/91 99 %      Temp (24hrs), Av.4 °F (36.3 °C), Min:97.3 °F (36.3 °C), Max:97.5 °F (36.4 °C)         O2 Device: None (Room air)    Date 22 - 02/15/22 0659 02/15/22 07 - 22 0659   Shift 7447-8816 6979-0380 24 Hour Total 3329-6177 3299-2143 24 Hour Total   INTAKE   P.O.  200 200        P. O. 200 200      I. V.(mL/kg/hr)  0(0) 0(0)        I.V.  0 0      Blood  0 0        Autotransfused  0 0      Other  0 0        Other  0 0      Shift Total(mL/kg)  200(3) 200(3)      OUTPUT   Urine(mL/kg/hr)  775(1) 775(0.5)        Urine Voided  775 775      Dialysis  3000 3000        NET Fluid Removed (mL)  3000 3000      Shift Total(mL/kg)  6181(32.7) 9126(71.9)      NET  -2586 -3570      Weight (kg) 63.5 65.6 65.6 65.6 65.6 65.6     General: No acute distress. Alert. Cooperative. Appears older than stated age. Head: Normocephalic. Atraumatic. Neck: Supple. Normal ROM. No stiffness. Respiratory: CTAB. No w/r/r/c.   Cardiovascular: RRR. Audible 2/6 systolic murmur most audible along LUSB. Pulses 2+ throughout. GI: + bowel sounds. No sig abdominal TTP. No rebound tenderness or guarding. Nondistended. Extremities: No obvious BLE edema. Distal pulses intact. Skin: Warm, dry. No rashes. Neuro: Awake. Alert       Data Review:     CBC:  Recent Labs     02/15/22  0029 02/14/22  1412   WBC 4.5 6.0   HGB 7.4* 10.9*   HCT 21.3* 31.4*   PLT 72* 98*     Metabolic Panel:  Recent Labs     02/15/22  0029 02/14/22  1726 02/14/22  1412   *  --  132*   K 3.6  --  3.7     --  102   CO2 23  --  22   BUN 52*  --  48*   CREA 2.36*  --  2.34*   *  --  272*   CA 7.9*  --  8.5   MG 2.2 2.1  --    PHOS 4.1 4.2  --    ALB 2.2*  --  2.6*   TBILI 0.6  --  0.4   ALT 32  --  39     Micro:  Lab Results   Component Value Date/Time    Culture result: NO GROWTH 4 DAYS 11/18/2021 11:40 AM     Imaging:  XR CHEST PA LAT    Result Date: 2/14/2022  History: Chest pain. 2 views of the chest demonstrate a dialysis catheter in place. The patient is status post median sternotomy. There is atherosclerosis of the aorta. There is a persistent left pleural effusion. There are degenerative changes of the spine. Persistent left pleural effusion.     CT HEAD WO CONT    Result Date: 2/14/2022  CLINICAL HISTORY: headache INDICATION: headache COMPARISON: 11/18/2021. CT dose reduction was achieved through use of a standardized protocol tailored for this examination and automatic exposure control for dose modulation. TECHNIQUE: Serial axial images with a collimation of 5 mm were obtained from the skull base through the vertex  FINDINGS: The sulci and ventricles are within normal limits for patient age. There is no evidence of an acute infarction, hemorrhage, or mass-effect. There is no evidence of midline shift or hydrocephalus. Posterior fossa structures are unremarkable. No extra-axial collections are seen. Mastoid air cells are well pneumatized and clear. There is no evidence of depressed skull fractures of soft tissue swelling. No acute intracranial process. CTA CHEST W OR W WO CONT    Result Date: 2/14/2022  EXAM: CTA CHEST W OR W WO CONT, CT ABD PELV W CONT INDICATION: chest pain, ABD pain, HTN, elevated ddimer, on hemodialysis COMPARISON: CTA chest abdomen pelvis every 5/20/2022 CONTRAST: 100 mL of Isovue-370. TECHNIQUE: CTA chest: Helical thin section chest CT following uneventful intravenous administration of nonionic contrast 100 mL of isovue 370 according to departmental PE protocol. Coronal and sagittal reformats were performed. 3D post processing was performed. CT dose reduction was achieved through the use of a standardized protocol tailored for this examination and automatic exposure control for dose modulation. CT abdomen/pelvis: Following the uneventful intravenous administration of contrast, thin axial images were obtained through the abdomen and pelvis. Coronal and sagittal reconstructions were generated. Oral contrast was not administered. CT dose reduction was achieved through use of a standardized protocol tailored for this examination and automatic exposure control for dose modulation. FINDINGS: This is a good quality study for the evaluation of pulmonary embolism to the first subsegmental arterial level.  There is no pulmonary embolism to this level. Chest wall: Prior sternotomy. Anasarca. THYROID: No nodule. MEDIASTINUM: No mass or lymphadenopathy. RENEE: No mass or lymphadenopathy. THORACIC AORTA: No aneurysm. HEART: Normal in size. Right IJ dialysis catheter terminates in the right atrium. Coronary artery calcifications. Status post coronary artery bypass grafting. ESOPHAGUS: No wall thickening or dilatation. TRACHEA/BRONCHI: Patent. PLEURA: Small bilateral pleural effusions with minimal bibasilar atelectasis, and a few areas of peripheral scarring or subsegmental atelectasis. LUNGS: No nodule, mass, or airspace disease. LIVER: No mass. BILIARY TREE: Gallbladder is within normal limits. CBD is not dilated. SPLEEN: within normal limits. PANCREAS: No mass or ductal dilatation. ADRENALS: Unremarkable. KIDNEYS: No mass, calculus, or hydronephrosis. STOMACH: Unremarkable. SMALL BOWEL: No dilatation or wall thickening. COLON: No dilatation or wall thickening. APPENDIX: Unremarkable. PERITONEUM: No ascites or pneumoperitoneum. Mild mesenteric and presacral edema. RETROPERITONEUM: No lymphadenopathy or aortic aneurysm. REPRODUCTIVE ORGANS: Prostatomegaly. URINARY BLADDER: No mass or calculus. Mild diffuse bladder wall thickening. BONES: No destructive bone lesion. Chronic fracture of left rib 5 ABDOMINAL WALL: No mass or hernia. Anasarca. ADDITIONAL COMMENTS: N/A     1. Bilateral pleural effusions which are slightly decreased compared to February 5, with minimal dependent atelectasis. 2. Anasarca, including edema of the subcutaneous tissues, mesentery, and pelvis. 3. Previously identified pulmonary edema is resolved. 4. Prostatomegaly with findings which raise the possibility of chronic bladder outlet obstruction. 5. No pulmonary embolism, no acute vascular abnormality. CT ABD PELV WO CONT    Result Date: 2/5/2022  EXAM: CT ABD PELV WO CONT INDICATION: Abdominal pain and diarrhea COMPARISON: 11/14/2021 IV CONTRAST: None.  ORAL CONTRAST: None TECHNIQUE: Thin axial images were obtained through the abdomen and pelvis. Coronal and sagittal reformats were generated. CT dose reduction was achieved through use of a standardized protocol tailored for this examination and automatic exposure control for dose modulation. The absence of intravenous contrast material reduces the sensitivity for evaluation of the vasculature and solid organs. FINDINGS: LOWER THORAX: Moderate bilateral pleural effusions and underlying atelectasis again noted. LIVER: No mass. BILIARY TREE: Gallbladder is within normal limits. CBD is not dilated. SPLEEN: within normal limits. PANCREAS: No focal abnormality. ADRENALS: Unremarkable. KIDNEYS/URETERS: No calculus or hydronephrosis. STOMACH: Unremarkable. SMALL BOWEL: No dilatation or wall thickening. COLON: No dilatation or wall thickening. APPENDIX: Within normal limits. PERITONEUM: Mild free fluid and mesenteric edema. RETROPERITONEUM: No lymphadenopathy or aortic aneurysm. REPRODUCTIVE ORGANS: There is no pelvic mass or lymphadenopathy. URINARY BLADDER: No mass or calculus. BONES: No destructive bone lesion. ABDOMINAL WALL: Diffuse subcutaneous edema is noted. ADDITIONAL COMMENTS: N/A     Diffuse subcutaneous edema, mild free fluid, and mesenteric edema, compatible with third spacing of fluids. Bilateral pleural effusions and underlying atelectasis. No acute intra-abdominal abnormality. CT ABD PELV W CONT    Result Date: 2/14/2022  EXAM: CTA CHEST W OR W WO CONT, CT ABD PELV W CONT INDICATION: chest pain, ABD pain, HTN, elevated ddimer, on hemodialysis COMPARISON: CTA chest abdomen pelvis every 5/20/2022 CONTRAST: 100 mL of Isovue-370. TECHNIQUE: CTA chest: Helical thin section chest CT following uneventful intravenous administration of nonionic contrast 100 mL of isovue 370 according to departmental PE protocol. Coronal and sagittal reformats were performed. 3D post processing was performed.   CT dose reduction was achieved through the use of a standardized protocol tailored for this examination and automatic exposure control for dose modulation. CT abdomen/pelvis: Following the uneventful intravenous administration of contrast, thin axial images were obtained through the abdomen and pelvis. Coronal and sagittal reconstructions were generated. Oral contrast was not administered. CT dose reduction was achieved through use of a standardized protocol tailored for this examination and automatic exposure control for dose modulation. FINDINGS: This is a good quality study for the evaluation of pulmonary embolism to the first subsegmental arterial level. There is no pulmonary embolism to this level. Chest wall: Prior sternotomy. Anasarca. THYROID: No nodule. MEDIASTINUM: No mass or lymphadenopathy. RENEE: No mass or lymphadenopathy. THORACIC AORTA: No aneurysm. HEART: Normal in size. Right IJ dialysis catheter terminates in the right atrium. Coronary artery calcifications. Status post coronary artery bypass grafting. ESOPHAGUS: No wall thickening or dilatation. TRACHEA/BRONCHI: Patent. PLEURA: Small bilateral pleural effusions with minimal bibasilar atelectasis, and a few areas of peripheral scarring or subsegmental atelectasis. LUNGS: No nodule, mass, or airspace disease. LIVER: No mass. BILIARY TREE: Gallbladder is within normal limits. CBD is not dilated. SPLEEN: within normal limits. PANCREAS: No mass or ductal dilatation. ADRENALS: Unremarkable. KIDNEYS: No mass, calculus, or hydronephrosis. STOMACH: Unremarkable. SMALL BOWEL: No dilatation or wall thickening. COLON: No dilatation or wall thickening. APPENDIX: Unremarkable. PERITONEUM: No ascites or pneumoperitoneum. Mild mesenteric and presacral edema. RETROPERITONEUM: No lymphadenopathy or aortic aneurysm. REPRODUCTIVE ORGANS: Prostatomegaly. URINARY BLADDER: No mass or calculus. Mild diffuse bladder wall thickening. BONES: No destructive bone lesion.  Chronic fracture of left rib 5 ABDOMINAL WALL: No mass or hernia. Anasarca. ADDITIONAL COMMENTS: N/A     1. Bilateral pleural effusions which are slightly decreased compared to February 5, with minimal dependent atelectasis. 2. Anasarca, including edema of the subcutaneous tissues, mesentery, and pelvis. 3. Previously identified pulmonary edema is resolved. 4. Prostatomegaly with findings which raise the possibility of chronic bladder outlet obstruction. 5. No pulmonary embolism, no acute vascular abnormality. XR CHEST PORT    Result Date: 2/6/2022  INDICATION: CP EXAMINATION:  AP CHEST, PORTABLE COMPARISON: 2/5/2022 FINDINGS: Single AP portable view of the chest demonstrates unchanged right IJ permacath. Prior median sternotomy. The cardiomediastinal silhouette is unchanged. Mild interstitial edema and small pleural effusions left greater than right, unchanged. No pneumothorax. No significant change. XR CHEST PORT    Result Date: 2/5/2022  Clinical indication: Shortness of breath. Portable AP view of the chest obtained. Comparison November 23, 2021. A double lumen catheter is in the right atrium. There is blunting of the left costophrenic angle. The heart size is normal. There is no acute infiltrate. Double lumen catheter in place. Blunting left costophrenic angle. CTA CHEST ABD PELV W CONT    Result Date: 2/6/2022  Indication: Abdominal pain, evaluate for dissection 2.5 mm axial thin sections were obtained from the lung apices to the pubic symphysis following the rapid bolus administration of 100 cc Isovue-370. 3D post processing was performed including MIP and volume rendered imaging. CT dose reduction was achieved through use of a standardized protocol tailored for this examination and automatic exposure control for dose modulation. Comparison to noncontrast abdomen pelvis CT performed earlier the same day FINDINGS: THYROID: No nodule. MEDIASTINUM: No mass or lymphadenopathy. RENEE: No mass or lymphadenopathy.  THORACIC AORTA: No dissection or aneurysm. PULMONARY ARTERIES: Main pulmonary artery is normal in caliber. No evidence of acute pulmonary emboli. TRACHEA/BRONCHI: Patent. ESOPHAGUS: No wall thickening or dilatation. HEART: Normal in size. PLEURA: Moderate bilateral pleural effusions are noted. LUNGS: Bilateral lower lobe atelectasis. Minimal pulmonary edema. LIVER: No mass or biliary dilatation. GALLBLADDER: Unremarkable. SPLEEN: No mass. PANCREAS: No mass or ductal dilatation. ADRENALS: Unremarkable. KIDNEYS: No mass, calculus, or hydronephrosis. STOMACH: Unremarkable. SMALL BOWEL: No dilatation or wall thickening. COLON: No dilatation or wall thickening. APPENDIX: Within normal limits. PERITONEUM: Mild ascites and mesenteric edema. RETROPERITONEUM: The abdominal aorta is atherosclerotic without evidence of aneurysm or dissection. Origins of the celiac axis, SMA, single renal arteries bilaterally, and MIL are widely patent. REPRODUCTIVE ORGANS: There is no pelvic mass or lymphadenopathy. URINARY BLADDER: No mass or calculus. BONES: No destructive bone lesion. ADDITIONAL COMMENTS: Soft tissue edema is diffusely noted. Moderate bilateral pleural effusions with underlying atelectasis and minimal pulmonary edema. Mild ascites, mesenteric edema, and subcutaneous edema compatible with third spacing of fluids. No evidence of aortic dissection or aneurysm.      Medications reviewed  Current Facility-Administered Medications   Medication Dose Route Frequency    aspirin chewable tablet 81 mg  81 mg Oral DAILY    cloNIDine HCL (CATAPRES) tablet 0.2 mg  0.2 mg Oral TID    hydrALAZINE (APRESOLINE) tablet 100 mg  100 mg Oral TID    pantoprazole (PROTONIX) tablet 40 mg  40 mg Oral ACB    rosuvastatin (CRESTOR) tablet 10 mg  10 mg Oral QAM    sucralfate (CARAFATE) tablet 1 g  1 g Oral AC&HS    prochlorperazine (COMPAZINE) injection 5 mg  5 mg IntraVENous Q6H PRN    labetaloL (NORMODYNE;TRANDATE) injection 10 mg  10 mg IntraVENous Q4H PRN    sodium chloride (NS) flush 5-40 mL  5-40 mL IntraVENous Q8H    sodium chloride (NS) flush 5-40 mL  5-40 mL IntraVENous PRN    acetaminophen (TYLENOL) tablet 650 mg  650 mg Oral Q6H PRN    Or    acetaminophen (TYLENOL) suppository 650 mg  650 mg Rectal Q6H PRN    amLODIPine (NORVASC) tablet 10 mg  10 mg Oral QAM    bumetanide (BUMEX) tablet 2 mg  2 mg Oral DAILY    heparin (porcine) injection 5,000 Units  5,000 Units SubCUTAneous Q8H    isosorbide mononitrate ER (IMDUR) tablet 60 mg  60 mg Oral 7am    NIFEdipine ER (PROCARDIA XL) tablet 90 mg  90 mg Oral DAILY    eplerenone (INSPRA) tablet 25 mg  25 mg Oral DAILY    metoprolol succinate (TOPROL-XL) XL tablet 50 mg  50 mg Oral DAILY    gabapentin (NEURONTIN) capsule 100 mg  100 mg Oral BID    insulin lispro (HUMALOG) injection   SubCUTAneous AC&HS    glucose chewable tablet 16 g  4 Tablet Oral PRN    dextrose (D50W) injection syrg 12.5-25 g  12.5-25 g IntraVENous PRN    glucagon (GLUCAGEN) injection 1 mg  1 mg IntraMUSCular PRN    insulin glargine (LANTUS) injection 10 Units  10 Units SubCUTAneous QHS         Signed:   Sally Lopez MD   Resident, Family Medicine      Attending note: Attending note to follow. ..

## 2022-02-18 ENCOUNTER — TELEPHONE (OUTPATIENT)
Dept: FAMILY MEDICINE CLINIC | Age: 62
End: 2022-02-18

## 2022-03-15 ENCOUNTER — TELEPHONE (OUTPATIENT)
Dept: FAMILY MEDICINE CLINIC | Age: 62
End: 2022-03-15

## 2022-03-15 NOTE — TELEPHONE ENCOUNTER
----- Message from [facility] sent at 3/15/2022 10:39 AM EDT -----  Subject: Message to Provider    QUESTIONS  Information for Provider? Jovita from [facility] called to   see if provider Karen Westfall will follow and sign home health orders   for the patient. A verbal order is needed only. Please call back [facility]- [phone], please ask for Jovita. Please Advise.   ---------------------------------------------------------------------------  --------------  CALL BACK INFO  What is the best way for the office to contact you? OK to leave message on   voicemail  Preferred Call Back Phone Number? [phone]  ---------------------------------------------------------------------------  --------------  SCRIPT ANSWERS  Relationship to Patient? Third Party  Representative Name?  [address]um

## 2022-03-18 PROBLEM — D69.6 THROMBOCYTOPENIA (HCC): Status: ACTIVE | Noted: 2022-02-06

## 2022-03-18 PROBLEM — R10.13 EPIGASTRIC PAIN: Status: ACTIVE | Noted: 2022-02-06

## 2022-03-18 PROBLEM — I50.30 HEART FAILURE WITH PRESERVED EJECTION FRACTION (HCC): Status: ACTIVE | Noted: 2022-02-06

## 2022-03-18 PROBLEM — E11.39 TYPE 2 DIABETES MELLITUS WITH OPHTHALMIC COMPLICATION, WITH LONG-TERM CURRENT USE OF INSULIN (HCC): Status: ACTIVE | Noted: 2018-11-25

## 2022-03-18 PROBLEM — I16.1 HYPERTENSIVE EMERGENCY: Status: ACTIVE | Noted: 2022-02-14

## 2022-03-18 PROBLEM — Z79.4 TYPE 2 DIABETES MELLITUS WITH OPHTHALMIC COMPLICATION, WITH LONG-TERM CURRENT USE OF INSULIN (HCC): Status: ACTIVE | Noted: 2018-11-25

## 2022-03-19 PROBLEM — N18.6 ESRD (END STAGE RENAL DISEASE) (HCC): Status: ACTIVE | Noted: 2022-02-14

## 2022-03-19 PROBLEM — D64.9 ANEMIA: Status: ACTIVE | Noted: 2022-02-06

## 2022-03-19 PROBLEM — E11.21 TYPE 2 DIABETES WITH NEPHROPATHY (HCC): Status: ACTIVE | Noted: 2019-08-05

## 2022-03-19 PROBLEM — F32.9 REACTIVE DEPRESSION: Status: ACTIVE | Noted: 2018-11-14

## 2022-03-19 PROBLEM — I10 ESSENTIAL HYPERTENSION: Status: ACTIVE | Noted: 2018-11-14

## 2022-03-19 PROBLEM — G62.9 PERIPHERAL NEUROPATHY: Status: ACTIVE | Noted: 2018-11-14

## 2022-03-19 PROBLEM — I50.9 CHF EXACERBATION (HCC): Status: ACTIVE | Noted: 2021-11-14

## 2022-03-19 PROBLEM — E11.40 TYPE 2 DIABETES MELLITUS WITH DIABETIC NEUROPATHY (HCC): Status: ACTIVE | Noted: 2019-08-05

## 2022-03-19 PROBLEM — R77.8 ELEVATED TROPONIN: Status: ACTIVE | Noted: 2022-02-06

## 2022-03-19 PROBLEM — R60.1 ANASARCA: Status: ACTIVE | Noted: 2022-02-06

## 2022-03-19 PROBLEM — R79.89 ELEVATED TROPONIN: Status: ACTIVE | Noted: 2022-02-06

## 2022-03-20 PROBLEM — Z99.2 ESRD (END STAGE RENAL DISEASE) ON DIALYSIS (HCC): Status: ACTIVE | Noted: 2022-02-06

## 2022-03-20 PROBLEM — E43 SEVERE PROTEIN-CALORIE MALNUTRITION (HCC): Status: ACTIVE | Noted: 2021-11-23

## 2022-03-20 PROBLEM — N18.6 ESRD (END STAGE RENAL DISEASE) ON DIALYSIS (HCC): Status: ACTIVE | Noted: 2022-02-06

## 2022-03-22 ENCOUNTER — TELEPHONE (OUTPATIENT)
Dept: FAMILY MEDICINE CLINIC | Age: 62
End: 2022-03-22

## 2022-03-22 NOTE — TELEPHONE ENCOUNTER
Robin Baron [phone] call to inform that pt /90 and he took his med in her presence hoping it will bring BP down

## 2022-04-01 ENCOUNTER — APPOINTMENT (OUTPATIENT)
Dept: ULTRASOUND IMAGING | Age: 62
DRG: 199 | End: 2022-04-01
Payer: MEDICAID

## 2022-04-01 ENCOUNTER — HOSPITAL ENCOUNTER (INPATIENT)
Age: 62
LOS: 1 days | Discharge: HOME | DRG: 199 | End: 2022-04-02
Attending: EMERGENCY MEDICINE | Admitting: FAMILY MEDICINE
Payer: MEDICAID

## 2022-04-01 ENCOUNTER — APPOINTMENT (OUTPATIENT)
Dept: GENERAL RADIOLOGY | Age: 62
DRG: 199 | End: 2022-04-01
Attending: EMERGENCY MEDICINE
Payer: MEDICAID

## 2022-04-01 ENCOUNTER — TELEPHONE (OUTPATIENT)
Dept: FAMILY MEDICINE CLINIC | Age: 62
End: 2022-04-01

## 2022-04-01 ENCOUNTER — APPOINTMENT (OUTPATIENT)
Dept: CT IMAGING | Age: 62
DRG: 199 | End: 2022-04-01
Attending: EMERGENCY MEDICINE
Payer: MEDICAID

## 2022-04-01 DIAGNOSIS — N18.6 ESRD (END STAGE RENAL DISEASE) (HCC): ICD-10-CM

## 2022-04-01 DIAGNOSIS — I21.4 NSTEMI (NON-ST ELEVATED MYOCARDIAL INFARCTION) (HCC): Primary | ICD-10-CM

## 2022-04-01 DIAGNOSIS — R77.8 ELEVATED TROPONIN: ICD-10-CM

## 2022-04-01 PROBLEM — K74.60 CIRRHOSIS (HCC): Status: ACTIVE | Noted: 2022-04-01

## 2022-04-01 LAB
ALBUMIN SERPL-MCNC: 2.4 G/DL (ref 3.5–5)
ALBUMIN/GLOB SERPL: 0.6 {RATIO} (ref 1.1–2.2)
ALP SERPL-CCNC: 186 U/L (ref 45–117)
ALT SERPL-CCNC: 47 U/L (ref 12–78)
ANION GAP SERPL CALC-SCNC: 6 MMOL/L (ref 5–15)
APPEARANCE UR: CLEAR
AST SERPL-CCNC: 65 U/L (ref 15–37)
ATRIAL RATE: 82 BPM
BACTERIA URNS QL MICRO: NEGATIVE /HPF
BASOPHILS # BLD: 0 K/UL (ref 0–0.1)
BASOPHILS NFR BLD: 0 % (ref 0–1)
BILIRUB SERPL-MCNC: 0.5 MG/DL (ref 0.2–1)
BILIRUB UR QL: NEGATIVE
BNP SERPL-MCNC: ABNORMAL PG/ML
BUN SERPL-MCNC: 57 MG/DL (ref 6–20)
BUN/CREAT SERPL: 24 (ref 12–20)
CALCIUM SERPL-MCNC: 8 MG/DL (ref 8.5–10.1)
CALCULATED P AXIS, ECG09: 61 DEGREES
CALCULATED R AXIS, ECG10: 34 DEGREES
CALCULATED T AXIS, ECG11: 62 DEGREES
CHLORIDE SERPL-SCNC: 105 MMOL/L (ref 97–108)
CO2 SERPL-SCNC: 21 MMOL/L (ref 21–32)
COLOR UR: ABNORMAL
COMMENT, HOLDF: NORMAL
CREAT SERPL-MCNC: 2.33 MG/DL (ref 0.7–1.3)
DIAGNOSIS, 93000: NORMAL
DIFFERENTIAL METHOD BLD: ABNORMAL
EOSINOPHIL # BLD: 0.2 K/UL (ref 0–0.4)
EOSINOPHIL NFR BLD: 4 % (ref 0–7)
EPITH CASTS URNS QL MICRO: ABNORMAL /LPF
ERYTHROCYTE [DISTWIDTH] IN BLOOD BY AUTOMATED COUNT: 16.9 % (ref 11.5–14.5)
GLOBULIN SER CALC-MCNC: 3.8 G/DL (ref 2–4)
GLUCOSE BLD STRIP.AUTO-MCNC: 188 MG/DL (ref 65–117)
GLUCOSE BLD STRIP.AUTO-MCNC: 238 MG/DL (ref 65–117)
GLUCOSE SERPL-MCNC: 242 MG/DL (ref 65–100)
GLUCOSE UR STRIP.AUTO-MCNC: >=1000 MG/DL
HCT VFR BLD AUTO: 26.2 % (ref 36.6–50.3)
HGB BLD-MCNC: 9.1 G/DL (ref 12.1–17)
HGB UR QL STRIP: ABNORMAL
HYALINE CASTS URNS QL MICRO: ABNORMAL /LPF (ref 0–2)
IMM GRANULOCYTES # BLD AUTO: 0 K/UL (ref 0–0.04)
IMM GRANULOCYTES NFR BLD AUTO: 0 % (ref 0–0.5)
KETONES UR QL STRIP.AUTO: NEGATIVE MG/DL
LEUKOCYTE ESTERASE UR QL STRIP.AUTO: NEGATIVE
LIPASE SERPL-CCNC: 140 U/L (ref 73–393)
LYMPHOCYTES # BLD: 0.8 K/UL (ref 0.8–3.5)
LYMPHOCYTES NFR BLD: 15 % (ref 12–49)
MAGNESIUM SERPL-MCNC: 2.2 MG/DL (ref 1.6–2.4)
MAGNESIUM SERPL-MCNC: 2.2 MG/DL (ref 1.6–2.4)
MCH RBC QN AUTO: 29.8 PG (ref 26–34)
MCHC RBC AUTO-ENTMCNC: 34.7 G/DL (ref 30–36.5)
MCV RBC AUTO: 85.9 FL (ref 80–99)
MONOCYTES # BLD: 0.4 K/UL (ref 0–1)
MONOCYTES NFR BLD: 8 % (ref 5–13)
NEUTS SEG # BLD: 4.2 K/UL (ref 1.8–8)
NEUTS SEG NFR BLD: 73 % (ref 32–75)
NITRITE UR QL STRIP.AUTO: NEGATIVE
NRBC # BLD: 0 K/UL (ref 0–0.01)
NRBC BLD-RTO: 0 PER 100 WBC
P-R INTERVAL, ECG05: 126 MS
PH UR STRIP: 6.5 [PH] (ref 5–8)
PLATELET # BLD AUTO: 93 K/UL (ref 150–400)
PMV BLD AUTO: 10.3 FL (ref 8.9–12.9)
POTASSIUM SERPL-SCNC: 3.7 MMOL/L (ref 3.5–5.1)
POTASSIUM SERPL-SCNC: 5.1 MMOL/L (ref 3.5–5.1)
PROT SERPL-MCNC: 6.2 G/DL (ref 6.4–8.2)
PROT UR STRIP-MCNC: >=1000 MG/DL
Q-T INTERVAL, ECG07: 416 MS
QRS DURATION, ECG06: 80 MS
QTC CALCULATION (BEZET), ECG08: 486 MS
RBC # BLD AUTO: 3.05 M/UL (ref 4.1–5.7)
RBC #/AREA URNS HPF: ABNORMAL /HPF (ref 0–5)
RBC MORPH BLD: ABNORMAL
RBC MORPH BLD: ABNORMAL
SAMPLES BEING HELD,HOLD: NORMAL
SERVICE CMNT-IMP: ABNORMAL
SERVICE CMNT-IMP: ABNORMAL
SODIUM SERPL-SCNC: 132 MMOL/L (ref 136–145)
SP GR UR REFRACTOMETRY: 1.01 (ref 1–1.03)
TROPONIN-HIGH SENSITIVITY: 1256 NG/L (ref 0–76)
TROPONIN-HIGH SENSITIVITY: 1402 NG/L (ref 0–76)
UR CULT HOLD, URHOLD: NORMAL
UROBILINOGEN UR QL STRIP.AUTO: 0.2 EU/DL (ref 0.2–1)
VENTRICULAR RATE, ECG03: 82 BPM
WBC # BLD AUTO: 5.6 K/UL (ref 4.1–11.1)
WBC URNS QL MICRO: ABNORMAL /HPF (ref 0–4)

## 2022-04-01 PROCEDURE — 74011000250 HC RX REV CODE- 250

## 2022-04-01 PROCEDURE — 80053 COMPREHEN METABOLIC PANEL: CPT

## 2022-04-01 PROCEDURE — 83880 ASSAY OF NATRIURETIC PEPTIDE: CPT

## 2022-04-01 PROCEDURE — 71045 X-RAY EXAM CHEST 1 VIEW: CPT

## 2022-04-01 PROCEDURE — 84484 ASSAY OF TROPONIN QUANT: CPT

## 2022-04-01 PROCEDURE — 36415 COLL VENOUS BLD VENIPUNCTURE: CPT

## 2022-04-01 PROCEDURE — 84132 ASSAY OF SERUM POTASSIUM: CPT

## 2022-04-01 PROCEDURE — 65660000000 HC RM CCU STEPDOWN

## 2022-04-01 PROCEDURE — 81001 URINALYSIS AUTO W/SCOPE: CPT

## 2022-04-01 PROCEDURE — 74011000250 HC RX REV CODE- 250: Performed by: STUDENT IN AN ORGANIZED HEALTH CARE EDUCATION/TRAINING PROGRAM

## 2022-04-01 PROCEDURE — 74011250636 HC RX REV CODE- 250/636: Performed by: STUDENT IN AN ORGANIZED HEALTH CARE EDUCATION/TRAINING PROGRAM

## 2022-04-01 PROCEDURE — 74011250637 HC RX REV CODE- 250/637

## 2022-04-01 PROCEDURE — 83735 ASSAY OF MAGNESIUM: CPT

## 2022-04-01 PROCEDURE — 74011250636 HC RX REV CODE- 250/636

## 2022-04-01 PROCEDURE — 85025 COMPLETE CBC W/AUTO DIFF WBC: CPT

## 2022-04-01 PROCEDURE — 76705 ECHO EXAM OF ABDOMEN: CPT

## 2022-04-01 PROCEDURE — 74011250637 HC RX REV CODE- 250/637: Performed by: STUDENT IN AN ORGANIZED HEALTH CARE EDUCATION/TRAINING PROGRAM

## 2022-04-01 PROCEDURE — 74011636637 HC RX REV CODE- 636/637

## 2022-04-01 PROCEDURE — 83690 ASSAY OF LIPASE: CPT

## 2022-04-01 PROCEDURE — 70450 CT HEAD/BRAIN W/O DYE: CPT

## 2022-04-01 PROCEDURE — 99222 1ST HOSP IP/OBS MODERATE 55: CPT | Performed by: FAMILY MEDICINE

## 2022-04-01 PROCEDURE — 99285 EMERGENCY DEPT VISIT HI MDM: CPT

## 2022-04-01 PROCEDURE — 99223 1ST HOSP IP/OBS HIGH 75: CPT | Performed by: SPECIALIST

## 2022-04-01 PROCEDURE — 82962 GLUCOSE BLOOD TEST: CPT

## 2022-04-01 PROCEDURE — 93005 ELECTROCARDIOGRAM TRACING: CPT

## 2022-04-01 RX ORDER — CLONIDINE HYDROCHLORIDE 0.1 MG/1
0.2 TABLET ORAL 3 TIMES DAILY
Status: DISCONTINUED | OUTPATIENT
Start: 2022-04-01 | End: 2022-04-02 | Stop reason: HOSPADM

## 2022-04-01 RX ORDER — METOPROLOL SUCCINATE 50 MG/1
50 TABLET, EXTENDED RELEASE ORAL DAILY
Status: DISCONTINUED | OUTPATIENT
Start: 2022-04-02 | End: 2022-04-02

## 2022-04-01 RX ORDER — ACETAMINOPHEN 650 MG/1
650 SUPPOSITORY RECTAL
Status: DISCONTINUED | OUTPATIENT
Start: 2022-04-01 | End: 2022-04-02 | Stop reason: HOSPADM

## 2022-04-01 RX ORDER — ACETAMINOPHEN 500 MG
1000 TABLET ORAL
Status: DISCONTINUED | OUTPATIENT
Start: 2022-04-01 | End: 2022-04-02 | Stop reason: HOSPADM

## 2022-04-01 RX ORDER — SUCRALFATE 1 G/1
1 TABLET ORAL 4 TIMES DAILY
Status: DISCONTINUED | OUTPATIENT
Start: 2022-04-01 | End: 2022-04-02 | Stop reason: HOSPADM

## 2022-04-01 RX ORDER — DEXTROSE MONOHYDRATE 100 MG/ML
0-250 INJECTION, SOLUTION INTRAVENOUS AS NEEDED
Status: DISCONTINUED | OUTPATIENT
Start: 2022-04-01 | End: 2022-04-02 | Stop reason: HOSPADM

## 2022-04-01 RX ORDER — ISOSORBIDE MONONITRATE 60 MG/1
TABLET, EXTENDED RELEASE ORAL DAILY
COMMUNITY
End: 2022-06-21 | Stop reason: SDUPTHER

## 2022-04-01 RX ORDER — TRAMADOL HYDROCHLORIDE 50 MG/1
50 TABLET ORAL
COMMUNITY
End: 2022-06-21 | Stop reason: ALTCHOICE

## 2022-04-01 RX ORDER — LABETALOL HCL 20 MG/4 ML
20 SYRINGE (ML) INTRAVENOUS
Status: COMPLETED | OUTPATIENT
Start: 2022-04-01 | End: 2022-04-01

## 2022-04-01 RX ORDER — HYDRALAZINE HYDROCHLORIDE 25 MG/1
100 TABLET, FILM COATED ORAL 3 TIMES DAILY
Status: DISCONTINUED | OUTPATIENT
Start: 2022-04-01 | End: 2022-04-02 | Stop reason: HOSPADM

## 2022-04-01 RX ORDER — EPLERENONE 25 MG/1
25 TABLET, FILM COATED ORAL DAILY
Status: DISCONTINUED | OUTPATIENT
Start: 2022-04-02 | End: 2022-04-02 | Stop reason: HOSPADM

## 2022-04-01 RX ORDER — LABETALOL HCL 20 MG/4 ML
20 SYRINGE (ML) INTRAVENOUS
Status: DISCONTINUED | OUTPATIENT
Start: 2022-04-01 | End: 2022-04-01

## 2022-04-01 RX ORDER — CALCIUM CARBONATE 200(500)MG
200 TABLET,CHEWABLE ORAL
Status: DISCONTINUED | OUTPATIENT
Start: 2022-04-01 | End: 2022-04-02 | Stop reason: HOSPADM

## 2022-04-01 RX ORDER — SERTRALINE HYDROCHLORIDE 100 MG/1
100 TABLET, FILM COATED ORAL DAILY
Status: ON HOLD | COMMUNITY
End: 2022-04-02

## 2022-04-01 RX ORDER — GUAIFENESIN 100 MG/5ML
81 LIQUID (ML) ORAL DAILY
Status: DISCONTINUED | OUTPATIENT
Start: 2022-04-02 | End: 2022-04-02 | Stop reason: HOSPADM

## 2022-04-01 RX ORDER — HYDRALAZINE HYDROCHLORIDE 20 MG/ML
20 INJECTION INTRAMUSCULAR; INTRAVENOUS
Status: DISCONTINUED | OUTPATIENT
Start: 2022-04-01 | End: 2022-04-02 | Stop reason: HOSPADM

## 2022-04-01 RX ORDER — POLYETHYLENE GLYCOL 3350 17 G/17G
17 POWDER, FOR SOLUTION ORAL DAILY PRN
Status: DISCONTINUED | OUTPATIENT
Start: 2022-04-01 | End: 2022-04-02 | Stop reason: HOSPADM

## 2022-04-01 RX ORDER — DOXAZOSIN 2 MG/1
4 TABLET ORAL 2 TIMES DAILY
Status: DISCONTINUED | OUTPATIENT
Start: 2022-04-02 | End: 2022-04-02 | Stop reason: HOSPADM

## 2022-04-01 RX ORDER — INSULIN GLARGINE 100 [IU]/ML
10 INJECTION, SOLUTION SUBCUTANEOUS
Status: DISCONTINUED | OUTPATIENT
Start: 2022-04-01 | End: 2022-04-02 | Stop reason: HOSPADM

## 2022-04-01 RX ORDER — LABETALOL HCL 20 MG/4 ML
10 SYRINGE (ML) INTRAVENOUS
Status: DISCONTINUED | OUTPATIENT
Start: 2022-04-01 | End: 2022-04-01

## 2022-04-01 RX ORDER — INSULIN LISPRO 100 [IU]/ML
INJECTION, SOLUTION INTRAVENOUS; SUBCUTANEOUS
Status: DISCONTINUED | OUTPATIENT
Start: 2022-04-01 | End: 2022-04-02 | Stop reason: HOSPADM

## 2022-04-01 RX ORDER — DOCUSATE SODIUM 100 MG/1
100 CAPSULE, LIQUID FILLED ORAL 2 TIMES DAILY
Status: DISCONTINUED | OUTPATIENT
Start: 2022-04-01 | End: 2022-04-02 | Stop reason: HOSPADM

## 2022-04-01 RX ORDER — MAGNESIUM SULFATE 100 %
4 CRYSTALS MISCELLANEOUS AS NEEDED
Status: DISCONTINUED | OUTPATIENT
Start: 2022-04-01 | End: 2022-04-02 | Stop reason: HOSPADM

## 2022-04-01 RX ORDER — BUSPIRONE HYDROCHLORIDE 10 MG/1
10 TABLET ORAL 2 TIMES DAILY
COMMUNITY

## 2022-04-01 RX ORDER — ACETAMINOPHEN 325 MG/1
650 TABLET ORAL
Status: DISCONTINUED | OUTPATIENT
Start: 2022-04-01 | End: 2022-04-01

## 2022-04-01 RX ORDER — ACETAMINOPHEN 650 MG/1
650 SUPPOSITORY RECTAL
Status: DISCONTINUED | OUTPATIENT
Start: 2022-04-01 | End: 2022-04-01

## 2022-04-01 RX ORDER — LANOLIN ALCOHOL/MO/W.PET/CERES
1 CREAM (GRAM) TOPICAL
Status: DISCONTINUED | OUTPATIENT
Start: 2022-04-02 | End: 2022-04-02 | Stop reason: HOSPADM

## 2022-04-01 RX ORDER — TAMSULOSIN HYDROCHLORIDE 0.4 MG/1
0.4 CAPSULE ORAL DAILY
Status: DISCONTINUED | OUTPATIENT
Start: 2022-04-02 | End: 2022-04-02 | Stop reason: HOSPADM

## 2022-04-01 RX ORDER — BUMETANIDE 1 MG/1
2 TABLET ORAL DAILY
Status: DISCONTINUED | OUTPATIENT
Start: 2022-04-02 | End: 2022-04-02 | Stop reason: HOSPADM

## 2022-04-01 RX ORDER — HEPARIN SODIUM 5000 [USP'U]/ML
5000 INJECTION, SOLUTION INTRAVENOUS; SUBCUTANEOUS EVERY 12 HOURS
Status: DISCONTINUED | OUTPATIENT
Start: 2022-04-01 | End: 2022-04-01

## 2022-04-01 RX ORDER — SODIUM CHLORIDE 0.9 % (FLUSH) 0.9 %
5-40 SYRINGE (ML) INJECTION EVERY 8 HOURS
Status: DISCONTINUED | OUTPATIENT
Start: 2022-04-01 | End: 2022-04-02 | Stop reason: HOSPADM

## 2022-04-01 RX ORDER — AMLODIPINE BESYLATE 5 MG/1
10 TABLET ORAL EVERY MORNING
Status: DISCONTINUED | OUTPATIENT
Start: 2022-04-02 | End: 2022-04-02 | Stop reason: HOSPADM

## 2022-04-01 RX ORDER — SODIUM CHLORIDE 0.9 % (FLUSH) 0.9 %
5-40 SYRINGE (ML) INJECTION AS NEEDED
Status: DISCONTINUED | OUTPATIENT
Start: 2022-04-01 | End: 2022-04-02 | Stop reason: HOSPADM

## 2022-04-01 RX ORDER — LABETALOL HCL 20 MG/4 ML
10 SYRINGE (ML) INTRAVENOUS
Status: DISCONTINUED | OUTPATIENT
Start: 2022-04-01 | End: 2022-04-02 | Stop reason: HOSPADM

## 2022-04-01 RX ORDER — PANTOPRAZOLE SODIUM 40 MG/1
40 TABLET, DELAYED RELEASE ORAL
Status: DISCONTINUED | OUTPATIENT
Start: 2022-04-02 | End: 2022-04-02 | Stop reason: HOSPADM

## 2022-04-01 RX ORDER — ONDANSETRON 2 MG/ML
4 INJECTION INTRAMUSCULAR; INTRAVENOUS
Status: DISCONTINUED | OUTPATIENT
Start: 2022-04-01 | End: 2022-04-02 | Stop reason: HOSPADM

## 2022-04-01 RX ORDER — GABAPENTIN 300 MG/1
300 CAPSULE ORAL 2 TIMES DAILY
Status: DISCONTINUED | OUTPATIENT
Start: 2022-04-02 | End: 2022-04-02 | Stop reason: HOSPADM

## 2022-04-01 RX ORDER — ONDANSETRON 4 MG/1
4 TABLET, ORALLY DISINTEGRATING ORAL
Status: DISCONTINUED | OUTPATIENT
Start: 2022-04-01 | End: 2022-04-02 | Stop reason: HOSPADM

## 2022-04-01 RX ORDER — CARVEDILOL 12.5 MG/1
12.5 TABLET ORAL 2 TIMES DAILY WITH MEALS
COMMUNITY

## 2022-04-01 RX ORDER — HYDROMORPHONE HYDROCHLORIDE 1 MG/ML
0.5 INJECTION, SOLUTION INTRAMUSCULAR; INTRAVENOUS; SUBCUTANEOUS
Status: DISCONTINUED | OUTPATIENT
Start: 2022-04-01 | End: 2022-04-02 | Stop reason: HOSPADM

## 2022-04-01 RX ADMIN — INSULIN GLARGINE 10 UNITS: 100 INJECTION, SOLUTION SUBCUTANEOUS at 21:12

## 2022-04-01 RX ADMIN — DOCUSATE SODIUM 100 MG: 100 CAPSULE, LIQUID FILLED ORAL at 17:17

## 2022-04-01 RX ADMIN — LABETALOL HYDROCHLORIDE 20 MG: 5 INJECTION, SOLUTION INTRAVENOUS at 16:55

## 2022-04-01 RX ADMIN — LABETALOL HYDROCHLORIDE 10 MG: 5 INJECTION, SOLUTION INTRAVENOUS at 20:13

## 2022-04-01 RX ADMIN — INSULIN LISPRO 2 UNITS: 100 INJECTION, SOLUTION INTRAVENOUS; SUBCUTANEOUS at 21:11

## 2022-04-01 RX ADMIN — LIDOCAINE HYDROCHLORIDE 40 ML: 20 SOLUTION ORAL; TOPICAL at 15:37

## 2022-04-01 RX ADMIN — CLONIDINE HYDROCHLORIDE 0.2 MG: 0.1 TABLET ORAL at 16:56

## 2022-04-01 RX ADMIN — SODIUM CHLORIDE, PRESERVATIVE FREE 10 ML: 5 INJECTION INTRAVENOUS at 21:11

## 2022-04-01 RX ADMIN — HYDROMORPHONE HYDROCHLORIDE 0.5 MG: 1 INJECTION, SOLUTION INTRAMUSCULAR; INTRAVENOUS; SUBCUTANEOUS at 20:39

## 2022-04-01 RX ADMIN — INSULIN LISPRO 2 UNITS: 100 INJECTION, SOLUTION INTRAVENOUS; SUBCUTANEOUS at 17:17

## 2022-04-01 RX ADMIN — CLONIDINE HYDROCHLORIDE 0.2 MG: 0.1 TABLET ORAL at 21:10

## 2022-04-01 RX ADMIN — SODIUM CHLORIDE, PRESERVATIVE FREE 10 ML: 5 INJECTION INTRAVENOUS at 17:00

## 2022-04-01 RX ADMIN — ANTACID TABLETS 200 MG: 500 TABLET, CHEWABLE ORAL at 16:56

## 2022-04-01 RX ADMIN — LABETALOL HYDROCHLORIDE 20 MG: 5 INJECTION, SOLUTION INTRAVENOUS at 15:34

## 2022-04-01 RX ADMIN — HYDRALAZINE HYDROCHLORIDE 100 MG: 25 TABLET, FILM COATED ORAL at 21:10

## 2022-04-01 RX ADMIN — HYDRALAZINE HYDROCHLORIDE 100 MG: 25 TABLET, FILM COATED ORAL at 15:31

## 2022-04-01 NOTE — ED NOTES
TRANSFER - OUT REPORT:    Verbal report given to Juliet DE LA PAZ (name) on Bonnie Hodgkin  being transferred to [facility] (unit) for routine progression of care       Report consisted of patients Situation, Background, Assessment and   Recommendations(SBAR). Information from the following report(s) SBAR, Kardex, ED Summary, Intake/Output, MAR and Recent Results was reviewed with the receiving nurse. Lines:   Peripheral IV 04/01/22 Left Antecubital (Active)   Site Assessment Clean, dry, & intact 04/01/22 1526   Phlebitis Assessment 0 04/01/22 1526   Infiltration Assessment 0 04/01/22 1526   Dressing Status Clean, dry, & intact 04/01/22 1526        Opportunity for questions and clarification was provided.       Patient transported with:   Monitor

## 2022-04-01 NOTE — TELEPHONE ENCOUNTER
Nurse Miles Wells [phone] called to state that pt did not answer or return her call this week. Therefore missed the care for the week of 3-28 to 4-1.

## 2022-04-01 NOTE — PROGRESS NOTES
I saw and evaluated the patient, performing the key elements of the service. I discussed the findings, assessment and plan with the resident and agree with the resident's findings and plan as documented in the resident's note. 63yo M ESRD, missed last 3 session of HD, HTN, cirrhosis, s/p CABG, CHF, DM2, gastritis admitted for abd pain and cp. Sx x3 days, plus n/diarrhea, no sob, fall 3 days ago. /76 in ED. Trop 1402, CXR small b/l pleural eff, EKG NSR no st/t wave changes.     NSTEMI (?): trop in \"critically abnormal\" range but reproducible pain following a recent fall, ASA, cardiology consulted, echo ordered  Hypertensive emergency: will lower pressure per recommendations, restart home meds as needed   Abd pain: low suspicion of infectious etiology, will get US and measure ascites

## 2022-04-01 NOTE — ED TRIAGE NOTES
Patient reports he has missed dialysis for a week. Patient reports having weakness and was unable to get there.  Patient reports also feeling shaky

## 2022-04-01 NOTE — ROUTINE PROCESS
1900  Bedside shift change report given to Delroy Wills 99 (oncoming nurse) by Derek Marie RN (offgoing nurse). Report included the following information SBAR, Kardex, ED Summary, Intake/Output, and Recent Results. 1955  Went to assess patient. Rating pain 9 out 10 and all he has ordered in tylenol. Called family practice and they said they would put in an order. 2013  Blood pressure 195/82. RN notified. PRN meds given. 2110  Vitals rechecked and /88. Called RRT nurse to ask about order for labetalol every 5 mins. She said wait and she would call family practice. Order changed to every 4 hours prn. Will give nigh time PO meds and recheck in 1 hour    2153  Rechecked BP and was 201/83. Called family practice and talked to Dr. Nate Larson. He said give it more time for night time meds to kick in hold off on labetalol. He said if systolic is >359 and diastolic is >835 to call back. 2335  Attempted to finish med reconciliation. Had patient wife send pictures of it but dosages were cut off. Asked patient to have her resend. 2340  Patients /86 at recheck. Contacted family [facility] and Dr. Nate Larson will put in orders. [phone]  Transfered patient to [facility] to better monitor patient. 0003  Orders put in to transfer patient to ICU for a higher level of care    TRANSFER - OUT REPORT:    Verbal report given to [facility] (name) on Rodriguez Hemet  being transferred to ICU(unit) for change in patient condition(Increase in BP needs higher level of care )       Report consisted of patients Situation, Background, Assessment and   Recommendations(SBAR). Information from the following report(s) SBAR, Kardex, ED Summary, Intake/Output, MAR, Recent Results, Med Rec Status and Cardiac Rhythm NSR was reviewed with the receiving nurse.     Lines:   Peripheral IV 04/01/22 Left Antecubital (Active)   Site Assessment Clean, dry, & intact 04/01/22 1946   Phlebitis Assessment 0 04/01/22 1946   Infiltration Assessment 0 04/01/22 1946 Dressing Status Clean, dry, & intact 04/01/22 1946   Dressing Type Transparent 04/01/22 1946   Action Taken Open ports on tubing capped 04/01/22 1946   Alcohol Cap Used Yes 04/01/22 1946        Opportunity for questions and clarification was provided. Patient transported with:   Registered Nurse       This patient was assisted with Intentional Toileting every 2 hours during this shift as appropriate. Documentation of ambulation and output reflected on Flowsheet as appropriate. Purposeful hourly rounding was completed using AIDET and 5Ps. Outcomes of PHR documented as they occurred.

## 2022-04-01 NOTE — ED PROVIDER NOTES
Exie Ahumada is a 65 yo M with h/o HTN, DM, CAD, CABG  and ESRD on HD MWF who presents to the ED with 1 week of generalized weakness and intermittent chest pains. He states when he tries to walk his legs are shaky and he has fallen several times. Because of this he has missed his past week of dialysis appointments. He denies fever, nausea or vomiting. 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, with long-term current use of insulin ([address] 2018    Diabetes ([address]        Past Surgical History:   Procedure Laterality Date    HX ARTERIAL BYPASS      HX OTHER SURGICAL      5th toe Metatarsal amputation 2017     IR INSERT NON TUNL CVC OVER 5 YRS  2021    IR INSERT TUNL CVC W/O PORT OVER 5 YR  2021         Family History:   Problem Relation Age of Onset    Diabetes Mother     No Known Problems Father        Social History     Socioeconomic History    Marital status:      Spouse name: Not on file    Number of children: Not on file    Years of education: Not on file    Highest education level: Not on file   Occupational History    Not on file   Tobacco Use    Smoking status: Former Smoker     Quit date: 2001     Years since quittin.8    Smokeless tobacco: Never Used   Vaping Use    Vaping Use: Never used   Substance and Sexual Activity    Alcohol use: No    Drug use: No    Sexual activity: Yes     Partners: Female     Birth control/protection: Condom   Other Topics Concern    Not on file   Social History Narrative    Not on file     Social Determinants of Health     Financial Resource Strain:     Difficulty of Paying Living Expenses: Not on file   Food Insecurity:     Worried About Running Out of Food in the Last Year: Not on file    Hardik of Food in the Last Year: Not on file   Transportation Needs:     Lack of Transportation (Medical):  Not on file    Lack of Transportation (Non-Medical): Not on file   Physical Activity:     Days of Exercise per Week: Not on file    Minutes of Exercise per Session: Not on file   Stress:     Feeling of Stress : Not on file   Social Connections:     Frequency of Communication with Friends and Family: Not on file    Frequency of Social Gatherings with Friends and Family: Not on file    Attends Scientology Services: Not on file    Active Member of [address] [place] [address] or Organizations: Not on file    Attends Club or Organization Meetings: Not on file    Marital Status: Not on file   Intimate Partner Violence:     Fear of Current or Ex-Partner: Not on file    Emotionally Abused: Not on file    Physically Abused: Not on file    Sexually Abused: Not on file   Housing Stability:     Unable to Pay for Housing in the Last Year: Not on file    Number of [place] in the Last Year: Not on file    Unstable Housing in the Last Year: Not on file         ALLERGIES: Patient has no known allergies. Review of Systems   Constitutional: Negative for fever. HENT: Negative for sore throat. Eyes: Negative for visual disturbance. Respiratory: Negative for cough. Cardiovascular: Positive for chest pain. Gastrointestinal: Negative for abdominal pain. Genitourinary: Negative for dysuria. Musculoskeletal: Negative for back pain. Skin: Negative for rash. Neurological: Positive for weakness. Negative for headaches. Vitals:    04/01/22 1228   BP: (!) 216/76   Pulse: 82   Resp: 16   Temp: 97.5 °F (36.4 °C)   SpO2: 99%   Weight: 62.1 kg (137 lb)   Height: 5' 7\" (1.702 m)            Physical Exam  Vitals and nursing note reviewed. Constitutional:       General: He is not in acute distress. Appearance: He is well-developed. HENT:      Head: Normocephalic and atraumatic. Eyes:      Conjunctiva/sclera: Conjunctivae normal.   Neck:      Trachea: Phonation normal.   Cardiovascular:      Rate and Rhythm: Normal rate.    Pulmonary:      Effort: Pulmonary effort is normal. No respiratory distress. Breath sounds: No wheezing or rales. Abdominal:      General: There is no distension. Tenderness: There is no abdominal tenderness. There is no guarding or rebound. Musculoskeletal:         General: No tenderness. Normal range of motion. Cervical back: Normal range of motion. Skin:     General: Skin is warm and dry. Neurological:      Mental Status: He is alert. He is not disoriented. Motor: No abnormal muscle tone. ED EKG interpretation:  Rhythm: normal sinus rhythm; and regular . Rate (approx.): 82; Axis: normal; P wave: normal; QRS interval: normal ; ST/T wave: normal; Other findings: normal. This EKG was interpreted by Ryan Watson MD,ED Provider. MDM         Perfect Serve Consult for Admission  2:12 PM    ED Room Number: ER17/17  Patient Name and age:  Janak Sheikh 64 y.o.  male  Working Diagnosis:   1. NSTEMI (non-ST elevated myocardial infarction) ([facility]    2. ESRD (end stage renal disease) ([facility]        COVID-19 Suspicion:  no  Sepsis present:  no  Reassessment needed: N/A  Code Status:  Full Code  Readmission: no  Isolation Requirements:  no  Recommended Level of Care:  telemetry  Department:[facility] ED - [phone]  Other:  Patient has h/o CAD, CABG, ESRD on HD M, W, F who has missed dialysis for the past week due to generalized fatigue, frequent falls and intermittent stabbing chest pain. His trop today is 1402. K is 5.1 but was hemolyzed so I am repeating the sample. BUN 57, Cr. 2.3.         Procedures

## 2022-04-01 NOTE — PROGRESS NOTES
Cardiology Initial Care Encounter    Patient: Bri Sanchez MRN: [id number]     YOB: 1960  Age: 64 y.o. Sex: male      Admit Date: 4/1/2022       Assessment/Plan     1. Elevated troponin: likely non-ACS elevation due to uncontrolled BP/missed HD. Trend troponin. Check TTE. His chest pain sounds non-anginal (tender on palpation)     2. Hx of CAD s/p CABG 2020: cont ASA, statin, BB therapy    3. Uncontrolled HTN: pt reports taking his meds. Resume norvasc, clonidine, hydralazine, metoprolol, cardura. Can also resume minoxidil (on PTA) if needed    4. ESRD: pt missed several days of HD. Per renal    5. DM: SSI, per primary team    6. Cirrhosis: abd US    7. Erosive gastritis: having abd/epigastric pain as well, had previous admission - abd US     8. Thrombocytopenia: appears chronic, monitor. CARDIOLOGY ATTENDING  Patient personally seen and examined. All the elements of history and examination were personally performed. Assessment and plan was discussed and agree. NAD. Hrt RRR, no murmur. Lungs clear. Abd soft. No edema     Elevated troponin   - He has non-anginal CP - worse with a deep breath and tender on palpation   - EKG is normal   - trop elevated at ~ 1400  - He likely has non-MI troponin elevation in setting of known CAD, ESRD with missed HD sessions, and uncontrolled HTN  - Follow serial cardiac markers and check an echo     2) HTN  - resume outpatient regimen and see what BP does after HD sessions   - adjust meds as needed       Lexy Quiñonez MD, [facility]     Bri Sanchez is a 64 y.o. male  with PMH significant for CAD s/p CABG, hx of PEA arrest (?), HTN, ESRD on HD, DM, anemia, and gastritis. Pt reports left-sided CP beginning several days ago. Worse with activity. Associated with weakness, SOB and 2-3 pillow orthopnea. Pt recently had a fall at home but states the pain was there prior to the fall.  He also c/o epigastric/abd pain which he had previous admission, associated with nausea and diarrhea. He denies any palpitations or edema. No recent fever or chills. ECHO ordered    The patient has been referred to cardiology for on going management of elevated troponin, uncontrolled BP. Review of Symptoms:  Constitutional: +weakness  ENT: negative   Respiratory: +SOB, orthopnea  Gastrointestinal: +pain, diarrhea  Genitourinary: dysuria, hematuria, frequency   Musculoskeletal:negative  Neurological: negative  Other systems reviewed and negative except as above. Previous cardiac hx  No specialty comments available.   Risk factors: HTN, DM, ESRD    Social History     Tobacco Use    Smoking status: Former Smoker     Quit date: 2001     Years since quittin.8    Smokeless tobacco: Never Used   Substance Use Topics    Alcohol use: No     Family History   Problem Relation Age of Onset    Diabetes Mother     No Known Problems Father        Current Facility-Administered Medications   Medication Dose Route Frequency    hydrALAZINE (APRESOLINE) tablet 100 mg  100 mg Oral TID    sodium chloride (NS) flush 5-40 mL  5-40 mL IntraVENous Q8H    sodium chloride (NS) flush 5-40 mL  5-40 mL IntraVENous PRN    acetaminophen (TYLENOL) tablet 650 mg  650 mg Oral Q6H PRN    Or    acetaminophen (TYLENOL) suppository 650 mg  650 mg Rectal Q6H PRN    polyethylene glycol (MIRALAX) packet 17 g  17 g Oral DAILY PRN    ondansetron (ZOFRAN ODT) tablet 4 mg  4 mg Oral Q8H PRN    Or    ondansetron (ZOFRAN) injection 4 mg  4 mg IntraVENous Q6H PRN    cloNIDine HCL (CATAPRES) tablet 0.2 mg  0.2 mg Oral TID    [START ON 2022] amLODIPine (NORVASC) tablet 10 mg  10 mg Oral QAM    [START ON 2022] aspirin chewable tablet 81 mg  81 mg Oral DAILY    [START ON 2022] bumetanide (BUMEX) tablet 2 mg  2 mg Oral DAILY    calcium carbonate (TUMS) chewable tablet 200 mg [elemental]  200 mg Oral TID WITH MEALS    docusate sodium (COLACE) capsule 100 mg  100 mg Oral BID    doxazosin (CARDURA) tablet 4 mg  4 mg Oral BID    [START ON 4/2/2022] eplerenone (INSPRA) tablet 25 mg  25 mg Oral DAILY    [START ON 4/2/2022] ferrous sulfate tablet 325 mg  1 Tablet Oral DAILY WITH BREAKFAST    gabapentin (NEURONTIN) capsule 300 mg  300 mg Oral BID    [START ON 4/2/2022] metoprolol succinate (TOPROL-XL) XL tablet 50 mg  50 mg Oral DAILY    [START ON 4/2/2022] pantoprazole (PROTONIX) tablet 40 mg  40 mg Oral DAILY    sucralfate (CARAFATE) tablet 1 g  1 g Oral QID    [START ON 4/2/2022] tamsulosin (FLOMAX) capsule 0.4 mg  0.4 mg Oral DAILY     Current Outpatient Medications   Medication Sig    docusate sodium (Colace) 100 mg capsule Take 1 Capsule by mouth.  ferrous sulfate 325 mg (65 mg iron) tablet Take 1 Tablet by mouth.  ondansetron hcl (ZOFRAN) 4 mg tablet TAKE 1 TABLET BY MOUTH EVERY 8 HOURS AS NEEDED FOR NAUSEA AND VOMITING    tamsulosin (FLOMAX) 0.4 mg capsule Take 1 Capsule by mouth.  bumetanide (BUMEX) 2 mg tablet Take 2 mg by mouth daily.  pantoprazole (PROTONIX) 40 mg tablet Take 40 mg by mouth daily.  insulin glargine (Lantus U-100 Insulin) 100 unit/mL injection 10 Units by SubCUTAneous route daily.  acetaminophen (Tylenol Extra Strength) 500 mg tablet Take 1,000 mg by mouth every six (6) hours as needed for Pain.  rosuvastatin (CRESTOR) 10 mg tablet Take 10 mg by mouth Every morning.  cloNIDine HCL (CATAPRES) 0.2 mg tablet Take 0.2 mg by mouth three (3) times daily.  gabapentin (NEURONTIN) 300 mg capsule Take 300 mg by mouth two (2) times a day.  amLODIPine (NORVASC) 10 mg tablet Take 10 mg by mouth Every morning.  aspirin 81 mg chewable tablet Take 81 mg by mouth daily.  doxazosin (CARDURA) 4 mg tablet Take 4 mg by mouth two (2) times a day.  hydrALAZINE (APRESOLINE) 100 mg tablet Take 100 mg by mouth three (3) times daily.  minoxidiL (LONITEN) 2.5 mg tablet Take 5 mg by mouth two (2) times a day.     sucralfate (CARAFATE) 1 gram tablet Take 1 g by mouth four (4) times daily.  ondansetron (ZOFRAN ODT) 8 mg disintegrating tablet Take 8 mg by mouth every eight (8) hours as needed for Nausea or Vomiting.  calcium carbonate (TUMS) 200 mg calcium (500 mg) chew Take 1 Tablet by mouth daily as needed.  metoprolol succinate (TOPROL-XL) 50 mg XL tablet Take 1 Tablet by mouth daily.  eplerenone (Inspra) 25 mg tablet Take 25 mg by mouth daily. Objective:     Vitals:    04/01/22 1515 04/01/22 1525 04/01/22 1530 04/01/22 1534   BP:   (!) 215/77 (!) 215/77   Pulse:  85 86 86   Resp: 15      Temp:       SpO2:   99%    Weight:       Height:            Intake and Output:  Current Shift: No intake/output data recorded. Last three shifts: No intake/output data recorded. Gen: Well-developed, well-nourished, in no acute distress  Neck: Supple,No JVD, No Carotid Bruit,   Resp: No accessory muscle use, Clear breath sounds, No rales or rhonchi  Card: Regular Rate,Rythm,Normal S1, S2, No murmurs, rubs or gallop. No thrills. Abd:  Soft, non-tender, non-distended, BS+   MSK: No cyanosis  Skin: No rashes    Neuro: moving all four extremities , follows commands appropriately  Psych:  Good insight, oriented to person, place , alert, Nml Affect  LE: No edema    EKG: normal EKG, normal sinus rhythm. TELEMETRY SR     Lab/Data Review: All lab results for the last 24 hours reviewed.      Signed By: Ad Ray NP     April 1, 2022

## 2022-04-01 NOTE — PROGRESS NOTES
Patient has called and awaiting a \"picture\" of his medication list from his wife. Patient states he is not positive and the strengths and frequency of his meds, because his wife takes care of filling his planner. Awaiting his wife to call back.

## 2022-04-01 NOTE — H&P
Admission H&P     Name: Leah Blunt MRN: [id number]    Sex: Male   YOB: 1960  Age: 64 y.o. PCP: Natalya Pace MD      Source of Information: patient, medical records    Chief complaint: Chest Pain / Abdominal Pain    History of Present Illness  Leah Blunt is a 64 y.o. male with PMHx of  PMHx of known h/o ESRD on HD, HTN, HFpEF, T2DM, CAD s/p CABG (May 2020), h/o PEA arrest, erosive gastritis who presents to the ED complaining of pain in his abdomen, chest, and back for the last 2 days. Patient has not received dialysis for the last week and notes he has been experiencing increased weakness, which resulted in a GLF x3 days ago where he hit his head. No LOC. He also believes he may have struck his chest during the fall. He has had a continuous sharp pain in his left chest since that time. Denies palpitations or pain radiating. Does not change with motion. Patient states this pain feels different than his prior MI. He also notes a gnawing epigastric pain for the last 2 days. It worsens with eating and drinking. Patient endorses nausea and vomiting over the last x24 hours, but none at this time. States the pain radiates to his left flank. Denies dysuria, hematuria, anuria. Endorses some diarrhea yesterday. No fever, no sick contacts. In the ED:  Vitals: Temp 97.5   /76   HR 83   RR 16   SatO2  99% on RA  Labs: WBC 5.6, Hgb 9.1, Plt 93, Na 132, K 5.1, Cr 2.33, Glu 242. Trop 1402. Lipase 140. Imaging: CXR showing bilateral pleural effusions which are unchanged, CT Head showing no acute process.    Treatment: No medications       Patient Vitals for the past 12 hrs:   Temp Pulse Resp BP SpO2   04/01/22 1634 98.1 °F (36.7 °C) 77 14 (!) 203/95 99 %   04/01/22 1600 -- 76 -- (!) 197/74 99 %   04/01/22 1545 -- 74 -- (!) 197/78 100 %   04/01/22 1534 -- 86 -- (!) 215/77 --   04/01/22 1530 -- 86 -- (!) 215/77 99 %   04/01/22 1525 -- 85 -- -- --   04/01/22 1515 -- -- 15 -- -- 04/01/22 1514 -- 86 -- (!) 202/83 98 %   04/01/22 1230 -- 83 11 (!) 198/79 100 %   04/01/22 1229 -- 83 -- -- --   04/01/22 1228 97.5 °F (36.4 °C) 82 16 (!) 216/76 99 %       Review of Systems  Review of Systems   Constitutional: Positive for fatigue. Negative for appetite change, chills and fever. HENT: Negative for congestion, sinus pressure and sinus pain. Eyes: Negative for pain and visual disturbance. Respiratory: Positive for cough and shortness of breath. Negative for wheezing. Cardiovascular: Positive for chest pain. Negative for palpitations and leg swelling. Gastrointestinal: Positive for abdominal pain, blood in stool, diarrhea and nausea. Negative for abdominal distention and vomiting. Genitourinary: Negative for dysuria, enuresis, flank pain and hematuria. Musculoskeletal: Positive for back pain. Negative for neck pain and neck stiffness. Skin: Negative for rash and wound. Neurological: Positive for weakness. Negative for dizziness, syncope and headaches. Psychiatric/Behavioral: Negative for agitation, behavioral problems and confusion. Home Medications  Prior to Admission medications    Medication Sig Start Date End Date Taking? Authorizing Provider   docusate sodium (Colace) 100 mg capsule Take 1 Capsule by mouth. 10/20/21   Provider, Historical   ferrous sulfate 325 mg (65 mg iron) tablet Take 1 Tablet by mouth. 4/14/21   Provider, Historical   ondansetron hcl (ZOFRAN) 4 mg tablet TAKE 1 TABLET BY MOUTH EVERY 8 HOURS AS NEEDED FOR NAUSEA AND VOMITING 1/17/22   Provider, Historical   tamsulosin (FLOMAX) 0.4 mg capsule Take 1 Capsule by mouth. 10/20/21   Provider, Historical   bumetanide (BUMEX) 2 mg tablet Take 2 mg by mouth daily. Provider, Historical   pantoprazole (PROTONIX) 40 mg tablet Take 40 mg by mouth daily. Provider, Historical   insulin glargine (Lantus U-100 Insulin) 100 unit/mL injection 10 Units by SubCUTAneous route daily.     Provider, Historical acetaminophen (Tylenol Extra Strength) 500 mg tablet Take 1,000 mg by mouth every six (6) hours as needed for Pain. Provider, Historical   rosuvastatin (CRESTOR) 10 mg tablet Take 10 mg by mouth Every morning. Provider, Historical   cloNIDine HCL (CATAPRES) 0.2 mg tablet Take 0.2 mg by mouth three (3) times daily. Provider, Historical   gabapentin (NEURONTIN) 300 mg capsule Take 300 mg by mouth two (2) times a day. Provider, Historical   amLODIPine (NORVASC) 10 mg tablet Take 10 mg by mouth Every morning. Provider, Historical   aspirin 81 mg chewable tablet Take 81 mg by mouth daily. Provider, Historical   doxazosin (CARDURA) 4 mg tablet Take 4 mg by mouth two (2) times a day. Provider, Historical   hydrALAZINE (APRESOLINE) 100 mg tablet Take 100 mg by mouth three (3) times daily. Provider, Historical   minoxidiL (LONITEN) 2.5 mg tablet Take 5 mg by mouth two (2) times a day. Provider, Historical   sucralfate (CARAFATE) 1 gram tablet Take 1 g by mouth four (4) times daily. Provider, Historical   ondansetron (ZOFRAN ODT) 8 mg disintegrating tablet Take 8 mg by mouth every eight (8) hours as needed for Nausea or Vomiting. Provider, Historical   calcium carbonate (TUMS) 200 mg calcium (500 mg) chew Take 1 Tablet by mouth daily as needed. Provider, Historical   metoprolol succinate (TOPROL-XL) 50 mg XL tablet Take 1 Tablet by mouth daily. 2/8/22   eHrminia Adorno DO   eplerenone (Inspra) 25 mg tablet Take 25 mg by mouth daily.     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, with long-term current use of insulin ([address] 11/14/2018    Diabetes ([facility]        Previous Hospitalization(s)  Past Surgical History:   Procedure Laterality Date    HX ARTERIAL BYPASS      HX OTHER SURGICAL      5th toe Metatarsal amputation 12/2017     IR INSERT NON TUNL CVC OVER 5 YRS  11/19/2021    IR INSERT TUNL CVC W/O PORT OVER 5 YR  11/24/2021       Family History  Family History   Problem Relation Age of Onset    Diabetes Mother     No Known Problems Father        Social History  Alcohol history: Significant history, now sober x7 years  Smoking history: Non-smoker  Illicit drug history: Distant history of marijuana  Living arrangement: patient lives with their family. Ambulates: Assisted walker    Vital Signs  Visit Vitals  BP (!) 203/95 (BP 1 Location: Right upper arm, BP Patient Position: At rest)   Pulse 77   Temp 98.1 °F (36.7 °C)   Resp 14   Ht 5' 7\" (1.702 m)   Wt 137 lb (62.1 kg)   SpO2 99%   BMI 21.46 kg/m²       Physical Exam  General: No acute distress. Alert. Cooperative. Head: Normocephalic. Atraumatic. Eyes:              Conjunctiva pink. Sclera icterus. PERRL. Ears:              Hearing grossly intact. Nose:             Septum midline. Mucosa pink. No drainage. Throat: Mucosa pink. Moist mucous membranes. No tonsillar exudates or erythema. Palate movement equal bilaterally. Neck: Supple. Normal ROM. No stiffness. Respiratory: CTAB. No w/r/r/c.   Cardiovascular: RRR. Normal S1,S2. No m/r/g. Pulses 2+ throughout. GI: + bowel sounds. Mildly tender to epigastric area. No rebound tenderness or guarding. Nondistended. Mild fluid wave. Extremities: no LE edema. Distal pulses intact. Musculoskeletal: Full ROM in all extremities. Skin: Warm, dry. No rashes. Mild jaundice appearance. Neuro: CN II-XII grossly intact. Strength 5/5 in all extremities.         Laboratory Data  Recent Results (from the past 8 hour(s))   EKG, 12 LEAD, INITIAL    Collection Time: 04/01/22 12:34 PM   Result Value Ref Range    Ventricular Rate 82 BPM    Atrial Rate 82 BPM    P-R Interval 126 ms    QRS Duration 80 ms    Q-T Interval 416 ms    QTC Calculation (Bezet) 486 ms    Calculated P Axis 61 degrees    Calculated R Axis 34 degrees    Calculated T Axis 62 degrees    Diagnosis       Normal sinus rhythm  Normal ECG  When compared with ECG of 14-FEB-2022 13:49,  No significant change was found     CBC WITH AUTOMATED DIFF    Collection Time: 04/01/22 12:53 PM   Result Value Ref Range    WBC 5.6 4.1 - 11.1 K/uL    RBC 3.05 (L) 4.10 - 5.70 M/uL    HGB 9.1 (L) 12.1 - 17.0 g/dL    HCT 26.2 (L) 36.6 - 50.3 %    MCV 85.9 80.0 - 99.0 FL    MCH 29.8 26.0 - 34.0 PG    MCHC 34.7 30.0 - 36.5 g/dL    RDW 16.9 (H) 11.5 - 14.5 %    PLATELET 93 (L) 508 - 400 K/uL    MPV 10.3 8.9 - 12.9 FL    NRBC 0.0 0  WBC    ABSOLUTE NRBC 0.00 0.00 - 0.01 K/uL    NEUTROPHILS 73 32 - 75 %    LYMPHOCYTES 15 12 - 49 %    MONOCYTES 8 5 - 13 %    EOSINOPHILS 4 0 - 7 %    BASOPHILS 0 0 - 1 %    IMMATURE GRANULOCYTES 0 0.0 - 0.5 %    ABS. NEUTROPHILS 4.2 1.8 - 8.0 K/UL    ABS. LYMPHOCYTES 0.8 0.8 - 3.5 K/UL    ABS. MONOCYTES 0.4 0.0 - 1.0 K/UL    ABS. EOSINOPHILS 0.2 0.0 - 0.4 K/UL    ABS. BASOPHILS 0.0 0.0 - 0.1 K/UL    ABS. IMM. GRANS. 0.0 0.00 - 0.04 K/UL    DF SMEAR SCANNED      RBC COMMENTS ANISOCYTOSIS  1+        RBC COMMENTS NORMOCYTIC, NORMOCHROMIC     METABOLIC PANEL, COMPREHENSIVE    Collection Time: 04/01/22 12:53 PM   Result Value Ref Range    Sodium 132 (L) 136 - 145 mmol/L    Potassium 5.1 3.5 - 5.1 mmol/L    Chloride 105 97 - 108 mmol/L    CO2 21 21 - 32 mmol/L    Anion gap 6 5 - 15 mmol/L    Glucose 242 (H) 65 - 100 mg/dL    BUN 57 (H) 6 - 20 MG/DL    Creatinine 2.33 (H) 0.70 - 1.30 MG/DL    BUN/Creatinine ratio 24 (H) 12 - 20      GFR est AA 35 (L) >60 ml/min/1.73m2    GFR est non-AA 29 (L) >60 ml/min/1.73m2    Calcium 8.0 (L) 8.5 - 10.1 MG/DL    Bilirubin, total 0.5 0.2 - 1.0 MG/DL    ALT (SGPT) 47 12 - 78 U/L    AST (SGOT) 65 (H) 15 - 37 U/L    Alk.  phosphatase 186 (H) 45 - 117 U/L    Protein, total 6.2 (L) 6.4 - 8.2 g/dL    Albumin 2.4 (L) 3.5 - 5.0 g/dL    Globulin 3.8 2.0 - 4.0 g/dL    A-G Ratio 0.6 (L) 1.1 - 2.2     SAMPLES BEING HELD    Collection Time: 04/01/22 12:53 PM Result Value Ref Range    SAMPLES BEING HELD JULIO     COMMENT        Add-on orders for these samples will be processed based on acceptable specimen integrity and analyte stability, which may vary by analyte. LIPASE    Collection Time: 04/01/22 12:53 PM   Result Value Ref Range    Lipase 140 73 - 393 U/L   MAGNESIUM    Collection Time: 04/01/22 12:53 PM   Result Value Ref Range    Magnesium 2.2 1.6 - 2.4 mg/dL   TROPONIN-HIGH SENSITIVITY    Collection Time: 04/01/22 12:53 PM   Result Value Ref Range    Troponin-High Sensitivity 1,402 (HH) 0 - 76 ng/L   POTASSIUM    Collection Time: 04/01/22  3:21 PM   Result Value Ref Range    Potassium 3.7 3.5 - 5.1 mmol/L   TROPONIN-HIGH SENSITIVITY    Collection Time: 04/01/22  3:21 PM   Result Value Ref Range    Troponin-High Sensitivity 1,256 (HH) 0 - 76 ng/L   MAGNESIUM    Collection Time: 04/01/22  3:21 PM   Result Value Ref Range    Magnesium 2.2 1.6 - 2.4 mg/dL   NT-PRO BNP    Collection Time: 04/01/22  3:21 PM   Result Value Ref Range    NT pro-BNP 15,216 (H) <125 PG/ML   URINALYSIS W/MICROSCOPIC    Collection Time: 04/01/22  3:30 PM   Result Value Ref Range    Color YELLOW/STRAW      Appearance CLEAR CLEAR      Specific gravity 1.015 1.003 - 1.030      pH (UA) 6.5 5.0 - 8.0      Protein >=1000 (A) NEG mg/dL    Glucose >=1000 (A) NEG mg/dL    Ketone Negative NEG mg/dL    Bilirubin Negative NEG      Blood SMALL (A) NEG      Urobilinogen 0.2 0.2 - 1.0 EU/dL    Nitrites Negative NEG      Leukocyte Esterase Negative NEG      WBC 0-4 0 - 4 /hpf    RBC 10-20 0 - 5 /hpf    Epithelial cells FEW FEW /lpf    Bacteria Negative NEG /hpf    Hyaline cast 0-2 0 - 2 /lpf   URINE CULTURE HOLD SAMPLE    Collection Time: 04/01/22  3:30 PM    Specimen: Serum; Urine   Result Value Ref Range    Urine culture hold        Urine on hold in Microbiology dept for 2 days. If unpreserved urine is submitted, it cannot be used for addtional testing after 24 hours, recollection will be required. GLUCOSE, POC    Collection Time: 04/01/22  4:33 PM   Result Value Ref Range    Glucose (POC) 188 (H) 65 - 117 mg/dL    Performed by Jessica KWONG ([email or web address])        Imaging  CXR Results  (Last 48 hours)               04/01/22 1245  XR CHEST PORT Final result    Impression:  No significant change. Small bilateral pleural effusions, left   greater than right. Narrative:  INDICATION:  chest pain        COMPARISON: February 14, 2022       FINDINGS: Single AP portable view of the chest obtained at 1243 demonstrates a   stable cardiomediastinal silhouette. There is a right chest tunneled central   venous catheter. There are median sternotomy wires. There is a small left   pleural effusion and a trace right pleural effusion, similar prior study. There   is no focal airspace disease. CT Results  (Last 48 hours)               04/01/22 1306  CT HEAD WO CONT Final result    Impression:          No acute abnormality       Narrative:  EXAM: CT HEAD WO CONT       INDICATION: Generalized weakness and frequent falls for 1 week       COMPARISON: 2/14/2022. CONTRAST: None. TECHNIQUE: Unenhanced CT of the head was performed using 5 mm images. Brain and   bone windows were generated. Coronal and sagittal reformats. CT dose reduction   was achieved through use of a standardized protocol tailored for this   examination and automatic exposure control for dose modulation. FINDINGS:   The ventricles and sulci are normal in size, shape and configuration. . There is   no significant white matter disease. There is no intracranial hemorrhage,   extra-axial collection, or mass effect. The basilar cisterns are open. No CT   evidence of acute infarct. The bone windows demonstrate no abnormalities. The visualized portions of the   paranasal sinuses and mastoid air cells are clear.                      Assessment and Plan     Maciej Andres is a 64 y.o. male with a PMHx of known h/o ESRD on HD, HTN, HFpEF, T2DM, CAD s/p CABG (May 2020), h/o PEA arrest, erosive gastritis who is admitted for NSTEMI in the setting of missing dialysis for the last week and questionable compliance. NSTEMI: POA Trop of 1402 in setting of chest pain. EKG unchanged since prior with no ischemic changes. Hx of CAD with CABG in 5/2020. Chest pain reproducible on palpation, present since GLF x2 days ago. - trend Trop  - Aspirin 325mg now  - Consult cardiology   - Cardiac echo  - NPO  - SCDs    Hypertensive Emergency: /76 with elevated troponin. Missed dialysis x1 week. And large concern for noncompliacne with BP medications. - Goal reduction 20% in 24 hours (systolic ~760)  - Labetalol 20mg now   - continue home Hydral 100mg PO TID, Toprol XL 50mg daily, Amlodipine 10mg daily, Clonidine 0.2mg TID, Cardura 4mg BID, Eplerenone 25mg daily  - Labetalol PRN for BP systolic >458, diastolic >577  - dialysis per Nephro    Epigastric pain in the setting of known erosive gastritis: Prior admission for GI bleed in 11/2021. Prior CT notable for anasarca. 11/16/21 EGD w/ nonbleeding erosive esophagitis. No bleed at this time. Fluid wave present on exam. Lipase wnl.   - GI cocktail now  - Abdominal ultrasound to evaluate for abdominal pain in the setting of elevated Alk phos, and ascites volume  - Home Protonix 40mg daily   - Home Carafate 1g QID     ESRD on HD: Permacath placed 11/24 after ATN. Missed HD for the past week. Follows with Dr. Marcio Crowley (Nepho). Receives HD MWF. - consult to nephrology, appreciate recs  - Dialysis today    - daily CBC, CMP, Mg, Phos    Ground Level Fall: two days ago, hit head. No LOC. CT Head no acute process. - fall precautions    HFrEF:  Echo 11/19/21 with EF 53% on repeat, and reduced systolic function and moderate pulm HTN.  Requiring more pillows at night.   - given pleural effusions and increased orthopnea, will get repeat echo  - continue home Bumex 1mg BID      T2DM c/b peripheral neuropathy: Last A1c 8.4% (11/2021). Home Insulin regimen Lantus 10u QHS (15u if BG > 200), mealtime Lispro 2-3u TID w/ meals.   - Lantus 10u QHS  - SSI ACHS  - continue home Gabapentin 300mg BID  - A1c     Concern for cirrhosis: past h/o heavy alcohol use, stopped 6-7 years ago. US 11/20/21 showing hepatic parenchymal disease with cirrhotic morphology, perihepatic ascites and right pleural effusion. Per chart review, had liver biopsy completed awaiting results. - following with Dr. Elton Todd (hepatology)   - abdominal ultrasound as above      Anemia/thrombocytopenia: This is a chronic condition. POA Hgb 9.1 (bl ~9), normocytic and Plt 93 (bl ~90). Appears to be c/w CKD,  - consult nephrology  - home iron      HLD: Chronic.   Lab Results   Component Value Date/Time    Cholesterol, total 176 11/22/2019 09:36 AM    HDL Cholesterol 45 11/22/2019 09:36 AM    LDL, calculated 112 (H) 11/22/2019 09:36 AM    VLDL, calculated 19 11/22/2019 09:36 AM    Triglyceride 94 11/22/2019 09:36 AM     - Home rosuvastatin 10 mg daily     H/o PEA arrest: During previous admission to [facility] (11/2021) suspected secondary to respiratory distress. ROSC achieved after 12 minutes CPR. S/p intubation and extubation with mental status return to baseline. FEN/GI - NPO. Pedro Case Activity - Out of bed with assistance  DVT prophylaxis - SCDs  GI prophylaxis - Protonix  Fall prophylaxis - Fall precautions ordered. Disposition - Admit to Telemetry. Plan to d/c to Home. Consulting PT and OT  Code Status - Full. Discussed with patient / caregivers.   Next of Kin Name and Contact - Feliciano Lawrence,  Girlfriend - [phone]     Patient Betzy Red will be discussed with Dr. Garcia Garay.    2:25 PM, 04/01/22  Neville Myers MD  Family Medicine Resident, PGY1       For Billing    Chief Complaint   Patient presents with   [address] [facility] [address] Problems  Date Reviewed: 4/1/2022          Codes Class Noted POA    NSTEMI (non-ST elevated myocardial infarction) ([facility] [address] ICD-10-CM: I21.4  ICD-9-CM: 410.70  4/1/2022 Unknown        Cirrhosis ([facility] ICD-10-CM: K74.60  ICD-9-CM: 571.5  4/1/2022 Unknown

## 2022-04-02 ENCOUNTER — APPOINTMENT (OUTPATIENT)
Dept: NON INVASIVE DIAGNOSTICS | Age: 62
DRG: 199 | End: 2022-04-02
Attending: NURSE PRACTITIONER
Payer: MEDICAID

## 2022-04-02 VITALS
HEART RATE: 75 BPM | RESPIRATION RATE: 23 BRPM | OXYGEN SATURATION: 100 % | HEIGHT: 67 IN | WEIGHT: 136.91 LBS | SYSTOLIC BLOOD PRESSURE: 192 MMHG | DIASTOLIC BLOOD PRESSURE: 78 MMHG | TEMPERATURE: 98.2 F | BODY MASS INDEX: 21.49 KG/M2

## 2022-04-02 LAB
ANION GAP SERPL CALC-SCNC: 7 MMOL/L (ref 5–15)
BASOPHILS # BLD: 0 K/UL (ref 0–0.1)
BASOPHILS NFR BLD: 0 % (ref 0–1)
BUN SERPL-MCNC: 52 MG/DL (ref 6–20)
BUN/CREAT SERPL: 21 (ref 12–20)
CALCIUM SERPL-MCNC: 7.8 MG/DL (ref 8.5–10.1)
CHLORIDE SERPL-SCNC: 108 MMOL/L (ref 97–108)
CO2 SERPL-SCNC: 21 MMOL/L (ref 21–32)
CREAT SERPL-MCNC: 2.47 MG/DL (ref 0.7–1.3)
DIFFERENTIAL METHOD BLD: ABNORMAL
ECHO AO ASC DIAM: 2.8 CM
ECHO AO ASCENDING AORTA INDEX: 1.63 CM/M2
ECHO AV AREA PEAK VELOCITY: 1.6 CM2
ECHO AV AREA/BSA PEAK VELOCITY: 0.9 CM2/M2
ECHO AV PEAK GRADIENT: 13 MMHG
ECHO AV PEAK VELOCITY: 1.8 M/S
ECHO AV VELOCITY RATIO: 0.5
ECHO LA DIAMETER INDEX: 2.21 CM/M2
ECHO LA DIAMETER: 3.8 CM
ECHO LA VOL 2C: 43 ML (ref 18–58)
ECHO LA VOL 4C: 35 ML (ref 18–58)
ECHO LA VOL BP: 39 ML (ref 18–58)
ECHO LA VOL/BSA BIPLANE: 23 ML/M2 (ref 16–34)
ECHO LA VOLUME AREA LENGTH: 42 ML
ECHO LA VOLUME INDEX A2C: 25 ML/M2 (ref 16–34)
ECHO LA VOLUME INDEX A4C: 20 ML/M2 (ref 16–34)
ECHO LA VOLUME INDEX AREA LENGTH: 24 ML/M2 (ref 16–34)
ECHO LV E' LATERAL VELOCITY: 6 CM/S
ECHO LV E' SEPTAL VELOCITY: 4 CM/S
ECHO LV FRACTIONAL SHORTENING: 26 % (ref 28–44)
ECHO LV INTERNAL DIMENSION DIASTOLE INDEX: 2.5 CM/M2
ECHO LV INTERNAL DIMENSION DIASTOLIC: 4.3 CM (ref 4.2–5.9)
ECHO LV INTERNAL DIMENSION SYSTOLIC INDEX: 1.86 CM/M2
ECHO LV INTERNAL DIMENSION SYSTOLIC: 3.2 CM
ECHO LV IVSD: 0.8 CM (ref 0.6–1)
ECHO LV MASS 2D: 105.3 G (ref 88–224)
ECHO LV MASS INDEX 2D: 61.2 G/M2 (ref 49–115)
ECHO LV POSTERIOR WALL DIASTOLIC: 0.8 CM (ref 0.6–1)
ECHO LV RELATIVE WALL THICKNESS RATIO: 0.37
ECHO LVOT AREA: 3.1 CM2
ECHO LVOT DIAM: 2 CM
ECHO LVOT PEAK GRADIENT: 3 MMHG
ECHO LVOT PEAK VELOCITY: 0.9 M/S
ECHO MV A VELOCITY: 0.74 M/S
ECHO MV E DECELERATION TIME (DT): 284.9 MS
ECHO MV E VELOCITY: 0.6 M/S
ECHO MV E/A RATIO: 0.81
ECHO MV E/E' LATERAL: 10
ECHO MV E/E' RATIO (AVERAGED): 12.5
ECHO MV E/E' SEPTAL: 15
ECHO PV MAX VELOCITY: 1.4 M/S
ECHO PV PEAK GRADIENT: 8 MMHG
ECHO RV FREE WALL PEAK S': 6 CM/S
ECHO RV INTERNAL DIMENSION: 3 CM
ECHO RV TAPSE: 1.4 CM (ref 1.5–2)
ECHO TV REGURGITANT MAX VELOCITY: 2.32 M/S
ECHO TV REGURGITANT PEAK GRADIENT: 22 MMHG
EOSINOPHIL # BLD: 0.2 K/UL (ref 0–0.4)
EOSINOPHIL NFR BLD: 4 % (ref 0–7)
ERYTHROCYTE [DISTWIDTH] IN BLOOD BY AUTOMATED COUNT: 16.8 % (ref 11.5–14.5)
EST. AVERAGE GLUCOSE BLD GHB EST-MCNC: 137 MG/DL
GLUCOSE BLD STRIP.AUTO-MCNC: 109 MG/DL (ref 65–117)
GLUCOSE BLD STRIP.AUTO-MCNC: 230 MG/DL (ref 65–117)
GLUCOSE BLD STRIP.AUTO-MCNC: 92 MG/DL (ref 65–117)
GLUCOSE SERPL-MCNC: 138 MG/DL (ref 65–100)
HBA1C MFR BLD: 6.4 % (ref 4–5.6)
HCT VFR BLD AUTO: 20.8 % (ref 36.6–50.3)
HCT VFR BLD AUTO: 21.1 % (ref 36.6–50.3)
HGB BLD-MCNC: 7.3 G/DL (ref 12.1–17)
HGB BLD-MCNC: 7.3 G/DL (ref 12.1–17)
IMM GRANULOCYTES # BLD AUTO: 0 K/UL (ref 0–0.04)
IMM GRANULOCYTES NFR BLD AUTO: 0 % (ref 0–0.5)
LYMPHOCYTES # BLD: 1.3 K/UL (ref 0.8–3.5)
LYMPHOCYTES NFR BLD: 24 % (ref 12–49)
MAGNESIUM SERPL-MCNC: 2.2 MG/DL (ref 1.6–2.4)
MCH RBC QN AUTO: 29.6 PG (ref 26–34)
MCHC RBC AUTO-ENTMCNC: 34.6 G/DL (ref 30–36.5)
MCV RBC AUTO: 85.4 FL (ref 80–99)
MONOCYTES # BLD: 0.5 K/UL (ref 0–1)
MONOCYTES NFR BLD: 9 % (ref 5–13)
NEUTS SEG # BLD: 3.3 K/UL (ref 1.8–8)
NEUTS SEG NFR BLD: 62 % (ref 32–75)
NRBC # BLD: 0 K/UL (ref 0–0.01)
NRBC BLD-RTO: 0 PER 100 WBC
PHOSPHATE SERPL-MCNC: 3.9 MG/DL (ref 2.6–4.7)
PLATELET # BLD AUTO: 99 K/UL (ref 150–400)
PMV BLD AUTO: 10.1 FL (ref 8.9–12.9)
POTASSIUM SERPL-SCNC: 3.5 MMOL/L (ref 3.5–5.1)
RBC # BLD AUTO: 2.47 M/UL (ref 4.1–5.7)
SERVICE CMNT-IMP: ABNORMAL
SERVICE CMNT-IMP: NORMAL
SERVICE CMNT-IMP: NORMAL
SODIUM SERPL-SCNC: 136 MMOL/L (ref 136–145)
WBC # BLD AUTO: 5.4 K/UL (ref 4.1–11.1)

## 2022-04-02 PROCEDURE — 85018 HEMOGLOBIN: CPT

## 2022-04-02 PROCEDURE — APPSS45 APP SPLIT SHARED TIME 31-45 MINUTES: Performed by: NURSE PRACTITIONER

## 2022-04-02 PROCEDURE — 93306 TTE W/DOPPLER COMPLETE: CPT | Performed by: STUDENT IN AN ORGANIZED HEALTH CARE EDUCATION/TRAINING PROGRAM

## 2022-04-02 PROCEDURE — 90935 HEMODIALYSIS ONE EVALUATION: CPT

## 2022-04-02 PROCEDURE — 83735 ASSAY OF MAGNESIUM: CPT

## 2022-04-02 PROCEDURE — 82962 GLUCOSE BLOOD TEST: CPT

## 2022-04-02 PROCEDURE — 36415 COLL VENOUS BLD VENIPUNCTURE: CPT

## 2022-04-02 PROCEDURE — 99223 1ST HOSP IP/OBS HIGH 75: CPT | Performed by: STUDENT IN AN ORGANIZED HEALTH CARE EDUCATION/TRAINING PROGRAM

## 2022-04-02 PROCEDURE — 85025 COMPLETE CBC W/AUTO DIFF WBC: CPT

## 2022-04-02 PROCEDURE — 93306 TTE W/DOPPLER COMPLETE: CPT

## 2022-04-02 PROCEDURE — 74011250637 HC RX REV CODE- 250/637

## 2022-04-02 PROCEDURE — 84100 ASSAY OF PHOSPHORUS: CPT

## 2022-04-02 PROCEDURE — 74011250637 HC RX REV CODE- 250/637: Performed by: STUDENT IN AN ORGANIZED HEALTH CARE EDUCATION/TRAINING PROGRAM

## 2022-04-02 PROCEDURE — 74011250636 HC RX REV CODE- 250/636: Performed by: STUDENT IN AN ORGANIZED HEALTH CARE EDUCATION/TRAINING PROGRAM

## 2022-04-02 PROCEDURE — 74011000250 HC RX REV CODE- 250: Performed by: NURSE PRACTITIONER

## 2022-04-02 PROCEDURE — 74011000250 HC RX REV CODE- 250

## 2022-04-02 PROCEDURE — 74011636637 HC RX REV CODE- 636/637

## 2022-04-02 PROCEDURE — 83036 HEMOGLOBIN GLYCOSYLATED A1C: CPT

## 2022-04-02 PROCEDURE — 74011250636 HC RX REV CODE- 250/636: Performed by: NURSE PRACTITIONER

## 2022-04-02 PROCEDURE — 80048 BASIC METABOLIC PNL TOTAL CA: CPT

## 2022-04-02 RX ORDER — SERTRALINE HYDROCHLORIDE 100 MG/1
200 TABLET, FILM COATED ORAL DAILY
COMMUNITY
End: 2022-06-21 | Stop reason: ALTCHOICE

## 2022-04-02 RX ORDER — ASPIRIN 81 MG/1
81 TABLET ORAL DAILY
COMMUNITY

## 2022-04-02 RX ORDER — SENNOSIDES 8.6 MG/1
2 TABLET ORAL
COMMUNITY

## 2022-04-02 RX ORDER — GABAPENTIN 300 MG/1
300 CAPSULE ORAL DAILY
COMMUNITY

## 2022-04-02 RX ORDER — ACETAMINOPHEN 500 MG
500 TABLET ORAL
COMMUNITY
End: 2022-08-15

## 2022-04-02 RX ORDER — INSULIN GLARGINE 100 [IU]/ML
15 INJECTION, SOLUTION SUBCUTANEOUS
Status: ON HOLD | COMMUNITY
End: 2022-07-29 | Stop reason: SDUPTHER

## 2022-04-02 RX ORDER — BUMETANIDE 1 MG/1
1 TABLET ORAL 2 TIMES DAILY
COMMUNITY
End: 2022-07-18

## 2022-04-02 RX ORDER — AMLODIPINE BESYLATE 10 MG/1
10 TABLET ORAL EVERY MORNING
Qty: 30 TABLET | Refills: 0 | Status: SHIPPED | OUTPATIENT
Start: 2022-04-03 | End: 2022-06-20 | Stop reason: SDUPTHER

## 2022-04-02 RX ORDER — SUCRALFATE 1 G/1
1 TABLET ORAL 3 TIMES DAILY
COMMUNITY
End: 2022-06-21 | Stop reason: SDUPTHER

## 2022-04-02 RX ORDER — CARVEDILOL 12.5 MG/1
25 TABLET ORAL 2 TIMES DAILY WITH MEALS
Status: DISCONTINUED | OUTPATIENT
Start: 2022-04-03 | End: 2022-04-02 | Stop reason: HOSPADM

## 2022-04-02 RX ORDER — CALCIUM CARBONATE 200(500)MG
200 TABLET,CHEWABLE ORAL
Qty: 90 TABLET | Refills: 0 | Status: SHIPPED | OUTPATIENT
Start: 2022-04-03 | End: 2022-06-21 | Stop reason: ALTCHOICE

## 2022-04-02 RX ORDER — AMLODIPINE BESYLATE 10 MG/1
5 TABLET ORAL 2 TIMES DAILY
COMMUNITY
End: 2022-04-02

## 2022-04-02 RX ORDER — HYDRALAZINE HYDROCHLORIDE 50 MG/1
50 TABLET, FILM COATED ORAL 3 TIMES DAILY
Qty: 90 TABLET | Refills: 0 | Status: ON HOLD | OUTPATIENT
Start: 2022-04-02 | End: 2022-07-29 | Stop reason: SDUPTHER

## 2022-04-02 RX ORDER — CLOPIDOGREL BISULFATE 75 MG/1
75 TABLET ORAL DAILY
COMMUNITY
End: 2022-07-29

## 2022-04-02 RX ORDER — INSULIN ASPART 100 [IU]/ML
INJECTION, SOLUTION INTRAVENOUS; SUBCUTANEOUS
COMMUNITY

## 2022-04-02 RX ORDER — TAMSULOSIN HYDROCHLORIDE 0.4 MG/1
0.4 CAPSULE ORAL
COMMUNITY
End: 2022-06-21 | Stop reason: SDUPTHER

## 2022-04-02 RX ORDER — POLYETHYLENE GLYCOL 3350 17 G/17G
17 POWDER, FOR SOLUTION ORAL
COMMUNITY

## 2022-04-02 RX ADMIN — BUMETANIDE 2 MG: 1 TABLET ORAL at 08:02

## 2022-04-02 RX ADMIN — ACETAMINOPHEN 1000 MG: 500 TABLET ORAL at 01:20

## 2022-04-02 RX ADMIN — DOXAZOSIN 4 MG: 2 TABLET ORAL at 17:29

## 2022-04-02 RX ADMIN — ANTACID TABLETS 200 MG: 500 TABLET, CHEWABLE ORAL at 08:02

## 2022-04-02 RX ADMIN — TAMSULOSIN HYDROCHLORIDE 0.4 MG: 0.4 CAPSULE ORAL at 08:02

## 2022-04-02 RX ADMIN — SODIUM CHLORIDE, PRESERVATIVE FREE 10 ML: 5 INJECTION INTRAVENOUS at 02:06

## 2022-04-02 RX ADMIN — DOXAZOSIN 4 MG: 2 TABLET ORAL at 08:01

## 2022-04-02 RX ADMIN — Medication 81 MG: at 08:02

## 2022-04-02 RX ADMIN — ANTACID TABLETS 200 MG: 500 TABLET, CHEWABLE ORAL at 11:28

## 2022-04-02 RX ADMIN — INSULIN LISPRO 3 UNITS: 100 INJECTION, SOLUTION INTRAVENOUS; SUBCUTANEOUS at 16:41

## 2022-04-02 RX ADMIN — FERROUS SULFATE TAB 325 MG (65 MG ELEMENTAL FE) 325 MG: 325 (65 FE) TAB at 08:01

## 2022-04-02 RX ADMIN — CLONIDINE HYDROCHLORIDE 0.2 MG: 0.1 TABLET ORAL at 16:33

## 2022-04-02 RX ADMIN — SUCRALFATE 1 G: 1 TABLET ORAL at 17:29

## 2022-04-02 RX ADMIN — SODIUM CHLORIDE, PRESERVATIVE FREE 10 ML: 5 INJECTION INTRAVENOUS at 14:11

## 2022-04-02 RX ADMIN — METOPROLOL SUCCINATE 50 MG: 50 TABLET, EXTENDED RELEASE ORAL at 08:02

## 2022-04-02 RX ADMIN — HYDRALAZINE HYDROCHLORIDE 20 MG: 20 INJECTION INTRAMUSCULAR; INTRAVENOUS at 00:02

## 2022-04-02 RX ADMIN — HYDROMORPHONE HYDROCHLORIDE 0.5 MG: 1 INJECTION, SOLUTION INTRAMUSCULAR; INTRAVENOUS; SUBCUTANEOUS at 04:37

## 2022-04-02 RX ADMIN — DOCUSATE SODIUM 100 MG: 100 CAPSULE, LIQUID FILLED ORAL at 08:02

## 2022-04-02 RX ADMIN — GABAPENTIN 300 MG: 300 CAPSULE ORAL at 17:29

## 2022-04-02 RX ADMIN — SUCRALFATE 1 G: 1 TABLET ORAL at 08:02

## 2022-04-02 RX ADMIN — HYDROMORPHONE HYDROCHLORIDE 0.5 MG: 1 INJECTION, SOLUTION INTRAMUSCULAR; INTRAVENOUS; SUBCUTANEOUS at 11:28

## 2022-04-02 RX ADMIN — NICARDIPINE HYDROCHLORIDE 5 MG/HR: 2.5 INJECTION, SOLUTION INTRAVENOUS at 02:00

## 2022-04-02 RX ADMIN — PANTOPRAZOLE SODIUM 40 MG: 40 TABLET, DELAYED RELEASE ORAL at 08:00

## 2022-04-02 RX ADMIN — CLONIDINE HYDROCHLORIDE 0.2 MG: 0.1 TABLET ORAL at 08:01

## 2022-04-02 RX ADMIN — ANTACID TABLETS 200 MG: 500 TABLET, CHEWABLE ORAL at 16:33

## 2022-04-02 RX ADMIN — DOCUSATE SODIUM 100 MG: 100 CAPSULE, LIQUID FILLED ORAL at 17:29

## 2022-04-02 RX ADMIN — GABAPENTIN 300 MG: 300 CAPSULE ORAL at 08:02

## 2022-04-02 RX ADMIN — SUCRALFATE 1 G: 1 TABLET ORAL at 12:24

## 2022-04-02 RX ADMIN — HYDRALAZINE HYDROCHLORIDE 100 MG: 25 TABLET, FILM COATED ORAL at 08:02

## 2022-04-02 RX ADMIN — AMLODIPINE BESYLATE 10 MG: 5 TABLET ORAL at 08:02

## 2022-04-02 RX ADMIN — HYDRALAZINE HYDROCHLORIDE 100 MG: 25 TABLET, FILM COATED ORAL at 16:33

## 2022-04-02 RX ADMIN — ACETAMINOPHEN 1000 MG: 500 TABLET ORAL at 17:34

## 2022-04-02 NOTE — CONSULTS
NEPHROLOGY CONSULT NOTE     Patient: Alcira Cadet MRN: [id number]  PCP: Marge Du MD   :     1960  Age:   64 y.o. Sex:  male      Referring physician: Raymond Franks DO  Reason for consultation: 64 y.o. male with NSTEMI (non-ST elevated myocardial infarction) [facility]) [Y61.3] complicated by ESRD on HD  Admission Date: 2022 12:24 PM  LOS: 1 day      ASSESSMENT and PLAN :   ESRD on HD  -Dialyzes MWF at [facility], follows with Dr. Hugo Craig  -Secondary to progressive diabetic nephropathy, superimposed ATN from PEA/respiratory arrest in November. CKD progressed to ESRD now  -Missed dialysis for one week due to abdominal pain/transportation issues  -Now with hypertensive urgency SBP consistently greater than 200  -Have ordered HD for this evening, Cameron Gaston on call notified and will attempt to dialyze overnight  -Right permacath     Hypertension  -Resume home antihypertensive regimen, as needed IV antihypertensives  -Hemodialysis as above  -May need cardene gtt if BP persists greater than 180     Anemia of CKD  -RANDY deferred due to hypertensive urgency     CAD status post CABG May 2020  HFpEF  NSTEMI  -Last echo preserved LVEF, moderate pulmonary hypertension  -Cardiology following    Care Plan discussed with:  Family medicine team, Leta Libman on call, ICU RN        Thank you for consulting [facility] in the care of your patient. Subjective:   HPI: Alcira Cadet is a 64 y.o.  male with past medical history of diabetes, ESRD on HD MWF, hypertension who presented to the emergency department with chief complaint of not feeling well, tremors, missed HD for one week. States he missed one session due to abdominal pain, another due to transportation issues. Evaluation in the ED with elevated troponin, cardiology following and attributes to uncontrolled BP.  BP uncontrolled during admission despite PRN medication, SBP>200  Nephrology is consulted for management of ESRD on hemodialysis  -Dialyzes Monday Wednesday Friday at [facility], Dr. Chika Setrn  -Hemodialysis started in November of last year, progression of diabetic nephropathy, status post ATN from PEA/respiratory arrest.  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 He has never used smokeless tobacco. He reports that he does not drink alcohol and does not use drugs. Family History   Problem Relation Age of Onset    Diabetes Mother     No Known Problems Father        Review of Systems   Constitutional: Positive for malaise/fatigue. Negative for chills and fever. HENT: Negative for congestion and sore throat. Respiratory: Negative for cough, shortness of breath and wheezing. Cardiovascular: Negative for chest pain, palpitations and leg swelling. Gastrointestinal: Positive for abdominal pain. Negative for constipation, diarrhea, heartburn, nausea and vomiting. Genitourinary: Negative for dysuria, frequency and urgency. Neurological: Positive for tremors and weakness. Negative for dizziness and headaches. Objective:      I&O's:  04/01 0701 - 04/02 0700  In: 240 [P.O.:240]  Out: 400 [Urine:400]    Visit Vitals  BP (!) 199/69   Pulse 78   Temp 98.6 °F (37 °C)   Resp 18   Ht 5' 7\" (1.702 m)   Wt 62.1 kg (137 lb)   SpO2 99%   BMI 21.46 kg/m²       Physical Exam  Constitutional:       General: He is not in acute distress. Appearance: He is well-developed.  He is not ill-appearing or diaphoretic. HENT:      Head: Normocephalic. Mouth/Throat:      Mouth: Mucous membranes are dry. Eyes:      General:         Right eye: No discharge. Left eye: No discharge. Extraocular Movements: Extraocular movements intact. Cardiovascular:      Rate and Rhythm: Normal rate and regular rhythm. Pulses: Normal pulses. Heart sounds: Normal heart sounds. No murmur heard. No friction rub. No gallop. Pulmonary:      Effort: Pulmonary effort is normal. No respiratory distress. Breath sounds: Normal breath sounds. No wheezing or rales. Abdominal:      General: Bowel sounds are normal. There is no distension. Palpations: Abdomen is soft. Tenderness: There is no abdominal tenderness. Musculoskeletal:      Right lower leg: No edema. Left lower leg: No edema. Skin:     General: Skin is warm and dry. Capillary Refill: Capillary refill takes less than 2 seconds. Coloration: Skin is not pale. Findings: No erythema or rash. Neurological:      Mental Status: He is alert and oriented to person, place, and time. Psychiatric:         Behavior: Behavior normal.         Thought Content:  Thought content normal.         Judgment: Judgment normal.         Laboratory Results:    Recent Labs     04/01/22  1521 04/01/22  1253   NA  --  132*   K 3.7 5.1   CL  --  105   CO2  --  21   GLU  --  242*   BUN  --  57*   CREA  --  2.33*   CA  --  8.0*   MG 2.2 2.2   ALB  --  2.4*   ALT  --  47     Recent Labs     04/01/22  1253   WBC 5.6   HGB 9.1*   HCT 26.2*   PLT 93*     Lab Results   Component Value Date/Time    Color YELLOW/STRAW 04/01/2022 03:30 PM    Appearance CLEAR 04/01/2022 03:30 PM    Specific gravity 1.015 04/01/2022 03:30 PM    pH (UA) 6.5 04/01/2022 03:30 PM    Protein >=1000 (A) 04/01/2022 03:30 PM    Glucose >=1000 (A) 04/01/2022 03:30 PM    Ketone Negative 04/01/2022 03:30 PM    Bilirubin Negative 04/01/2022 03:30 PM    Urobilinogen 0.2 04/01/2022 03:30 PM    Nitrites Negative 04/01/2022 03:30 PM    Leukocyte Esterase Negative 04/01/2022 03:30 PM    Epithelial cells FEW 04/01/2022 03:30 PM    Bacteria Negative 04/01/2022 03:30 PM    WBC 0-4 04/01/2022 03:30 PM    RBC 10-20 04/01/2022 03:30 PM     Recent Results (from the past 24 hour(s))   EKG, 12 LEAD, INITIAL    Collection Time: 04/01/22 12:34 PM   Result Value Ref Range    Ventricular Rate 82 BPM    Atrial Rate 82 BPM    P-R Interval 126 ms    QRS Duration 80 ms    Q-T Interval 416 ms    QTC Calculation (Bezet) 486 ms    Calculated P Axis 61 degrees    Calculated R Axis 34 degrees    Calculated T Axis 62 degrees    Diagnosis       Normal sinus rhythm  Normal ECG  When compared with ECG of 14-FEB-2022 13:49,  No significant change was found  Confirmed by Kiel Snow MD. ([id number]) on 4/1/2022 7:52:11 PM     CBC WITH AUTOMATED DIFF    Collection Time: 04/01/22 12:53 PM   Result Value Ref Range    WBC 5.6 4.1 - 11.1 K/uL    RBC 3.05 (L) 4.10 - 5.70 M/uL    HGB 9.1 (L) 12.1 - 17.0 g/dL    HCT 26.2 (L) 36.6 - 50.3 %    MCV 85.9 80.0 - 99.0 FL    MCH 29.8 26.0 - 34.0 PG    MCHC 34.7 30.0 - 36.5 g/dL    RDW 16.9 (H) 11.5 - 14.5 %    PLATELET 93 (L) 036 - 400 K/uL    MPV 10.3 8.9 - 12.9 FL    NRBC 0.0 0  WBC    ABSOLUTE NRBC 0.00 0.00 - 0.01 K/uL    NEUTROPHILS 73 32 - 75 %    LYMPHOCYTES 15 12 - 49 %    MONOCYTES 8 5 - 13 %    EOSINOPHILS 4 0 - 7 %    BASOPHILS 0 0 - 1 %    IMMATURE GRANULOCYTES 0 0.0 - 0.5 %    ABS. NEUTROPHILS 4.2 1.8 - 8.0 K/UL    ABS. LYMPHOCYTES 0.8 0.8 - 3.5 K/UL    ABS. MONOCYTES 0.4 0.0 - 1.0 K/UL    ABS. EOSINOPHILS 0.2 0.0 - 0.4 K/UL    ABS. BASOPHILS 0.0 0.0 - 0.1 K/UL    ABS. IMM.  GRANS. 0.0 0.00 - 0.04 K/UL    DF SMEAR SCANNED      RBC COMMENTS ANISOCYTOSIS  1+        RBC COMMENTS NORMOCYTIC, NORMOCHROMIC     METABOLIC PANEL, COMPREHENSIVE    Collection Time: 04/01/22 12:53 PM   Result Value Ref Range    Sodium 132 (L) 136 - 145 mmol/L    Potassium 5.1 3.5 - 5.1 mmol/L Chloride 105 97 - 108 mmol/L    CO2 21 21 - 32 mmol/L    Anion gap 6 5 - 15 mmol/L    Glucose 242 (H) 65 - 100 mg/dL    BUN 57 (H) 6 - 20 MG/DL    Creatinine 2.33 (H) 0.70 - 1.30 MG/DL    BUN/Creatinine ratio 24 (H) 12 - 20      GFR est AA 35 (L) >60 ml/min/1.73m2    GFR est non-AA 29 (L) >60 ml/min/1.73m2    Calcium 8.0 (L) 8.5 - 10.1 MG/DL    Bilirubin, total 0.5 0.2 - 1.0 MG/DL    ALT (SGPT) 47 12 - 78 U/L    AST (SGOT) 65 (H) 15 - 37 U/L    Alk. phosphatase 186 (H) 45 - 117 U/L    Protein, total 6.2 (L) 6.4 - 8.2 g/dL    Albumin 2.4 (L) 3.5 - 5.0 g/dL    Globulin 3.8 2.0 - 4.0 g/dL    A-G Ratio 0.6 (L) 1.1 - 2.2     SAMPLES BEING HELD    Collection Time: 04/01/22 12:53 PM   Result Value Ref Range    SAMPLES BEING HELD JULIO     COMMENT        Add-on orders for these samples will be processed based on acceptable specimen integrity and analyte stability, which may vary by analyte.    LIPASE    Collection Time: 04/01/22 12:53 PM   Result Value Ref Range    Lipase 140 73 - 393 U/L   MAGNESIUM    Collection Time: 04/01/22 12:53 PM   Result Value Ref Range    Magnesium 2.2 1.6 - 2.4 mg/dL   TROPONIN-HIGH SENSITIVITY    Collection Time: 04/01/22 12:53 PM   Result Value Ref Range    Troponin-High Sensitivity 1,402 (HH) 0 - 76 ng/L   POTASSIUM    Collection Time: 04/01/22  3:21 PM   Result Value Ref Range    Potassium 3.7 3.5 - 5.1 mmol/L   TROPONIN-HIGH SENSITIVITY    Collection Time: 04/01/22  3:21 PM   Result Value Ref Range    Troponin-High Sensitivity 1,256 (HH) 0 - 76 ng/L   MAGNESIUM    Collection Time: 04/01/22  3:21 PM   Result Value Ref Range    Magnesium 2.2 1.6 - 2.4 mg/dL   NT-PRO BNP    Collection Time: 04/01/22  3:21 PM   Result Value Ref Range    NT pro-BNP 15,216 (H) <125 PG/ML   URINALYSIS W/MICROSCOPIC    Collection Time: 04/01/22  3:30 PM   Result Value Ref Range    Color YELLOW/STRAW      Appearance CLEAR CLEAR      Specific gravity 1.015 1.003 - 1.030      pH (UA) 6.5 5.0 - 8.0      Protein >=1000 (A) NEG mg/dL    Glucose >=1000 (A) NEG mg/dL    Ketone Negative NEG mg/dL    Bilirubin Negative NEG      Blood SMALL (A) NEG      Urobilinogen 0.2 0.2 - 1.0 EU/dL    Nitrites Negative NEG      Leukocyte Esterase Negative NEG      WBC 0-4 0 - 4 /hpf    RBC 10-20 0 - 5 /hpf    Epithelial cells FEW FEW /lpf    Bacteria Negative NEG /hpf    Hyaline cast 0-2 0 - 2 /lpf   URINE CULTURE HOLD SAMPLE    Collection Time: 04/01/22  3:30 PM    Specimen: Serum; Urine   Result Value Ref Range    Urine culture hold        Urine on hold in Microbiology dept for 2 days. If unpreserved urine is submitted, it cannot be used for addtional testing after 24 hours, recollection will be required. GLUCOSE, POC    Collection Time: 04/01/22  4:33 PM   Result Value Ref Range    Glucose (POC) 188 (H) 65 - 117 mg/dL    Performed by Gerald KWONG (CON)    GLUCOSE, POC    Collection Time: 04/01/22  8:51 PM   Result Value Ref Range    Glucose (POC) 238 (H) 65 - 117 mg/dL    Performed by Christian Mendosa        I have reviewed the following all pertinent labs, microbiology data, radiology imaging, and home medications for my assessment     We will follow patient. Please dont hesitate to call with any questions.     Vijay Reynoso NP, MARY  [facility]      [facility]  [address] [address]  Phone: [phone]     [phone]   [facility]  [facility]  [address]  [place], [address]  Phone: [phone]     [phone]   [facility]  [address] [place], [address] [facility],[address]  [address]  Phone: [phone]     [phone]   [facility]  [address] [facility], [address]  Phone: [phone]     [phone]

## 2022-04-02 NOTE — PROGRESS NOTES
0700: Bedside and Verbal shift change report given to BRAIN Borrero (oncoming nurse) by Raghu Platt RN (offgoing nurse). Report included the following information SBAR, Intake/Output, MAR, Recent Results and Cardiac Rhythm NSR. Primary Nurse [place] and Raghu Platt RN performed a dual skin assessment on this patient No impairment noted  Erik score is see flow sheet. 0730: Assessment completed. Patient A/Ox4. Complaints of mild abdominal pain. SBP < 180 with cardene off. Patient encouraged to void. 0900: Reji Parks 1154 RN at bedside starting dialysis. 1130: Reassessment completed. Patient complaining of increased abdominal pain, dilaudid given. Dialysis should be completed around 1300, BP has been stable  1300: Dialysis completed and H&H sent to lab.   1315: MD notified of lab results, plan to discharge once family gets here to verify med rec.  1600: Family at bedside meeting with pharmacy  [phone]: Patient updated on plan to discharge once medication list is completed. 1900: Bedside and Verbal shift change report given to Yanely Pozo RN (oncoming nurse) by Izzy Fowler RN (offgoing nurse). Report included the following information SBAR, Intake/Output, MAR, Recent Results and Cardiac Rhythm NSR.

## 2022-04-02 NOTE — PROGRESS NOTES
Seen this AM by on-call NP. BP improved on cardene. Missed several HD tx. For dialysis later this morning, confirmed with [facility]. Orders reviewed.

## 2022-04-02 NOTE — CONSULTS
Consult Date: 2022    Consults    Subjective   A 64 y.o. male with Hx of ESRD on HD, HTN, HFpEF, T2DM, CAD s/p CABG (May 2020), h/o PEA arrest, erosive gastritis is transferred to ICU with uncontrolled hypertension in setting of missed dialysis. Pt did not require any drips. He is on BP meds. Plan for IHD today.    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, with long-term current use of insulin ([facility] [address] 2018    Diabetes ([facility] [address]       Past Surgical History:   Procedure Laterality Date    HX ARTERIAL BYPASS      HX OTHER SURGICAL      5th toe Metatarsal amputation 2017     IR INSERT NON TUNL CVC OVER 5 YRS  2021    IR INSERT TUNL CVC W/O PORT OVER 5 YR  2021     Family History   Problem Relation Age of Onset    Diabetes Mother     No Known Problems Father       Social History     Tobacco Use    Smoking status: Former Smoker     Quit date: 2001     Years since quittin.8    Smokeless tobacco: Never Used   Substance Use Topics    Alcohol use: No       Current Facility-Administered Medications   Medication Dose Route Frequency Provider Last Rate Last Admin    niCARdipine (CARDENE) 25 mg in 0.9% sodium chloride 250 mL infusion  0-15 mg/hr IntraVENous TITRATE Armida Yu NP   Paused at 22 0234    hydrALAZINE (APRESOLINE) tablet 100 mg  100 mg Oral TID Exie Aid, DO   100 mg at 22 0802    sodium chloride (NS) flush 5-40 mL  5-40 mL IntraVENous Q8H Stephon Hoskins MD   10 mL at 22 0206    sodium chloride (NS) flush 5-40 mL  5-40 mL IntraVENous PRN Stephon Hoskins MD        polyethylene glycol (MIRALAX) packet 17 g  17 g Oral DAILY PRN Stephon Hoskins MD        ondansetron (ZOFRAN ODT) tablet 4 mg  4 mg Oral Q8H PRN Stephon Hoskins MD        Or    ondansetron [facility]) injection 4 mg  4 mg IntraVENous Q6H PRN Stephon Hoskins MD        cloNIDine HCL (CATAPRES) tablet 0.2 mg  0.2 mg Oral TID Pedro Luis Gwendolyn Thompson MD   0.2 mg at 04/02/22 0801    amLODIPine (NORVASC) tablet 10 mg  10 mg Oral Travis Choudhury MD   10 mg at 04/02/22 0451    aspirin chewable tablet 81 mg  81 mg Oral DAILY Yuriy Santamaria MD   81 mg at 04/02/22 0802    bumetanide (BUMEX) tablet 2 mg  2 mg Oral DAILY Yuriy Santamaria MD   2 mg at 04/02/22 0802    calcium carbonate (TUMS) chewable tablet 200 mg [elemental]  200 mg Oral TID WITH MEALS Yuriy Santamaria MD   200 mg at 04/02/22 1128    docusate sodium (COLACE) capsule 100 mg  100 mg Oral BID Yuriy Santamaria MD   100 mg at 04/02/22 0802    doxazosin (CARDURA) tablet 4 mg  4 mg Oral BID Yuriy Santamaria MD   4 mg at 04/02/22 0801    eplerenone (INSPRA) tablet 25 mg (Patient Supplied)  25 mg Oral DAILY Yuriy Santamaria MD        ferrous sulfate tablet 325 mg  1 Tablet Oral DAILY WITH Jesus Manuel Alberto MD   325 mg at 04/02/22 0801    gabapentin (NEURONTIN) capsule 300 mg  300 mg Oral BID Yuriy Santamaria MD   300 mg at 04/02/22 0802    metoprolol succinate (TOPROL-XL) XL tablet 50 mg  50 mg Oral DAILY Yuriy Santamaria MD   50 mg at 04/02/22 0802    pantoprazole (PROTONIX) tablet 40 mg  40 mg Oral ACB Yuriy Santamaria MD   40 mg at 04/02/22 0800    sucralfate (CARAFATE) tablet 1 g  1 g Oral QID Yuriy Santamaria MD   1 g at 04/02/22 0802    tamsulosin (FLOMAX) capsule 0.4 mg  0.4 mg Oral DAILY Yuriy Santamaria MD   0.4 mg at 04/02/22 0802    glucose chewable tablet 16 g  4 Tablet Oral PRN Yuriy Santamaria MD        dextrose 10% infusion 0-250 mL  0-250 mL IntraVENous PRN Yuriy Santamaria MD        glucagon (GLUCAGEN) injection 1 mg  1 mg IntraMUSCular PRN Yuriy Santamaria MD        insulin glargine (LANTUS) injection 10 Units  10 Units SubCUTAneous QHS Yuriy Santamaria MD   10 Units at 04/01/22 2112    insulin lispro (HUMALOG) injection   SubCUTAneous AC&HS Yuriy Santamaria MD   2 Units at 04/01/22 2111    HYDROmorphone (DILAUDID) syringe 0.5 mg  0.5 mg IntraVENous Q6H PRN Lefty Brothers MD   0.5 mg at 04/02/22 1128    acetaminophen (TYLENOL) tablet 1,000 mg  1,000 mg Oral Q8H PRN Lefty Brothers MD   1,000 mg at 04/02/22 0120    Or    acetaminophen (TYLENOL) suppository 650 mg  650 mg Rectal Q6H PRN Lefty Brothers MD        labetaloL (NORMODYNE;TRANDATE) 20 mg/4 mL (5 mg/mL) injection 10 mg  10 mg IntraVENous Q4H PRN Lefty Brothers MD        hydrALAZINE (APRESOLINE) 20 mg/mL injection 20 mg  20 mg IntraVENous Q4H PRN Blank Barnett MD   20 mg at 04/02/22 0002        Review of Systems  Pt denies any CP or SOB   Objective     Vital signs for last 24 hours:  Visit Vitals  /68 (BP 1 Location: Right upper arm, BP Patient Position: At rest)   Pulse 64   Temp 98 °F (36.7 °C)   Resp (!) 5   Ht 5' 7\" (1.702 m)   Wt 62.1 kg (136 lb 14.5 oz)   SpO2 100%   BMI 21.44 kg/m²       Intake/Output this shift:  Current Shift: No intake/output data recorded. Last 3 Shifts: 03/31 1901 - 04/02 0700  In: 268.3 [P.O.:240;  I.V.:28.3]  Out: 400 [Urine:400]    Data Review:   Recent Results (from the past 24 hour(s))   EKG, 12 LEAD, INITIAL    Collection Time: 04/01/22 12:34 PM   Result Value Ref Range    Ventricular Rate 82 BPM    Atrial Rate 82 BPM    P-R Interval 126 ms    QRS Duration 80 ms    Q-T Interval 416 ms    QTC Calculation (Bezet) 486 ms    Calculated P Axis 61 degrees    Calculated R Axis 34 degrees    Calculated T Axis 62 degrees    Diagnosis       Normal sinus rhythm  Normal ECG  When compared with ECG of 14-FEB-2022 13:49,  No significant change was found  Confirmed by Kiel Sanchez MD. ([id number]) on 4/1/2022 7:52:11 PM     CBC WITH AUTOMATED DIFF    Collection Time: 04/01/22 12:53 PM   Result Value Ref Range    WBC 5.6 4.1 - 11.1 K/uL    RBC 3.05 (L) 4.10 - 5.70 M/uL    HGB 9.1 (L) 12.1 - 17.0 g/dL    HCT 26.2 (L) 36.6 - 50.3 %    MCV 85.9 80.0 - 99.0 FL    MCH 29.8 26.0 - 34.0 PG    MCHC 34.7 30.0 - 36.5 g/dL    RDW 16.9 (H) 11.5 - 14.5 %    PLATELET 93 (L) 209 - 400 K/uL    MPV 10.3 8.9 - 12.9 FL    NRBC 0.0 0  WBC ABSOLUTE NRBC 0.00 0.00 - 0.01 K/uL    NEUTROPHILS 73 32 - 75 %    LYMPHOCYTES 15 12 - 49 %    MONOCYTES 8 5 - 13 %    EOSINOPHILS 4 0 - 7 %    BASOPHILS 0 0 - 1 %    IMMATURE GRANULOCYTES 0 0.0 - 0.5 %    ABS. NEUTROPHILS 4.2 1.8 - 8.0 K/UL    ABS. LYMPHOCYTES 0.8 0.8 - 3.5 K/UL    ABS. MONOCYTES 0.4 0.0 - 1.0 K/UL    ABS. EOSINOPHILS 0.2 0.0 - 0.4 K/UL    ABS. BASOPHILS 0.0 0.0 - 0.1 K/UL    ABS. IMM. GRANS. 0.0 0.00 - 0.04 K/UL    DF SMEAR SCANNED      RBC COMMENTS ANISOCYTOSIS  1+        RBC COMMENTS NORMOCYTIC, NORMOCHROMIC     METABOLIC PANEL, COMPREHENSIVE    Collection Time: 04/01/22 12:53 PM   Result Value Ref Range    Sodium 132 (L) 136 - 145 mmol/L    Potassium 5.1 3.5 - 5.1 mmol/L    Chloride 105 97 - 108 mmol/L    CO2 21 21 - 32 mmol/L    Anion gap 6 5 - 15 mmol/L    Glucose 242 (H) 65 - 100 mg/dL    BUN 57 (H) 6 - 20 MG/DL    Creatinine 2.33 (H) 0.70 - 1.30 MG/DL    BUN/Creatinine ratio 24 (H) 12 - 20      GFR est AA 35 (L) >60 ml/min/1.73m2    GFR est non-AA 29 (L) >60 ml/min/1.73m2    Calcium 8.0 (L) 8.5 - 10.1 MG/DL    Bilirubin, total 0.5 0.2 - 1.0 MG/DL    ALT (SGPT) 47 12 - 78 U/L    AST (SGOT) 65 (H) 15 - 37 U/L    Alk. phosphatase 186 (H) 45 - 117 U/L    Protein, total 6.2 (L) 6.4 - 8.2 g/dL    Albumin 2.4 (L) 3.5 - 5.0 g/dL    Globulin 3.8 2.0 - 4.0 g/dL    A-G Ratio 0.6 (L) 1.1 - 2.2     SAMPLES BEING HELD    Collection Time: 04/01/22 12:53 PM   Result Value Ref Range    SAMPLES BEING HELD JULIO     COMMENT        Add-on orders for these samples will be processed based on acceptable specimen integrity and analyte stability, which may vary by analyte.    LIPASE    Collection Time: 04/01/22 12:53 PM   Result Value Ref Range    Lipase 140 73 - 393 U/L   MAGNESIUM    Collection Time: 04/01/22 12:53 PM   Result Value Ref Range    Magnesium 2.2 1.6 - 2.4 mg/dL   TROPONIN-HIGH SENSITIVITY    Collection Time: 04/01/22 12:53 PM   Result Value Ref Range    Troponin-High Sensitivity 1,402 (HH) 0 - 76 ng/L POTASSIUM    Collection Time: 04/01/22  3:21 PM   Result Value Ref Range    Potassium 3.7 3.5 - 5.1 mmol/L   TROPONIN-HIGH SENSITIVITY    Collection Time: 04/01/22  3:21 PM   Result Value Ref Range    Troponin-High Sensitivity 1,256 (HH) 0 - 76 ng/L   MAGNESIUM    Collection Time: 04/01/22  3:21 PM   Result Value Ref Range    Magnesium 2.2 1.6 - 2.4 mg/dL   NT-PRO BNP    Collection Time: 04/01/22  3:21 PM   Result Value Ref Range    NT pro-BNP 15,216 (H) <125 PG/ML   URINALYSIS W/MICROSCOPIC    Collection Time: 04/01/22  3:30 PM   Result Value Ref Range    Color YELLOW/STRAW      Appearance CLEAR CLEAR      Specific gravity 1.015 1.003 - 1.030      pH (UA) 6.5 5.0 - 8.0      Protein >=1000 (A) NEG mg/dL    Glucose >=1000 (A) NEG mg/dL    Ketone Negative NEG mg/dL    Bilirubin Negative NEG      Blood SMALL (A) NEG      Urobilinogen 0.2 0.2 - 1.0 EU/dL    Nitrites Negative NEG      Leukocyte Esterase Negative NEG      WBC 0-4 0 - 4 /hpf    RBC 10-20 0 - 5 /hpf    Epithelial cells FEW FEW /lpf    Bacteria Negative NEG /hpf    Hyaline cast 0-2 0 - 2 /lpf   URINE CULTURE HOLD SAMPLE    Collection Time: 04/01/22  3:30 PM    Specimen: Serum; Urine   Result Value Ref Range    Urine culture hold        Urine on hold in Microbiology dept for 2 days. If unpreserved urine is submitted, it cannot be used for addtional testing after 24 hours, recollection will be required.    GLUCOSE, POC    Collection Time: 04/01/22  4:33 PM   Result Value Ref Range    Glucose (POC) 188 (H) 65 - 117 mg/dL    Performed by Pete KWONG ([email or web address])    GLUCOSE, POC    Collection Time: 04/01/22  8:51 PM   Result Value Ref Range    Glucose (POC) 238 (H) 65 - 117 mg/dL    Performed by Elijah Burris    HEMOGLOBIN A1C WITH EAG    Collection Time: 04/02/22  1:35 AM   Result Value Ref Range    Hemoglobin A1c 6.4 (H) 4.0 - 5.6 %    Est. average glucose 188 mg/dL   METABOLIC PANEL, BASIC    Collection Time: 04/02/22  1:35 AM   Result Value Ref Range Sodium 136 136 - 145 mmol/L    Potassium 3.5 3.5 - 5.1 mmol/L    Chloride 108 97 - 108 mmol/L    CO2 21 21 - 32 mmol/L    Anion gap 7 5 - 15 mmol/L    Glucose 138 (H) 65 - 100 mg/dL    BUN 52 (H) 6 - 20 MG/DL    Creatinine 2.47 (H) 0.70 - 1.30 MG/DL    BUN/Creatinine ratio 21 (H) 12 - 20      GFR est AA 32 (L) >60 ml/min/1.73m2    GFR est non-AA 27 (L) >60 ml/min/1.73m2    Calcium 7.8 (L) 8.5 - 10.1 MG/DL   MAGNESIUM    Collection Time: 04/02/22  1:35 AM   Result Value Ref Range    Magnesium 2.2 1.6 - 2.4 mg/dL   CBC WITH AUTOMATED DIFF    Collection Time: 04/02/22  1:35 AM   Result Value Ref Range    WBC 5.4 4.1 - 11.1 K/uL    RBC 2.47 (L) 4.10 - 5.70 M/uL    HGB 7.3 (L) 12.1 - 17.0 g/dL    HCT 21.1 (L) 36.6 - 50.3 %    MCV 85.4 80.0 - 99.0 FL    MCH 29.6 26.0 - 34.0 PG    MCHC 34.6 30.0 - 36.5 g/dL    RDW 16.8 (H) 11.5 - 14.5 %    PLATELET 99 (L) 350 - 400 K/uL    MPV 10.1 8.9 - 12.9 FL    NRBC 0.0 0  WBC    ABSOLUTE NRBC 0.00 0.00 - 0.01 K/uL    NEUTROPHILS 62 32 - 75 %    LYMPHOCYTES 24 12 - 49 %    MONOCYTES 9 5 - 13 %    EOSINOPHILS 4 0 - 7 %    BASOPHILS 0 0 - 1 %    IMMATURE GRANULOCYTES 0 0.0 - 0.5 %    ABS. NEUTROPHILS 3.3 1.8 - 8.0 K/UL    ABS. LYMPHOCYTES 1.3 0.8 - 3.5 K/UL    ABS. MONOCYTES 0.5 0.0 - 1.0 K/UL    ABS. EOSINOPHILS 0.2 0.0 - 0.4 K/UL    ABS. BASOPHILS 0.0 0.0 - 0.1 K/UL    ABS. IMM.  GRANS. 0.0 0.00 - 0.04 K/UL    DF AUTOMATED     PHOSPHORUS    Collection Time: 04/02/22  1:35 AM   Result Value Ref Range    Phosphorus 3.9 2.6 - 4.7 MG/DL   GLUCOSE, POC    Collection Time: 04/02/22  7:12 AM   Result Value Ref Range    Glucose (POC) 92 65 - 117 mg/dL    Performed by [facility]    ECHO ADULT COMPLETE    Collection Time: 04/02/22  9:04 AM   Result Value Ref Range    IVSd 0.8 0.6 - 1.0 cm    LVIDd 4.3 4.2 - 5.9 cm    LVIDs 3.2 cm    LVOT Diameter 2.0 cm    LVPWd 0.8 0.6 - 1.0 cm    LVOT Peak Gradient 3 mmHg    LVOT Peak Velocity 0.9 m/s    RVIDd 3.0 cm    RV Free Wall Peak S' 6 cm/s LA Diameter 3.8 cm    LA Volume A/L 42 mL    LA Volume 2C 43 18 - 58 mL    LA Volume 4C 35 18 - 58 mL    LA Volume BP 39 18 - 58 mL    AV Area by Peak Velocity 1.6 cm2    AV Peak Gradient 13 mmHg    AV Peak Velocity 1.8 m/s    MV A Velocity 0.74 m/s    MV E Wave Deceleration Time 284.9 ms    MV E Velocity 0.60 m/s    LV E' Lateral Velocity 6 cm/s    LV E' Septal Velocity 4 cm/s    PV Peak Gradient 8 mmHg    PV Max Velocity 1.4 m/s    TAPSE 1.4 (A) 1.5 - 2.0 cm    TR Peak Gradient 22 mmHg    TR Max Velocity 2.32 m/s    Ascending Aorta 2.8 cm    Fractional Shortening 2D 26 28 - 44 %    LVIDd Index 2.50 cm/m2    LVIDs Index 1.86 cm/m2    LV RWT Ratio 0.37     LV Mass 2D 105.3 88 - 224 g    LV Mass 2D Index 61.2 49 - 115 g/m2    MV E/A 0.81     E/E' Ratio (Averaged) 12.50     E/E' Lateral 10.00     E/E' Septal 15.00     LA Volume Index BP 23 16 - 34 ml/m2    LA Volume Index A/L 24 16 - 34 mL/m2    LVOT Area 3.1 cm2    LA Volume Index 2C 25 16 - 34 mL/m2    LA Volume Index 4C 20 16 - 34 mL/m2    LA Size Index 2.21 cm/m2    Ascending Aorta Index 1.63 cm/m2    AV Velocity Ratio 0.50     EDVIN/BSA Peak Velocity 0.9 cm2/m2   GLUCOSE, POC    Collection Time: 04/02/22 11:02 AM   Result Value Ref Range    Glucose (POC) 109 65 - 117 mg/dL    Performed by Sally Oro        Physical Exam      I examined the patient via telemedicine, with its associated limitations. I beamed in and examined the patient with assistance of house staff     On exam:    Gen: awake,  interactive  HEENT:  Mucous membrane dry  Chest: symmetrical chest rise  CV:S1,S2  Abd: soft, non-distended  Ext:warm   Neuro: moves all ext. No focal deficits. Assessment and Plan:  A 64 y.o. male with  PMHx of ESRD on HD, HTN, HFpEF, T2DM, CAD s/p CABG (May 2020), h/o PEA arrest, erosive gastritis is admitted to ICU with uncontrolled hypertension in setting of missed dialysis     Plan:  IHD per nephrology.    On multiple PO meds ( Amlodipine, Hydralazine, Metorpolol and Clonidine)   Glycemic control:Lantus +ISS  Diet  Monitor Plts  Ambulate. Consider SQ Heparin if Plts remain stable   OK for downgrade after dialysis      Discussed with bedside RN     I reviewed the electronic medical record, the x-rays, labs, progress notes, previous history and physicals and consultation notes that were available in the electronic medical record. I performed all aspects of the physical examination via Telemedicine associated with two way audio and video communication and with the on-site assistance of  the bedside nurse. I am located in [place]  and the patient is located in [address] [place] [address] at [facility]   The patient is critically ill in the ICU. I  personally  reviewed the pertinent medical records, laboratory/ pathology data and radiographic images. The decision making regarding this patient is as documented above, which was generated  following  discussion  with the multidisciplinary ICU team and creation of a treatment plan for  the patient. We discussed the patient's interval history and future coordination of care and  plans. The patient's medications  were reviewed and changes made as stipulated above. Due to  critical illness impairing one or more vital organs of this patient resulting in life threatening clinical situation  I have provided direct, frequent personal  assessment and manipulation in management plan and spent 40 minutes  of  Consult  time excluding the time spent on procedures and teaching. Greater than 50% of this time  in patients care was  employed  in counseling and coordination of care and engaged in face to face discussion of case management issues, addressing questions, and outlining a plan of  therapy. Pt at risk of life threatening deterioration from uncontrolled hypertension     Pt seen and evaluated via tele encounter. Audio and Video were used for this interaction.         ATTENTION:  This medical record was transcribed using an electronic medical records/speech recognition system. Although proofread, it may and can contain electronic, spelling and other errors. Corrections may be executed at a later time. Please feel free to contact us for any clarifications as needed.

## 2022-04-02 NOTE — PROGRESS NOTES
CARDIOLOGY PROGRESS NOTE        [address], [place], [address] [facility]  Phone [phone]; Fax [phone]          2022 7:22 AM       Admit Date:           2022  Admit Diagnosis:  NSTEMI (non-ST elevated myocardial infarction) ([facility] [address] [I21.4]  :          1960   MRN:          [id number]        Assessment/Plan  1. Elevated troponin: likely non-ACS elevation due to uncontrolled BP/missed HD.  ECHO today. His chest pain is reproducible with palpation . EKG is normal. Trop elevated at ~ 1400     2. Hx of CAD s/p CABG : cont ASA, statin, BB therapy     3. Uncontrolled HTN: transferred pt to ICU overnight for cardene drip, now off. On multiple po meds. This is likely due to several days of missed HD tx.      4. ESRD: pt missed several days of HD.      5. DM: SSI, per primary team     6. Cirrhosis: abd US     7. Erosive gastritis: US with ce of free fluid around the liver.     8. Thrombocytopenia: appears chronic. Plts 93,000           We discussed the expected course, resolution and complications of the diagnosis(es) in detail. Medication risks, benefits, costs, interactions, and alternatives were discussed as indicated. No intake/output data recorded. Last 3 Recorded Weights in this Encounter    22 1228   Weight: 62.1 kg (137 lb)         1 -  0700  In: 268.3 [P.O.:240; I.V.:28.3]  Out: 400 [Urine:400]    SUBJECTIVE      Javier Deng is a 64 y.o. male  with PMH significant for CAD s/p CABG, hx of PEA arrest (?), HTN, ESRD on HD, DM, anemia, and gastritis.      Pt reports left-sided CP beginning several days ago. Worse with activity. Associated with weakness, SOB and 2-3 pillow orthopnea. Pt recently had a fall at home but states the pain was there prior to the fall.  He also c/o epigastric/abd pain which he had previous admission, associated with nausea and diarrhea.                     Javier Deng reports epigastric/chest pain.      Current Facility-Administered Medications   Medication Dose Route Frequency    niCARdipine (CARDENE) 25 mg in 0.9% sodium chloride 250 mL infusion  0-15 mg/hr IntraVENous TITRATE    hydrALAZINE (APRESOLINE) tablet 100 mg  100 mg Oral TID    sodium chloride (NS) flush 5-40 mL  5-40 mL IntraVENous Q8H    sodium chloride (NS) flush 5-40 mL  5-40 mL IntraVENous PRN    polyethylene glycol (MIRALAX) packet 17 g  17 g Oral DAILY PRN    ondansetron (ZOFRAN ODT) tablet 4 mg  4 mg Oral Q8H PRN    Or    ondansetron (ZOFRAN) injection 4 mg  4 mg IntraVENous Q6H PRN    cloNIDine HCL (CATAPRES) tablet 0.2 mg  0.2 mg Oral TID    amLODIPine (NORVASC) tablet 10 mg  10 mg Oral QAM    aspirin chewable tablet 81 mg  81 mg Oral DAILY    bumetanide (BUMEX) tablet 2 mg  2 mg Oral DAILY    calcium carbonate (TUMS) chewable tablet 200 mg [elemental]  200 mg Oral TID WITH MEALS    docusate sodium (COLACE) capsule 100 mg  100 mg Oral BID    doxazosin (CARDURA) tablet 4 mg  4 mg Oral BID    eplerenone (INSPRA) tablet 25 mg (Patient Supplied)  25 mg Oral DAILY    ferrous sulfate tablet 325 mg  1 Tablet Oral DAILY WITH BREAKFAST    gabapentin (NEURONTIN) capsule 300 mg  300 mg Oral BID    metoprolol succinate (TOPROL-XL) XL tablet 50 mg  50 mg Oral DAILY    pantoprazole (PROTONIX) tablet 40 mg  40 mg Oral ACB    sucralfate (CARAFATE) tablet 1 g  1 g Oral QID    tamsulosin (FLOMAX) capsule 0.4 mg  0.4 mg Oral DAILY    glucose chewable tablet 16 g  4 Tablet Oral PRN    dextrose 10% infusion 0-250 mL  0-250 mL IntraVENous PRN    glucagon (GLUCAGEN) injection 1 mg  1 mg IntraMUSCular PRN    insulin glargine (LANTUS) injection 10 Units  10 Units SubCUTAneous QHS    insulin lispro (HUMALOG) injection   SubCUTAneous AC&HS    HYDROmorphone (DILAUDID) syringe 0.5 mg  0.5 mg IntraVENous Q6H PRN    acetaminophen (TYLENOL) tablet 1,000 mg  1,000 mg Oral Q8H PRN    Or    acetaminophen (TYLENOL) suppository 650 mg  650 mg Rectal Q6H PRN    labetaloL (NORMODYNE;TRANDATE) 20 mg/4 mL (5 mg/mL) injection 10 mg  10 mg IntraVENous Q4H PRN    hydrALAZINE (APRESOLINE) 20 mg/mL injection 20 mg  20 mg IntraVENous Q4H PRN      OBJECTIVE               Intake/Output Summary (Last 24 hours) at 4/2/2022 4705  Last data filed at 4/2/2022 0600  Gross per 24 hour   Intake 268.33 ml   Output 400 ml   Net -131.67 ml       Review of Systems - History obtained from the patient AS PER  HPI        PHYSICAL EXAM        Visit Vitals  BP (!) 170/69   Pulse 72   Temp 98.3 °F (36.8 °C)   Resp 14   Ht 5' 7\" (1.702 m)   Wt 62.1 kg (137 lb)   SpO2 98%   BMI 21.46 kg/m²       Gen: Well-developed, well-nourished, in no acute distress  alert and oriented x 3  HEENT:  Pink conjunctivae, Hearing grossly normal. No scleral icterus or conjunctival, moist mucous membranes  Neck: Supple,No JVD  Resp: No accessory muscle use, Clear breath sounds, No rales or rhonchi  Card: Regular Rate,  Rhythm ,Normal S1, S2, No murmurs, rubs or gallop. No thrills. MSK: No cyanosis or clubbing, good capillary refill  Skin: No rashes or ulcers, no bruising  Neuro:  Moving all four extremities, no focal deficit, follows commands appropriately  Psych:  Fair  sight, oriented to person, place and time, alert, Nml Affect  LE: No edema       DATA REVIEW      11/14/21    ECHO ADULT FOLLOW-UP OR LIMITED 11/19/2021 11/19/2021    Interpretation Summary  · LV: Estimated LVEF is 60 - 65%. Mild concentric hypertrophy. · RV: Mildly dilated right ventricle. Mildly reduced systolic function. Signed by: Jerson Cartagena MD on 11/19/2021 11:32 AM        Cardiac monitor:SR         Laboratory and Imaging have been reviewed by me and are notable for  No results for input(s): CPK, CKMB, TROIQ in the last 72 hours.   Recent Labs     04/02/22  0135 04/01/22  1521 04/01/22  1253     --  132*   K 3.5 3.7 5.1   CO2 21  --  21   BUN 52*  --  57*   CREA 2.47*  --  2.33*   *  --  242*   PHOS 3.9  --   -- MG 2.2 2.2 2.2   WBC 5.4  --  5.6   HGB 7.3*  --  9.1*   HCT 21.1*  --  26.2*   PLT 99*  --  93*

## 2022-04-02 NOTE — PROGRESS NOTES
DISCHARGE SUMMARY from Nurse    PATIENT INSTRUCTIONS:    After general anesthesia or intravenous sedation, for 24 hours or while taking prescription Narcotics:  · Limit your activities  · Do not drive and operate hazardous machinery  · Do not make important personal or business decisions  · Do  not drink alcoholic beverages  · If you have not urinated within 8 hours after discharge, please contact your surgeon on call. Report the following to your surgeon:  · Excessive pain, swelling, redness or odor of or around the surgical area  · Temperature over 100.5  · Nausea and vomiting lasting longer than 4 hours or if unable to take medications  · Any signs of decreased circulation or nerve impairment to extremity: change in color, persistent  numbness, tingling, coldness or increase pain  · Any questions    What to do at Home:  Recommended activity: as tolerated,        *  Please give a list of your current medications to your Primary Care Provider. *  Please update this list whenever your medications are discontinued, doses are      changed, or new medications (including over-the-counter products) are added. *  Please carry medication information at all times in case of emergency situations. These are general instructions for a healthy lifestyle:    No smoking/ No tobacco products/ Avoid exposure to second hand smoke  Surgeon General's Warning:  Quitting smoking now greatly reduces serious risk to your health. Obesity, smoking, and sedentary lifestyle greatly increases your risk for illness    A healthy diet, regular physical exercise & weight monitoring are important for maintaining a healthy lifestyle    You may be retaining fluid if you have a history of heart failure or if you experience any of the following symptoms:  Weight gain of 3 pounds or more overnight or 5 pounds in a week, increased swelling in our hands or feet or shortness of breath while lying flat in bed.   Please call your doctor as soon as you notice any of these symptoms; do not wait until your next office visit. The discharge information has been reviewed with the patient. The patient verbalized understanding. Discharge medications reviewed with the patient and appropriate educational materials and side effects teaching were provided. Patient to follow up with nephrology, cardiology and primary care doctors. Prescription sent to home pharmacy [facility] on [facility]. Patient verbalized understanding. Peripheral IVs removed without complication, Dialysis line left intact for outpatient dialysis. Patient educated on discharge instructions and medications, instructed to return to ED if SOB, chest pain, altered mental status or signs of stroke. Patient verbalized understanding of education.   ___________________________________________________________________________________________________________________________________

## 2022-04-02 NOTE — PROGRESS NOTES
Patient BP uncontrolled, requiring multiple antihypertensive agents. Discussed patient case with Mat Lockett NP with [facility] Nephrology who recommends starting cardene gtt at this time for better BP control and will place orders for HD. Also discussed case with nursing supervisor and will transfer patient to ICU for close monitoring. Plan to initiate HD and wean off cardene drip.      Celina Truong MD  PGY1 [address] [facility]

## 2022-04-02 NOTE — DISCHARGE SUMMARY
[facility] [address] [place] [address]   Office [phone]  Fax [phone]       Discharge / Transfer / Off-Service Note     Name: Ayleen Pruitt MRN: [id number]  Sex: Male   YOB: 1960  Age: 64 y.o. PCP: Frederick Hoover MD     Date of admission: 4/1/2022  Date of discharge/transfer: 4/2/2022    Attending physician at admission: Genet Puga.  Attending physician at discharge/transfer: Omar Jaramillo DO  Resident physician at discharge/transfer: Missy Gann MD     Consultants during hospitalization  IP CONSULT TO NEPHROLOGY  IP CONSULT TO [facility] TO INTENSIVIST  IP CONSULT TO [id number] [facility]     Admission diagnoses   NSTEMI (non-ST elevated myocardial infarction) ([facility]) [I21.4]    Recommended follow-up after discharge    1. Sahntell Scott MD  2. Dailysis - Frederick  3. Nephrology - Dr. Lobo Dean  4. Cardiology - Dr. Chiara Lopez       Things to follow up on with PCP:   - outpatient BP regimen and compliance  - T2DM regimen and compliance   - anemia in the setting of ESRD  - Hyperlipidemia     ----------------------------------------------------------------------------------------------------------------------------  The patient was well enough to be discharged from the hospital. However, because they were inpatient in a hospital, they are at greater risk of having been exposed to the coronavirus.  PLEASE stay inside and self-quarantine for 14 days to prevent further spread of the coronavirus.  ------------------------------------------------------------------------------------------------------------------    Medication Changes:      History of Present Illness    As per admitting provider, Dr. Missy Gann:   Ethan Paul is a 64 y.o. male with PMHx of  PMHx of known h/o ESRD on HD, HTN, HFpEF, T2DM, CAD s/p CABG (May 2020), h/o PEA arrest, erosive gastritis who presents to the ED complaining of pain in his abdomen, chest, and back for the last 2 days.      Patient has not received dialysis for the last week and notes he has been experiencing increased weakness, which resulted in a GLF x3 days ago where he hit his head. No LOC. He also believes he may have struck his chest during the fall. He has had a continuous sharp pain in his left chest since that time. Denies palpitations or pain radiating. Does not change with motion. Patient states this pain feels different than his prior MI.      He also notes a gnawing epigastric pain for the last 2 days. It worsens with eating and drinking. Patient endorses nausea and vomiting over the last x24 hours, but none at this time. States the pain radiates to his left flank. Denies dysuria, hematuria, anuria. Endorses some diarrhea yesterday. No fever, no sick contacts. [facility]    Salazar Sleeper a 64 y. o. male with a PMHx of known h/o ESRD on HD, HTN, HFpEF, T2DM, CAD s/p CABG (May 2020), h/o PEA arrest, Kashmir Farley was admitted for concern for Hypertensive Emergency and NSTEMI in the setting of missing dialysis for the last week. Found to be non-MI troponin elevation and troponin quickly downtrended. He was treated initially with home BP medications and PRN Labetalol, required transfer to ICU for Cardene drip ~3hours. Dialysis performed the next morning and BP stabilized. Patient initially with epigastric pain in setting of known anasarca, improved after dialysis. Medications reconciled per pharmacy. Patient discharged with close PCP follow up and resume HD on regular MWF schedule.      During the hospital course, the following problems were managed:     Hypertensive Emergency: /76 with elevated troponin.  Likely due to missed dialysis x1 week. Required transfer to ICU for Cardene drip, now off.   - discharge BP regimen to include Hydral 50mg PO TID, Coreg 12.5mg BID, Amlodipine 10mg daily, Imdur 60mg CR daily, Eplerenone 25mg daily  - discuss compliance importance with both medications and dialysis      Non-ACS Tropinemia: POA Trop of 1402, down to 1256. Cards evaluated, likely in setting of missed dialysis and uncontrolled BP.  Echo showing EF 88-04% with diastolic dysfunction. Hx CABG in 2020.  - follow up cardiology, Dr. Porfirio Amado   - home Aspirin 81mg daily  - home Plavix 75mg daily      Epigastric pain in the setting of known erosive gastritis: . Prior CT notable for anasarca. 11/16/21 EGD w/ nonbleeding erosive esophagitis. RUQUS performed during this admission showing only mild fluid around liver. - Continue Home Protonix 40mg daily, home TUMS 200mg TID, and Home Carafate 1g QID     ESRD on HD: Permacath placed 11/24 after ATN. Received dialysis once while inpatient. Electrolytes remained stable. - continue Dialysis on MWF schedule   - follow up Nephrology, Dr. Hall Samples: two days ago, hit head. No LOC. CT Head no acute process.     HFpEF:  Echo 11/19/21 with EF 53% on repeat, and reduced systolic function and moderate pulm HTN. Echo during this admission showing EF 60-65%. - continue home Bumex 1mg BID and Coreg 12.5mg BID  - follow up cardiology, Dr. Porfirio Amado      T2DM c/b peripheral neuropathy: Last A1c 8.4% (11/2021), now A1c of 6.4 on 4/2/22.   - Lantus 15u nightly  - Lispro 2u TID with meals (incease by 2u for each 40 points over blood glucose of 180, up to a max of 10 units of sugars over 300).    Concern for cirrhosis: past h/o heavy alcohol use, stopped 6-7 years ago. US 11/20/21. following with Dr. Tiffany Kevin (hepatology)   - RUQUS with mild fluid around liver, no other abnormality noted.      Anemia/thrombocytopenia: This is a chronic condition. Hgb decreased to 7.3 from 9.1. No active bleed symptoms.   - nephrology, dialysis. - recheck [facility] post dialysis.    - home iron      HLD: Chronic.             Lab Results   Component Value Date/Time     Cholesterol, total 176 11/22/2019 09:36 AM     HDL Cholesterol 45 11/22/2019 09:36 AM     LDL, calculated 112 (H) 11/22/2019 09:36 AM     VLDL, calculated 19 11/22/2019 09:36 AM     Triglyceride 94 11/22/2019 09:36 AM      - Home rosuvastatin 10 mg daily     H/o PEA arrest: During previous admission to [facility] (11/2021) suspected secondary to respiratory distress. ROSC achieved after 12 minutes CPR. S/p intubation and extubation with mental status return to baseline.           Physical exam at discharge:    Vitals Reviewed. Patient Vitals for the past 12 hrs:   Temp Pulse Resp BP SpO2   04/02/22 1030 -- 70 14 134/68 99 %   04/02/22 1015 -- 70 12 128/65 100 %   04/02/22 1000 -- 65 14 113/65 100 %   04/02/22 0945 -- 71 14 (!) 106/58 100 %   04/02/22 0930 -- 71 19 129/67 100 %   04/02/22 0920 -- 69 20 (!) 145/62 100 %   04/02/22 0916 -- 69 19 (!) 145/62 100 %   04/02/22 0915 98.2 °F (36.8 °C) 69 14 (!) 146/60 100 %   04/02/22 0904 -- 68 10 (!) 179/80 100 %   04/02/22 0900 -- 68 12 (!) 146/60 100 %   04/02/22 0830 -- 77 17 (!) 179/80 100 %   04/02/22 0800 -- 77 19 (!) 180/74 100 %   04/02/22 0730 98.2 °F (36.8 °C) 74 17 (!) 178/76 99 %   04/02/22 0630 -- 72 14 (!) 170/69 98 %   04/02/22 0600 -- 73 12 (!) 178/71 100 %   04/02/22 0500 -- 74 10 (!) 167/69 100 %   04/02/22 0430 -- 73 13 (!) 172/65 100 %   04/02/22 0400 98.3 °F (36.8 °C) 72 12 (!) 155/63 100 %   04/02/22 0330 -- 78 10 (!) 167/75 100 %   04/02/22 0300 -- 77 19 (!) 149/69 100 %   04/02/22 0245 -- 80 13 135/66 100 %   04/02/22 0230 -- 79 13 133/64 100 %   04/02/22 0215 -- 79 10 (!) 149/72 100 %   04/02/22 0200 -- 78 11 (!) 189/83 100 %   04/02/22 0113 98.3 °F (36.8 °C) 78 16 (!) 172/68 100 %   04/02/22 0002 -- 78 -- -- --   04/02/22 0000 -- 77 -- (!) 199/69 --   04/01/22 2335 98.6 °F (37 °C) 79 18 (!) 208/86 99 %      General Oriented to person, place, and time and well-developed. Appears well-nourished, no distress. Not diaphoretic. HENT Head Normocephalic and atraumatic. Eyes Conjunctivae are normal, no discharge. No scleral icterus. Nose Nose normal, clear turbinates. Oral Oropharynx is clear and moist. No oropharyngeal exudate. Neck No thyromegaly present. No cervical adenopathy. Cardio Normal rate, regular rhythm. Exam reveals no gallop and no friction rub. No murmur heard. No chest wall tenderness. Pulmonary Effort normal and breath sounds normal. No respiratory distress. No wheezes, no rales. Abdominal Soft. Bowel sounds normal. No distension. No tenderness.  Deferred. Extremities No edema of lower extremities. No tenderness. Distal pulses intact. Neurological Alert and oriented to person, place, and time. Dermatology Skin is warm and dry. No rash noted. No erythema or pallor. Dialysis cath in place   Psychiatric Affect and judgment normal.        Condition at discharge: Stable. Labs  Recent Labs     04/02/22  0135 04/01/22  1253   WBC 5.4 5.6   HGB 7.3* 9.1*   HCT 21.1* 26.2*   PLT 99* 93*     Recent Labs     04/02/22  0135 04/01/22  1521 04/01/22  1253     --  132*   K 3.5 3.7 5.1     --  105   CO2 21  --  21   BUN 52*  --  57*   CREA 2.47*  --  2.33*   *  --  242*   CA 7.8*  --  8.0*   MG 2.2 2.2 2.2   PHOS 3.9  --   --      Recent Labs     04/01/22  1253   ALT 47   *   TBILI 0.5   TP 6.2*   ALB 2.4*   GLOB 3.8   LPSE 140     Recent Labs     04/02/22  1102 04/02/22  0712 04/01/22 2051 04/01/22  1633   GLUCPOC 109 92 238* 188*       Micro:  Lab Results   Component Value Date/Time    Culture result: NO GROWTH 4 DAYS 11/18/2021 11:40 AM       Imaging:  CT HEAD WO CONT    Result Date: 4/1/2022  EXAM: CT HEAD WO CONT INDICATION: Generalized weakness and frequent falls for 1 week COMPARISON: 2/14/2022. CONTRAST: None. TECHNIQUE: Unenhanced CT of the head was performed using 5 mm images. Brain and bone windows were generated. Coronal and sagittal reformats. CT dose reduction was achieved through use of a standardized protocol tailored for this examination and automatic exposure control for dose modulation.   FINDINGS: The ventricles and sulci are normal in size, shape and configuration. . There is no significant white matter disease. There is no intracranial hemorrhage, extra-axial collection, or mass effect. The basilar cisterns are open. No CT evidence of acute infarct. The bone windows demonstrate no abnormalities. The visualized portions of the paranasal sinuses and mastoid air cells are clear. No acute abnormality    US ABD LTD    Result Date: 4/1/2022  Clinical indication: Right upper quadrant pain. Right upper quadrant ultrasound is performed. The gallbladder appears unremarkable. The common bile duct is 5 mm. There is a trace of fluid around the liver without significant ascites. The liver appears unremarkable, portal vein flow is hepatopedal, main portal vein diameter is 1.1 cm velocity 47 cm/s. Pancreatic head is not visualized due to gas. The right kidney appears normal.     Trace of free fluid around the liver. XR CHEST PORT    Result Date: 4/1/2022  INDICATION:  chest pain COMPARISON: February 14, 2022 FINDINGS: Single AP portable view of the chest obtained at 1243 demonstrates a stable cardiomediastinal silhouette. There is a right chest tunneled central venous catheter. There are median sternotomy wires. There is a small left pleural effusion and a trace right pleural effusion, similar prior study. There is no focal airspace disease. No significant change. Small bilateral pleural effusions, left greater than right. ECHO ADULT COMPLETE    Result Date: 4/2/2022    Left Ventricle: Left ventricle size is normal. Mildly increased wall thickness. Normal wall motion. Normal left ventricular systolic function with a visually estimated EF of 60 - 65%. Diastolic dysfunction present with normal LV EF.        Procedures / Diagnostic Studies  Dialysis    Chronic diagnoses   Problem List as of 4/2/2022 Date Reviewed: 4/1/2022          Codes Class Noted - Resolved    NSTEMI (non-ST elevated myocardial infarction) [facility]) ICD-10-CM: I21.4  ICD-9-CM: 410.70  4/1/2022 - Present        Cirrhosis ([facility] ICD-10-CM: K74.60  ICD-9-CM: 571.5  4/1/2022 - Present        ESRD (end stage renal disease) ([facility] ICD-10-CM: N18.6  ICD-9-CM: 585.6  2/14/2022 - Present        Hypertensive emergency ICD-10-CM: I16.1  ICD-9-CM: 401.9  2/14/2022 - Present        Anasarca ICD-10-CM: R60.1  ICD-9-CM: 782.3  2/6/2022 - Present        ESRD (end stage renal disease) on dialysis [facility]) ICD-10-CM: N18.6, Z99.2  ICD-9-CM: 585.6, V45.11  2/6/2022 - Present        Heart failure with preserved ejection fraction (HCC) ICD-10-CM: I50.30  ICD-9-CM: 428.9  2/6/2022 - Present        Elevated troponin ICD-10-CM: R77.8  ICD-9-CM: 790.6  2/6/2022 - Present        Anemia ICD-10-CM: D64.9  ICD-9-CM: 285.9  2/6/2022 - Present        Thrombocytopenia ([facility] ICD-10-CM: D69.6  ICD-9-CM: 287.5  2/6/2022 - Present        Epigastric pain ICD-10-CM: R10.13  ICD-9-CM: 789.06  2/6/2022 - Present        Severe protein-calorie malnutrition ([facility] ICD-10-CM: E43  ICD-9-CM: 761  11/23/2021 - Present        CHF exacerbation ([facility] ICD-10-CM: I50.9  ICD-9-CM: 428.0  11/14/2021 - Present        Type 2 diabetes with nephropathy ([facility] ICD-10-CM: E11.21  ICD-9-CM: 250.40, 583.81  8/5/2019 - Present        Type 2 diabetes mellitus with diabetic neuropathy ([facility] ICD-10-CM: E11.40  ICD-9-CM: 250.60, 357.2  8/5/2019 - Present        Type 2 diabetes mellitus with ophthalmic complication, with long-term current use of insulin ([facility] ICD-10-CM: E11.39, Z79.4  ICD-9-CM: 250.50, V58.67  11/25/2018 - Present        Essential hypertension ICD-10-CM: I10  ICD-9-CM: 401.9  11/14/2018 - Present        Reactive depression ICD-10-CM: F32.9  ICD-9-CM: 300.4  11/14/2018 - Present        Peripheral neuropathy ICD-10-CM: G62.9  ICD-9-CM: 356.9  11/14/2018 - Present        RESOLVED: Abdominal pain ICD-10-CM: R10.9  ICD-9-CM: 789.00  2/6/2022 - 2/14/2022        RESOLVED: Hypertensive urgency ICD-10-CM: I16.0  ICD-9-CM: 401.9  2/6/2022 - 2/14/2022        RESOLVED: Obesity ICD-10-CM: E66.9  ICD-9-CM: 278.00  2/6/2022 - 2/14/2022        RESOLVED: Controlled type 2 diabetes mellitus with ophthalmic complication, with long-term current use of insulin ([facility] ICD-10-CM: E11.39, Z79.4  ICD-9-CM: 250.50, V58.67  11/14/2018 - 11/14/2018              Current Discharge Medication List           Diet:  Diabetic diet.     Activity:  As tolerated     Disposition: Home or Self Care    Discharge instructions to patient/family  Please seek medical attention for any new or worsening symptoms particularly fever, chest pain, shortness of breath, abdominal pain, nausea, vomiting    Follow up plans/appointments  Follow-up Information    None          Zackary Heaton MD  Family Medicine Resident, PGY1       For Billing    Chief Complaint   Patient presents with   [address] Problems  Date Reviewed: 4/1/2022          Codes Class Noted POA    NSTEMI (non-ST elevated myocardial infarction) [facility]) ICD-10-CM: I21.4  ICD-9-CM: 410.70  4/1/2022 Unknown        Cirrhosis ([facility] ICD-10-CM: K74.60  ICD-9-CM: 571.5  4/1/2022 Unknown

## 2022-04-02 NOTE — PROGRESS NOTES
Consult received - ESRD on HD, missed HD  - Now hypertensive urgency, SBP >200 despite IV antihypertensives, resuming home blood pressure regimen  - HD orders placed, will attempt to pull 4kg  - Discussed with [place] nurse on call, will attempt to run HD overnight  - Rec cardene gtt in the meantime, aim for goal , can wean off with HD  - Will see patient shortly    --[facility]

## 2022-04-02 NOTE — DIALYSIS
Hemodialysis / [phone]    Vitals Pre Post Assessment Pre Post   BP BP: 134/68 (04/02/22 1030) 154/66 LOC A&O x 3 A&O x 3   HR Pulse (Heart Rate): 70 (resting, eyes closed) (04/02/22 1030) 69 Lungs clear clear   Resp Resp Rate: 14 (04/02/22 1030) 14 Cardiac regular regular   Temp Temp: 98.2 °F (36.8 °C) (04/02/22 0915) 97.7 Skin warm warm   Weight Pre-Dialysis Weight: 67.7 kg (149 lb 4 oz) (04/02/22 0915) 65.1 kg Edema No edema No edema   Tele status bedside bedside Pain Pain Intensity 1: 4 (04/02/22 0730) No pain     Orders   Duration: Start: 0920 End: 1250 Total: 3.5 hr   Dialyzer: Dialyzer/Set Up Inspection: Jose Fernando ([id number]) (04/02/22 0915)   K Bath: Dialysate K (mEq/L): 3 (04/02/22 0915)   Ca Bath: Dialysate CA (mEq/L): 2.5 (04/02/22 0915)   Na: Dialysate NA (mEq/L): 138 (04/02/22 0915)   Bicarb: Dialysate HCO3 (mEq/L): 38 (04/02/22 0915)   Target Fluid Removal: Goal/Amount of Fluid to Remove (mL): 4000 mL (04/02/22 0915)     Access   Type & Location: [facility] cath   Comments: In place, patent, dressing dry and intact, no S/S of infection. Labs   HBsAg (Antigen) / date:  03/21/22 Negative                                             HBsAb (Antibody) / date: 12/10/21 [facility].    Source: PP   Obtained/Reviewed  Critical Results Called HGB   Date Value Ref Range Status   04/02/2022 7.3 (L) 12.1 - 17.0 g/dL Final     Potassium   Date Value Ref Range Status   04/02/2022 3.5 3.5 - 5.1 mmol/L Final     Calcium   Date Value Ref Range Status   04/02/2022 7.8 (L) 8.5 - 10.1 MG/DL Final     BUN   Date Value Ref Range Status   04/02/2022 52 (H) 6 - 20 MG/DL Final     Creatinine   Date Value Ref Range Status   04/02/2022 2.47 (H) 0.70 - 1.30 MG/DL Final        Meds Given   Name Dose Route                    Adequacy / Fluid    Total Liters Process: 78 L   Net Fluid Removed: 2500 ml      Comments   Time Out Done:   (Time) Yes, 0915   Admitting Diagnosis: Renal failure   Consent obtained/signed:     Machine / RO # Machine Number: [id number] (04/02/22 0915)   Primary Nurse Rpt Pre: Aide Juarez RN   Primary Nurse Rpt Post: Aide Juarez RN   Pt Education: Yes, r/t dialysis process   Care Plan: Continue HD as ordered   Pts outpatient clinic:      Tx Summary   Comments:           tolearted tx well, at end, all blood in circuit returned with 300 ml NS, [facility] cath in place, patent, dressing dry and intact, no S/S of infection.

## 2022-04-02 NOTE — PROGRESS NOTES
Progress Note     4/2/2022  PCP: Erin Davies MD       Subjective:   Pt was seen and examined at bedside. Afebrile and hemodynamically stable. Concerns overnight include: Continued epigastric pain, but no vomiting. Ate dinner with no issues. Denies chest pain, SOB. Last BM yesterday morning, no blood.      Objective:   Physical examination  Patient Vitals for the past 24 hrs:   Temp Pulse Resp BP SpO2   04/02/22 0830 -- 77 17 (!) 179/80 100 %   04/02/22 0800 -- 77 19 (!) 180/74 100 %   04/02/22 0730 98.2 °F (36.8 °C) 74 17 (!) 178/76 99 %   04/02/22 0630 -- 72 14 (!) 170/69 98 %   04/02/22 0600 -- 73 12 (!) 178/71 100 %   04/02/22 0500 -- 74 10 (!) 167/69 100 %   04/02/22 0430 -- 73 13 (!) 172/65 100 %   04/02/22 0400 98.3 °F (36.8 °C) 72 12 (!) 155/63 100 %   04/02/22 0330 -- 78 10 (!) 167/75 100 %   04/02/22 0300 -- 77 19 (!) 149/69 100 %   04/02/22 0245 -- 80 13 135/66 100 %   04/02/22 0230 -- 79 13 133/64 100 %   04/02/22 0215 -- 79 10 (!) 149/72 100 %   04/02/22 0200 -- 78 11 (!) 189/83 100 %   04/02/22 0113 98.3 °F (36.8 °C) 78 16 (!) 172/68 100 %   04/02/22 0002 -- 78 -- -- --   04/02/22 0000 -- 77 -- (!) 199/69 --   04/01/22 2335 98.6 °F (37 °C) 79 18 (!) 208/86 99 %   04/01/22 2240 -- 81 -- -- --   04/01/22 2153 -- -- -- (!) 201/83 --   04/01/22 2110 -- 82 -- (!) 202/88 --   04/01/22 2043 -- -- -- (!) 201/85 --   04/01/22 1946 98 °F (36.7 °C) 82 15 (!) 195/82 98 %   04/01/22 1821 -- -- -- (!) 154/78 --   04/01/22 1716 -- -- -- (!) 156/76 --   04/01/22 1700 -- -- -- (!) 184/87 --   04/01/22 1655 -- 95 -- (!) 198/89 --   04/01/22 1634 98.1 °F (36.7 °C) 77 14 (!) 203/95 99 %   04/01/22 1600 -- 76 -- (!) 197/74 99 %   04/01/22 1545 -- 74 -- (!) 197/78 100 %   04/01/22 1534 -- 86 -- (!) 215/77 --   04/01/22 1530 -- 86 -- (!) 215/77 99 %   04/01/22 1525 -- 85 -- -- --   04/01/22 1515 -- -- 15 -- --   04/01/22 1514 -- 86 -- (!) 202/83 98 %   04/01/22 1230 -- 83 11 (!) 198/79 100 %   04/01/22 1229 -- 83 -- -- --   22 1228 97.5 °F (36.4 °C) 82 16 (!) 216/76 99 %      Temp (24hrs), Av.1 °F (36.7 °C), Min:97.5 °F (36.4 °C), Max:98.6 °F (37 °C)     O2 Flow Rate (L/min): 2 l/min   O2 Device: None (Room air)    Date 22 - 22 - 22 0659   Shift 2780-1943 5789-0326 24 Hour Total 9901-7203 4930-2312 24 Hour Total   INTAKE   P.O.  240 240        P. O.  240 240      I. V.(mL/kg/hr)  28.3(0) 28.3(0)        Cardene Volume  28.3 28.3      Shift Total(mL/kg)  268.3(4.3) 268.3(4.3)      OUTPUT   Urine(mL/kg/hr)  400(0.5) 400(0.3)        Urine Voided  400 400      Stool  0 0        Stool  0 0      Shift Total(mL/kg)  400(6.4) 400(6.4)      NET  -131.7 -131.7      Weight (kg) 62.1 62.1 62.1 62.1 62.1 62.1     General:   Alert, cooperative, no acute distress   Head:   Atraumatic   Eyes:   Conjunctivae clear   ENT:  Oral mucosa normal   Neck:  Supple, trachea midline, no adenopathy   No JVD   Back:    No CVA tenderness    Chest wall:    Left side mildly tender to palpation    Lungs:   Clear to auscultation bilaterally    Heart:   Normal rate, regular rhythm, no murmur, rubs or gallops   Abdomen:    Point tenderness of epigastrium, no RUQ pain    Extremities:  No edema or DVT signs   Pulses:  Symmetric all extremities   Skin:  Warm and dry    No rashes or lesions   Neurologic:  Alert and oriented x4   No focal deficits         Data Review:     CBC:  Recent Labs     22  0135 22  1253   WBC 5.4 5.6   HGB 7.3* 9.1*   HCT 21.1* 26.2*   PLT 99* 93*     Metabolic Panel:  Recent Labs     22  0135 22  1521 22  1253     --  132*   K 3.5 3.7 5.1     --  105   CO2 21  --  21   BUN 52*  --  57*   CREA 2.47*  --  2.33*   *  --  242*   CA 7.8*  --  8.0*   MG 2.2 2.2 2.2   PHOS 3.9  --   --    ALB  --   --  2.4*   TBILI  --   --  0.5   ALT  --   --  47     Micro:  Lab Results   Component Value Date/Time    Culture result: NO GROWTH 4 DAYS 2021 11:40 AM     Imaging:  CT HEAD WO CONT    Result Date: 4/1/2022  EXAM: CT HEAD WO CONT INDICATION: Generalized weakness and frequent falls for 1 week COMPARISON: 2/14/2022. CONTRAST: None. TECHNIQUE: Unenhanced CT of the head was performed using 5 mm images. Brain and bone windows were generated. Coronal and sagittal reformats. CT dose reduction was achieved through use of a standardized protocol tailored for this examination and automatic exposure control for dose modulation. FINDINGS: The ventricles and sulci are normal in size, shape and configuration. . There is no significant white matter disease. There is no intracranial hemorrhage, extra-axial collection, or mass effect. The basilar cisterns are open. No CT evidence of acute infarct. The bone windows demonstrate no abnormalities. The visualized portions of the paranasal sinuses and mastoid air cells are clear. No acute abnormality    US ABD LTD    Result Date: 4/1/2022  Clinical indication: Right upper quadrant pain. Right upper quadrant ultrasound is performed. The gallbladder appears unremarkable. The common bile duct is 5 mm. There is a trace of fluid around the liver without significant ascites. The liver appears unremarkable, portal vein flow is hepatopedal, main portal vein diameter is 1.1 cm velocity 47 cm/s. Pancreatic head is not visualized due to gas. The right kidney appears normal.     Trace of free fluid around the liver. XR CHEST PORT    Result Date: 4/1/2022  INDICATION:  chest pain COMPARISON: February 14, 2022 FINDINGS: Single AP portable view of the chest obtained at 1243 demonstrates a stable cardiomediastinal silhouette. There is a right chest tunneled central venous catheter. There are median sternotomy wires. There is a small left pleural effusion and a trace right pleural effusion, similar prior study. There is no focal airspace disease. No significant change. Small bilateral pleural effusions, left greater than right. Medications reviewed  Current Facility-Administered Medications   Medication Dose Route Frequency    niCARdipine (CARDENE) 25 mg in 0.9% sodium chloride 250 mL infusion  0-15 mg/hr IntraVENous TITRATE    hydrALAZINE (APRESOLINE) tablet 100 mg  100 mg Oral TID    sodium chloride (NS) flush 5-40 mL  5-40 mL IntraVENous Q8H    sodium chloride (NS) flush 5-40 mL  5-40 mL IntraVENous PRN    polyethylene glycol (MIRALAX) packet 17 g  17 g Oral DAILY PRN    ondansetron (ZOFRAN ODT) tablet 4 mg  4 mg Oral Q8H PRN    Or    ondansetron (ZOFRAN) injection 4 mg  4 mg IntraVENous Q6H PRN    cloNIDine HCL (CATAPRES) tablet 0.2 mg  0.2 mg Oral TID    amLODIPine (NORVASC) tablet 10 mg  10 mg Oral QAM    aspirin chewable tablet 81 mg  81 mg Oral DAILY    bumetanide (BUMEX) tablet 2 mg  2 mg Oral DAILY    calcium carbonate (TUMS) chewable tablet 200 mg [elemental]  200 mg Oral TID WITH MEALS    docusate sodium (COLACE) capsule 100 mg  100 mg Oral BID    doxazosin (CARDURA) tablet 4 mg  4 mg Oral BID    eplerenone (INSPRA) tablet 25 mg (Patient Supplied)  25 mg Oral DAILY    ferrous sulfate tablet 325 mg  1 Tablet Oral DAILY WITH BREAKFAST    gabapentin (NEURONTIN) capsule 300 mg  300 mg Oral BID    metoprolol succinate (TOPROL-XL) XL tablet 50 mg  50 mg Oral DAILY    pantoprazole (PROTONIX) tablet 40 mg  40 mg Oral ACB    sucralfate (CARAFATE) tablet 1 g  1 g Oral QID    tamsulosin (FLOMAX) capsule 0.4 mg  0.4 mg Oral DAILY    glucose chewable tablet 16 g  4 Tablet Oral PRN    dextrose 10% infusion 0-250 mL  0-250 mL IntraVENous PRN    glucagon (GLUCAGEN) injection 1 mg  1 mg IntraMUSCular PRN    insulin glargine (LANTUS) injection 10 Units  10 Units SubCUTAneous QHS    insulin lispro (HUMALOG) injection   SubCUTAneous AC&HS    HYDROmorphone (DILAUDID) syringe 0.5 mg  0.5 mg IntraVENous Q6H PRN    acetaminophen (TYLENOL) tablet 1,000 mg  1,000 mg Oral Q8H PRN    Or    acetaminophen (TYLENOL) suppository 650 mg  650 mg Rectal Q6H PRN    labetaloL (NORMODYNE;TRANDATE) 20 mg/4 mL (5 mg/mL) injection 10 mg  10 mg IntraVENous Q4H PRN    hydrALAZINE (APRESOLINE) 20 mg/mL injection 20 mg  20 mg IntraVENous Q4H PRN       Assessment/Plan:     Bri Sanchez is a 64 y.o. male with a PMHx of known h/o ESRD on HD, HTN, HFpEF, T2DM, CAD s/p CABG (May 2020), h/o PEA arrest, erosive gastritis who is admitted for NSTEMI in the setting of missing dialysis for the last week and questionable compliance. 24 Hour Events: BP continued elevated SBP > 180 despitire hydralazine treatment, transferred to ICU for Cardene drip, only required for ~3 hours. Off this morning. Single 0.5mg dilaudid given for epigastric pain. Hypertensive Emergency: /76 with elevated troponin. Likely due to missed dialysis x1 week. Required transfer to ICU for Cardene drip, now off.   - continue home Hydral 100mg PO TID, Toprol XL 50mg daily, Amlodipine 10mg daily, Clonidine 0.2mg TID, Cardura 4mg BID, Eplerenone 25mg daily  - Labetalol PRN for BP systolic >273, diastolic >851  - dialysis per Nephro today. Non-ACS Tropinemia: POA Trop of 1402, down to 1256. Cards evaluated, likely in setting of missed dialysis and uncontrolled BP.    - Echo today  - Cardiology consulted, appreciate recs      Epigastric pain in the setting of known erosive gastritis: . Prior CT notable for anasarca. 11/16/21 EGD w/ nonbleeding erosive esophagitis. NFBDO with mild fluid.   - Home Protonix 40mg daily   - Home Carafate 1g QID     ESRD on HD: Permacath placed 11/24 after ATN. Missed HD for the past week. Receives HD MWF. - consult to nephrology, appreciate recs  - Dialysis today    - daily CBC, CMP, Mg, Phos     Ground Level Fall: two days ago, hit head. No LOC. CT Head no acute process.   - fall precautions     HFrEF:  Echo 11/19/21 with EF 53% on repeat, and reduced systolic function and moderate pulm HTN.   - Echo today  - continue home Bumex 1mg BID    T2DM c/b peripheral neuropathy: Last A1c 8.4% (11/2021). Home Insulin regimen Lantus 10u QHS (15u if BG > 200), mealtime Lispro 2-3u TID w/ meals.   - Lantus 10u QHS  - SSI ACHS  - continue home Gabapentin 300mg BID  - fu A1c     Concern for cirrhosis: past h/o heavy alcohol use, stopped 6-7 years ago. US 11/20/21. following with Dr. Otilia Lentz (hepatology)   - RUQUS with mild fluid around liver, no other abnormality noted.      Anemia/thrombocytopenia: This is a chronic condition. Hgb decreased to 7.3 from 9.1. No active bleed symptoms.   - nephrology, dialysis. - recheck [place] post dialysis. - home iron      HLD: Chronic.         Lab Results   Component Value Date/Time     Cholesterol, total 176 11/22/2019 09:36 AM     HDL Cholesterol 45 11/22/2019 09:36 AM     LDL, calculated 112 (H) 11/22/2019 09:36 AM     VLDL, calculated 19 11/22/2019 09:36 AM     Triglyceride 94 11/22/2019 09:36 AM      - Home rosuvastatin 10 mg daily     H/o PEA arrest: During previous admission to [facility] (11/2021) suspected secondary to respiratory distress. ROSC achieved after 12 minutes CPR. S/p intubation and extubation with mental status return to baseline.         FEN/GI - Diabetic diet  Activity - Out of bed with assistance  DVT prophylaxis - SCDs  GI prophylaxis - Protonix  Fall prophylaxis - Fall precautions ordered. Disposition - Admit to Telemetry. Plan to d/c to Home. Consulting PT and OT  Code Status - Full. Discussed with patient / caregivers. Next of Kin Name and Contact - Oscar Stern,  Girlfriend - [phone]          Davina Moses discussed with Dr. Hao Calvin.       Signed:   Lila Dumont MD   Resident, Family Medicine PGY1      Attending note: Attending note to follow. ..

## 2022-04-02 NOTE — PROGRESS NOTES
Attempted PTA medication list from a picture of his list from home. Some medications were cut off and could not verify the medication or dose. PTA med list is partially verified. Pharmacy aware.

## 2022-04-02 NOTE — PROGRESS NOTES
[facility]: MED RECONCILIATION    Medications added:   Buspirone  Carvedilol  Clopidogrel  Isosorbide Mononitrate  Sertraline  Polyethylene Glycol  Senna    Medications removed:  Calcium Carbonate  Clonidine  Doxazosin  Hydralazine  Metoprolol Succinate  Minoxidil  Ondansetron    Medications adjusted:  Acetaminophen  Amlodipine  Docusate Sodium  Gabapentin  Insulin Glargine  Sucralfate    Information obtained from: [facility] discharge summary dated 3/15/2022 , prescription fill history and patient information. Summary includes Phos-Nak 1 packet 3 times daily after meals   There is no prescription fill history for this medication and patient reports not currently taking. Allergies: Patient has no known allergies. Prior to Admission Medications:     Medication Documentation Review Audit       Reviewed by Walter Mauro (Pharmacist) on 04/02/22 at 1701      Medication Sig Documenting Provider Last Dose Status Taking?   acetaminophen (Tylenol Extra Strength) 500 mg tablet Take 500 mg by mouth every six (6) hours as needed for Pain or Fever. Provider, Historical 3/25/2022 Active Yes   amLODIPine (NORVASC) 10 mg tablet Take 5 mg by mouth two (2) times a day. Hold if top number below 120 Provider, Historical 4/1/2022 Active Yes   aspirin delayed-release 81 mg tablet Take 81 mg by mouth daily. Provider, Historical 4/1/2022 Active Yes   bumetanide (BUMEX) 1 mg tablet Take 1 mg by mouth two (2) times a day. Provider, Historical 4/1/2022 Active Yes   busPIRone (BUSPAR) 10 mg tablet Take 10 mg by mouth two (2) times a day. Provider, Historical 4/1/2022 Unknown time Active Yes   carvediloL (Coreg) 12.5 mg tablet Take 12.5 mg by mouth two (2) times daily (with meals). Provider, Historical 4/1/2022 Unknown time Active Yes   clopidogreL (Plavix) 75 mg tab Take 75 mg by mouth daily. Provider, Historical  Active Yes   docusate sodium (Colace) 100 mg capsule Take 1 Capsule by mouth two (2) times a day.  Provider, Historical  Active eplerenone (Inspra) 25 mg tablet Take 25 mg by mouth daily. Provider, Historical  Active    ferrous sulfate 325 mg (65 mg iron) tablet Take 1 Tablet by mouth daily. Provider, Historical 4/1/2022 Unknown time Active Yes   gabapentin (NEURONTIN) 300 mg capsule Take 300 mg by mouth daily. Take after dialysis on dialysis days Provider, Historical  Active Yes   insulin aspart U-100 (NovoLOG Flexpen U-100 Insulin) 100 unit/mL (3 mL) inpn by SubCUTAneous route Before breakfast, lunch, and dinner. -180    2 units  -220    4 units   -260    6 units  -300    8 units  -350    10 units Provider, Historical  Active Yes   insulin glargine (LANTUS,BASAGLAR) 100 unit/mL (3 mL) inpn 15 Units by SubCUTAneous route nightly. Provider, Historical 3/31/2022 Active Yes   isosorbide mononitrate ER (IMDUR) 60 mg CR tablet Take  by mouth daily. Provider, Historical 4/1/2022 Unknown time Active Yes   pantoprazole (PROTONIX) 40 mg tablet Take 40 mg by mouth daily. Provider, Historical 4/1/2022 Unknown time Active Yes   polyethylene glycol (Miralax) 17 gram/dose powder Take 17 g by mouth daily as needed for Constipation. Provider, Historical  Active Yes   rosuvastatin (CRESTOR) 10 mg tablet Take 10 mg by mouth Every morning. Provider, Historical 4/1/2022 Unknown time Active Yes   senna (Senna) 8.6 mg tablet Take 2 Tablets by mouth nightly. Provider, Historical  Active Yes   sertraline (ZOLOFT) 100 mg tablet Take 200 mg by mouth daily. Provider, Historical 4/1/2022 Active Yes   sucralfate (CARAFATE) 1 gram tablet Take 1 g by mouth three (3) times daily. Provider, Historical 4/1/2022 Active Yes   tamsulosin (FLOMAX) 0.4 mg capsule Take 0.4 mg by mouth nightly. Provider, Historical 3/31/2022 Active Yes   traMADoL (Ultram) 50 mg tablet Take 50 mg by mouth every eight (8) hours as needed for Pain.  Provider, Historical 3/25/2022 Unknown time Active Yes                      Alma Rosa Monroy   Contact: [id number]

## 2022-04-02 NOTE — PROGRESS NOTES
Spiritual Care Assessment/Progress Note  [facility]      NAME: Maciej Andres      MRN: [id number]  AGE: 64 y.o. SEX: male  [employer]ian Affiliation: Gnosticist   Language: English     4/2/2022     Total Time (in minutes): 15     Spiritual Assessment begun in [facility] INTENSIVE CARE through conversation with:         []Patient        [] Family    [] Friend(s)        Reason for Consult: Initial/Spiritual assessment, critical care     Spiritual beliefs: (Please include comment if needed)     [] Identifies with a jelly tradition:         [] Supported by a jelly community:            [] Claims no spiritual orientation:           [] Seeking spiritual identity:                [] Adheres to an individual form of spirituality:           [x] Not able to assess:                           Identified resources for coping:      [] Prayer                               [] Music                  [] Guided Imagery     [] Family/friends                 [] Pet visits     [] Devotional reading                         [x] Unknown     [] Other:                                              Interventions offered during this visit: (See comments for more details)    Patient Interventions: Initial visit           Plan of Care:     [] Support spiritual and/or cultural needs    [] Support AMD and/or advance care planning process      [] Support grieving process   [] Coordinate Rites and/or Rituals    [] Coordination with community clergy   [] No spiritual needs identified at this time   [] Detailed Plan of Care below (See Comments)  [] Make referral to Music Therapy  [] Make referral to Pet Therapy     [] Make referral to Addiction services  [] Make referral to [facility]  [] Make referral to Spiritual Care Partner  [] No future visits requested        [x] Contact Spiritual Care for further referrals     Attempted to visit pt for initial spiritual assessment. Unable to complete assessment at this time, pt sleeping and did not awake.  He was receiving dialysis treatment at time of attempted visit. Pt's chart was consulted.   Chaplain Coronado MDiv, MS, [facility]

## 2022-04-02 NOTE — DISCHARGE INSTRUCTIONS
HOME DISCHARGE INSTRUCTIONS    [facility] / [id number] : 1960    Admission date: 2022 Discharge date: 2022 11:33 AM     Please bring this form with you to show your care provider at your follow-up appointment. Primary care provider:  Alva Prater MD    Discharging provider:  Eda Ramsey MD - Family Medicine Resident  Romeo Garcia, [address] Attending      You have been admitted to the hospital with the following diagnoses:    ACUTE DIAGNOSES:  · NSTEMI (non-ST elevated myocardial infarction) [facility]) [I21.4]    FOLLOW-UP CARE RECOMMENDATIONS:    Appointments  Follow-up Information     Follow up With Specialties Details Why Contact Info    Alva Prater MD Family Medicine Schedule an appointment as soon as possible for a visit in 3 days Call to make a hospital follow up early next week  [address]      Sylvia Barrientos MD Nephrology Schedule an appointment as soon as possible for a visit  [address] [facility]  [phone]      Semaj Terry MD Cardiology Schedule an appointment as soon as possible for a visit  [address] [phone]             Follow-up tests needed: none    Pending test results: At the time of your discharge the following test results are still pending: none. Please make sure you review these results with your outpatient follow-up provider(s). DIET/what to eat:  Diabetic Diet    ACTIVITY:  Activity as tolerated    Wound care: Catheter care for dialysis catheter    Equipment needed:  None    Specific symptoms to watch for: chest pain, shortness of breath, fever, chills, nausea, vomiting, diarrhea, change in mentation, falling, weakness, bleeding. What to do if new or unexpected symptoms occur?     If you experience any of the above symptoms (or should other concerns or questions arise after discharge) please call your primary care physician. Return to the emergency room if you cannot get hold of your doctor. · It is very important that you keep your follow-up appointment(s). · Please bring discharge papers, medication list (and/or medication bottles) to your follow-up appointments for review by your outpatient provider(s). · Please check the list of medications and be sure it includes every medication (even non-prescription medications) that your provider wants you to take. · It is important that you take the medication exactly as they are prescribed. · Keep your medication in the bottles provided by the pharmacist and keep a list of the medication names, dosages, and times to be taken in your wallet. · Do not take other medications without consulting your doctor. · If you have any questions about your medications or other instructions, please talk to your nurse or care provider before you leave the hospital.     Information obtained by:     I understand that if any problems occur once I am at home I am to contact my physician. These instructions were explained to me and I had the opportunity to ask questions. I understand and acknowledge receipt of the instructions indicated above.                                                                                                                                                Physician's or R.N.'s Signature                                                                  Date/Time                                                                                                                                              Patient or Representative Signature                                                          Date/Time

## 2022-04-05 ENCOUNTER — TELEPHONE (OUTPATIENT)
Dept: FAMILY MEDICINE CLINIC | Age: 62
End: 2022-04-05

## 2022-04-05 DIAGNOSIS — Z79.4 TYPE 2 DIABETES MELLITUS WITH DIABETIC NEUROPATHY, WITH LONG-TERM CURRENT USE OF INSULIN (HCC): Primary | ICD-10-CM

## 2022-04-05 DIAGNOSIS — E11.40 TYPE 2 DIABETES MELLITUS WITH DIABETIC NEUROPATHY, WITH LONG-TERM CURRENT USE OF INSULIN (HCC): Primary | ICD-10-CM

## 2022-04-05 NOTE — TELEPHONE ENCOUNTER
Does he need an order for pen needles?   Let me know and which pharmacy if he does  [facility]

## 2022-04-06 RX ORDER — PEN NEEDLE, DIABETIC 30 GX3/16"
NEEDLE, DISPOSABLE MISCELLANEOUS
Qty: 100 EACH | Refills: 11 | Status: SHIPPED | OUTPATIENT
Start: 2022-04-06

## 2022-04-14 ENCOUNTER — APPOINTMENT (OUTPATIENT)
Dept: GENERAL RADIOLOGY | Age: 62
End: 2022-04-14
Attending: EMERGENCY MEDICINE
Payer: MEDICAID

## 2022-04-14 ENCOUNTER — APPOINTMENT (OUTPATIENT)
Dept: CT IMAGING | Age: 62
End: 2022-04-14
Attending: EMERGENCY MEDICINE
Payer: MEDICAID

## 2022-04-14 ENCOUNTER — HOSPITAL ENCOUNTER (EMERGENCY)
Age: 62
Discharge: HOME | End: 2022-04-14
Attending: EMERGENCY MEDICINE
Payer: MEDICAID

## 2022-04-14 ENCOUNTER — TELEPHONE (OUTPATIENT)
Dept: FAMILY MEDICINE CLINIC | Age: 62
End: 2022-04-14

## 2022-04-14 VITALS
TEMPERATURE: 98 F | DIASTOLIC BLOOD PRESSURE: 96 MMHG | HEART RATE: 72 BPM | OXYGEN SATURATION: 95 % | BODY MASS INDEX: 21.35 KG/M2 | RESPIRATION RATE: 22 BRPM | HEIGHT: 67 IN | SYSTOLIC BLOOD PRESSURE: 199 MMHG | WEIGHT: 136 LBS

## 2022-04-14 DIAGNOSIS — Z99.2 ESRD (END STAGE RENAL DISEASE) ON DIALYSIS (HCC): ICD-10-CM

## 2022-04-14 DIAGNOSIS — N18.6 ESRD (END STAGE RENAL DISEASE) ON DIALYSIS (HCC): ICD-10-CM

## 2022-04-14 DIAGNOSIS — R07.9 CHEST PAIN, UNSPECIFIED TYPE: ICD-10-CM

## 2022-04-14 DIAGNOSIS — R06.02 SOB (SHORTNESS OF BREATH): Primary | ICD-10-CM

## 2022-04-14 DIAGNOSIS — J90 BILATERAL PLEURAL EFFUSION: ICD-10-CM

## 2022-04-14 PROBLEM — I16.1 HYPERTENSIVE EMERGENCY: Status: RESOLVED | Noted: 2022-02-14 | Resolved: 2022-04-14

## 2022-04-14 PROBLEM — D64.9 ANEMIA: Status: RESOLVED | Noted: 2022-02-06 | Resolved: 2022-04-14

## 2022-04-14 PROBLEM — R60.1 ANASARCA: Status: RESOLVED | Noted: 2022-02-06 | Resolved: 2022-04-14

## 2022-04-14 PROBLEM — I21.4 NSTEMI (NON-ST ELEVATED MYOCARDIAL INFARCTION) (HCC): Status: RESOLVED | Noted: 2022-04-01 | Resolved: 2022-04-14

## 2022-04-14 PROBLEM — E11.21 TYPE 2 DIABETES WITH NEPHROPATHY (HCC): Status: RESOLVED | Noted: 2019-08-05 | Resolved: 2022-04-14

## 2022-04-14 PROBLEM — R79.89 ELEVATED TROPONIN: Status: RESOLVED | Noted: 2022-02-06 | Resolved: 2022-04-14

## 2022-04-14 PROBLEM — I50.9 CHF EXACERBATION (HCC): Status: RESOLVED | Noted: 2021-11-14 | Resolved: 2022-04-14

## 2022-04-14 PROBLEM — R77.8 ELEVATED TROPONIN: Status: RESOLVED | Noted: 2022-02-06 | Resolved: 2022-04-14

## 2022-04-14 PROBLEM — E43 SEVERE PROTEIN-CALORIE MALNUTRITION (HCC): Status: RESOLVED | Noted: 2021-11-23 | Resolved: 2022-04-14

## 2022-04-14 PROBLEM — I50.30 HEART FAILURE WITH PRESERVED EJECTION FRACTION (HCC): Status: RESOLVED | Noted: 2022-02-06 | Resolved: 2022-04-14

## 2022-04-14 PROBLEM — F32.9 REACTIVE DEPRESSION: Status: RESOLVED | Noted: 2018-11-14 | Resolved: 2022-04-14

## 2022-04-14 PROBLEM — I10 ESSENTIAL HYPERTENSION: Status: RESOLVED | Noted: 2018-11-14 | Resolved: 2022-04-14

## 2022-04-14 PROBLEM — R10.13 EPIGASTRIC PAIN: Status: RESOLVED | Noted: 2022-02-06 | Resolved: 2022-04-14

## 2022-04-14 LAB
ALBUMIN SERPL-MCNC: 2.2 G/DL (ref 3.5–5)
ALBUMIN/GLOB SERPL: 0.5 {RATIO} (ref 1.1–2.2)
ALP SERPL-CCNC: 209 U/L (ref 45–117)
ALT SERPL-CCNC: 22 U/L (ref 12–78)
ANION GAP SERPL CALC-SCNC: 9 MMOL/L (ref 5–15)
AST SERPL-CCNC: 11 U/L (ref 15–37)
BASOPHILS # BLD: 0 K/UL (ref 0–0.1)
BASOPHILS NFR BLD: 1 % (ref 0–1)
BILIRUB SERPL-MCNC: 0.3 MG/DL (ref 0.2–1)
BUN SERPL-MCNC: 33 MG/DL (ref 6–20)
BUN/CREAT SERPL: 16 (ref 12–20)
CALCIUM SERPL-MCNC: 8 MG/DL (ref 8.5–10.1)
CHLORIDE SERPL-SCNC: 101 MMOL/L (ref 97–108)
CO2 SERPL-SCNC: 25 MMOL/L (ref 21–32)
COMMENT, HOLDF: NORMAL
CREAT SERPL-MCNC: 2.09 MG/DL (ref 0.7–1.3)
DIFFERENTIAL METHOD BLD: ABNORMAL
EOSINOPHIL # BLD: 0.1 K/UL (ref 0–0.4)
EOSINOPHIL NFR BLD: 3 % (ref 0–7)
ERYTHROCYTE [DISTWIDTH] IN BLOOD BY AUTOMATED COUNT: 17.1 % (ref 11.5–14.5)
GLOBULIN SER CALC-MCNC: 4.2 G/DL (ref 2–4)
GLUCOSE SERPL-MCNC: 403 MG/DL (ref 65–100)
HCT VFR BLD AUTO: 21.7 % (ref 36.6–50.3)
HGB BLD-MCNC: 7.1 G/DL (ref 12.1–17)
IMM GRANULOCYTES # BLD AUTO: 0 K/UL (ref 0–0.04)
IMM GRANULOCYTES NFR BLD AUTO: 0 % (ref 0–0.5)
LYMPHOCYTES # BLD: 0.8 K/UL (ref 0.8–3.5)
LYMPHOCYTES NFR BLD: 20 % (ref 12–49)
MCH RBC QN AUTO: 30 PG (ref 26–34)
MCHC RBC AUTO-ENTMCNC: 32.7 G/DL (ref 30–36.5)
MCV RBC AUTO: 91.6 FL (ref 80–99)
MONOCYTES # BLD: 0.5 K/UL (ref 0–1)
MONOCYTES NFR BLD: 12 % (ref 5–13)
NEUTS SEG # BLD: 2.7 K/UL (ref 1.8–8)
NEUTS SEG NFR BLD: 65 % (ref 32–75)
NRBC # BLD: 0 K/UL (ref 0–0.01)
NRBC BLD-RTO: 0 PER 100 WBC
PLATELET # BLD AUTO: 118 K/UL (ref 150–400)
PMV BLD AUTO: 10.3 FL (ref 8.9–12.9)
POTASSIUM SERPL-SCNC: 3.8 MMOL/L (ref 3.5–5.1)
PROT SERPL-MCNC: 6.4 G/DL (ref 6.4–8.2)
RBC # BLD AUTO: 2.37 M/UL (ref 4.1–5.7)
SAMPLES BEING HELD,HOLD: NORMAL
SODIUM SERPL-SCNC: 135 MMOL/L (ref 136–145)
TROPONIN-HIGH SENSITIVITY: 33 NG/L (ref 0–76)
TROPONIN-HIGH SENSITIVITY: 35 NG/L (ref 0–76)
WBC # BLD AUTO: 4.2 K/UL (ref 4.1–11.1)

## 2022-04-14 PROCEDURE — 74011000636 HC RX REV CODE- 636: Performed by: EMERGENCY MEDICINE

## 2022-04-14 PROCEDURE — 80053 COMPREHEN METABOLIC PANEL: CPT

## 2022-04-14 PROCEDURE — 71045 X-RAY EXAM CHEST 1 VIEW: CPT

## 2022-04-14 PROCEDURE — 93005 ELECTROCARDIOGRAM TRACING: CPT

## 2022-04-14 PROCEDURE — 99285 EMERGENCY DEPT VISIT HI MDM: CPT

## 2022-04-14 PROCEDURE — 71275 CT ANGIOGRAPHY CHEST: CPT

## 2022-04-14 PROCEDURE — 85025 COMPLETE CBC W/AUTO DIFF WBC: CPT

## 2022-04-14 PROCEDURE — 84484 ASSAY OF TROPONIN QUANT: CPT

## 2022-04-14 PROCEDURE — 36415 COLL VENOUS BLD VENIPUNCTURE: CPT

## 2022-04-14 RX ADMIN — IOPAMIDOL 100 ML: 755 INJECTION, SOLUTION INTRAVENOUS at 18:24

## 2022-04-14 NOTE — ED TRIAGE NOTES
Pt arrives to ED via EMS with complaints of CP, dizziness, SOB x2 days. Pt reports CP comes and goes but has pain currently. Pt has hx of HTN, CHF, DM. Pt had dialysis yesterday. Pt has swelling to ankles. Pt given ASA and nitro in route.

## 2022-04-14 NOTE — ED PROVIDER NOTES
Is a 57-year-old male with history of diabetes, end-stage renal disease with  dialysis nephrologist Dr. Olivas at who presents with left-sided chest pain. Patient reports constant pain in the left side of his chest.  Reports pain is worse when he tries to lay flat and he has trouble catching his breath. Symptoms are improved when he sits up. Notes that symptoms today are different from the last time he was admitted to the hospital for an NSTEMI. Reports that pain this time is constant. No recent illness, travel, sick contacts. Patient reports he completed his last session of dialysis yesterday.            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, with long-term current use of insulin ([address] 2018    Diabetes ([address]        Past Surgical History:   Procedure Laterality Date    HX ARTERIAL BYPASS      HX OTHER SURGICAL      5th toe Metatarsal amputation 2017     IR INSERT NON TUNL CVC OVER 5 YRS  2021    IR INSERT TUNL CVC W/O PORT OVER 5 YR  2021         Family History:   Problem Relation Age of Onset    Diabetes Mother     No Known Problems Father        Social History     Socioeconomic History    Marital status:      Spouse name: Not on file    Number of children: Not on file    Years of education: Not on file    Highest education level: Not on file   Occupational History    Not on file   Tobacco Use    Smoking status: Former Smoker     Quit date: 2001     Years since quittin.9    Smokeless tobacco: Never Used   Vaping Use    Vaping Use: Never used   Substance and Sexual Activity    Alcohol use: No    Drug use: No    Sexual activity: Yes     Partners: Female     Birth control/protection: Condom   Other Topics Concern    Not on file   Social History Narrative    Not on file     Social Determinants of Health     Financial Resource Strain:     Difficulty of Paying Living Expenses: Not on file   Food Insecurity:     Worried About Running Out of Food in the Last Year: Not on file    Hardik of Food in the Last Year: Not on file   Transportation Needs:     Lack of Transportation (Medical): Not on file    Lack of Transportation (Non-Medical): Not on file   Physical Activity:     Days of Exercise per Week: Not on file    Minutes of Exercise per Session: Not on file   Stress:     Feeling of Stress : Not on file   Social Connections:     Frequency of Communication with Friends and Family: Not on file    Frequency of Social Gatherings with Friends and Family: Not on file    Attends Gnosticism Services: Not on file    Active Member of [address] [place] [address] [employer] or Organizations: Not on file    Attends Club or Organization Meetings: Not on file    Marital Status: Not on file   Intimate Partner Violence:     Fear of Current or Ex-Partner: Not on file    Emotionally Abused: Not on file    Physically Abused: Not on file    Sexually Abused: Not on file   Housing Stability:     Unable to Pay for Housing in the Last Year: Not on file    Number of [place] in the Last Year: Not on file    Unstable Housing in the Last Year: Not on file         ALLERGIES: Patient has no known allergies. Review of Systems   Constitutional: Negative for chills and fever. HENT: Negative for drooling and nosebleeds. Eyes: Negative for pain and itching. Respiratory: Positive for shortness of breath. Negative for choking and stridor. Cardiovascular: Positive for chest pain and leg swelling. Gastrointestinal: Negative for abdominal pain and rectal pain. Endocrine: Negative for heat intolerance and polyphagia. Genitourinary: Negative for enuresis and genital sores. Musculoskeletal: Negative for arthralgias and joint swelling. Skin: Negative for color change. Allergic/Immunologic: Negative for immunocompromised state. Neurological: Negative for tremors and speech difficulty.    Hematological: Negative for adenopathy. Psychiatric/Behavioral: Negative for dysphoric mood and sleep disturbance. Vitals:    04/14/22 1513   BP: (!) 193/75   Pulse: 75   Resp: 20   Temp: 98 °F (36.7 °C)   SpO2: 100%   Weight: 61.7 kg (136 lb)   Height: 5' 7\" (1.702 m)            Physical Exam  Vitals and nursing note reviewed. Constitutional:       General: He is not in acute distress. Appearance: He is well-developed. He is not toxic-appearing or diaphoretic. HENT:      Head: Normocephalic and atraumatic. Nose: Nose normal.      Mouth/Throat:      Mouth: Mucous membranes are moist.   Eyes:      Conjunctiva/sclera: Conjunctivae normal.   Cardiovascular:      Rate and Rhythm: Normal rate and regular rhythm. Heart sounds: Normal heart sounds. Pulmonary:      Effort: Pulmonary effort is normal. No respiratory distress. Breath sounds: Normal breath sounds. Abdominal:      General: There is no distension. Palpations: Abdomen is soft. Tenderness: There is no abdominal tenderness. Musculoskeletal:         General: No deformity. Normal range of motion. Cervical back: Normal range of motion and neck supple. Right lower leg: Edema present. Left lower leg: Edema present. Skin:     General: Skin is warm and dry. Neurological:      Mental Status: He is alert. Coordination: Coordination normal.   Psychiatric:         Behavior: Behavior normal.          MDM  Number of Diagnoses or Management Options  Bilateral pleural effusion  Chest pain, unspecified type  ESRD (end stage renal disease) on dialysis (HCC)  SOB (shortness of breath)  Diagnosis management comments: ED EKG interpretation:  Rhythm: normal sinus rhythm; and regular . Rate (approx.): 74; Axis: normal; ST/T wave: normal; No evidence of acute coronary ischemia. With mild increased work of breathing noted and he reports even after dialysis sessions he still feels short of breath and is unable to lay flat.   No need for emergent dialysis at this time, he remains hemodynamically stable. He will be admitted for further management. Amount and/or Complexity of Data Reviewed  Decide to obtain previous medical records or to obtain history from someone other than the patient: yes      ED Course as of 04/14/22 2053   Thu Apr 14, 2022   1809 Pt reports continued pain. Says he is having to take several short breaths 2/2 to pain. No prior hx of PE. Will obtain CTA. [AL]      ED Course User Index  [AL] Jovita Martinez MD       Procedures    Perfect Serve Consult for Admission  8:53 PM    ED Room Number: ER04/04  Patient Name and age:  María Goodman 64 y.o.  male  Working Diagnosis:   1. SOB (shortness of breath)    2. Bilateral pleural effusion    3. ESRD (end stage renal disease) on dialysis ([facility]    4. Chest pain, unspecified type        COVID-19 Suspicion:  no  Sepsis present:  no  Reassessment needed: no  Code Status:  Full Code  Readmission: no  Isolation Requirements:  no  Recommended Level of Care:  telemetry  Department:[facility] ED - [phone]  Other:  Pt reports SOB, orthopnea and CP. Says he has had two dialysis sessions this week and remains SOB. Patient is being admitted to the hospital.  The results of their tests and reasons for their admission have been discussed with them and/or available family. They convey agreement and understanding for the need to be admitted and for their admission diagnosis.

## 2022-04-14 NOTE — TELEPHONE ENCOUNTER
Nurse Miles Wells [phone] of Home care called stating that pt High /70 sugar 504.  Pt had to be sent to [address] [place] [address] for more care

## 2022-04-15 NOTE — DISCHARGE INSTRUCTIONS
Patient Discharge Instructions    Exie Ahumada / [id number] : 1960    Admitted 2022 Discharged: 2022     Primary Diagnoses  Problem List as of 2022 Date Reviewed: 2022           Cirrhosis ([facility]   ESRD (end stage renal disease) ([facility]   Hypertensive emergency   ESRD (end stage renal disease) on dialysis ([facility]   Heart failure with preserved ejection fraction (HCC)   Anemia   Thrombocytopenia (HCC)   Epigastric pain   Severe protein-calorie malnutrition ([facility]   Type 2 diabetes with nephropathy ([facility]   Type 2 diabetes mellitus with diabetic neuropathy ([facility]   Type 2 diabetes mellitus with ophthalmic complication, with long-term current use of insulin (HCC)   Essential hypertension   Reactive depression   Peripheral neuropathy          Take Home Medications     · It is important that you take the medication exactly as they are prescribed. · Keep your medication in the bottles provided by the pharmacist and keep a list of the medication names, dosages, and times to be taken in your wallet. · Do not take other medications without consulting your doctor. What to do at Home    Recommended diet: Cardiac Diet, Diabetic Diet, Low fat, Low cholesterol and Renal Diet    Recommended activity: Activity as tolerated    If you experience worse symptoms, please follow up with your PCP. Follow-up with your PCP in a few weeks    Follow-up Information     Follow up With Specialties Details Why Contact Info    Darryle Setters, MD Family Medicine   [address] [facility]  [phone]             Information obtained by :  I understand that if any problems occur once I am at home I am to contact my physician. I understand and acknowledge receipt of the instructions indicated above.                                                                                                                                            Physician's or R.N.'s Signature Date/Time                                                                                                                                              Patient or Representative Signature                                                          Date/Time

## 2022-04-15 NOTE — ED NOTES
Patient's high systolic BP was discussed with Dr. Luz Gomes. Per Dr. Luz Gomes, patient can be discharged with this BP.

## 2022-04-15 NOTE — CONSULTS
[address]  [phone]    Hospitalist Consult Note      NAME:  Radha Woodson   :   1960   MRN:  [id number]     Requesting Physician No att. providers found   Reason for Consult:  dyspnea     PCP:  Raquel Galarza MD     Date/Time of service  2022 10:19 PM       Assessment and Plan:      Dyspnea / Pleural effusion - POA. Subacute. Not hypoxic. Dyspnea due to effusions and fluid. No sign of PNA, acute CHF, CAD. Would benefit from additional fluid removal with HD, and there is no faster way to get that than to discharge him for his usual HD in AM.    ESRD (end stage renal disease) on dialysis - He is compliant with HD, on MWF. He plans to have HD in AM.     DM type 2 causing renal, vascular, neurological and ophthalmological disease - Diabetic diet and counseling. Continue home lantus and SSI. CHF NYHA class I, chronic, diastolic - Appears stable. Recent cardiac workup. pBNP unreliable in setting of ESRD. Continue Bumex, hydralazine, coreg, IMDUR    CAD (coronary artery disease) / HTN (hypertension) / Hyperlipidemia - Appears stable. Troponin low and flat. Continue ASA, Plavix, Bumex, hydralazine, amlodipine, coreg, IMDUR and rosuvastatin    Anemia associated with chronic renal failure - EPO per renal. He takes iron    Cirrhosis / Thrombocytopenia - PCP to follow. Chronic pain / Peripheral neuropathy - Tylenol, ultram and gabapentin    Anxiety and depression - Continue sertraline and buspirone    BPH (benign prostatic hyperplasia) - continue flomax    GERD (gastroesophageal reflux disease) - Continue carafate and PPI          Subjective:     CHIEF COMPLAINT: dyspnea     HISTORY OF PRESENT ILLNESS:     Mr. Giovanna Smith is a 64 y.o.  male who is admitted to the ER Service with dyspnea. We are asked to evaluate for admission. Dyspnea gradually worse over days. No cough, sputum fever or sick contacts.   Associated with pleuritic pain. ER finds chronic pleural effusions and no hypoxia. He needs dialysis for fluid removal, and the fastest way to get that will be to discharge him to have his scheduled dialysis in 12 hours. Past Medical History:   Diagnosis Date    Anemia associated with chronic renal failure     Anxiety and depression     BPH (benign prostatic hyperplasia)     CAD (coronary artery disease)     CHF NYHA class I, chronic, diastolic (HCC)     Chronic pain     DM type 2 causing renal disease [facility]     DM type 2 causing vascular disease ([facility]     ESRD on dialysis ([facility]     GERD (gastroesophageal reflux disease)     HTN (hypertension)     Hyperlipidemia     Thrombocytopenia (HCC)         Past Surgical History:   Procedure Laterality Date    HX ARTERIAL BYPASS      HX OTHER SURGICAL      5th toe Metatarsal amputation 2017     IR INSERT NON TUNL CVC OVER 5 YRS  2021    IR INSERT TUNL CVC W/O PORT OVER 5 YR  2021       Social History     Tobacco Use    Smoking status: Former Smoker     Quit date: 2001     Years since quittin.9    Smokeless tobacco: Never Used   Substance Use Topics    Alcohol use: No        Family History   Problem Relation Age of Onset    Diabetes Mother     No Known Problems Father         No Known Allergies     Prior to Admission medications    Medication Sig Start Date End Date Taking? Authorizing Provider   Insulin Needles, Disposable, 31 gauge x \" ndle Use as directed with insulin DM2 22   Tin Clemons MD   acetaminophen (Tylenol Extra Strength) 500 mg tablet Take 500 mg by mouth every six (6) hours as needed for Pain or Fever. Provider, Historical   aspirin delayed-release 81 mg tablet Take 81 mg by mouth daily. Provider, Historical   bumetanide (BUMEX) 1 mg tablet Take 1 mg by mouth two (2) times a day. Provider, Historical   clopidogreL (Plavix) 75 mg tab Take 75 mg by mouth daily.     Provider, Historical   gabapentin (NEURONTIN) 300 mg capsule Take 300 mg by mouth daily. Take after dialysis on dialysis days    Provider, Historical   sertraline (ZOLOFT) 100 mg tablet Take 200 mg by mouth daily. Provider, Historical   sucralfate (CARAFATE) 1 gram tablet Take 1 g by mouth three (3) times daily. Provider, Historical   tamsulosin (FLOMAX) 0.4 mg capsule Take 0.4 mg by mouth nightly. Provider, Historical   insulin glargine (LANTUS,BASAGLAR) 100 unit/mL (3 mL) inpn 15 Units by SubCUTAneous route nightly. Provider, Historical   insulin aspart U-100 (NovoLOG Flexpen U-100 Insulin) 100 unit/mL (3 mL) inpn by SubCUTAneous route Before breakfast, lunch, and dinner. -180    2 units  -220    4 units   -260    6 units  -300    8 units  -350    10 units    Provider, Historical   senna (Senna) 8.6 mg tablet Take 2 Tablets by mouth nightly. Provider, Historical   polyethylene glycol (Miralax) 17 gram/dose powder Take 17 g by mouth daily as needed for Constipation. Provider, Historical   hydrALAZINE (APRESOLINE) 50 mg tablet Take 1 Tablet by mouth three (3) times daily. 4/2/22   Loy Lesch, MD   amLODIPine (NORVASC) 10 mg tablet Take 1 Tablet by mouth Every morning. 4/3/22   Loy Lesch, MD   calcium carbonate (TUMS) 200 mg calcium (500 mg) chew Take 1 Tablet by mouth three (3) times daily (with meals). 4/3/22   Loy Lesch, MD   busPIRone (BUSPAR) 10 mg tablet Take 10 mg by mouth two (2) times a day. Provider, Historical   carvediloL (Coreg) 12.5 mg tablet Take 12.5 mg by mouth two (2) times daily (with meals). Hold if top number below 100    Provider, Historical   traMADoL (Ultram) 50 mg tablet Take 50 mg by mouth every eight (8) hours as needed for Pain. Provider, Historical   isosorbide mononitrate ER (IMDUR) 60 mg CR tablet Take  by mouth daily. Provider, Historical   docusate sodium (Colace) 100 mg capsule Take 1 Capsule by mouth two (2) times a day.  10/20/21 Provider, Historical   ferrous sulfate 325 mg (65 mg iron) tablet Take 1 Tablet by mouth daily. 4/14/21   Provider, Historical   pantoprazole (PROTONIX) 40 mg tablet Take 40 mg by mouth daily. Provider, Historical   rosuvastatin (CRESTOR) 10 mg tablet Take 10 mg by mouth Every morning. Provider, Historical   eplerenone (Inspra) 25 mg tablet Take 25 mg by mouth daily. Provider, Historical       No current facility-administered medications for this encounter. Current Outpatient Medications:     Insulin Needles, Disposable, 31 gauge x 5/16\" ndle, Use as directed with insulin DM2, Disp: 100 Each, Rfl: 11    acetaminophen (Tylenol Extra Strength) 500 mg tablet, Take 500 mg by mouth every six (6) hours as needed for Pain or Fever., Disp: , Rfl:     aspirin delayed-release 81 mg tablet, Take 81 mg by mouth daily. , Disp: , Rfl:     bumetanide (BUMEX) 1 mg tablet, Take 1 mg by mouth two (2) times a day., Disp: , Rfl:     clopidogreL (Plavix) 75 mg tab, Take 75 mg by mouth daily. , Disp: , Rfl:     gabapentin (NEURONTIN) 300 mg capsule, Take 300 mg by mouth daily. Take after dialysis on dialysis days, Disp: , Rfl:     sertraline (ZOLOFT) 100 mg tablet, Take 200 mg by mouth daily. , Disp: , Rfl:     sucralfate (CARAFATE) 1 gram tablet, Take 1 g by mouth three (3) times daily. , Disp: , Rfl:     tamsulosin (FLOMAX) 0.4 mg capsule, Take 0.4 mg by mouth nightly., Disp: , Rfl:     insulin glargine (LANTUS,BASAGLAR) 100 unit/mL (3 mL) inpn, 15 Units by SubCUTAneous route nightly., Disp: , Rfl:     insulin aspart U-100 (NovoLOG Flexpen U-100 Insulin) 100 unit/mL (3 mL) inpn, by SubCUTAneous route Before breakfast, lunch, and dinner.  -180    2 units -220    4 units  -260    6 units -300    8 units -350    10 units, Disp: , Rfl:     senna (Senna) 8.6 mg tablet, Take 2 Tablets by mouth nightly., Disp: , Rfl:     polyethylene glycol (Miralax) 17 gram/dose powder, Take 17 g by mouth daily as needed for Constipation. , Disp: , Rfl:     hydrALAZINE (APRESOLINE) 50 mg tablet, Take 1 Tablet by mouth three (3) times daily. , Disp: 90 Tablet, Rfl: 0    amLODIPine (NORVASC) 10 mg tablet, Take 1 Tablet by mouth Every morning., Disp: 30 Tablet, Rfl: 0    calcium carbonate (TUMS) 200 mg calcium (500 mg) chew, Take 1 Tablet by mouth three (3) times daily (with meals). , Disp: 90 Tablet, Rfl: 0    busPIRone (BUSPAR) 10 mg tablet, Take 10 mg by mouth two (2) times a day., Disp: , Rfl:     carvediloL (Coreg) 12.5 mg tablet, Take 12.5 mg by mouth two (2) times daily (with meals). Hold if top number below 100, Disp: , Rfl:     traMADoL (Ultram) 50 mg tablet, Take 50 mg by mouth every eight (8) hours as needed for Pain., Disp: , Rfl:     isosorbide mononitrate ER (IMDUR) 60 mg CR tablet, Take  by mouth daily. , Disp: , Rfl:     docusate sodium (Colace) 100 mg capsule, Take 1 Capsule by mouth two (2) times a day., Disp: , Rfl:     ferrous sulfate 325 mg (65 mg iron) tablet, Take 1 Tablet by mouth daily. , Disp: , Rfl:     pantoprazole (PROTONIX) 40 mg tablet, Take 40 mg by mouth daily. , Disp: , Rfl:     rosuvastatin (CRESTOR) 10 mg tablet, Take 10 mg by mouth Every morning., Disp: , Rfl:     eplerenone (Inspra) 25 mg tablet, Take 25 mg by mouth daily. , Disp: , Rfl:     Review of Systems:  (bold if positive, if negative)    Gen:  fatigueEyes:  ENT:  CVS:  chest pain,Pulm:  dyspneaGI:  :  MS:  Pain, weakness, arthritisSkin:  Psych:   Insomnia, depression,Endo:  Hem:  Renal:  Neuro:            Objective:      VITALS:    Vital signs reviewed; most recent are:    Visit Vitals  BP (!) 199/96   Pulse 72   Temp 98 °F (36.7 °C)   Resp 22   Ht 5' 7\" (1.702 m)   Wt 61.7 kg (136 lb)   SpO2 95%   BMI 21.30 kg/m²     SpO2 Readings from Last 6 Encounters:   04/14/22 95%   04/02/22 100%   02/15/22 99%   02/07/22 97%   12/01/21 96%   08/07/20 100%        No intake or output data in the 24 hours ending 04/14/22 5591   All Micro Results     None            Exam:     Physical Exam:    Gen:  Frail, in no acute distress  HEENT:  Pink conjunctivae, PERRL, hearing intact to voice, moist mucous membranes  Neck:  Supple, without masses, thyroid non-tender  Resp:  No accessory muscle use, clear breath sounds without wheezes rales or rhonchi  Card:  No murmurs, normal S1, S2 without thrills, bruits or peripheral edema  Abd:  Soft, non-tender, non-distended, normoactive bowel sounds are present, no mass  Lymph:  No cervical or inguinal adenopathy  Musc:  No cyanosis or clubbing  Skin:  No rashes or ulcers, skin turgor is good  Neuro:  Cranial nerves are grossly intact, general motor weakness, follows commands   Psych:  Poor insight, oriented to person, place and time, alert       Labs:    Recent Labs     04/14/22  1515   WBC 4.2   HGB 7.1*   HCT 21.7*   *     Recent Labs     04/14/22  1515   *   K 3.8      CO2 25   *   BUN 33*   CREA 2.09*   CA 8.0*   ALB 2.2*   TBILI 0.3   ALT 22     Lab Results   Component Value Date/Time    Glucose (POC) 230 (H) 04/02/2022 04:38 PM    Glucose (POC) 109 04/02/2022 11:02 AM     No results for input(s): PH, PCO2, PO2, HCO3, FIO2 in the last 72 hours. No results for input(s): INR, INREXT in the last 72 hours. I have reviewed previous records,    Telemetry reviewed:   normal sinus rhythm    Risk of deterioration: high      Total time spent with patient: 45 minutes I personally reviewed chart, notes, data and current medications in the medical record. I have personally examined and treated the patient at bedside during this period.                  Care Plan discussed with: Patient, Nursing Staff and >50% of time spent in counseling and coordination of care    Discussed:  Care Plan and D/C Planning       ___________________________________________________    Attending Physician: Cody Lane MD

## 2022-04-16 LAB
ATRIAL RATE: 74 BPM
CALCULATED P AXIS, ECG09: 57 DEGREES
CALCULATED R AXIS, ECG10: 31 DEGREES
CALCULATED T AXIS, ECG11: 50 DEGREES
DIAGNOSIS, 93000: NORMAL
P-R INTERVAL, ECG05: 130 MS
Q-T INTERVAL, ECG07: 432 MS
QRS DURATION, ECG06: 88 MS
QTC CALCULATION (BEZET), ECG08: 479 MS
VENTRICULAR RATE, ECG03: 74 BPM

## 2022-05-31 ENCOUNTER — NURSE TRIAGE (OUTPATIENT)
Dept: OTHER | Facility: CLINIC | Age: 62
End: 2022-05-31

## 2022-05-31 NOTE — TELEPHONE ENCOUNTER
Received call from [address] at [facility] with Red Flag Complaint. Subjective: Caller states \"BP has been elevated to the 197s. Has been high for a while. \"     Current Symptoms: elevated BP  Dialysis on M, W, F and BP is always high, they give a pill to bring down  Ranging 180-190  Ran out of BP medication  Blood sugar is also elevated  BP now at 8:14 is 147/63, pulse is 72  Feels like \"lungs is pulsating\" when the BP is elevated. Will get headaches   Dizziness and shaky after dialysis    Onset: for a while    Associated Symptoms: NA    Pain Severity:    Temperature: no fever    What has been tried:     LMP: NA Pregnant: NA    Recommended disposition: See in Office Today    Care advice provided, patient verbalizes understanding; denies any other questions or concerns; instructed to call back for any new or worsening symptoms. Krystin at [facility] calling patient to schedule    Attention Provider: Thank you for allowing me to participate in the care of your patient. The patient was connected to triage in response to information provided to the [facility]. Please do not respond through this encounter as the response is not directed to a shared pool.     Reason for Disposition   Patient wants to be seen    Protocols used: BLOOD PRESSURE - HIGH-ADULT-OH

## 2022-06-21 ENCOUNTER — OFFICE VISIT (OUTPATIENT)
Dept: FAMILY MEDICINE CLINIC | Age: 62
End: 2022-06-21
Payer: MEDICAID

## 2022-06-21 VITALS
WEIGHT: 159.5 LBS | HEIGHT: 67 IN | OXYGEN SATURATION: 99 % | SYSTOLIC BLOOD PRESSURE: 184 MMHG | TEMPERATURE: 98.3 F | BODY MASS INDEX: 25.03 KG/M2 | HEART RATE: 81 BPM | RESPIRATION RATE: 17 BRPM | DIASTOLIC BLOOD PRESSURE: 84 MMHG

## 2022-06-21 DIAGNOSIS — N40.0 BENIGN PROSTATIC HYPERPLASIA, UNSPECIFIED WHETHER LOWER URINARY TRACT SYMPTOMS PRESENT: ICD-10-CM

## 2022-06-21 DIAGNOSIS — K70.31 ALCOHOLIC CIRRHOSIS OF LIVER WITH ASCITES (HCC): ICD-10-CM

## 2022-06-21 DIAGNOSIS — N18.6 ESRD (END STAGE RENAL DISEASE) ON DIALYSIS (HCC): ICD-10-CM

## 2022-06-21 DIAGNOSIS — Z79.4 TYPE 2 DIABETES MELLITUS WITH CHRONIC KIDNEY DISEASE ON CHRONIC DIALYSIS, WITH LONG-TERM CURRENT USE OF INSULIN (HCC): Primary | ICD-10-CM

## 2022-06-21 DIAGNOSIS — E11.22 TYPE 2 DIABETES MELLITUS WITH CHRONIC KIDNEY DISEASE ON CHRONIC DIALYSIS, WITH LONG-TERM CURRENT USE OF INSULIN (HCC): Primary | ICD-10-CM

## 2022-06-21 DIAGNOSIS — Z99.2 ESRD (END STAGE RENAL DISEASE) ON DIALYSIS (HCC): ICD-10-CM

## 2022-06-21 DIAGNOSIS — Z99.2 TYPE 2 DIABETES MELLITUS WITH CHRONIC KIDNEY DISEASE ON CHRONIC DIALYSIS, WITH LONG-TERM CURRENT USE OF INSULIN (HCC): Primary | ICD-10-CM

## 2022-06-21 DIAGNOSIS — E78.5 HYPERLIPIDEMIA, UNSPECIFIED HYPERLIPIDEMIA TYPE: ICD-10-CM

## 2022-06-21 DIAGNOSIS — K21.9 GASTROESOPHAGEAL REFLUX DISEASE, UNSPECIFIED WHETHER ESOPHAGITIS PRESENT: ICD-10-CM

## 2022-06-21 DIAGNOSIS — I10 HYPERTENSION, UNSPECIFIED TYPE: ICD-10-CM

## 2022-06-21 DIAGNOSIS — N18.6 TYPE 2 DIABETES MELLITUS WITH CHRONIC KIDNEY DISEASE ON CHRONIC DIALYSIS, WITH LONG-TERM CURRENT USE OF INSULIN (HCC): Primary | ICD-10-CM

## 2022-06-21 DIAGNOSIS — I25.119 CORONARY ARTERY DISEASE WITH ANGINA PECTORIS, UNSPECIFIED VESSEL OR LESION TYPE, UNSPECIFIED WHETHER NATIVE OR TRANSPLANTED HEART (HCC): ICD-10-CM

## 2022-06-21 PROCEDURE — 99214 OFFICE O/P EST MOD 30 MIN: CPT | Performed by: STUDENT IN AN ORGANIZED HEALTH CARE EDUCATION/TRAINING PROGRAM

## 2022-06-21 PROCEDURE — 3044F HG A1C LEVEL LT 7.0%: CPT | Performed by: STUDENT IN AN ORGANIZED HEALTH CARE EDUCATION/TRAINING PROGRAM

## 2022-06-21 RX ORDER — SUCRALFATE 1 G/1
1 TABLET ORAL 3 TIMES DAILY
Qty: 90 TABLET | Refills: 1 | Status: SHIPPED | OUTPATIENT
Start: 2022-06-21

## 2022-06-21 RX ORDER — TAMSULOSIN HYDROCHLORIDE 0.4 MG/1
0.4 CAPSULE ORAL
Qty: 90 CAPSULE | Refills: 0 | Status: SHIPPED | OUTPATIENT
Start: 2022-06-21 | End: 2022-08-02 | Stop reason: SDUPTHER

## 2022-06-21 RX ORDER — AMLODIPINE BESYLATE 10 MG/1
TABLET ORAL
Qty: 90 TABLET | Refills: 0 | Status: SHIPPED | OUTPATIENT
Start: 2022-06-21

## 2022-06-21 RX ORDER — SEVELAMER HYDROCHLORIDE 800 MG/1
800 TABLET, FILM COATED ORAL
COMMUNITY

## 2022-06-21 RX ORDER — FUROSEMIDE 80 MG/1
80 TABLET ORAL DAILY
COMMUNITY

## 2022-06-21 RX ORDER — ISOSORBIDE MONONITRATE 60 MG/1
60 TABLET, EXTENDED RELEASE ORAL DAILY
Qty: 30 TABLET | Refills: 0 | Status: SHIPPED | OUTPATIENT
Start: 2022-06-21

## 2022-06-21 RX ORDER — PANTOPRAZOLE SODIUM 40 MG/1
40 TABLET, DELAYED RELEASE ORAL DAILY
Qty: 90 TABLET | Refills: 0 | Status: SHIPPED | OUTPATIENT
Start: 2022-06-21

## 2022-06-21 NOTE — PROGRESS NOTES
Assessment/Plan:     Diagnoses and all orders for this visit:    1. Type 2 diabetes mellitus with chronic kidney disease on chronic dialysis, with long-term current use of insulin ([facility])  -     METABOLIC PANEL, COMPREHENSIVE; Future  -     CBC WITH AUTOMATED DIFF; Future  -     HEMOGLOBIN A1C WITH EAG; Future  -Chronic. Stable. Last A1c appears to be 6.4  -Continue on current insulin regimen which includes Lantus 15 units daily and Humalog on sliding scale  -Advised patient to monitor for hypoglycemia.  -Will repeat lab work today.  -Patient has an appointment already scheduled for his annual eye exam which is recommended. 2. Hyperlipidemia, unspecified hyperlipidemia type  -     LIPID PANEL; Future  -Chronic. Will repeat lipid panel today.  -Especially in the setting of CAD patient to continue on Crestor 10 mg daily. 3. Hypertension, unspecified type  -     METABOLIC PANEL, COMPREHENSIVE; Future  -     CBC WITH AUTOMATED DIFF; Future  -     amLODIPine (NORVASC) 10 mg tablet; Take 0.5 tab in AM and 0.5 tab in PM  -Chronic. Uncontrolled. Asymptomatic. BP in office elevated today due to being out of a couple of his blood pressure medications.  -Patient is taking hydralazine 100 mg 3 times daily and Coreg 12.5 mg twice daily  -Refilled patient's amlodipine 5 mg twice daily (how patient has been taking his medication) and Imdur ER 60 mg daily  -Patient has been off of Inspra.  -Patient advised to monitor his blood pressure closely including at dialysis. Patient to call office at end of week to notify us how his blood pressures are on the refill medication given difficulty getting into office on dialysis days. Patient also to follow-up at the beginning of next week for reevaluation  -Discussed ED precautions and reviewed signs and symptoms of when he would need to present to the ED. Patient stated understanding. 4. ESRD (end stage renal disease) on dialysis ([facility] [address]  -Chronic.   Followed by nephrology.  -Is currently on dialysis M-W-F  -Has dialysis scheduled for tomorrow.  -Patient advised to review medications with nephrologist especially his diuretics given he states he has been taking Lasix and Bumex and patient seems confused about what and how often he should be taking it. 5. Alcoholic cirrhosis of liver with ascites (HCC)  -History of cirrhosis. Patient states he has a hepatologist.  -Given it has been sometime since patient states he is followed up with hepatology encourage patient to schedule appointment. 6. Gastroesophageal reflux disease, unspecified whether esophagitis present  -     pantoprazole (PROTONIX) 40 mg tablet; Take 1 Tablet by mouth daily. -     sucralfate (CARAFATE) 1 gram tablet; Take 1 Tablet by mouth three (3) times daily.  -Chronic. It appears per hospital notes in the past that on 11/21 he had EGD noting erosive esophagitis without bleeding  -Patient to continue on pantoprazole and Carafate    7. Coronary artery disease with angina pectoris, unspecified vessel or lesion type, unspecified whether native or transplanted heart (HCC)  -     isosorbide mononitrate ER (IMDUR) 60 mg CR tablet; Take 1 Tablet by mouth daily.  -Patient has a history of CAD and is supposed to follow with cardiology next week. -Most recent hospital notes state patient to take aspirin 81 mg daily and Plavix daily however patient is currently only taking aspirin daily.  -Patient to clarify medication needs with cardiology including isosorbide mononitrate.  -Patient encouraged to keep his cardiology follow-up next week. -ED precautions reviewed    8. Benign prostatic hyperplasia, unspecified whether lower urinary tract symptoms present  -     tamsulosin (FLOMAX) 0.4 mg capsule; Take 1 Capsule by mouth nightly.  -Chronic. Stable. Will refill tamsulosin today. *Of note reviewed medication list with patient and try to clarify dosages.   Advised patient to follow-up with all specialists and clarify medication prescription and what provider will be prescribing which medication. Will have close follow-up with patient to further review. Follow-up and Dispositions    · Return in about 5 days (around 6/26/2022), or if symptoms worsen or fail to improve. Discussed expected course/resolution/complications of diagnosis in detail with patient. Medication risks/benefits/costs/interactions/alternatives discussed with patient. Pt expressed understanding with the diagnosis and plan        Subjective:      Cheyanne Leon is a 64 y.o. male who presents for had concerns including Follow-up (DIABETES). Current Outpatient Medications   Medication Sig Dispense Refill    sevelamer (RENAGEL) 800 mg tablet Take 800 mg by mouth three (3) times daily (with meals).  amLODIPine (NORVASC) 10 mg tablet Take 0.5 tab in AM and 0.5 tab in PM 90 Tablet 0    pantoprazole (PROTONIX) 40 mg tablet Take 1 Tablet by mouth daily. 90 Tablet 0    sucralfate (CARAFATE) 1 gram tablet Take 1 Tablet by mouth three (3) times daily. 90 Tablet 1    tamsulosin (FLOMAX) 0.4 mg capsule Take 1 Capsule by mouth nightly. 90 Capsule 0    isosorbide mononitrate ER (IMDUR) 60 mg CR tablet Take 1 Tablet by mouth daily. 30 Tablet 0    furosemide (Lasix) 80 mg tablet Take 80 mg by mouth daily.  Insulin Needles, Disposable, 31 gauge x 5/16\" ndle Use as directed with insulin DM2 100 Each 11    acetaminophen (Tylenol Extra Strength) 500 mg tablet Take 500 mg by mouth every six (6) hours as needed for Pain or Fever.  aspirin delayed-release 81 mg tablet Take 81 mg by mouth daily.  bumetanide (BUMEX) 1 mg tablet Take 1 mg by mouth two (2) times a day. (Patient not taking: Reported on 6/21/2022)      clopidogreL (Plavix) 75 mg tab Take 75 mg by mouth daily. (Patient not taking: Reported on 6/21/2022)      gabapentin (NEURONTIN) 300 mg capsule Take 300 mg by mouth daily. Take after dialysis on dialysis days. Patient taking this TID.       insulin glargine (LANTUS,BASAGLAR) 100 unit/mL (3 mL) inpn 15 Units by SubCUTAneous route nightly.  insulin aspart U-100 (NovoLOG Flexpen U-100 Insulin) 100 unit/mL (3 mL) inpn by SubCUTAneous route Before breakfast, lunch, and dinner. -180    2 units  -220    4 units   -260    6 units  -300    8 units  -350    10 units      senna (Senna) 8.6 mg tablet Take 2 Tablets by mouth nightly.  polyethylene glycol (Miralax) 17 gram/dose powder Take 17 g by mouth daily as needed for Constipation.  hydrALAZINE (APRESOLINE) 50 mg tablet Take 1 Tablet by mouth three (3) times daily. (Patient taking differently: Take 100 mg by mouth three (3) times daily.) 90 Tablet 0    busPIRone (BUSPAR) 10 mg tablet Take 10 mg by mouth two (2) times a day.  carvediloL (Coreg) 12.5 mg tablet Take 12.5 mg by mouth two (2) times daily (with meals). Hold if top number below 100      docusate sodium (Colace) 100 mg capsule Take 1 Capsule by mouth two (2) times a day.  ferrous sulfate 325 mg (65 mg iron) tablet Take 1 Tablet by mouth daily.  rosuvastatin (CRESTOR) 10 mg tablet Take 10 mg by mouth Every morning.  eplerenone (Inspra) 25 mg tablet Take 25 mg by mouth daily.  (Patient not taking: Reported on 6/21/2022)         No Known Allergies  Past Medical History:   Diagnosis Date    Anemia associated with chronic renal failure     Anxiety and depression     BPH (benign prostatic hyperplasia)     CAD (coronary artery disease)     CHF NYHA class I, chronic, diastolic (HCC)     Chronic pain     DM type 2 causing renal disease ([address]     DM type 2 causing vascular disease ([address]     ESRD on dialysis ([address]     GERD (gastroesophageal reflux disease)     HTN (hypertension)     Hyperlipidemia     Thrombocytopenia (HCC)      Past Surgical History:   Procedure Laterality Date    HX ARTERIAL BYPASS      HX OTHER SURGICAL      5th toe Metatarsal amputation 12/2017     IR INSERT NON TUNL CVC OVER 5 YRS  2021    IR INSERT TUNL CVC W/O PORT OVER 5 YR  2021     Family History   Problem Relation Age of Onset    Diabetes Mother     No Known Problems Father      Social History     Socioeconomic History    Marital status:      Spouse name: Not on file    Number of children: Not on file    Years of education: Not on file    Highest education level: Not on file   Occupational History    Not on file   Tobacco Use    Smoking status: Former Smoker     Quit date: 2001     Years since quittin.0    Smokeless tobacco: Never Used   Vaping Use    Vaping Use: Never used   Substance and Sexual Activity    Alcohol use: No    Drug use: No    Sexual activity: Yes     Partners: Female     Birth control/protection: Condom   Other Topics Concern    Not on file   Social History Narrative    Not on file     Social Determinants of Health     Financial Resource Strain:     Difficulty of Paying Living Expenses: Not on file   Food Insecurity:     Worried About [address] in the Last Year: Not on file    [address] in the Last Year: Not on file   Transportation Needs:     Lack of Transportation (Medical): Not on file    Lack of Transportation (Non-Medical):  Not on file   Physical Activity:     Days of Exercise per Week: Not on file    Minutes of Exercise per Session: Not on file   Stress:     Feeling of Stress : Not on file   Social Connections:     Frequency of Communication with Friends and Family: Not on file    Frequency of Social Gatherings with Friends and Family: Not on file    Attends Mandaeism Services: Not on file    Active Member of Clubs or Organizations: Not on file    Attends Club or Organization Meetings: Not on file    Marital Status: Not on file   Intimate Partner Violence:     Fear of Current or Ex-Partner: Not on file    Emotionally Abused: Not on file    Physically Abused: Not on file    Sexually Abused: Not on file   Housing Stability:     Unable to Pay for Housing in the Last Year: Not on file    Number of Places Lived in the Last Year: Not on file    Unstable Housing in the Last Year: Not on file       Patient is a 65 yo male who presents to the office today for follow-up of his diabetes. Of note patient is currently followed by several specialists which include nephrology, cardiology, and hepatology. Patient has a history of end-stage renal disease and is currently on dialysis on Monday, Wednesday and Fridays. Patient states he has currently been compliant with his dialysis although in the past if he has missed dialysis he will develop fluid overload. Patient also is supposed to be followed by cardiology for his heart failure and coronary artery disease. He has been in and out of the hospital so much recently he has not been able to see his cardiologist recently although is scheduled for next week. Patient also states he has a history of cirrhosis and does have hepatologist but has not been to see them recently. Patient was advised to schedule follow-up with his hepatologist.  Patient presents today for follow-up of his blood pressure and diabetes. Patient has a history of hypertension and states currently he has been taking hydralazine 100 mg 3 times daily, Coreg 12.5 mg twice daily and is supposed to be on amlodipine 10 mg daily as well as Imdur 60 mg daily however he has been out of these medications for about a week. He states at dialysis they have noted his blood pressure to be about in the 762P systolic and therefore recommended he follow-up with his PCP for further evaluation. Patient denies any acute vision changes, shortness of breath, chest pain, headache, dizziness, nausea vomiting or numbness tingling or weakness. He is currently asymptomatic.     Diabetes Follow-up:   -----------------------------  Home glucose Readings - Fasting glucose  and on average takes 2-4 units of humalog before meals based on his sliding scale  Hypoglycemia - Rarely - had a glucose in the 70s last week but otherwise does not frequently get low sugars    Current Medications - Lantus 15 units daily and sliding scale with Humalog   Yearly eye exam - Due for eye exam, has this currently scheduled   Microalbumin/Cr - On dialysis   Statin Therapy - Crestor       Of note many of the patient's prescriptions appear to be filled from his numerous hospital stays. Patient has confusion about what he should be taking and who is filling the prescriptions. He has several empty bottles and appears has not been following up with PCP for refills recently. There is confusion over what diuretic he should be taking. Patient states that he is taking Lasix and Bumex. His diuretics are currently managed by nephrology therefore advised patient to clarify with nephrologist when he is in dialysis tomorrow to ensure he is on the correct medications. Advised patient to bring in his medications to appointments including with his specialists. Patient has not been taking Inspra. He is listed as taking gabapentin 300 mg 3 times daily. ROS:   Review of Systems   Constitutional: Negative for chills and fever. Fatigue    HENT: Negative for congestion. Eyes: Negative for double vision. Respiratory: Negative for cough and shortness of breath. Cardiovascular: Negative for chest pain, palpitations and leg swelling. Gastrointestinal: Negative for abdominal pain, nausea and vomiting. Genitourinary: Negative for dysuria. Neurological: Negative for dizziness, tingling, weakness and headaches. Objective:     Visit Vitals  BP (!) 184/84 (BP 1 Location: Left arm, BP Patient Position: Sitting, BP Cuff Size: Adult)   Pulse 81   Temp 98.3 °F (36.8 °C) (Temporal)   Resp 17   Ht 5' 7\" (1.702 m)   Wt 159 lb 8 oz (72.3 kg)   SpO2 99%   BMI 24.98 kg/m²         Vitals and Nurse Documentation reviewed.      Physical Exam  Constitutional:       General: He is not in acute distress. Appearance: Normal appearance. He is not toxic-appearing. HENT:      Head: Normocephalic and atraumatic. Eyes:      Conjunctiva/sclera: Conjunctivae normal.   Cardiovascular:      Rate and Rhythm: Normal rate and regular rhythm. Pulses: Normal pulses. Heart sounds: Normal heart sounds. No murmur heard. No gallop. Pulmonary:      Effort: Pulmonary effort is normal. No respiratory distress. Breath sounds: Normal breath sounds. No wheezing or rhonchi. Abdominal:      General: Bowel sounds are normal. There is no distension. Palpations: Abdomen is soft. Tenderness: There is no abdominal tenderness. There is no guarding. Musculoskeletal:      Right lower leg: No edema. Left lower leg: No edema. Neurological:      Mental Status: He is alert and oriented to person, place, and time.       Gait: Gait normal.

## 2022-06-21 NOTE — PROGRESS NOTES
Chief Complaint   Patient presents with    Follow-up     DIABETES     1. Have you been to the ER, urgent care clinic since your last visit? [facility] for skin on foot. Hospitalized since your last visit? No       2. Have you seen or consulted any other health care providers outside of the [address] [place] [address] since your last visit? Include any pap smears or colon screening.   No

## 2022-06-22 LAB
ALBUMIN SERPL-MCNC: 2.9 G/DL (ref 3.5–5)
ALBUMIN/GLOB SERPL: 0.7 {RATIO} (ref 1.1–2.2)
ALP SERPL-CCNC: 164 U/L (ref 45–117)
ALT SERPL-CCNC: 17 U/L (ref 12–78)
ANION GAP SERPL CALC-SCNC: 7 MMOL/L (ref 5–15)
AST SERPL-CCNC: 9 U/L (ref 15–37)
BASOPHILS # BLD: 0 K/UL (ref 0–0.1)
BASOPHILS NFR BLD: 0 % (ref 0–1)
BILIRUB SERPL-MCNC: 0.3 MG/DL (ref 0.2–1)
BUN SERPL-MCNC: 41 MG/DL (ref 6–20)
BUN/CREAT SERPL: 11 (ref 12–20)
CALCIUM SERPL-MCNC: 8.5 MG/DL (ref 8.5–10.1)
CHLORIDE SERPL-SCNC: 98 MMOL/L (ref 97–108)
CHOLEST SERPL-MCNC: 117 MG/DL
CO2 SERPL-SCNC: 29 MMOL/L (ref 21–32)
CREAT SERPL-MCNC: 3.6 MG/DL (ref 0.7–1.3)
DIFFERENTIAL METHOD BLD: ABNORMAL
EOSINOPHIL # BLD: 0.2 K/UL (ref 0–0.4)
EOSINOPHIL NFR BLD: 4 % (ref 0–7)
ERYTHROCYTE [DISTWIDTH] IN BLOOD BY AUTOMATED COUNT: 15 % (ref 11.5–14.5)
EST. AVERAGE GLUCOSE BLD GHB EST-MCNC: 180 MG/DL
GLOBULIN SER CALC-MCNC: 4.3 G/DL (ref 2–4)
GLUCOSE SERPL-MCNC: 453 MG/DL (ref 65–100)
HBA1C MFR BLD: 7.9 % (ref 4–5.6)
HCT VFR BLD AUTO: 33.4 % (ref 36.6–50.3)
HDLC SERPL-MCNC: 42 MG/DL
HDLC SERPL: 2.8 {RATIO} (ref 0–5)
HGB BLD-MCNC: 10.5 G/DL (ref 12.1–17)
IMM GRANULOCYTES # BLD AUTO: 0 K/UL (ref 0–0.04)
IMM GRANULOCYTES NFR BLD AUTO: 0 % (ref 0–0.5)
LDLC SERPL CALC-MCNC: 20 MG/DL (ref 0–100)
LYMPHOCYTES # BLD: 0.8 K/UL (ref 0.8–3.5)
LYMPHOCYTES NFR BLD: 14 % (ref 12–49)
MCH RBC QN AUTO: 30.3 PG (ref 26–34)
MCHC RBC AUTO-ENTMCNC: 31.4 G/DL (ref 30–36.5)
MCV RBC AUTO: 96.5 FL (ref 80–99)
MONOCYTES # BLD: 0.5 K/UL (ref 0–1)
MONOCYTES NFR BLD: 9 % (ref 5–13)
NEUTS SEG # BLD: 3.9 K/UL (ref 1.8–8)
NEUTS SEG NFR BLD: 73 % (ref 32–75)
NRBC # BLD: 0 K/UL (ref 0–0.01)
NRBC BLD-RTO: 0 PER 100 WBC
PLATELET # BLD AUTO: 189 K/UL (ref 150–400)
PMV BLD AUTO: 10.7 FL (ref 8.9–12.9)
POTASSIUM SERPL-SCNC: 4.8 MMOL/L (ref 3.5–5.1)
PROT SERPL-MCNC: 7.2 G/DL (ref 6.4–8.2)
RBC # BLD AUTO: 3.46 M/UL (ref 4.1–5.7)
RBC MORPH BLD: ABNORMAL
RBC MORPH BLD: ABNORMAL
SODIUM SERPL-SCNC: 134 MMOL/L (ref 136–145)
TRIGL SERPL-MCNC: 275 MG/DL (ref ?–150)
VLDLC SERPL CALC-MCNC: 55 MG/DL
WBC # BLD AUTO: 5.4 K/UL (ref 4.1–11.1)

## 2022-07-11 ENCOUNTER — NURSE TRIAGE (OUTPATIENT)
Dept: OTHER | Facility: CLINIC | Age: 62
End: 2022-07-11

## 2022-07-11 NOTE — TELEPHONE ENCOUNTER
Received call from [place] at [facility] with Red Flag Complaint. Subjective: Caller states \"I am feeling weak and I am stumbling when I am walking. If I walk I have to use my walker. I had chills and sweats. I was dc on Saturday after falling and I had an infection on foot and they gave me antibiotics. I was supposed to have dialysis today but I called and told them my sx and they told me to call my doctor and to cancel for today until I am seen by my doctor or at the hospital.\"     Current Symptoms: Reports chills, sweats, weakness, back pain, R foot pain/skin peeling - seen in hospital for this and dc, dizziness with standing/walking, nausea, vomiting a few times. Denies R foot injury, unilateral weakness, LOC, palpitations, CP, SOB, D/C, vomiting blood, blood in stool, melena, reduced appetite, reduced fluid intake. Onset: 1 day ago; worsening    Associated Symptoms: NA    Pain Severity: 8/10; aching; intermittent    Temperature: denies known fever - chills    What has been tried: nothing    LMP: NA Pregnant: NA    Recommended disposition: Go to Office [facility]    Care advice provided, patient verbalizes understanding; denies any other questions or concerns; instructed to call back for any new or worsening symptoms. Patient/Caller agrees with recommended disposition; writer provided warm transfer to [place] [facility] for appointment scheduling    Attention Provider: Thank you for allowing me to participate in the care of your patient. The patient was connected to triage in response to information provided to the [facility]. Please do not respond through this encounter as the response is not directed to a shared pool.     Reason for Disposition   MODERATE weakness (i.e., interferes with work, school, normal activities) and cause unknown (Exceptions: weakness with acute minor illness, or weakness from poor fluid intake)    Protocols used: WEAKNESS (GENERALIZED) AND FATIGUE-ADULT-OH

## 2022-07-12 ENCOUNTER — VIRTUAL VISIT (OUTPATIENT)
Dept: FAMILY MEDICINE CLINIC | Age: 62
End: 2022-07-12
Payer: MEDICAID

## 2022-07-12 VITALS — SYSTOLIC BLOOD PRESSURE: 142 MMHG | DIASTOLIC BLOOD PRESSURE: 66 MMHG

## 2022-07-12 DIAGNOSIS — R42 DIZZINESS: ICD-10-CM

## 2022-07-12 DIAGNOSIS — L08.9 SKIN INFECTION: Primary | ICD-10-CM

## 2022-07-12 PROCEDURE — 99443 PR PHYS/QHP TELEPHONE EVALUATION 21-30 MIN: CPT | Performed by: NURSE PRACTITIONER

## 2022-07-12 RX ORDER — CEPHALEXIN 500 MG/1
500 CAPSULE ORAL 3 TIMES DAILY
COMMUNITY
Start: 2022-07-09 | End: 2022-07-29

## 2022-07-12 NOTE — PROGRESS NOTES
Arline Bridges is a 64 y.o. male, evaluated via audio-only technology on 7/12/2022 for Hypertension  . Assessment & Plan:   Diagnoses and all orders for this visit:    1. Skin infection    2. Dizziness    Patient was seen by telephone encounter. Patient reports over the weekend he had an episode of dizziness and also went to the emergency room for infection on his right foot. He was given antibiotics. He reports that he is eating and drinking. His last labs had elevated blood sugar and A1c he has not done follow-up with Dr. Garett Yoon of asked him to schedule appointment with her. He did not go to dialysis on Friday or Monday. He was able to take his blood pressure and was just slightly elevated today I have asked him to go to dialysis tomorrow to be evaluated by the nephrologist and have new labs done he should continue to hydrate well. He has no other complaints. He reports that he has a follow-up this week for his foot wound. He has no other concerns or needs at this time. 12  Subjective:       Prior to Admission medications    Medication Sig Start Date End Date Taking? Authorizing Provider   cephALEXin (KEFLEX) 500 mg capsule Take 500 mg by mouth three (3) times daily. 7/9/22   Provider, Historical   sevelamer (RENAGEL) 800 mg tablet Take 800 mg by mouth three (3) times daily (with meals). Provider, Historical   amLODIPine (NORVASC) 10 mg tablet Take 0.5 tab in AM and 0.5 tab in PM 6/21/22   Kellen Sanchez MD   pantoprazole (PROTONIX) 40 mg tablet Take 1 Tablet by mouth daily. 6/21/22   Kellen Sanchez MD   sucralfate (CARAFATE) 1 gram tablet Take 1 Tablet by mouth three (3) times daily. 6/21/22   Kellen Sanchez MD   tamsulosin [facility]) 0.4 mg capsule Take 1 Capsule by mouth nightly. 6/21/22   Kellen Sanchez MD   isosorbide mononitrate ER (IMDUR) 60 mg CR tablet Take 1 Tablet by mouth daily.  6/21/22   Kellen Sanchez MD   furosemide (Lasix) 80 mg tablet Take 80 mg by mouth daily. Provider, Historical   Insulin Needles, Disposable, 31 gauge x 5/16\" ndle Use as directed with insulin DM2 4/6/22   Renata Dodge MD   acetaminophen (Tylenol Extra Strength) 500 mg tablet Take 500 mg by mouth every six (6) hours as needed for Pain or Fever. Provider, Historical   aspirin delayed-release 81 mg tablet Take 81 mg by mouth daily. Provider, Historical   bumetanide (BUMEX) 1 mg tablet Take 1 mg by mouth two (2) times a day. Patient not taking: Reported on 6/21/2022    Provider, Historical   clopidogreL (Plavix) 75 mg tab Take 75 mg by mouth daily. Patient not taking: Reported on 6/21/2022    Provider, Historical   gabapentin (NEURONTIN) 300 mg capsule Take 300 mg by mouth daily. Take after dialysis on dialysis days. Patient taking this TID. Provider, Historical   insulin glargine (LANTUS,BASAGLAR) 100 unit/mL (3 mL) inpn 15 Units by SubCUTAneous route nightly. Provider, Historical   insulin aspart U-100 (NovoLOG Flexpen U-100 Insulin) 100 unit/mL (3 mL) inpn by SubCUTAneous route Before breakfast, lunch, and dinner. -180    2 units  -220    4 units   -260    6 units  -300    8 units  -350    10 units    Provider, Historical   senna (Senna) 8.6 mg tablet Take 2 Tablets by mouth nightly. Provider, Historical   polyethylene glycol (Miralax) 17 gram/dose powder Take 17 g by mouth daily as needed for Constipation. Provider, Historical   hydrALAZINE (APRESOLINE) 50 mg tablet Take 1 Tablet by mouth three (3) times daily. Patient taking differently: Take 100 mg by mouth three (3) times daily. 4/2/22   Gloria Hawkins MD   busPIRone (BUSPAR) 10 mg tablet Take 10 mg by mouth two (2) times a day. Provider, Historical   carvediloL (Coreg) 12.5 mg tablet Take 12.5 mg by mouth two (2) times daily (with meals).  Hold if top number below 100    Provider, Historical   docusate sodium (Colace) 100 mg capsule Take 1 Capsule by mouth two (2) times a day. 10/20/21   Provider, Historical   ferrous sulfate 325 mg (65 mg iron) tablet Take 1 Tablet by mouth daily. 4/14/21   Provider, Historical   rosuvastatin (CRESTOR) 10 mg tablet Take 10 mg by mouth Every morning. Provider, Historical   eplerenone (Inspra) 25 mg tablet Take 25 mg by mouth daily. Patient not taking: Reported on 6/21/2022    Provider, Historical     Patient Active Problem List   Diagnosis Code    Peripheral neuropathy G62.9    Type 2 diabetes mellitus with ophthalmic complication, with long-term current use of insulin (HCC) E11.39, Z79.4    Type 2 diabetes mellitus with diabetic neuropathy (HCC) E11.40    ESRD (end stage renal disease) on dialysis ([facility]) N18.6, Z99.2    Thrombocytopenia (HCC) D69.6    Cirrhosis (HCC) K74.60    Anxiety and depression F41.9, F32. A    CHF NYHA class I, chronic, diastolic (HCC) K01.11    DM type 2 causing renal disease (HCC) E11.29    BPH (benign prostatic hyperplasia) N40.0    CAD (coronary artery disease) I25.10    DM type 2 causing vascular disease (HCC) E11.59    GERD (gastroesophageal reflux disease) K21.9    HTN (hypertension) I10    Hyperlipidemia E78.5    Anemia associated with chronic renal failure N18.9, D63.1    Chronic pain G89.29    Pleural effusion J90     Patient Active Problem List    Diagnosis Date Noted    Pleural effusion 04/14/2022    Anxiety and depression     CHF NYHA class I, chronic, diastolic (HCC)     DM type 2 causing renal disease (HCC)     BPH (benign prostatic hyperplasia)     CAD (coronary artery disease)     DM type 2 causing vascular disease (HCC)     GERD (gastroesophageal reflux disease)     HTN (hypertension)     Hyperlipidemia     Anemia associated with chronic renal failure     Chronic pain     Cirrhosis ([facility] 04/01/2022    ESRD (end stage renal disease) on dialysis ([facility] 02/06/2022    Thrombocytopenia ([facility] 02/06/2022    Type 2 diabetes mellitus with diabetic neuropathy ([facility] 08/05/2019    Type 2 diabetes mellitus with ophthalmic complication, with long-term current use of insulin ([address] 11/25/2018    Peripheral neuropathy 11/14/2018     Current Outpatient Medications   Medication Sig Dispense Refill    cephALEXin (KEFLEX) 500 mg capsule Take 500 mg by mouth three (3) times daily.  sevelamer (RENAGEL) 800 mg tablet Take 800 mg by mouth three (3) times daily (with meals).  amLODIPine (NORVASC) 10 mg tablet Take 0.5 tab in AM and 0.5 tab in PM 90 Tablet 0    pantoprazole (PROTONIX) 40 mg tablet Take 1 Tablet by mouth daily. 90 Tablet 0    sucralfate (CARAFATE) 1 gram tablet Take 1 Tablet by mouth three (3) times daily. 90 Tablet 1    tamsulosin (FLOMAX) 0.4 mg capsule Take 1 Capsule by mouth nightly. 90 Capsule 0    isosorbide mononitrate ER (IMDUR) 60 mg CR tablet Take 1 Tablet by mouth daily. 30 Tablet 0    furosemide (Lasix) 80 mg tablet Take 80 mg by mouth daily.  Insulin Needles, Disposable, 31 gauge x 5/16\" ndle Use as directed with insulin DM2 100 Each 11    acetaminophen (Tylenol Extra Strength) 500 mg tablet Take 500 mg by mouth every six (6) hours as needed for Pain or Fever.  aspirin delayed-release 81 mg tablet Take 81 mg by mouth daily.  bumetanide (BUMEX) 1 mg tablet Take 1 mg by mouth two (2) times a day. (Patient not taking: Reported on 6/21/2022)      clopidogreL (Plavix) 75 mg tab Take 75 mg by mouth daily. (Patient not taking: Reported on 6/21/2022)      gabapentin (NEURONTIN) 300 mg capsule Take 300 mg by mouth daily. Take after dialysis on dialysis days. Patient taking this TID.  insulin glargine (LANTUS,BASAGLAR) 100 unit/mL (3 mL) inpn 15 Units by SubCUTAneous route nightly.  insulin aspart U-100 (NovoLOG Flexpen U-100 Insulin) 100 unit/mL (3 mL) inpn by SubCUTAneous route Before breakfast, lunch, and dinner.  -180    2 units  -220    4 units   -260    6 units  -300    8 units  -350    10 units      senna (Senna) 8.6 mg tablet Take 2 Tablets by mouth nightly.  polyethylene glycol (Miralax) 17 gram/dose powder Take 17 g by mouth daily as needed for Constipation.  hydrALAZINE (APRESOLINE) 50 mg tablet Take 1 Tablet by mouth three (3) times daily. (Patient taking differently: Take 100 mg by mouth three (3) times daily.) 90 Tablet 0    busPIRone (BUSPAR) 10 mg tablet Take 10 mg by mouth two (2) times a day.  carvediloL (Coreg) 12.5 mg tablet Take 12.5 mg by mouth two (2) times daily (with meals). Hold if top number below 100      docusate sodium (Colace) 100 mg capsule Take 1 Capsule by mouth two (2) times a day.  ferrous sulfate 325 mg (65 mg iron) tablet Take 1 Tablet by mouth daily.  rosuvastatin (CRESTOR) 10 mg tablet Take 10 mg by mouth Every morning.  eplerenone (Inspra) 25 mg tablet Take 25 mg by mouth daily. (Patient not taking: Reported on 6/21/2022)       No Known Allergies  Past Medical History:   Diagnosis Date    Anemia associated with chronic renal failure     Anxiety and depression     BPH (benign prostatic hyperplasia)     CAD (coronary artery disease)     CHF NYHA class I, chronic, diastolic (HCC)     Chronic pain     DM type 2 causing renal disease ([facility]     DM type 2 causing vascular disease ([facility] [facility]     ESRD on dialysis ([facility] [facility]     GERD (gastroesophageal reflux disease)     HTN (hypertension)     Hyperlipidemia     Thrombocytopenia (HCC)        Review of Systems   Constitutional: Negative for fever and malaise/fatigue. HENT: Negative for congestion, sinus pain and sore throat. Eyes: Negative. Respiratory: Negative for cough and shortness of breath. Cardiovascular: Negative for chest pain. Gastrointestinal: Negative for diarrhea, nausea and vomiting. Genitourinary: Negative for dysuria. Musculoskeletal: Negative. Skin:        Wound on foot , RT   Neurological: Negative for dizziness and headaches.    Endo/Heme/Allergies: Negative for environmental allergies. Psychiatric/Behavioral: Negative. No data recorded     Catha Rosas was evaluated through a patient-initiated, synchronous (real-time) audio only encounter. He (or guardian if applicable) is aware that it is a billable service, which includes applicable co-pays, with coverage as determined by his insurance carrier. This visit was conducted with the patient's (and/or Lanette Arteaga guardian's) verbal consent. He has not had a related appointment within my department in the past 7 days or scheduled within the next 24 hours. The patient was located in a state where the provider was licensed to provide care. The patient was located at: Home: [address] [place] [address]  The provider was located at:  Facility (Appt Department): [place] [phone]    Total Time: minutes: 21-30 minutes    Ernie Winters NP

## 2022-07-16 ENCOUNTER — HOSPITAL ENCOUNTER (INPATIENT)
Age: 62
LOS: 3 days | Discharge: HOME | DRG: 425 | End: 2022-07-19
Attending: EMERGENCY MEDICINE | Admitting: FAMILY MEDICINE
Payer: MEDICAID

## 2022-07-16 ENCOUNTER — APPOINTMENT (OUTPATIENT)
Dept: GENERAL RADIOLOGY | Age: 62
DRG: 425 | End: 2022-07-16
Attending: EMERGENCY MEDICINE
Payer: MEDICAID

## 2022-07-16 ENCOUNTER — APPOINTMENT (OUTPATIENT)
Dept: CT IMAGING | Age: 62
DRG: 425 | End: 2022-07-16
Attending: EMERGENCY MEDICINE
Payer: MEDICAID

## 2022-07-16 DIAGNOSIS — E11.59 DM TYPE 2 CAUSING VASCULAR DISEASE (HCC): ICD-10-CM

## 2022-07-16 DIAGNOSIS — N18.9 ANEMIA ASSOCIATED WITH CHRONIC RENAL FAILURE: ICD-10-CM

## 2022-07-16 DIAGNOSIS — D69.6 THROMBOCYTOPENIA (HCC): ICD-10-CM

## 2022-07-16 DIAGNOSIS — J81.0 ACUTE PULMONARY EDEMA (HCC): Primary | ICD-10-CM

## 2022-07-16 DIAGNOSIS — D63.1 ANEMIA ASSOCIATED WITH CHRONIC RENAL FAILURE: ICD-10-CM

## 2022-07-16 DIAGNOSIS — K21.9 GASTROESOPHAGEAL REFLUX DISEASE, UNSPECIFIED WHETHER ESOPHAGITIS PRESENT: ICD-10-CM

## 2022-07-16 DIAGNOSIS — N18.6 TYPE 2 DIABETES MELLITUS WITH CHRONIC KIDNEY DISEASE ON CHRONIC DIALYSIS, WITH LONG-TERM CURRENT USE OF INSULIN (HCC): ICD-10-CM

## 2022-07-16 DIAGNOSIS — N40.0 BENIGN PROSTATIC HYPERPLASIA, UNSPECIFIED WHETHER LOWER URINARY TRACT SYMPTOMS PRESENT: ICD-10-CM

## 2022-07-16 DIAGNOSIS — J90 PLEURAL EFFUSION: ICD-10-CM

## 2022-07-16 DIAGNOSIS — Z91.15 DIALYSIS PATIENT, NONCOMPLIANT (HCC): ICD-10-CM

## 2022-07-16 DIAGNOSIS — G63 POLYNEUROPATHY ASSOCIATED WITH UNDERLYING DISEASE (HCC): ICD-10-CM

## 2022-07-16 DIAGNOSIS — S91.301S OPEN WOUND OF RIGHT HEEL, SEQUELA: ICD-10-CM

## 2022-07-16 DIAGNOSIS — N18.6 ESRD (END STAGE RENAL DISEASE) ON DIALYSIS (HCC): ICD-10-CM

## 2022-07-16 DIAGNOSIS — Z99.2 ESRD (END STAGE RENAL DISEASE) ON DIALYSIS (HCC): ICD-10-CM

## 2022-07-16 DIAGNOSIS — Z99.2 TYPE 2 DIABETES MELLITUS WITH CHRONIC KIDNEY DISEASE ON CHRONIC DIALYSIS, WITH LONG-TERM CURRENT USE OF INSULIN (HCC): ICD-10-CM

## 2022-07-16 DIAGNOSIS — E11.22 TYPE 2 DIABETES MELLITUS WITH CHRONIC KIDNEY DISEASE ON CHRONIC DIALYSIS, WITH LONG-TERM CURRENT USE OF INSULIN (HCC): ICD-10-CM

## 2022-07-16 DIAGNOSIS — E11.36 TYPE 2 DIABETES MELLITUS WITH DIABETIC CATARACT, WITH LONG-TERM CURRENT USE OF INSULIN (HCC): ICD-10-CM

## 2022-07-16 DIAGNOSIS — Z79.4 TYPE 2 DIABETES MELLITUS WITH CHRONIC KIDNEY DISEASE ON CHRONIC DIALYSIS, WITH LONG-TERM CURRENT USE OF INSULIN (HCC): ICD-10-CM

## 2022-07-16 DIAGNOSIS — Z79.4 TYPE 2 DIABETES MELLITUS WITH DIABETIC CATARACT, WITH LONG-TERM CURRENT USE OF INSULIN (HCC): ICD-10-CM

## 2022-07-16 PROBLEM — R07.9 CHEST PAIN: Status: ACTIVE | Noted: 2022-07-16

## 2022-07-16 PROBLEM — M79.89 CALF SWELLING: Status: ACTIVE | Noted: 2022-07-16

## 2022-07-16 PROBLEM — S91.301A OPEN WOUND OF RIGHT HEEL: Status: ACTIVE | Noted: 2022-07-16

## 2022-07-16 LAB
ALBUMIN SERPL-MCNC: 2.7 G/DL (ref 3.5–5)
ALBUMIN/GLOB SERPL: 0.5 {RATIO} (ref 1.1–2.2)
ALP SERPL-CCNC: 116 U/L (ref 45–117)
ALT SERPL-CCNC: 11 U/L (ref 12–78)
ANION GAP SERPL CALC-SCNC: 13 MMOL/L (ref 5–15)
AST SERPL-CCNC: 11 U/L (ref 15–37)
BASOPHILS # BLD: 0 K/UL (ref 0–0.1)
BASOPHILS NFR BLD: 0 % (ref 0–1)
BILIRUB SERPL-MCNC: 0.3 MG/DL (ref 0.2–1)
BNP SERPL-MCNC: ABNORMAL PG/ML
BUN SERPL-MCNC: 71 MG/DL (ref 6–20)
BUN/CREAT SERPL: 12 (ref 12–20)
CALCIUM SERPL-MCNC: 8.6 MG/DL (ref 8.5–10.1)
CHLORIDE SERPL-SCNC: 101 MMOL/L (ref 97–108)
CO2 SERPL-SCNC: 19 MMOL/L (ref 21–32)
COMMENT, HOLDF: NORMAL
COVID-19 RAPID TEST, COVR: NOT DETECTED
CREAT SERPL-MCNC: 5.97 MG/DL (ref 0.7–1.3)
DIFFERENTIAL METHOD BLD: ABNORMAL
EOSINOPHIL # BLD: 0.1 K/UL (ref 0–0.4)
EOSINOPHIL NFR BLD: 1 % (ref 0–7)
ERYTHROCYTE [DISTWIDTH] IN BLOOD BY AUTOMATED COUNT: 14.7 % (ref 11.5–14.5)
GLOBULIN SER CALC-MCNC: 5.1 G/DL (ref 2–4)
GLUCOSE BLD STRIP.AUTO-MCNC: 119 MG/DL (ref 65–117)
GLUCOSE SERPL-MCNC: 307 MG/DL (ref 65–100)
HCT VFR BLD AUTO: 27.9 % (ref 36.6–50.3)
HGB BLD-MCNC: 9.1 G/DL (ref 12.1–17)
IMM GRANULOCYTES # BLD AUTO: 0 K/UL (ref 0–0.04)
IMM GRANULOCYTES NFR BLD AUTO: 0 % (ref 0–0.5)
LIPASE SERPL-CCNC: 271 U/L (ref 73–393)
LYMPHOCYTES # BLD: 0.5 K/UL (ref 0.8–3.5)
LYMPHOCYTES NFR BLD: 6 % (ref 12–49)
MAGNESIUM SERPL-MCNC: 2.2 MG/DL (ref 1.6–2.4)
MCH RBC QN AUTO: 28.8 PG (ref 26–34)
MCHC RBC AUTO-ENTMCNC: 32.6 G/DL (ref 30–36.5)
MCV RBC AUTO: 88.3 FL (ref 80–99)
MONOCYTES # BLD: 0.6 K/UL (ref 0–1)
MONOCYTES NFR BLD: 7 % (ref 5–13)
NEUTS SEG # BLD: 7.9 K/UL (ref 1.8–8)
NEUTS SEG NFR BLD: 86 % (ref 32–75)
NRBC # BLD: 0 K/UL (ref 0–0.01)
NRBC BLD-RTO: 0 PER 100 WBC
PLATELET # BLD AUTO: 155 K/UL (ref 150–400)
PMV BLD AUTO: 10.2 FL (ref 8.9–12.9)
POTASSIUM SERPL-SCNC: 4.6 MMOL/L (ref 3.5–5.1)
PROT SERPL-MCNC: 7.8 G/DL (ref 6.4–8.2)
RBC # BLD AUTO: 3.16 M/UL (ref 4.1–5.7)
RBC MORPH BLD: ABNORMAL
SAMPLES BEING HELD,HOLD: NORMAL
SERVICE CMNT-IMP: ABNORMAL
SODIUM SERPL-SCNC: 133 MMOL/L (ref 136–145)
SOURCE, COVRS: NORMAL
TROPONIN-HIGH SENSITIVITY: 30 NG/L (ref 0–76)
TROPONIN-HIGH SENSITIVITY: 36 NG/L (ref 0–76)
WBC # BLD AUTO: 9.1 K/UL (ref 4.1–11.1)

## 2022-07-16 PROCEDURE — 74011250636 HC RX REV CODE- 250/636

## 2022-07-16 PROCEDURE — 96374 THER/PROPH/DIAG INJ IV PUSH: CPT

## 2022-07-16 PROCEDURE — 87340 HEPATITIS B SURFACE AG IA: CPT

## 2022-07-16 PROCEDURE — 85025 COMPLETE CBC W/AUTO DIFF WBC: CPT

## 2022-07-16 PROCEDURE — 74011250637 HC RX REV CODE- 250/637

## 2022-07-16 PROCEDURE — 83880 ASSAY OF NATRIURETIC PEPTIDE: CPT

## 2022-07-16 PROCEDURE — 90935 HEMODIALYSIS ONE EVALUATION: CPT

## 2022-07-16 PROCEDURE — 5A1D70Z PERFORMANCE OF URINARY FILTRATION, INTERMITTENT, LESS THAN 6 HOURS PER DAY: ICD-10-PCS | Performed by: INTERNAL MEDICINE

## 2022-07-16 PROCEDURE — 86706 HEP B SURFACE ANTIBODY: CPT

## 2022-07-16 PROCEDURE — 74011636637 HC RX REV CODE- 636/637

## 2022-07-16 PROCEDURE — 83735 ASSAY OF MAGNESIUM: CPT

## 2022-07-16 PROCEDURE — 83690 ASSAY OF LIPASE: CPT

## 2022-07-16 PROCEDURE — 71045 X-RAY EXAM CHEST 1 VIEW: CPT

## 2022-07-16 PROCEDURE — 80053 COMPREHEN METABOLIC PANEL: CPT

## 2022-07-16 PROCEDURE — 84484 ASSAY OF TROPONIN QUANT: CPT

## 2022-07-16 PROCEDURE — 96375 TX/PRO/DX INJ NEW DRUG ADDON: CPT

## 2022-07-16 PROCEDURE — 65270000046 HC RM TELEMETRY

## 2022-07-16 PROCEDURE — 99285 EMERGENCY DEPT VISIT HI MDM: CPT

## 2022-07-16 PROCEDURE — 36415 COLL VENOUS BLD VENIPUNCTURE: CPT

## 2022-07-16 PROCEDURE — 74176 CT ABD & PELVIS W/O CONTRAST: CPT

## 2022-07-16 PROCEDURE — 93005 ELECTROCARDIOGRAM TRACING: CPT

## 2022-07-16 PROCEDURE — 87635 SARS-COV-2 COVID-19 AMP PRB: CPT

## 2022-07-16 PROCEDURE — 82962 GLUCOSE BLOOD TEST: CPT

## 2022-07-16 PROCEDURE — 74011000250 HC RX REV CODE- 250

## 2022-07-16 PROCEDURE — 74011250636 HC RX REV CODE- 250/636: Performed by: EMERGENCY MEDICINE

## 2022-07-16 RX ORDER — HYDROMORPHONE HYDROCHLORIDE 1 MG/ML
0.5 INJECTION, SOLUTION INTRAMUSCULAR; INTRAVENOUS; SUBCUTANEOUS ONCE
Status: DISCONTINUED | OUTPATIENT
Start: 2022-07-16 | End: 2022-07-16

## 2022-07-16 RX ORDER — ONDANSETRON 2 MG/ML
4 INJECTION INTRAMUSCULAR; INTRAVENOUS
Status: DISCONTINUED | OUTPATIENT
Start: 2022-07-16 | End: 2022-07-19 | Stop reason: HOSPADM

## 2022-07-16 RX ORDER — INSULIN LISPRO 100 [IU]/ML
INJECTION, SOLUTION INTRAVENOUS; SUBCUTANEOUS
Status: DISCONTINUED | OUTPATIENT
Start: 2022-07-16 | End: 2022-07-19 | Stop reason: HOSPADM

## 2022-07-16 RX ORDER — ISOSORBIDE MONONITRATE 30 MG/1
60 TABLET, EXTENDED RELEASE ORAL DAILY
Status: DISCONTINUED | OUTPATIENT
Start: 2022-07-17 | End: 2022-07-19 | Stop reason: HOSPADM

## 2022-07-16 RX ORDER — BUSPIRONE HYDROCHLORIDE 10 MG/1
10 TABLET ORAL 2 TIMES DAILY
Status: DISCONTINUED | OUTPATIENT
Start: 2022-07-16 | End: 2022-07-19 | Stop reason: HOSPADM

## 2022-07-16 RX ORDER — TAMSULOSIN HYDROCHLORIDE 0.4 MG/1
0.4 CAPSULE ORAL
Status: DISCONTINUED | OUTPATIENT
Start: 2022-07-16 | End: 2022-07-19 | Stop reason: HOSPADM

## 2022-07-16 RX ORDER — GABAPENTIN 300 MG/1
300 CAPSULE ORAL DAILY
Status: DISCONTINUED | OUTPATIENT
Start: 2022-07-17 | End: 2022-07-19 | Stop reason: HOSPADM

## 2022-07-16 RX ORDER — AMLODIPINE BESYLATE 5 MG/1
10 TABLET ORAL DAILY
Status: DISCONTINUED | OUTPATIENT
Start: 2022-07-17 | End: 2022-07-19 | Stop reason: HOSPADM

## 2022-07-16 RX ORDER — ONDANSETRON 4 MG/1
4 TABLET, ORALLY DISINTEGRATING ORAL
Status: DISCONTINUED | OUTPATIENT
Start: 2022-07-16 | End: 2022-07-19 | Stop reason: HOSPADM

## 2022-07-16 RX ORDER — ROSUVASTATIN CALCIUM 10 MG/1
10 TABLET, COATED ORAL EVERY MORNING
Status: DISCONTINUED | OUTPATIENT
Start: 2022-07-17 | End: 2022-07-19 | Stop reason: HOSPADM

## 2022-07-16 RX ORDER — SEVELAMER CARBONATE 800 MG/1
800 TABLET, FILM COATED ORAL
Status: DISCONTINUED | OUTPATIENT
Start: 2022-07-17 | End: 2022-07-19 | Stop reason: HOSPADM

## 2022-07-16 RX ORDER — SODIUM CHLORIDE 0.9 % (FLUSH) 0.9 %
5-40 SYRINGE (ML) INJECTION AS NEEDED
Status: DISCONTINUED | OUTPATIENT
Start: 2022-07-16 | End: 2022-07-19 | Stop reason: HOSPADM

## 2022-07-16 RX ORDER — POLYETHYLENE GLYCOL 3350 17 G/17G
17 POWDER, FOR SOLUTION ORAL DAILY
Status: DISCONTINUED | OUTPATIENT
Start: 2022-07-16 | End: 2022-07-19 | Stop reason: HOSPADM

## 2022-07-16 RX ORDER — FENTANYL CITRATE 50 UG/ML
50 INJECTION, SOLUTION INTRAMUSCULAR; INTRAVENOUS ONCE
Status: COMPLETED | OUTPATIENT
Start: 2022-07-16 | End: 2022-07-16

## 2022-07-16 RX ORDER — PANTOPRAZOLE SODIUM 40 MG/1
40 TABLET, DELAYED RELEASE ORAL
Status: DISCONTINUED | OUTPATIENT
Start: 2022-07-17 | End: 2022-07-19 | Stop reason: HOSPADM

## 2022-07-16 RX ORDER — MAGNESIUM SULFATE 100 %
4 CRYSTALS MISCELLANEOUS AS NEEDED
Status: DISCONTINUED | OUTPATIENT
Start: 2022-07-16 | End: 2022-07-19 | Stop reason: HOSPADM

## 2022-07-16 RX ORDER — HYDROMORPHONE HYDROCHLORIDE 1 MG/ML
0.2 INJECTION, SOLUTION INTRAMUSCULAR; INTRAVENOUS; SUBCUTANEOUS ONCE
Status: ACTIVE | OUTPATIENT
Start: 2022-07-16 | End: 2022-07-17

## 2022-07-16 RX ORDER — HYDROMORPHONE HYDROCHLORIDE 1 MG/ML
0.5 INJECTION, SOLUTION INTRAMUSCULAR; INTRAVENOUS; SUBCUTANEOUS ONCE
Status: COMPLETED | OUTPATIENT
Start: 2022-07-16 | End: 2022-07-16

## 2022-07-16 RX ORDER — DEXTROSE MONOHYDRATE 100 MG/ML
0-250 INJECTION, SOLUTION INTRAVENOUS AS NEEDED
Status: DISCONTINUED | OUTPATIENT
Start: 2022-07-16 | End: 2022-07-19 | Stop reason: HOSPADM

## 2022-07-16 RX ORDER — SUCRALFATE 1 G/1
1 TABLET ORAL
Status: DISCONTINUED | OUTPATIENT
Start: 2022-07-17 | End: 2022-07-19 | Stop reason: HOSPADM

## 2022-07-16 RX ORDER — CARVEDILOL 12.5 MG/1
12.5 TABLET ORAL 2 TIMES DAILY WITH MEALS
Status: DISCONTINUED | OUTPATIENT
Start: 2022-07-16 | End: 2022-07-19 | Stop reason: HOSPADM

## 2022-07-16 RX ORDER — SODIUM CHLORIDE 0.9 % (FLUSH) 0.9 %
5-40 SYRINGE (ML) INJECTION EVERY 8 HOURS
Status: DISCONTINUED | OUTPATIENT
Start: 2022-07-16 | End: 2022-07-19 | Stop reason: HOSPADM

## 2022-07-16 RX ORDER — SEVELAMER HYDROCHLORIDE 800 MG/1
800 TABLET, FILM COATED ORAL
Status: DISCONTINUED | OUTPATIENT
Start: 2022-07-17 | End: 2022-07-16 | Stop reason: CLARIF

## 2022-07-16 RX ORDER — INSULIN GLARGINE 100 [IU]/ML
12 INJECTION, SOLUTION SUBCUTANEOUS
Status: DISCONTINUED | OUTPATIENT
Start: 2022-07-16 | End: 2022-07-19 | Stop reason: HOSPADM

## 2022-07-16 RX ORDER — HYDROMORPHONE HYDROCHLORIDE 1 MG/ML
0.2 INJECTION, SOLUTION INTRAMUSCULAR; INTRAVENOUS; SUBCUTANEOUS
Status: DISCONTINUED | OUTPATIENT
Start: 2022-07-16 | End: 2022-07-19 | Stop reason: HOSPADM

## 2022-07-16 RX ORDER — ACETAMINOPHEN 325 MG/1
650 TABLET ORAL
Status: DISCONTINUED | OUTPATIENT
Start: 2022-07-16 | End: 2022-07-19 | Stop reason: HOSPADM

## 2022-07-16 RX ORDER — HYDRALAZINE HYDROCHLORIDE 25 MG/1
50 TABLET, FILM COATED ORAL 3 TIMES DAILY
Status: DISCONTINUED | OUTPATIENT
Start: 2022-07-16 | End: 2022-07-19 | Stop reason: HOSPADM

## 2022-07-16 RX ORDER — DOCUSATE SODIUM 100 MG/1
100 CAPSULE, LIQUID FILLED ORAL 2 TIMES DAILY
Status: DISCONTINUED | OUTPATIENT
Start: 2022-07-16 | End: 2022-07-19 | Stop reason: HOSPADM

## 2022-07-16 RX ORDER — ASPIRIN 81 MG/1
81 TABLET ORAL DAILY
Status: DISCONTINUED | OUTPATIENT
Start: 2022-07-17 | End: 2022-07-19 | Stop reason: HOSPADM

## 2022-07-16 RX ORDER — HEPARIN SODIUM 5000 [USP'U]/ML
5000 INJECTION, SOLUTION INTRAVENOUS; SUBCUTANEOUS EVERY 8 HOURS
Status: DISCONTINUED | OUTPATIENT
Start: 2022-07-16 | End: 2022-07-19 | Stop reason: HOSPADM

## 2022-07-16 RX ORDER — ACETAMINOPHEN 650 MG/1
650 SUPPOSITORY RECTAL
Status: DISCONTINUED | OUTPATIENT
Start: 2022-07-16 | End: 2022-07-19 | Stop reason: HOSPADM

## 2022-07-16 RX ORDER — SENNOSIDES 8.6 MG/1
2 TABLET ORAL
Status: DISCONTINUED | OUTPATIENT
Start: 2022-07-16 | End: 2022-07-19 | Stop reason: HOSPADM

## 2022-07-16 RX ORDER — ONDANSETRON 2 MG/ML
4 INJECTION INTRAMUSCULAR; INTRAVENOUS
Status: COMPLETED | OUTPATIENT
Start: 2022-07-16 | End: 2022-07-16

## 2022-07-16 RX ADMIN — SENNOSIDES 17.2 MG: 8.6 TABLET, FILM COATED ORAL at 23:32

## 2022-07-16 RX ADMIN — HEPARIN SODIUM 5000 UNITS: 5000 INJECTION INTRAVENOUS; SUBCUTANEOUS at 23:32

## 2022-07-16 RX ADMIN — BUSPIRONE HYDROCHLORIDE 10 MG: 10 TABLET ORAL at 23:32

## 2022-07-16 RX ADMIN — Medication 12 UNITS: at 20:05

## 2022-07-16 RX ADMIN — DOCUSATE SODIUM 100 MG: 100 CAPSULE, LIQUID FILLED ORAL at 23:33

## 2022-07-16 RX ADMIN — FENTANYL CITRATE 50 MCG: 50 INJECTION, SOLUTION INTRAMUSCULAR; INTRAVENOUS at 13:53

## 2022-07-16 RX ADMIN — ONDANSETRON 4 MG: 2 INJECTION INTRAMUSCULAR; INTRAVENOUS at 14:15

## 2022-07-16 RX ADMIN — HYDROMORPHONE HYDROCHLORIDE 0.5 MG: 1 INJECTION, SOLUTION INTRAMUSCULAR; INTRAVENOUS; SUBCUTANEOUS at 15:54

## 2022-07-16 RX ADMIN — TAMSULOSIN HYDROCHLORIDE 0.4 MG: 0.4 CAPSULE ORAL at 23:32

## 2022-07-16 RX ADMIN — CARVEDILOL 12.5 MG: 12.5 TABLET, FILM COATED ORAL at 23:32

## 2022-07-16 RX ADMIN — HYDROMORPHONE HYDROCHLORIDE 0.2 MG: 1 INJECTION, SOLUTION INTRAMUSCULAR; INTRAVENOUS; SUBCUTANEOUS at 23:31

## 2022-07-16 RX ADMIN — HYDRALAZINE HYDROCHLORIDE 50 MG: 25 TABLET ORAL at 23:33

## 2022-07-16 RX ADMIN — Medication 10 ML: at 23:29

## 2022-07-16 NOTE — PROGRESS NOTES
[facility]   Senior Resident Admission Note    CC: Abdominal pain/SOB    HPI:  Ad Del Valle is a 64 y.o. male PMHx of known h/o ESRD on HD (M/W/F) HTN, HFpEF, T2DM, CAD s/p CABG (May 2020), h/o PEA arrest, Yesica Suarez presents for Abdominal pain and SOB. Pt mentioned he was not able to complete HD on Monday (7/11) d/t chest pain, went to [facility] and was told everything was fine. He missed HD on W and F due to feeling unwell and had not transportation. Stated having mild SOB, very mild CP and generalized abdominal pain, sharp type, intermittent, no radiated. Also mentioned has been having urinary frequency. Had an infection in R heel for the past 3 weeks and has been following with his Podiatrist who saw him on Thursday and per pt he was told lesion was cleaned, they applied some medication and was put on a cast w/ instructions to follow next week. Reports noticing RLE swelling for the past few days accompanied by some pain. Has not been mobilizing that much for the past week. Denies fever, dysuria, dizziness, hematuria. Chart reviewed. Patient seen, examined, and discussed with Dr. Wei Sequeira (PGY-1). See H&P note for more details. ED Course:  Vitals: T: 93.8 (likely bad reading)  HR 89  /88  RR 16  SpO2 97 on RA  Labs: WBC 9.1, Hgb 9. 1(BL:7-8), Plt 155 (BL:100), Na 133 (Corrected 136), Cr 5.97, Glu 307. Trop:30. Lipase: wnl. Imaging: CXR showing small bilateral pleural effusions and minimal pulmonary edema. CT A/P: NAP, Bilateral pleural effusions with underlying atelectasis left greater than right. Moderate fecal stasis. No acute intra-abdominal abnormality. EKG: EKG with normal sinus rhythm, rate 88, normal GA interval, normal QRS, nonspecific ST changes, normal QTC. Treatment: Zofran, Fentanyl 50 mcg x1, Dilaudid 0.5 mg x1. Physical Exam  General: No acute distress. Alert. Cooperative. Throat: Mucosa pink. Moist mucous membranes. Respiratory: Mild crackles in BL posterior bases w/ diminished BS. Cardiovascular: RRR. Normal S1,S2. No m/r/g.   GI: Mildly distended.+ bowel sounds. Mild TPP throughout. No rebound tenderness or guarding. Extremities: RLE: Cast up to mid-chin. ~ 10 cm area w/ some redness in the medial aspect below the knee. Mild swelling and calor up to below the knee, no TTP. Unable to evaluate foot. LLE No edema. Distal pulses present. Se picture in media. Skin: Warm, dry. No rashes. Neuro: Alert and oriented. A/P:  65 yo male PMHx of known h/o ESRD on HD, HTN, HFpEF, T2DM, CAD s/p CABG (May 2020), h/o PEA arrest, Cydney Penny was admitted for Hypertensive Emergency. Hypertensive Emergency: /88. Likely due to missed HD x1 week. Pt reports med compliance. CXR w/ pleural effusions and minimal pulmonary edema. Pro-BNP at BL. Trop:30. Expected to improve with HD.   - Admit to Telemetry  - Vitals per unit. - Tren Trops  - Home hydralazine 50 mg PO TID daily  - Home Coreg 12.5 mg PO BID daily  - Norvasc 10 mg PO daily   - Imdur 60 mg PO daily   - Hold home Bumex 1 mg BID daily and Lasix 80 mg PO daily (Pt confused about these med), please ask for Nephrology recs and clarification.      RLE swelling: Concerns for DVT given cast and poor mobilization. Low concern for cellulitis at this time in the absent of fever and white count. Unable to evaluate foot.   - BL Venous doppler.     - Wound care consulted  - Consider Podiatrist consult. ESRD on HD: HD on M/W/D.  - Nephrology consulted, appreciate recs. - HD scheduled for now. I agree with remaining assessment and plan as documented in Dr. Debra Baron note.     Pt discussed with Dr. Kylah Trinh (on-call attending physician)    Az Johnson MD  Family Medicine Resident

## 2022-07-16 NOTE — ED PROVIDER NOTES
Patient is here because of abdominal pain. He states that he has end-stage renal disease and gets hemodialysis every Monday Wednesday Friday. He has not had it since 8 days ago. He states that he beginning last Monday, approximately 6 days ago he began having upper abdominal pain. This started around the time he went to dialysis. He states that he went to an ED from dialysis where no acute process was found. He has been having worsening pain since then. He has not been able to go to dialysis since then. He denies any fevers or chills. Occasionally will have some substernal chest pressure. Occasional shortness of breath. Does admit to mild nausea but no vomiting. Abdominal pain is more in the epigastric area. It is sharp. Does not radiate. No diarrhea or constipation. No falls or trauma. No one else with similar symptoms. Still makes urine. Last time he urinated was about an hour ago. Did admit to mild dysuria. No leg pain or leg swelling. He states that his right lower extremity has been in a cast for about a month now because of a wound to his right heel.            Past Medical History:   Diagnosis Date    Anemia associated with chronic renal failure     Anxiety and depression     BPH (benign prostatic hyperplasia)     CAD (coronary artery disease)     CHF NYHA class I, chronic, diastolic (HCC)     Chronic pain     DM type 2 causing renal disease ([address]     DM type 2 causing vascular disease ([address]     ESRD on dialysis ([address]     GERD (gastroesophageal reflux disease)     HTN (hypertension)     Hyperlipidemia     Thrombocytopenia (HCC)        Past Surgical History:   Procedure Laterality Date    HX ARTERIAL BYPASS      HX OTHER SURGICAL      5th toe Metatarsal amputation 12/2017     IR INSERT NON TUNL CVC OVER 5 YRS  11/19/2021    IR INSERT TUNL CVC W/O PORT OVER 5 YR  11/24/2021         Family History:   Problem Relation Age of Onset    Diabetes Mother     No Known Problems Father        Social History     Socioeconomic History    Marital status:      Spouse name: Not on file    Number of children: Not on file    Years of education: Not on file    Highest education level: Not on file   Occupational History    Not on file   Tobacco Use    Smoking status: Former Smoker     Quit date: 2001     Years since quittin.1    Smokeless tobacco: Never Used   Vaping Use    Vaping Use: Never used   Substance and Sexual Activity    Alcohol use: No    Drug use: No    Sexual activity: Yes     Partners: Female     Birth control/protection: Condom   Other Topics Concern    Not on file   Social History Narrative    Not on file     Social Determinants of Health     Financial Resource Strain:     Difficulty of Paying Living Expenses: Not on file   Food Insecurity:     Worried About [employer] [employer] in the Last Year: Not on file    [address] [employer] in the Last Year: Not on file   Transportation Needs:     Lack of Transportation (Medical): Not on file    Lack of Transportation (Non-Medical):  Not on file   Physical Activity:     Days of Exercise per Week: Not on file    Minutes of Exercise per Session: Not on file   Stress:     Feeling of Stress : Not on file   Social Connections:     Frequency of Communication with Friends and Family: Not on file    Frequency of Social Gatherings with Friends and Family: Not on file    Attends Rastafarian Services: Not on file    Active Member of [address] [place] [address] or Organizations: Not on file    Attends Club or Organization Meetings: Not on file    Marital Status: Not on file   Intimate Partner Violence:     Fear of Current or Ex-Partner: Not on file    Emotionally Abused: Not on file    Physically Abused: Not on file    Sexually Abused: Not on file   Housing Stability:     Unable to Pay for Housing in the Last Year: Not on file    Number of [place] in the Last Year: Not on file    Unstable Housing in the Last Year: Not on file ALLERGIES: Patient has no known allergies. Review of Systems   Constitutional: Positive for fatigue. Negative for chills, diaphoresis and fever. HENT: Negative for congestion, rhinorrhea and sore throat. Eyes: Negative for discharge and itching. Respiratory: Positive for shortness of breath. Negative for cough and wheezing. Cardiovascular: Negative for chest pain, palpitations and leg swelling. Gastrointestinal: Positive for abdominal pain and nausea. Negative for constipation, diarrhea and vomiting. Genitourinary: Positive for dysuria. Negative for frequency. Musculoskeletal: Negative for arthralgias, back pain, gait problem, joint swelling, myalgias, neck pain and neck stiffness. Skin: Negative for color change, pallor, rash and wound. Neurological: Negative for dizziness, tremors, syncope, light-headedness and headaches. Vitals:    07/16/22 1339   BP: (!) 197/88   Pulse: 89   Resp: 16   Temp: (!) 93.8 °F (34.3 °C)   SpO2: 97%   Weight: 72 kg (158 lb 11.7 oz)   Height: 5' 8\" (1.727 m)            Physical Exam  Vitals and nursing note reviewed. Exam conducted with a chaperone present. Constitutional:       General: He is not in acute distress. Appearance: He is well-developed and normal weight. He is not ill-appearing, toxic-appearing or diaphoretic. HENT:      Head: Normocephalic and atraumatic. Mouth/Throat:      Mouth: Mucous membranes are moist.      Pharynx: Oropharynx is clear. No pharyngeal swelling or oropharyngeal exudate. Eyes:      General: No scleral icterus. Extraocular Movements: Extraocular movements intact. Pupils: Pupils are equal, round, and reactive to light. Cardiovascular:      Rate and Rhythm: Normal rate and regular rhythm. Heart sounds: Normal heart sounds. Pulmonary:      Effort: Pulmonary effort is normal.      Breath sounds: Normal breath sounds. Abdominal:      General: Abdomen is flat.       Palpations: Abdomen is soft.      Tenderness: There is abdominal tenderness in the epigastric area and periumbilical area. Negative signs include Contreras's sign, Rovsing's sign and McBurney's sign. Skin:     General: Skin is warm and dry. Capillary Refill: Capillary refill takes less than 2 seconds. Neurological:      General: No focal deficit present. Mental Status: He is alert and oriented to person, place, and time. Psychiatric:         Mood and Affect: Mood normal.         Behavior: Behavior normal.          [facility]  ED Course as of 07/16/22 1647   Sat Jul 16, 2022   1343 Patient examined. With hypertension. Does appear to be in pain. Does have tenderness in the epigastric area. Has not had dialysis in over a week. Will get EKG, labs, CT scan of the abdomen pelvis. May need emergent diaysis [AF]   1353 EKG with normal sinus rhythm, rate 88, normal NM interval, normal QRS, nonspecific ST changes, normal QTC. [AF]   1430 XR CHEST PORT [AF]   7369 [id number] RAPID TEST:    Specimen source Nasopharyngeal   COVID-19 rapid test Not detected [AF]   1430 CBC WITH AUTOMATED DIFF(!):    WBC 9.1   RBC 3.16(!)   HGB 9.1(!)   HCT 27.9(!)   MCV 88.3   MCH 28.8   MCHC 32.6   RDW 14.7(!)   PLATELET 995   MPV 12.5   NRBC 0.0   ABSOLUTE NRBC 0.00   NEUTROPHILS PENDING   LYMPHOCYTES PENDING   MONOCYTES PENDING   EOSINOPHILS PENDING   BASOPHILS PENDING   IMMATURE GRANULOCYTES PENDING   ABS. NEUTROPHILS PENDING   ABS. LYMPHOCYTES PENDING   ABS. MONOCYTES PENDING   ABS. EOSINOPHILS PENDING   ABS. BASOPHILS PENDING   ABS. IMM. GRANS.  PENDING   DF PENDING [AF]   2199 PRO-BNP(!):    NT pro-BNP 14,153(!) [AF]   1442 LIPASE:    Lipase 271 [AF]   1442 MAGNESIUM:    Magnesium 2.2 [AF]   1442 TROPONIN-HIGH SENSITIVITY:    Troponin-High Sensitivity 30 [AF]   1442 CBC WITH AUTOMATED DIFF(!):    WBC 9.1   RBC 3.16(!)   HGB 9.1(!)   HCT 27.9(!)   MCV 88.3   MCH 28.8   MCHC 32.6   RDW 14.7(!)   PLATELET 866   MPV 37.5   NRBC 0.0   ABSOLUTE NRBC 0.00 NEUTROPHILS 86(!)   LYMPHOCYTES 6(!)   MONOCYTES 7   EOSINOPHILS 1   BASOPHILS 0   IMMATURE GRANULOCYTES 0   ABS. NEUTROPHILS 7.9   ABS. LYMPHOCYTES 0.5(!)   ABS. MONOCYTES 0.6   ABS. EOSINOPHILS 0.1   ABS. BASOPHILS 0.0   ABS. IMM. GRANS. 0.0   DF SMEAR SCANNED   RBC COMMENTS HYPOCHROMIA  PRESENT   [AF]   1442 COMPREHENSIVE METABOLIC PANEL(!):    Sodium 133(!)   Potassium 4.6   Chloride 101   CO2 19(!)   Anion gap 13   Glucose 307(!)   BUN 71(!)   Creatinine 5.97(!)   BUN/Creatinine ratio 12   GFR est AA 12(!)   GFR est non-AA 10(!)   Calcium 8.6   Bilirubin, total 0.3   ALT 11(!)   AST 11(!)   Alk. phosphatase 116   Protein, total 7.8   Albumin 2.7(!)   Globulin 5.1(!)   A-G Ratio 0.5(!) [AF]   1516 CT ABD PELV WO CONT [AF]   3008 Nephrology to do dialysis. Will call for admission.  [AF]      ED Course User Index  [AF] DO Luiza Pendleton Serve Consult for Admission  4:46 PM    ED Room Number: ER20/20  Patient Name and age:  Jennifer Graves 64 y.o.  male  Working Diagnosis:   1. Acute pulmonary edema (HCC)    2. Pleural effusion    3. Dialysis patient, noncompliant ([address]        COVID-19 Suspicion:  no  Sepsis present:  no  Reassessment needed: yes  Code Status:  Full Code  Readmission: no  Isolation Requirements:  no  Recommended Level of Care:  med/surg  Department:[facility] ED - [phone]  Other: Patient has end-stage renal disease. Gets dialysis Monday Wednesday Friday. Has not had dialysis in the days. Here does have bilateral pleural effusions. Elevated proBNP. Nephrology has seen patient. They will dialyze patient.     Procedures

## 2022-07-16 NOTE — ED NOTES
Verbal shift change report given to Joel Driscoll (oncoming nurse) by Lilibeth Bautista RN (offgoing nurse). Report included the following information SBAR, Kardex, ED Summary, Intake/Output, MAR and Recent Results.

## 2022-07-16 NOTE — PROGRESS NOTES
Casandra Orozco  YOB: 1960      Assessment & Plan:     ESKD-HD MWF @ [facility] unit Yvonne Vick)  Volume overload  HTN > Uncontrolled  Anemia of CKD  Dietary and HD non compliance     -Plan for HD today for 3 hr  -HD order placed and acute [facility] notified           Subjective:   CC:f/u ESKD-HD presented to the ER with abdominal pain. His last HD 1 week ago and missed 3 HD treatment  HPI: Patient seen, c/o mild SOB and abdominal pain  ROS:  No current facility-administered medications for this encounter. Current Outpatient Medications   Medication Sig    cephALEXin (KEFLEX) 500 mg capsule Take 500 mg by mouth three (3) times daily.  sevelamer (RENAGEL) 800 mg tablet Take 800 mg by mouth three (3) times daily (with meals).  amLODIPine (NORVASC) 10 mg tablet Take 0.5 tab in AM and 0.5 tab in PM    pantoprazole (PROTONIX) 40 mg tablet Take 1 Tablet by mouth daily.  sucralfate (CARAFATE) 1 gram tablet Take 1 Tablet by mouth three (3) times daily.  tamsulosin (FLOMAX) 0.4 mg capsule Take 1 Capsule by mouth nightly.  isosorbide mononitrate ER (IMDUR) 60 mg CR tablet Take 1 Tablet by mouth daily.  furosemide (Lasix) 80 mg tablet Take 80 mg by mouth daily.  Insulin Needles, Disposable, 31 gauge x 5/16\" ndle Use as directed with insulin DM2    acetaminophen (Tylenol Extra Strength) 500 mg tablet Take 500 mg by mouth every six (6) hours as needed for Pain or Fever.  aspirin delayed-release 81 mg tablet Take 81 mg by mouth daily.  bumetanide (BUMEX) 1 mg tablet Take 1 mg by mouth two (2) times a day. (Patient not taking: Reported on 6/21/2022)    clopidogreL (Plavix) 75 mg tab Take 75 mg by mouth daily. (Patient not taking: Reported on 6/21/2022)    gabapentin (NEURONTIN) 300 mg capsule Take 300 mg by mouth daily. Take after dialysis on dialysis days. Patient taking this TID.  insulin glargine (LANTUS,BASAGLAR) 100 unit/mL (3 mL) inpn 15 Units by SubCUTAneous route nightly.  insulin aspart U-100 (NovoLOG Flexpen U-100 Insulin) 100 unit/mL (3 mL) inpn by SubCUTAneous route Before breakfast, lunch, and dinner. -180    2 units  -220    4 units   -260    6 units  -300    8 units  -350    10 units    senna (Senna) 8.6 mg tablet Take 2 Tablets by mouth nightly.  polyethylene glycol (Miralax) 17 gram/dose powder Take 17 g by mouth daily as needed for Constipation.  hydrALAZINE (APRESOLINE) 50 mg tablet Take 1 Tablet by mouth three (3) times daily. (Patient taking differently: Take 100 mg by mouth three (3) times daily.)    busPIRone (BUSPAR) 10 mg tablet Take 10 mg by mouth two (2) times a day.  carvediloL (Coreg) 12.5 mg tablet Take 12.5 mg by mouth two (2) times daily (with meals). Hold if top number below 100    docusate sodium (Colace) 100 mg capsule Take 1 Capsule by mouth two (2) times a day.  ferrous sulfate 325 mg (65 mg iron) tablet Take 1 Tablet by mouth daily.  rosuvastatin (CRESTOR) 10 mg tablet Take 10 mg by mouth Every morning.  eplerenone (Inspra) 25 mg tablet Take 25 mg by mouth daily. (Patient not taking: Reported on 2022)          Objective:     Vitals:  Blood pressure (!) 197/88, pulse 89, temperature 98 °F (36.7 °C), resp. rate 16, height 5' 8\" (1.727 m), weight 72 kg (158 lb 11.7 oz), SpO2 97 %. Temp (24hrs), Av.9 °F (35.5 °C), Min:93.8 °F (34.3 °C), Max:98 °F (36.7 °C)      Intake and Output:  No intake/output data recorded. No intake/output data recorded. Physical Exam:                   Physical Examination:   GENERAL ASSESSMENT: NAD  HEENT:Nontraumatic   CHEST: diminished BS +  HEART: S1S2  ABDOMEN: Soft,NT,  :Grant:   EXTREMITY: NOEDEMA  NEURO:Grossly non focal          ECG/rhythm:    Data Review      No results for input(s): TNIPOC in the last 72 hours. No lab exists for component: ITNL   No results for input(s): CPK, CKMB, TROIQ in the last 72 hours.   Recent Labs     22  1350   * K 4.6      CO2 19*   BUN 71*   CREA 5.97*   *   MG 2.2   CA 8.6   ALB 2.7*   WBC 9.1   HGB 9.1*   HCT 27.9*         No results for input(s): INR, PTP, APTT, INREXT in the last 72 hours. Needs: urine analysis, urine sodium, protein and creatinine  Lab Results   Component Value Date/Time    Creatinine, urine 72.2 11/22/2019 09:36 AM               Discussed with:  Staff    : Donaldo Cai MD  7/16/2022        [facility]:  [email or web address].[email or web address]. [place]  Stella Melendrez office:  [address], [address] [place] [address],[address] [place] [address]  Phone: [phone]  Fax :     [phone]    [place] office:  [address]  Phone - [phone]  Fax - [phone]

## 2022-07-16 NOTE — ED TRIAGE NOTES
Pt states hasn't had dialysis for the past week. Also c/o abd pain/cramping. States went to dialysis on Monday, but didn't finish due to abd pain. Pt also c/o sob.

## 2022-07-17 ENCOUNTER — APPOINTMENT (OUTPATIENT)
Dept: VASCULAR SURGERY | Age: 62
DRG: 425 | End: 2022-07-17
Payer: MEDICAID

## 2022-07-17 LAB
ALBUMIN SERPL-MCNC: 2.7 G/DL (ref 3.5–5)
ALBUMIN/GLOB SERPL: 0.6 {RATIO} (ref 1.1–2.2)
ALP SERPL-CCNC: 102 U/L (ref 45–117)
ALT SERPL-CCNC: 11 U/L (ref 12–78)
ANION GAP SERPL CALC-SCNC: 9 MMOL/L (ref 5–15)
APPEARANCE UR: CLEAR
AST SERPL-CCNC: 13 U/L (ref 15–37)
BACTERIA URNS QL MICRO: NEGATIVE /HPF
BASOPHILS # BLD: 0 K/UL (ref 0–0.1)
BASOPHILS NFR BLD: 0 % (ref 0–1)
BILIRUB SERPL-MCNC: 0.6 MG/DL (ref 0.2–1)
BILIRUB UR QL: NEGATIVE
BUN SERPL-MCNC: 33 MG/DL (ref 6–20)
BUN/CREAT SERPL: 9 (ref 12–20)
CALCIUM SERPL-MCNC: 8.8 MG/DL (ref 8.5–10.1)
CHLORIDE SERPL-SCNC: 103 MMOL/L (ref 97–108)
CO2 SERPL-SCNC: 25 MMOL/L (ref 21–32)
COLOR UR: ABNORMAL
CREAT SERPL-MCNC: 3.67 MG/DL (ref 0.7–1.3)
DIFFERENTIAL METHOD BLD: ABNORMAL
EOSINOPHIL # BLD: 0.2 K/UL (ref 0–0.4)
EOSINOPHIL NFR BLD: 2 % (ref 0–7)
EPITH CASTS URNS QL MICRO: ABNORMAL /LPF
ERYTHROCYTE [DISTWIDTH] IN BLOOD BY AUTOMATED COUNT: 14.7 % (ref 11.5–14.5)
GLOBULIN SER CALC-MCNC: 4.9 G/DL (ref 2–4)
GLUCOSE BLD STRIP.AUTO-MCNC: 101 MG/DL (ref 65–117)
GLUCOSE BLD STRIP.AUTO-MCNC: 117 MG/DL (ref 65–117)
GLUCOSE BLD STRIP.AUTO-MCNC: 146 MG/DL (ref 65–117)
GLUCOSE BLD STRIP.AUTO-MCNC: 181 MG/DL (ref 65–117)
GLUCOSE BLD STRIP.AUTO-MCNC: 95 MG/DL (ref 65–117)
GLUCOSE SERPL-MCNC: 143 MG/DL (ref 65–100)
GLUCOSE UR STRIP.AUTO-MCNC: >1000 MG/DL
HBV SURFACE AB SER QL: NONREACTIVE
HBV SURFACE AB SER-ACNC: <3.1 MIU/ML
HBV SURFACE AG SER QL: <0.1 INDEX
HBV SURFACE AG SER QL: NEGATIVE
HCT VFR BLD AUTO: 26.6 % (ref 36.6–50.3)
HGB BLD-MCNC: 8.9 G/DL (ref 12.1–17)
HGB UR QL STRIP: ABNORMAL
HYALINE CASTS URNS QL MICRO: ABNORMAL /LPF (ref 0–2)
IMM GRANULOCYTES # BLD AUTO: 0 K/UL (ref 0–0.04)
IMM GRANULOCYTES NFR BLD AUTO: 0 % (ref 0–0.5)
KETONES UR QL STRIP.AUTO: NEGATIVE MG/DL
LEUKOCYTE ESTERASE UR QL STRIP.AUTO: NEGATIVE
LYMPHOCYTES # BLD: 0.8 K/UL (ref 0.8–3.5)
LYMPHOCYTES NFR BLD: 8 % (ref 12–49)
MCH RBC QN AUTO: 29.6 PG (ref 26–34)
MCHC RBC AUTO-ENTMCNC: 33.5 G/DL (ref 30–36.5)
MCV RBC AUTO: 88.4 FL (ref 80–99)
MONOCYTES # BLD: 0.9 K/UL (ref 0–1)
MONOCYTES NFR BLD: 9 % (ref 5–13)
NEUTS SEG # BLD: 7.6 K/UL (ref 1.8–8)
NEUTS SEG NFR BLD: 81 % (ref 32–75)
NITRITE UR QL STRIP.AUTO: NEGATIVE
NRBC # BLD: 0 K/UL (ref 0–0.01)
NRBC BLD-RTO: 0 PER 100 WBC
PH UR STRIP: 7 [PH] (ref 5–8)
PLATELET # BLD AUTO: 163 K/UL (ref 150–400)
PMV BLD AUTO: 10.5 FL (ref 8.9–12.9)
POTASSIUM SERPL-SCNC: 3.6 MMOL/L (ref 3.5–5.1)
PROT SERPL-MCNC: 7.6 G/DL (ref 6.4–8.2)
PROT UR STRIP-MCNC: >300 MG/DL
RBC # BLD AUTO: 3.01 M/UL (ref 4.1–5.7)
RBC #/AREA URNS HPF: ABNORMAL /HPF (ref 0–5)
RBC MORPH BLD: ABNORMAL
SERVICE CMNT-IMP: ABNORMAL
SERVICE CMNT-IMP: ABNORMAL
SERVICE CMNT-IMP: NORMAL
SODIUM SERPL-SCNC: 137 MMOL/L (ref 136–145)
SP GR UR REFRACTOMETRY: 1.02 (ref 1–1.03)
TROPONIN-HIGH SENSITIVITY: 38 NG/L (ref 0–76)
UROBILINOGEN UR QL STRIP.AUTO: 0.2 EU/DL (ref 0.2–1)
WBC # BLD AUTO: 9.5 K/UL (ref 4.1–11.1)
WBC URNS QL MICRO: ABNORMAL /HPF (ref 0–4)

## 2022-07-17 PROCEDURE — 97165 OT EVAL LOW COMPLEX 30 MIN: CPT

## 2022-07-17 PROCEDURE — 99223 1ST HOSP IP/OBS HIGH 75: CPT

## 2022-07-17 PROCEDURE — 82962 GLUCOSE BLOOD TEST: CPT

## 2022-07-17 PROCEDURE — 36415 COLL VENOUS BLD VENIPUNCTURE: CPT

## 2022-07-17 PROCEDURE — 93970 EXTREMITY STUDY: CPT

## 2022-07-17 PROCEDURE — 74011636637 HC RX REV CODE- 636/637

## 2022-07-17 PROCEDURE — 84484 ASSAY OF TROPONIN QUANT: CPT

## 2022-07-17 PROCEDURE — 85025 COMPLETE CBC W/AUTO DIFF WBC: CPT

## 2022-07-17 PROCEDURE — 80053 COMPREHEN METABOLIC PANEL: CPT

## 2022-07-17 PROCEDURE — 74011000250 HC RX REV CODE- 250

## 2022-07-17 PROCEDURE — 97535 SELF CARE MNGMENT TRAINING: CPT

## 2022-07-17 PROCEDURE — 65270000046 HC RM TELEMETRY

## 2022-07-17 PROCEDURE — 74011250637 HC RX REV CODE- 250/637

## 2022-07-17 PROCEDURE — 74011250636 HC RX REV CODE- 250/636

## 2022-07-17 PROCEDURE — 81001 URINALYSIS AUTO W/SCOPE: CPT

## 2022-07-17 PROCEDURE — 74011250637 HC RX REV CODE- 250/637: Performed by: STUDENT IN AN ORGANIZED HEALTH CARE EDUCATION/TRAINING PROGRAM

## 2022-07-17 RX ADMIN — SUCRALFATE 1 G: 1 TABLET ORAL at 12:20

## 2022-07-17 RX ADMIN — Medication 12 UNITS: at 21:48

## 2022-07-17 RX ADMIN — ASPIRIN 81 MG: 81 TABLET, COATED ORAL at 08:22

## 2022-07-17 RX ADMIN — ROSUVASTATIN CALCIUM 10 MG: 10 TABLET, FILM COATED ORAL at 08:22

## 2022-07-17 RX ADMIN — HYDROMORPHONE HYDROCHLORIDE 0.2 MG: 1 INJECTION, SOLUTION INTRAMUSCULAR; INTRAVENOUS; SUBCUTANEOUS at 21:47

## 2022-07-17 RX ADMIN — HYDRALAZINE HYDROCHLORIDE 50 MG: 25 TABLET ORAL at 17:08

## 2022-07-17 RX ADMIN — HEPARIN SODIUM 5000 UNITS: 5000 INJECTION INTRAVENOUS; SUBCUTANEOUS at 05:08

## 2022-07-17 RX ADMIN — CARVEDILOL 12.5 MG: 12.5 TABLET, FILM COATED ORAL at 17:08

## 2022-07-17 RX ADMIN — HYDROMORPHONE HYDROCHLORIDE 0.2 MG: 1 INJECTION, SOLUTION INTRAMUSCULAR; INTRAVENOUS; SUBCUTANEOUS at 12:34

## 2022-07-17 RX ADMIN — ISOSORBIDE MONONITRATE 60 MG: 30 TABLET, EXTENDED RELEASE ORAL at 08:22

## 2022-07-17 RX ADMIN — SUCRALFATE 1 G: 1 TABLET ORAL at 06:30

## 2022-07-17 RX ADMIN — SUCRALFATE 1 G: 1 TABLET ORAL at 17:08

## 2022-07-17 RX ADMIN — SEVELAMER CARBONATE 800 MG: 800 TABLET, FILM COATED ORAL at 17:08

## 2022-07-17 RX ADMIN — HEPARIN SODIUM 5000 UNITS: 5000 INJECTION INTRAVENOUS; SUBCUTANEOUS at 17:08

## 2022-07-17 RX ADMIN — SENNOSIDES 17.2 MG: 8.6 TABLET, FILM COATED ORAL at 21:33

## 2022-07-17 RX ADMIN — ONDANSETRON 4 MG: 2 INJECTION INTRAMUSCULAR; INTRAVENOUS at 17:13

## 2022-07-17 RX ADMIN — POLYETHYLENE GLYCOL 3350 17 G: 17 POWDER, FOR SOLUTION ORAL at 08:23

## 2022-07-17 RX ADMIN — ACETAMINOPHEN 650 MG: 325 TABLET ORAL at 23:06

## 2022-07-17 RX ADMIN — INSULIN LISPRO 2 UNITS: 100 INJECTION, SOLUTION INTRAVENOUS; SUBCUTANEOUS at 12:20

## 2022-07-17 RX ADMIN — DOCUSATE SODIUM 100 MG: 100 CAPSULE, LIQUID FILLED ORAL at 21:33

## 2022-07-17 RX ADMIN — TAMSULOSIN HYDROCHLORIDE 0.4 MG: 0.4 CAPSULE ORAL at 21:34

## 2022-07-17 RX ADMIN — DOCUSATE SODIUM 100 MG: 100 CAPSULE, LIQUID FILLED ORAL at 08:22

## 2022-07-17 RX ADMIN — PANTOPRAZOLE SODIUM 40 MG: 40 TABLET, DELAYED RELEASE ORAL at 06:30

## 2022-07-17 RX ADMIN — ONDANSETRON 4 MG: 2 INJECTION INTRAMUSCULAR; INTRAVENOUS at 08:11

## 2022-07-17 RX ADMIN — GABAPENTIN 300 MG: 300 CAPSULE ORAL at 08:22

## 2022-07-17 RX ADMIN — ACETAMINOPHEN 650 MG: 325 TABLET ORAL at 01:51

## 2022-07-17 RX ADMIN — HYDRALAZINE HYDROCHLORIDE 50 MG: 25 TABLET ORAL at 08:22

## 2022-07-17 RX ADMIN — AMLODIPINE BESYLATE 10 MG: 5 TABLET ORAL at 08:22

## 2022-07-17 RX ADMIN — Medication 10 ML: at 17:08

## 2022-07-17 RX ADMIN — BUSPIRONE HYDROCHLORIDE 10 MG: 10 TABLET ORAL at 21:34

## 2022-07-17 RX ADMIN — CARVEDILOL 12.5 MG: 12.5 TABLET, FILM COATED ORAL at 08:22

## 2022-07-17 RX ADMIN — BUSPIRONE HYDROCHLORIDE 10 MG: 10 TABLET ORAL at 08:22

## 2022-07-17 RX ADMIN — Medication 5 ML: at 22:00

## 2022-07-17 RX ADMIN — HYDROMORPHONE HYDROCHLORIDE 0.2 MG: 1 INJECTION, SOLUTION INTRAMUSCULAR; INTRAVENOUS; SUBCUTANEOUS at 17:08

## 2022-07-17 RX ADMIN — Medication 10 ML: at 05:08

## 2022-07-17 RX ADMIN — HEPARIN SODIUM 5000 UNITS: 5000 INJECTION INTRAVENOUS; SUBCUTANEOUS at 21:36

## 2022-07-17 RX ADMIN — SEVELAMER CARBONATE 800 MG: 800 TABLET, FILM COATED ORAL at 12:21

## 2022-07-17 RX ADMIN — HYDROMORPHONE HYDROCHLORIDE 0.2 MG: 1 INJECTION, SOLUTION INTRAMUSCULAR; INTRAVENOUS; SUBCUTANEOUS at 08:22

## 2022-07-17 RX ADMIN — HYDROMORPHONE HYDROCHLORIDE 0.2 MG: 1 INJECTION, SOLUTION INTRAMUSCULAR; INTRAVENOUS; SUBCUTANEOUS at 03:43

## 2022-07-17 NOTE — H&P
.    [address] [facility] [address] [place] [address]   Office [phone]  Fax [phone]       Admission H&P     Name: Romana Nino MRN: [id number]  Sex: Male   YOB: 1960  Age: 64 y.o. PCP: Meredith Null MD     Source of Information: patient, medical records    Chief complaint: abdominal pain and SOB    History of Present Illness  Romana Nino is a 64 y.o. male with PMHx of ESRD on HD MWF, T2DM, HTN, CAD s/p CABG (May 2020), HEFpEF, PEA arrest, and erosive gastritis. He presents to the ED today for abdo pain and SOB after having missed a week of dialysis. According to patient, he last received dialysis on Mon 7/11 but did not complete his session as he was transferred to [facility] ED due to chest pain occurring 45 minutes into the session. Today he is c/o of intermittent stabbing central cp, abdominal pain, flank pain, and muscle pains of his JOSEPH thighs. He also has SOB that is worse when lying flat. He reports noticing some swelling of his R lower extremity. He has a R heel wound that is currently covered with a cast. According to patient he saw podiatry on Thursday for his wound. They applied an ointment and then placed the cast.     COVID Questions:   Experiencing any of the following symptoms: fever, chills, cough, SOB, diarrhea, URI symptoms. YES  Any Sick contacts with fever, cough, diarrhea, SOB, URI symptoms. NO  Traveled out of state or out of country. NO  Lives with wife and kids. Has been staying at home. YES    In the ED:  Vitals: Temp 93.8   /88   HR 89  RR 16   SatO2  97% on room air  Labs: WBC 9.1, Hgb 9.1, Plt 155, Na 133, K 4.6, Cr 5.97, Glu 307. Trop 30. Imaging: CXR: Small bilateral pleural effusions and minimal pulmonary edema. CT: Bilateral pleural effusions with underlying atelectasis left greater than right. Moderate fecal stasis. No acute intra-abdominal abnormality. Treatment: IV Fentanyl 50mcg x1. IV Dilaudid 0.5mg x1. IV Zofran 4mg x1.     EKG: EKG with normal sinus rhythm, rate 88, normal IL interval, normal QRS, nonspecific ST changes, normal QTC    Patient Vitals for the past 12 hrs:   Temp Pulse Resp BP SpO2   07/16/22 2305 99.5 °F (37.5 °C) 86 16 (!) 182/82 95 %   07/16/22 2300 99.2 °F (37.3 °C) 86 16 (!) 171/83 95 %   07/16/22 2245 -- 85 16 (!) 164/79 95 %   07/16/22 2230 -- 86 16 (!) 160/83 95 %   07/16/22 2215 -- 83 16 (!) 165/80 95 %   07/16/22 2200 -- 83 16 (!) 163/71 95 %   07/16/22 2145 -- 85 16 (!) 180/70 95 %   07/16/22 2130 -- 85 16 (!) 180/80 95 %   07/16/22 2115 -- 86 16 (!) 178/91 93 %   07/16/22 2100 -- 85 16 (!) 193/93 92 %   07/16/22 2045 -- 84 16 (!) 171/87 96 %   07/16/22 2030 -- 84 16 (!) 181/86 96 %   07/16/22 2015 -- 86 16 (!) 172/78 97 %   07/16/22 2000 -- 84 16 (!) 185/87 97 %   07/16/22 1955 99.3 °F (37.4 °C) 81 16 (!) 169/85 97 %   07/16/22 1827 -- -- -- -- 92 %   07/16/22 1824 -- -- -- -- 90 %   07/16/22 1809 -- -- -- -- 95 %   07/16/22 1800 -- -- -- -- (!) 88 %   07/16/22 1754 -- -- -- (!) 175/76 93 %   07/16/22 1733 98 °F (36.7 °C) -- -- -- --   07/16/22 1339 (!) 93.8 °F (34.3 °C) 89 16 (!) 197/88 97 %       Review of Systems  Review of Systems   Constitutional: Positive for fatigue. Respiratory: Positive for shortness of breath. Cardiovascular: Positive for chest pain. Gastrointestinal: Positive for abdominal distention, abdominal pain, constipation and nausea. Endocrine: Negative for polydipsia and polyuria. Genitourinary: Negative for difficulty urinating and dysuria. All other systems reviewed and are negative. Home Medications   Prior to Admission medications    Medication Sig Start Date End Date Taking? Authorizing Provider   cephALEXin (KEFLEX) 500 mg capsule Take 500 mg by mouth three (3) times daily. 7/9/22   Provider, Historical   sevelamer (RENAGEL) 800 mg tablet Take 800 mg by mouth three (3) times daily (with meals).     Provider, Historical   amLODIPine (NORVASC) 10 mg tablet Take 0.5 tab in AM and 0.5 tab in PM 6/21/22   Manisha Mishra MD   pantoprazole (PROTONIX) 40 mg tablet Take 1 Tablet by mouth daily. 6/21/22   Manisha Mihsra MD   sucralfate (CARAFATE) 1 gram tablet Take 1 Tablet by mouth three (3) times daily. 6/21/22   Manisha Mishra MD   tamsulosin [facility]) 0.4 mg capsule Take 1 Capsule by mouth nightly. 6/21/22   Manisha Mishra MD   isosorbide mononitrate ER (IMDUR) 60 mg CR tablet Take 1 Tablet by mouth daily. 6/21/22   Manisha Mishra MD   furosemide (Lasix) 80 mg tablet Take 80 mg by mouth daily. Provider, Historical   Insulin Needles, Disposable, 31 gauge x 5/16\" ndle Use as directed with insulin DM2 4/6/22   Asad Pike MD   acetaminophen (Tylenol Extra Strength) 500 mg tablet Take 500 mg by mouth every six (6) hours as needed for Pain or Fever. Provider, Historical   aspirin delayed-release 81 mg tablet Take 81 mg by mouth daily. Provider, Historical   bumetanide (BUMEX) 1 mg tablet Take 1 mg by mouth two (2) times a day. Patient not taking: Reported on 6/21/2022    Provider, Historical   clopidogreL (Plavix) 75 mg tab Take 75 mg by mouth daily. Patient not taking: Reported on 6/21/2022    Provider, Historical   gabapentin (NEURONTIN) 300 mg capsule Take 300 mg by mouth daily. Take after dialysis on dialysis days. Patient taking this TID. Provider, Historical   insulin glargine (LANTUS,BASAGLAR) 100 unit/mL (3 mL) inpn 15 Units by SubCUTAneous route nightly. Provider, Historical   insulin aspart U-100 (NovoLOG Flexpen U-100 Insulin) 100 unit/mL (3 mL) inpn by SubCUTAneous route Before breakfast, lunch, and dinner. -180    2 units  -220    4 units   -260    6 units  -300    8 units  -350    10 units    Provider, Historical   senna (Senna) 8.6 mg tablet Take 2 Tablets by mouth nightly. Provider, Historical   polyethylene glycol (Miralax) 17 gram/dose powder Take 17 g by mouth daily as needed for Constipation. Provider, Historical   hydrALAZINE (APRESOLINE) 50 mg tablet Take 1 Tablet by mouth three (3) times daily. Patient taking differently: Take 100 mg by mouth three (3) times daily. 4/2/22   Jamie Darden MD   busPIRone (BUSPAR) 10 mg tablet Take 10 mg by mouth two (2) times a day. Provider, Historical   carvediloL (Coreg) 12.5 mg tablet Take 12.5 mg by mouth two (2) times daily (with meals). Hold if top number below 100    Provider, Historical   docusate sodium (Colace) 100 mg capsule Take 1 Capsule by mouth two (2) times a day. 10/20/21   Provider, Historical   ferrous sulfate 325 mg (65 mg iron) tablet Take 1 Tablet by mouth daily. 4/14/21   Provider, Historical   rosuvastatin (CRESTOR) 10 mg tablet Take 10 mg by mouth Every morning. Provider, Historical   eplerenone (Inspra) 25 mg tablet Take 25 mg by mouth daily.   Patient not taking: Reported on 6/21/2022    Provider, Historical       Allergies  No Known Allergies    Past Medical History  Past Medical History:   Diagnosis Date    Anemia associated with chronic renal failure     Anxiety and depression     BPH (benign prostatic hyperplasia)     CAD (coronary artery disease)     CHF NYHA class I, chronic, diastolic (HCC)     Chronic pain     DM type 2 causing renal disease ([address]     DM type 2 causing vascular disease ([address]     [facility]     GERD (gastroesophageal reflux disease)     HTN (hypertension)     Hyperlipidemia     Thrombocytopenia (HCC)        Previous Hospitalization(s)  Past Surgical History:   Procedure Laterality Date    HX ARTERIAL BYPASS      HX OTHER SURGICAL      5th toe Metatarsal amputation 12/2017     IR INSERT NON TUNL CVC OVER 5 YRS  11/19/2021    IR INSERT TUNL CVC W/O PORT OVER 5 YR  11/24/2021       Family History  Family History   Problem Relation Age of Onset    Diabetes Mother     No Known Problems Father        Social History   Alcohol history: Significant hx, now sober x7 years  Smoking history: Non-smoker  Illicit drug history: Distant history of marijuana  Living arrangement: patient lives with their family. Ambulates: With cane    Vital Signs  Visit Vitals  BP (!) 182/82   Pulse 86   Temp 99.5 °F (37.5 °C) (Oral)   Resp 16   Ht 5' 8\" (1.727 m)   Wt 158 lb 11.7 oz (72 kg)   SpO2 95%   BMI 24.14 kg/m²       Physical Exam  General: Patient in distress due to abdo and flank pain. Alert. Cooperative. Head: Normocephalic. Atraumatic. Eyes:              Conjunctiva pink. Sclera white. PERRL. Ears:              Hearing grossly intact. Nose:             Septum midline. Mucosa pink. No drainage. Throat: Mucosa pink. Moist mucous membranes. Missing front teeth    Neck: Supple. Normal ROM. No stiffness. Respiratory: Mild crackles at lung bases   Cardiovascular: RRR. Normal S1,S2. No m/r/g. Pulses 2+ throughout. GI: + bowel sounds. Generalized tenderness. Distended. No rebound or guarding. Extremities: R foot cast for R heel wound. R calf swelling. 10 cm area of redness on medial aspect of RLE. Skin: RLE erythema   Neuro: CN II-XII grossly intact                      . Laboratory Data  Recent Results (from the past 8 hour(s))   TROPONIN-HIGH SENSITIVITY    Collection Time: 07/16/22  8:15 PM   Result Value Ref Range    Troponin-High Sensitivity 36 0 - 76 ng/L       Imaging  CXR Results  (Last 48 hours)               07/16/22 1400  XR CHEST PORT Final result    Impression:  Small bilateral pleural effusions and minimal pulmonary edema. Narrative: Indication: Chest pain       Comparison: 4/14/2022       Portable exam of the chest obtained at 1357 demonstrates stable cardiomegaly. The patient is status post median sternotomy. There are small bilateral pleural   effusions. Minimal pulmonary edema is noted. Central venous catheter is   unchanged in position.                CT Results  (Last 48 hours)               07/16/22 1453  CT ABD PELV WO CONT Final result Impression:  Bilateral pleural effusions with underlying atelectasis left greater than right. Moderate fecal stasis. No acute intra-abdominal abnormality. Narrative:  EXAM: CT ABD PELV WO CONT       INDICATION: abdominal pain; does have ESRD but still makes urine       COMPARISON: 2/5/2022       IV CONTRAST: None. ORAL CONTRAST: None       TECHNIQUE:    Thin axial images were obtained through the abdomen and pelvis. Coronal and   sagittal reformats were generated. CT dose reduction was achieved through use of   a standardized protocol tailored for this examination and automatic exposure   control for dose modulation. The absence of intravenous contrast material reduces the sensitivity for   evaluation of the vasculature and solid organs. FINDINGS:    LOWER THORAX: There bilateral pleural effusions left greater than right with   underlying atelectasis. LIVER: No mass. BILIARY TREE: Gallbladder is within normal limits. CBD is not dilated. SPLEEN: within normal limits. PANCREAS: No focal abnormality. ADRENALS: Unremarkable. KIDNEYS/URETERS: No calculus or hydronephrosis. STOMACH: Unremarkable. SMALL BOWEL: No dilatation or wall thickening. COLON: No dilatation or wall thickening. Moderate fecal stasis. APPENDIX: Within normal limits. PERITONEUM: No ascites or pneumoperitoneum. RETROPERITONEUM: No lymphadenopathy or aortic aneurysm. REPRODUCTIVE ORGANS: There is no pelvic mass or lymphadenopathy. URINARY BLADDER: No mass or calculus. BONES: Degenerative changes are seen in the lumbar spine and hip joints   bilaterally. ABDOMINAL WALL: No mass or hernia. ADDITIONAL COMMENTS: N/A                   Assessment and Plan     Diego Burn is a 64 y.o. male with PMHx of ESRD on HD MWF, T2DM, HTN, CAD s/p CABG (May 2020), HEFpEF, PEA arrest, and erosive gastritis. He presents to the ED today for abdo pain and SOB after having missed a week of dialysis.     1. Hypertensive Emergency likely 2/2 to fluid overload. pt with hx of HTN. POA /88. Last BP reading 193/93. CXR showing pulmonary edema. CT showing JOSEPH pleural effusions. Pt c/o intermittent stabbing cp. POA trop 30 >> 36. EKG neg for ischemia. Pt follows with Dr. Luis Lo  - continue home Hydralazine 50mg TID, Amlodipine 10mg OD, Carvedilol 12.5mg BID  - placed on continuous telemetry  - trend troponin   - holding home diuretics ; will re-evaluate    2. Abdominal pain pt c/o of epigastric pain likely 2/2 fluid overload. Has generalized abdo tenderness to palpation without rebound or guarding. Abdo is distended. CT negative for obstruction, but shows fecal stasis. No concern for SBP at the moment, pt afebrile and WBC wnl.  - Dilaudid 0.2 Q4H PRN  - continue to monitor     3. Constipation pt with hx of constipation. Stopped taking laxatives because they gave him diarrhea. CT showing fecal stasis. - start docsuate, miralax, senna  - holding home ferrous sulfate    4. Calf swelling R calf is swollen and erythematous. Sedentary lifestyle. Concern for DVT. - JOSEPH LE doppler pending    5. Heel Wound 3 week hx of R heel wound. Saw podiatry on Thurs. R foot in cast. Not receiving oral tx. - wound care on consult, appreciate recs    6. ESRD pt with hx of ESRD on HD MWF; has missed past week of dialysis. - Dialysis initiated in ED. Pt follows with Dr. Doyle Ryan. - Hold home diuretics and re-evaluate after dialysis treatment  - continue home Sevelamer 800mg TID  - Nephrology on consult, appreciate recs    7. CAD / HEFrEF pt with hx of chronic HF. Echo 11/19/21 with EF 53% and reduced systolic function. BNP 14,153 which is around his usual baseline. - continue home Carvedilol 12.5mg BID, IMDUR 60mg OD, and Aspirin 81mg OD    8. Type 2 diabetes mellitus pt's at home insulin regimen is Lantus 15 units daily and Humalog on sliding scale. POA glu 307.  Last A1c 6.4   - hold home meds  - Start on Lantus 12 units ACHS  - Insulin Sliding Scale normal sensitivity with AC&HS glucose checks. - Hypoglycemia protocols ordered. 9. HLD  - continue home rosuvastatin 10mg OD    10. GERD   - continue home Protonix 40mg OD, Sucralfate 1g TID    11. BPH  - continue home Flomax 0.4mg once nightly    12. Anxiety and depression  - continue home Buspirone 10mg BID    13- Anemia and thrombocytopenia POA Hgb 9.1 Plts 155.  - holding home ferrous sulfate; CT showing moderate fecal stasis  - will continue to monitor      FEN/GI - Regular diet. Low sodium, low potassium. Activity - Ambulate with assistance  DVT prophylaxis - Sub Q Heparin  GI prophylaxis - Protonix  Fall prophylaxis - Fall precautions ordered. Disposition - Admit to Telemetry. Plan to d/c to Home. Consulting CM  Code Status - Full. Discussed with patient / caregivers. Next of Kin Name and Didier 13 (Spouse) [phone] (Mobile)    Patient Yadiel Casarez will be discussed with Dr. Jackie Morel.     8:18 PM, 07/16/22  Vickie Wellington MD  Family Medicine Resident  PGY-1      Ed Enamorado MD   Family Medicine Attending  7/17/2022 10:32 AM        For Billing    Chief Complaint   Patient presents with    Abdominal Pain    Shortness of Breath       Hospital Problems  Date Reviewed: 7/12/2022          Codes Class Noted POA    Acute pulmonary edema ([facility] ICD-10-CM: J81.0  ICD-9-CM: 518.4  7/16/2022 Unknown        Open wound of right heel ICD-10-CM: H83.215W  ICD-9-CM: 892.0  7/16/2022 Yes        Calf swelling ICD-10-CM: M79.89  ICD-9-CM: 729.81  7/16/2022 Yes    Overview Signed 7/16/2022 11:36 PM by Emil Virgen MD     R calf             Chest pain ICD-10-CM: R07.9  ICD-9-CM: 786.50  7/16/2022 Yes        BPH (benign prostatic hyperplasia) ICD-10-CM: N40.0  ICD-9-CM: 600.00  Unknown Yes        CAD (coronary artery disease) ICD-10-CM: I25.10  ICD-9-CM: 414.00  Unknown Yes        GERD (gastroesophageal reflux disease) ICD-10-CM: K21.9  ICD-9-CM: 530.81  Unknown Yes        HTN (hypertension) ICD-10-CM: I10  ICD-9-CM: 401.9  Unknown Yes        Hyperlipidemia ICD-10-CM: E78.5  ICD-9-CM: 272.4  Unknown Yes        Pleural effusion ICD-10-CM: J90  ICD-9-CM: 511.9  4/14/2022 Unknown        ESRD (end stage renal disease) on dialysis [facility]) ICD-10-CM: N18.6, Z99.2  ICD-9-CM: 585.6, V45.11  2/6/2022 Yes        Type 2 diabetes mellitus with diabetic neuropathy ([facility] ICD-10-CM: E11.40  ICD-9-CM: 250.60, 357.2  8/5/2019 Yes

## 2022-07-17 NOTE — DIALYSIS
Hemodialysis / [phone]    Vitals Pre Post Assessment Pre Post   BP BP: (!) 185/87 (07/16/22 2000) 182/82 LOC AOx4 unchanged   HR Pulse (Heart Rate): 84 (07/16/22 2000) 86 Lungs Coarse and diminished in bases Coarse in bases   Resp Resp Rate: 16 (07/16/22 2000) 16 Cardiac nsr nsr   Temp Temp: 99.3 °F (37.4 °C) (07/16/22 1955) 99.4 Skin Warm/dry Warm/dry      Edema none none   Tele status Bedside monitor Bedside monitor Pain Pain Intensity 1: 8 (07/16/22 1339) 8 (due to abd pain - called primary RN to address pt pain)     Orders   Duration: Start: 2000 End: 2300 Total: 3 hrs   Dialyzer: Dialyzer/Set Up Inspection: Arline Reyes (07/16/22 1955)   K Bath: Dialysate K (mEq/L): 2 (07/16/22 1955)   Ca Bath: Dialysate CA (mEq/L): 2.5 (07/16/22 1955)   Na: Dialysate NA (mEq/L): 138 (07/16/22 1955)   Bicarb: Dialysate HCO3 (mEq/L): 35 (07/16/22 1955)   Target Fluid Removal:  4000 mL     Access   Type & Location: Pt R chest permacath POA. Each catheter limb disinfected for 60 seconds per limb with alcohol swabs. Caps removed, dialysis CVC hub scrubbed with Prevantics for 15 seconds, followed by a 5 second dry time per Hospital P&P. +asp/+flush x 2 ports. CHG dressing changed per hospital p/p by Dialysis RN and dated 07/16/22.      Labs   HBsAg (Antigen) / date: Negative  //  06/20/22                                              HBsAb (Antibody) / date: Unknown - updated lab drawn by Rod Hester RN - pending   Source: [facility] / [facility]   Obtained/Reviewed  Critical Results Called HGB   Date Value Ref Range Status   07/16/2022 9.1 (L) 12.1 - 17.0 g/dL Final     Potassium   Date Value Ref Range Status   07/16/2022 4.6 3.5 - 5.1 mmol/L Final     Calcium   Date Value Ref Range Status   07/16/2022 8.6 8.5 - 10.1 MG/DL Final     BUN   Date Value Ref Range Status   07/16/2022 71 (H) 6 - 20 MG/DL Final     Creatinine   Date Value Ref Range Status   07/16/2022 5.97 (H) 0.70 - 1.30 MG/DL Final        Meds Given   Name Dose Route                    Adequacy / Fluid    Total Liters Process: 54 L   Net Fluid Removed: 4000 mL      Comments   Time Out Done:   (Time) 1955   Admitting Diagnosis: Abdominal Pain   Consent obtained/signed: Informed Consent Verified:  (chronic hemodialysis patient) (07/16/22 1955)   Machine / RO # Machine Number: [id number] / Kate Peed (07/16/22 1955)   Primary Nurse Rpt Pre: Gene Huitron RN   Primary Nurse Rpt Post: DENNYS Cole RN   Pt Education: Access/ Site Care   Care Plan: Continue HD tx per MD orders   Pts outpatient clinic: [facility]. Tx Summary   Comments:  1955: Procedural time-out completed and permacath CHG dressing changed by Tess Barajas RN  2000: HD tx initiated  2045: Pt resting in bed watching television, VSS  2300: HD tx completed. At end of tx all pt blood returned from circuit. Each dialysis catheter limb disinfected per p&p, blood returned per p&p, each dialysis hub disinfected per p&p, post dialysis catheter dwell instilled per order, and caps applied. Report given to primary RN and pt bed in lowest position and call bell within reach.

## 2022-07-17 NOTE — PROGRESS NOTES
Bedside shift change report given to [address] Ch[address] Andrews,[address]  (oncoming nurse) by Dinora Puckett RN  (offgoing nurse). Report included the following information SBAR, Kardex, MAR, Recent Results and Cardiac Rhythm NSR.

## 2022-07-17 NOTE — PROGRESS NOTES
Physical Therapy Note:    Orders acknowledged, chart reviewed, discussed with RN. PT evaluation deferred. Pt declining PT evaluation reporting considerable fatigue. Per OT pt with symptomatic orthostatic hypotension during OT evaluation earlier today. Will continue to follow and proceed with PT evaluation when pt is agreeable and appropriate.     Della Berry, PT, DPT, Kyleigh Becerril

## 2022-07-17 NOTE — PROGRESS NOTES
Problem: Self Care Deficits Care Plan (Adult)  Goal: *Acute Goals and Plan of Care (Insert Text)  Description: FUNCTIONAL STATUS PRIOR TO ADMISSION: Patient was independent and active without use of DME. Patient was independent for basic and instrumental ADLs. HOME SUPPORT: The patient lived with wife but did not require assist.    Occupational Therapy Goals  Initiated 7/17/2022  1. Patient will perform grooming, standing at sink, with modified independence within 7 day(s). 2.  Patient will perform lower body dressing with modified independence within 7 day(s). 3.  Patient will perform bathing, sitting and standing PRN, with supervision/set-up within 7 day(s). 4.  Patient will perform toilet transfers with modified independence within 7 day(s). 5.  Patient will perform all aspects of toileting with modified independence within 7 day(s). 6.  Patient will participate in upper extremity therapeutic exercise/activities with modified independence for 10 minutes within 7 day(s). 7.  Patient will utilize energy conservation techniques during functional activities with verbal cues within 7 day(s). Outcome: Progressing Towards Goal     OCCUPATIONAL THERAPY EVALUATION  Patient: Fabrizio Martin (86 y.o. male)  Date: 7/17/2022  Primary Diagnosis: Acute pulmonary edema (HCC) [J81.0]  Pleural effusion [J90]        Precautions:  Fall (unclear WB restrictions for R LE)    ASSESSMENT  Based on the objective data described below, the patient presents with dizziness, abdominal pain, decreased strength, decreased activity tolerance, impaired balance, and unclear WB restrictions to R LE impacting ADL performance and mobility following admission for abdominal pain. Pt today received in ED on BSC, managing LB dressing tasks with overall SBA from seated position with good ROM and distal reach noted. He manages seated portions of toileting with SUP/SBA but in standing requires overall CGA.  Pt reporting feeling dizziness and spinning in standing, and noted associated drop in SBP. Pt returned to stretcher bed at end of session for safety. Anticipate pt will improve quickly once more medically stable, however at this time will require continued skilled OT services and assistance at discharge. Of note, pt with R LE heel wound, today in cast and post op shoe. Pt poor historian on level of weightbearing allowed to R LE despite having cast on for >1 week per pt. Opted for more conservative approach during session. Sittin/57  Standin/65  Semi-ureña's (post activity): 131/58    Current Level of Function Impacting Discharge (ADLs/self-care): overall SBA/CGA to min A for ADLs    Functional Outcome Measure: The patient scored Total: 50/100 on the Barthel Index outcome measure which is indicative of being moderately impaired in basic self-care. Other factors to consider for discharge: pain; unclear weightbearing restriction to R LE; fall risk; dizziness with orthostatic drop in SBP     Patient will benefit from skilled therapy intervention to address the above noted impairments. PLAN :  Recommendations and Planned Interventions: self care training, functional mobility training, therapeutic exercise, balance training, therapeutic activities, endurance activities, patient education, home safety training and family training/education    Frequency/Duration: Patient will be followed by occupational therapy 5 times a week to address goals. Recommendation for discharge: (in order for the patient to meet his/her long term goals)  To be determined: rehab vs. home with [facility] pending progress    This discharge recommendation:  Has not yet been discussed the attending provider and/or case management    IF patient discharges home will need the following DME: TBD       SUBJECTIVE:   Patient stated I feel like I'm spinning, I'm so dizzy.     OBJECTIVE DATA SUMMARY:   HISTORY:   Past Medical History:   Diagnosis Date    Anemia associated with chronic renal failure     Anxiety and depression     BPH (benign prostatic hyperplasia)     CAD (coronary artery disease)     CHF NYHA class I, chronic, diastolic (HCC)     Chronic pain     DM type [facility] [facility]     DM type 2 causing vascular disease ([facility]     ESRD on dialysis ([facility]     GERD (gastroesophageal reflux disease)     HTN (hypertension)     Hyperlipidemia     Thrombocytopenia (HCC)      Past Surgical History:   Procedure Laterality Date    HX ARTERIAL BYPASS      HX OTHER SURGICAL      5th toe Metatarsal amputation 12/2017     IR INSERT NON TUNL CVC OVER 5 YRS  11/19/2021    IR INSERT TUNL CVC W/O PORT OVER 5 YR  11/24/2021       Expanded or extensive additional review of patient history:     Home Situation  Home Environment: Private residence  # Steps to Enter: 3  One/Two Story Residence: One story  Living Alone: No  Support Systems: Spouse/Significant Other,Child(fidelia)  Patient Expects to be Discharged to[de-identified] Home with family assistance  Current DME Used/Available at Home: Brace/Splint,CPAP,Walker    Hand dominance: Right    EXAMINATION OF PERFORMANCE DEFICITS:  Cognitive/Behavioral Status:  Neurologic State: Alert  Orientation Level: Oriented X4  Cognition: Appropriate decision making; Appropriate for age attention/concentration; Appropriate safety awareness; Follows commands  Perception: Appears intact  Perseveration: No perseveration noted  Safety/Judgement: Awareness of environment; Fall prevention;Good awareness of safety precautions; Home safety; Insight into deficits    Hearing: Auditory  Auditory Impairment: None    Range of Motion:  AROM: Within functional limits    Strength:  Strength: Generally decreased, functional    Coordination:  Coordination: Within functional limits  Fine Motor Skills-Upper: Left Intact; Right Intact    Gross Motor Skills-Upper: Left Intact; Right Intact    Tone:  Tone: Normal    Balance:  Sitting: Intact  Standing: Impaired; Without support  Standing - Static: Fair  Standing - Dynamic : Constant support; Fair    Functional Mobility and Transfers for ADLs:  Bed Mobility:  Sit to Supine: Stand-by assistance    Transfers:  Sit to Stand: Contact guard assistance  Stand to Sit: Contact guard assistance  Toilet Transfer : Contact guard assistance (for SPT from [facility])    ADL Assessment:  Feeding: Independent    Oral Facial Hygiene/Grooming: Setup;Supervision (seated)    Bathing: Minimum assistance    Upper Body Dressing: Setup    Lower Body Dressing: Contact guard assistance;Minimum assistance    Toileting: Contact guard assistance    ADL Intervention and task modifications:    Lower Body Dressing Assistance  Underpants: Contact guard assistance  Pants With Elastic Waist: Contact guard assistance  Leg Crossed Method Used: Yes  Position Performed: Seated in chair  Cues: Don;Doff;Verbal cues provided    Toileting  Bowel Hygiene: Stand-by assistance (seated)  Clothing Management: Contact guard assistance  Adaptive Equipment: Other (comment) ([facility])    Cognitive Retraining  Safety/Judgement: Awareness of environment; Fall prevention;Good awareness of safety precautions; Home safety; Insight into deficits    Functional Measure:    Barthel Index:  Bathin  Bladder: 10  Bowels: 5  Groomin  Dressin  Feeding: 10  Mobility: 0  Stairs: 0  Toilet Use: 5  Transfer (Bed to Chair and Back): 10  Total: 50/100      The Barthel ADL Index: Guidelines  1. The index should be used as a record of what a patient does, not as a record of what a patient could do. 2. The main aim is to establish degree of independence from any help, physical or verbal, however minor and for whatever reason. 3. The need for supervision renders the patient not independent. 4. A patient's performance should be established using the best available evidence. Asking the patient, friends/relatives and nurses are the usual sources, but direct observation and common sense are also important. However direct testing is not needed. 5. Usually the patient's performance over the preceding 24-48 hours is important, but occasionally longer periods will be relevant. 6. Middle categories imply that the patient supplies over 50 per cent of the effort. 7. Use of aids to be independent is allowed. Score Interpretation (from [address])    Independent   60-79 Minimally independent   40-59 Partially dependent   20-39 Very dependent   <20 Totally dependent     -Kalyan Lyn., Barthel, DDANETTE. (1965). Functional evaluation: the Barthel Index. [address] ([address] (1997). The Barthel activities of daily living index: self-reporting versus actual performance in the old (> or = 75 years). Journal [address] 45(7), [facility], J...F, Omaira Wheeler., Jayde Cabrales. (1999). Measuring the change in disability after inpatient rehabilitation; comparison of the responsiveness of the Barthel Index and Functional Walker Measure. Journal of Neurology, Neurosurgery, and Psychiatry, 66(4), 331-539. Gian Castro, N.J.A, Ariela Chatman  [email or web address].[email or web address], & Nacny Wilson, M.A. (2004) Assessment of post-stroke quality of life in cost-effectiveness studies: The usefulness of the Barthel Index and the EuroQoL-5D.  Quality of Life Research, 15, 001-97     Occupational Therapy Evaluation Charge Determination   History Examination Decision-Making   LOW Complexity : Brief history review  MEDIUM Complexity : 3-5 performance deficits relating to physical, cognitive , or psychosocial skils that result in activity limitations and / or participation restrictions LOW Complexity : No comorbidities that affect functional and no verbal or physical assistance needed to complete eval tasks       Based on the above components, the patient evaluation is determined to be of the following complexity level: LOW   Pain Rating:  Pt reporting ongoing abdominal pain and dizziness    Activity Tolerance:   Fair and signs and symptoms of orthostatic hypotension    After treatment patient left in no apparent distress:    Supine in bed, Call bell within reach and Side rails x 3    COMMUNICATION/EDUCATION:   The patients plan of care was discussed with: Physical therapist and Registered nurse. Home safety education was provided and the patient/caregiver indicated understanding., Patient/family have participated as able in goal setting and plan of care. and Patient/family agree to work toward stated goals and plan of care. This patients plan of care is appropriate for delegation to [facility].     Thank you for this referral.  Eloisa Bamberger, OT  Time Calculation: 20 mins

## 2022-07-17 NOTE — PROGRESS NOTES
[address] [facility] [place]    Office [phone]  Fax [phone]          Subjective / Objective     24 Hour Events: No acute events. Subjective: Patient was sleeping comfortably. Still c/o abdo, flank, and chest pain but says it has improved since admission. His abdomen is less distended. His R calf is significantly warm and tender to touch. Temp (24hrs), Av.3 °F (36.8 °C), Min:93.8 °F (34.3 °C), Max:99.8 °F (37.7 °C)     Physical Exam  Vitals reviewed. HENT:      Head: Normocephalic and atraumatic. Nose: Nose normal.   Eyes:      Extraocular Movements: Extraocular movements intact. Pupils: Pupils are equal, round, and reactive to light. Cardiovascular:      Rate and Rhythm: Normal rate and regular rhythm. Pulses: Normal pulses. Heart sounds: Normal heart sounds. Pulmonary:      Effort: Pulmonary effort is normal.   Abdominal:      General: Bowel sounds are normal. There is distension. Palpations: Abdomen is soft. Tenderness: There is abdominal tenderness. There is no guarding or rebound. Comments: Abdo distension resolving   Skin:     General: Skin is warm. Comments: RLE warm and erythematous   Neurological:      General: No focal deficit present. Mental Status: He is alert.    Psychiatric:         Mood and Affect: Mood normal.        Respiratory: O2 Flow Rate (L/min): 2 l/min O2 Device: Nasal cannula     I/O:  Date 22 - 22 0622 - 22 0659   Shift 7112-9748 6426-7557 24 Hour Total 6531-7034 5455-7475 24 Hour Total   INTAKE   Shift Total(mL/kg)         OUTPUT   Urine(mL/kg/hr)  400 400        Urine Voided  400 400      Dialysis  4000 4000        NET Fluid Removed (mL)  4000 4000      Shift Total(mL/kg)  4400(61.1) 4400(61.1)      NET  -4400 -4400      Weight (kg) 72 72 72 72 72 72       Inpatient Medications  Current Facility-Administered Medications   Medication Dose Route Frequency    sodium chloride (NS) flush 5-40 mL  5-40 mL IntraVENous Q8H    sodium chloride (NS) flush 5-40 mL  5-40 mL IntraVENous PRN    acetaminophen (TYLENOL) tablet 650 mg  650 mg Oral Q6H PRN    Or    acetaminophen (TYLENOL) suppository 650 mg  650 mg Rectal Q6H PRN    polyethylene glycol (MIRALAX) packet 17 g  17 g Oral DAILY    ondansetron (ZOFRAN ODT) tablet 4 mg  4 mg Oral Q8H PRN    Or    ondansetron (ZOFRAN) injection 4 mg  4 mg IntraVENous Q6H PRN    heparin (porcine) injection 5,000 Units  5,000 Units SubCUTAneous Q8H    HYDROmorphone (DILAUDID) syringe 0.2 mg  0.2 mg IntraVENous Q4H PRN    glucose chewable tablet 16 g  4 Tablet Oral PRN    dextrose 10% infusion 0-250 mL  0-250 mL IntraVENous PRN    glucagon (GLUCAGEN) injection 1 mg  1 mg IntraMUSCular PRN    insulin glargine (LANTUS) injection 12 Units  12 Units SubCUTAneous QHS    insulin lispro (HUMALOG) injection   SubCUTAneous AC&HS    amLODIPine (NORVASC) tablet 10 mg  10 mg Oral DAILY    carvediloL (COREG) tablet 12.5 mg  12.5 mg Oral BID WITH MEALS    hydrALAZINE (APRESOLINE) tablet 50 mg  50 mg Oral TID    isosorbide mononitrate ER (IMDUR) tablet 60 mg  60 mg Oral DAILY    aspirin delayed-release tablet 81 mg  81 mg Oral DAILY    busPIRone (BUSPAR) tablet 10 mg  10 mg Oral BID    docusate sodium (COLACE) capsule 100 mg  100 mg Oral BID    gabapentin (NEURONTIN) capsule 300 mg  300 mg Oral DAILY    pantoprazole (PROTONIX) tablet 40 mg  40 mg Oral ACB    rosuvastatin (CRESTOR) tablet 10 mg  10 mg Oral QAM    senna (SENOKOT) tablet 17.2 mg  2 Tablet Oral QHS    sucralfate (CARAFATE) tablet 1 g  1 g Oral TIDAC    tamsulosin (FLOMAX) capsule 0.4 mg  0.4 mg Oral QHS    sevelamer carbonate (RENVELA) tab 800 mg  800 mg Oral TID WITH MEALS     Current Outpatient Medications   Medication Sig    cephALEXin (KEFLEX) 500 mg capsule Take 500 mg by mouth three (3) times daily.     sevelamer (RENAGEL) 800 mg tablet Take 800 mg by mouth three (3) times daily (with meals).  amLODIPine (NORVASC) 10 mg tablet Take 0.5 tab in AM and 0.5 tab in PM    pantoprazole (PROTONIX) 40 mg tablet Take 1 Tablet by mouth daily.  sucralfate (CARAFATE) 1 gram tablet Take 1 Tablet by mouth three (3) times daily.  tamsulosin (FLOMAX) 0.4 mg capsule Take 1 Capsule by mouth nightly.  isosorbide mononitrate ER (IMDUR) 60 mg CR tablet Take 1 Tablet by mouth daily.  furosemide (Lasix) 80 mg tablet Take 80 mg by mouth daily.  Insulin Needles, Disposable, 31 gauge x 5/16\" ndle Use as directed with insulin DM2    acetaminophen (Tylenol Extra Strength) 500 mg tablet Take 500 mg by mouth every six (6) hours as needed for Pain or Fever.  aspirin delayed-release 81 mg tablet Take 81 mg by mouth daily.  bumetanide (BUMEX) 1 mg tablet Take 1 mg by mouth two (2) times a day. (Patient not taking: Reported on 6/21/2022)    clopidogreL (Plavix) 75 mg tab Take 75 mg by mouth daily. (Patient not taking: Reported on 6/21/2022)    gabapentin (NEURONTIN) 300 mg capsule Take 300 mg by mouth daily. Take after dialysis on dialysis days. Patient taking this TID.  insulin glargine (LANTUS,BASAGLAR) 100 unit/mL (3 mL) inpn 15 Units by SubCUTAneous route nightly.  insulin aspart U-100 (NovoLOG Flexpen U-100 Insulin) 100 unit/mL (3 mL) inpn by SubCUTAneous route Before breakfast, lunch, and dinner. -180    2 units  -220    4 units   -260    6 units  -300    8 units  -350    10 units    senna (Senna) 8.6 mg tablet Take 2 Tablets by mouth nightly.  polyethylene glycol (Miralax) 17 gram/dose powder Take 17 g by mouth daily as needed for Constipation.  hydrALAZINE (APRESOLINE) 50 mg tablet Take 1 Tablet by mouth three (3) times daily. (Patient taking differently: Take 100 mg by mouth three (3) times daily.)    busPIRone (BUSPAR) 10 mg tablet Take 10 mg by mouth two (2) times a day.     carvediloL (Coreg) 12.5 mg tablet Take 12.5 mg by mouth two (2) times daily (with meals). Hold if top number below 100    docusate sodium (Colace) 100 mg capsule Take 1 Capsule by mouth two (2) times a day.  ferrous sulfate 325 mg (65 mg iron) tablet Take 1 Tablet by mouth daily.  rosuvastatin (CRESTOR) 10 mg tablet Take 10 mg by mouth Every morning.  eplerenone (Inspra) 25 mg tablet Take 25 mg by mouth daily. (Patient not taking: Reported on 6/21/2022)         Allergies  No Known Allergies      CBC:  Recent Labs     07/17/22  0142 07/16/22  1350   WBC 9.5 9.1   HGB 8.9* 9.1*   HCT 26.6* 27.9*    916       Metabolic Panel:  Recent Labs     07/17/22  0142 07/16/22  1350    133*   K 3.6 4.6    101   CO2 25 19*   BUN 33* 71*   CREA 3.67* 5.97*   * 307*   CA 8.8 8.6   MG  --  2.2   ALB 2.7* 2.7*   ALT 11* 11*            Assessment and Plan   Heather Wilson is a 64 y.o. male with PMHx of ESRD on HD MWF, T2DM, HTN, CAD s/p CABG (May 2020), HEFpEF, PEA arrest, and erosive gastritis. He presents to the ED today for abdo pain and SOB after having missed a week of dialysis.     1. Hypertensive Emergency likely 2/2 to fluid overload. pt with hx of HTN. POA /88. Last BP reading 151/61. CXR showing pulmonary edema. CT showing JOSEPH pleural effusions. Pt c/o intermittent stabbing cp. POA trop 30 >> 36 >> 38. EKG neg for ischemia. Pt follows with Dr. Denver Wiley  - continue home Hydralazine 50mg TID, Amlodipine 10mg OD, Carvedilol 12.5mg BID  - placed on continuous telemetry    2. Abdominal pain pt c/o of epigastric pain likely 2/2 fluid overload. Has generalized abdo tenderness to palpation without rebound or guarding. Abdo is distended. CT negative for obstruction, but shows fecal stasis. No concern for SBP at the moment as pt afebrile and WBC wnl.  - Dilaudid 0.2 Q4H PRN  - continue to monitor     3. Constipation pt with hx of constipation. Stopped taking laxatives because they gave him diarrhea. CT showing fecal stasis.   - start docsuate, miralax, senna  - holding home ferrous sulfate    4. Calf swelling R calf is swollen and erythematous. Sedentary lifestyle. Concern for DVT. - JOSEPH LE doppler pending; scheduled for 7 AM.    5. Heel Wound 3 week hx of R heel wound. Saw podiatry on Thurs. R foot in cast. Not receiving oral tx. - wound care on consult, appreciate recs    6. ESRD pt with hx of ESRD on HD MWF; has missed past week of dialysis. - Dialysis initiated in ED. Pt follows with Dr. Hannah Lobo. - Hold home diuretics and re-evaluate after dialysis treatment  - continue home Sevelamer 800mg TID  - Nephrology on consult, appreciate recs    7. CAD / HEFrEF pt with hx of chronic HF. Echo 11/19/21 with EF 53% and reduced systolic function. BNP 14,153 which is around his usual baseline. - continue home Carvedilol 12.5mg BID, IMDUR 60mg OD, and Aspirin 81mg OD    8. Type 2 diabetes mellitus pt's at home insulin regimen is Lantus 15 units daily and Humalog on sliding scale. POA glu 307. Last A1c 6.4   - hold home meds  - Lantus 12 units ACHS  - Insulin Sliding Scale normal sensitivity with AC&HS glucose checks. - Hypoglycemia protocols ordered. 9. HLD  - continue home rosuvastatin 10mg OD    10. GERD   - continue home Protonix 40mg OD, Sucralfate 1g TID    11. BPH  - continue home Flomax 0.4mg once nightly    12. Anxiety and depression  - continue home Buspirone 10mg BID    13- Anemia and thrombocytopenia POA Hgb 9.1 Plts 155.  - holding home ferrous sulfate; CT showing moderate fecal stasis  - will continue to monitor      FEN/GI - Regular diet. Low sodium, low potassium. Activity - Ambulate with assistance  DVT prophylaxis - Sub Q Heparin  GI prophylaxis - Protonix  Fall prophylaxis - Fall precautions ordered. Disposition - Admit to Telemetry. Plan to d/c to Home. Consulting CM  Code Status - Full. Discussed with patient / caregivers.   Next of Kin Name and Didier 13 (Spouse) [phone] (Mobile)       I appreciate the opportunity to participate in the care of this patient,  Ana Alejandre MD  [facility] Medicine Resident       For Billing    Chief Complaint   Patient presents with    Abdominal Pain    Shortness of Breath       Hospital Problems  Date Reviewed: 7/12/2022          Codes Class Noted POA    Acute pulmonary edema ([facility] [address] ICD-10-CM: J81.0  ICD-9-CM: 518.4  7/16/2022 Unknown        Open wound of right heel ICD-10-CM: S91.301A  ICD-9-CM: 892.0  7/16/2022 Yes        Calf swelling ICD-10-CM: M79.89  ICD-9-CM: 729.81  7/16/2022 Yes    Overview Signed 7/16/2022 11:36 PM by Laura Lindquist MD     R calf             Chest pain ICD-10-CM: R07.9  ICD-9-CM: 786.50  7/16/2022 Yes        BPH (benign prostatic hyperplasia) ICD-10-CM: N40.0  ICD-9-CM: 600.00  Unknown Yes        CAD (coronary artery disease) ICD-10-CM: I25.10  ICD-9-CM: 414.00  Unknown Yes        GERD (gastroesophageal reflux disease) ICD-10-CM: K21.9  ICD-9-CM: 530.81  Unknown Yes        HTN (hypertension) ICD-10-CM: I10  ICD-9-CM: 401.9  Unknown Yes        Hyperlipidemia ICD-10-CM: E78.5  ICD-9-CM: 272.4  Unknown Yes        Pleural effusion ICD-10-CM: J90  ICD-9-CM: 511.9  4/14/2022 Unknown        ESRD (end stage renal disease) on dialysis [facility]) ICD-10-CM: N18.6, Z99.2  ICD-9-CM: 585.6, V45.11  2/6/2022 Yes        Type 2 diabetes mellitus with diabetic neuropathy ([facility] [address] ICD-10-CM: E11.40  ICD-9-CM: 250.60, 357.2  8/5/2019 Yes

## 2022-07-18 LAB
ALBUMIN SERPL-MCNC: 2.4 G/DL (ref 3.5–5)
ALBUMIN/GLOB SERPL: 0.5 {RATIO} (ref 1.1–2.2)
ALP SERPL-CCNC: 94 U/L (ref 45–117)
ALT SERPL-CCNC: 11 U/L (ref 12–78)
ANION GAP SERPL CALC-SCNC: 10 MMOL/L (ref 5–15)
AST SERPL-CCNC: 11 U/L (ref 15–37)
ATRIAL RATE: 88 BPM
BASOPHILS # BLD: 0 K/UL (ref 0–0.1)
BASOPHILS NFR BLD: 0 % (ref 0–1)
BILIRUB SERPL-MCNC: 0.4 MG/DL (ref 0.2–1)
BUN SERPL-MCNC: 43 MG/DL (ref 6–20)
BUN/CREAT SERPL: 8 (ref 12–20)
CALCIUM SERPL-MCNC: 8.1 MG/DL (ref 8.5–10.1)
CALCULATED P AXIS, ECG09: 76 DEGREES
CALCULATED R AXIS, ECG10: 85 DEGREES
CALCULATED T AXIS, ECG11: -23 DEGREES
CHLORIDE SERPL-SCNC: 102 MMOL/L (ref 97–108)
CO2 SERPL-SCNC: 25 MMOL/L (ref 21–32)
CREAT SERPL-MCNC: 5.1 MG/DL (ref 0.7–1.3)
DIAGNOSIS, 93000: NORMAL
DIFFERENTIAL METHOD BLD: ABNORMAL
EOSINOPHIL # BLD: 0.1 K/UL (ref 0–0.4)
EOSINOPHIL NFR BLD: 1 % (ref 0–7)
ERYTHROCYTE [DISTWIDTH] IN BLOOD BY AUTOMATED COUNT: 14.7 % (ref 11.5–14.5)
GLOBULIN SER CALC-MCNC: 4.7 G/DL (ref 2–4)
GLUCOSE BLD STRIP.AUTO-MCNC: 103 MG/DL (ref 65–117)
GLUCOSE BLD STRIP.AUTO-MCNC: 121 MG/DL (ref 65–117)
GLUCOSE BLD STRIP.AUTO-MCNC: 158 MG/DL (ref 65–117)
GLUCOSE BLD STRIP.AUTO-MCNC: 180 MG/DL (ref 65–117)
GLUCOSE SERPL-MCNC: 141 MG/DL (ref 65–100)
HCT VFR BLD AUTO: 25.4 % (ref 36.6–50.3)
HGB BLD-MCNC: 8.3 G/DL (ref 12.1–17)
IMM GRANULOCYTES # BLD AUTO: 0.1 K/UL (ref 0–0.04)
IMM GRANULOCYTES NFR BLD AUTO: 0 % (ref 0–0.5)
LYMPHOCYTES # BLD: 0.8 K/UL (ref 0.8–3.5)
LYMPHOCYTES NFR BLD: 7 % (ref 12–49)
MCH RBC QN AUTO: 29.1 PG (ref 26–34)
MCHC RBC AUTO-ENTMCNC: 32.7 G/DL (ref 30–36.5)
MCV RBC AUTO: 89.1 FL (ref 80–99)
MONOCYTES # BLD: 1 K/UL (ref 0–1)
MONOCYTES NFR BLD: 9 % (ref 5–13)
NEUTS SEG # BLD: 9.1 K/UL (ref 1.8–8)
NEUTS SEG NFR BLD: 83 % (ref 32–75)
NRBC # BLD: 0 K/UL (ref 0–0.01)
NRBC BLD-RTO: 0 PER 100 WBC
P-R INTERVAL, ECG05: 144 MS
PLATELET # BLD AUTO: 166 K/UL (ref 150–400)
PMV BLD AUTO: 10.5 FL (ref 8.9–12.9)
POTASSIUM SERPL-SCNC: 4.3 MMOL/L (ref 3.5–5.1)
PROT SERPL-MCNC: 7.1 G/DL (ref 6.4–8.2)
Q-T INTERVAL, ECG07: 358 MS
QRS DURATION, ECG06: 78 MS
QTC CALCULATION (BEZET), ECG08: 433 MS
RBC # BLD AUTO: 2.85 M/UL (ref 4.1–5.7)
SERVICE CMNT-IMP: ABNORMAL
SERVICE CMNT-IMP: NORMAL
SODIUM SERPL-SCNC: 137 MMOL/L (ref 136–145)
VENTRICULAR RATE, ECG03: 88 BPM
WBC # BLD AUTO: 11.1 K/UL (ref 4.1–11.1)

## 2022-07-18 PROCEDURE — 74011250637 HC RX REV CODE- 250/637: Performed by: STUDENT IN AN ORGANIZED HEALTH CARE EDUCATION/TRAINING PROGRAM

## 2022-07-18 PROCEDURE — 65270000046 HC RM TELEMETRY

## 2022-07-18 PROCEDURE — 97162 PT EVAL MOD COMPLEX 30 MIN: CPT

## 2022-07-18 PROCEDURE — 74011000250 HC RX REV CODE- 250

## 2022-07-18 PROCEDURE — 82962 GLUCOSE BLOOD TEST: CPT

## 2022-07-18 PROCEDURE — 97116 GAIT TRAINING THERAPY: CPT

## 2022-07-18 PROCEDURE — 74011250636 HC RX REV CODE- 250/636

## 2022-07-18 PROCEDURE — 90935 HEMODIALYSIS ONE EVALUATION: CPT

## 2022-07-18 PROCEDURE — 77010033678 HC OXYGEN DAILY

## 2022-07-18 PROCEDURE — 74011250636 HC RX REV CODE- 250/636: Performed by: INTERNAL MEDICINE

## 2022-07-18 PROCEDURE — 74011636637 HC RX REV CODE- 636/637

## 2022-07-18 PROCEDURE — 74011250637 HC RX REV CODE- 250/637

## 2022-07-18 PROCEDURE — 94761 N-INVAS EAR/PLS OXIMETRY MLT: CPT

## 2022-07-18 PROCEDURE — 36415 COLL VENOUS BLD VENIPUNCTURE: CPT

## 2022-07-18 PROCEDURE — 80053 COMPREHEN METABOLIC PANEL: CPT

## 2022-07-18 PROCEDURE — 85025 COMPLETE CBC W/AUTO DIFF WBC: CPT

## 2022-07-18 PROCEDURE — 99232 SBSQ HOSP IP/OBS MODERATE 35: CPT

## 2022-07-18 RX ORDER — HEPARIN SODIUM 1000 [USP'U]/ML
1900 INJECTION, SOLUTION INTRAVENOUS; SUBCUTANEOUS
Status: DISCONTINUED | OUTPATIENT
Start: 2022-07-18 | End: 2022-07-19 | Stop reason: HOSPADM

## 2022-07-18 RX ADMIN — SUCRALFATE 1 G: 1 TABLET ORAL at 12:26

## 2022-07-18 RX ADMIN — INSULIN LISPRO 2 UNITS: 100 INJECTION, SOLUTION INTRAVENOUS; SUBCUTANEOUS at 17:00

## 2022-07-18 RX ADMIN — ASPIRIN 81 MG: 81 TABLET, COATED ORAL at 08:33

## 2022-07-18 RX ADMIN — HYDRALAZINE HYDROCHLORIDE 50 MG: 25 TABLET ORAL at 12:25

## 2022-07-18 RX ADMIN — HYDRALAZINE HYDROCHLORIDE 50 MG: 25 TABLET ORAL at 16:49

## 2022-07-18 RX ADMIN — DOCUSATE SODIUM 100 MG: 100 CAPSULE, LIQUID FILLED ORAL at 08:33

## 2022-07-18 RX ADMIN — HYDROMORPHONE HYDROCHLORIDE 0.2 MG: 1 INJECTION, SOLUTION INTRAMUSCULAR; INTRAVENOUS; SUBCUTANEOUS at 16:49

## 2022-07-18 RX ADMIN — HYDROMORPHONE HYDROCHLORIDE 0.2 MG: 1 INJECTION, SOLUTION INTRAMUSCULAR; INTRAVENOUS; SUBCUTANEOUS at 02:16

## 2022-07-18 RX ADMIN — CARVEDILOL 12.5 MG: 12.5 TABLET, FILM COATED ORAL at 16:49

## 2022-07-18 RX ADMIN — HEPARIN SODIUM 5000 UNITS: 5000 INJECTION INTRAVENOUS; SUBCUTANEOUS at 06:33

## 2022-07-18 RX ADMIN — HYDRALAZINE HYDROCHLORIDE 50 MG: 25 TABLET ORAL at 21:29

## 2022-07-18 RX ADMIN — DOCUSATE SODIUM 100 MG: 100 CAPSULE, LIQUID FILLED ORAL at 21:29

## 2022-07-18 RX ADMIN — HEPARIN SODIUM 5000 UNITS: 5000 INJECTION INTRAVENOUS; SUBCUTANEOUS at 14:25

## 2022-07-18 RX ADMIN — Medication 5 ML: at 06:00

## 2022-07-18 RX ADMIN — SEVELAMER CARBONATE 800 MG: 800 TABLET, FILM COATED ORAL at 17:01

## 2022-07-18 RX ADMIN — Medication 10 ML: at 21:29

## 2022-07-18 RX ADMIN — ACETAMINOPHEN 650 MG: 325 TABLET ORAL at 17:01

## 2022-07-18 RX ADMIN — ISOSORBIDE MONONITRATE 60 MG: 30 TABLET, EXTENDED RELEASE ORAL at 12:26

## 2022-07-18 RX ADMIN — ACETAMINOPHEN 650 MG: 325 TABLET ORAL at 08:37

## 2022-07-18 RX ADMIN — PANTOPRAZOLE SODIUM 40 MG: 40 TABLET, DELAYED RELEASE ORAL at 06:45

## 2022-07-18 RX ADMIN — ONDANSETRON 4 MG: 2 INJECTION INTRAMUSCULAR; INTRAVENOUS at 07:43

## 2022-07-18 RX ADMIN — TAMSULOSIN HYDROCHLORIDE 0.4 MG: 0.4 CAPSULE ORAL at 21:29

## 2022-07-18 RX ADMIN — BUSPIRONE HYDROCHLORIDE 10 MG: 10 TABLET ORAL at 21:29

## 2022-07-18 RX ADMIN — SUCRALFATE 1 G: 1 TABLET ORAL at 16:49

## 2022-07-18 RX ADMIN — Medication 12 UNITS: at 21:29

## 2022-07-18 RX ADMIN — HYDROMORPHONE HYDROCHLORIDE 0.2 MG: 1 INJECTION, SOLUTION INTRAMUSCULAR; INTRAVENOUS; SUBCUTANEOUS at 20:53

## 2022-07-18 RX ADMIN — HEPARIN SODIUM 1900 UNITS: 1000 INJECTION INTRAVENOUS; SUBCUTANEOUS at 09:44

## 2022-07-18 RX ADMIN — SEVELAMER CARBONATE 800 MG: 800 TABLET, FILM COATED ORAL at 08:33

## 2022-07-18 RX ADMIN — POLYETHYLENE GLYCOL 3350 17 G: 17 POWDER, FOR SOLUTION ORAL at 08:37

## 2022-07-18 RX ADMIN — SUCRALFATE 1 G: 1 TABLET ORAL at 06:45

## 2022-07-18 RX ADMIN — GABAPENTIN 300 MG: 300 CAPSULE ORAL at 08:33

## 2022-07-18 RX ADMIN — HEPARIN SODIUM 5000 UNITS: 5000 INJECTION INTRAVENOUS; SUBCUTANEOUS at 21:29

## 2022-07-18 RX ADMIN — AMLODIPINE BESYLATE 10 MG: 5 TABLET ORAL at 12:26

## 2022-07-18 RX ADMIN — HEPARIN SODIUM 1900 UNITS: 1000 INJECTION INTRAVENOUS; SUBCUTANEOUS at 09:43

## 2022-07-18 RX ADMIN — HYDROMORPHONE HYDROCHLORIDE 0.2 MG: 1 INJECTION, SOLUTION INTRAMUSCULAR; INTRAVENOUS; SUBCUTANEOUS at 06:45

## 2022-07-18 RX ADMIN — SEVELAMER CARBONATE 800 MG: 800 TABLET, FILM COATED ORAL at 12:26

## 2022-07-18 RX ADMIN — BUSPIRONE HYDROCHLORIDE 10 MG: 10 TABLET ORAL at 08:33

## 2022-07-18 RX ADMIN — SENNOSIDES 17.2 MG: 8.6 TABLET, FILM COATED ORAL at 21:29

## 2022-07-18 RX ADMIN — HYDROMORPHONE HYDROCHLORIDE 0.2 MG: 1 INJECTION, SOLUTION INTRAMUSCULAR; INTRAVENOUS; SUBCUTANEOUS at 12:26

## 2022-07-18 RX ADMIN — ROSUVASTATIN CALCIUM 10 MG: 10 TABLET, FILM COATED ORAL at 12:25

## 2022-07-18 RX ADMIN — Medication 10 ML: at 14:27

## 2022-07-18 NOTE — ED NOTES
Verbal shift change report given to BRAIN De Los Santos (oncoming nurse) by Emely Altman (offgoing nurse). Report included the following information SBAR, ED Summary, Procedure Summary and MAR.

## 2022-07-18 NOTE — PROGRESS NOTES
CARE MANAGEMENT INITIAL ASSESSEMENT      NAME:   Jenni Carcamo   :     1960   MRN:     [id number]       Emergency Contact:  Extended Emergency Contact Information  Primary Emergency Contact: Mone  Mobile Phone: [phone]  Relation: Spouse  Secondary Emergency Contact: Drew Webster  Mobile Phone: [phone]  Relation: Daughter    Advance Directive:  Full Code, does not have an advance directive. Elvi Gillis Healthcare Decision Maker:   Claudene Spruce- spouse- [phone]    Reason for Admission:  Mr. Helen Sutton is a 64 y.o. male with history that includes anxiety, depression, DM, CAD, HTN, GERD and ESRD  who was emergently admitted for:  hypertensive emergency    Patient Active Problem List   Diagnosis Code    Peripheral neuropathy G62.9    Type 2 diabetes mellitus with ophthalmic complication, with long-term current use of insulin ([facility] [address] E11.39, Z79.4    Type 2 diabetes mellitus with diabetic neuropathy ([address] E11.40    ESRD (end stage renal disease) on dialysis ([address] N18.6, Z99.2    Thrombocytopenia (HCC) D69.6    Cirrhosis ([address] K74.60    Anxiety and depression F41.9, F32. A    CHF NYHA class I, chronic, diastolic (HCC) K21.35    DM type 2 causing renal disease (HCC) E11.29    BPH (benign prostatic hyperplasia) N40.0    CAD (coronary artery disease) I25.10    DM type 2 causing vascular disease (HCC) E11.59    GERD (gastroesophageal reflux disease) K21.9    HTN (hypertension) I10    Hyperlipidemia E78.5    Anemia associated with chronic renal failure N18.9, D63.1    Chronic pain G89.29    Pleural effusion J90    Acute pulmonary edema (HCC) J81.0    Open wound of right heel S91.301A    Calf swelling M79.89    Chest pain R07.9       Assessment: In person with patient.       RUR:  20%  Risk Level:  High  Value-based purchasing:   No  Bundle patient:  No    Residency:  Private residence  Exterior Steps:  3  Interior Steps:  None    Lives With:  Spouse/Significant Other    Prior functioning:  Independent. Patient requires assistance with:  N/A    Prior DME required:  Cane, Rolling walker and Home O2 (2L/Provider UNKNOWN)    DME available:  Same as above    Rehab history:  None    Discharge Concerns/Barriers Identified:  None identified      Insurer:  Payor: Abiola Denis / Plan: [id number] [employer] / Product Type: Managed Care Medicaid /     PCP: Renata Dodge MD   Name of Practice:  [facility]   Current patient: Yes   Approximate date of last visit: last week   Access to virtual PCP visits:  Unknown    Pharmacy:  [facility] [address]   Financial/Difficulty affording medications:  None identified    COVID-19 vaccination status:  Fully vaccinated. Pt/family unable to recall dates. DC Transport:  Family      Transition of care plan:  Home with outpatient follow-up     Comments:   Pt admitted on 7/16/22 for hypertensive emergency. CM met with Pt to complete initial assessment. Pt states he lives at home with family. Pt has no hx of HH. Pt has home O2 at 2L that he uses PRN. Pt is unable to identify O2 provider. Pt has a cane and walker at home. Pt reports he usually uses the cane but due to foot injury he has been using the walker. Pt is independent with ADLs. Pt denies any problems with ADLs. Pt denies any usual problems with ambulation. Pt reports weakness at times after dialysis. Pt is not employed. Pt is not able to drive. Pt reports he receives OP dialysis at [facility]. Pt is on a MWF schedule. Pt reports family or uber usually transport him to dialysis. Pt states he missed dialysis last week because the uber never showed up. CM encouraged Pt to reach out to his dialysis SW to schedule transportation through [facility] if transportation continues to be an option. Pt voiced understanding. Discharge plan is for Pt to return home.  Family will transport Pt home.   _____________________________________  Fanny Caraballo, [address] [facility]. [address] Management  Available via [address] [place] [address]  7/18/2022   4:15 PM      Care Management Interventions  PCP Verified by CM: Yes Jensen Flood MD)  Mode of Transport at Discharge:  Other (see comment) (family)  Transition of Care Consult (CM Consult): Discharge Planning  MyChart Signup: No  Discharge Durable Medical Equipment: No  Physical Therapy Consult: Yes  Occupational Therapy Consult: Yes  Speech Therapy Consult: No  Support Systems: Spouse/Significant Other,Child(fidelia)  Confirm Follow Up Transport: Family  Discharge Location  Patient Expects to be Discharged to[de-identified] Home with family assistance

## 2022-07-18 NOTE — PROGRESS NOTES
Problem: Diabetes Self-Management  Goal: *Disease process and treatment process  Description: Define diabetes and identify own type of diabetes; list 3 options for treating diabetes. Outcome: Progressing Towards Goal  Goal: *Incorporating nutritional management into lifestyle  Description: Describe effect of type, amount and timing of food on blood glucose; list 3 methods for planning meals. Outcome: Progressing Towards Goal  Goal: *Incorporating physical activity into lifestyle  Description: State effect of exercise on blood glucose levels. Outcome: Progressing Towards Goal  Goal: *Developing strategies to promote health/change behavior  Description: Define the ABC's of diabetes; identify appropriate screenings, schedule and personal plan for screenings. Outcome: Progressing Towards Goal  Goal: *Using medications safely  Description: State effect of diabetes medications on diabetes; name diabetes medication taking, action and side effects. Outcome: Progressing Towards Goal  Goal: *Monitoring blood glucose, interpreting and using results  Description: Identify recommended blood glucose targets  and personal targets. Outcome: Progressing Towards Goal  Goal: *Prevention, detection, treatment of acute complications  Description: List symptoms of hyper- and hypoglycemia; describe how to treat low blood sugar and actions for lowering  high blood glucose level. Outcome: Progressing Towards Goal  Goal: *Prevention, detection and treatment of chronic complications  Description: Define the natural course of diabetes and describe the relationship of blood glucose levels to long term complications of diabetes.   Outcome: Progressing Towards Goal  Goal: *Developing strategies to address psychosocial issues  Description: Describe feelings about living with diabetes; identify support needed and support network  Outcome: Progressing Towards Goal  Goal: *Insulin pump training  Outcome: Progressing Towards Goal  Goal: *Sick day guidelines  Outcome: Progressing Towards Goal  Goal: *Patient Specific Goal (EDIT GOAL, INSERT TEXT)  Outcome: Progressing Towards Goal     Problem: Patient Education: Go to Patient Education Activity  Goal: Patient/Family Education  Outcome: Progressing Towards Goal     Problem: Falls - Risk of  Goal: *Absence of Falls  Description: Document Bard  Fall Risk and appropriate interventions in the flowsheet. Outcome: Progressing Towards Goal  Note: Fall Risk Interventions:  Mobility Interventions: Patient to call before getting OOB         Medication Interventions: Teach patient to arise slowly    Elimination Interventions: Call light in reach    History of Falls Interventions: Door open when patient unattended         Problem: Patient Education: Go to Patient Education Activity  Goal: Patient/Family Education  Outcome: Progressing Towards Goal     Problem: Pain  Goal: *Control of Pain  Outcome: Progressing Towards Goal  Goal: *PALLIATIVE CARE:  Alleviation of Pain  Outcome: Progressing Towards Goal     Problem: Patient Education: Go to Patient Education Activity  Goal: Patient/Family Education  Outcome: Progressing Towards Goal     Problem:  Activity Intolerance  Goal: *Oxygen saturation during activity within specified parameters  Outcome: Progressing Towards Goal  Goal: *Able to remain out of bed as prescribed  Outcome: Progressing Towards Goal     Problem: Patient Education: Go to Patient Education Activity  Goal: Patient/Family Education  Outcome: Progressing Towards Goal     Problem: Patient Education: Go to Patient Education Activity  Goal: Patient/Family Education  Outcome: Progressing Towards Goal

## 2022-07-18 NOTE — PROGRESS NOTES
Called the Family practice and spoke with [facility] [place] about the Pt's having non sustain VT's. New orders received to transfer Pt to Tele instead of Ortho unit.

## 2022-07-18 NOTE — ED NOTES
Pt reported the tylenol helped some with the pain.  Pt's SaO2 dropped to 88 while sleeping, and pt placed on 2 lpm O2 NC.

## 2022-07-18 NOTE — PROGRESS NOTES
Occupational therapy note:  Chart reviewed. Patient currently receiving dialysis at bedside. Will follow up with patient at later time. Mary George MS OTR/L

## 2022-07-18 NOTE — PROGRESS NOTES
[address] [facility] [address]   Office [phone]  Fax [phone]     DAILY PROGRESS NOTE    24 Hour Events: NAOE    SUBJECTIVE: Endorses ongoing nausea. Had 1 episode of vomiting after dinner. Eager to eat food. Denies chest pain, SOB,dizziness. OBJECTIVE:    Tele: Sinus rhythm    Vitals:   Visit Vitals  BP (!) 131/48   Pulse 74   Temp 99.7 °F (37.6 °C)   Resp 18   Ht 5' 8\" (1.727 m)   Wt 158 lb 11.7 oz (72 kg)   SpO2 97%   BMI 24.14 kg/m²     Physical Exam:  General: no acute distress  Respiratory: clear to auscultation bilaterally   Cardiovascular: Regular rate. GI: Flank and epigastric tenderness noted. BS present. Extremities: No LE edema. Skin: Warm, dry.   Neuro: Alert and oriented    I/O:  Date 07/17/22 0700 - 07/18/22 0659 07/18/22 0700 - 07/19/22 0659   Shift 4444-5949 4919-1876 24 Hour Total 1601-0713 8360-8732 24 Hour Total   INTAKE   Shift Total(mL/kg)         OUTPUT   Stool           Stool Occurrence(s) 1 x  1 x      Shift Total(mL/kg)         NET         Weight (kg) 72 72 72 72 72 72       Inpatient Medications  Current Facility-Administered Medications   Medication Dose Route Frequency    sodium chloride (NS) flush 5-40 mL  5-40 mL IntraVENous Q8H    sodium chloride (NS) flush 5-40 mL  5-40 mL IntraVENous PRN    acetaminophen (TYLENOL) tablet 650 mg  650 mg Oral Q6H PRN    Or    acetaminophen (TYLENOL) suppository 650 mg  650 mg Rectal Q6H PRN    polyethylene glycol (MIRALAX) packet 17 g  17 g Oral DAILY    ondansetron (ZOFRAN ODT) tablet 4 mg  4 mg Oral Q8H PRN    Or    ondansetron (ZOFRAN) injection 4 mg  4 mg IntraVENous Q6H PRN    heparin (porcine) injection 5,000 Units  5,000 Units SubCUTAneous Q8H    HYDROmorphone (DILAUDID) syringe 0.2 mg  0.2 mg IntraVENous Q4H PRN    glucose chewable tablet 16 g  4 Tablet Oral PRN    dextrose 10% infusion 0-250 mL  0-250 mL IntraVENous PRN    glucagon (GLUCAGEN) injection 1 mg  1 mg IntraMUSCular PRN    insulin glargine (LANTUS) injection 12 Units  12 Units SubCUTAneous QHS    insulin lispro (HUMALOG) injection   SubCUTAneous AC&HS    amLODIPine (NORVASC) tablet 10 mg  10 mg Oral DAILY    carvediloL (COREG) tablet 12.5 mg  12.5 mg Oral BID WITH MEALS    hydrALAZINE (APRESOLINE) tablet 50 mg  50 mg Oral TID    isosorbide mononitrate ER (IMDUR) tablet 60 mg  60 mg Oral DAILY    aspirin delayed-release tablet 81 mg  81 mg Oral DAILY    busPIRone (BUSPAR) tablet 10 mg  10 mg Oral BID    docusate sodium (COLACE) capsule 100 mg  100 mg Oral BID    gabapentin (NEURONTIN) capsule 300 mg  300 mg Oral DAILY    pantoprazole (PROTONIX) tablet 40 mg  40 mg Oral ACB    rosuvastatin (CRESTOR) tablet 10 mg  10 mg Oral QAM    senna (SENOKOT) tablet 17.2 mg  2 Tablet Oral QHS    sucralfate (CARAFATE) tablet 1 g  1 g Oral TIDAC    tamsulosin (FLOMAX) capsule 0.4 mg  0.4 mg Oral QHS    sevelamer carbonate (RENVELA) tab 800 mg  800 mg Oral TID WITH MEALS     Current Outpatient Medications   Medication Sig    cephALEXin (KEFLEX) 500 mg capsule Take 500 mg by mouth three (3) times daily.  sevelamer (RENAGEL) 800 mg tablet Take 800 mg by mouth three (3) times daily (with meals).  amLODIPine (NORVASC) 10 mg tablet Take 0.5 tab in AM and 0.5 tab in PM    pantoprazole (PROTONIX) 40 mg tablet Take 1 Tablet by mouth daily.  sucralfate (CARAFATE) 1 gram tablet Take 1 Tablet by mouth three (3) times daily.  tamsulosin (FLOMAX) 0.4 mg capsule Take 1 Capsule by mouth nightly.  isosorbide mononitrate ER (IMDUR) 60 mg CR tablet Take 1 Tablet by mouth daily.  furosemide (Lasix) 80 mg tablet Take 80 mg by mouth daily.  Insulin Needles, Disposable, 31 gauge x 5/16\" ndle Use as directed with insulin DM2    acetaminophen (Tylenol Extra Strength) 500 mg tablet Take 500 mg by mouth every six (6) hours as needed for Pain or Fever.  aspirin delayed-release 81 mg tablet Take 81 mg by mouth daily.     bumetanide (BUMEX) 1 mg tablet Take 1 mg by mouth two (2) times a day. (Patient not taking: Reported on 6/21/2022)    clopidogreL (Plavix) 75 mg tab Take 75 mg by mouth daily. (Patient not taking: Reported on 6/21/2022)    gabapentin (NEURONTIN) 300 mg capsule Take 300 mg by mouth daily. Take after dialysis on dialysis days. Patient taking this TID.  insulin glargine (LANTUS,BASAGLAR) 100 unit/mL (3 mL) inpn 15 Units by SubCUTAneous route nightly.  insulin aspart U-100 (NovoLOG Flexpen U-100 Insulin) 100 unit/mL (3 mL) inpn by SubCUTAneous route Before breakfast, lunch, and dinner. -180    2 units  -220    4 units   -260    6 units  -300    8 units  -350    10 units    senna (Senna) 8.6 mg tablet Take 2 Tablets by mouth nightly.  polyethylene glycol (Miralax) 17 gram/dose powder Take 17 g by mouth daily as needed for Constipation.  hydrALAZINE (APRESOLINE) 50 mg tablet Take 1 Tablet by mouth three (3) times daily. (Patient taking differently: Take 100 mg by mouth three (3) times daily.)    busPIRone (BUSPAR) 10 mg tablet Take 10 mg by mouth two (2) times a day.  carvediloL (Coreg) 12.5 mg tablet Take 12.5 mg by mouth two (2) times daily (with meals). Hold if top number below 100    docusate sodium (Colace) 100 mg capsule Take 1 Capsule by mouth two (2) times a day.  ferrous sulfate 325 mg (65 mg iron) tablet Take 1 Tablet by mouth daily.  rosuvastatin (CRESTOR) 10 mg tablet Take 10 mg by mouth Every morning.  eplerenone (Inspra) 25 mg tablet Take 25 mg by mouth daily.  (Patient not taking: Reported on 6/21/2022)       Allergies  No Known Allergies    CBC:  Recent Labs     07/18/22  0204 07/17/22  0142 07/16/22  1350   WBC 11.1 9.5 9.1   HGB 8.3* 8.9* 9.1*   HCT 25.4* 26.6* 27.9*    163 250       Metabolic Panel:  Recent Labs     07/18/22  0204 07/17/22  0142 07/16/22  1350    137 133*   K 4.3 3.6 4.6    103 101   CO2 25 25 19*   BUN 43* 33* 71*   CREA 5.10* 3.67* 5.97*   * 143* 307*   CA 8.1* 8.8 8.6   MG  --   --  2.2   ALB 2.4* 2.7* 2.7*   ALT 11* 11* 11*            Assessment and Plan     Brissa Parra a 64 y. o. male with PMHx of ESRD on HD MWF, T2DM, HTN, CAD s/p CABG (May 2020), HEFpEF, PEA arrest, and erosive gastritis. He was admitted for abdo pain and SOB after having missed a week of dialysis.     1. Hypertensive Emergency: Resolved likely 2/2 to fluid overload. Has h/o HTN. POA /88. CXR showing pulmonary edema. CT showing JOSEPH pleural effusions. Associated with intermittent stabbing cp. POA trop 30/36/ 38. EKG neg for ischemia. Pt follows with Dr. Erin Grubbs home Hydralazine 50mg TID, Amlodipine 10mg OD, Carvedilol 12.5mg BID  - On continuous telemetry     2. Abdominal pain: pt c/o of epigastric pain likely 2/2 fluid overload. Has generalized abdo tenderness to palpation with guarding. Abdo is distended. CT negative for obstruction, but shows fecal stasis. No concern for SBP at the moment as pt afebrile and WBC wnl.  - Tylenol, Dilaudid 0.2 Q4H PRN  - GI consulted, appreciate recs, consider gastroparesis     3. Constipation: Resolved hx of constipation. Stopped taking laxatives because they gave him diarrhea. CT showing fecal stasis. - Continue docsuate, miralax, senna  - HOLD home ferrous sulfate     4. Calf swelling R calf is swollen and erythematous. Likely 2/2 to heel wound. Sedentary lifestyle. - JOSEPH LE doppler neg     5. Heel Wound 3 week hx of R heel wound. Saw podiatry on Thurs. R foot in cast. Not receiving oral tx.  - Wound care consulted, appreciate recs     6. ESRD on HD MWF; has missed past week of dialysis. Follows Dr. Zonia Zuniga. - Hold home diuretics  - Continue dialysis treatment  - Continue home Sevelamer 800mg TID  - Nephrology on consult, appreciate recs     7. CAD / HEFrEF : Echo 11/2021 EF 53% and reduced systolic function.  BNP 14k (BL15k)   - Continue home Carvedilol 12.5mg BID, IMDUR 60mg OD, and Aspirin 81mg OD     8. Type 2 diabetes mellitus Home insulin regimen is Lantus 15 units daily and Humalog on sliding scale. POA glu 307. Last A1c 6.4   - Hold home meds  - Lantus 12 units ACHS  - Insulin Sliding Scale normal sensitivity with AC&HS glucose checks. - Hypoglycemia protocols ordered.     9. HLD  - Continue home rosuvastatin 10mg OD     10. GERD   - Continue home Protonix 40mg OD, Sucralfate 1g TID     11. BPH  - Continue home Flomax 0.4mg once nightly     12.  Anxiety and depression  - Continue home Buspirone 10mg BID     13- Anemia and thrombocytopenia POA Hgb 9.1 Plts 155.  - Holding home ferrous sulfate;  - Monitor daily labs        Alida Camacho MD  Family Medicine Resident       For Billing    Chief Complaint   Patient presents with    Abdominal Pain    Shortness of Breath       Hospital Problems  Date Reviewed: 7/12/2022          Codes Class Noted POA    Acute pulmonary edema ([facility] [address] ICD-10-CM: J81.0  ICD-9-CM: 518.4  7/16/2022 Unknown        Open wound of right heel ICD-10-CM: P30.170T  ICD-9-CM: 892.0  7/16/2022 Yes        Calf swelling ICD-10-CM: M79.89  ICD-9-CM: 729.81  7/16/2022 Yes    Overview Signed 7/16/2022 11:36 PM by Del Sneed MD     R calf             Chest pain ICD-10-CM: R07.9  ICD-9-CM: 786.50  7/16/2022 Yes        BPH (benign prostatic hyperplasia) ICD-10-CM: N40.0  ICD-9-CM: 600.00  Unknown Yes        CAD (coronary artery disease) ICD-10-CM: I25.10  ICD-9-CM: 414.00  Unknown Yes        GERD (gastroesophageal reflux disease) ICD-10-CM: K21.9  ICD-9-CM: 530.81  Unknown Yes        HTN (hypertension) ICD-10-CM: I10  ICD-9-CM: 401.9  Unknown Yes        Hyperlipidemia ICD-10-CM: E78.5  ICD-9-CM: 272.4  Unknown Yes        Pleural effusion ICD-10-CM: J90  ICD-9-CM: 511.9  4/14/2022 Unknown        ESRD (end stage renal disease) on dialysis [facility]) ICD-10-CM: N18.6, Z99.2  ICD-9-CM: 585.6, V45.11  2/6/2022 Yes        [facility] ICD-10-CM: E11.40  ICD-9-CM: 250.60, 357.2  8/5/2019 Yes

## 2022-07-18 NOTE — PROGRESS NOTES
CARE MANAGEMENT NOTE      CM attempted to complete initial assessment with Pt, however, Pt is receiving dialysis at this time.  CM will follow up when Pt is available.     _____________________________________  Josue Smoker, [address] [facility] Management   Available via [employer]  7/18/2022   10:43 AM

## 2022-07-18 NOTE — ROUTINE PROCESS
TRANSFER - OUT REPORT:    Verbal report given to Jeanna RN(name) on Omaira Enter  being transferred to [facility](unit) for routine progression of care       Report consisted of patients Situation, Background, Assessment and   Recommendations(SBAR). Information from the following report(s) SBAR was reviewed with the receiving nurse. Lines:   Peripheral IV 07/16/22 Left Forearm (Active)   Site Assessment Clean, dry, & intact 07/18/22 1512   Phlebitis Assessment 0 07/18/22 1512   Infiltration Assessment 0 07/18/22 1512   Dressing Status Clean, dry, & intact 07/18/22 1512   Dressing Type Transparent 07/18/22 1512   Hub Color/Line Status Green;Patent; Flushed 07/18/22 1512   Action Taken Blood drawn 07/16/22 1342   Alcohol Cap Used Yes 07/18/22 1512        Opportunity for questions and clarification was provided.       Patient transported with:   [employer]

## 2022-07-18 NOTE — DISCHARGE SUMMARY
[address] [facility] [address] [place] [address]   Office [phone]  Fax [phone]       Discharge Note     Name: Arline Bridges MRN: [id number]  Sex: Male   YOB: 1960  Age: 64 y.o. PCP: Varun Bridges MD     Date of admission: 7/16/2022  Date of discharge: 7/19/2022    Attending physician at admission: Cesar Navarro MD.  Attending physician at discharge: Jerson Lopez MD  Resident physician at discharge: Denice Williamson MD     Consultants during hospitalization  None     Admission diagnoses   Acute pulmonary edema ([address] [J81.0]  Pleural effusion [J90]    Recommended follow-up after discharge    1. Florecita Washington MD  2. Nephrologist  3. Podiatry     Things to follow up on with PCP:   1. Repeat CBC to monitor hemoglobin and platelets  2. Monitor incidental groin lymph node      Medication Changes:  Current Discharge Medication List      CONTINUE these medications which have NOT CHANGED    Details   cephALEXin (KEFLEX) 500 mg capsule Take 500 mg by mouth three (3) times daily. sevelamer (RENAGEL) 800 mg tablet Take 800 mg by mouth three (3) times daily (with meals). amLODIPine (NORVASC) 10 mg tablet Take 0.5 tab in AM and 0.5 tab in PM  Qty: 90 Tablet, Refills: 0    Associated Diagnoses: Hypertension, unspecified type      pantoprazole (PROTONIX) 40 mg tablet Take 1 Tablet by mouth daily. Qty: 90 Tablet, Refills: 0    Associated Diagnoses: Gastroesophageal reflux disease, unspecified whether esophagitis present      sucralfate (CARAFATE) 1 gram tablet Take 1 Tablet by mouth three (3) times daily. Qty: 90 Tablet, Refills: 1    Associated Diagnoses: Gastroesophageal reflux disease, unspecified whether esophagitis present      tamsulosin (FLOMAX) 0.4 mg capsule Take 1 Capsule by mouth nightly.   Qty: 90 Capsule, Refills: 0    Associated Diagnoses: Benign prostatic hyperplasia, unspecified whether lower urinary tract symptoms present      isosorbide mononitrate ER (IMDUR) 60 mg CR tablet Take 1 Tablet by mouth daily. Qty: 30 Tablet, Refills: 0    Associated Diagnoses: Coronary artery disease with angina pectoris, unspecified vessel or lesion type, unspecified whether native or transplanted heart ([facility])      furosemide (Lasix) 80 mg tablet Take 80 mg by mouth daily. Insulin Needles, Disposable, 31 gauge x 5/16\" ndle Use as directed with insulin DM2  Qty: 100 Each, Refills: 11    Comments: May substitute  Associated Diagnoses: Type 2 diabetes mellitus with diabetic neuropathy, with long-term current use of insulin ([facility])      acetaminophen (Tylenol Extra Strength) 500 mg tablet Take 500 mg by mouth every six (6) hours as needed for Pain or Fever. aspirin delayed-release 81 mg tablet Take 81 mg by mouth daily. clopidogreL (Plavix) 75 mg tab Take 75 mg by mouth daily. gabapentin (NEURONTIN) 300 mg capsule Take 300 mg by mouth daily. Take after dialysis on dialysis days. Patient taking this TID. insulin glargine (LANTUS,BASAGLAR) 100 unit/mL (3 mL) inpn 15 Units by SubCUTAneous route nightly. insulin aspart U-100 (NovoLOG Flexpen U-100 Insulin) 100 unit/mL (3 mL) inpn by SubCUTAneous route Before breakfast, lunch, and dinner. -180    2 units  -220    4 units   -260    6 units  -300    8 units  -350    10 units      senna (Senna) 8.6 mg tablet Take 2 Tablets by mouth nightly. polyethylene glycol (Miralax) 17 gram/dose powder Take 17 g by mouth daily as needed for Constipation. hydrALAZINE (APRESOLINE) 50 mg tablet Take 1 Tablet by mouth three (3) times daily. Qty: 90 Tablet, Refills: 0      busPIRone (BUSPAR) 10 mg tablet Take 10 mg by mouth two (2) times a day. carvediloL (Coreg) 12.5 mg tablet Take 12.5 mg by mouth two (2) times daily (with meals). Hold if top number below 100      docusate sodium (Colace) 100 mg capsule Take 1 Capsule by mouth two (2) times a day.       ferrous sulfate 325 mg (65 mg iron) tablet Take 1 Tablet by mouth daily. rosuvastatin (CRESTOR) 10 mg tablet Take 10 mg by mouth Every morning. eplerenone (Inspra) 25 mg tablet Take 25 mg by mouth daily. STOP taking these medications       bumetanide (BUMEX) 1 mg tablet Comments:   Reason for Stopping:             History of Present Illness  Chiara Figueroa is a 64 y.o. male with PMHx of ESRD on HD MWF, T2DM, HTN, CAD s/p CABG (May 2020), HEFpEF, PEA arrest, and erosive gastritis. He presents to the ED today for abdo pain and SOB after having missed a week of dialysis. According to patient, he last received dialysis on Mon 7/11 but did not complete his session as he was transferred to [facility] ED due to chest pain occurring 45 minutes into the session. Today he is c/o of intermittent stabbing central cp, abdominal pain, flank pain, and muscle pains of his JOSEPH thighs. He also has SOB that is worse when lying flat. He reports noticing some swelling of his R lower extremity.     He has a R heel wound that is currently covered with a cast. According to patient he saw podiatry on Thursday for his wound. They applied an ointment and then placed the cast.     As per admitting provider, Dr. Jess Calvin MD      Hospital course  Chiara Figueroa was admitted with hypertensive emergency with pulmonary edema. He was treated with hydralazine, Coreg and Norvasc. He was followed by nephrology and dialyzed in the hospital. His nausea, vomiting, abdominal pain and distention resolved with Zofran and dialysis. His constipation due to missed laxatives resolved on Miralax and senna. He was tolerating regular diet and hemodynamically stable on discharge       Hypertensive Emergency with pulm edema: Resolved likely 2/2 to fluid overload dt missed dialysis. Associated with intermittent stabbing cp. Has h/o HTN. POA /88. CXR pulmonary edema. CT chest B/L pleural effusions. Trop 30/36/38. EKG neg for ischemia.  Follows Dr. Miley Salazar home Hydralazine 50mg TID, Amlodipine 10mg OD, Carvedilol 12.5mg BID    Abdominal pain: Resolving Epigastric and flank pain likely 2/2 fluid overload. Spasmodic in nature. Abdominal distention resolved post dialysis. CT negative for obstruction, shows fecal stasis. No concern for SBP as pt was afebrile and WBC wnl.  - Continue Protonix 40 mg daily     Constipation: Resolved Hx of constipation. Stopped laxatives because of diarrhea. CT showing fecal stasis. - Continue docsuate, miralax, senna      Right Heel Wound: 3 week history. Cast up to mid calf. POA had calf swelling and erythema b/l Doppler neg  - Follow up with Podiatry OP       ESRD on HD: MWF. Follows Dr. Ofe Reid. - Per Nephrology, continue dialysis  - Continue home Sevelamer 800mg TID, lasix 80 mg daily  - Follow up with Nephrology OP      CAD / HEFrEF : Echo 11/2021 EF 53% and reduced systolic function  - Continue home Carvedilol 12.5mg BID, IMDUR 60mg OD, and Aspirin 81mg OD       T2 DM: Home insulin regimen Lantus 15 units daily and Humalog on SSI. Last A1c 6.4   - Continue home regiment   - Hypoglycemia precautions       HLD: Chronic  - Continue home rosuvastatin 10mg OD    Groin lymph node: Incidental 2.5cm x 0.9cm.   - PCP: Follow up OP     GERD: Chronic  - Continue home Protonix 40mg OD, Sucralfate 1g TID        BPH: Chronic  - Continue home Flomax 0.4mg once nightly        Anxiety and depression: Chronic  - Continue home Buspirone 10mg BID       Anemia and thrombocytopenia Chronic. Likely secondary to ESRD  - Continue home ferrous sulfate daily  - PCP: Repeat CBC.       Visit Vitals  BP (!) 114/93 (BP 1 Location: Left upper arm, BP Patient Position: At rest)   Pulse 61   Temp 98.2 °F (36.8 °C)   Resp 14   Ht 5' 8\" (1.727 m)   Wt 158 lb 11.7 oz (72 kg)   SpO2 95%   BMI 24.14 kg/m²       Physical Examination:  General: No acute distress  Head: Normocephalic, atraumatic  Eyes: Conjunctiva pink  Neck: Supple  ENT: No rhinorrhea  CV: Heart: regular rate  Resp: Lungs: clear to auscultation bilaterally  GI: + bowel sounds, non-tender to palpation, non-distended. Back: No CVA tenderness  Extremities: No lower extremity edema  Skin: Warm, dry  Neuro: Awake, alert, and oriented. Condition at discharge: Stable. Labs  Recent Labs     07/19/22  0140 07/18/22  0204 07/17/22  0142   WBC 9.7 11.1 9.5   HGB 8.4* 8.3* 8.9*   HCT 26.6* 25.4* 26.6*    166 163     Recent Labs     07/19/22  0140 07/18/22  0204 07/17/22  0142 07/16/22  1350 07/16/22  1350   * 137 137   < > 133*   K 3.9 4.3 3.6   < > 4.6   CL 98 102 103   < > 101   CO2 27 25 25   < > 19*   BUN 26* 43* 33*   < > 71*   CREA 3.74* 5.10* 3.67*   < > 5.97*   * 141* 143*   < > 307*   CA 8.3* 8.1* 8.8   < > 8.6   MG  --   --   --   --  2.2    < > = values in this interval not displayed. Recent Labs     07/19/22  0140 07/18/22  0204 07/17/22  0142 07/16/22  1350 07/16/22  1350   ALT 11* 11* 11*   < > 11*   AP 94 94 102   < > 116   TBILI 0.4 0.4 0.6   < > 0.3   TP 7.3 7.1 7.6   < > 7.8   ALB 2.4* 2.4* 2.7*   < > 2.7*   GLOB 4.9* 4.7* 4.9*   < > 5.1*   LPSE  --   --   --   --  271    < > = values in this interval not displayed. Recent Labs     07/19/22  1119 07/19/22  0746 07/18/22  2042 07/18/22  1648 07/18/22  1125   GLUCPOC 210* 119* 158* 180* 121*       Micro:  Lab Results   Component Value Date/Time    Culture result: NO GROWTH 4 DAYS 11/18/2021 11:40 AM       Imaging:  CT ABD PELV WO CONT    Result Date: 7/16/2022  EXAM: CT ABD PELV WO CONT INDICATION: abdominal pain; does have ESRD but still makes urine COMPARISON: 2/5/2022 IV CONTRAST: None. ORAL CONTRAST: None TECHNIQUE: Thin axial images were obtained through the abdomen and pelvis. Coronal and sagittal reformats were generated. CT dose reduction was achieved through use of a standardized protocol tailored for this examination and automatic exposure control for dose modulation.  The absence of intravenous contrast material reduces the sensitivity for evaluation of the vasculature and solid organs. FINDINGS: LOWER THORAX: There bilateral pleural effusions left greater than right with underlying atelectasis. LIVER: No mass. BILIARY TREE: Gallbladder is within normal limits. CBD is not dilated. SPLEEN: within normal limits. PANCREAS: No focal abnormality. ADRENALS: Unremarkable. KIDNEYS/URETERS: No calculus or hydronephrosis. STOMACH: Unremarkable. SMALL BOWEL: No dilatation or wall thickening. COLON: No dilatation or wall thickening. Moderate fecal stasis. APPENDIX: Within normal limits. PERITONEUM: No ascites or pneumoperitoneum. RETROPERITONEUM: No lymphadenopathy or aortic aneurysm. REPRODUCTIVE ORGANS: There is no pelvic mass or lymphadenopathy. URINARY BLADDER: No mass or calculus. BONES: Degenerative changes are seen in the lumbar spine and hip joints bilaterally. ABDOMINAL WALL: No mass or hernia. ADDITIONAL COMMENTS: N/A     Bilateral pleural effusions with underlying atelectasis left greater than right. Moderate fecal stasis. No acute intra-abdominal abnormality. XR CHEST PORT    Result Date: 7/16/2022  Indication: Chest pain Comparison: 4/14/2022 Portable exam of the chest obtained at 1357 demonstrates stable cardiomegaly. The patient is status post median sternotomy. There are small bilateral pleural effusions. Minimal pulmonary edema is noted. Central venous catheter is unchanged in position. Small bilateral pleural effusions and minimal pulmonary edema.        Procedures / Diagnostic Studies  None    Chronic diagnoses   Problem List as of 7/19/2022 Date Reviewed: 7/12/2022          Codes Class Noted - Resolved    Acute pulmonary edema ([facility] ICD-10-CM: J81.0  ICD-9-CM: 518.4  7/16/2022 - Present        Open wound of right heel ICD-10-CM: N32.113C  ICD-9-CM: 892.0  7/16/2022 - Present        Calf swelling ICD-10-CM: M79.89  ICD-9-CM: 729.81  7/16/2022 - Present    Overview Signed 7/16/2022 11:36 PM by MD RADHA Lu calf             Chest pain ICD-10-CM: R07.9  ICD-9-CM: 786.50  7/16/2022 - Present        Anxiety and depression ICD-10-CM: F41.9, F32. A  ICD-9-CM: 300.00, 311  Unknown - Present        CHF NYHA class I, chronic, diastolic (HCC) [id number]: I50.32  ICD-9-CM: 428.32  Unknown - Present        DM type 2 causing renal disease (HCC) ICD-10-CM: E11.29  ICD-9-CM: 250.40  Unknown - Present        BPH (benign prostatic hyperplasia) ICD-10-CM: N40.0  ICD-9-CM: 600.00  Unknown - Present        CAD (coronary artery disease) ICD-10-CM: I25.10  ICD-9-CM: 414.00  Unknown - Present        DM type 2 causing vascular disease (HCC) ICD-10-CM: E11.59  ICD-9-CM: 250.70, 443.81  Unknown - Present        GERD (gastroesophageal reflux disease) ICD-10-CM: K21.9  ICD-9-CM: 530.81  Unknown - Present        HTN (hypertension) ICD-10-CM: I10  ICD-9-CM: 401.9  Unknown - Present        Hyperlipidemia ICD-10-CM: E78.5  ICD-9-CM: 272.4  Unknown - Present        Anemia associated with chronic renal failure ICD-10-CM: N18.9, D63.1  ICD-9-CM: 285.21  Unknown - Present        Chronic pain ICD-10-CM: G89.29  ICD-9-CM: 338.29  Unknown - Present        Pleural effusion ICD-10-CM: J90  ICD-9-CM: 511.9  4/14/2022 - Present        Cirrhosis ([facility] ICD-10-CM: K74.60  ICD-9-CM: 571.5  4/1/2022 - Present        ESRD (end stage renal disease) on dialysis [facility]) ICD-10-CM: N18.6, Z99.2  ICD-9-CM: 585.6, V45.11  2/6/2022 - Present        Thrombocytopenia ([facility] ICD-10-CM: D69.6  ICD-9-CM: 287.5  2/6/2022 - Present        Type 2 diabetes mellitus with diabetic neuropathy (HCC) ICD-10-CM: E11.40  ICD-9-CM: 250.60, 357.2  8/5/2019 - Present        Type 2 diabetes mellitus with ophthalmic complication, with long-term current use of insulin (HCC) ICD-10-CM: E11.39, Z79.4  ICD-9-CM: 250.50, V58.67  11/25/2018 - Present        Peripheral neuropathy ICD-10-CM: G62.9  ICD-9-CM: 356.9  11/14/2018 - Present        RESOLVED: ESRD on dialysis ([facility] ICD-10-CM: N18.6, Z99.2  ICD-9-CM: 585.6, V45.11 Unknown - 4/14/2022        RESOLVED: NSTEMI (non-ST elevated myocardial infarction) [facility]) ICD-10-CM: I21.4  ICD-9-CM: 410.70  4/1/2022 - 4/14/2022        RESOLVED: ESRD (end stage renal disease) ([facility] ICD-10-CM: N18.6  ICD-9-CM: 585.6  2/14/2022 - 4/14/2022        RESOLVED: Hypertensive emergency ICD-10-CM: I16.1  ICD-9-CM: 401.9  2/14/2022 - 4/14/2022        RESOLVED: Anasarca ICD-10-CM: R60.1  ICD-9-CM: 782.3  2/6/2022 - 4/14/2022        RESOLVED: Abdominal pain ICD-10-CM: R10.9  ICD-9-CM: 789.00  2/6/2022 - 2/14/2022        RESOLVED: Heart failure with preserved ejection fraction ([facility] ICD-10-CM: I50.30  ICD-9-CM: 428.9  2/6/2022 - 4/14/2022        RESOLVED: Elevated troponin ICD-10-CM: R77.8  ICD-9-CM: 790.6  2/6/2022 - 4/14/2022        RESOLVED: Anemia ICD-10-CM: D64.9  ICD-9-CM: 285.9  2/6/2022 - 4/14/2022        RESOLVED: Epigastric pain ICD-10-CM: R10.13  ICD-9-CM: 789.06  2/6/2022 - 4/14/2022        RESOLVED: Hypertensive urgency ICD-10-CM: I16.0  ICD-9-CM: 401.9  2/6/2022 - 2/14/2022        RESOLVED: Obesity ICD-10-CM: E66.9  ICD-9-CM: 278.00  2/6/2022 - 2/14/2022        RESOLVED: Severe protein-calorie malnutrition ([facility] ICD-10-CM: E43  ICD-9-CM: 262  11/23/2021 - 4/14/2022        RESOLVED: CHF exacerbation ([facility] ICD-10-CM: I50.9  ICD-9-CM: 428.0  11/14/2021 - 4/14/2022        RESOLVED: Type 2 diabetes with nephropathy ([facility] ICD-10-CM: E11.21  ICD-9-CM: 250.40, 583.81  8/5/2019 - 4/14/2022        RESOLVED: Controlled type 2 diabetes mellitus with ophthalmic complication, with long-term current use of insulin ([facility] ICD-10-CM: E11.39, Z79.4  ICD-9-CM: 250.50, V58.67  11/14/2018 - 11/14/2018        RESOLVED: Essential hypertension ICD-10-CM: I10  ICD-9-CM: 401.9  11/14/2018 - 4/14/2022        RESOLVED: Reactive depression ICD-10-CM: F32.9  ICD-9-CM: 300.4  11/14/2018 - 4/14/2022               Diet:  Low sodium potassium diet.     Activity:  As tolerated     Disposition: Home or Self Care    Discharge instructions to patient/family  Please seek medical attention for any new or worsening symptoms particularly fever, chest pain, shortness of breath, abdominal pain, nausea, vomiting    Follow up plans/appointments  Follow-up Information     Follow up With Specialties Details Why Contact Info    Karina Berry MD Family Medicine On 7/20/2022 Follow up from hospital admission for hypertension emergency [facility]             Paige Blanton MD  Family Medicine Resident       For Billing    Chief Complaint   Patient presents with    Abdominal Pain    Shortness of Breath       Hospital Problems  Date Reviewed: 7/12/2022          Codes Class Noted POA    Acute pulmonary edema ([facility] ICD-10-CM: J81.0  ICD-9-CM: 518.4  7/16/2022 Unknown        Open wound of right heel ICD-10-CM: S91.301A  ICD-9-CM: 892.0  7/16/2022 Yes        Calf swelling ICD-10-CM: M79.89  ICD-9-CM: 729.81  7/16/2022 Yes    Overview Signed 7/16/2022 11:36 PM by Dayana Arnold MD     R calf             Chest pain ICD-10-CM: R07.9  ICD-9-CM: 786.50  7/16/2022 Yes        BPH (benign prostatic hyperplasia) ICD-10-CM: N40.0  ICD-9-CM: 600.00  Unknown Yes        CAD (coronary artery disease) ICD-10-CM: I25.10  ICD-9-CM: 414.00  Unknown Yes        GERD (gastroesophageal reflux disease) ICD-10-CM: K21.9  ICD-9-CM: 530.81  Unknown Yes        HTN (hypertension) ICD-10-CM: I10  ICD-9-CM: 401.9  Unknown Yes        Hyperlipidemia ICD-10-CM: E78.5  ICD-9-CM: 272.4  Unknown Yes        Pleural effusion ICD-10-CM: J90  ICD-9-CM: 511.9  4/14/2022 Unknown        ESRD (end stage renal disease) on dialysis [facility]) ICD-10-CM: N18.6, Z99.2  ICD-9-CM: 585.6, V45.11  2/6/2022 Yes        Type 2 diabetes mellitus with diabetic neuropathy ([facility] ICD-10-CM: E11.40  ICD-9-CM: 250.60, 357.2  8/5/2019 Yes

## 2022-07-18 NOTE — PROCEDURES
Hemodialysis / [phone]    Vitals Pre Post Assessment Pre Post   /53 156/53 LOC A & O X4 A & O X4   HR 70 75 Lungs No sob No sob   Resp 21 19 Cardiac SR SR   Temp 98.3 99.3 Skin warm warm   Weight  N/A N/A Edema None noted None noted   Tele status monitor monitor Pain 8/10 abdominal; premedicated by primary nurse 8/10 abdominal      Orders   Duration: Start: 0849 End: 1151 Total: 3 hours   Dialyzer: Dialyzer/Set Up Inspection: Bishnu Maldonado (07/18/22 0849)   Ceci Crandall Bath: Dialysate K (mEq/L): 2 (07/18/22 0849)   Ca Bath: Dialysate CA (mEq/L): 2.5 (07/18/22 0849)   Na: Dialysate NA (mEq/L): 138 (07/18/22 0849)   Bicarb: Dialysate HCO3 (mEq/L): 35 (07/18/22 0849)   Target Fluid Removal: Goal/Amount of Fluid to Remove (mL): 3000 mL (07/18/22 0849)     Access   Type & Location: Right cvc   Comments:      Patent; access cleaned, dressing dry, intact, and biopatch in place dated 7/18/22; no s/s of infection noted; post treatment both ports clamped and new caps applied                                 Labs   HBsAg (Antigen) / date:   Negative on 7/16/22                                            HBsAb (Antibody) / date: Susceptible on 7/16/22    Source: [facility]   Obtained/Reviewed  Critical Results Called HGB   Date Value Ref Range Status   07/18/2022 8.3 (L) 12.1 - 17.0 g/dL Final     Potassium   Date Value Ref Range Status   07/18/2022 4.3 3.5 - 5.1 mmol/L Final     Calcium   Date Value Ref Range Status   07/18/2022 8.1 (L) 8.5 - 10.1 MG/DL Final     BUN   Date Value Ref Range Status   07/18/2022 43 (H) 6 - 20 MG/DL Final     Creatinine   Date Value Ref Range Status   07/18/2022 5.10 (H) 0.70 - 1.30 MG/DL Final     Comment:     INVESTIGATED PER DELTA CHECK PROTOCOL        Meds Given   Name Dose Route   Heparin (1000 units/ml) Arterial port 1.9 ml  Venous port 1.9 ml IC               Adequacy / Fluid    Total Liters Process: 66.5   Net Fluid Removed: 3 Liters      Comments   Time Out Done:   (Time) Yes, 6394 Admitting Diagnosis: Acute pulmonary edema; pleural effusion   Consent obtained/signed: Informed Consent Verified: Yes (chronic dialysis patient) (07/18/22 7537)   Machine / RO # Machine Number: [id number] (07/18/22 6354)   Primary Nurse Rpt Pre: Deysi Guthrie RN   Primary Nurse Rpt Post: Deysi Guthrie RN   Pt Education: Hemodialysis procedural; access care   Care Plan: Continue hemodialysis per nephrologist   Pts outpatient clinic: [facility]     Tx Summary   Comments:  Treatment completed uneventful

## 2022-07-18 NOTE — PROGRESS NOTES
[address] [facility]  YOB: 1960          Assessment & Plan:   ESRD on HD [facility] [place])  Volume overload  Missed HD  Uncontrolled HTN  DM2  Anemia    Rec:  OK for d/c from renal standpoint  Follow up at dialysis Wed       Subjective:   CC: ESRD  HPI: Patient seen on HD.  Tanner tx well  ROS: no sob  Current Facility-Administered Medications   Medication Dose Route Frequency    heparin (porcine) 1,000 unit/mL injection 1,900 Units  1,900 Units Hemodialysis DIALYSIS PRN    heparin (porcine) 1,000 unit/mL injection 1,900 Units  1,900 Units Hemodialysis DIALYSIS PRN    sodium chloride (NS) flush 5-40 mL  5-40 mL IntraVENous Q8H    sodium chloride (NS) flush 5-40 mL  5-40 mL IntraVENous PRN    acetaminophen (TYLENOL) tablet 650 mg  650 mg Oral Q6H PRN    Or    acetaminophen (TYLENOL) suppository 650 mg  650 mg Rectal Q6H PRN    polyethylene glycol (MIRALAX) packet 17 g  17 g Oral DAILY    ondansetron (ZOFRAN ODT) tablet 4 mg  4 mg Oral Q8H PRN    Or    ondansetron (ZOFRAN) injection 4 mg  4 mg IntraVENous Q6H PRN    heparin (porcine) injection 5,000 Units  5,000 Units SubCUTAneous Q8H    HYDROmorphone (DILAUDID) syringe 0.2 mg  0.2 mg IntraVENous Q4H PRN    glucose chewable tablet 16 g  4 Tablet Oral PRN    dextrose 10% infusion 0-250 mL  0-250 mL IntraVENous PRN    glucagon (GLUCAGEN) injection 1 mg  1 mg IntraMUSCular PRN    insulin glargine (LANTUS) injection 12 Units  12 Units SubCUTAneous QHS    insulin lispro (HUMALOG) injection   SubCUTAneous AC&HS    amLODIPine (NORVASC) tablet 10 mg  10 mg Oral DAILY    carvediloL (COREG) tablet 12.5 mg  12.5 mg Oral BID WITH MEALS    hydrALAZINE (APRESOLINE) tablet 50 mg  50 mg Oral TID    isosorbide mononitrate ER (IMDUR) tablet 60 mg  60 mg Oral DAILY    aspirin delayed-release tablet 81 mg  81 mg Oral DAILY    busPIRone (BUSPAR) tablet 10 mg  10 mg Oral BID    docusate sodium (COLACE) capsule 100 mg  100 mg Oral BID    gabapentin (NEURONTIN) capsule 300 mg  300 mg Oral DAILY    pantoprazole (PROTONIX) tablet 40 mg  40 mg Oral ACB    rosuvastatin (CRESTOR) tablet 10 mg  10 mg Oral QAM    senna (SENOKOT) tablet 17.2 mg  2 Tablet Oral QHS    sucralfate (CARAFATE) tablet 1 g  1 g Oral TIDAC    tamsulosin (FLOMAX) capsule 0.4 mg  0.4 mg Oral QHS    sevelamer carbonate (RENVELA) tab 800 mg  800 mg Oral TID WITH MEALS     Current Outpatient Medications   Medication Sig    cephALEXin (KEFLEX) 500 mg capsule Take 500 mg by mouth three (3) times daily.  sevelamer (RENAGEL) 800 mg tablet Take 800 mg by mouth three (3) times daily (with meals).  amLODIPine (NORVASC) 10 mg tablet Take 0.5 tab in AM and 0.5 tab in PM    pantoprazole (PROTONIX) 40 mg tablet Take 1 Tablet by mouth daily.  sucralfate (CARAFATE) 1 gram tablet Take 1 Tablet by mouth three (3) times daily.  tamsulosin (FLOMAX) 0.4 mg capsule Take 1 Capsule by mouth nightly.  isosorbide mononitrate ER (IMDUR) 60 mg CR tablet Take 1 Tablet by mouth daily.  furosemide (Lasix) 80 mg tablet Take 80 mg by mouth daily.  Insulin Needles, Disposable, 31 gauge x 5/16\" ndle Use as directed with insulin DM2    acetaminophen (Tylenol Extra Strength) 500 mg tablet Take 500 mg by mouth every six (6) hours as needed for Pain or Fever.  aspirin delayed-release 81 mg tablet Take 81 mg by mouth daily.  bumetanide (BUMEX) 1 mg tablet Take 1 mg by mouth two (2) times a day. (Patient not taking: Reported on 6/21/2022)    clopidogreL (Plavix) 75 mg tab Take 75 mg by mouth daily. (Patient not taking: Reported on 6/21/2022)    gabapentin (NEURONTIN) 300 mg capsule Take 300 mg by mouth daily. Take after dialysis on dialysis days. Patient taking this TID.  insulin glargine (LANTUS,BASAGLAR) 100 unit/mL (3 mL) inpn 15 Units by SubCUTAneous route nightly.     insulin aspart U-100 (NovoLOG Flexpen U-100 Insulin) 100 unit/mL (3 mL) inpn by SubCUTAneous route Before breakfast, lunch, and dinner. -180    2 units  -220    4 units   -260    6 units  -300    8 units  -350    10 units    senna (Senna) 8.6 mg tablet Take 2 Tablets by mouth nightly.  polyethylene glycol (Miralax) 17 gram/dose powder Take 17 g by mouth daily as needed for Constipation.  hydrALAZINE (APRESOLINE) 50 mg tablet Take 1 Tablet by mouth three (3) times daily. (Patient taking differently: Take 100 mg by mouth three (3) times daily.)    busPIRone (BUSPAR) 10 mg tablet Take 10 mg by mouth two (2) times a day.  carvediloL (Coreg) 12.5 mg tablet Take 12.5 mg by mouth two (2) times daily (with meals). Hold if top number below 100    docusate sodium (Colace) 100 mg capsule Take 1 Capsule by mouth two (2) times a day.  ferrous sulfate 325 mg (65 mg iron) tablet Take 1 Tablet by mouth daily.  rosuvastatin (CRESTOR) 10 mg tablet Take 10 mg by mouth Every morning.  eplerenone (Inspra) 25 mg tablet Take 25 mg by mouth daily. (Patient not taking: Reported on 2022)          Objective:     Vitals:  Blood pressure (!) 146/57, pulse 73, temperature 98.3 °F (36.8 °C), resp. rate 14, height 5' 8\" (1.727 m), weight 72 kg (158 lb 11.7 oz), SpO2 98 %. Temp (24hrs), Av.4 °F (37.4 °C), Min:98.3 °F (36.8 °C), Max:100.2 °F (37.9 °C)      Intake and Output:  No intake/output data recorded.  1901 -  0700  In: -   Out: 4400 [Urine:400]    Physical Exam:               GENERAL ASSESSMENT: NAD  HEENT: Nontraumatic   CHEST: unlabored  HEART: reg  EXTREMITY: trace EDEMA        ECG/rhythm:    Data Review      No results for input(s): TNIPOC in the last 72 hours. No lab exists for component: ITNL   No results for input(s): CPK, CKMB, TROIQ in the last 72 hours.   Recent Labs     22  0204 22  0142 22  1350    137 133*   K 4.3 3.6 4.6    103 101   CO2 25 25 19*   BUN 43* 33* 71*   CREA 5.10* 3.67* 5.97*   * 143* 307*   MG  --   --  2.2   CA 8.1* 8.8 8.6   ALB 2.4* 2.7* 2.7*   WBC 11.1 9.5 9.1   HGB 8.3* 8.9* 9.1*   HCT 25.4* 26.6* 27.9*    163 155      No results for input(s): INR, PTP, APTT, INREXT in the last 72 hours. Needs: urine analysis, urine sodium, protein and creatinine  Lab Results   Component Value Date/Time    Creatinine, urine 72.2 11/22/2019 09:36 AM         : Rubén Valente MD  7/18/2022        [facility]:  [email or web address]  Julia Vera office:  [address], [address] [place] [address],[address]  [place], [address] [place] [address]  Phone: [phone]  Fax :     [phone]    [facility] office:  [address] [facility], [address] [place] [address]  Phone - [phone]  Fax - [phone]

## 2022-07-18 NOTE — WOUND CARE
New Patient Consult: consulted for right heel wound; patient found in ED up to bathroom. He has boot to walk with what appears to be total contact casting to right foot/leg. We are not capable of cutting/removing cast.  Unable to assess heel. Discussed with patient's nurses. Patient follows with whomever placed on Thursday.   Louis Shaw RN,CWON

## 2022-07-18 NOTE — PROGRESS NOTES
PHYSICAL THERAPY EVALUATION/DISCHARGE  Patient: Yadiel Casarez (60 y.o. male)  Date: 7/18/2022  Primary Diagnosis: Acute pulmonary edema (HCC) [J81.0]  Pleural effusion [J90]       Precautions:   Fall (WBAT vs. full WBing per Dr. Dwayne Naranjo)      ASSESSMENT  Based on the objective data described below, the patient presents with modified independent gait and functional mobility in the setting of hospital admission for acute pulmonary edema. Patient with recent OP podiatry visit with cierra Branham serve messaged for [facility] clarifications. Patient cleared to ambulate with full vs. WBAT weight bearing with cast and post operative boot donned. Patient tolerates 100ft ambulation with RW and supervision. Patient with antalgic gait secondary to weight/size of cast + boot, but otherwise demonstrates no glaring gait deviations. Patient blood pressure drops somewhat from sitting > standing, he reports mild dizziness. Blood pressure recovered as below with ambulation. No further PT needs identified. Vitals:    07/18/22 1400 07/18/22 1454 07/18/22 1500 07/18/22 1512   BP: (!) 163/67 (!) 154/60 (!) 132/53 (!) 142/63   BP 2:   135/53    BP 1 Location:  Left upper arm Left upper arm Left upper arm   BP Patient Position:  Sitting Standing Sitting  Comment: post ambulation   Pulse: 75 80 80 76   Pulse 2:   79    Temp:       TempSrc:       Resp: 16 20 17 12   Height:       Weight:       SpO2:             Functional Outcome Measure: The patient scored 19/28 on the tinetti outcome measure which is indicative of high fall risk. Other factors to consider for discharge: none additional     Further skilled acute physical therapy is not indicated at this time. PLAN :  Recommendation for discharge: (in order for the patient to meet his/her long term goals)  No skilled physical therapy/ follow up rehabilitation needs identified at this time.     This discharge recommendation:  Has not yet been discussed the attending provider and/or case management    IF patient discharges home will need the following DME: patient owns DME required for discharge       SUBJECTIVE:   Patient stated my wife is going to be angry.  re: patient's phone is broken and he cannot answer calls     OBJECTIVE DATA SUMMARY:   Patient received supine in bed and was agreeable to participate in PT session. Patient was cleared by nursing to participate in PT session.        HISTORY:    Past Medical History:   Diagnosis Date    Anemia associated with chronic renal failure     Anxiety and depression     BPH (benign prostatic hyperplasia)     CAD (coronary artery disease)     CHF NYHA class I, chronic, diastolic (HCC)     Chronic pain     DM type 2 causing [facility] disease [facility]     DM type 2 causing vascular disease ([facility]     ESRD on dialysis ([facility]     GERD (gastroesophageal reflux disease)     HTN (hypertension)     Hyperlipidemia     Thrombocytopenia (HCC)      Past Surgical History:   Procedure Laterality Date    HX ARTERIAL BYPASS      HX OTHER SURGICAL      5th toe Metatarsal amputation 12/2017     IR INSERT NON TUNL CVC OVER 5 YRS  11/19/2021    IR INSERT TUNL CVC W/O PORT OVER 5 YR  11/24/2021       Prior level of function: Bill with use of RW secondary to R heel ulcer  Personal factors and/or comorbidities impacting plan of care: R heel ulcer    Home Situation  Home Environment: Private residence  # Steps to Enter: 3  One/Two Story Residence: One story  Living Alone: No  Support Systems: Spouse/Significant Other,Child(fidelia) (22, 21, 19)  Patient Expects to be Discharged to[de-identified] Home with family assistance  Current DME Used/Available at Home: Cane, straight,Walker, rolling  Tub or Shower Type: Tub/Shower combination    EXAMINATION/PRESENTATION/DECISION MAKING:   Critical Behavior:  Neurologic State: Alert  Orientation Level: Oriented X4  Cognition: Appropriate decision making,Follows commands  Safety/Judgement: Awareness of environment,Fall prevention,Good awareness of safety precautions,Home safety,Insight into deficits  Hearing: Auditory  Auditory Impairment: None  Skin:  Did not observe R heel wound  Edema: none apparent   Range Of Motion:  AROM: Within functional limits                       Strength:    Strength: Generally decreased, functional                    Tone & Sensation:   Tone: Normal                              Coordination:  Coordination: Within functional limits  Vision:      Functional Mobility:  Bed Mobility:  Rolling: Independent  Supine to Sit: Independent  Sit to Supine: Independent  Scooting: Independent  Transfers:  Sit to Stand: Stand-by assistance  Stand to Sit: Stand-by assistance                       Balance:   Sitting: Intact  Standing: Intact; With support  Standing - Static: Good  Standing - Dynamic : Good  Ambulation/Gait Training:  Distance (ft): 100 Feet (ft)  Assistive Device: Gait belt;Walker, rolling (R LE cast & post op boot)  Ambulation - Level of Assistance: Supervision        Gait Abnormalities: Antalgic; Step to gait;Trunk sway increased  Right Side Weight Bearing:  (Per Dr. Winters Binlola Full vs. WBAT with surgical shoe and cast)           Speed/Myla: Pace decreased (<100 feet/min)                        Stairs:               Therapeutic Exercises:       Functional Measure:  Tinetti test:    Sitting Balance: 1  Arises: 1  Attempts to Rise: 2  Immediate Standing Balance: 1  Standing Balance: 1  Nudged: 2  Eyes Closed: 1  Turn 360 Degrees - Continuous/Discontinuous: 1  Turn 360 Degrees - Steady/Unsteady: 1  Sitting Down: 1  Balance Score: 12 Balance total score  Indication of Gait: 1  R Step Length/Height: 1  L Step Length/Height: 1  R Foot Clearance: 1  L Foot Clearance: 1  Step Symmetry: 0  Step Continuity: 1  Path: 1  Trunk: 0  Walking Time: 0  Gait Score: 7 Gait total score  Total Score: 19/28 Overall total score         Tinetti Tool Score Risk of Falls  <19 = High Fall Risk  19-24 = Moderate Fall Risk  25-28 = Low Fall Risk Talib AUSTIN. Performance-Oriented Assessment of Mobility Problems in Elderly Patients. [facility] 66; [id number]. (Scoring Description: PT Bulletin Feb. 10, 1993)    Older adults: Shea Yarbrough et al, 2009; n = 1000 [facility] elderly evaluated with ABC, SHANI, ADL, and IADL)  · Mean SHANI score for males aged 69-68 years = 26.21(3.40)  · Mean SHANI score for females age 69-68 years = 25.16(4.30)  · Mean SHANI score for males over 80 years = 23.29(6.02)  · Mean SHANI score for females over 80 years = 17.20(8.32)            Physical Therapy Evaluation Charge Determination   History Examination Presentation Decision-Making   MEDIUM  Complexity : 1-2 comorbidities / personal factors will impact the outcome/ POC  MEDIUM Complexity : 3 Standardized tests and measures addressing body structure, function, activity limitation and / or participation in recreation  MEDIUM Complexity : Evolving with changing characteristics  MEDIUM Complexity : FOTO score of 26-74      Based on the above components, the patient evaluation is determined to be of the following complexity level: MEDIUM    Pain Rating:  Patient without reports of pain during therapy      Activity Tolerance:   Good, requires rest breaks and signs and symptoms of orthostatic hypotension      After treatment patient left in no apparent distress:   Supine in bed and Call bell within reach    COMMUNICATION/EDUCATION:   The patients plan of care was discussed with: Occupational therapist and Registered nurse. Fall prevention education was provided and the patient/caregiver indicated understanding. and Patient/family have participated as able in goal setting and plan of care.     Thank you for this referral.  Estela Bernheim PT, DPT   Time Calculation: 25 mins

## 2022-07-19 VITALS
WEIGHT: 158.73 LBS | BODY MASS INDEX: 24.06 KG/M2 | SYSTOLIC BLOOD PRESSURE: 106 MMHG | OXYGEN SATURATION: 95 % | TEMPERATURE: 97 F | RESPIRATION RATE: 12 BRPM | HEIGHT: 68 IN | HEART RATE: 60 BPM | DIASTOLIC BLOOD PRESSURE: 60 MMHG

## 2022-07-19 LAB
ALBUMIN SERPL-MCNC: 2.4 G/DL (ref 3.5–5)
ALBUMIN/GLOB SERPL: 0.5 {RATIO} (ref 1.1–2.2)
ALP SERPL-CCNC: 94 U/L (ref 45–117)
ALT SERPL-CCNC: 11 U/L (ref 12–78)
ANION GAP SERPL CALC-SCNC: 9 MMOL/L (ref 5–15)
AST SERPL-CCNC: 10 U/L (ref 15–37)
BASOPHILS # BLD: 0 K/UL (ref 0–0.1)
BASOPHILS NFR BLD: 0 % (ref 0–1)
BILIRUB SERPL-MCNC: 0.4 MG/DL (ref 0.2–1)
BUN SERPL-MCNC: 26 MG/DL (ref 6–20)
BUN/CREAT SERPL: 7 (ref 12–20)
CALCIUM SERPL-MCNC: 8.3 MG/DL (ref 8.5–10.1)
CHLORIDE SERPL-SCNC: 98 MMOL/L (ref 97–108)
CO2 SERPL-SCNC: 27 MMOL/L (ref 21–32)
COMMENT, HOLDF: NORMAL
CREAT SERPL-MCNC: 3.74 MG/DL (ref 0.7–1.3)
DIFFERENTIAL METHOD BLD: ABNORMAL
EOSINOPHIL # BLD: 0.2 K/UL (ref 0–0.4)
EOSINOPHIL NFR BLD: 2 % (ref 0–7)
ERYTHROCYTE [DISTWIDTH] IN BLOOD BY AUTOMATED COUNT: 14.6 % (ref 11.5–14.5)
GLOBULIN SER CALC-MCNC: 4.9 G/DL (ref 2–4)
GLUCOSE BLD STRIP.AUTO-MCNC: 119 MG/DL (ref 65–117)
GLUCOSE BLD STRIP.AUTO-MCNC: 210 MG/DL (ref 65–117)
GLUCOSE SERPL-MCNC: 138 MG/DL (ref 65–100)
HCT VFR BLD AUTO: 26.6 % (ref 36.6–50.3)
HGB BLD-MCNC: 8.4 G/DL (ref 12.1–17)
IMM GRANULOCYTES # BLD AUTO: 0 K/UL (ref 0–0.04)
IMM GRANULOCYTES NFR BLD AUTO: 0 % (ref 0–0.5)
LYMPHOCYTES # BLD: 1 K/UL (ref 0.8–3.5)
LYMPHOCYTES NFR BLD: 10 % (ref 12–49)
MCH RBC QN AUTO: 28.5 PG (ref 26–34)
MCHC RBC AUTO-ENTMCNC: 31.6 G/DL (ref 30–36.5)
MCV RBC AUTO: 90.2 FL (ref 80–99)
MONOCYTES # BLD: 1 K/UL (ref 0–1)
MONOCYTES NFR BLD: 10 % (ref 5–13)
NEUTS SEG # BLD: 7.5 K/UL (ref 1.8–8)
NEUTS SEG NFR BLD: 78 % (ref 32–75)
NRBC # BLD: 0 K/UL (ref 0–0.01)
NRBC BLD-RTO: 0 PER 100 WBC
PLATELET # BLD AUTO: 159 K/UL (ref 150–400)
PMV BLD AUTO: 10 FL (ref 8.9–12.9)
POTASSIUM SERPL-SCNC: 3.9 MMOL/L (ref 3.5–5.1)
PROT SERPL-MCNC: 7.3 G/DL (ref 6.4–8.2)
RBC # BLD AUTO: 2.95 M/UL (ref 4.1–5.7)
SAMPLES BEING HELD,HOLD: NORMAL
SERVICE CMNT-IMP: ABNORMAL
SERVICE CMNT-IMP: ABNORMAL
SODIUM SERPL-SCNC: 134 MMOL/L (ref 136–145)
WBC # BLD AUTO: 9.7 K/UL (ref 4.1–11.1)

## 2022-07-19 PROCEDURE — 36415 COLL VENOUS BLD VENIPUNCTURE: CPT

## 2022-07-19 PROCEDURE — 74011000250 HC RX REV CODE- 250

## 2022-07-19 PROCEDURE — 80053 COMPREHEN METABOLIC PANEL: CPT

## 2022-07-19 PROCEDURE — 82962 GLUCOSE BLOOD TEST: CPT

## 2022-07-19 PROCEDURE — 74011250637 HC RX REV CODE- 250/637

## 2022-07-19 PROCEDURE — 74011250637 HC RX REV CODE- 250/637: Performed by: STUDENT IN AN ORGANIZED HEALTH CARE EDUCATION/TRAINING PROGRAM

## 2022-07-19 PROCEDURE — 99238 HOSP IP/OBS DSCHRG MGMT 30/<: CPT | Performed by: FAMILY MEDICINE

## 2022-07-19 PROCEDURE — 74011250636 HC RX REV CODE- 250/636

## 2022-07-19 PROCEDURE — 74011636637 HC RX REV CODE- 636/637

## 2022-07-19 PROCEDURE — 85025 COMPLETE CBC W/AUTO DIFF WBC: CPT

## 2022-07-19 RX ADMIN — DOCUSATE SODIUM 100 MG: 100 CAPSULE, LIQUID FILLED ORAL at 08:58

## 2022-07-19 RX ADMIN — SEVELAMER CARBONATE 800 MG: 800 TABLET, FILM COATED ORAL at 12:06

## 2022-07-19 RX ADMIN — ACETAMINOPHEN 650 MG: 325 TABLET ORAL at 08:57

## 2022-07-19 RX ADMIN — CARVEDILOL 12.5 MG: 12.5 TABLET, FILM COATED ORAL at 08:57

## 2022-07-19 RX ADMIN — HYDRALAZINE HYDROCHLORIDE 50 MG: 25 TABLET ORAL at 08:57

## 2022-07-19 RX ADMIN — SEVELAMER CARBONATE 800 MG: 800 TABLET, FILM COATED ORAL at 08:57

## 2022-07-19 RX ADMIN — GABAPENTIN 300 MG: 300 CAPSULE ORAL at 08:57

## 2022-07-19 RX ADMIN — HYDROMORPHONE HYDROCHLORIDE 0.2 MG: 1 INJECTION, SOLUTION INTRAMUSCULAR; INTRAVENOUS; SUBCUTANEOUS at 06:10

## 2022-07-19 RX ADMIN — HEPARIN SODIUM 5000 UNITS: 5000 INJECTION INTRAVENOUS; SUBCUTANEOUS at 14:07

## 2022-07-19 RX ADMIN — ROSUVASTATIN CALCIUM 10 MG: 10 TABLET, FILM COATED ORAL at 08:57

## 2022-07-19 RX ADMIN — ONDANSETRON 4 MG: 2 INJECTION INTRAMUSCULAR; INTRAVENOUS at 00:58

## 2022-07-19 RX ADMIN — HYDROMORPHONE HYDROCHLORIDE 0.2 MG: 1 INJECTION, SOLUTION INTRAMUSCULAR; INTRAVENOUS; SUBCUTANEOUS at 14:09

## 2022-07-19 RX ADMIN — ISOSORBIDE MONONITRATE 60 MG: 30 TABLET, EXTENDED RELEASE ORAL at 08:57

## 2022-07-19 RX ADMIN — Medication 10 ML: at 14:08

## 2022-07-19 RX ADMIN — BUSPIRONE HYDROCHLORIDE 10 MG: 10 TABLET ORAL at 08:57

## 2022-07-19 RX ADMIN — INSULIN LISPRO 3 UNITS: 100 INJECTION, SOLUTION INTRAVENOUS; SUBCUTANEOUS at 12:06

## 2022-07-19 RX ADMIN — SUCRALFATE 1 G: 1 TABLET ORAL at 12:06

## 2022-07-19 RX ADMIN — AMLODIPINE BESYLATE 10 MG: 5 TABLET ORAL at 08:57

## 2022-07-19 RX ADMIN — PANTOPRAZOLE SODIUM 40 MG: 40 TABLET, DELAYED RELEASE ORAL at 06:10

## 2022-07-19 RX ADMIN — Medication 10 ML: at 06:16

## 2022-07-19 RX ADMIN — HYDROMORPHONE HYDROCHLORIDE 0.2 MG: 1 INJECTION, SOLUTION INTRAMUSCULAR; INTRAVENOUS; SUBCUTANEOUS at 01:12

## 2022-07-19 RX ADMIN — SUCRALFATE 1 G: 1 TABLET ORAL at 06:10

## 2022-07-19 RX ADMIN — ASPIRIN 81 MG: 81 TABLET, COATED ORAL at 08:57

## 2022-07-19 RX ADMIN — HYDROMORPHONE HYDROCHLORIDE 0.2 MG: 1 INJECTION, SOLUTION INTRAMUSCULAR; INTRAVENOUS; SUBCUTANEOUS at 10:19

## 2022-07-19 RX ADMIN — HEPARIN SODIUM 5000 UNITS: 5000 INJECTION INTRAVENOUS; SUBCUTANEOUS at 06:10

## 2022-07-19 NOTE — PROGRESS NOTES
Rounded on Mu-ism patients and provided Anointing of the Sick at request of patient.     Lisa Salmon

## 2022-07-19 NOTE — DISCHARGE INSTRUCTIONS
HOME DISCHARGE INSTRUCTIONS    Esme Dumont / [id number] : 1960    Admission date: 2022 Discharge date: 2022 12:46 PM     Please bring this form with you to show your care provider at your follow-up appointment. Primary care provider:  Asad Pike MD    Discharging provider:  Sangeeta Hernandez MD  - Family Medicine Resident  Darwin Meneses MD - Family Medicine Attending      You have been admitted to the hospital with the following diagnoses:    ACUTE DIAGNOSES:  Acute pulmonary edema ([address] [J81.0]  Pleural effusion [J90]    You were admitted for elevated blood pressures and fluid in your lungs due to missed kidney dialysis sessions. We treated you with medicines to reduce your blood pressure and decrease the abdominal pain and vomiting. We were able to remove the extra fluid by dialysis. Kindly continue your dialysis sessions as advised and take your medicines as instructed. FOLLOW-UP CARE RECOMMENDATIONS:  Follow up with your PCP  Follow up with your Nephrologist  Follow up with your Podiatrist    Appointments  Follow-up Information     Follow up With Specialties Details Why Contact Info    Asad Pike MD Family Medicine On 2022 Follow up from hospital admission for hypertension emergency [address] [facility]  [phone]               Follow-up tests needed: None    Pending test results: At the time of your discharge the following test results are still pending: None. Please make sure you review these results with your outpatient follow-up provider(s). DIET/what to eat:  Renal diet with low sodium and potassium    ACTIVITY:  Ambulate as tolerated    Wound care: Keep foot clean and dry. Equipment needed:  None    Specific symptoms to watch for: chest pain, shortness of breath, fever, chills, nausea, vomiting, diarrhea. What to do if new or unexpected symptoms occur?     If you experience any of the above symptoms (or should other concerns or questions arise after discharge) please call your primary care physician. Return to the emergency room if you cannot get hold of your doctor. · It is very important that you keep your follow-up appointment(s). · Please bring discharge papers, medication list (and/or medication bottles) to your follow-up appointments for review by your outpatient provider(s). · Please check the list of medications and be sure it includes every medication (even non-prescription medications) that your provider wants you to take. · It is important that you take the medication exactly as they are prescribed. · Keep your medication in the bottles provided by the pharmacist and keep a list of the medication names, dosages, and times to be taken in your wallet. · Do not take other medications without consulting your doctor. · If you have any questions about your medications or other instructions, please talk to your nurse or care provider before you leave the hospital.     Information obtained by:     I understand that if any problems occur once I am at home I am to contact my physician. These instructions were explained to me and I had the opportunity to ask questions. I understand and acknowledge receipt of the instructions indicated above.                                                                                                                                                Physician's or R.N.'s Signature                                                                  Date/Time                                                                                                                                              Patient or Representative Signature                                                          Date/Time

## 2022-07-19 NOTE — PROGRESS NOTES
[address] [facility]  YOB: 1960          Assessment & Plan:   ESRD on HD MWF [facility], [facility] [address])  Volume overload  Missed HD  Uncontrolled HTN  DM2  Anemia    Rec: Tolerated HD well. On room air. HTN much better. RANDY with HD  HD MWF  Can d/c from my standpoint. If he is still here tomorrow, will run again       Subjective:   CC: ESRD  HPI: Stable overnight. Better after HD yesterday.   ROS: no sob/n/v  Current Facility-Administered Medications   Medication Dose Route Frequency    heparin (porcine) 1,000 unit/mL injection 1,900 Units  1,900 Units Hemodialysis DIALYSIS PRN    heparin (porcine) 1,000 unit/mL injection 1,900 Units  1,900 Units Hemodialysis DIALYSIS PRN    sodium chloride (NS) flush 5-40 mL  5-40 mL IntraVENous Q8H    sodium chloride (NS) flush 5-40 mL  5-40 mL IntraVENous PRN    acetaminophen (TYLENOL) tablet 650 mg  650 mg Oral Q6H PRN    Or    acetaminophen (TYLENOL) suppository 650 mg  650 mg Rectal Q6H PRN    polyethylene glycol (MIRALAX) packet 17 g  17 g Oral DAILY    ondansetron (ZOFRAN ODT) tablet 4 mg  4 mg Oral Q8H PRN    Or    ondansetron (ZOFRAN) injection 4 mg  4 mg IntraVENous Q6H PRN    heparin (porcine) injection 5,000 Units  5,000 Units SubCUTAneous Q8H    HYDROmorphone (DILAUDID) syringe 0.2 mg  0.2 mg IntraVENous Q4H PRN    glucose chewable tablet 16 g  4 Tablet Oral PRN    dextrose 10% infusion 0-250 mL  0-250 mL IntraVENous PRN    glucagon (GLUCAGEN) injection 1 mg  1 mg IntraMUSCular PRN    insulin glargine (LANTUS) injection 12 Units  12 Units SubCUTAneous QHS    insulin lispro (HUMALOG) injection   SubCUTAneous AC&HS    amLODIPine (NORVASC) tablet 10 mg  10 mg Oral DAILY    carvediloL (COREG) tablet 12.5 mg  12.5 mg Oral BID WITH MEALS    hydrALAZINE (APRESOLINE) tablet 50 mg  50 mg Oral TID    isosorbide mononitrate ER (IMDUR) tablet 60 mg  60 mg Oral DAILY    aspirin delayed-release tablet 81 mg  81 mg Oral DAILY    busPIRone (BUSPAR) tablet 10 mg  10 mg Oral BID    docusate sodium (COLACE) capsule 100 mg  100 mg Oral BID    gabapentin (NEURONTIN) capsule 300 mg  300 mg Oral DAILY    pantoprazole (PROTONIX) tablet 40 mg  40 mg Oral ACB    rosuvastatin (CRESTOR) tablet 10 mg  10 mg Oral QAM    senna (SENOKOT) tablet 17.2 mg  2 Tablet Oral QHS    sucralfate (CARAFATE) tablet 1 g  1 g Oral TIDAC    tamsulosin (FLOMAX) capsule 0.4 mg  0.4 mg Oral QHS    sevelamer carbonate (RENVELA) tab 800 mg  800 mg Oral TID WITH MEALS          Objective:     Vitals:  Blood pressure (!) 143/66, pulse 69, temperature 99.1 °F (37.3 °C), resp. rate 19, height 5' 8\" (1.727 m), weight 72 kg (158 lb 11.7 oz), SpO2 95 %. Temp (24hrs), Av.1 °F (37.3 °C), Min:98.3 °F (36.8 °C), Max:100.1 °F (37.8 °C)      Intake and Output:  701 - 1900  In: -   Out: 50 [Urine:50]  1901 - 700  In: 240 [P.O.:240]  Out: 7816 [Urine:380]    Physical Exam:               GENERAL ASSESSMENT: NAD  HEENT: Nontraumatic   CHEST: Clear  HEART: reg  EXTREMITY: trace EDEMA        ECG/rhythm:    Data Review      No results for input(s): TNIPOC in the last 72 hours. No lab exists for component: ITNL   No results for input(s): CPK, CKMB, TROIQ in the last 72 hours. Recent Labs     22  0140 22  0204 22  0142 22  1350 22  1350   * 137 137   < > 133*   K 3.9 4.3 3.6   < > 4.6   CL 98 102 103   < > 101   CO2 27 25 25   < > 19*   BUN 26* 43* 33*   < > 71*   CREA 3.74* 5.10* 3.67*   < > 5.97*   * 141* 143*   < > 307*   MG  --   --   --   --  2.2   CA 8.3* 8.1* 8.8   < > 8.6   ALB 2.4* 2.4* 2.7*   < > 2.7*   WBC 9.7 11.1 9.5   < > 9.1   HGB 8.4* 8.3* 8.9*   < > 9.1*   HCT 26.6* 25.4* 26.6*   < > 27.9*    166 163   < > 155    < > = values in this interval not displayed.       No results for input(s): INR, PTP, APTT, INREXT, INREXT in the last 72 hours.  Needs: urine analysis, urine sodium, protein and creatinine  Lab Results   Component Value Date/Time    Creatinine, urine 72.2 11/22/2019 09:36 AM         : Marija Espinosa MD  7/19/2022        [facility]:  [email or web address]  Federico Raymundo office:  [address] [place] [address],[address]  [address]  Phone: [phone]  Fax :     [phone]    [place] office:  [address] [facility]  Sergo Ba   Phone - [phone]  Fax - [phone]

## 2022-07-19 NOTE — PROGRESS NOTES
Problem: Diabetes Self-Management  Goal: *Disease process and treatment process  Description: Define diabetes and identify own type of diabetes; list 3 options for treating diabetes. Outcome: Resolved/Met  Goal: *Incorporating nutritional management into lifestyle  Description: Describe effect of type, amount and timing of food on blood glucose; list 3 methods for planning meals. Outcome: Resolved/Met  Goal: *Incorporating physical activity into lifestyle  Description: State effect of exercise on blood glucose levels. Outcome: Resolved/Met  Goal: *Developing strategies to promote health/change behavior  Description: Define the ABC's of diabetes; identify appropriate screenings, schedule and personal plan for screenings. Outcome: Resolved/Met  Goal: *Using medications safely  Description: State effect of diabetes medications on diabetes; name diabetes medication taking, action and side effects. Outcome: Resolved/Met  Goal: *Monitoring blood glucose, interpreting and using results  Description: Identify recommended blood glucose targets  and personal targets. Outcome: Resolved/Met  Goal: *Prevention, detection, treatment of acute complications  Description: List symptoms of hyper- and hypoglycemia; describe how to treat low blood sugar and actions for lowering  high blood glucose level. Outcome: Resolved/Met  Goal: *Prevention, detection and treatment of chronic complications  Description: Define the natural course of diabetes and describe the relationship of blood glucose levels to long term complications of diabetes.   Outcome: Resolved/Met  Goal: *Developing strategies to address psychosocial issues  Description: Describe feelings about living with diabetes; identify support needed and support network  Outcome: Resolved/Met  Goal: *Insulin pump training  Outcome: Resolved/Met  Goal: *Sick day guidelines  Outcome: Resolved/Met  Goal: *Patient Specific Goal (EDIT GOAL, INSERT TEXT)  Outcome: Resolved/Met Problem: Patient Education: Go to Patient Education Activity  Goal: Patient/Family Education  Outcome: Resolved/Met     Problem: Falls - Risk of  Goal: *Absence of Falls  Description: Document Vania Purvis Fall Risk and appropriate interventions in the flowsheet. Outcome: Resolved/Met     Problem: Patient Education: Go to Patient Education Activity  Goal: Patient/Family Education  Outcome: Resolved/Met     Problem: Pain  Goal: *Control of Pain  Outcome: Resolved/Met  Goal: *PALLIATIVE CARE:  Alleviation of Pain  Outcome: Resolved/Met     Problem: Patient Education: Go to Patient Education Activity  Goal: Patient/Family Education  Outcome: Resolved/Met     Problem:  Activity Intolerance  Goal: *Oxygen saturation during activity within specified parameters  Outcome: Resolved/Met  Goal: *Able to remain out of bed as prescribed  Outcome: Resolved/Met     Problem: Patient Education: Go to Patient Education Activity  Goal: Patient/Family Education  Outcome: Resolved/Met     Problem: Patient Education: Go to Patient Education Activity  Goal: Patient/Family Education  Outcome: Resolved/Met

## 2022-07-19 NOTE — PROGRESS NOTES
2000 - TRANSFER - IN REPORT:    Verbal report received from BRAIN Vázquez on Sergo Chi  being received from ED for routine progression of care      Report consisted of patients Situation, Background, Assessment and   Recommendations(SBAR). Information from the following report(s) SBAR, Kardex, ED Summary, [facility] and Cardiac Rhythm NSR was reviewed with the receiving nurse. Opportunity for questions and clarification was provided. Assessment completed upon patients arrival to unit and care assumed. This patient was assisted with intentional toileting every 2 hours during this shift as appropriate. Documentation of ambulation and output reflected on flow sheet as appropriate. Purposeful hourly rounding was completed using AIDET and 5 P's. Outcomes of PHR documented as they occurred. Bed alarm in use as appropriate. Dual suction and ambubag in place. 0700 - Bedside and verbal shift change report given to Carine Garzon RN (oncoming nurse) by Titus Rogers RN (offgoing nurse). Report included the following information: SBAR, Kardex, Intake/Output, MAR, Recent Results, and Med Rec Status.

## 2022-07-19 NOTE — PROGRESS NOTES
CM Discharge note:    Patient ready for discharge to home. Patient will be transported home via wife by personal car. Home with family assist.  Patient has follow up appointment with PCP. Discussed transportation to/from [facility] dialysis. Patient states his wife transports him one day per week and he gets transport the other days. Patient provided with contact information for [employer],  Transportation provider for [employer]. Encouraged patient to contact them to see if he has transportation benefit to assist with transport to dialysis. Care Management Interventions  PCP Verified by CM: Yes Alley Persaud MD)  Mode of Transport at Discharge:  Other (see comment) (family)  Transition of Care Consult (CM Consult): Discharge Planning  MyChart Signup: No  Discharge Durable Medical Equipment: No  Physical Therapy Consult: Yes  Occupational Therapy Consult: Yes  Speech Therapy Consult: No  Support Systems: Spouse/Significant Other,Child(fidelia)  Confirm Follow Up Transport: Family  Discharge Location  Patient Expects to be Discharged to[de-identified] Home with family assistance    Abe Delvalle RN, MSN/Care manager

## 2022-07-19 NOTE — PROGRESS NOTES
Spiritual Care Assessment/Progress Note  [address]      NAME: Pato Rincon      MRN: [id number]  AGE: 64 y.o.  SEX: male  Jehovah's witness Affiliation: Yarsanism   Language: English     7/19/2022     Total Time (in minutes): 5     Spiritual Assessment begun in [facility] 3 PROG CARE TELE 1 through conversation with:         [x]Patient        [] Family    [] Friend(s)        Reason for Consult: [facility]     Spiritual beliefs: (Please include comment if needed)     [x] Identifies with a jelly tradition: Yarsanism        [] Supported by a jelly community:            [] Claims no spiritual orientation:           [] Seeking spiritual identity:                [] Adheres to an individual form of spirituality:           [] Not able to assess:                           Identified resources for coping:      [x] Prayer                               [x] Music                  [] Guided Imagery     [x] Family/friends                 [] Pet visits     [] Devotional reading                         [] Unknown     [] Other:                                              Interventions offered during this visit: (See comments for more details)    Patient Interventions: Affirmation of jelly,Communion (Yarsanism),Catharsis/review of pertinent events in supportive environment,Initial/Spiritual assessment, patient floor,Prayer (actual),Prayer (assurance of)           Plan of Care:     [x] Support spiritual and/or cultural needs    [] Support AMD and/or advance care planning process      [] Support grieving process   [] Coordinate Rites and/or Rituals    [] Coordination with community clergy   [] No spiritual needs identified at this time   [] Detailed Plan of Care below (See Comments)  [] Make referral to Music Therapy  [] Make referral to Pet Therapy     [] Make referral to Addiction services  [] Make referral to [facility]  [] Make referral to Spiritual Care Partner  [] No future visits requested        [] Contact Spiritual Care for further referrals     Comments: Mr. Helen Sutton was in bed. He is hoping to go home this afternoon. Prayer and communion offered. Mr. Helen Sutton spoke briefly about the reason he is in the hospital and doesn't understand how his foot got injured.       MAGGY Ceron, RN, ACSW, LCSW   Page:  [phone]

## 2022-07-21 ENCOUNTER — APPOINTMENT (OUTPATIENT)
Dept: GENERAL RADIOLOGY | Age: 62
DRG: 314 | End: 2022-07-21
Attending: STUDENT IN AN ORGANIZED HEALTH CARE EDUCATION/TRAINING PROGRAM
Payer: MEDICAID

## 2022-07-21 ENCOUNTER — HOSPITAL ENCOUNTER (INPATIENT)
Age: 62
LOS: 8 days | Discharge: HOME HEALTH | DRG: 314 | End: 2022-07-29
Attending: STUDENT IN AN ORGANIZED HEALTH CARE EDUCATION/TRAINING PROGRAM | Admitting: FAMILY MEDICINE
Payer: MEDICAID

## 2022-07-21 ENCOUNTER — APPOINTMENT (OUTPATIENT)
Dept: MRI IMAGING | Age: 62
DRG: 314 | End: 2022-07-21
Attending: STUDENT IN AN ORGANIZED HEALTH CARE EDUCATION/TRAINING PROGRAM
Payer: MEDICAID

## 2022-07-21 DIAGNOSIS — R07.89 FEELING OF CHEST TIGHTNESS: ICD-10-CM

## 2022-07-21 DIAGNOSIS — G89.18 POSTOPERATIVE PAIN: ICD-10-CM

## 2022-07-21 DIAGNOSIS — N18.6 ESRD (END STAGE RENAL DISEASE) ON DIALYSIS (HCC): ICD-10-CM

## 2022-07-21 DIAGNOSIS — R74.8 ABNORMAL LIVER ENZYMES: ICD-10-CM

## 2022-07-21 DIAGNOSIS — Z79.4 TYPE 2 DIABETES MELLITUS WITH DIABETIC NEUROPATHY, WITH LONG-TERM CURRENT USE OF INSULIN (HCC): ICD-10-CM

## 2022-07-21 DIAGNOSIS — S91.301D OPEN WOUND OF RIGHT HEEL, SUBSEQUENT ENCOUNTER: Primary | ICD-10-CM

## 2022-07-21 DIAGNOSIS — N18.6 ESRD ON HEMODIALYSIS (HCC): ICD-10-CM

## 2022-07-21 DIAGNOSIS — I10 HYPERTENSION, UNSPECIFIED TYPE: ICD-10-CM

## 2022-07-21 DIAGNOSIS — I25.10 CORONARY ARTERY DISEASE, UNSPECIFIED VESSEL OR LESION TYPE, UNSPECIFIED WHETHER ANGINA PRESENT, UNSPECIFIED WHETHER NATIVE OR TRANSPLANTED HEART: ICD-10-CM

## 2022-07-21 DIAGNOSIS — E11.40 TYPE 2 DIABETES MELLITUS WITH DIABETIC NEUROPATHY, WITH LONG-TERM CURRENT USE OF INSULIN (HCC): ICD-10-CM

## 2022-07-21 DIAGNOSIS — D63.1 ANEMIA ASSOCIATED WITH CHRONIC RENAL FAILURE: ICD-10-CM

## 2022-07-21 DIAGNOSIS — M86.171 OSTEOMYELITIS OF ANKLE OR FOOT, ACUTE, RIGHT (HCC): ICD-10-CM

## 2022-07-21 DIAGNOSIS — Z99.2 ESRD (END STAGE RENAL DISEASE) ON DIALYSIS (HCC): ICD-10-CM

## 2022-07-21 DIAGNOSIS — E11.65 POORLY CONTROLLED DIABETES MELLITUS (HCC): ICD-10-CM

## 2022-07-21 DIAGNOSIS — Z99.2 ESRD ON HEMODIALYSIS (HCC): ICD-10-CM

## 2022-07-21 DIAGNOSIS — I50.32 CHF NYHA CLASS I, CHRONIC, DIASTOLIC (HCC): ICD-10-CM

## 2022-07-21 DIAGNOSIS — E11.40 TYPE 2 DIABETES MELLITUS WITH DIABETIC NEUROPATHY, UNSPECIFIED WHETHER LONG TERM INSULIN USE (HCC): ICD-10-CM

## 2022-07-21 DIAGNOSIS — N18.9 ANEMIA ASSOCIATED WITH CHRONIC RENAL FAILURE: ICD-10-CM

## 2022-07-21 DIAGNOSIS — M86.171 ACUTE OSTEOMYELITIS OF RIGHT CALCANEUS (HCC): ICD-10-CM

## 2022-07-21 PROBLEM — J81.0 ACUTE PULMONARY EDEMA (HCC): Status: RESOLVED | Noted: 2022-07-16 | Resolved: 2022-07-21

## 2022-07-21 LAB
ALBUMIN SERPL-MCNC: 2.6 G/DL (ref 3.5–5)
ALBUMIN/GLOB SERPL: 0.5 {RATIO} (ref 1.1–2.2)
ALP SERPL-CCNC: 161 U/L (ref 45–117)
ALT SERPL-CCNC: 13 U/L (ref 12–78)
ANION GAP SERPL CALC-SCNC: 6 MMOL/L (ref 5–15)
AST SERPL-CCNC: 11 U/L (ref 15–37)
BASOPHILS # BLD: 0 K/UL (ref 0–0.1)
BASOPHILS NFR BLD: 0 % (ref 0–1)
BILIRUB SERPL-MCNC: 0.3 MG/DL (ref 0.2–1)
BUN SERPL-MCNC: 34 MG/DL (ref 6–20)
BUN/CREAT SERPL: 9 (ref 12–20)
CALCIUM SERPL-MCNC: 8.6 MG/DL (ref 8.5–10.1)
CHLORIDE SERPL-SCNC: 95 MMOL/L (ref 97–108)
CO2 SERPL-SCNC: 26 MMOL/L (ref 21–32)
COMMENT, HOLDF: NORMAL
COMMENT, HOLDF: NORMAL
CREAT SERPL-MCNC: 3.97 MG/DL (ref 0.7–1.3)
CRP SERPL-MCNC: 15.2 MG/DL (ref 0–0.6)
DIFFERENTIAL METHOD BLD: ABNORMAL
EOSINOPHIL # BLD: 0.2 K/UL (ref 0–0.4)
EOSINOPHIL NFR BLD: 2 % (ref 0–7)
ERYTHROCYTE [DISTWIDTH] IN BLOOD BY AUTOMATED COUNT: 14.7 % (ref 11.5–14.5)
ERYTHROCYTE [SEDIMENTATION RATE] IN BLOOD: 127 MM/HR (ref 0–20)
GLOBULIN SER CALC-MCNC: 5.4 G/DL (ref 2–4)
GLUCOSE BLD STRIP.AUTO-MCNC: 250 MG/DL (ref 65–117)
GLUCOSE BLD STRIP.AUTO-MCNC: 403 MG/DL (ref 65–117)
GLUCOSE SERPL-MCNC: 322 MG/DL (ref 65–100)
HCT VFR BLD AUTO: 28.1 % (ref 36.6–50.3)
HGB BLD-MCNC: 9 G/DL (ref 12.1–17)
IMM GRANULOCYTES # BLD AUTO: 0 K/UL (ref 0–0.04)
IMM GRANULOCYTES NFR BLD AUTO: 0 % (ref 0–0.5)
LYMPHOCYTES # BLD: 0.8 K/UL (ref 0.8–3.5)
LYMPHOCYTES NFR BLD: 9 % (ref 12–49)
MCH RBC QN AUTO: 28.5 PG (ref 26–34)
MCHC RBC AUTO-ENTMCNC: 32 G/DL (ref 30–36.5)
MCV RBC AUTO: 88.9 FL (ref 80–99)
MONOCYTES # BLD: 0.7 K/UL (ref 0–1)
MONOCYTES NFR BLD: 8 % (ref 5–13)
NEUTS SEG # BLD: 6.8 K/UL (ref 1.8–8)
NEUTS SEG NFR BLD: 81 % (ref 32–75)
NRBC # BLD: 0 K/UL (ref 0–0.01)
NRBC BLD-RTO: 0 PER 100 WBC
PLATELET # BLD AUTO: 168 K/UL (ref 150–400)
PMV BLD AUTO: 10.3 FL (ref 8.9–12.9)
POTASSIUM SERPL-SCNC: 4.2 MMOL/L (ref 3.5–5.1)
PROT SERPL-MCNC: 8 G/DL (ref 6.4–8.2)
RBC # BLD AUTO: 3.16 M/UL (ref 4.1–5.7)
RBC MORPH BLD: ABNORMAL
SAMPLES BEING HELD,HOLD: NORMAL
SAMPLES BEING HELD,HOLD: NORMAL
SERVICE CMNT-IMP: ABNORMAL
SERVICE CMNT-IMP: ABNORMAL
SODIUM SERPL-SCNC: 127 MMOL/L (ref 136–145)
TROPONIN-HIGH SENSITIVITY: 14 NG/L (ref 0–76)
WBC # BLD AUTO: 8.5 K/UL (ref 4.1–11.1)

## 2022-07-21 PROCEDURE — 86140 C-REACTIVE PROTEIN: CPT

## 2022-07-21 PROCEDURE — 74011636637 HC RX REV CODE- 636/637: Performed by: STUDENT IN AN ORGANIZED HEALTH CARE EDUCATION/TRAINING PROGRAM

## 2022-07-21 PROCEDURE — 87040 BLOOD CULTURE FOR BACTERIA: CPT

## 2022-07-21 PROCEDURE — 84484 ASSAY OF TROPONIN QUANT: CPT

## 2022-07-21 PROCEDURE — 85652 RBC SED RATE AUTOMATED: CPT

## 2022-07-21 PROCEDURE — 80053 COMPREHEN METABOLIC PANEL: CPT

## 2022-07-21 PROCEDURE — 85025 COMPLETE CBC W/AUTO DIFF WBC: CPT

## 2022-07-21 PROCEDURE — 74011250636 HC RX REV CODE- 250/636: Performed by: STUDENT IN AN ORGANIZED HEALTH CARE EDUCATION/TRAINING PROGRAM

## 2022-07-21 PROCEDURE — 36415 COLL VENOUS BLD VENIPUNCTURE: CPT

## 2022-07-21 PROCEDURE — 73718 MRI LOWER EXTREMITY W/O DYE: CPT

## 2022-07-21 PROCEDURE — 74011250637 HC RX REV CODE- 250/637: Performed by: STUDENT IN AN ORGANIZED HEALTH CARE EDUCATION/TRAINING PROGRAM

## 2022-07-21 PROCEDURE — 73630 X-RAY EXAM OF FOOT: CPT

## 2022-07-21 PROCEDURE — 74011000250 HC RX REV CODE- 250: Performed by: STUDENT IN AN ORGANIZED HEALTH CARE EDUCATION/TRAINING PROGRAM

## 2022-07-21 PROCEDURE — 99285 EMERGENCY DEPT VISIT HI MDM: CPT

## 2022-07-21 PROCEDURE — 73590 X-RAY EXAM OF LOWER LEG: CPT

## 2022-07-21 PROCEDURE — 65270000029 HC RM PRIVATE

## 2022-07-21 PROCEDURE — 82962 GLUCOSE BLOOD TEST: CPT

## 2022-07-21 PROCEDURE — 93005 ELECTROCARDIOGRAM TRACING: CPT

## 2022-07-21 RX ORDER — CARVEDILOL 12.5 MG/1
12.5 TABLET ORAL 2 TIMES DAILY WITH MEALS
Status: DISCONTINUED | OUTPATIENT
Start: 2022-07-21 | End: 2022-07-29 | Stop reason: HOSPADM

## 2022-07-21 RX ORDER — MAGNESIUM SULFATE 100 %
4 CRYSTALS MISCELLANEOUS AS NEEDED
Status: DISCONTINUED | OUTPATIENT
Start: 2022-07-21 | End: 2022-07-29 | Stop reason: HOSPADM

## 2022-07-21 RX ORDER — SODIUM CHLORIDE 0.9 % (FLUSH) 0.9 %
5-40 SYRINGE (ML) INJECTION AS NEEDED
Status: DISCONTINUED | OUTPATIENT
Start: 2022-07-21 | End: 2022-07-29 | Stop reason: HOSPADM

## 2022-07-21 RX ORDER — PANTOPRAZOLE SODIUM 40 MG/1
40 TABLET, DELAYED RELEASE ORAL
Status: DISCONTINUED | OUTPATIENT
Start: 2022-07-22 | End: 2022-07-29 | Stop reason: HOSPADM

## 2022-07-21 RX ORDER — SEVELAMER CARBONATE 800 MG/1
800 TABLET, FILM COATED ORAL
Status: DISCONTINUED | OUTPATIENT
Start: 2022-07-21 | End: 2022-07-29 | Stop reason: HOSPADM

## 2022-07-21 RX ORDER — OXYCODONE AND ACETAMINOPHEN 5; 325 MG/1; MG/1
1 TABLET ORAL
Status: COMPLETED | OUTPATIENT
Start: 2022-07-21 | End: 2022-07-21

## 2022-07-21 RX ORDER — HYDRALAZINE HYDROCHLORIDE 25 MG/1
50 TABLET, FILM COATED ORAL 3 TIMES DAILY
Status: DISCONTINUED | OUTPATIENT
Start: 2022-07-21 | End: 2022-07-29 | Stop reason: HOSPADM

## 2022-07-21 RX ORDER — ACETAMINOPHEN 325 MG/1
650 TABLET ORAL
Status: DISCONTINUED | OUTPATIENT
Start: 2022-07-21 | End: 2022-07-29 | Stop reason: HOSPADM

## 2022-07-21 RX ORDER — ONDANSETRON 4 MG/1
4 TABLET, ORALLY DISINTEGRATING ORAL
Status: DISCONTINUED | OUTPATIENT
Start: 2022-07-21 | End: 2022-07-29 | Stop reason: HOSPADM

## 2022-07-21 RX ORDER — INSULIN GLARGINE 100 [IU]/ML
15 INJECTION, SOLUTION SUBCUTANEOUS
Status: DISCONTINUED | OUTPATIENT
Start: 2022-07-21 | End: 2022-07-24

## 2022-07-21 RX ORDER — METRONIDAZOLE 500 MG/100ML
500 INJECTION, SOLUTION INTRAVENOUS EVERY 12 HOURS
Status: DISCONTINUED | OUTPATIENT
Start: 2022-07-21 | End: 2022-07-29 | Stop reason: HOSPADM

## 2022-07-21 RX ORDER — BUSPIRONE HYDROCHLORIDE 5 MG/1
10 TABLET ORAL 2 TIMES DAILY
Status: DISCONTINUED | OUTPATIENT
Start: 2022-07-21 | End: 2022-07-29 | Stop reason: HOSPADM

## 2022-07-21 RX ORDER — SODIUM CHLORIDE 0.9 % (FLUSH) 0.9 %
5-40 SYRINGE (ML) INJECTION EVERY 8 HOURS
Status: DISCONTINUED | OUTPATIENT
Start: 2022-07-21 | End: 2022-07-29 | Stop reason: HOSPADM

## 2022-07-21 RX ORDER — AMLODIPINE BESYLATE 5 MG/1
10 TABLET ORAL DAILY
Status: DISCONTINUED | OUTPATIENT
Start: 2022-07-22 | End: 2022-07-21

## 2022-07-21 RX ORDER — HYDROMORPHONE HYDROCHLORIDE 1 MG/ML
0.5 INJECTION, SOLUTION INTRAMUSCULAR; INTRAVENOUS; SUBCUTANEOUS
Status: DISCONTINUED | OUTPATIENT
Start: 2022-07-21 | End: 2022-07-22

## 2022-07-21 RX ORDER — INSULIN LISPRO 100 [IU]/ML
INJECTION, SOLUTION INTRAVENOUS; SUBCUTANEOUS
Status: DISCONTINUED | OUTPATIENT
Start: 2022-07-21 | End: 2022-07-24

## 2022-07-21 RX ORDER — ISOSORBIDE MONONITRATE 30 MG/1
60 TABLET, EXTENDED RELEASE ORAL DAILY
Status: DISCONTINUED | OUTPATIENT
Start: 2022-07-22 | End: 2022-07-29 | Stop reason: HOSPADM

## 2022-07-21 RX ORDER — HYDRALAZINE HYDROCHLORIDE 25 MG/1
100 TABLET, FILM COATED ORAL 3 TIMES DAILY
Status: DISCONTINUED | OUTPATIENT
Start: 2022-07-21 | End: 2022-07-21

## 2022-07-21 RX ORDER — ASPIRIN 81 MG/1
81 TABLET ORAL DAILY
Status: DISCONTINUED | OUTPATIENT
Start: 2022-07-22 | End: 2022-07-24

## 2022-07-21 RX ORDER — AMLODIPINE BESYLATE 5 MG/1
5 TABLET ORAL 2 TIMES DAILY
Status: DISCONTINUED | OUTPATIENT
Start: 2022-07-21 | End: 2022-07-21

## 2022-07-21 RX ORDER — DEXTROSE MONOHYDRATE 100 MG/ML
0-250 INJECTION, SOLUTION INTRAVENOUS AS NEEDED
Status: DISCONTINUED | OUTPATIENT
Start: 2022-07-21 | End: 2022-07-29 | Stop reason: HOSPADM

## 2022-07-21 RX ORDER — LANOLIN ALCOHOL/MO/W.PET/CERES
1 CREAM (GRAM) TOPICAL DAILY
Status: DISCONTINUED | OUTPATIENT
Start: 2022-07-22 | End: 2022-07-29 | Stop reason: HOSPADM

## 2022-07-21 RX ORDER — SUCRALFATE 1 G/1
1 TABLET ORAL
Status: DISCONTINUED | OUTPATIENT
Start: 2022-07-21 | End: 2022-07-29 | Stop reason: HOSPADM

## 2022-07-21 RX ORDER — FUROSEMIDE 40 MG/1
80 TABLET ORAL DAILY
Status: DISCONTINUED | OUTPATIENT
Start: 2022-07-22 | End: 2022-07-29 | Stop reason: HOSPADM

## 2022-07-21 RX ORDER — POLYETHYLENE GLYCOL 3350 17 G/17G
17 POWDER, FOR SOLUTION ORAL DAILY PRN
Status: DISCONTINUED | OUTPATIENT
Start: 2022-07-21 | End: 2022-07-29 | Stop reason: HOSPADM

## 2022-07-21 RX ORDER — VANCOMYCIN 1.75 GRAM/500 ML IN 0.9 % SODIUM CHLORIDE INTRAVENOUS
1750
Status: COMPLETED | OUTPATIENT
Start: 2022-07-21 | End: 2022-07-21

## 2022-07-21 RX ORDER — AMLODIPINE BESYLATE 5 MG/1
10 TABLET ORAL DAILY
Status: DISCONTINUED | OUTPATIENT
Start: 2022-07-21 | End: 2022-07-29 | Stop reason: HOSPADM

## 2022-07-21 RX ORDER — TAMSULOSIN HYDROCHLORIDE 0.4 MG/1
0.4 CAPSULE ORAL
Status: DISCONTINUED | OUTPATIENT
Start: 2022-07-21 | End: 2022-07-29 | Stop reason: HOSPADM

## 2022-07-21 RX ORDER — ONDANSETRON 2 MG/ML
4 INJECTION INTRAMUSCULAR; INTRAVENOUS
Status: DISCONTINUED | OUTPATIENT
Start: 2022-07-21 | End: 2022-07-29 | Stop reason: HOSPADM

## 2022-07-21 RX ORDER — ACETAMINOPHEN 650 MG/1
650 SUPPOSITORY RECTAL
Status: DISCONTINUED | OUTPATIENT
Start: 2022-07-21 | End: 2022-07-29 | Stop reason: HOSPADM

## 2022-07-21 RX ORDER — GABAPENTIN 300 MG/1
300 CAPSULE ORAL DAILY
Status: DISCONTINUED | OUTPATIENT
Start: 2022-07-22 | End: 2022-07-29 | Stop reason: HOSPADM

## 2022-07-21 RX ORDER — DOCUSATE SODIUM 100 MG/1
100 CAPSULE, LIQUID FILLED ORAL 2 TIMES DAILY
Status: DISCONTINUED | OUTPATIENT
Start: 2022-07-21 | End: 2022-07-29 | Stop reason: HOSPADM

## 2022-07-21 RX ADMIN — OXYCODONE AND ACETAMINOPHEN 1 TABLET: 5; 325 TABLET ORAL at 18:31

## 2022-07-21 RX ADMIN — SUCRALFATE 1 G: 1 TABLET ORAL at 18:31

## 2022-07-21 RX ADMIN — Medication 10 ML: at 22:00

## 2022-07-21 RX ADMIN — HYDRALAZINE HYDROCHLORIDE 50 MG: 25 TABLET ORAL at 18:31

## 2022-07-21 RX ADMIN — DOCUSATE SODIUM 100 MG: 100 CAPSULE, LIQUID FILLED ORAL at 18:31

## 2022-07-21 RX ADMIN — Medication 3 UNITS: at 22:00

## 2022-07-21 RX ADMIN — Medication 8 UNITS: at 19:13

## 2022-07-21 RX ADMIN — CARVEDILOL 12.5 MG: 12.5 TABLET, FILM COATED ORAL at 18:31

## 2022-07-21 RX ADMIN — VANCOMYCIN HYDROCHLORIDE 1750 MG: 5 INJECTION, POWDER, LYOPHILIZED, FOR SOLUTION INTRAVENOUS at 19:53

## 2022-07-21 RX ADMIN — SEVELAMER CARBONATE 800 MG: 800 TABLET, FILM COATED ORAL at 19:58

## 2022-07-21 RX ADMIN — Medication 15 UNITS: at 22:00

## 2022-07-21 RX ADMIN — HYDROMORPHONE HYDROCHLORIDE 0.5 MG: 1 INJECTION, SOLUTION INTRAMUSCULAR; INTRAVENOUS; SUBCUTANEOUS at 19:13

## 2022-07-21 RX ADMIN — TAMSULOSIN HYDROCHLORIDE 0.4 MG: 0.4 CAPSULE ORAL at 22:00

## 2022-07-21 RX ADMIN — Medication 10 ML: at 16:24

## 2022-07-21 RX ADMIN — AMLODIPINE BESYLATE 10 MG: 5 TABLET ORAL at 18:31

## 2022-07-21 RX ADMIN — CEFEPIME 2 G: 2 INJECTION, POWDER, FOR SOLUTION INTRAVENOUS at 18:31

## 2022-07-21 RX ADMIN — METRONIDAZOLE 500 MG: 500 INJECTION, SOLUTION INTRAVENOUS at 19:38

## 2022-07-21 RX ADMIN — ACETAMINOPHEN 650 MG: 325 TABLET ORAL at 23:45

## 2022-07-21 RX ADMIN — BUSPIRONE HYDROCHLORIDE 10 MG: 10 TABLET ORAL at 18:31

## 2022-07-21 RX ADMIN — HYDRALAZINE HYDROCHLORIDE 50 MG: 25 TABLET ORAL at 23:45

## 2022-07-21 NOTE — ED PROVIDER NOTES
Patient 60-year-old male with a complex medical history presented ED with right foot drainage followed by Dr. Misael Saravia podiatry. They were hoping to place a cast to support the area but wound is draining blood and purulent fluid. Concern for deep tissue infection. Patient sent to the ED for evaluation hospital admission for MRI, antibiotics and likely surgery. Patient reports some chest tightness yesterday as well as sweating. The history is provided by the patient. Foot Pain   This is a recurrent problem. The current episode started more than 1 week ago. The problem occurs constantly. The problem has been rapidly worsening. The pain is present in the right foot. The quality of the pain is described as sharp. The pain is at a severity of 8/10. The pain is severe. The symptoms are aggravated by standing, contact and palpation. He has tried rest for the symptoms. The treatment provided no relief. There has been a history of trauma.       Past Medical History:   Diagnosis Date    Anemia associated with chronic renal failure     Anxiety and depression     BPH (benign prostatic hyperplasia)     CAD (coronary artery disease)     CHF NYHA class I, chronic, diastolic (HCC)     Chronic pain     DM type 2 causing renal disease ([address]     DM type 2 causing vascular disease ([address]     ESRD on dialysis ([address]     GERD (gastroesophageal reflux disease)     HTN (hypertension)     Hyperlipidemia     Thrombocytopenia (HCC)        Past Surgical History:   Procedure Laterality Date    HX ARTERIAL BYPASS      HX OTHER SURGICAL      5th toe Metatarsal amputation 12/2017     IR INSERT NON TUNL CVC OVER 5 YRS  11/19/2021    IR INSERT TUNL CVC W/O PORT OVER 5 YR  11/24/2021         Family History:   Problem Relation Age of Onset    Diabetes Mother     No Known Problems Father        Social History     Socioeconomic History    Marital status:      Spouse name: Not on file    Number of children: Not on file    Years of education: Not on file    Highest education level: Not on file   Occupational History    Not on file   Tobacco Use    Smoking status: Former     Types: Cigarettes     Quit date: 2001     Years since quittin.1    Smokeless tobacco: Never   Vaping Use    Vaping Use: Never used   Substance and Sexual Activity    Alcohol use: No    Drug use: No    Sexual activity: Yes     Partners: Female     Birth control/protection: Condom   Other Topics Concern    Not on file   Social History Narrative    Not on file     Social Determinants of Health     Financial Resource Strain: Not on file   Food Insecurity: Not on file   Transportation Needs: Not on file   Physical Activity: Not on file   Stress: Not on file   Social Connections: Not on file   Intimate Partner Violence: Not on file   Housing Stability: Not on file         ALLERGIES: Patient has no known allergies. Review of Systems   Constitutional:  Positive for chills and fever. HENT: Negative. Eyes: Negative. Respiratory:  Positive for chest tightness. Cardiovascular: Negative. Gastrointestinal: Negative. Endocrine: Negative. Genitourinary: Negative. Musculoskeletal: Negative. Skin:  Positive for wound. Allergic/Immunologic: Negative. Neurological: Negative. Hematological: Negative. Psychiatric/Behavioral: Negative. Vitals:    22 1338   BP: (!) 160/87   Pulse: 77   Resp: 16   Temp: 98.1 °F (36.7 °C)   SpO2: 98%   Weight: 71.7 kg (158 lb)   Height: 5' 8\" (1.727 m)            Physical Exam  Vitals and nursing note reviewed. Constitutional:       General: He is not in acute distress. Appearance: Normal appearance. HENT:      Head: Normocephalic and atraumatic. Right Ear: External ear normal.      Left Ear: External ear normal.      Nose: Nose normal.   Eyes:      Extraocular Movements: Extraocular movements intact.       Conjunctiva/sclera: Conjunctivae normal.   Cardiovascular:      Rate and Rhythm: Normal rate and regular rhythm. Pulses: Normal pulses. Radial pulses are 2+ on the right side and 2+ on the left side. Heart sounds: Normal heart sounds. Pulmonary:      Effort: Pulmonary effort is normal.      Breath sounds: Normal breath sounds. Chest:      Chest wall: No deformity or tenderness. Abdominal:      General: Abdomen is flat. There is no distension. Tenderness: There is no abdominal tenderness. Musculoskeletal:         General: No deformity or signs of injury. Normal range of motion. Cervical back: Normal range of motion and neck supple. No tenderness. Skin:     General: Skin is warm and dry. Capillary Refill: Capillary refill takes less than 2 seconds. Findings: Erythema and lesion present. Comments: Draining wound on right heel. See photo below   Neurological:      General: No focal deficit present. Mental Status: He is alert and oriented to person, place, and time. Psychiatric:         Attention and Perception: Attention normal.         Mood and Affect: Mood normal.         Behavior: Behavior normal.          MDM     Amount and/or Complexity of Data Reviewed  Decide to obtain previous medical records or to obtain history from someone other than the patient: yes           LABORATORY RESULTS:  Labs Reviewed   CBC WITH AUTOMATED DIFF - Abnormal; Notable for the following components:       Result Value    RBC 3.16 (*)     HGB 9.0 (*)     HCT 28.1 (*)     RDW 14.7 (*)     NEUTROPHILS 81 (*)     LYMPHOCYTES 9 (*)     All other components within normal limits   METABOLIC PANEL, COMPREHENSIVE - Abnormal; Notable for the following components:    Sodium 127 (*)     Chloride 95 (*)     Glucose 322 (*)     BUN 34 (*)     Creatinine 3.97 (*)     BUN/Creatinine ratio 9 (*)     GFR est AA 19 (*)     GFR est non-AA 15 (*)     AST (SGOT) 11 (*)     Alk.  phosphatase 161 (*)     Albumin 2.6 (*)     Globulin 5.4 (*)     A-G Ratio 0.5 (*)     All other components within normal limits   SAMPLES BEING HELD   TROPONIN-HIGH SENSITIVITY   TROPONIN-HIGH SENSITIVITY       IMAGING RESULTS:  XR FOOT RT MIN 3 V   Final Result   Deformity of the soft tissues of the posterior heel with a locule of gas   inferiorly. This may be related to the patient's ulceration/infection. No   radiographic evidence of osteomyelitis. .      XR TIB/FIB RT   Final Result   Soft tissue swelling surrounding the ankle. MEDICATIONS GIVEN:  Medications   oxyCODONE-acetaminophen (PERCOCET) 5-325 mg per tablet 1 Tablet (has no administration in time range)       Differential diagnosis: Osteomyelitis, draining foot infection, systemic infection, ACV    ED physician interpretation of EKG: No STEMI. ED physician interpretation of laboratory results: Patient's labs with hyperglycemia without DKA. Elevated creatinine but appears to be patient baseline on dialysis. No leukocytosis. Hemoglobin appears baseline as well. MDM: Patient is a 57-year-old male presenting to the ED with a wound on his right heel that has been evaluated by podiatry with him recommending hospital admission for IV antibiotics, investigation osteomyelitis and likely surgery. Patient vital signs are stable. No signs of systemic infection or sepsis at this time. Patient admitted to the hospitalist service for further treatment evaluation. EKG nonischemic, troponins x2 without delta and within normal limits. Doubt ACS at this time. DISPOSITION: Admitted    Perfect Serve Consult for Admission  4:05 PM    ED Room Number: ERF/F  Patient Name and age:  Omaira Chris 64 y.o.  male  Working Diagnosis:   1. Open wound of right heel, subsequent encounter    2. Feeling of chest tightness    3.  ESRD on hemodialysis ([address]        COVID-19 Suspicion:  no  Sepsis present:  no  Reassessment needed: yes  Code Status:  Full Code  Readmission: yes  Isolation Requirements:  no  Recommended Level of Care:  med/surg  Department: [facility] ED - [phone]    Other: Patient presenting from podiatry with concerning foot wound. Podiatry worried osteomyelitis. Recommending hospital admission for MRI, IV antibiotics and likely surgery. Patient of Dr. Sienna Jewell. Liang Joshi.  Nisha Bowman MD      Procedures

## 2022-07-21 NOTE — PROGRESS NOTES
[facility] Dosing Services: 7/21/22    Pharmacist Renal Dosing Progress Note for Dr Joanie Sena    The following medication: cefepime was automatically dose-adjusted per [facility] P&T Committee Protocol, with respect to renal function. Pt Weight:   Wt Readings from Last 1 Encounters:   07/21/22 71.7 kg (158 lb)         Previous Regimen  2gm q8h   Serum Creatinine Lab Results   Component Value Date/Time    Creatinine 3.97 (H) 07/21/2022 01:47 PM       Creatinine Clearance Estimated Creatinine Clearance: 18.9 mL/min (A) (based on SCr of 3.97 mg/dL (H)). BUN Lab Results   Component Value Date/Time    BUN 34 (H) 07/21/2022 01:47 PM       Dosage changed to: 1gm q24h          Pharmacy to continue to monitor patient daily. Will make dosage adjustments based upon changing renal function.   Suleiman Howell, PHARMD. Contact information:  [phone]

## 2022-07-21 NOTE — H&P
[address] [facility] [address] [place] [address]   Office [phone]  Fax [phone]       Admission H&P     Name: Sergo Chi MRN: [id number]  Sex: Male   YOB: 1960  Age: 64 y.o. PCP: Kirk Haley MD     Source of Information: patient, medical records    Chief complaint: R heel wound    History of Present Illness  Sergo Chi is a 64 y.o. male with PMH ESRD on HD MWF, T2DM, HTN, CAD s/p CABG (May 2020), HEFpEF, PEA arrest, and erosive gastritis who presents to the ED from podiatrist office due to concern for osteo. Patient endorses subjective fever and chills. Per patient , he was sent in by is Podiatrist because of worsening purulent drainage from his right heel wound. Currently the wound is wrapped and he is wearing a boot. He also endorses pain in his right leg. In the ER:      Vitals:  Patient Vitals for the past 8 hrs:   Temp Pulse Resp BP SpO2   07/22/22 1244 98.4 °F (36.9 °C) 88 18 -- 96 %   07/22/22 1223 98.2 °F (36.8 °C) 91 18 (!) 147/76 96 %   07/22/22 0812 -- 88 -- (!) 157/77 --   07/22/22 0730 98.3 °F (36.8 °C) -- -- -- 98 %   07/22/22 0531 98.1 °F (36.7 °C) 64 18 -- 97 %         Labs:   Recent Labs     07/22/22  0210 07/21/22  1347   WBC 6.4 8.5   HGB 8.2* 9.0*   HCT 25.3* 28.1*    168     Recent Labs     07/22/22  0210 07/21/22  1347    127*   K 4.2 4.2    95*   CO2 27 26   BUN 44* 34*   CREA 4.51* 3.97*   * 322*   CA 8.3* 8.6     Recent Labs     07/22/22  0210 07/21/22  1347   * 161*   TP 6.7 8.0   ALB 2.1* 2.6*   GLOB 4.6* 5.4*     No results for input(s): INR, PTP, APTT, INREXT, INREXT in the last 72 hours. No results for input(s): PHI, PCO2I, PO2I, FIO2I in the last 72 hours. No results for input(s): CPK, CKMB, TROIQ, BNPP in the last 72 hours. Imaging:   CXR:  CXR Results  (Last 48 hours)      None            CT: CT Results  (Last 48 hours)      None            Treatment: S/p Percocet    EKG: Normal sinus rhythm 64.       Review of Systems  Review of Systems   Constitutional:  Positive for chills and fever. HENT:  Negative for congestion, facial swelling and trouble swallowing. Eyes:  Negative for pain and redness. Respiratory:  Negative for cough and shortness of breath. Cardiovascular:  Negative for chest pain. Gastrointestinal:  Negative for abdominal pain, nausea and vomiting. Musculoskeletal:  Negative for neck pain and neck stiffness. Right leg pain. Skin:  Positive for wound. Neurological:  Negative for dizziness, facial asymmetry, speech difficulty and headaches. Psychiatric/Behavioral:  Negative for agitation. Home Medications   Prior to Admission medications    Medication Sig Start Date End Date Taking? Authorizing Provider   cephALEXin (KEFLEX) 500 mg capsule Take 500 mg by mouth three (3) times daily. 7/9/22   Provider, Historical   sevelamer (RENAGEL) 800 mg tablet Take 800 mg by mouth three (3) times daily (with meals). Provider, Historical   amLODIPine (NORVASC) 10 mg tablet Take 0.5 tab in AM and 0.5 tab in PM 6/21/22   Martín Hogan MD   pantoprazole (PROTONIX) 40 mg tablet Take 1 Tablet by mouth daily. 6/21/22   Martín Hogan MD   sucralfate (CARAFATE) 1 gram tablet Take 1 Tablet by mouth three (3) times daily. 6/21/22   Martín Hogan MD   tamsulosin [facility]) 0.4 mg capsule Take 1 Capsule by mouth nightly. 6/21/22   Martín Hogan MD   isosorbide mononitrate ER (IMDUR) 60 mg CR tablet Take 1 Tablet by mouth daily. 6/21/22   Martín Hogan MD   furosemide (Lasix) 80 mg tablet Take 80 mg by mouth daily. Provider, Historical   Insulin Needles, Disposable, 31 gauge x 5/16\" ndle Use as directed with insulin DM2 4/6/22   Jerome Jackson MD   acetaminophen (Tylenol Extra Strength) 500 mg tablet Take 500 mg by mouth every six (6) hours as needed for Pain or Fever.     Provider, Historical   aspirin delayed-release 81 mg tablet Take 81 mg by mouth daily.    Provider, Historical   clopidogreL (Plavix) 75 mg tab Take 75 mg by mouth daily. Patient not taking: Reported on 6/21/2022    Provider, Historical   gabapentin (NEURONTIN) 300 mg capsule Take 300 mg by mouth daily. Take after dialysis on dialysis days. Patient taking this TID. Provider, Historical   insulin glargine (LANTUS,BASAGLAR) 100 unit/mL (3 mL) inpn 15 Units by SubCUTAneous route nightly. Provider, Historical   insulin aspart U-100 (NovoLOG Flexpen U-100 Insulin) 100 unit/mL (3 mL) inpn by SubCUTAneous route Before breakfast, lunch, and dinner. -180    2 units  -220    4 units   -260    6 units  -300    8 units  -350    10 units    Provider, Historical   senna (Senna) 8.6 mg tablet Take 2 Tablets by mouth nightly. Provider, Historical   polyethylene glycol (Miralax) 17 gram/dose powder Take 17 g by mouth daily as needed for Constipation. Provider, Historical   hydrALAZINE (APRESOLINE) 50 mg tablet Take 1 Tablet by mouth three (3) times daily. Patient taking differently: Take 100 mg by mouth three (3) times daily. 4/2/22   Angella Pool MD   busPIRone (BUSPAR) 10 mg tablet Take 10 mg by mouth two (2) times a day. Provider, Historical   carvediloL (Coreg) 12.5 mg tablet Take 12.5 mg by mouth two (2) times daily (with meals). Hold if top number below 100    Provider, Historical   docusate sodium (Colace) 100 mg capsule Take 1 Capsule by mouth two (2) times a day. 10/20/21   Provider, Historical   ferrous sulfate 325 mg (65 mg iron) tablet Take 1 Tablet by mouth daily. 4/14/21   Provider, Historical   rosuvastatin (CRESTOR) 10 mg tablet Take 10 mg by mouth Every morning. Provider, Historical   eplerenone (Inspra) 25 mg tablet Take 25 mg by mouth daily.   Patient not taking: Reported on 6/21/2022    Provider, Historical       Allergies  No Known Allergies    Past Medical History  Past Medical History:   Diagnosis Date    Anemia associated with chronic renal failure     Anxiety and depression     BPH (benign prostatic hyperplasia)     CAD (coronary artery disease)     CHF NYHA class I, chronic, diastolic (HCC)     Chronic pain     DM type 2 causing renal disease (HCC)     DM type 2 causing vascular disease ([facility]     ESRD on dialysis ([facility]     GERD (gastroesophageal reflux disease)     HTN (hypertension)     Hyperlipidemia     Thrombocytopenia (HCC)         Previous Hospitalization(s)  Past Surgical History:   Procedure Laterality Date    HX ARTERIAL BYPASS      HX OTHER SURGICAL      5th toe Metatarsal amputation 2017     IR INSERT NON TUNL CVC OVER 5 YRS  2021    IR INSERT TUNL CVC W/O PORT OVER 5 YR  2021       Social History  Social History     Socioeconomic History    Marital status:      Spouse name: Not on file    Number of children: Not on file    Years of education: Not on file    Highest education level: Not on file   Occupational History    Not on file   Tobacco Use    Smoking status: Former     Types: Cigarettes     Quit date: 2001     Years since quittin.1    Smokeless tobacco: Never   Vaping Use    Vaping Use: Never used   Substance and Sexual Activity    Alcohol use: No    Drug use: No    Sexual activity: Yes     Partners: Female     Birth control/protection: Condom   Other Topics Concern    Not on file   Social History Narrative    Not on file     Social Determinants of Health     Financial Resource Strain: Not on file   Food Insecurity: Not on file   Transportation Needs: Not on file   Physical Activity: Not on file   Stress: Not on file   Social Connections: Not on file   Intimate Partner Violence: Not on file   Housing Stability: Not on file       Family History  Family History   Problem Relation Age of Onset    Diabetes Mother     No Known Problems Father         Social History   Alcohol history: Significant hx, now sober x7 years  Smoking history: Non-smoker  Illicit drug history: Distant history of marijuana  Living arrangement: patient lives with their family. Ambulates: With cane      Physical Exam  General: No acute distress. Alert. Cooperative. Sitting on a wheel chair. Head: Normocephalic. Atraumatic. Eyes:              Conjunctiva pink. Sclera white. Respiratory: CTAB. No w/r/r/c. No accessory muscle use. Cardiovascular: Regular Rhythm. No m/r/g.    GI: + bowel sounds. Nontender. No rebound/guarding. Extremities: Right foot wrapped up and in a boot    Musculoskeletal: Full ROM in all extremities. Skin: Open wound to R heel with purulent drainage (see media for picture)    Neuro: Alert. No focal deficits           Assessment and Plan     Maxi Lerner is a 64 y.o. male with PMH of ESRD on HD MWF, T2DM, HTN, CAD s/p CABG (May 2020), HEFpEF, PEA arrest, and erosive gastritis who is admitted for concern for osteo. Right Heel Wound: Concern for osteo. Does not meet sepsis criteria. Xray Rt foot and Tib/Fib Nap   - Admit to medical  - VS per unit routine  - Vanc, Cefepime and Flagyl  - ESR, CRP  - MRI  - Podiatry consulted  - Blood culture  - Wound culture  - Daily CBC, CMP      HTN: Chronic and uncontrolled. - Continue home Hydralazine 50mg TID, Amlodipine 10mg OD, Carvedilol 12.5mg BID      ESRD on HD: MWF. Follows Dr. Kelvin Lennon. - Nephrology consult  - Continue home Sevelamer 800mg TID, lasix 80 mg daily    Anemia: Chronic. Likely secondary to ESRD  - Continue home ferrous sulfate daily  - Monitor on daily CBC     T2DM: Chronic  Lab Results   Component Value Date/Time    Hemoglobin A1c 7.9 (H) 06/21/2022 02:38 PM   - Continue home Lantus 15 units daily.  Will adjust ast poc glu trend  - SSI  - Hypoglycemia precautions    GERD: Chronic  - Continue home Protonix 40mg OD, Sucralfate 1g TID    Constipation: Chronic.   - Continue docsuate, miralax, senna     CAD / HEFrEF : Echo 11/2021 EF 53% and reduced systolic function  - Continue home Carvedilol 12.5mg BID, IMDUR 60mg OD, and Aspirin 81mg OD     HLD: Chronic.   - Continue home rosuvastatin 10mg daily     Groin lymph node: Incidental 2.5cm x 0.9cm noted during previous admission.   - PCP: Follow up OP      BPH: Chronic  - Continue home Flomax 0.4mg once nightly      Anxiety and depression: Chronic  - Continue home Buspirone 10mg BID        FEN/GI - NPO at MN for possible procedure tomorrow  Activity - Out of bed with assistance  DVT prophylaxis - SCDs  GI prophylaxis - Not indicated at this time  Fall prophylaxis - Not indicated at this time. Disposition - Admit to Medical. Plan to d/c to Home. Code Status - Full, discussed with patient / caregivers. Next of Kin Name and Dane Living (Spouse) [phone] (Mobile)      Patient [facility] will be discussed with Dr. Hernandez Code    4:25 PM, 07/22/22  Emy Soriano MD  Family Medicine Resident       For Billing    Chief Complaint   Patient presents with   [facility] Problems  Date Reviewed: 7/12/2022            Codes Class Noted POA    * (Principal) Open wound of right heel ICD-10-CM: S91.301A  ICD-9-CM: 892.0  7/16/2022 Unknown        Anxiety and depression ICD-10-CM: F41.9, F32. A  ICD-9-CM: 300.00, 311  Unknown Yes        CHF NYHA class I, chronic, diastolic (HCC) DAVID-29-GD: I50.32  ICD-9-CM: 428.32  Unknown Yes        BPH (benign prostatic hyperplasia) ICD-10-CM: N40.0  ICD-9-CM: 600.00  Unknown Yes        CAD (coronary artery disease) ICD-10-CM: I25.10  ICD-9-CM: 414.00  Unknown Yes        GERD (gastroesophageal reflux disease) ICD-10-CM: K21.9  ICD-9-CM: 530.81  Unknown Yes        HTN (hypertension) ICD-10-CM: I10  ICD-9-CM: 401.9  Unknown Yes        Hyperlipidemia ICD-10-CM: E78.5  ICD-9-CM: 272.4  Unknown Yes        Anemia associated with chronic renal failure ICD-10-CM: N18.9, D63.1  ICD-9-CM: 285.21  Unknown Yes        ESRD (end stage renal disease) on dialysis ([facility] ICD-10-CM: N18.6, Z99.2  ICD-9-CM: 585.6, V45.11  2/6/2022 Yes        Type 2 diabetes mellitus with diabetic neuropathy ([facility] ICD-10-CM: E11.40  ICD-9-CM: 250.60, 357.2  8/5/2019 Yes

## 2022-07-21 NOTE — PROGRESS NOTES
[address] [address] RX Pharmacy Progress Note: Antimicrobial Stewardship    Consult for AUTOMATIC antibiotic dosing of vancomycin for Dr Rehana Abel  Indication: Bone and joint infection right heel1  Day of Therapy: 1    Plan:  Vancomycin therapy:   Start with loading dose of vancomycin 1750mg then pharmacy dosing by levels due to ESRD on HD   Check level ~18-24hrs  Redose when level <=15mcg/mL  Pharmacy to follow daily and will make changes to dose and/or frequency based on clinical status. Date Dose & Interval Measured (mcg/mL) Extrapolated (mcg/mL)   ?7/21/22 ?1750mg x1 ? ?   ? ? ? ?   ? ? ? ? Other Antimicrobial  (not dosed by pharmacist)   Cefepime 1gm q24h, metronidazole 500mg q12h   Cultures     Wound cx ordered   Serum Creatinine     Lab Results   Component Value Date/Time    Creatinine 3.97 (H) 07/21/2022 01:47 PM       Creatinine Clearance Estimated Creatinine Clearance: 18.9 mL/min (A) (based on SCr of 3.97 mg/dL (H)). Procalcitonin  No results found for: PCT     Temp   98.1 °F (36.7 °C)    WBC   Lab Results   Component Value Date/Time    WBC 8.5 07/21/2022 01:47 PM       For Antifungals, Metronidazole and Nafcillin: Lab Results   Component Value Date/Time    ALT (SGPT) 13 07/21/2022 01:47 PM    AST (SGOT) 11 (L) 07/21/2022 01:47 PM    Alk.  phosphatase 161 (H) 07/21/2022 01:47 PM    Bilirubin, total 0.3 07/21/2022 01:47 PM         Pharmacist: Signed Eddie Barton PHARMD

## 2022-07-21 NOTE — ED TRIAGE NOTES
Pt sent by ortho for infection to right heel. Pt reports fevers for several days. Pt states that ortho put him in a cast to see if would heal better and when they took it off today at appt they sent him here.

## 2022-07-22 LAB
ALBUMIN SERPL-MCNC: 2.1 G/DL (ref 3.5–5)
ALBUMIN/GLOB SERPL: 0.5 {RATIO} (ref 1.1–2.2)
ALP SERPL-CCNC: 127 U/L (ref 45–117)
ALT SERPL-CCNC: 13 U/L (ref 12–78)
ANION GAP SERPL CALC-SCNC: 9 MMOL/L (ref 5–15)
AST SERPL-CCNC: 19 U/L (ref 15–37)
ATRIAL RATE: 64 BPM
BILIRUB SERPL-MCNC: 0.3 MG/DL (ref 0.2–1)
BUN SERPL-MCNC: 44 MG/DL (ref 6–20)
BUN/CREAT SERPL: 10 (ref 12–20)
CALCIUM SERPL-MCNC: 8.3 MG/DL (ref 8.5–10.1)
CALCULATED P AXIS, ECG09: 61 DEGREES
CALCULATED R AXIS, ECG10: 36 DEGREES
CALCULATED T AXIS, ECG11: 43 DEGREES
CHLORIDE SERPL-SCNC: 100 MMOL/L (ref 97–108)
CO2 SERPL-SCNC: 27 MMOL/L (ref 21–32)
CREAT SERPL-MCNC: 4.51 MG/DL (ref 0.7–1.3)
DIAGNOSIS, 93000: NORMAL
ERYTHROCYTE [DISTWIDTH] IN BLOOD BY AUTOMATED COUNT: 14.7 % (ref 11.5–14.5)
GLOBULIN SER CALC-MCNC: 4.6 G/DL (ref 2–4)
GLUCOSE BLD STRIP.AUTO-MCNC: 105 MG/DL (ref 65–117)
GLUCOSE BLD STRIP.AUTO-MCNC: 137 MG/DL (ref 65–117)
GLUCOSE BLD STRIP.AUTO-MCNC: 138 MG/DL (ref 65–117)
GLUCOSE BLD STRIP.AUTO-MCNC: 96 MG/DL (ref 65–117)
GLUCOSE SERPL-MCNC: 165 MG/DL (ref 65–100)
HCT VFR BLD AUTO: 25.3 % (ref 36.6–50.3)
HGB BLD-MCNC: 8.2 G/DL (ref 12.1–17)
MCH RBC QN AUTO: 28.8 PG (ref 26–34)
MCHC RBC AUTO-ENTMCNC: 32.4 G/DL (ref 30–36.5)
MCV RBC AUTO: 88.8 FL (ref 80–99)
NRBC # BLD: 0 K/UL (ref 0–0.01)
NRBC BLD-RTO: 0 PER 100 WBC
P-R INTERVAL, ECG05: 158 MS
PLATELET # BLD AUTO: 151 K/UL (ref 150–400)
PMV BLD AUTO: 10.7 FL (ref 8.9–12.9)
POTASSIUM SERPL-SCNC: 4.2 MMOL/L (ref 3.5–5.1)
PROT SERPL-MCNC: 6.7 G/DL (ref 6.4–8.2)
Q-T INTERVAL, ECG07: 446 MS
QRS DURATION, ECG06: 94 MS
QTC CALCULATION (BEZET), ECG08: 460 MS
RBC # BLD AUTO: 2.85 M/UL (ref 4.1–5.7)
SERVICE CMNT-IMP: ABNORMAL
SERVICE CMNT-IMP: ABNORMAL
SERVICE CMNT-IMP: NORMAL
SERVICE CMNT-IMP: NORMAL
SODIUM SERPL-SCNC: 136 MMOL/L (ref 136–145)
TROPONIN-HIGH SENSITIVITY: 15 NG/L (ref 0–76)
VENTRICULAR RATE, ECG03: 64 BPM
WBC # BLD AUTO: 6.4 K/UL (ref 4.1–11.1)

## 2022-07-22 PROCEDURE — 74011250636 HC RX REV CODE- 250/636: Performed by: STUDENT IN AN ORGANIZED HEALTH CARE EDUCATION/TRAINING PROGRAM

## 2022-07-22 PROCEDURE — 5A1D70Z PERFORMANCE OF URINARY FILTRATION, INTERMITTENT, LESS THAN 6 HOURS PER DAY: ICD-10-PCS | Performed by: INTERNAL MEDICINE

## 2022-07-22 PROCEDURE — 74011000250 HC RX REV CODE- 250: Performed by: STUDENT IN AN ORGANIZED HEALTH CARE EDUCATION/TRAINING PROGRAM

## 2022-07-22 PROCEDURE — 90935 HEMODIALYSIS ONE EVALUATION: CPT

## 2022-07-22 PROCEDURE — 85027 COMPLETE CBC AUTOMATED: CPT

## 2022-07-22 PROCEDURE — 94761 N-INVAS EAR/PLS OXIMETRY MLT: CPT

## 2022-07-22 PROCEDURE — 74011250636 HC RX REV CODE- 250/636: Performed by: EMERGENCY MEDICINE

## 2022-07-22 PROCEDURE — 65270000029 HC RM PRIVATE

## 2022-07-22 PROCEDURE — 74011250636 HC RX REV CODE- 250/636: Performed by: INTERNAL MEDICINE

## 2022-07-22 PROCEDURE — 74011636637 HC RX REV CODE- 636/637: Performed by: STUDENT IN AN ORGANIZED HEALTH CARE EDUCATION/TRAINING PROGRAM

## 2022-07-22 PROCEDURE — 36415 COLL VENOUS BLD VENIPUNCTURE: CPT

## 2022-07-22 PROCEDURE — 82962 GLUCOSE BLOOD TEST: CPT

## 2022-07-22 PROCEDURE — 74011250637 HC RX REV CODE- 250/637: Performed by: STUDENT IN AN ORGANIZED HEALTH CARE EDUCATION/TRAINING PROGRAM

## 2022-07-22 PROCEDURE — 80053 COMPREHEN METABOLIC PANEL: CPT

## 2022-07-22 PROCEDURE — 99222 1ST HOSP IP/OBS MODERATE 55: CPT | Performed by: FAMILY MEDICINE

## 2022-07-22 RX ORDER — HYDROMORPHONE HYDROCHLORIDE 1 MG/ML
1 INJECTION, SOLUTION INTRAMUSCULAR; INTRAVENOUS; SUBCUTANEOUS ONCE
Status: COMPLETED | OUTPATIENT
Start: 2022-07-22 | End: 2022-07-22

## 2022-07-22 RX ORDER — HYDROMORPHONE HYDROCHLORIDE 1 MG/ML
0.5 INJECTION, SOLUTION INTRAMUSCULAR; INTRAVENOUS; SUBCUTANEOUS
Status: DISCONTINUED | OUTPATIENT
Start: 2022-07-22 | End: 2022-07-29 | Stop reason: HOSPADM

## 2022-07-22 RX ADMIN — FERROUS SULFATE TAB 325 MG (65 MG ELEMENTAL FE) 325 MG: 325 (65 FE) TAB at 08:12

## 2022-07-22 RX ADMIN — SEVELAMER CARBONATE 800 MG: 800 TABLET, FILM COATED ORAL at 17:38

## 2022-07-22 RX ADMIN — EPOETIN ALFA-EPBX 10000 UNITS: 10000 INJECTION, SOLUTION INTRAVENOUS; SUBCUTANEOUS at 20:48

## 2022-07-22 RX ADMIN — ISOSORBIDE MONONITRATE 60 MG: 30 TABLET, EXTENDED RELEASE ORAL at 08:11

## 2022-07-22 RX ADMIN — HYDRALAZINE HYDROCHLORIDE 50 MG: 25 TABLET ORAL at 23:13

## 2022-07-22 RX ADMIN — Medication 15 UNITS: at 23:13

## 2022-07-22 RX ADMIN — HYDRALAZINE HYDROCHLORIDE 50 MG: 25 TABLET ORAL at 17:35

## 2022-07-22 RX ADMIN — ONDANSETRON 4 MG: 2 INJECTION INTRAMUSCULAR; INTRAVENOUS at 11:35

## 2022-07-22 RX ADMIN — GABAPENTIN 300 MG: 300 CAPSULE ORAL at 08:12

## 2022-07-22 RX ADMIN — CARVEDILOL 12.5 MG: 12.5 TABLET, FILM COATED ORAL at 17:35

## 2022-07-22 RX ADMIN — VANCOMYCIN HYDROCHLORIDE 1000 MG: 1 INJECTION, POWDER, LYOPHILIZED, FOR SOLUTION INTRAVENOUS at 23:12

## 2022-07-22 RX ADMIN — DOCUSATE SODIUM 100 MG: 100 CAPSULE, LIQUID FILLED ORAL at 17:35

## 2022-07-22 RX ADMIN — ONDANSETRON 4 MG: 2 INJECTION INTRAMUSCULAR; INTRAVENOUS at 05:37

## 2022-07-22 RX ADMIN — Medication 5 ML: at 23:13

## 2022-07-22 RX ADMIN — HYDRALAZINE HYDROCHLORIDE 50 MG: 25 TABLET ORAL at 08:11

## 2022-07-22 RX ADMIN — CARVEDILOL 12.5 MG: 12.5 TABLET, FILM COATED ORAL at 08:12

## 2022-07-22 RX ADMIN — SEVELAMER CARBONATE 800 MG: 800 TABLET, FILM COATED ORAL at 12:38

## 2022-07-22 RX ADMIN — PANTOPRAZOLE SODIUM 40 MG: 40 TABLET, DELAYED RELEASE ORAL at 08:11

## 2022-07-22 RX ADMIN — ONDANSETRON 4 MG: 2 INJECTION INTRAMUSCULAR; INTRAVENOUS at 17:33

## 2022-07-22 RX ADMIN — FUROSEMIDE 80 MG: 40 TABLET ORAL at 08:11

## 2022-07-22 RX ADMIN — ACETAMINOPHEN 650 MG: 325 TABLET ORAL at 10:14

## 2022-07-22 RX ADMIN — BUSPIRONE HYDROCHLORIDE 10 MG: 10 TABLET ORAL at 17:35

## 2022-07-22 RX ADMIN — HYDROMORPHONE HYDROCHLORIDE 1 MG: 1 INJECTION, SOLUTION INTRAMUSCULAR; INTRAVENOUS; SUBCUTANEOUS at 05:38

## 2022-07-22 RX ADMIN — METRONIDAZOLE 500 MG: 500 INJECTION, SOLUTION INTRAVENOUS at 17:35

## 2022-07-22 RX ADMIN — METRONIDAZOLE 500 MG: 500 INJECTION, SOLUTION INTRAVENOUS at 05:17

## 2022-07-22 RX ADMIN — SUCRALFATE 1 G: 1 TABLET ORAL at 08:12

## 2022-07-22 RX ADMIN — HYDROMORPHONE HYDROCHLORIDE 0.5 MG: 1 INJECTION, SOLUTION INTRAMUSCULAR; INTRAVENOUS; SUBCUTANEOUS at 15:31

## 2022-07-22 RX ADMIN — SUCRALFATE 1 G: 1 TABLET ORAL at 17:35

## 2022-07-22 RX ADMIN — DOCUSATE SODIUM 100 MG: 100 CAPSULE, LIQUID FILLED ORAL at 08:11

## 2022-07-22 RX ADMIN — SUCRALFATE 1 G: 1 TABLET ORAL at 10:59

## 2022-07-22 RX ADMIN — ACETAMINOPHEN 650 MG: 325 TABLET ORAL at 20:11

## 2022-07-22 RX ADMIN — AMLODIPINE BESYLATE 10 MG: 5 TABLET ORAL at 08:12

## 2022-07-22 RX ADMIN — HYDROMORPHONE HYDROCHLORIDE 0.5 MG: 1 INJECTION, SOLUTION INTRAMUSCULAR; INTRAVENOUS; SUBCUTANEOUS at 23:11

## 2022-07-22 RX ADMIN — BUSPIRONE HYDROCHLORIDE 10 MG: 10 TABLET ORAL at 08:11

## 2022-07-22 RX ADMIN — Medication 10 ML: at 15:32

## 2022-07-22 RX ADMIN — HYDROMORPHONE HYDROCHLORIDE 0.5 MG: 1 INJECTION, SOLUTION INTRAMUSCULAR; INTRAVENOUS; SUBCUTANEOUS at 11:35

## 2022-07-22 RX ADMIN — HYDROMORPHONE HYDROCHLORIDE 0.5 MG: 1 INJECTION, SOLUTION INTRAMUSCULAR; INTRAVENOUS; SUBCUTANEOUS at 02:04

## 2022-07-22 RX ADMIN — TAMSULOSIN HYDROCHLORIDE 0.4 MG: 0.4 CAPSULE ORAL at 23:13

## 2022-07-22 NOTE — PROGRESS NOTES
Physical Therapy Note:    10:13 Orders acknowledged, chart reviewed, discussed with RN. PT evaluation deferred. Pt admitted from OP podiatrist's office for IV antibiotics, osteomyelitis rule out, and possible surgical procedure to address R heel wound. At this time pt reporting 9/10 pain, constantly shifting positions and grimacing; RN working to provide pain medication. Pt is not appropriate for PT interventions at this time. 14:52 Pt pending orthopedic consult and MRI results + for osteomyelitis per chart. Pt is currently off the unit and unavailable to participate. Per nursing notes pt is mobilizing sufficiently for toileting needs at this time. Will continue to follow and proceed with PT evaluation when appropriate.     Nicole Anguiano, PT, DPT, [place]

## 2022-07-22 NOTE — PROGRESS NOTES
1335 TRANSFER - IN REPORT:Verbal report received from Latoya(name) on Heather Ru  being received from ED(unit) for routine progression of care Report consisted of patients Situation, Background, Assessment and Recommendations(SBAR). Information from the following report(s) SBAR and ED Summary was reviewed with the receiving nurse. Opportunity for questions and clarification was provided. Assessment completed upon patients arrival to unit and care assumed. 1504 Pt arrived to 519. Attending notified of arrival.  Hany Dickey RN at bedside for HD.    1900 Bedside shift change report given to Roberto Ma (oncoming nurse) by Yessenia Restrepo (offgoing nurse). Report included the following information SBAR, Intake/Output, MAR, and Recent Results.

## 2022-07-22 NOTE — ED NOTES
Bedside and Verbal shift change report given to Daniella (oncoming nurse) by Deon DE LA PAZ (offgoing nurse). Report included the following information SBAR, Kardex, Intake/Output, MAR, and Recent Results.

## 2022-07-22 NOTE — PROGRESS NOTES
[facility] Medicine Service Daily Progress Note    24 Hour Events: Received IV Dilaudid overnight for pain. SUBJECTIVE: Pt reports continued R heel pain today. OBJECTIVE:    Vitals: Visit Vitals  BP (!) 147/76   Pulse 88   Temp 98.4 °F (36.9 °C)   Resp 18   Ht 5' 8\" (1.727 m)   Wt 158 lb (71.7 kg)   SpO2 96%   BMI 24.02 kg/m²     Physical Exam:  General: NAD. Respiratory: CTAB. Cardiovascular: Regular rate. GI: Non-tender to palpation. Extremities: Trace lower extremity edema bilaterally. Right ankle wrapped in bandage. Skin: Warm, dry.   Neuro: Alert and oriented    I/O:  Date 07/21/22 0700 - 07/22/22 0659 07/22/22 0700 - 07/23/22 0659   Shift 9681-3183 9710-4049 24 Hour Total 1151-5480 5371-9722 24 Hour Total   INTAKE   I.V.(mL/kg/hr) 10(0) 500(0.6) 510(0.3)        Volume (cefepime (MAXIPIME) 2 g in sterile water (preservative free) 10 mL IV syringe) 10  10        Volume (vancomycin (VANCOCIN) 1750 mg in  ml infusion)  500 500      Shift Total(mL/kg) 10(0.1) 500(7) 510(7.1)      OUTPUT   Urine(mL/kg/hr)           Urine Occurrence(s)    4 x  4 x   Shift Total(mL/kg)         NET 10 500 510      Weight (kg) 71.7 71.7 71.7 71.7 71.7 71.7       Inpatient Medications  Current Facility-Administered Medications   Medication Dose Route Frequency    epoetin ericka-epbx (RETACRIT) injection 10,000 Units  10,000 Units IntraVENous DIALYSIS MON, WED & FRI    sodium chloride (NS) flush 5-40 mL  5-40 mL IntraVENous Q8H    sodium chloride (NS) flush 5-40 mL  5-40 mL IntraVENous PRN    acetaminophen (TYLENOL) tablet 650 mg  650 mg Oral Q6H PRN    Or    acetaminophen (TYLENOL) suppository 650 mg  650 mg Rectal Q6H PRN    polyethylene glycol (MIRALAX) packet 17 g  17 g Oral DAILY PRN    ondansetron (ZOFRAN ODT) tablet 4 mg  4 mg Oral Q8H PRN    Or    ondansetron (ZOFRAN) injection 4 mg  4 mg IntraVENous Q6H PRN    insulin lispro (HUMALOG) injection   SubCUTAneous AC&HS    glucose chewable tablet 16 g  4 Tablet Oral PRN    glucagon (GLUCAGEN) injection 1 mg  1 mg IntraMUSCular PRN    dextrose 10% infusion 0-250 mL  0-250 mL IntraVENous PRN    cefepime (MAXIPIME) 1 g in 0.9% sodium chloride (MBP/ADV) 50 mL MBP  1 g IntraVENous Q24H    metroNIDAZOLE (FLAGYL) IVPB premix 500 mg  500 mg IntraVENous Q12H    [Held by provider] aspirin delayed-release tablet 81 mg  81 mg Oral DAILY    busPIRone (BUSPAR) tablet 10 mg  10 mg Oral BID    carvediloL (COREG) tablet 12.5 mg  12.5 mg Oral BID WITH MEALS    docusate sodium (COLACE) capsule 100 mg  100 mg Oral BID    ferrous sulfate tablet 325 mg  1 Tablet Oral DAILY    furosemide (LASIX) tablet 80 mg  80 mg Oral DAILY    gabapentin (NEURONTIN) capsule 300 mg  300 mg Oral DAILY    isosorbide mononitrate ER (IMDUR) tablet 60 mg  60 mg Oral DAILY    pantoprazole (PROTONIX) tablet 40 mg  40 mg Oral ACB    sevelamer carbonate (RENVELA) tab 800 mg  800 mg Oral TID WITH MEALS    sucralfate (CARAFATE) tablet 1 g  1 g Oral TIDAC    tamsulosin (FLOMAX) capsule 0.4 mg  0.4 mg Oral QHS    Vancomycin- Pharmacy dosing by levels   Other Rx Dosing/Monitoring    hydrALAZINE (APRESOLINE) tablet 50 mg  50 mg Oral TID    insulin glargine (LANTUS) injection 15 Units  15 Units SubCUTAneous QHS    amLODIPine (NORVASC) tablet 10 mg  10 mg Oral DAILY    HYDROmorphone (DILAUDID) syringe 0.5 mg  0.5 mg IntraVENous Q6H PRN     Current Outpatient Medications   Medication Sig    cephALEXin (KEFLEX) 500 mg capsule Take 500 mg by mouth three (3) times daily. sevelamer (RENAGEL) 800 mg tablet Take 800 mg by mouth three (3) times daily (with meals). amLODIPine (NORVASC) 10 mg tablet Take 0.5 tab in AM and 0.5 tab in PM    pantoprazole (PROTONIX) 40 mg tablet Take 1 Tablet by mouth daily. sucralfate (CARAFATE) 1 gram tablet Take 1 Tablet by mouth three (3) times daily. tamsulosin (FLOMAX) 0.4 mg capsule Take 1 Capsule by mouth nightly.     isosorbide mononitrate ER (IMDUR) 60 mg CR tablet Take 1 Tablet by mouth daily. furosemide (Lasix) 80 mg tablet Take 80 mg by mouth daily. Insulin Needles, Disposable, 31 gauge x 5/16\" ndle Use as directed with insulin DM2    acetaminophen (Tylenol Extra Strength) 500 mg tablet Take 500 mg by mouth every six (6) hours as needed for Pain or Fever. aspirin delayed-release 81 mg tablet Take 81 mg by mouth daily. clopidogreL (Plavix) 75 mg tab Take 75 mg by mouth daily. (Patient not taking: Reported on 6/21/2022)    gabapentin (NEURONTIN) 300 mg capsule Take 300 mg by mouth daily. Take after dialysis on dialysis days. Patient taking this TID. insulin glargine (LANTUS,BASAGLAR) 100 unit/mL (3 mL) inpn 15 Units by SubCUTAneous route nightly. insulin aspart U-100 (NovoLOG Flexpen U-100 Insulin) 100 unit/mL (3 mL) inpn by SubCUTAneous route Before breakfast, lunch, and dinner. -180    2 units  -220    4 units   -260    6 units  -300    8 units  -350    10 units    senna (Senna) 8.6 mg tablet Take 2 Tablets by mouth nightly. polyethylene glycol (Miralax) 17 gram/dose powder Take 17 g by mouth daily as needed for Constipation. hydrALAZINE (APRESOLINE) 50 mg tablet Take 1 Tablet by mouth three (3) times daily. (Patient taking differently: Take 100 mg by mouth three (3) times daily.)    busPIRone (BUSPAR) 10 mg tablet Take 10 mg by mouth two (2) times a day. carvediloL (Coreg) 12.5 mg tablet Take 12.5 mg by mouth two (2) times daily (with meals). Hold if top number below 100    docusate sodium (Colace) 100 mg capsule Take 1 Capsule by mouth two (2) times a day. ferrous sulfate 325 mg (65 mg iron) tablet Take 1 Tablet by mouth daily. rosuvastatin (CRESTOR) 10 mg tablet Take 10 mg by mouth Every morning. eplerenone (Inspra) 25 mg tablet Take 25 mg by mouth daily.  (Patient not taking: Reported on 6/21/2022)       Allergies  No Known Allergies    CBC:  Recent Labs     07/22/22  0210 07/21/22  1347   WBC 6.4 8.5   HGB 8.2* 9.0* HCT 25.3* 28.1*    562       Metabolic Panel:  Recent Labs     07/22/22  0210 07/21/22  1347    127*   K 4.2 4.2    95*   CO2 27 26   BUN 44* 34*   CREA 4.51* 3.97*   * 322*   CA 8.3* 8.6   ALB 2.1* 2.6*   ALT 13 13            Assessment and Plan     Carolee Carey is a 64 y.o. male with PMH of ESRD on HD MWF, T2DM, HTN, CAD s/p CABG (May 2020), HEFpEF, PEA arrest, and erosive gastritis who is admitted for osteomyelitis of the R calcaneus. Right calcaneal osteomyelitis: Demonstrated on MRI.  and CRP 15.2.  - Vanc, Cefepime and Flagyl  - Podiatry consulted  - Follow-up blood and wound cultures  - Daily CBC, CMP      HTN: Chronic and uncontrolled. - Continue home hydralazine 50 mg TID, amlodipine 10 mg OD, carvedilol 12.5 mg BID      ESRD on HD: MWF. Follows Dr. Rose Mary Calle. - Nephrology following  - Continue home Sevelamer 800mg TID, lasix 80 mg daily     Anemia: Chronic. Likely secondary to ESRD  - Continue home ferrous sulfate daily  - Monitor on daily CBC     T2DM: Chronic. Last A1c 7.9 (6/21/22). - Continue home Lantus 15 units daily.  - SSI w/ glucose checks ACHS  - Hypoglycemia precautions     GERD: Chronic.  - Continue home Protonix 40 mg OD, Sucralfate 1 g TID     Constipation: Chronic.   - Continue Docusate, Miralax, Senna      CAD / HEFrEF : Echo 11/2021 EF 53% and reduced systolic function.  - Continue home carvedilol 12.5 mg BID, Imdur 60 mg OD, and aspirin 81 mg OD     HLD: Chronic.  - Continue home rosuvastatin 10 mg daily     Groin lymph node: Incidental 2.5 cm x 0.9 cm noted during previous admission.   - PCP: Follow up OP      BPH: Chronic  - Continue home Flomax 0.4 mg once nightly      Anxiety and depression: Chronic.  - Continue home Buspirone 10 mg BID        FEN/GI - NPO for possible procedure. Activity - Out of bed with assistance. DVT prophylaxis - SCDs  GI prophylaxis - Not indicated at this time  Fall prophylaxis - Not indicated at this time.   Disposition - Plan to d/c TBD. Code Status - Full, discussed with patient / caregivers. Next of Scarlet 69 Name and De Reveal (Spouse) [phone] (Mobile)      Selena Crenshaw MD  Family Medicine Resident       For Billing    Chief Complaint   Patient presents with    Foot Pain       Hospital Problems  Date Reviewed: 7/12/2022            Codes Class Noted POA    * (Principal) Open wound of right heel ICD-10-CM: S91.301A  ICD-9-CM: 892.0  7/16/2022 Unknown        Anxiety and depression ICD-10-CM: F41.9, F32. A  ICD-9-CM: 300.00, 311  Unknown Yes        CHF NYHA class I, chronic, diastolic (HCC) [id number]: I50.32  ICD-9-CM: 428.32  Unknown Yes        BPH (benign prostatic hyperplasia) ICD-10-CM: N40.0  ICD-9-CM: 600.00  Unknown Yes        CAD (coronary artery disease) ICD-10-CM: I25.10  ICD-9-CM: 414.00  Unknown Yes        GERD (gastroesophageal reflux disease) ICD-10-CM: K21.9  ICD-9-CM: 530.81  Unknown Yes        HTN (hypertension) ICD-10-CM: I10  ICD-9-CM: 401.9  Unknown Yes        Hyperlipidemia ICD-10-CM: E78.5  ICD-9-CM: 272.4  Unknown Yes        Anemia associated with chronic renal failure ICD-10-CM: N18.9, D63.1  ICD-9-CM: 285.21  Unknown Yes        ESRD (end stage renal disease) on dialysis [facility]) ICD-10-CM: N18.6, Z99.2  ICD-9-CM: 585.6, V45.11  2/6/2022 Yes        Type 2 diabetes mellitus with diabetic neuropathy ([facility] [address] ICD-10-CM: E11.40  ICD-9-CM: 250.60, 357.2  8/5/2019 Yes

## 2022-07-22 NOTE — ED NOTES
Comfort measures: blankets, repositioning  Pt c/o pain   ER DR lam with one time dose order awaiting pharmacy review

## 2022-07-22 NOTE — ED NOTES
Report called to 5th floor for receiving nurse, Red Bejarano.   Nurse aware of last pain meds, dialysis schedule of [id number], and pt summary    Nurse without further questions

## 2022-07-22 NOTE — ED NOTES
Pt given silicone cream for leg itching. Pt aware not to put cream on right heel wound. Pt ambulated to the bathroom without difficulty and voided without difficulty. Pt voiced no complaints.   Comfort measures given:  repositioned & blanket

## 2022-07-22 NOTE — PROGRESS NOTES
7/22/2022  10:25 AM  CM completed assessment w/ pt in person. CM wore mask at all times. Charted demographics verified. Reason for Readmission:   Open Wound Rt Heel    Pt is a 65 yo  male who presents to [facility] from podiatrist's office for open wound on Rt heel. PT c/o pain and drainage  Pt is ESRD on HD [address] MWF  Admission 7/16-7/19/22 Acute pulmonary Edema, pleural effusion, DC to home w/ outpatient services   PMHx:      Anemia associated with chronic renal failure       Anxiety and depression      BPH (benign prostatic hyperplasia)      CAD (coronary artery disease)      CHF NYHA class I, chronic, diastolic (HCC)      Chronic pain      DM type 2 causing renal disease ([facility] [address]      DM type 2 causing vascular disease ([facility] [address]      ESRD on dialysis ([facility] [address]      GERD (gastroesophageal reflux disease)      HTN (hypertension)      Hyperlipidemia      Thrombocytopenia (HCC)        RUR Score/Risk Level:    22 % High Risk of Readmission/Red     PCP: First and Last name:  Beryle Lacer, MD    Name of Practice:    Are you a current patient: Yes/No: yes    Approximate date of last visit: > 30 days    Can you participate in a virtual visit with your PCP: NO     Is a Care Conference indicated: NO       Did you attend your follow up appointment (s): If not, why not:pt had appt for PCP scheduled at same time of HD, pt saw podiatry 7/21         Resources/supports as identified by patient/family:   pt has supportive family       Top Challenges facing patient (as identified by patient/family and CM): Finances/Medication cost?   [employer], pt uses [employer], is fully covered for his medications      Trans[facility]      [facility]  Support system or lack thereof? Pt has supportive spouse and 3 adult sons  Living arrangements? Pt lives w/ spouse and 3 adult sons in a private residence      Self-care/ADLs/Cognition?      Pt reports he can perform ADLS independently and is ambulatory without assistive device        Current Advanced Directive/Advance Care Plan:     NO AMD, HCDM spouse Sergio Gitelman (C)         Plan for utilizing home health:   No history of HH or rehab, PT, OT evals pending    DME: Salena sena, RW, pt has Home O2  (concentrator and portables) for use PRN, unknown provider  COVID Vax Hx: Fully vaccinated in 2021, no boosters        Transition of Care Plan:    Based on readmission, the patient's previous Plan of Care   has been evaluated and/or modified. The current Transition of Care Plan is:    RUR 22 % High Risk of readmission/Red  LOS  1 Day    1, hospital admission for medical management   2. Nephrology consult, ERSD on HD [facility]  3. PT, OT evals pending   4. Podiatry consult  5. CM to follow through for treatment/response  6. DC when stable, dispo pending evals and progress   7. Resume HD [facility] [address]  8. Outpatient follow up PCP, specialists  9. Family will transport at DC   10. CM will follow and assist w/ DC needs   Care Management Interventions  PCP Verified by CM:  Yes Mary Weir MD, last visit > 30 days)  Palliative Care Criteria Met (RRAT>21 & CHF Dx)?: No  Mode of Transport at Discharge: Self (Family)  Physical Therapy Consult: Yes  Occupational Therapy Consult: Yes  Support Systems: Spouse/Significant Other, Child(fidelia) (pt lives w/ wife and 3 adult sons in pvt residence)  Confirm Follow Up Transport: Family  Discharge Location  Patient Expects to be Discharged to[de-identified] Home with outpatient services  Readmission Assessment  Number of days since last admission?: 1-7 days  Previous disposition: Home with Family  Who is being interviewed?: Patient  What was the patient's/caregiver's perception as to why they think they needed to return back to the hospital?: Other (Comment) (Sent by podiatry)  Did you see a specialist, such as Cardiac, Pulmonary, Orthopedic Physician, etc. after you left the hospital?: Yes (Podaitry 7/21)  Who advised the patient to return to the hospital?: Physician  Does the patient report anything that got in the way of taking their medications?: Yes  What reasons did they give?: Other (Comment) (Didn't have time to get them filled)  In our efforts to provide the best possible care to you and others like you, can you think of anything that we could have done to help you after you left the hospital the first time, so that you might not have needed to return so soon?: Other (Comment) (\"Fix me up\", pt felt problem was not taken care of prior to DC)  JALIL Guadarrama

## 2022-07-22 NOTE — PROGRESS NOTES
Occupational Therapy Note:  Orders received and appreciated. Chart reviewed and spoke with RN. RN stated pt is up ad roma with good safety awareness and no LOB, performing ADLs without assist.   Pt admitted from OP podiatrist's office for IV antibiotics, osteomyelitis rule out, and possible surgical procedure to address R heel wound. No skilled OT services required at this time. Please re-consult if pt has a decline in function. Thank you for the consult.   Madelyn Resendiz, OTR/L, CBIS

## 2022-07-22 NOTE — PROGRESS NOTES
[address] [facility]  YOB: 1960          Assessment & Plan:     ESRD on HD [facility])  DFI  DM2  HTN  CAD  Anemia    Rec:  HD today and [facility]  RANDY with HD       Subjective:   CC: fu ESRD  HPI: Patient seen for HD. Admitted with DFI concern of osteo. For HD today. ROS: Pain in foot.  No sob  Current Facility-Administered Medications   Medication Dose Route Frequency    epoetin ericka-epbx (RETACRIT) injection 10,000 Units  10,000 Units IntraVENous DIALYSIS MON, WED & FRI    sodium chloride (NS) flush 5-40 mL  5-40 mL IntraVENous Q8H    sodium chloride (NS) flush 5-40 mL  5-40 mL IntraVENous PRN    acetaminophen (TYLENOL) tablet 650 mg  650 mg Oral Q6H PRN    Or    acetaminophen (TYLENOL) suppository 650 mg  650 mg Rectal Q6H PRN    polyethylene glycol (MIRALAX) packet 17 g  17 g Oral DAILY PRN    ondansetron (ZOFRAN ODT) tablet 4 mg  4 mg Oral Q8H PRN    Or    ondansetron (ZOFRAN) injection 4 mg  4 mg IntraVENous Q6H PRN    insulin lispro (HUMALOG) injection   SubCUTAneous AC&HS    glucose chewable tablet 16 g  4 Tablet Oral PRN    glucagon (GLUCAGEN) injection 1 mg  1 mg IntraMUSCular PRN    dextrose 10% infusion 0-250 mL  0-250 mL IntraVENous PRN    cefepime (MAXIPIME) 1 g in 0.9% sodium chloride (MBP/ADV) 50 mL MBP  1 g IntraVENous Q24H    metroNIDAZOLE (FLAGYL) IVPB premix 500 mg  500 mg IntraVENous Q12H    [Held by provider] aspirin delayed-release tablet 81 mg  81 mg Oral DAILY    busPIRone (BUSPAR) tablet 10 mg  10 mg Oral BID    carvediloL (COREG) tablet 12.5 mg  12.5 mg Oral BID WITH MEALS    docusate sodium (COLACE) capsule 100 mg  100 mg Oral BID    ferrous sulfate tablet 325 mg  1 Tablet Oral DAILY    furosemide (LASIX) tablet 80 mg  80 mg Oral DAILY    gabapentin (NEURONTIN) capsule 300 mg  300 mg Oral DAILY    isosorbide mononitrate ER (IMDUR) tablet 60 mg  60 mg Oral DAILY    pantoprazole (PROTONIX) tablet 40 mg  40 mg Oral ACB    sevelamer carbonate (RENVELA) tab 800 mg  800 mg Oral TID WITH MEALS    sucralfate (CARAFATE) tablet 1 g  1 g Oral TIDAC    tamsulosin (FLOMAX) capsule 0.4 mg  0.4 mg Oral QHS    Vancomycin- Pharmacy dosing by levels   Other Rx Dosing/Monitoring    hydrALAZINE (APRESOLINE) tablet 50 mg  50 mg Oral TID    insulin glargine (LANTUS) injection 15 Units  15 Units SubCUTAneous QHS    amLODIPine (NORVASC) tablet 10 mg  10 mg Oral DAILY    HYDROmorphone (DILAUDID) syringe 0.5 mg  0.5 mg IntraVENous Q6H PRN     Current Outpatient Medications   Medication Sig    cephALEXin (KEFLEX) 500 mg capsule Take 500 mg by mouth three (3) times daily. sevelamer (RENAGEL) 800 mg tablet Take 800 mg by mouth three (3) times daily (with meals). amLODIPine (NORVASC) 10 mg tablet Take 0.5 tab in AM and 0.5 tab in PM    pantoprazole (PROTONIX) 40 mg tablet Take 1 Tablet by mouth daily. sucralfate (CARAFATE) 1 gram tablet Take 1 Tablet by mouth three (3) times daily. tamsulosin (FLOMAX) 0.4 mg capsule Take 1 Capsule by mouth nightly. isosorbide mononitrate ER (IMDUR) 60 mg CR tablet Take 1 Tablet by mouth daily. furosemide (Lasix) 80 mg tablet Take 80 mg by mouth daily. Insulin Needles, Disposable, 31 gauge x 5/16\" ndle Use as directed with insulin DM2    acetaminophen (Tylenol Extra Strength) 500 mg tablet Take 500 mg by mouth every six (6) hours as needed for Pain or Fever. aspirin delayed-release 81 mg tablet Take 81 mg by mouth daily. clopidogreL (Plavix) 75 mg tab Take 75 mg by mouth daily. (Patient not taking: Reported on 6/21/2022)    gabapentin (NEURONTIN) 300 mg capsule Take 300 mg by mouth daily. Take after dialysis on dialysis days. Patient taking this TID. insulin glargine (LANTUS,BASAGLAR) 100 unit/mL (3 mL) inpn 15 Units by SubCUTAneous route nightly. insulin aspart U-100 (NovoLOG Flexpen U-100 Insulin) 100 unit/mL (3 mL) inpn by SubCUTAneous route Before breakfast, lunch, and dinner. -180    2 units  -220    4 units   -260    6 units  -300    8 units  -350    10 units    senna (Senna) 8.6 mg tablet Take 2 Tablets by mouth nightly. polyethylene glycol (Miralax) 17 gram/dose powder Take 17 g by mouth daily as needed for Constipation. hydrALAZINE (APRESOLINE) 50 mg tablet Take 1 Tablet by mouth three (3) times daily. (Patient taking differently: Take 100 mg by mouth three (3) times daily.)    busPIRone (BUSPAR) 10 mg tablet Take 10 mg by mouth two (2) times a day. carvediloL (Coreg) 12.5 mg tablet Take 12.5 mg by mouth two (2) times daily (with meals). Hold if top number below 100    docusate sodium (Colace) 100 mg capsule Take 1 Capsule by mouth two (2) times a day. ferrous sulfate 325 mg (65 mg iron) tablet Take 1 Tablet by mouth daily. rosuvastatin (CRESTOR) 10 mg tablet Take 10 mg by mouth Every morning. eplerenone (Inspra) 25 mg tablet Take 25 mg by mouth daily. (Patient not taking: Reported on 2022)          Objective:     Vitals:  Blood pressure (!) 157/77, pulse 88, temperature 98.1 °F (36.7 °C), resp. rate 18, height 5' 8\" (1.727 m), weight 71.7 kg (158 lb), SpO2 97 %. Temp (24hrs), Av.9 °F (36.6 °C), Min:97.5 °F (36.4 °C), Max:98.2 °F (36.8 °C)      Intake and Output:  No intake/output data recorded.  1901 -  0700  In: 510 [I.V.:510]  Out: -     Physical Exam:               GENERAL ASSESSMENT: NAD  NECK: IJ PC  CHEST: CTA  HEART: S1S2  ABDOMEN: Soft,NT  EXTREMITY: no EDEMA  NEURO: Grossly non focal          ECG/rhythm:    Data Review      No results for input(s): TNIPOC in the last 72 hours. No lab exists for component: ITNL   No results for input(s): CPK, CKMB, TROIQ in the last 72 hours.   Recent Labs     22  0210 22  1347    127*   K 4.2 4.2    95*   CO2 27 26   BUN 44* 34*   CREA 4.51* 3.97*   * 322*   CA 8.3* 8.6   ALB 2.1* 2.6*   WBC 6.4 8.5   HGB 8.2* 9.0*   HCT 25.3* 28.1*   PLT 151 168      No results for input(s): INR, PTP, APTT, INREXT in the last 72 hours. Needs: urine analysis, urine sodium, protein and creatinine  Lab Results   Component Value Date/Time    Creatinine, urine 72.2 11/22/2019 09:36 AM           : Ej Aragon MD  7/22/2022        [facility]:  [email or web address]  Stella Melendrez office:  [place] [address] [place] [address], [address] [place] [address],[address]  [address]  Phone: [phone]  Fax :     [phone]    [place] office:  [address] [facility], [address]   Phone - [phone]  Fax - [phone]

## 2022-07-22 NOTE — PROCEDURES
[facility] / [phone]    Vitals Pre Post Assessment Pre Post   BP BP: 128/60 (07/22/22 1833) 150/68 LOC Awake, alert, oriented x 4. Awake, alert, oriented x 4. HR Pulse (Heart Rate): 72 (07/22/22 1833) 73 Lungs CTA CTA   Resp Resp Rate: 18 (07/22/22 1504) 18 Cardiac HR 72 HR 73   Temp Temp: 99.9 °F (37.7 °C) (07/22/22 1504) Not obtained Skin CDI, R-foot dressing intact. CDI, R-foot dressing intact. Weight  Not obtained Not obtained Edema No edema noted. No edema noted. Tele status No tele No tele Pain Pain Intensity 1: 0 (07/22/22 1630) No c/o pain     Orders   Duration: Start: 7454 End: 2237 Total: 4   Dialyzer: Dialyzer/Set Up Inspection: Emilia Patiño (07/22/22 1833)   K Bath: Dialysate K (mEq/L): 2 (07/22/22 1833)   Ca Bath: Dialysate CA (mEq/L): 2.5 (07/22/22 1833)   Na: Dialysate NA (mEq/L): 138 (07/22/22 1833)   Bicarb: Dialysate HCO3 (mEq/L): 35 (07/22/22 1833)   Target Fluid Removal: Goal/Amount of Fluid to Remove (mL): 1000 mL (07/22/22 1833)     Access   Type & Location: R CVC   Comments:       Currently dressed with gauze and paper tape, CDI. Will change dressing this evening. Skin surrounding CVC site appropriate for patient's ethnicity without redness, swelling, or drainage noted. CVC limbs cleaned per P&P.  5cc blood aspirated swiftly from each limb, followed by a brisk flush with 10cc NS, no resistance noted.                                    Labs   HBsAg (Antigen) / date:                  Negative  7/20/22                             HBsAb (Antibody) / date: Susceptible   4/18/22   Source: Physician's Portal   Obtained/Reviewed  Critical Results Called HGB   Date Value Ref Range Status   07/22/2022 8.2 (L) 12.1 - 17.0 g/dL Final     Potassium   Date Value Ref Range Status   07/22/2022 4.2 3.5 - 5.1 mmol/L Final     Comment:     SPECIMEN HEMOLYZED, RESULTS MAY BE AFFECTED     Calcium   Date Value Ref Range Status   07/22/2022 8.3 (L) 8.5 - 10.1 MG/DL Final     BUN   Date Value Ref Range Status   07/22/2022 44 (H) 6 - 20 MG/DL Final     Creatinine   Date Value Ref Range Status   07/22/2022 4.51 (H) 0.70 - 1.30 MG/DL Final        Meds Given   Name Dose Route   Retacrit 10,000 units IVP               Adequacy / Fluid    Total Liters Process: 87.8   Net Fluid Removed: 1000      Comments   Time Out Done:   (Time) 1815   Admitting Diagnosis: ESRD   Consent obtained/signed: Informed Consent Verified:  (OP HD at [facility]) (07/22/22 1833)   Machine / RO # Machine Number: [id number] (07/22/22 6299)   Primary Nurse Rpt Pre: Kelle Luo RN   Primary Nurse Rpt Post: BERNADETTE Puga RN   Pt Education: Infection prevention   Care Plan: Continue HD plan of care   Pts outpatient clinic: [facility]     Tx Summary   Comments:                7807 - HD initiated without incident. 2237 - Treatment tolerated well. All possible blood returned to patient. CVC limbs cleaned, flushed with 10cc NS, clamped, and secured with new q-sites and clean caps. CVC dressing changed. Report to BERNADETTE Puga RN.

## 2022-07-23 ENCOUNTER — ANESTHESIA EVENT (OUTPATIENT)
Dept: SURGERY | Age: 62
DRG: 314 | End: 2022-07-23
Payer: MEDICAID

## 2022-07-23 ENCOUNTER — ANESTHESIA (OUTPATIENT)
Dept: SURGERY | Age: 62
DRG: 314 | End: 2022-07-23
Payer: MEDICAID

## 2022-07-23 ENCOUNTER — APPOINTMENT (OUTPATIENT)
Dept: GENERAL RADIOLOGY | Age: 62
DRG: 314 | End: 2022-07-23
Attending: PODIATRIST
Payer: MEDICAID

## 2022-07-23 LAB
ALBUMIN SERPL-MCNC: 2.2 G/DL (ref 3.5–5)
ALBUMIN/GLOB SERPL: 0.5 {RATIO} (ref 1.1–2.2)
ALP SERPL-CCNC: 153 U/L (ref 45–117)
ALT SERPL-CCNC: 13 U/L (ref 12–78)
ANION GAP SERPL CALC-SCNC: 5 MMOL/L (ref 5–15)
AST SERPL-CCNC: 16 U/L (ref 15–37)
BILIRUB SERPL-MCNC: 0.5 MG/DL (ref 0.2–1)
BUN SERPL-MCNC: 17 MG/DL (ref 6–20)
BUN/CREAT SERPL: 7 (ref 12–20)
CALCIUM SERPL-MCNC: 8.4 MG/DL (ref 8.5–10.1)
CHLORIDE SERPL-SCNC: 101 MMOL/L (ref 97–108)
CO2 SERPL-SCNC: 30 MMOL/L (ref 21–32)
CREAT SERPL-MCNC: 2.47 MG/DL (ref 0.7–1.3)
ERYTHROCYTE [DISTWIDTH] IN BLOOD BY AUTOMATED COUNT: 14.8 % (ref 11.5–14.5)
GLOBULIN SER CALC-MCNC: 4.8 G/DL (ref 2–4)
GLUCOSE BLD STRIP.AUTO-MCNC: 210 MG/DL (ref 65–117)
GLUCOSE BLD STRIP.AUTO-MCNC: 265 MG/DL (ref 65–117)
GLUCOSE BLD STRIP.AUTO-MCNC: 84 MG/DL (ref 65–117)
GLUCOSE BLD STRIP.AUTO-MCNC: 87 MG/DL (ref 65–117)
GLUCOSE BLD STRIP.AUTO-MCNC: 97 MG/DL (ref 65–117)
GLUCOSE SERPL-MCNC: 96 MG/DL (ref 65–100)
HCT VFR BLD AUTO: 25.4 % (ref 36.6–50.3)
HGB BLD-MCNC: 7.9 G/DL (ref 12.1–17)
MCH RBC QN AUTO: 28.3 PG (ref 26–34)
MCHC RBC AUTO-ENTMCNC: 31.1 G/DL (ref 30–36.5)
MCV RBC AUTO: 91 FL (ref 80–99)
NRBC # BLD: 0 K/UL (ref 0–0.01)
NRBC BLD-RTO: 0 PER 100 WBC
PLATELET # BLD AUTO: 160 K/UL (ref 150–400)
PMV BLD AUTO: 11.3 FL (ref 8.9–12.9)
POTASSIUM SERPL-SCNC: 3.2 MMOL/L (ref 3.5–5.1)
PROT SERPL-MCNC: 7 G/DL (ref 6.4–8.2)
RBC # BLD AUTO: 2.79 M/UL (ref 4.1–5.7)
SERVICE CMNT-IMP: ABNORMAL
SERVICE CMNT-IMP: ABNORMAL
SERVICE CMNT-IMP: NORMAL
SODIUM SERPL-SCNC: 136 MMOL/L (ref 136–145)
WBC # BLD AUTO: 8.2 K/UL (ref 4.1–11.1)

## 2022-07-23 PROCEDURE — 85027 COMPLETE CBC AUTOMATED: CPT

## 2022-07-23 PROCEDURE — 0QBL0ZZ EXCISION OF RIGHT TARSAL, OPEN APPROACH: ICD-10-PCS | Performed by: PODIATRIST

## 2022-07-23 PROCEDURE — 74011000250 HC RX REV CODE- 250: Performed by: STUDENT IN AN ORGANIZED HEALTH CARE EDUCATION/TRAINING PROGRAM

## 2022-07-23 PROCEDURE — 82962 GLUCOSE BLOOD TEST: CPT

## 2022-07-23 PROCEDURE — 99232 SBSQ HOSP IP/OBS MODERATE 35: CPT | Performed by: FAMILY MEDICINE

## 2022-07-23 PROCEDURE — 74011000250 HC RX REV CODE- 250: Performed by: NURSE ANESTHETIST, CERTIFIED REGISTERED

## 2022-07-23 PROCEDURE — 73650 X-RAY EXAM OF HEEL: CPT

## 2022-07-23 PROCEDURE — 87077 CULTURE AEROBIC IDENTIFY: CPT

## 2022-07-23 PROCEDURE — 0JRQ07Z REPLACEMENT OF RIGHT FOOT SUBCUTANEOUS TISSUE AND FASCIA WITH AUTOLOGOUS TISSUE SUBSTITUTE, OPEN APPROACH: ICD-10-PCS | Performed by: PODIATRIST

## 2022-07-23 PROCEDURE — 74011250637 HC RX REV CODE- 250/637: Performed by: STUDENT IN AN ORGANIZED HEALTH CARE EDUCATION/TRAINING PROGRAM

## 2022-07-23 PROCEDURE — 74011250636 HC RX REV CODE- 250/636: Performed by: STUDENT IN AN ORGANIZED HEALTH CARE EDUCATION/TRAINING PROGRAM

## 2022-07-23 PROCEDURE — 87205 SMEAR GRAM STAIN: CPT

## 2022-07-23 PROCEDURE — 76010000149 HC OR TIME 1 TO 1.5 HR: Performed by: PODIATRIST

## 2022-07-23 PROCEDURE — 87186 SC STD MICRODIL/AGAR DIL: CPT

## 2022-07-23 PROCEDURE — 65270000029 HC RM PRIVATE

## 2022-07-23 PROCEDURE — 77030031139 HC SUT VCRL2 J&J -A: Performed by: PODIATRIST

## 2022-07-23 PROCEDURE — 88307 TISSUE EXAM BY PATHOLOGIST: CPT

## 2022-07-23 PROCEDURE — 87076 CULTURE ANAEROBE IDENT EACH: CPT

## 2022-07-23 PROCEDURE — 74011250636 HC RX REV CODE- 250/636: Performed by: NURSE ANESTHETIST, CERTIFIED REGISTERED

## 2022-07-23 PROCEDURE — 2709999900 HC NON-CHARGEABLE SUPPLY: Performed by: PODIATRIST

## 2022-07-23 PROCEDURE — 77030032015 HC HNDPC IRR VRSAJET EXCT S&N -F: Performed by: PODIATRIST

## 2022-07-23 PROCEDURE — 76210000006 HC OR PH I REC 0.5 TO 1 HR: Performed by: PODIATRIST

## 2022-07-23 PROCEDURE — 74011000250 HC RX REV CODE- 250: Performed by: PODIATRIST

## 2022-07-23 PROCEDURE — 74011636637 HC RX REV CODE- 636/637: Performed by: STUDENT IN AN ORGANIZED HEALTH CARE EDUCATION/TRAINING PROGRAM

## 2022-07-23 PROCEDURE — 87147 CULTURE TYPE IMMUNOLOGIC: CPT

## 2022-07-23 PROCEDURE — 80053 COMPREHEN METABOLIC PANEL: CPT

## 2022-07-23 PROCEDURE — 76060000033 HC ANESTHESIA 1 TO 1.5 HR: Performed by: PODIATRIST

## 2022-07-23 PROCEDURE — 77030008684 HC TU ET CUF COVD -B: Performed by: NURSE ANESTHETIST, CERTIFIED REGISTERED

## 2022-07-23 PROCEDURE — 88311 DECALCIFY TISSUE: CPT

## 2022-07-23 PROCEDURE — 74011000258 HC RX REV CODE- 258: Performed by: STUDENT IN AN ORGANIZED HEALTH CARE EDUCATION/TRAINING PROGRAM

## 2022-07-23 PROCEDURE — 77030026438 HC STYL ET INTUB CARD -A: Performed by: NURSE ANESTHETIST, CERTIFIED REGISTERED

## 2022-07-23 PROCEDURE — 87185 SC STD ENZYME DETCJ PER NZM: CPT

## 2022-07-23 PROCEDURE — 77030013079 HC BLNKT BAIR HGGR 3M -A: Performed by: STUDENT IN AN ORGANIZED HEALTH CARE EDUCATION/TRAINING PROGRAM

## 2022-07-23 PROCEDURE — 36415 COLL VENOUS BLD VENIPUNCTURE: CPT

## 2022-07-23 PROCEDURE — 87075 CULTR BACTERIA EXCEPT BLOOD: CPT

## 2022-07-23 RX ORDER — CISATRACURIUM BESYLATE 2 MG/ML
INJECTION, SOLUTION INTRAVENOUS AS NEEDED
Status: DISCONTINUED | OUTPATIENT
Start: 2022-07-23 | End: 2022-07-23 | Stop reason: HOSPADM

## 2022-07-23 RX ORDER — HYDROMORPHONE HYDROCHLORIDE 1 MG/ML
.25-1 INJECTION, SOLUTION INTRAMUSCULAR; INTRAVENOUS; SUBCUTANEOUS
Status: DISCONTINUED | OUTPATIENT
Start: 2022-07-23 | End: 2022-07-23 | Stop reason: HOSPADM

## 2022-07-23 RX ORDER — ONDANSETRON 2 MG/ML
INJECTION INTRAMUSCULAR; INTRAVENOUS AS NEEDED
Status: DISCONTINUED | OUTPATIENT
Start: 2022-07-23 | End: 2022-07-23 | Stop reason: HOSPADM

## 2022-07-23 RX ORDER — MIDAZOLAM HYDROCHLORIDE 1 MG/ML
INJECTION, SOLUTION INTRAMUSCULAR; INTRAVENOUS AS NEEDED
Status: DISCONTINUED | OUTPATIENT
Start: 2022-07-23 | End: 2022-07-23 | Stop reason: HOSPADM

## 2022-07-23 RX ORDER — NEOSTIGMINE METHYLSULFATE 1 MG/ML
INJECTION, SOLUTION INTRAVENOUS AS NEEDED
Status: DISCONTINUED | OUTPATIENT
Start: 2022-07-23 | End: 2022-07-23 | Stop reason: HOSPADM

## 2022-07-23 RX ORDER — FENTANYL CITRATE 50 UG/ML
INJECTION, SOLUTION INTRAMUSCULAR; INTRAVENOUS AS NEEDED
Status: DISCONTINUED | OUTPATIENT
Start: 2022-07-23 | End: 2022-07-23 | Stop reason: HOSPADM

## 2022-07-23 RX ORDER — LIDOCAINE HYDROCHLORIDE 20 MG/ML
INJECTION, SOLUTION EPIDURAL; INFILTRATION; INTRACAUDAL; PERINEURAL AS NEEDED
Status: DISCONTINUED | OUTPATIENT
Start: 2022-07-23 | End: 2022-07-23 | Stop reason: HOSPADM

## 2022-07-23 RX ORDER — DEXAMETHASONE SODIUM PHOSPHATE 4 MG/ML
INJECTION, SOLUTION INTRA-ARTICULAR; INTRALESIONAL; INTRAMUSCULAR; INTRAVENOUS; SOFT TISSUE AS NEEDED
Status: DISCONTINUED | OUTPATIENT
Start: 2022-07-23 | End: 2022-07-23 | Stop reason: HOSPADM

## 2022-07-23 RX ORDER — SODIUM CHLORIDE, SODIUM LACTATE, POTASSIUM CHLORIDE, CALCIUM CHLORIDE 600; 310; 30; 20 MG/100ML; MG/100ML; MG/100ML; MG/100ML
100 INJECTION, SOLUTION INTRAVENOUS CONTINUOUS
Status: DISCONTINUED | OUTPATIENT
Start: 2022-07-23 | End: 2022-07-23 | Stop reason: HOSPADM

## 2022-07-23 RX ORDER — GLYCOPYRROLATE 0.2 MG/ML
INJECTION INTRAMUSCULAR; INTRAVENOUS AS NEEDED
Status: DISCONTINUED | OUTPATIENT
Start: 2022-07-23 | End: 2022-07-23 | Stop reason: HOSPADM

## 2022-07-23 RX ORDER — PROPOFOL 10 MG/ML
INJECTION, EMULSION INTRAVENOUS AS NEEDED
Status: DISCONTINUED | OUTPATIENT
Start: 2022-07-23 | End: 2022-07-23 | Stop reason: HOSPADM

## 2022-07-23 RX ORDER — DIPHENHYDRAMINE HYDROCHLORIDE 50 MG/ML
12.5 INJECTION, SOLUTION INTRAMUSCULAR; INTRAVENOUS AS NEEDED
Status: DISCONTINUED | OUTPATIENT
Start: 2022-07-23 | End: 2022-07-23 | Stop reason: HOSPADM

## 2022-07-23 RX ORDER — SODIUM CHLORIDE 0.9 % (FLUSH) 0.9 %
5-40 SYRINGE (ML) INJECTION EVERY 8 HOURS
Status: DISCONTINUED | OUTPATIENT
Start: 2022-07-23 | End: 2022-07-23 | Stop reason: HOSPADM

## 2022-07-23 RX ORDER — OXYCODONE HYDROCHLORIDE 5 MG/1
5 TABLET ORAL AS NEEDED
Status: DISCONTINUED | OUTPATIENT
Start: 2022-07-23 | End: 2022-07-23 | Stop reason: HOSPADM

## 2022-07-23 RX ORDER — SODIUM CHLORIDE 0.9 % (FLUSH) 0.9 %
5-40 SYRINGE (ML) INJECTION AS NEEDED
Status: DISCONTINUED | OUTPATIENT
Start: 2022-07-23 | End: 2022-07-23 | Stop reason: HOSPADM

## 2022-07-23 RX ORDER — BUPIVACAINE HYDROCHLORIDE 5 MG/ML
INJECTION, SOLUTION EPIDURAL; INTRACAUDAL AS NEEDED
Status: DISCONTINUED | OUTPATIENT
Start: 2022-07-23 | End: 2022-07-23 | Stop reason: HOSPADM

## 2022-07-23 RX ORDER — ONDANSETRON 2 MG/ML
4 INJECTION INTRAMUSCULAR; INTRAVENOUS AS NEEDED
Status: DISCONTINUED | OUTPATIENT
Start: 2022-07-23 | End: 2022-07-23 | Stop reason: HOSPADM

## 2022-07-23 RX ORDER — SODIUM CHLORIDE 9 MG/ML
INJECTION, SOLUTION INTRAVENOUS
Status: DISCONTINUED | OUTPATIENT
Start: 2022-07-23 | End: 2022-07-23 | Stop reason: HOSPADM

## 2022-07-23 RX ORDER — HYDROMORPHONE HYDROCHLORIDE 1 MG/ML
0.2 INJECTION, SOLUTION INTRAMUSCULAR; INTRAVENOUS; SUBCUTANEOUS ONCE
Status: COMPLETED | OUTPATIENT
Start: 2022-07-23 | End: 2022-07-23

## 2022-07-23 RX ADMIN — HYDROMORPHONE HYDROCHLORIDE 0.5 MG: 1 INJECTION, SOLUTION INTRAMUSCULAR; INTRAVENOUS; SUBCUTANEOUS at 16:06

## 2022-07-23 RX ADMIN — GLYCOPYRROLATE 0.6 MG: 0.2 INJECTION INTRAMUSCULAR; INTRAVENOUS at 10:01

## 2022-07-23 RX ADMIN — HYDRALAZINE HYDROCHLORIDE 50 MG: 25 TABLET ORAL at 21:59

## 2022-07-23 RX ADMIN — AMLODIPINE BESYLATE 10 MG: 5 TABLET ORAL at 11:50

## 2022-07-23 RX ADMIN — SUCRALFATE 1 G: 1 TABLET ORAL at 11:52

## 2022-07-23 RX ADMIN — Medication 10 ML: at 06:40

## 2022-07-23 RX ADMIN — SEVELAMER CARBONATE 800 MG: 800 TABLET, FILM COATED ORAL at 11:52

## 2022-07-23 RX ADMIN — Medication 10 ML: at 16:40

## 2022-07-23 RX ADMIN — CEFEPIME 1 G: 1 INJECTION, POWDER, FOR SOLUTION INTRAMUSCULAR; INTRAVENOUS at 00:00

## 2022-07-23 RX ADMIN — TAMSULOSIN HYDROCHLORIDE 0.4 MG: 0.4 CAPSULE ORAL at 21:59

## 2022-07-23 RX ADMIN — Medication 3 UNITS: at 22:00

## 2022-07-23 RX ADMIN — FERROUS SULFATE TAB 325 MG (65 MG ELEMENTAL FE) 325 MG: 325 (65 FE) TAB at 11:51

## 2022-07-23 RX ADMIN — ACETAMINOPHEN 650 MG: 325 TABLET ORAL at 17:50

## 2022-07-23 RX ADMIN — METRONIDAZOLE 500 MG: 500 INJECTION, SOLUTION INTRAVENOUS at 05:21

## 2022-07-23 RX ADMIN — METRONIDAZOLE 500 MG: 500 INJECTION, SOLUTION INTRAVENOUS at 16:38

## 2022-07-23 RX ADMIN — ISOSORBIDE MONONITRATE 60 MG: 30 TABLET, EXTENDED RELEASE ORAL at 11:52

## 2022-07-23 RX ADMIN — BUSPIRONE HYDROCHLORIDE 10 MG: 10 TABLET ORAL at 17:09

## 2022-07-23 RX ADMIN — MIDAZOLAM HYDROCHLORIDE 2 MG: 2 INJECTION, SOLUTION INTRAMUSCULAR; INTRAVENOUS at 09:13

## 2022-07-23 RX ADMIN — CEFEPIME 1 G: 1 INJECTION, POWDER, FOR SOLUTION INTRAMUSCULAR; INTRAVENOUS at 23:33

## 2022-07-23 RX ADMIN — HYDROMORPHONE HYDROCHLORIDE 0.5 MG: 1 INJECTION, SOLUTION INTRAMUSCULAR; INTRAVENOUS; SUBCUTANEOUS at 03:38

## 2022-07-23 RX ADMIN — HYDROMORPHONE HYDROCHLORIDE 0.2 MG: 1 INJECTION, SOLUTION INTRAMUSCULAR; INTRAVENOUS; SUBCUTANEOUS at 18:42

## 2022-07-23 RX ADMIN — PANTOPRAZOLE SODIUM 40 MG: 40 TABLET, DELAYED RELEASE ORAL at 06:40

## 2022-07-23 RX ADMIN — Medication 3 UNITS: at 16:37

## 2022-07-23 RX ADMIN — Medication 5 ML: at 22:01

## 2022-07-23 RX ADMIN — HYDROMORPHONE HYDROCHLORIDE 0.5 MG: 1 INJECTION, SOLUTION INTRAMUSCULAR; INTRAVENOUS; SUBCUTANEOUS at 23:27

## 2022-07-23 RX ADMIN — HYDRALAZINE HYDROCHLORIDE 50 MG: 25 TABLET ORAL at 16:38

## 2022-07-23 RX ADMIN — DOCUSATE SODIUM 100 MG: 100 CAPSULE, LIQUID FILLED ORAL at 17:09

## 2022-07-23 RX ADMIN — CARVEDILOL 12.5 MG: 12.5 TABLET, FILM COATED ORAL at 16:38

## 2022-07-23 RX ADMIN — SEVELAMER CARBONATE 800 MG: 800 TABLET, FILM COATED ORAL at 16:38

## 2022-07-23 RX ADMIN — PROPOFOL 160 MG: 10 INJECTION, EMULSION INTRAVENOUS at 09:18

## 2022-07-23 RX ADMIN — SUCRALFATE 1 G: 1 TABLET ORAL at 16:38

## 2022-07-23 RX ADMIN — Medication 3 MG: at 10:01

## 2022-07-23 RX ADMIN — SUCRALFATE 1 G: 1 TABLET ORAL at 06:40

## 2022-07-23 RX ADMIN — CISATRACURIUM BESYLATE 14 MG: 2 INJECTION, SOLUTION INTRAVENOUS at 09:19

## 2022-07-23 RX ADMIN — Medication 15 UNITS: at 22:00

## 2022-07-23 RX ADMIN — LIDOCAINE HYDROCHLORIDE 80 MG: 20 INJECTION, SOLUTION EPIDURAL; INFILTRATION; INTRACAUDAL; PERINEURAL at 09:15

## 2022-07-23 RX ADMIN — ONDANSETRON HYDROCHLORIDE 4 MG: 2 SOLUTION INTRAMUSCULAR; INTRAVENOUS at 09:38

## 2022-07-23 RX ADMIN — FENTANYL CITRATE 100 MCG: 50 INJECTION, SOLUTION INTRAMUSCULAR; INTRAVENOUS at 09:15

## 2022-07-23 RX ADMIN — SODIUM CHLORIDE: 9 INJECTION, SOLUTION INTRAVENOUS at 09:14

## 2022-07-23 RX ADMIN — DEXAMETHASONE SODIUM PHOSPHATE 4 MG: 4 INJECTION, SOLUTION INTRAMUSCULAR; INTRAVENOUS at 09:30

## 2022-07-23 RX ADMIN — GABAPENTIN 300 MG: 300 CAPSULE ORAL at 11:51

## 2022-07-23 RX ADMIN — FUROSEMIDE 80 MG: 40 TABLET ORAL at 11:51

## 2022-07-23 NOTE — PROGRESS NOTES
Physical Therapy:   7/23/2022    Orders received and chart reviewed in preparation for PT evaluation. RN states patient is currently ALBERTO for surgery to address R calcaneal osteomyelitis. Prior to surgery, patient up independently to/from bathroom in room per chart. Will continue to follow for PT evaluation post-op.     Thank you,    Sam Bajwa, PT, DPT

## 2022-07-23 NOTE — PROGRESS NOTES
Bedside shift change report given to Sheri Hernandez  (oncoming nurse) by Jazmin Rosenberg  (offgoing nurse). Report included the following information SBAR, Kardex, ED Summary, MAR, Accordion, and Recent Results.

## 2022-07-23 NOTE — PROGRESS NOTES
ID.  Weekend cross coverage. Chart reviewed. Cultures pending. Patient on broad-spectrum antibiotics. Nothing to add at this time.    Full consult to follow

## 2022-07-23 NOTE — ANESTHESIA PREPROCEDURE EVALUATION
Relevant Problems   NEUROLOGY   (+) Anxiety and depression      CARDIOVASCULAR   (+) CAD (coronary artery disease)   (+) HTN (hypertension)      GASTROINTESTINAL   (+) Cirrhosis (HCC)   (+) GERD (gastroesophageal reflux disease)      RENAL FAILURE   (+) Anemia associated with chronic renal failure   (+) ESRD (end stage renal disease) on dialysis (HCC)      ENDOCRINE   (+) DM type 2 causing renal disease (HCC)   (+) DM type 2 causing vascular disease (HCC)   (+) Type 2 diabetes mellitus with diabetic neuropathy (HCC)   (+) Type 2 diabetes mellitus with ophthalmic complication, with long-term current use of insulin (HCC)      HEMATOLOGY   (+) Anemia associated with chronic renal failure       Anesthetic History               Review of Systems / Medical History  Patient summary reviewed and pertinent labs reviewed    Pulmonary              Pertinent negatives: No recent URI     Neuro/Psych         Psychiatric history     Cardiovascular    Hypertension      CHF: NYHA Classification I    CAD    Exercise tolerance: >4 METS  Comments: TTE 4/2/22:      Left Ventricle: Left ventricle size is normal. Mildly increased wall thickness. Normal wall motion. Normal left ventricular systolic function with a visually estimated EF of 60 - 65%.  Diastolic dysfunction present with normal LV EF.   GI/Hepatic/Renal     GERD    Renal disease: ESRD and dialysis  Liver disease     Endo/Other    Diabetes: poorly controlled, type 2, using insulin    Anemia     Other Findings   Comments: Hgb 7.9  K 3.2         Physical Exam    Airway  Mallampati: II  TM Distance: 4 - 6 cm  Neck ROM: normal range of motion   Mouth opening: Normal     Cardiovascular  Regular rate and rhythm,  S1 and S2 normal,  no murmur, click, rub, or gallop             Dental    Dentition: Poor dentition     Pulmonary  Breath sounds clear to auscultation               Abdominal         Other Findings            Anesthetic Plan    ASA: 3  Anesthesia type: general          Induction: Intravenous  Anesthetic plan and risks discussed with: Patient

## 2022-07-23 NOTE — PROGRESS NOTES
[facility] Medicine Service Daily Progress Note    24 Hour Events: NAEO. SUBJECTIVE: Pt reports continued R heel pain. OBJECTIVE:    Vitals: Visit Vitals  BP (!) 132/50 (BP 1 Location: Right upper arm, BP Patient Position: Sitting)   Pulse 74   Temp 99.5 °F (37.5 °C)   Resp 19   Ht 5' 8\" (1.727 m)   Wt 154 lb 12.2 oz (70.2 kg)   SpO2 90%   BMI 23.53 kg/m²     Physical Exam:  General: NAD. Respiratory: CTAB. Cardiovascular: Regular rate. GI: Non-tender to palpation. Extremities: Trace lower extremity edema bilaterally. Right ankle wrapped in bandage with scant serosanginous drainage. Skin: Warm, dry. Neuro: Alert and oriented    I/O:  Date 07/22/22 0700 - 07/23/22 0659 07/23/22 0700 - 07/24/22 0659   Shift 7587-5225 5064-6681 24 Hour Total 6053-6528 3133-3510 24 Hour Total   INTAKE   P.O.  100 100        P. O.  100 100      I. V.(mL/kg/hr)  650(0.8) 650(0.4)        Volume (vancomycin (VANCOCIN) 1,000 mg in 0.9% sodium chloride 250 mL ([id number]))  250 250        Volume (metroNIDAZOLE (FLAGYL) IVPB premix 500 mg)  400 400      Shift Total(mL/kg)  750(10.7) 750(10.7)      OUTPUT   Urine(mL/kg/hr)           Urine Occurrence(s) 4 x  4 x      Dialysis  1000 1000        NET Fluid Removed (mL)  1000 1000      Shift Total(mL/kg)  1000(14.2) 1000(14.2)      NET  -250 -250      Weight (kg) 71.7 70.2 70.2 70.2 70.2 70.2       Inpatient Medications  Current Facility-Administered Medications   Medication Dose Route Frequency    epoetin ericka-epbx (RETACRIT) injection 10,000 Units  10,000 Units IntraVENous DIALYSIS MON, WED & FRI    HYDROmorphone (DILAUDID) syringe 0.5 mg  0.5 mg IntraVENous Q4H PRN    sodium chloride (NS) flush 5-40 mL  5-40 mL IntraVENous Q8H    sodium chloride (NS) flush 5-40 mL  5-40 mL IntraVENous PRN    acetaminophen (TYLENOL) tablet 650 mg  650 mg Oral Q6H PRN    Or    acetaminophen (TYLENOL) suppository 650 mg  650 mg Rectal Q6H PRN    polyethylene glycol (MIRALAX) packet 17 g  17 g Oral DAILY PRN    ondansetron (ZOFRAN ODT) tablet 4 mg  4 mg Oral Q8H PRN    Or    ondansetron (ZOFRAN) injection 4 mg  4 mg IntraVENous Q6H PRN    insulin lispro (HUMALOG) injection   SubCUTAneous AC&HS    glucose chewable tablet 16 g  4 Tablet Oral PRN    glucagon (GLUCAGEN) injection 1 mg  1 mg IntraMUSCular PRN    dextrose 10% infusion 0-250 mL  0-250 mL IntraVENous PRN    cefepime (MAXIPIME) 1 g in 0.9% sodium chloride (MBP/ADV) 50 mL MBP  1 g IntraVENous Q24H    metroNIDAZOLE (FLAGYL) IVPB premix 500 mg  500 mg IntraVENous Q12H    [Held by provider] aspirin delayed-release tablet 81 mg  81 mg Oral DAILY    busPIRone (BUSPAR) tablet 10 mg  10 mg Oral BID    carvediloL (COREG) tablet 12.5 mg  12.5 mg Oral BID WITH MEALS    docusate sodium (COLACE) capsule 100 mg  100 mg Oral BID    ferrous sulfate tablet 325 mg  1 Tablet Oral DAILY    furosemide (LASIX) tablet 80 mg  80 mg Oral DAILY    gabapentin (NEURONTIN) capsule 300 mg  300 mg Oral DAILY    isosorbide mononitrate ER (IMDUR) tablet 60 mg  60 mg Oral DAILY    pantoprazole (PROTONIX) tablet 40 mg  40 mg Oral ACB    sevelamer carbonate (RENVELA) tab 800 mg  800 mg Oral TID WITH MEALS    sucralfate (CARAFATE) tablet 1 g  1 g Oral TIDAC    tamsulosin (FLOMAX) capsule 0.4 mg  0.4 mg Oral QHS    Vancomycin- Pharmacy dosing by levels   Other Rx Dosing/Monitoring    hydrALAZINE (APRESOLINE) tablet 50 mg  50 mg Oral TID    insulin glargine (LANTUS) injection 15 Units  15 Units SubCUTAneous QHS    amLODIPine (NORVASC) tablet 10 mg  10 mg Oral DAILY       Allergies  No Known Allergies    CBC:  Recent Labs     07/23/22  0344 07/22/22  0210 07/21/22  1347   WBC 8.2 6.4 8.5   HGB 7.9* 8.2* 9.0*   HCT 25.4* 25.3* 28.1*    151 629       Metabolic Panel:  Recent Labs     07/23/22  0344 07/22/22  0210 07/21/22  1347    136 127*   K 3.2* 4.2 4.2    100 95*   CO2 30 27 26   BUN 17 44* 34*   CREA 2.47* 4.51* 3.97*   GLU 96 165* 322*   CA 8.4* 8.3* 8.6   ALB 2.2* 2. 1* 2.6*   ALT 13 13 13            Assessment and Plan     Jenni Carcamo is a 64 y.o. male with PMH of ESRD on HD MWF, T2DM, HTN, CAD s/p CABG (May 2020), HEFpEF, PEA arrest, and erosive gastritis who is admitted for osteomyelitis of the R calcaneus. Podiatry consulted. Currently on IV broad spectrum abx. Blood and wound cultures pending. Right calcaneal osteomyelitis: Demonstrated on MRI.  and CRP 15.2.  - Vanc, Cefepime and Flagyl  - Podiatry consulted  - Follow-up blood and wound cultures  - Daily CBC, CMP      HTN: Chronic and uncontrolled. - Continue home hydralazine 50 mg TID, amlodipine 10 mg OD, carvedilol 12.5 mg BID      ESRD on HD: MWF. Follows Dr. Jackie Nevarez. - Nephrology following  - Continue home Sevelamer 800mg TID, Lasix 80 mg daily     Anemia: Chronic. Likely secondary to ESRD  - Continue home ferrous sulfate daily  - Monitor on daily CBC     T2DM: Chronic. Last A1c 7.9 (6/21/22). - Continue home Lantus 15 units daily.  - SSI w/ glucose checks ACHS  - Hypoglycemia precautions     GERD: Chronic.  - Continue home Protonix 40 mg OD, Sucralfate 1 g TID     Constipation: Chronic.   - Continue Docusate, Miralax, Senna      CAD / HEFrEF : Echo 11/2021 EF 53% and reduced systolic function.  - Continue home carvedilol 12.5 mg BID, Imdur 60 mg OD, and aspirin 81 mg OD     HLD: Chronic.  - Continue home rosuvastatin 10 mg daily     Groin lymph node: Incidental 2.5 cm x 0.9 cm noted during previous admission.   - PCP: Follow up OP      BPH: Chronic  - Continue home Flomax 0.4 mg once nightly      Anxiety and depression: Chronic.  - Continue home Buspirone 10 mg BID        FEN/GI - NPO for possible procedure. Activity - Out of bed with assistance. DVT prophylaxis - SCDs  GI prophylaxis - Not indicated at this time  Fall prophylaxis - Not indicated at this time. Disposition - Plan to d/c TBD. Code Status - Full, discussed with patient / caregivers.   Next of Kin Name and Contact Melissa Hernández (Spouse) [phone] (Mobile)      Gladis Lancaster MD  Family Medicine Resident       For Billing    Chief Complaint   Patient presents with    Foot Pain       Hospital Problems  Date Reviewed: 7/12/2022            Codes Class Noted POA    * (Principal) Open wound of right heel ICD-10-CM: S91.301A  ICD-9-CM: 892.0  7/16/2022 Unknown        Anxiety and depression ICD-10-CM: F41.9, F32. A  ICD-9-CM: 300.00, 311  Unknown Yes        CHF NYHA class I, chronic, diastolic (HCC) EPM-59-UF: I50.32  ICD-9-CM: 428.32  Unknown Yes        BPH (benign prostatic hyperplasia) ICD-10-CM: N40.0  ICD-9-CM: 600.00  Unknown Yes        CAD (coronary artery disease) ICD-10-CM: I25.10  ICD-9-CM: 414.00  Unknown Yes        GERD (gastroesophageal reflux disease) ICD-10-CM: K21.9  ICD-9-CM: 530.81  Unknown Yes        HTN (hypertension) ICD-10-CM: I10  ICD-9-CM: 401.9  Unknown Yes        Hyperlipidemia ICD-10-CM: E78.5  ICD-9-CM: 272.4  Unknown Yes        Anemia associated with chronic renal failure ICD-10-CM: N18.9, D63.1  ICD-9-CM: 285.21  Unknown Yes        ESRD (end stage renal disease) on dialysis [facility]) ICD-10-CM: N18.6, Z99.2  ICD-9-CM: 585.6, V45.11  2/6/2022 Yes        Type 2 diabetes mellitus with diabetic neuropathy ([facility] [address] ICD-10-CM: E11.40  ICD-9-CM: 250.60, 357.2  8/5/2019 Yes

## 2022-07-23 NOTE — CONSULTS
[facility] Podiatric Surgery  H & P Note    Date: July 22, 2022  Patient: Best Rosas  MR #: [id number]  [facility] #: [id number]  Attending/Admitting Physician: Dr Ines Jose MD    Reason for Consult:  Dr Ines Jose MD asked me to see Best Rosas for evaluation and treatment of right heel worsening wound and infection. History of Present Illness:  Best Rosas is a 64 y.o. male with PMH of ESRD on HD MWF, T2DM, HTN, CAD s/p CABG (May 2020), and erosive gastritis who was sent for admission due to worsening right heel wound. He has been seen for several weeks and the wound was improving. He was placed in a total contact cast last week. Patient endorses subjective fever and chills. MRI and radiographs have been completed. He has been started on empiric antibiotics. Review of Systems  Constitutional:  Positive for chills and fever. HENT:  Negative for congestion, facial swelling and trouble swallowing. Eyes:  Negative for pain and redness. Respiratory:  Negative for cough and shortness of breath. Cardiovascular:  Negative for chest pain. Gastrointestinal:  Negative for abdominal pain, nausea and vomiting. Musculoskeletal:  Negative for neck pain and neck stiffness. Right leg pain. Skin:  Positive for wound. Neurological:  Negative for dizziness, facial asymmetry, speech difficulty and headaches. Psychiatric/Behavioral:  Negative for agitation. Blood pressure (!) 150/68, pulse 73, temperature 98.9 °F (37.2 °C), resp. rate 18, height 5' 8\" (1.727 m), weight 71.7 kg (158 lb), SpO2 96 %.     Intake/Output Summary (Last 24 hours) at 7/22/2022 2255  Last data filed at 7/22/2022 2237  Gross per 24 hour   Intake 300 ml   Output 1000 ml   Net -700 ml     Medical History:  Past Medical History:   Diagnosis Date    Anemia associated with chronic renal failure     Anxiety and depression     BPH (benign prostatic hyperplasia)     CAD (coronary artery disease)     CHF NYHA class I, chronic, diastolic (HCC)     Chronic pain     DM type 2 causing renal disease ([facility] [address]     DM type 2 causing vascular disease ([facility] [address]     ESRD on dialysis ([facility] [address]     GERD (gastroesophageal reflux disease)     HTN (hypertension)     Hyperlipidemia     Thrombocytopenia (HCC)      Past Surgical History:   Procedure Laterality Date    HX ARTERIAL BYPASS      HX OTHER SURGICAL      5th toe Metatarsal amputation 2017     IR INSERT NON TUNL CVC OVER 5 YRS  2021    IR INSERT TUNL CVC W/O PORT OVER 5 YR  2021     No Known Allergies  Family History   Problem Relation Age of Onset    Diabetes Mother     No Known Problems Father      Social History     Tobacco Use   Smoking Status Former    Types: Cigarettes    Quit date: 2001    Years since quittin.1   Smokeless Tobacco Never     Social History     Substance and Sexual Activity   Drug Use No     Social History     Substance and Sexual Activity   Alcohol Use No       Labs:  Lab Results   Component Value Date/Time    WBC 6.4 2022 02:10 AM    HGB 8.2 (L) 2022 02:10 AM    HCT 25.3 (L) 2022 02:10 AM    MCV 88.8 2022 02:10 AM    PLATELET 837  02:10 AM     Lab Results   Component Value Date/Time    Sodium 136 2022 02:10 AM    Potassium 4.2 2022 02:10 AM    Chloride 100 2022 02:10 AM    CO2 27 2022 02:10 AM    Phosphorus 3.9 2022 01:35 AM    BUN 44 (H) 2022 02:10 AM    Calcium 8.3 (L) 2022 02:10 AM      Lab Results   Component Value Date/Time    Glucose 165 (H) 2022 02:10 AM     Lab Results   Component Value Date/Time    INR 1.3 (H) 2021 02:45 AM    No components found for: PTT  Recent Labs     22  0210 22  1347   * 322*       Physical Exam:  General: No acute distress. Alert. Cooperative. Sitting on a wheel chair. Head: Normocephalic. Atraumatic. Eyes:              Conjunctiva pink. Sclera white. Respiratory: CTAB. No w/r/r/c. No accessory muscle use. Cardiovascular: Regular Rhythm. No m/r/g.   GI: + bowel sounds. Nontender. No rebound/guarding. Lower Extremity Exam:  Vascular:    Dorsalis Pedis Pulse: present right and left  Posterior Tibialis Pulse: present right and left  Popliteal and Femoral Pulses: present right and left  Skin Temp: warm to cool legs to toes right and left  Extremity Edema: nonpitting right heel  Varicosities: present right and left    Neurological:  Deep Tendon Reflexes of Achilles and Patellar: intact right and left  Epicritic and Protective Sensations: absent right and left foot  Deep Pain Response: absent right and left foot    Dermatological:  Toenails: mycotic right and left   Ulceration:    Ulcer right plantar heel. Fibrogranular wound with area of necrosis posteriorly. Hyperkeratotic wound border. Bleeding wound bed. Measures 4cm x 4cm x 0.3cm. Boggy posterior heel with sinusing wound posterolaterally. Positive malodor and purulent drainage noted. Erythema: noted to right heel  Pigment Changes:chronic hemosiderin deposition right and left    Skin is thin, shiny, hairless    Musculoskeletal:  Gait and Station: antalgic  Muscle Strength of Major Muscle Groups: 4/5 right and left lower extremities  Stability:diminished right and left  Range of Motion: decreased ankle dorsiflexion right and left  Limb Deformities: prior right 5th ray amputation. Hammertoe contractures right and left    Imaging Modalities:   Xray right foot  IMPRESSION  Deformity of the soft tissues of the posterior heel with a locule of gas  inferiorly. This may be related to the patient's ulceration/infection. No  radiographic evidence of osteomyelitis. Tavo Kevin MRI  IMPRESSION  Ulceration in the posterior heel with an underlying fluid collection. Signal  abnormality in the posterior inferior calcaneus is worrisome for acute  osteomyelitis. Diffuse subcutaneous edema surrounds the foot and ankle.     Vascular Studies:  Venous Duplex  Bilateral lower extremity venous duplex negative for deep venous thrombosis or thrombophlebitis in the visualized vein segments; however due to limited visualization, isolated vein thrombus cannot be excluded. Microbiological:  Blood: ngtd    Impression:  1. Ulcer right heel with fat layer exposed  2. Cellulitis right foot  3. Osteomyelitis right calcaneus    Plan:  Evaluation and medical management of limb threatening infection to the right heel. MRI and radiographs reviewed showing osteomyelitis and abscess right heel. I have reviewed the results with the patient and recommended surgical intervention with partial excision of the infected bone and wound debridement. This may need to be a staged approach for limb salvage. High risk for limb loss due to medical comobidities and extent of infection. I discussed the procedure at length including the expected post operative course, risks, alternatives and potential complications. No guarantees given. Patient has elected to proceed. NPO and consent ordered. Will proceed tomorrow morning. Continue empiric antibiotics with vancomycin, cefepime, and flagyl. Will obtain surgical cultures. The nature of the finding, probable diagnosis and likely treatment was thoroughly discussed with the patient. The options, risks, complications, alternative treatment as well as some of the differential diagnosis was discussed. The patient was thoroughly informed and all questions were answered. the patient indicated understanding and satisfaction with the discussion. Devon Strange.  VIC Marrero FACFAS FACCWS [facility]  Triple Board Certified Foot & Ankle Specialist  [phone] cell  7/22/2022  10:56 AM

## 2022-07-23 NOTE — PROGRESS NOTES
Bedside shift change report given to [facility] (oncoming nurse) by Norman Mccall (offgoing nurse). Report included the following information SBAR, Kardex, Intake/Output, MAR, Recent Results, and Med Rec Status.

## 2022-07-23 NOTE — PROGRESS NOTES
Problem: Pressure Injury - Risk of  Goal: *Prevention of pressure injury  Description: Document Erik Scale and appropriate interventions in the flowsheet.   Outcome: Progressing Towards Goal  Note: Pressure Injury Interventions:  Sensory Interventions: Assess changes in LOC, Check visual cues for pain    Moisture Interventions: Assess need for specialty bed, Minimize layers, Moisture barrier    Activity Interventions: Assess need for specialty bed    Mobility Interventions: Assess need for specialty bed, HOB 30 degrees or less, Float heels    Nutrition Interventions: Document food/fluid/supplement intake, Offer support with meals,snacks and hydration    Friction and Shear Interventions: HOB 30 degrees or less, Lift sheet, Minimize layers

## 2022-07-23 NOTE — OP NOTES
Operative Note    Patient: Sergo Chi  YOB: 1960  MRN: [id number]    Date of Procedure: 7/23/2022     Pre-Op Diagnosis:   Ulcer with fat layer exposed right heel  Osteomyelitis right calcaneus    Post-Op Diagnosis: Same as preoperative diagnosis. Procedure(s):  RIGHT HEEL PARTIAL EXCISION OF BONE CALCANEUS  ADVANCEMENT FLAP, VARIABLE FLAP CLOSURE OF WOUND 28CM RIGHT FOOT    Surgeon(s):  Fabián Marrero DPM    Surgical Assistant: Surg Asst-1: Frank Barton LPN    Anesthesia: MAC     Estimated Blood Loss (mL):  less than 50     Complications: None    Specimens:   ID Type Source Tests Collected by Time Destination   1 : right heel bone Preservative Heel  Fabián Marrero DP 7/23/2022 3619 Pathology   1 : right heel wound Wound Heel CULTURE, AEROBIC AND ANAEROBIC Nette Coreas [facility] 7/23/2022 8678 Microbiology   2 : right heel bone Wound Heel CULTURE, AEROBIC AND ANAEROBIC Fabián Marrero DP 7/23/2022 6969 Microbiology    Implants: * No implants in log *    Drains: * No LDAs found *    Findings: Full thickness ulcer right heel with liquefactive necrosis and fat necrosis posteriorly. Ulcer measured 7cm x 4cm x 0.7cm post debridement    Justification of Procedure: This 70-year-old male was admitted to the hospital from my office due to worsening right heel wound. He was admitted and started on empiric antibiotics. Radiographic imaging and MRI showed possible osteomyelitis of the calcaneus. Due to these findings I recommended surgical intervention with debridement and possible bone resection, possible wound closure. I discussed the procedures at length including the expected postoperative course, risks, alternatives, and possible complications. No guarantees were given and the patient elected to proceed. Detailed Description of Procedure: This 70-year-old male was brought the operating room and placed prone on the operating room table.  After adequate IV sedation and establishment of an airway the right lower extremity was prepped and draped in usual aseptic fashion. Next a local infiltrated block was performed to the right lower extremity using 0.5% Marcaine plain. RIGHT HEEL PARTIAL EXCISION OF BONE CALCANEUS: Attention was directed to the posterior aspect of the right heel over the area of concern based on MRI imaging. A linear longitudinal incision was made in all devitalized tissue was removed from the underlying area. Culture was obtained of the abscess. This was passed from the field. Next incision was deepened to expose the posterior and plantar aspect of the calcaneus. A portion of the calcaneus was resected obliquely and passed from the field for pathology. The wound was irrigated with copious amounts of normal saline. The posterior portion of the wound was closed were this incision was made. ADVANCEMENT FLAP, VARIABLE FLAP CLOSURE OF WOUND 28CM RIGHT FOOT: Next attention was directed to the plantar heel where the chronic ulcer was noted. This ulcer was completely excised of all devitalized tissue posteriorly proximally and plantarly. This resulted in a wound measuring 8 cm x 3 cm. The wound edges were undermined to allow for mobilization of the wound edges. The plantar medial tissue was undermined creating a unilobe flap which was mobilized and moved laterally to cover the wound defect under minimal tension. This flap was secured in layers with closure of deep fascia as well as skin separately. The rest of the flap was then secured using suture. Patient tolerated the procedures and anesthesia well and a postoperative compression dressing was applied including posterior splint. Patient was then transferred to the PACU with vital signs stable. From there he will be transferred back to the floor for further medical and surgical management.     Electronically Signed by Cortney De La Torre DPM on 7/23/2022 at 10:27 AM

## 2022-07-23 NOTE — BRIEF OP NOTE
Brief Postoperative Note    Patient: Diego Burn  YOB: 1960  MRN: [id number]    Date of Procedure: 7/23/2022     Pre-Op Diagnosis:   OSTEOMYELITIS RIGHT CALCANEUS  ULCER RIGHT HEEL 28CM    Post-Op Diagnosis: Same as preoperative diagnosis. Procedure(s):  RIGHT HEEL PARTIAL EXCISION OF BONE CALCANEUS  ADVANCEMENT FLAP, VARIABLE FLAP CLOSURE OF WOUND 28CM RIGHT FOOT    Surgeon(s):  Chantel Marrero DPM    Surgical Assistant: Surg Asst-1: Izzy Gill LPN    Anesthesia: MAC     Estimated Blood Loss (mL): less than 529     Complications: None    Specimens:   ID Type Source Tests Collected by Time Destination   1 : right heel bone Preservative Heel  Chantel Marrero DPM 7/23/2022 0267 Pathology   1 : right heel wound Wound Heel CULTURE, AEROBIC AND ANAEROBIC Hanane Balderrama DPM 7/23/2022 4955 Microbiology   2 : right heel bone Wound Heel CULTURE, AEROBIC AND ANAEROBIC Chantel Marrero DPM 7/23/2022 2125 Microbiology        Implants: * No implants in log *    Drains: * No LDAs found *    Findings: Full thickness ulcer right heel with liquefactive necrosis and fat necrosis posteriorly.  Ulcer measured 7cm x 4cm x 0.7cm post debridement    Electronically Signed by Nicole Menjivar DPM on 7/23/2022 at 10:25 AM

## 2022-07-23 NOTE — ANESTHESIA POSTPROCEDURE EVALUATION
Procedure(s):  RIGHT HEEL DEBRIDEMENT, PARTIAL EXCISION OF BONE.    general    Anesthesia Post Evaluation      Multimodal analgesia: multimodal analgesia used between 6 hours prior to anesthesia start to PACU discharge  Patient location during evaluation: PACU  Patient participation: complete - patient participated  Level of consciousness: awake and alert  Pain management: adequate  Airway patency: patent  Anesthetic complications: no  Cardiovascular status: acceptable  Respiratory status: acceptable  Hydration status: acceptable  Post anesthesia nausea and vomiting:  none  Final Post Anesthesia Temperature Assessment:  Normothermia (36.0-37.5 degrees C)      INITIAL Post-op Vital signs:   Vitals Value Taken Time   /54 07/23/22 1025   Temp 36.3 °C (97.4 °F) 07/23/22 1025   Pulse 60 07/23/22 1030   Resp 10 07/23/22 1030   SpO2 98 % 07/23/22 1030   Vitals shown include unvalidated device data.

## 2022-07-24 LAB
ALBUMIN SERPL-MCNC: 2.3 G/DL (ref 3.5–5)
ALBUMIN/GLOB SERPL: 0.5 {RATIO} (ref 1.1–2.2)
ALP SERPL-CCNC: 128 U/L (ref 45–117)
ALT SERPL-CCNC: 12 U/L (ref 12–78)
ANION GAP SERPL CALC-SCNC: 11 MMOL/L (ref 5–15)
AST SERPL-CCNC: 12 U/L (ref 15–37)
BILIRUB SERPL-MCNC: 0.4 MG/DL (ref 0.2–1)
BUN SERPL-MCNC: 31 MG/DL (ref 6–20)
BUN/CREAT SERPL: 9 (ref 12–20)
CALCIUM SERPL-MCNC: 8.4 MG/DL (ref 8.5–10.1)
CHLORIDE SERPL-SCNC: 97 MMOL/L (ref 97–108)
CO2 SERPL-SCNC: 25 MMOL/L (ref 21–32)
CREAT SERPL-MCNC: 3.62 MG/DL (ref 0.7–1.3)
ERYTHROCYTE [DISTWIDTH] IN BLOOD BY AUTOMATED COUNT: 14.8 % (ref 11.5–14.5)
GLOBULIN SER CALC-MCNC: 5 G/DL (ref 2–4)
GLUCOSE BLD STRIP.AUTO-MCNC: 210 MG/DL (ref 65–117)
GLUCOSE BLD STRIP.AUTO-MCNC: 225 MG/DL (ref 65–117)
GLUCOSE BLD STRIP.AUTO-MCNC: 283 MG/DL (ref 65–117)
GLUCOSE BLD STRIP.AUTO-MCNC: 329 MG/DL (ref 65–117)
GLUCOSE SERPL-MCNC: 286 MG/DL (ref 65–100)
HCT VFR BLD AUTO: 25.3 % (ref 36.6–50.3)
HGB BLD-MCNC: 7.9 G/DL (ref 12.1–17)
MCH RBC QN AUTO: 28.2 PG (ref 26–34)
MCHC RBC AUTO-ENTMCNC: 31.2 G/DL (ref 30–36.5)
MCV RBC AUTO: 90.4 FL (ref 80–99)
NRBC # BLD: 0 K/UL (ref 0–0.01)
NRBC BLD-RTO: 0 PER 100 WBC
PLATELET # BLD AUTO: 170 K/UL (ref 150–400)
PMV BLD AUTO: 11 FL (ref 8.9–12.9)
POTASSIUM SERPL-SCNC: 3.6 MMOL/L (ref 3.5–5.1)
PROT SERPL-MCNC: 7.3 G/DL (ref 6.4–8.2)
RBC # BLD AUTO: 2.8 M/UL (ref 4.1–5.7)
SERVICE CMNT-IMP: ABNORMAL
SODIUM SERPL-SCNC: 133 MMOL/L (ref 136–145)
WBC # BLD AUTO: 9 K/UL (ref 4.1–11.1)

## 2022-07-24 PROCEDURE — 74011250637 HC RX REV CODE- 250/637: Performed by: PODIATRIST

## 2022-07-24 PROCEDURE — 74011250636 HC RX REV CODE- 250/636: Performed by: STUDENT IN AN ORGANIZED HEALTH CARE EDUCATION/TRAINING PROGRAM

## 2022-07-24 PROCEDURE — 36415 COLL VENOUS BLD VENIPUNCTURE: CPT

## 2022-07-24 PROCEDURE — 74011250636 HC RX REV CODE- 250/636: Performed by: PODIATRIST

## 2022-07-24 PROCEDURE — 99232 SBSQ HOSP IP/OBS MODERATE 35: CPT | Performed by: FAMILY MEDICINE

## 2022-07-24 PROCEDURE — 51798 US URINE CAPACITY MEASURE: CPT

## 2022-07-24 PROCEDURE — 65270000029 HC RM PRIVATE

## 2022-07-24 PROCEDURE — 85027 COMPLETE CBC AUTOMATED: CPT

## 2022-07-24 PROCEDURE — 74011000250 HC RX REV CODE- 250: Performed by: STUDENT IN AN ORGANIZED HEALTH CARE EDUCATION/TRAINING PROGRAM

## 2022-07-24 PROCEDURE — 82962 GLUCOSE BLOOD TEST: CPT

## 2022-07-24 PROCEDURE — 80053 COMPREHEN METABOLIC PANEL: CPT

## 2022-07-24 PROCEDURE — 74011636637 HC RX REV CODE- 636/637: Performed by: PODIATRIST

## 2022-07-24 PROCEDURE — 74011250637 HC RX REV CODE- 250/637: Performed by: STUDENT IN AN ORGANIZED HEALTH CARE EDUCATION/TRAINING PROGRAM

## 2022-07-24 PROCEDURE — 74011636637 HC RX REV CODE- 636/637: Performed by: STUDENT IN AN ORGANIZED HEALTH CARE EDUCATION/TRAINING PROGRAM

## 2022-07-24 PROCEDURE — 74011000250 HC RX REV CODE- 250: Performed by: PODIATRIST

## 2022-07-24 RX ORDER — ASPIRIN 81 MG/1
81 TABLET ORAL DAILY
Status: DISCONTINUED | OUTPATIENT
Start: 2022-07-24 | End: 2022-07-29 | Stop reason: HOSPADM

## 2022-07-24 RX ORDER — INSULIN GLARGINE 100 [IU]/ML
22 INJECTION, SOLUTION SUBCUTANEOUS
Status: DISCONTINUED | OUTPATIENT
Start: 2022-07-24 | End: 2022-07-25

## 2022-07-24 RX ORDER — LIDOCAINE HYDROCHLORIDE 10 MG/ML
0.1 INJECTION, SOLUTION EPIDURAL; INFILTRATION; INTRACAUDAL; PERINEURAL AS NEEDED
Status: DISCONTINUED | OUTPATIENT
Start: 2022-07-24 | End: 2022-07-24 | Stop reason: HOSPADM

## 2022-07-24 RX ORDER — SODIUM CHLORIDE 0.9 % (FLUSH) 0.9 %
5-40 SYRINGE (ML) INJECTION EVERY 8 HOURS
Status: DISCONTINUED | OUTPATIENT
Start: 2022-07-24 | End: 2022-07-24 | Stop reason: HOSPADM

## 2022-07-24 RX ORDER — NALOXONE HYDROCHLORIDE 0.4 MG/ML
0.2 INJECTION, SOLUTION INTRAMUSCULAR; INTRAVENOUS; SUBCUTANEOUS
Status: DISCONTINUED | OUTPATIENT
Start: 2022-07-24 | End: 2022-07-24 | Stop reason: HOSPADM

## 2022-07-24 RX ORDER — SODIUM CHLORIDE, SODIUM LACTATE, POTASSIUM CHLORIDE, CALCIUM CHLORIDE 600; 310; 30; 20 MG/100ML; MG/100ML; MG/100ML; MG/100ML
125 INJECTION, SOLUTION INTRAVENOUS CONTINUOUS
Status: DISCONTINUED | OUTPATIENT
Start: 2022-07-24 | End: 2022-07-24 | Stop reason: HOSPADM

## 2022-07-24 RX ORDER — FLUMAZENIL 0.1 MG/ML
0.2 INJECTION INTRAVENOUS
Status: DISCONTINUED | OUTPATIENT
Start: 2022-07-24 | End: 2022-07-24 | Stop reason: HOSPADM

## 2022-07-24 RX ORDER — SODIUM CHLORIDE 0.9 % (FLUSH) 0.9 %
5-40 SYRINGE (ML) INJECTION AS NEEDED
Status: DISCONTINUED | OUTPATIENT
Start: 2022-07-24 | End: 2022-07-24 | Stop reason: HOSPADM

## 2022-07-24 RX ORDER — INSULIN LISPRO 100 [IU]/ML
INJECTION, SOLUTION INTRAVENOUS; SUBCUTANEOUS
Status: DISCONTINUED | OUTPATIENT
Start: 2022-07-24 | End: 2022-07-29 | Stop reason: HOSPADM

## 2022-07-24 RX ADMIN — INSULIN GLARGINE 22 UNITS: 100 INJECTION, SOLUTION SUBCUTANEOUS at 22:38

## 2022-07-24 RX ADMIN — SEVELAMER CARBONATE 800 MG: 800 TABLET, FILM COATED ORAL at 08:51

## 2022-07-24 RX ADMIN — METRONIDAZOLE 500 MG: 500 INJECTION, SOLUTION INTRAVENOUS at 17:43

## 2022-07-24 RX ADMIN — DOCUSATE SODIUM 100 MG: 100 CAPSULE, LIQUID FILLED ORAL at 09:55

## 2022-07-24 RX ADMIN — Medication 7 UNITS: at 11:47

## 2022-07-24 RX ADMIN — Medication 10 ML: at 13:46

## 2022-07-24 RX ADMIN — ASPIRIN 81 MG: 81 TABLET, COATED ORAL at 09:55

## 2022-07-24 RX ADMIN — HYDROMORPHONE HYDROCHLORIDE 0.5 MG: 1 INJECTION, SOLUTION INTRAMUSCULAR; INTRAVENOUS; SUBCUTANEOUS at 13:43

## 2022-07-24 RX ADMIN — BUSPIRONE HYDROCHLORIDE 10 MG: 10 TABLET ORAL at 09:55

## 2022-07-24 RX ADMIN — CARVEDILOL 12.5 MG: 12.5 TABLET, FILM COATED ORAL at 08:51

## 2022-07-24 RX ADMIN — BUSPIRONE HYDROCHLORIDE 10 MG: 10 TABLET ORAL at 17:43

## 2022-07-24 RX ADMIN — SUCRALFATE 1 G: 1 TABLET ORAL at 11:48

## 2022-07-24 RX ADMIN — ISOSORBIDE MONONITRATE 60 MG: 30 TABLET, EXTENDED RELEASE ORAL at 09:55

## 2022-07-24 RX ADMIN — GABAPENTIN 300 MG: 300 CAPSULE ORAL at 09:55

## 2022-07-24 RX ADMIN — Medication 7 UNITS: at 07:30

## 2022-07-24 RX ADMIN — HYDRALAZINE HYDROCHLORIDE 50 MG: 25 TABLET ORAL at 09:55

## 2022-07-24 RX ADMIN — HYDROMORPHONE HYDROCHLORIDE 0.5 MG: 1 INJECTION, SOLUTION INTRAMUSCULAR; INTRAVENOUS; SUBCUTANEOUS at 18:00

## 2022-07-24 RX ADMIN — FUROSEMIDE 80 MG: 40 TABLET ORAL at 09:55

## 2022-07-24 RX ADMIN — Medication 5 ML: at 06:37

## 2022-07-24 RX ADMIN — HYDROMORPHONE HYDROCHLORIDE 0.5 MG: 1 INJECTION, SOLUTION INTRAMUSCULAR; INTRAVENOUS; SUBCUTANEOUS at 22:37

## 2022-07-24 RX ADMIN — HYDROMORPHONE HYDROCHLORIDE 0.5 MG: 1 INJECTION, SOLUTION INTRAMUSCULAR; INTRAVENOUS; SUBCUTANEOUS at 08:51

## 2022-07-24 RX ADMIN — Medication 4 UNITS: at 22:38

## 2022-07-24 RX ADMIN — Medication 10 ML: at 22:39

## 2022-07-24 RX ADMIN — SUCRALFATE 1 G: 1 TABLET ORAL at 06:37

## 2022-07-24 RX ADMIN — SEVELAMER CARBONATE 800 MG: 800 TABLET, FILM COATED ORAL at 17:43

## 2022-07-24 RX ADMIN — METRONIDAZOLE 500 MG: 500 INJECTION, SOLUTION INTRAVENOUS at 04:30

## 2022-07-24 RX ADMIN — HYDRALAZINE HYDROCHLORIDE 50 MG: 25 TABLET ORAL at 16:25

## 2022-07-24 RX ADMIN — HYDRALAZINE HYDROCHLORIDE 50 MG: 25 TABLET ORAL at 22:37

## 2022-07-24 RX ADMIN — HYDROMORPHONE HYDROCHLORIDE 0.5 MG: 1 INJECTION, SOLUTION INTRAMUSCULAR; INTRAVENOUS; SUBCUTANEOUS at 04:32

## 2022-07-24 RX ADMIN — TAMSULOSIN HYDROCHLORIDE 0.4 MG: 0.4 CAPSULE ORAL at 22:37

## 2022-07-24 RX ADMIN — FERROUS SULFATE TAB 325 MG (65 MG ELEMENTAL FE) 325 MG: 325 (65 FE) TAB at 09:55

## 2022-07-24 RX ADMIN — PANTOPRAZOLE SODIUM 40 MG: 40 TABLET, DELAYED RELEASE ORAL at 06:37

## 2022-07-24 RX ADMIN — Medication 4 UNITS: at 16:30

## 2022-07-24 RX ADMIN — SUCRALFATE 1 G: 1 TABLET ORAL at 16:25

## 2022-07-24 RX ADMIN — SEVELAMER CARBONATE 800 MG: 800 TABLET, FILM COATED ORAL at 11:47

## 2022-07-24 RX ADMIN — DOCUSATE SODIUM 100 MG: 100 CAPSULE, LIQUID FILLED ORAL at 17:43

## 2022-07-24 RX ADMIN — AMLODIPINE BESYLATE 10 MG: 5 TABLET ORAL at 09:56

## 2022-07-24 RX ADMIN — CARVEDILOL 12.5 MG: 12.5 TABLET, FILM COATED ORAL at 17:43

## 2022-07-24 NOTE — PROGRESS NOTES
Problem: Pressure Injury - Risk of  Goal: *Prevention of pressure injury  Description: Document Erik Scale and appropriate interventions in the flowsheet. Outcome: Progressing Towards Goal  Note: Pressure Injury Interventions:  Sensory Interventions: Assess changes in LOC, Check visual cues for pain    Moisture Interventions: Assess need for specialty bed    Activity Interventions: Assess need for specialty bed    Mobility Interventions: Assess need for specialty bed, HOB 30 degrees or less    Nutrition Interventions: Document food/fluid/supplement intake, Offer support with meals,snacks and hydration    Friction and Shear Interventions: Feet elevated on foot rest, HOB 30 degrees or less                Problem: Falls - Risk of  Goal: *Absence of Falls  Description: Document Michelle Fall Risk and appropriate interventions in the flowsheet. Outcome: Progressing Towards Goal  Note: Fall Risk Interventions:  Mobility Interventions: Patient to call before getting OOB, Strengthening exercises (ROM-active/passive), Bed/chair exit alarm         Medication Interventions: Patient to call before getting OOB    Elimination Interventions: Call light in reach, Urinal in reach              Problem: Diabetes Self-Management  Goal: *Monitoring blood glucose, interpreting and using results  Description: Identify recommended blood glucose targets  and personal targets. Outcome: Progressing Towards Goal     Problem: Diabetes Self-Management  Goal: *Using medications safely  Description: State effect of diabetes medications on diabetes; name diabetes medication taking, action and side effects. Outcome: Progressing Towards Goal     Problem: Diabetes Self-Management  Goal: *Prevention, detection and treatment of chronic complications  Description: Define the natural course of diabetes and describe the relationship of blood glucose levels to long term complications of diabetes.   Outcome: Progressing Towards Goal

## 2022-07-24 NOTE — PROGRESS NOTES
The [address] [place] [address] Podiatric Surgery  Post-Op Note    Subjective:   Admit Date: 7/21/2022  OR Date: 7/23/2022  Post-op: 1 Day Post-Op  Status Post: right calcaneus partial excision of bone, flap closure of wound    Best Rosas  appears; alert, well appearing, and in no distress  Pain control is: excellent    Objective[de-identified]    height is 5' 8\" (1.727 m) and weight is 71.5 kg (157 lb 10.1 oz). His temperature is 98 °F (36.7 °C). His blood pressure is 148/72 (abnormal) and his pulse is 63. His respiration is 16 and oxygen saturation is 93%.      Intake/Output Summary (Last 24 hours) at 7/24/2022 1330  Last data filed at 7/24/2022 0603  Gross per 24 hour   Intake 200 ml   Output 400 ml   Net -200 ml       Physical Exam:  Incision: Dressing intact without strikethrough  Abdomen: soft, nontender, nondistended, no masses or organomegaly  DVT Exam:No evidence of DVT seen on physical exam.    Tierra MG  Order: [id number]  Collected 7/23/2022 09:45    Status: Preliminary result    Specimen Information: Surgical Specimen    RIGHT HEEL BONE        0 Result Notes    Component Ref Range & Units 7/23/22 0945  Resulting Agency   Special Requests:   NO SPECIAL REQUESTS P  [facility]   GRAM STAIN   RARE [facility]S SEEN [facility]   Culture result:   RARE PROBABLE STAPHYLOCOCCUS AUREUS Abnormal  P  [address]   Culture result:   CHECKING FOR POSSIBLE OTHER ORGANISMS P          Labs:  Lab Results   Component Value Date/Time    WBC 9.0 07/24/2022 02:22 AM    HGB 7.9 (L) 07/24/2022 02:22 AM    HCT 25.3 (L) 07/24/2022 02:22 AM    MCV 90.4 07/24/2022 02:22 AM    PLATELET 614 82/75/8437 02:22 AM     Lab Results   Component Value Date/Time    Sodium 133 (L) 07/24/2022 02:22 AM    Potassium 3.6 07/24/2022 02:22 AM    Chloride 97 07/24/2022 02:22 AM    CO2 25 07/24/2022 02:22 AM    Phosphorus 3.9 04/02/2022 01:35 AM    BUN 31 (H) 07/24/2022 02:22 AM    Calcium 8.4 (L) 07/24/2022 02:22 AM      Lab Results   Component Value Date/Time    Glucose 286 (H) 07/24/2022 02:22 AM       Impression/Plan:   Principal Problem:    Open wound of right heel (7/16/2022)    Active Problems:    Type 2 diabetes mellitus with diabetic neuropathy ([facility] [address] (8/5/2019)      ESRD (end stage renal disease) on dialysis ([facility] (2/6/2022)      Anxiety and depression ()      CHF NYHA class I, chronic, diastolic (HCC) ()      BPH (benign prostatic hyperplasia) ()      CAD (coronary artery disease) ()      GERD (gastroesophageal reflux disease) ()      HTN (hypertension) ()      Hyperlipidemia ()      Anemia associated with chronic renal failure ()      1.  Status post right foot limb salvage surgery  Plan:  Dressing intact. Continue nonweightbearing to right foot  Heel protector boots at all times when sitting or in bed. PT  Pathology and final cultures pending. Tee Thurman.  VIC Marrero FACFAS FACCWS [facility]  Triple Board Certified Foot & Ankle Specialist  [phone] cell  July 24, 2022  1:30 PM

## 2022-07-24 NOTE — PROGRESS NOTES
Bedside and Verbal shift change report given to [facility] (oncoming nurse) by Paul Mallory (offgoing nurse). Report included the following information SBAR, Kardex, ED Summary, Procedure Summary, Intake/Output, Accordion, Recent Results, and Cardiac Rhythm No tele . Bedside and Verbal shift change report given to Paul Mallory (oncoming nurse) by [facility] (offgoing nurse). Report included the following information SBAR, Kardex, Intake/Output, MAR, and Recent Results.

## 2022-07-24 NOTE — PROGRESS NOTES
[address] [facility] Medicine Service Daily Progress Note    Overnight Events: NAEO. SUBJECTIVE: Pt reports improvement in his R foot pain s/p surgical debridement. He denies having any other complaints. OBJECTIVE:    Vitals: Visit Vitals  BP (!) 148/72 (BP 1 Location: Right upper arm, BP Patient Position: At rest)   Pulse 63   Temp 98 °F (36.7 °C)   Resp 16   Ht 5' 8\" (1.727 m)   Wt 157 lb 10.1 oz (71.5 kg)   SpO2 93%   BMI 23.97 kg/m²     Physical Exam:  General: NAD. Respiratory: CTAB. Cardiovascular: Regular rate. GI: Non-tender to palpation. Extremities: Right ankle wrapped in bandage with heel protector in place. LLE w/ no edema or tenderness. Skin: Warm, dry.   Neuro: Alert and oriented    I/O:  Date 07/23/22 0700 - 07/24/22 0659 07/24/22 0700 - 07/25/22 0659   Shift 9362-5076 7873-4850 24 Hour Total 6811-4586 0873-9192 24 Hour Total   INTAKE   I.V.(mL/kg/hr) 200(0.2) 200(0.2) 400(0.2)        Volume (0.9% sodium chloride infusion) 200  200        Volume (metroNIDAZOLE (FLAGYL) IVPB premix 500 mg)  100 100        Volume (cefepime (MAXIPIME) 1 g in 0.9% sodium chloride (MBP/ADV) 50 mL MBP)  100 100      Shift Total(mL/kg) 200(2.8) 200(2.8) 400(5.6)      OUTPUT   Urine(mL/kg/hr) 0(0) 400(0.5) 400(0.2)        Urine Voided 0 400 400        Urine Occurrence(s) 0 x  0 x      Emesis/NG output 0  0        Emesis 0  0        Emesis Occurrence(s) 0 x  0 x      Other 0  0        Other Output 0  0      Stool 0  0        Stool Occurrence(s) 0 x  0 x        Stool 0  0      Blood 0  0        Quantitative Blood Loss 0  0        Blood 0  0      Shift Total(mL/kg) 0(0) 400(5.6) 400(5.6)       -200 0      Weight (kg) 70.2 71.5 71.5 71.5 71.5 71.5       Inpatient Medications  Current Facility-Administered Medications   Medication Dose Route Frequency    aspirin delayed-release tablet 81 mg  81 mg Oral DAILY    insulin glargine (LANTUS) injection 22 Units  22 Units SubCUTAneous QHS    insulin lispro (HUMALOG) injection   SubCUTAneous AC&HS    [START ON 7/25/2022] Vancomycin random level 7/25 with AM labs   Other ONCE    epoetin ericka-epbx (RETACRIT) injection 10,000 Units  10,000 Units IntraVENous DIALYSIS MON, WED & FRI    HYDROmorphone (DILAUDID) syringe 0.5 mg  0.5 mg IntraVENous Q4H PRN    sodium chloride (NS) flush 5-40 mL  5-40 mL IntraVENous Q8H    sodium chloride (NS) flush 5-40 mL  5-40 mL IntraVENous PRN    acetaminophen (TYLENOL) tablet 650 mg  650 mg Oral Q6H PRN    Or    acetaminophen (TYLENOL) suppository 650 mg  650 mg Rectal Q6H PRN    polyethylene glycol (MIRALAX) packet 17 g  17 g Oral DAILY PRN    ondansetron (ZOFRAN ODT) tablet 4 mg  4 mg Oral Q8H PRN    Or    ondansetron (ZOFRAN) injection 4 mg  4 mg IntraVENous Q6H PRN    glucose chewable tablet 16 g  4 Tablet Oral PRN    glucagon (GLUCAGEN) injection 1 mg  1 mg IntraMUSCular PRN    dextrose 10% infusion 0-250 mL  0-250 mL IntraVENous PRN    cefepime (MAXIPIME) 1 g in 0.9% sodium chloride (MBP/ADV) 50 mL MBP  1 g IntraVENous Q24H    metroNIDAZOLE (FLAGYL) IVPB premix 500 mg  500 mg IntraVENous Q12H    busPIRone (BUSPAR) tablet 10 mg  10 mg Oral BID    carvediloL (COREG) tablet 12.5 mg  12.5 mg Oral BID WITH MEALS    docusate sodium (COLACE) capsule 100 mg  100 mg Oral BID    ferrous sulfate tablet 325 mg  1 Tablet Oral DAILY    furosemide (LASIX) tablet 80 mg  80 mg Oral DAILY    gabapentin (NEURONTIN) capsule 300 mg  300 mg Oral DAILY    isosorbide mononitrate ER (IMDUR) tablet 60 mg  60 mg Oral DAILY    pantoprazole (PROTONIX) tablet 40 mg  40 mg Oral ACB    sevelamer carbonate (RENVELA) tab 800 mg  800 mg Oral TID WITH MEALS    sucralfate (CARAFATE) tablet 1 g  1 g Oral TIDAC    tamsulosin (FLOMAX) capsule 0.4 mg  0.4 mg Oral QHS    Vancomycin- Pharmacy dosing by levels   Other Rx Dosing/Monitoring    hydrALAZINE (APRESOLINE) tablet 50 mg  50 mg Oral TID    amLODIPine (NORVASC) tablet 10 mg  10 mg Oral DAILY       Allergies  No Known Allergies    CBC:  Recent Labs     07/24/22  0222 07/23/22  0344 07/22/22  0210   WBC 9.0 8.2 6.4   HGB 7.9* 7.9* 8.2*   HCT 25.3* 25.4* 25.3*    160 156       Metabolic Panel:  Recent Labs     07/24/22 0222 07/23/22  0344 07/22/22  0210   * 136 136   K 3.6 3.2* 4.2   CL 97 101 100   CO2 25 30 27   BUN 31* 17 44*   CREA 3.62* 2.47* 4.51*   * 96 165*   CA 8.4* 8.4* 8.3*   ALB 2.3* 2.2* 2.1*   ALT 12 13 13            Assessment and Plan     Daya Leiva is a 64 y.o. male with PMH of ESRD on HD MWF, T2DM, HTN, CAD s/p CABG (May 2020), HEFpEF, PEA arrest, and erosive gastritis who is admitted for osteomyelitis of the R calcaneus now s/p surgical debridement. Currently on IV broad spectrum abx. Will follow blood and wound cultures to guide abx therapy. Right calcaneal osteomyelitis: Demonstrated on MRI.  and CRP 15.2.  - Continue Vanc, Cefepime and Flagyl  - Podiatry and ID following  - Follow-up blood and wound cultures ot guide antibiotic therapy  - Daily CBC, CMP      HTN: Chronic and uncontrolled. - Continue home hydralazine 50 mg TID, amlodipine 10 mg OD, carvedilol 12.5 mg BID      ESRD on HD: MWF. Follows Dr. Doyle Ryan. - Nephrology following  - Continue home Sevelamer 800mg TID, Lasix 80 mg daily     Anemia: Chronic. Likely secondary to ESRD  - Continue home ferrous sulfate daily  - Monitor on daily CBC     T2DM: Chronic. Last A1c 7.9 (6/21/22). - Continue home Lantus 15 units daily.  - SSI w/ glucose checks ACHS  - Hypoglycemia precautions     GERD: Chronic.  - Continue home Protonix 40 mg OD, Sucralfate 1 g TID     Constipation: Chronic.   - Continue Docusate, Miralax, Senna      CAD / HEFrEF : Echo 11/2021 EF 53% and reduced systolic function.  - Continue home carvedilol 12.5 mg BID, Imdur 60 mg OD, and aspirin 81 mg OD     HLD: Chronic.  - Continue home rosuvastatin 10 mg daily     Groin lymph node:  Incidental 2.5 cm x 0.9 cm noted during previous admission.   - PCP: Follow up OP      BPH: Chronic  - Continue home Flomax 0.4 mg once nightly      Anxiety and depression: Chronic.  - Continue home Buspirone 10 mg BID        FEN/GI - Diabetic diet. Activity - Out of bed with assistance. DVT prophylaxis - SCDs  Disposition - Plan to d/c TBD. Consulting PT. Code Status - Full, discussed with patient / caregivers. Next of Scarlet 69 Name and Adolph Rafael Francisco [address] Emanuel Alanis (Spouse) [phone] ([place])      Vinod Gallegos MD  Family Medicine Resident       For Billing    Chief Complaint   Patient presents with    Foot Pain       Hospital Problems  Date Reviewed: 7/12/2022            Codes Class Noted POA    * (Principal) Open wound of right heel ICD-10-CM: S91.301A  ICD-9-CM: 892.0  7/16/2022 Unknown        Anxiety and depression ICD-10-CM: F41.9, F32. A  ICD-9-CM: 300.00, 311  Unknown Yes        CHF NYHA class I, chronic, diastolic (HCC) ZVS-29-PV: I50.32  ICD-9-CM: 428.32  Unknown Yes        BPH (benign prostatic hyperplasia) ICD-10-CM: N40.0  ICD-9-CM: 600.00  Unknown Yes        CAD (coronary artery disease) ICD-10-CM: I25.10  ICD-9-CM: 414.00  Unknown Yes        GERD (gastroesophageal reflux disease) ICD-10-CM: K21.9  ICD-9-CM: 530.81  Unknown Yes        HTN (hypertension) ICD-10-CM: I10  ICD-9-CM: 401.9  Unknown Yes        Hyperlipidemia ICD-10-CM: E78.5  ICD-9-CM: 272.4  Unknown Yes        Anemia associated with chronic renal failure ICD-10-CM: N18.9, D63.1  ICD-9-CM: 285.21  Unknown Yes        ESRD (end stage renal disease) on dialysis [facility]) ICD-10-CM: N18.6, Z99.2  ICD-9-CM: 585.6, V45.11  2/6/2022 Yes        Type 2 diabetes mellitus with diabetic neuropathy ([facility] [address] ICD-10-CM: E11.40  ICD-9-CM: 250.60, 357.2  8/5/2019 Yes

## 2022-07-25 PROBLEM — K59.00 CONSTIPATION: Status: ACTIVE | Noted: 2022-07-25

## 2022-07-25 PROBLEM — M86.171 OSTEOMYELITIS OF ANKLE OR FOOT, ACUTE, RIGHT (HCC): Status: ACTIVE | Noted: 2022-07-25

## 2022-07-25 LAB
ANION GAP SERPL CALC-SCNC: 8 MMOL/L (ref 5–15)
BACTERIA SPEC CULT: ABNORMAL
BACTERIA SPEC CULT: ABNORMAL
BUN SERPL-MCNC: 39 MG/DL (ref 6–20)
BUN/CREAT SERPL: 8 (ref 12–20)
CALCIUM SERPL-MCNC: 8.2 MG/DL (ref 8.5–10.1)
CHLORIDE SERPL-SCNC: 99 MMOL/L (ref 97–108)
CO2 SERPL-SCNC: 26 MMOL/L (ref 21–32)
CREAT SERPL-MCNC: 4.64 MG/DL (ref 0.7–1.3)
ERYTHROCYTE [DISTWIDTH] IN BLOOD BY AUTOMATED COUNT: 15.1 % (ref 11.5–14.5)
GLUCOSE BLD STRIP.AUTO-MCNC: 142 MG/DL (ref 65–117)
GLUCOSE BLD STRIP.AUTO-MCNC: 150 MG/DL (ref 65–117)
GLUCOSE BLD STRIP.AUTO-MCNC: 154 MG/DL (ref 65–117)
GLUCOSE BLD STRIP.AUTO-MCNC: 171 MG/DL (ref 65–117)
GLUCOSE SERPL-MCNC: 242 MG/DL (ref 65–100)
HCT VFR BLD AUTO: 24.7 % (ref 36.6–50.3)
HGB BLD-MCNC: 7.6 G/DL (ref 12.1–17)
MCH RBC QN AUTO: 27.9 PG (ref 26–34)
MCHC RBC AUTO-ENTMCNC: 30.8 G/DL (ref 30–36.5)
MCV RBC AUTO: 90.8 FL (ref 80–99)
NRBC # BLD: 0 K/UL (ref 0–0.01)
NRBC BLD-RTO: 0 PER 100 WBC
PLATELET # BLD AUTO: 179 K/UL (ref 150–400)
PMV BLD AUTO: 11.1 FL (ref 8.9–12.9)
POTASSIUM SERPL-SCNC: 3.6 MMOL/L (ref 3.5–5.1)
RBC # BLD AUTO: 2.72 M/UL (ref 4.1–5.7)
SERVICE CMNT-IMP: ABNORMAL
SODIUM SERPL-SCNC: 133 MMOL/L (ref 136–145)
VANCOMYCIN SERPL-MCNC: 17.5 UG/ML
WBC # BLD AUTO: 6.3 K/UL (ref 4.1–11.1)

## 2022-07-25 PROCEDURE — 74011250637 HC RX REV CODE- 250/637: Performed by: PODIATRIST

## 2022-07-25 PROCEDURE — 90935 HEMODIALYSIS ONE EVALUATION: CPT

## 2022-07-25 PROCEDURE — 36415 COLL VENOUS BLD VENIPUNCTURE: CPT

## 2022-07-25 PROCEDURE — 97116 GAIT TRAINING THERAPY: CPT

## 2022-07-25 PROCEDURE — 74011250636 HC RX REV CODE- 250/636: Performed by: PODIATRIST

## 2022-07-25 PROCEDURE — 99223 1ST HOSP IP/OBS HIGH 75: CPT | Performed by: INTERNAL MEDICINE

## 2022-07-25 PROCEDURE — 97530 THERAPEUTIC ACTIVITIES: CPT

## 2022-07-25 PROCEDURE — 94761 N-INVAS EAR/PLS OXIMETRY MLT: CPT

## 2022-07-25 PROCEDURE — 2709999900 HC NON-CHARGEABLE SUPPLY

## 2022-07-25 PROCEDURE — 82962 GLUCOSE BLOOD TEST: CPT

## 2022-07-25 PROCEDURE — 97161 PT EVAL LOW COMPLEX 20 MIN: CPT

## 2022-07-25 PROCEDURE — 74011636637 HC RX REV CODE- 636/637: Performed by: STUDENT IN AN ORGANIZED HEALTH CARE EDUCATION/TRAINING PROGRAM

## 2022-07-25 PROCEDURE — 99232 SBSQ HOSP IP/OBS MODERATE 35: CPT | Performed by: FAMILY MEDICINE

## 2022-07-25 PROCEDURE — 80048 BASIC METABOLIC PNL TOTAL CA: CPT

## 2022-07-25 PROCEDURE — 85027 COMPLETE CBC AUTOMATED: CPT

## 2022-07-25 PROCEDURE — 74011000258 HC RX REV CODE- 258: Performed by: PODIATRIST

## 2022-07-25 PROCEDURE — 74011250636 HC RX REV CODE- 250/636: Performed by: STUDENT IN AN ORGANIZED HEALTH CARE EDUCATION/TRAINING PROGRAM

## 2022-07-25 PROCEDURE — 80202 ASSAY OF VANCOMYCIN: CPT

## 2022-07-25 PROCEDURE — 65270000029 HC RM PRIVATE

## 2022-07-25 PROCEDURE — 74011636637 HC RX REV CODE- 636/637

## 2022-07-25 PROCEDURE — 74011000250 HC RX REV CODE- 250: Performed by: PODIATRIST

## 2022-07-25 RX ORDER — INSULIN GLARGINE 100 [IU]/ML
30 INJECTION, SOLUTION SUBCUTANEOUS
Status: DISCONTINUED | OUTPATIENT
Start: 2022-07-25 | End: 2022-07-29 | Stop reason: HOSPADM

## 2022-07-25 RX ADMIN — ISOSORBIDE MONONITRATE 60 MG: 30 TABLET, EXTENDED RELEASE ORAL at 11:44

## 2022-07-25 RX ADMIN — HYDRALAZINE HYDROCHLORIDE 50 MG: 25 TABLET ORAL at 16:12

## 2022-07-25 RX ADMIN — SEVELAMER CARBONATE 800 MG: 800 TABLET, FILM COATED ORAL at 11:43

## 2022-07-25 RX ADMIN — FERROUS SULFATE TAB 325 MG (65 MG ELEMENTAL FE) 325 MG: 325 (65 FE) TAB at 11:44

## 2022-07-25 RX ADMIN — DOCUSATE SODIUM 100 MG: 100 CAPSULE, LIQUID FILLED ORAL at 18:07

## 2022-07-25 RX ADMIN — SUCRALFATE 1 G: 1 TABLET ORAL at 16:12

## 2022-07-25 RX ADMIN — VANCOMYCIN HYDROCHLORIDE 1250 MG: 1.25 INJECTION, POWDER, LYOPHILIZED, FOR SOLUTION INTRAVENOUS at 13:00

## 2022-07-25 RX ADMIN — TAMSULOSIN HYDROCHLORIDE 0.4 MG: 0.4 CAPSULE ORAL at 22:20

## 2022-07-25 RX ADMIN — INSULIN GLARGINE 30 UNITS: 100 INJECTION, SOLUTION SUBCUTANEOUS at 22:21

## 2022-07-25 RX ADMIN — Medication 10 ML: at 22:21

## 2022-07-25 RX ADMIN — BUSPIRONE HYDROCHLORIDE 10 MG: 10 TABLET ORAL at 18:07

## 2022-07-25 RX ADMIN — SUCRALFATE 1 G: 1 TABLET ORAL at 06:31

## 2022-07-25 RX ADMIN — Medication 3 UNITS: at 13:01

## 2022-07-25 RX ADMIN — GABAPENTIN 300 MG: 300 CAPSULE ORAL at 11:43

## 2022-07-25 RX ADMIN — HYDROMORPHONE HYDROCHLORIDE 0.5 MG: 1 INJECTION, SOLUTION INTRAMUSCULAR; INTRAVENOUS; SUBCUTANEOUS at 03:06

## 2022-07-25 RX ADMIN — HYDRALAZINE HYDROCHLORIDE 50 MG: 25 TABLET ORAL at 11:44

## 2022-07-25 RX ADMIN — Medication 3 UNITS: at 16:12

## 2022-07-25 RX ADMIN — HYDROMORPHONE HYDROCHLORIDE 0.5 MG: 1 INJECTION, SOLUTION INTRAMUSCULAR; INTRAVENOUS; SUBCUTANEOUS at 22:34

## 2022-07-25 RX ADMIN — CARVEDILOL 12.5 MG: 12.5 TABLET, FILM COATED ORAL at 16:12

## 2022-07-25 RX ADMIN — ASPIRIN 81 MG: 81 TABLET, COATED ORAL at 11:43

## 2022-07-25 RX ADMIN — HYDRALAZINE HYDROCHLORIDE 50 MG: 25 TABLET ORAL at 22:21

## 2022-07-25 RX ADMIN — Medication 5 ML: at 06:32

## 2022-07-25 RX ADMIN — SUCRALFATE 1 G: 1 TABLET ORAL at 11:43

## 2022-07-25 RX ADMIN — Medication 3 UNITS: at 10:24

## 2022-07-25 RX ADMIN — BUSPIRONE HYDROCHLORIDE 10 MG: 10 TABLET ORAL at 11:44

## 2022-07-25 RX ADMIN — METRONIDAZOLE 500 MG: 500 INJECTION, SOLUTION INTRAVENOUS at 04:51

## 2022-07-25 RX ADMIN — HYDROMORPHONE HYDROCHLORIDE 0.5 MG: 1 INJECTION, SOLUTION INTRAMUSCULAR; INTRAVENOUS; SUBCUTANEOUS at 11:44

## 2022-07-25 RX ADMIN — CARVEDILOL 12.5 MG: 12.5 TABLET, FILM COATED ORAL at 11:44

## 2022-07-25 RX ADMIN — AMLODIPINE BESYLATE 10 MG: 5 TABLET ORAL at 11:44

## 2022-07-25 RX ADMIN — SEVELAMER CARBONATE 800 MG: 800 TABLET, FILM COATED ORAL at 16:12

## 2022-07-25 RX ADMIN — DOCUSATE SODIUM 100 MG: 100 CAPSULE, LIQUID FILLED ORAL at 11:44

## 2022-07-25 RX ADMIN — ACETAMINOPHEN 650 MG: 325 TABLET ORAL at 22:20

## 2022-07-25 RX ADMIN — METRONIDAZOLE 500 MG: 500 INJECTION, SOLUTION INTRAVENOUS at 16:13

## 2022-07-25 RX ADMIN — EPOETIN ALFA-EPBX 10000 UNITS: 10000 INJECTION, SOLUTION INTRAVENOUS; SUBCUTANEOUS at 22:35

## 2022-07-25 RX ADMIN — HYDROMORPHONE HYDROCHLORIDE 0.5 MG: 1 INJECTION, SOLUTION INTRAMUSCULAR; INTRAVENOUS; SUBCUTANEOUS at 16:12

## 2022-07-25 RX ADMIN — CEFEPIME 1 G: 1 INJECTION, POWDER, FOR SOLUTION INTRAMUSCULAR; INTRAVENOUS at 00:31

## 2022-07-25 RX ADMIN — PANTOPRAZOLE SODIUM 40 MG: 40 TABLET, DELAYED RELEASE ORAL at 06:32

## 2022-07-25 RX ADMIN — FUROSEMIDE 80 MG: 40 TABLET ORAL at 11:43

## 2022-07-25 NOTE — PROGRESS NOTES
[facility] Medicine Service Daily Progress Note    Overnight Events: NAEO. SUBJECTIVE: Pt reports improvement in his R foot pain s/p surgical debridement. He denies having any other complaints. OBJECTIVE:    Vitals: Visit Vitals  BP (!) 140/67 (BP 1 Location: Right upper arm, BP Patient Position: At rest)   Pulse (!) 57   Temp 97.8 °F (36.6 °C)   Resp 16   Ht 5' 8\" (1.727 m)   Wt 157 lb 10.1 oz (71.5 kg)   SpO2 91%   BMI 23.97 kg/m²     Physical Exam:  General: NAD. Respiratory: Lungs clear to auscultation bilaterally with no wheezing, crackles, rhonchi, or rales. Cardiovascular: Regular rate and rhythm. S1/S2 auscultated with no murmurs, rubs, or gallops. GI: Non-tender to palpation. Non-distended. + Bowel sounds throughout. Extremities: Right ankle wrapped in bandage with heel protector in place. LLE w/ no edema or tenderness. Skin: Warm, dry. Neuro: Alert and oriented    I/O:  Date 07/24/22 0700 - 07/25/22 0659 07/25/22 0700 - 07/26/22 0659   Shift 5231-5854 5316-3831 24 Hour Total 2166-6213 7086-0607 24 Hour Total   INTAKE   P.O. 600 150 750        P. O. 600 150 750      I. V.(mL/kg/hr)  250(0.3) 250(0.1)        Volume (metroNIDAZOLE (FLAGYL) IVPB premix 500 mg)  200 200        Volume (cefepime (MAXIPIME) 1 g in 0.9% sodium chloride (MBP/ADV) 50 mL MBP)  50 50      Shift Total(mL/kg) 600(8.4) 400(5.6) 1000(14)      OUTPUT   Urine(mL/kg/hr) 150(0.2)  150(0.1)        Urine Voided 150  150      Shift Total(mL/kg) 150(2.1)  150(2.1)       400 850      Weight (kg) 71.5 71.5 71.5 71.5 71.5 71.5         Inpatient Medications  Current Facility-Administered Medications   Medication Dose Route Frequency    insulin glargine (LANTUS) injection 30 Units  30 Units SubCUTAneous QHS    aspirin delayed-release tablet 81 mg  81 mg Oral DAILY    insulin lispro (HUMALOG) injection   SubCUTAneous AC&HS    epoetin ericka-epbx (RETACRIT) injection 10,000 Units  10,000 Units IntraVENous DIALYSIS MON, WED & FRI    HYDROmorphone (DILAUDID) syringe 0.5 mg  0.5 mg IntraVENous Q4H PRN    sodium chloride (NS) flush 5-40 mL  5-40 mL IntraVENous Q8H    sodium chloride (NS) flush 5-40 mL  5-40 mL IntraVENous PRN    acetaminophen (TYLENOL) tablet 650 mg  650 mg Oral Q6H PRN    Or    acetaminophen (TYLENOL) suppository 650 mg  650 mg Rectal Q6H PRN    polyethylene glycol (MIRALAX) packet 17 g  17 g Oral DAILY PRN    ondansetron (ZOFRAN ODT) tablet 4 mg  4 mg Oral Q8H PRN    Or    ondansetron (ZOFRAN) injection 4 mg  4 mg IntraVENous Q6H PRN    glucose chewable tablet 16 g  4 Tablet Oral PRN    glucagon (GLUCAGEN) injection 1 mg  1 mg IntraMUSCular PRN    dextrose 10% infusion 0-250 mL  0-250 mL IntraVENous PRN    cefepime (MAXIPIME) 1 g in 0.9% sodium chloride (MBP/ADV) 50 mL MBP  1 g IntraVENous Q24H    metroNIDAZOLE (FLAGYL) IVPB premix 500 mg  500 mg IntraVENous Q12H    busPIRone (BUSPAR) tablet 10 mg  10 mg Oral BID    carvediloL (COREG) tablet 12.5 mg  12.5 mg Oral BID WITH MEALS    docusate sodium (COLACE) capsule 100 mg  100 mg Oral BID    ferrous sulfate tablet 325 mg  1 Tablet Oral DAILY    furosemide (LASIX) tablet 80 mg  80 mg Oral DAILY    gabapentin (NEURONTIN) capsule 300 mg  300 mg Oral DAILY    isosorbide mononitrate ER (IMDUR) tablet 60 mg  60 mg Oral DAILY    pantoprazole (PROTONIX) tablet 40 mg  40 mg Oral ACB    sevelamer carbonate (RENVELA) tab 800 mg  800 mg Oral TID WITH MEALS    sucralfate (CARAFATE) tablet 1 g  1 g Oral TIDAC    tamsulosin (FLOMAX) capsule 0.4 mg  0.4 mg Oral QHS    Vancomycin- Pharmacy dosing by levels   Other Rx Dosing/Monitoring    hydrALAZINE (APRESOLINE) tablet 50 mg  50 mg Oral TID    amLODIPine (NORVASC) tablet 10 mg  10 mg Oral DAILY       Allergies  No Known Allergies    CBC:  Recent Labs     07/25/22  0306 07/24/22  0222 07/23/22  0344   WBC 6.3 9.0 8.2   HGB 7.6* 7.9* 7.9*   HCT 24.7* 25.3* 25.4*    170 838         Metabolic Panel:  Recent Labs     07/25/22  8453 07/24/22  0222 07/23/22  0344   * 133* 136   K 3.6 3.6 3.2*   CL 99 97 101   CO2 26 25 30   BUN 39* 31* 17   CREA 4.64* 3.62* 2.47*   * 286* 96   CA 8.2* 8.4* 8.4*   ALB  --  2.3* 2.2*   ALT  --  12 13              Assessment and Plan     Rodney Shell is a 64 y.o. male with PMH of ESRD on HD MWF, T2DM, HTN, CAD s/p CABG (May 2020), HEFpEF, PEA arrest, and erosive gastritis who is admitted for osteomyelitis of the R calcaneus now s/p surgical debridement. Right calcaneal osteomyelitis: Demonstrated on MRI.  and CRP 15.2. Podiatry and ID following. Wound Cx: rare probably S. Aureus, light gram - rods, possible streptococcus. Anaerobic Cx: pending. Bcx: NGTD.  - Continue Vanc, Cefepime and Flagyl  - Follow-up blood, wound, anaerobic cultures to guide antibiotic therapy  - Daily CBC, BMP      HTN: Chronic and uncontrolled. - Continue home hydralazine 50 mg TID, amlodipine 10 mg OD, carvedilol 12.5 mg BID      ESRD on HD: MWF. Follows Dr. Claire Schmitt. - Nephrology following  - Continue home Sevelamer 800mg TID, Lasix 80 mg daily     Anemia: Chronic. Likely secondary to ESRD  - Continue home ferrous sulfate daily  - Monitor on daily CBC     T2DM: Chronic. Last A1c 7.9 (6/21/22). - Increase Lantus to 30 units daily  - SSI w/ glucose checks ACHS  - Hypoglycemia precautions     GERD: Chronic.  - Continue home Protonix 40 mg OD, Sucralfate 1 g TID     Constipation: Chronic.   - Continue Docusate, Miralax, Senna      CAD / HEFrEF : Echo 11/2021 EF 53% and reduced systolic function.  - Continue home carvedilol 12.5 mg BID, Imdur 60 mg OD, and aspirin 81 mg OD     HLD: Chronic.  - Continue home rosuvastatin 10 mg daily     Groin lymph node: Incidental 2.5 cm x 0.9 cm noted during previous admission.   - PCP: Follow up OP      BPH: Chronic  - Continue home Flomax 0.4 mg once nightly      Anxiety and depression: Chronic.  - Continue home Buspirone 10 mg BID        FEN/GI - Diabetic diet.   Activity - Out of bed with assistance. DVT prophylaxis - SCDs  Disposition - Plan to d/c TBD. Consulting PT. Code Status - Full, discussed with patient / caregivers. Devorah 69 Name and Adolphiraj Francisco 23 Karlo Garcia (Spouse) [phone] (Mobile)      Israel Carnes MD  Family Medicine Resident       For Billing    Chief Complaint   Patient presents with    Foot Pain       Hospital Problems  Date Reviewed: 7/12/2022            Codes Class Noted POA    * (Principal) Open wound of right heel ICD-10-CM: S91.301A  ICD-9-CM: 892.0  7/16/2022 Unknown        Anxiety and depression ICD-10-CM: F41.9, F32. A  ICD-9-CM: 300.00, 311  Unknown Yes        CHF NYHA class I, chronic, diastolic (HCC) NOR-84-DM: I50.32  ICD-9-CM: 428.32  Unknown Yes        BPH (benign prostatic hyperplasia) ICD-10-CM: N40.0  ICD-9-CM: 600.00  Unknown Yes        CAD (coronary artery disease) ICD-10-CM: I25.10  ICD-9-CM: 414.00  Unknown Yes        GERD (gastroesophageal reflux disease) ICD-10-CM: K21.9  ICD-9-CM: 530.81  Unknown Yes        HTN (hypertension) ICD-10-CM: I10  ICD-9-CM: 401.9  Unknown Yes        Hyperlipidemia ICD-10-CM: E78.5  ICD-9-CM: 272.4  Unknown Yes        Anemia associated with chronic renal failure ICD-10-CM: N18.9, D63.1  ICD-9-CM: 285.21  Unknown Yes        ESRD (end stage renal disease) on dialysis [facility]) ICD-10-CM: N18.6, Z99.2  ICD-9-CM: 585.6, V45.11  2/6/2022 Yes        Type 2 diabetes mellitus with diabetic neuropathy ([facility] [address] ICD-10-CM: E11.40  ICD-9-CM: 250.60, 357.2  8/5/2019 Yes

## 2022-07-25 NOTE — TELEPHONE ENCOUNTER
7/25/2022         RE: Theo Meyers  [address]  [facility] [address]        Dear Colleague,    Thank you for referring your patient, Theo Meyers, to the [facility] BLOOD AND MARROW TRANSPLANT PROGRAM [place]. Please see a copy of my visit note below.      BMT/Cellular therapyClinic  Progress Note    MT 2020-23  Mr Theo Meyers is a 77 year old male who is Day -3 of   as Monotherapy in  in Combination with Monoclonal Antibodies in Relapsed/Refractory Multiple Myeloma     Active Medical Management Issues:     Hematologic history:  Multiple myeloma diagnosed in 2009, IgG kappa but evolving to light chain secretory, del 13q, t(11;14).       Date Treatment Response Toxicities/Complications   7/2009 Velcade/Dex x 8 cycles VGPR     4/2010 HD-Jennifer/PBSCT CR     6/2010 Rev maintenance       10/2012 RVD   cytopenias   9/2012 RVD (q 2wk velcade) Long remission     10/2020 Brianne/pom/dex Recent progresson     4/6/2022 Carf/dex  NE - held Possible cardiac toxicity                             Chief Complaint: daratumumab    HPI: Keith is here prior to 5c admission today. After he received pentamidine on Friday he felt SOB however each day since it's improved. Today he has no SOB. No other complaints. New PICC in left arm.  ICANS 10/10 7/25    ROS:     10 point ROS otherwise negative     PHYSICAL EXAM                                                                                                                                      Wt Readings from Last 4 Encounters:   07/25/22 72.6 kg (160 lb)   07/25/22 74.8 kg (165 lb)   07/22/22 75.2 kg (165 lb 12.8 oz)   07/21/22 73.6 kg (162 lb 3.2 oz)     Blood pressure (!) 144/79, pulse 59, temperature 97.4  F (36.3  C), resp. rate 18, weight 72.6 kg (160 lb), SpO2 100 %.       General: NAD, s  Eyes: sclera anicteric   HEENT:  Old black eye and R subconjuntical hemorrhage (right eye).  Mouth: masked  Lungs: CTAB  Cardiovascular: RRR, with murmer, heart  Left voicemail for patient to return call. sounds are distant   Lymphatics: slt edema   Skin: No rash but thin skin with bruises on his arms  Neuro: non-focal  Access: Port a cath:right: accessed. New PICC in left arm       Blood Counts     Lab Results   Component Value Date    WBC 1.5 (L) 07/25/2022    ANEU 1.8 07/05/2022    HGB 9.2 (L) 07/25/2022    HCT 28.3 (L) 07/25/2022    PLT 89 (L) 07/25/2022     07/25/2022    POTASSIUM 3.9 07/25/2022    CHLORIDE 112 (H) 07/25/2022    CO2 24 07/22/2022     (H) 07/22/2022    BUN 29 07/22/2022    CR 1.31 (H) 07/22/2022    MAG 1.6 07/21/2022    INR 1.15 06/23/2022    BILITOTAL 0.3 07/22/2022    AST 65 (H) 07/22/2022     (H) 07/22/2022    ALKPHOS 86 07/22/2022    PROTTOTAL 5.0 (L) 07/22/2022    ALBUMIN 2.9 (L) 07/22/2022       I have assessed all abnormal lab values for their clinical significance and any values considered clinically significant have been addressed in the assessment and plan.        Assessment Plans:     Keith Meyers is a 77 year old man who is Day -3 before  as Monotherapy in Relapsed/Refractory MM and in Combination with Monoclonal Antibodies in Relapsed/Refractory Multiple Myeloma     1. Cellular Therapy: Relapsed refractory Multiple myeloma-  Day - 3    Day-5(7/20)  Begin Flu/CY: LD chemo  Day-4:( 7/21) Flu/CY/dimple: LD chemo; IH pentam  Day -3: 7/22)  Flu/CY: LD chemo, covid test  Rest over the weekend   -Baseline ICE score by study RN.       Day 1: ( 7/25/2022)  PICC placement and infusion of   Remains on allopurinol  (decrease dose due to LATANYA on CKI)    2. Heme:  Remains on Xarelto: History of blood clots, chronic and stroke prophy for atrial fib  - 7/20 does have right eye echymosis - no trauma/falls- thinks he runs hsi eyes as night has has large bruising around eye as result.   - Monitor plts closely - hold xaralto when plts <50k.   - no transfusion      3. ID: afebrile  remains on acyclovir and Bactrim( hold due to LATANYA on CKI)   pentamidine 7/22 in the PFT lab.      4. FEN: Given additional Lasix 40mg IV in infusion 7/22.   Remains on supplemental potassium   LATANYA on CKI- Cr improved 1.3-- 500cc flush pre and post chemo.  Lasix 40mg PO daily at home        5. CV:   -Bradycardia  -history of atrial fib and heart failure  Remains on lasix and supplemental K    6.Neuro:  Has neuropathy in hands mainly and some in feet, neurontin    Dispo:    5C for cells         30 minutes spent on the date of the encounter doing chart review, review of test results, interpretation of tests, patient visit and documentation     Bibiana Ramos NP          Again, thank you for allowing me to participate in the care of your patient.      Sincerely,    BMT Advanced Practice Provider

## 2022-07-25 NOTE — CONSULTS
Infectious Disease Consult    Impression/Plan   Diabetic foot infection with osteomyelitis of the right calcaneus. Status post I&D with partial bone excision 7/23/2022. Cultures reveal a polymicrobial infection including Staphylococcus aureus and gram-negative rods. Continue current empiric antibiotics pending further culture data. Await pathology but I anticipate patient will need 6-week course of IV antibiotics at discharge. Depending on culture results hopefully this may be able to be administered on dialysis   End-stage renal disease on dialysis MWF  Diabetes mellitus. This is poorly controlled. Hemoglobin A1c is 7.9  Anemia. Hemoglobin 7.6. Management per primary team  Elevated alkaline phosphatase. Follow    Anti-infectives:   Vancomycin  Cefepime  Metronidazole    Subjective:   Date of Consultation:  July 25, 2022  Date of Admission: 7/21/2022   Referring Physician:     Patient is a 64 y.o. male with a past medical history significant for end-stage renal disease on dialysis, diabetes mellitus, and coronary artery disease status post CABG who is being seen for diabetic foot infection. Patient has a chronic right heel wound. This has been followed in the outpatient setting by podiatry. Per podiatry records the wound had been improving and he was placed in a total contact cast the week prior to admission. At follow-up the wound was noted to be worsening and he was sent to [place] for further evaluation. An MRI was done which raised concern for calcaneal osteomyelitis. He was taken to the OR 7/23 where he underwent partial incision of the calcaneus with wound closure. Preliminary cultures are growing Staphylococcus aureus and gram-negative rods. He is currently on vancomycin, cefepime, and metronidazole.   The infectious diseases service has been asked to assist with antibiotic management    Patient Active Problem List   Diagnosis Code    Peripheral neuropathy G62.9    Type 2 Received pt from IR at 1320, drowsy but oriented x4, following commands, JUAREZ with equal strength, initially hypotensive upon arrival, cardene stopped and fluid bolus given as ordered per Taina APN, BP improved, art line, sherman draining clear yellow urine diabetes mellitus with ophthalmic complication, with long-term current use of insulin (HCC) E11.39, Z79.4    Type 2 diabetes mellitus with diabetic neuropathy (HCC) E11.40    ESRD (end stage renal disease) on dialysis (HCC) N18.6, Z99.2    Thrombocytopenia (HCC) D69.6    Cirrhosis (HCC) K74.60    Anxiety and depression F41.9, F32.A    CHF NYHA class I, chronic, diastolic (HCC) W05.23    DM type 2 causing renal disease (HCC) E11.29    BPH (benign prostatic hyperplasia) N40.0    CAD (coronary artery disease) I25.10    DM type 2 causing vascular disease (HCC) E11.59    GERD (gastroesophageal reflux disease) K21.9    HTN (hypertension) I10    Hyperlipidemia E78.5    Anemia associated with chronic renal failure N18.9, D63.1    Chronic pain G89.29    Pleural effusion J90    Open wound of right heel S91.301A    Calf swelling M79.89    Chest pain R07.9    Osteomyelitis of ankle or foot, acute, right (HCC) M86.171    Constipation K59.00     Past Medical History:   Diagnosis Date    Anemia associated with chronic renal failure     Anxiety and depression     BPH (benign prostatic hyperplasia)     CAD (coronary artery disease)     CHF NYHA class I, chronic, diastolic (HCC)     Chronic pain     DM type 2 causing renal disease ([address]     DM type 2 causing vascular disease ([address]     ESRD on dialysis ([facility] [address]     GERD (gastroesophageal reflux disease)     HTN (hypertension)     Hyperlipidemia     Thrombocytopenia (HCC)       Family History   Problem Relation Age of Onset    Diabetes Mother     No Known Problems Father       Social History     Tobacco Use    Smoking status: Former     Types: Cigarettes     Quit date: 2001     Years since quittin.1    Smokeless tobacco: Never   Substance Use Topics    Alcohol use: No     Past Surgical History:   Procedure Laterality Date    HX ARTERIAL BYPASS      HX OTHER SURGICAL      5th toe Metatarsal amputation 2017     IR INSERT NON TUNL CVC OVER 5 YRS  2021    IR INSERT TUNL CVC W/O PORT OVER 5 YR  11/24/2021      Prior to Admission medications    Medication Sig Start Date End Date Taking? Authorizing Provider   cephALEXin (KEFLEX) 500 mg capsule Take 500 mg by mouth three (3) times daily. 7/9/22   Provider, Historical   sevelamer (RENAGEL) 800 mg tablet Take 800 mg by mouth three (3) times daily (with meals). Provider, Historical   amLODIPine (NORVASC) 10 mg tablet Take 0.5 tab in AM and 0.5 tab in PM 6/21/22   Mike Jimenez MD   pantoprazole (PROTONIX) 40 mg tablet Take 1 Tablet by mouth daily. 6/21/22   Mike Jimenez MD   sucralfate (CARAFATE) 1 gram tablet Take 1 Tablet by mouth three (3) times daily. 6/21/22   Mike Jimenez MD   tamsulosin [facility]) 0.4 mg capsule Take 1 Capsule by mouth nightly. 6/21/22   Mike Jimenez MD   isosorbide mononitrate ER (IMDUR) 60 mg CR tablet Take 1 Tablet by mouth daily. 6/21/22   Mike Jimenez MD   furosemide (Lasix) 80 mg tablet Take 80 mg by mouth daily. Provider, Historical   Insulin Needles, Disposable, 31 gauge x 5/16\" ndle Use as directed with insulin DM2 4/6/22   Aram Sood MD   acetaminophen (Tylenol Extra Strength) 500 mg tablet Take 500 mg by mouth every six (6) hours as needed for Pain or Fever. Provider, Historical   aspirin delayed-release 81 mg tablet Take 81 mg by mouth daily. Provider, Historical   clopidogreL (Plavix) 75 mg tab Take 75 mg by mouth daily. Patient not taking: Reported on 6/21/2022    Provider, Historical   gabapentin (NEURONTIN) 300 mg capsule Take 300 mg by mouth daily. Take after dialysis on dialysis days. Patient taking this TID. Provider, Historical   insulin glargine (LANTUS,BASAGLAR) 100 unit/mL (3 mL) inpn 15 Units by SubCUTAneous route nightly. Provider, Historical   insulin aspart U-100 (NovoLOG Flexpen U-100 Insulin) 100 unit/mL (3 mL) inpn by SubCUTAneous route Before breakfast, lunch, and dinner.  -180    2 units  -220    4 units -260    6 units  -300    8 units  -350    10 units    Provider, Historical   senna (Senna) 8.6 mg tablet Take 2 Tablets by mouth nightly. Provider, Historical   polyethylene glycol (Miralax) 17 gram/dose powder Take 17 g by mouth daily as needed for Constipation. Provider, Historical   hydrALAZINE (APRESOLINE) 50 mg tablet Take 1 Tablet by mouth three (3) times daily. Patient taking differently: Take 100 mg by mouth three (3) times daily. 22   Bernabe Mart MD   busPIRone (BUSPAR) 10 mg tablet Take 10 mg by mouth two (2) times a day. Provider, Historical   carvediloL (Coreg) 12.5 mg tablet Take 12.5 mg by mouth two (2) times daily (with meals). Hold if top number below 100    Provider, Historical   docusate sodium (Colace) 100 mg capsule Take 1 Capsule by mouth two (2) times a day. 10/20/21   Provider, Historical   ferrous sulfate 325 mg (65 mg iron) tablet Take 1 Tablet by mouth daily. 21   Provider, Historical   rosuvastatin (CRESTOR) 10 mg tablet Take 10 mg by mouth Every morning. Provider, Historical   eplerenone (Inspra) 25 mg tablet Take 25 mg by mouth daily. Patient not taking: Reported on 2022    Provider, Historical     No Known Allergies     Review of Systems:  A comprehensive review of systems was negative except for that written in the History of Present Illness. Objective:   Blood pressure (!) 146/65, pulse 61, temperature 98 °F (36.7 °C), resp. rate 16, height 5' 8\" (1.727 m), weight 157 lb 10.1 oz (71.5 kg), SpO2 91 %. Temp (24hrs), Av.9 °F (36.6 °C), Min:97.8 °F (36.6 °C), Max:98 °F (36.7 °C)       Exam: Seen on dialysis  General:  Alert, cooperative, well noursished, well developed, appears stated age   Eyes:  Sclera anicteric. Mouth/Throat: Mucous membranes normal, oral pharynx clear   Neck: Supple   Lungs:   Clear to auscultation anteriorly   CV:  Regular rate and rhythm   Abdomen:   Nondistended   Extremities: Moves all.   Right foot dressed   Skin: No acute rash on exposed skin   Lymph nodes:    Musculoskeletal:    Lines/Devices:  Right chest wall HD catheter unremarkable   Psych: Alert and oriented, normal mood affect given the setting       Data Review:   Recent Results (from the past 24 hour(s))   GLUCOSE, POC    Collection Time: 07/24/22 11:06 AM   Result Value Ref Range    Glucose (POC) 283 (H) 65 - 117 mg/dL    Performed by [employer] [facility], POC    Collection Time: 07/24/22  4:13 PM   Result Value Ref Range    Glucose (POC) 210 (H) 65 - 117 mg/dL    Performed by [employer] [facility], POC    Collection Time: 07/24/22 10:21 PM   Result Value Ref Range    Glucose (POC) 225 (H) 65 - 117 mg/dL    Performed by Sigrid Khan (JAGDEEP)    CBC W/O DIFF    Collection Time: 07/25/22  3:06 AM   Result Value Ref Range    WBC 6.3 4.1 - 11.1 K/uL    RBC 2.72 (L) 4.10 - 5.70 M/uL    HGB 7.6 (L) 12.1 - 17.0 g/dL    HCT 24.7 (L) 36.6 - 50.3 %    MCV 90.8 80.0 - 99.0 FL    MCH 27.9 26.0 - 34.0 PG    MCHC 30.8 30.0 - 36.5 g/dL    RDW 15.1 (H) 11.5 - 14.5 %    PLATELET 123 310 - 651 K/uL    MPV 11.1 8.9 - 12.9 FL    NRBC 0.0 0  WBC    ABSOLUTE NRBC 0.00 0.00 - 0.01 K/uL   VANCOMYCIN, RANDOM    Collection Time: 07/25/22  3:06 AM   Result Value Ref Range    Vancomycin, random 21.7 UG/ML   METABOLIC PANEL, BASIC    Collection Time: 07/25/22  3:06 AM   Result Value Ref Range    Sodium 133 (L) 136 - 145 mmol/L    Potassium 3.6 3.5 - 5.1 mmol/L    Chloride 99 97 - 108 mmol/L    CO2 26 21 - 32 mmol/L    Anion gap 8 5 - 15 mmol/L    Glucose 242 (H) 65 - 100 mg/dL    BUN 39 (H) 6 - 20 MG/DL    Creatinine 4.64 (H) 0.70 - 1.30 MG/DL    BUN/Creatinine ratio 8 (L) 12 - 20      GFR est AA 16 (L) >60 ml/min/1.73m2    GFR est non-AA 13 (L) >60 ml/min/1.73m2    Calcium 8.2 (L) 8.5 - 10.1 MG/DL        Microbiology:      Studies:      Signed By: Chiqui Ceja DO     July 25, 2022

## 2022-07-25 NOTE — PROGRESS NOTES
.Problem: Mobility Impaired (Adult and Pediatric)  Goal: *Acute Goals and Plan of Care (Insert Text)  Description: FUNCTIONAL STATUS PRIOR TO ADMISSION: Patient was independent and active without use of DME.    HOME SUPPORT PRIOR TO ADMISSION: The patient lived with spouse but did not require assist.    Physical Therapy Goals  Initiated 7/25/2022  1. Patient will move from supine to sit and sit to supine  in bed with modified independence within 7 day(s). 2.  Patient will transfer from bed to chair and chair to bed with supervision/set-up using the least restrictive device within 7 day(s). 3.  Patient will perform sit to stand with supervision/set-up within 7 day(s). 4.  Patient will ambulate with supervision/set-up for 50 feet with the least restrictive device within 7 day(s). 5.  Patient will ascend/descend 3 stairs with b/l handrail(s) with minimal assistance/contact guard assist within 7 day(s).   7/25/2022 1250 by Danielle Nieto  Outcome: Not Met    PHYSICAL THERAPY EVALUATION  Patient: Marylu Rizzo (74 y.o. male)  Date: 7/25/2022  Primary Diagnosis: Open wound of right heel [S91.301A]  Procedure(s) (LRB):  RIGHT HEEL DEBRIDEMENT, PARTIAL EXCISION OF BONE (Right) 2 Days Post-Op   Precautions: falls, NWB R LE       ASSESSMENT  This is a 65 y/o male with PMH  of ESRD on HD MWF, who came to  [facility]  ED with c/o worsening purulent drainage from his right heel wound, admitted for osteomyelitis of the R calcaneus,  s/p R heel debridement, partial excision of bone on 7/23/22. Orders for NWB for R LE. Pt is A&O x4, received semi-supine in bed, agreeable for PT eval/tx. Based on the objective data described below, the patient presents with decreased strength , (3+/5 grossly R LE, L Le - WFL) , balance deficits , generalized weakness, decreased activity tolerance and increased need for assist with functional mobility ( requires SBA for supine>sit transfers , intact  static sitting balance.  Pt educated about NWB R LE status, verbalizes understanding. Requires CGA/Hernán for sit <>stand and bed<>chair transfers , good  static  standing balance with support, is able to ambulate - 15' with RW, CGA, is able to maintain NWB status R LE). Recommend d/c to IPR vs HHPT with family care pending progress (has 3 stairs with no HR) when medically appropriate. Current Level of Function Impacting Discharge (mobility/balance): Pt requires CGA/Hernán for functional transfers/mobility    Functional Outcome Measure: The patient scored 55 on the Barthel index outcome measure which is indicative of partial dependence. Other factors to consider for discharge: decline from functional baseline, time since onset, PLOF     Patient will benefit from skilled therapy intervention to address the above noted impairments. PLAN :  Recommendations and Planned Interventions: bed mobility training, transfer training, gait training, therapeutic exercises, neuromuscular re-education, patient and family training/education, and therapeutic activities      Frequency/Duration: Patient will be followed by physical therapy:  5 times a week to address goals.     Recommendation for discharge: (in order for the patient to meet his/her long term goals)  To be determined: IPR vs HHPT with family care pending progress (has 3 stairs with no HR)    This discharge recommendation:  Has been made in collaboration with the attending provider and/or case management    IF patient discharges home will need the following DME: to be determined (TBD)         SUBJECTIVE:   Patient stated  I want to sit up on the chair     OBJECTIVE DATA SUMMARY:   HISTORY:    Past Medical History:   Diagnosis Date    Anemia associated with chronic renal failure     Anxiety and depression     BPH (benign prostatic hyperplasia)     CAD (coronary artery disease)     CHF NYHA class I, chronic, diastolic (HCC)     Chronic pain     DM type 2 causing renal disease ([facility] [address]     DM type 2 causing vascular disease ([facility] [address]     ESRD on dialysis ([facility] [address]     GERD (gastroesophageal reflux disease)     HTN (hypertension)     Hyperlipidemia     Thrombocytopenia (HCC)      Past Surgical History:   Procedure Laterality Date    HX ARTERIAL BYPASS      HX OTHER SURGICAL      5th toe Metatarsal amputation 12/2017     IR INSERT NON TUNL CVC OVER 5 YRS  11/19/2021    IR INSERT TUNL CVC W/O PORT OVER 5 YR  11/24/2021       Personal factors and/or comorbidities impacting plan of care:     Home Situation  Home Environment: Private residence  # Steps to Enter: 3  Rails to Enter: No  Wheelchair Ramp: No  One/Two Story Residence: One story  Living Alone: No  Support Systems: Spouse/Significant Other, Child(fidelia)  Patient Expects to be Discharged to[de-identified] Home with outpatient services  Current DME Used/Available at Home: [address] [facility], [address]    EXAMINATION/PRESENTATION/DECISION MAKING:   Critical Behavior:  Neurologic State: Alert  Orientation Level: Oriented X4  Cognition: Follows commands     Hearing:     Skin:  dressing present on R foot    Range Of Motion:  AROM: Generally decreased, functional (R foot)    Strength:    Strength: Generally decreased, functional (3+/5 grossly R LE, L Le - WFL)    Tone & Sensation:   Tone: Normal    Sensation: Intact    Functional Mobility:  Bed Mobility:  Rolling: Modified independent  Supine to Sit: Stand-by assistance     Scooting: Stand-by assistance  Transfers:  Sit to Stand: Contact guard assistance;Minimum assistance  Stand to Sit: Contact guard assistance;Minimum assistance        Bed to Chair: Contact guard assistance    Balance:   Sitting: Intact; Without support  Standing: Impaired; Without support  Standing - Static: Good;Constant support  Standing - Dynamic : Fair;Constant support  Ambulation/Gait Training:  Distance (ft): 12 Feet (ft)  Assistive Device: Walker, rolling;Gait belt  Ambulation - Level of Assistance: Contact guard assistance    Right Side Weight Bearing: Non-weight bearing    Functional Measure:  Barthel Index:    Bathin  Bladder: 5  Bowels: 5  Groomin  Dressing: 10  Feeding: 10  Mobility: 0  Stairs: 0  Toilet Use: 5  Transfer (Bed to Chair and Back): 10  Total: 55/100       The Barthel ADL Index: Guidelines  1. The index should be used as a record of what a patient does, not as a record of what a patient could do. 2. The main aim is to establish degree of independence from any help, physical or verbal, however minor and for whatever reason. 3. The need for supervision renders the patient not independent. 4. A patient's performance should be established using the best available evidence. Asking the patient, friends/relatives and nurses are the usual sources, but direct observation and common sense are also important. However direct testing is not needed. 5. Usually the patient's performance over the preceding 24-48 hours is important, but occasionally longer periods will be relevant. 6. Middle categories imply that the patient supplies over 50 per cent of the effort. 7. Use of aids to be independent is allowed. Score Interpretation (from [address] [address])    Independent   60-79 Minimally independent   40-59 Partially dependent   20-39 Very dependent   <20 Totally dependent     -Kalyan Lyn., Barthel, D.W. (1965). Functional evaluation: the Barthel Index. [address] [facility] ([address] (1997). The Barthel activities of daily living index: self-reporting versus actual performance in the old (> or = 75 years). Journal of [address] [place] [address] 45(7), [facility], J.J.M.F, Leo Del Rio., Hazel Xavier. (1999). Measuring the change in disability after inpatient rehabilitation; comparison of the responsiveness of the Barthel Index and Functional [place] Measure. Journal of Neurology, Neurosurgery, and Psychiatry, 66(4), 307-072.   -PERFECTO Almodovar, MATT Brown, Sheila Ruggiero, M.A. (2004) Assessment of post-stroke quality of life in cost-effectiveness studies: The usefulness of the Barthel Index and the EuroQoL-5D. Quality of Life Research, 15, 890-14        Physical Therapy Evaluation Charge Determination   History Examination Presentation Decision-Making   MEDIUM  Complexity : 1-2 comorbidities / personal factors will impact the outcome/ POC  MEDIUM Complexity : 3 Standardized tests and measures addressing body structure, function, activity limitation and / or participation in recreation  LOW Complexity : Stable, uncomplicated  Other outcome measures Barthel Index  MEDIUM      Based on the above components, the patient evaluation is determined to be of the following complexity level: LOW     Pain Ratin/10    Activity Tolerance:   Fair    After treatment patient left in no apparent distress:   Sitting in chair, Heels elevated for pressure relief, Call bell within reach, and Bed / chair alarm activated    COMMUNICATION/EDUCATION:   The patients plan of care was discussed with: Registered nurse. Fall prevention education was provided and the patient/caregiver indicated understanding. and Patient/family agree to work toward stated goals and plan of care.     Thank you for this referral.  Nicole Joy   Time Calculation: 43 mins

## 2022-07-25 NOTE — PROGRESS NOTES
[address] [facility]  YOB: 1960          Assessment & Plan:     ESRD on HD [facility] Lilibeth Chandra, Ede Jorge Pearce)  DFI  DM2  HTN  CAD  Anemia    Rec:   Tanner HD well  HD MWF  Abx per ID  RANDY with HD       Subjective:   CC: fu ESRD  HPI: Seen on HD tanner well  ROS: Back pain  Current Facility-Administered Medications   Medication Dose Route Frequency    insulin glargine (LANTUS) injection 30 Units  30 Units SubCUTAneous QHS    aspirin delayed-release tablet 81 mg  81 mg Oral DAILY    insulin lispro (HUMALOG) injection   SubCUTAneous AC&HS    epoetin ericka-epbx (RETACRIT) injection 10,000 Units  10,000 Units IntraVENous DIALYSIS MON, WED & FRI    HYDROmorphone (DILAUDID) syringe 0.5 mg  0.5 mg IntraVENous Q4H PRN    sodium chloride (NS) flush 5-40 mL  5-40 mL IntraVENous Q8H    sodium chloride (NS) flush 5-40 mL  5-40 mL IntraVENous PRN    acetaminophen (TYLENOL) tablet 650 mg  650 mg Oral Q6H PRN    Or    acetaminophen (TYLENOL) suppository 650 mg  650 mg Rectal Q6H PRN    polyethylene glycol (MIRALAX) packet 17 g  17 g Oral DAILY PRN    ondansetron (ZOFRAN ODT) tablet 4 mg  4 mg Oral Q8H PRN    Or    ondansetron (ZOFRAN) injection 4 mg  4 mg IntraVENous Q6H PRN    glucose chewable tablet 16 g  4 Tablet Oral PRN    glucagon (GLUCAGEN) injection 1 mg  1 mg IntraMUSCular PRN    dextrose 10% infusion 0-250 mL  0-250 mL IntraVENous PRN    cefepime (MAXIPIME) 1 g in 0.9% sodium chloride (MBP/ADV) 50 mL MBP  1 g IntraVENous Q24H    metroNIDAZOLE (FLAGYL) IVPB premix 500 mg  500 mg IntraVENous Q12H    busPIRone (BUSPAR) tablet 10 mg  10 mg Oral BID    carvediloL (COREG) tablet 12.5 mg  12.5 mg Oral BID WITH MEALS    docusate sodium (COLACE) capsule 100 mg  100 mg Oral BID    ferrous sulfate tablet 325 mg  1 Tablet Oral DAILY    furosemide (LASIX) tablet 80 mg  80 mg Oral DAILY    gabapentin (NEURONTIN) capsule 300 mg  300 mg Oral DAILY    isosorbide mononitrate ER (IMDUR) tablet 60 mg  60 mg Oral DAILY    pantoprazole (PROTONIX) tablet 40 mg  40 mg Oral ACB    sevelamer carbonate (RENVELA) tab 800 mg  800 mg Oral TID WITH MEALS    sucralfate (CARAFATE) tablet 1 g  1 g Oral TIDAC    tamsulosin (FLOMAX) capsule 0.4 mg  0.4 mg Oral QHS    Vancomycin- Pharmacy dosing by levels   Other Rx Dosing/Monitoring    hydrALAZINE (APRESOLINE) tablet 50 mg  50 mg Oral TID    amLODIPine (NORVASC) tablet 10 mg  10 mg Oral DAILY          Objective:     Vitals:  Blood pressure (!) 155/72, pulse 62, temperature 98.5 °F (36.9 °C), resp. rate 16, height 5' 8\" (1.727 m), weight 71.5 kg (157 lb 10.1 oz), SpO2 91 %. Temp (24hrs), Av °F (36.7 °C), Min:97.8 °F (36.6 °C), Max:98.5 °F (36.9 °C)      Intake and Output:  701 - 1900  In: -   Out: 2000 [Urine:200]  1901 -  07  In: 1200 [P.O.:750; I.V.:450]  Out: 550 [Urine:550]    Physical Exam:               GENERAL ASSESSMENT: NAD  NECK: IJ PC  CHEST: CTA  HEART: S1S2  ABDOMEN: Soft,NT  EXTREMITY: no EDEMA  NEURO: Grossly non focal          ECG/rhythm:    Data Review      No results for input(s): TNIPOC in the last 72 hours. No lab exists for component: ITNL   No results for input(s): CPK, CKMB, TROIQ in the last 72 hours. Recent Labs     22  0306 22  0222 22  0344   * 133* 136   K 3.6 3.6 3.2*   CL 99 97 101   CO2 26 25 30   BUN 39* 31* 17   CREA 4.64* 3.62* 2.47*   * 286* 96   CA 8.2* 8.4* 8.4*   ALB  --  2.3* 2.2*   WBC 6.3 9.0 8.2   HGB 7.6* 7.9* 7.9*   HCT 24.7* 25.3* 25.4*    170 160        No results for input(s): INR, PTP, APTT, INREXT, INREXT in the last 72 hours. Needs: urine analysis, urine sodium, protein and creatinine  Lab Results   Component Value Date/Time    Creatinine, urine 72.2 2019 09:36 AM           : Marie Escoto MD  2022        [facility]:  [email or web address]. [employer])  [place] office:  [address] [facility], [address]  [place], [address] [facility]  Phone: [phone]  Fax :     [phone]    [place] office:  [address] [facility]  [place], [address] [place] [address]  Phone - [phone]  Fax - [phone]

## 2022-07-25 NOTE — PROGRESS NOTES
Problem: Diabetes Self-Management  Goal: *Using medications safely  Description: State effect of diabetes medications on diabetes; name diabetes medication taking, action and side effects. Outcome: Progressing Towards Goal     Problem: Pressure Injury - Risk of  Goal: *Prevention of pressure injury  Description: Document Erik Scale and appropriate interventions in the flowsheet. Outcome: Progressing Towards Goal  Note: Pressure Injury Interventions:  Sensory Interventions: Assess changes in LOC, Assess need for specialty bed, Check visual cues for pain    Moisture Interventions: Absorbent underpads, Minimize layers    Activity Interventions: Assess need for specialty bed    Mobility Interventions: Assess need for specialty bed, HOB 30 degrees or less    Nutrition Interventions: Document food/fluid/supplement intake, Offer support with meals,snacks and hydration    Friction and Shear Interventions: HOB 30 degrees or less, Minimize layers                Problem: Falls - Risk of  Goal: *Absence of Falls  Description: Document Michelle Fall Risk and appropriate interventions in the flowsheet.   Outcome: Progressing Towards Goal  Note: Fall Risk Interventions:  Mobility Interventions: Bed/chair exit alarm, Patient to call before getting OOB         Medication Interventions: Patient to call before getting OOB, Teach patient to arise slowly    Elimination Interventions: Bed/chair exit alarm, Call light in reach, Stay With Me (per policy), Urinal in reach

## 2022-07-25 NOTE — PROCEDURES
Hemodialysis / [phone]    Vitals Pre Post Assessment Pre Post   BP BP: (!) 140/81 (07/25/22 0731)   155/68 LOC Alert, awake, conscious and coherent Alert, awake, conscious and coherent   HR Pulse (Heart Rate): 60 (07/25/22 0731) 62 Lungs Clear, no SOB Clear, no SOB   Resp Resp Rate: 16 (07/25/22 0731) 16 Cardiac  Regular rate and rythm Regular rate and rythm   Temp Temp: 98 °F (36.7 °C) (07/25/22 0731) 98.5 Skin Intact, dry and warm ntact, dry and warm   Weight  NA NA Edema No edema No edema   Tele status Machine Machine Pain Pain Intensity 1: 0 (07/25/22 0405) No pain     Orders   Duration: Start: 0725 End: 1125 Total: 4 hours   Dialyzer: Dialyzer/Set Up Inspection: Ramona Carmen (07/25/22 0731)   Sushil Prakash Bath: Dialysate K (mEq/L): 2 (07/25/22 0731)   Ca Bath: Dialysate CA (mEq/L): 2.5 (07/25/22 0731)   Na: Dialysate NA (mEq/L): 138 (07/25/22 0731)   Bicarb: Dialysate HCO3 (mEq/L): 35 (07/25/22 0731)   Target Fluid Removal: Goal/Amount of Fluid to Remove (mL): 1000 mL (07/25/22 0731)     Access   Type & Location: Right CVC   Comments:             No signs and symptoms of infection noted, each catheter limb are patent, caps removed and hubs disinfected as per p&p.                                Labs   HBsAg (Antigen) / date:  Negative 07/16/22                                             HBsAb (Antibody) / date:  Susceptible 07/16/22   Source: Epic   Obtained/Reviewed  Critical Results Called HGB   Date Value Ref Range Status   07/25/2022 7.6 (L) 12.1 - 17.0 g/dL Final     Potassium   Date Value Ref Range Status   07/25/2022 3.6 3.5 - 5.1 mmol/L Final     Calcium   Date Value Ref Range Status   07/25/2022 8.2 (L) 8.5 - 10.1 MG/DL Final     BUN   Date Value Ref Range Status   07/25/2022 39 (H) 6 - 20 MG/DL Final     Creatinine   Date Value Ref Range Status   07/25/2022 4.64 (H) 0.70 - 1.30 MG/DL Final     Comment:     INVESTIGATED PER DELTA CHECK PROTOCOL        Meds Given   Name Dose Route   None                 Adequacy / Fluid    Total Liters Process: 88.6 L   Net Fluid Removed: 1800 mL      Comments   Time Out Done:   (Time) 0715   Admitting Diagnosis: Open wound on right heel   Consent obtained/signed: Informed Consent Verified: Yes (07/25/22 0731)   Machine / RO # Machine Number: [id number] (07/25/22 0920)   Primary Nurse Rpt Pre: Shirley Reyes RN   Primary Nurse Rpt Post:  Yana Robles RN   Pt Education: Procedure   Care Plan: HD POC   Pts outpatient clinic: [facility] Summary   Comments:      Dialysis order, medication, labs and consent verified pre dialysis. Time Out, ICEBOAT done pre dialysis. 4960: SBAR received from Primary Nurse, patient is on stable condition upon endorsement. CVC clean aseptically as per policy, no signs of infection, dressing not due for change, both lumen are patent. 0725: Treatment started, safety checks done, vitals signs are stable upon initiation of treatment. 1125: Treatment ended. Blood returned as per policy. Vital  signs stable the whole treatment. SBAR given to Primary nurse.

## 2022-07-25 NOTE — PROGRESS NOTES
Bedside shift change report given to Broderick Hicks RN (oncoming nurse) by Marissa Copeland (offgoing nurse). Report included the following information SBAR, Kardex, Intake/Output, MAR, and Recent Results.

## 2022-07-25 NOTE — PROGRESS NOTES
PT eval attempted. However, pt undergoing dialysis at this time. PT will reattempt for PT eval at a later time. Thanks .

## 2022-07-26 LAB
ANION GAP SERPL CALC-SCNC: 5 MMOL/L (ref 5–15)
BACTERIA SPEC CULT: ABNORMAL
BACTERIA SPEC CULT: ABNORMAL
BACTERIA SPEC CULT: NORMAL
BUN SERPL-MCNC: 26 MG/DL (ref 6–20)
BUN/CREAT SERPL: 9 (ref 12–20)
CALCIUM SERPL-MCNC: 8 MG/DL (ref 8.5–10.1)
CHLORIDE SERPL-SCNC: 102 MMOL/L (ref 97–108)
CO2 SERPL-SCNC: 29 MMOL/L (ref 21–32)
CREAT SERPL-MCNC: 2.88 MG/DL (ref 0.7–1.3)
ERYTHROCYTE [DISTWIDTH] IN BLOOD BY AUTOMATED COUNT: 14.7 % (ref 11.5–14.5)
GLUCOSE BLD STRIP.AUTO-MCNC: 123 MG/DL (ref 65–117)
GLUCOSE BLD STRIP.AUTO-MCNC: 153 MG/DL (ref 65–117)
GLUCOSE BLD STRIP.AUTO-MCNC: 180 MG/DL (ref 65–117)
GLUCOSE BLD STRIP.AUTO-MCNC: 273 MG/DL (ref 65–117)
GLUCOSE SERPL-MCNC: 162 MG/DL (ref 65–100)
GRAM STN SPEC: ABNORMAL
GRAM STN SPEC: ABNORMAL
HCT VFR BLD AUTO: 25.1 % (ref 36.6–50.3)
HGB BLD-MCNC: 7.8 G/DL (ref 12.1–17)
MCH RBC QN AUTO: 28.1 PG (ref 26–34)
MCHC RBC AUTO-ENTMCNC: 31.1 G/DL (ref 30–36.5)
MCV RBC AUTO: 90.3 FL (ref 80–99)
NRBC # BLD: 0 K/UL (ref 0–0.01)
NRBC BLD-RTO: 0 PER 100 WBC
PLATELET # BLD AUTO: 192 K/UL (ref 150–400)
PMV BLD AUTO: 10.8 FL (ref 8.9–12.9)
POTASSIUM SERPL-SCNC: 3.4 MMOL/L (ref 3.5–5.1)
RBC # BLD AUTO: 2.78 M/UL (ref 4.1–5.7)
SERVICE CMNT-IMP: ABNORMAL
SERVICE CMNT-IMP: NORMAL
SODIUM SERPL-SCNC: 136 MMOL/L (ref 136–145)
WBC # BLD AUTO: 5.8 K/UL (ref 4.1–11.1)

## 2022-07-26 PROCEDURE — 36415 COLL VENOUS BLD VENIPUNCTURE: CPT

## 2022-07-26 PROCEDURE — 74011250636 HC RX REV CODE- 250/636: Performed by: PODIATRIST

## 2022-07-26 PROCEDURE — 94761 N-INVAS EAR/PLS OXIMETRY MLT: CPT

## 2022-07-26 PROCEDURE — 74011000250 HC RX REV CODE- 250: Performed by: PODIATRIST

## 2022-07-26 PROCEDURE — 65270000029 HC RM PRIVATE

## 2022-07-26 PROCEDURE — 74011250637 HC RX REV CODE- 250/637: Performed by: PODIATRIST

## 2022-07-26 PROCEDURE — 74011636637 HC RX REV CODE- 636/637

## 2022-07-26 PROCEDURE — 99232 SBSQ HOSP IP/OBS MODERATE 35: CPT | Performed by: FAMILY MEDICINE

## 2022-07-26 PROCEDURE — 74011000258 HC RX REV CODE- 258: Performed by: PODIATRIST

## 2022-07-26 PROCEDURE — 80048 BASIC METABOLIC PNL TOTAL CA: CPT

## 2022-07-26 PROCEDURE — 99232 SBSQ HOSP IP/OBS MODERATE 35: CPT | Performed by: INTERNAL MEDICINE

## 2022-07-26 PROCEDURE — 97116 GAIT TRAINING THERAPY: CPT

## 2022-07-26 PROCEDURE — 85027 COMPLETE CBC AUTOMATED: CPT

## 2022-07-26 PROCEDURE — 97530 THERAPEUTIC ACTIVITIES: CPT

## 2022-07-26 PROCEDURE — 74011636637 HC RX REV CODE- 636/637: Performed by: STUDENT IN AN ORGANIZED HEALTH CARE EDUCATION/TRAINING PROGRAM

## 2022-07-26 PROCEDURE — 82962 GLUCOSE BLOOD TEST: CPT

## 2022-07-26 RX ADMIN — HYDROMORPHONE HYDROCHLORIDE 0.5 MG: 1 INJECTION, SOLUTION INTRAMUSCULAR; INTRAVENOUS; SUBCUTANEOUS at 22:11

## 2022-07-26 RX ADMIN — ACETAMINOPHEN 650 MG: 325 TABLET ORAL at 12:21

## 2022-07-26 RX ADMIN — BUSPIRONE HYDROCHLORIDE 10 MG: 10 TABLET ORAL at 09:28

## 2022-07-26 RX ADMIN — ISOSORBIDE MONONITRATE 60 MG: 30 TABLET, EXTENDED RELEASE ORAL at 09:29

## 2022-07-26 RX ADMIN — SEVELAMER CARBONATE 800 MG: 800 TABLET, FILM COATED ORAL at 09:28

## 2022-07-26 RX ADMIN — AMLODIPINE BESYLATE 10 MG: 5 TABLET ORAL at 09:28

## 2022-07-26 RX ADMIN — BUSPIRONE HYDROCHLORIDE 10 MG: 10 TABLET ORAL at 17:13

## 2022-07-26 RX ADMIN — DOCUSATE SODIUM 100 MG: 100 CAPSULE, LIQUID FILLED ORAL at 17:13

## 2022-07-26 RX ADMIN — SEVELAMER CARBONATE 800 MG: 800 TABLET, FILM COATED ORAL at 17:08

## 2022-07-26 RX ADMIN — Medication 10 ML: at 05:55

## 2022-07-26 RX ADMIN — HYDRALAZINE HYDROCHLORIDE 50 MG: 25 TABLET ORAL at 22:11

## 2022-07-26 RX ADMIN — Medication 10 ML: at 22:13

## 2022-07-26 RX ADMIN — HYDRALAZINE HYDROCHLORIDE 50 MG: 25 TABLET ORAL at 17:08

## 2022-07-26 RX ADMIN — ACETAMINOPHEN 650 MG: 325 TABLET ORAL at 05:55

## 2022-07-26 RX ADMIN — Medication 3 UNITS: at 12:16

## 2022-07-26 RX ADMIN — METRONIDAZOLE 500 MG: 500 INJECTION, SOLUTION INTRAVENOUS at 05:54

## 2022-07-26 RX ADMIN — HYDRALAZINE HYDROCHLORIDE 50 MG: 25 TABLET ORAL at 09:29

## 2022-07-26 RX ADMIN — CEFEPIME 1 G: 1 INJECTION, POWDER, FOR SOLUTION INTRAMUSCULAR; INTRAVENOUS at 00:27

## 2022-07-26 RX ADMIN — ASPIRIN 81 MG: 81 TABLET, COATED ORAL at 09:28

## 2022-07-26 RX ADMIN — HYDROMORPHONE HYDROCHLORIDE 0.5 MG: 1 INJECTION, SOLUTION INTRAMUSCULAR; INTRAVENOUS; SUBCUTANEOUS at 09:24

## 2022-07-26 RX ADMIN — HYDROMORPHONE HYDROCHLORIDE 0.5 MG: 1 INJECTION, SOLUTION INTRAMUSCULAR; INTRAVENOUS; SUBCUTANEOUS at 13:32

## 2022-07-26 RX ADMIN — SODIUM CHLORIDE, PRESERVATIVE FREE 10 ML: 5 INJECTION INTRAVENOUS at 13:32

## 2022-07-26 RX ADMIN — SUCRALFATE 1 G: 1 TABLET ORAL at 09:28

## 2022-07-26 RX ADMIN — SUCRALFATE 1 G: 1 TABLET ORAL at 12:16

## 2022-07-26 RX ADMIN — HYDROMORPHONE HYDROCHLORIDE 0.5 MG: 1 INJECTION, SOLUTION INTRAMUSCULAR; INTRAVENOUS; SUBCUTANEOUS at 03:30

## 2022-07-26 RX ADMIN — HYDROMORPHONE HYDROCHLORIDE 0.5 MG: 1 INJECTION, SOLUTION INTRAMUSCULAR; INTRAVENOUS; SUBCUTANEOUS at 17:13

## 2022-07-26 RX ADMIN — GABAPENTIN 300 MG: 300 CAPSULE ORAL at 09:28

## 2022-07-26 RX ADMIN — FERROUS SULFATE TAB 325 MG (65 MG ELEMENTAL FE) 325 MG: 325 (65 FE) TAB at 09:28

## 2022-07-26 RX ADMIN — CARVEDILOL 12.5 MG: 12.5 TABLET, FILM COATED ORAL at 09:29

## 2022-07-26 RX ADMIN — CARVEDILOL 12.5 MG: 12.5 TABLET, FILM COATED ORAL at 17:08

## 2022-07-26 RX ADMIN — TAMSULOSIN HYDROCHLORIDE 0.4 MG: 0.4 CAPSULE ORAL at 22:11

## 2022-07-26 RX ADMIN — SEVELAMER CARBONATE 800 MG: 800 TABLET, FILM COATED ORAL at 12:16

## 2022-07-26 RX ADMIN — DOCUSATE SODIUM 100 MG: 100 CAPSULE, LIQUID FILLED ORAL at 09:29

## 2022-07-26 RX ADMIN — Medication 3 UNITS: at 17:07

## 2022-07-26 RX ADMIN — SUCRALFATE 1 G: 1 TABLET ORAL at 17:08

## 2022-07-26 RX ADMIN — METRONIDAZOLE 500 MG: 500 INJECTION, SOLUTION INTRAVENOUS at 17:07

## 2022-07-26 RX ADMIN — INSULIN GLARGINE 30 UNITS: 100 INJECTION, SOLUTION SUBCUTANEOUS at 22:12

## 2022-07-26 RX ADMIN — PANTOPRAZOLE SODIUM 40 MG: 40 TABLET, DELAYED RELEASE ORAL at 09:29

## 2022-07-26 RX ADMIN — FUROSEMIDE 80 MG: 40 TABLET ORAL at 09:28

## 2022-07-26 RX ADMIN — Medication 4 UNITS: at 22:12

## 2022-07-26 NOTE — PROGRESS NOTES
Bedside and Verbal shift change report given to Kathy Chaap (oncoming nurse) by Magali Mayers RN (offgoing nurse). Report included the following information SBAR, Kardex, Procedure Summary, MAR, Accordion, and Recent Results.

## 2022-07-26 NOTE — PROGRESS NOTES
Problem: Mobility Impaired (Adult and Pediatric)  Goal: *Acute Goals and Plan of Care (Insert Text)  Description: FUNCTIONAL STATUS PRIOR TO ADMISSION: Patient was independent and active without use of DME.    HOME SUPPORT PRIOR TO ADMISSION: The patient lived with spouse but did not require assist.    Physical Therapy Goals  Initiated 7/25/2022  1. Patient will move from supine to sit and sit to supine  in bed with modified independence within 7 day(s). 2.  Patient will transfer from bed to chair and chair to bed with supervision/set-up using the least restrictive device within 7 day(s). 3.  Patient will perform sit to stand with supervision/set-up within 7 day(s). 4.  Patient will ambulate with supervision/set-up for 50 feet with the least restrictive device within 7 day(s). 5.  Patient will ascend/descend 3 stairs with b/l handrail(s) with minimal assistance/contact guard assist within 7 day(s). Note:   PHYSICAL THERAPY TREATMENT  Patient: Maxi Lerner (26 y.o. male)  Date: 7/26/2022  Diagnosis: Open wound of right heel [S91.301A] Osteomyelitis of ankle or foot, acute, right (HCC)  Procedure(s) (LRB):  RIGHT HEEL DEBRIDEMENT, PARTIAL EXCISION OF BONE (Right) 3 Days Post-Op  Precautions:    Chart, physical therapy assessment, plan of care and goals were reviewed. ASSESSMENT  Patient continues with skilled PT services. Pt supine to sit to stand with CGA. Pt ambulated 7ft x 2 with RW CGA NWB on right LE. Pt struggled to maintain NWB at distance past 10FT. Pt returned to sitting. Pt fatigues rather quickly with mobility limiting distance. Pt reports 3 steps but no rails to enter home. Pt also states no one at home during the day. Recommendation is SNF for Rehab or Home health with supervision or assistance. PLAN :  Patient continues to benefit from skilled intervention to address the above impairments. Continue treatment per established plan of care. to address goals.     Recommendation for discharge: (in order for the patient to meet his/her long term goals)  SNF for Rehab or Home health supervision or assistance for safety    This discharge recommendation:  Has been made in collaboration with the attending provider and/or case management    IF patient discharges home will need the following DME: rolling walker       SUBJECTIVE:       OBJECTIVE DATA SUMMARY:   Critical Behavior:  Neurologic State: Alert  Orientation Level: Oriented X4  Cognition: Follows commands     Functional Mobility Training:  Bed Mobility:     Supine to Sit: Contact guard assistance              Transfers:  Sit to Stand: Contact guard assistance  Stand to Sit: Contact guard assistance                             Balance:  Sitting: Intact  Standing: With support  Standing - Static: Fair  Ambulation/Gait Training:  Distance (ft): 14 Feet (ft)  Assistive Device: Gait belt;Walker, rolling  Ambulation - Level of Assistance: Contact guard assistance                       Speed/Myla: Pace decreased (<100 feet/min)                  Activity Tolerance:   Fair    After treatment patient left in no apparent distress:   Sitting in chair    COMMUNICATION/COLLABORATION:   The patients plan of care was discussed with: Physical therapist.     Howie Burris PTA   Time Calculation: 23 mins

## 2022-07-26 NOTE — PROGRESS NOTES
[address] [facility] Medicine Service Daily Progress Note    Overnight Events: NAEO. SUBJECTIVE: Pt reports improvement in his R foot pain s/p surgical debridement. He also has some lower back pain and requests a heating pad. OBJECTIVE:    Vitals: Visit Vitals  BP (!) 161/72 (BP 1 Location: Right upper arm)   Pulse 61   Temp 98.2 °F (36.8 °C)   Resp 16   Ht 5' 8\" (1.727 m)   Wt 157 lb 10.1 oz (71.5 kg)   SpO2 96%   BMI 23.97 kg/m²     Physical Exam:  General: NAD. Respiratory: Lungs clear to auscultation bilaterally with no wheezing, crackles, rhonchi, or rales. Cardiovascular: Regular rate and rhythm. S1/S2 auscultated with no murmurs, rubs, or gallops. GI: Non-tender to palpation. Non-distended. + Bowel sounds throughout. Extremities: Right ankle wrapped in bandage with heel protector in place. LLE w/ no edema or tenderness. Skin: Warm, dry.   Neuro: Alert and oriented    I/O:  Date 07/25/22 0700 - 07/26/22 0659 07/26/22 0700 - 07/27/22 0659   Shift 0378-1186 4944-8965 24 Hour Total 6061-7662 0812-1971 24 Hour Total   INTAKE   Shift Total(mL/kg)         OUTPUT   Urine(mL/kg/hr) 200(0.2) 1100(1.3) 1300(0.8)        Urine Voided 200 1100 1300      Dialysis 1800  1800        NET Fluid Removed (mL) 1800  1800      Shift Total(mL/kg) 2000(28) 1100(15.4) 3100(43.4)      NET -2000 -1100 -3100      Weight (kg) 71.5 71.5 71.5 71.5 71.5 71.5         Inpatient Medications  Current Facility-Administered Medications   Medication Dose Route Frequency    insulin glargine (LANTUS) injection 30 Units  30 Units SubCUTAneous QHS    aspirin delayed-release tablet 81 mg  81 mg Oral DAILY    insulin lispro (HUMALOG) injection   SubCUTAneous AC&HS    epoetin ericka-epbx (RETACRIT) injection 10,000 Units  10,000 Units IntraVENous DIALYSIS MON, WED & FRI    HYDROmorphone (DILAUDID) syringe 0.5 mg  0.5 mg IntraVENous Q4H PRN    sodium chloride (NS) flush 5-40 mL  5-40 mL IntraVENous Q8H    sodium chloride (NS) flush 5-40 mL  5-40 mL IntraVENous PRN    acetaminophen (TYLENOL) tablet 650 mg  650 mg Oral Q6H PRN    Or    acetaminophen (TYLENOL) suppository 650 mg  650 mg Rectal Q6H PRN    polyethylene glycol (MIRALAX) packet 17 g  17 g Oral DAILY PRN    ondansetron (ZOFRAN ODT) tablet 4 mg  4 mg Oral Q8H PRN    Or    ondansetron (ZOFRAN) injection 4 mg  4 mg IntraVENous Q6H PRN    glucose chewable tablet 16 g  4 Tablet Oral PRN    glucagon (GLUCAGEN) injection 1 mg  1 mg IntraMUSCular PRN    dextrose 10% infusion 0-250 mL  0-250 mL IntraVENous PRN    cefepime (MAXIPIME) 1 g in 0.9% sodium chloride (MBP/ADV) 50 mL MBP  1 g IntraVENous Q24H    metroNIDAZOLE (FLAGYL) IVPB premix 500 mg  500 mg IntraVENous Q12H    busPIRone (BUSPAR) tablet 10 mg  10 mg Oral BID    carvediloL (COREG) tablet 12.5 mg  12.5 mg Oral BID WITH MEALS    docusate sodium (COLACE) capsule 100 mg  100 mg Oral BID    ferrous sulfate tablet 325 mg  1 Tablet Oral DAILY    furosemide (LASIX) tablet 80 mg  80 mg Oral DAILY    gabapentin (NEURONTIN) capsule 300 mg  300 mg Oral DAILY    isosorbide mononitrate ER (IMDUR) tablet 60 mg  60 mg Oral DAILY    pantoprazole (PROTONIX) tablet 40 mg  40 mg Oral ACB    sevelamer carbonate (RENVELA) tab 800 mg  800 mg Oral TID WITH MEALS    sucralfate (CARAFATE) tablet 1 g  1 g Oral TIDAC    tamsulosin (FLOMAX) capsule 0.4 mg  0.4 mg Oral QHS    Vancomycin- Pharmacy dosing by levels   Other Rx Dosing/Monitoring    hydrALAZINE (APRESOLINE) tablet 50 mg  50 mg Oral TID    amLODIPine (NORVASC) tablet 10 mg  10 mg Oral DAILY       Allergies  No Known Allergies    CBC:  Recent Labs     07/26/22  0210 07/25/22  0306 07/24/22  0222   WBC 5.8 6.3 9.0   HGB 7.8* 7.6* 7.9*   HCT 25.1* 24.7* 25.3*    179 059         Metabolic Panel:  Recent Labs     07/26/22  0210 07/25/22  0306 07/24/22  0222    133* 133*   K 3.4* 3.6 3.6    99 97   CO2 29 26 25   BUN 26* 39* 31*   CREA 2.88* 4.64* 3.62*   * 242* 286*   CA 8.0* 8.2* 8.4*   ALB --   --  2.3*   ALT  --   --  12              Assessment and Plan     Jenni Carcamo is a 64 y.o. male with PMH of ESRD on HD MWF, T2DM, HTN, CAD s/p CABG (May 2020), HEFpEF, PEA arrest, and erosive gastritis who is admitted for osteomyelitis of the R calcaneus now s/p surgical debridement. Right calcaneal osteomyelitis: Demonstrated on MRI.  and CRP 15.2. Podiatry and ID following. Wound Cx: rare probably S. Aureus, Citrobacter, possible streptococcus. Anaerobic Cx: bacteroides pyogenes. Bcx: NGTD. ID recommending 6wks IV pending path results. - Continue Vanc, Cefepime and Flagyl  - Follow-up blood, wound, anaerobic cultures to guide antibiotic therapy  - Daily CBC, BMP     HTN: Chronic and uncontrolled. - Continue home hydralazine 50 mg TID, amlodipine 10 mg OD, carvedilol 12.5 mg BID     ESRD on HD: MWF. Follows Dr. Jackie Nevarez. - Nephrology following  - Continue home Sevelamer 800mg TID, Lasix 80 mg daily     Anemia: Chronic. Likely secondary to ESRD  - Continue home ferrous sulfate daily  - Monitor on daily CBC     T2DM: Chronic. Last A1c 7.9 (6/21/22). - Increase Lantus to 30 units daily  - SSI w/ glucose checks ACHS  - Hypoglycemia precautions     GERD: Chronic.  - Continue home Protonix 40 mg OD, Sucralfate 1 g TID     Constipation: Chronic.   - Continue Docusate, Miralax, Senna      CAD / HEFrEF : Echo 11/2021 EF 53% and reduced systolic function.  - Continue home carvedilol 12.5 mg BID, Imdur 60 mg OD, and aspirin 81 mg OD     HLD: Chronic.  - Continue home rosuvastatin 10 mg daily     Groin lymph node: Incidental 2.5 cm x 0.9 cm noted during previous admission.   - PCP: Follow up OP      BPH: Chronic  - Continue home Flomax 0.4 mg once nightly      Anxiety and depression: Chronic.  - Continue home Buspirone 10 mg BID        FEN/GI - Diabetic diet. Activity - Out of bed with assistance. DVT prophylaxis - SCDs  Disposition - Plan to d/c TBD. Consulting PT.   Code Status - Full, discussed with patient / caregivers. Next of Scarlet 69 Name and Adolph Rafael Francisco [address] (Spouse) [phone] ([place])      Sulaiman Manuel MD  Family Medicine Resident       For Billing    Chief Complaint   Patient presents with    [address] Problems  Date Reviewed: 7/12/2022            Codes Class Noted POA    * (Principal) Osteomyelitis of ankle or foot, acute, right ([facility] [address] ICD-10-CM: M86.171  ICD-9-CM: 730.07  7/25/2022 Unknown        Constipation ICD-10-CM: K59.00  ICD-9-CM: 564.00  7/25/2022 Unknown        Open wound of right heel ICD-10-CM: S91.301A  ICD-9-CM: 892.0  7/16/2022 Unknown        Anxiety and depression ICD-10-CM: F41.9, F32. A  ICD-9-CM: 300.00, 311  Unknown Yes        CHF NYHA class I, chronic, diastolic (HCC) [id number]: I50.32  ICD-9-CM: 428.32  Unknown Yes        BPH (benign prostatic hyperplasia) ICD-10-CM: N40.0  ICD-9-CM: 600.00  Unknown Yes        CAD (coronary artery disease) ICD-10-CM: I25.10  ICD-9-CM: 414.00  Unknown Yes        GERD (gastroesophageal reflux disease) ICD-10-CM: K21.9  ICD-9-CM: 530.81  Unknown Yes        HTN (hypertension) ICD-10-CM: I10  ICD-9-CM: 401.9  Unknown Yes        Hyperlipidemia ICD-10-CM: E78.5  ICD-9-CM: 272.4  Unknown Yes        Anemia associated with chronic renal failure ICD-10-CM: N18.9, D63.1  ICD-9-CM: 285.21  Unknown Yes        ESRD (end stage renal disease) on dialysis [facility]) ICD-10-CM: N18.6, Z99.2  ICD-9-CM: 585.6, V45.11  2/6/2022 Yes        Type 2 diabetes mellitus with diabetic neuropathy ([facility] [address] ICD-10-CM: E11.40  ICD-9-CM: 250.60, 357.2  8/5/2019 Yes

## 2022-07-26 NOTE — PROGRESS NOTES
Bedside and Verbal shift change report given to [address] Francisco Begum (oncoming nurse) by Ailyn Rodriguez (offgoing nurse). Report included the following information SBAR, Kardex, Intake/Output, MAR, and Recent Results.

## 2022-07-26 NOTE — PROGRESS NOTES
7/26/2022   CARE MANAGEMENT NOTE:  CM reviewed EMR. READMISSION:  Pt was admitted to [facility] from 7/16 - 7/19 with abd pain and SOB. He returned home with his wife. Pt was readmitted to [facility] on 7/21/22 with open wound of right heel. Reportedly, pt resides with his wife Roma Gracia (c [phone]) and three adult sons. RUR 23%     Transition Plan of Care:  ID, Podiatry following for medical management - pt is s/p partial right excision of bone calcaneus and flap closure on 7/23  PT eval complete; pt ambulated 12 feet on 7/25 and IPR vs HH was recommended. CM will continue to follow pt's rehab therapy progress  CM discussed rehab options; also per ID pt may need 6 wks of IV ABX. ESRD - HD on M,W,F at [facility] follow up  Family will transport pt home     CM will continue to follow pt until discharged.   Nestor

## 2022-07-26 NOTE — PROGRESS NOTES
Problem: Diabetes Self-Management  Goal: *Disease process and treatment process  Description: Define diabetes and identify own type of diabetes; list 3 options for treating diabetes. Outcome: Progressing Towards Goal  Goal: *Incorporating nutritional management into lifestyle  Description: Describe effect of type, amount and timing of food on blood glucose; list 3 methods for planning meals. Outcome: Progressing Towards Goal  Goal: *Incorporating physical activity into lifestyle  Description: State effect of exercise on blood glucose levels. Outcome: Progressing Towards Goal  Goal: *Developing strategies to promote health/change behavior  Description: Define the ABC's of diabetes; identify appropriate screenings, schedule and personal plan for screenings. Outcome: Progressing Towards Goal  Goal: *Using medications safely  Description: State effect of diabetes medications on diabetes; name diabetes medication taking, action and side effects. Outcome: Progressing Towards Goal  Goal: *Monitoring blood glucose, interpreting and using results  Description: Identify recommended blood glucose targets  and personal targets. Outcome: Progressing Towards Goal  Goal: *Prevention, detection, treatment of acute complications  Description: List symptoms of hyper- and hypoglycemia; describe how to treat low blood sugar and actions for lowering  high blood glucose level. Outcome: Progressing Towards Goal  Goal: *Prevention, detection and treatment of chronic complications  Description: Define the natural course of diabetes and describe the relationship of blood glucose levels to long term complications of diabetes.   Outcome: Progressing Towards Goal  Goal: *Developing strategies to address psychosocial issues  Description: Describe feelings about living with diabetes; identify support needed and support network  Outcome: Progressing Towards Goal  Goal: *Insulin pump training  Outcome: Progressing Towards Goal  Goal: *Sick day guidelines  Outcome: Progressing Towards Goal  Goal: *Patient Specific Goal (EDIT GOAL, INSERT TEXT)  Outcome: Progressing Towards Goal     Problem: Patient Education: Go to Patient Education Activity  Goal: Patient/Family Education  Outcome: Progressing Towards Goal     Problem: Pressure Injury - Risk of  Goal: *Prevention of pressure injury  Description: Document Erik Scale and appropriate interventions in the flowsheet. Outcome: Progressing Towards Goal  Note: Pressure Injury Interventions:  Sensory Interventions: Assess changes in LOC    Moisture Interventions: Absorbent underpads    Activity Interventions: PT/OT evaluation, Pressure redistribution bed/mattress(bed type)    Mobility Interventions: Pressure redistribution bed/mattress (bed type), PT/OT evaluation    Nutrition Interventions: Document food/fluid/supplement intake, Offer support with meals,snacks and hydration    Friction and Shear Interventions: Apply protective barrier, creams and emollients                Problem: Patient Education: Go to Patient Education Activity  Goal: Patient/Family Education  Outcome: Progressing Towards Goal     Problem: Falls - Risk of  Goal: *Absence of Falls  Description: Document Michelle Fall Risk and appropriate interventions in the flowsheet.   Outcome: Progressing Towards Goal  Note: Fall Risk Interventions:  Mobility Interventions: Bed/chair exit alarm, Communicate number of staff needed for ambulation/transfer, OT consult for ADLs, Patient to call before getting OOB         Medication Interventions: Bed/chair exit alarm, Evaluate medications/consider consulting pharmacy, Patient to call before getting OOB    Elimination Interventions: Bed/chair exit alarm, Call light in reach, Patient to call for help with toileting needs              Problem: Patient Education: Go to Patient Education Activity  Goal: Patient/Family Education  Outcome: Progressing Towards Goal     Problem: Patient Education: Go to Patient Education Activity  Goal: Patient/Family Education  Outcome: Progressing Towards Goal     Problem: Risk for Spread of Infection  Goal: Prevent transmission of infectious organism to others  Description: Prevent the transmission of infectious organisms to other patients, staff members, and visitors. Outcome: Progressing Towards Goal     Problem: Patient Education:  Go to Education Activity  Goal: Patient/Family Education  Outcome: Progressing Towards Goal     Problem: Falls - Risk of  Goal: *Absence of Falls  Description: Document Shelby Flow Fall Risk and appropriate interventions in the flowsheet. Outcome: Progressing Towards Goal  Note: Fall Risk Interventions:  Mobility Interventions: Bed/chair exit alarm, Communicate number of staff needed for ambulation/transfer, OT consult for ADLs, Patient to call before getting OOB         Medication Interventions: Bed/chair exit alarm, Evaluate medications/consider consulting pharmacy, Patient to call before getting OOB    Elimination Interventions: Bed/chair exit alarm, Call light in reach, Patient to call for help with toileting needs              Problem: Falls - Risk of  Goal: *Absence of Falls  Description: Document Joel Flow Fall Risk and appropriate interventions in the flowsheet. Outcome: Progressing Towards Goal  Note: Fall Risk Interventions:  Mobility Interventions: Bed/chair exit alarm, Communicate number of staff needed for ambulation/transfer, OT consult for ADLs, Patient to call before getting OOB         Medication Interventions: Bed/chair exit alarm, Evaluate medications/consider consulting pharmacy, Patient to call before getting OOB    Elimination Interventions: Bed/chair exit alarm, Call light in reach, Patient to call for help with toileting needs              Problem: Falls - Risk of  Goal: *Absence of Falls  Description: Document Joel Flow Fall Risk and appropriate interventions in the flowsheet.   Outcome: Progressing Towards Goal  Note: Fall Risk Interventions:  Mobility Interventions: Bed/chair exit alarm, Communicate number of staff needed for ambulation/transfer, OT consult for ADLs, Patient to call before getting OOB         Medication Interventions: Bed/chair exit alarm, Evaluate medications/consider consulting pharmacy, Patient to call before getting OOB    Elimination Interventions: Bed/chair exit alarm, Call light in reach, Patient to call for help with toileting needs              Problem: Diabetes Self-Management  Goal: *Disease process and treatment process  Description: Define diabetes and identify own type of diabetes; list 3 options for treating diabetes. Outcome: Progressing Towards Goal  Goal: *Incorporating nutritional management into lifestyle  Description: Describe effect of type, amount and timing of food on blood glucose; list 3 methods for planning meals. Outcome: Progressing Towards Goal  Goal: *Incorporating physical activity into lifestyle  Description: State effect of exercise on blood glucose levels. Outcome: Progressing Towards Goal  Goal: *Developing strategies to promote health/change behavior  Description: Define the ABC's of diabetes; identify appropriate screenings, schedule and personal plan for screenings. Outcome: Progressing Towards Goal  Goal: *Using medications safely  Description: State effect of diabetes medications on diabetes; name diabetes medication taking, action and side effects. Outcome: Progressing Towards Goal  Goal: *Monitoring blood glucose, interpreting and using results  Description: Identify recommended blood glucose targets  and personal targets. Outcome: Progressing Towards Goal  Goal: *Prevention, detection, treatment of acute complications  Description: List symptoms of hyper- and hypoglycemia; describe how to treat low blood sugar and actions for lowering  high blood glucose level.   Outcome: Progressing Towards Goal  Goal: *Prevention, detection and treatment of chronic complications  Description: Define the natural course of diabetes and describe the relationship of blood glucose levels to long term complications of diabetes. Outcome: Progressing Towards Goal  Goal: *Developing strategies to address psychosocial issues  Description: Describe feelings about living with diabetes; identify support needed and support network  Outcome: Progressing Towards Goal  Goal: *Insulin pump training  Outcome: Progressing Towards Goal  Goal: *Sick day guidelines  Outcome: Progressing Towards Goal  Goal: *Patient Specific Goal (EDIT GOAL, INSERT TEXT)  Outcome: Progressing Towards Goal     Problem: Patient Education: Go to Patient Education Activity  Goal: Patient/Family Education  Outcome: Progressing Towards Goal     Problem: Pressure Injury - Risk of  Goal: *Prevention of pressure injury  Description: Document Erik Scale and appropriate interventions in the flowsheet. Outcome: Progressing Towards Goal  Note: Pressure Injury Interventions:  Sensory Interventions: Assess changes in LOC    Moisture Interventions: Absorbent underpads    Activity Interventions: PT/OT evaluation, Pressure redistribution bed/mattress(bed type)    Mobility Interventions: Pressure redistribution bed/mattress (bed type), PT/OT evaluation    Nutrition Interventions: Document food/fluid/supplement intake, Offer support with meals,snacks and hydration    Friction and Shear Interventions: Apply protective barrier, creams and emollients                Problem: Patient Education: Go to Patient Education Activity  Goal: Patient/Family Education  Outcome: Progressing Towards Goal     Problem: Falls - Risk of  Goal: *Absence of Falls  Description: Document Michelle Fall Risk and appropriate interventions in the flowsheet.   Outcome: Progressing Towards Goal  Note: Fall Risk Interventions:  Mobility Interventions: Bed/chair exit alarm, Communicate number of staff needed for ambulation/transfer, OT consult for ADLs, Patient to call before getting OOB         Medication Interventions: Bed/chair exit alarm, Evaluate medications/consider consulting pharmacy, Patient to call before getting OOB    Elimination Interventions: Bed/chair exit alarm, Call light in reach, Patient to call for help with toileting needs              Problem: Patient Education: Go to Patient Education Activity  Goal: Patient/Family Education  Outcome: Progressing Towards Goal     Problem: Patient Education: Go to Patient Education Activity  Goal: Patient/Family Education  Outcome: Progressing Towards Goal     Problem: Risk for Spread of Infection  Goal: Prevent transmission of infectious organism to others  Description: Prevent the transmission of infectious organisms to other patients, staff members, and visitors.   Outcome: Progressing Towards Goal     Problem: Patient Education:  Go to Education Activity  Goal: Patient/Family Education  Outcome: Progressing Towards Goal

## 2022-07-26 NOTE — PROGRESS NOTES
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 Status post I&D with partial bone excision 2022. Cultures reveal a polymicrobial infection including MRSA, Pseudomonas, Citrobacter, Enterococcus, Pasteurella, Proteus, and anaerobes. Awaiting final sensitivities. .  Await pathology but I anticipate patient will need 6-week course of IV antibiotics at discharge. Depending on culture results hopefully this may be able to be administered on dialysis  End-stage renal disease on dialysis MWF  Diabetes mellitus. This is poorly controlled. Hemoglobin A1c is 7.9  Anemia. Hemoglobin 7.6. Management per primary team  Elevated alkaline phosphatase. Follow          Subjective:     No complaints    Objective:     Vitals: Visit Vitals  BP (!) 161/72 (BP 1 Location: Right upper arm) Comment: nurse notified   Pulse 61   Temp 98.2 °F (36.8 °C)   Resp 16   Ht 5' 8\" (1.727 m)   Wt 157 lb 10.1 oz (71.5 kg)   SpO2 96%   BMI 23.97 kg/m²        Tmax:  Temp (24hrs), Av.3 °F (36.8 °C), Min:98 °F (36.7 °C), Max:98.9 °F (37.2 °C)      Exam:   Patient is intubated:  no    Physical Examination:   General:  Alert, cooperative, no distress   Head:  Normocephalic, atraumatic. Eyes:  Conjunctivae clear   Neck: Supple       Lungs:   No distress. Chest wall:     Heart:     Abdomen:   non-distended   Extremities: Moves all. Foot dressed   Skin:  No rash. Neurologic: CNII-XII intact. Normal strength     Labs:        No lab exists for component: ITNL   No results for input(s): CPK, CKMB, TROIQ in the last 72 hours.   Recent Labs     22  0210 22  0306 22  0222    133* 133*   K 3.4* 3.6 3.6    99 97   CO2 29 26 25   BUN 26* 39* 31*   CREA 2.88* 4.64* 3.62*   * 242* 286*   ALB  --   --  2.3*   WBC 5.8 6.3 9.0 HGB 7.8* 7.6* 7.9*   HCT 25.1* 24.7* 25.3*    179 170     No results for input(s): INR, PTP, APTT, INREXT in the last 72 hours.   Needs: urine analysis, urine sodium, protein and creatinine  Lab Results   Component Value Date/Time    Creatinine, urine 72.2 11/22/2019 09:36 AM         Cultures:     No results found for: SDES  Lab Results   Component Value Date/Time    Culture result: (A) 07/23/2022 09:45 AM     RARE * Methicillin Resistant Staphylococcus aureus * REFER TO [id number] FOR SENSITIVITIES    Culture result: (A) 07/23/2022 09:45 AM     SCANT ENTEROCOCCUS SPECIES REFER TO [id number] FOR ID AND SENSITIVITIES    Culture result: LIGHT Bacteroides pyogenes BETA LACTAMASE POSITIVE (A) 07/23/2022 09:45 AM       Radiology:     Medications       Current Facility-Administered Medications   Medication Dose Route Frequency Last Admin    insulin glargine (LANTUS) injection 30 Units  30 Units SubCUTAneous QHS 30 Units at 07/25/22 2221    aspirin delayed-release tablet 81 mg  81 mg Oral DAILY 81 mg at 07/26/22 0928    insulin lispro (HUMALOG) injection   SubCUTAneous AC&HS 3 Units at 07/25/22 1612    epoetin ericka-epbx (RETACRIT) injection 10,000 Units  10,000 Units IntraVENous DIALYSIS MON, WED & FRI 10,000 Units at 07/25/22 2235    HYDROmorphone (DILAUDID) syringe 0.5 mg  0.5 mg IntraVENous Q4H PRN 0.5 mg at 07/26/22 0924    sodium chloride (NS) flush 5-40 mL  5-40 mL IntraVENous Q8H 10 mL at 07/26/22 0555    sodium chloride (NS) flush 5-40 mL  5-40 mL IntraVENous PRN      acetaminophen (TYLENOL) tablet 650 mg  650 mg Oral Q6H  mg at 07/26/22 0555    Or    acetaminophen (TYLENOL) suppository 650 mg  650 mg Rectal Q6H PRN      polyethylene glycol (MIRALAX) packet 17 g  17 g Oral DAILY PRN      ondansetron (ZOFRAN ODT) tablet 4 mg  4 mg Oral Q8H PRN      Or    ondansetron (ZOFRAN) injection 4 mg  4 mg IntraVENous Q6H PRN 4 mg at 07/22/22 2585    glucose chewable tablet 16 g  4 Tablet Oral PRN      glucagon (GLUCAGEN) injection 1 mg  1 mg IntraMUSCular PRN      dextrose 10% infusion 0-250 mL  0-250 mL IntraVENous PRN      cefepime (MAXIPIME) 1 g in 0.9% sodium chloride (MBP/ADV) 50 mL MBP  1 g IntraVENous Q24H 1 g at 07/26/22 0027    metroNIDAZOLE (FLAGYL) IVPB premix 500 mg  500 mg IntraVENous Q12H 500 mg at 07/26/22 0554    busPIRone (BUSPAR) tablet 10 mg  10 mg Oral BID 10 mg at 07/26/22 0928    carvediloL (COREG) tablet 12.5 mg  12.5 mg Oral BID WITH MEALS 12.5 mg at 07/26/22 0929    docusate sodium (COLACE) capsule 100 mg  100 mg Oral  mg at 07/26/22 0929    ferrous sulfate tablet 325 mg  1 Tablet Oral DAILY 325 mg at 07/26/22 0928    furosemide (LASIX) tablet 80 mg  80 mg Oral DAILY 80 mg at 07/26/22 0928    gabapentin (NEURONTIN) capsule 300 mg  300 mg Oral DAILY 300 mg at 07/26/22 0130    isosorbide mononitrate ER (IMDUR) tablet 60 mg  60 mg Oral DAILY 60 mg at 07/26/22 0929    pantoprazole (PROTONIX) tablet 40 mg  40 mg Oral ACB 40 mg at 07/26/22 0929    sevelamer carbonate (RENVELA) tab 800 mg  800 mg Oral TID WITH MEALS 800 mg at 07/26/22 0928    sucralfate (CARAFATE) tablet 1 g  1 g Oral TIDAC 1 g at 07/26/22 0928    tamsulosin (FLOMAX) capsule 0.4 mg  0.4 mg Oral QHS 0.4 mg at 07/25/22 2220    Vancomycin- Pharmacy dosing by levels   Other Rx Dosing/Monitoring      hydrALAZINE (APRESOLINE) tablet 50 mg  50 mg Oral TID 50 mg at 07/26/22 0929    amLODIPine (NORVASC) tablet 10 mg  10 mg Oral DAILY 10 mg at 07/26/22 9025           Case discussed with:      Bishop Fournier DO

## 2022-07-26 NOTE — PROGRESS NOTES
Spiritual Care Assessment/Progress Note  [address]      NAME: Anayeli Solomon      MRN: [id number]  AGE: 64 y.o.  SEX: male  Moravian Affiliation: Muslim   Language: English     7/26/2022     Total Time (in minutes): 5     Spiritual Assessment begun in [facility]  MED SURG 2 through conversation with:         [x]Patient        [] Family    [] Friend(s)        Reason for Consult: [facility]     Spiritual beliefs: (Please include comment if needed)     [x] Identifies with a jelly tradition:   Muslim      [] Supported by a jelly community:            [] Claims no spiritual orientation:           [] Seeking spiritual identity:                [] Adheres to an individual form of spirituality:           [] Not able to assess:                           Identified resources for coping:      [x] Prayer                               [x] Music                  [] Guided Imagery     [x] Family/friends                 [] Pet visits     [] Devotional reading                         [] Unknown     [] Other:                                            Interventions offered during this visit: (See comments for more details)    Patient Interventions: Affirmation of jelly, Catharsis/review of pertinent events in supportive environment, Communion (Muslim), Initial/Spiritual assessment, patient floor, Prayer (actual), Prayer (assurance of)           Plan of Care:     [x] Support spiritual and/or cultural needs    [] Support AMD and/or advance care planning process      [] Support grieving process   [] Coordinate Rites and/or Rituals    [] Coordination with community clergy   [] No spiritual needs identified at this time   [] Detailed Plan of Care below (See Comments)  [] Make referral to Music Therapy  [] Make referral to Pet Therapy     [] Make referral to Addiction services  [] Make referral to [facility]  [] Make referral to Spiritual Care Partner  [] No future visits requested        [] Contact Spiritual Care for further referrals     Comments: Mr. Patricia Fagan was recently in [facility] and said he went home for one day and had to come back. He said the plan is for a rehab for him to get the treatments he needs. Family at home works during the day so it makes is difficult for him to get around alone. Prayer and communion offered and included his intentions in the prayer.     MAGGY Castrejon, RN, ACSW, LCSW   Page:  [phone]

## 2022-07-26 NOTE — PROGRESS NOTES
[address] [facility]  YOB: 1960          Assessment & Plan:     ESRD on HD MWF [facility] Les, Ede Pearce)  DFI  DM2  HTN  CAD  Anemia    Rec: [facility] HD well  HD MWF  Abx per ID  RANDY with HD       Subjective:   CC: fu ESRD  HPI: HD yest tanner well.  No final plan for abx yet  ROS: No sob/n/v  Current Facility-Administered Medications   Medication Dose Route Frequency    insulin glargine (LANTUS) injection 30 Units  30 Units SubCUTAneous QHS    aspirin delayed-release tablet 81 mg  81 mg Oral DAILY    insulin lispro (HUMALOG) injection   SubCUTAneous AC&HS    epoetin ericka-epbx (RETACRIT) injection 10,000 Units  10,000 Units IntraVENous DIALYSIS MON, WED & FRI    HYDROmorphone (DILAUDID) syringe 0.5 mg  0.5 mg IntraVENous Q4H PRN    sodium chloride (NS) flush 5-40 mL  5-40 mL IntraVENous Q8H    sodium chloride (NS) flush 5-40 mL  5-40 mL IntraVENous PRN    acetaminophen (TYLENOL) tablet 650 mg  650 mg Oral Q6H PRN    Or    acetaminophen (TYLENOL) suppository 650 mg  650 mg Rectal Q6H PRN    polyethylene glycol (MIRALAX) packet 17 g  17 g Oral DAILY PRN    ondansetron (ZOFRAN ODT) tablet 4 mg  4 mg Oral Q8H PRN    Or    ondansetron (ZOFRAN) injection 4 mg  4 mg IntraVENous Q6H PRN    glucose chewable tablet 16 g  4 Tablet Oral PRN    glucagon (GLUCAGEN) injection 1 mg  1 mg IntraMUSCular PRN    dextrose 10% infusion 0-250 mL  0-250 mL IntraVENous PRN    cefepime (MAXIPIME) 1 g in 0.9% sodium chloride (MBP/ADV) 50 mL MBP  1 g IntraVENous Q24H    metroNIDAZOLE (FLAGYL) IVPB premix 500 mg  500 mg IntraVENous Q12H    busPIRone (BUSPAR) tablet 10 mg  10 mg Oral BID    carvediloL (COREG) tablet 12.5 mg  12.5 mg Oral BID WITH MEALS    docusate sodium (COLACE) capsule 100 mg  100 mg Oral BID    ferrous sulfate tablet 325 mg  1 Tablet Oral DAILY    furosemide (LASIX) tablet 80 mg  80 mg Oral DAILY    gabapentin (NEURONTIN) capsule 300 mg  300 mg Oral DAILY isosorbide mononitrate ER (IMDUR) tablet 60 mg  60 mg Oral DAILY    pantoprazole (PROTONIX) tablet 40 mg  40 mg Oral ACB    sevelamer carbonate (RENVELA) tab 800 mg  800 mg Oral TID WITH MEALS    sucralfate (CARAFATE) tablet 1 g  1 g Oral TIDAC    tamsulosin (FLOMAX) capsule 0.4 mg  0.4 mg Oral QHS    Vancomycin- Pharmacy dosing by levels   Other Rx Dosing/Monitoring    hydrALAZINE (APRESOLINE) tablet 50 mg  50 mg Oral TID    amLODIPine (NORVASC) tablet 10 mg  10 mg Oral DAILY          Objective:     Vitals:  Blood pressure (!) 173/79, pulse 60, temperature 97.6 °F (36.4 °C), resp. rate 22, height 5' 8\" (1.727 m), weight 71.5 kg (157 lb 10.1 oz), SpO2 93 %. Temp (24hrs), Av.2 °F (36.8 °C), Min:97.6 °F (36.4 °C), Max:98.9 °F (37.2 °C)      Intake and Output:  No intake/output data recorded. 1901 -  0700  In: 400 [P.O.:150; I.V.:250]  Out: 3100 [Urine:1300]    Physical Exam:               GENERAL ASSESSMENT: NAD  NECK: IJ PC  CHEST: Unlabored  ABDOMEN: Soft,NT  EXTREMITY: no EDEMA  NEURO: Grossly non focal          ECG/rhythm:    Data Review      No results for input(s): TNIPOC in the last 72 hours. No lab exists for component: ITNL   No results for input(s): CPK, CKMB, TROIQ in the last 72 hours. Recent Labs     22  0210 22  0306 22  0222    133* 133*   K 3.4* 3.6 3.6    99 97   CO2 29 26 25   BUN 26* 39* 31*   CREA 2.88* 4.64* 3.62*   * 242* 286*   CA 8.0* 8.2* 8.4*   ALB  --   --  2.3*   WBC 5.8 6.3 9.0   HGB 7.8* 7.6* 7.9*   HCT 25.1* 24.7* 25.3*    179 170        No results for input(s): INR, PTP, APTT, INREXT, INREXT in the last 72 hours. Needs: urine analysis, urine sodium, protein and creatinine  Lab Results   Component Value Date/Time    Creatinine, urine 72.2 2019 09:36 AM           : Jean-Paul Ott MD  2022        [facility]:  [email or web address] Web  Tali Daley office:  [address] [address] [facility], [address]  [place], [address] [facility]  Phone: [phone]  Fax :     [phone]    [place] office:  [address] [facility], [address] [place] [address] [address]  Phone - [phone]  Fax - [phone]

## 2022-07-27 LAB
ANION GAP SERPL CALC-SCNC: 7 MMOL/L (ref 5–15)
BACTERIA SPEC CULT: ABNORMAL
BUN SERPL-MCNC: 42 MG/DL (ref 6–20)
BUN/CREAT SERPL: 11 (ref 12–20)
CALCIUM SERPL-MCNC: 8.2 MG/DL (ref 8.5–10.1)
CHLORIDE SERPL-SCNC: 102 MMOL/L (ref 97–108)
CO2 SERPL-SCNC: 25 MMOL/L (ref 21–32)
CREAT SERPL-MCNC: 3.72 MG/DL (ref 0.7–1.3)
ERYTHROCYTE [DISTWIDTH] IN BLOOD BY AUTOMATED COUNT: 15.2 % (ref 11.5–14.5)
GLUCOSE BLD STRIP.AUTO-MCNC: 122 MG/DL (ref 65–117)
GLUCOSE BLD STRIP.AUTO-MCNC: 129 MG/DL (ref 65–117)
GLUCOSE BLD STRIP.AUTO-MCNC: 149 MG/DL (ref 65–117)
GLUCOSE BLD STRIP.AUTO-MCNC: 159 MG/DL (ref 65–117)
GLUCOSE BLD STRIP.AUTO-MCNC: 163 MG/DL (ref 65–117)
GLUCOSE SERPL-MCNC: 198 MG/DL (ref 65–100)
GRAM STN SPEC: ABNORMAL
GRAM STN SPEC: ABNORMAL
HCT VFR BLD AUTO: 25.5 % (ref 36.6–50.3)
HGB BLD-MCNC: 8.1 G/DL (ref 12.1–17)
MCH RBC QN AUTO: 28.2 PG (ref 26–34)
MCHC RBC AUTO-ENTMCNC: 31.8 G/DL (ref 30–36.5)
MCV RBC AUTO: 88.9 FL (ref 80–99)
NRBC # BLD: 0 K/UL (ref 0–0.01)
NRBC BLD-RTO: 0 PER 100 WBC
PLATELET # BLD AUTO: 184 K/UL (ref 150–400)
PMV BLD AUTO: 10.2 FL (ref 8.9–12.9)
POTASSIUM SERPL-SCNC: 3.7 MMOL/L (ref 3.5–5.1)
RBC # BLD AUTO: 2.87 M/UL (ref 4.1–5.7)
SERVICE CMNT-IMP: ABNORMAL
SODIUM SERPL-SCNC: 134 MMOL/L (ref 136–145)
WBC # BLD AUTO: 6.7 K/UL (ref 4.1–11.1)

## 2022-07-27 PROCEDURE — 74011250636 HC RX REV CODE- 250/636: Performed by: PODIATRIST

## 2022-07-27 PROCEDURE — 74011636637 HC RX REV CODE- 636/637

## 2022-07-27 PROCEDURE — 74011000250 HC RX REV CODE- 250: Performed by: STUDENT IN AN ORGANIZED HEALTH CARE EDUCATION/TRAINING PROGRAM

## 2022-07-27 PROCEDURE — 74011000258 HC RX REV CODE- 258: Performed by: PODIATRIST

## 2022-07-27 PROCEDURE — 94761 N-INVAS EAR/PLS OXIMETRY MLT: CPT

## 2022-07-27 PROCEDURE — 74011250636 HC RX REV CODE- 250/636: Performed by: FAMILY MEDICINE

## 2022-07-27 PROCEDURE — 97530 THERAPEUTIC ACTIVITIES: CPT

## 2022-07-27 PROCEDURE — 99232 SBSQ HOSP IP/OBS MODERATE 35: CPT | Performed by: INTERNAL MEDICINE

## 2022-07-27 PROCEDURE — 99232 SBSQ HOSP IP/OBS MODERATE 35: CPT | Performed by: FAMILY MEDICINE

## 2022-07-27 PROCEDURE — 74011250637 HC RX REV CODE- 250/637: Performed by: PODIATRIST

## 2022-07-27 PROCEDURE — 90935 HEMODIALYSIS ONE EVALUATION: CPT

## 2022-07-27 PROCEDURE — 85027 COMPLETE CBC AUTOMATED: CPT

## 2022-07-27 PROCEDURE — 36415 COLL VENOUS BLD VENIPUNCTURE: CPT

## 2022-07-27 PROCEDURE — 80048 BASIC METABOLIC PNL TOTAL CA: CPT

## 2022-07-27 PROCEDURE — 74011000250 HC RX REV CODE- 250: Performed by: PODIATRIST

## 2022-07-27 PROCEDURE — 97116 GAIT TRAINING THERAPY: CPT

## 2022-07-27 PROCEDURE — 82962 GLUCOSE BLOOD TEST: CPT

## 2022-07-27 PROCEDURE — 65270000029 HC RM PRIVATE

## 2022-07-27 PROCEDURE — 74011636637 HC RX REV CODE- 636/637: Performed by: STUDENT IN AN ORGANIZED HEALTH CARE EDUCATION/TRAINING PROGRAM

## 2022-07-27 RX ORDER — LIDOCAINE 4 G/100G
1 PATCH TOPICAL EVERY 24 HOURS
Status: DISCONTINUED | OUTPATIENT
Start: 2022-07-27 | End: 2022-07-29 | Stop reason: HOSPADM

## 2022-07-27 RX ADMIN — AMLODIPINE BESYLATE 10 MG: 5 TABLET ORAL at 09:20

## 2022-07-27 RX ADMIN — ASPIRIN 81 MG: 81 TABLET, COATED ORAL at 09:21

## 2022-07-27 RX ADMIN — Medication 3 UNITS: at 08:25

## 2022-07-27 RX ADMIN — METRONIDAZOLE 500 MG: 500 INJECTION, SOLUTION INTRAVENOUS at 17:04

## 2022-07-27 RX ADMIN — CEFEPIME 1 G: 1 INJECTION, POWDER, FOR SOLUTION INTRAMUSCULAR; INTRAVENOUS at 23:44

## 2022-07-27 RX ADMIN — TAMSULOSIN HYDROCHLORIDE 0.4 MG: 0.4 CAPSULE ORAL at 23:44

## 2022-07-27 RX ADMIN — HYDROMORPHONE HYDROCHLORIDE 0.5 MG: 1 INJECTION, SOLUTION INTRAMUSCULAR; INTRAVENOUS; SUBCUTANEOUS at 07:37

## 2022-07-27 RX ADMIN — METRONIDAZOLE 500 MG: 500 INJECTION, SOLUTION INTRAVENOUS at 05:59

## 2022-07-27 RX ADMIN — SEVELAMER CARBONATE 800 MG: 800 TABLET, FILM COATED ORAL at 16:59

## 2022-07-27 RX ADMIN — HYDRALAZINE HYDROCHLORIDE 50 MG: 25 TABLET ORAL at 15:38

## 2022-07-27 RX ADMIN — Medication 10 ML: at 23:45

## 2022-07-27 RX ADMIN — HYDROMORPHONE HYDROCHLORIDE 0.5 MG: 1 INJECTION, SOLUTION INTRAMUSCULAR; INTRAVENOUS; SUBCUTANEOUS at 03:25

## 2022-07-27 RX ADMIN — FUROSEMIDE 80 MG: 40 TABLET ORAL at 09:20

## 2022-07-27 RX ADMIN — CARVEDILOL 12.5 MG: 12.5 TABLET, FILM COATED ORAL at 09:23

## 2022-07-27 RX ADMIN — Medication 10 ML: at 05:59

## 2022-07-27 RX ADMIN — BUSPIRONE HYDROCHLORIDE 10 MG: 10 TABLET ORAL at 09:21

## 2022-07-27 RX ADMIN — SEVELAMER CARBONATE 800 MG: 800 TABLET, FILM COATED ORAL at 09:38

## 2022-07-27 RX ADMIN — ISOSORBIDE MONONITRATE 60 MG: 30 TABLET, EXTENDED RELEASE ORAL at 09:20

## 2022-07-27 RX ADMIN — HYDROMORPHONE HYDROCHLORIDE 0.5 MG: 1 INJECTION, SOLUTION INTRAMUSCULAR; INTRAVENOUS; SUBCUTANEOUS at 16:58

## 2022-07-27 RX ADMIN — DOCUSATE SODIUM 100 MG: 100 CAPSULE, LIQUID FILLED ORAL at 16:59

## 2022-07-27 RX ADMIN — CARVEDILOL 12.5 MG: 12.5 TABLET, FILM COATED ORAL at 16:59

## 2022-07-27 RX ADMIN — BUSPIRONE HYDROCHLORIDE 10 MG: 10 TABLET ORAL at 16:59

## 2022-07-27 RX ADMIN — HYDROMORPHONE HYDROCHLORIDE 0.5 MG: 1 INJECTION, SOLUTION INTRAMUSCULAR; INTRAVENOUS; SUBCUTANEOUS at 23:52

## 2022-07-27 RX ADMIN — SUCRALFATE 1 G: 1 TABLET ORAL at 09:20

## 2022-07-27 RX ADMIN — PANTOPRAZOLE SODIUM 40 MG: 40 TABLET, DELAYED RELEASE ORAL at 09:21

## 2022-07-27 RX ADMIN — HYDRALAZINE HYDROCHLORIDE 50 MG: 25 TABLET ORAL at 09:20

## 2022-07-27 RX ADMIN — SUCRALFATE 1 G: 1 TABLET ORAL at 16:59

## 2022-07-27 RX ADMIN — CEFEPIME 1 G: 1 INJECTION, POWDER, FOR SOLUTION INTRAMUSCULAR; INTRAVENOUS at 01:02

## 2022-07-27 RX ADMIN — Medication 3 UNITS: at 12:16

## 2022-07-27 RX ADMIN — INSULIN GLARGINE 30 UNITS: 100 INJECTION, SOLUTION SUBCUTANEOUS at 23:53

## 2022-07-27 RX ADMIN — SEVELAMER CARBONATE 800 MG: 800 TABLET, FILM COATED ORAL at 12:16

## 2022-07-27 RX ADMIN — VANCOMYCIN HYDROCHLORIDE 1250 MG: 1.25 INJECTION, POWDER, LYOPHILIZED, FOR SOLUTION INTRAVENOUS at 23:44

## 2022-07-27 RX ADMIN — HYDRALAZINE HYDROCHLORIDE 50 MG: 25 TABLET ORAL at 23:44

## 2022-07-27 RX ADMIN — SUCRALFATE 1 G: 1 TABLET ORAL at 12:16

## 2022-07-27 RX ADMIN — HYDROMORPHONE HYDROCHLORIDE 0.5 MG: 1 INJECTION, SOLUTION INTRAMUSCULAR; INTRAVENOUS; SUBCUTANEOUS at 12:24

## 2022-07-27 RX ADMIN — Medication 10 ML: at 15:38

## 2022-07-27 RX ADMIN — EPOETIN ALFA-EPBX 10000 UNITS: 10000 INJECTION, SOLUTION INTRAVENOUS; SUBCUTANEOUS at 21:04

## 2022-07-27 RX ADMIN — DOCUSATE SODIUM 100 MG: 100 CAPSULE, LIQUID FILLED ORAL at 09:22

## 2022-07-27 RX ADMIN — GABAPENTIN 300 MG: 300 CAPSULE ORAL at 09:22

## 2022-07-27 RX ADMIN — FERROUS SULFATE TAB 325 MG (65 MG ELEMENTAL FE) 325 MG: 325 (65 FE) TAB at 09:22

## 2022-07-27 RX ADMIN — Medication 3 UNITS: at 16:58

## 2022-07-27 NOTE — WOUND CARE
Wound : consulted for wound care on surgical site, instructed nurse to call Dr. Paty Fowler for orders.   Please re-consult if needed  Anthony Marvin RN

## 2022-07-27 NOTE — PROGRESS NOTES
Bedside shift change report given to Ronak (oncoming nurse) by Dmitri ruiz. Report included the following information SBAR.

## 2022-07-27 NOTE — PROCEDURES
Hemodialysis / [phone]    Vitals Pre Post Assessment Pre Post   BP BP: (!) 150/71 (HD started) (07/27/22 1903)   167/74 LOC A/O x 4 A/O x 4   HR Pulse (Heart Rate): 61 (07/27/22 1903) 70 Lungs clear clear   Resp Resp Rate: 16 (07/27/22 1439) 20 Cardiac regular regular   Temp Temp: 98.5 °F (36.9 °C) (07/27/22 1903) 99 Skin Warm/dry Warm/dry   Weight  No bed scale  Edema none none   Tele status N/A  Pain Pain Intensity 1: 3 (07/27/22 1801) 8 - primary RN aware     Orders   Duration: Start:1855  End: 4936 Total: 4hrs   Dialyzer: Dialyzer/Set Up Inspection: Revaclear (07/27/22 1903)   K Bath: Dialysate K (mEq/L): 2 (07/27/22 1903)   Ca Bath: Dialysate CA (mEq/L): 2.5 (07/27/22 1903)   Na: Dialysate NA (mEq/L): 140 (07/27/22 1903)   Bicarb: Dialysate HCO3 (mEq/L): 35 (07/27/22 1903)   Target Fluid Removal: Goal/Amount of Fluid to Remove (mL): 1000 mL (07/27/22 1903)     Access   Type & Location: Left Tunnelled IJ   Comments: Both ports flushes /aspirates well  . Exit site covered with biopatch and secured by a clear dressing. Cath site without signs of infection.                            Labs   HBsAg (Antigen) / date:       7/16/22 - negative                                        HBsAb (Antibody) / date: 7/16/22 < 10   Source: Epic   Obtained/Reviewed  Critical Results Called HGB   Date Value Ref Range Status   07/27/2022 8.1 (L) 12.1 - 17.0 g/dL Final     Potassium   Date Value Ref Range Status   07/27/2022 3.7 3.5 - 5.1 mmol/L Final     Calcium   Date Value Ref Range Status   07/27/2022 8.2 (L) 8.5 - 10.1 MG/DL Final     BUN   Date Value Ref Range Status   07/27/2022 42 (H) 6 - 20 MG/DL Final     Creatinine   Date Value Ref Range Status   07/27/2022 3.72 (H) 0.70 - 1.30 MG/DL Final     Comment:     INVESTIGATED PER DELTA CHECK PROTOCOL        Meds Given   Name Dose Route   Retacrit 10,000 IVP               Adequacy / Fluid    Total Liters Process: 90 L   Net Fluid Removed: 1000 ML      Comments Time Out Done:   (Time) 2120   Admitting Diagnosis: Pain to right leg   Consent obtained/signed: Informed Consent Verified: Yes (07/27/22 1903)   Machine / RO # Machine Number: [id number] (07/27/22 1903)   Primary Nurse Rpt Pre: Alessandro Guerrero   Primary Nurse Rpt Post: Estefani Dotson RN   Pt Education:    Care Plan:    Pts outpatient clinic: [facility]     Tx Summary   Comments: Tolerated treatment well. Vitals stable throughout treatment.

## 2022-07-27 NOTE — PROGRESS NOTES
1920: Bedside and Verbal shift change report given to Luis El RN (oncoming nurse) by Shan Mclean RN (offgoing nurse). Report included the following information SBAR, Kardex, Procedure Summary, Intake/Output, MAR, Accordion, Recent Results, and Quality Measures.

## 2022-07-27 NOTE — PROGRESS NOTES
Problem: Diabetes Self-Management  Goal: *Disease process and treatment process  Description: Define diabetes and identify own type of diabetes; list 3 options for treating diabetes. Outcome: Progressing Towards Goal  Goal: *Incorporating nutritional management into lifestyle  Description: Describe effect of type, amount and timing of food on blood glucose; list 3 methods for planning meals. Outcome: Progressing Towards Goal  Goal: *Incorporating physical activity into lifestyle  Description: State effect of exercise on blood glucose levels. Outcome: Progressing Towards Goal  Goal: *Developing strategies to promote health/change behavior  Description: Define the ABC's of diabetes; identify appropriate screenings, schedule and personal plan for screenings. Outcome: Progressing Towards Goal  Goal: *Using medications safely  Description: State effect of diabetes medications on diabetes; name diabetes medication taking, action and side effects. Outcome: Progressing Towards Goal  Goal: *Monitoring blood glucose, interpreting and using results  Description: Identify recommended blood glucose targets  and personal targets. Outcome: Progressing Towards Goal  Goal: *Prevention, detection, treatment of acute complications  Description: List symptoms of hyper- and hypoglycemia; describe how to treat low blood sugar and actions for lowering  high blood glucose level. Outcome: Progressing Towards Goal  Goal: *Prevention, detection and treatment of chronic complications  Description: Define the natural course of diabetes and describe the relationship of blood glucose levels to long term complications of diabetes.   Outcome: Progressing Towards Goal  Goal: *Developing strategies to address psychosocial issues  Description: Describe feelings about living with diabetes; identify support needed and support network  Outcome: Progressing Towards Goal  Goal: *Insulin pump training  Outcome: Progressing Towards Goal  Goal: *Sick day guidelines  Outcome: Progressing Towards Goal  Goal: *Patient Specific Goal (EDIT GOAL, INSERT TEXT)  Outcome: Progressing Towards Goal     Problem: Patient Education: Go to Patient Education Activity  Goal: Patient/Family Education  Outcome: Progressing Towards Goal     Problem: Pressure Injury - Risk of  Goal: *Prevention of pressure injury  Description: Document Erik Scale and appropriate interventions in the flowsheet. Outcome: Progressing Towards Goal  Note: Pressure Injury Interventions:  Sensory Interventions: Assess changes in LOC, Float heels, Minimize linen layers    Moisture Interventions: Absorbent underpads    Activity Interventions: Increase time out of bed, Pressure redistribution bed/mattress(bed type), PT/OT evaluation    Mobility Interventions: Pressure redistribution bed/mattress (bed type), PT/OT evaluation    Nutrition Interventions: Document food/fluid/supplement intake, Offer support with meals,snacks and hydration    Friction and Shear Interventions: Apply protective barrier, creams and emollients                Problem: Patient Education: Go to Patient Education Activity  Goal: Patient/Family Education  Outcome: Progressing Towards Goal     Problem: Falls - Risk of  Goal: *Absence of Falls  Description: Document Michelle Fall Risk and appropriate interventions in the flowsheet.   Outcome: Progressing Towards Goal  Note: Fall Risk Interventions:  Mobility Interventions: Bed/chair exit alarm, Communicate number of staff needed for ambulation/transfer, Patient to call before getting OOB         Medication Interventions: Bed/chair exit alarm, Patient to call before getting OOB, Teach patient to arise slowly    Elimination Interventions: Bed/chair exit alarm, Call light in reach, Patient to call for help with toileting needs              Problem: Patient Education: Go to Patient Education Activity  Goal: Patient/Family Education  Outcome: Progressing Towards Goal     Problem: Patient Education: Go to Patient Education Activity  Goal: Patient/Family Education  Outcome: Progressing Towards Goal     Problem: Risk for Spread of Infection  Goal: Prevent transmission of infectious organism to others  Description: Prevent the transmission of infectious organisms to other patients, staff members, and visitors.   Outcome: Progressing Towards Goal     Problem: Patient Education:  Go to Education Activity  Goal: Patient/Family Education  Outcome: Progressing Towards Goal

## 2022-07-27 NOTE — PROGRESS NOTES
7/27/2022   CARE MANAGEMENT NOTE:  CM reviewed EMR. READMISSION:  Pt was admitted to [facility] from 7/16 - 7/19 with abd pain and SOB. He returned home with his wife. Pt was readmitted to [facility] on 7/21/22 with open wound of right heel. Reportedly, pt resides with his wife Natasha Dotson (c [phone]) and three adult sons. RUR 23%; LOS 5 days     Transition Plan of Care:  ID, Podiatry following for medical management - pt is s/p partial right excision of bone calcaneus and flap closure on 7/23  PT note on 7/26 - pt ambulated 14 feet and SNF vs HH was recommended  CM had follow up conversation with pt and he prefers SNF for short term rehab until his level of functioning increases. CM sent out referrals [facility], [address] in order to explore [employer] Medicaid benefit  ESRD - HD on M,W,F at [facility]. He uses Medicaid transport to and from app. Outpatient follow up  Mode of transport to be determined      CM will continue to follow pt for SNF ([place] Medicaid benefit to be explored)  DENNYS De Paz

## 2022-07-27 NOTE — PROGRESS NOTES
The [address] [place] [address] Podiatric Surgery  Post-Op Note    Subjective:   Admit Date: 7/21/2022  OR Date: 7/23/2022  4 Days Post-Op  Status Post: right calcaneus partial excision of bone, flap closure of wound    Hyla Needs  appears; alert, well appearing, and in no distress  Pain control is: excellent    Objective[de-identified]    height is 5' 8\" (1.727 m) and weight is 78.7 kg (173 lb 6.4 oz). His oral temperature is 98.5 °F (36.9 °C). His blood pressure is 139/68 and his pulse is 61. His respiration is 16 and oxygen saturation is 98%.      Intake/Output Summary (Last 24 hours) at 7/27/2022 1931  Last data filed at 7/27/2022 1542  Gross per 24 hour   Intake --   Output 300 ml   Net -300 ml       Physical Exam:  Incision: Dressing intact without strikethrough  Abdomen: soft, nontender, nondistended, no masses or organomegaly  DVT Exam:No evidence of DVT seen on physical exam.    CULTURE, [facility]  Order: [id number]  Collected 7/23/2022 09:45    Status: Preliminary result    Specimen Information: Surgical Specimen    RIGHT HEEL BONE        0 Result Notes    Component Ref Range & Units 7/23/22 0945  Resulting Agency   Special Requests:   NO SPECIAL REQUESTS P  [facility] CTR   GRAM STAIN   RARE WBCS [address]   Culture result:   RARE PROBABLE STAPHYLOCOCCUS AUREUS Abnormal  [address] [facility]   Culture result:   CHECKING FOR POSSIBLE OTHER ORGANISMS P          Labs:  Lab Results   Component Value Date/Time    WBC 6.7 07/27/2022 09:24 AM    HGB 8.1 (L) 07/27/2022 09:24 AM    HCT 25.5 (L) 07/27/2022 09:24 AM    MCV 88.9 07/27/2022 09:24 AM    PLATELET 480 70/88/0470 09:24 AM     Lab Results   Component Value Date/Time    Sodium 134 (L) 07/27/2022 09:24 AM    Potassium 3.7 07/27/2022 09:24 AM    Chloride 102 07/27/2022 09:24 AM    CO2 25 07/27/2022 09:24 AM    Phosphorus 3.9 04/02/2022 01:35 AM    BUN 42 (H) 07/27/2022 09:24 AM    Calcium 8.2 (L) 07/27/2022 09:24 AM      Lab Results   Component Value Date/Time    Glucose 198 (H) 07/27/2022 09:24 AM     ns abnormal data CULTURE, [facility]  Order: [id number]  Collected 7/23/2022 09:45    Status: Final result    Specimen Information: Surgical Specimen    RIGHT HEEL BONE        0 Result Notes    Component Ref Range & Units 7/23/22 0945  Resulting Agency   Special Requests:   NO SPECIAL REQUESTS  [address]   GRAM STAIN   RARE WBCS SEEN  [address] [facility],[address]   Culture result:   RARE * Methicillin Resistant Staphylococcus aureus * REFER TO [id number] FOR SENSITIVITIES Abnormal   [address]   Culture result:   SCANT ENTEROCOCCUS SPECIES REFER TO [id number] FOR ID AND SENSITIVITIES Abnormal   [address]        Impression/Plan:   Principal Problem:    Osteomyelitis of ankle or foot, acute, right ([facility] [address] (7/25/2022)    Active Problems:    Type 2 diabetes mellitus with diabetic neuropathy ([facility] [address] (8/5/2019)      ESRD (end stage renal disease) on dialysis ([facility] [address] (2/6/2022)      Anxiety and depression ()      CHF NYHA class I, chronic, diastolic (HCC) ()      BPH (benign prostatic hyperplasia) ()      CAD (coronary artery disease) ()      GERD (gastroesophageal reflux disease) ()      HTN (hypertension) ()      Hyperlipidemia ()      Anemia associated with chronic renal failure ()      Open wound of right heel (7/16/2022)      Constipation (7/25/2022)    1. Status post right foot limb salvage surgery  Plan:  Dressing intact. Continue nonweightbearing to right foot  Heel protector boots at all times when sitting or in bed. PT  Antibiotics per ID. Recommend 6 weeks of therapy. Bertram Ye.  Janes, DPM FACFAS FACCWS [facility]  Triple Board Certified Foot & Ankle Specialist  [phone] cell  July 27, 2022  1:30 PM

## 2022-07-27 NOTE — PROGRESS NOTES
Problem: Mobility Impaired (Adult and Pediatric)  Goal: *Acute Goals and Plan of Care (Insert Text)  Description: FUNCTIONAL STATUS PRIOR TO ADMISSION: Patient was independent and active without use of DME.    HOME SUPPORT PRIOR TO ADMISSION: The patient lived with spouse but did not require assist.    Physical Therapy Goals  Initiated 7/25/2022  1. Patient will move from supine to sit and sit to supine  in bed with modified independence within 7 day(s). 2.  Patient will transfer from bed to chair and chair to bed with supervision/set-up using the least restrictive device within 7 day(s). 3.  Patient will perform sit to stand with supervision/set-up within 7 day(s). 4.  Patient will ambulate with supervision/set-up for 50 feet with the least restrictive device within 7 day(s). 5.  Patient will ascend/descend 3 stairs with b/l handrail(s) with minimal assistance/contact guard assist within 7 day(s). Note:   PHYSICAL THERAPY TREATMENT  Patient: Jace Christensen (6 y.o. male)  Date: 7/27/2022  Diagnosis: Open wound of right heel [S91.301A] Osteomyelitis of ankle or foot, acute, right (HCC)  Procedure(s) (LRB):  RIGHT HEEL DEBRIDEMENT, PARTIAL EXCISION OF BONE (Right) 4 Days Post-Op  Precautions:    Chart, physical therapy assessment, plan of care and goals were reviewed. ASSESSMENT  Patient continues with skilled PT services. Pt sit to stand with CGA. Pt ambulated 17ft with RW CGA NWB on right LE. Pt fatigues rather quickly limiting ambulation to short distances before needing to put right foot down. Pt left sitting with bed elevated. Pt progressing slowly. Continue goals. PLAN :  Patient continues to benefit from skilled intervention to address the above impairments. Continue treatment per established plan of care. to address goals.     Recommendation for discharge: (in order for the patient to meet his/her long term goals)  Therapy up to 5 days/week in SNF setting or HHPT with supervision or assist.    This discharge recommendation:  Has been made in collaboration with the attending provider and/or case management    IF patient discharges home will need the following DME: rolling walker       SUBJECTIVE:       OBJECTIVE DATA SUMMARY:   Critical Behavior:  Neurologic State: Alert  Orientation Level: Oriented X4  Cognition: Appropriate decision making     Functional Mobility Training:          Transfers:  Sit to Stand: Contact guard assistance  Stand to Sit: Contact guard assistance                             Balance:  Sitting: Intact  Standing: With support  Standing - Static: Fair;Good  Ambulation/Gait Training:  Distance (ft): 17 Feet (ft)  Assistive Device: Gait belt;Walker, rolling           Gait Abnormalities: Decreased step clearance              Speed/Myla: Pace decreased (<100 feet/min)                  Activity Tolerance:   Fair to good    After treatment patient left in no apparent distress:   Sitting in chair    COMMUNICATION/COLLABORATION:   The patients plan of care was discussed with: Physical therapist.     Marilou Francisco PTA   Time Calculation: 23 mins

## 2022-07-27 NOTE — PROGRESS NOTES
[facility] Infectious Disease Specialists Progress Note           Romero Dan DO    [phone] Office  [phone]  Fax    2022      Assessment & Plan:     Diabetic foot infection with osteomyelitis of the right calcaneus. Status post I&D with partial bone excision 2022. Pathology positive for osteomyelitis. Cultures reveal a polymicrobial infection including MRSA, Pseudomonas, Citrobacter, Enterococcus, Pasteurella, Proteus, and anaerobes. Plan discharge on vancomycin and cefepime dosed on dialysis along with oral metronidazole. See recommendations below  End-stage renal disease on dialysis MWF  Diabetes mellitus. This is poorly controlled. Hemoglobin A1c is 7.9  Anemia. Hemoglobin 7.6. Management per primary team  Elevated alkaline phosphatase. Plan discharge on:  Vancomycin 1250 mg post HD MWF  Cefepime 2 g post HD MWF  Metronidazole 500 p.o. twice daily (patient will need prescription for this)  All antibiotics to be continued through 9/3/2022  This will need to be arranged by nephrology          Subjective:     No complaints    Objective:     Vitals: Visit Vitals  BP (!) 174/70 (BP 1 Location: Right lower arm, BP Patient Position: At rest)   Pulse 65   Temp 98 °F (36.7 °C)   Resp 18   Ht 5' 8\" (1.727 m)   Wt 157 lb 10.1 oz (71.5 kg)   SpO2 95%   BMI 23.97 kg/m²          Tmax:  Temp (24hrs), Av.3 °F (36.8 °C), Min:97.7 °F (36.5 °C), Max:98.9 °F (37.2 °C)      Exam:   Patient is intubated:  no    Physical Examination:   General:  Alert, cooperative, no distress   Head:  Normocephalic, atraumatic. Eyes:  Conjunctivae clear   Neck: Supple       Lungs:   No distress. Chest wall:     Heart:     Abdomen:   non-distended   Extremities: Moves all. Foot dressed   Skin:  No rash. Neurologic: CNII-XII intact. Normal strength     Labs:        No lab exists for component: ITNL   No results for input(s): CPK, CKMB, TROIQ in the last 72 hours.   Recent Labs     22  0021 07/26/22  0210 07/25/22  0306   * 136 133*   K 3.7 3.4* 3.6    102 99   CO2 25 29 26   BUN 42* 26* 39*   CREA 3.72* 2.88* 4.64*   * 162* 242*   WBC 6.7 5.8 6.3   HGB 8.1* 7.8* 7.6*   HCT 25.5* 25.1* 24.7*    192 179       No results for input(s): INR, PTP, APTT, INREXT, INREXT in the last 72 hours.   Needs: urine analysis, urine sodium, protein and creatinine  Lab Results   Component Value Date/Time    Creatinine, urine 72.2 11/22/2019 09:36 AM         Cultures:     No results found for: SDES  Lab Results   Component Value Date/Time    Culture result: (A) 07/23/2022 09:45 AM     RARE * Methicillin Resistant Staphylococcus aureus * REFER TO [id number] FOR SENSITIVITIES    Culture result: (A) 07/23/2022 09:45 AM     SCANT ENTEROCOCCUS SPECIES REFER TO [id number] FOR ID AND SENSITIVITIES    Culture result: LIGHT Bacteroides pyogenes BETA LACTAMASE POSITIVE (A) 07/23/2022 09:45 AM       Radiology:     Medications       Current Facility-Administered Medications   Medication Dose Route Frequency Last Admin    insulin glargine (LANTUS) injection 30 Units  30 Units SubCUTAneous QHS 30 Units at 07/26/22 2212    aspirin delayed-release tablet 81 mg  81 mg Oral DAILY 81 mg at 07/27/22 0921    insulin lispro (HUMALOG) injection   SubCUTAneous AC&HS 3 Units at 07/27/22 0825    epoetin ericka-epbx (RETACRIT) injection 10,000 Units  10,000 Units IntraVENous DIALYSIS MON, WED & FRI 10,000 Units at 07/25/22 2235    HYDROmorphone (DILAUDID) syringe 0.5 mg  0.5 mg IntraVENous Q4H PRN 0.5 mg at 07/27/22 0737    sodium chloride (NS) flush 5-40 mL  5-40 mL IntraVENous Q8H 10 mL at 07/27/22 0559    sodium chloride (NS) flush 5-40 mL  5-40 mL IntraVENous PRN 10 mL at 07/26/22 1332    acetaminophen (TYLENOL) tablet 650 mg  650 mg Oral Q6H  mg at 07/26/22 1221    Or    acetaminophen (TYLENOL) suppository 650 mg  650 mg Rectal Q6H PRN      polyethylene glycol (MIRALAX) packet 17 g  17 g Oral DAILY PRN ondansetron (ZOFRAN ODT) tablet 4 mg  4 mg Oral Q8H PRN      Or    ondansetron (ZOFRAN) injection 4 mg  4 mg IntraVENous Q6H PRN 4 mg at 07/22/22 1733    glucose chewable tablet 16 g  4 Tablet Oral PRN      glucagon (GLUCAGEN) injection 1 mg  1 mg IntraMUSCular PRN      dextrose 10% infusion 0-250 mL  0-250 mL IntraVENous PRN      cefepime (MAXIPIME) 1 g in 0.9% sodium chloride (MBP/ADV) 50 mL MBP  1 g IntraVENous Q24H 1 g at 07/27/22 0102    metroNIDAZOLE (FLAGYL) IVPB premix 500 mg  500 mg IntraVENous Q12H 500 mg at 07/27/22 0559    busPIRone (BUSPAR) tablet 10 mg  10 mg Oral BID 10 mg at 07/27/22 0921    carvediloL (COREG) tablet 12.5 mg  12.5 mg Oral BID WITH MEALS 12.5 mg at 07/27/22 5700    docusate sodium (COLACE) capsule 100 mg  100 mg Oral  mg at 07/27/22 0256    ferrous sulfate tablet 325 mg  1 Tablet Oral DAILY 325 mg at 07/27/22 5837    furosemide (LASIX) tablet 80 mg  80 mg Oral DAILY 80 mg at 07/27/22 0920    gabapentin (NEURONTIN) capsule 300 mg  300 mg Oral DAILY 300 mg at 07/27/22 8336    isosorbide mononitrate ER (IMDUR) tablet 60 mg  60 mg Oral DAILY 60 mg at 07/27/22 0920    pantoprazole (PROTONIX) tablet 40 mg  40 mg Oral ACB 40 mg at 07/27/22 0921    sevelamer carbonate (RENVELA) tab 800 mg  800 mg Oral TID WITH MEALS 800 mg at 07/27/22 0938    sucralfate (CARAFATE) tablet 1 g  1 g Oral TIDAC 1 g at 07/27/22 0920    tamsulosin (FLOMAX) capsule 0.4 mg  0.4 mg Oral QHS 0.4 mg at 07/26/22 2211    Vancomycin- Pharmacy dosing by levels   Other Rx Dosing/Monitoring      hydrALAZINE (APRESOLINE) tablet 50 mg  50 mg Oral TID 50 mg at 07/27/22 0920    amLODIPine (NORVASC) tablet 10 mg  10 mg Oral DAILY 10 mg at 07/27/22 0920           Case discussed with:      Esperanza Jordan, DO

## 2022-07-27 NOTE — PROGRESS NOTES
[address] [facility] Medicine Service Daily Progress Note    Overnight Events: NAEO. SUBJECTIVE: Pt reports improvement in his R foot pain s/p surgical debridement. He is still complaining of some lower back pain and he did not get his heating pad last night. OBJECTIVE:    Vitals: Visit Vitals  BP (!) 166/82 (BP 1 Location: Right upper arm, BP Patient Position: At rest)   Pulse 65   Temp 98.9 °F (37.2 °C)   Resp 18   Ht 5' 8\" (1.727 m)   Wt 157 lb 10.1 oz (71.5 kg)   SpO2 93%   BMI 23.97 kg/m²     Physical Exam:  General: NAD. Respiratory: Lungs clear to auscultation bilaterally with no wheezing, crackles, rhonchi, or rales. Cardiovascular: Regular rate and rhythm. S1/S2 auscultated with no murmurs, rubs, or gallops. GI: Non-tender to palpation. Non-distended. + Bowel sounds throughout. Extremities: Right ankle wrapped in bandage with heel protector in place. LLE w/ no edema or tenderness. Skin: Warm, dry.   Neuro: Alert and oriented    I/O:  Date 07/26/22 0700 - 07/27/22 0659 07/27/22 0700 - 07/28/22 0659   Shift 4585-6501 1434-2918 24 Hour Total 5270-3424 4009-6290 24 Hour Total   INTAKE   Shift Total(mL/kg)         OUTPUT   Urine(mL/kg/hr)           Urine Occurrence(s)  1 x 1 x      Emesis/NG output           Emesis Occurrence(s)  0 x 0 x      Stool           Stool Occurrence(s)  1 x 1 x      Shift Total(mL/kg)         NET         Weight (kg) 71.5 71.5 71.5 71.5 71.5 71.5         Inpatient Medications  Current Facility-Administered Medications   Medication Dose Route Frequency    insulin glargine (LANTUS) injection 30 Units  30 Units SubCUTAneous QHS    aspirin delayed-release tablet 81 mg  81 mg Oral DAILY    insulin lispro (HUMALOG) injection   SubCUTAneous AC&HS    epoetin ericka-epbx (RETACRIT) injection 10,000 Units  10,000 Units IntraVENous DIALYSIS MON, WED & FRI    HYDROmorphone (DILAUDID) syringe 0.5 mg  0.5 mg IntraVENous Q4H PRN    sodium chloride (NS) flush 5-40 mL  5-40 mL IntraVENous Q8H sodium chloride (NS) flush 5-40 mL  5-40 mL IntraVENous PRN    acetaminophen (TYLENOL) tablet 650 mg  650 mg Oral Q6H PRN    Or    acetaminophen (TYLENOL) suppository 650 mg  650 mg Rectal Q6H PRN    polyethylene glycol (MIRALAX) packet 17 g  17 g Oral DAILY PRN    ondansetron (ZOFRAN ODT) tablet 4 mg  4 mg Oral Q8H PRN    Or    ondansetron (ZOFRAN) injection 4 mg  4 mg IntraVENous Q6H PRN    glucose chewable tablet 16 g  4 Tablet Oral PRN    glucagon (GLUCAGEN) injection 1 mg  1 mg IntraMUSCular PRN    dextrose 10% infusion 0-250 mL  0-250 mL IntraVENous PRN    cefepime (MAXIPIME) 1 g in 0.9% sodium chloride (MBP/ADV) 50 mL MBP  1 g IntraVENous Q24H    metroNIDAZOLE (FLAGYL) IVPB premix 500 mg  500 mg IntraVENous Q12H    busPIRone (BUSPAR) tablet 10 mg  10 mg Oral BID    carvediloL (COREG) tablet 12.5 mg  12.5 mg Oral BID WITH MEALS    docusate sodium (COLACE) capsule 100 mg  100 mg Oral BID    ferrous sulfate tablet 325 mg  1 Tablet Oral DAILY    furosemide (LASIX) tablet 80 mg  80 mg Oral DAILY    gabapentin (NEURONTIN) capsule 300 mg  300 mg Oral DAILY    isosorbide mononitrate ER (IMDUR) tablet 60 mg  60 mg Oral DAILY    pantoprazole (PROTONIX) tablet 40 mg  40 mg Oral ACB    sevelamer carbonate (RENVELA) tab 800 mg  800 mg Oral TID WITH MEALS    sucralfate (CARAFATE) tablet 1 g  1 g Oral TIDAC    tamsulosin (FLOMAX) capsule 0.4 mg  0.4 mg Oral QHS    Vancomycin- Pharmacy dosing by levels   Other Rx Dosing/Monitoring    hydrALAZINE (APRESOLINE) tablet 50 mg  50 mg Oral TID    amLODIPine (NORVASC) tablet 10 mg  10 mg Oral DAILY       Allergies  No Known Allergies    CBC:  Recent Labs     07/26/22 0210 07/25/22  0306   WBC 5.8 6.3   HGB 7.8* 7.6*   HCT 25.1* 24.7*    452         Metabolic Panel:  Recent Labs     07/26/22 0210 07/25/22  0306    133*   K 3.4* 3.6    99   CO2 29 26   BUN 26* 39*   CREA 2.88* 4.64*   * 242*   CA 8.0* 8.2*              Assessment and Plan     Arline Bridges is a 64 y.o. male with PMH of ESRD on HD MWF, T2DM, HTN, CAD s/p CABG (May 2020), HEFpEF, PEA arrest, and erosive gastritis who is admitted for osteomyelitis of the R calcaneus now s/p surgical debridement. Right calcaneal osteomyelitis: Demonstrated on MRI.  and CRP 15.2. Podiatry and ID following. Wound Cx: rare probably S. Aureus, Citrobacter, possible streptococcus, Enterococcus sp, other gram + and gram - rods. . Anaerobic Cx: bacteroides pyogenes. Bcx: NGTD. ID recommending 6wks IV. Pathology shows osteomyelitis. - Continue Vanc, Cefepime and Flagyl  - ID to give antibiotic recs, hopeful can be administered at same time as dialysis  - Daily CBC, BMP     HTN: Chronic and uncontrolled. - Continue home hydralazine 50 mg TID, amlodipine 10 mg OD, carvedilol 12.5 mg BID, imdur 60 mg PO daily     ESRD on HD: MWF. Follows Dr. Judy Santiago. - Nephrology following  - Continue home Sevelamer 800mg TID, Lasix 80 mg daily     Anemia: Chronic. Likely secondary to ESRD  - Continue home ferrous sulfate daily  - Monitor on daily CBC     T2DM: Chronic. Last A1c 7.9 (6/21/22). - Continue Lantus to 30 units daily  - SSI w/ glucose checks ACHS  - Hypoglycemia precautions     GERD: Chronic.  - Continue home Protonix 40 mg OD, Sucralfate 1 g TID     Constipation: Chronic.   - Continue Docusate, Miralax, Senna      CAD / HEFrEF : Echo 11/2021 EF 53% and reduced systolic function.  - Continue home carvedilol 12.5 mg BID, Imdur 60 mg OD, and aspirin 81 mg OD     HLD: Chronic.  - Continue home rosuvastatin 10 mg daily     Groin lymph node: Incidental 2.5 cm x 0.9 cm noted during previous admission.   - PCP: Follow up OP      BPH: Chronic  - Continue home Flomax 0.4 mg once nightly      Anxiety and depression: Chronic.  - Continue home Buspirone 10 mg BID        FEN/GI - Diabetic diet. Activity - Out of bed with assistance. DVT prophylaxis - SCDs  Disposition - Plan to d/c TBD. Consulting PT.   Code Status - Full, discussed with patient / caregivers. Next of Scarlet 69 Name and Adolph Rafael Francisco 23 Kay Rosales (Spouse) [phone] (Mobile)      Evelyn Cui MD  Family Medicine Resident       For Billing    Chief Complaint   Patient presents with    [address] Problems  Date Reviewed: 7/12/2022            Codes Class Noted POA    * (Principal) Osteomyelitis of ankle or foot, acute, right ([facility] [address] ICD-10-CM: M86.171  ICD-9-CM: 730.07  7/25/2022 Unknown        Constipation ICD-10-CM: K59.00  ICD-9-CM: 564.00  7/25/2022 Unknown        Open wound of right heel ICD-10-CM: S91.301A  ICD-9-CM: 892.0  7/16/2022 Unknown        Anxiety and depression ICD-10-CM: F41.9, F32. A  ICD-9-CM: 300.00, 311  Unknown Yes        CHF NYHA class I, chronic, diastolic (HCC) [id number]-[id number]: I50.32  ICD-9-CM: 428.32  Unknown Yes        BPH (benign prostatic hyperplasia) ICD-10-CM: N40.0  ICD-9-CM: 600.00  Unknown Yes        CAD (coronary artery disease) ICD-10-CM: I25.10  ICD-9-CM: 414.00  Unknown Yes        GERD (gastroesophageal reflux disease) ICD-10-CM: K21.9  ICD-9-CM: 530.81  Unknown Yes        HTN (hypertension) ICD-10-CM: I10  ICD-9-CM: 401.9  Unknown Yes        Hyperlipidemia ICD-10-CM: E78.5  ICD-9-CM: 272.4  Unknown Yes        Anemia associated with chronic renal failure ICD-10-CM: N18.9, D63.1  ICD-9-CM: 285.21  Unknown Yes        ESRD (end stage renal disease) on dialysis [facility]) ICD-10-CM: N18.6, Z99.2  ICD-9-CM: 585.6, V45.11  2/6/2022 Yes        Type 2 diabetes mellitus with diabetic neuropathy ([facility] [address] ICD-10-CM: E11.40  ICD-9-CM: 250.60, 357.2  8/5/2019 Yes

## 2022-07-27 NOTE — PROGRESS NOTES
Attempted to draw blood work twice but were unsuccessful. Floor  made aware to let phlebotomist know in the morning.

## 2022-07-27 NOTE — PROGRESS NOTES
Bedside and Verbal shift change report given to Esther (oncoming nurse) by Ashish Frias RN (offgoing nurse). Report included the following information SBAR, Kardex, Intake/Output, MAR, and Recent Results.

## 2022-07-28 LAB
ANION GAP SERPL CALC-SCNC: 4 MMOL/L (ref 5–15)
BACTERIA SPEC CULT: ABNORMAL
BUN SERPL-MCNC: 21 MG/DL (ref 6–20)
BUN/CREAT SERPL: 9 (ref 12–20)
CALCIUM SERPL-MCNC: 8.8 MG/DL (ref 8.5–10.1)
CHLORIDE SERPL-SCNC: 103 MMOL/L (ref 97–108)
CO2 SERPL-SCNC: 29 MMOL/L (ref 21–32)
CREAT SERPL-MCNC: 2.27 MG/DL (ref 0.7–1.3)
CRP SERPL-MCNC: 2.57 MG/DL (ref 0–0.6)
ERYTHROCYTE [DISTWIDTH] IN BLOOD BY AUTOMATED COUNT: 15.5 % (ref 11.5–14.5)
GLUCOSE BLD STRIP.AUTO-MCNC: 112 MG/DL (ref 65–117)
GLUCOSE BLD STRIP.AUTO-MCNC: 113 MG/DL (ref 65–117)
GLUCOSE BLD STRIP.AUTO-MCNC: 153 MG/DL (ref 65–117)
GLUCOSE BLD STRIP.AUTO-MCNC: 93 MG/DL (ref 65–117)
GLUCOSE SERPL-MCNC: 102 MG/DL (ref 65–100)
GRAM STN SPEC: ABNORMAL
HCT VFR BLD AUTO: 27.6 % (ref 36.6–50.3)
HGB BLD-MCNC: 8.7 G/DL (ref 12.1–17)
MCH RBC QN AUTO: 28.2 PG (ref 26–34)
MCHC RBC AUTO-ENTMCNC: 31.5 G/DL (ref 30–36.5)
MCV RBC AUTO: 89.6 FL (ref 80–99)
NRBC # BLD: 0 K/UL (ref 0–0.01)
NRBC BLD-RTO: 0 PER 100 WBC
PLATELET # BLD AUTO: 214 K/UL (ref 150–400)
PMV BLD AUTO: 10.5 FL (ref 8.9–12.9)
POTASSIUM SERPL-SCNC: 4.1 MMOL/L (ref 3.5–5.1)
RBC # BLD AUTO: 3.08 M/UL (ref 4.1–5.7)
SERVICE CMNT-IMP: ABNORMAL
SERVICE CMNT-IMP: ABNORMAL
SERVICE CMNT-IMP: NORMAL
SODIUM SERPL-SCNC: 136 MMOL/L (ref 136–145)
WBC # BLD AUTO: 6 K/UL (ref 4.1–11.1)

## 2022-07-28 PROCEDURE — 65270000029 HC RM PRIVATE

## 2022-07-28 PROCEDURE — 74011250637 HC RX REV CODE- 250/637: Performed by: PODIATRIST

## 2022-07-28 PROCEDURE — 80048 BASIC METABOLIC PNL TOTAL CA: CPT

## 2022-07-28 PROCEDURE — 99232 SBSQ HOSP IP/OBS MODERATE 35: CPT | Performed by: INTERNAL MEDICINE

## 2022-07-28 PROCEDURE — 74011636637 HC RX REV CODE- 636/637

## 2022-07-28 PROCEDURE — 86140 C-REACTIVE PROTEIN: CPT

## 2022-07-28 PROCEDURE — 74011250636 HC RX REV CODE- 250/636: Performed by: PODIATRIST

## 2022-07-28 PROCEDURE — 94761 N-INVAS EAR/PLS OXIMETRY MLT: CPT

## 2022-07-28 PROCEDURE — 74011000250 HC RX REV CODE- 250: Performed by: PODIATRIST

## 2022-07-28 PROCEDURE — 74011000250 HC RX REV CODE- 250: Performed by: STUDENT IN AN ORGANIZED HEALTH CARE EDUCATION/TRAINING PROGRAM

## 2022-07-28 PROCEDURE — 36415 COLL VENOUS BLD VENIPUNCTURE: CPT

## 2022-07-28 PROCEDURE — 85027 COMPLETE CBC AUTOMATED: CPT

## 2022-07-28 PROCEDURE — 82962 GLUCOSE BLOOD TEST: CPT

## 2022-07-28 PROCEDURE — 99232 SBSQ HOSP IP/OBS MODERATE 35: CPT | Performed by: FAMILY MEDICINE

## 2022-07-28 RX ORDER — HYDRALAZINE HYDROCHLORIDE 20 MG/ML
10 INJECTION INTRAMUSCULAR; INTRAVENOUS
Status: DISCONTINUED | OUTPATIENT
Start: 2022-07-28 | End: 2022-07-29 | Stop reason: HOSPADM

## 2022-07-28 RX ADMIN — ASPIRIN 81 MG: 81 TABLET, COATED ORAL at 08:25

## 2022-07-28 RX ADMIN — HYDROMORPHONE HYDROCHLORIDE 0.5 MG: 1 INJECTION, SOLUTION INTRAMUSCULAR; INTRAVENOUS; SUBCUTANEOUS at 17:51

## 2022-07-28 RX ADMIN — Medication 10 ML: at 14:00

## 2022-07-28 RX ADMIN — METRONIDAZOLE 500 MG: 500 INJECTION, SOLUTION INTRAVENOUS at 04:21

## 2022-07-28 RX ADMIN — METRONIDAZOLE 500 MG: 500 INJECTION, SOLUTION INTRAVENOUS at 17:51

## 2022-07-28 RX ADMIN — SODIUM CHLORIDE, PRESERVATIVE FREE 10 ML: 5 INJECTION INTRAVENOUS at 17:54

## 2022-07-28 RX ADMIN — FUROSEMIDE 80 MG: 40 TABLET ORAL at 08:43

## 2022-07-28 RX ADMIN — HYDRALAZINE HYDROCHLORIDE 50 MG: 25 TABLET ORAL at 21:38

## 2022-07-28 RX ADMIN — CARVEDILOL 12.5 MG: 12.5 TABLET, FILM COATED ORAL at 08:24

## 2022-07-28 RX ADMIN — HYDROMORPHONE HYDROCHLORIDE 0.5 MG: 1 INJECTION, SOLUTION INTRAMUSCULAR; INTRAVENOUS; SUBCUTANEOUS at 04:21

## 2022-07-28 RX ADMIN — SEVELAMER CARBONATE 800 MG: 800 TABLET, FILM COATED ORAL at 13:26

## 2022-07-28 RX ADMIN — HYDRALAZINE HYDROCHLORIDE 50 MG: 25 TABLET ORAL at 08:25

## 2022-07-28 RX ADMIN — ACETAMINOPHEN 650 MG: 325 TABLET ORAL at 06:27

## 2022-07-28 RX ADMIN — ISOSORBIDE MONONITRATE 60 MG: 30 TABLET, EXTENDED RELEASE ORAL at 08:25

## 2022-07-28 RX ADMIN — DOCUSATE SODIUM 100 MG: 100 CAPSULE, LIQUID FILLED ORAL at 17:51

## 2022-07-28 RX ADMIN — SUCRALFATE 1 G: 1 TABLET ORAL at 13:26

## 2022-07-28 RX ADMIN — Medication 10 ML: at 21:37

## 2022-07-28 RX ADMIN — SUCRALFATE 1 G: 1 TABLET ORAL at 06:27

## 2022-07-28 RX ADMIN — SEVELAMER CARBONATE 800 MG: 800 TABLET, FILM COATED ORAL at 08:24

## 2022-07-28 RX ADMIN — PANTOPRAZOLE SODIUM 40 MG: 40 TABLET, DELAYED RELEASE ORAL at 06:27

## 2022-07-28 RX ADMIN — HYDRALAZINE HYDROCHLORIDE 50 MG: 25 TABLET ORAL at 15:58

## 2022-07-28 RX ADMIN — BUSPIRONE HYDROCHLORIDE 10 MG: 10 TABLET ORAL at 08:25

## 2022-07-28 RX ADMIN — FERROUS SULFATE TAB 325 MG (65 MG ELEMENTAL FE) 325 MG: 325 (65 FE) TAB at 08:25

## 2022-07-28 RX ADMIN — HYDROMORPHONE HYDROCHLORIDE 0.5 MG: 1 INJECTION, SOLUTION INTRAMUSCULAR; INTRAVENOUS; SUBCUTANEOUS at 13:26

## 2022-07-28 RX ADMIN — CARVEDILOL 12.5 MG: 12.5 TABLET, FILM COATED ORAL at 17:51

## 2022-07-28 RX ADMIN — AMLODIPINE BESYLATE 10 MG: 5 TABLET ORAL at 08:24

## 2022-07-28 RX ADMIN — HYDROMORPHONE HYDROCHLORIDE 0.5 MG: 1 INJECTION, SOLUTION INTRAMUSCULAR; INTRAVENOUS; SUBCUTANEOUS at 08:26

## 2022-07-28 RX ADMIN — TAMSULOSIN HYDROCHLORIDE 0.4 MG: 0.4 CAPSULE ORAL at 21:38

## 2022-07-28 RX ADMIN — SEVELAMER CARBONATE 800 MG: 800 TABLET, FILM COATED ORAL at 17:00

## 2022-07-28 RX ADMIN — Medication 10 ML: at 06:27

## 2022-07-28 RX ADMIN — SUCRALFATE 1 G: 1 TABLET ORAL at 15:58

## 2022-07-28 RX ADMIN — GABAPENTIN 300 MG: 300 CAPSULE ORAL at 08:25

## 2022-07-28 RX ADMIN — BUSPIRONE HYDROCHLORIDE 10 MG: 10 TABLET ORAL at 17:51

## 2022-07-28 RX ADMIN — DOCUSATE SODIUM 100 MG: 100 CAPSULE, LIQUID FILLED ORAL at 08:25

## 2022-07-28 RX ADMIN — HYDROMORPHONE HYDROCHLORIDE 0.5 MG: 1 INJECTION, SOLUTION INTRAMUSCULAR; INTRAVENOUS; SUBCUTANEOUS at 21:55

## 2022-07-28 RX ADMIN — INSULIN GLARGINE 30 UNITS: 100 INJECTION, SOLUTION SUBCUTANEOUS at 21:38

## 2022-07-28 NOTE — PROGRESS NOTES
The [address] [place] [address] Podiatric Surgery  Post-Op Note    Subjective:   Admit Date: 7/21/2022  OR Date: 7/23/2022  4 Days Post-Op  Status Post: right calcaneus partial excision of bone, flap closure of wound    Marylu Rizzo  appears; alert, well appearing, and in no distress  Pain control is: excellent    Objective[de-identified]    height is 5' 8\" (1.727 m) and weight is 78.7 kg (173 lb 6.4 oz). His temperature is 98.4 °F (36.9 °C). His blood pressure is 153/68 (abnormal) and his pulse is 65. His respiration is 16 and oxygen saturation is 97%. Intake/Output Summary (Last 24 hours) at 7/28/2022 1835  Last data filed at 7/28/2022 1478  Gross per 24 hour   Intake --   Output 300 ml   Net -300 ml       Physical Exam:  Incision: incision well coapted with sutures intact.  No erythema right heel      Abdomen: soft, nontender, nondistended, no masses or organomegaly  DVT Exam:No evidence of DVT seen on physical exam.    CULTURE, Ines People  Order: [id number]  Collected 7/23/2022 09:45    Status: Preliminary result    Specimen Information: Surgical Specimen    RIGHT HEEL BONE        0 Result Notes    Component Ref Range & Units 7/23/22 0945  Resulting Agency   Special Requests:   NO SPECIAL REQUESTS P  [facility] CTR   GRAM STAIN   RARE WBCS SEEN P  [facility] [facility]   Culture result:   RARE PROBABLE STAPHYLOCOCCUS AUREUS Abnormal  [facility]   Culture result:   CHECKING FOR POSSIBLE OTHER ORGANISMS P          Labs:  Lab Results   Component Value Date/Time    WBC 6.0 07/28/2022 11:58 AM    HGB 8.7 (L) 07/28/2022 11:58 AM    HCT 27.6 (L) 07/28/2022 11:58 AM    MCV 89.6 07/28/2022 11:58 AM    PLATELET 461 02/52/8374 11:58 AM     Lab Results   Component Value Date/Time    Sodium 136 07/28/2022 11:58 AM    Potassium 4.1 07/28/2022 11:58 AM    Chloride 103 07/28/2022 11:58 AM    CO2 29 07/28/2022 11:58 AM    Phosphorus 3.9 04/02/2022 01:35 AM    BUN 21 (H) 07/28/2022 11:58 AM    Calcium 8.8 07/28/2022 11:58 AM      Lab Results   Component Value Date/Time    Glucose 102 (H) 07/28/2022 11:58 AM     ns abnormal data CULTURE, WOUND Navarro Must  Order: [id number]  Collected 7/23/2022 09:45    Status: Final result    Specimen Information: Surgical Specimen    RIGHT HEEL BONE        0 Result Notes    Component Ref Range & Units 7/23/22 0945  Resulting Agency   Special Requests:   NO SPECIAL REQUESTS  [address]   GRAM STAIN   RARE WBCS SEEN  [facility] [facility]   Culture result:   RARE * Methicillin Resistant Staphylococcus aureus * REFER TO [id number] FOR SENSITIVITIES Abnormal   [address]   Culture result:   SCANT ENTEROCOCCUS SPECIES REFER TO [id number] FOR ID AND SENSITIVITIES Abnormal   [address]        Impression/Plan:   Principal Problem:    Osteomyelitis of ankle or foot, acute, right ([facility] [address] (7/25/2022)    Active Problems:    Type 2 diabetes mellitus with diabetic neuropathy ([facility] [address] (8/5/2019)      ESRD (end stage renal disease) on dialysis ([facility] [address] (2/6/2022)      Anxiety and depression ()      CHF NYHA class I, chronic, diastolic (HCC) ()      BPH (benign prostatic hyperplasia) ()      CAD (coronary artery disease) ()      GERD (gastroesophageal reflux disease) ()      HTN (hypertension) ()      Hyperlipidemia ()      Anemia associated with chronic renal failure ()      Open wound of right heel (7/16/2022)      Constipation (7/25/2022)    1. Status post right foot limb salvage surgery  Plan:  Dressing changed. Continue nonweightbearing to right foot  Heel protector boots at all times when sitting or in bed. PT  Antibiotics per ID. Recommend 6 weeks of therapy. Ok to discharge from my standpoint. Follow up in my office upon discharge. Adelia Wooten.  VIC Marrero FACFAS FACCWS [facility]  Triple Board Certified Foot & Ankle Specialist  [phone] cell  July 28, 2022  1:30 PM

## 2022-07-28 NOTE — PROGRESS NOTES
[facility] Infectious Disease Specialists Progress Note           Mana Liphair BOLAND    [phone] Office  [phone]  Fax    2022      Assessment & Plan:     Diabetic foot infection with osteomyelitis of the right calcaneus. Status post I&D with partial bone excision 2022. Pathology positive for osteomyelitis. Cultures reveal a polymicrobial infection including MRSA, Pseudomonas, Citrobacter, Enterococcus, Pasteurella, Proteus, and anaerobes. Plan discharge on vancomycin and cefepime dosed on dialysis along with oral metronidazole. See recommendations below. Check CRP prior to discharge  End-stage renal disease on dialysis MWF  Diabetes mellitus. This is poorly controlled. Hemoglobin A1c is 7.9  Anemia. Hemoglobin 7.6. Management per primary team  Elevated alkaline phosphatase. Plan discharge on:  Vancomycin 1250 mg post HD MWF  Cefepime 2 g post HD MWF  Metronidazole 500 p.o. twice daily (patient will need prescription for this)  All antibiotics to be continued through 9/3/2022  This will need to be arranged by nephrology          Subjective:     No complaints    Objective:     Vitals: Visit Vitals  BP (!) 164/79 (BP 1 Location: Right upper arm, BP Patient Position: At rest)   Pulse 63   Temp 98.6 °F (37 °C)   Resp 17   Ht 5' 8\" (1.727 m)   Wt 173 lb 6.4 oz (78.7 kg)   SpO2 90%   BMI 26.37 kg/m²          Tmax:  Temp (24hrs), Av.5 °F (36.9 °C), Min:98 °F (36.7 °C), Max:99 °F (37.2 °C)      Exam:   Patient is intubated:  no    Physical Examination:   General:  Alert, cooperative, no distress   Head:  Normocephalic, atraumatic. Eyes:  Conjunctivae clear   Neck: Supple       Lungs:   No distress. Chest wall:     Heart:     Abdomen:   non-distended   Extremities: Moves all. Foot dressed   Skin:  No rash. Neurologic: CNII-XII intact. Normal strength     Labs:        No lab exists for component: ITNL   No results for input(s): CPK, CKMB, TROIQ in the last 72 hours.   Recent Labs 07/27/22  0924 07/26/22  0210   * 136   K 3.7 3.4*    102   CO2 25 29   BUN 42* 26*   CREA 3.72* 2.88*   * 162*   WBC 6.7 5.8   HGB 8.1* 7.8*   HCT 25.5* 25.1*    192       No results for input(s): INR, PTP, APTT, INREXT, INREXT in the last 72 hours.   Needs: urine analysis, urine sodium, protein and creatinine  Lab Results   Component Value Date/Time    Creatinine, urine 72.2 11/22/2019 09:36 AM         Cultures:     No results found for: SDES  Lab Results   Component Value Date/Time    Culture result: (A) 07/23/2022 09:45 AM     RARE * Methicillin Resistant Staphylococcus aureus * REFER TO [id number] FOR SENSITIVITIES    Culture result: (A) 07/23/2022 09:45 AM     SCANT ENTEROCOCCUS SPECIES REFER TO [id number] FOR ID AND SENSITIVITIES    Culture result: LIGHT Bacteroides pyogenes BETA LACTAMASE POSITIVE (A) 07/23/2022 09:45 AM       Radiology:     Medications       Current Facility-Administered Medications   Medication Dose Route Frequency Last Admin    hydrALAZINE (APRESOLINE) 20 mg/mL injection 10 mg  10 mg IntraVENous Q4H PRN      lidocaine 4 % patch 1 Patch  1 Patch TransDERmal Q24H 1 Patch at 07/27/22 1537    insulin glargine (LANTUS) injection 30 Units  30 Units SubCUTAneous QHS 30 Units at 07/27/22 2353    aspirin delayed-release tablet 81 mg  81 mg Oral DAILY 81 mg at 07/28/22 0825    insulin lispro (HUMALOG) injection   SubCUTAneous AC&HS 3 Units at 07/27/22 1658    epoetin ericka-epbx (RETACRIT) injection 10,000 Units  10,000 Units IntraVENous DIALYSIS MON, WED & FRI 10,000 Units at 07/27/22 2104    HYDROmorphone (DILAUDID) syringe 0.5 mg  0.5 mg IntraVENous Q4H PRN 0.5 mg at 07/28/22 0826    sodium chloride (NS) flush 5-40 mL  5-40 mL IntraVENous Q8H 10 mL at 07/28/22 0103    sodium chloride (NS) flush 5-40 mL  5-40 mL IntraVENous PRN 10 mL at 07/26/22 1332    acetaminophen (TYLENOL) tablet 650 mg  650 mg Oral Q6H  mg at 07/28/22 0942    Or    acetaminophen (TYLENOL) suppository 650 mg  650 mg Rectal Q6H PRN      polyethylene glycol (MIRALAX) packet 17 g  17 g Oral DAILY PRN      ondansetron (ZOFRAN ODT) tablet 4 mg  4 mg Oral Q8H PRN      Or    ondansetron (ZOFRAN) injection 4 mg  4 mg IntraVENous Q6H PRN 4 mg at 07/22/22 1733    glucose chewable tablet 16 g  4 Tablet Oral PRN      glucagon (GLUCAGEN) injection 1 mg  1 mg IntraMUSCular PRN      dextrose 10% infusion 0-250 mL  0-250 mL IntraVENous PRN      cefepime (MAXIPIME) 1 g in 0.9% sodium chloride (MBP/ADV) 50 mL MBP  1 g IntraVENous Q24H 1 g at 07/27/22 2344    metroNIDAZOLE (FLAGYL) IVPB premix 500 mg  500 mg IntraVENous Q12H 500 mg at 07/28/22 0421    busPIRone (BUSPAR) tablet 10 mg  10 mg Oral BID 10 mg at 07/28/22 0825    carvediloL (COREG) tablet 12.5 mg  12.5 mg Oral BID WITH MEALS 12.5 mg at 07/28/22 0824    docusate sodium (COLACE) capsule 100 mg  100 mg Oral  mg at 07/28/22 0825    ferrous sulfate tablet 325 mg  1 Tablet Oral DAILY 325 mg at 07/28/22 0825    furosemide (LASIX) tablet 80 mg  80 mg Oral DAILY 80 mg at 07/28/22 0843    gabapentin (NEURONTIN) capsule 300 mg  300 mg Oral DAILY 300 mg at 07/28/22 0825    isosorbide mononitrate ER (IMDUR) tablet 60 mg  60 mg Oral DAILY 60 mg at 07/28/22 0825    pantoprazole (PROTONIX) tablet 40 mg  40 mg Oral ACB 40 mg at 07/28/22 9249    sevelamer carbonate (RENVELA) tab 800 mg  800 mg Oral TID WITH MEALS 800 mg at 07/28/22 0824    sucralfate (CARAFATE) tablet 1 g  1 g Oral TIDAC 1 g at 07/28/22 0401    tamsulosin (FLOMAX) capsule 0.4 mg  0.4 mg Oral QHS 0.4 mg at 07/27/22 2344    Vancomycin- Pharmacy dosing by levels   Other Rx Dosing/Monitoring      hydrALAZINE (APRESOLINE) tablet 50 mg  50 mg Oral TID 50 mg at 07/28/22 0825    amLODIPine (NORVASC) tablet 10 mg  10 mg Oral DAILY 10 mg at 07/28/22 2256           Case discussed with: Vancomycin dosing discussed with pharmacy      Corwin Rivero DO

## 2022-07-28 NOTE — PROGRESS NOTES
Physical Therapy    Attempted to see patient today, received in recliner with lunch tray mostly empty in front of him. Patient declining participation at this time due to lunch as well as wanting to rest for a little while after eating. Encouraged mobility and advised therapy may not be able to return pending schedule priorities, patient continued to politely decline. Will follow as able later today.     Lynn David, MS, PT

## 2022-07-28 NOTE — PROGRESS NOTES
[facility] visit. Mr. Armand Cordova was sitting up in his chair today. Prayer and communion offered.     Librado Alcala, SBS, RN, ACSW, LCSW   Page:  [phone]

## 2022-07-28 NOTE — PROGRESS NOTES
[address] [facility] Medicine Service Daily Progress Note    Overnight Events: NAEO. SUBJECTIVE: Pt reports improvement in his R foot pain s/p surgical debridement. He is still complaining of some lower back pain but says that is also improved. OBJECTIVE:    Vitals: Visit Vitals  BP (!) 183/78 (BP 1 Location: Right upper arm, BP Patient Position: Lying)   Pulse 66   Temp 98.4 °F (36.9 °C)   Resp 18   Ht 5' 8\" (1.727 m)   Wt 173 lb 6.4 oz (78.7 kg)   SpO2 96%   BMI 26.37 kg/m²     Physical Exam:  General: NAD. Respiratory: Lungs clear to auscultation bilaterally with no wheezing, crackles, rhonchi, or rales. Cardiovascular: Regular rate and rhythm. S1/S2 auscultated with no murmurs, rubs, or gallops. GI: Non-tender to palpation. Non-distended. + Bowel sounds throughout. Extremities: Right ankle wrapped in bandage with heel protector in place. LLE w/ no edema or tenderness. Skin: Warm, dry.   Neuro: Alert and oriented    I/O:  Date 07/27/22 0700 - 07/28/22 0659 07/28/22 0700 - 07/29/22 0659   Shift 3095-2348 6261-7334 24 Hour Total 1435-2083 2707-6395 24 Hour Total   INTAKE   Shift Total(mL/kg)         OUTPUT   Urine(mL/kg/hr) 300(0.3)  300(0.2)        Urine Voided 300  300      Shift Total(mL/kg) 300(3.8)  300(3.8)      NET -300  -300      Weight (kg) 78.7 78.7 78.7 78.7 78.7 78.7         Inpatient Medications  Current Facility-Administered Medications   Medication Dose Route Frequency    lidocaine 4 % patch 1 Patch  1 Patch TransDERmal Q24H    insulin glargine (LANTUS) injection 30 Units  30 Units SubCUTAneous QHS    aspirin delayed-release tablet 81 mg  81 mg Oral DAILY    insulin lispro (HUMALOG) injection   SubCUTAneous AC&HS    epoetin ericka-epbx (RETACRIT) injection 10,000 Units  10,000 Units IntraVENous DIALYSIS MON, WED & FRI    HYDROmorphone (DILAUDID) syringe 0.5 mg  0.5 mg IntraVENous Q4H PRN    sodium chloride (NS) flush 5-40 mL  5-40 mL IntraVENous Q8H    sodium chloride (NS) flush 5-40 mL 5-40 mL IntraVENous PRN    acetaminophen (TYLENOL) tablet 650 mg  650 mg Oral Q6H PRN    Or    acetaminophen (TYLENOL) suppository 650 mg  650 mg Rectal Q6H PRN    polyethylene glycol (MIRALAX) packet 17 g  17 g Oral DAILY PRN    ondansetron (ZOFRAN ODT) tablet 4 mg  4 mg Oral Q8H PRN    Or    ondansetron (ZOFRAN) injection 4 mg  4 mg IntraVENous Q6H PRN    glucose chewable tablet 16 g  4 Tablet Oral PRN    glucagon (GLUCAGEN) injection 1 mg  1 mg IntraMUSCular PRN    dextrose 10% infusion 0-250 mL  0-250 mL IntraVENous PRN    cefepime (MAXIPIME) 1 g in 0.9% sodium chloride (MBP/ADV) 50 mL MBP  1 g IntraVENous Q24H    metroNIDAZOLE (FLAGYL) IVPB premix 500 mg  500 mg IntraVENous Q12H    busPIRone (BUSPAR) tablet 10 mg  10 mg Oral BID    carvediloL (COREG) tablet 12.5 mg  12.5 mg Oral BID WITH MEALS    docusate sodium (COLACE) capsule 100 mg  100 mg Oral BID    ferrous sulfate tablet 325 mg  1 Tablet Oral DAILY    furosemide (LASIX) tablet 80 mg  80 mg Oral DAILY    gabapentin (NEURONTIN) capsule 300 mg  300 mg Oral DAILY    isosorbide mononitrate ER (IMDUR) tablet 60 mg  60 mg Oral DAILY    pantoprazole (PROTONIX) tablet 40 mg  40 mg Oral ACB    sevelamer carbonate (RENVELA) tab 800 mg  800 mg Oral TID WITH MEALS    sucralfate (CARAFATE) tablet 1 g  1 g Oral TIDAC    tamsulosin (FLOMAX) capsule 0.4 mg  0.4 mg Oral QHS    Vancomycin- Pharmacy dosing by levels   Other Rx Dosing/Monitoring    hydrALAZINE (APRESOLINE) tablet 50 mg  50 mg Oral TID    amLODIPine (NORVASC) tablet 10 mg  10 mg Oral DAILY       Allergies  No Known Allergies    CBC:  Recent Labs     07/27/22  0924 07/26/22 0210   WBC 6.7 5.8   HGB 8.1* 7.8*   HCT 25.5* 25.1*    140         Metabolic Panel:  Recent Labs     07/27/22  0924 07/26/22 0210   * 136   K 3.7 3.4*    102   CO2 25 29   BUN 42* 26*   CREA 3.72* 2.88*   * 162*   CA 8.2* 8.0*              Assessment and Plan     Jennifer Graves is a 64 y.o. male with PMH of ESRD on HD MWF, T2DM, HTN, CAD s/p CABG (May 2020), HEFpEF, PEA arrest, and erosive gastritis who is admitted for osteomyelitis of the R calcaneus now s/p surgical debridement. Right calcaneal osteomyelitis: Demonstrated on MRI.  and CRP 15.2. Podiatry and ID following. Wound Cx: rare probably S. Aureus, Citrobacter, possible streptococcus, Enterococcus sp, other gram + and gram - rods. . Anaerobic Cx: bacteroides pyogenes. Bcx: NGTD. ID recommending 6wks IV. Pathology shows osteomyelitis. ID recommend Vanc, Cefepime MWF and PO flagyl 500mg twice daily through 09/03/22.  - Continue Vanc, Cefepime and Flagyl  - Daily CBC, BMP     HTN: Chronic and uncontrolled. - Continue home hydralazine 50 mg TID, amlodipine 10 mg OD, carvedilol 12.5 mg BID, imdur 60 mg PO daily  - Hydral 10mg IV prn for SBP >180, DBP >110    ESRD on HD: MWF. Follows Dr. Judy Santiago. - Nephrology following  - Continue home Sevelamer 800mg TID, Lasix 80 mg daily     Anemia: Chronic. Likely secondary to ESRD  - Continue home ferrous sulfate daily  - Monitor on daily CBC     T2DM: Chronic. Last A1c 7.9 (6/21/22). - Continue Lantus 30 units daily  - SSI w/ glucose checks ACHS  - Hypoglycemia precautions     GERD: Chronic.  - Continue home Protonix 40 mg OD, Sucralfate 1 g TID     Constipation: Chronic.   - Continue Docusate, Miralax, Senna      CAD / HEFrEF : Echo 11/2021 EF 53% and reduced systolic function.  - Continue home carvedilol 12.5 mg BID, Imdur 60 mg OD, and aspirin 81 mg OD     HLD: Chronic.  - Continue home rosuvastatin 10 mg daily     Groin lymph node: Incidental 2.5 cm x 0.9 cm noted during previous admission.   - PCP: Follow up OP      BPH: Chronic  - Continue home Flomax 0.4 mg once nightly      Anxiety and depression: Chronic.  - Continue home Buspirone 10 mg BID       FEN/GI - Diabetic diet. Activity - Out of bed with assistance. DVT prophylaxis - SCDs  Disposition - Plan to d/c TBD. Consulting PT.   Code Status - Full, discussed with patient / caregivers. [place] [place] [address] and Adolph Rafael Francisco [address] (Spouse) [phone] ([place])      Rehan Marcano MD  Family Medicine Resident       For Billing    Chief Complaint   Patient presents with    [address] Problems  Date Reviewed: 7/12/2022            Codes Class Noted POA    * (Principal) Osteomyelitis of ankle or foot, acute, right ([facility] [address] ICD-10-CM: M86.171  ICD-9-CM: 730.07  7/25/2022 Unknown        Constipation ICD-10-CM: K59.00  ICD-9-CM: 564.00  7/25/2022 Unknown        Open wound of right heel ICD-10-CM: S91.301A  ICD-9-CM: 892.0  7/16/2022 Unknown        Anxiety and depression ICD-10-CM: F41.9, F32. A  ICD-9-CM: 300.00, 311  Unknown Yes        CHF NYHA class I, chronic, diastolic (HCC) [id number]: I50.32  ICD-9-CM: 428.32  Unknown Yes        BPH (benign prostatic hyperplasia) ICD-10-CM: N40.0  ICD-9-CM: 600.00  Unknown Yes        CAD (coronary artery disease) ICD-10-CM: I25.10  ICD-9-CM: 414.00  Unknown Yes        GERD (gastroesophageal reflux disease) ICD-10-CM: K21.9  ICD-9-CM: 530.81  Unknown Yes        HTN (hypertension) ICD-10-CM: I10  ICD-9-CM: 401.9  Unknown Yes        Hyperlipidemia ICD-10-CM: E78.5  ICD-9-CM: 272.4  Unknown Yes        Anemia associated with chronic renal failure ICD-10-CM: N18.9, D63.1  ICD-9-CM: 285.21  Unknown Yes        ESRD (end stage renal disease) on dialysis [facility]) ICD-10-CM: N18.6, Z99.2  ICD-9-CM: 585.6, V45.11  2/6/2022 Yes        Type 2 diabetes mellitus with diabetic neuropathy ([facility] [address] ICD-10-CM: E11.40  ICD-9-CM: 250.60, 357.2  8/5/2019 Yes

## 2022-07-28 NOTE — PROGRESS NOTES
Bedside shift change report given to Marilou Gamez (oncoming nurse) by Criss Soliz (offgoing nurse). Report included the following information SBAR, Kardex, Intake/Output, MAR, and Med Rec Status.

## 2022-07-28 NOTE — PROGRESS NOTES
Problem: Diabetes Self-Management  Goal: *Disease process and treatment process  Description: Define diabetes and identify own type of diabetes; list 3 options for treating diabetes. Outcome: Progressing Towards Goal  Goal: *Incorporating nutritional management into lifestyle  Description: Describe effect of type, amount and timing of food on blood glucose; list 3 methods for planning meals. Outcome: Progressing Towards Goal  Goal: *Incorporating physical activity into lifestyle  Description: State effect of exercise on blood glucose levels. Outcome: Progressing Towards Goal  Goal: *Developing strategies to promote health/change behavior  Description: Define the ABC's of diabetes; identify appropriate screenings, schedule and personal plan for screenings. Outcome: Progressing Towards Goal  Goal: *Using medications safely  Description: State effect of diabetes medications on diabetes; name diabetes medication taking, action and side effects. Outcome: Progressing Towards Goal  Goal: *Monitoring blood glucose, interpreting and using results  Description: Identify recommended blood glucose targets  and personal targets. Outcome: Progressing Towards Goal  Goal: *Prevention, detection, treatment of acute complications  Description: List symptoms of hyper- and hypoglycemia; describe how to treat low blood sugar and actions for lowering  high blood glucose level. Outcome: Progressing Towards Goal  Goal: *Prevention, detection and treatment of chronic complications  Description: Define the natural course of diabetes and describe the relationship of blood glucose levels to long term complications of diabetes.   Outcome: Progressing Towards Goal  Goal: *Developing strategies to address psychosocial issues  Description: Describe feelings about living with diabetes; identify support needed and support network  Outcome: Progressing Towards Goal  Goal: *Insulin pump training  Outcome: Progressing Towards Goal  Goal: *Sick day guidelines  Outcome: Progressing Towards Goal  Goal: *Patient Specific Goal (EDIT GOAL, INSERT TEXT)  Outcome: Progressing Towards Goal     Problem: Patient Education: Go to Patient Education Activity  Goal: Patient/Family Education  Outcome: Progressing Towards Goal     Problem: Pressure Injury - Risk of  Goal: *Prevention of pressure injury  Description: Document Erik Scale and appropriate interventions in the flowsheet. Outcome: Progressing Towards Goal  Note: Pressure Injury Interventions:  Sensory Interventions: Assess changes in LOC    Moisture Interventions: Absorbent underpads    Activity Interventions: Increase time out of bed    Mobility Interventions: HOB 30 degrees or less    Nutrition Interventions: Document food/fluid/supplement intake    Friction and Shear Interventions: HOB 30 degrees or less                Problem: Patient Education: Go to Patient Education Activity  Goal: Patient/Family Education  Outcome: Progressing Towards Goal     Problem: Falls - Risk of  Goal: *Absence of Falls  Description: Document Michelle Fall Risk and appropriate interventions in the flowsheet. Outcome: Progressing Towards Goal  Note: Fall Risk Interventions:  Mobility Interventions: Bed/chair exit alarm         Medication Interventions: Bed/chair exit alarm    Elimination Interventions: Call light in reach, Patient to call for help with toileting needs              Problem: Patient Education: Go to Patient Education Activity  Goal: Patient/Family Education  Outcome: Progressing Towards Goal     Problem: Patient Education: Go to Patient Education Activity  Goal: Patient/Family Education  Outcome: Progressing Towards Goal     Problem: Risk for Spread of Infection  Goal: Prevent transmission of infectious organism to others  Description: Prevent the transmission of infectious organisms to other patients, staff members, and visitors.   Outcome: Progressing Towards Goal     Problem: Patient Education:  Go to Education Activity  Goal: Patient/Family Education  Outcome: Progressing Towards Goal

## 2022-07-28 NOTE — PROGRESS NOTES
No wound care or dressing change order in chart for R heel. MD Picjon texted via perfect serve to inquire about wound care orders. MD Picjon responded \"no dressing changes. leave intact\". Ace bandage c/d/intact at this time.      MARILYNN DE LA PAZ

## 2022-07-28 NOTE — PROGRESS NOTES
7/28/2022   CARE MANAGEMENT NOTE:  CM reviewed EMR. READMISSION:  Pt was admitted to [facility] from 7/16 - 7/19 with abd pain and SOB. He returned home with his wife. Pt was readmitted to [facility] on 7/21/22 with open wound of right heel. Reportedly, pt resides with his wife Justin Wen ([phone]) and three adult sons. RUR 23%; LOS 6 days     Transition Plan of Care:  ID, Podiatry following for medical management - pt is s/p partial right excision of bone calcaneus and flap closure on 7/23  Per ID, plan for discharge on IV Vanc and Cefepime to be dosed at hemodialysis. Nephrology will arrange  ESRD - HD on M,W,F at [facility]. Pt uses Medicaid transport to and from appts. SNF per pt choice - referrals were sent to [facility], [facility], [facility], and [facility] on the [employer] auth will be needed (still exploring if pt has a SNF benefit)  Rapid Covid testing on day of discharge  Outpatient follow up  Family vs [place] Medicaid transport     CM will continue to follow pt for SNF ([place] Medicaid benefit to be explored)  DENNYS Velázquez

## 2022-07-29 ENCOUNTER — HOME HEALTH ADMISSION (OUTPATIENT)
Dept: HOME HEALTH SERVICES | Facility: HOME HEALTH | Age: 62
End: 2022-07-29
Payer: MEDICAID

## 2022-07-29 VITALS
RESPIRATION RATE: 16 BRPM | DIASTOLIC BLOOD PRESSURE: 80 MMHG | TEMPERATURE: 97.7 F | WEIGHT: 148.9 LBS | HEART RATE: 63 BPM | SYSTOLIC BLOOD PRESSURE: 150 MMHG | OXYGEN SATURATION: 95 % | HEIGHT: 68 IN | BODY MASS INDEX: 22.57 KG/M2

## 2022-07-29 LAB
ANION GAP SERPL CALC-SCNC: 7 MMOL/L (ref 5–15)
BUN SERPL-MCNC: 31 MG/DL (ref 6–20)
BUN/CREAT SERPL: 11 (ref 12–20)
CALCIUM SERPL-MCNC: 9 MG/DL (ref 8.5–10.1)
CHLORIDE SERPL-SCNC: 102 MMOL/L (ref 97–108)
CO2 SERPL-SCNC: 26 MMOL/L (ref 21–32)
CREAT SERPL-MCNC: 2.82 MG/DL (ref 0.7–1.3)
ERYTHROCYTE [DISTWIDTH] IN BLOOD BY AUTOMATED COUNT: 15.9 % (ref 11.5–14.5)
GLUCOSE BLD STRIP.AUTO-MCNC: 115 MG/DL (ref 65–117)
GLUCOSE BLD STRIP.AUTO-MCNC: 87 MG/DL (ref 65–117)
GLUCOSE SERPL-MCNC: 97 MG/DL (ref 65–100)
HCT VFR BLD AUTO: 27.1 % (ref 36.6–50.3)
HGB BLD-MCNC: 8.4 G/DL (ref 12.1–17)
MCH RBC QN AUTO: 28 PG (ref 26–34)
MCHC RBC AUTO-ENTMCNC: 31 G/DL (ref 30–36.5)
MCV RBC AUTO: 90.3 FL (ref 80–99)
NRBC # BLD: 0 K/UL (ref 0–0.01)
NRBC BLD-RTO: 0 PER 100 WBC
PLATELET # BLD AUTO: 203 K/UL (ref 150–400)
PMV BLD AUTO: 10.3 FL (ref 8.9–12.9)
POTASSIUM SERPL-SCNC: 4 MMOL/L (ref 3.5–5.1)
RBC # BLD AUTO: 3 M/UL (ref 4.1–5.7)
SERVICE CMNT-IMP: NORMAL
SERVICE CMNT-IMP: NORMAL
SODIUM SERPL-SCNC: 135 MMOL/L (ref 136–145)
VANCOMYCIN SERPL-MCNC: 26.1 UG/ML
WBC # BLD AUTO: 5.3 K/UL (ref 4.1–11.1)

## 2022-07-29 PROCEDURE — 74011250637 HC RX REV CODE- 250/637: Performed by: PODIATRIST

## 2022-07-29 PROCEDURE — 74011000258 HC RX REV CODE- 258: Performed by: PODIATRIST

## 2022-07-29 PROCEDURE — 74011250636 HC RX REV CODE- 250/636: Performed by: PODIATRIST

## 2022-07-29 PROCEDURE — 90935 HEMODIALYSIS ONE EVALUATION: CPT

## 2022-07-29 PROCEDURE — 80048 BASIC METABOLIC PNL TOTAL CA: CPT

## 2022-07-29 PROCEDURE — 74011000250 HC RX REV CODE- 250: Performed by: STUDENT IN AN ORGANIZED HEALTH CARE EDUCATION/TRAINING PROGRAM

## 2022-07-29 PROCEDURE — 74011250636 HC RX REV CODE- 250/636: Performed by: INTERNAL MEDICINE

## 2022-07-29 PROCEDURE — 80202 ASSAY OF VANCOMYCIN: CPT

## 2022-07-29 PROCEDURE — 85027 COMPLETE CBC AUTOMATED: CPT

## 2022-07-29 PROCEDURE — 99232 SBSQ HOSP IP/OBS MODERATE 35: CPT | Performed by: INTERNAL MEDICINE

## 2022-07-29 PROCEDURE — 99238 HOSP IP/OBS DSCHRG MGMT 30/<: CPT | Performed by: FAMILY MEDICINE

## 2022-07-29 PROCEDURE — 74011000250 HC RX REV CODE- 250: Performed by: PODIATRIST

## 2022-07-29 PROCEDURE — 36415 COLL VENOUS BLD VENIPUNCTURE: CPT

## 2022-07-29 PROCEDURE — 82962 GLUCOSE BLOOD TEST: CPT

## 2022-07-29 PROCEDURE — 94761 N-INVAS EAR/PLS OXIMETRY MLT: CPT

## 2022-07-29 RX ORDER — NALOXONE HYDROCHLORIDE 4 MG/.1ML
SPRAY NASAL
Qty: 1 EACH | Refills: 0 | Status: SHIPPED | OUTPATIENT
Start: 2022-07-29

## 2022-07-29 RX ORDER — OXYCODONE HYDROCHLORIDE 5 MG/1
5 TABLET ORAL
Qty: 12 TABLET | Refills: 0 | Status: SHIPPED | OUTPATIENT
Start: 2022-07-29 | End: 2022-08-01

## 2022-07-29 RX ORDER — LACTOBACILLUS COMBINATION NO.4 3B CELL
1 CAPSULE ORAL DAILY
Qty: 40 CAPSULE | Refills: 0 | Status: SHIPPED | OUTPATIENT
Start: 2022-07-29

## 2022-07-29 RX ORDER — HYDRALAZINE HYDROCHLORIDE 50 MG/1
50 TABLET, FILM COATED ORAL 3 TIMES DAILY
Qty: 90 TABLET | Refills: 0 | Status: SHIPPED
Start: 2022-07-29 | End: 2022-08-28

## 2022-07-29 RX ORDER — INSULIN GLARGINE 100 [IU]/ML
15 INJECTION, SOLUTION SUBCUTANEOUS
Qty: 4.5 ML | Refills: 0 | Status: SHIPPED | OUTPATIENT
Start: 2022-07-29 | End: 2022-08-28

## 2022-07-29 RX ORDER — METRONIDAZOLE 500 MG/1
500 TABLET ORAL 2 TIMES DAILY
Qty: 72 TABLET | Refills: 0 | Status: SHIPPED | OUTPATIENT
Start: 2022-07-29 | End: 2022-07-29 | Stop reason: SDUPTHER

## 2022-07-29 RX ORDER — INSULIN GLARGINE 100 [IU]/ML
30 INJECTION, SOLUTION SUBCUTANEOUS
Qty: 9 ML | Refills: 0 | Status: SHIPPED | OUTPATIENT
Start: 2022-07-29 | End: 2022-07-29 | Stop reason: SDUPTHER

## 2022-07-29 RX ORDER — METRONIDAZOLE 500 MG/1
500 TABLET ORAL 2 TIMES DAILY
Qty: 70 TABLET | Refills: 0 | Status: SHIPPED | OUTPATIENT
Start: 2022-07-30 | End: 2022-09-03

## 2022-07-29 RX ORDER — HYDRALAZINE HYDROCHLORIDE 50 MG/1
100 TABLET, FILM COATED ORAL 3 TIMES DAILY
Qty: 180 TABLET | Refills: 0 | Status: SHIPPED
Start: 2022-07-29 | End: 2022-07-29 | Stop reason: SDUPTHER

## 2022-07-29 RX ADMIN — AMLODIPINE BESYLATE 10 MG: 5 TABLET ORAL at 08:15

## 2022-07-29 RX ADMIN — ACETAMINOPHEN 650 MG: 325 TABLET ORAL at 11:23

## 2022-07-29 RX ADMIN — ACETAMINOPHEN 650 MG: 325 TABLET ORAL at 17:27

## 2022-07-29 RX ADMIN — HYDRALAZINE HYDROCHLORIDE 50 MG: 25 TABLET ORAL at 17:26

## 2022-07-29 RX ADMIN — Medication 10 ML: at 14:00

## 2022-07-29 RX ADMIN — SEVELAMER CARBONATE 800 MG: 800 TABLET, FILM COATED ORAL at 14:27

## 2022-07-29 RX ADMIN — ISOSORBIDE MONONITRATE 60 MG: 30 TABLET, EXTENDED RELEASE ORAL at 08:14

## 2022-07-29 RX ADMIN — HYDRALAZINE HYDROCHLORIDE 50 MG: 25 TABLET ORAL at 08:15

## 2022-07-29 RX ADMIN — HYDROMORPHONE HYDROCHLORIDE 0.5 MG: 1 INJECTION, SOLUTION INTRAMUSCULAR; INTRAVENOUS; SUBCUTANEOUS at 07:08

## 2022-07-29 RX ADMIN — BUSPIRONE HYDROCHLORIDE 10 MG: 10 TABLET ORAL at 08:15

## 2022-07-29 RX ADMIN — HYDROMORPHONE HYDROCHLORIDE 0.5 MG: 1 INJECTION, SOLUTION INTRAMUSCULAR; INTRAVENOUS; SUBCUTANEOUS at 14:15

## 2022-07-29 RX ADMIN — BUSPIRONE HYDROCHLORIDE 10 MG: 10 TABLET ORAL at 19:21

## 2022-07-29 RX ADMIN — CEFEPIME 1 G: 1 INJECTION, POWDER, FOR SOLUTION INTRAMUSCULAR; INTRAVENOUS at 00:22

## 2022-07-29 RX ADMIN — SUCRALFATE 1 G: 1 TABLET ORAL at 06:17

## 2022-07-29 RX ADMIN — SUCRALFATE 1 G: 1 TABLET ORAL at 11:23

## 2022-07-29 RX ADMIN — EPOETIN ALFA-EPBX 10000 UNITS: 10000 INJECTION, SOLUTION INTRAVENOUS; SUBCUTANEOUS at 12:17

## 2022-07-29 RX ADMIN — DOCUSATE SODIUM 100 MG: 100 CAPSULE, LIQUID FILLED ORAL at 08:15

## 2022-07-29 RX ADMIN — Medication 10 ML: at 06:20

## 2022-07-29 RX ADMIN — SEVELAMER CARBONATE 800 MG: 800 TABLET, FILM COATED ORAL at 08:21

## 2022-07-29 RX ADMIN — DOCUSATE SODIUM 100 MG: 100 CAPSULE, LIQUID FILLED ORAL at 17:26

## 2022-07-29 RX ADMIN — VANCOMYCIN HYDROCHLORIDE 1000 MG: 1 INJECTION, POWDER, LYOPHILIZED, FOR SOLUTION INTRAVENOUS at 17:15

## 2022-07-29 RX ADMIN — GABAPENTIN 300 MG: 300 CAPSULE ORAL at 08:15

## 2022-07-29 RX ADMIN — ASPIRIN 81 MG: 81 TABLET, COATED ORAL at 08:15

## 2022-07-29 RX ADMIN — FERROUS SULFATE TAB 325 MG (65 MG ELEMENTAL FE) 325 MG: 325 (65 FE) TAB at 08:15

## 2022-07-29 RX ADMIN — METRONIDAZOLE 500 MG: 500 INJECTION, SOLUTION INTRAVENOUS at 05:13

## 2022-07-29 RX ADMIN — PANTOPRAZOLE SODIUM 40 MG: 40 TABLET, DELAYED RELEASE ORAL at 06:18

## 2022-07-29 RX ADMIN — FUROSEMIDE 80 MG: 40 TABLET ORAL at 08:15

## 2022-07-29 RX ADMIN — SEVELAMER CARBONATE 800 MG: 800 TABLET, FILM COATED ORAL at 17:26

## 2022-07-29 RX ADMIN — HYDROMORPHONE HYDROCHLORIDE 0.5 MG: 1 INJECTION, SOLUTION INTRAMUSCULAR; INTRAVENOUS; SUBCUTANEOUS at 03:20

## 2022-07-29 RX ADMIN — CARVEDILOL 12.5 MG: 12.5 TABLET, FILM COATED ORAL at 08:15

## 2022-07-29 RX ADMIN — CARVEDILOL 12.5 MG: 12.5 TABLET, FILM COATED ORAL at 17:29

## 2022-07-29 RX ADMIN — SUCRALFATE 1 G: 1 TABLET ORAL at 17:26

## 2022-07-29 NOTE — PROGRESS NOTES
Bedside shift change report given to Aydee  (oncoming nurse) by Dacia Cerrato (offgoing nurse). Report included the following information SBAR, Kardex, Intake/Output, MAR, and Med Rec Status.

## 2022-07-29 NOTE — PROGRESS NOTES
[address] [facility]  YOB: 1960          Assessment & Plan:     [facility]  DFI  DM2  HTN  CAD  Anemia    Rec:  Seen on HD mati well  I have called in his abx to his unit  His prescribed outpatient Qt is 4 hours  Working on rehab placement       Subjective:   CC: fu ESRD  HPI: Seen on HD mati well   ROS: No sob/n/v  Current Facility-Administered Medications   Medication Dose Route Frequency    hydrALAZINE (APRESOLINE) 20 mg/mL injection 10 mg  10 mg IntraVENous Q4H PRN    lidocaine 4 % patch 1 Patch  1 Patch TransDERmal Q24H    insulin glargine (LANTUS) injection 30 Units  30 Units SubCUTAneous QHS    aspirin delayed-release tablet 81 mg  81 mg Oral DAILY    insulin lispro (HUMALOG) injection   SubCUTAneous AC&HS    epoetin ericka-epbx (RETACRIT) injection 10,000 Units  10,000 Units IntraVENous DIALYSIS MON, WED & FRI    HYDROmorphone (DILAUDID) syringe 0.5 mg  0.5 mg IntraVENous Q4H PRN    sodium chloride (NS) flush 5-40 mL  5-40 mL IntraVENous Q8H    sodium chloride (NS) flush 5-40 mL  5-40 mL IntraVENous PRN    acetaminophen (TYLENOL) tablet 650 mg  650 mg Oral Q6H PRN    Or    acetaminophen (TYLENOL) suppository 650 mg  650 mg Rectal Q6H PRN    polyethylene glycol (MIRALAX) packet 17 g  17 g Oral DAILY PRN    ondansetron (ZOFRAN ODT) tablet 4 mg  4 mg Oral Q8H PRN    Or    ondansetron (ZOFRAN) injection 4 mg  4 mg IntraVENous Q6H PRN    glucose chewable tablet 16 g  4 Tablet Oral PRN    glucagon (GLUCAGEN) injection 1 mg  1 mg IntraMUSCular PRN    dextrose 10% infusion 0-250 mL  0-250 mL IntraVENous PRN    cefepime (MAXIPIME) 1 g in 0.9% sodium chloride (MBP/ADV) 50 mL MBP  1 g IntraVENous Q24H    metroNIDAZOLE (FLAGYL) IVPB premix 500 mg  500 mg IntraVENous Q12H    busPIRone (BUSPAR) tablet 10 mg  10 mg Oral BID    carvediloL (COREG) tablet 12.5 mg  12.5 mg Oral BID WITH MEALS    docusate sodium (COLACE) capsule 100 mg  100 mg Oral BID    ferrous sulfate tablet 325 mg  1 Tablet Oral DAILY    furosemide (LASIX) tablet 80 mg  80 mg Oral DAILY    gabapentin (NEURONTIN) capsule 300 mg  300 mg Oral DAILY    isosorbide mononitrate ER (IMDUR) tablet 60 mg  60 mg Oral DAILY    pantoprazole (PROTONIX) tablet 40 mg  40 mg Oral ACB    sevelamer carbonate (RENVELA) tab 800 mg  800 mg Oral TID WITH MEALS    sucralfate (CARAFATE) tablet 1 g  1 g Oral TIDAC    tamsulosin (FLOMAX) capsule 0.4 mg  0.4 mg Oral QHS    Vancomycin- Pharmacy dosing by levels   Other Rx Dosing/Monitoring    hydrALAZINE (APRESOLINE) tablet 50 mg  50 mg Oral TID    amLODIPine (NORVASC) tablet 10 mg  10 mg Oral DAILY          Objective:     Vitals:  Blood pressure (!) 168/71, pulse 65, temperature 98.9 °F (37.2 °C), temperature source Oral, resp. rate 18, height 5' 8\" (1.727 m), weight 78.7 kg (173 lb 6.4 oz), SpO2 95 %. Temp (24hrs), Av.5 °F (36.9 °C), Min:97.9 °F (36.6 °C), Max:99.1 °F (37.3 °C)      Intake and Output:  No intake/output data recorded.  1901 -  0700  In: -   Out: 300 [Urine:300]    Physical Exam:               GENERAL ASSESSMENT: NAD  NECK: IJ PC  CHEST: Unlabored  EXTREMITY: no EDEMA  NEURO: Grossly non focal          ECG/rhythm:    Data Review      No results for input(s): TNIPOC in the last 72 hours. No lab exists for component: ITNL   No results for input(s): CPK, CKMB, TROIQ in the last 72 hours. Recent Labs     22  0311 22  1158 22  0924   * 136 134*   K 4.0 4.1 3.7    103 102   CO2 26 29 25   BUN 31* 21* 42*   CREA 2.82* 2.27* 3.72*   GLU 97 102* 198*   CA 9.0 8.8 8.2*   WBC 5.3 6.0 6.7   HGB 8.4* 8.7* 8.1*   HCT 27.1* 27.6* 25.5*    214 184        No results for input(s): INR, PTP, APTT, INREXT, INREXT in the last 72 hours.   Needs: urine analysis, urine sodium, protein and creatinine  Lab Results   Component Value Date/Time    Creatinine, urine 72.2 2019 09:36 AM           : Demetrice Laureano Narendra Christensen MD  7/29/2022        [address] [place] [address] [facility]:  [email or web address]  Dc Russ office:  [address] [place] [address], [address] [place] [address],[address]  [address]  Phone: [phone]  Fax :     [phone]    [address] [place] [address] office:  [address], [address]   Phone - [phone]  Fax - [phone]

## 2022-07-29 NOTE — PROGRESS NOTES
Problem: Diabetes Self-Management  Goal: *Disease process and treatment process  Description: Define diabetes and identify own type of diabetes; list 3 options for treating diabetes. Outcome: Progressing Towards Goal  Goal: *Incorporating nutritional management into lifestyle  Description: Describe effect of type, amount and timing of food on blood glucose; list 3 methods for planning meals. Outcome: Progressing Towards Goal  Goal: *Incorporating physical activity into lifestyle  Description: State effect of exercise on blood glucose levels. Outcome: Progressing Towards Goal  Goal: *Developing strategies to promote health/change behavior  Description: Define the ABC's of diabetes; identify appropriate screenings, schedule and personal plan for screenings. Outcome: Progressing Towards Goal  Goal: *Using medications safely  Description: State effect of diabetes medications on diabetes; name diabetes medication taking, action and side effects. Outcome: Progressing Towards Goal  Goal: *Monitoring blood glucose, interpreting and using results  Description: Identify recommended blood glucose targets  and personal targets. Outcome: Progressing Towards Goal  Goal: *Prevention, detection, treatment of acute complications  Description: List symptoms of hyper- and hypoglycemia; describe how to treat low blood sugar and actions for lowering  high blood glucose level. Outcome: Progressing Towards Goal  Goal: *Prevention, detection and treatment of chronic complications  Description: Define the natural course of diabetes and describe the relationship of blood glucose levels to long term complications of diabetes.   Outcome: Progressing Towards Goal  Goal: *Developing strategies to address psychosocial issues  Description: Describe feelings about living with diabetes; identify support needed and support network  Outcome: Progressing Towards Goal  Goal: *Insulin pump training  Outcome: Progressing Towards Goal  Goal: *Sick day guidelines  Outcome: Progressing Towards Goal  Goal: *Patient Specific Goal (EDIT GOAL, INSERT TEXT)  Outcome: Progressing Towards Goal     Problem: Patient Education: Go to Patient Education Activity  Goal: Patient/Family Education  Outcome: Progressing Towards Goal     Problem: Pressure Injury - Risk of  Goal: *Prevention of pressure injury  Description: Document Erik Scale and appropriate interventions in the flowsheet. Outcome: Progressing Towards Goal  Note: Pressure Injury Interventions:  Sensory Interventions: Assess changes in LOC    Moisture Interventions: Absorbent underpads    Activity Interventions: Increase time out of bed    Mobility Interventions: HOB 30 degrees or less    Nutrition Interventions: Discuss nutritional consult with provider    Friction and Shear Interventions: HOB 30 degrees or less                Problem: Patient Education: Go to Patient Education Activity  Goal: Patient/Family Education  Outcome: Progressing Towards Goal     Problem: Falls - Risk of  Goal: *Absence of Falls  Description: Document Michelle Fall Risk and appropriate interventions in the flowsheet. Outcome: Progressing Towards Goal  Note: Fall Risk Interventions:  Mobility Interventions: Bed/chair exit alarm         Medication Interventions: Bed/chair exit alarm    Elimination Interventions: Call light in reach              Problem: Patient Education: Go to Patient Education Activity  Goal: Patient/Family Education  Outcome: Progressing Towards Goal     Problem: Patient Education: Go to Patient Education Activity  Goal: Patient/Family Education  Outcome: Progressing Towards Goal     Problem: Risk for Spread of Infection  Goal: Prevent transmission of infectious organism to others  Description: Prevent the transmission of infectious organisms to other patients, staff members, and visitors.   Outcome: Progressing Towards Goal     Problem: Patient Education:  Go to Education Activity  Goal: Patient/Family Education  Outcome: Progressing Towards Goal     Problem: Nutrition Deficit  Goal: *Optimize nutritional status  Outcome: Progressing Towards Goal

## 2022-07-29 NOTE — PROCEDURES
Hemodialysis / [phone]    Vitals Pre Post Assessment Pre Post   /82 147/62 LOC A&O x4 A&O x4   HR 63 64 Lungs clear clear   Resp 18 16 Cardiac regular regular   Temp 98.7 98.9 Skin Dry, warm Dry, warm   Weight    Edema none none   Tele status N/A N/A Pain 0 0     Orders   Duration: Start: 0930 End: 1330 Total: 4 hrs   Dialyzer: Dialyzer/Set Up Inspection: Jeny Maciel (07/29/22 0930)   K Bath: Dialysate K (mEq/L): 2 (07/29/22 0930)   Ca Bath: Dialysate CA (mEq/L): 2.5 (07/29/22 0930)   Na: Dialysate NA (mEq/L): 138 (07/29/22 0930)   Bicarb: Dialysate HCO3 (mEq/L): 35 (07/29/22 0930)   Target Fluid Removal: Goal/Amount of Fluid to Remove (mL): 1000 mL (07/29/22 0930)     Access   Type & Location: Right PC: dressing DCI, no S&S of infection, ports cleaned per P&P, flushing well   Comments:                                        Labs   HBsAg (Antigen) / date: 07/16/22 negative                                              HBsAb (Antibody) / date: 07/16/22 susceptible   Source:    Obtained/Reviewed  Critical Results Called HGB   Date Value Ref Range Status   07/29/2022 8.4 (L) 12.1 - 17.0 g/dL Final     Potassium   Date Value Ref Range Status   07/29/2022 4.0 3.5 - 5.1 mmol/L Final     Calcium   Date Value Ref Range Status   07/29/2022 9.0 8.5 - 10.1 MG/DL Final     BUN   Date Value Ref Range Status   07/29/2022 31 (H) 6 - 20 MG/DL Final     Creatinine   Date Value Ref Range Status   07/29/2022 2.82 (H) 0.70 - 1.30 MG/DL Final        Meds Given   Name Dose Route        Retacrit 10,000 units HD          Adequacy / Fluid    Total Liters Process: 88L   Net Fluid Removed: 1000 cc      Comments   Time Out Done:   (Time) 4899   Admitting Diagnosis:    Consent obtained/signed: Informed Consent Verified: Yes (07/29/22 0930)   Machine / RO # Machine Number: [id number] (07/29/22 5452)   Primary Nurse Rpt Pre: Obdulio Briones RN   Primary Nurse Rpt Post: Obdulio Briones RN   Pt Education: Access care, procedure   Care Plan: Continue HD tx as per MD order   Pts outpatient clinic:      Tx Summary   Comments: Tolerated tx well,  blood rinsed back, ports flushed with NS, cleaned per P&P, caps applied.

## 2022-07-29 NOTE — PROGRESS NOTES
Bedside shift change report given to Ronak (oncoming nurse) by Mahesh Perry (offgoing nurse). Report included the following information SBAR.

## 2022-07-29 NOTE — DISCHARGE SUMMARY
Discharge Note     Name: Mary Youngblood MRN: [id number]  Sex: Male   YOB: 1960  Age: 64 y.o. PCP: Karina Renee MD     Date of admission: 7/21/2022  Date of discharge: 7/29/2022    Attending physician at admission: Elias Mccarthy MD  Attending physician at discharge: Lydia Morejon MD  Resident physician at discharge: Heath Garnica MD     Consultants during hospitalization  IP CONSULT TO [id number] TO INFECTIOUS DISEASES     Admission diagnoses   Open wound of right heel [S91.301A]    Recommended follow-up after discharge    1. PCP - Sirena Tran NP  2. Dialysis [facility], Dr. Suzanne Leonardo)     Things to follow up on with PCP:   - Ensure compliance with MWF dialysis. - Review blood sugars and determine if home insulin needs to be adjusted. - Check blood pressure  - Follow-up inguinal lymphadenopathy    Medication Changes:  START:  - Metronidazole 500 mg BID through 9/3/22.  - Vancomycin 1250 mg post HD MWF through 9/3/22 (arranged by Nephrology)  - Cefepime 2 g post HD MWF (arranged by Nephrology)  - Oxycodone 5 mg q6H PRN for pain, 12 tablets prescribed  - Narcan PRN     STOP:  - Keflex  - Plavix (Pt reports that he is no longer taking this medication. Discussed with pharmacy who report that Plavix was last filled in March for a 30 day supply.)  - Eplerenone (Pt reports that he is no longer taking this medication. Discussed with pharmacy who report that Eplerenone was last filled in March for a 30 day supply.)     CHANGES:  - Hydralazine 50 mg TID (pt reports that he previously was taking 100 mg TID). No other changes were made to pt's medications.      History of Present Illness    As per admitting provider:   Prachi Hathaway is a 64 y.o. male with PMH ESRD on HD MWF, T2DM, HTN, CAD s/p CABG (May 2020), HEFpEF, PEA arrest, and erosive gastritis who presents to the ED from podiatrist office due to concern for osteo. Patient endorses subjective fever and chills. Per patient , he was sent in by is Podiatrist because of worsening purulent drainage from his right heel wound. Currently the wound is wrapped and he is wearing a boot. He also endorses pain in his right leg. [facility] course    Admitted for osteomyelitis of the R calcaneus. Podiatry performed partial removal of the R calcaneus. Wound cultures revealed a polymicrobial infection including MRSA, Pseudomonas, Citrobacter, Enterococcus, Pasteurella, Proteus, and anaerobes. Per ID, pt treated w/ broad spectrum abx post-operatively (vancomycin, cefepime, and metronidazole). CRP down-trended w/ tx. PT recommended HH vs SNF. Pt initially elected SNF. Informed by CM that pt's insurance does not have a SNF benefit and SNFs did not accept. Pt elected to return home w/ HH, which was arranged by CM. Pt discharged in stable condition with plan to undergo extended abx course through 9/3/22. Right calcaneal osteomyelitis:   - START Metronidazole 500 mg BID through 9/3/22.  - START vancomycin 1250 mg post HD MWF through 9/3/22 (arranged by Nephrology)  - START cefepime 2 g post HD MWF (arranged by Nephrology)  - START oxycodone 5 mg q6H PRN for pain, 12 tablets prescribed for post-op pain  - Narcan PRN rx provided     HTN:    - Hydralazine 50 mg TID (pt reports that he previously was taking 100 mg TID). ESRD on HD: Follows Dr. Magaly Haynes. - Continue dialysis MWF  - Abx to be administered after completion of dialysis as above through 9/3/22     Anemia: Chronic. Likely secondary to ESRD. No changes. T2DM: Chronic. Last A1c 7.9 (6/21/22). No changes made to pt's home regimen at discharge. - Follow-up w/ PCP on 8/2 to review blood sugars     GERD: No changes. Constipation: Chronic. No changes. Continue bowel regimen. CAD / HEFrEF : No changes. HLD: No changes. Groin lymph node:  Incidental 2.5 cm x 0.9 cm noted during previous admission.   - Follow-up w/ PCP     BPH: No changes. Anxiety and depression: No changes. Visit Vitals  BP (!) 150/80 (BP 1 Location: Right upper arm, BP Patient Position: At rest;Sitting)   Pulse 63   Temp 97.7 °F (36.5 °C)   Resp 16   Ht 5' 8\" (1.727 m)   Wt 148 lb 14.4 oz (67.5 kg)   SpO2 95%   BMI 22.64 kg/m²       Physical Examination:  General: No acute distress  CV: Heart: regular rate  Resp: Lungs: clear to auscultation bilaterally  Neuro: Awake, alert, and oriented. Condition at discharge: Stable. Labs  Recent Labs     07/29/22  0311 07/28/22  1158 07/27/22  0924   WBC 5.3 6.0 6.7   HGB 8.4* 8.7* 8.1*   HCT 27.1* 27.6* 25.5*    214 184     Recent Labs     07/29/22 0311 07/28/22  1158 07/27/22  0924   * 136 134*   K 4.0 4.1 3.7    103 102   CO2 26 29 25   BUN 31* 21* 42*   CREA 2.82* 2.27* 3.72*   GLU 97 102* 198*   CA 9.0 8.8 8.2*     No results for input(s): ALT, AP, TBIL, TBILI, TP, ALB, GLOB, GGT, AML, LPSE in the last 72 hours. No lab exists for component: SGOT, GPT, AMYP, HLPSE  Recent Labs     07/29/22  1552 07/29/22  1128 07/28/22  2114 07/28/22  1600 07/28/22  1118   GLUCPOC 87 115 153* 113 112       Micro:  Lab Results   Component Value Date/Time    Culture result: (A) 07/23/2022 09:45 AM     RARE * Methicillin Resistant Staphylococcus aureus * REFER TO [id number] FOR SENSITIVITIES    Culture result: (A) 07/23/2022 09:45 AM     SCANT ENTEROCOCCUS SPECIES REFER TO [id number] FOR ID AND SENSITIVITIES    Culture result: LIGHT Bacteroides pyogenes BETA LACTAMASE POSITIVE (A) 07/23/2022 09:45 AM       Imaging:  XR TIB/FIB RT    Result Date: 7/21/2022  EXAM: XR TIB/FIB RT INDICATION: swelling and pain. COMPARISON: None. FINDINGS: AP and lateral  views of the right tibia and fibula. Surgical clips are seen medial to the proximal tibia. There is soft tissue swelling surrounding the ankle. No acute fracture or dislocation. No evidence of osteomyelitis.      Soft tissue swelling surrounding the ankle.    XR FOOT RT MIN 3 V    Result Date: 7/21/2022  EXAM: XR FOOT RT MIN 3 V INDICATION: draining lesion, concern for osteo. COMPARISON: None. FINDINGS: Three views of the right foot. The patient is status post amputation of the fifth ray to the level of the fifth metatarsal base. No acute fracture or dislocation. No radiographic evidence of osteomyelitis. There is deformity of the posterior heel with a locule of gas inferiorly. Deformity of the soft tissues of the posterior heel with a locule of gas inferiorly. This may be related to the patient's ulceration/infection. No radiographic evidence of osteomyelitis. .    XR CALCANEUS RT    Result Date: 7/23/2022  EXAM: XR CALCANEUS RT INDICATION: post op. COMPARISON: July 21. FINDINGS: Lateral and calcaneal views of the right os calcis demonstrate postsurgical changes/defects at the inferior aspect of the calcaneus, with an overlying soft tissue wound. . Vascular calcifications are noted. Postsurgical changes involving the calcaneus and overlying soft tissues. Recommend correlation with clinical and surgical notes. MRI FOOT RT WO CONT    Result Date: 7/21/2022  EXAM:  MRI FOOT RT WO CONT INDICATION:  Heel wound. Evaluate for osteoarthritis. COMPARISON: Radiographs 7/21/2022 TECHNIQUE: Axial, coronal, and sagittal MRI of the right hindfoot in the T1, T2, and inversion-recovery pulse sequences with and without fat saturation . CONTRAST: None. FINDINGS: Bone marrow: Mild edema is present in the posterior inferior calcaneus with wispy areas of decreased T1 signal. Joint fluid:  None. Tendons: Intact. Muscles: Within normal limits. Neurovascular bundles: Within normal limits. Articular cartilage:Mild osteoarthritis is seen in the midfoot. Soft tissues: Evaluation for soft tissue infection is partially limited by the lack of IV contrast. There is edema surrounding the ankle and foot that makes evaluation for focal fluid collection difficult.  There is however an ulceration of the posterior heel with an apparent underlying fluid collection measuring 3.0 x 0.5 cm on series 9 image 13. Ulceration in the posterior heel with an underlying fluid collection. Signal abnormality in the posterior inferior calcaneus is worrisome for acute osteomyelitis. Diffuse subcutaneous edema surrounds the foot and ankle. CT ABD PELV WO CONT    Result Date: 7/16/2022  EXAM: CT ABD PELV WO CONT INDICATION: abdominal pain; does have ESRD but still makes urine COMPARISON: 2/5/2022 IV CONTRAST: None. ORAL CONTRAST: None TECHNIQUE: Thin axial images were obtained through the abdomen and pelvis. Coronal and sagittal reformats were generated. CT dose reduction was achieved through use of a standardized protocol tailored for this examination and automatic exposure control for dose modulation. The absence of intravenous contrast material reduces the sensitivity for evaluation of the vasculature and solid organs. FINDINGS: LOWER THORAX: There bilateral pleural effusions left greater than right with underlying atelectasis. LIVER: No mass. BILIARY TREE: Gallbladder is within normal limits. CBD is not dilated. SPLEEN: within normal limits. PANCREAS: No focal abnormality. ADRENALS: Unremarkable. KIDNEYS/URETERS: No calculus or hydronephrosis. STOMACH: Unremarkable. SMALL BOWEL: No dilatation or wall thickening. COLON: No dilatation or wall thickening. Moderate fecal stasis. APPENDIX: Within normal limits. PERITONEUM: No ascites or pneumoperitoneum. RETROPERITONEUM: No lymphadenopathy or aortic aneurysm. REPRODUCTIVE ORGANS: There is no pelvic mass or lymphadenopathy. URINARY BLADDER: No mass or calculus. BONES: Degenerative changes are seen in the lumbar spine and hip joints bilaterally. ABDOMINAL WALL: No mass or hernia. ADDITIONAL COMMENTS: N/A     Bilateral pleural effusions with underlying atelectasis left greater than right. Moderate fecal stasis. No acute intra-abdominal abnormality.     XR CHEST PORT    Result Date: 7/16/2022  Indication: Chest pain Comparison: 4/14/2022 Portable exam of the chest obtained at 1357 demonstrates stable cardiomegaly. The patient is status post median sternotomy. There are small bilateral pleural effusions. Minimal pulmonary edema is noted. Central venous catheter is unchanged in position. Small bilateral pleural effusions and minimal pulmonary edema. DUPLEX LOWER EXT VENOUS BILAT    Result Date: 7/18/2022  Bilateral lower extremity venous duplex negative for deep venous thrombosis or thrombophlebitis in the visualized vein segments; however due to limited visualization, isolated vein thrombus cannot be excluded. Incidental findings: (1) Doppler interrogation of common femoral vein shows pulsatile flow bilaterally, suggestive of increased central venous pressure. (2) Prominent groin lymph nodes bilaterally. Chronic diagnoses   Problem List as of 7/29/2022 Date Reviewed: 7/12/2022            Codes Class Noted - Resolved    * (Principal) Osteomyelitis of ankle or foot, acute, right ([address] ICD-10-CM: M86.171  ICD-9-CM: 730.07  7/25/2022 - Present        Constipation ICD-10-CM: K59.00  ICD-9-CM: 564.00  7/25/2022 - Present        Open wound of right heel ICD-10-CM: T13.378C  ICD-9-CM: 892.0  7/16/2022 - Present        Calf swelling ICD-10-CM: M79.89  ICD-9-CM: 729.81  7/16/2022 - Present    Overview Signed 7/16/2022 11:36 PM by Neftaly Best MD     R calf             Chest pain ICD-10-CM: R07.9  ICD-9-CM: 786.50  7/16/2022 - Present        Anxiety and depression ICD-10-CM: F41.9, F32. A  ICD-9-CM: 300.00, 311  Unknown - Present        CHF NYHA class I, chronic, diastolic (HCC) VIF-00-VB: I50.32  ICD-9-CM: 428.32  Unknown - Present        DM type 2 causing renal disease (HCC) ICD-10-CM: E11.29  ICD-9-CM: 250.40  Unknown - Present        BPH (benign prostatic hyperplasia) ICD-10-CM: N40.0  ICD-9-CM: 600.00  Unknown - Present        CAD (coronary artery disease) ICD-10-CM: I25.10  ICD-9-CM: 414.00  Unknown - Present        DM type 2 causing vascular disease (HCC) ICD-10-CM: E11.59  ICD-9-CM: 250.70, 443.81  Unknown - Present        GERD (gastroesophageal reflux disease) ICD-10-CM: K21.9  ICD-9-CM: 530.81  Unknown - Present        HTN (hypertension) ICD-10-CM: I10  ICD-9-CM: 401.9  Unknown - Present        Hyperlipidemia ICD-10-CM: E78.5  ICD-9-CM: 272.4  Unknown - Present        Anemia associated with chronic renal failure ICD-10-CM: N18.9, D63.1  ICD-9-CM: 285.21  Unknown - Present        Chronic pain ICD-10-CM: G89.29  ICD-9-CM: 338.29  Unknown - Present        Pleural effusion ICD-10-CM: J90  ICD-9-CM: 511.9  4/14/2022 - Present        Cirrhosis ([facility] ICD-10-CM: K74.60  ICD-9-CM: 571.5  4/1/2022 - Present        ESRD (end stage renal disease) on dialysis [facility]) ICD-10-CM: N18.6, Z99.2  ICD-9-CM: 585.6, V45.11  2/6/2022 - Present        Thrombocytopenia ([facility] ICD-10-CM: D69.6  ICD-9-CM: 287.5  2/6/2022 - Present        Type 2 diabetes mellitus with diabetic neuropathy (HCC) ICD-10-CM: E11.40  ICD-9-CM: 250.60, 357.2  8/5/2019 - Present        Type 2 diabetes mellitus with ophthalmic complication, with long-term current use of insulin (HCC) ICD-10-CM: E11.39, Z79.4  ICD-9-CM: 250.50, V58.67  11/25/2018 - Present        Peripheral neuropathy ICD-10-CM: G62.9  ICD-9-CM: 356.9  11/14/2018 - Present        RESOLVED: Acute pulmonary edema (HCC) ICD-10-CM: J81.0  ICD-9-CM: 518.4  7/16/2022 - 7/21/2022        RESOLVED: ESRD on dialysis ([facility] ICD-10-CM: N18.6, Z99.2  ICD-9-CM: 585.6, V45.11  Unknown - 4/14/2022        RESOLVED: NSTEMI (non-ST elevated myocardial infarction) ([facility] ICD-10-CM: I21.4  ICD-9-CM: 410.70  4/1/2022 - 4/14/2022        RESOLVED: ESRD (end stage renal disease) ([facility] ICD-10-CM: N18.6  ICD-9-CM: 585.6  2/14/2022 - 4/14/2022        RESOLVED: Hypertensive emergency ICD-10-CM: I16.1  ICD-9-CM: 401.9  2/14/2022 - 4/14/2022        RESOLVED: Anasarca ICD-10-CM: R60. 1  ICD-9-CM: 782.3  2/6/2022 - 4/14/2022        RESOLVED: Abdominal pain ICD-10-CM: R10.9  ICD-9-CM: 789.00  2/6/2022 - 2/14/2022        RESOLVED: Heart failure with preserved ejection fraction ([facility] ICD-10-CM: I50.30  ICD-9-CM: 428.9  2/6/2022 - 4/14/2022        RESOLVED: Elevated troponin ICD-10-CM: R77.8  ICD-9-CM: 790.6  2/6/2022 - 4/14/2022        RESOLVED: Anemia ICD-10-CM: D64.9  ICD-9-CM: 285.9  2/6/2022 - 4/14/2022        RESOLVED: Epigastric pain ICD-10-CM: R10.13  ICD-9-CM: 789.06  2/6/2022 - 4/14/2022        RESOLVED: Hypertensive urgency ICD-10-CM: I16.0  ICD-9-CM: 401.9  2/6/2022 - 2/14/2022        RESOLVED: Obesity ICD-10-CM: E66.9  ICD-9-CM: 278.00  2/6/2022 - 2/14/2022        RESOLVED: Severe protein-calorie malnutrition ([facility] ICD-10-CM: E43  ICD-9-CM: 262  11/23/2021 - 4/14/2022        RESOLVED: CHF exacerbation ([facility] ICD-10-CM: I50.9  ICD-9-CM: 428.0  11/14/2021 - 4/14/2022        RESOLVED: Type 2 diabetes with nephropathy ([facility] ICD-10-CM: E11.21  ICD-9-CM: 250.40, 583.81  8/5/2019 - 4/14/2022        RESOLVED: Controlled type 2 diabetes mellitus with ophthalmic complication, with long-term current use of insulin (HCC) ICD-10-CM: E11.39, Z79.4  ICD-9-CM: 250.50, V58.67  11/14/2018 - 11/14/2018        RESOLVED: Essential hypertension ICD-10-CM: I10  ICD-9-CM: 401.9  11/14/2018 - 4/14/2022        RESOLVED: Reactive depression ICD-10-CM: F32.9  ICD-9-CM: 300.4  11/14/2018 - 4/14/2022           Discharge Medication List as of 7/29/2022  7:05 PM        START taking these medications    Details   lactobacillus combination no.4 (Probiotic) 3 billion cell cap Take 1 Capsule by mouth daily. , Normal, Disp-40 Capsule, R-0      oxyCODONE IR (ROXICODONE) 5 mg immediate release tablet Take 1 Tablet by mouth every six (6) hours as needed for Pain for up to 3 days. Max Daily Amount: 20 mg., Normal, Disp-12 Tablet, R-0      naloxone (Narcan) 4 mg/actuation nasal spray Use 1 spray intranasally, then discard. Repeat with new spray every 2 min as needed for opioid overdose symptoms, alternating nostrils. , Normal, Disp-1 Each, R-0   metroNIDAZOLE (FLAGYL) 500 mg tablet Take 1 Tablet by mouth two (2) times a day for 35 days. , Normal, Disp-70 Tablet, R-0           CONTINUE these medications which have CHANGED    Details                 hydrALAZINE (APRESOLINE) 50 mg tablet Take 1 Tablet by mouth three (3) times daily for 30 days. , No Print, Disp-90 Tablet, R-0           CONTINUE these medications which have NOT CHANGED    Details   sevelamer (RENAGEL) 800 mg tablet Take 800 mg by mouth three (3) times daily (with meals). , Historical Med      amLODIPine (NORVASC) 10 mg tablet Take 0.5 tab in AM and 0.5 tab in PM, Normal, Disp-90 Tablet, R-0   insulin glargine (LANTUS,BASAGLAR) 100 unit/mL (3 mL) inpn 15 Units by SubCUTAneous route nightly. pantoprazole (PROTONIX) 40 mg tablet Take 1 Tablet by mouth daily. , Normal, Disp-90 Tablet, R-0      sucralfate (CARAFATE) 1 gram tablet Take 1 Tablet by mouth three (3) times daily. , Normal, Disp-90 Tablet, R-1      tamsulosin (FLOMAX) 0.4 mg capsule Take 1 Capsule by mouth nightly., Normal, Disp-90 Capsule, R-0      isosorbide mononitrate ER (IMDUR) 60 mg CR tablet Take 1 Tablet by mouth daily. , Normal, Disp-30 Tablet, R-0      furosemide (Lasix) 80 mg tablet Take 80 mg by mouth daily. , Historical Med      Insulin Needles, Disposable, 31 gauge x 5/16\" ndle Use as directed with insulin DM2, Normal, Disp-100 Each, R-11May substitute      acetaminophen (Tylenol Extra Strength) 500 mg tablet Take 500 mg by mouth every six (6) hours as needed for Pain or Fever., Historical Med      aspirin delayed-release 81 mg tablet Take 81 mg by mouth daily. , Historical Med      gabapentin (NEURONTIN) 300 mg capsule Take 300 mg by mouth daily. Take after dialysis on dialysis days.  Patient taking this TID., Historical Med      insulin aspart U-100 (NovoLOG Flexpen U-100 Insulin) 100 unit/mL (3 mL) inpn by SubCUTAneous route Before breakfast, lunch, and dinner. -180    2 units  -220    4 units   -260    6 units  -300    8 units  -350    10 units, Historical Med      senna (Senna) 8.6 mg tablet Take 2 Tablets by mouth nightly., Historical Med      polyethylene glycol (Miralax) 17 gram/dose powder Take 17 g by mouth daily as needed for Constipation. , Historical Med      busPIRone (BUSPAR) 10 mg tablet Take 10 mg by mouth two (2) times a day., Historical Med      carvediloL (Coreg) 12.5 mg tablet Take 12.5 mg by mouth two (2) times daily (with meals). Hold if top number below 100, Historical Med      docusate sodium (Colace) 100 mg capsule Take 1 Capsule by mouth two (2) times a day., Historical Med      ferrous sulfate 325 mg (65 mg iron) tablet Take 1 Tablet by mouth daily. , Historical Med      rosuvastatin (CRESTOR) 10 mg tablet Take 10 mg by mouth Every morning., Historical Med           STOP taking these medications       cephALEXin (KEFLEX) 500 mg capsule Comments:   Reason for Stopping:         clopidogreL (Plavix) 75 mg tab Comments:   Reason for Stopping: Pt reports no longer taking this medication. eplerenone (Inspra) 25 mg tablet Comments:   Reason for Stopping: Pt reports no longer taking this medication. Diet:  Diabetic diet.     Activity:  As tolerated     Disposition: Home with Home Health    Discharge instructions to patient/family  Please seek medical attention for any new or worsening symptoms particularly fever, chest pain, shortness of breath, abdominal pain, nausea, vomiting    Follow up plans/appointments  Follow-up Information       Follow up With Specialties Details Why [address] [facility] [address]  [phone]    Saundra Magdaleno MD Family Medicine Follow up on 8/2/2022 At 8:40 am  Follow up after hospital admission for osteomylitis  with James Resendiz NP [address]  [phone]      Dialysis  Follow up Please attend your dialysis sessions every Monday, Wednesday, and Friday. You will receive antibiotics at these sessions through August 3rd. Keely Islas MD  Family Medicine Resident       For Billing    Chief Complaint   Patient presents with    [address] Problems  Date Reviewed: 7/12/2022            Codes Class Noted POA    * (Principal) Osteomyelitis of ankle or foot, acute, right ([facility] ICD-10-CM: M86.171  ICD-9-CM: 730.07  7/25/2022 Unknown        Constipation ICD-10-CM: K59.00  ICD-9-CM: 564.00  7/25/2022 Unknown        Open wound of right heel ICD-10-CM: I30.641I  ICD-9-CM: 892.0  7/16/2022 Unknown        Anxiety and depression ICD-10-CM: F41.9, F32. A  ICD-9-CM: 300.00, 311  Unknown Yes        CHF NYHA class I, chronic, diastolic (HCC) [facility]: I50.32  ICD-9-CM: 428.32  Unknown Yes        BPH (benign prostatic hyperplasia) ICD-10-CM: N40.0  ICD-9-CM: 600.00  Unknown Yes        CAD (coronary artery disease) ICD-10-CM: I25.10  ICD-9-CM: 414.00  Unknown Yes        GERD (gastroesophageal reflux disease) ICD-10-CM: K21.9  ICD-9-CM: 530.81  Unknown Yes        HTN (hypertension) ICD-10-CM: I10  ICD-9-CM: 401.9  Unknown Yes        Hyperlipidemia ICD-10-CM: E78.5  ICD-9-CM: 272.4  Unknown Yes        Anemia associated with chronic renal failure ICD-10-CM: N18.9, D63.1  ICD-9-CM: 285.21  Unknown Yes        ESRD (end stage renal disease) on dialysis [facility]) ICD-10-CM: N18.6, Z99.2  ICD-9-CM: 585.6, V45.11  2/6/2022 Yes        Type 2 diabetes mellitus with diabetic neuropathy ([facility] ICD-10-CM: E11.40  ICD-9-CM: 250.60, 357.2  8/5/2019 Yes

## 2022-07-29 NOTE — PROGRESS NOTES
[address] [facility] Medicine Service Daily Progress Note    Overnight Events: NAEO. SUBJECTIVE: Pt reports improvement in his R foot pain s/p surgical debridement. He is still complaining of some lower back pain but says that is also improved. OBJECTIVE:    Vitals: Visit Vitals  BP (!) 162/73 (BP 1 Location: Left upper arm, BP Patient Position: At rest)   Pulse 66   Temp 98.1 °F (36.7 °C)   Resp 18   Ht 5' 8\" (1.727 m)   Wt 173 lb 6.4 oz (78.7 kg)   SpO2 95%   BMI 26.37 kg/m²     Physical Exam:  General: NAD. Respiratory: Lungs clear to auscultation bilaterally with no wheezing, crackles, rhonchi, or rales. Cardiovascular: Regular rate and rhythm. S1/S2 auscultated with no murmurs, rubs, or gallops. GI: Non-tender to palpation. Non-distended. + Bowel sounds throughout. Extremities: Right ankle wrapped in bandage with heel protector in place. LLE w/ no edema or tenderness. Skin: Warm, dry.   Neuro: Alert and oriented    I/O:  Date 07/28/22 0700 - 07/29/22 0659 07/29/22 0700 - 07/30/22 0659   Shift 9038-4620 2369-1998 24 Hour Total 4529-4404 8181-7823 24 Hour Total   INTAKE   Shift Total(mL/kg)         OUTPUT   Urine(mL/kg/hr) 300(0.3)  300        Urine Voided 300  300        Urine Occurrence(s)  1 x 1 x      Shift Total(mL/kg) 300(3.8)  300(3.8)      NET -300  -300      Weight (kg) 78.7 78.7 78.7 78.7 78.7 78.7         Inpatient Medications  Current Facility-Administered Medications   Medication Dose Route Frequency    hydrALAZINE (APRESOLINE) 20 mg/mL injection 10 mg  10 mg IntraVENous Q4H PRN    lidocaine 4 % patch 1 Patch  1 Patch TransDERmal Q24H    insulin glargine (LANTUS) injection 30 Units  30 Units SubCUTAneous QHS    aspirin delayed-release tablet 81 mg  81 mg Oral DAILY    insulin lispro (HUMALOG) injection   SubCUTAneous AC&HS    epoetin ericka-epbx (RETACRIT) injection 10,000 Units  10,000 Units IntraVENous DIALYSIS MON, WED & FRI    HYDROmorphone (DILAUDID) syringe 0.5 mg  0.5 mg IntraVENous Q4H PRN    sodium chloride (NS) flush 5-40 mL  5-40 mL IntraVENous Q8H    sodium chloride (NS) flush 5-40 mL  5-40 mL IntraVENous PRN    acetaminophen (TYLENOL) tablet 650 mg  650 mg Oral Q6H PRN    Or    acetaminophen (TYLENOL) suppository 650 mg  650 mg Rectal Q6H PRN    polyethylene glycol (MIRALAX) packet 17 g  17 g Oral DAILY PRN    ondansetron (ZOFRAN ODT) tablet 4 mg  4 mg Oral Q8H PRN    Or    ondansetron (ZOFRAN) injection 4 mg  4 mg IntraVENous Q6H PRN    glucose chewable tablet 16 g  4 Tablet Oral PRN    glucagon (GLUCAGEN) injection 1 mg  1 mg IntraMUSCular PRN    dextrose 10% infusion 0-250 mL  0-250 mL IntraVENous PRN    cefepime (MAXIPIME) 1 g in 0.9% sodium chloride (MBP/ADV) 50 mL MBP  1 g IntraVENous Q24H    metroNIDAZOLE (FLAGYL) IVPB premix 500 mg  500 mg IntraVENous Q12H    busPIRone (BUSPAR) tablet 10 mg  10 mg Oral BID    carvediloL (COREG) tablet 12.5 mg  12.5 mg Oral BID WITH MEALS    docusate sodium (COLACE) capsule 100 mg  100 mg Oral BID    ferrous sulfate tablet 325 mg  1 Tablet Oral DAILY    furosemide (LASIX) tablet 80 mg  80 mg Oral DAILY    gabapentin (NEURONTIN) capsule 300 mg  300 mg Oral DAILY    isosorbide mononitrate ER (IMDUR) tablet 60 mg  60 mg Oral DAILY    pantoprazole (PROTONIX) tablet 40 mg  40 mg Oral ACB    sevelamer carbonate (RENVELA) tab 800 mg  800 mg Oral TID WITH MEALS    sucralfate (CARAFATE) tablet 1 g  1 g Oral TIDAC    tamsulosin (FLOMAX) capsule 0.4 mg  0.4 mg Oral QHS    Vancomycin- Pharmacy dosing by levels   Other Rx Dosing/Monitoring    hydrALAZINE (APRESOLINE) tablet 50 mg  50 mg Oral TID    amLODIPine (NORVASC) tablet 10 mg  10 mg Oral DAILY       Allergies  No Known Allergies    CBC:  Recent Labs     07/29/22  0311 07/28/22  1158 07/27/22  0924   WBC 5.3 6.0 6.7   HGB 8.4* 8.7* 8.1*   HCT 27.1* 27.6* 25.5*    214 249         Metabolic Panel:  Recent Labs     07/29/22  0311 07/28/22  1158 07/27/22  0924   * 136 134*   K 4.0 4.1 3.7    103 102   CO2 26 29 25   BUN 31* 21* 42*   CREA 2.82* 2.27* 3.72*   GLU 97 102* 198*   CA 9.0 8.8 8.2*              Assessment and Plan     Kevyn Patient is a 64 y.o. male with PMH of ESRD on HD MWF, T2DM, HTN, CAD s/p CABG (May 2020), HEFpEF, PEA arrest, and erosive gastritis who is admitted for osteomyelitis of the R calcaneus now s/p surgical debridement. Right calcaneal osteomyelitis: Demonstrated on MRI.  and CRP 15.2. Podiatry and ID following. Wound Cx: rare probably S. Aureus, Citrobacter, possible streptococcus, Enterococcus sp, other gram + and gram - rods. . Anaerobic Cx: bacteroides pyogenes. Bcx: NGTD. ID recommending 6wks IV. Pathology shows osteomyelitis. ID recommend Vanc, Cefepime MWF and PO flagyl 500mg twice daily through 09/03/22.  - CRP before discharge  - Continue Vanc, Cefepime and Flagyl  - Daily CBC, BMP     HTN: Chronic and uncontrolled. - Continue home hydralazine 50 mg TID, amlodipine 10 mg OD, carvedilol 12.5 mg BID, imdur 60 mg PO daily  - Hydral 10mg IV prn for SBP >180, DBP >110    ESRD on HD: MWF. Follows Dr. Mckenna Chaparro. - Nephrology following  - Continue home Sevelamer 800mg TID, Lasix 80 mg daily     Anemia: Chronic. Likely secondary to ESRD  - Continue home ferrous sulfate daily  - Monitor on daily CBC     T2DM: Chronic. Last A1c 7.9 (6/21/22). - Continue Lantus 30 units daily  - SSI w/ glucose checks ACHS  - Hypoglycemia precautions     GERD: Chronic.  - Continue home Protonix 40 mg OD, Sucralfate 1 g TID     Constipation: Chronic.   - Continue Docusate, Miralax, Senna      CAD / HEFrEF : Echo 11/2021 EF 53% and reduced systolic function.  - Continue home carvedilol 12.5 mg BID, Imdur 60 mg OD, and aspirin 81 mg OD     HLD: Chronic.  - Continue home rosuvastatin 10 mg daily     Groin lymph node:  Incidental 2.5 cm x 0.9 cm noted during previous admission.   - PCP: Follow up OP      BPH: Chronic  - Continue home Flomax 0.4 mg once nightly      Anxiety and depression: Chronic.  - Continue home Buspirone 10 mg BID       FEN/GI - Diabetic diet. Activity - Out of bed with assistance. DVT prophylaxis - SCDs  Disposition - Plan to d/c TBD. Consulting PT. Code Status - Full, discussed with patient / caregivers. Next of Scarlet 69 Name and Adolph Rafael Francisco [address] (Spouse) [phone] ([place])      Ernst Sandifer, MD  Family Medicine Resident       For Billing    Chief Complaint   Patient presents with    [address] [place] [employer] Problems  Date Reviewed: 7/12/2022            Codes Class Noted POA    * (Principal) Osteomyelitis of ankle or foot, acute, right ([address] ICD-10-CM: M86.171  ICD-9-CM: 730.07  7/25/2022 Unknown        Constipation ICD-10-CM: K59.00  ICD-9-CM: 564.00  7/25/2022 Unknown        Open wound of right heel ICD-10-CM: M83.710L  ICD-9-CM: 892.0  7/16/2022 Unknown        Anxiety and depression ICD-10-CM: F41.9, F32. A  ICD-9-CM: 300.00, 311  Unknown Yes        CHF NYHA class I, chronic, diastolic (HCC) OAC-55-FQ: I50.32  ICD-9-CM: 428.32  Unknown Yes        BPH (benign prostatic hyperplasia) ICD-10-CM: N40.0  ICD-9-CM: 600.00  Unknown Yes        CAD (coronary artery disease) ICD-10-CM: I25.10  ICD-9-CM: 414.00  Unknown Yes        GERD (gastroesophageal reflux disease) ICD-10-CM: K21.9  ICD-9-CM: 530.81  Unknown Yes        HTN (hypertension) ICD-10-CM: I10  ICD-9-CM: 401.9  Unknown Yes        Hyperlipidemia ICD-10-CM: E78.5  ICD-9-CM: 272.4  Unknown Yes        Anemia associated with chronic renal failure ICD-10-CM: N18.9, D63.1  ICD-9-CM: 285.21  Unknown Yes        ESRD (end stage renal disease) on dialysis [facility]) ICD-10-CM: N18.6, Z99.2  ICD-9-CM: 585.6, V45.11  2/6/2022 Yes        Type 2 diabetes mellitus with diabetic neuropathy ([facility] ICD-10-CM: E11.40  ICD-9-CM: 250.60, 357.2  8/5/2019 Yes

## 2022-07-29 NOTE — DISCHARGE INSTRUCTIONS
HOME DISCHARGE INSTRUCTIONS    Romana Nino / [id number] : 1960    Admission date: 2022 Discharge date: 2022 3:28 PM     Please bring this form with you to show your care provider at your follow-up appointment. Discharging provider:  Isis Diaz MD  - Family Medicine Resident  Rocio Herr MD - Family Medicine Attending      You have been admitted to the hospital with the following diagnoses:    ACUTE DIAGNOSES:  Open wound of right heel [S91.301A]    FOLLOW-UP CARE RECOMMENDATIONS:    Please go to your dialysis sessions every Monday, Wednesday, and Friday. You will receive your antibiotic treatment at these times. It is very important that you attend each of these appointments in order to receive dialysis and antibiotics. We have also prescribed you an antibiotic to take every day. This medication is called Flagyl. Please take 500 mg twice daily through . Please take an over-the-counter probiotic while you are taking antibiotics. Acuda a lewis sesiones de diálisis todos los lunes, miércoles y viernes. Recibirá nieves tratamiento antibiótico en estos momentos. Es muy importante que asista a cada aiden de estas citas para recibir diálisis y antibióticos. También le hemos recetado un antibiótico para que lo tome [facility]. Carina medicamento se llama Flagyl. [place] 500 mg dos veces al día hasta el 3 de septiembre. [place] un probiótico de venta karel mientras cornel antibióticos. Appointments  Follow-up on 2022 at 8:40 am at your primary care physicians's office with Ana Laura Taylor NP     Follow-up tests needed: Re-check sugar levels. DIET/what to eat:  Diabetic Diet and Renal Diet    ACTIVITY:  Activity as tolerated with assistance    Specific symptoms to watch for: chest pain, shortness of breath, fever, chills, nausea, vomiting, diarrhea, change in mentation, falling, weakness, bleeding. What to do if new or unexpected symptoms occur?     If you experience any of the above symptoms (or should other concerns or questions arise after discharge) please call your primary care physician. Return to the emergency room if you cannot get hold of your doctor. It is very important that you keep your follow-up appointment(s). Please bring discharge papers, medication list (and/or medication bottles) to your follow-up appointments for review by your outpatient provider(s). Please check the list of medications and be sure it includes every medication (even non-prescription medications) that your provider wants you to take. It is important that you take the medication exactly as they are prescribed. Keep your medication in the bottles provided by the pharmacist and keep a list of the medication names, dosages, and times to be taken in your wallet. Do not take other medications without consulting your doctor. If you have any questions about your medications or other instructions, please talk to your nurse or care provider before you leave the hospital.     Information obtained by:     I understand that if any problems occur once I am at home I am to contact my physician. These instructions were explained to me and I had the opportunity to ask questions. I understand and acknowledge receipt of the instructions indicated above.                                                                                                                                                Physician's or R.N.'s Signature                                                                  Date/Time                                                                                                                                              Patient or Representative Signature                                                          Date/Time

## 2022-07-29 NOTE — PROGRESS NOTES
[facility] Dosing Services: Antimicrobial Stewardship Daily Doc    Consult for antibiotic dosing of vancomycin by Dr. Familia Sarabia (auto-consult)  Indication: osteomyelitis  Day of Therapy: 9  S/p debridement by podiatry on 07/23/2022    Vancomycin therapy:  Current maintenance dose: vancomycin 1,250 mg following each HD session, current schedule M/W/F. Dose calculated to approximate a pre-HD level of 21-24 mcg/mL     Assessment/Plan: Afebrile; WBCs WNL. Slightly supratherapeutic and will adjust back to 1000mg post-HD and recheck a level after 2 maintenance doses administered    Dose administration notes:   Doses given appropriately as scheduled    Date Dose & Interval Measured (mcg/mL) Extrapolated (mcg/mL)   07/25/2022 1,000 mg IV after each HD session 17.5 n/a   07/29 1250mg post-HD 26.1            Other Antimicrobial   (not dosed by pharmacist) Cefepime 1 gram IV every 24 hours  Metronidazole 500 mg IV every 12 hours   Cultures 07/23/2022 Wound: rare MRSA; scant enterococcus (final)  07/23/2022 Wound for anaerobes: light bacteroides pyrogenes - beta lactamase positive (final)  07/23/2022 Wound: light citrobacter freundii; few MRSA; light enterococcus avium; light enterococcus faecalis (final)  07/23/2022 Wound for anaerobes: heavy bacteroides pyrogenes - beta lactamase positive (final)  07/23/2022 Wound: light citrobacter freundii; few MRSA; few pseudomonas aeruginosa; few enteroccus faecalis; few pasteurella canis - beta lactamase negative; light anaerobic GPC; light anaerobic GNR - beta lactamase positive; light proteus vulgaris (pending)  07/21/2022 Blood: no growth x 5 days (final)   Serum Creatinine Lab Results   Component Value Date/Time    Creatinine 2.82 (H) 07/29/2022 03:11 AM       Creatinine Clearance Estimated Creatinine Clearance: 26.6 mL/min (A) (based on SCr of 2.82 mg/dL (H)).      Temp Temp: 99.1 °F (37.3 °C)       WBC Lab Results   Component Value Date/Time    WBC 5.3 07/29/2022 03:11 AM Procalcitonin No results found for: PCT     For Antifungals, Metronidazole and Nafcillin: Lab Results   Component Value Date/Time    ALT (SGPT) 12 07/24/2022 02:22 AM    AST (SGOT) 12 (L) 07/24/2022 02:22 AM    Alk. phosphatase 128 (H) 07/24/2022 02:22 AM    Bilirubin, total 0.4 07/24/2022 02:22 AM        Pharmacist Lilian De La Rosa

## 2022-07-29 NOTE — PROGRESS NOTES
[facility] Progress Note           Samia Marquis DO    [phone] Office  [phone]  Fax    2022      Assessment & Plan:     Diabetic foot infection with osteomyelitis of the right calcaneus. Status post I&D with partial bone excision 2022. Pathology positive for osteomyelitis. Cultures reveal a polymicrobial infection including MRSA, Pseudomonas, Citrobacter, Enterococcus, Pasteurella, Proteus, and anaerobes. Plan discharge on vancomycin and cefepime dosed on dialysis along with oral metronidazole. See recommendations below. Repeat CRP is down from 15 on  to 2.5 on   End-stage renal disease on dialysis MWF  Diabetes mellitus. This is poorly controlled. Hemoglobin A1c is 7.9  Anemia. Hemoglobin 7.6. Management per primary team  Elevated alkaline phosphatase. Plan discharge on:  Vancomycin 1250 mg post HD MWF  Cefepime 2 g post HD MWF  Metronidazole 500 p.o. twice daily (patient will need prescription for this)  All antibiotics to be continued through 9/3/2022  This will need to be arranged by nephrology          Subjective:     No complaints. Tolerating antibiotics    Objective:     Vitals: Visit Vitals  BP (!) 161/79   Pulse 64   Temp 98.7 °F (37.1 °C) (Oral)   Resp 16   Ht 5' 8\" (1.727 m)   Wt 173 lb 6.4 oz (78.7 kg)   SpO2 95%   BMI 26.37 kg/m²          Tmax:  Temp (24hrs), Av.4 °F (36.9 °C), Min:97.9 °F (36.6 °C), Max:99.1 °F (37.3 °C)      Exam:   Patient is intubated:  no    Physical Examination:       General:  Alert, cooperative, no distress   Head:  Normocephalic, atraumatic. Eyes:  Conjunctivae clear   Neck: Supple       Lungs:   No distress. Chest wall:     Heart:     Abdomen:   non-distended   Extremities: Moves all. Foot dressed   Skin:  No rash. .  See image of heel above   Neurologic: CNII-XII intact.  Normal strength     Labs:        No lab exists for component: ITNL   No results for input(s): CPK, CKMB, TROIQ in the last 72 hours. Recent Labs     07/29/22  0311 07/28/22  1158 07/27/22  0924   * 136 134*   K 4.0 4.1 3.7    103 102   CO2 26 29 25   BUN 31* 21* 42*   CREA 2.82* 2.27* 3.72*   GLU 97 102* 198*   WBC 5.3 6.0 6.7   HGB 8.4* 8.7* 8.1*   HCT 27.1* 27.6* 25.5*    214 184       No results for input(s): INR, PTP, APTT, INREXT, INREXT in the last 72 hours.   Needs: urine analysis, urine sodium, protein and creatinine  Lab Results   Component Value Date/Time    Creatinine, urine 72.2 11/22/2019 09:36 AM         Cultures:     No results found for: KOKO  Lab Results   Component Value Date/Time    Culture result: (A) 07/23/2022 09:45 AM     RARE * Methicillin Resistant Staphylococcus aureus * REFER TO [id number] FOR SENSITIVITIES    Culture result: (A) 07/23/2022 09:45 AM     SCANT ENTEROCOCCUS SPECIES REFER TO [id number] FOR ID AND SENSITIVITIES    Culture result: LIGHT Bacteroides pyogenes BETA LACTAMASE POSITIVE (A) 07/23/2022 09:45 AM       Radiology:     Medications       Current Facility-Administered Medications   Medication Dose Route Frequency Last Admin    hydrALAZINE (APRESOLINE) 20 mg/mL injection 10 mg  10 mg IntraVENous Q4H PRN      lidocaine 4 % patch 1 Patch  1 Patch TransDERmal Q24H 1 Patch at 07/28/22 1603    insulin glargine (LANTUS) injection 30 Units  30 Units SubCUTAneous QHS 30 Units at 07/28/22 2138    aspirin delayed-release tablet 81 mg  81 mg Oral DAILY 81 mg at 07/29/22 0815    insulin lispro (HUMALOG) injection   SubCUTAneous AC&HS 3 Units at 07/27/22 1658    epoetin ericka-epbx (RETACRIT) injection 10,000 Units  10,000 Units IntraVENous DIALYSIS MON, WED & FRI 10,000 Units at 07/27/22 2104    HYDROmorphone (DILAUDID) syringe 0.5 mg  0.5 mg IntraVENous Q4H PRN 0.5 mg at 07/29/22 0708    sodium chloride (NS) flush 5-40 mL  5-40 mL IntraVENous Q8H 10 mL at 07/29/22 0620    sodium chloride (NS) flush 5-40 mL  5-40 mL IntraVENous PRN 10 mL at 07/28/22 1754    acetaminophen (TYLENOL) tablet 650 mg 650 mg Oral Q6H  mg at 07/28/22 5109    Or    acetaminophen (TYLENOL) suppository 650 mg  650 mg Rectal Q6H PRN      polyethylene glycol (MIRALAX) packet 17 g  17 g Oral DAILY PRN      ondansetron (ZOFRAN ODT) tablet 4 mg  4 mg Oral Q8H PRN      Or    ondansetron (ZOFRAN) injection 4 mg  4 mg IntraVENous Q6H PRN 4 mg at 07/22/22 1733    glucose chewable tablet 16 g  4 Tablet Oral PRN      glucagon (GLUCAGEN) injection 1 mg  1 mg IntraMUSCular PRN      dextrose 10% infusion 0-250 mL  0-250 mL IntraVENous PRN      cefepime (MAXIPIME) 1 g in 0.9% sodium chloride (MBP/ADV) 50 mL MBP  1 g IntraVENous Q24H 1 g at 07/29/22 0022    metroNIDAZOLE (FLAGYL) IVPB premix 500 mg  500 mg IntraVENous Q12H 500 mg at 07/29/22 0513    busPIRone (BUSPAR) tablet 10 mg  10 mg Oral BID 10 mg at 07/29/22 0815    carvediloL (COREG) tablet 12.5 mg  12.5 mg Oral BID WITH MEALS 12.5 mg at 07/29/22 0815    docusate sodium (COLACE) capsule 100 mg  100 mg Oral  mg at 07/29/22 0815    ferrous sulfate tablet 325 mg  1 Tablet Oral DAILY 325 mg at 07/29/22 0815    furosemide (LASIX) tablet 80 mg  80 mg Oral DAILY 80 mg at 07/29/22 0815    gabapentin (NEURONTIN) capsule 300 mg  300 mg Oral DAILY 300 mg at 07/29/22 0815    isosorbide mononitrate ER (IMDUR) tablet 60 mg  60 mg Oral DAILY 60 mg at 07/29/22 0814    pantoprazole (PROTONIX) tablet 40 mg  40 mg Oral ACB 40 mg at 07/29/22 0618    sevelamer carbonate (RENVELA) tab 800 mg  800 mg Oral TID WITH MEALS 800 mg at 07/29/22 0821    sucralfate (CARAFATE) tablet 1 g  1 g Oral TIDAC 1 g at 07/29/22 0617    tamsulosin (FLOMAX) capsule 0.4 mg  0.4 mg Oral QHS 0.4 mg at 07/28/22 2138    Vancomycin- Pharmacy dosing by levels   Other Rx Dosing/Monitoring      hydrALAZINE (APRESOLINE) tablet 50 mg  50 mg Oral TID 50 mg at 07/29/22 0815    amLODIPine (NORVASC) tablet 10 mg  10 mg Oral DAILY 10 mg at 07/29/22 0815           Case discussed with:       Corwin Rivero DO

## 2022-07-29 NOTE — PROGRESS NOTES
7/29/2022   CARE MANAGEMENT NOTE:  CM reviewed EMR. READMISSION:  Pt was admitted to [facility] from 7/16 - 7/19 with abd pain and SOB. He returned home with his wife. Pt was readmitted to [facility] on 7/21/22 with open wound of right heel. Reportedly, pt resides with his wife Maciel Crawley (c [phone]) and three adult sons. RUR 25%; LOS 7 days     Transition Plan of Care:  ID, Podiatry following for medical management - pt is s/p partial right excision of bone calcaneus and flap closure on 7/23  Per ID, plan for discharge on IV Vanc and Cefepime to be dosed at hemodialysis thru 9/3/22. Nephrology has arranged. CM notified [facility]) of pt's resumption with them on Monday, Aug. 1.  ESRD - HD on M,W,F at [facility]. Pt uses Medicaid transport to and from appts. SNF per pt choice - referrals were sent to [facility], [facility], [facility], [facility], and [facility] [place] but no acceptance         Reportedly, pt doesn't have a SNF benefit  5. Per PT note on 7/27, pt ambulated 17 feet. [facility] Maddox  (skilled nursing, PT) was set up. 6.   Outpatient follow up  7. Family will transport pt home     No further post discharge needs indicated.   [place]

## 2022-07-30 NOTE — PROGRESS NOTES
I have reviewed discharge instructions with the patient and spouse. The patient and spouse verbalized understanding. Tech removed IV and assisted pt with dressing and gathering belongings. Tech escorted pt via wheelchair to front of hospital. Wife alongside to drive patient home. No more questions at that time.      MARILYNN DE LA PAZ

## 2022-07-31 ENCOUNTER — HOME CARE VISIT (OUTPATIENT)
Dept: SCHEDULING | Facility: HOME HEALTH | Age: 62
End: 2022-07-31
Payer: MEDICAID

## 2022-07-31 PROCEDURE — 400013 HH SOC

## 2022-07-31 PROCEDURE — G0299 HHS/HOSPICE OF RN EA 15 MIN: HCPCS

## 2022-08-01 ENCOUNTER — HOME CARE VISIT (OUTPATIENT)
Dept: SCHEDULING | Facility: HOME HEALTH | Age: 62
End: 2022-08-01
Payer: MEDICAID

## 2022-08-01 PROCEDURE — G0151 HHCP-SERV OF PT,EA 15 MIN: HCPCS

## 2022-08-02 ENCOUNTER — OFFICE VISIT (OUTPATIENT)
Dept: FAMILY MEDICINE CLINIC | Age: 62
End: 2022-08-02
Payer: MEDICAID

## 2022-08-02 VITALS
RESPIRATION RATE: 18 BRPM | TEMPERATURE: 98.6 F | HEART RATE: 71 BPM | OXYGEN SATURATION: 98 % | SYSTOLIC BLOOD PRESSURE: 140 MMHG | DIASTOLIC BLOOD PRESSURE: 80 MMHG

## 2022-08-02 VITALS
DIASTOLIC BLOOD PRESSURE: 71 MMHG | SYSTOLIC BLOOD PRESSURE: 182 MMHG | HEART RATE: 66 BPM | WEIGHT: 148 LBS | HEIGHT: 68 IN | BODY MASS INDEX: 22.43 KG/M2 | RESPIRATION RATE: 16 BRPM | OXYGEN SATURATION: 97 % | TEMPERATURE: 97.1 F

## 2022-08-02 VITALS
SYSTOLIC BLOOD PRESSURE: 138 MMHG | RESPIRATION RATE: 18 BRPM | TEMPERATURE: 98.6 F | HEART RATE: 62 BPM | DIASTOLIC BLOOD PRESSURE: 76 MMHG | OXYGEN SATURATION: 98 %

## 2022-08-02 DIAGNOSIS — M86.171 OSTEOMYELITIS OF ANKLE OR FOOT, ACUTE, RIGHT (HCC): Primary | ICD-10-CM

## 2022-08-02 DIAGNOSIS — N18.6 TYPE 2 DIABETES MELLITUS WITH CHRONIC KIDNEY DISEASE ON CHRONIC DIALYSIS, WITH LONG-TERM CURRENT USE OF INSULIN (HCC): ICD-10-CM

## 2022-08-02 DIAGNOSIS — N40.0 BENIGN PROSTATIC HYPERPLASIA, UNSPECIFIED WHETHER LOWER URINARY TRACT SYMPTOMS PRESENT: ICD-10-CM

## 2022-08-02 DIAGNOSIS — Z99.2 TYPE 2 DIABETES MELLITUS WITH CHRONIC KIDNEY DISEASE ON CHRONIC DIALYSIS, WITH LONG-TERM CURRENT USE OF INSULIN (HCC): ICD-10-CM

## 2022-08-02 DIAGNOSIS — Z09 HOSPITAL DISCHARGE FOLLOW-UP: ICD-10-CM

## 2022-08-02 DIAGNOSIS — E11.22 TYPE 2 DIABETES MELLITUS WITH CHRONIC KIDNEY DISEASE ON CHRONIC DIALYSIS, WITH LONG-TERM CURRENT USE OF INSULIN (HCC): ICD-10-CM

## 2022-08-02 DIAGNOSIS — Z79.4 TYPE 2 DIABETES MELLITUS WITH CHRONIC KIDNEY DISEASE ON CHRONIC DIALYSIS, WITH LONG-TERM CURRENT USE OF INSULIN (HCC): ICD-10-CM

## 2022-08-02 PROCEDURE — 3051F HG A1C>EQUAL 7.0%<8.0%: CPT | Performed by: NURSE PRACTITIONER

## 2022-08-02 PROCEDURE — 99214 OFFICE O/P EST MOD 30 MIN: CPT | Performed by: NURSE PRACTITIONER

## 2022-08-02 PROCEDURE — 1111F DSCHRG MED/CURRENT MED MERGE: CPT | Performed by: NURSE PRACTITIONER

## 2022-08-02 RX ORDER — TAMSULOSIN HYDROCHLORIDE 0.4 MG/1
0.4 CAPSULE ORAL
Qty: 90 CAPSULE | Refills: 0 | Status: SHIPPED | OUTPATIENT
Start: 2022-08-02

## 2022-08-02 NOTE — PROGRESS NOTES
Omaira Chris is a 64 y.o. male who presents to clinic today for the following:    Chief Complaint   Patient presents with   [facility] Follow Up     Right Foot surgery on heel.  Back Pain       Vitals:    08/02/22 0829   BP: (!) 182/71   Pulse: 66   Resp: 16   Temp: 97.1 °F (36.2 °C)   TempSrc: Skin   SpO2: 97%   Weight: 148 lb (67.1 kg)   Height: 5' 8\" (1.727 m)       Body mass index is 22.5 kg/m². Patients past medical, surgical and family histories were reviewed. Allergies and Medications reviewed and updated. Current Outpatient Medications:     tamsulosin (FLOMAX) 0.4 mg capsule, Take 1 Capsule by mouth nightly., Disp: 90 Capsule, Rfl: 0    hydrALAZINE (APRESOLINE) 100 mg tablet, Take 100 mg by mouth three (3) times daily. , Disp: , Rfl:     insulin glargine (LANTUS,BASAGLAR) 100 unit/mL (3 mL) inpn, 15 Units by SubCUTAneous route nightly for 30 days. , Disp: 4.5 mL, Rfl: 0    metroNIDAZOLE (FLAGYL) 500 mg tablet, Take 1 Tablet by mouth two (2) times a day for 35 days. , Disp: 70 Tablet, Rfl: 0    lactobacillus combination no.4 (Probiotic) 3 billion cell cap, Take 1 Capsule by mouth daily. , Disp: 40 Capsule, Rfl: 0    hydrALAZINE (APRESOLINE) 50 mg tablet, Take 1 Tablet by mouth three (3) times daily for 30 days. , Disp: 90 Tablet, Rfl: 0    naloxone (Narcan) 4 mg/actuation nasal spray, Use 1 spray intranasally, then discard. Repeat with new spray every 2 min as needed for opioid overdose symptoms, alternating nostrils. , Disp: 1 Each, Rfl: 0    sevelamer (RENAGEL) 800 mg tablet, Take 800 mg by mouth three (3) times daily (with meals). , Disp: , Rfl:     amLODIPine (NORVASC) 10 mg tablet, Take 0.5 tab in AM and 0.5 tab in PM, Disp: 90 Tablet, Rfl: 0    pantoprazole (PROTONIX) 40 mg tablet, Take 1 Tablet by mouth daily. , Disp: 90 Tablet, Rfl: 0    sucralfate (CARAFATE) 1 gram tablet, Take 1 Tablet by mouth three (3) times daily. , Disp: 90 Tablet, Rfl: 1    isosorbide mononitrate ER (IMDUR) 60 mg CR tablet, Take 1 Tablet by mouth daily. , Disp: 30 Tablet, Rfl: 0    furosemide (Lasix) 80 mg tablet, Take 80 mg by mouth daily. , Disp: , Rfl:     Insulin Needles, Disposable, 31 gauge x 5/16\" ndle, Use as directed with insulin DM2, Disp: 100 Each, Rfl: 11    acetaminophen (Tylenol Extra Strength) 500 mg tablet, Take 500 mg by mouth every six (6) hours as needed for Pain or Fever., Disp: , Rfl:     aspirin delayed-release 81 mg tablet, Take 81 mg by mouth daily. , Disp: , Rfl:     gabapentin (NEURONTIN) 300 mg capsule, Take 300 mg by mouth daily. Take after dialysis on dialysis days. Patient taking this TID., Disp: , Rfl:     insulin aspart U-100 (NovoLOG Flexpen U-100 Insulin) 100 unit/mL (3 mL) inpn, by SubCUTAneous route Before breakfast, lunch, and dinner. -180    2 units -220    4 units  -260    6 units -300    8 units -350    10 units, Disp: , Rfl:     senna (Senna) 8.6 mg tablet, Take 2 Tablets by mouth nightly., Disp: , Rfl:     polyethylene glycol (Miralax) 17 gram/dose powder, Take 17 g by mouth daily as needed for Constipation. , Disp: , Rfl:     busPIRone (BUSPAR) 10 mg tablet, Take 10 mg by mouth two (2) times a day., Disp: , Rfl:     carvediloL (Coreg) 12.5 mg tablet, Take 12.5 mg by mouth two (2) times daily (with meals). Hold if top number below 100, Disp: , Rfl:     docusate sodium (Colace) 100 mg capsule, Take 1 Capsule by mouth two (2) times a day., Disp: , Rfl:     ferrous sulfate 325 mg (65 mg iron) tablet, Take 1 Tablet by mouth daily. , Disp: , Rfl:     rosuvastatin (CRESTOR) 10 mg tablet, Take 10 mg by mouth Every morning., Disp: , Rfl:     No Known Allergies    Past Medical History:   Diagnosis Date    Anemia associated with chronic renal failure     Anxiety and depression     BPH (benign prostatic hyperplasia)     CAD (coronary artery disease)     CHF NYHA class I, chronic, diastolic (HCC)     Chronic pain     DM type [facility] disease ([facility]     [facility]     ESRD on dialysis ([facility]     GERD (gastroesophageal reflux disease)     HTN (hypertension)     Hyperlipidemia     Thrombocytopenia (HCC)        Past Surgical History:   Procedure Laterality Date    HX ARTERIAL BYPASS      HX OTHER SURGICAL      5th toe Metatarsal amputation 2017     IR INSERT NON TUNL CVC OVER 5 YRS  2021    IR INSERT TUNL CVC W/O PORT OVER 5 YR  2021       Family History   Problem Relation Age of Onset    Diabetes Mother     No Known Problems Father        Social History     Socioeconomic History    Marital status:      Spouse name: Not on file    Number of children: Not on file    Years of education: Not on file    Highest education level: Not on file   Occupational History    Not on file   Tobacco Use    Smoking status: Former     Types: Cigarettes     Quit date: 2001     Years since quittin.2    Smokeless tobacco: Never   Vaping Use    Vaping Use: Never used   Substance and Sexual Activity    Alcohol use: No    Drug use: No    Sexual activity: Yes     Partners: Female     Birth control/protection: Condom   Other Topics Concern    Not on file   Social History Narrative    Not on file     Social Determinants of Health     Financial Resource Strain: Not on file   Food Insecurity: Not on file   Transportation Needs: Not on file   Physical Activity: Not on file   Stress: Not on file   Social Connections: Not on file   Intimate Partner Violence: Not on file   Housing Stability: Not on file           Physical Exam  Vitals and nursing note reviewed. Constitutional:       Appearance: He is normal weight. HENT:      Head: Normocephalic. Mouth/Throat:      Mouth: Mucous membranes are moist.   Eyes:      Pupils: Pupils are equal, round, and reactive to light. Cardiovascular:      Rate and Rhythm: Normal rate and regular rhythm. Pulses: Normal pulses.       Heart sounds: Normal heart sounds. Pulmonary:      Effort: Pulmonary effort is normal.      Breath sounds: Normal breath sounds. Abdominal:      General: Abdomen is flat. Musculoskeletal:         General: Tenderness present. Cervical back: Normal range of motion. Skin:     Capillary Refill: Capillary refill takes less than 2 seconds. Findings: Lesion present. Neurological:      General: No focal deficit present. Mental Status: He is alert and oriented to person, place, and time. Psychiatric:         Mood and Affect: Mood normal.         Behavior: Behavior normal.        Patient was seen in office for follow-up after hospitalization for osteomyelitis. Wound is wrapped in bandage. There is no noted swelling or increased redness or streaking noted from outside of the bandage. He does report having wound care every Thursday. He does dialysis 3 days a week. We did reconcile his medications. This was previously done by another physician. I again have asked him to follow back up with the providers that are prescribed any medications for refill. These were mainly through his cardiologist and nephrologist.  I have ordered physical therapy and also recommend they contact her home health provider for in-home physical therapy. I have also asked them to ask home health about in-home wound care. Transportation seems to be a problem this may be helpful. Other conditions were reviewed and managed by other providers. I have asked him to follow-up in 2 to 3 weeks. Review of Systems   Constitutional:  Negative for fever and malaise/fatigue. HENT:  Negative for congestion, sinus pain and sore throat. Respiratory:  Negative for cough and shortness of breath. Cardiovascular:  Negative for chest pain. Gastrointestinal:  Negative for diarrhea, nausea and vomiting. Genitourinary:  Negative for dysuria. Musculoskeletal:  Positive for joint pain.    Skin:         Rt foot wound   Neurological:  Negative for dizziness and headaches. Endo/Heme/Allergies:  Negative for environmental allergies. Psychiatric/Behavioral: Negative. No results found. No results found for this or any previous visit (from the past 24 hour(s)). Assessment and Plan:    Encounter Diagnoses   Name Primary?  Osteomyelitis of ankle or foot, acute, right ([address] Yes    Type 2 diabetes mellitus with chronic kidney disease on chronic dialysis, with long-term current use of insulin (HCC)     Benign prostatic hyperplasia, unspecified whether lower urinary tract symptoms present    [facility] discharge follow-up                 I have discussed the diagnosis with the patient and the intended plan as seen in the above orders. he has expressed understanding. The patient has received an after-visit summary and questions were answered concerning future plans. I have discussed medication side effects and warnings with the patient as well. Electronically Signed: Mercedes Dong NP  Transitional Care Management Progress Note    Patient: Daya Leiva  : 1960  PCP: Katherine Garland MD    Date of office visit: 2022   Date of admission: 22  Date of discharge: 22  Hospital: [facility]    Call initiated w/i 2 business dates of discharge: *No response documented in the last 14 days   Date of the most recent call to the patient: *No documented post hospital discharge outreach found in the last 14 days      Assessment/Plan:   Diagnoses and all orders for this visit:    1. Osteomyelitis of ankle or foot, acute, right (HCC)  -     REFERRAL TO PHYSICAL THERAPY    2. Type 2 diabetes mellitus with chronic kidney disease on chronic dialysis, with long-term current use of insulin ([facility]    3. Benign prostatic hyperplasia, unspecified whether lower urinary tract symptoms present  -     tamsulosin (FLOMAX) 0.4 mg capsule; Take 1 Capsule by mouth nightly.     4. Hospital discharge follow-up  -     SD DISCHARGE MEDS RECONCILED W/ CURRENT OUTPATIENT MED LIST       Subjective:   Marie Bustamante is a 64 y.o. male presenting today for follow-up after hospital discharge. This encounter and supporting documentation was reviewed if available. Medication reconciliation was performed today. The main problem requiring admission was foot surgery. Complications during admission: none      Interval history/Current status: better    Admitting symptoms have: improved      Medications marked \"taking\" at this time:  Prior to Admission medications    Medication Sig Start Date End Date Taking? Authorizing Provider   tamsulosin (FLOMAX) 0.4 mg capsule Take 1 Capsule by mouth nightly. 8/2/22  Yes Bridget Samano, KALEIGH   hydrALAZINE (APRESOLINE) 100 mg tablet Take 100 mg by mouth three (3) times daily. Provider, Historical   insulin glargine (LANTUS,BASAGLAR) 100 unit/mL (3 mL) inpn 15 Units by SubCUTAneous route nightly for 30 days. 7/29/22 8/28/22  Eloise Sow MD   metroNIDAZOLE (FLAGYL) 500 mg tablet Take 1 Tablet by mouth two (2) times a day for 35 days. 7/30/22 9/3/22  Eloise Sow MD   lactobacillus combination no.4 (Probiotic) 3 billion cell cap Take 1 Capsule by mouth daily. 7/29/22   Eloise Sow MD   hydrALAZINE (APRESOLINE) 50 mg tablet Take 1 Tablet by mouth three (3) times daily for 30 days. 7/29/22 8/28/22  Eloise Sow MD   oxyCODONE IR (ROXICODONE) 5 mg immediate release tablet Take 1 Tablet by mouth every six (6) hours as needed for Pain for up to 3 days. Max Daily Amount: 20 mg. 7/29/22 8/1/22  Eloise Sow MD   naloxone (Narcan) 4 mg/actuation nasal spray Use 1 spray intranasally, then discard. Repeat with new spray every 2 min as needed for opioid overdose symptoms, alternating nostrils. 7/29/22   Eloise Sow MD   sevelamer (RENAGEL) 800 mg tablet Take 800 mg by mouth three (3) times daily (with meals).     Provider, Historical   amLODIPine (NORVASC) 10 mg tablet Take 0.5 tab in AM and 0.5 tab in PM 6/21/22   Justina Orellana MD   pantoprazole (PROTONIX) 40 mg tablet Take 1 Tablet by mouth daily. 6/21/22   Justina Orellana MD   sucralfate (CARAFATE) 1 gram tablet Take 1 Tablet by mouth three (3) times daily. 6/21/22   Justina Orellana MD   isosorbide mononitrate ER (IMDUR) 60 mg CR tablet Take 1 Tablet by mouth daily. 6/21/22   Justina Orellana MD   furosemide (Lasix) 80 mg tablet Take 80 mg by mouth daily. Provider, Historical   tamsulosin (FLOMAX) 0.4 mg capsule Take 1 Capsule by mouth nightly. 6/21/22 8/2/22  Justina Orellana MD   Insulin Needles, Disposable, 31 gauge x 5/16\" ndle Use as directed with insulin DM2 4/6/22   Kari Damon MD   acetaminophen (Tylenol Extra Strength) 500 mg tablet Take 500 mg by mouth every six (6) hours as needed for Pain or Fever. Provider, Historical   aspirin delayed-release 81 mg tablet Take 81 mg by mouth daily. Provider, Historical   gabapentin (NEURONTIN) 300 mg capsule Take 300 mg by mouth daily. Take after dialysis on dialysis days. Patient taking this TID. Provider, Historical   insulin aspart U-100 (NovoLOG Flexpen U-100 Insulin) 100 unit/mL (3 mL) inpn by SubCUTAneous route Before breakfast, lunch, and dinner. -180    2 units  -220    4 units   -260    6 units  -300    8 units  -350    10 units    Provider, Historical   senna (Senna) 8.6 mg tablet Take 2 Tablets by mouth nightly. Provider, Historical   polyethylene glycol (Miralax) 17 gram/dose powder Take 17 g by mouth daily as needed for Constipation. Provider, Historical   busPIRone (BUSPAR) 10 mg tablet Take 10 mg by mouth two (2) times a day. Provider, Historical   carvediloL (Coreg) 12.5 mg tablet Take 12.5 mg by mouth two (2) times daily (with meals). Hold if top number below 100    Provider, Historical   docusate sodium (Colace) 100 mg capsule Take 1 Capsule by mouth two (2) times a day.  10/20/21   Provider, Historical   ferrous sulfate 325 mg (65 mg iron) tablet Take 1 Tablet by mouth daily. 4/14/21   Provider, Historical   rosuvastatin (CRESTOR) 10 mg tablet Take 10 mg by mouth Every morning. Provider, Historical        ROS:  History obtained from chart review and the patient       Patient Active Problem List    Diagnosis Date Noted    Osteomyelitis of ankle or foot, acute, right ([facility] 07/25/2022    Constipation 07/25/2022    Open wound of right heel 07/16/2022    Calf swelling 07/16/2022    Chest pain 07/16/2022    Pleural effusion 04/14/2022    Anxiety and depression     CHF NYHA class I, chronic, diastolic ([facility]     DM type 2 causing renal disease ([facility]     BPH (benign prostatic hyperplasia)     CAD (coronary artery disease)     DM type 2 causing vascular disease ([facility]     GERD (gastroesophageal reflux disease)     HTN (hypertension)     Hyperlipidemia     Anemia associated with chronic renal failure     Chronic pain     Cirrhosis ([facility] 04/01/2022    ESRD (end stage renal disease) on dialysis ([facility] 02/06/2022    Thrombocytopenia ([facility] 02/06/2022    Type 2 diabetes mellitus with diabetic neuropathy ([facility] 08/05/2019    Type 2 diabetes mellitus with ophthalmic complication, with long-term current use of insulin ([facility] 11/25/2018    Peripheral neuropathy 11/14/2018     Current Outpatient Medications   Medication Sig Dispense Refill    tamsulosin (FLOMAX) 0.4 mg capsule Take 1 Capsule by mouth nightly. 90 Capsule 0    hydrALAZINE (APRESOLINE) 100 mg tablet Take 100 mg by mouth three (3) times daily.  insulin glargine (LANTUS,BASAGLAR) 100 unit/mL (3 mL) inpn 15 Units by SubCUTAneous route nightly for 30 days. 4.5 mL 0    metroNIDAZOLE (FLAGYL) 500 mg tablet Take 1 Tablet by mouth two (2) times a day for 35 days. 70 Tablet 0    lactobacillus combination no.4 (Probiotic) 3 billion cell cap Take 1 Capsule by mouth daily.  40 Capsule 0    hydrALAZINE (APRESOLINE) 50 mg tablet Take 1 Tablet by mouth three (3) times daily for 30 days. 90 Tablet 0    naloxone (Narcan) 4 mg/actuation nasal spray Use 1 spray intranasally, then discard. Repeat with new spray every 2 min as needed for opioid overdose symptoms, alternating nostrils. 1 Each 0    sevelamer (RENAGEL) 800 mg tablet Take 800 mg by mouth three (3) times daily (with meals).  amLODIPine (NORVASC) 10 mg tablet Take 0.5 tab in AM and 0.5 tab in PM 90 Tablet 0    pantoprazole (PROTONIX) 40 mg tablet Take 1 Tablet by mouth daily. 90 Tablet 0    sucralfate (CARAFATE) 1 gram tablet Take 1 Tablet by mouth three (3) times daily. 90 Tablet 1    isosorbide mononitrate ER (IMDUR) 60 mg CR tablet Take 1 Tablet by mouth daily. 30 Tablet 0    furosemide (Lasix) 80 mg tablet Take 80 mg by mouth daily.  Insulin Needles, Disposable, 31 gauge x 5/16\" ndle Use as directed with insulin DM2 100 Each 11    acetaminophen (Tylenol Extra Strength) 500 mg tablet Take 500 mg by mouth every six (6) hours as needed for Pain or Fever.  aspirin delayed-release 81 mg tablet Take 81 mg by mouth daily.  gabapentin (NEURONTIN) 300 mg capsule Take 300 mg by mouth daily. Take after dialysis on dialysis days. Patient taking this TID.  insulin aspart U-100 (NovoLOG Flexpen U-100 Insulin) 100 unit/mL (3 mL) inpn by SubCUTAneous route Before breakfast, lunch, and dinner. -180    2 units  -220    4 units   -260    6 units  -300    8 units  -350    10 units      senna (Senna) 8.6 mg tablet Take 2 Tablets by mouth nightly.  polyethylene glycol (Miralax) 17 gram/dose powder Take 17 g by mouth daily as needed for Constipation.  busPIRone (BUSPAR) 10 mg tablet Take 10 mg by mouth two (2) times a day.  carvediloL (Coreg) 12.5 mg tablet Take 12.5 mg by mouth two (2) times daily (with meals). Hold if top number below 100      docusate sodium (Colace) 100 mg capsule Take 1 Capsule by mouth two (2) times a day.       ferrous sulfate 325 mg (65 mg iron) tablet Take 1 Tablet by mouth daily.  rosuvastatin (CRESTOR) 10 mg tablet Take 10 mg by mouth Every morning. No Known Allergies  Past Medical History:   Diagnosis Date    Anemia associated with chronic renal failure     Anxiety and depression     BPH (benign prostatic hyperplasia)     CAD (coronary artery disease)     CHF NYHA class I, chronic, diastolic (HCC)     Chronic pain     DM type 2 [facility] disease [facility]     DM type 2 causing vascular disease ([facility]     ESRD on dialysis ([facility]     GERD (gastroesophageal reflux disease)     HTN (hypertension)     Hyperlipidemia     Thrombocytopenia (HCC)      Past Surgical History:   Procedure Laterality Date    HX ARTERIAL BYPASS      HX OTHER SURGICAL      5th toe Metatarsal amputation 12/2017     IR INSERT NON TUNL CVC OVER 5 YRS  11/19/2021    IR INSERT TUNL CVC W/O PORT OVER 5 YR  11/24/2021          Objective:   Visit Vitals  BP (!) 182/71 (BP 1 Location: Left upper arm, BP Patient Position: Sitting, BP Cuff Size: Adult)   Pulse 66   Temp 97.1 °F (36.2 °C) (Skin)   Resp 16   Ht 5' 8\" (1.727 m)   Wt 148 lb (67.1 kg)   SpO2 97%   BMI 22.50 kg/m²        Physical Examination: General appearance - alert, well appearing, and in no distress and oriented to person, place, and time       We discussed the expected course, resolution and complications of the diagnosis(es) in detail. Medication risks, benefits, costs, interactions, and alternatives were discussed as indicated. I advised him to contact the office if his condition worsens, changes or fails to improve as anticipated. He expressed understanding with the diagnosis(es) and plan.      Suleiman Orozco NP

## 2022-08-02 NOTE — PROGRESS NOTES
1. Have you been to the ER, urgent care clinic since your last visit? Hospitalized since your last visit? Yes When: 7/21/22 to 7/29/2022 Where: [facility] Reason for visit: Right Foot pain    2. Have you seen or consulted any other health care providers outside of the [address] [place] [address] since your last visit? Include any pap smears or colon screening. No    Chief Complaint   Patient presents with    Hospital Follow Up     Right Foot surgery on heel. Back Pain     Health Maintenance Due   Topic Date Due    Shingrix Vaccine Age 49> (1 of 2) Never done    Pneumococcal 0-64 years (2 - PCV) 11/14/2019    COVID-19 Vaccine (2 - Pfizer series) 04/29/2021    Eye Exam Retinal or Dilated  08/26/2021     3 most recent PHQ Screens 8/2/2022   Little interest or pleasure in doing things Several days   Feeling down, depressed, irritable, or hopeless Several days   Total Score PHQ 2 2   Trouble falling or staying asleep, or sleeping too much -   Feeling tired or having little energy -   Poor appetite, weight loss, or overeating -   Feeling bad about yourself - or that you are a failure or have let yourself or your family down -   Trouble concentrating on things such as school, work, reading, or watching TV -   Moving or speaking so slowly that other people could have noticed; or the opposite being so fidgety that others notice -   Thoughts of being better off dead, or hurting yourself in some way -   PHQ 9 Score -   How difficult have these problems made it for you to do your work, take care of your home and get along with others -     Abuse Screening Questionnaire 8/2/2022   Do you ever feel afraid of your partner? N   Are you in a relationship with someone who physically or mentally threatens you? N   Is it safe for you to go home?  Y     Visit Vitals  BP (!) 182/71 (BP 1 Location: Left upper arm, BP Patient Position: Sitting, BP Cuff Size: Adult)   Pulse 66   Temp 97.1 °F (36.2 °C) (Skin)   Resp 16   Ht 5' 8\" (1.727 m)   Wt 148 lb (67.1 kg)   SpO2 97%   BMI 22.50 kg/m²

## 2022-08-02 NOTE — PATIENT INSTRUCTIONS
Contact Home health and ask about in home Physical Therapy  Referral for outpatient therapy has been provided  Follow up with wound care this Thursday, may request through home health for them to come to home  Follow up with Nephrology and cardiology for refills, call provider on bottle  Follow up with Dr Eusebio Watson in 3-4 weeks.

## 2022-08-03 ENCOUNTER — HOME CARE VISIT (OUTPATIENT)
Dept: HOME HEALTH SERVICES | Facility: HOME HEALTH | Age: 62
End: 2022-08-03
Payer: MEDICAID

## 2022-08-04 ENCOUNTER — HOME CARE VISIT (OUTPATIENT)
Dept: SCHEDULING | Facility: HOME HEALTH | Age: 62
End: 2022-08-04
Payer: MEDICAID

## 2022-08-04 VITALS
TEMPERATURE: 99 F | SYSTOLIC BLOOD PRESSURE: 140 MMHG | OXYGEN SATURATION: 97 % | DIASTOLIC BLOOD PRESSURE: 78 MMHG | HEART RATE: 64 BPM

## 2022-08-04 VITALS
SYSTOLIC BLOOD PRESSURE: 130 MMHG | TEMPERATURE: 98.2 F | RESPIRATION RATE: 22 BRPM | HEART RATE: 65 BPM | DIASTOLIC BLOOD PRESSURE: 60 MMHG | OXYGEN SATURATION: 98 %

## 2022-08-04 PROCEDURE — G0152 HHCP-SERV OF OT,EA 15 MIN: HCPCS

## 2022-08-04 PROCEDURE — G0151 HHCP-SERV OF PT,EA 15 MIN: HCPCS

## 2022-08-05 ENCOUNTER — HOME CARE VISIT (OUTPATIENT)
Dept: SCHEDULING | Facility: HOME HEALTH | Age: 62
End: 2022-08-05
Payer: MEDICAID

## 2022-08-09 ENCOUNTER — HOME CARE VISIT (OUTPATIENT)
Dept: SCHEDULING | Facility: HOME HEALTH | Age: 62
End: 2022-08-09
Payer: MEDICAID

## 2022-08-09 ENCOUNTER — TELEPHONE (OUTPATIENT)
Dept: FAMILY MEDICINE CLINIC | Age: 62
End: 2022-08-09

## 2022-08-09 PROCEDURE — G0300 HHS/HOSPICE OF LPN EA 15 MIN: HCPCS

## 2022-08-09 PROCEDURE — G0157 HHC PT ASSISTANT EA 15: HCPCS

## 2022-08-09 PROCEDURE — G0152 HHCP-SERV OF OT,EA 15 MIN: HCPCS

## 2022-08-09 NOTE — TELEPHONE ENCOUNTER
Bethanie from [facility] called about pt. Pt was seen today, his BP was high 150/68. His pain was outside of their parameters it was 8/10 in lower back. Pt feels SOB when breathing due to back pain. Pt feels suicidal that started yesterday. Crying for unknown reasons. Today he feels useless because he cannot do thing he once did and not able to help his wife. [facility] did provide him with a suicide hotline number and was told to call 911 if thoughts returned. Callback at [phone].

## 2022-08-09 NOTE — TELEPHONE ENCOUNTER
TC to Mrs. Yudi Haley wife Name and  verified      Spoke with patient wife about message received form  , we have set up a appt to see . Yudi Haley for next week not able to get in sooner due to transportation    Wife was told if depression get worst take patient to the ER to be evaluated  she agreed.

## 2022-08-09 NOTE — TELEPHONE ENCOUNTER
6Scan, pat reported has some suicidal thought and is very depress due to his pain. Today pain is 8. Nurse gave his suical hot line . Pt has no plan to harm himself. Pt fall out of bed Sunday increased back pain.

## 2022-08-10 VITALS
SYSTOLIC BLOOD PRESSURE: 150 MMHG | RESPIRATION RATE: 17 BRPM | HEART RATE: 78 BPM | TEMPERATURE: 97.8 F | DIASTOLIC BLOOD PRESSURE: 68 MMHG | OXYGEN SATURATION: 97 %

## 2022-08-10 VITALS
RESPIRATION RATE: 17 BRPM | DIASTOLIC BLOOD PRESSURE: 78 MMHG | SYSTOLIC BLOOD PRESSURE: 164 MMHG | TEMPERATURE: 99.8 F | OXYGEN SATURATION: 97 % | HEART RATE: 76 BPM

## 2022-08-10 VITALS
RESPIRATION RATE: 17 BRPM | OXYGEN SATURATION: 97 % | HEART RATE: 75 BPM | DIASTOLIC BLOOD PRESSURE: 78 MMHG | SYSTOLIC BLOOD PRESSURE: 165 MMHG | TEMPERATURE: 99 F

## 2022-08-10 NOTE — TELEPHONE ENCOUNTER
Spoke with [address] [place] [address]. She stated that the patient had interest seeing  a mental health therapist due to suicidal thoughts. Pt would like a referral and to be contacted with provider information so they can set up an appointment.

## 2022-08-10 NOTE — TELEPHONE ENCOUNTER
Spoke with patient and provided him with [facility] telephone number and address per luis carlos for mental health referral.

## 2022-08-11 ENCOUNTER — HOME CARE VISIT (OUTPATIENT)
Dept: SCHEDULING | Facility: HOME HEALTH | Age: 62
End: 2022-08-11
Payer: MEDICAID

## 2022-08-11 VITALS
RESPIRATION RATE: 16 BRPM | OXYGEN SATURATION: 98 % | DIASTOLIC BLOOD PRESSURE: 78 MMHG | HEART RATE: 70 BPM | SYSTOLIC BLOOD PRESSURE: 162 MMHG | TEMPERATURE: 99.3 F

## 2022-08-11 VITALS
SYSTOLIC BLOOD PRESSURE: 150 MMHG | DIASTOLIC BLOOD PRESSURE: 80 MMHG | TEMPERATURE: 98.5 F | OXYGEN SATURATION: 97 % | HEART RATE: 73 BPM

## 2022-08-11 PROCEDURE — G0152 HHCP-SERV OF OT,EA 15 MIN: HCPCS

## 2022-08-11 PROCEDURE — G0157 HHC PT ASSISTANT EA 15: HCPCS

## 2022-08-16 ENCOUNTER — HOME CARE VISIT (OUTPATIENT)
Dept: SCHEDULING | Facility: HOME HEALTH | Age: 62
End: 2022-08-16
Payer: MEDICAID

## 2022-08-16 ENCOUNTER — HOME CARE VISIT (OUTPATIENT)
Dept: HOME HEALTH SERVICES | Facility: HOME HEALTH | Age: 62
End: 2022-08-16
Payer: MEDICAID

## 2022-08-16 ENCOUNTER — OFFICE VISIT (OUTPATIENT)
Dept: FAMILY MEDICINE CLINIC | Age: 62
End: 2022-08-16
Payer: MEDICAID

## 2022-08-16 VITALS
DIASTOLIC BLOOD PRESSURE: 73 MMHG | BODY MASS INDEX: 22.43 KG/M2 | HEIGHT: 68 IN | TEMPERATURE: 98.2 F | RESPIRATION RATE: 20 BRPM | WEIGHT: 148 LBS | SYSTOLIC BLOOD PRESSURE: 176 MMHG | HEART RATE: 72 BPM | OXYGEN SATURATION: 99 %

## 2022-08-16 VITALS
OXYGEN SATURATION: 97 % | SYSTOLIC BLOOD PRESSURE: 142 MMHG | TEMPERATURE: 98.2 F | DIASTOLIC BLOOD PRESSURE: 80 MMHG | HEART RATE: 72 BPM

## 2022-08-16 DIAGNOSIS — M86.171 OSTEOMYELITIS OF ANKLE OR FOOT, ACUTE, RIGHT (HCC): ICD-10-CM

## 2022-08-16 DIAGNOSIS — Z79.4 TYPE 2 DIABETES MELLITUS WITH CHRONIC KIDNEY DISEASE ON CHRONIC DIALYSIS, WITH LONG-TERM CURRENT USE OF INSULIN (HCC): Primary | ICD-10-CM

## 2022-08-16 DIAGNOSIS — G89.29 CHRONIC MIDLINE LOW BACK PAIN WITHOUT SCIATICA: ICD-10-CM

## 2022-08-16 DIAGNOSIS — N18.6 TYPE 2 DIABETES MELLITUS WITH CHRONIC KIDNEY DISEASE ON CHRONIC DIALYSIS, WITH LONG-TERM CURRENT USE OF INSULIN (HCC): Primary | ICD-10-CM

## 2022-08-16 DIAGNOSIS — M54.50 CHRONIC MIDLINE LOW BACK PAIN WITHOUT SCIATICA: ICD-10-CM

## 2022-08-16 DIAGNOSIS — Z99.2 TYPE 2 DIABETES MELLITUS WITH CHRONIC KIDNEY DISEASE ON CHRONIC DIALYSIS, WITH LONG-TERM CURRENT USE OF INSULIN (HCC): Primary | ICD-10-CM

## 2022-08-16 DIAGNOSIS — E11.22 TYPE 2 DIABETES MELLITUS WITH CHRONIC KIDNEY DISEASE ON CHRONIC DIALYSIS, WITH LONG-TERM CURRENT USE OF INSULIN (HCC): Primary | ICD-10-CM

## 2022-08-16 PROCEDURE — 3051F HG A1C>EQUAL 7.0%<8.0%: CPT | Performed by: NURSE PRACTITIONER

## 2022-08-16 PROCEDURE — 99214 OFFICE O/P EST MOD 30 MIN: CPT | Performed by: NURSE PRACTITIONER

## 2022-08-16 PROCEDURE — G0152 HHCP-SERV OF OT,EA 15 MIN: HCPCS

## 2022-08-16 RX ORDER — DICLOFENAC SODIUM 10 MG/G
4 GEL TOPICAL 4 TIMES DAILY
Qty: 1 EACH | Refills: 3 | Status: SHIPPED | OUTPATIENT
Start: 2022-08-16

## 2022-08-16 NOTE — PROGRESS NOTES
Assessment/Plan:     Diagnoses and all orders for this visit:    1. Type 2 diabetes mellitus with chronic kidney disease on chronic dialysis, with long-term current use of insulin ([facility])  -     REFERRAL TO ENDOCRINOLOGY  A1c not at goal, continue on insulin use also on dialysis  2. Chronic midline low back pain without sciatica  -     diclofenac (VOLTAREN) 1 % gel; Apply 4 g to affected area four (4) times daily. Low back pain I believe is from immobility and sitting due to the osteomyelitis of the foot. Patient does have physical therapy coming in home apply gel 4 times daily  3. Osteomyelitis of ankle or foot, acute, right ([address]  Continues to see surgeon, reports doing well has another appointment this Thursday, wound is dressed. Discussed expected course/resolution/complications of diagnosis in detail with patient. Medication risks/benefits/costs/interactions/alternatives discussed with patient. Pt was given after visit summary which includes diagnoses, current medications & vitals. Pt expressed understanding with the diagnosis and plan        Subjective:      Jace Christensen is a 64 y.o. male who presents for had concerns including Hospital Follow Up ([address] [place] [address] H/O       Wound on right heel). Current Outpatient Medications   Medication Sig Dispense Refill    diclofenac (VOLTAREN) 1 % gel Apply 4 g to affected area four (4) times daily. 1 Each 3    acetaminophen (TYLENOL) 500 mg tablet Take 500 mg by mouth every six (6) hours as needed for Pain or Fever. mild pain      tamsulosin (FLOMAX) 0.4 mg capsule Take 1 Capsule by mouth nightly. 90 Capsule 0    hydrALAZINE (APRESOLINE) 100 mg tablet Take 100 mg by mouth three (3) times daily. insulin glargine (LANTUS,BASAGLAR) 100 unit/mL (3 mL) inpn 15 Units by SubCUTAneous route nightly for 30 days. 4.5 mL 0    metroNIDAZOLE (FLAGYL) 500 mg tablet Take 1 Tablet by mouth two (2) times a day for 35 days.  70 Tablet 0    lactobacillus combination no.4 (Probiotic) 3 billion cell cap Take 1 Capsule by mouth daily. 40 Capsule 0    hydrALAZINE (APRESOLINE) 50 mg tablet Take 1 Tablet by mouth three (3) times daily for 30 days. 90 Tablet 0    naloxone (Narcan) 4 mg/actuation nasal spray Use 1 spray intranasally, then discard. Repeat with new spray every 2 min as needed for opioid overdose symptoms, alternating nostrils. 1 Each 0    sevelamer (RENAGEL) 800 mg tablet Take 800 mg by mouth three (3) times daily (with meals). amLODIPine (NORVASC) 10 mg tablet Take 0.5 tab in AM and 0.5 tab in PM (Patient taking differently: Take 5 mg by mouth two (2) times a day. Take 0.5 tab in AM and 0.5 tab in PM) 90 Tablet 0    pantoprazole (PROTONIX) 40 mg tablet Take 1 Tablet by mouth daily. 90 Tablet 0    sucralfate (CARAFATE) 1 gram tablet Take 1 Tablet by mouth three (3) times daily. 90 Tablet 1    isosorbide mononitrate ER (IMDUR) 60 mg CR tablet Take 1 Tablet by mouth daily. 30 Tablet 0    furosemide (Lasix) 80 mg tablet Take 80 mg by mouth daily. Insulin Needles, Disposable, 31 gauge x 5/16\" ndle Use as directed with insulin DM2 100 Each 11    aspirin delayed-release 81 mg tablet Take 81 mg by mouth daily. gabapentin (NEURONTIN) 300 mg capsule Take 300 mg by mouth daily. Take after dialysis on dialysis days. Patient taking this TID. insulin aspart U-100 (NovoLOG Flexpen U-100 Insulin) 100 unit/mL (3 mL) inpn by SubCUTAneous route Before breakfast, lunch, and dinner. -180    2 units  -220    4 units   -260    6 units  -300    8 units  -350    10 units      senna (Senna) 8.6 mg tablet Take 2 Tablets by mouth nightly. polyethylene glycol (Miralax) 17 gram/dose powder Take 17 g by mouth daily as needed for Constipation. busPIRone (BUSPAR) 10 mg tablet Take 10 mg by mouth two (2) times a day. carvediloL (Coreg) 12.5 mg tablet Take 12.5 mg by mouth two (2) times daily (with meals).  Hold if top number below 100 docusate sodium (Colace) 100 mg capsule Take 1 Capsule by mouth two (2) times a day. ferrous sulfate 325 mg (65 mg iron) tablet Take 1 Tablet by mouth daily. rosuvastatin (CRESTOR) 10 mg tablet Take 10 mg by mouth Every morning.          No Known Allergies  Past Medical History:   Diagnosis Date    Anemia associated with chronic renal failure     Anxiety and depression     BPH (benign prostatic hyperplasia)     CAD (coronary artery disease)     CHF NYHA class I, chronic, diastolic (HCC)     Chronic pain     DM type 2 causing renal disease ([facility] [address]     DM type 2 causing vascular disease ([facility] [address]     ESRD on dialysis ([facility] [address]     GERD (gastroesophageal reflux disease)     HTN (hypertension)     Hyperlipidemia     Thrombocytopenia (HCC)      Past Surgical History:   Procedure Laterality Date    HX ARTERIAL BYPASS      HX OTHER SURGICAL      5th toe Metatarsal amputation 2017     IR INSERT NON TUNL CVC OVER 5 YRS  2021    IR INSERT TUNL CVC W/O PORT OVER 5 YR  2021     Family History   Problem Relation Age of Onset    Diabetes Mother     No Known Problems Father      Social History     Socioeconomic History    Marital status:      Spouse name: Not on file    Number of children: Not on file    Years of education: Not on file    Highest education level: Not on file   Occupational History    Not on file   Tobacco Use    Smoking status: Former     Types: Cigarettes     Quit date: 2001     Years since quittin.2    Smokeless tobacco: Never   Vaping Use    Vaping Use: Never used   Substance and Sexual Activity    Alcohol use: No    Drug use: No    Sexual activity: Yes     Partners: Female     Birth control/protection: Condom   Other Topics Concern    Not on file   Social History Narrative    Not on file     Social Determinants of Health     Financial Resource Strain: Not on file   Food Insecurity: Not on file   Transportation Needs: Not on file   Physical Activity: Not on file   Stress: Not on file   Social Connections: Not on file   Intimate Partner Violence: Not on file   Housing Stability: Not on file       HPI      ROS:   ROS    Objective:   Visit Vitals  BP (!) 176/73   Pulse 72   Temp 98.2 °F (36.8 °C) (Temporal)   Resp 20   Ht 5' 8\" (1.727 m)   Wt 148 lb (67.1 kg)   SpO2 99%   BMI 22.50 kg/m²         Vitals and Nurse Documentation reviewed.      Physical Exam    Results for orders placed or performed during the hospital encounter of 07/21/22   CULTURE, BLOOD, PAIRED    Specimen: Blood   Result Value Ref Range    Special Requests: NO SPECIAL REQUESTS      Culture result: NO GROWTH 5 DAYS     CULTURE, WOUND W GRAM STAIN    Specimen: Heel   Result Value Ref Range    Special Requests: RIGHT HEEL     GRAM STAIN FEW WBCS SEEN      GRAM STAIN FEW GRAM POSITIVE COCCI IN CLUSTERS      GRAM STAIN 2+ GRAM NEGATIVE RODS      Culture result: LIGHT CITROBACTER FREUNDII (A)      Culture result: (A)       FEW * Methicillin Resistant Staphylococcus aureus *    Culture result: FEW PSEUDOMONAS AERUGINOSA (A)      Culture result: FEW ENTEROCOCCUS FAECALIS (A)      Culture result: FEW PASTEURELLA CANIS BETA LACTAMASE NEGATIVE (A)      Culture result: LIGHT ANAEROBIC GRAM POSITIVE COCCI (A)      Culture result: (A)       LIGHT ANAEROBIC GRAM NEGATIVE RODS BETA LACTAMASE POSITIVE    Culture result: LIGHT PROTEUS VULGARIS (A)         Susceptibility    Citrobacter freundii - ANDERSON     Amikacin ($)  Susceptible ug/mL     Cefazolin ($)  Resistant ug/mL     Ceftazidime ($)  Susceptible ug/mL     Ceftriaxone ($)  Susceptible ug/mL     Cefepime ($$)  Susceptible ug/mL     Ciprofloxacin ($)  Susceptible ug/mL     Gentamicin ($)  Susceptible ug/mL     Levofloxacin ($)  Susceptible ug/mL     Meropenem ($$)  Susceptible ug/mL     Piperacillin/Tazobac ($)  Susceptible ug/mL     Tobramycin ($)  Susceptible ug/mL     Trimeth/Sulfa  Susceptible ug/mL     Cefoxitin  Resistant ug/mL    Enterococcus faecalis - ANDERSON     Ampicillin ($) Susceptible ug/mL     Daptomycin ($$$$$)  Susceptible ug/mL     Linezolid ($$$$$)  Susceptible ug/mL     Vancomycin ($)  Susceptible ug/mL    Proteus vulgaris - ANDERSON     Amikacin ($)  Susceptible ug/mL     Ampicillin ($)  Resistant ug/mL     Ampicillin/sulbactam ($)  Susceptible ug/mL     Cefazolin ($)  Resistant ug/mL     Ceftazidime ($)  Susceptible ug/mL     Ceftriaxone ($)  Susceptible ug/mL     Cefepime ($$)  Susceptible ug/mL     Ciprofloxacin ($)  Susceptible ug/mL     Gentamicin ($)  Susceptible ug/mL     Levofloxacin ($)  Susceptible ug/mL     Piperacillin/Tazobac ($)  Susceptible ug/mL     Tobramycin ($)  Susceptible ug/mL     Trimeth/Sulfa  Susceptible ug/mL     Cefoxitin  Susceptible ug/mL     Meropenem ($$)*  Susceptible ug/mL      * (SENSITIVITIES PERFORMED BY E-TEST)    Pseudomonas aeruginosa - ANDERSON     Amikacin ($)  Susceptible ug/mL     Ceftazidime ($)  Susceptible ug/mL     Cefepime ($$)  Susceptible ug/mL     Ciprofloxacin ($)  Susceptible ug/mL     Gentamicin ($)  Susceptible ug/mL     Levofloxacin ($)  Susceptible ug/mL     Meropenem ($$)  Susceptible ug/mL     Piperacillin/Tazobac ($)*  Susceptible ug/mL      * **FDA INTERPRETATION REFLECTED, REFER TO CLSI FOR ALTERNATE INTERPRETATIONS.**     Tobramycin ($)  Susceptible ug/mL    Staphylococcus aureus Methcillin Resistant - ANDERSON     Clindamycin ($)  Susceptible ug/mL     Daptomycin ($$$$$)  Susceptible ug/mL     Erythromycin ($$$$)  Resistant ug/mL     Gentamicin ($)  Susceptible ug/mL     Linezolid ($$$$$)  Susceptible ug/mL     Oxacillin  Resistant ug/mL     Rifampin ($$$$)*  Susceptible ug/mL      * Rifampin is not to be used for mono-therapy.      Tetracycline  Susceptible ug/mL     Trimeth/Sulfa  Susceptible ug/mL     Vancomycin ($)  Susceptible ug/mL     Ciprofloxacin ($)  Susceptible ug/mL     Levofloxacin ($)  Susceptible ug/mL     Doxycycline ($$)  Susceptible ug/mL   CULTURE, WOUND W GRAM STAIN    Specimen: Surgical Specimen    RIGHT HEEL WOUND   Result Value Ref Range    Special Requests: NO SPECIAL REQUESTS      GRAM STAIN RARE WBCS SEEN      GRAM STAIN NO ORGANISMS SEEN      Culture result: (A)       LIGHT CITROBACTER FREUNDII REFER TO [id number] FOR SENSITIVITIES    Culture result: (A)       FEW * Methicillin Resistant Staphylococcus aureus * REFER TO [id number] FOR SENSITIVITIES    Culture result: LIGHT ENTEROCOCCUS AVIUM (A)      Culture result: (A)       LIGHT ENTEROCOCCUS FAECALIS REFER TO [id number] FOR SENSITIVITIES    Culture result:       (NOTE) MRSA BY ANTIGEN DETECTION CALLED TO [facility],RN,AT 1430 ON 7/25/22. RF       Susceptibility    Enterococcus avium - ANDERSON     Ampicillin ($)  Susceptible ug/mL     Vancomycin ($)  Susceptible ug/mL     Daptomycin ($$$$$)*  Susceptible ug/mL      * (SENSITIVITIES PERFORMED BY E-TEST)   CULTURE, ANAEROBIC    Specimen: Surgical Specimen    RIGHT HEEL WOUND   Result Value Ref Range    Special Requests: NO SPECIAL REQUESTS      Culture result: HEAVY Bacteroides pyogenes BETA LACTAMASE POSITIVE (A)     CULTURE, WOUND W GRAM STAIN    Specimen: Surgical Specimen    RIGHT HEEL BONE   Result Value Ref Range    Special Requests: NO SPECIAL REQUESTS      GRAM STAIN RARE WBCS SEEN      GRAM STAIN NO ORGANISMS SEEN      Culture result: (A)       RARE * Methicillin Resistant Staphylococcus aureus * REFER TO [id number] FOR SENSITIVITIES    Culture result: (A)       SCANT ENTEROCOCCUS SPECIES REFER TO [id number] FOR ID AND SENSITIVITIES   CULTURE, ANAEROBIC    Specimen: Surgical Specimen    RIGHT HEEL BONE   Result Value Ref Range    Special Requests: NO SPECIAL REQUESTS      Culture result: LIGHT Bacteroides pyogenes BETA LACTAMASE POSITIVE (A)     CBC WITH AUTOMATED DIFF   Result Value Ref Range    WBC 8.5 4.1 - 11.1 K/uL    RBC 3.16 (L) 4.10 - 5.70 M/uL    HGB 9.0 (L) 12.1 - 17.0 g/dL    HCT 28.1 (L) 36.6 - 50.3 %    MCV 88.9 80.0 - 99.0 FL    MCH 28.5 26.0 - 34.0 PG    MCHC 32.0 30.0 - 36.5 g/dL    RDW 14.7 (H) 11.5 - 14.5 %    PLATELET 918 164 - 686 K/uL    MPV 10.3 8.9 - 12.9 FL    NRBC 0.0 0  WBC    ABSOLUTE NRBC 0.00 0.00 - 0.01 K/uL    NEUTROPHILS 81 (H) 32 - 75 %    LYMPHOCYTES 9 (L) 12 - 49 %    MONOCYTES 8 5 - 13 %    EOSINOPHILS 2 0 - 7 %    BASOPHILS 0 0 - 1 %    IMMATURE GRANULOCYTES 0 0.0 - 0.5 %    ABS. NEUTROPHILS 6.8 1.8 - 8.0 K/UL    ABS. LYMPHOCYTES 0.8 0.8 - 3.5 K/UL    ABS. MONOCYTES 0.7 0.0 - 1.0 K/UL    ABS. EOSINOPHILS 0.2 0.0 - 0.4 K/UL    ABS. BASOPHILS 0.0 0.0 - 0.1 K/UL    ABS. IMM. GRANS. 0.0 0.00 - 0.04 K/UL    DF SMEAR SCANNED      RBC COMMENTS NORMOCYTIC, NORMOCHROMIC     METABOLIC PANEL, COMPREHENSIVE   Result Value Ref Range    Sodium 127 (L) 136 - 145 mmol/L    Potassium 4.2 3.5 - 5.1 mmol/L    Chloride 95 (L) 97 - 108 mmol/L    CO2 26 21 - 32 mmol/L    Anion gap 6 5 - 15 mmol/L    Glucose 322 (H) 65 - 100 mg/dL    BUN 34 (H) 6 - 20 MG/DL    Creatinine 3.97 (H) 0.70 - 1.30 MG/DL    BUN/Creatinine ratio 9 (L) 12 - 20      GFR est AA 19 (L) >60 ml/min/1.73m2    GFR est non-AA 15 (L) >60 ml/min/1.73m2    Calcium 8.6 8.5 - 10.1 MG/DL    Bilirubin, total 0.3 0.2 - 1.0 MG/DL    ALT (SGPT) 13 12 - 78 U/L    AST (SGOT) 11 (L) 15 - 37 U/L    Alk. phosphatase 161 (H) 45 - 117 U/L    Protein, total 8.0 6.4 - 8.2 g/dL    Albumin 2.6 (L) 3.5 - 5.0 g/dL    Globulin 5.4 (H) 2.0 - 4.0 g/dL    A-G Ratio 0.5 (L) 1.1 - 2.2     SAMPLES BEING HELD   Result Value Ref Range    SAMPLES BEING HELD 1SST,1BLUE,1DK GRN     COMMENT        Add-on orders for these samples will be processed based on acceptable specimen integrity and analyte stability, which may vary by analyte.    TROPONIN-HIGH SENSITIVITY   Result Value Ref Range    Troponin-High Sensitivity 14 0 - 76 ng/L   TROPONIN-HIGH SENSITIVITY   Result Value Ref Range    Troponin-High Sensitivity 15 0 - 76 ng/L   SED RATE (ESR)   Result Value Ref Range    Sed rate, automated 127 (H) 0 - 20 mm/hr   C REACTIVE PROTEIN, QT   Result Value Ref Range    C-Reactive protein 15.20 (H) 0.00 - 0.60 mg/dL   SAMPLES BEING HELD   Result Value Ref Range    SAMPLES BEING HELD bldcs     COMMENT        Add-on orders for these samples will be processed based on acceptable specimen integrity and analyte stability, which may vary by analyte. CBC W/O DIFF   Result Value Ref Range    WBC 6.4 4.1 - 11.1 K/uL    RBC 2.85 (L) 4.10 - 5.70 M/uL    HGB 8.2 (L) 12.1 - 17.0 g/dL    HCT 25.3 (L) 36.6 - 50.3 %    MCV 88.8 80.0 - 99.0 FL    MCH 28.8 26.0 - 34.0 PG    MCHC 32.4 30.0 - 36.5 g/dL    RDW 14.7 (H) 11.5 - 14.5 %    PLATELET 102 382 - 579 K/uL    MPV 10.7 8.9 - 12.9 FL    NRBC 0.0 0  WBC    ABSOLUTE NRBC 0.00 0.00 - 9.06 K/uL   METABOLIC PANEL, COMPREHENSIVE   Result Value Ref Range    Sodium 136 136 - 145 mmol/L    Potassium 4.2 3.5 - 5.1 mmol/L    Chloride 100 97 - 108 mmol/L    CO2 27 21 - 32 mmol/L    Anion gap 9 5 - 15 mmol/L    Glucose 165 (H) 65 - 100 mg/dL    BUN 44 (H) 6 - 20 MG/DL    Creatinine 4.51 (H) 0.70 - 1.30 MG/DL    BUN/Creatinine ratio 10 (L) 12 - 20      GFR est AA 16 (L) >60 ml/min/1.73m2    GFR est non-AA 13 (L) >60 ml/min/1.73m2    Calcium 8.3 (L) 8.5 - 10.1 MG/DL    Bilirubin, total 0.3 0.2 - 1.0 MG/DL    ALT (SGPT) 13 12 - 78 U/L    AST (SGOT) 19 15 - 37 U/L    Alk.  phosphatase 127 (H) 45 - 117 U/L    Protein, total 6.7 6.4 - 8.2 g/dL    Albumin 2.1 (L) 3.5 - 5.0 g/dL    Globulin 4.6 (H) 2.0 - 4.0 g/dL    A-G Ratio 0.5 (L) 1.1 - 2.2     CBC W/O DIFF   Result Value Ref Range    WBC 8.2 4.1 - 11.1 K/uL    RBC 2.79 (L) 4.10 - 5.70 M/uL    HGB 7.9 (L) 12.1 - 17.0 g/dL    HCT 25.4 (L) 36.6 - 50.3 %    MCV 91.0 80.0 - 99.0 FL    MCH 28.3 26.0 - 34.0 PG    MCHC 31.1 30.0 - 36.5 g/dL    RDW 14.8 (H) 11.5 - 14.5 %    PLATELET 266 473 - 059 K/uL    MPV 11.3 8.9 - 12.9 FL    NRBC 0.0 0  WBC    ABSOLUTE NRBC 0.00 0.00 - 4.85 K/uL   METABOLIC PANEL, COMPREHENSIVE   Result Value Ref Range    Sodium 136 136 - 145 mmol/L    Potassium 3.2 (L) 3.5 - 5.1 mmol/L Chloride 101 97 - 108 mmol/L    CO2 30 21 - 32 mmol/L    Anion gap 5 5 - 15 mmol/L    Glucose 96 65 - 100 mg/dL    BUN 17 6 - 20 MG/DL    Creatinine 2.47 (H) 0.70 - 1.30 MG/DL    BUN/Creatinine ratio 7 (L) 12 - 20      GFR est AA 32 (L) >60 ml/min/1.73m2    GFR est non-AA 27 (L) >60 ml/min/1.73m2    Calcium 8.4 (L) 8.5 - 10.1 MG/DL    Bilirubin, total 0.5 0.2 - 1.0 MG/DL    ALT (SGPT) 13 12 - 78 U/L    AST (SGOT) 16 15 - 37 U/L    Alk. phosphatase 153 (H) 45 - 117 U/L    Protein, total 7.0 6.4 - 8.2 g/dL    Albumin 2.2 (L) 3.5 - 5.0 g/dL    Globulin 4.8 (H) 2.0 - 4.0 g/dL    A-G Ratio 0.5 (L) 1.1 - 2.2     CBC W/O DIFF   Result Value Ref Range    WBC 9.0 4.1 - 11.1 K/uL    RBC 2.80 (L) 4.10 - 5.70 M/uL    HGB 7.9 (L) 12.1 - 17.0 g/dL    HCT 25.3 (L) 36.6 - 50.3 %    MCV 90.4 80.0 - 99.0 FL    MCH 28.2 26.0 - 34.0 PG    MCHC 31.2 30.0 - 36.5 g/dL    RDW 14.8 (H) 11.5 - 14.5 %    PLATELET 131 909 - 289 K/uL    MPV 11.0 8.9 - 12.9 FL    NRBC 0.0 0  WBC    ABSOLUTE NRBC 0.00 0.00 - 2.64 K/uL   METABOLIC PANEL, COMPREHENSIVE   Result Value Ref Range    Sodium 133 (L) 136 - 145 mmol/L    Potassium 3.6 3.5 - 5.1 mmol/L    Chloride 97 97 - 108 mmol/L    CO2 25 21 - 32 mmol/L    Anion gap 11 5 - 15 mmol/L    Glucose 286 (H) 65 - 100 mg/dL    BUN 31 (H) 6 - 20 MG/DL    Creatinine 3.62 (H) 0.70 - 1.30 MG/DL    BUN/Creatinine ratio 9 (L) 12 - 20      GFR est AA 21 (L) >60 ml/min/1.73m2    GFR est non-AA 17 (L) >60 ml/min/1.73m2    Calcium 8.4 (L) 8.5 - 10.1 MG/DL    Bilirubin, total 0.4 0.2 - 1.0 MG/DL    ALT (SGPT) 12 12 - 78 U/L    AST (SGOT) 12 (L) 15 - 37 U/L    Alk.  phosphatase 128 (H) 45 - 117 U/L    Protein, total 7.3 6.4 - 8.2 g/dL    Albumin 2.3 (L) 3.5 - 5.0 g/dL    Globulin 5.0 (H) 2.0 - 4.0 g/dL    A-G Ratio 0.5 (L) 1.1 - 2.2     CBC W/O DIFF   Result Value Ref Range    WBC 6.3 4.1 - 11.1 K/uL    RBC 2.72 (L) 4.10 - 5.70 M/uL    HGB 7.6 (L) 12.1 - 17.0 g/dL    HCT 24.7 (L) 36.6 - 50.3 %    MCV 90.8 80.0 - 99.0 FL    MCH 27.9 26.0 - 34.0 PG    MCHC 30.8 30.0 - 36.5 g/dL    RDW 15.1 (H) 11.5 - 14.5 %    PLATELET 308 040 - 303 K/uL    MPV 11.1 8.9 - 12.9 FL    NRBC 0.0 0  WBC    ABSOLUTE NRBC 0.00 0.00 - 0.01 K/uL   VANCOMYCIN, RANDOM   Result Value Ref Range    Vancomycin, random 93.7 UG/ML   METABOLIC PANEL, BASIC   Result Value Ref Range    Sodium 133 (L) 136 - 145 mmol/L    Potassium 3.6 3.5 - 5.1 mmol/L    Chloride 99 97 - 108 mmol/L    CO2 26 21 - 32 mmol/L    Anion gap 8 5 - 15 mmol/L    Glucose 242 (H) 65 - 100 mg/dL    BUN 39 (H) 6 - 20 MG/DL    Creatinine 4.64 (H) 0.70 - 1.30 MG/DL    BUN/Creatinine ratio 8 (L) 12 - 20      GFR est AA 16 (L) >60 ml/min/1.73m2    GFR est non-AA 13 (L) >60 ml/min/1.73m2    Calcium 8.2 (L) 8.5 - 10.1 MG/DL   CBC W/O DIFF   Result Value Ref Range    WBC 5.8 4.1 - 11.1 K/uL    RBC 2.78 (L) 4.10 - 5.70 M/uL    HGB 7.8 (L) 12.1 - 17.0 g/dL    HCT 25.1 (L) 36.6 - 50.3 %    MCV 90.3 80.0 - 99.0 FL    MCH 28.1 26.0 - 34.0 PG    MCHC 31.1 30.0 - 36.5 g/dL    RDW 14.7 (H) 11.5 - 14.5 %    PLATELET 547 107 - 646 K/uL    MPV 10.8 8.9 - 12.9 FL    NRBC 0.0 0  WBC    ABSOLUTE NRBC 0.00 0.00 - 7.55 K/uL   METABOLIC PANEL, BASIC   Result Value Ref Range    Sodium 136 136 - 145 mmol/L    Potassium 3.4 (L) 3.5 - 5.1 mmol/L    Chloride 102 97 - 108 mmol/L    CO2 29 21 - 32 mmol/L    Anion gap 5 5 - 15 mmol/L    Glucose 162 (H) 65 - 100 mg/dL    BUN 26 (H) 6 - 20 MG/DL    Creatinine 2.88 (H) 0.70 - 1.30 MG/DL    BUN/Creatinine ratio 9 (L) 12 - 20      GFR est AA 27 (L) >60 ml/min/1.73m2    GFR est non-AA 22 (L) >60 ml/min/1.73m2    Calcium 8.0 (L) 8.5 - 10.1 MG/DL   CBC W/O DIFF   Result Value Ref Range    WBC 6.7 4.1 - 11.1 K/uL    RBC 2.87 (L) 4.10 - 5.70 M/uL    HGB 8.1 (L) 12.1 - 17.0 g/dL    HCT 25.5 (L) 36.6 - 50.3 %    MCV 88.9 80.0 - 99.0 FL    MCH 28.2 26.0 - 34.0 PG    MCHC 31.8 30.0 - 36.5 g/dL    RDW 15.2 (H) 11.5 - 14.5 %    PLATELET 454 578 - 176 K/uL    MPV 10.2 8.9 - 12.9 FL    NRBC 0.0 0  WBC    ABSOLUTE NRBC 0.00 0.00 - 7.44 K/uL   METABOLIC PANEL, BASIC   Result Value Ref Range    Sodium 134 (L) 136 - 145 mmol/L    Potassium 3.7 3.5 - 5.1 mmol/L    Chloride 102 97 - 108 mmol/L    CO2 25 21 - 32 mmol/L    Anion gap 7 5 - 15 mmol/L    Glucose 198 (H) 65 - 100 mg/dL    BUN 42 (H) 6 - 20 MG/DL    Creatinine 3.72 (H) 0.70 - 1.30 MG/DL    BUN/Creatinine ratio 11 (L) 12 - 20      GFR est AA 20 (L) >60 ml/min/1.73m2    GFR est non-AA 17 (L) >60 ml/min/1.73m2    Calcium 8.2 (L) 8.5 - 10.1 MG/DL   CBC W/O DIFF   Result Value Ref Range    WBC 6.0 4.1 - 11.1 K/uL    RBC 3.08 (L) 4.10 - 5.70 M/uL    HGB 8.7 (L) 12.1 - 17.0 g/dL    HCT 27.6 (L) 36.6 - 50.3 %    MCV 89.6 80.0 - 99.0 FL    MCH 28.2 26.0 - 34.0 PG    MCHC 31.5 30.0 - 36.5 g/dL    RDW 15.5 (H) 11.5 - 14.5 %    PLATELET 416 853 - 545 K/uL    MPV 10.5 8.9 - 12.9 FL    NRBC 0.0 0  WBC    ABSOLUTE NRBC 0.00 0.00 - 6.95 K/uL   METABOLIC PANEL, BASIC   Result Value Ref Range    Sodium 136 136 - 145 mmol/L    Potassium 4.1 3.5 - 5.1 mmol/L    Chloride 103 97 - 108 mmol/L    CO2 29 21 - 32 mmol/L    Anion gap 4 (L) 5 - 15 mmol/L    Glucose 102 (H) 65 - 100 mg/dL    BUN 21 (H) 6 - 20 MG/DL    Creatinine 2.27 (H) 0.70 - 1.30 MG/DL    BUN/Creatinine ratio 9 (L) 12 - 20      GFR est AA 36 (L) >60 ml/min/1.73m2    GFR est non-AA 29 (L) >60 ml/min/1.73m2    Calcium 8.8 8.5 - 10.1 MG/DL   C REACTIVE PROTEIN, QT   Result Value Ref Range    C-Reactive protein 2.57 (H) 0.00 - 0.60 mg/dL   CBC W/O DIFF   Result Value Ref Range    WBC 5.3 4.1 - 11.1 K/uL    RBC 3.00 (L) 4.10 - 5.70 M/uL    HGB 8.4 (L) 12.1 - 17.0 g/dL    HCT 27.1 (L) 36.6 - 50.3 %    MCV 90.3 80.0 - 99.0 FL    MCH 28.0 26.0 - 34.0 PG    MCHC 31.0 30.0 - 36.5 g/dL    RDW 15.9 (H) 11.5 - 14.5 %    PLATELET 031 209 - 099 K/uL    MPV 10.3 8.9 - 12.9 FL    NRBC 0.0 0  WBC    ABSOLUTE NRBC 0.00 0.00 - 0.01 K/uL   VANCOMYCIN, RANDOM   Result Value Ref Range Vancomycin, random 26.4 UG/ML   METABOLIC PANEL, BASIC   Result Value Ref Range    Sodium 135 (L) 136 - 145 mmol/L    Potassium 4.0 3.5 - 5.1 mmol/L    Chloride 102 97 - 108 mmol/L    CO2 26 21 - 32 mmol/L    Anion gap 7 5 - 15 mmol/L    Glucose 97 65 - 100 mg/dL    BUN 31 (H) 6 - 20 MG/DL    Creatinine 2.82 (H) 0.70 - 1.30 MG/DL    BUN/Creatinine ratio 11 (L) 12 - 20      GFR est AA 28 (L) >60 ml/min/1.73m2    GFR est non-AA 23 (L) >60 ml/min/1.73m2    Calcium 9.0 8.5 - 10.1 MG/DL   GLUCOSE, POC   Result Value Ref Range    Glucose (POC) 403 (H) 65 - 117 mg/dL    Performed by Jose Rowan    GLUCOSE, POC   Result Value Ref Range    Glucose (POC) 250 (H) 65 - 117 mg/dL    Performed by Chrystal POC   Result Value Ref Range    Glucose (POC) 138 (H) 65 - 117 mg/dL    Performed by Samanta Wilson    GLUCOSE, POC   Result Value Ref Range    Glucose (POC) 137 (H) 65 - 117 mg/dL    Performed by Steven Ravi (Tech)    GLUCOSE, POC   Result Value Ref Range    Glucose (POC) 105 65 - 117 mg/dL    Performed by Berta Abbott    GLUCOSE, POC   Result Value Ref Range    Glucose (POC) 96 65 - 117 mg/dL    Performed by [employer] (PCT)    GLUCOSE, POC   Result Value Ref Range    Glucose (POC) 87 65 - 117 mg/dL    Performed by [employer] (PCT)    GLUCOSE, POC   Result Value Ref Range    Glucose (POC) 84 65 - 117 mg/dL    Performed by Vivian Montero    GLUCOSE, POC   Result Value Ref Range    Glucose (POC) 97 65 - 117 mg/dL    Performed by Tiago Bobo    GLUCOSE, POC   Result Value Ref Range    Glucose (POC) 210 (H) 65 - 117 mg/dL    Performed by Yohana Lagos    GLUCOSE, POC   Result Value Ref Range    Glucose (POC) 265 (H) 65 - 117 mg/dL    Performed by Ramana Andrade    GLUCOSE, POC   Result Value Ref Range    Glucose (POC) 329 (H) 65 - 117 mg/dL    Performed by Ramana Andrade    GLUCOSE, POC   Result Value Ref Range    Glucose (POC) 283 (H) 65 - 117 mg/dL    Performed by [address] [place] [address] Result Value Ref Range    Glucose (POC) 210 (H) 65 - 117 mg/dL    Performed by [address] [place] [address], POC   Result Value Ref Range    Glucose (POC) 225 (H) 65 - 117 mg/dL    Performed by Mariam Galdamez (CON)    GLUCOSE, POC   Result Value Ref Range    Glucose (POC) 150 (H) 65 - 117 mg/dL    Performed by [facility] PCT    GLUCOSE, POC   Result Value Ref Range    Glucose (POC) 142 (H) 65 - 117 mg/dL    Performed by [id number] [facility],8W, POC   Result Value Ref Range    Glucose (POC) 171 (H) 65 - 117 mg/dL    Performed by [address] [place], [place] [address],[id number], POC   Result Value Ref Range    Glucose (POC) 154 (H) 65 - 117 mg/dL    Performed by Alberto Tanner    GLUCOSE, POC   Result Value Ref Range    Glucose (POC) 123 (H) 65 - 117 mg/dL    Performed by Michelle Venegas    GLUCOSE, POC   Result Value Ref Range    Glucose (POC) 153 (H) 65 - 117 mg/dL    Performed by Esteban Gaytan (PCT)    GLUCOSE, POC   Result Value Ref Range    Glucose (POC) 180 (H) 65 - 117 mg/dL    Performed by Esteban Gaytan (PCT)    GLUCOSE, POC   Result Value Ref Range    Glucose (POC) 273 (H) 65 - 117 mg/dL    Performed by Kyler Sanders    GLUCOSE, POC   Result Value Ref Range    Glucose (POC) 159 (H) 65 - 117 mg/dL    Performed by Kyler Sheehana    GLUCOSE, POC   Result Value Ref Range    Glucose (POC) 163 (H) 65 - 117 mg/dL    Performed by Luís Lalas PCT    GLUCOSE, POC   Result Value Ref Range    Glucose (POC) 149 (H) 65 - 117 mg/dL    Performed by Clemente LACEY PCT    GLUCOSE, POC   Result Value Ref Range    Glucose (POC) 129 (H) 65 - 117 mg/dL    Performed by Jodi Stokes    GLUCOSE, POC   Result Value Ref Range    Glucose (POC) 122 (H) 65 - 117 mg/dL    Performed by Jodi Stokes    GLUCOSE, POC   Result Value Ref Range    Glucose (POC) 93 65 - 117 mg/dL    Performed by Jodi Stokes    GLUCOSE, POC   Result Value Ref Range    Glucose (POC) 112 65 - 117 mg/dL    Performed by Joel Montilla. (CON)    GLUCOSE, POC   Result Value Ref Range    Glucose (POC) 113 65 - 117 mg/dL    Performed by Steve Urbina. (CON)    GLUCOSE, POC   Result Value Ref Range    Glucose (POC) 153 (H) 65 - 117 mg/dL    Performed by Chitra Flood    GLUCOSE, POC   Result Value Ref Range    Glucose (POC) 115 65 - 117 mg/dL    Performed by Joy Conroy    GLUCOSE, POC   Result Value Ref Range    Glucose (POC) 87 65 - 117 mg/dL    Performed by Cinda Jackson PCT    EKG, 12 LEAD, INITIAL   Result Value Ref Range    Ventricular Rate 64 BPM    Atrial Rate 64 BPM    P-R Interval 158 ms    QRS Duration 94 ms    Q-T Interval 446 ms    QTC Calculation (Bezet) 460 ms    Calculated P Axis 61 degrees    Calculated R Axis 36 degrees    Calculated T Axis 43 degrees    Diagnosis       Normal sinus rhythm  Normal ECG  When compared with ECG of 16-JUL-2022 13:41,  T wave inversion no longer evident in Inferior leads  T wave inversion no longer evident in Lateral leads  Confirmed by Che Andrew MD, [id number] ([id number]) on 7/22/2022 9:03:34 AM

## 2022-08-16 NOTE — PROGRESS NOTES
Patient stated name &     Chief Complaint   Patient presents with    Hospital Follow Up     [facility] H/O       Wound on right heel        Health Maintenance Due   Topic    Shingrix Vaccine Age 50> (1 of 2)    Pneumococcal 0-64 years (2 - PCV)    COVID-19 Vaccine (2 - Pfizer series)    Eye Exam Retinal or Dilated        Wt Readings from Last 3 Encounters:   22 148 lb (67.1 kg)   22 148 lb (67.1 kg)   22 148 lb 14.4 oz (67.5 kg)     Temp Readings from Last 3 Encounters:   22 98.2 °F (36.8 °C) (Temporal)   22 98.2 °F (36.8 °C)   22 98.5 °F (36.9 °C)     BP Readings from Last 3 Encounters:   22 (!) 185/84   22 (!) 142/80   22 (!) 150/80     Pulse Readings from Last 3 Encounters:   22 72   22 72   22 73         Learning Assessment:  :     Learning Assessment 2018   PRIMARY LEARNER Patient Patient Patient   HIGHEST LEVEL OF EDUCATION - PRIMARY LEARNER  - - GRADUATED HIGH SCHOOL OR GED   BARRIERS PRIMARY LEARNER NONE - -   CO-LEARNER CAREGIVER No - -   PRIMARY LANGUAGE ENGLISH ENGLISH ENGLISH   LEARNER PREFERENCE PRIMARY VIDEOS VIDEOS DEMONSTRATION   ANSWERED BY SELF patinet self   RELATIONSHIP SELF SELF SELF       Depression Screening:  :     3 most recent PHQ Screens 2022   Little interest or pleasure in doing things Several days   Feeling down, depressed, irritable, or hopeless Several days   Total Score PHQ 2 2   Trouble falling or staying asleep, or sleeping too much -   Feeling tired or having little energy -   Poor appetite, weight loss, or overeating -   Feeling bad about yourself - or that you are a failure or have let yourself or your family down -   Trouble concentrating on things such as school, work, reading, or watching TV -   Moving or speaking so slowly that other people could have noticed; or the opposite being so fidgety that others notice -   Thoughts of being better off dead, or hurting yourself in some way -   PHQ 9 Score -   How difficult have these problems made it for you to do your work, take care of your home and get along with others -       Fall Risk Assessment:  :     No flowsheet data found. Abuse Screening:  :     Abuse Screening Questionnaire 8/2/2022 1/22/2019   Do you ever feel afraid of your partner? N N   Are you in a relationship with someone who physically or mentally threatens you? N N   Is it safe for you to go home? Y Y       Coordination of Care Questionnaire:  :     1) Have you been to an emergency room, urgent care clinic since your last visit? no   Hospitalized since your last visit? yes          [facility]    Wound on right heel    2) Have you seen or consulted any other health care providers outside of [address] [place] [address] since your last visit? no  (Include any pap smears or colon screenings in this section.)    3) Do you have an Advance Directive on file? no  Are you interested in receiving information about Advance Directives? no    Patient is accompanied by spouse I have received verbal consent from Rayla Needs to discuss any/all medical information while they are present in the room.

## 2022-08-18 ENCOUNTER — HOME CARE VISIT (OUTPATIENT)
Dept: SCHEDULING | Facility: HOME HEALTH | Age: 62
End: 2022-08-18
Payer: MEDICAID

## 2022-08-18 VITALS
DIASTOLIC BLOOD PRESSURE: 70 MMHG | RESPIRATION RATE: 18 BRPM | BODY MASS INDEX: 24.81 KG/M2 | TEMPERATURE: 98.9 F | HEART RATE: 73 BPM | OXYGEN SATURATION: 97 % | SYSTOLIC BLOOD PRESSURE: 144 MMHG | WEIGHT: 163.14 LBS

## 2022-08-18 VITALS
RESPIRATION RATE: 18 BRPM | OXYGEN SATURATION: 96 % | TEMPERATURE: 98 F | HEART RATE: 75 BPM | SYSTOLIC BLOOD PRESSURE: 158 MMHG | DIASTOLIC BLOOD PRESSURE: 82 MMHG

## 2022-08-18 VITALS
RESPIRATION RATE: 18 BRPM | SYSTOLIC BLOOD PRESSURE: 158 MMHG | OXYGEN SATURATION: 96 % | TEMPERATURE: 98 F | DIASTOLIC BLOOD PRESSURE: 82 MMHG | HEART RATE: 75 BPM

## 2022-08-18 PROCEDURE — G0151 HHCP-SERV OF PT,EA 15 MIN: HCPCS

## 2022-08-18 PROCEDURE — G0152 HHCP-SERV OF OT,EA 15 MIN: HCPCS

## 2022-08-18 PROCEDURE — G0299 HHS/HOSPICE OF RN EA 15 MIN: HCPCS

## 2022-08-23 ENCOUNTER — HOME CARE VISIT (OUTPATIENT)
Dept: SCHEDULING | Facility: HOME HEALTH | Age: 62
End: 2022-08-23
Payer: MEDICAID

## 2022-08-23 ENCOUNTER — TELEPHONE (OUTPATIENT)
Dept: FAMILY MEDICINE CLINIC | Age: 62
End: 2022-08-23

## 2022-08-23 ENCOUNTER — HOME CARE VISIT (OUTPATIENT)
Dept: HOME HEALTH SERVICES | Facility: HOME HEALTH | Age: 62
End: 2022-08-23
Payer: MEDICAID

## 2022-08-23 VITALS
SYSTOLIC BLOOD PRESSURE: 150 MMHG | RESPIRATION RATE: 18 BRPM | TEMPERATURE: 99.7 F | OXYGEN SATURATION: 97 % | DIASTOLIC BLOOD PRESSURE: 78 MMHG | HEART RATE: 78 BPM

## 2022-08-23 PROCEDURE — G0299 HHS/HOSPICE OF RN EA 15 MIN: HCPCS

## 2022-08-23 PROCEDURE — G0151 HHCP-SERV OF PT,EA 15 MIN: HCPCS

## 2022-08-23 NOTE — TELEPHONE ENCOUNTER
[facility] from [address] [place] [address] Alex Stark is going to reevaluate pt on insulin education.  They are going to try to see pt on 08/25/22    [facility]: [phone]

## 2022-08-24 ENCOUNTER — HOME CARE VISIT (OUTPATIENT)
Dept: HOME HEALTH SERVICES | Facility: HOME HEALTH | Age: 62
End: 2022-08-24
Payer: MEDICAID

## 2022-08-25 ENCOUNTER — HOME CARE VISIT (OUTPATIENT)
Dept: SCHEDULING | Facility: HOME HEALTH | Age: 62
End: 2022-08-25
Payer: MEDICAID

## 2022-08-26 VITALS
RESPIRATION RATE: 18 BRPM | DIASTOLIC BLOOD PRESSURE: 80 MMHG | SYSTOLIC BLOOD PRESSURE: 140 MMHG | OXYGEN SATURATION: 97 % | HEART RATE: 78 BPM | TEMPERATURE: 99 F

## 2022-08-27 ENCOUNTER — HOME CARE VISIT (OUTPATIENT)
Dept: SCHEDULING | Facility: HOME HEALTH | Age: 62
End: 2022-08-27
Payer: MEDICAID

## 2022-08-27 VITALS
SYSTOLIC BLOOD PRESSURE: 188 MMHG | TEMPERATURE: 97.6 F | RESPIRATION RATE: 20 BRPM | DIASTOLIC BLOOD PRESSURE: 86 MMHG | OXYGEN SATURATION: 98 % | HEART RATE: 72 BPM

## 2022-08-27 PROCEDURE — G0300 HHS/HOSPICE OF LPN EA 15 MIN: HCPCS

## 2022-08-30 ENCOUNTER — HOME CARE VISIT (OUTPATIENT)
Dept: HOME HEALTH SERVICES | Facility: HOME HEALTH | Age: 62
End: 2022-08-30
Payer: MEDICAID

## 2022-08-30 NOTE — CASE COMMUNICATION
Pt bp elevated - he had not taken his medications - what he had on hand. He had not obtained medications from med list provided - instructed his sister to please call pharmacy and obtain. He is very non-compliant with medications. Gave him a pill box and instructed him how to fill once medications were obtained. Instructed him to go to Er if he started to feel dizzy, sob or any other s/s cva.  Pt stated his sister was coming over and he  would tell her

## 2022-09-01 ENCOUNTER — HOME CARE VISIT (OUTPATIENT)
Dept: SCHEDULING | Facility: HOME HEALTH | Age: 62
End: 2022-09-01
Payer: MEDICAID

## 2022-09-01 ENCOUNTER — HOME CARE VISIT (OUTPATIENT)
Dept: HOME HEALTH SERVICES | Facility: HOME HEALTH | Age: 62
End: 2022-09-01
Payer: MEDICAID

## 2022-09-06 ENCOUNTER — HOME CARE VISIT (OUTPATIENT)
Dept: HOME HEALTH SERVICES | Facility: HOME HEALTH | Age: 62
End: 2022-09-06
Payer: MEDICAID

## 2022-09-08 ENCOUNTER — HOME CARE VISIT (OUTPATIENT)
Dept: HOME HEALTH SERVICES | Facility: HOME HEALTH | Age: 62
End: 2022-09-08
Payer: MEDICAID

## 2022-09-08 ENCOUNTER — HOME CARE VISIT (OUTPATIENT)
Dept: SCHEDULING | Facility: HOME HEALTH | Age: 62
End: 2022-09-08
Payer: MEDICAID

## 2022-09-08 PROCEDURE — G0299 HHS/HOSPICE OF RN EA 15 MIN: HCPCS

## 2022-09-08 PROCEDURE — 400013 HH SOC

## 2022-09-09 ENCOUNTER — HOME CARE VISIT (OUTPATIENT)
Dept: SCHEDULING | Facility: HOME HEALTH | Age: 62
End: 2022-09-09
Payer: MEDICAID

## 2022-09-09 VITALS
RESPIRATION RATE: 18 BRPM | OXYGEN SATURATION: 97 % | TEMPERATURE: 98.6 F | SYSTOLIC BLOOD PRESSURE: 118 MMHG | HEART RATE: 65 BPM | DIASTOLIC BLOOD PRESSURE: 60 MMHG

## 2022-09-09 PROCEDURE — G0151 HHCP-SERV OF PT,EA 15 MIN: HCPCS

## 2022-09-10 VITALS
RESPIRATION RATE: 12 BRPM | OXYGEN SATURATION: 98 % | SYSTOLIC BLOOD PRESSURE: 122 MMHG | TEMPERATURE: 98.3 F | DIASTOLIC BLOOD PRESSURE: 68 MMHG | HEART RATE: 73 BPM

## 2022-09-15 ENCOUNTER — HOME CARE VISIT (OUTPATIENT)
Dept: SCHEDULING | Facility: HOME HEALTH | Age: 62
End: 2022-09-15
Payer: MEDICAID

## 2022-09-15 VITALS
RESPIRATION RATE: 18 BRPM | DIASTOLIC BLOOD PRESSURE: 62 MMHG | HEART RATE: 66 BPM | SYSTOLIC BLOOD PRESSURE: 122 MMHG | OXYGEN SATURATION: 98 % | TEMPERATURE: 98 F

## 2022-09-15 PROCEDURE — G0299 HHS/HOSPICE OF RN EA 15 MIN: HCPCS

## 2022-09-20 ENCOUNTER — HOME CARE VISIT (OUTPATIENT)
Dept: HOME HEALTH SERVICES | Facility: HOME HEALTH | Age: 62
End: 2022-09-20
Payer: MEDICAID

## 2022-09-21 ENCOUNTER — HOME CARE VISIT (OUTPATIENT)
Dept: HOME HEALTH SERVICES | Facility: HOME HEALTH | Age: 62
End: 2022-09-21
Payer: MEDICAID

## 2022-09-22 ENCOUNTER — HOME CARE VISIT (OUTPATIENT)
Dept: SCHEDULING | Facility: HOME HEALTH | Age: 62
End: 2022-09-22
Payer: MEDICAID

## 2022-09-22 VITALS
OXYGEN SATURATION: 99 % | SYSTOLIC BLOOD PRESSURE: 182 MMHG | HEART RATE: 72 BPM | TEMPERATURE: 97.7 F | DIASTOLIC BLOOD PRESSURE: 74 MMHG | RESPIRATION RATE: 16 BRPM

## 2022-09-22 PROCEDURE — G0300 HHS/HOSPICE OF LPN EA 15 MIN: HCPCS

## 2022-09-28 ENCOUNTER — HOME CARE VISIT (OUTPATIENT)
Dept: SCHEDULING | Facility: HOME HEALTH | Age: 62
End: 2022-09-28
Payer: MEDICAID

## 2023-01-24 ENCOUNTER — OFFICE VISIT (OUTPATIENT)
Dept: FAMILY MEDICINE CLINIC | Age: 63
End: 2023-01-24
Payer: MEDICAID

## 2023-01-24 VITALS
SYSTOLIC BLOOD PRESSURE: 196 MMHG | DIASTOLIC BLOOD PRESSURE: 73 MMHG | HEART RATE: 68 BPM | RESPIRATION RATE: 16 BRPM | OXYGEN SATURATION: 99 % | TEMPERATURE: 97.4 F | WEIGHT: 152 LBS | HEIGHT: 68 IN | BODY MASS INDEX: 23.04 KG/M2

## 2023-01-24 DIAGNOSIS — Z09 HOSPITAL DISCHARGE FOLLOW-UP: Primary | ICD-10-CM

## 2023-01-24 DIAGNOSIS — I10 HYPERTENSION, UNSPECIFIED TYPE: ICD-10-CM

## 2023-01-24 PROCEDURE — 99496 TRANSJ CARE MGMT HIGH F2F 7D: CPT | Performed by: NURSE PRACTITIONER

## 2023-01-24 PROCEDURE — 1111F DSCHRG MED/CURRENT MED MERGE: CPT | Performed by: NURSE PRACTITIONER

## 2023-01-24 RX ORDER — AMLODIPINE BESYLATE 10 MG/1
TABLET ORAL
Qty: 90 TABLET | Refills: 0 | Status: SHIPPED | OUTPATIENT
Start: 2023-01-24

## 2023-01-24 RX ORDER — METOPROLOL SUCCINATE 100 MG/1
100 TABLET, EXTENDED RELEASE ORAL DAILY
Qty: 90 TABLET | Refills: 1 | Status: SHIPPED | OUTPATIENT
Start: 2023-01-24

## 2023-01-24 RX ORDER — LOSARTAN POTASSIUM 25 MG/1
25 TABLET ORAL DAILY
Qty: 90 TABLET | Refills: 1 | Status: SHIPPED | OUTPATIENT
Start: 2023-01-24

## 2023-01-24 RX ORDER — METOPROLOL SUCCINATE 100 MG/1
100 TABLET, EXTENDED RELEASE ORAL DAILY
COMMUNITY
Start: 2022-12-31 | End: 2023-01-24 | Stop reason: SDUPTHER

## 2023-01-24 RX ORDER — LOSARTAN POTASSIUM 25 MG/1
TABLET ORAL
COMMUNITY
Start: 2023-01-20 | End: 2023-01-24 | Stop reason: SDUPTHER

## 2023-01-24 NOTE — PROGRESS NOTES
1. Have you been to the ER, urgent care clinic since your last visit? Hospitalized since your last visit? Yes When: 1/19/2023 Where: [facility] Reason for visit: htn    2. Have you seen or consulted any other health care providers outside of the [address] [place] [address] since your last visit? Include any pap smears or colon screening. No      Chief Complaint   Patient presents with    Hospital Follow Up     [facility]  Patient states Fluid, hypertensive emergency     3 most recent PHQ Screens 1/24/2023   Little interest or pleasure in doing things Several days   Feeling down, depressed, irritable, or hopeless Several days   Total Score PHQ 2 2   Trouble falling or staying asleep, or sleeping too much -   Feeling tired or having little energy -   Poor appetite, weight loss, or overeating -   Feeling bad about yourself - or that you are a failure or have let yourself or your family down -   Trouble concentrating on things such as school, work, reading, or watching TV -   Moving or speaking so slowly that other people could have noticed; or the opposite being so fidgety that others notice -   Thoughts of being better off dead, or hurting yourself in some way -   PHQ 9 Score -   How difficult have these problems made it for you to do your work, take care of your home and get along with others -     Abuse Screening Questionnaire 1/24/2023   Do you ever feel afraid of your partner? N   Are you in a relationship with someone who physically or mentally threatens you? N   Is it safe for you to go home?  Y     Learning Assessment 4/25/2019   PRIMARY LEARNER Patient   HIGHEST LEVEL OF EDUCATION - PRIMARY LEARNER  -   BARRIERS PRIMARY LEARNER NONE   CO-LEARNER CAREGIVER No   PRIMARY LANGUAGE ENGLISH   LEARNER PREFERENCE PRIMARY VIDEOS   ANSWERED BY SELF   RELATIONSHIP SELF   Visit Vitals  BP (!) 196/73 (BP 1 Location: Right upper arm, BP Patient Position: Sitting, BP Cuff Size: Adult)   Pulse 68   Temp 97.4 °F (36.3 °C) (Skin) Resp 16   Ht 5' 8\" (1.727 m)   Wt 152 lb (68.9 kg)   SpO2 99%   BMI 23.11 kg/m²

## 2023-01-24 NOTE — PROGRESS NOTES
Assessment/Plan:     Diagnoses and all orders for this visit:    1. Hospital discharge follow-up  -     [facility] [address]osa MEDS RECONCILED W/CURRENT MED LIST  Patient was seen in office for hospital follow-up. He reports being in at [facility] from January 9 through 19. Patient is unclear as to who has prescribed what medications and what they are for. I reconciled based on hospital discharge summary and what the patient could remember. He reports he sees so many doctors he cannot keep them all straight. It was unclear who is managing his diabetes as the prescription refills have not been from our office. He does know the dosing and the names of his insulin and reports being out so these have been refilled. Several months ago he was referred to endocrinology but did not go. I provided this referral again to help better manage his diabetes. He does have upcoming appointment with pulmonology, cardiology and nephrology. 2. Hypertension, unspecified type  -     amLODIPine (NORVASC) 10 mg tablet; Take 0.5 tab in AM and 0.5 tab in PM  -     metoprolol succinate (TOPROL-XL) 100 mg tablet; Take 1 Tablet by mouth daily. -     losartan (COZAAR) 25 mg tablet; Take 1 Tablet by mouth daily. Indications: kidney disease from diabetes, high blood pressure  Patient reports being out of blood pressure medication. These were reconciled off the discharge summary and appears our practice has been prescribing. Patient's compliance to previous care has not been good. Again we try to reconcile medications and explained what medications are taken for what. He does know he has some specialist visits coming up and will follow-up with them as well. Patient needs to be seen again in 1 month to repeat routine labs. Patient is able to understand English well. Does have family with him but declined them coming back to exam room. Discussed expected course/resolution/complications of diagnosis in detail with patient. Medication risks/benefits/costs/interactions/alternatives discussed with patient. Pt was given after visit summary which includes diagnoses, current medications & vitals. Pt expressed understanding with the diagnosis and plan        Subjective:      Marie Bustamante is a 58 y.o. male who presents for had concerns including Hospital Follow Up ([facility]/Patient states Fluid, hypertensive emergency) and Medication Refill. Current Outpatient Medications   Medication Sig Dispense Refill    amLODIPine (NORVASC) 10 mg tablet Take 0.5 tab in AM and 0.5 tab in PM 90 Tablet 0    metoprolol succinate (TOPROL-XL) 100 mg tablet Take 1 Tablet by mouth daily. 90 Tablet 1    losartan (COZAAR) 25 mg tablet Take 1 Tablet by mouth daily. Indications: kidney disease from diabetes, high blood pressure 90 Tablet 1    insulin glargine (LANTUS) 100 unit/mL injection 25 Units by SubCUTAneous route nightly. diclofenac (VOLTAREN) 1 % gel Apply 4 g to affected area four (4) times daily. 1 Each 3    acetaminophen (TYLENOL) 500 mg tablet Take 500 mg by mouth every six (6) hours as needed for Pain or Fever. mild pain      tamsulosin (FLOMAX) 0.4 mg capsule Take 1 Capsule by mouth nightly. 90 Capsule 0    hydrALAZINE (APRESOLINE) 100 mg tablet Take 100 mg by mouth three (3) times daily. lactobacillus combination no.4 (Probiotic) 3 billion cell cap Take 1 Capsule by mouth daily. 40 Capsule 0    naloxone (Narcan) 4 mg/actuation nasal spray Use 1 spray intranasally, then discard. Repeat with new spray every 2 min as needed for opioid overdose symptoms, alternating nostrils. 1 Each 0    sevelamer (RENAGEL) 800 mg tablet Take 800 mg by mouth three (3) times daily (with meals). pantoprazole (PROTONIX) 40 mg tablet Take 1 Tablet by mouth daily. 90 Tablet 0    sucralfate (CARAFATE) 1 gram tablet Take 1 Tablet by mouth three (3) times daily. 90 Tablet 1    isosorbide mononitrate ER (IMDUR) 60 mg CR tablet Take 1 Tablet by mouth daily. 30 Tablet 0    furosemide (LASIX) 80 mg tablet Take 80 mg by mouth daily. Insulin Needles, Disposable, 31 gauge x 5/16\" ndle Use as directed with insulin DM2 100 Each 11    aspirin delayed-release 81 mg tablet Take 81 mg by mouth daily. gabapentin (NEURONTIN) 300 mg capsule Take 300 mg by mouth daily. Take after dialysis on dialysis days. Patient taking this TID. insulin aspart U-100 (NOVOLOG) 100 unit/mL (3 mL) inpn by SubCUTAneous route Before breakfast, lunch, and dinner. -180    2 units  -220    4 units   -260    6 units  -300    8 units  -350    10 units      senna (SENOKOT) 8.6 mg tablet Take 2 Tablets by mouth nightly. polyethylene glycol (MIRALAX) 17 gram/dose powder Take 17 g by mouth daily as needed for Constipation. busPIRone (BUSPAR) 10 mg tablet Take 10 mg by mouth two (2) times a day. docusate sodium (COLACE) 100 mg capsule Take 1 Capsule by mouth two (2) times a day. rosuvastatin (CRESTOR) 10 mg tablet Take 10 mg by mouth Every morning.          No Known Allergies  Past Medical History:   Diagnosis Date    Anemia associated with chronic renal failure     Anxiety and depression     BPH (benign prostatic hyperplasia)     CAD (coronary artery disease)     CHF NYHA class I, chronic, diastolic (HCC)     Chronic pain     DM type 2 causing renal disease (HCC)     DM type 2 causing vascular disease ([facility] [address]     ESRD on dialysis ([facility] [address]     GERD (gastroesophageal reflux disease)     HTN (hypertension)     Hyperlipidemia     Thrombocytopenia (HCC)      Past Surgical History:   Procedure Laterality Date    HX ARTERIAL BYPASS      HX OTHER SURGICAL      5th toe Metatarsal amputation 12/2017     IR INSERT NON TUNL CVC OVER 5 YRS  11/19/2021    IR INSERT TUNL CVC W/O PORT OVER 5 YR  11/24/2021     Family History   Problem Relation Age of Onset    Diabetes Mother     No Known Problems Father      Social History     Socioeconomic History    Marital status:      Spouse name: Not on file    Number of children: Not on file    Years of education: Not on file    Highest education level: Not on file   Occupational History    Not on file   Tobacco Use    Smoking status: Former     Types: Cigarettes     Quit date: 2001     Years since quittin.6    Smokeless tobacco: Never   Vaping Use    Vaping Use: Never used   Substance and Sexual Activity    Alcohol use: No    Drug use: No    Sexual activity: Yes     Partners: Female     Birth control/protection: Condom   Other Topics Concern    Not on file   Social History Narrative    Not on file     Social Determinants of Health     Financial Resource Strain: Not on file   Food Insecurity: Not on file   Transportation Needs: Not on file   Physical Activity: Not on file   Stress: Not on file   Social Connections: Not on file   Intimate Partner Violence: Not on file   Housing Stability: Not on file       HPI      ROS:   Review of Systems   Constitutional:  Negative for fever and malaise/fatigue. HENT:  Negative for congestion, sinus pain and sore throat. Respiratory:  Negative for cough and shortness of breath. Cardiovascular:  Negative for chest pain. Gastrointestinal:  Negative for diarrhea, nausea and vomiting. Genitourinary:  Negative for dysuria. Musculoskeletal: Negative. Skin: Negative. Neurological:  Negative for dizziness and headaches. Endo/Heme/Allergies:  Negative for environmental allergies. Psychiatric/Behavioral: Negative. Objective:   Visit Vitals  BP (!) 196/73 (BP 1 Location: Right upper arm, BP Patient Position: Sitting, BP Cuff Size: Adult)   Pulse 68   Temp 97.4 °F (36.3 °C) (Skin)   Resp 16   Ht 5' 8\" (1.727 m)   Wt 152 lb (68.9 kg)   SpO2 99%   BMI 23.11 kg/m²         Vitals and Nurse Documentation reviewed. Physical Exam  Vitals and nursing note reviewed. Constitutional:       Appearance: He is normal weight. HENT:      Head: Normocephalic.       Nose: Nose normal.      Mouth/Throat:      Mouth: Mucous membranes are moist.   Eyes:      Pupils: Pupils are equal, round, and reactive to light. Cardiovascular:      Rate and Rhythm: Normal rate and regular rhythm. Pulses: Normal pulses. Heart sounds: Normal heart sounds. Pulmonary:      Effort: Pulmonary effort is normal.      Breath sounds: Normal breath sounds. Comments: Bandage on left side of chest from drainage. Lungs clear  Abdominal:      General: Abdomen is flat. Musculoskeletal:         General: Normal range of motion. Cervical back: Normal range of motion. Skin:     General: Skin is warm. Capillary Refill: Capillary refill takes less than 2 seconds. Neurological:      General: No focal deficit present. Mental Status: He is alert and oriented to person, place, and time.    Psychiatric:         Mood and Affect: Mood normal.         Behavior: Behavior normal.     Results for orders placed or performed during the hospital encounter of 07/21/22   CULTURE, BLOOD, PAIRED    Specimen: Blood   Result Value Ref Range    Special Requests: NO SPECIAL REQUESTS      Culture result: NO GROWTH 5 DAYS     CULTURE, WOUND W GRAM STAIN    Specimen: Heel   Result Value Ref Range    Special Requests: RIGHT HEEL     GRAM STAIN FEW WBCS SEEN      GRAM STAIN FEW GRAM POSITIVE COCCI IN CLUSTERS      GRAM STAIN 2+ GRAM NEGATIVE RODS      Culture result: LIGHT CITROBACTER FREUNDII (A)      Culture result: (A)       FEW * Methicillin Resistant Staphylococcus aureus *    Culture result: FEW PSEUDOMONAS AERUGINOSA (A)      Culture result: FEW ENTEROCOCCUS FAECALIS (A)      Culture result: FEW PASTEURELLA CANIS BETA LACTAMASE NEGATIVE (A)      Culture result: LIGHT ANAEROBIC GRAM POSITIVE COCCI (A)      Culture result: (A)       LIGHT ANAEROBIC GRAM NEGATIVE RODS BETA LACTAMASE POSITIVE    Culture result: LIGHT PROTEUS VULGARIS (A)         Susceptibility    Citrobacter freundii - ANDERSON     Amikacin ($) Susceptible ug/mL     Cefazolin ($)  Resistant ug/mL     Ceftazidime ($)  Susceptible ug/mL     Ceftriaxone ($)  Susceptible ug/mL     Cefepime ($$)  Susceptible ug/mL     Ciprofloxacin ($)  Susceptible ug/mL     Gentamicin ($)  Susceptible ug/mL     Levofloxacin ($)  Susceptible ug/mL     Meropenem ($$)  Susceptible ug/mL     Piperacillin/Tazobac ($)  Susceptible ug/mL     Tobramycin ($)  Susceptible ug/mL     Trimeth/Sulfa  Susceptible ug/mL     Cefoxitin  Resistant ug/mL    Enterococcus faecalis - ANDERSON     Ampicillin ($)  Susceptible ug/mL     Daptomycin ($$$$$)  Susceptible ug/mL     Linezolid ($$$$$)  Susceptible ug/mL     Vancomycin ($)  Susceptible ug/mL    Proteus vulgaris - ANDERSON     Amikacin ($)  Susceptible ug/mL     Ampicillin ($)  Resistant ug/mL     Ampicillin/sulbactam ($)  Susceptible ug/mL     Cefazolin ($)  Resistant ug/mL     Ceftazidime ($)  Susceptible ug/mL     Ceftriaxone ($)  Susceptible ug/mL     Cefepime ($$)  Susceptible ug/mL     Ciprofloxacin ($)  Susceptible ug/mL     Gentamicin ($)  Susceptible ug/mL     Levofloxacin ($)  Susceptible ug/mL     Piperacillin/Tazobac ($)  Susceptible ug/mL     Tobramycin ($)  Susceptible ug/mL     Trimeth/Sulfa  Susceptible ug/mL     Cefoxitin  Susceptible ug/mL     Meropenem ($$)*  Susceptible ug/mL      * (SENSITIVITIES PERFORMED BY E-TEST)    Pseudomonas aeruginosa - ANDERSON     Amikacin ($)  Susceptible ug/mL     Ceftazidime ($)  Susceptible ug/mL     Cefepime ($$)  Susceptible ug/mL     Ciprofloxacin ($)  Susceptible ug/mL     Gentamicin ($)  Susceptible ug/mL     Levofloxacin ($)  Susceptible ug/mL     Meropenem ($$)  Susceptible ug/mL     Piperacillin/Tazobac ($)*  Susceptible ug/mL      * **FDA INTERPRETATION REFLECTED, REFER TO CLSI FOR ALTERNATE INTERPRETATIONS.**     Tobramycin ($)  Susceptible ug/mL    Staphylococcus aureus Methcillin Resistant - [place]ERSON     Clindamycin ($)  Susceptible ug/mL     Daptomycin ($$$$$)  Susceptible ug/mL     Erythromycin ($$$$)  Resistant ug/mL     Gentamicin ($)  Susceptible ug/mL     Linezolid ($$$$$)  Susceptible ug/mL     Oxacillin  Resistant ug/mL     Rifampin ($$$$)*  Susceptible ug/mL      * Rifampin is not to be used for mono-therapy. Tetracycline  Susceptible ug/mL     Trimeth/Sulfa  Susceptible ug/mL     Vancomycin ($)  Susceptible ug/mL     Ciprofloxacin ($)  Susceptible ug/mL     Levofloxacin ($)  Susceptible ug/mL     Doxycycline ($$)  Susceptible ug/mL   CULTURE, WOUND W GRAM STAIN    Specimen: Surgical Specimen    RIGHT HEEL WOUND   Result Value Ref Range    Special Requests: NO SPECIAL REQUESTS      GRAM STAIN RARE WBCS SEEN      GRAM STAIN NO ORGANISMS SEEN      Culture result: (A)       LIGHT CITROBACTER FREUNDII REFER TO [id number] FOR SENSITIVITIES    Culture result: (A)       FEW * Methicillin Resistant Staphylococcus aureus * REFER TO [id number] FOR SENSITIVITIES    Culture result: LIGHT ENTEROCOCCUS AVIUM (A)      Culture result: (A)       LIGHT ENTEROCOCCUS FAECALIS REFER TO [id number] FOR SENSITIVITIES    Culture result:       (NOTE) MRSA BY ANTIGEN DETECTION CALLED TO [facility],RN,AT 1430 ON 7/25/22. RF       Susceptibility    Enterococcus avium - [place]ERSON     Ampicillin ($)  Susceptible ug/mL     Vancomycin ($)  Susceptible ug/mL     Daptomycin ($$$$$)*  Susceptible ug/mL      * (SENSITIVITIES PERFORMED BY E-TEST)   CULTURE, ANAEROBIC    Specimen: Surgical Specimen    RIGHT HEEL WOUND   Result Value Ref Range    Special Requests: NO SPECIAL REQUESTS      Culture result: HEAVY Bacteroides pyogenes BETA LACTAMASE POSITIVE (A)     CULTURE, WOUND W GRAM STAIN    Specimen: Surgical Specimen    RIGHT HEEL BONE   Result Value Ref Range    Special Requests: NO SPECIAL REQUESTS      GRAM STAIN RARE WBCS SEEN      GRAM STAIN NO ORGANISMS SEEN      Culture result: (A)       RARE * Methicillin Resistant Staphylococcus aureus * REFER TO [id number] FOR SENSITIVITIES    Culture result: (A)       SCANT ENTEROCOCCUS SPECIES REFER TO [id number] FOR ID AND SENSITIVITIES   CULTURE, ANAEROBIC    Specimen: Surgical Specimen    RIGHT HEEL BONE   Result Value Ref Range    Special Requests: NO SPECIAL REQUESTS      Culture result: LIGHT Bacteroides pyogenes BETA LACTAMASE POSITIVE (A)     CBC WITH AUTOMATED DIFF   Result Value Ref Range    WBC 8.5 4.1 - 11.1 K/uL    RBC 3.16 (L) 4.10 - 5.70 M/uL    HGB 9.0 (L) 12.1 - 17.0 g/dL    HCT 28.1 (L) 36.6 - 50.3 %    MCV 88.9 80.0 - 99.0 FL    MCH 28.5 26.0 - 34.0 PG    MCHC 32.0 30.0 - 36.5 g/dL    RDW 14.7 (H) 11.5 - 14.5 %    PLATELET 983 458 - 831 K/uL    MPV 10.3 8.9 - 12.9 FL    NRBC 0.0 0  WBC    ABSOLUTE NRBC 0.00 0.00 - 0.01 K/uL    NEUTROPHILS 81 (H) 32 - 75 %    LYMPHOCYTES 9 (L) 12 - 49 %    MONOCYTES 8 5 - 13 %    EOSINOPHILS 2 0 - 7 %    BASOPHILS 0 0 - 1 %    IMMATURE GRANULOCYTES 0 0.0 - 0.5 %    ABS. NEUTROPHILS 6.8 1.8 - 8.0 K/UL    ABS. LYMPHOCYTES 0.8 0.8 - 3.5 K/UL    ABS. MONOCYTES 0.7 0.0 - 1.0 K/UL    ABS. EOSINOPHILS 0.2 0.0 - 0.4 K/UL    ABS. BASOPHILS 0.0 0.0 - 0.1 K/UL    ABS. IMM. GRANS. 0.0 0.00 - 0.04 K/UL    DF SMEAR SCANNED      RBC COMMENTS NORMOCYTIC, NORMOCHROMIC     METABOLIC PANEL, COMPREHENSIVE   Result Value Ref Range    Sodium 127 (L) 136 - 145 mmol/L    Potassium 4.2 3.5 - 5.1 mmol/L    Chloride 95 (L) 97 - 108 mmol/L    CO2 26 21 - 32 mmol/L    Anion gap 6 5 - 15 mmol/L    Glucose 322 (H) 65 - 100 mg/dL    BUN 34 (H) 6 - 20 MG/DL    Creatinine 3.97 (H) 0.70 - 1.30 MG/DL    BUN/Creatinine ratio 9 (L) 12 - 20      GFR est AA 19 (L) >60 ml/min/1.73m2    GFR est non-AA 15 (L) >60 ml/min/1.73m2    Calcium 8.6 8.5 - 10.1 MG/DL    Bilirubin, total 0.3 0.2 - 1.0 MG/DL    ALT (SGPT) 13 12 - 78 U/L    AST (SGOT) 11 (L) 15 - 37 U/L    Alk.  phosphatase 161 (H) 45 - 117 U/L    Protein, total 8.0 6.4 - 8.2 g/dL    Albumin 2.6 (L) 3.5 - 5.0 g/dL    Globulin 5.4 (H) 2.0 - 4.0 g/dL    A-G Ratio 0.5 (L) 1.1 - 2.2     SAMPLES BEING HELD   Result Value Ref Range    SAMPLES BEING HELD 1SST,1BLUE,1DK GRN     COMMENT        Add-on orders for these samples will be processed based on acceptable specimen integrity and analyte stability, which may vary by analyte. TROPONIN-HIGH SENSITIVITY   Result Value Ref Range    Troponin-High Sensitivity 14 0 - 76 ng/L   TROPONIN-HIGH SENSITIVITY   Result Value Ref Range    Troponin-High Sensitivity 15 0 - 76 ng/L   SED RATE (ESR)   Result Value Ref Range    Sed rate, automated 127 (H) 0 - 20 mm/hr   C REACTIVE PROTEIN, QT   Result Value Ref Range    C-Reactive protein 15.20 (H) 0.00 - 0.60 mg/dL   SAMPLES BEING HELD   Result Value Ref Range    SAMPLES BEING HELD bldcs     COMMENT        Add-on orders for these samples will be processed based on acceptable specimen integrity and analyte stability, which may vary by analyte. CBC W/O DIFF   Result Value Ref Range    WBC 6.4 4.1 - 11.1 K/uL    RBC 2.85 (L) 4.10 - 5.70 M/uL    HGB 8.2 (L) 12.1 - 17.0 g/dL    HCT 25.3 (L) 36.6 - 50.3 %    MCV 88.8 80.0 - 99.0 FL    MCH 28.8 26.0 - 34.0 PG    MCHC 32.4 30.0 - 36.5 g/dL    RDW 14.7 (H) 11.5 - 14.5 %    PLATELET 175 103 - 487 K/uL    MPV 10.7 8.9 - 12.9 FL    NRBC 0.0 0  WBC    ABSOLUTE NRBC 0.00 0.00 - 2.29 K/uL   METABOLIC PANEL, COMPREHENSIVE   Result Value Ref Range    Sodium 136 136 - 145 mmol/L    Potassium 4.2 3.5 - 5.1 mmol/L    Chloride 100 97 - 108 mmol/L    CO2 27 21 - 32 mmol/L    Anion gap 9 5 - 15 mmol/L    Glucose 165 (H) 65 - 100 mg/dL    BUN 44 (H) 6 - 20 MG/DL    Creatinine 4.51 (H) 0.70 - 1.30 MG/DL    BUN/Creatinine ratio 10 (L) 12 - 20      GFR est AA 16 (L) >60 ml/min/1.73m2    GFR est non-AA 13 (L) >60 ml/min/1.73m2    Calcium 8.3 (L) 8.5 - 10.1 MG/DL    Bilirubin, total 0.3 0.2 - 1.0 MG/DL    ALT (SGPT) 13 12 - 78 U/L    AST (SGOT) 19 15 - 37 U/L    Alk.  phosphatase 127 (H) 45 - 117 U/L    Protein, total 6.7 6.4 - 8.2 g/dL    Albumin 2.1 (L) 3.5 - 5.0 g/dL    Globulin 4.6 (H) 2.0 - 4.0 g/dL    A-G Ratio 0.5 (L) 1.1 - 2.2     CBC W/O DIFF   Result Value Ref Range    WBC 8.2 4.1 - 11.1 K/uL    RBC 2.79 (L) 4.10 - 5.70 M/uL    HGB 7.9 (L) 12.1 - 17.0 g/dL    HCT 25.4 (L) 36.6 - 50.3 %    MCV 91.0 80.0 - 99.0 FL    MCH 28.3 26.0 - 34.0 PG    MCHC 31.1 30.0 - 36.5 g/dL    RDW 14.8 (H) 11.5 - 14.5 %    PLATELET 843 323 - 690 K/uL    MPV 11.3 8.9 - 12.9 FL    NRBC 0.0 0  WBC    ABSOLUTE NRBC 0.00 0.00 - 2.90 K/uL   METABOLIC PANEL, COMPREHENSIVE   Result Value Ref Range    Sodium 136 136 - 145 mmol/L    Potassium 3.2 (L) 3.5 - 5.1 mmol/L    Chloride 101 97 - 108 mmol/L    CO2 30 21 - 32 mmol/L    Anion gap 5 5 - 15 mmol/L    Glucose 96 65 - 100 mg/dL    BUN 17 6 - 20 MG/DL    Creatinine 2.47 (H) 0.70 - 1.30 MG/DL    BUN/Creatinine ratio 7 (L) 12 - 20      GFR est AA 32 (L) >60 ml/min/1.73m2    GFR est non-AA 27 (L) >60 ml/min/1.73m2    Calcium 8.4 (L) 8.5 - 10.1 MG/DL    Bilirubin, total 0.5 0.2 - 1.0 MG/DL    ALT (SGPT) 13 12 - 78 U/L    AST (SGOT) 16 15 - 37 U/L    Alk.  phosphatase 153 (H) 45 - 117 U/L    Protein, total 7.0 6.4 - 8.2 g/dL    Albumin 2.2 (L) 3.5 - 5.0 g/dL    Globulin 4.8 (H) 2.0 - 4.0 g/dL    A-G Ratio 0.5 (L) 1.1 - 2.2     CBC W/O DIFF   Result Value Ref Range    WBC 9.0 4.1 - 11.1 K/uL    RBC 2.80 (L) 4.10 - 5.70 M/uL    HGB 7.9 (L) 12.1 - 17.0 g/dL    HCT 25.3 (L) 36.6 - 50.3 %    MCV 90.4 80.0 - 99.0 FL    MCH 28.2 26.0 - 34.0 PG    MCHC 31.2 30.0 - 36.5 g/dL    RDW 14.8 (H) 11.5 - 14.5 %    PLATELET 268 032 - 427 K/uL    MPV 11.0 8.9 - 12.9 FL    NRBC 0.0 0  WBC    ABSOLUTE NRBC 0.00 0.00 - 7.70 K/uL   METABOLIC PANEL, COMPREHENSIVE   Result Value Ref Range    Sodium 133 (L) 136 - 145 mmol/L    Potassium 3.6 3.5 - 5.1 mmol/L    Chloride 97 97 - 108 mmol/L    CO2 25 21 - 32 mmol/L    Anion gap 11 5 - 15 mmol/L    Glucose 286 (H) 65 - 100 mg/dL    BUN 31 (H) 6 - 20 MG/DL    Creatinine 3.62 (H) 0.70 - 1.30 MG/DL    BUN/Creatinine ratio 9 (L) 12 - 20      GFR est AA 21 (L) >60 ml/min/1.73m2    GFR est non-AA 17 (L) >60 ml/min/1.73m2    Calcium 8.4 (L) 8.5 - 10.1 MG/DL    Bilirubin, total 0.4 0.2 - 1.0 MG/DL    ALT (SGPT) 12 12 - 78 U/L    AST (SGOT) 12 (L) 15 - 37 U/L    Alk.  phosphatase 128 (H) 45 - 117 U/L    Protein, total 7.3 6.4 - 8.2 g/dL    Albumin 2.3 (L) 3.5 - 5.0 g/dL    Globulin 5.0 (H) 2.0 - 4.0 g/dL    A-G Ratio 0.5 (L) 1.1 - 2.2     CBC W/O DIFF   Result Value Ref Range    WBC 6.3 4.1 - 11.1 K/uL    RBC 2.72 (L) 4.10 - 5.70 M/uL    HGB 7.6 (L) 12.1 - 17.0 g/dL    HCT 24.7 (L) 36.6 - 50.3 %    MCV 90.8 80.0 - 99.0 FL    MCH 27.9 26.0 - 34.0 PG    MCHC 30.8 30.0 - 36.5 g/dL    RDW 15.1 (H) 11.5 - 14.5 %    PLATELET 546 845 - 242 K/uL    MPV 11.1 8.9 - 12.9 FL    NRBC 0.0 0  WBC    ABSOLUTE NRBC 0.00 0.00 - 0.01 K/uL   VANCOMYCIN, RANDOM   Result Value Ref Range    Vancomycin, random 91.3 UG/ML   METABOLIC PANEL, BASIC   Result Value Ref Range    Sodium 133 (L) 136 - 145 mmol/L    Potassium 3.6 3.5 - 5.1 mmol/L    Chloride 99 97 - 108 mmol/L    CO2 26 21 - 32 mmol/L    Anion gap 8 5 - 15 mmol/L    Glucose 242 (H) 65 - 100 mg/dL    BUN 39 (H) 6 - 20 MG/DL    Creatinine 4.64 (H) 0.70 - 1.30 MG/DL    BUN/Creatinine ratio 8 (L) 12 - 20      GFR est AA 16 (L) >60 ml/min/1.73m2    GFR est non-AA 13 (L) >60 ml/min/1.73m2    Calcium 8.2 (L) 8.5 - 10.1 MG/DL   CBC W/O DIFF   Result Value Ref Range    WBC 5.8 4.1 - 11.1 K/uL    RBC 2.78 (L) 4.10 - 5.70 M/uL    HGB 7.8 (L) 12.1 - 17.0 g/dL    HCT 25.1 (L) 36.6 - 50.3 %    MCV 90.3 80.0 - 99.0 FL    MCH 28.1 26.0 - 34.0 PG    MCHC 31.1 30.0 - 36.5 g/dL    RDW 14.7 (H) 11.5 - 14.5 %    PLATELET 434 087 - 040 K/uL    MPV 10.8 8.9 - 12.9 FL    NRBC 0.0 0  WBC    ABSOLUTE NRBC 0.00 0.00 - 7.39 K/uL   METABOLIC PANEL, BASIC   Result Value Ref Range    Sodium 136 136 - 145 mmol/L    Potassium 3.4 (L) 3.5 - 5.1 mmol/L    Chloride 102 97 - 108 mmol/L    CO2 29 21 - 32 mmol/L    Anion gap 5 5 - 15 mmol/L    Glucose 162 (H) 65 - 100 mg/dL    BUN 26 (H) 6 - 20 MG/DL Creatinine 2.88 (H) 0.70 - 1.30 MG/DL    BUN/Creatinine ratio 9 (L) 12 - 20      GFR est AA 27 (L) >60 ml/min/1.73m2    GFR est non-AA 22 (L) >60 ml/min/1.73m2    Calcium 8.0 (L) 8.5 - 10.1 MG/DL   CBC W/O DIFF   Result Value Ref Range    WBC 6.7 4.1 - 11.1 K/uL    RBC 2.87 (L) 4.10 - 5.70 M/uL    HGB 8.1 (L) 12.1 - 17.0 g/dL    HCT 25.5 (L) 36.6 - 50.3 %    MCV 88.9 80.0 - 99.0 FL    MCH 28.2 26.0 - 34.0 PG    MCHC 31.8 30.0 - 36.5 g/dL    RDW 15.2 (H) 11.5 - 14.5 %    PLATELET 971 293 - 712 K/uL    MPV 10.2 8.9 - 12.9 FL    NRBC 0.0 0  WBC    ABSOLUTE NRBC 0.00 0.00 - 5.10 K/uL   METABOLIC PANEL, BASIC   Result Value Ref Range    Sodium 134 (L) 136 - 145 mmol/L    Potassium 3.7 3.5 - 5.1 mmol/L    Chloride 102 97 - 108 mmol/L    CO2 25 21 - 32 mmol/L    Anion gap 7 5 - 15 mmol/L    Glucose 198 (H) 65 - 100 mg/dL    BUN 42 (H) 6 - 20 MG/DL    Creatinine 3.72 (H) 0.70 - 1.30 MG/DL    BUN/Creatinine ratio 11 (L) 12 - 20      GFR est AA 20 (L) >60 ml/min/1.73m2    GFR est non-AA 17 (L) >60 ml/min/1.73m2    Calcium 8.2 (L) 8.5 - 10.1 MG/DL   CBC W/O DIFF   Result Value Ref Range    WBC 6.0 4.1 - 11.1 K/uL    RBC 3.08 (L) 4.10 - 5.70 M/uL    HGB 8.7 (L) 12.1 - 17.0 g/dL    HCT 27.6 (L) 36.6 - 50.3 %    MCV 89.6 80.0 - 99.0 FL    MCH 28.2 26.0 - 34.0 PG    MCHC 31.5 30.0 - 36.5 g/dL    RDW 15.5 (H) 11.5 - 14.5 %    PLATELET 890 122 - 762 K/uL    MPV 10.5 8.9 - 12.9 FL    NRBC 0.0 0  WBC    ABSOLUTE NRBC 0.00 0.00 - 4.24 K/uL   METABOLIC PANEL, BASIC   Result Value Ref Range    Sodium 136 136 - 145 mmol/L    Potassium 4.1 3.5 - 5.1 mmol/L    Chloride 103 97 - 108 mmol/L    CO2 29 21 - 32 mmol/L    Anion gap 4 (L) 5 - 15 mmol/L    Glucose 102 (H) 65 - 100 mg/dL    BUN 21 (H) 6 - 20 MG/DL    Creatinine 2.27 (H) 0.70 - 1.30 MG/DL    BUN/Creatinine ratio 9 (L) 12 - 20      GFR est AA 36 (L) >60 ml/min/1.73m2    GFR est non-AA 29 (L) >60 ml/min/1.73m2    Calcium 8.8 8.5 - 10.1 MG/DL   C REACTIVE PROTEIN, QT Result Value Ref Range    C-Reactive protein 2.57 (H) 0.00 - 0.60 mg/dL   CBC W/O DIFF   Result Value Ref Range    WBC 5.3 4.1 - 11.1 K/uL    RBC 3.00 (L) 4.10 - 5.70 M/uL    HGB 8.4 (L) 12.1 - 17.0 g/dL    HCT 27.1 (L) 36.6 - 50.3 %    MCV 90.3 80.0 - 99.0 FL    MCH 28.0 26.0 - 34.0 PG    MCHC 31.0 30.0 - 36.5 g/dL    RDW 15.9 (H) 11.5 - 14.5 %    PLATELET 942 649 - 028 K/uL    MPV 10.3 8.9 - 12.9 FL    NRBC 0.0 0  WBC    ABSOLUTE NRBC 0.00 0.00 - 0.01 K/uL   VANCOMYCIN, RANDOM   Result Value Ref Range    Vancomycin, random 70.1 UG/ML   METABOLIC PANEL, BASIC   Result Value Ref Range    Sodium 135 (L) 136 - 145 mmol/L    Potassium 4.0 3.5 - 5.1 mmol/L    Chloride 102 97 - 108 mmol/L    CO2 26 21 - 32 mmol/L    Anion gap 7 5 - 15 mmol/L    Glucose 97 65 - 100 mg/dL    BUN 31 (H) 6 - 20 MG/DL    Creatinine 2.82 (H) 0.70 - 1.30 MG/DL    BUN/Creatinine ratio 11 (L) 12 - 20      GFR est AA 28 (L) >60 ml/min/1.73m2    GFR est non-AA 23 (L) >60 ml/min/1.73m2    Calcium 9.0 8.5 - 10.1 MG/DL   GLUCOSE, POC   Result Value Ref Range    Glucose (POC) 403 (H) 65 - 117 mg/dL    Performed by Conner Spencer    GLUCOSE, POC   Result Value Ref Range    Glucose (POC) 250 (H) 65 - 117 mg/dL    Performed by Chrystal POC   Result Value Ref Range    Glucose (POC) 138 (H) 65 - 117 mg/dL    Performed by Osei Linton    GLUCOSE, POC   Result Value Ref Range    Glucose (POC) 137 (H) 65 - 117 mg/dL    Performed by Justin Wilson Grisel (Tech)    GLUCOSE, POC   Result Value Ref Range    Glucose (POC) 105 65 - 117 mg/dL    Performed by Sandi Garland    GLUCOSE, POC   Result Value Ref Range    Glucose (POC) 96 65 - 117 mg/dL    Performed by Goldy Cutter ([facility])    GLUCOSE, POC   Result Value Ref Range    Glucose (POC) 87 65 - 117 mg/dL    Performed by Goldy Cutter ([facility])    GLUCOSE, POC   Result Value Ref Range    Glucose (POC) 84 65 - 117 mg/dL    Performed by Ramana Hermosillo    GLUCOSE, POC   Result Value Ref Range    Glucose (POC) 97 65 - 117 mg/dL    Performed by Javan Dempsey    GLUCOSE, POC   Result Value Ref Range    Glucose (POC) 210 (H) 65 - 117 mg/dL    Performed by [address] [place] [address], POC   Result Value Ref Range    Glucose (POC) 265 (H) 65 - 117 mg/dL    Performed by Yaima 2, POC   Result Value Ref Range    Glucose (POC) 329 (H) 65 - 117 mg/dL    Performed by Gay Notice    GLUCOSE, POC   Result Value Ref Range    Glucose (POC) 283 (H) 65 - 117 mg/dL    Performed by [address] [place] [address], POC   Result Value Ref Range    Glucose (POC) 210 (H) 65 - 117 mg/dL    Performed by [address] [place] [address], POC   Result Value Ref Range    Glucose (POC) 225 (H) 65 - 117 mg/dL    Performed by Dipak Hammond (CON)    GLUCOSE, POC   Result Value Ref Range    Glucose (POC) 150 (H) 65 - 117 mg/dL    Performed by Lupillo Cervantes PCT    GLUCOSE, POC   Result Value Ref Range    Glucose (POC) 142 (H) 65 - 117 mg/dL    Performed by [address] [place] [address],8W, POC   Result Value Ref Range    Glucose (POC) 171 (H) 65 - 117 mg/dL    Performed by [address] [place] [address],8W, POC   Result Value Ref Range    Glucose (POC) 154 (H) 65 - 117 mg/dL    Performed by Gay [facility]    GLUCOSE, POC   Result Value Ref Range    Glucose (POC) 123 (H) 65 - 117 mg/dL    Performed by Rosmery Arias    GLUCOSE, POC   Result Value Ref Range    Glucose (POC) 153 (H) 65 - 117 mg/dL    Performed by Cydney ALLEN (PCT)    GLUCOSE, POC   Result Value Ref Range    Glucose (POC) 180 (H) 65 - 117 mg/dL    Performed by Cydney ALLEN (PCT)    GLUCOSE, POC   Result Value Ref Range    Glucose (POC) 273 (H) 65 - 117 mg/dL    Performed by Kyler Sanders    GLUCOSE, POC   Result Value Ref Range    Glucose (POC) 159 (H) 65 - 117 mg/dL    Performed by Kyelr Sanders    GLUCOSE, POC   Result Value Ref Range    Glucose (POC) 163 (H) 65 - 117 mg/dL    Performed by Alicia LACEY PCT    GLUCOSE, POC   Result Value Ref Range    Glucose (POC) 149 (H) 65 - 117 mg/dL Performed by [facility]    GLUCOSE, POC   Result Value Ref Range    Glucose (POC) 129 (H) 65 - 117 mg/dL    Performed by Celena Ritter    GLUCOSE, POC   Result Value Ref Range    Glucose (POC) 122 (H) 65 - 117 mg/dL    Performed by Celena Ritter    GLUCOSE, POC   Result Value Ref Range    Glucose (POC) 93 65 - 117 mg/dL    Performed by Celena Ritter    GLUCOSE, POC   Result Value Ref Range    Glucose (POC) 112 65 - 117 mg/dL    Performed by Monserrat Berumen. (CON)    GLUCOSE, POC   Result Value Ref Range    Glucose (POC) 113 65 - 117 mg/dL    Performed by Monserrat Berumen. (CON)    GLUCOSE, POC   Result Value Ref Range    Glucose (POC) 153 (H) 65 - 117 mg/dL    Performed by Celena Ritter    GLUCOSE, POC   Result Value Ref Range    Glucose (POC) 115 65 - 117 mg/dL    Performed by Melani Gunter    GLUCOSE, POC   Result Value Ref Range    Glucose (POC) 87 65 - 117 mg/dL    Performed by Kristine [facility]    EKG, 12 LEAD, INITIAL   Result Value Ref Range    Ventricular Rate 64 BPM    Atrial Rate 64 BPM    P-R Interval 158 ms    QRS Duration 94 ms    Q-T Interval 446 ms    QTC Calculation (Bezet) 460 ms    Calculated P Axis 61 degrees    Calculated R Axis 36 degrees    Calculated T Axis 43 degrees    Diagnosis       Normal sinus rhythm  Normal ECG  When compared with ECG of 2022 13:41,  T wave inversion no longer evident in Inferior leads  T wave inversion no longer evident in Lateral leads  Confirmed by Suzy Granado MD, [address] ([id number]) on 2022 9:03:34 AM         Transitional Care Management Progress Note    Patient: Jenni Carcamo  : 1960  PCP: Kari Damon MD    Date of admission:   Date of discharge:     Patient was contacted by Transitional Care Management services within two days after his discharge: No. This encounter and supporting documentation was reviewed if available. Medication reconciliation was performed today (2023).      Assessment/Plan:   The complexity of medical decision making for this patient's transitional care is high        Subjective:   Arline Bridges is a 58 y.o. male presenting today for follow-up after being discharged from [facility].  The discharge summary was reviewed or requested. The main problem requiring admission was fluid on lung. Complications during admission: chest tube drainage    Interval history/Current status: better    Admitting symptoms have: improved      Medications marked \"taking\" at this time:  Home Medications    Medication Sig Start Date End Date Taking? Authorizing Provider   amLODIPine (NORVASC) 10 mg tablet Take 0.5 tab in AM and 0.5 tab in PM 1/24/23  Yes Ru Saavedra NP   metoprolol succinate (TOPROL-XL) 100 mg tablet Take 1 Tablet by mouth daily. 1/24/23  Yes Ru Saavedra NP   losartan (COZAAR) 25 mg tablet Take 1 Tablet by mouth daily. Indications: kidney disease from diabetes, high blood pressure 1/24/23  Yes Ru Saavedra NP   insulin glargine (LANTUS) 100 unit/mL injection 25 Units by SubCUTAneous route nightly. Yes Provider, Historical   diclofenac (VOLTAREN) 1 % gel Apply 4 g to affected area four (4) times daily. 8/16/22  Yes Ru Saavedra NP   acetaminophen (TYLENOL) 500 mg tablet Take 500 mg by mouth every six (6) hours as needed for Pain or Fever. mild pain 8/15/22  Yes Provider, Historical   tamsulosin (FLOMAX) 0.4 mg capsule Take 1 Capsule by mouth nightly. 8/2/22  Yes Ru Saavedra NP   hydrALAZINE (APRESOLINE) 100 mg tablet Take 100 mg by mouth three (3) times daily. Yes Provider, Historical   lactobacillus combination no.4 (Probiotic) 3 billion cell cap Take 1 Capsule by mouth daily. 7/29/22  Yes Emeterio Calle MD   naloxone (Narcan) 4 mg/actuation nasal spray Use 1 spray intranasally, then discard. Repeat with new spray every 2 min as needed for opioid overdose symptoms, alternating nostrils.  7/29/22  Yes Emeterio Calle MD   sevelamer (RENAGEL) 800 mg tablet Take 800 mg by mouth three (3) times daily (with meals). Yes Provider, Historical   pantoprazole (PROTONIX) 40 mg tablet Take 1 Tablet by mouth daily. 6/21/22  Yes Hughie Snellen, MD   sucralfate (CARAFATE) 1 gram tablet Take 1 Tablet by mouth three (3) times daily. 6/21/22  Yes Hughie Snellen, MD   isosorbide mononitrate ER (IMDUR) 60 mg CR tablet Take 1 Tablet by mouth daily. 6/21/22  Yes Hughie Snellen, MD   furosemide (LASIX) 80 mg tablet Take 80 mg by mouth daily. Yes Provider, Historical   Insulin Needles, Disposable, 31 gauge x 5/16\" ndle Use as directed with insulin DM2 4/6/22  Yes Tierney Mireles MD   aspirin delayed-release 81 mg tablet Take 81 mg by mouth daily. Yes Provider, Historical   gabapentin (NEURONTIN) 300 mg capsule Take 300 mg by mouth daily. Take after dialysis on dialysis days. Patient taking this TID. Yes Provider, Historical   insulin aspart U-100 (NOVOLOG) 100 unit/mL (3 mL) inpn by SubCUTAneous route Before breakfast, lunch, and dinner. -180    2 units  -220    4 units   -260    6 units  -300    8 units  -350    10 units   Yes Provider, Historical   senna (SENOKOT) 8.6 mg tablet Take 2 Tablets by mouth nightly. Yes Provider, Historical   polyethylene glycol (MIRALAX) 17 gram/dose powder Take 17 g by mouth daily as needed for Constipation. Yes Provider, Historical   busPIRone (BUSPAR) 10 mg tablet Take 10 mg by mouth two (2) times a day. Yes Provider, Historical   docusate sodium (COLACE) 100 mg capsule Take 1 Capsule by mouth two (2) times a day. 10/20/21  Yes Provider, Historical   rosuvastatin (CRESTOR) 10 mg tablet Take 10 mg by mouth Every morning. Yes Provider, Historical   losartan (COZAAR) 25 mg tablet  1/20/23 1/24/23  Provider, Historical   metoprolol succinate (TOPROL-XL) 100 mg tablet Take 100 mg by mouth daily.  12/31/22 1/24/23  Provider, Historical   amLODIPine (NORVASC) 10 mg tablet Take 0.5 tab in AM and 0.5 tab in PM  Patient taking differently: Take 5 mg by mouth two (2) times a day. Take 0.5 tab in AM and 0.5 tab in PM 6/21/22 1/24/23  Ale Singletary MD   carvediloL (COREG) 12.5 mg tablet Take 12.5 mg by mouth two (2) times daily (with meals). Hold if top number below 100  Patient not taking: Reported on 1/24/2023 1/24/23  Provider, Historical   ferrous sulfate 325 mg (65 mg iron) tablet Take 1 Tablet by mouth daily.  4/14/21 1/24/23  Provider, Historical        Review of Systems:  History obtained from the patient and d/c summary       Patient Active Problem List   Diagnosis Code    Peripheral neuropathy G62.9    Type 2 diabetes mellitus with ophthalmic complication, with long-term current use of insulin (HCC) E11.39, Z79.4    Type 2 diabetes mellitus with diabetic neuropathy (HCC) E11.40    ESRD (end stage renal disease) on dialysis ([facility]) N18.6, Z99.2    Thrombocytopenia (HCC) D69.6    Cirrhosis ([facility]) K74.60    Anxiety and depression F41.9, F32.A    CHF NYHA class I, chronic, diastolic ([facility]) Y54.01    DM type 2 causing renal disease (HCC) E11.29    BPH (benign prostatic hyperplasia) N40.0    CAD (coronary artery disease) I25.10    DM type 2 causing vascular disease (HCC) E11.59    GERD (gastroesophageal reflux disease) K21.9    HTN (hypertension) I10    Hyperlipidemia E78.5    Anemia associated with chronic renal failure N18.9, D63.1    Chronic pain G89.29    Pleural effusion J90    Open wound of right heel S91.301A    Calf swelling M79.89    Chest pain R07.9    Osteomyelitis of ankle or foot, acute, right ([address] M86.171    Constipation K59.00     Patient Active Problem List    Diagnosis Date Noted    Osteomyelitis of ankle or foot, acute, right ([address] 07/25/2022    Constipation 07/25/2022    Open wound of right heel 07/16/2022    Calf swelling 07/16/2022    Chest pain 07/16/2022    Pleural effusion 04/14/2022    Anxiety and depression     CHF NYHA class I, chronic, diastolic (HCC)     DM type 2 causing renal disease ([address]     BPH (benign prostatic hyperplasia)     CAD (coronary artery disease)     DM type 2 causing vascular disease (HCC)     GERD (gastroesophageal reflux disease)     HTN (hypertension)     Hyperlipidemia     Anemia associated with chronic renal failure     Chronic pain     Cirrhosis ([facility] 04/01/2022    ESRD (end stage renal disease) on dialysis ([facility] 02/06/2022    Thrombocytopenia ([facility] 02/06/2022    Type 2 diabetes mellitus with diabetic neuropathy ([facility] 08/05/2019    Type 2 diabetes mellitus with ophthalmic complication, with long-term current use of insulin ([facility] 11/25/2018    Peripheral neuropathy 11/14/2018     Current Outpatient Medications   Medication Sig Dispense Refill    amLODIPine (NORVASC) 10 mg tablet Take 0.5 tab in AM and 0.5 tab in PM 90 Tablet 0    metoprolol succinate (TOPROL-XL) 100 mg tablet Take 1 Tablet by mouth daily. 90 Tablet 1    losartan (COZAAR) 25 mg tablet Take 1 Tablet by mouth daily. Indications: kidney disease from diabetes, high blood pressure 90 Tablet 1    insulin glargine (LANTUS) 100 unit/mL injection 25 Units by SubCUTAneous route nightly. diclofenac (VOLTAREN) 1 % gel Apply 4 g to affected area four (4) times daily. 1 Each 3    acetaminophen (TYLENOL) 500 mg tablet Take 500 mg by mouth every six (6) hours as needed for Pain or Fever. mild pain      tamsulosin (FLOMAX) 0.4 mg capsule Take 1 Capsule by mouth nightly. 90 Capsule 0    hydrALAZINE (APRESOLINE) 100 mg tablet Take 100 mg by mouth three (3) times daily. lactobacillus combination no.4 (Probiotic) 3 billion cell cap Take 1 Capsule by mouth daily. 40 Capsule 0    naloxone (Narcan) 4 mg/actuation nasal spray Use 1 spray intranasally, then discard. Repeat with new spray every 2 min as needed for opioid overdose symptoms, alternating nostrils. 1 Each 0    sevelamer (RENAGEL) 800 mg tablet Take 800 mg by mouth three (3) times daily (with meals). pantoprazole (PROTONIX) 40 mg tablet Take 1 Tablet by mouth daily.  [address] Tablet 0    sucralfate (CARAFATE) 1 gram tablet Take 1 Tablet by mouth three (3) times daily. 90 Tablet 1    isosorbide mononitrate ER (IMDUR) 60 mg CR tablet Take 1 Tablet by mouth daily. 30 Tablet 0    furosemide (LASIX) 80 mg tablet Take 80 mg by mouth daily. Insulin Needles, Disposable, 31 gauge x 5/16\" ndle Use as directed with insulin DM2 100 Each 11    aspirin delayed-release 81 mg tablet Take 81 mg by mouth daily. gabapentin (NEURONTIN) 300 mg capsule Take 300 mg by mouth daily. Take after dialysis on dialysis days. Patient taking this TID. insulin aspart U-100 (NOVOLOG) 100 unit/mL (3 mL) inpn by SubCUTAneous route Before breakfast, lunch, and dinner. -180    2 units  -220    4 units   -260    6 units  -300    8 units  -350    10 units      senna (SENOKOT) 8.6 mg tablet Take 2 Tablets by mouth nightly. polyethylene glycol (MIRALAX) 17 gram/dose powder Take 17 g by mouth daily as needed for Constipation. busPIRone (BUSPAR) 10 mg tablet Take 10 mg by mouth two (2) times a day. docusate sodium (COLACE) 100 mg capsule Take 1 Capsule by mouth two (2) times a day. rosuvastatin (CRESTOR) 10 mg tablet Take 10 mg by mouth Every morning.        No Known Allergies  Past Medical History:   Diagnosis Date    Anemia associated with chronic renal failure     Anxiety and depression     BPH (benign prostatic hyperplasia)     CAD (coronary artery disease)     CHF NYHA class I, chronic, diastolic (HCC)     Chronic pain     DM type 2 causing renal disease ([facility]     DM type 2 causing vascular disease ([facility]     ESRD on dialysis ([facility]     GERD (gastroesophageal reflux disease)     HTN (hypertension)     Hyperlipidemia     Thrombocytopenia (HCC)           Objective:   BP (!) 196/73 (BP 1 Location: Right upper arm, BP Patient Position: Sitting, BP Cuff Size: Adult)   Pulse 68   Temp 97.4 °F (36.3 °C) (Skin)   Resp 16   Ht 5' 8\" (1.727 m)   Wt 152 lb (68.9 kg) SpO2 99%   BMI 23.11 kg/m²      Physical Examination: General appearance - alert, well appearing, and in no distress, oriented to person, place, and time, and improved  Mental status - alert, oriented to person, place, and time, normal mood, behavior, speech, dress, motor activity, and thought processes  Chest - clear to auscultation, no wheezes, rales or rhonchi, symmetric air entry      We discussed the expected course, resolution and complications of the diagnosis(es) in detail. Medication risks, benefits, costs, interactions, and alternatives were discussed as indicated. I advised him to contact the office if his condition worsens, changes or fails to improve as anticipated. He expressed understanding with the diagnosis(es) and plan.      Keisha Spivey NP

## 2023-01-24 NOTE — PATIENT INSTRUCTIONS
Please see endocrinology as previously referred  Follow up with pulmonolgy  Take medications as prescribed  Follow up with cardiology  Return in 3 months to repeat labs

## 2023-02-27 ENCOUNTER — OFFICE VISIT (OUTPATIENT)
Dept: FAMILY MEDICINE CLINIC | Age: 63
End: 2023-02-27
Payer: MEDICAID

## 2023-02-27 VITALS
HEIGHT: 68 IN | TEMPERATURE: 98.6 F | SYSTOLIC BLOOD PRESSURE: 153 MMHG | OXYGEN SATURATION: 99 % | WEIGHT: 166.6 LBS | BODY MASS INDEX: 25.25 KG/M2 | DIASTOLIC BLOOD PRESSURE: 69 MMHG | RESPIRATION RATE: 18 BRPM | HEART RATE: 65 BPM

## 2023-02-27 DIAGNOSIS — G47.00 INSOMNIA, UNSPECIFIED TYPE: ICD-10-CM

## 2023-02-27 DIAGNOSIS — G62.9 NEUROPATHY: ICD-10-CM

## 2023-02-27 DIAGNOSIS — Z79.4 TYPE 2 DIABETES MELLITUS WITH CHRONIC KIDNEY DISEASE ON CHRONIC DIALYSIS, WITH LONG-TERM CURRENT USE OF INSULIN (HCC): Primary | ICD-10-CM

## 2023-02-27 DIAGNOSIS — Z99.2 TYPE 2 DIABETES MELLITUS WITH CHRONIC KIDNEY DISEASE ON CHRONIC DIALYSIS, WITH LONG-TERM CURRENT USE OF INSULIN (HCC): Primary | ICD-10-CM

## 2023-02-27 DIAGNOSIS — F32.A DEPRESSION, UNSPECIFIED DEPRESSION TYPE: ICD-10-CM

## 2023-02-27 DIAGNOSIS — N18.6 TYPE 2 DIABETES MELLITUS WITH CHRONIC KIDNEY DISEASE ON CHRONIC DIALYSIS, WITH LONG-TERM CURRENT USE OF INSULIN (HCC): Primary | ICD-10-CM

## 2023-02-27 DIAGNOSIS — E11.22 TYPE 2 DIABETES MELLITUS WITH CHRONIC KIDNEY DISEASE ON CHRONIC DIALYSIS, WITH LONG-TERM CURRENT USE OF INSULIN (HCC): Primary | ICD-10-CM

## 2023-02-27 DIAGNOSIS — K70.31 ALCOHOLIC CIRRHOSIS OF LIVER WITH ASCITES (HCC): ICD-10-CM

## 2023-02-27 PROCEDURE — 99214 OFFICE O/P EST MOD 30 MIN: CPT | Performed by: NURSE PRACTITIONER

## 2023-02-27 PROCEDURE — 3077F SYST BP >= 140 MM HG: CPT | Performed by: NURSE PRACTITIONER

## 2023-02-27 PROCEDURE — 3078F DIAST BP <80 MM HG: CPT | Performed by: NURSE PRACTITIONER

## 2023-02-27 RX ORDER — TRAZODONE HYDROCHLORIDE 50 MG/1
50 TABLET ORAL
Qty: 30 TABLET | Refills: 1 | Status: SHIPPED | OUTPATIENT
Start: 2023-02-27

## 2023-02-27 RX ORDER — SERTRALINE HYDROCHLORIDE 50 MG/1
50 TABLET, FILM COATED ORAL DAILY
Qty: 30 TABLET | Refills: 2 | Status: SHIPPED | OUTPATIENT
Start: 2023-02-27

## 2023-02-27 RX ORDER — GABAPENTIN 300 MG/1
300 CAPSULE ORAL DAILY
Qty: 30 CAPSULE | Refills: 2 | Status: SHIPPED | OUTPATIENT
Start: 2023-02-27

## 2023-02-27 NOTE — PROGRESS NOTES
Assessment/Plan:     Diagnoses and all orders for this visit:    1. Type 2 diabetes mellitus with chronic kidney disease on chronic dialysis, with long-term current use of insulin ([address]  History of above reports taking medication as prescribed is continuing with renal dialysis. 2. Alcoholic cirrhosis of liver with ascites (HCC)  Previous history of alcoholism is not drinking at this time but feels due to depression he would like to start back. We discussed  3. Depression, unspecified depression type  -     sertraline (ZOLOFT) 50 mg tablet; Take 1 Tablet by mouth daily. Indications: major depressive disorder  -     REFERRAL TO PSYCHIATRY  Reports increased depression due to health condition and increased stress. Medications were reviewed we will begin Zoloft daily. Will refer to psychiatry. Information about local mental health services provided. Patient agrees to follow-up in 1 month. Denies any thought of hurting himself but does say he feels like alcohol would make stress less in his life. 4. Neuropathy  -     gabapentin (NEURONTIN) 300 mg capsule; Take 1 Capsule by mouth daily. Max Daily Amount: 300 mg. Take after dialysis on dialysis days. Patient taking this TID. Indications: neuropathic pain  Continue neuropathic pain. Uncontrolled diabetes previously this medication had previously been prescribed by another provider we will continue today  5. Insomnia, unspecified type  -     traZODone (DESYREL) 50 mg tablet; Take 1 Tablet by mouth nightly. Indications: insomnia associated with depression  Insomnia due to increased stress. Reports he wakes up and then thinks about things in his life that are causing anxiety. He is unable to get back to sleep. He has tried melatonin with no relief we will try trazodone as needed. Follow-up and Dispositions    Return in about 4 weeks (around 3/27/2023) for Follow up.          Discussed expected course/resolution/complications of diagnosis in detail with patient. Medication risks/benefits/costs/interactions/alternatives discussed with patient. Pt was given after visit summary which includes diagnoses, current medications & vitals. Pt expressed understanding with the diagnosis and plan        Subjective:      Pato Ragland is a 58 y.o. male who presents for had concerns including Diabetes (1 month follow), Sleep Problem (Patient states difficulty falling asleep and staying asleep. Patient states issue has gotten worse in the last few months.), and Medication Refill (Gabapentin). Current Outpatient Medications   Medication Sig Dispense Refill    sertraline (ZOLOFT) 50 mg tablet Take 1 Tablet by mouth daily. Indications: major depressive disorder 30 Tablet 2    traZODone (DESYREL) 50 mg tablet Take 1 Tablet by mouth nightly. Indications: insomnia associated with depression 30 Tablet 1    gabapentin (NEURONTIN) 300 mg capsule Take 1 Capsule by mouth daily. Max Daily Amount: 300 mg. Take after dialysis on dialysis days. Patient taking this TID. Indications: neuropathic pain 30 Capsule 2    amLODIPine (NORVASC) 10 mg tablet Take 0.5 tab in AM and 0.5 tab in PM 90 Tablet 0    metoprolol succinate (TOPROL-XL) 100 mg tablet Take 1 Tablet by mouth daily. 90 Tablet 1    losartan (COZAAR) 25 mg tablet Take 1 Tablet by mouth daily. Indications: kidney disease from diabetes, high blood pressure 90 Tablet 1    insulin glargine (LANTUS) 100 unit/mL injection 25 Units by SubCUTAneous route nightly.  acetaminophen (TYLENOL) 500 mg tablet Take 500 mg by mouth every six (6) hours as needed for Pain or Fever. mild pain      hydrALAZINE (APRESOLINE) 100 mg tablet Take 100 mg by mouth three (3) times daily.  lactobacillus combination no.4 (Probiotic) 3 billion cell cap Take 1 Capsule by mouth daily. 40 Capsule 0    naloxone (Narcan) 4 mg/actuation nasal spray Use 1 spray intranasally, then discard.  Repeat with new spray every 2 min as needed for opioid overdose symptoms, alternating nostrils. 1 Each 0    sevelamer (RENAGEL) 800 mg tablet Take 800 mg by mouth three (3) times daily (with meals).  pantoprazole (PROTONIX) 40 mg tablet Take 1 Tablet by mouth daily. 90 Tablet 0    sucralfate (CARAFATE) 1 gram tablet Take 1 Tablet by mouth three (3) times daily. 90 Tablet 1    isosorbide mononitrate ER (IMDUR) 60 mg CR tablet Take 1 Tablet by mouth daily. 30 Tablet 0    furosemide (LASIX) 80 mg tablet Take 80 mg by mouth daily.  Insulin Needles, Disposable, 31 gauge x 5/16\" ndle Use as directed with insulin DM2 100 Each 11    aspirin delayed-release 81 mg tablet Take 81 mg by mouth daily.  insulin aspart U-100 (NOVOLOG) 100 unit/mL (3 mL) inpn by SubCUTAneous route Before breakfast, lunch, and dinner. -180    2 units  -220    4 units   -260    6 units  -300    8 units  -350    10 units      polyethylene glycol (MIRALAX) 17 gram/dose powder Take 17 g by mouth daily as needed for Constipation.  rosuvastatin (CRESTOR) 10 mg tablet Take 10 mg by mouth Every morning.  diclofenac (VOLTAREN) 1 % gel Apply 4 g to affected area four (4) times daily. (Patient not taking: Reported on 2/27/2023) 1 Each 3    tamsulosin (FLOMAX) 0.4 mg capsule Take 1 Capsule by mouth nightly. (Patient not taking: Reported on 2/27/2023) 90 Capsule 0    senna (SENOKOT) 8.6 mg tablet Take 2 Tablets by mouth nightly. (Patient not taking: Reported on 2/27/2023)      docusate sodium (COLACE) 100 mg capsule Take 1 Capsule by mouth two (2) times a day.  (Patient not taking: Reported on 2/27/2023)         No Known Allergies  Past Medical History:   Diagnosis Date    Anemia associated with chronic renal failure     Anxiety and depression     BPH (benign prostatic hyperplasia)     CAD (coronary artery disease)     CHF NYHA class I, chronic, diastolic (HCC)     Chronic pain     DM type 2 causing renal disease ([address]     DM type 2 causing vascular disease ([facility]     ESRD on dialysis ([facility]     GERD (gastroesophageal reflux disease)     HTN (hypertension)     Hyperlipidemia     Thrombocytopenia (HCC)      Past Surgical History:   Procedure Laterality Date    HX ARTERIAL BYPASS      HX OTHER SURGICAL      5th toe Metatarsal amputation 2017     IR INSERT NON TUNL CVC OVER 5 YRS  2021    IR INSERT TUNL CVC W/O PORT OVER 5 YR  2021     Family History   Problem Relation Age of Onset    Diabetes Mother     No Known Problems Father      Social History     Socioeconomic History    Marital status:      Spouse name: Not on file    Number of children: Not on file    Years of education: Not on file    Highest education level: Not on file   Occupational History    Not on file   Tobacco Use    Smoking status: Former     Types: Cigarettes     Quit date: 2001     Years since quittin.7    Smokeless tobacco: Never   Vaping Use    Vaping Use: Never used   Substance and Sexual Activity    Alcohol use: No    Drug use: No    Sexual activity: Yes     Partners: Female     Birth control/protection: Condom   Other Topics Concern    Not on file   Social History Narrative    Not on file     Social Determinants of Health     Financial Resource Strain: Not on file   Food Insecurity: Not on file   Transportation Needs: Not on file   Physical Activity: Not on file   Stress: Not on file   Social Connections: Not on file   Intimate Partner Violence: Not on file   Housing Stability: Not on file       HPI      ROS:   Review of Systems   Constitutional:  Negative for fever. HENT:  Negative for congestion, sinus pain and sore throat. Eyes: Negative. Respiratory:  Negative for cough and shortness of breath. Cardiovascular:  Negative for chest pain. Gastrointestinal:  Negative for diarrhea, nausea and vomiting. Genitourinary:  Negative for dysuria. Musculoskeletal: Negative. Skin: Negative.     Neurological:  Negative for dizziness and headaches. Endo/Heme/Allergies:  Negative for environmental allergies. Psychiatric/Behavioral:  Positive for depression and memory loss. Negative for suicidal ideas. The patient is nervous/anxious. Objective:   Visit Vitals  BP (!) 153/69 (BP 1 Location: Right upper arm, BP Patient Position: Sitting, BP Cuff Size: Adult)   Pulse 65   Temp 98.6 °F (37 °C) (Temporal)   Resp 18   Ht 5' 8\" (1.727 m)   Wt 166 lb 9.6 oz (75.6 kg)   SpO2 99%   BMI 25.33 kg/m²         Vitals and Nurse Documentation reviewed. Physical Exam  Vitals and nursing note reviewed. Constitutional:       Appearance: He is normal weight. HENT:      Head: Normocephalic. Nose: Nose normal.      Mouth/Throat:      Mouth: Mucous membranes are moist.   Eyes:      Pupils: Pupils are equal, round, and reactive to light. Cardiovascular:      Rate and Rhythm: Normal rate and regular rhythm. Pulses: Normal pulses. Heart sounds: Normal heart sounds. Pulmonary:      Effort: Pulmonary effort is normal.      Breath sounds: Normal breath sounds. Abdominal:      General: Abdomen is flat. Musculoskeletal:         General: Normal range of motion. Cervical back: Normal range of motion. Skin:     General: Skin is warm. Capillary Refill: Capillary refill takes less than 2 seconds. Neurological:      General: No focal deficit present. Mental Status: He is alert and oriented to person, place, and time.    Psychiatric:         Mood and Affect: Mood normal.         Behavior: Behavior normal.     Results for orders placed or performed during the hospital encounter of 07/21/22   CULTURE, BLOOD, PAIRED    Specimen: Blood   Result Value Ref Range    Special Requests: NO SPECIAL REQUESTS      Culture result: NO GROWTH 5 DAYS     CULTURE, WOUND W GRAM STAIN    Specimen: Heel   Result Value Ref Range    Special Requests: RIGHT HEEL     GRAM STAIN FEW WBCS SEEN      GRAM STAIN FEW GRAM POSITIVE COCCI IN CLUSTERS GRAM STAIN 2+ GRAM NEGATIVE RODS      Culture result: LIGHT CITROBACTER FREUNDII (A)      Culture result: (A)       FEW * Methicillin Resistant Staphylococcus aureus *    Culture result: FEW PSEUDOMONAS AERUGINOSA (A)      Culture result: FEW ENTEROCOCCUS FAECALIS (A)      Culture result: FEW PASTEURELLA CANIS BETA LACTAMASE NEGATIVE (A)      Culture result: LIGHT ANAEROBIC GRAM POSITIVE COCCI (A)      Culture result: (A)       LIGHT ANAEROBIC GRAM NEGATIVE RODS BETA LACTAMASE POSITIVE    Culture result: LIGHT PROTEUS VULGARIS (A)         Susceptibility    Citrobacter freundii - [place]ERSON     Amikacin ($)  Susceptible ug/mL     Cefazolin ($)  Resistant ug/mL     Ceftazidime ($)  Susceptible ug/mL     Ceftriaxone ($)  Susceptible ug/mL     Cefepime ($$)  Susceptible ug/mL     Ciprofloxacin ($)  Susceptible ug/mL     Gentamicin ($)  Susceptible ug/mL     Levofloxacin ($)  Susceptible ug/mL     Meropenem ($$)  Susceptible ug/mL     Piperacillin/Tazobac ($)  Susceptible ug/mL     Tobramycin ($)  Susceptible ug/mL     Trimeth/Sulfa  Susceptible ug/mL     Cefoxitin  Resistant ug/mL    Enterococcus faecalis - [place]ERSON     Ampicillin ($)  Susceptible ug/mL     Daptomycin ($$$$$)  Susceptible ug/mL     Linezolid ($$$$$)  Susceptible ug/mL     Vancomycin ($)  Susceptible ug/mL    Proteus vulgaris - [place]ERSON     Amikacin ($)  Susceptible ug/mL     Ampicillin ($)  Resistant ug/mL     Ampicillin/sulbactam ($)  Susceptible ug/mL     Cefazolin ($)  Resistant ug/mL     Ceftazidime ($)  Susceptible ug/mL     Ceftriaxone ($)  Susceptible ug/mL     Cefepime ($$)  Susceptible ug/mL     Ciprofloxacin ($)  Susceptible ug/mL     Gentamicin ($)  Susceptible ug/mL     Levofloxacin ($)  Susceptible ug/mL     Piperacillin/Tazobac ($)  Susceptible ug/mL     Tobramycin ($)  Susceptible ug/mL     Trimeth/Sulfa  Susceptible ug/mL     Cefoxitin  Susceptible ug/mL     Meropenem ($$)*  Susceptible ug/mL      * (SENSITIVITIES PERFORMED BY E-TEST)    Pseudomonas aeruginosa - [place]ERSON     Amikacin ($)  Susceptible ug/mL     Ceftazidime ($)  Susceptible ug/mL     Cefepime ($$)  Susceptible ug/mL     Ciprofloxacin ($)  Susceptible ug/mL     Gentamicin ($)  Susceptible ug/mL     Levofloxacin ($)  Susceptible ug/mL     Meropenem ($$)  Susceptible ug/mL     Piperacillin/Tazobac ($)*  Susceptible ug/mL      * **FDA INTERPRETATION REFLECTED, REFER TO CLSI FOR ALTERNATE INTERPRETATIONS.**     Tobramycin ($)  Susceptible ug/mL    Staphylococcus aureus Methcillin Resistant - [place]ERSON     Clindamycin ($)  Susceptible ug/mL     Daptomycin ($$$$$)  Susceptible ug/mL     Erythromycin ($$$$)  Resistant ug/mL     Gentamicin ($)  Susceptible ug/mL     Linezolid ($$$$$)  Susceptible ug/mL     Oxacillin  Resistant ug/mL     Rifampin ($$$$)*  Susceptible ug/mL      * Rifampin is not to be used for mono-therapy. Tetracycline  Susceptible ug/mL     Trimeth/Sulfa  Susceptible ug/mL     Vancomycin ($)  Susceptible ug/mL     Ciprofloxacin ($)  Susceptible ug/mL     Levofloxacin ($)  Susceptible ug/mL     Doxycycline ($$)  Susceptible ug/mL   CULTURE, WOUND W GRAM STAIN    Specimen: Surgical Specimen    RIGHT HEEL WOUND   Result Value Ref Range    Special Requests: NO SPECIAL REQUESTS      GRAM STAIN RARE WBCS SEEN      GRAM STAIN NO ORGANISMS SEEN      Culture result: (A)       LIGHT CITROBACTER FREUNDII REFER TO [id number] FOR SENSITIVITIES    Culture result: (A)       FEW * Methicillin Resistant Staphylococcus aureus * REFER TO [id number] FOR SENSITIVITIES    Culture result: LIGHT ENTEROCOCCUS AVIUM (A)      Culture result: (A)       LIGHT ENTEROCOCCUS FAECALIS REFER TO [id number] FOR SENSITIVITIES    Culture result:       (NOTE) MRSA BY ANTIGEN DETECTION CALLED TO [facility],RN,AT 1430 ON 7/25/22. RF       Susceptibility    Enterococcus avium - [place]     Ampicillin ($)  Susceptible ug/mL     Vancomycin ($)  Susceptible ug/mL     Daptomycin ($$$$$)*  Susceptible ug/mL      * (SENSITIVITIES PERFORMED BY E-TEST) CULTURE, ANAEROBIC    Specimen: Surgical Specimen    RIGHT HEEL WOUND   Result Value Ref Range    Special Requests: NO SPECIAL REQUESTS      Culture result: HEAVY Bacteroides pyogenes BETA LACTAMASE POSITIVE (A)     CULTURE, WOUND W GRAM STAIN    Specimen: Surgical Specimen    RIGHT HEEL BONE   Result Value Ref Range    Special Requests: NO SPECIAL REQUESTS      GRAM STAIN RARE WBCS SEEN      GRAM STAIN NO ORGANISMS SEEN      Culture result: (A)       RARE * Methicillin Resistant Staphylococcus aureus * REFER TO [id number] FOR SENSITIVITIES    Culture result: (A)       SCANT ENTEROCOCCUS SPECIES REFER TO [id number] FOR ID AND SENSITIVITIES   CULTURE, ANAEROBIC    Specimen: Surgical Specimen    RIGHT HEEL BONE   Result Value Ref Range    Special Requests: NO SPECIAL REQUESTS      Culture result: LIGHT Bacteroides pyogenes BETA LACTAMASE POSITIVE (A)     CBC WITH AUTOMATED DIFF   Result Value Ref Range    WBC 8.5 4.1 - 11.1 K/uL    RBC 3.16 (L) 4.10 - 5.70 M/uL    HGB 9.0 (L) 12.1 - 17.0 g/dL    HCT 28.1 (L) 36.6 - 50.3 %    MCV 88.9 80.0 - 99.0 FL    MCH 28.5 26.0 - 34.0 PG    MCHC 32.0 30.0 - 36.5 g/dL    RDW 14.7 (H) 11.5 - 14.5 %    PLATELET 192 983 - 991 K/uL    MPV 10.3 8.9 - 12.9 FL    NRBC 0.0 0  WBC    ABSOLUTE NRBC 0.00 0.00 - 0.01 K/uL    NEUTROPHILS 81 (H) 32 - 75 %    LYMPHOCYTES 9 (L) 12 - 49 %    MONOCYTES 8 5 - 13 %    EOSINOPHILS 2 0 - 7 %    BASOPHILS 0 0 - 1 %    IMMATURE GRANULOCYTES 0 0.0 - 0.5 %    ABS. NEUTROPHILS 6.8 1.8 - 8.0 K/UL    ABS. LYMPHOCYTES 0.8 0.8 - 3.5 K/UL    ABS. MONOCYTES 0.7 0.0 - 1.0 K/UL    ABS. EOSINOPHILS 0.2 0.0 - 0.4 K/UL    ABS. BASOPHILS 0.0 0.0 - 0.1 K/UL    ABS. IMM.  GRANS. 0.0 0.00 - 0.04 K/UL    DF SMEAR SCANNED      RBC COMMENTS NORMOCYTIC, NORMOCHROMIC     METABOLIC PANEL, COMPREHENSIVE   Result Value Ref Range    Sodium 127 (L) 136 - 145 mmol/L    Potassium 4.2 3.5 - 5.1 mmol/L    Chloride 95 (L) 97 - 108 mmol/L    CO2 26 21 - 32 mmol/L    Anion gap 6 5 - 15 mmol/L    Glucose 322 (H) 65 - 100 mg/dL    BUN 34 (H) 6 - 20 MG/DL    Creatinine 3.97 (H) 0.70 - 1.30 MG/DL    BUN/Creatinine ratio 9 (L) 12 - 20      GFR est AA 19 (L) >60 ml/min/1.73m2    GFR est non-AA 15 (L) >60 ml/min/1.73m2    Calcium 8.6 8.5 - 10.1 MG/DL    Bilirubin, total 0.3 0.2 - 1.0 MG/DL    ALT (SGPT) 13 12 - 78 U/L    AST (SGOT) 11 (L) 15 - 37 U/L    Alk. phosphatase 161 (H) 45 - 117 U/L    Protein, total 8.0 6.4 - 8.2 g/dL    Albumin 2.6 (L) 3.5 - 5.0 g/dL    Globulin 5.4 (H) 2.0 - 4.0 g/dL    A-G Ratio 0.5 (L) 1.1 - 2.2     SAMPLES BEING HELD   Result Value Ref Range    SAMPLES BEING HELD 1SST,1BLUE,1DK GRN     COMMENT        Add-on orders for these samples will be processed based on acceptable specimen integrity and analyte stability, which may vary by analyte. TROPONIN-HIGH SENSITIVITY   Result Value Ref Range    Troponin-High Sensitivity 14 0 - 76 ng/L   TROPONIN-HIGH SENSITIVITY   Result Value Ref Range    Troponin-High Sensitivity 15 0 - 76 ng/L   SED RATE (ESR)   Result Value Ref Range    Sed rate, automated 127 (H) 0 - 20 mm/hr   C REACTIVE PROTEIN, QT   Result Value Ref Range    C-Reactive protein 15.20 (H) 0.00 - 0.60 mg/dL   SAMPLES BEING HELD   Result Value Ref Range    SAMPLES BEING HELD bldcs     COMMENT        Add-on orders for these samples will be processed based on acceptable specimen integrity and analyte stability, which may vary by analyte.    CBC W/O DIFF   Result Value Ref Range    WBC 6.4 4.1 - 11.1 K/uL    RBC 2.85 (L) 4.10 - 5.70 M/uL    HGB 8.2 (L) 12.1 - 17.0 g/dL    HCT 25.3 (L) 36.6 - 50.3 %    MCV 88.8 80.0 - 99.0 FL    MCH 28.8 26.0 - 34.0 PG    MCHC 32.4 30.0 - 36.5 g/dL    RDW 14.7 (H) 11.5 - 14.5 %    PLATELET 572 667 - 076 K/uL    MPV 10.7 8.9 - 12.9 FL    NRBC 0.0 0  WBC    ABSOLUTE NRBC 0.00 0.00 - 4.53 K/uL   METABOLIC PANEL, COMPREHENSIVE   Result Value Ref Range    Sodium 136 136 - 145 mmol/L    Potassium 4.2 3.5 - 5.1 mmol/L    Chloride 100 97 - 108 mmol/L    CO2 27 21 - 32 mmol/L    Anion gap 9 5 - 15 mmol/L    Glucose 165 (H) 65 - 100 mg/dL    BUN 44 (H) 6 - 20 MG/DL    Creatinine 4.51 (H) 0.70 - 1.30 MG/DL    BUN/Creatinine ratio 10 (L) 12 - 20      GFR est AA 16 (L) >60 ml/min/1.73m2    GFR est non-AA 13 (L) >60 ml/min/1.73m2    Calcium 8.3 (L) 8.5 - 10.1 MG/DL    Bilirubin, total 0.3 0.2 - 1.0 MG/DL    ALT (SGPT) 13 12 - 78 U/L    AST (SGOT) 19 15 - 37 U/L    Alk. phosphatase 127 (H) 45 - 117 U/L    Protein, total 6.7 6.4 - 8.2 g/dL    Albumin 2.1 (L) 3.5 - 5.0 g/dL    Globulin 4.6 (H) 2.0 - 4.0 g/dL    A-G Ratio 0.5 (L) 1.1 - 2.2     CBC W/O DIFF   Result Value Ref Range    WBC 8.2 4.1 - 11.1 K/uL    RBC 2.79 (L) 4.10 - 5.70 M/uL    HGB 7.9 (L) 12.1 - 17.0 g/dL    HCT 25.4 (L) 36.6 - 50.3 %    MCV 91.0 80.0 - 99.0 FL    MCH 28.3 26.0 - 34.0 PG    MCHC 31.1 30.0 - 36.5 g/dL    RDW 14.8 (H) 11.5 - 14.5 %    PLATELET 597 540 - 896 K/uL    MPV 11.3 8.9 - 12.9 FL    NRBC 0.0 0  WBC    ABSOLUTE NRBC 0.00 0.00 - 4.12 K/uL   METABOLIC PANEL, COMPREHENSIVE   Result Value Ref Range    Sodium 136 136 - 145 mmol/L    Potassium 3.2 (L) 3.5 - 5.1 mmol/L    Chloride 101 97 - 108 mmol/L    CO2 30 21 - 32 mmol/L    Anion gap 5 5 - 15 mmol/L    Glucose 96 65 - 100 mg/dL    BUN 17 6 - 20 MG/DL    Creatinine 2.47 (H) 0.70 - 1.30 MG/DL    BUN/Creatinine ratio 7 (L) 12 - 20      GFR est AA 32 (L) >60 ml/min/1.73m2    GFR est non-AA 27 (L) >60 ml/min/1.73m2    Calcium 8.4 (L) 8.5 - 10.1 MG/DL    Bilirubin, total 0.5 0.2 - 1.0 MG/DL    ALT (SGPT) 13 12 - 78 U/L    AST (SGOT) 16 15 - 37 U/L    Alk.  phosphatase 153 (H) 45 - 117 U/L    Protein, total 7.0 6.4 - 8.2 g/dL    Albumin 2.2 (L) 3.5 - 5.0 g/dL    Globulin 4.8 (H) 2.0 - 4.0 g/dL    A-G Ratio 0.5 (L) 1.1 - 2.2     CBC W/O DIFF   Result Value Ref Range    WBC 9.0 4.1 - 11.1 K/uL    RBC 2.80 (L) 4.10 - 5.70 M/uL    HGB 7.9 (L) 12.1 - 17.0 g/dL    HCT 25.3 (L) 36.6 - 50.3 %    MCV 90.4 80.0 - 99.0 FL    MCH 28.2 26.0 - 34.0 PG    MCHC 31.2 30.0 - 36.5 g/dL    RDW 14.8 (H) 11.5 - 14.5 %    PLATELET 193 969 - 802 K/uL    MPV 11.0 8.9 - 12.9 FL    NRBC 0.0 0  WBC    ABSOLUTE NRBC 0.00 0.00 - 5.98 K/uL   METABOLIC PANEL, COMPREHENSIVE   Result Value Ref Range    Sodium 133 (L) 136 - 145 mmol/L    Potassium 3.6 3.5 - 5.1 mmol/L    Chloride 97 97 - 108 mmol/L    CO2 25 21 - 32 mmol/L    Anion gap 11 5 - 15 mmol/L    Glucose 286 (H) 65 - 100 mg/dL    BUN 31 (H) 6 - 20 MG/DL    Creatinine 3.62 (H) 0.70 - 1.30 MG/DL    BUN/Creatinine ratio 9 (L) 12 - 20      GFR est AA 21 (L) >60 ml/min/1.73m2    GFR est non-AA 17 (L) >60 ml/min/1.73m2    Calcium 8.4 (L) 8.5 - 10.1 MG/DL    Bilirubin, total 0.4 0.2 - 1.0 MG/DL    ALT (SGPT) 12 12 - 78 U/L    AST (SGOT) 12 (L) 15 - 37 U/L    Alk.  phosphatase 128 (H) 45 - 117 U/L    Protein, total 7.3 6.4 - 8.2 g/dL    Albumin 2.3 (L) 3.5 - 5.0 g/dL    Globulin 5.0 (H) 2.0 - 4.0 g/dL    A-G Ratio 0.5 (L) 1.1 - 2.2     CBC W/O DIFF   Result Value Ref Range    WBC 6.3 4.1 - 11.1 K/uL    RBC 2.72 (L) 4.10 - 5.70 M/uL    HGB 7.6 (L) 12.1 - 17.0 g/dL    HCT 24.7 (L) 36.6 - 50.3 %    MCV 90.8 80.0 - 99.0 FL    MCH 27.9 26.0 - 34.0 PG    MCHC 30.8 30.0 - 36.5 g/dL    RDW 15.1 (H) 11.5 - 14.5 %    PLATELET 709 042 - 001 K/uL    MPV 11.1 8.9 - 12.9 FL    NRBC 0.0 0  WBC    ABSOLUTE NRBC 0.00 0.00 - 0.01 K/uL   VANCOMYCIN, RANDOM   Result Value Ref Range    Vancomycin, random 79.5 UG/ML   METABOLIC PANEL, BASIC   Result Value Ref Range    Sodium 133 (L) 136 - 145 mmol/L    Potassium 3.6 3.5 - 5.1 mmol/L    Chloride 99 97 - 108 mmol/L    CO2 26 21 - 32 mmol/L    Anion gap 8 5 - 15 mmol/L    Glucose 242 (H) 65 - 100 mg/dL    BUN 39 (H) 6 - 20 MG/DL    Creatinine 4.64 (H) 0.70 - 1.30 MG/DL    BUN/Creatinine ratio 8 (L) 12 - 20      GFR est AA 16 (L) >60 ml/min/1.73m2    GFR est non-AA 13 (L) >60 ml/min/1.73m2    Calcium 8.2 (L) 8.5 - 10.1 MG/DL   CBC W/O DIFF   Result Value Ref Range    WBC 5.8 4.1 - 11.1 K/uL    RBC 2.78 (L) 4.10 - 5.70 M/uL    HGB 7.8 (L) 12.1 - 17.0 g/dL    HCT 25.1 (L) 36.6 - 50.3 %    MCV 90.3 80.0 - 99.0 FL    MCH 28.1 26.0 - 34.0 PG    MCHC 31.1 30.0 - 36.5 g/dL    RDW 14.7 (H) 11.5 - 14.5 %    PLATELET 935 110 - 002 K/uL    MPV 10.8 8.9 - 12.9 FL    NRBC 0.0 0  WBC    ABSOLUTE NRBC 0.00 0.00 - 5.76 K/uL   METABOLIC PANEL, BASIC   Result Value Ref Range    Sodium 136 136 - 145 mmol/L    Potassium 3.4 (L) 3.5 - 5.1 mmol/L    Chloride 102 97 - 108 mmol/L    CO2 29 21 - 32 mmol/L    Anion gap 5 5 - 15 mmol/L    Glucose 162 (H) 65 - 100 mg/dL    BUN 26 (H) 6 - 20 MG/DL    Creatinine 2.88 (H) 0.70 - 1.30 MG/DL    BUN/Creatinine ratio 9 (L) 12 - 20      GFR est AA 27 (L) >60 ml/min/1.73m2    GFR est non-AA 22 (L) >60 ml/min/1.73m2    Calcium 8.0 (L) 8.5 - 10.1 MG/DL   CBC W/O DIFF   Result Value Ref Range    WBC 6.7 4.1 - 11.1 K/uL    RBC 2.87 (L) 4.10 - 5.70 M/uL    HGB 8.1 (L) 12.1 - 17.0 g/dL    HCT 25.5 (L) 36.6 - 50.3 %    MCV 88.9 80.0 - 99.0 FL    MCH 28.2 26.0 - 34.0 PG    MCHC 31.8 30.0 - 36.5 g/dL    RDW 15.2 (H) 11.5 - 14.5 %    PLATELET 504 463 - 127 K/uL    MPV 10.2 8.9 - 12.9 FL    NRBC 0.0 0  WBC    ABSOLUTE NRBC 0.00 0.00 - 9.85 K/uL   METABOLIC PANEL, BASIC   Result Value Ref Range    Sodium 134 (L) 136 - 145 mmol/L    Potassium 3.7 3.5 - 5.1 mmol/L    Chloride 102 97 - 108 mmol/L    CO2 25 21 - 32 mmol/L    Anion gap 7 5 - 15 mmol/L    Glucose 198 (H) 65 - 100 mg/dL    BUN 42 (H) 6 - 20 MG/DL    Creatinine 3.72 (H) 0.70 - 1.30 MG/DL    BUN/Creatinine ratio 11 (L) 12 - 20      GFR est AA 20 (L) >60 ml/min/1.73m2    GFR est non-AA 17 (L) >60 ml/min/1.73m2    Calcium 8.2 (L) 8.5 - 10.1 MG/DL   CBC W/O DIFF   Result Value Ref Range    WBC 6.0 4.1 - 11.1 K/uL    RBC 3.08 (L) 4.10 - 5.70 M/uL    HGB 8.7 (L) 12.1 - 17.0 g/dL    HCT 27.6 (L) 36.6 - 50.3 %    MCV 89.6 80.0 - 99.0 FL    MCH 28.2 26.0 - 34.0 PG    MCHC 31.5 30.0 - 36.5 g/dL    RDW 15.5 (H) 11.5 - 14.5 % PLATELET 497 142 - 223 K/uL    MPV 10.5 8.9 - 12.9 FL    NRBC 0.0 0  WBC    ABSOLUTE NRBC 0.00 0.00 - 2.11 K/uL   METABOLIC PANEL, BASIC   Result Value Ref Range    Sodium 136 136 - 145 mmol/L    Potassium 4.1 3.5 - 5.1 mmol/L    Chloride 103 97 - 108 mmol/L    CO2 29 21 - 32 mmol/L    Anion gap 4 (L) 5 - 15 mmol/L    Glucose 102 (H) 65 - 100 mg/dL    BUN 21 (H) 6 - 20 MG/DL    Creatinine 2.27 (H) 0.70 - 1.30 MG/DL    BUN/Creatinine ratio 9 (L) 12 - 20      GFR est AA 36 (L) >60 ml/min/1.73m2    GFR est non-AA 29 (L) >60 ml/min/1.73m2    Calcium 8.8 8.5 - 10.1 MG/DL   C REACTIVE PROTEIN, QT   Result Value Ref Range    C-Reactive protein 2.57 (H) 0.00 - 0.60 mg/dL   CBC W/O DIFF   Result Value Ref Range    WBC 5.3 4.1 - 11.1 K/uL    RBC 3.00 (L) 4.10 - 5.70 M/uL    HGB 8.4 (L) 12.1 - 17.0 g/dL    HCT 27.1 (L) 36.6 - 50.3 %    MCV 90.3 80.0 - 99.0 FL    MCH 28.0 26.0 - 34.0 PG    MCHC 31.0 30.0 - 36.5 g/dL    RDW 15.9 (H) 11.5 - 14.5 %    PLATELET 608 486 - 601 K/uL    MPV 10.3 8.9 - 12.9 FL    NRBC 0.0 0  WBC    ABSOLUTE NRBC 0.00 0.00 - 0.01 K/uL   VANCOMYCIN, RANDOM   Result Value Ref Range    Vancomycin, random 33.0 UG/ML   METABOLIC PANEL, BASIC   Result Value Ref Range    Sodium 135 (L) 136 - 145 mmol/L    Potassium 4.0 3.5 - 5.1 mmol/L    Chloride 102 97 - 108 mmol/L    CO2 26 21 - 32 mmol/L    Anion gap 7 5 - 15 mmol/L    Glucose 97 65 - 100 mg/dL    BUN 31 (H) 6 - 20 MG/DL    Creatinine 2.82 (H) 0.70 - 1.30 MG/DL    BUN/Creatinine ratio 11 (L) 12 - 20      GFR est AA 28 (L) >60 ml/min/1.73m2    GFR est non-AA 23 (L) >60 ml/min/1.73m2    Calcium 9.0 8.5 - 10.1 MG/DL   GLUCOSE, POC   Result Value Ref Range    Glucose (POC) 403 (H) 65 - 117 mg/dL    Performed by [facility]    GLUCOSE, POC   Result Value Ref Range    Glucose (POC) 250 (H) 65 - 117 mg/dL    [facility]   Result Value Ref Range    Glucose (POC) 138 (H) 65 - 117 mg/dL    Performed by Tamara Gutiérrez GLUCOSE, POC   Result Value Ref Range    Glucose (POC) 137 (H) 65 - 117 mg/dL    Performed by Brigette Franklin (Tech)    GLUCOSE, POC   Result Value Ref Range    Glucose (POC) 105 65 - 117 mg/dL    Performed by Vernon Kowalski, POC   Result Value Ref Range    Glucose (POC) 96 65 - 117 mg/dL    Performed by King Vizcarra (PCT)    GLUCOSE, POC   Result Value Ref Range    Glucose (POC) 87 65 - 117 mg/dL    Performed by King Vizcarra (PCT)    GLUCOSE, POC   Result Value Ref Range    Glucose (POC) 84 65 - 117 mg/dL    Performed by Lalito Bedolla    GLUCOSE, POC   Result Value Ref Range    Glucose (POC) 97 65 - 117 mg/dL    Performed by Lowell Castaneda    GLUCOSE, POC   Result Value Ref Range    Glucose (POC) 210 (H) 65 - 117 mg/dL    Performed by Brittany Westfall    GLUCOSE, POC   Result Value Ref Range    Glucose (POC) 265 (H) 65 - 117 mg/dL    Performed by Syd Drake    GLUCOSE, POC   Result Value Ref Range    Glucose (POC) 329 (H) 65 - 117 mg/dL    Performed by Syd Drake    GLUCOSE, POC   Result Value Ref Range    Glucose (POC) 283 (H) 65 - 117 mg/dL    Performed by Brittany Westfall    GLUCOSE, POC   Result Value Ref Range    Glucose (POC) 210 (H) 65 - 117 mg/dL    Performed by Brittany Westfall    GLUCOSE, POC   Result Value Ref Range    Glucose (POC) 225 (H) 65 - 117 mg/dL    Performed by Ambrosio Bobo (CON)    GLUCOSE, POC   Result Value Ref Range    Glucose (POC) 150 (H) 65 - 117 mg/dL    Performed by Rachel Garcia PCT    GLUCOSE, POC   Result Value Ref Range    Glucose (POC) 142 (H) 65 - 117 mg/dL    Performed by Jose Raul Petite    GLUCOSE, POC   Result Value Ref Range    Glucose (POC) 171 (H) 65 - 117 mg/dL    Performed by Jose Raul Petite    GLUCOSE, POC   Result Value Ref Range    Glucose (POC) 154 (H) 65 - 117 mg/dL    Performed by Syd Drake    GLUCOSE, POC   Result Value Ref Range    Glucose (POC) 123 (H) 65 - 117 mg/dL    Performed by Gala Sanchez    GLUCOSE, POC   Result Value Ref Range Glucose (POC) 153 (H) 65 - 117 mg/dL    Performed by Blanco Fruit (PCT)    GLUCOSE, POC   Result Value Ref Range    Glucose (POC) 180 (H) 65 - 117 mg/dL    Performed by Blanco Fruit (PCT)    GLUCOSE, POC   Result Value Ref Range    Glucose (POC) 273 (H) 65 - 117 mg/dL    Performed by Kyler Marilyn    GLUCOSE, POC   Result Value Ref Range    Glucose (POC) 159 (H) 65 - 117 mg/dL    Performed by JolieBoxrina    GLUCOSE, POC   Result Value Ref Range    Glucose (POC) 163 (H) 65 - 117 mg/dL    Performed by [facility] PCT    GLUCOSE, POC   Result Value Ref Range    Glucose (POC) 149 (H) 65 - 117 mg/dL    Per[facility] by [facility]    GLUCOSE, POC   Result Value Ref Range    Glucose (POC) 129 (H) 65 - 117 mg/dL    Performed by Christian Enriquez    GLUCOSE, POC   Result Value Ref Range    Glucose (POC) 122 (H) 65 - 117 mg/dL    Performed by Christian Zoe    GLUCOSE, POC   Result Value Ref Range    Glucose (POC) 93 65 - 117 mg/dL    Performed by Christian Zoe    GLUCOSE, POC   Result Value Ref Range    Glucose (POC) 112 65 - 117 mg/dL    Performed by [facility] (CON)    GLUCOSE, POC   Result Value Ref Range    Glucose (POC) 113 65 - 117 mg/dL    Performed by [employer] [facility]. (CON)    GLUCOSE, POC   Result Value Ref Range    Glucose (POC) 153 (H) 65 - 117 mg/dL    Performed by Christian Zoe    GLUCOSE, POC   Result Value Ref Range    Glucose (POC) 115 65 - 117 mg/dL    Performed by Mitchell Shaw    GLUCOSE, POC   Result Value Ref Range    Glucose (POC) 87 65 - 117 mg/dL    Performed by [facility] PCT    EKG, 12 LEAD, INITIAL   Result Value Ref Range    Ventricular Rate 64 BPM    Atrial Rate 64 BPM    P-R Interval 158 ms    QRS Duration 94 ms    Q-T Interval 446 ms    QTC Calculation (Bezet) 460 ms    Calculated P Axis 61 degrees    Calculated R Axis 36 degrees    Calculated T Axis 43 degrees    Diagnosis       Normal sinus rhythm  Normal ECG  When compared with ECG of 16-JUL-2022 13:41,  T wave inversion no longer evident in Inferior leads  T wave inversion no longer evident in Lateral leads  Confirmed by Vilma Moreno MD, [address] ([id number]) on 7/22/2022 9:03:34 AM

## 2023-02-27 NOTE — PATIENT INSTRUCTIONS
Take medications as prescribed  No driving when taking medication for sleep  Follow up in office in 1 month to check on depression and if we need to increase dose of medication  Contact your previous mental health MD or call [address] [place] [address] at [address] your insurance provider for additional mental health resources or referral as needed.

## 2023-02-27 NOTE — PROGRESS NOTES
Identified pt with two pt identifiers(name and ). Reviewed record in preparation for visit and have obtained necessary documentation. All patient medications has been reviewed. Chief Complaint   Patient presents with    Diabetes     1 month follow    Sleep Problem     Patient states difficulty falling asleep and staying asleep. Patient states issue has gotten worse in the last few months. Medication Refill     Gabapentin       3 most recent PHQ Screens 2023   Little interest or pleasure in doing things Nearly every day   Feeling down, depressed, irritable, or hopeless Nearly every day   Total Score PHQ 2 6   Trouble falling or staying asleep, or sleeping too much Nearly every day   Feeling tired or having little energy Nearly every day   Poor appetite, weight loss, or overeating Several days   Feeling bad about yourself - or that you are a failure or have let yourself or your family down Not at all   Trouble concentrating on things such as school, work, reading, or watching TV Nearly every day   Moving or speaking so slowly that other people could have noticed; or the opposite being so fidgety that others notice Nearly every day   Thoughts of being better off dead, or hurting yourself in some way Nearly every day   PHQ 9 Score 22   How difficult have these problems made it for you to do your work, take care of your home and get along with others Somewhat difficult     Abuse Screening Questionnaire 2023   Do you ever feel afraid of your partner? N   Are you in a relationship with someone who physically or mentally threatens you? N   Is it safe for you to go home? Y       Health Maintenance Due   Topic    Hepatitis B Vaccine (1 of 3 - Risk 3-dose series)    Shingles Vaccine (1 of 2)    COVID-19 Vaccine (2 - Pfizer series)    Eye Exam Retinal or Dilated     Flu Vaccine (1)    Colorectal Cancer Screening Combo      Health Maintenance Review: Patient reminded of \"due or due soon\" health maintenance. I have asked the patient to contact his/her primary care provider (PCP) for follow-up on his/her health maintenance. Vitals:    02/27/23 0837   BP: (!) 153/69   Pulse: 65   Resp: 18   Temp: 98.6 °F (37 °C)   TempSrc: Temporal   SpO2: 99%   Weight: 166 lb 9.6 oz (75.6 kg)   Height: 5' 8\" (1.727 m)   PainSc:   6   PainLoc: Generalized       Wt Readings from Last 3 Encounters:   02/27/23 166 lb 9.6 oz (75.6 kg)   01/24/23 152 lb (68.9 kg)   08/18/22 163 lb 2.3 oz (74 kg)     Temp Readings from Last 3 Encounters:   02/27/23 98.6 °F (37 °C) (Temporal)   01/24/23 97.4 °F (36.3 °C) (Skin)   09/22/22 97.7 °F (36.5 °C) (Temporal)     BP Readings from Last 3 Encounters:   02/27/23 (!) 153/69   01/24/23 (!) 196/73   09/22/22 (!) 182/74     Pulse Readings from Last 3 Encounters:   02/27/23 65   01/24/23 68   09/22/22 72       1. \"Have you been to the ER, urgent care clinic since your last visit? Hospitalized since your last visit? \" No    2. \"Have you seen or consulted any other health care providers outside of the [address] [place] [address] since your last visit? \" No     3. For patients aged 39-70: Has the patient had a colonoscopy / FIT/ Cologuard? No      Patient is accompanied by self I have received verbal consent from Becki Prater to discuss any/all medical information while they are present in the room.

## 2023-04-07 PROBLEM — T82.590A MALFUNCTION OF ARTERIOVENOUS DIALYSIS FISTULA (HCC): Status: ACTIVE | Noted: 2023-04-07

## 2023-04-07 PROBLEM — I26.99 ACUTE PULMONARY EMBOLISM (HCC): Status: ACTIVE | Noted: 2023-04-07

## 2023-05-07 DIAGNOSIS — G47.00 INSOMNIA, UNSPECIFIED: ICD-10-CM

## 2023-05-08 ENCOUNTER — TELEPHONE (OUTPATIENT)
Facility: CLINIC | Age: 63
End: 2023-05-08

## 2023-05-08 RX ORDER — OMEPRAZOLE 40 MG/1
CAPSULE, DELAYED RELEASE ORAL DAILY
COMMUNITY
Start: 2023-02-15 | End: 2023-05-15

## 2023-05-08 RX ORDER — BUMETANIDE 2 MG/1
TABLET ORAL
COMMUNITY
Start: 2023-03-13 | End: 2023-05-15

## 2023-05-08 RX ORDER — CLONIDINE HYDROCHLORIDE 0.1 MG/1
0.1 TABLET ORAL 2 TIMES DAILY
COMMUNITY
Start: 2023-04-02 | End: 2023-05-15

## 2023-05-08 RX ORDER — SEVELAMER CARBONATE 800 MG/1
TABLET, FILM COATED ORAL
Status: ON HOLD | COMMUNITY
Start: 2023-02-21

## 2023-05-08 NOTE — TELEPHONE ENCOUNTER
PCP: Alonzo Aparicio MD    Last appt: [unfilled]  No future appointments.     Requested Prescriptions     Pending Prescriptions Disp Refills    traZODone (DESYREL) 50 MG tablet [Pharmacy Med Name: TRAZODONE 50 MG TABLET] 30 tablet 1     Sig: TAKE 1 TABLET BY MOUTH NIGHTLY FOR INSOMNIA ASSOCIATED WITH DEPRESSION       Prior labs and Blood pressures:  BP Readings from Last 3 Encounters:   02/27/23 (!) 153/69   01/24/23 (!) 196/73   09/22/22 (!) 182/74     Lab Results   Component Value Date/Time     04/11/2023 04:53 AM    K 3.9 04/11/2023 04:53 AM     04/11/2023 04:53 AM    CO2 25 04/11/2023 04:53 AM    BUN 38 04/11/2023 04:53 AM    GFRAA 28 07/29/2022 03:11 AM     No results found for: HBA1C, QAE4PZKA  Lab Results   Component Value Date/Time    CHOL 117 06/21/2022 02:38 PM    HDL 42 06/21/2022 02:38 PM     No results found for: VITD3, VD3RIA    Lab Results   Component Value Date/Time    TSH 3.52 11/18/2021 10:03 AM

## 2023-05-09 RX ORDER — TRAZODONE HYDROCHLORIDE 50 MG/1
TABLET ORAL
Qty: 30 TABLET | Refills: 1 | Status: SHIPPED | OUTPATIENT
Start: 2023-05-09

## 2023-05-15 ENCOUNTER — APPOINTMENT (OUTPATIENT)
Facility: HOSPITAL | Age: 63
DRG: 425 | End: 2023-05-15
Payer: MEDICAID

## 2023-05-15 ENCOUNTER — HOSPITAL ENCOUNTER (INPATIENT)
Facility: HOSPITAL | Age: 63
LOS: 1 days | Discharge: HOME HEALTH | DRG: 425 | End: 2023-05-16
Attending: EMERGENCY MEDICINE | Admitting: FAMILY MEDICINE
Payer: MEDICAID

## 2023-05-15 DIAGNOSIS — I10 ESSENTIAL HYPERTENSION: ICD-10-CM

## 2023-05-15 DIAGNOSIS — E87.5 HYPERKALEMIA: ICD-10-CM

## 2023-05-15 DIAGNOSIS — J81.0 ACUTE PULMONARY EDEMA (HCC): ICD-10-CM

## 2023-05-15 DIAGNOSIS — R10.13 ABDOMINAL PAIN, EPIGASTRIC: Primary | ICD-10-CM

## 2023-05-15 DIAGNOSIS — N18.6 ESRD (END STAGE RENAL DISEASE) (HCC): ICD-10-CM

## 2023-05-15 LAB
ALBUMIN SERPL-MCNC: 3.1 G/DL (ref 3.5–5)
ALBUMIN/GLOB SERPL: 0.7 (ref 1.1–2.2)
ALP SERPL-CCNC: 111 U/L (ref 45–117)
ALT SERPL-CCNC: 16 U/L (ref 12–78)
ANION GAP SERPL CALC-SCNC: 10 MMOL/L (ref 5–15)
ANION GAP SERPL CALC-SCNC: 8 MMOL/L (ref 5–15)
APTT PPP: 31.8 SEC (ref 22.1–31)
AST SERPL-CCNC: 10 U/L (ref 15–37)
BASOPHILS # BLD: 0 K/UL (ref 0–0.1)
BASOPHILS NFR BLD: 0 % (ref 0–1)
BILIRUB SERPL-MCNC: 0.7 MG/DL (ref 0.2–1)
BUN SERPL-MCNC: 77 MG/DL (ref 6–20)
BUN SERPL-MCNC: 81 MG/DL (ref 6–20)
BUN/CREAT SERPL: 10 (ref 12–20)
BUN/CREAT SERPL: 10 (ref 12–20)
CALCIUM SERPL-MCNC: 8.4 MG/DL (ref 8.5–10.1)
CALCIUM SERPL-MCNC: 8.8 MG/DL (ref 8.5–10.1)
CHLORIDE SERPL-SCNC: 100 MMOL/L (ref 97–108)
CHLORIDE SERPL-SCNC: 101 MMOL/L (ref 97–108)
CO2 SERPL-SCNC: 19 MMOL/L (ref 21–32)
CO2 SERPL-SCNC: 20 MMOL/L (ref 21–32)
COMMENT:: NORMAL
CREAT SERPL-MCNC: 7.57 MG/DL (ref 0.7–1.3)
CREAT SERPL-MCNC: 7.81 MG/DL (ref 0.7–1.3)
DIFFERENTIAL METHOD BLD: ABNORMAL
EOSINOPHIL # BLD: 0.1 K/UL (ref 0–0.4)
EOSINOPHIL NFR BLD: 1 % (ref 0–7)
ERYTHROCYTE [DISTWIDTH] IN BLOOD BY AUTOMATED COUNT: 15.2 % (ref 11.5–14.5)
GLOBULIN SER CALC-MCNC: 4.5 G/DL (ref 2–4)
GLUCOSE BLD STRIP.AUTO-MCNC: 75 MG/DL (ref 65–117)
GLUCOSE BLD STRIP.AUTO-MCNC: 79 MG/DL (ref 65–117)
GLUCOSE SERPL-MCNC: 89 MG/DL (ref 65–100)
GLUCOSE SERPL-MCNC: 95 MG/DL (ref 65–100)
HCT VFR BLD AUTO: 37 % (ref 36.6–50.3)
HGB BLD-MCNC: 12.4 G/DL (ref 12.1–17)
IMM GRANULOCYTES # BLD AUTO: 0 K/UL (ref 0–0.04)
IMM GRANULOCYTES NFR BLD AUTO: 0 % (ref 0–0.5)
INR PPP: 1.1 (ref 0.9–1.1)
LIPASE SERPL-CCNC: 91 U/L (ref 73–393)
LYMPHOCYTES # BLD: 0.5 K/UL (ref 0.8–3.5)
LYMPHOCYTES NFR BLD: 7 % (ref 12–49)
MAGNESIUM SERPL-MCNC: 2.2 MG/DL (ref 1.6–2.4)
MCH RBC QN AUTO: 28.6 PG (ref 26–34)
MCHC RBC AUTO-ENTMCNC: 33.5 G/DL (ref 30–36.5)
MCV RBC AUTO: 85.5 FL (ref 80–99)
MONOCYTES # BLD: 0.6 K/UL (ref 0–1)
MONOCYTES NFR BLD: 9 % (ref 5–13)
NEUTS SEG # BLD: 5.9 K/UL (ref 1.8–8)
NEUTS SEG NFR BLD: 83 % (ref 32–75)
NRBC # BLD: 0 K/UL (ref 0–0.01)
NRBC BLD-RTO: 0 PER 100 WBC
PLATELET # BLD AUTO: 95 K/UL (ref 150–400)
PMV BLD AUTO: 11.2 FL (ref 8.9–12.9)
POTASSIUM SERPL-SCNC: 5.1 MMOL/L (ref 3.5–5.1)
POTASSIUM SERPL-SCNC: 6 MMOL/L (ref 3.5–5.1)
PROT SERPL-MCNC: 7.6 G/DL (ref 6.4–8.2)
PROTHROMBIN TIME: 11.8 SEC (ref 9–11.1)
RBC # BLD AUTO: 4.33 M/UL (ref 4.1–5.7)
RBC MORPH BLD: ABNORMAL
SERVICE CMNT-IMP: NORMAL
SERVICE CMNT-IMP: NORMAL
SODIUM SERPL-SCNC: 129 MMOL/L (ref 136–145)
SODIUM SERPL-SCNC: 129 MMOL/L (ref 136–145)
SPECIMEN HOLD: NORMAL
THERAPEUTIC RANGE: ABNORMAL SECS (ref 58–77)
TROPONIN I SERPL HS-MCNC: 19 NG/L (ref 0–76)
WBC # BLD AUTO: 7.1 K/UL (ref 4.1–11.1)

## 2023-05-15 PROCEDURE — 1100000003 HC PRIVATE W/ TELEMETRY

## 2023-05-15 PROCEDURE — 85610 PROTHROMBIN TIME: CPT

## 2023-05-15 PROCEDURE — 5A1D70Z PERFORMANCE OF URINARY FILTRATION, INTERMITTENT, LESS THAN 6 HOURS PER DAY: ICD-10-PCS | Performed by: INTERNAL MEDICINE

## 2023-05-15 PROCEDURE — 6360000004 HC RX CONTRAST MEDICATION

## 2023-05-15 PROCEDURE — 6360000002 HC RX W HCPCS

## 2023-05-15 PROCEDURE — 94761 N-INVAS EAR/PLS OXIMETRY MLT: CPT

## 2023-05-15 PROCEDURE — 6370000000 HC RX 637 (ALT 250 FOR IP): Performed by: EMERGENCY MEDICINE

## 2023-05-15 PROCEDURE — 83690 ASSAY OF LIPASE: CPT

## 2023-05-15 PROCEDURE — 2500000003 HC RX 250 WO HCPCS: Performed by: EMERGENCY MEDICINE

## 2023-05-15 PROCEDURE — 85730 THROMBOPLASTIN TIME PARTIAL: CPT

## 2023-05-15 PROCEDURE — 6360000002 HC RX W HCPCS: Performed by: EMERGENCY MEDICINE

## 2023-05-15 PROCEDURE — C9113 INJ PANTOPRAZOLE SODIUM, VIA: HCPCS | Performed by: EMERGENCY MEDICINE

## 2023-05-15 PROCEDURE — 90935 HEMODIALYSIS ONE EVALUATION: CPT

## 2023-05-15 PROCEDURE — 6370000000 HC RX 637 (ALT 250 FOR IP)

## 2023-05-15 PROCEDURE — 85025 COMPLETE CBC W/AUTO DIFF WBC: CPT

## 2023-05-15 PROCEDURE — A4216 STERILE WATER/SALINE, 10 ML: HCPCS | Performed by: EMERGENCY MEDICINE

## 2023-05-15 PROCEDURE — 6360000002 HC RX W HCPCS: Performed by: STUDENT IN AN ORGANIZED HEALTH CARE EDUCATION/TRAINING PROGRAM

## 2023-05-15 PROCEDURE — 96375 TX/PRO/DX INJ NEW DRUG ADDON: CPT

## 2023-05-15 PROCEDURE — 71275 CT ANGIOGRAPHY CHEST: CPT

## 2023-05-15 PROCEDURE — 80053 COMPREHEN METABOLIC PANEL: CPT

## 2023-05-15 PROCEDURE — 6360000002 HC RX W HCPCS: Performed by: NURSE PRACTITIONER

## 2023-05-15 PROCEDURE — 2580000003 HC RX 258: Performed by: EMERGENCY MEDICINE

## 2023-05-15 PROCEDURE — 83735 ASSAY OF MAGNESIUM: CPT

## 2023-05-15 PROCEDURE — 86706 HEP B SURFACE ANTIBODY: CPT

## 2023-05-15 PROCEDURE — 6370000000 HC RX 637 (ALT 250 FOR IP): Performed by: STUDENT IN AN ORGANIZED HEALTH CARE EDUCATION/TRAINING PROGRAM

## 2023-05-15 PROCEDURE — 84484 ASSAY OF TROPONIN QUANT: CPT

## 2023-05-15 PROCEDURE — 93005 ELECTROCARDIOGRAM TRACING: CPT | Performed by: EMERGENCY MEDICINE

## 2023-05-15 PROCEDURE — 87340 HEPATITIS B SURFACE AG IA: CPT

## 2023-05-15 PROCEDURE — 99285 EMERGENCY DEPT VISIT HI MDM: CPT

## 2023-05-15 PROCEDURE — 82962 GLUCOSE BLOOD TEST: CPT

## 2023-05-15 PROCEDURE — 96374 THER/PROPH/DIAG INJ IV PUSH: CPT

## 2023-05-15 PROCEDURE — 36415 COLL VENOUS BLD VENIPUNCTURE: CPT

## 2023-05-15 RX ORDER — HYDRALAZINE HYDROCHLORIDE 20 MG/ML
20 INJECTION INTRAMUSCULAR; INTRAVENOUS EVERY 6 HOURS PRN
Status: DISCONTINUED | OUTPATIENT
Start: 2023-05-15 | End: 2023-05-16

## 2023-05-15 RX ORDER — TAMSULOSIN HYDROCHLORIDE 0.4 MG/1
0.4 CAPSULE ORAL NIGHTLY
Status: DISCONTINUED | OUTPATIENT
Start: 2023-05-15 | End: 2023-05-16 | Stop reason: HOSPADM

## 2023-05-15 RX ORDER — INSULIN LISPRO 100 [IU]/ML
0-4 INJECTION, SOLUTION INTRAVENOUS; SUBCUTANEOUS NIGHTLY
Status: DISCONTINUED | OUTPATIENT
Start: 2023-05-15 | End: 2023-05-16 | Stop reason: HOSPADM

## 2023-05-15 RX ORDER — SENNA PLUS 8.6 MG/1
2 TABLET ORAL NIGHTLY
Status: DISCONTINUED | OUTPATIENT
Start: 2023-05-15 | End: 2023-05-15

## 2023-05-15 RX ORDER — FUROSEMIDE 40 MG/1
80 TABLET ORAL DAILY
Status: DISCONTINUED | OUTPATIENT
Start: 2023-05-16 | End: 2023-05-16 | Stop reason: HOSPADM

## 2023-05-15 RX ORDER — AMLODIPINE BESYLATE 5 MG/1
10 TABLET ORAL DAILY
Status: DISCONTINUED | OUTPATIENT
Start: 2023-05-16 | End: 2023-05-15

## 2023-05-15 RX ORDER — SODIUM POLYSTYRENE SULFONATE 15 G/60ML
15 SUSPENSION ORAL; RECTAL ONCE
Status: COMPLETED | OUTPATIENT
Start: 2023-05-15 | End: 2023-05-15

## 2023-05-15 RX ORDER — AMLODIPINE BESYLATE 5 MG/1
10 TABLET ORAL DAILY
Status: DISCONTINUED | OUTPATIENT
Start: 2023-05-15 | End: 2023-05-16 | Stop reason: HOSPADM

## 2023-05-15 RX ORDER — SODIUM CHLORIDE 0.9 % (FLUSH) 0.9 %
5-40 SYRINGE (ML) INJECTION EVERY 12 HOURS SCHEDULED
Status: DISCONTINUED | OUTPATIENT
Start: 2023-05-15 | End: 2023-05-16 | Stop reason: HOSPADM

## 2023-05-15 RX ORDER — LOSARTAN POTASSIUM 25 MG/1
25 TABLET ORAL DAILY
Status: DISCONTINUED | OUTPATIENT
Start: 2023-05-15 | End: 2023-05-16 | Stop reason: HOSPADM

## 2023-05-15 RX ORDER — METOPROLOL SUCCINATE 50 MG/1
100 TABLET, EXTENDED RELEASE ORAL DAILY
Status: DISCONTINUED | OUTPATIENT
Start: 2023-05-15 | End: 2023-05-16 | Stop reason: HOSPADM

## 2023-05-15 RX ORDER — FUROSEMIDE 40 MG/1
80 TABLET ORAL DAILY
Status: DISCONTINUED | OUTPATIENT
Start: 2023-05-15 | End: 2023-05-15

## 2023-05-15 RX ORDER — INSULIN LISPRO 100 [IU]/ML
0-8 INJECTION, SOLUTION INTRAVENOUS; SUBCUTANEOUS
Status: DISCONTINUED | OUTPATIENT
Start: 2023-05-15 | End: 2023-05-16 | Stop reason: HOSPADM

## 2023-05-15 RX ORDER — ISOSORBIDE MONONITRATE 30 MG/1
60 TABLET, EXTENDED RELEASE ORAL DAILY
Status: DISCONTINUED | OUTPATIENT
Start: 2023-05-15 | End: 2023-05-15

## 2023-05-15 RX ORDER — AMLODIPINE BESYLATE 5 MG/1
5 TABLET ORAL DAILY
Status: DISCONTINUED | OUTPATIENT
Start: 2023-05-15 | End: 2023-05-15

## 2023-05-15 RX ORDER — CALCIUM GLUCONATE 20 MG/ML
2000 INJECTION, SOLUTION INTRAVENOUS ONCE
Status: COMPLETED | OUTPATIENT
Start: 2023-05-15 | End: 2023-05-15

## 2023-05-15 RX ORDER — HYDRALAZINE HYDROCHLORIDE 25 MG/1
100 TABLET, FILM COATED ORAL 3 TIMES DAILY
Status: DISCONTINUED | OUTPATIENT
Start: 2023-05-15 | End: 2023-05-16 | Stop reason: HOSPADM

## 2023-05-15 RX ORDER — TRAZODONE HYDROCHLORIDE 50 MG/1
50 TABLET ORAL NIGHTLY
Status: DISCONTINUED | OUTPATIENT
Start: 2023-05-15 | End: 2023-05-16 | Stop reason: HOSPADM

## 2023-05-15 RX ORDER — ISOSORBIDE MONONITRATE 60 MG/1
60 TABLET, EXTENDED RELEASE ORAL DAILY
Status: DISCONTINUED | OUTPATIENT
Start: 2023-05-16 | End: 2023-05-16 | Stop reason: HOSPADM

## 2023-05-15 RX ORDER — ROSUVASTATIN CALCIUM 10 MG/1
10 TABLET, COATED ORAL EVERY MORNING
Status: DISCONTINUED | OUTPATIENT
Start: 2023-05-16 | End: 2023-05-16 | Stop reason: HOSPADM

## 2023-05-15 RX ORDER — SUCRALFATE 1 G/1
1 TABLET ORAL
Status: DISCONTINUED | OUTPATIENT
Start: 2023-05-15 | End: 2023-05-16 | Stop reason: HOSPADM

## 2023-05-15 RX ORDER — SEVELAMER CARBONATE 800 MG/1
800 TABLET, FILM COATED ORAL
Status: DISCONTINUED | OUTPATIENT
Start: 2023-05-15 | End: 2023-05-16 | Stop reason: HOSPADM

## 2023-05-15 RX ORDER — SODIUM CHLORIDE 9 MG/ML
INJECTION, SOLUTION INTRAVENOUS PRN
Status: DISCONTINUED | OUTPATIENT
Start: 2023-05-15 | End: 2023-05-16 | Stop reason: HOSPADM

## 2023-05-15 RX ORDER — CALCIUM GLUCONATE 20 MG/ML
2000 INJECTION, SOLUTION INTRAVENOUS ONCE
Status: DISCONTINUED | OUTPATIENT
Start: 2023-05-15 | End: 2023-05-15

## 2023-05-15 RX ORDER — ONDANSETRON 4 MG/1
4 TABLET, ORALLY DISINTEGRATING ORAL EVERY 8 HOURS PRN
Status: DISCONTINUED | OUTPATIENT
Start: 2023-05-15 | End: 2023-05-16 | Stop reason: HOSPADM

## 2023-05-15 RX ORDER — ONDANSETRON 2 MG/ML
4 INJECTION INTRAMUSCULAR; INTRAVENOUS EVERY 6 HOURS PRN
Status: DISCONTINUED | OUTPATIENT
Start: 2023-05-15 | End: 2023-05-16 | Stop reason: HOSPADM

## 2023-05-15 RX ORDER — INSULIN GLARGINE 100 [IU]/ML
5 INJECTION, SOLUTION SUBCUTANEOUS NIGHTLY
Status: DISCONTINUED | OUTPATIENT
Start: 2023-05-15 | End: 2023-05-16 | Stop reason: HOSPADM

## 2023-05-15 RX ORDER — DEXTROSE MONOHYDRATE 25 G/50ML
25 INJECTION, SOLUTION INTRAVENOUS PRN
Status: DISCONTINUED | OUTPATIENT
Start: 2023-05-15 | End: 2023-05-15

## 2023-05-15 RX ORDER — HYDRALAZINE HYDROCHLORIDE 20 MG/ML
20 INJECTION INTRAMUSCULAR; INTRAVENOUS ONCE
Status: COMPLETED | OUTPATIENT
Start: 2023-05-15 | End: 2023-05-15

## 2023-05-15 RX ORDER — METOPROLOL SUCCINATE 25 MG/1
100 TABLET, EXTENDED RELEASE ORAL DAILY
Status: DISCONTINUED | OUTPATIENT
Start: 2023-05-15 | End: 2023-05-15

## 2023-05-15 RX ORDER — TRAZODONE HYDROCHLORIDE 50 MG/1
50 TABLET ORAL NIGHTLY
Status: CANCELLED | OUTPATIENT
Start: 2023-05-15

## 2023-05-15 RX ORDER — POLYETHYLENE GLYCOL 3350 17 G/17G
17 POWDER, FOR SOLUTION ORAL DAILY PRN
Status: DISCONTINUED | OUTPATIENT
Start: 2023-05-15 | End: 2023-05-16 | Stop reason: HOSPADM

## 2023-05-15 RX ORDER — ACETAMINOPHEN 325 MG/1
650 TABLET ORAL EVERY 6 HOURS PRN
Status: DISCONTINUED | OUTPATIENT
Start: 2023-05-15 | End: 2023-05-16 | Stop reason: HOSPADM

## 2023-05-15 RX ORDER — PANTOPRAZOLE SODIUM 40 MG/1
40 TABLET, DELAYED RELEASE ORAL
Status: DISCONTINUED | OUTPATIENT
Start: 2023-05-16 | End: 2023-05-16 | Stop reason: HOSPADM

## 2023-05-15 RX ORDER — GABAPENTIN 300 MG/1
300 CAPSULE ORAL DAILY
Status: DISCONTINUED | OUTPATIENT
Start: 2023-05-15 | End: 2023-05-16 | Stop reason: HOSPADM

## 2023-05-15 RX ORDER — ACETAMINOPHEN 650 MG/1
650 SUPPOSITORY RECTAL EVERY 6 HOURS PRN
Status: DISCONTINUED | OUTPATIENT
Start: 2023-05-15 | End: 2023-05-16 | Stop reason: HOSPADM

## 2023-05-15 RX ORDER — SODIUM CHLORIDE 0.9 % (FLUSH) 0.9 %
5-40 SYRINGE (ML) INJECTION PRN
Status: DISCONTINUED | OUTPATIENT
Start: 2023-05-15 | End: 2023-05-16 | Stop reason: HOSPADM

## 2023-05-15 RX ADMIN — ACETAMINOPHEN 650 MG: 325 TABLET ORAL at 17:47

## 2023-05-15 RX ADMIN — FUROSEMIDE 80 MG: 40 TABLET ORAL at 15:28

## 2023-05-15 RX ADMIN — ONDANSETRON 4 MG: 2 INJECTION INTRAMUSCULAR; INTRAVENOUS at 15:30

## 2023-05-15 RX ADMIN — HYDRALAZINE HYDROCHLORIDE 20 MG: 20 INJECTION INTRAMUSCULAR; INTRAVENOUS at 20:52

## 2023-05-15 RX ADMIN — CALCIUM GLUCONATE 2000 MG: 20 INJECTION, SOLUTION INTRAVENOUS at 17:38

## 2023-05-15 RX ADMIN — HYDRALAZINE HYDROCHLORIDE 20 MG: 20 INJECTION INTRAMUSCULAR; INTRAVENOUS at 12:22

## 2023-05-15 RX ADMIN — INSULIN GLARGINE 5 UNITS: 100 INJECTION, SOLUTION SUBCUTANEOUS at 21:06

## 2023-05-15 RX ADMIN — SODIUM BICARBONATE 25 MEQ: 84 INJECTION INTRAVENOUS at 14:24

## 2023-05-15 RX ADMIN — TAMSULOSIN HYDROCHLORIDE 0.4 MG: 0.4 CAPSULE ORAL at 21:06

## 2023-05-15 RX ADMIN — PANTOPRAZOLE SODIUM 40 MG: 40 INJECTION, POWDER, FOR SOLUTION INTRAVENOUS at 14:27

## 2023-05-15 RX ADMIN — SUCRALFATE 1 G: 1 TABLET ORAL at 16:18

## 2023-05-15 RX ADMIN — HYDROMORPHONE HYDROCHLORIDE 0.5 MG: 1 INJECTION, SOLUTION INTRAMUSCULAR; INTRAVENOUS; SUBCUTANEOUS at 12:23

## 2023-05-15 RX ADMIN — IOPAMIDOL 100 ML: 755 INJECTION, SOLUTION INTRAVENOUS at 12:08

## 2023-05-15 RX ADMIN — AMLODIPINE BESYLATE 10 MG: 5 TABLET ORAL at 17:07

## 2023-05-15 RX ADMIN — SODIUM POLYSTYRENE SULFONATE 15 G: 15 SUSPENSION ORAL; RECTAL at 14:29

## 2023-05-15 RX ADMIN — DEXTROSE MONOHYDRATE 25 G: 25 INJECTION, SOLUTION INTRAVENOUS at 14:27

## 2023-05-15 RX ADMIN — APIXABAN 5 MG: 5 TABLET, FILM COATED ORAL at 21:06

## 2023-05-15 RX ADMIN — GABAPENTIN 300 MG: 300 CAPSULE ORAL at 16:17

## 2023-05-15 RX ADMIN — Medication 5 UNITS: at 14:32

## 2023-05-15 RX ADMIN — HYDRALAZINE HYDROCHLORIDE 100 MG: 25 TABLET, FILM COATED ORAL at 21:43

## 2023-05-15 RX ADMIN — HYDRALAZINE HYDROCHLORIDE 100 MG: 25 TABLET, FILM COATED ORAL at 16:20

## 2023-05-15 RX ADMIN — LOSARTAN POTASSIUM 25 MG: 50 TABLET, FILM COATED ORAL at 16:19

## 2023-05-15 RX ADMIN — ALUMINUM HYDROXIDE, MAGNESIUM HYDROXIDE, AND SIMETHICONE 40 ML: 200; 200; 20 SUSPENSION ORAL at 15:30

## 2023-05-15 RX ADMIN — ISOSORBIDE MONONITRATE 60 MG: 30 TABLET, EXTENDED RELEASE ORAL at 16:17

## 2023-05-15 ASSESSMENT — PAIN DESCRIPTION - LOCATION
LOCATION: ABDOMEN
LOCATION: ABDOMEN
LOCATION: HEAD;ABDOMEN

## 2023-05-15 ASSESSMENT — PAIN - FUNCTIONAL ASSESSMENT: PAIN_FUNCTIONAL_ASSESSMENT: 0-10

## 2023-05-15 ASSESSMENT — PAIN DESCRIPTION - DESCRIPTORS
DESCRIPTORS: CRAMPING
DESCRIPTORS: CRAMPING

## 2023-05-15 ASSESSMENT — PAIN DESCRIPTION - PAIN TYPE: TYPE: ACUTE PAIN

## 2023-05-15 ASSESSMENT — ENCOUNTER SYMPTOMS
ABDOMINAL PAIN: 1
NAUSEA: 1
VOMITING: 1

## 2023-05-15 ASSESSMENT — PAIN SCALES - GENERAL
PAINLEVEL_OUTOF10: 9
PAINLEVEL_OUTOF10: 6
PAINLEVEL_OUTOF10: 9
PAINLEVEL_OUTOF10: 5

## 2023-05-15 ASSESSMENT — PAIN DESCRIPTION - FREQUENCY: FREQUENCY: CONTINUOUS

## 2023-05-15 NOTE — H&P
daily 4/17/23 5/17/23  Ar Automatic Reconciliation   acetaminophen (TYLENOL) 500 MG tablet Take 500 mg by mouth every 6 hours as needed 8/15/22   Ar Automatic Reconciliation   amLODIPine (NORVASC) 10 MG tablet Take 0.5 tab in AM and 0.5 tab in PM 1/24/23   Ar Automatic Reconciliation   aspirin 81 MG EC tablet Take 81 mg by mouth daily    Ar Automatic Reconciliation   diclofenac sodium (VOLTAREN) 1 % GEL Apply 4 g topically 4 times daily 8/16/22   Ar Automatic Reconciliation   docusate (COLACE, DULCOLAX) 100 MG CAPS Take 1 capsule by mouth 2 times daily 10/20/21   Ar Automatic Reconciliation   furosemide (LASIX) 80 MG tablet Take 80 mg by mouth daily    Ar Automatic Reconciliation   gabapentin (NEURONTIN) 300 MG capsule Take 300 mg by mouth daily. 2/27/23   Ar Automatic Reconciliation   hydrALAZINE (APRESOLINE) 100 MG tablet Take 100 mg by mouth 3 times daily    Ar Automatic Reconciliation   insulin aspart (NOVOLOG) 100 UNIT/ML injection pen Inject into the skin 3 times daily (before meals)    Ar Automatic Reconciliation   insulin glargine (LANTUS) 100 UNIT/ML injection vial Inject 25 Units into the skin    Ar Automatic Reconciliation   isosorbide mononitrate (IMDUR) 60 MG extended release tablet Take 60 mg by mouth daily 6/21/22   Ar Automatic Reconciliation   losartan (COZAAR) 25 MG tablet Take 25 mg by mouth daily 1/24/23   Ar Automatic Reconciliation   metoprolol succinate (TOPROL XL) 100 MG extended release tablet Take 100 mg by mouth daily 1/24/23   Ar Automatic Reconciliation   naloxone 4 MG/0.1ML LIQD nasal spray Use 1 spray intranasally, then discard. Repeat with new spray every 2 min as needed for opioid overdose symptoms, alternating nostrils.  7/29/22   Ar Automatic Reconciliation   pantoprazole (PROTONIX) 40 MG tablet Take 40 mg by mouth daily 6/21/22   Ar Automatic Reconciliation   polyethylene glycol (GLYCOLAX) 17 GM/SCOOP powder Take 17 g by mouth daily as needed    Ar Automatic Reconciliation

## 2023-05-15 NOTE — PROGRESS NOTES
Transfer out:     Patient transferring to room 336. SBAR report called to Maxim Mendoza RN.     Patient being transferred with:  [facility]

## 2023-05-15 NOTE — PROGRESS NOTES
Bedside and Verbal shift change report given to Garrett Marie RN (oncoming nurse) by Tio Sanders RN (offgoing nurse).  Report included the following information Nurse Handoff Report, ED SBAR, Intake/Output, MAR, Med Rec Status, and Cardiac Rhythm SB .

## 2023-05-15 NOTE — FLOWSHEET NOTE
05/15/23 1845   Vital Signs   BP (!) 202/76   Temp 97.7 °F (36.5 °C)   Pulse 59   Respirations 18   SpO2 97 %   Pain Assessment   Pain Assessment 0-10   Pain Level 5   Pain Location Head;Abdomen   Treatment   Time On 1845   Observations & Evaluations   Level of Consciousness 0   Oriented X 4   Heart Rhythm Regular   Respiratory Quality/Effort Unlabored   Skin Condition/Temp Dry; Warm   Edema None   Technical Checks   Dialysis Machine No. [id number]   RO Machine Number [id number]   Dialyzer Lot No. [id number]   Tubing Lot Number [id number]   All Connections Secure Yes   NS Bag Yes   Saline Line Double Clamped Yes   Dialyzer Revaclear 300   Prime Volume (mL) 141 mL   ICEBOAT I;C;E;B;O;A;T   RO Machine Log Sheet Completed Yes   Machine Alarm Self Test Completed; Passed   Air Foam Detector Tested;Proper Function   Extracorporeal Circuit Tested for Integrity Yes   Machine Conductivity 13.8   Manual Conductivity 13.7   Manual Ph 7.4   Bleach Test (Neg) Yes   Bath Temperature 98.6 °F (37 °C)   Treatment Initiation   Dialyze Hours 3.5   Treatment  Initiation Universal Precautions maintained;Lines secured to patient; Connections secured;Prime given;Venous Parameters set; Arterial Parameters set; Air foam detector engaged;Dialysate;Saline line double clamped;Dialyzer; Revaclear Dialyzer;REV-300   Access   Add Access?  Yes   Dialysis Bath   K+ (Potassium) 2   Ca+ (Calcium) 2.5   Na+ (Sodium) 138   HCO3 (Bicarb) 38   Bicarbonate Concentrate Lot No. [id number]   Acid Concentrate Lot No. [id number]     Pre report SBAR : Bryson Dacosta RN  Hep B pending

## 2023-05-15 NOTE — ED PROVIDER NOTES
(APRESOLINE) 100 MG TABLET    Take 100 mg by mouth 3 times daily    INSULIN ASPART (NOVOLOG) 100 UNIT/ML INJECTION PEN    Inject into the skin 3 times daily (before meals)    INSULIN GLARGINE (LANTUS) 100 UNIT/ML INJECTION VIAL    Inject 25 Units into the skin    ISOSORBIDE MONONITRATE (IMDUR) 60 MG EXTENDED RELEASE TABLET    Take 60 mg by mouth daily    LOSARTAN (COZAAR) 25 MG TABLET    Take 25 mg by mouth daily    METOPROLOL SUCCINATE (TOPROL XL) 100 MG EXTENDED RELEASE TABLET    Take 100 mg by mouth daily    NALOXONE 4 MG/0.1ML LIQD NASAL SPRAY    Use 1 spray intranasally, then discard. Repeat with new spray every 2 min as needed for opioid overdose symptoms, alternating nostrils. OMEPRAZOLE (PRILOSEC) 40 MG DELAYED RELEASE CAPSULE    Take by mouth daily    PANTOPRAZOLE (PROTONIX) 40 MG TABLET    Take 40 mg by mouth daily    POLYETHYLENE GLYCOL (GLYCOLAX) 17 GM/SCOOP POWDER    Take 17 g by mouth daily as needed    ROSUVASTATIN (CRESTOR) 10 MG TABLET    Take 10 mg by mouth every morning    SENNA (SENOKOT) 8.6 MG TABLET    Take 2 tablets by mouth nightly    SERTRALINE (ZOLOFT) 50 MG TABLET    Take 50 mg by mouth daily    SEVELAMER (RENVELA) 800 MG TABLET    TAKE 1 TABLET 3 TIMES A DAY AT BEGINNING OF MEALS SWALLOW WHOLE DO NOT CRUSH, BREAK OR CHEW    SEVELAMER HCL (RENAGEL) 800 MG TABLET    Take 800 mg by mouth 3 times daily (with meals)    SUCRALFATE (CARAFATE) 1 GM TABLET    Take 1 g by mouth 3 times daily    TAMSULOSIN (FLOMAX) 0.4 MG CAPSULE    Take 1 capsule by mouth nightly    TRAZODONE (DESYREL) 50 MG TABLET    TAKE 1 TABLET BY MOUTH NIGHTLY FOR INSOMNIA ASSOCIATED WITH DEPRESSION       ALLERGIES     Patient has no known allergies.     FAMILY HISTORY       Family History   Problem Relation Age of Onset    No Known Problems Father     Diabetes Mother           SOCIAL HISTORY       Social History     Socioeconomic History    Marital status:    Tobacco Use    Smoking status: Former     Types:

## 2023-05-15 NOTE — ED TRIAGE NOTES
Pt arrives via EMS with a CC of abdominal pain, headache, nausea and vomiting since Friday. Pt reports missing dialysis due to feeling unwell. EMS reports BP en route was 230/100. Pt states states his blood pressure has been high for at least a month.

## 2023-05-16 VITALS
DIASTOLIC BLOOD PRESSURE: 62 MMHG | BODY MASS INDEX: 25.31 KG/M2 | SYSTOLIC BLOOD PRESSURE: 158 MMHG | OXYGEN SATURATION: 100 % | HEART RATE: 64 BPM | TEMPERATURE: 98 F | HEIGHT: 68 IN | WEIGHT: 167 LBS | RESPIRATION RATE: 19 BRPM

## 2023-05-16 PROBLEM — K74.60 CIRRHOSIS (HCC): Status: ACTIVE | Noted: 2022-04-01

## 2023-05-16 PROBLEM — R10.13 ABDOMINAL PAIN, EPIGASTRIC: Status: ACTIVE | Noted: 2022-02-06

## 2023-05-16 PROBLEM — Z79.4 TYPE 2 DIABETES MELLITUS WITH OPHTHALMIC COMPLICATION, WITH LONG-TERM CURRENT USE OF INSULIN (HCC): Status: ACTIVE | Noted: 2018-11-25

## 2023-05-16 PROBLEM — E11.39 TYPE 2 DIABETES MELLITUS WITH OPHTHALMIC COMPLICATION, WITH LONG-TERM CURRENT USE OF INSULIN (HCC): Status: ACTIVE | Noted: 2018-11-25

## 2023-05-16 PROBLEM — G62.9 PERIPHERAL NEUROPATHY: Status: ACTIVE | Noted: 2018-11-14

## 2023-05-16 PROBLEM — D69.6 THROMBOCYTOPENIA (HCC): Status: ACTIVE | Noted: 2022-02-06

## 2023-05-16 PROBLEM — R07.9 CHEST PAIN: Status: RESOLVED | Noted: 2022-07-16 | Resolved: 2023-05-16

## 2023-05-16 LAB
ALBUMIN SERPL-MCNC: 2.9 G/DL (ref 3.5–5)
ALBUMIN/GLOB SERPL: 0.7 (ref 1.1–2.2)
ALP SERPL-CCNC: 93 U/L (ref 45–117)
ALT SERPL-CCNC: 14 U/L (ref 12–78)
ANION GAP SERPL CALC-SCNC: 6 MMOL/L (ref 5–15)
AST SERPL-CCNC: 9 U/L (ref 15–37)
BASOPHILS # BLD: 0 K/UL (ref 0–0.1)
BASOPHILS NFR BLD: 0 % (ref 0–1)
BILIRUB SERPL-MCNC: 0.7 MG/DL (ref 0.2–1)
BUN SERPL-MCNC: 33 MG/DL (ref 6–20)
BUN/CREAT SERPL: 8 (ref 12–20)
CALCIUM SERPL-MCNC: 8.5 MG/DL (ref 8.5–10.1)
CHLORIDE SERPL-SCNC: 100 MMOL/L (ref 97–108)
CO2 SERPL-SCNC: 28 MMOL/L (ref 21–32)
CREAT SERPL-MCNC: 4.07 MG/DL (ref 0.7–1.3)
DIFFERENTIAL METHOD BLD: ABNORMAL
EKG ATRIAL RATE: 54 BPM
EKG DIAGNOSIS: NORMAL
EKG P AXIS: 46 DEGREES
EKG P-R INTERVAL: 140 MS
EKG Q-T INTERVAL: 474 MS
EKG QRS DURATION: 84 MS
EKG QTC CALCULATION (BAZETT): 449 MS
EKG R AXIS: 16 DEGREES
EKG T AXIS: 74 DEGREES
EKG VENTRICULAR RATE: 54 BPM
EOSINOPHIL # BLD: 0.1 K/UL (ref 0–0.4)
EOSINOPHIL NFR BLD: 2 % (ref 0–7)
ERYTHROCYTE [DISTWIDTH] IN BLOOD BY AUTOMATED COUNT: 15 % (ref 11.5–14.5)
GLOBULIN SER CALC-MCNC: 4.3 G/DL (ref 2–4)
GLUCOSE BLD STRIP.AUTO-MCNC: 168 MG/DL (ref 65–117)
GLUCOSE BLD STRIP.AUTO-MCNC: 187 MG/DL (ref 65–117)
GLUCOSE BLD STRIP.AUTO-MCNC: 55 MG/DL (ref 65–117)
GLUCOSE BLD STRIP.AUTO-MCNC: 78 MG/DL (ref 65–117)
GLUCOSE SERPL-MCNC: 57 MG/DL (ref 65–100)
HBV SURFACE AB SER QL: NONREACTIVE
HBV SURFACE AB SER-ACNC: <3.1 MIU/ML
HBV SURFACE AG SER QL: <0.1 INDEX
HBV SURFACE AG SER QL: NEGATIVE
HCT VFR BLD AUTO: 34.2 % (ref 36.6–50.3)
HGB BLD-MCNC: 11.5 G/DL (ref 12.1–17)
IMM GRANULOCYTES # BLD AUTO: 0 K/UL (ref 0–0.04)
IMM GRANULOCYTES NFR BLD AUTO: 0 % (ref 0–0.5)
LYMPHOCYTES # BLD: 0.8 K/UL (ref 0.8–3.5)
LYMPHOCYTES NFR BLD: 18 % (ref 12–49)
MAGNESIUM SERPL-MCNC: 2.1 MG/DL (ref 1.6–2.4)
MCH RBC QN AUTO: 28.5 PG (ref 26–34)
MCHC RBC AUTO-ENTMCNC: 33.6 G/DL (ref 30–36.5)
MCV RBC AUTO: 84.9 FL (ref 80–99)
MONOCYTES # BLD: 0.6 K/UL (ref 0–1)
MONOCYTES NFR BLD: 14 % (ref 5–13)
NEUTS SEG # BLD: 2.9 K/UL (ref 1.8–8)
NEUTS SEG NFR BLD: 65 % (ref 32–75)
NRBC # BLD: 0 K/UL (ref 0–0.01)
NRBC BLD-RTO: 0 PER 100 WBC
PHOSPHATE SERPL-MCNC: 3.6 MG/DL (ref 2.6–4.7)
PLATELET # BLD AUTO: 112 K/UL (ref 150–400)
PMV BLD AUTO: 10.4 FL (ref 8.9–12.9)
POTASSIUM SERPL-SCNC: 3.5 MMOL/L (ref 3.5–5.1)
PROT SERPL-MCNC: 7.2 G/DL (ref 6.4–8.2)
RBC # BLD AUTO: 4.03 M/UL (ref 4.1–5.7)
SERVICE CMNT-IMP: ABNORMAL
SERVICE CMNT-IMP: NORMAL
SODIUM SERPL-SCNC: 134 MMOL/L (ref 136–145)
WBC # BLD AUTO: 4.5 K/UL (ref 4.1–11.1)

## 2023-05-16 PROCEDURE — 82962 GLUCOSE BLOOD TEST: CPT

## 2023-05-16 PROCEDURE — 36415 COLL VENOUS BLD VENIPUNCTURE: CPT

## 2023-05-16 PROCEDURE — 99222 1ST HOSP IP/OBS MODERATE 55: CPT | Performed by: FAMILY MEDICINE

## 2023-05-16 PROCEDURE — 94761 N-INVAS EAR/PLS OXIMETRY MLT: CPT

## 2023-05-16 PROCEDURE — 84100 ASSAY OF PHOSPHORUS: CPT

## 2023-05-16 PROCEDURE — 6370000000 HC RX 637 (ALT 250 FOR IP): Performed by: STUDENT IN AN ORGANIZED HEALTH CARE EDUCATION/TRAINING PROGRAM

## 2023-05-16 PROCEDURE — 85025 COMPLETE CBC W/AUTO DIFF WBC: CPT

## 2023-05-16 PROCEDURE — 6370000000 HC RX 637 (ALT 250 FOR IP)

## 2023-05-16 PROCEDURE — 83735 ASSAY OF MAGNESIUM: CPT

## 2023-05-16 PROCEDURE — 80053 COMPREHEN METABOLIC PANEL: CPT

## 2023-05-16 PROCEDURE — 6360000002 HC RX W HCPCS: Performed by: STUDENT IN AN ORGANIZED HEALTH CARE EDUCATION/TRAINING PROGRAM

## 2023-05-16 RX ORDER — PANTOPRAZOLE SODIUM 40 MG/1
40 TABLET, DELAYED RELEASE ORAL DAILY
Qty: 30 TABLET | Refills: 3 | Status: SHIPPED | OUTPATIENT
Start: 2023-05-16

## 2023-05-16 RX ORDER — AMLODIPINE BESYLATE 10 MG/1
10 TABLET ORAL DAILY
Qty: 30 TABLET | Refills: 3 | Status: SHIPPED | OUTPATIENT
Start: 2023-05-17

## 2023-05-16 RX ORDER — INSULIN GLARGINE 100 [IU]/ML
5 INJECTION, SOLUTION SUBCUTANEOUS NIGHTLY
Qty: 10 ML | Refills: 3 | Status: SHIPPED | OUTPATIENT
Start: 2023-05-16

## 2023-05-16 RX ORDER — HYDRALAZINE HYDROCHLORIDE 20 MG/ML
20 INJECTION INTRAMUSCULAR; INTRAVENOUS EVERY 4 HOURS PRN
Status: DISCONTINUED | OUTPATIENT
Start: 2023-05-16 | End: 2023-05-16 | Stop reason: HOSPADM

## 2023-05-16 RX ORDER — SUCRALFATE 1 G/1
1 TABLET ORAL 3 TIMES DAILY
Qty: 120 TABLET | Refills: 3 | Status: SHIPPED | OUTPATIENT
Start: 2023-05-16

## 2023-05-16 RX ORDER — CALCIUM CARBONATE 200(500)MG
500 TABLET,CHEWABLE ORAL 3 TIMES DAILY PRN
Status: DISCONTINUED | OUTPATIENT
Start: 2023-05-16 | End: 2023-05-16 | Stop reason: HOSPADM

## 2023-05-16 RX ADMIN — METOPROLOL SUCCINATE 100 MG: 50 TABLET, EXTENDED RELEASE ORAL at 08:36

## 2023-05-16 RX ADMIN — HYDRALAZINE HYDROCHLORIDE 100 MG: 25 TABLET, FILM COATED ORAL at 15:11

## 2023-05-16 RX ADMIN — ISOSORBIDE MONONITRATE 60 MG: 60 TABLET, EXTENDED RELEASE ORAL at 08:06

## 2023-05-16 RX ADMIN — ANTACID TABLETS 500 MG: 500 TABLET, CHEWABLE ORAL at 15:15

## 2023-05-16 RX ADMIN — HYDRALAZINE HYDROCHLORIDE 100 MG: 25 TABLET, FILM COATED ORAL at 08:06

## 2023-05-16 RX ADMIN — AMLODIPINE BESYLATE 10 MG: 5 TABLET ORAL at 08:07

## 2023-05-16 RX ADMIN — ROSUVASTATIN CALCIUM 10 MG: 10 TABLET, FILM COATED ORAL at 08:06

## 2023-05-16 RX ADMIN — SEVELAMER CARBONATE 800 MG: 800 TABLET, FILM COATED ORAL at 08:07

## 2023-05-16 RX ADMIN — APIXABAN 5 MG: 5 TABLET, FILM COATED ORAL at 08:36

## 2023-05-16 RX ADMIN — SEVELAMER CARBONATE 800 MG: 800 TABLET, FILM COATED ORAL at 11:54

## 2023-05-16 RX ADMIN — HYDRALAZINE HYDROCHLORIDE 20 MG: 20 INJECTION INTRAMUSCULAR; INTRAVENOUS at 01:17

## 2023-05-16 RX ADMIN — PANTOPRAZOLE SODIUM 40 MG: 40 TABLET, DELAYED RELEASE ORAL at 05:40

## 2023-05-16 RX ADMIN — ACETAMINOPHEN 650 MG: 325 TABLET ORAL at 00:10

## 2023-05-16 RX ADMIN — LOSARTAN POTASSIUM 25 MG: 50 TABLET, FILM COATED ORAL at 08:06

## 2023-05-16 RX ADMIN — ACETAMINOPHEN 650 MG: 325 TABLET ORAL at 06:21

## 2023-05-16 RX ADMIN — GABAPENTIN 300 MG: 300 CAPSULE ORAL at 08:36

## 2023-05-16 RX ADMIN — SUCRALFATE 1 G: 1 TABLET ORAL at 05:40

## 2023-05-16 RX ADMIN — FUROSEMIDE 80 MG: 40 TABLET ORAL at 08:06

## 2023-05-16 RX ADMIN — SUCRALFATE 1 G: 1 TABLET ORAL at 11:54

## 2023-05-16 RX ADMIN — ACETAMINOPHEN 650 MG: 325 TABLET ORAL at 12:48

## 2023-05-16 ASSESSMENT — PAIN DESCRIPTION - LOCATION
LOCATION: GENERALIZED
LOCATION: HEAD

## 2023-05-16 ASSESSMENT — ENCOUNTER SYMPTOMS
ABDOMINAL PAIN: 1
SHORTNESS OF BREATH: 0
COLOR CHANGE: 0

## 2023-05-16 ASSESSMENT — PAIN - FUNCTIONAL ASSESSMENT: PAIN_FUNCTIONAL_ASSESSMENT: ACTIVITIES ARE NOT PREVENTED

## 2023-05-16 ASSESSMENT — PAIN DESCRIPTION - ORIENTATION: ORIENTATION: ANTERIOR;POSTERIOR

## 2023-05-16 ASSESSMENT — PAIN SCALES - GENERAL
PAINLEVEL_OUTOF10: 8

## 2023-05-16 ASSESSMENT — PAIN DESCRIPTION - DESCRIPTORS: DESCRIPTORS: PATIENT UNABLE TO DESCRIBE

## 2023-05-16 NOTE — PROGRESS NOTES
Bedside and Verbal shift change report given to Andree Amos RN (oncoming nurse) by Edgardo Ovalles RN (offgoing nurse). Report included the following information Nurse Handoff Report, Index, Adult Overview, Intake/Output, MAR, and Recent Results. 18 - Spoke with Tracey Vega w/ SUE. Metoprolol held 5/15 for bradycardia in 50's. Heart rate currently 60's on monitor. Per MD okay to give scheduled metoprolol. This patient was assisted with Intentional Toileting every 2 hours during this shift as appropriate. Documentation of ambulation and output reflected on Flowsheet as appropriate. Purposeful hourly rounding was completed using AIDET and 5Ps. Outcomes of PHR documented as they occurred. Bed alarm in use as appropriate. Dual Suction and ambubag in place.

## 2023-05-16 NOTE — PROGRESS NOTES
Subjective / Objective     Subjective  Overnight Events: elevated Bps requiring PRN med. Patient seen and examined at bedside. Reports a mild headache. Denies CP, SOB, N/V, dysuria. Respiratory:     Vitals:    05/16/23 0345   BP: (!) 165/56   Pulse: 65   Resp: 14   Temp: 98 °F (36.7 °C)   SpO2: 94%     Physical Examination:   General appearance - alert and in no distress  Chest - clear to auscultation, no wheezes, rales or rhonchi, symmetric air entry  Heart - normal rate, regular rhythm, normal S1, S2, no murmurs, rubs, clicks or gallops,   Abdomen - soft,distention-improved, mild epigastric tenderness. No masses or organomegaly  Neurological - alert, oriented, normal speech, no focal findings  Skin - warm, dry.  No notable rashes  Extremities - peripheral pulses normal no clubbing or cyanosis  Psychiatric - normal speech and thought processes    I/O:  05/15 0701 - 05/16 0700  In: 620 [P.O.:120]  Out: 3500     Inpatient Medications   Current Facility-Administered Medications   Medication Dose Route Frequency Provider Last Rate Last Admin    hydrALAZINE (APRESOLINE) injection 20 mg  20 mg IntraVENous Q4H PRN Gael Lobo MD   20 mg at 05/16/23 0117    sodium chloride flush 0.9 % injection 5-40 mL  5-40 mL IntraVENous 2 times per day Mireya Robins MD        sodium chloride flush 0.9 % injection 5-40 mL  5-40 mL IntraVENous PRN Mireya Robins MD        0.9 % sodium chloride infusion   IntraVENous PRN Mireya Robins MD        ondansetron (ZOFRAN-ODT) disintegrating tablet 4 mg  4 mg Oral Q8H PRN Mireya Robins MD        Or    [facility]ron [facility]) injection 4 mg  4 mg IntraVENous Q6H PRN Mireya Robins MD   4 mg at 05/15/23 1530    polyethylene glycol (GLYCOLAX) packet 17 g  17 g Oral Daily PRN Mireya Robins MD        acetaminophen (TYLENOL) tablet 650 mg  650 mg Oral Q6H PRN Mireya Robins MD   650 mg at 05/16/23 9853    Or    acetaminophen (TYLENOL) suppository 650 mg  650 mg

## 2023-05-16 NOTE — CONSULTS
Vascular Surgery Consult Note  5/16/2023    Subjective:     Tricia Gates is a 58 y.o. male know to Vascular service. He presented to the ER with abdominal pain and elevated blood pressure after missing dialysis due to access difficulty. Vascular consult for access issue. He has had two recent fistulagrams 4/6/23 and 4/20/23. Most recently had two coil embolectomies to two venous side branches and small outflow stenosis that was treated. Still trouble with his access. He has a right chest port that is functioning. Patient seen and examined. He had dialysis yesterday and feels better. He continues with mild abdominal pain. Reports soreness at this left fistula. On last access it became painful and swollen.      Past Medical History:   Diagnosis Date    Anemia associated with chronic renal failure     Anxiety and depression     BPH (benign prostatic hyperplasia)     CAD (coronary artery disease)     CHF NYHA class I, chronic, diastolic (HCC)     Chronic pain     DM type 2 causing renal disease (HCC)     DM type 2 causing vascular disease (HCC)     ESRD on dialysis ([facility] [address]     GERD (gastroesophageal reflux disease)     HTN (hypertension)     Hyperlipidemia     Thrombocytopenia (HCC)       Past Surgical History:   Procedure Laterality Date    ARTERIAL BYPASS SURGRY      IR NONTUNNELED VASCULAR CATHETER  11/19/2021    IR NONTUNNELED VASCULAR CATHETER 11/19/2021 [facility] RAD [facility]GIO IR    IR NONTUNNELED VASCULAR CATHETER  11/19/2021    IR NONTUNNELED VASCULAR CATHETER  4/7/2023    IR NONTUNNELED VASCULAR CATHETER  4/7/2023    IR NONTUNNELED VASCULAR CATHETER 4/7/2023 [facility] RAD ANGIO IR    IR TUNNELED CATHETER PLACEMENT GREATER THAN 5 YEARS  11/24/2021    IR TUNNELED CATHETER PLACEMENT GREATER THAN 5 YEARS 11/24/2021 [facility] RAD ANGIO IR    IR TUNNELED CATHETER PLACEMENT GREATER THAN 5 YEARS  11/24/2021    IR TUNNELED CATHETER PLACEMENT GREATER THAN 5 YEARS  4/11/2023    IR TUNNELED CATHETER PLACEMENT GREATER THAN 5 YEARS  4/11/2023    IR
was performed today. Pertinent positives and negatives are mentioned in the HPI. The reminder of the ROS is negative and noncontributory. .     Objective:    Vitals:    Vitals:    05/15/23 1245 05/15/23 1300 05/15/23 1305 05/15/23 1500   BP: (!) 204/71 (!) 208/75 (!) 208/75    Pulse: 52 52 52 57   Resp: 14 15 15 23   Temp:   97.5 °F (36.4 °C) 97.1 °F (36.2 °C)   TempSrc:   Oral Axillary   SpO2: 95% 94% 94%    Weight:       Height:         I&O's:  No intake/output data recorded. Physical Exam:  General:Alert, No distress,   Head:Normocephalic, Atraumatic  Eyes:No scleral icterus, No conjunctival pallor  Neck:Supple,no mass palpable  Lungs:Clears to auscultation Bilaterally,  normal respiratory effort  CVS:Braycardia  Abdomen:Soft, Non tender, No hepatosplenomegaly, bowel sound present  Extremities:No cyanosis, No clubbing  Access R chest port and LFA fistula  Skin:No rash or lesions.   Lymph nodes:No palpable nodes  MS: No joint swelling, erthema, warmth  Neurologic:non focal, AAO x3  Psych: normal affect    Laboratory Results:    Lab Results   Component Value Date    CREATININE 7.57 (H) 05/15/2023    BUN 77 (H) 05/15/2023     (L) 05/15/2023    K 6.0 (H) 05/15/2023     05/15/2023    CO2 19 (L) 05/15/2023       Lab Results   Component Value Date    CREATININE 7.57 (H) 05/15/2023    CREATININE 4.70 (H) 04/11/2023    CREATININE 6.92 (H) 04/10/2023    CREATININE 5.57 (H) 04/09/2023    CREATININE 3.51 (H) 04/08/2023    BUN 77 (H) 05/15/2023    BUN 38 (H) 04/11/2023    BUN 73 (H) 04/10/2023    BUN 56 (H) 04/09/2023    BUN 39 (H) 04/08/2023    K 6.0 (H) 05/15/2023    K 3.9 04/11/2023    K 5.4 (H) 04/10/2023    K 4.5 04/09/2023    K 3.7 04/08/2023       Lab Results   Component Value Date    WBC 7.1 05/15/2023    RBC 4.33 05/15/2023    HGB 12.4 05/15/2023    HCT 37.0 05/15/2023    MCV 85.5 05/15/2023    MCH 28.6 05/15/2023    RDW 15.2 (H) 05/15/2023    PLT 95 (L) 05/15/2023       Lab Results   Component Value Date

## 2023-05-16 NOTE — FLOWSHEET NOTE
05/16/23 0010   Vital Signs   BP (!) 198/73   MAP (Calculated) 115     Patient's BP continues to be elevated after HD, scheduled PO Hydralazine and PRN IV hydralazine. Family practice notified. Stated they will look over chart and evaluate the patient. Will continue to monitor.

## 2023-05-16 NOTE — PROGRESS NOTES
Discharge instructions were reviewed with patient. All questions were answered. IV site and telemetry (hard wired) were removed Care Plans and Education were completed. Home health was set up by Case Management. Patient was discharged home with his wife. Primary nurse was updated.

## 2023-05-16 NOTE — FLOWSHEET NOTE
05/15/23 2220   Vital Signs   BP (!) 205/83   Temp 98.2 °F (36.8 °C)   Pulse 69   Respirations 16   SpO2 97 %   Pain Assessment   Pain Assessment None - Denies Pain   Post-Hemodialysis Assessment   Post-Treatment Procedures Blood returned;Catheter Capped, clamped with Saline x2 ports   Machine Disinfection Process Acid/Vinegar Clean;Bleach   Rinseback Volume (ml) 300 ml   Blood Volume Processed (Liters) 58 l/min   Dialyzer Clearance Lightly streaked   Duration of Treatment (minutes) 3.5 minutes   Hemodialysis Intake (ml) 500 ml   Hemodialysis Output (ml) 3500 ml   NET Removed (ml) 3000   Tolerated Treatment Good   Patient Response to Treatment well   Edema None   Time Off 2215   Patient Disposition Other (Comment)  (remained in room 336)     Post tx report given to: Rosalinda Davila RN  Dressing changed, dated

## 2023-05-16 NOTE — DISCHARGE SUMMARY
[address] [facility] [address] [place] [address]   Office [phone]  Fax [phone]       Discharge / Transfer / Off-Service Note     Name: Dolores Parisi MRN: [id number]  Sex: Male   YOB: 1960  Age: 58 y.o. PCP: Dean Sultana MD     Date of admission: 5/15/2023  Date of discharge/transfer: 5/16/2023    Attending physician at admission: Dr. Myra De La Vega. Attending physician at discharge/transfer: Bradley Ye MD  Resident physician at discharge/transfer: Melinda Fisher MD     Consultants during hospitalization  IP CONSULT TO NEPHROLOGY  IP CONSULT TO Luciana Montgomery  78. CONSULT TO EVAL  IP CONSULT TO CASE MANAGEMENT     Admission diagnoses   Abdominal pain, epigastric [R10.13]  Hyperkalemia [E87.5]  Acute pulmonary edema ([facility] [J81.0]  Essential hypertension [I10]  ESRD (end stage renal disease) ([facility] [N18.6]    Recommended follow-up after discharge    1. PCP-Cheikh Tim MD  2. Nephrology for continued dialysis   3. Vascular surgery for fistula revision   4. GI for gastritis      Things to follow up on with PCP:   - BP well controlled   - Repeat BMP (Hyperkalemia, hyponatremia)   - Patient is taking medications properly.   - Follow up recommended per CT read on last admission for Rounded Atelectasis L Lung base   - Follow-up with groin lymph node from prior admission   - Please insure patient has GI follow-up for this gastritis     Medication Changes:     Medication List        CHANGE how you take these medications      amLODIPine 10 MG tablet  Commonly known as: NORVASC  Take 1 tablet by mouth daily  Start taking on: May 17, 2023  What changed: See the new instructions.      insulin glargine 100 UNIT/ML injection vial  Commonly known as: LANTUS  Inject 5 Units into the skin nightly  What changed:   how much to take  when to take this            CONTINUE taking these medications      acetaminophen 500 MG tablet  Commonly known as: TYLENOL     apixaban 5 MG

## 2023-05-16 NOTE — DISCHARGE INSTRUCTIONS
HOME DISCHARGE INSTRUCTIONS    April Friday / [id number] : 1960    Admission date: 5/15/2023 Discharge date: 2023     Please bring this form with you to show your care provider at your follow-up appointment. Primary care provider:  David Blankenship    Discharging provider:  Sydnie Loyd MD  - Family Medicine Resident  Dr. Jay Mercedes. Attending, Family Medicine     You have been admitted to the hospital with the following diagnoses:    ACUTE DIAGNOSES:  Abdominal pain, epigastric [R10.13]  Hyperkalemia [E87.5]  Acute pulmonary edema ([facility] [J81.0]  Essential hypertension [I10]  ESRD (end stage renal disease) ([facility] [address] [N18.6]    . . . . . . . . . . . . . . . . . . . . . . . . . . . . . . . . . . . . . . . . . . . . . . . . . . . . . . . . . . . . . . . . . . . . . . . .   Attending physician at discharge/transfer:     FOLLOW-UP CARE RECOMMENDATIONS:  You are well enough to be discharged from the hospital. You were in the hospital for dialysis. We helped you get the dialysis.  Please continue your current regimen and follow up with your nephrologist, PCP, and the vascular team.     Johnathon Kuhn MD  [address]  [phone]    Schedule an appointment as soon as possible for a visit in 2 week(s)  Schedule follow up with GI regarding abdominal pain and prior diagnosis of erosive gastritis    SAMIR Prasad NP   [phone]    Go on 2023  10:00 AM - hospital follow up appointment    Carly Gan MD  [facility]  [phone]    Follow up  Nephrology follow up    SAMIR Dickson NP  [address]  [phone]    Call  Vascular surgery follow up for dialysis fistula revision     Future Appointments   Date Time Provider Codie Llanes   2023 10:00 AM [facility]

## 2023-05-16 NOTE — CARE COORDINATION
05/16/23 1340   Service Assessment   Patient Orientation Alert and Oriented   Cognition Alert   History Provided By Patient   Primary Caregiver Self   Support Systems Spouse/Significant Other;Children;Family Members   Patient's Healthcare Decision Maker is: Legal Next of Kin   PCP Verified by CM Yes   Last Visit to PCP Within last 3 months   Prior Functional Level Independent in ADLs/IADLs   Current Functional Level Independent in ADLs/IADLs   Can patient return to prior living arrangement Yes   Ability to make needs known: Good   Family able to assist with home care needs: Yes   Would you like for me to discuss the discharge plan with any other family members/significant others, and if so, who? No   Financial Resources Medicaid   Social/Functional History   Lives With Spouse; Family   Type of [address] One level   [address] to enter without rails   Entrance Stairs - Number of Steps [address] chair   Home Equipment Cane;Walker, rolling   Active  No   Patient's [facility] wife and children drive   Mode of Transportation Car;Cab   Discharge Planning   Type of Residence House   Current Services Prior To Admission None   Patient expects to be discharged to: House     Reason for Admission:   abdominal pain, epigastric, hyperkalemia, acute pulmonary edema, essential HTN, ESRD                    RUR Score:     16%             PCP: First and Last name:   Alejandro Shi MD     Name of Practice:    Are you a current patient: Yes/No: yes   Approximate date of last visit: 3 weeks ago   Can you participate in a virtual visit if needed:     Do you (patient/family) have any concerns for transition/discharge?     No               Plan for utilizing home health:   a referral was sent in [facility] to AT [facility]:  Full Code No additional code details      Healthcare Decision Maker:   Click here to [address]

## 2023-05-16 NOTE — PROGRESS NOTES
[address] [facility]  YOB: 1960          Assessment & Plan:     ESRD on HD MWF at [facility]  Missed HD  Hyperkalemia  Fluid overload  Anemia  SHPT  HTN    Rec:  No acute indication HD  On 2L NC, which he wears at home, with sat 100%  HD tomorrow and MWF  Continue BP meds  LIZZETH       Subjective:   CC: ESRD  HPI: HD yest katherin well  ROS:Mild sob, no n/v  Current Facility-Administered Medications   Medication Dose Route Frequency    hydrALAZINE (APRESOLINE) injection 20 mg  20 mg IntraVENous Q4H PRN    sodium chloride flush 0.9 % injection 5-40 mL  5-40 mL IntraVENous 2 times per day    sodium chloride flush 0.9 % injection 5-40 mL  5-40 mL IntraVENous PRN    0.9 % sodium chloride infusion   IntraVENous PRN    ondansetron (ZOFRAN-ODT) disintegrating tablet 4 mg  4 mg Oral Q8H PRN    Or    ondansetron (ZOFRAN) injection 4 mg  4 mg IntraVENous Q6H PRN    polyethylene glycol (GLYCOLAX) packet 17 g  17 g Oral Daily PRN    acetaminophen (TYLENOL) tablet 650 mg  650 mg Oral Q6H PRN    Or    acetaminophen (TYLENOL) suppository 650 mg  650 mg Rectal Q6H PRN    apixaban (ELIQUIS) tablet 5 mg  5 mg Oral BID    gabapentin (NEURONTIN) capsule 300 mg  300 mg Oral Daily    hydrALAZINE (APRESOLINE) tablet 100 mg  100 mg Oral TID    losartan (COZAAR) tablet 25 mg  25 mg Oral Daily    pantoprazole (PROTONIX) tablet 40 mg  40 mg Oral QAM AC    rosuvastatin (CRESTOR) tablet 10 mg  10 mg Oral QAM    [Held by provider] sertraline (ZOLOFT) tablet 50 mg  50 mg Oral Daily    sucralfate (CARAFATE) tablet 1 g  1 g Oral TID AC    tamsulosin (FLOMAX) capsule 0.4 mg  0.4 mg Oral Nightly    sevelamer (RENVELA) tablet 800 mg  800 mg Oral TID WC    insulin lispro (HUMALOG) injection vial 0-8 Units  0-8 Units SubCUTAneous TID WC    insulin lispro (HUMALOG) injection vial 0-4 Units  0-4 Units SubCUTAneous Nightly    insulin glargine (LANTUS) injection vial 5 Units  5 Units

## 2023-05-16 NOTE — WOUND CARE
Wound consult: consulted for left great toe. In with patient who is alert, oriented x 4. He appears to have had great toe nail trimmed and has small cut to left great toe tip which is closed, TUYET no drainage or redness surrounding. Appears to be healing without difficulty. No local wound care indicated.   Martha Zhou, RN,CWON

## 2023-05-17 DIAGNOSIS — F32.A DEPRESSION, UNSPECIFIED: ICD-10-CM

## 2023-05-30 ENCOUNTER — TELEPHONE (OUTPATIENT)
Facility: CLINIC | Age: 63
End: 2023-05-30

## 2023-06-05 ENCOUNTER — APPOINTMENT (OUTPATIENT)
Facility: HOSPITAL | Age: 63
End: 2023-06-05
Payer: MEDICAID

## 2023-06-05 ENCOUNTER — HOSPITAL ENCOUNTER (OUTPATIENT)
Facility: HOSPITAL | Age: 63
Setting detail: OBSERVATION
Discharge: HOME | End: 2023-06-07
Attending: EMERGENCY MEDICINE
Payer: MEDICAID

## 2023-06-05 DIAGNOSIS — G62.9 PERIPHERAL POLYNEUROPATHY: ICD-10-CM

## 2023-06-05 DIAGNOSIS — N18.6 ESRD ON DIALYSIS (HCC): ICD-10-CM

## 2023-06-05 DIAGNOSIS — J81.0 ACUTE PULMONARY EDEMA (HCC): ICD-10-CM

## 2023-06-05 DIAGNOSIS — R10.9 ABDOMINAL PAIN, UNSPECIFIED ABDOMINAL LOCATION: ICD-10-CM

## 2023-06-05 DIAGNOSIS — R07.9 CHEST PAIN, UNSPECIFIED TYPE: Primary | ICD-10-CM

## 2023-06-05 DIAGNOSIS — K52.9 COLITIS: ICD-10-CM

## 2023-06-05 DIAGNOSIS — E87.5 HYPERKALEMIA: ICD-10-CM

## 2023-06-05 DIAGNOSIS — R06.00 DYSPNEA, UNSPECIFIED TYPE: ICD-10-CM

## 2023-06-05 DIAGNOSIS — D69.6 THROMBOCYTOPENIA (HCC): ICD-10-CM

## 2023-06-05 DIAGNOSIS — Z99.2 ESRD ON DIALYSIS (HCC): ICD-10-CM

## 2023-06-05 LAB
ALBUMIN SERPL-MCNC: 3.1 G/DL (ref 3.5–5)
ALBUMIN/GLOB SERPL: 0.7 (ref 1.1–2.2)
ALP SERPL-CCNC: 200 U/L (ref 45–117)
ALT SERPL-CCNC: 24 U/L (ref 12–78)
ANION GAP SERPL CALC-SCNC: 9 MMOL/L (ref 5–15)
APPEARANCE UR: CLEAR
AST SERPL-CCNC: 12 U/L (ref 15–37)
BACTERIA URNS QL MICRO: NEGATIVE /HPF
BASOPHILS # BLD: 0 K/UL (ref 0–0.1)
BASOPHILS NFR BLD: 1 % (ref 0–1)
BILIRUB SERPL-MCNC: 0.8 MG/DL (ref 0.2–1)
BILIRUB UR QL: NEGATIVE
BUN SERPL-MCNC: 67 MG/DL (ref 6–20)
BUN/CREAT SERPL: 11 (ref 12–20)
CALCIUM SERPL-MCNC: 8.7 MG/DL (ref 8.5–10.1)
CHLORIDE SERPL-SCNC: 98 MMOL/L (ref 97–108)
CO2 SERPL-SCNC: 24 MMOL/L (ref 21–32)
COLOR UR: ABNORMAL
COMMENT:: NORMAL
CREAT SERPL-MCNC: 6.33 MG/DL (ref 0.7–1.3)
DIFFERENTIAL METHOD BLD: ABNORMAL
EKG ATRIAL RATE: 54 BPM
EKG DIAGNOSIS: NORMAL
EKG P AXIS: 33 DEGREES
EKG P-R INTERVAL: 168 MS
EKG Q-T INTERVAL: 488 MS
EKG QRS DURATION: 90 MS
EKG QTC CALCULATION (BAZETT): 462 MS
EKG R AXIS: 26 DEGREES
EKG T AXIS: 89 DEGREES
EKG VENTRICULAR RATE: 54 BPM
EOSINOPHIL # BLD: 0.1 K/UL (ref 0–0.4)
EOSINOPHIL NFR BLD: 2 % (ref 0–7)
EPITH CASTS URNS QL MICRO: ABNORMAL /LPF
ERYTHROCYTE [DISTWIDTH] IN BLOOD BY AUTOMATED COUNT: 17.2 % (ref 11.5–14.5)
ETHANOL SERPL-MCNC: <10 MG/DL (ref 0–0.08)
GLOBULIN SER CALC-MCNC: 4.3 G/DL (ref 2–4)
GLUCOSE BLD STRIP.AUTO-MCNC: 118 MG/DL (ref 65–117)
GLUCOSE BLD STRIP.AUTO-MCNC: 136 MG/DL (ref 65–117)
GLUCOSE SERPL-MCNC: 184 MG/DL (ref 65–100)
GLUCOSE UR STRIP.AUTO-MCNC: 250 MG/DL
HCT VFR BLD AUTO: 29 % (ref 36.6–50.3)
HGB BLD-MCNC: 9.6 G/DL (ref 12.1–17)
HGB UR QL STRIP: NEGATIVE
IMM GRANULOCYTES # BLD AUTO: 0 K/UL (ref 0–0.04)
IMM GRANULOCYTES NFR BLD AUTO: 1 % (ref 0–0.5)
KETONES UR QL STRIP.AUTO: NEGATIVE MG/DL
LACTATE SERPL-SCNC: 1 MMOL/L (ref 0.4–2)
LEUKOCYTE ESTERASE UR QL STRIP.AUTO: NEGATIVE
LIPASE SERPL-CCNC: 89 U/L (ref 73–393)
LYMPHOCYTES # BLD: 0.4 K/UL (ref 0.8–3.5)
LYMPHOCYTES NFR BLD: 10 % (ref 12–49)
MAGNESIUM SERPL-MCNC: 2.4 MG/DL (ref 1.6–2.4)
MCH RBC QN AUTO: 28.5 PG (ref 26–34)
MCHC RBC AUTO-ENTMCNC: 33.1 G/DL (ref 30–36.5)
MCV RBC AUTO: 86.1 FL (ref 80–99)
MONOCYTES # BLD: 0.4 K/UL (ref 0–1)
MONOCYTES NFR BLD: 10 % (ref 5–13)
NEUTS SEG # BLD: 3 K/UL (ref 1.8–8)
NEUTS SEG NFR BLD: 76 % (ref 32–75)
NITRITE UR QL STRIP.AUTO: NEGATIVE
NRBC # BLD: 0 K/UL (ref 0–0.01)
NRBC BLD-RTO: 0 PER 100 WBC
NT PRO BNP: ABNORMAL PG/ML
PH UR STRIP: 7.5 (ref 5–8)
PLATELET # BLD AUTO: 77 K/UL (ref 150–400)
POTASSIUM SERPL-SCNC: 5.8 MMOL/L (ref 3.5–5.1)
PROT SERPL-MCNC: 7.4 G/DL (ref 6.4–8.2)
PROT UR STRIP-MCNC: >300 MG/DL
RBC # BLD AUTO: 3.37 M/UL (ref 4.1–5.7)
RBC #/AREA URNS HPF: ABNORMAL /HPF (ref 0–5)
RBC MORPH BLD: ABNORMAL
SERVICE CMNT-IMP: ABNORMAL
SERVICE CMNT-IMP: ABNORMAL
SODIUM SERPL-SCNC: 131 MMOL/L (ref 136–145)
SP GR UR REFRACTOMETRY: 1.02 (ref 1–1.03)
SPECIMEN HOLD: NORMAL
TROPONIN I SERPL HS-MCNC: 18 NG/L (ref 0–76)
TROPONIN I SERPL HS-MCNC: 19 NG/L (ref 0–76)
UROBILINOGEN UR QL STRIP.AUTO: 1 EU/DL (ref 0.2–1)
WBC # BLD AUTO: 3.9 K/UL (ref 4.1–11.1)
WBC URNS QL MICRO: ABNORMAL /HPF (ref 0–4)

## 2023-06-05 PROCEDURE — 6360000002 HC RX W HCPCS: Performed by: EMERGENCY MEDICINE

## 2023-06-05 PROCEDURE — 83605 ASSAY OF LACTIC ACID: CPT

## 2023-06-05 PROCEDURE — 96372 THER/PROPH/DIAG INJ SC/IM: CPT

## 2023-06-05 PROCEDURE — 94761 N-INVAS EAR/PLS OXIMETRY MLT: CPT

## 2023-06-05 PROCEDURE — 85025 COMPLETE CBC W/AUTO DIFF WBC: CPT

## 2023-06-05 PROCEDURE — 83690 ASSAY OF LIPASE: CPT

## 2023-06-05 PROCEDURE — 6370000000 HC RX 637 (ALT 250 FOR IP): Performed by: INTERNAL MEDICINE

## 2023-06-05 PROCEDURE — 82962 GLUCOSE BLOOD TEST: CPT

## 2023-06-05 PROCEDURE — 80053 COMPREHEN METABOLIC PANEL: CPT

## 2023-06-05 PROCEDURE — 93005 ELECTROCARDIOGRAM TRACING: CPT | Performed by: EMERGENCY MEDICINE

## 2023-06-05 PROCEDURE — 6360000002 HC RX W HCPCS

## 2023-06-05 PROCEDURE — 96367 TX/PROPH/DG ADDL SEQ IV INF: CPT

## 2023-06-05 PROCEDURE — 6360000002 HC RX W HCPCS: Performed by: STUDENT IN AN ORGANIZED HEALTH CARE EDUCATION/TRAINING PROGRAM

## 2023-06-05 PROCEDURE — 2580000003 HC RX 258: Performed by: EMERGENCY MEDICINE

## 2023-06-05 PROCEDURE — 81001 URINALYSIS AUTO W/SCOPE: CPT

## 2023-06-05 PROCEDURE — 82077 ASSAY SPEC XCP UR&BREATH IA: CPT

## 2023-06-05 PROCEDURE — 96376 TX/PRO/DX INJ SAME DRUG ADON: CPT

## 2023-06-05 PROCEDURE — 74176 CT ABD & PELVIS W/O CONTRAST: CPT

## 2023-06-05 PROCEDURE — 96365 THER/PROPH/DIAG IV INF INIT: CPT

## 2023-06-05 PROCEDURE — 84484 ASSAY OF TROPONIN QUANT: CPT

## 2023-06-05 PROCEDURE — 83735 ASSAY OF MAGNESIUM: CPT

## 2023-06-05 PROCEDURE — G0378 HOSPITAL OBSERVATION PER HR: HCPCS

## 2023-06-05 PROCEDURE — 83880 ASSAY OF NATRIURETIC PEPTIDE: CPT

## 2023-06-05 PROCEDURE — 87086 URINE CULTURE/COLONY COUNT: CPT

## 2023-06-05 PROCEDURE — 36415 COLL VENOUS BLD VENIPUNCTURE: CPT

## 2023-06-05 PROCEDURE — 6370000000 HC RX 637 (ALT 250 FOR IP): Performed by: EMERGENCY MEDICINE

## 2023-06-05 PROCEDURE — 71045 X-RAY EXAM CHEST 1 VIEW: CPT

## 2023-06-05 PROCEDURE — 2500000003 HC RX 250 WO HCPCS: Performed by: EMERGENCY MEDICINE

## 2023-06-05 PROCEDURE — 99285 EMERGENCY DEPT VISIT HI MDM: CPT

## 2023-06-05 PROCEDURE — 93010 ELECTROCARDIOGRAM REPORT: CPT | Performed by: SPECIALIST

## 2023-06-05 PROCEDURE — 96375 TX/PRO/DX INJ NEW DRUG ADDON: CPT

## 2023-06-05 PROCEDURE — 6370000000 HC RX 637 (ALT 250 FOR IP)

## 2023-06-05 PROCEDURE — 2580000003 HC RX 258

## 2023-06-05 RX ORDER — FUROSEMIDE 40 MG/1
80 TABLET ORAL DAILY
Status: DISCONTINUED | OUTPATIENT
Start: 2023-06-06 | End: 2023-06-05

## 2023-06-05 RX ORDER — FUROSEMIDE 10 MG/ML
40 INJECTION INTRAMUSCULAR; INTRAVENOUS ONCE
Status: COMPLETED | OUTPATIENT
Start: 2023-06-05 | End: 2023-06-05

## 2023-06-05 RX ORDER — SODIUM CHLORIDE 0.9 % (FLUSH) 0.9 %
5-40 SYRINGE (ML) INJECTION PRN
Status: DISCONTINUED | OUTPATIENT
Start: 2023-06-05 | End: 2023-06-07 | Stop reason: HOSPADM

## 2023-06-05 RX ORDER — ONDANSETRON 2 MG/ML
4 INJECTION INTRAMUSCULAR; INTRAVENOUS EVERY 6 HOURS PRN
Status: DISCONTINUED | OUTPATIENT
Start: 2023-06-05 | End: 2023-06-07 | Stop reason: HOSPADM

## 2023-06-05 RX ORDER — INSULIN LISPRO 100 [IU]/ML
0-8 INJECTION, SOLUTION INTRAVENOUS; SUBCUTANEOUS
Status: DISCONTINUED | OUTPATIENT
Start: 2023-06-05 | End: 2023-06-07 | Stop reason: HOSPADM

## 2023-06-05 RX ORDER — GABAPENTIN 300 MG/1
300 CAPSULE ORAL DAILY
Status: DISCONTINUED | OUTPATIENT
Start: 2023-06-05 | End: 2023-06-07 | Stop reason: HOSPADM

## 2023-06-05 RX ORDER — ACETAMINOPHEN 650 MG/1
650 SUPPOSITORY RECTAL EVERY 6 HOURS PRN
Status: DISCONTINUED | OUTPATIENT
Start: 2023-06-05 | End: 2023-06-07 | Stop reason: HOSPADM

## 2023-06-05 RX ORDER — TRAZODONE HYDROCHLORIDE 50 MG/1
50 TABLET ORAL NIGHTLY
Status: DISCONTINUED | OUTPATIENT
Start: 2023-06-05 | End: 2023-06-07 | Stop reason: HOSPADM

## 2023-06-05 RX ORDER — CALCIUM GLUCONATE 20 MG/ML
1000 INJECTION, SOLUTION INTRAVENOUS ONCE
Status: COMPLETED | OUTPATIENT
Start: 2023-06-05 | End: 2023-06-05

## 2023-06-05 RX ORDER — AMLODIPINE BESYLATE 5 MG/1
10 TABLET ORAL DAILY
Status: DISCONTINUED | OUTPATIENT
Start: 2023-06-05 | End: 2023-06-07 | Stop reason: HOSPADM

## 2023-06-05 RX ORDER — ROSUVASTATIN CALCIUM 10 MG/1
10 TABLET, COATED ORAL EVERY MORNING
Status: DISCONTINUED | OUTPATIENT
Start: 2023-06-06 | End: 2023-06-07 | Stop reason: HOSPADM

## 2023-06-05 RX ORDER — ISOSORBIDE MONONITRATE 60 MG/1
60 TABLET, EXTENDED RELEASE ORAL DAILY
Status: DISCONTINUED | OUTPATIENT
Start: 2023-06-06 | End: 2023-06-07 | Stop reason: HOSPADM

## 2023-06-05 RX ORDER — SODIUM CHLORIDE 0.9 % (FLUSH) 0.9 %
5-40 SYRINGE (ML) INJECTION EVERY 12 HOURS SCHEDULED
Status: DISCONTINUED | OUTPATIENT
Start: 2023-06-05 | End: 2023-06-07 | Stop reason: HOSPADM

## 2023-06-05 RX ORDER — PANTOPRAZOLE SODIUM 40 MG/1
40 TABLET, DELAYED RELEASE ORAL DAILY
Status: DISCONTINUED | OUTPATIENT
Start: 2023-06-05 | End: 2023-06-07 | Stop reason: HOSPADM

## 2023-06-05 RX ORDER — ACETAMINOPHEN 325 MG/1
650 TABLET ORAL EVERY 6 HOURS PRN
Status: DISCONTINUED | OUTPATIENT
Start: 2023-06-05 | End: 2023-06-07 | Stop reason: HOSPADM

## 2023-06-05 RX ORDER — METOPROLOL SUCCINATE 50 MG/1
100 TABLET, EXTENDED RELEASE ORAL DAILY
Status: DISCONTINUED | OUTPATIENT
Start: 2023-06-05 | End: 2023-06-07 | Stop reason: HOSPADM

## 2023-06-05 RX ORDER — MORPHINE SULFATE 4 MG/ML
4 INJECTION, SOLUTION INTRAMUSCULAR; INTRAVENOUS
Status: COMPLETED | OUTPATIENT
Start: 2023-06-05 | End: 2023-06-05

## 2023-06-05 RX ORDER — HEPARIN SODIUM 5000 [USP'U]/ML
5000 INJECTION, SOLUTION INTRAVENOUS; SUBCUTANEOUS EVERY 8 HOURS SCHEDULED
Status: DISCONTINUED | OUTPATIENT
Start: 2023-06-05 | End: 2023-06-07 | Stop reason: HOSPADM

## 2023-06-05 RX ORDER — LOSARTAN POTASSIUM 50 MG/1
25 TABLET ORAL DAILY
Status: DISCONTINUED | OUTPATIENT
Start: 2023-06-06 | End: 2023-06-05 | Stop reason: DRUGHIGH

## 2023-06-05 RX ORDER — SUCRALFATE 1 G/1
1 TABLET ORAL 3 TIMES DAILY
Status: DISCONTINUED | OUTPATIENT
Start: 2023-06-05 | End: 2023-06-07 | Stop reason: HOSPADM

## 2023-06-05 RX ORDER — LOSARTAN POTASSIUM 50 MG/1
50 TABLET ORAL 2 TIMES DAILY
Status: DISCONTINUED | OUTPATIENT
Start: 2023-06-06 | End: 2023-06-07 | Stop reason: HOSPADM

## 2023-06-05 RX ORDER — HYDRALAZINE HYDROCHLORIDE 25 MG/1
100 TABLET, FILM COATED ORAL 3 TIMES DAILY
Status: DISCONTINUED | OUTPATIENT
Start: 2023-06-05 | End: 2023-06-07 | Stop reason: HOSPADM

## 2023-06-05 RX ORDER — FUROSEMIDE 10 MG/ML
60 INJECTION INTRAMUSCULAR; INTRAVENOUS ONCE
Status: COMPLETED | OUTPATIENT
Start: 2023-06-05 | End: 2023-06-05

## 2023-06-05 RX ORDER — SEVELAMER CARBONATE 800 MG/1
800 TABLET, FILM COATED ORAL
Status: DISCONTINUED | OUTPATIENT
Start: 2023-06-05 | End: 2023-06-07 | Stop reason: HOSPADM

## 2023-06-05 RX ORDER — ASPIRIN 81 MG/1
324 TABLET, CHEWABLE ORAL DAILY
Status: DISCONTINUED | OUTPATIENT
Start: 2023-06-05 | End: 2023-06-05

## 2023-06-05 RX ORDER — TAMSULOSIN HYDROCHLORIDE 0.4 MG/1
0.4 CAPSULE ORAL NIGHTLY
Status: DISCONTINUED | OUTPATIENT
Start: 2023-06-05 | End: 2023-06-07 | Stop reason: HOSPADM

## 2023-06-05 RX ORDER — INSULIN LISPRO 100 [IU]/ML
0-4 INJECTION, SOLUTION INTRAVENOUS; SUBCUTANEOUS NIGHTLY
Status: DISCONTINUED | OUTPATIENT
Start: 2023-06-05 | End: 2023-06-07 | Stop reason: HOSPADM

## 2023-06-05 RX ORDER — ONDANSETRON 4 MG/1
4 TABLET, ORALLY DISINTEGRATING ORAL EVERY 8 HOURS PRN
Status: DISCONTINUED | OUTPATIENT
Start: 2023-06-05 | End: 2023-06-07 | Stop reason: HOSPADM

## 2023-06-05 RX ORDER — SODIUM CHLORIDE 9 MG/ML
INJECTION, SOLUTION INTRAVENOUS PRN
Status: DISCONTINUED | OUTPATIENT
Start: 2023-06-05 | End: 2023-06-07 | Stop reason: HOSPADM

## 2023-06-05 RX ADMIN — CALCIUM GLUCONATE 1000 MG: 20 INJECTION, SOLUTION INTRAVENOUS at 12:56

## 2023-06-05 RX ADMIN — SUCRALFATE 1 G: 1 TABLET ORAL at 23:50

## 2023-06-05 RX ADMIN — GABAPENTIN 300 MG: 300 CAPSULE ORAL at 17:23

## 2023-06-05 RX ADMIN — FUROSEMIDE 40 MG: 10 INJECTION, SOLUTION INTRAMUSCULAR; INTRAVENOUS at 18:48

## 2023-06-05 RX ADMIN — HYDRALAZINE HYDROCHLORIDE 100 MG: 25 TABLET, FILM COATED ORAL at 17:23

## 2023-06-05 RX ADMIN — HEPARIN SODIUM 5000 UNITS: 5000 INJECTION INTRAVENOUS; SUBCUTANEOUS at 23:49

## 2023-06-05 RX ADMIN — PANTOPRAZOLE SODIUM 40 MG: 40 TABLET, DELAYED RELEASE ORAL at 17:24

## 2023-06-05 RX ADMIN — ASPIRIN 324 MG: 81 TABLET, CHEWABLE ORAL at 11:58

## 2023-06-05 RX ADMIN — SERTRALINE 50 MG: 50 TABLET, FILM COATED ORAL at 17:23

## 2023-06-05 RX ADMIN — PIPERACILLIN AND TAZOBACTAM 3375 MG: 3; .375 INJECTION, POWDER, LYOPHILIZED, FOR SOLUTION INTRAVENOUS at 23:50

## 2023-06-05 RX ADMIN — ACETAMINOPHEN 650 MG: 325 TABLET ORAL at 17:31

## 2023-06-05 RX ADMIN — PIPERACILLIN AND TAZOBACTAM 4500 MG: 4; .5 INJECTION, POWDER, LYOPHILIZED, FOR SOLUTION INTRAVENOUS at 13:36

## 2023-06-05 RX ADMIN — MORPHINE SULFATE 4 MG: 4 INJECTION, SOLUTION INTRAMUSCULAR; INTRAVENOUS at 11:56

## 2023-06-05 RX ADMIN — TRAZODONE HYDROCHLORIDE 50 MG: 50 TABLET ORAL at 23:50

## 2023-06-05 RX ADMIN — SODIUM CHLORIDE, PRESERVATIVE FREE 10 ML: 5 INJECTION INTRAVENOUS at 23:51

## 2023-06-05 RX ADMIN — FUROSEMIDE 60 MG: 40 INJECTION, SOLUTION INTRAMUSCULAR; INTRAVENOUS at 11:59

## 2023-06-05 RX ADMIN — SEVELAMER CARBONATE 800 MG: 800 TABLET, FILM COATED ORAL at 17:51

## 2023-06-05 RX ADMIN — AMLODIPINE BESYLATE 10 MG: 5 TABLET ORAL at 17:23

## 2023-06-05 RX ADMIN — TAMSULOSIN HYDROCHLORIDE 0.4 MG: 0.4 CAPSULE ORAL at 23:50

## 2023-06-05 RX ADMIN — MORPHINE SULFATE 4 MG: 4 INJECTION, SOLUTION INTRAMUSCULAR; INTRAVENOUS at 23:51

## 2023-06-05 RX ADMIN — SODIUM ZIRCONIUM CYCLOSILICATE 10 G: 10 POWDER, FOR SUSPENSION ORAL at 14:27

## 2023-06-05 RX ADMIN — HYDRALAZINE HYDROCHLORIDE 100 MG: 25 TABLET, FILM COATED ORAL at 23:50

## 2023-06-05 RX ADMIN — SUCRALFATE 1 G: 1 TABLET ORAL at 17:24

## 2023-06-05 RX ADMIN — ONDANSETRON 4 MG: 2 INJECTION INTRAMUSCULAR; INTRAVENOUS at 23:49

## 2023-06-05 RX ADMIN — MORPHINE SULFATE 4 MG: 4 INJECTION, SOLUTION INTRAMUSCULAR; INTRAVENOUS at 14:26

## 2023-06-05 ASSESSMENT — ENCOUNTER SYMPTOMS
DIARRHEA: 0
WHEEZING: 0
BLOOD IN STOOL: 0
COUGH: 1
SHORTNESS OF BREATH: 1
SINUS PAIN: 0
COLOR CHANGE: 0
NAUSEA: 1
CHEST TIGHTNESS: 1
VOMITING: 0
ABDOMINAL PAIN: 1
CHOKING: 0
BACK PAIN: 1
CONSTIPATION: 0
PHOTOPHOBIA: 0
SORE THROAT: 0

## 2023-06-05 ASSESSMENT — PAIN SCALES - GENERAL
PAINLEVEL_OUTOF10: 9
PAINLEVEL_OUTOF10: 8

## 2023-06-05 NOTE — H&P
Collection Time: 06/05/23 11:04 AM   Result Value Ref Range    NT Pro-BNP 13,979 (H) <125 PG/ML   CBC with Auto Differential    Collection Time: 06/05/23 11:04 AM   Result Value Ref Range    WBC 3.9 (L) 4.1 - 11.1 K/uL    RBC 3.37 (L) 4.10 - 5.70 M/uL    Hemoglobin 9.6 (L) 12.1 - 17.0 g/dL    Hematocrit 29.0 (L) 36.6 - 50.3 %    MCV 86.1 80.0 - 99.0 FL    MCH 28.5 26.0 - 34.0 PG    MCHC 33.1 30.0 - 36.5 g/dL    RDW 17.2 (H) 11.5 - 14.5 %    Platelets 77 (L) 185 - 400 K/uL    Nucleated RBCs 0.0 0  WBC    nRBC 0.00 0.00 - 0.01 K/uL    Neutrophils % 76 (H) 32 - 75 %    Lymphocytes % 10 (L) 12 - 49 %    Monocytes % 10 5 - 13 %    Eosinophils % 2 0 - 7 %    Basophils % 1 0 - 1 %    Immature Granulocytes 1 (H) 0.0 - 0.5 %    Neutrophils Absolute 3.0 1.8 - 8.0 K/UL    Lymphocytes Absolute 0.4 (L) 0.8 - 3.5 K/UL    Monocytes Absolute 0.4 0.0 - 1.0 K/UL    Eosinophils Absolute 0.1 0.0 - 0.4 K/UL    Basophils Absolute 0.0 0.0 - 0.1 K/UL    Absolute Immature Granulocyte 0.0 0.00 - 0.04 K/UL    Differential Type SMEAR SCANNED      RBC Comment NORMOCYTIC, NORMOCHROMIC     CMP    Collection Time: 06/05/23 11:04 AM   Result Value Ref Range    Sodium 131 (L) 136 - 145 mmol/L    Potassium 5.8 (H) 3.5 - 5.1 mmol/L    Chloride 98 97 - 108 mmol/L    CO2 24 21 - 32 mmol/L    Anion Gap 9 5 - 15 mmol/L    Glucose 184 (H) 65 - 100 mg/dL    BUN 67 (H) 6 - 20 MG/DL    Creatinine 6.33 (H) 0.70 - 1.30 MG/DL    Bun/Cre Ratio 11 (L) 12 - 20      Est, Glom Filt Rate 9 (L) >60 ml/min/1.73m2    Calcium 8.7 8.5 - 10.1 MG/DL    Total Bilirubin 0.8 0.2 - 1.0 MG/DL    ALT 24 12 - 78 U/L    AST 12 (L) 15 - 37 U/L    Alk Phosphatase 200 (H) 45 - 117 U/L    Total Protein 7.4 6.4 - 8.2 g/dL    Albumin 3.1 (L) 3.5 - 5.0 g/dL    Globulin 4.3 (H) 2.0 - 4.0 g/dL    Albumin/Globulin Ratio 0.7 (L) 1.1 - 2.2     Lipase    Collection Time: 06/05/23 11:04 AM   Result Value Ref Range    Lipase 89 73 - 393 U/L   Magnesium    Collection Time: 06/05/23 11:04 AM

## 2023-06-05 NOTE — ED TRIAGE NOTES
Pt arrives to the ER for complaints shortness of breath and left sided chest pain that started night light. Pt states increased BLE swelling. Pt reports mid abdominal cramping. Pt states he had a subjective fever last night. Pt reports he receives peritoneal dialysis. Has not received dialysis today. Denies any diarrhea or vomiting. PMH of anemia, depression, BPH, CHF, CAD, diabetes, ESRD, gerd, HTN and hyperlipidemia.

## 2023-06-05 NOTE — ED NOTES
Bedside and Verbal shift change report given to BEBETO Rossi (oncoming nurse) by Inder Murcia RN (offgoing nurse). Report included the following information Nurse Handoff Report, Index, ED Encounter Summary, ED SBAR, Intake/Output, MAR, and Recent Results.         Yael Enciso RN  06/05/23 1929

## 2023-06-05 NOTE — ED PROVIDER NOTES
dictations but occasionally words are mis-transcribed.)    Phyllis Lyle MD (electronically signed)  Emergency Attending Physician / Physician Assistant / Nurse Practitioner             Aylin Ruiz MD  06/07/23 9993

## 2023-06-05 NOTE — ED NOTES
RN educated that urine sample is still needed. Pt attempting to provide a urine sample at this time.       Iraida Rockwell RN  06/05/23 6852

## 2023-06-05 NOTE — ED NOTES
RN educated patient that urine sample is needed. Pt unable to provide sample at this time.       Bre Jack RN  06/05/23 2050

## 2023-06-06 ENCOUNTER — APPOINTMENT (OUTPATIENT)
Facility: HOSPITAL | Age: 63
End: 2023-06-06
Payer: MEDICAID

## 2023-06-06 LAB
ANION GAP SERPL CALC-SCNC: 6 MMOL/L (ref 5–15)
BACTERIA SPEC CULT: NORMAL
BASOPHILS # BLD: 0 K/UL (ref 0–0.1)
BASOPHILS NFR BLD: 1 % (ref 0–1)
BUN SERPL-MCNC: 28 MG/DL (ref 6–20)
BUN/CREAT SERPL: 8 (ref 12–20)
CALCIUM SERPL-MCNC: 8.5 MG/DL (ref 8.5–10.1)
CHLORIDE SERPL-SCNC: 101 MMOL/L (ref 97–108)
CO2 SERPL-SCNC: 28 MMOL/L (ref 21–32)
CREAT SERPL-MCNC: 3.56 MG/DL (ref 0.7–1.3)
CRP SERPL-MCNC: 2.01 MG/DL (ref 0–0.6)
DIFFERENTIAL METHOD BLD: ABNORMAL
EOSINOPHIL # BLD: 0.2 K/UL (ref 0–0.4)
EOSINOPHIL NFR BLD: 5 % (ref 0–7)
ERYTHROCYTE [DISTWIDTH] IN BLOOD BY AUTOMATED COUNT: 17 % (ref 11.5–14.5)
ERYTHROCYTE [SEDIMENTATION RATE] IN BLOOD: 63 MM/HR (ref 0–20)
GLUCOSE BLD STRIP.AUTO-MCNC: 103 MG/DL (ref 65–117)
GLUCOSE BLD STRIP.AUTO-MCNC: 114 MG/DL (ref 65–117)
GLUCOSE BLD STRIP.AUTO-MCNC: 169 MG/DL (ref 65–117)
GLUCOSE BLD STRIP.AUTO-MCNC: 87 MG/DL (ref 65–117)
GLUCOSE SERPL-MCNC: 129 MG/DL (ref 65–100)
HCT VFR BLD AUTO: 27.7 % (ref 36.6–50.3)
HGB BLD-MCNC: 9.1 G/DL (ref 12.1–17)
IMM GRANULOCYTES # BLD AUTO: 0 K/UL (ref 0–0.04)
IMM GRANULOCYTES NFR BLD AUTO: 0 % (ref 0–0.5)
LYMPHOCYTES # BLD: 0.5 K/UL (ref 0.8–3.5)
LYMPHOCYTES NFR BLD: 17 % (ref 12–49)
MAGNESIUM SERPL-MCNC: 2.3 MG/DL (ref 1.6–2.4)
MCH RBC QN AUTO: 28.2 PG (ref 26–34)
MCHC RBC AUTO-ENTMCNC: 32.9 G/DL (ref 30–36.5)
MCV RBC AUTO: 85.8 FL (ref 80–99)
MONOCYTES # BLD: 0.4 K/UL (ref 0–1)
MONOCYTES NFR BLD: 12 % (ref 5–13)
NEUTS SEG # BLD: 2 K/UL (ref 1.8–8)
NEUTS SEG NFR BLD: 65 % (ref 32–75)
NRBC # BLD: 0 K/UL (ref 0–0.01)
NRBC BLD-RTO: 0 PER 100 WBC
PHOSPHATE SERPL-MCNC: 3.3 MG/DL (ref 2.6–4.7)
PLATELET # BLD AUTO: 71 K/UL (ref 150–400)
POTASSIUM SERPL-SCNC: 4.3 MMOL/L (ref 3.5–5.1)
RBC # BLD AUTO: 3.23 M/UL (ref 4.1–5.7)
RBC MORPH BLD: ABNORMAL
SERVICE CMNT-IMP: ABNORMAL
SERVICE CMNT-IMP: NORMAL
SODIUM SERPL-SCNC: 135 MMOL/L (ref 136–145)
WBC # BLD AUTO: 3.1 K/UL (ref 4.1–11.1)

## 2023-06-06 PROCEDURE — 86140 C-REACTIVE PROTEIN: CPT

## 2023-06-06 PROCEDURE — G0378 HOSPITAL OBSERVATION PER HR: HCPCS

## 2023-06-06 PROCEDURE — 96372 THER/PROPH/DIAG INJ SC/IM: CPT

## 2023-06-06 PROCEDURE — 6360000002 HC RX W HCPCS

## 2023-06-06 PROCEDURE — 6370000000 HC RX 637 (ALT 250 FOR IP)

## 2023-06-06 PROCEDURE — 82962 GLUCOSE BLOOD TEST: CPT

## 2023-06-06 PROCEDURE — 83735 ASSAY OF MAGNESIUM: CPT

## 2023-06-06 PROCEDURE — 96366 THER/PROPH/DIAG IV INF ADDON: CPT

## 2023-06-06 PROCEDURE — 85025 COMPLETE CBC W/AUTO DIFF WBC: CPT

## 2023-06-06 PROCEDURE — 99223 1ST HOSP IP/OBS HIGH 75: CPT | Performed by: FAMILY MEDICINE

## 2023-06-06 PROCEDURE — 2580000003 HC RX 258

## 2023-06-06 PROCEDURE — 84100 ASSAY OF PHOSPHORUS: CPT

## 2023-06-06 PROCEDURE — 94761 N-INVAS EAR/PLS OXIMETRY MLT: CPT

## 2023-06-06 PROCEDURE — 36415 COLL VENOUS BLD VENIPUNCTURE: CPT

## 2023-06-06 PROCEDURE — 70450 CT HEAD/BRAIN W/O DYE: CPT

## 2023-06-06 PROCEDURE — 90935 HEMODIALYSIS ONE EVALUATION: CPT

## 2023-06-06 PROCEDURE — 85652 RBC SED RATE AUTOMATED: CPT

## 2023-06-06 PROCEDURE — 80048 BASIC METABOLIC PNL TOTAL CA: CPT

## 2023-06-06 RX ORDER — GABAPENTIN 100 MG/1
100 CAPSULE ORAL
Qty: 30 CAPSULE | Refills: 1 | Status: SHIPPED | OUTPATIENT
Start: 2023-06-07 | End: 2023-10-25

## 2023-06-06 RX ORDER — FUROSEMIDE 80 MG
80 TABLET ORAL DAILY
Qty: 60 TABLET | Refills: 3 | Status: SHIPPED | OUTPATIENT
Start: 2023-06-06 | End: 2023-06-07 | Stop reason: SDUPTHER

## 2023-06-06 RX ADMIN — HEPARIN SODIUM 5000 UNITS: 5000 INJECTION INTRAVENOUS; SUBCUTANEOUS at 16:53

## 2023-06-06 RX ADMIN — SEVELAMER CARBONATE 800 MG: 800 TABLET, FILM COATED ORAL at 08:34

## 2023-06-06 RX ADMIN — HYDRALAZINE HYDROCHLORIDE 100 MG: 25 TABLET, FILM COATED ORAL at 21:37

## 2023-06-06 RX ADMIN — SUCRALFATE 1 G: 1 TABLET ORAL at 21:37

## 2023-06-06 RX ADMIN — SUCRALFATE 1 G: 1 TABLET ORAL at 08:34

## 2023-06-06 RX ADMIN — GABAPENTIN 300 MG: 300 CAPSULE ORAL at 08:33

## 2023-06-06 RX ADMIN — ISOSORBIDE MONONITRATE 60 MG: 60 TABLET, EXTENDED RELEASE ORAL at 08:35

## 2023-06-06 RX ADMIN — METOPROLOL SUCCINATE 100 MG: 50 TABLET, EXTENDED RELEASE ORAL at 08:34

## 2023-06-06 RX ADMIN — PANTOPRAZOLE SODIUM 40 MG: 40 TABLET, DELAYED RELEASE ORAL at 08:34

## 2023-06-06 RX ADMIN — Medication 40 ML: at 08:36

## 2023-06-06 RX ADMIN — ROSUVASTATIN CALCIUM 10 MG: 10 TABLET, FILM COATED ORAL at 08:34

## 2023-06-06 RX ADMIN — HYDROMORPHONE HYDROCHLORIDE 0.25 MG: 1 INJECTION, SOLUTION INTRAMUSCULAR; INTRAVENOUS; SUBCUTANEOUS at 08:52

## 2023-06-06 RX ADMIN — HYDRALAZINE HYDROCHLORIDE 100 MG: 25 TABLET, FILM COATED ORAL at 16:53

## 2023-06-06 RX ADMIN — AMLODIPINE BESYLATE 10 MG: 5 TABLET ORAL at 08:34

## 2023-06-06 RX ADMIN — PIPERACILLIN AND TAZOBACTAM 3375 MG: 3; .375 INJECTION, POWDER, LYOPHILIZED, FOR SOLUTION INTRAVENOUS at 11:15

## 2023-06-06 RX ADMIN — LOSARTAN POTASSIUM 50 MG: 50 TABLET, FILM COATED ORAL at 21:37

## 2023-06-06 RX ADMIN — HYDRALAZINE HYDROCHLORIDE 100 MG: 25 TABLET, FILM COATED ORAL at 08:33

## 2023-06-06 RX ADMIN — PIPERACILLIN AND TAZOBACTAM 3375 MG: 3; .375 INJECTION, POWDER, LYOPHILIZED, FOR SOLUTION INTRAVENOUS at 21:38

## 2023-06-06 RX ADMIN — ACETAMINOPHEN 650 MG: 325 TABLET ORAL at 07:07

## 2023-06-06 RX ADMIN — SEVELAMER CARBONATE 800 MG: 800 TABLET, FILM COATED ORAL at 16:53

## 2023-06-06 RX ADMIN — TRAZODONE HYDROCHLORIDE 50 MG: 50 TABLET ORAL at 21:37

## 2023-06-06 RX ADMIN — LOSARTAN POTASSIUM 50 MG: 50 TABLET, FILM COATED ORAL at 08:36

## 2023-06-06 RX ADMIN — SODIUM CHLORIDE, PRESERVATIVE FREE 10 ML: 5 INJECTION INTRAVENOUS at 08:39

## 2023-06-06 RX ADMIN — ACETAMINOPHEN 650 MG: 325 TABLET ORAL at 16:53

## 2023-06-06 RX ADMIN — SERTRALINE 50 MG: 50 TABLET, FILM COATED ORAL at 08:35

## 2023-06-06 RX ADMIN — TAMSULOSIN HYDROCHLORIDE 0.4 MG: 0.4 CAPSULE ORAL at 21:39

## 2023-06-06 RX ADMIN — HEPARIN SODIUM 5000 UNITS: 5000 INJECTION INTRAVENOUS; SUBCUTANEOUS at 21:37

## 2023-06-06 RX ADMIN — SUCRALFATE 1 G: 1 TABLET ORAL at 16:54

## 2023-06-06 RX ADMIN — SODIUM CHLORIDE, PRESERVATIVE FREE 10 ML: 5 INJECTION INTRAVENOUS at 21:38

## 2023-06-06 RX ADMIN — HEPARIN SODIUM 5000 UNITS: 5000 INJECTION INTRAVENOUS; SUBCUTANEOUS at 07:07

## 2023-06-06 RX ADMIN — SEVELAMER CARBONATE 800 MG: 800 TABLET, FILM COATED ORAL at 11:16

## 2023-06-06 ASSESSMENT — PAIN DESCRIPTION - LOCATION
LOCATION: ARM;LEG
LOCATION: HEAD
LOCATION: FACE;ARM;LEG

## 2023-06-06 ASSESSMENT — PAIN DESCRIPTION - DESCRIPTORS
DESCRIPTORS: SORE;ACHING
DESCRIPTORS: ACHING

## 2023-06-06 ASSESSMENT — PAIN - FUNCTIONAL ASSESSMENT
PAIN_FUNCTIONAL_ASSESSMENT: ACTIVITIES ARE NOT PREVENTED

## 2023-06-06 ASSESSMENT — PAIN DESCRIPTION - ORIENTATION
ORIENTATION: MID
ORIENTATION: RIGHT;LEFT
ORIENTATION: RIGHT

## 2023-06-06 ASSESSMENT — PAIN SCALES - GENERAL
PAINLEVEL_OUTOF10: 5
PAINLEVEL_OUTOF10: 3
PAINLEVEL_OUTOF10: 3
PAINLEVEL_OUTOF10: 8

## 2023-06-06 ASSESSMENT — PAIN DESCRIPTION - PAIN TYPE: TYPE: ACUTE PAIN

## 2023-06-06 ASSESSMENT — PAIN DESCRIPTION - ONSET: ONSET: ON-GOING

## 2023-06-06 ASSESSMENT — PAIN DESCRIPTION - FREQUENCY: FREQUENCY: CONTINUOUS

## 2023-06-06 NOTE — FLOWSHEET NOTE
06/06/23 0200   Vital Signs   BP (!) 141/64   Temp 97.7 °F (36.5 °C)   Pulse (!) 45   Respirations 18   Pain Assessment   Pain Assessment None - Denies Pain   Treatment   Time On 0200   Time Off 0530   Treatment Goal 3.5 hours, 4000 ml UF   Observations & Evaluations   Level of Consciousness 0   Oriented X 4   Heart Rhythm Regular   Respiratory Quality/Effort Unlabored   O2 Device None (Room air)   Bilateral Breath Sounds Clear;Diminished   Skin Color Other (comment)  (Normal for ethnicity)   Skin Condition/Temp Warm;Dry   Abdomen Inspection Soft   Edema None   RLE Edema None   LLE Edema None   Technical Checks   Dialysis Machine No. [id number]   RO Machine Number [id number]   Dialyzer Lot No. [id number]   T[place]ing Lot Number [id number]   All Connections Secure Yes   NS Bag Yes   Saline Line Double Clamped Yes   Dialyzer Revaclear 300   Prime Volume (mL) 200 mL   ICEBOAT I;E;C;B;O;A;T   RO Machine Log Sheet Completed Yes   Machine Alarm Self Test Completed; Passed   Air Foam Detector Tested;Proper Function   Extracorporeal Circuit Tested for Integrity Yes   Machine Conductivity 13.6   Manual Conductivity 13.6   Manual Ph 7.4   Bleach Test (Neg) Yes   Bath Temperature 98.6 °F (37 °C)   Treatment Initiation   Dialyze Hours 3.5   Treatment  Initiation Universal Precautions maintained;Lines secured to patient; Connections secured;Prime given;Venous Parameters set; Arterial Parameters set; Air foam detector engaged; Saline line double clamped;REV-300   Dialysis Bath   K+ (Potassium) 2   Ca+ (Calcium) 2.5   Na+ (Sodium) 138   HCO3 (Bicarb) 35   Bicarbonate Concentrate Lot No. [id number]   Acid Concentrate Lot No. [id number]   Hemodialysis Central Access Right Subclavian   No placement date or time found. Present on Admission/Arrival: Yes  Orientation: Right  Access Location: Subclavian   Continued need for line?  Yes   Site Assessment Clean, dry & intact   Venous Lumen Status Brisk blood return;Flushed   Arterial Lumen Status Brisk blood

## 2023-06-06 NOTE — ED NOTES
TRANSFER - OUT REPORT:    Verbal report given to Sada on Karon Sr  being transferred to [facility] for routine progression of patient care       Report consisted of patient's Situation, Background, Assessment and   Recommendations(SBAR). Information from the following report(s) Nurse Handoff Report, ED Encounter Summary, ED SBAR, Intake/Output, MAR, Recent Results, Cardiac Rhythm NSR/SB, and Neuro Assessment was reviewed with the receiving nurse. Harleigh Assessment: No data recorded  Lines:   Peripheral IV 06/05/23 Right; Anterior Forearm (Active)   Site Assessment Clean, dry & intact 06/05/23 1108   Line Status Blood return noted 06/05/23 1108   Phlebitis Assessment No symptoms 06/05/23 1108   Infiltration Assessment 0 06/05/23 1108   Dressing Status New dressing applied 06/05/23 1108   Dressing Type Transparent 06/05/23 1108       Hemodialysis Central Access Right Subclavian (Active)        Opportunity for questions and clarification was provided.       Patient transported with:  Registered Nurse           Allison Bojorquez RN  06/06/23 3486

## 2023-06-06 NOTE — FLOWSHEET NOTE
06/06/23 0530   Vital Signs   BP (!) 171/75   Temp 97.6 °F (36.4 °C)   Pulse 56   Respirations 16   Pain Assessment   Pain Assessment None - Denies Pain   Post-Hemodialysis Assessment   Post-Treatment Procedures Blood returned;Catheter Capped, clamped with Saline x2 ports   Machine Disinfection Process Acid/Vinegar Clean;Heat Disinfect; Exterior Machine Disinfection   Rinseback Volume (ml) 300 ml   Blood Volume Processed (Liters) 78 l/min   Dialyzer Clearance Clear   Duration of Treatment (minutes) 3.5 minutes   Hemodialysis Intake (ml) 500 ml   Hemodialysis Output (ml) 4500 ml   NET Removed (ml) 4000   Tolerated Treatment Good   Patient Response to Treatment Good   Bilateral Breath Sounds Clear;Diminished   Edema None   Time Off 0530   Patient Disposition Other (Comment)  (Remains in room 409)     Primary RN SBAR: Edson Antunez RN  Comments: Pt left with bed low, side rails up X 2, call bell and belongings in reach.

## 2023-06-06 NOTE — PLAN OF CARE
Problem: Discharge Planning  Goal: Discharge to home or other facility with appropriate resources  6/6/2023 1143 by Lowell Richardson RN  Outcome: Progressing  6/6/2023 0844 by Tania Lovett RN  Outcome: Progressing     Problem: Safety - Adult  Goal: Free from fall injury  6/6/2023 1143 by Lowell Richardson RN  Outcome: Progressing  6/6/2023 0844 by Tania Lovett RN  Outcome: Progressing     Problem: Pain  Goal: Verbalizes/displays adequate comfort level or baseline comfort level  6/6/2023 1143 by Lowell Richardson RN  Outcome: Progressing  6/6/2023 0844 by Tania Lovett RN  Outcome: Progressing     Problem: Chronic Conditions and Co-morbidities  Goal: Patient's chronic conditions and co-morbidity symptoms are monitored and maintained or improved  Outcome: Progressing

## 2023-06-07 VITALS
HEIGHT: 68 IN | HEART RATE: 57 BPM | SYSTOLIC BLOOD PRESSURE: 170 MMHG | BODY MASS INDEX: 23.22 KG/M2 | RESPIRATION RATE: 18 BRPM | WEIGHT: 153.22 LBS | OXYGEN SATURATION: 93 % | DIASTOLIC BLOOD PRESSURE: 76 MMHG | TEMPERATURE: 98.2 F

## 2023-06-07 PROBLEM — E87.5 HYPERKALEMIA: Status: ACTIVE | Noted: 2023-06-07

## 2023-06-07 PROBLEM — K29.60 EROSIVE GASTRITIS: Status: ACTIVE | Noted: 2023-06-07

## 2023-06-07 PROBLEM — G44.209 TENSION HEADACHE: Status: ACTIVE | Noted: 2023-06-07

## 2023-06-07 PROBLEM — K52.9 COLITIS: Status: ACTIVE | Noted: 2023-06-07

## 2023-06-07 LAB
ANION GAP SERPL CALC-SCNC: 5 MMOL/L (ref 5–15)
BASOPHILS # BLD: 0 K/UL (ref 0–0.1)
BASOPHILS NFR BLD: 1 % (ref 0–1)
BUN SERPL-MCNC: 39 MG/DL (ref 6–20)
BUN/CREAT SERPL: 8 (ref 12–20)
CALCIUM SERPL-MCNC: 8.2 MG/DL (ref 8.5–10.1)
CHLORIDE SERPL-SCNC: 100 MMOL/L (ref 97–108)
CO2 SERPL-SCNC: 27 MMOL/L (ref 21–32)
CREAT SERPL-MCNC: 4.93 MG/DL (ref 0.7–1.3)
DIFFERENTIAL METHOD BLD: ABNORMAL
EOSINOPHIL # BLD: 0.2 K/UL (ref 0–0.4)
EOSINOPHIL NFR BLD: 5 % (ref 0–7)
ERYTHROCYTE [DISTWIDTH] IN BLOOD BY AUTOMATED COUNT: 17.1 % (ref 11.5–14.5)
GLUCOSE BLD STRIP.AUTO-MCNC: 109 MG/DL (ref 65–117)
GLUCOSE BLD STRIP.AUTO-MCNC: 149 MG/DL (ref 65–117)
GLUCOSE BLD STRIP.AUTO-MCNC: 161 MG/DL (ref 65–117)
GLUCOSE SERPL-MCNC: 160 MG/DL (ref 65–100)
HCT VFR BLD AUTO: 28.2 % (ref 36.6–50.3)
HGB BLD-MCNC: 9.3 G/DL (ref 12.1–17)
IMM GRANULOCYTES # BLD AUTO: 0 K/UL (ref 0–0.04)
IMM GRANULOCYTES NFR BLD AUTO: 0 % (ref 0–0.5)
LYMPHOCYTES # BLD: 0.7 K/UL (ref 0.8–3.5)
LYMPHOCYTES NFR BLD: 21 % (ref 12–49)
MAGNESIUM SERPL-MCNC: 2.6 MG/DL (ref 1.6–2.4)
MCH RBC QN AUTO: 28.4 PG (ref 26–34)
MCHC RBC AUTO-ENTMCNC: 33 G/DL (ref 30–36.5)
MCV RBC AUTO: 86.2 FL (ref 80–99)
MONOCYTES # BLD: 0.4 K/UL (ref 0–1)
MONOCYTES NFR BLD: 14 % (ref 5–13)
NEUTS SEG # BLD: 1.9 K/UL (ref 1.8–8)
NEUTS SEG NFR BLD: 60 % (ref 32–75)
NRBC # BLD: 0 K/UL (ref 0–0.01)
NRBC BLD-RTO: 0 PER 100 WBC
PHOSPHATE SERPL-MCNC: 4.4 MG/DL (ref 2.6–4.7)
PLATELET # BLD AUTO: 63 K/UL (ref 150–400)
PMV BLD AUTO: 12.4 FL (ref 8.9–12.9)
POTASSIUM SERPL-SCNC: 5.1 MMOL/L (ref 3.5–5.1)
RBC # BLD AUTO: 3.27 M/UL (ref 4.1–5.7)
SERVICE CMNT-IMP: ABNORMAL
SERVICE CMNT-IMP: ABNORMAL
SERVICE CMNT-IMP: NORMAL
SODIUM SERPL-SCNC: 132 MMOL/L (ref 136–145)
WBC # BLD AUTO: 3.2 K/UL (ref 4.1–11.1)

## 2023-06-07 PROCEDURE — 90935 HEMODIALYSIS ONE EVALUATION: CPT

## 2023-06-07 PROCEDURE — 85025 COMPLETE CBC W/AUTO DIFF WBC: CPT

## 2023-06-07 PROCEDURE — G0378 HOSPITAL OBSERVATION PER HR: HCPCS

## 2023-06-07 PROCEDURE — 84100 ASSAY OF PHOSPHORUS: CPT

## 2023-06-07 PROCEDURE — 2580000003 HC RX 258

## 2023-06-07 PROCEDURE — 94761 N-INVAS EAR/PLS OXIMETRY MLT: CPT

## 2023-06-07 PROCEDURE — 6370000000 HC RX 637 (ALT 250 FOR IP)

## 2023-06-07 PROCEDURE — 96372 THER/PROPH/DIAG INJ SC/IM: CPT

## 2023-06-07 PROCEDURE — 82962 GLUCOSE BLOOD TEST: CPT

## 2023-06-07 PROCEDURE — 80048 BASIC METABOLIC PNL TOTAL CA: CPT

## 2023-06-07 PROCEDURE — 83735 ASSAY OF MAGNESIUM: CPT

## 2023-06-07 PROCEDURE — 36415 COLL VENOUS BLD VENIPUNCTURE: CPT

## 2023-06-07 PROCEDURE — 6360000002 HC RX W HCPCS

## 2023-06-07 RX ORDER — LIDOCAINE 4 G/G
1 PATCH TOPICAL DAILY PRN
Status: DISCONTINUED | OUTPATIENT
Start: 2023-06-07 | End: 2023-06-07 | Stop reason: HOSPADM

## 2023-06-07 RX ORDER — FUROSEMIDE 80 MG
80 TABLET ORAL DAILY
Qty: 60 TABLET | Refills: 0 | Status: SHIPPED | OUTPATIENT
Start: 2023-06-07

## 2023-06-07 RX ADMIN — SODIUM CHLORIDE, PRESERVATIVE FREE 10 ML: 5 INJECTION INTRAVENOUS at 09:05

## 2023-06-07 RX ADMIN — HYDRALAZINE HYDROCHLORIDE 100 MG: 25 TABLET, FILM COATED ORAL at 09:03

## 2023-06-07 RX ADMIN — HYDRALAZINE HYDROCHLORIDE 100 MG: 25 TABLET, FILM COATED ORAL at 14:33

## 2023-06-07 RX ADMIN — HYDRALAZINE HYDROCHLORIDE 100 MG: 25 TABLET, FILM COATED ORAL at 17:35

## 2023-06-07 RX ADMIN — SUCRALFATE 1 G: 1 TABLET ORAL at 14:33

## 2023-06-07 RX ADMIN — GABAPENTIN 300 MG: 300 CAPSULE ORAL at 09:04

## 2023-06-07 RX ADMIN — AMLODIPINE BESYLATE 10 MG: 5 TABLET ORAL at 09:01

## 2023-06-07 RX ADMIN — PANTOPRAZOLE SODIUM 40 MG: 40 TABLET, DELAYED RELEASE ORAL at 09:04

## 2023-06-07 RX ADMIN — SUCRALFATE 1 G: 1 TABLET ORAL at 09:03

## 2023-06-07 RX ADMIN — ACETAMINOPHEN 650 MG: 325 TABLET ORAL at 10:57

## 2023-06-07 RX ADMIN — SEVELAMER CARBONATE 800 MG: 800 TABLET, FILM COATED ORAL at 09:04

## 2023-06-07 RX ADMIN — SERTRALINE 50 MG: 50 TABLET, FILM COATED ORAL at 09:02

## 2023-06-07 RX ADMIN — ISOSORBIDE MONONITRATE 60 MG: 60 TABLET, EXTENDED RELEASE ORAL at 09:04

## 2023-06-07 RX ADMIN — HEPARIN SODIUM 5000 UNITS: 5000 INJECTION INTRAVENOUS; SUBCUTANEOUS at 06:30

## 2023-06-07 RX ADMIN — ROSUVASTATIN CALCIUM 10 MG: 10 TABLET, FILM COATED ORAL at 09:02

## 2023-06-07 RX ADMIN — LOSARTAN POTASSIUM 50 MG: 50 TABLET, FILM COATED ORAL at 09:05

## 2023-06-07 RX ADMIN — SEVELAMER CARBONATE 800 MG: 800 TABLET, FILM COATED ORAL at 14:33

## 2023-06-07 ASSESSMENT — PAIN DESCRIPTION - DESCRIPTORS
DESCRIPTORS: ACHING
DESCRIPTORS: ACHING

## 2023-06-07 ASSESSMENT — PAIN DESCRIPTION - LOCATION
LOCATION: HEAD
LOCATION: HEAD

## 2023-06-07 ASSESSMENT — ENCOUNTER SYMPTOMS
VOMITING: 0
SHORTNESS OF BREATH: 0
NAUSEA: 0

## 2023-06-07 ASSESSMENT — PAIN DESCRIPTION - FREQUENCY: FREQUENCY: CONTINUOUS

## 2023-06-07 ASSESSMENT — PAIN SCALES - GENERAL
PAINLEVEL_OUTOF10: 7
PAINLEVEL_OUTOF10: 7
PAINLEVEL_OUTOF10: 9

## 2023-06-07 ASSESSMENT — PAIN DESCRIPTION - PAIN TYPE
TYPE: ACUTE PAIN
TYPE: ACUTE PAIN

## 2023-06-07 ASSESSMENT — PAIN DESCRIPTION - ORIENTATION
ORIENTATION: ANTERIOR
ORIENTATION: ANTERIOR

## 2023-06-07 NOTE — PLAN OF CARE
Problem: Safety - Adult  Goal: Free from fall injury  Outcome: Progressing     Problem: Pain  Goal: Verbalizes/displays adequate comfort level or baseline comfort level  Outcome: Progressing     Problem: Chronic Conditions and Co-morbidities  Goal: Patient's chronic conditions and co-morbidity symptoms are monitored and maintained or improved  Outcome: Not Progressing   MD notified

## 2023-06-07 NOTE — DISCHARGE INSTR - COC
Conversion                Nurse Assessment:  Last Vital Signs: BP (!) 190/83   Pulse 57   Temp 97.9 °F (36.6 °C)   Resp 18   Ht 1.727 m (5' 8\")   Wt 69.5 kg (153 lb 3.5 oz)   SpO2 95%   BMI 23.30 kg/m²     Last documented pain score (0-10 scale): Pain Level: 7  Last Weight:   Wt Readings from Last 1 Encounters:   06/07/23 69.5 kg (153 lb 3.5 oz)     Mental Status:  oriented and alert    IV Access:  - Dialysis Catheter  - site  right, insertion date: unknown    Nursing Mobility/ADLs:  Walking   Assisted  Transfer  Assisted  Bathing  Assisted  Dressing  Assisted  Toileting  Assisted  Feeding  [facility] Delivery   none    Wound Care Documentation and Therapy:        Elimination:  Continence: Bowel: Yes  Bladder: No/Anuric  Urinary Catheter: None   Colostomy/Ileostomy/Ileal Conduit: No       Date of Last BM:     Intake/Output Summary (Last 24 hours) at 6/7/2023 1617  Last data filed at 6/7/2023 1542  Gross per 24 hour   Intake 1250 ml   Output 4500 ml   Net -3250 ml     I/O last 3 completed shifts: In: 1570 [P.O.:1070]  Out: 4500     Safety Concerns: At Risk for Falls    Impairments/Disabilities:      None    Nutrition Therapy:  Current Nutrition Therapy:   - Oral Diet:  Renal    Routes of Feeding: Oral  Liquids: No Restrictions  Daily Fluid Restriction: yes - amount 1500 mL  Last Modified Barium Swallow with Video (Video Swallowing Test): not done    Treatments at the Time of Hospital Discharge:   Respiratory Treatments: N/A  Oxygen Therapy:  is not on home oxygen therapy.   Ventilator:    - No ventilator support    Rehab Therapies: N/A  Weight Bearing Status/Restrictions: No weight bearing restrictions  Other Medical Equipment (for information only, NOT a DME order):  cane  Other Treatments: N/A    Patient's personal belongings (please select all that are sent with patient):  None    RN SIGNATURE:  Electronically signed by Nicky Garcia RN on 6/7/23 at 4:19 PM

## 2023-06-07 NOTE — PROGRESS NOTES
[facility] Medicine Service Daily Progress Note    Patient reporting headache and continued epigastric pain    OBJECTIVE:    Vitals:   Vitals:    06/06/23 1111   BP: (!) 147/68   Pulse: 56   Resp: 16   Temp: 97.8 °F (36.6 °C)   SpO2: 98%     Physical Exam:  General: NAD. Respiratory: CTAB. Cardiovascular: Regular rate. GI: Nondistended. + bowel sounds. Nontender. Extremities: No LE edema. Skin: Warm, dry. Neuro: Alert and oriented. No focal findings. Tender to palpation throughout R temporal region. Ecchymosis below eye on this side with small superficial laceration on forehead.  Temporal artery pulses intact bilaterally with no nodules    Inpatient Medications  Current Facility-Administered Medications   Medication Dose Route Frequency    HYDROmorphone (DILAUDID) injection 0.25 mg  0.25 mg IntraVENous Q4H PRN    [START ON 6/7/2023] epoetin regan-epbx (RETACRIT) injection 8,000 Units  8,000 Units SubCUTAneous Once per day on Mon Wed Fri    sodium chloride flush 0.9 % injection 5-40 mL  5-40 mL IntraVENous 2 times per day    sodium chloride flush 0.9 % injection 5-40 mL  5-40 mL IntraVENous PRN    0.9 % sodium chloride infusion   IntraVENous PRN    heparin (porcine) injection 5,000 Units  5,000 Units SubCUTAneous 3 times per day    ondansetron (ZOFRAN-ODT) disintegrating tablet 4 mg  4 mg Oral Q8H PRN    Or    ondansetron (ZOFRAN) injection 4 mg  4 mg IntraVENous Q6H PRN    acetaminophen (TYLENOL) tablet 650 mg  650 mg Oral Q6H PRN    Or    acetaminophen (TYLENOL) suppository 650 mg  650 mg Rectal Q6H PRN    piperacillin-tazobactam (ZOSYN) 3,375 mg in sodium chloride 0.9 % 50 mL IVPB (mini-bag)  3,375 mg IntraVENous Q12H    amLODIPine (NORVASC) tablet 10 mg  10 mg Oral Daily    gabapentin (NEURONTIN) capsule 300 mg  300 mg Oral Daily    hydrALAZINE (APRESOLINE) tablet 100 mg  100 mg Oral TID    metoprolol succinate (TOPROL XL) extended release tablet 100 mg  100 mg Oral Daily    pantoprazole (PROTONIX)
[address] visit attempted. Mr. Sergio Marcelo was asleep. He is Mu-ism. No family was with him at the time of the visit. Prayer  for spiritual communion offered for his well-being.     CLINTON Stuart, RN, ACSW, LCSW   Page:  [phone])
Attempted visit for Mandaen patient. Patient unavailable at this time.      Fidel Almeida
Concetta Mello RN at bedside for dialysis. SBAR report given to RN.
NEPHROLOGY PROGRESS NOTE     Patient: Dolores Parisi MRN: [id number]  PCP: Dean Sultana MD   :     1960  Age:   58 y.o.   Sex:  male          Assessment & Plan:     ESKD-HD MWF [facility])  Hyperkalemia   Fluid overload  AOCD    -had HD 6/ with 4 kg off  -continue HD MWF  -start LIZZETH with HD  -order placed in computer           Subjective:   CC:follow up ESKD-HD MWF  HPI: Patient seen, Feeling better   Had HD / with 4 kg off  ROS:  Current Facility-Administered Medications   Medication Dose Route Frequency    HYDROmorphone (DILAUDID) injection 0.25 mg  0.25 mg IntraVENous Q4H PRN    sodium chloride flush 0.9 % injection 5-40 mL  5-40 mL IntraVENous 2 times per day    sodium chloride flush 0.9 % injection 5-40 mL  5-40 mL IntraVENous PRN    0.9 % sodium chloride infusion   IntraVENous PRN    heparin (porcine) injection 5,000 Units  5,000 Units SubCUTAneous 3 times per day    ondansetron (ZOFRAN-ODT) disintegrating tablet 4 mg  4 mg Oral Q8H PRN    Or    ondansetron (ZOFRAN) injection 4 mg  4 mg IntraVENous Q6H PRN    acetaminophen (TYLENOL) tablet 650 mg  650 mg Oral Q6H PRN    Or    acetaminophen (TYLENOL) suppository 650 mg  650 mg Rectal Q6H PRN    piperacillin-tazobactam (ZOSYN) 3,375 mg in sodium chloride 0.9 % 50 mL IVPB (mini-bag)  3,375 mg IntraVENous Q12H    amLODIPine (NORVASC) tablet 10 mg  10 mg Oral Daily    gabapentin (NEURONTIN) capsule 300 mg  300 mg Oral Daily    hydrALAZINE (APRESOLINE) tablet 100 mg  100 mg Oral TID    metoprolol succinate (TOPROL XL) extended release tablet 100 mg  100 mg Oral Daily    pantoprazole (PROTONIX) tablet 40 mg  40 mg Oral Daily    rosuvastatin (CRESTOR) tablet 10 mg  10 mg Oral QAM    sertraline (ZOLOFT) tablet 50 mg  50 mg Oral Daily    sevelamer (RENVELA) tablet 800 mg  800 mg Oral TID WC    sucralfate (CARAFATE) tablet 1 g  1 g Oral TID    tamsulosin (FLOMAX) capsule 0.4 mg  0.4 mg Oral Nightly    traZODone (DESYREL) tablet 50 mg  50 mg Oral
NEPHROLOGY PROGRESS NOTE     Patient: Hillis Kocher MRN: [id number]  PCP: Moira Mccloud MD   :     1960  Age:   58 y.o. Sex:  male          Assessment & Plan:     [facility] MWF [facility])  Hyperkalemia   Fluid overload  AOCD    -Plan for urgent HD today  -acute davita notified   -give lokelma 10 gm pox1  -order placed in computer           Subjective:   CC:follow up ESKD-HD MWF  HPI: Patient seen, C/o sob and K is up 5.8  ROS:  Current Facility-Administered Medications   Medication Dose Route Frequency    aspirin chewable tablet 324 mg  324 mg Oral Daily    morphine sulfate (PF) injection 4 mg  4 mg IntraVENous Q1H PRN    piperacillin-tazobactam (ZOSYN) 4,500 mg in sodium chloride 0.9 % 100 mL IVPB (mini-bag)  4,500 mg IntraVENous Once     Current Outpatient Medications   Medication Sig    sertraline (ZOLOFT) 50 MG tablet TAKE 1 TABLET BY MOUTH DAILY. INDICATIONS: MAJOR DEPRESSIVE DISORDER    insulin glargine (LANTUS) 100 UNIT/ML injection vial Inject 5 Units into the skin nightly    amLODIPine (NORVASC) 10 MG tablet Take 1 tablet by mouth daily    sucralfate (CARAFATE) 1 GM tablet Take 1 tablet by mouth 3 times daily    pantoprazole (PROTONIX) 40 MG tablet Take 1 tablet by mouth daily    traZODone (DESYREL) 50 MG tablet TAKE 1 TABLET BY MOUTH NIGHTLY FOR INSOMNIA ASSOCIATED WITH DEPRESSION    sevelamer (RENVELA) 800 MG tablet TAKE 1 TABLET 3 TIMES A DAY AT BEGINNING OF MEALS SWALLOW WHOLE DO NOT CRUSH, BREAK OR CHEW    acetaminophen (TYLENOL) 500 MG tablet Take 500 mg by mouth every 6 hours as needed    diclofenac sodium (VOLTAREN) 1 % GEL Apply 4 g topically 4 times daily    docusate (COLACE, DULCOLAX) 100 MG CAPS Take 1 capsule by mouth 2 times daily    furosemide (LASIX) 80 MG tablet Take 80 mg by mouth daily    gabapentin (NEURONTIN) 300 MG capsule Take 300 mg by mouth daily.     hydrALAZINE (APRESOLINE) 100 MG tablet Take 100 mg by mouth 3 times daily    insulin aspart (NOVOLOG) 100 UNIT/ML
Patient HTN. Notified family medicine. Gave nighttime hydralazine dose early, per MD order. SBP down to 170. OK to d/c from family medicine. Patient instructed to follow up with doctors as soon as possible. Discharge instructions reviewed with patient at bedside. Opportunity for questions provided. Patient wheeled out to private car by hospital staff.
Patient with c/o of significant pain to Right side- UE/LE and face. Patient refusing tylenol stating it does not work. MD notified. Heating pad recommended. Unable to find machine. Supervisor/MD notified. MD notified again of continued pain. MD at bedside. New orders for lidocaine patch entered. Patient refused lidocaine patch and requesting something for his face/head. He still will not take tylenol. Patient also c/o blurry vision. MD notified. MD at bedside. Blurry vision is chronic and r/t glaucoma.
Nephrology consulted:  dialysis MWF. To get today   - Continue home Lasix 80 mg daily. Reportedly ran out on Friday and could not get refill     Colitis, Possible Cystitis: Mild colitis of rectosigmoid colon on CT. Abdominal pain. No WBC count, afebrile, no SIRS criteria  - S/p Zosyn 3,375 q12 hrs x 2 days. Discontinued. Low suspiscion for infectious process at this time  - UA clean    Headache: History from 6/6: \"Patient with ecchymosis below R eye and small laceration on R scalp. Does not remember any specific fall but does note that he was home alone and napping a lot this weekend. I spoke with his wife and she said that he slipped in the shower about 2 weeks ago but was unaware of any specific head injuries. Patient says his friend noticed some swelling under his right eye this weekend at some point. No acute vision changes, although he has been getting injections in eyes bilaterally for chronic vision loss seemingly secondary to chronic diabetes. Obtained a CRP and ESR which were both elevated, however have been just as elevated in the past, especially in the setting of ESRD on dialysis. We have low concern for temporal arteritis, but with elevated inflammatory markers and tenderness at R temporal artery, it is in our differential. CRP and ESR at normal levels for a patient with ESRD and dialysis. We feel it is likely some sort of mechanical fall or injury. Of note, patient does get chronic tension headaches. \"  Lab Results   Component Value Date    CRP 2.01 (H) 06/06/2023   ESR: 63  Likely occipital neuralgia  - CT NAP  - Tylenol, heating pad, consider flexeril if not improving    Blurry vision: History of diabetic retinopathy vs glaucoma. Has been getting outpatient eye injections, but has missed his last few appointments and has not followed up recently.  Also normally uses eye glasses to read and does not have them with him here in hospital  - [facility]
([facility]-HD MWF  [facility])  Hyperkalemia   Fluid overload  AOCD     -continue HD today and MWF  -LIZZETH with HD    Back 2d).      Electronically signed by Cherise Nova MD on 6/7/23 at 11:09 AM EDT

## 2023-06-07 NOTE — PLAN OF CARE
Problem: Discharge Planning  Goal: Discharge to home or other facility with appropriate resources  Outcome: Adequate for Discharge  Flowsheets (Taken 6/7/2023 0801)  Discharge to home or other facility with appropriate resources: Identify barriers to discharge with patient and caregiver     Problem: Safety - Adult  Goal: Free from fall injury  Outcome: Adequate for Discharge  Flowsheets (Taken 6/7/2023 0801)  Free From Fall Injury: Instruct family/caregiver on patient safety     Problem: Pain  Goal: Verbalizes/displays adequate comfort level or baseline comfort level  Outcome: Adequate for Discharge  Flowsheets (Taken 6/7/2023 0801)  Verbalizes/displays adequate comfort level or baseline comfort level:   Encourage patient to monitor pain and request assistance   Assess pain using appropriate pain scale     Problem: Chronic Conditions and Co-morbidities  Goal: Patient's chronic conditions and co-morbidity symptoms are monitored and maintained or improved  Outcome: Adequate for Discharge  Flowsheets (Taken 6/7/2023 0801)  Care Plan - Patient's Chronic Conditions and Co-Morbidity Symptoms are Monitored and Maintained or Improved: Monitor and assess patient's chronic conditions and comorbid symptoms for stability, deterioration, or improvement

## 2023-06-07 NOTE — FLOWSHEET NOTE
Pre Treatment note   06/07/23 1149   Vital Signs   BP (!) 176/80   Temp 97.4 °F (36.3 °C)   Pulse 51   Respirations 18   SpO2 94 %   Weight - Scale 74.5 kg (164 lb 3.9 oz)   Weight Method Bed scale   Percent Weight Change 3.47   Pain Assessment   Pain Level 7   Pain Type Acute pain   Pain Location Head   Pain Orientation Anterior   Pain Descriptors Aching   Pain Frequency Continuous   Observations & Evaluations   Level of Consciousness 0   Oriented X 4   Respiratory Quality/Effort Unlabored   O2 Device None (Room air)   Bilateral Breath Sounds Diminished   Skin Color Other (comment)  (normal for ethnicity)   Skin Condition/Temp Warm;Dry   Appetite Good   Abdomen Inspection Soft   Bowel Sounds (All Quadrants) Present   Edema None   Technical Checks   Dialysis Machine No. [id number]   RO Machine Number [id number]   Dialyzer Lot No. [id number]   Tubing Lot Number [id number]   All Connections Secure Yes   NS Bag Yes   Saline Line Double Clamped Yes   Dialyzer Revaclear 300   Prime Volume (mL) 200 mL   ICEBOAT I;C;E;B;O;A;T   RO Machine Log Sheet Completed Yes   Machine Alarm Self Test Completed; Passed   Air Foam Detector Tested;Proper Function;pH Reading   Extracorporeal Circuit Tested for Integrity Yes   Machine Conductivity 13.7   Manual Conductivity 13.4   Manual Ph 7.4   Bleach Test (Neg) Yes   Bath Temperature 98.6 °F (37 °C)   Treatment Initiation   Dialyze Hours 3.5   Treatment  Initiation Universal Precautions maintained;Lines secured to patient; Connections secured;Prime given;Venous Parameters set; Arterial Parameters set; Air foam detector engaged;Dialysate;Saline line double clamped;Dialyzer; Revaclear Dialyzer;REV-300   Dialysis Bath   K+ (Potassium) 2   Ca+ (Calcium) 2.5   Na+ (Sodium) 138   HCO3 (Bicarb) 35   Bicarbonate Concentrate Lot No. [id number]   Acid Concentrate Lot No. [id number]   Hemodialysis Central Access Right Subclavian   No placement date or time found.    Present on Admission/Arrival: Yes  Orientation:

## 2023-06-07 NOTE — CARE COORDINATION
6/7/2023  2:24 PM  CM attempted assessment x2. Pt not available. CM informed pt has no CM needs, and is discharging.

## 2023-06-12 PROBLEM — R51.9 CHRONIC NONINTRACTABLE HEADACHE: Status: ACTIVE | Noted: 2023-06-12

## 2023-06-12 PROBLEM — G89.29 CHRONIC NONINTRACTABLE HEADACHE: Status: ACTIVE | Noted: 2023-06-12

## 2023-06-20 ENCOUNTER — OFFICE VISIT (OUTPATIENT)
Facility: CLINIC | Age: 63
End: 2023-06-20
Payer: MEDICAID

## 2023-06-20 ENCOUNTER — HOSPITAL ENCOUNTER (EMERGENCY)
Facility: HOSPITAL | Age: 63
Discharge: HOME | End: 2023-06-20
Attending: STUDENT IN AN ORGANIZED HEALTH CARE EDUCATION/TRAINING PROGRAM | Admitting: STUDENT IN AN ORGANIZED HEALTH CARE EDUCATION/TRAINING PROGRAM
Payer: MEDICAID

## 2023-06-20 ENCOUNTER — APPOINTMENT (OUTPATIENT)
Facility: HOSPITAL | Age: 63
End: 2023-06-20
Payer: MEDICAID

## 2023-06-20 VITALS
HEART RATE: 63 BPM | WEIGHT: 152 LBS | TEMPERATURE: 98.1 F | HEIGHT: 68 IN | OXYGEN SATURATION: 98 % | DIASTOLIC BLOOD PRESSURE: 81 MMHG | SYSTOLIC BLOOD PRESSURE: 195 MMHG | BODY MASS INDEX: 23.04 KG/M2 | RESPIRATION RATE: 12 BRPM

## 2023-06-20 VITALS
OXYGEN SATURATION: 98 % | HEART RATE: 67 BPM | HEIGHT: 68 IN | BODY MASS INDEX: 23.04 KG/M2 | DIASTOLIC BLOOD PRESSURE: 79 MMHG | RESPIRATION RATE: 16 BRPM | WEIGHT: 152 LBS | SYSTOLIC BLOOD PRESSURE: 220 MMHG | TEMPERATURE: 98 F

## 2023-06-20 DIAGNOSIS — I25.119 ATHEROSCLEROSIS OF NATIVE CORONARY ARTERY OF NATIVE HEART WITH ANGINA PECTORIS (HCC): ICD-10-CM

## 2023-06-20 DIAGNOSIS — R51.9 NONINTRACTABLE HEADACHE, UNSPECIFIED CHRONICITY PATTERN, UNSPECIFIED HEADACHE TYPE: ICD-10-CM

## 2023-06-20 DIAGNOSIS — I10 ESSENTIAL (PRIMARY) HYPERTENSION: Primary | ICD-10-CM

## 2023-06-20 DIAGNOSIS — N18.6 END STAGE RENAL DISEASE (HCC): ICD-10-CM

## 2023-06-20 DIAGNOSIS — R07.89 CHEST WALL PAIN: Primary | ICD-10-CM

## 2023-06-20 LAB
ALBUMIN SERPL-MCNC: 3.3 G/DL (ref 3.5–5)
ALBUMIN/GLOB SERPL: 0.6 (ref 1.1–2.2)
ALP SERPL-CCNC: 212 U/L (ref 45–117)
ALT SERPL-CCNC: 24 U/L (ref 12–78)
ANION GAP SERPL CALC-SCNC: 9 MMOL/L (ref 5–15)
AST SERPL-CCNC: 14 U/L (ref 15–37)
BASOPHILS # BLD: 0 K/UL (ref 0–0.1)
BASOPHILS NFR BLD: 1 % (ref 0–1)
BILIRUB SERPL-MCNC: 0.6 MG/DL (ref 0.2–1)
BUN SERPL-MCNC: 35 MG/DL (ref 6–20)
BUN/CREAT SERPL: 7 (ref 12–20)
CALCIUM SERPL-MCNC: 9.2 MG/DL (ref 8.5–10.1)
CHLORIDE SERPL-SCNC: 101 MMOL/L (ref 97–108)
CO2 SERPL-SCNC: 25 MMOL/L (ref 21–32)
CREAT SERPL-MCNC: 4.72 MG/DL (ref 0.7–1.3)
DIFFERENTIAL METHOD BLD: ABNORMAL
EOSINOPHIL # BLD: 0.1 K/UL (ref 0–0.4)
EOSINOPHIL NFR BLD: 3 % (ref 0–7)
ERYTHROCYTE [DISTWIDTH] IN BLOOD BY AUTOMATED COUNT: 17.3 % (ref 11.5–14.5)
GLOBULIN SER CALC-MCNC: 5.1 G/DL (ref 2–4)
GLUCOSE SERPL-MCNC: 225 MG/DL (ref 65–100)
HCT VFR BLD AUTO: 30.9 % (ref 36.6–50.3)
HGB BLD-MCNC: 10.3 G/DL (ref 12.1–17)
IMM GRANULOCYTES # BLD AUTO: 0 K/UL (ref 0–0.04)
IMM GRANULOCYTES NFR BLD AUTO: 0 % (ref 0–0.5)
LIPASE SERPL-CCNC: 170 U/L (ref 73–393)
LYMPHOCYTES # BLD: 0.6 K/UL (ref 0.8–3.5)
LYMPHOCYTES NFR BLD: 16 % (ref 12–49)
MAGNESIUM SERPL-MCNC: 2.2 MG/DL (ref 1.6–2.4)
MCH RBC QN AUTO: 28.5 PG (ref 26–34)
MCHC RBC AUTO-ENTMCNC: 33.3 G/DL (ref 30–36.5)
MCV RBC AUTO: 85.6 FL (ref 80–99)
MONOCYTES # BLD: 0.4 K/UL (ref 0–1)
MONOCYTES NFR BLD: 12 % (ref 5–13)
NEUTS SEG # BLD: 2.6 K/UL (ref 1.8–8)
NEUTS SEG NFR BLD: 68 % (ref 32–75)
NRBC # BLD: 0 K/UL (ref 0–0.01)
NRBC BLD-RTO: 0 PER 100 WBC
PLATELET # BLD AUTO: 86 K/UL (ref 150–400)
PMV BLD AUTO: 10.7 FL (ref 8.9–12.9)
POTASSIUM SERPL-SCNC: 4.1 MMOL/L (ref 3.5–5.1)
PROT SERPL-MCNC: 8.4 G/DL (ref 6.4–8.2)
RBC # BLD AUTO: 3.61 M/UL (ref 4.1–5.7)
RBC MORPH BLD: ABNORMAL
SODIUM SERPL-SCNC: 135 MMOL/L (ref 136–145)
TROPONIN I SERPL HS-MCNC: 27 NG/L (ref 0–76)
WBC # BLD AUTO: 3.7 K/UL (ref 4.1–11.1)

## 2023-06-20 PROCEDURE — 99214 OFFICE O/P EST MOD 30 MIN: CPT | Performed by: NURSE PRACTITIONER

## 2023-06-20 PROCEDURE — 2500000003 HC RX 250 WO HCPCS: Performed by: EMERGENCY MEDICINE

## 2023-06-20 PROCEDURE — 3078F DIAST BP <80 MM HG: CPT | Performed by: NURSE PRACTITIONER

## 2023-06-20 PROCEDURE — 93005 ELECTROCARDIOGRAM TRACING: CPT | Performed by: EMERGENCY MEDICINE

## 2023-06-20 PROCEDURE — 83735 ASSAY OF MAGNESIUM: CPT

## 2023-06-20 PROCEDURE — A4216 STERILE WATER/SALINE, 10 ML: HCPCS | Performed by: EMERGENCY MEDICINE

## 2023-06-20 PROCEDURE — 83690 ASSAY OF LIPASE: CPT

## 2023-06-20 PROCEDURE — 85025 COMPLETE CBC W/AUTO DIFF WBC: CPT

## 2023-06-20 PROCEDURE — 80053 COMPREHEN METABOLIC PANEL: CPT

## 2023-06-20 PROCEDURE — 2580000003 HC RX 258: Performed by: EMERGENCY MEDICINE

## 2023-06-20 PROCEDURE — 96375 TX/PRO/DX INJ NEW DRUG ADDON: CPT

## 2023-06-20 PROCEDURE — 96374 THER/PROPH/DIAG INJ IV PUSH: CPT

## 2023-06-20 PROCEDURE — 36415 COLL VENOUS BLD VENIPUNCTURE: CPT

## 2023-06-20 PROCEDURE — 99285 EMERGENCY DEPT VISIT HI MDM: CPT

## 2023-06-20 PROCEDURE — 3077F SYST BP >= 140 MM HG: CPT | Performed by: NURSE PRACTITIONER

## 2023-06-20 PROCEDURE — 6360000002 HC RX W HCPCS: Performed by: EMERGENCY MEDICINE

## 2023-06-20 PROCEDURE — 84484 ASSAY OF TROPONIN QUANT: CPT

## 2023-06-20 PROCEDURE — 71046 X-RAY EXAM CHEST 2 VIEWS: CPT

## 2023-06-20 PROCEDURE — 6370000000 HC RX 637 (ALT 250 FOR IP): Performed by: EMERGENCY MEDICINE

## 2023-06-20 RX ORDER — HYDRALAZINE HYDROCHLORIDE 20 MG/ML
20 INJECTION INTRAMUSCULAR; INTRAVENOUS ONCE
Status: COMPLETED | OUTPATIENT
Start: 2023-06-20 | End: 2023-06-20

## 2023-06-20 RX ADMIN — ALUMINUM HYDROXIDE, MAGNESIUM HYDROXIDE, AND SIMETHICONE 40 ML: 200; 200; 20 SUSPENSION ORAL at 11:47

## 2023-06-20 RX ADMIN — FAMOTIDINE 20 MG: 10 INJECTION, SOLUTION INTRAVENOUS at 11:48

## 2023-06-20 RX ADMIN — HYDRALAZINE HYDROCHLORIDE 20 MG: 20 INJECTION INTRAMUSCULAR; INTRAVENOUS at 11:53

## 2023-06-20 ASSESSMENT — ENCOUNTER SYMPTOMS
SHORTNESS OF BREATH: 1
CHEST TIGHTNESS: 1
CHEST TIGHTNESS: 1
COLOR CHANGE: 0
STRIDOR: 0
SHORTNESS OF BREATH: 1
ABDOMINAL PAIN: 0
WHEEZING: 0
COUGH: 0
GASTROINTESTINAL NEGATIVE: 1
SINUS PRESSURE: 0

## 2023-06-20 ASSESSMENT — PAIN SCALES - GENERAL: PAINLEVEL_OUTOF10: 8

## 2023-06-20 ASSESSMENT — PAIN - FUNCTIONAL ASSESSMENT: PAIN_FUNCTIONAL_ASSESSMENT: 0-10

## 2023-06-20 NOTE — ED PROVIDER NOTES
[facility] EMERGENCY DEPT  EMERGENCY DEPARTMENT ENCOUNTER      Pt Name: Daniel Tejeda  MRN: [id number]  Regisgfjuany 1960  Date of evaluation: 6/20/2023  Provider: Tl Redmond DO    CHIEF COMPLAINT       Chief Complaint   Patient presents with    Chest Pain    Hypertension         HISTORY OF PRESENT ILLNESS   (Location/Symptom, Timing/Onset, Context/Setting, Quality, Duration, Modifying Factors, Severity)  Note limiting factors. Chest pain for over one year, epigastric pain, gets dialysis: M, W, F, went to the doctor and they noted elevated BP today and sent him to ED, reports mild shortness of breath and mild headache, last tylenol last night with some improvement    The history is provided by the patient. Review of External Medical Records:     Nursing Notes were reviewed. REVIEW OF SYSTEMS    (2-9 systems for level 4, 10 or more for level 5)     Review of Systems   Constitutional:  Negative for activity change, appetite change and fatigue. HENT:  Negative for sinus pressure. Respiratory:  Positive for chest tightness and shortness of breath. Cardiovascular:  Positive for chest pain. Gastrointestinal:  Negative for abdominal pain. Neurological:  Positive for headaches. All other systems reviewed and are negative. Except as noted above the remainder of the review of systems was reviewed and negative.        PAST MEDICAL HISTORY     Past Medical History:   Diagnosis Date    Anemia associated with chronic renal failure     Anxiety and depression     BPH (benign prostatic hyperplasia)     CAD (coronary artery disease)     CHF NYHA class I, chronic, diastolic (HCC)     Chronic pain     DM type 2 causing renal disease ([address]     DM type 2 causing vascular disease ([address]     ESRD on dialysis ([address]     GERD (gastroesophageal reflux disease)     HTN (hypertension)     Hyperlipidemia     Thrombocytopenia ([address]          SURGICAL HISTORY       Past Surgical History:   Procedure Laterality Date

## 2023-06-20 NOTE — PROGRESS NOTES
Chief Complaint   Patient presents with    Follow-Up from [address] [place] [address] with same issues, no improvements     BP (!) 220/79   Pulse 67   Temp 98 °F (36.7 °C)   Resp 16   Ht 5' 8\" (1.727 m)   Wt 152 lb (68.9 kg)   SpO2 98%   BMI 23.11 kg/m²   1. Have you been to the ER, urgent care clinic since your last visit? Hospitalized since your last visit? yes    2. Have you seen or consulted any other health care providers outside of the [address] [place] [address] since your last visit? Include any pap smears or colon screening.  No

## 2023-06-20 NOTE — ED NOTES
Pt was discharged by Dr Maya Vides  Pt verbalized good understanding of all discharge instructions, and follow up care. All questions answered. Pt in stable condition on discharge.       Socorro Oppenheim, RN  06/20/23 0444

## 2023-06-20 NOTE — PROGRESS NOTES
Assessment/Plan:     Emi Webb was seen today for follow-up from hospital.    Diagnoses and all orders for this visit:    Essential (primary) hypertension    End stage renal disease ([facility]    Atherosclerosis of native coronary artery of native heart with angina pectoris [facility])  Patient was seen in office for evaluation of blood pressure. Blood pressure was elevated on arrival and after 15 to 20 minutes was repeated and was even higher at 226/92. Patient reports headache, blurred vision, chest pains and shortness of breath with exertion. Patient declined EMS. Daughter is with patient and will transport to [facility] emergency room for evaluation. Recommended patient to continue to follow-up with nephrology, cardiology and does have upcoming appointment with hepatology. Patient sent to emergency room for evaluation. No follow-up provider specified. Discussed expected course/resolution/complications of diagnosis in detail with patient. Medication risks/benefits/costs/interactions/alternatives discussed with patient. Pt was given after visit summary which includes diagnoses, current medications & vitals. Pt expressed understanding with the diagnosis and plan        Subjective:      Grant Epps is a 58 y.o. male who presents for had concerns including Follow-Up from Hospital (Presents with same issues, no improvements). Current Outpatient Medications   Medication Sig Dispense Refill    epoetin regan-epbx (RETACRIT) 4000 UNIT/ML SOLN injection Inject 2 mLs into the skin three times a week 16.5 mL 0    furosemide (LASIX) 80 MG tablet Take 1 tablet by mouth daily 60 tablet 0    gabapentin (NEURONTIN) 100 MG capsule Take 1 capsule by mouth three times a week for 140 days. Take after dialysis Max Daily Amount: 100 mg 30 capsule 1    sertraline (ZOLOFT) 50 MG tablet TAKE 1 TABLET BY MOUTH DAILY.  INDICATIONS: MAJOR DEPRESSIVE DISORDER 30 tablet 2    insulin glargine (LANTUS) 100 UNIT/ML injection vial

## 2023-06-20 NOTE — ED TRIAGE NOTES
Patient arrives to ed via pov after going to PCP and BP elevated 226/92. Pt sts he has chest pain and epigastric pain x 1 year. Pt is HD pt MWF. Denies any missing appointments.

## 2023-06-21 LAB
EKG ATRIAL RATE: 65 BPM
EKG DIAGNOSIS: NORMAL
EKG P AXIS: 56 DEGREES
EKG P-R INTERVAL: 150 MS
EKG Q-T INTERVAL: 444 MS
EKG QRS DURATION: 88 MS
EKG QTC CALCULATION (BAZETT): 461 MS
EKG R AXIS: 21 DEGREES
EKG T AXIS: 89 DEGREES
EKG VENTRICULAR RATE: 65 BPM

## 2023-06-21 PROCEDURE — 93010 ELECTROCARDIOGRAM REPORT: CPT | Performed by: SPECIALIST

## 2023-06-30 ENCOUNTER — TELEPHONE (OUTPATIENT)
Facility: CLINIC | Age: 63
End: 2023-06-30

## 2023-07-05 ENCOUNTER — APPOINTMENT (OUTPATIENT)
Facility: HOSPITAL | Age: 63
DRG: 720 | End: 2023-07-05
Payer: MEDICAID

## 2023-07-05 ENCOUNTER — HOSPITAL ENCOUNTER (INPATIENT)
Facility: HOSPITAL | Age: 63
LOS: 5 days | Discharge: HOME | DRG: 720 | End: 2023-07-10
Attending: EMERGENCY MEDICINE | Admitting: FAMILY MEDICINE
Payer: MEDICAID

## 2023-07-05 DIAGNOSIS — E87.5 HYPERKALEMIA: ICD-10-CM

## 2023-07-05 DIAGNOSIS — R07.9 CHEST PAIN, UNSPECIFIED TYPE: Primary | ICD-10-CM

## 2023-07-05 PROBLEM — N18.6 ESRD (END STAGE RENAL DISEASE) ON DIALYSIS (HCC): Status: ACTIVE | Noted: 2022-02-06

## 2023-07-05 PROBLEM — Z99.2 ESRD (END STAGE RENAL DISEASE) ON DIALYSIS (HCC): Status: ACTIVE | Noted: 2022-02-06

## 2023-07-05 LAB
ALBUMIN SERPL-MCNC: 3 G/DL (ref 3.5–5)
ALBUMIN/GLOB SERPL: 0.7 (ref 1.1–2.2)
ALP SERPL-CCNC: 152 U/L (ref 45–117)
ALT SERPL-CCNC: 23 U/L (ref 12–78)
ANION GAP SERPL CALC-SCNC: 11 MMOL/L (ref 5–15)
ANION GAP SERPL CALC-SCNC: 9 MMOL/L (ref 5–15)
AST SERPL-CCNC: 10 U/L (ref 15–37)
BASOPHILS # BLD: 0 K/UL (ref 0–0.1)
BASOPHILS NFR BLD: 1 % (ref 0–1)
BILIRUB SERPL-MCNC: 0.4 MG/DL (ref 0.2–1)
BUN SERPL-MCNC: 30 MG/DL (ref 6–20)
BUN SERPL-MCNC: 83 MG/DL (ref 6–20)
BUN/CREAT SERPL: 9 (ref 12–20)
BUN/CREAT SERPL: 9 (ref 12–20)
CALCIUM SERPL-MCNC: 8.8 MG/DL (ref 8.5–10.1)
CALCIUM SERPL-MCNC: 9 MG/DL (ref 8.5–10.1)
CHLORIDE SERPL-SCNC: 100 MMOL/L (ref 97–108)
CHLORIDE SERPL-SCNC: 100 MMOL/L (ref 97–108)
CO2 SERPL-SCNC: 21 MMOL/L (ref 21–32)
CO2 SERPL-SCNC: 28 MMOL/L (ref 21–32)
COMMENT:: NORMAL
CREAT SERPL-MCNC: 3.47 MG/DL (ref 0.7–1.3)
CREAT SERPL-MCNC: 8.86 MG/DL (ref 0.7–1.3)
DIFFERENTIAL METHOD BLD: ABNORMAL
EOSINOPHIL # BLD: 0.1 K/UL (ref 0–0.4)
EOSINOPHIL NFR BLD: 3 % (ref 0–7)
ERYTHROCYTE [DISTWIDTH] IN BLOOD BY AUTOMATED COUNT: 18 % (ref 11.5–14.5)
GLOBULIN SER CALC-MCNC: 4.2 G/DL (ref 2–4)
GLUCOSE BLD STRIP.AUTO-MCNC: 93 MG/DL (ref 65–117)
GLUCOSE SERPL-MCNC: 142 MG/DL (ref 65–100)
GLUCOSE SERPL-MCNC: 86 MG/DL (ref 65–100)
HCT VFR BLD AUTO: 26.8 % (ref 36.6–50.3)
HGB BLD-MCNC: 8.7 G/DL (ref 12.1–17)
IMM GRANULOCYTES # BLD AUTO: 0 K/UL (ref 0–0.04)
IMM GRANULOCYTES NFR BLD AUTO: 0 % (ref 0–0.5)
LACTATE SERPL-SCNC: 0.6 MMOL/L (ref 0.4–2)
LIPASE SERPL-CCNC: 139 U/L (ref 73–393)
LYMPHOCYTES # BLD: 0.6 K/UL (ref 0.8–3.5)
LYMPHOCYTES NFR BLD: 18 % (ref 12–49)
MAGNESIUM SERPL-MCNC: 2.6 MG/DL (ref 1.6–2.4)
MCH RBC QN AUTO: 28.8 PG (ref 26–34)
MCHC RBC AUTO-ENTMCNC: 32.5 G/DL (ref 30–36.5)
MCV RBC AUTO: 88.7 FL (ref 80–99)
MONOCYTES # BLD: 0.4 K/UL (ref 0–1)
MONOCYTES NFR BLD: 11 % (ref 5–13)
NEUTS SEG # BLD: 2.4 K/UL (ref 1.8–8)
NEUTS SEG NFR BLD: 67 % (ref 32–75)
NRBC # BLD: 0 K/UL (ref 0–0.01)
NRBC BLD-RTO: 0 PER 100 WBC
PLATELET # BLD AUTO: 52 K/UL (ref 150–400)
POTASSIUM SERPL-SCNC: 3.6 MMOL/L (ref 3.5–5.1)
POTASSIUM SERPL-SCNC: 7.4 MMOL/L (ref 3.5–5.1)
PROT SERPL-MCNC: 7.2 G/DL (ref 6.4–8.2)
RBC # BLD AUTO: 3.02 M/UL (ref 4.1–5.7)
RBC MORPH BLD: ABNORMAL
SERVICE CMNT-IMP: NORMAL
SODIUM SERPL-SCNC: 132 MMOL/L (ref 136–145)
SODIUM SERPL-SCNC: 137 MMOL/L (ref 136–145)
SPECIMEN HOLD: NORMAL
TROPONIN I SERPL HS-MCNC: 12 NG/L (ref 0–76)
TROPONIN I SERPL HS-MCNC: 13 NG/L (ref 0–76)
TSH SERPL DL<=0.05 MIU/L-ACNC: 3.52 UIU/ML (ref 0.36–3.74)
WBC # BLD AUTO: 3.5 K/UL (ref 4.1–11.1)

## 2023-07-05 PROCEDURE — A4216 STERILE WATER/SALINE, 10 ML: HCPCS | Performed by: STUDENT IN AN ORGANIZED HEALTH CARE EDUCATION/TRAINING PROGRAM

## 2023-07-05 PROCEDURE — 82962 GLUCOSE BLOOD TEST: CPT

## 2023-07-05 PROCEDURE — 71045 X-RAY EXAM CHEST 1 VIEW: CPT

## 2023-07-05 PROCEDURE — 6360000002 HC RX W HCPCS: Performed by: STUDENT IN AN ORGANIZED HEALTH CARE EDUCATION/TRAINING PROGRAM

## 2023-07-05 PROCEDURE — 1100000000 HC RM PRIVATE

## 2023-07-05 PROCEDURE — 83735 ASSAY OF MAGNESIUM: CPT

## 2023-07-05 PROCEDURE — 99285 EMERGENCY DEPT VISIT HI MDM: CPT

## 2023-07-05 PROCEDURE — 5A1D70Z PERFORMANCE OF URINARY FILTRATION, INTERMITTENT, LESS THAN 6 HOURS PER DAY: ICD-10-PCS | Performed by: STUDENT IN AN ORGANIZED HEALTH CARE EDUCATION/TRAINING PROGRAM

## 2023-07-05 PROCEDURE — 94761 N-INVAS EAR/PLS OXIMETRY MLT: CPT

## 2023-07-05 PROCEDURE — 85025 COMPLETE CBC W/AUTO DIFF WBC: CPT

## 2023-07-05 PROCEDURE — 99291 CRITICAL CARE FIRST HOUR: CPT | Performed by: FAMILY MEDICINE

## 2023-07-05 PROCEDURE — 83605 ASSAY OF LACTIC ACID: CPT

## 2023-07-05 PROCEDURE — 36415 COLL VENOUS BLD VENIPUNCTURE: CPT

## 2023-07-05 PROCEDURE — 84443 ASSAY THYROID STIM HORMONE: CPT

## 2023-07-05 PROCEDURE — 90935 HEMODIALYSIS ONE EVALUATION: CPT

## 2023-07-05 PROCEDURE — 93005 ELECTROCARDIOGRAM TRACING: CPT | Performed by: EMERGENCY MEDICINE

## 2023-07-05 PROCEDURE — C9113 INJ PANTOPRAZOLE SODIUM, VIA: HCPCS | Performed by: STUDENT IN AN ORGANIZED HEALTH CARE EDUCATION/TRAINING PROGRAM

## 2023-07-05 PROCEDURE — 6370000000 HC RX 637 (ALT 250 FOR IP): Performed by: EMERGENCY MEDICINE

## 2023-07-05 PROCEDURE — 84484 ASSAY OF TROPONIN QUANT: CPT

## 2023-07-05 PROCEDURE — 2580000003 HC RX 258: Performed by: STUDENT IN AN ORGANIZED HEALTH CARE EDUCATION/TRAINING PROGRAM

## 2023-07-05 PROCEDURE — 74176 CT ABD & PELVIS W/O CONTRAST: CPT

## 2023-07-05 PROCEDURE — 83690 ASSAY OF LIPASE: CPT

## 2023-07-05 PROCEDURE — 80053 COMPREHEN METABOLIC PANEL: CPT

## 2023-07-05 PROCEDURE — 87040 BLOOD CULTURE FOR BACTERIA: CPT

## 2023-07-05 PROCEDURE — 6370000000 HC RX 637 (ALT 250 FOR IP): Performed by: STUDENT IN AN ORGANIZED HEALTH CARE EDUCATION/TRAINING PROGRAM

## 2023-07-05 RX ORDER — ROSUVASTATIN CALCIUM 10 MG/1
10 TABLET, COATED ORAL EVERY MORNING
Status: DISCONTINUED | OUTPATIENT
Start: 2023-07-06 | End: 2023-07-10 | Stop reason: HOSPADM

## 2023-07-05 RX ORDER — INSULIN LISPRO 100 [IU]/ML
0-4 INJECTION, SOLUTION INTRAVENOUS; SUBCUTANEOUS
Status: DISCONTINUED | OUTPATIENT
Start: 2023-07-05 | End: 2023-07-10 | Stop reason: HOSPADM

## 2023-07-05 RX ORDER — FUROSEMIDE 40 MG/1
80 TABLET ORAL DAILY
Status: DISCONTINUED | OUTPATIENT
Start: 2023-07-05 | End: 2023-07-10 | Stop reason: HOSPADM

## 2023-07-05 RX ORDER — GABAPENTIN 300 MG/1
300 CAPSULE ORAL NIGHTLY
Status: DISCONTINUED | OUTPATIENT
Start: 2023-07-05 | End: 2023-07-10 | Stop reason: HOSPADM

## 2023-07-05 RX ORDER — TRAZODONE HYDROCHLORIDE 50 MG/1
50 TABLET ORAL NIGHTLY PRN
Status: DISCONTINUED | OUTPATIENT
Start: 2023-07-05 | End: 2023-07-10 | Stop reason: HOSPADM

## 2023-07-05 RX ORDER — SODIUM CHLORIDE 0.9 % (FLUSH) 0.9 %
5-40 SYRINGE (ML) INJECTION EVERY 12 HOURS SCHEDULED
Status: DISCONTINUED | OUTPATIENT
Start: 2023-07-05 | End: 2023-07-10 | Stop reason: HOSPADM

## 2023-07-05 RX ORDER — POLYETHYLENE GLYCOL 3350 17 G/17G
17 POWDER, FOR SOLUTION ORAL DAILY PRN
Status: DISCONTINUED | OUTPATIENT
Start: 2023-07-05 | End: 2023-07-10 | Stop reason: HOSPADM

## 2023-07-05 RX ORDER — SODIUM CHLORIDE 9 MG/ML
INJECTION, SOLUTION INTRAVENOUS PRN
Status: DISCONTINUED | OUTPATIENT
Start: 2023-07-05 | End: 2023-07-10 | Stop reason: HOSPADM

## 2023-07-05 RX ORDER — INSULIN LISPRO 100 [IU]/ML
0-8 INJECTION, SOLUTION INTRAVENOUS; SUBCUTANEOUS
Status: DISCONTINUED | OUTPATIENT
Start: 2023-07-05 | End: 2023-07-06

## 2023-07-05 RX ORDER — SUCRALFATE 1 G/1
1 TABLET ORAL 3 TIMES DAILY
Status: DISCONTINUED | OUTPATIENT
Start: 2023-07-05 | End: 2023-07-10 | Stop reason: HOSPADM

## 2023-07-05 RX ORDER — SEVELAMER CARBONATE 800 MG/1
800 TABLET, FILM COATED ORAL
Status: DISCONTINUED | OUTPATIENT
Start: 2023-07-06 | End: 2023-07-10 | Stop reason: HOSPADM

## 2023-07-05 RX ORDER — INSULIN GLARGINE 100 [IU]/ML
5 INJECTION, SOLUTION SUBCUTANEOUS NIGHTLY
Status: DISCONTINUED | OUTPATIENT
Start: 2023-07-05 | End: 2023-07-10 | Stop reason: HOSPADM

## 2023-07-05 RX ORDER — DEXTROSE MONOHYDRATE 100 MG/ML
INJECTION, SOLUTION INTRAVENOUS CONTINUOUS PRN
Status: DISCONTINUED | OUTPATIENT
Start: 2023-07-05 | End: 2023-07-10 | Stop reason: HOSPADM

## 2023-07-05 RX ORDER — TAMSULOSIN HYDROCHLORIDE 0.4 MG/1
0.4 CAPSULE ORAL NIGHTLY
Status: DISCONTINUED | OUTPATIENT
Start: 2023-07-06 | End: 2023-07-10 | Stop reason: HOSPADM

## 2023-07-05 RX ORDER — ISOSORBIDE MONONITRATE 60 MG/1
60 TABLET, EXTENDED RELEASE ORAL DAILY
Status: DISCONTINUED | OUTPATIENT
Start: 2023-07-05 | End: 2023-07-10 | Stop reason: HOSPADM

## 2023-07-05 RX ORDER — SODIUM CHLORIDE 0.9 % (FLUSH) 0.9 %
5-40 SYRINGE (ML) INJECTION PRN
Status: DISCONTINUED | OUTPATIENT
Start: 2023-07-05 | End: 2023-07-10 | Stop reason: HOSPADM

## 2023-07-05 RX ORDER — HYDRALAZINE HYDROCHLORIDE 25 MG/1
100 TABLET, FILM COATED ORAL 3 TIMES DAILY
Status: DISCONTINUED | OUTPATIENT
Start: 2023-07-05 | End: 2023-07-10 | Stop reason: HOSPADM

## 2023-07-05 RX ORDER — DEXTROSE MONOHYDRATE 25 G/50ML
25 INJECTION, SOLUTION INTRAVENOUS ONCE
Status: DISCONTINUED | OUTPATIENT
Start: 2023-07-05 | End: 2023-07-05 | Stop reason: DRUGHIGH

## 2023-07-05 RX ORDER — AMLODIPINE BESYLATE 5 MG/1
10 TABLET ORAL DAILY
Status: DISCONTINUED | OUTPATIENT
Start: 2023-07-06 | End: 2023-07-09

## 2023-07-05 RX ORDER — ASPIRIN 81 MG/1
324 TABLET, CHEWABLE ORAL ONCE
Status: COMPLETED | OUTPATIENT
Start: 2023-07-05 | End: 2023-07-05

## 2023-07-05 RX ADMIN — INSULIN GLARGINE 5 UNITS: 100 INJECTION, SOLUTION SUBCUTANEOUS at 21:11

## 2023-07-05 RX ADMIN — GABAPENTIN 300 MG: 300 CAPSULE ORAL at 21:11

## 2023-07-05 RX ADMIN — ALUMINUM HYDROXIDE, MAGNESIUM HYDROXIDE, AND SIMETHICONE 40 ML: 200; 200; 20 SUSPENSION ORAL at 15:06

## 2023-07-05 RX ADMIN — PIPERACILLIN AND TAZOBACTAM 4500 MG: 4; .5 INJECTION, POWDER, LYOPHILIZED, FOR SOLUTION INTRAVENOUS at 19:29

## 2023-07-05 RX ADMIN — SUCRALFATE 1 G: 1 TABLET ORAL at 19:22

## 2023-07-05 RX ADMIN — SODIUM CHLORIDE 80 MG: 9 INJECTION INTRAMUSCULAR; INTRAVENOUS; SUBCUTANEOUS at 19:24

## 2023-07-05 RX ADMIN — ASPIRIN 324 MG: 81 TABLET, CHEWABLE ORAL at 15:05

## 2023-07-05 RX ADMIN — SERTRALINE 50 MG: 50 TABLET, FILM COATED ORAL at 19:22

## 2023-07-05 ASSESSMENT — ENCOUNTER SYMPTOMS
ABDOMINAL PAIN: 0
SORE THROAT: 0
SHORTNESS OF BREATH: 1
DIARRHEA: 1
ABDOMINAL PAIN: 1
COUGH: 0
EYE PAIN: 0
EYE REDNESS: 0
BACK PAIN: 0

## 2023-07-05 ASSESSMENT — PAIN - FUNCTIONAL ASSESSMENT: PAIN_FUNCTIONAL_ASSESSMENT: 0-10

## 2023-07-06 PROBLEM — A41.9 SEPSIS WITHOUT ACUTE ORGAN DYSFUNCTION (HCC): Status: ACTIVE | Noted: 2023-07-06

## 2023-07-06 LAB
ALBUMIN SERPL-MCNC: 2.8 G/DL (ref 3.5–5)
ALBUMIN/GLOB SERPL: 0.8 (ref 1.1–2.2)
ALP SERPL-CCNC: 135 U/L (ref 45–117)
ALT SERPL-CCNC: 21 U/L (ref 12–78)
ANION GAP SERPL CALC-SCNC: 5 MMOL/L (ref 5–15)
ANION GAP SERPL CALC-SCNC: 7 MMOL/L (ref 5–15)
ANION GAP SERPL CALC-SCNC: 7 MMOL/L (ref 5–15)
ANION GAP SERPL CALC-SCNC: 9 MMOL/L (ref 5–15)
AST SERPL-CCNC: 14 U/L (ref 15–37)
BASOPHILS # BLD: 0 K/UL (ref 0–0.1)
BASOPHILS NFR BLD: 1 % (ref 0–1)
BILIRUB SERPL-MCNC: 0.4 MG/DL (ref 0.2–1)
BUN SERPL-MCNC: 15 MG/DL (ref 6–20)
BUN SERPL-MCNC: 29 MG/DL (ref 6–20)
BUN SERPL-MCNC: 30 MG/DL (ref 6–20)
BUN SERPL-MCNC: 33 MG/DL (ref 6–20)
BUN/CREAT SERPL: 5 (ref 12–20)
BUN/CREAT SERPL: 7 (ref 12–20)
CALCIUM SERPL-MCNC: 8.2 MG/DL (ref 8.5–10.1)
CALCIUM SERPL-MCNC: 8.4 MG/DL (ref 8.5–10.1)
CALCIUM SERPL-MCNC: 8.4 MG/DL (ref 8.5–10.1)
CALCIUM SERPL-MCNC: 8.5 MG/DL (ref 8.5–10.1)
CHLORIDE SERPL-SCNC: 101 MMOL/L (ref 97–108)
CHLORIDE SERPL-SCNC: 101 MMOL/L (ref 97–108)
CHLORIDE SERPL-SCNC: 103 MMOL/L (ref 97–108)
CHLORIDE SERPL-SCNC: 103 MMOL/L (ref 97–108)
CO2 SERPL-SCNC: 27 MMOL/L (ref 21–32)
CO2 SERPL-SCNC: 27 MMOL/L (ref 21–32)
CO2 SERPL-SCNC: 29 MMOL/L (ref 21–32)
CO2 SERPL-SCNC: 29 MMOL/L (ref 21–32)
CREAT SERPL-MCNC: 3.02 MG/DL (ref 0.7–1.3)
CREAT SERPL-MCNC: 4.03 MG/DL (ref 0.7–1.3)
CREAT SERPL-MCNC: 4.49 MG/DL (ref 0.7–1.3)
CREAT SERPL-MCNC: 4.99 MG/DL (ref 0.7–1.3)
DIFFERENTIAL METHOD BLD: ABNORMAL
EKG ATRIAL RATE: 38 BPM
EKG DIAGNOSIS: NORMAL
EKG P AXIS: 61 DEGREES
EKG P-R INTERVAL: 220 MS
EKG Q-T INTERVAL: 532 MS
EKG QRS DURATION: 112 MS
EKG QTC CALCULATION (BAZETT): 422 MS
EKG R AXIS: 17 DEGREES
EKG T AXIS: 77 DEGREES
EKG VENTRICULAR RATE: 38 BPM
EOSINOPHIL # BLD: 0.1 K/UL (ref 0–0.4)
EOSINOPHIL NFR BLD: 3 % (ref 0–7)
ERYTHROCYTE [DISTWIDTH] IN BLOOD BY AUTOMATED COUNT: 18 % (ref 11.5–14.5)
GLOBULIN SER CALC-MCNC: 3.7 G/DL (ref 2–4)
GLUCOSE BLD STRIP.AUTO-MCNC: 116 MG/DL (ref 65–117)
GLUCOSE BLD STRIP.AUTO-MCNC: 192 MG/DL (ref 65–117)
GLUCOSE BLD STRIP.AUTO-MCNC: 204 MG/DL (ref 65–117)
GLUCOSE BLD STRIP.AUTO-MCNC: 229 MG/DL (ref 65–117)
GLUCOSE BLD STRIP.AUTO-MCNC: 231 MG/DL (ref 65–117)
GLUCOSE SERPL-MCNC: 126 MG/DL (ref 65–100)
GLUCOSE SERPL-MCNC: 199 MG/DL (ref 65–100)
GLUCOSE SERPL-MCNC: 254 MG/DL (ref 65–100)
GLUCOSE SERPL-MCNC: 260 MG/DL (ref 65–100)
HCT VFR BLD AUTO: 27.5 % (ref 36.6–50.3)
HGB BLD-MCNC: 9 G/DL (ref 12.1–17)
IMM GRANULOCYTES # BLD AUTO: 0 K/UL (ref 0–0.04)
IMM GRANULOCYTES NFR BLD AUTO: 0 % (ref 0–0.5)
INR PPP: 1.2 (ref 0.9–1.1)
LYMPHOCYTES # BLD: 0.5 K/UL (ref 0.8–3.5)
LYMPHOCYTES NFR BLD: 14 % (ref 12–49)
MCH RBC QN AUTO: 29 PG (ref 26–34)
MCHC RBC AUTO-ENTMCNC: 32.7 G/DL (ref 30–36.5)
MCV RBC AUTO: 88.7 FL (ref 80–99)
MONOCYTES # BLD: 0.4 K/UL (ref 0–1)
MONOCYTES NFR BLD: 10 % (ref 5–13)
NEUTS SEG # BLD: 2.6 K/UL (ref 1.8–8)
NEUTS SEG NFR BLD: 73 % (ref 32–75)
NRBC # BLD: 0 K/UL (ref 0–0.01)
NRBC BLD-RTO: 0 PER 100 WBC
PLATELET # BLD AUTO: 59 K/UL (ref 150–400)
POTASSIUM SERPL-SCNC: 4.2 MMOL/L (ref 3.5–5.1)
POTASSIUM SERPL-SCNC: 4.3 MMOL/L (ref 3.5–5.1)
POTASSIUM SERPL-SCNC: 4.9 MMOL/L (ref 3.5–5.1)
POTASSIUM SERPL-SCNC: 5.7 MMOL/L (ref 3.5–5.1)
PROT SERPL-MCNC: 6.5 G/DL (ref 6.4–8.2)
PROTHROMBIN TIME: 12.1 SEC (ref 9–11.1)
RBC # BLD AUTO: 3.1 M/UL (ref 4.1–5.7)
SERVICE CMNT-IMP: ABNORMAL
SERVICE CMNT-IMP: NORMAL
SODIUM SERPL-SCNC: 133 MMOL/L (ref 136–145)
SODIUM SERPL-SCNC: 137 MMOL/L (ref 136–145)
SODIUM SERPL-SCNC: 139 MMOL/L (ref 136–145)
SODIUM SERPL-SCNC: 139 MMOL/L (ref 136–145)
TROPONIN I SERPL HS-MCNC: 15 NG/L (ref 0–76)
TROPONIN I SERPL HS-MCNC: 19 NG/L (ref 0–76)
WBC # BLD AUTO: 3.6 K/UL (ref 4.1–11.1)

## 2023-07-06 PROCEDURE — 6360000002 HC RX W HCPCS: Performed by: STUDENT IN AN ORGANIZED HEALTH CARE EDUCATION/TRAINING PROGRAM

## 2023-07-06 PROCEDURE — 90935 HEMODIALYSIS ONE EVALUATION: CPT

## 2023-07-06 PROCEDURE — 2580000003 HC RX 258: Performed by: STUDENT IN AN ORGANIZED HEALTH CARE EDUCATION/TRAINING PROGRAM

## 2023-07-06 PROCEDURE — 1100000003 HC PRIVATE W/ TELEMETRY

## 2023-07-06 PROCEDURE — 6370000000 HC RX 637 (ALT 250 FOR IP): Performed by: STUDENT IN AN ORGANIZED HEALTH CARE EDUCATION/TRAINING PROGRAM

## 2023-07-06 PROCEDURE — 94761 N-INVAS EAR/PLS OXIMETRY MLT: CPT

## 2023-07-06 PROCEDURE — 85610 PROTHROMBIN TIME: CPT

## 2023-07-06 PROCEDURE — A4216 STERILE WATER/SALINE, 10 ML: HCPCS | Performed by: STUDENT IN AN ORGANIZED HEALTH CARE EDUCATION/TRAINING PROGRAM

## 2023-07-06 PROCEDURE — 36415 COLL VENOUS BLD VENIPUNCTURE: CPT

## 2023-07-06 PROCEDURE — 82962 GLUCOSE BLOOD TEST: CPT

## 2023-07-06 PROCEDURE — 80048 BASIC METABOLIC PNL TOTAL CA: CPT

## 2023-07-06 PROCEDURE — 99233 SBSQ HOSP IP/OBS HIGH 50: CPT | Performed by: FAMILY MEDICINE

## 2023-07-06 PROCEDURE — 85025 COMPLETE CBC W/AUTO DIFF WBC: CPT

## 2023-07-06 PROCEDURE — C9113 INJ PANTOPRAZOLE SODIUM, VIA: HCPCS | Performed by: STUDENT IN AN ORGANIZED HEALTH CARE EDUCATION/TRAINING PROGRAM

## 2023-07-06 PROCEDURE — 6370000000 HC RX 637 (ALT 250 FOR IP)

## 2023-07-06 PROCEDURE — 80053 COMPREHEN METABOLIC PANEL: CPT

## 2023-07-06 PROCEDURE — 84484 ASSAY OF TROPONIN QUANT: CPT

## 2023-07-06 RX ORDER — LABETALOL HYDROCHLORIDE 5 MG/ML
5 INJECTION, SOLUTION INTRAVENOUS ONCE
Status: DISCONTINUED | OUTPATIENT
Start: 2023-07-07 | End: 2023-07-07

## 2023-07-06 RX ORDER — ACETAMINOPHEN 325 MG/1
650 TABLET ORAL EVERY 4 HOURS PRN
Status: DISCONTINUED | OUTPATIENT
Start: 2023-07-06 | End: 2023-07-10 | Stop reason: HOSPADM

## 2023-07-06 RX ADMIN — INSULIN LISPRO 2 UNITS: 100 INJECTION, SOLUTION INTRAVENOUS; SUBCUTANEOUS at 07:36

## 2023-07-06 RX ADMIN — TAMSULOSIN HYDROCHLORIDE 0.4 MG: 0.4 CAPSULE ORAL at 21:42

## 2023-07-06 RX ADMIN — SEVELAMER CARBONATE 800 MG: 800 TABLET, FILM COATED ORAL at 17:32

## 2023-07-06 RX ADMIN — AMLODIPINE BESYLATE 10 MG: 5 TABLET ORAL at 08:30

## 2023-07-06 RX ADMIN — SUCRALFATE 1 G: 1 TABLET ORAL at 08:30

## 2023-07-06 RX ADMIN — HYDRALAZINE HYDROCHLORIDE 100 MG: 25 TABLET, FILM COATED ORAL at 16:21

## 2023-07-06 RX ADMIN — TRAZODONE HYDROCHLORIDE 50 MG: 50 TABLET ORAL at 23:08

## 2023-07-06 RX ADMIN — INSULIN GLARGINE 5 UNITS: 100 INJECTION, SOLUTION SUBCUTANEOUS at 22:05

## 2023-07-06 RX ADMIN — GABAPENTIN 300 MG: 300 CAPSULE ORAL at 21:43

## 2023-07-06 RX ADMIN — SODIUM CHLORIDE, PRESERVATIVE FREE 40 MG: 5 INJECTION INTRAVENOUS at 07:03

## 2023-07-06 RX ADMIN — SEVELAMER CARBONATE 800 MG: 800 TABLET, FILM COATED ORAL at 14:06

## 2023-07-06 RX ADMIN — SEVELAMER CARBONATE 800 MG: 800 TABLET, FILM COATED ORAL at 14:05

## 2023-07-06 RX ADMIN — HYDRALAZINE HYDROCHLORIDE 100 MG: 25 TABLET, FILM COATED ORAL at 21:42

## 2023-07-06 RX ADMIN — SUCRALFATE 1 G: 1 TABLET ORAL at 21:43

## 2023-07-06 RX ADMIN — ACETAMINOPHEN 650 MG: 325 TABLET ORAL at 23:08

## 2023-07-06 RX ADMIN — SODIUM CHLORIDE, PRESERVATIVE FREE 10 ML: 5 INJECTION INTRAVENOUS at 21:55

## 2023-07-06 RX ADMIN — PIPERACILLIN AND TAZOBACTAM 3375 MG: 3; .375 INJECTION, POWDER, LYOPHILIZED, FOR SOLUTION INTRAVENOUS at 07:03

## 2023-07-06 RX ADMIN — SUCRALFATE 1 G: 1 TABLET ORAL at 14:06

## 2023-07-06 RX ADMIN — SODIUM CHLORIDE, PRESERVATIVE FREE 40 MG: 5 INJECTION INTRAVENOUS at 16:20

## 2023-07-06 RX ADMIN — PIPERACILLIN AND TAZOBACTAM 3375 MG: 3; .375 INJECTION, POWDER, LYOPHILIZED, FOR SOLUTION INTRAVENOUS at 21:43

## 2023-07-06 RX ADMIN — SERTRALINE 50 MG: 50 TABLET, FILM COATED ORAL at 08:30

## 2023-07-06 RX ADMIN — ACETAMINOPHEN 650 MG: 325 TABLET ORAL at 04:15

## 2023-07-06 RX ADMIN — ROSUVASTATIN CALCIUM 10 MG: 10 TABLET, FILM COATED ORAL at 08:30

## 2023-07-06 ASSESSMENT — PAIN SCALES - GENERAL
PAINLEVEL_OUTOF10: 0
PAINLEVEL_OUTOF10: 8

## 2023-07-06 ASSESSMENT — PAIN - FUNCTIONAL ASSESSMENT: PAIN_FUNCTIONAL_ASSESSMENT: ACTIVITIES ARE NOT PREVENTED

## 2023-07-06 ASSESSMENT — PAIN DESCRIPTION - LOCATION: LOCATION: HEAD

## 2023-07-06 ASSESSMENT — PAIN DESCRIPTION - DESCRIPTORS: DESCRIPTORS: ACHING

## 2023-07-07 PROBLEM — Z86.711 HX PULMONARY EMBOLISM: Status: ACTIVE | Noted: 2023-07-07

## 2023-07-07 LAB
ALBUMIN SERPL-MCNC: 2.8 G/DL (ref 3.5–5)
ALBUMIN/GLOB SERPL: 0.7 (ref 1.1–2.2)
ALP SERPL-CCNC: 115 U/L (ref 45–117)
ALT SERPL-CCNC: 18 U/L (ref 12–78)
ANION GAP SERPL CALC-SCNC: 4 MMOL/L (ref 5–15)
ANION GAP SERPL CALC-SCNC: 6 MMOL/L (ref 5–15)
AST SERPL-CCNC: 12 U/L (ref 15–37)
BASOPHILS # BLD: 0 K/UL (ref 0–0.1)
BASOPHILS NFR BLD: 1 % (ref 0–1)
BILIRUB SERPL-MCNC: 0.5 MG/DL (ref 0.2–1)
BUN SERPL-MCNC: 17 MG/DL (ref 6–20)
BUN SERPL-MCNC: 6 MG/DL (ref 6–20)
BUN/CREAT SERPL: 3 (ref 12–20)
BUN/CREAT SERPL: 5 (ref 12–20)
CALCIUM SERPL-MCNC: 8.6 MG/DL (ref 8.5–10.1)
CALCIUM SERPL-MCNC: 8.8 MG/DL (ref 8.5–10.1)
CHLORIDE SERPL-SCNC: 103 MMOL/L (ref 97–108)
CHLORIDE SERPL-SCNC: 104 MMOL/L (ref 97–108)
CO2 SERPL-SCNC: 31 MMOL/L (ref 21–32)
CO2 SERPL-SCNC: 31 MMOL/L (ref 21–32)
COMMENT:: NORMAL
CREAT SERPL-MCNC: 2.33 MG/DL (ref 0.7–1.3)
CREAT SERPL-MCNC: 3.5 MG/DL (ref 0.7–1.3)
DIFFERENTIAL METHOD BLD: ABNORMAL
EOSINOPHIL # BLD: 0.1 K/UL (ref 0–0.4)
EOSINOPHIL NFR BLD: 4 % (ref 0–7)
ERYTHROCYTE [DISTWIDTH] IN BLOOD BY AUTOMATED COUNT: 17.4 % (ref 11.5–14.5)
GLOBULIN SER CALC-MCNC: 4.1 G/DL (ref 2–4)
GLUCOSE BLD STRIP.AUTO-MCNC: 110 MG/DL (ref 65–117)
GLUCOSE BLD STRIP.AUTO-MCNC: 127 MG/DL (ref 65–117)
GLUCOSE BLD STRIP.AUTO-MCNC: 147 MG/DL (ref 65–117)
GLUCOSE BLD STRIP.AUTO-MCNC: 167 MG/DL (ref 65–117)
GLUCOSE SERPL-MCNC: 101 MG/DL (ref 65–100)
GLUCOSE SERPL-MCNC: 131 MG/DL (ref 65–100)
HCT VFR BLD AUTO: 28.4 % (ref 36.6–50.3)
HEMOCCULT STL QL: POSITIVE
HGB BLD-MCNC: 9.1 G/DL (ref 12.1–17)
IMM GRANULOCYTES # BLD AUTO: 0 K/UL (ref 0–0.04)
IMM GRANULOCYTES NFR BLD AUTO: 0 % (ref 0–0.5)
LYMPHOCYTES # BLD: 0.9 K/UL (ref 0.8–3.5)
LYMPHOCYTES NFR BLD: 27 % (ref 12–49)
MAGNESIUM SERPL-MCNC: 2.2 MG/DL (ref 1.6–2.4)
MCH RBC QN AUTO: 28.8 PG (ref 26–34)
MCHC RBC AUTO-ENTMCNC: 32 G/DL (ref 30–36.5)
MCV RBC AUTO: 89.9 FL (ref 80–99)
MONOCYTES # BLD: 0.4 K/UL (ref 0–1)
MONOCYTES NFR BLD: 13 % (ref 5–13)
NEUTS SEG # BLD: 1.8 K/UL (ref 1.8–8)
NEUTS SEG NFR BLD: 55 % (ref 32–75)
NRBC # BLD: 0 K/UL (ref 0–0.01)
NRBC BLD-RTO: 0 PER 100 WBC
PLATELET # BLD AUTO: 58 K/UL (ref 150–400)
PMV BLD AUTO: 12.3 FL (ref 8.9–12.9)
POTASSIUM SERPL-SCNC: 3.7 MMOL/L (ref 3.5–5.1)
POTASSIUM SERPL-SCNC: 4 MMOL/L (ref 3.5–5.1)
PROT SERPL-MCNC: 6.9 G/DL (ref 6.4–8.2)
RBC # BLD AUTO: 3.16 M/UL (ref 4.1–5.7)
SERVICE CMNT-IMP: ABNORMAL
SERVICE CMNT-IMP: NORMAL
SODIUM SERPL-SCNC: 139 MMOL/L (ref 136–145)
SODIUM SERPL-SCNC: 140 MMOL/L (ref 136–145)
SPECIMEN HOLD: NORMAL
TROPONIN I SERPL HS-MCNC: 23 NG/L (ref 0–76)
WBC # BLD AUTO: 3.2 K/UL (ref 4.1–11.1)

## 2023-07-07 PROCEDURE — 6370000000 HC RX 637 (ALT 250 FOR IP): Performed by: STUDENT IN AN ORGANIZED HEALTH CARE EDUCATION/TRAINING PROGRAM

## 2023-07-07 PROCEDURE — 93005 ELECTROCARDIOGRAM TRACING: CPT | Performed by: STUDENT IN AN ORGANIZED HEALTH CARE EDUCATION/TRAINING PROGRAM

## 2023-07-07 PROCEDURE — A4216 STERILE WATER/SALINE, 10 ML: HCPCS | Performed by: STUDENT IN AN ORGANIZED HEALTH CARE EDUCATION/TRAINING PROGRAM

## 2023-07-07 PROCEDURE — 6360000002 HC RX W HCPCS: Performed by: STUDENT IN AN ORGANIZED HEALTH CARE EDUCATION/TRAINING PROGRAM

## 2023-07-07 PROCEDURE — C9113 INJ PANTOPRAZOLE SODIUM, VIA: HCPCS | Performed by: STUDENT IN AN ORGANIZED HEALTH CARE EDUCATION/TRAINING PROGRAM

## 2023-07-07 PROCEDURE — 6360000002 HC RX W HCPCS

## 2023-07-07 PROCEDURE — 1100000003 HC PRIVATE W/ TELEMETRY

## 2023-07-07 PROCEDURE — 84484 ASSAY OF TROPONIN QUANT: CPT

## 2023-07-07 PROCEDURE — 85025 COMPLETE CBC W/AUTO DIFF WBC: CPT

## 2023-07-07 PROCEDURE — 80053 COMPREHEN METABOLIC PANEL: CPT

## 2023-07-07 PROCEDURE — 2580000003 HC RX 258: Performed by: STUDENT IN AN ORGANIZED HEALTH CARE EDUCATION/TRAINING PROGRAM

## 2023-07-07 PROCEDURE — 83550 IRON BINDING TEST: CPT

## 2023-07-07 PROCEDURE — 82272 OCCULT BLD FECES 1-3 TESTS: CPT

## 2023-07-07 PROCEDURE — 83540 ASSAY OF IRON: CPT

## 2023-07-07 PROCEDURE — 90935 HEMODIALYSIS ONE EVALUATION: CPT

## 2023-07-07 PROCEDURE — 82728 ASSAY OF FERRITIN: CPT

## 2023-07-07 PROCEDURE — 36415 COLL VENOUS BLD VENIPUNCTURE: CPT

## 2023-07-07 PROCEDURE — 6370000000 HC RX 637 (ALT 250 FOR IP)

## 2023-07-07 PROCEDURE — 83735 ASSAY OF MAGNESIUM: CPT

## 2023-07-07 PROCEDURE — 82962 GLUCOSE BLOOD TEST: CPT

## 2023-07-07 PROCEDURE — 99233 SBSQ HOSP IP/OBS HIGH 50: CPT | Performed by: STUDENT IN AN ORGANIZED HEALTH CARE EDUCATION/TRAINING PROGRAM

## 2023-07-07 PROCEDURE — 94761 N-INVAS EAR/PLS OXIMETRY MLT: CPT

## 2023-07-07 RX ORDER — HYDRALAZINE HYDROCHLORIDE 20 MG/ML
10 INJECTION INTRAMUSCULAR; INTRAVENOUS ONCE
Status: COMPLETED | OUTPATIENT
Start: 2023-07-08 | End: 2023-07-07

## 2023-07-07 RX ORDER — HYDRALAZINE HYDROCHLORIDE 20 MG/ML
10 INJECTION INTRAMUSCULAR; INTRAVENOUS ONCE
Status: COMPLETED | OUTPATIENT
Start: 2023-07-07 | End: 2023-07-07

## 2023-07-07 RX ORDER — BUTALBITAL, ACETAMINOPHEN AND CAFFEINE 50; 325; 40 MG/1; MG/1; MG/1
1 TABLET ORAL ONCE
Status: COMPLETED | OUTPATIENT
Start: 2023-07-07 | End: 2023-07-07

## 2023-07-07 RX ORDER — METOPROLOL SUCCINATE 25 MG/1
12.5 TABLET, EXTENDED RELEASE ORAL DAILY
Status: DISCONTINUED | OUTPATIENT
Start: 2023-07-07 | End: 2023-07-07

## 2023-07-07 RX ORDER — FAMOTIDINE 20 MG/1
20 TABLET, FILM COATED ORAL ONCE
Status: DISCONTINUED | OUTPATIENT
Start: 2023-07-07 | End: 2023-07-07

## 2023-07-07 RX ORDER — METOPROLOL SUCCINATE 25 MG/1
12.5 TABLET, EXTENDED RELEASE ORAL ONCE
Status: COMPLETED | OUTPATIENT
Start: 2023-07-07 | End: 2023-07-07

## 2023-07-07 RX ADMIN — SUCRALFATE 1 G: 1 TABLET ORAL at 21:19

## 2023-07-07 RX ADMIN — GABAPENTIN 300 MG: 300 CAPSULE ORAL at 21:19

## 2023-07-07 RX ADMIN — BUTALBITAL, ACETAMINOPHEN, AND CAFFEINE 1 TABLET: 50; 325; 40 TABLET ORAL at 21:50

## 2023-07-07 RX ADMIN — ACETAMINOPHEN 650 MG: 325 TABLET ORAL at 16:40

## 2023-07-07 RX ADMIN — HYDRALAZINE HYDROCHLORIDE 10 MG: 20 INJECTION INTRAMUSCULAR; INTRAVENOUS at 23:48

## 2023-07-07 RX ADMIN — ALUMINUM HYDROXIDE, MAGNESIUM HYDROXIDE, AND SIMETHICONE 40 ML: 200; 200; 20 SUSPENSION ORAL at 03:27

## 2023-07-07 RX ADMIN — SODIUM CHLORIDE, PRESERVATIVE FREE 40 MG: 5 INJECTION INTRAVENOUS at 19:19

## 2023-07-07 RX ADMIN — WATER 2000 MG: 1 INJECTION INTRAMUSCULAR; INTRAVENOUS; SUBCUTANEOUS at 19:22

## 2023-07-07 RX ADMIN — ACETAMINOPHEN 650 MG: 325 TABLET ORAL at 03:56

## 2023-07-07 RX ADMIN — HYDRALAZINE HYDROCHLORIDE 100 MG: 25 TABLET, FILM COATED ORAL at 21:19

## 2023-07-07 RX ADMIN — SODIUM CHLORIDE, PRESERVATIVE FREE 40 MG: 5 INJECTION INTRAVENOUS at 06:01

## 2023-07-07 RX ADMIN — SEVELAMER CARBONATE 800 MG: 800 TABLET, FILM COATED ORAL at 12:24

## 2023-07-07 RX ADMIN — ACETAMINOPHEN 650 MG: 325 TABLET ORAL at 12:29

## 2023-07-07 RX ADMIN — HYDRALAZINE HYDROCHLORIDE 100 MG: 25 TABLET, FILM COATED ORAL at 08:53

## 2023-07-07 RX ADMIN — SERTRALINE 50 MG: 50 TABLET, FILM COATED ORAL at 08:54

## 2023-07-07 RX ADMIN — SUCRALFATE 1 G: 1 TABLET ORAL at 08:53

## 2023-07-07 RX ADMIN — HYDRALAZINE HYDROCHLORIDE 10 MG: 20 INJECTION INTRAMUSCULAR; INTRAVENOUS at 17:18

## 2023-07-07 RX ADMIN — ISOSORBIDE MONONITRATE 60 MG: 60 TABLET, EXTENDED RELEASE ORAL at 08:53

## 2023-07-07 RX ADMIN — PIPERACILLIN AND TAZOBACTAM 3375 MG: 3; .375 INJECTION, POWDER, LYOPHILIZED, FOR SOLUTION INTRAVENOUS at 06:48

## 2023-07-07 RX ADMIN — HYDRALAZINE HYDROCHLORIDE 10 MG: 20 INJECTION INTRAMUSCULAR; INTRAVENOUS at 00:33

## 2023-07-07 RX ADMIN — EPOETIN ALFA-EPBX 8000 UNITS: 4000 INJECTION, SOLUTION INTRAVENOUS; SUBCUTANEOUS at 21:50

## 2023-07-07 RX ADMIN — SEVELAMER CARBONATE 800 MG: 800 TABLET, FILM COATED ORAL at 08:54

## 2023-07-07 RX ADMIN — SODIUM CHLORIDE, PRESERVATIVE FREE 10 ML: 5 INJECTION INTRAVENOUS at 21:29

## 2023-07-07 RX ADMIN — FUROSEMIDE 80 MG: 40 TABLET ORAL at 08:53

## 2023-07-07 RX ADMIN — TAMSULOSIN HYDROCHLORIDE 0.4 MG: 0.4 CAPSULE ORAL at 21:19

## 2023-07-07 RX ADMIN — AMLODIPINE BESYLATE 10 MG: 5 TABLET ORAL at 08:54

## 2023-07-07 RX ADMIN — SODIUM CHLORIDE, PRESERVATIVE FREE 10 ML: 5 INJECTION INTRAVENOUS at 08:59

## 2023-07-07 RX ADMIN — INSULIN GLARGINE 5 UNITS: 100 INJECTION, SOLUTION SUBCUTANEOUS at 21:19

## 2023-07-07 RX ADMIN — METOPROLOL SUCCINATE 12.5 MG: 25 TABLET, EXTENDED RELEASE ORAL at 19:23

## 2023-07-07 RX ADMIN — HYDRALAZINE HYDROCHLORIDE 10 MG: 20 INJECTION INTRAMUSCULAR; INTRAVENOUS at 03:52

## 2023-07-07 RX ADMIN — ROSUVASTATIN CALCIUM 10 MG: 10 TABLET, FILM COATED ORAL at 08:54

## 2023-07-07 ASSESSMENT — PAIN DESCRIPTION - ONSET
ONSET: ON-GOING
ONSET: ON-GOING

## 2023-07-07 ASSESSMENT — PAIN DESCRIPTION - DESCRIPTORS
DESCRIPTORS: ACHING

## 2023-07-07 ASSESSMENT — PAIN - FUNCTIONAL ASSESSMENT: PAIN_FUNCTIONAL_ASSESSMENT: ACTIVITIES ARE NOT PREVENTED

## 2023-07-07 ASSESSMENT — PAIN SCALES - GENERAL
PAINLEVEL_OUTOF10: 10
PAINLEVEL_OUTOF10: 6
PAINLEVEL_OUTOF10: 3
PAINLEVEL_OUTOF10: 0
PAINLEVEL_OUTOF10: 6
PAINLEVEL_OUTOF10: 10
PAINLEVEL_OUTOF10: 3
PAINLEVEL_OUTOF10: 4
PAINLEVEL_OUTOF10: 8

## 2023-07-07 ASSESSMENT — PAIN DESCRIPTION - PAIN TYPE
TYPE: ACUTE PAIN
TYPE: ACUTE PAIN

## 2023-07-07 ASSESSMENT — PAIN DESCRIPTION - LOCATION
LOCATION: HEAD

## 2023-07-07 ASSESSMENT — PAIN DESCRIPTION - ORIENTATION: ORIENTATION: ANTERIOR

## 2023-07-07 ASSESSMENT — PAIN DESCRIPTION - FREQUENCY
FREQUENCY: CONTINUOUS
FREQUENCY: CONTINUOUS

## 2023-07-08 ENCOUNTER — APPOINTMENT (OUTPATIENT)
Facility: HOSPITAL | Age: 63
DRG: 720 | End: 2023-07-08
Payer: MEDICAID

## 2023-07-08 LAB
ALBUMIN SERPL-MCNC: 3.1 G/DL (ref 3.5–5)
ALBUMIN/GLOB SERPL: 0.7 (ref 1.1–2.2)
ALP SERPL-CCNC: 131 U/L (ref 45–117)
ALT SERPL-CCNC: 19 U/L (ref 12–78)
ANION GAP SERPL CALC-SCNC: 7 MMOL/L (ref 5–15)
AST SERPL-CCNC: 12 U/L (ref 15–37)
BASOPHILS # BLD: 0 K/UL (ref 0–0.1)
BASOPHILS NFR BLD: 1 % (ref 0–1)
BILIRUB SERPL-MCNC: 0.4 MG/DL (ref 0.2–1)
BUN SERPL-MCNC: 8 MG/DL (ref 6–20)
BUN/CREAT SERPL: 3 (ref 12–20)
CALCIUM SERPL-MCNC: 8.9 MG/DL (ref 8.5–10.1)
CHLORIDE SERPL-SCNC: 103 MMOL/L (ref 97–108)
CO2 SERPL-SCNC: 29 MMOL/L (ref 21–32)
CREAT SERPL-MCNC: 2.76 MG/DL (ref 0.7–1.3)
DIFFERENTIAL METHOD BLD: ABNORMAL
EOSINOPHIL # BLD: 0.1 K/UL (ref 0–0.4)
EOSINOPHIL NFR BLD: 3 % (ref 0–7)
ERYTHROCYTE [DISTWIDTH] IN BLOOD BY AUTOMATED COUNT: 17.1 % (ref 11.5–14.5)
FERRITIN SERPL-MCNC: 463 NG/ML (ref 26–388)
GLOBULIN SER CALC-MCNC: 4.3 G/DL (ref 2–4)
GLUCOSE BLD STRIP.AUTO-MCNC: 116 MG/DL (ref 65–117)
GLUCOSE BLD STRIP.AUTO-MCNC: 120 MG/DL (ref 65–117)
GLUCOSE BLD STRIP.AUTO-MCNC: 135 MG/DL (ref 65–117)
GLUCOSE BLD STRIP.AUTO-MCNC: 139 MG/DL (ref 65–117)
GLUCOSE BLD STRIP.AUTO-MCNC: 97 MG/DL (ref 65–117)
GLUCOSE SERPL-MCNC: 128 MG/DL (ref 65–100)
HCT VFR BLD AUTO: 30.3 % (ref 36.6–50.3)
HGB BLD-MCNC: 9.8 G/DL (ref 12.1–17)
IMM GRANULOCYTES # BLD AUTO: 0 K/UL (ref 0–0.04)
IMM GRANULOCYTES NFR BLD AUTO: 1 % (ref 0–0.5)
IRON SATN MFR SERPL: 22 % (ref 20–50)
IRON SERPL-MCNC: 52 UG/DL (ref 35–150)
LYMPHOCYTES # BLD: 0.7 K/UL (ref 0.8–3.5)
LYMPHOCYTES NFR BLD: 18 % (ref 12–49)
MAGNESIUM SERPL-MCNC: 2.2 MG/DL (ref 1.6–2.4)
MCH RBC QN AUTO: 28.9 PG (ref 26–34)
MCHC RBC AUTO-ENTMCNC: 32.3 G/DL (ref 30–36.5)
MCV RBC AUTO: 89.4 FL (ref 80–99)
MONOCYTES # BLD: 0.5 K/UL (ref 0–1)
MONOCYTES NFR BLD: 12 % (ref 5–13)
NEUTS SEG # BLD: 2.4 K/UL (ref 1.8–8)
NEUTS SEG NFR BLD: 66 % (ref 32–75)
NRBC # BLD: 0 K/UL (ref 0–0.01)
NRBC BLD-RTO: 0 PER 100 WBC
PLATELET # BLD AUTO: 89 K/UL (ref 150–400)
PMV BLD AUTO: 11.1 FL (ref 8.9–12.9)
POTASSIUM SERPL-SCNC: 3.5 MMOL/L (ref 3.5–5.1)
PROT SERPL-MCNC: 7.4 G/DL (ref 6.4–8.2)
RBC # BLD AUTO: 3.39 M/UL (ref 4.1–5.7)
SERVICE CMNT-IMP: ABNORMAL
SERVICE CMNT-IMP: NORMAL
SERVICE CMNT-IMP: NORMAL
SODIUM SERPL-SCNC: 139 MMOL/L (ref 136–145)
TIBC SERPL-MCNC: 236 UG/DL (ref 250–450)
TROPONIN I SERPL HS-MCNC: 23 NG/L (ref 0–76)
WBC # BLD AUTO: 3.7 K/UL (ref 4.1–11.1)

## 2023-07-08 PROCEDURE — 82962 GLUCOSE BLOOD TEST: CPT

## 2023-07-08 PROCEDURE — 83735 ASSAY OF MAGNESIUM: CPT

## 2023-07-08 PROCEDURE — 2580000003 HC RX 258

## 2023-07-08 PROCEDURE — 6370000000 HC RX 637 (ALT 250 FOR IP)

## 2023-07-08 PROCEDURE — A4216 STERILE WATER/SALINE, 10 ML: HCPCS | Performed by: STUDENT IN AN ORGANIZED HEALTH CARE EDUCATION/TRAINING PROGRAM

## 2023-07-08 PROCEDURE — 6370000000 HC RX 637 (ALT 250 FOR IP): Performed by: STUDENT IN AN ORGANIZED HEALTH CARE EDUCATION/TRAINING PROGRAM

## 2023-07-08 PROCEDURE — 94761 N-INVAS EAR/PLS OXIMETRY MLT: CPT

## 2023-07-08 PROCEDURE — 70450 CT HEAD/BRAIN W/O DYE: CPT

## 2023-07-08 PROCEDURE — 6360000002 HC RX W HCPCS: Performed by: STUDENT IN AN ORGANIZED HEALTH CARE EDUCATION/TRAINING PROGRAM

## 2023-07-08 PROCEDURE — 85025 COMPLETE CBC W/AUTO DIFF WBC: CPT

## 2023-07-08 PROCEDURE — 2580000003 HC RX 258: Performed by: STUDENT IN AN ORGANIZED HEALTH CARE EDUCATION/TRAINING PROGRAM

## 2023-07-08 PROCEDURE — 2000000000 HC ICU R&B

## 2023-07-08 PROCEDURE — 2500000003 HC RX 250 WO HCPCS

## 2023-07-08 PROCEDURE — 84484 ASSAY OF TROPONIN QUANT: CPT

## 2023-07-08 PROCEDURE — 36415 COLL VENOUS BLD VENIPUNCTURE: CPT

## 2023-07-08 PROCEDURE — C9113 INJ PANTOPRAZOLE SODIUM, VIA: HCPCS | Performed by: STUDENT IN AN ORGANIZED HEALTH CARE EDUCATION/TRAINING PROGRAM

## 2023-07-08 PROCEDURE — 80053 COMPREHEN METABOLIC PANEL: CPT

## 2023-07-08 RX ORDER — PANTOPRAZOLE SODIUM 40 MG/1
40 TABLET, DELAYED RELEASE ORAL
Status: DISCONTINUED | OUTPATIENT
Start: 2023-07-09 | End: 2023-07-10 | Stop reason: HOSPADM

## 2023-07-08 RX ORDER — PROCHLORPERAZINE EDISYLATE 5 MG/ML
10 INJECTION INTRAMUSCULAR; INTRAVENOUS EVERY 6 HOURS PRN
Status: DISCONTINUED | OUTPATIENT
Start: 2023-07-08 | End: 2023-07-10 | Stop reason: HOSPADM

## 2023-07-08 RX ORDER — HYDRALAZINE HYDROCHLORIDE 20 MG/ML
10 INJECTION INTRAMUSCULAR; INTRAVENOUS EVERY 6 HOURS PRN
Status: DISCONTINUED | OUTPATIENT
Start: 2023-07-08 | End: 2023-07-09

## 2023-07-08 RX ORDER — TRAMADOL HYDROCHLORIDE 50 MG/1
50 TABLET ORAL ONCE
Status: DISCONTINUED | OUTPATIENT
Start: 2023-07-08 | End: 2023-07-08

## 2023-07-08 RX ORDER — CARVEDILOL 3.12 MG/1
3.12 TABLET ORAL 2 TIMES DAILY WITH MEALS
Status: DISCONTINUED | OUTPATIENT
Start: 2023-07-08 | End: 2023-07-09

## 2023-07-08 RX ORDER — TRAMADOL HYDROCHLORIDE 50 MG/1
25 TABLET ORAL ONCE
Status: COMPLETED | OUTPATIENT
Start: 2023-07-08 | End: 2023-07-08

## 2023-07-08 RX ADMIN — SUCRALFATE 1 G: 1 TABLET ORAL at 13:43

## 2023-07-08 RX ADMIN — ROSUVASTATIN CALCIUM 10 MG: 10 TABLET, FILM COATED ORAL at 08:03

## 2023-07-08 RX ADMIN — SODIUM CHLORIDE, PRESERVATIVE FREE 10 ML: 5 INJECTION INTRAVENOUS at 20:59

## 2023-07-08 RX ADMIN — SERTRALINE 50 MG: 50 TABLET, FILM COATED ORAL at 08:03

## 2023-07-08 RX ADMIN — SODIUM CHLORIDE, PRESERVATIVE FREE 40 MG: 5 INJECTION INTRAVENOUS at 16:24

## 2023-07-08 RX ADMIN — AMLODIPINE BESYLATE 10 MG: 5 TABLET ORAL at 08:03

## 2023-07-08 RX ADMIN — HYDRALAZINE HYDROCHLORIDE 10 MG: 20 INJECTION INTRAMUSCULAR; INTRAVENOUS at 21:28

## 2023-07-08 RX ADMIN — FUROSEMIDE 80 MG: 40 TABLET ORAL at 08:03

## 2023-07-08 RX ADMIN — SODIUM CHLORIDE, PRESERVATIVE FREE 40 MG: 5 INJECTION INTRAVENOUS at 05:19

## 2023-07-08 RX ADMIN — SUCRALFATE 1 G: 1 TABLET ORAL at 20:58

## 2023-07-08 RX ADMIN — ISOSORBIDE MONONITRATE 60 MG: 60 TABLET, EXTENDED RELEASE ORAL at 08:03

## 2023-07-08 RX ADMIN — SODIUM CHLORIDE 5 MG/HR: 9 INJECTION, SOLUTION INTRAVENOUS at 01:33

## 2023-07-08 RX ADMIN — PROCHLORPERAZINE EDISYLATE 10 MG: 5 INJECTION INTRAMUSCULAR; INTRAVENOUS at 17:20

## 2023-07-08 RX ADMIN — SEVELAMER CARBONATE 800 MG: 800 TABLET, FILM COATED ORAL at 11:11

## 2023-07-08 RX ADMIN — TAMSULOSIN HYDROCHLORIDE 0.4 MG: 0.4 CAPSULE ORAL at 20:57

## 2023-07-08 RX ADMIN — SEVELAMER CARBONATE 800 MG: 800 TABLET, FILM COATED ORAL at 08:03

## 2023-07-08 RX ADMIN — GABAPENTIN 300 MG: 300 CAPSULE ORAL at 20:57

## 2023-07-08 RX ADMIN — HYDRALAZINE HYDROCHLORIDE 100 MG: 25 TABLET, FILM COATED ORAL at 08:03

## 2023-07-08 RX ADMIN — INSULIN GLARGINE 5 UNITS: 100 INJECTION, SOLUTION SUBCUTANEOUS at 20:58

## 2023-07-08 RX ADMIN — HYDRALAZINE HYDROCHLORIDE 100 MG: 25 TABLET, FILM COATED ORAL at 13:43

## 2023-07-08 RX ADMIN — SODIUM CHLORIDE, PRESERVATIVE FREE 10 ML: 5 INJECTION INTRAVENOUS at 08:03

## 2023-07-08 RX ADMIN — HYDRALAZINE HYDROCHLORIDE 100 MG: 25 TABLET, FILM COATED ORAL at 20:57

## 2023-07-08 RX ADMIN — CARVEDILOL 3.12 MG: 3.12 TABLET, FILM COATED ORAL at 19:36

## 2023-07-08 RX ADMIN — ACETAMINOPHEN 650 MG: 325 TABLET ORAL at 05:18

## 2023-07-08 RX ADMIN — SEVELAMER CARBONATE 800 MG: 800 TABLET, FILM COATED ORAL at 16:24

## 2023-07-08 RX ADMIN — SUCRALFATE 1 G: 1 TABLET ORAL at 08:03

## 2023-07-08 RX ADMIN — SODIUM CHLORIDE 5 MG/HR: 9 INJECTION, SOLUTION INTRAVENOUS at 06:45

## 2023-07-08 RX ADMIN — TRAMADOL HYDROCHLORIDE 25 MG: 50 TABLET ORAL at 01:42

## 2023-07-08 RX ADMIN — PROCHLORPERAZINE EDISYLATE 10 MG: 5 INJECTION INTRAMUSCULAR; INTRAVENOUS at 11:11

## 2023-07-08 ASSESSMENT — PAIN DESCRIPTION - DESCRIPTORS
DESCRIPTORS: ACHING

## 2023-07-08 ASSESSMENT — PAIN DESCRIPTION - LOCATION
LOCATION: HEAD
LOCATION: HEAD;CHEST;ABDOMEN
LOCATION: HEAD

## 2023-07-08 ASSESSMENT — PAIN DESCRIPTION - FREQUENCY
FREQUENCY: CONTINUOUS

## 2023-07-08 ASSESSMENT — PAIN SCALES - GENERAL
PAINLEVEL_OUTOF10: 0
PAINLEVEL_OUTOF10: 8
PAINLEVEL_OUTOF10: 0
PAINLEVEL_OUTOF10: 10
PAINLEVEL_OUTOF10: 10
PAINLEVEL_OUTOF10: 5
PAINLEVEL_OUTOF10: 8
PAINLEVEL_OUTOF10: 0

## 2023-07-08 ASSESSMENT — PAIN DESCRIPTION - ONSET: ONSET: ON-GOING

## 2023-07-08 ASSESSMENT — PAIN DESCRIPTION - ORIENTATION: ORIENTATION: ANTERIOR

## 2023-07-08 ASSESSMENT — PAIN DESCRIPTION - PAIN TYPE: TYPE: ACUTE PAIN

## 2023-07-09 LAB
ANION GAP SERPL CALC-SCNC: 3 MMOL/L (ref 5–15)
BUN SERPL-MCNC: 18 MG/DL (ref 6–20)
BUN/CREAT SERPL: 4 (ref 12–20)
CALCIUM SERPL-MCNC: 8.8 MG/DL (ref 8.5–10.1)
CHLORIDE SERPL-SCNC: 105 MMOL/L (ref 97–108)
CO2 SERPL-SCNC: 29 MMOL/L (ref 21–32)
CREAT SERPL-MCNC: 4.89 MG/DL (ref 0.7–1.3)
GLUCOSE BLD STRIP.AUTO-MCNC: 121 MG/DL (ref 65–117)
GLUCOSE BLD STRIP.AUTO-MCNC: 175 MG/DL (ref 65–117)
GLUCOSE BLD STRIP.AUTO-MCNC: 178 MG/DL (ref 65–117)
GLUCOSE BLD STRIP.AUTO-MCNC: 202 MG/DL (ref 65–117)
GLUCOSE SERPL-MCNC: 107 MG/DL (ref 65–100)
POTASSIUM SERPL-SCNC: 3.7 MMOL/L (ref 3.5–5.1)
SERVICE CMNT-IMP: ABNORMAL
SODIUM SERPL-SCNC: 137 MMOL/L (ref 136–145)

## 2023-07-09 PROCEDURE — 82962 GLUCOSE BLOOD TEST: CPT

## 2023-07-09 PROCEDURE — 6370000000 HC RX 637 (ALT 250 FOR IP)

## 2023-07-09 PROCEDURE — 6360000002 HC RX W HCPCS: Performed by: INTERNAL MEDICINE

## 2023-07-09 PROCEDURE — 36415 COLL VENOUS BLD VENIPUNCTURE: CPT

## 2023-07-09 PROCEDURE — 2580000003 HC RX 258: Performed by: STUDENT IN AN ORGANIZED HEALTH CARE EDUCATION/TRAINING PROGRAM

## 2023-07-09 PROCEDURE — 6370000000 HC RX 637 (ALT 250 FOR IP): Performed by: STUDENT IN AN ORGANIZED HEALTH CARE EDUCATION/TRAINING PROGRAM

## 2023-07-09 PROCEDURE — 6360000002 HC RX W HCPCS: Performed by: STUDENT IN AN ORGANIZED HEALTH CARE EDUCATION/TRAINING PROGRAM

## 2023-07-09 PROCEDURE — 2000000000 HC ICU R&B

## 2023-07-09 PROCEDURE — 6360000002 HC RX W HCPCS

## 2023-07-09 PROCEDURE — 94761 N-INVAS EAR/PLS OXIMETRY MLT: CPT

## 2023-07-09 PROCEDURE — 80048 BASIC METABOLIC PNL TOTAL CA: CPT

## 2023-07-09 RX ORDER — LOSARTAN POTASSIUM 50 MG/1
100 TABLET ORAL DAILY
Status: DISCONTINUED | OUTPATIENT
Start: 2023-07-09 | End: 2023-07-10 | Stop reason: HOSPADM

## 2023-07-09 RX ORDER — LABETALOL HYDROCHLORIDE 5 MG/ML
10 INJECTION, SOLUTION INTRAVENOUS
Status: COMPLETED | OUTPATIENT
Start: 2023-07-09 | End: 2023-07-09

## 2023-07-09 RX ORDER — NIFEDIPINE 60 MG/1
60 TABLET, EXTENDED RELEASE ORAL 2 TIMES DAILY
Status: DISCONTINUED | OUTPATIENT
Start: 2023-07-09 | End: 2023-07-10 | Stop reason: HOSPADM

## 2023-07-09 RX ORDER — HYDRALAZINE HYDROCHLORIDE 20 MG/ML
10 INJECTION INTRAMUSCULAR; INTRAVENOUS EVERY 6 HOURS PRN
Status: DISCONTINUED | OUTPATIENT
Start: 2023-07-09 | End: 2023-07-10 | Stop reason: HOSPADM

## 2023-07-09 RX ORDER — CARVEDILOL 6.25 MG/1
6.25 TABLET ORAL 2 TIMES DAILY WITH MEALS
Status: DISCONTINUED | OUTPATIENT
Start: 2023-07-09 | End: 2023-07-10

## 2023-07-09 RX ADMIN — CARVEDILOL 6.25 MG: 6.25 TABLET, FILM COATED ORAL at 07:46

## 2023-07-09 RX ADMIN — GABAPENTIN 300 MG: 300 CAPSULE ORAL at 20:25

## 2023-07-09 RX ADMIN — HYDRALAZINE HYDROCHLORIDE 10 MG: 20 INJECTION INTRAMUSCULAR; INTRAVENOUS at 14:19

## 2023-07-09 RX ADMIN — ROSUVASTATIN CALCIUM 10 MG: 10 TABLET, FILM COATED ORAL at 07:46

## 2023-07-09 RX ADMIN — TAMSULOSIN HYDROCHLORIDE 0.4 MG: 0.4 CAPSULE ORAL at 20:25

## 2023-07-09 RX ADMIN — APIXABAN 5 MG: 5 TABLET, FILM COATED ORAL at 13:51

## 2023-07-09 RX ADMIN — SUCRALFATE 1 G: 1 TABLET ORAL at 07:46

## 2023-07-09 RX ADMIN — INSULIN GLARGINE 5 UNITS: 100 INJECTION, SOLUTION SUBCUTANEOUS at 20:25

## 2023-07-09 RX ADMIN — SEVELAMER CARBONATE 800 MG: 800 TABLET, FILM COATED ORAL at 07:46

## 2023-07-09 RX ADMIN — TRAZODONE HYDROCHLORIDE 50 MG: 50 TABLET ORAL at 23:14

## 2023-07-09 RX ADMIN — SERTRALINE 50 MG: 50 TABLET, FILM COATED ORAL at 07:47

## 2023-07-09 RX ADMIN — HYDRALAZINE HYDROCHLORIDE 10 MG: 20 INJECTION INTRAMUSCULAR; INTRAVENOUS at 04:14

## 2023-07-09 RX ADMIN — AMLODIPINE BESYLATE 10 MG: 5 TABLET ORAL at 07:46

## 2023-07-09 RX ADMIN — HYDRALAZINE HYDROCHLORIDE 100 MG: 25 TABLET, FILM COATED ORAL at 07:47

## 2023-07-09 RX ADMIN — NIFEDIPINE 60 MG: 60 TABLET, EXTENDED RELEASE ORAL at 12:38

## 2023-07-09 RX ADMIN — HYDRALAZINE HYDROCHLORIDE 100 MG: 25 TABLET, FILM COATED ORAL at 13:51

## 2023-07-09 RX ADMIN — APIXABAN 5 MG: 5 TABLET, FILM COATED ORAL at 20:25

## 2023-07-09 RX ADMIN — ISOSORBIDE MONONITRATE 60 MG: 60 TABLET, EXTENDED RELEASE ORAL at 07:46

## 2023-07-09 RX ADMIN — LOSARTAN POTASSIUM 100 MG: 50 TABLET, FILM COATED ORAL at 12:38

## 2023-07-09 RX ADMIN — HYDRALAZINE HYDROCHLORIDE 100 MG: 25 TABLET, FILM COATED ORAL at 22:40

## 2023-07-09 RX ADMIN — SEVELAMER CARBONATE 800 MG: 800 TABLET, FILM COATED ORAL at 11:53

## 2023-07-09 RX ADMIN — PANTOPRAZOLE SODIUM 40 MG: 40 TABLET, DELAYED RELEASE ORAL at 06:22

## 2023-07-09 RX ADMIN — SODIUM CHLORIDE, PRESERVATIVE FREE 10 ML: 5 INJECTION INTRAVENOUS at 20:25

## 2023-07-09 RX ADMIN — SEVELAMER CARBONATE 800 MG: 800 TABLET, FILM COATED ORAL at 16:14

## 2023-07-09 RX ADMIN — SUCRALFATE 1 G: 1 TABLET ORAL at 13:51

## 2023-07-09 RX ADMIN — PANTOPRAZOLE SODIUM 40 MG: 40 TABLET, DELAYED RELEASE ORAL at 16:15

## 2023-07-09 RX ADMIN — LABETALOL HYDROCHLORIDE 10 MG: 5 INJECTION, SOLUTION INTRAVENOUS at 06:54

## 2023-07-09 RX ADMIN — FUROSEMIDE 80 MG: 40 TABLET ORAL at 07:46

## 2023-07-09 RX ADMIN — NIFEDIPINE 60 MG: 60 TABLET, EXTENDED RELEASE ORAL at 22:40

## 2023-07-09 RX ADMIN — SODIUM CHLORIDE, PRESERVATIVE FREE 10 ML: 5 INJECTION INTRAVENOUS at 07:47

## 2023-07-09 RX ADMIN — SUCRALFATE 1 G: 1 TABLET ORAL at 20:25

## 2023-07-09 RX ADMIN — CARVEDILOL 6.25 MG: 6.25 TABLET, FILM COATED ORAL at 16:14

## 2023-07-09 ASSESSMENT — PAIN SCALES - GENERAL
PAINLEVEL_OUTOF10: 0

## 2023-07-10 VITALS
DIASTOLIC BLOOD PRESSURE: 58 MMHG | TEMPERATURE: 97.4 F | SYSTOLIC BLOOD PRESSURE: 125 MMHG | OXYGEN SATURATION: 97 % | BODY MASS INDEX: 22.85 KG/M2 | WEIGHT: 150.79 LBS | HEART RATE: 76 BPM | RESPIRATION RATE: 12 BRPM | HEIGHT: 68 IN

## 2023-07-10 LAB
ANION GAP SERPL CALC-SCNC: 7 MMOL/L (ref 5–15)
BUN SERPL-MCNC: 29 MG/DL (ref 6–20)
BUN/CREAT SERPL: 4 (ref 12–20)
CALCIUM SERPL-MCNC: 8.9 MG/DL (ref 8.5–10.1)
CHLORIDE SERPL-SCNC: 104 MMOL/L (ref 97–108)
CO2 SERPL-SCNC: 27 MMOL/L (ref 21–32)
CREAT SERPL-MCNC: 6.8 MG/DL (ref 0.7–1.3)
EKG ATRIAL RATE: 74 BPM
EKG DIAGNOSIS: NORMAL
EKG P AXIS: 52 DEGREES
EKG P-R INTERVAL: 142 MS
EKG Q-T INTERVAL: 420 MS
EKG QRS DURATION: 90 MS
EKG QTC CALCULATION (BAZETT): 466 MS
EKG R AXIS: 21 DEGREES
EKG T AXIS: 58 DEGREES
EKG VENTRICULAR RATE: 74 BPM
ERYTHROCYTE [DISTWIDTH] IN BLOOD BY AUTOMATED COUNT: 16.8 % (ref 11.5–14.5)
GLUCOSE BLD STRIP.AUTO-MCNC: 118 MG/DL (ref 65–117)
GLUCOSE BLD STRIP.AUTO-MCNC: 142 MG/DL (ref 65–117)
GLUCOSE SERPL-MCNC: 178 MG/DL (ref 65–100)
HBV SURFACE AB SER QL: NONREACTIVE
HBV SURFACE AB SER-ACNC: 4.25 MIU/ML
HBV SURFACE AG SER QL: <0.1 INDEX
HBV SURFACE AG SER QL: NEGATIVE
HCT VFR BLD AUTO: 28.9 % (ref 36.6–50.3)
HGB BLD-MCNC: 9.4 G/DL (ref 12.1–17)
MCH RBC QN AUTO: 29.3 PG (ref 26–34)
MCHC RBC AUTO-ENTMCNC: 32.5 G/DL (ref 30–36.5)
MCV RBC AUTO: 90 FL (ref 80–99)
NRBC # BLD: 0 K/UL (ref 0–0.01)
NRBC BLD-RTO: 0 PER 100 WBC
PLATELET # BLD AUTO: 101 K/UL (ref 150–400)
PMV BLD AUTO: 10.7 FL (ref 8.9–12.9)
POTASSIUM SERPL-SCNC: 3.9 MMOL/L (ref 3.5–5.1)
RBC # BLD AUTO: 3.21 M/UL (ref 4.1–5.7)
SERVICE CMNT-IMP: ABNORMAL
SERVICE CMNT-IMP: ABNORMAL
SODIUM SERPL-SCNC: 138 MMOL/L (ref 136–145)
WBC # BLD AUTO: 3.8 K/UL (ref 4.1–11.1)

## 2023-07-10 PROCEDURE — 90935 HEMODIALYSIS ONE EVALUATION: CPT

## 2023-07-10 PROCEDURE — 6360000002 HC RX W HCPCS: Performed by: STUDENT IN AN ORGANIZED HEALTH CARE EDUCATION/TRAINING PROGRAM

## 2023-07-10 PROCEDURE — 82962 GLUCOSE BLOOD TEST: CPT

## 2023-07-10 PROCEDURE — 87340 HEPATITIS B SURFACE AG IA: CPT

## 2023-07-10 PROCEDURE — 2580000003 HC RX 258: Performed by: STUDENT IN AN ORGANIZED HEALTH CARE EDUCATION/TRAINING PROGRAM

## 2023-07-10 PROCEDURE — 86706 HEP B SURFACE ANTIBODY: CPT

## 2023-07-10 PROCEDURE — 6370000000 HC RX 637 (ALT 250 FOR IP)

## 2023-07-10 PROCEDURE — 6370000000 HC RX 637 (ALT 250 FOR IP): Performed by: STUDENT IN AN ORGANIZED HEALTH CARE EDUCATION/TRAINING PROGRAM

## 2023-07-10 PROCEDURE — 6360000002 HC RX W HCPCS: Performed by: INTERNAL MEDICINE

## 2023-07-10 PROCEDURE — 94760 N-INVAS EAR/PLS OXIMETRY 1: CPT

## 2023-07-10 PROCEDURE — 99238 HOSP IP/OBS DSCHRG MGMT 30/<: CPT | Performed by: STUDENT IN AN ORGANIZED HEALTH CARE EDUCATION/TRAINING PROGRAM

## 2023-07-10 PROCEDURE — 85027 COMPLETE CBC AUTOMATED: CPT

## 2023-07-10 PROCEDURE — 36415 COLL VENOUS BLD VENIPUNCTURE: CPT

## 2023-07-10 PROCEDURE — 80048 BASIC METABOLIC PNL TOTAL CA: CPT

## 2023-07-10 RX ORDER — CARVEDILOL 12.5 MG/1
25 TABLET ORAL 2 TIMES DAILY WITH MEALS
Status: DISCONTINUED | OUTPATIENT
Start: 2023-07-10 | End: 2023-07-10 | Stop reason: HOSPADM

## 2023-07-10 RX ORDER — NIFEDIPINE 60 MG/1
60 TABLET, EXTENDED RELEASE ORAL 2 TIMES DAILY
Qty: 30 TABLET | Refills: 3 | Status: SHIPPED | OUTPATIENT
Start: 2023-07-10

## 2023-07-10 RX ORDER — CARVEDILOL 25 MG/1
25 TABLET ORAL 2 TIMES DAILY WITH MEALS
Qty: 60 TABLET | Refills: 3 | Status: CANCELLED | OUTPATIENT
Start: 2023-07-10

## 2023-07-10 RX ORDER — LOSARTAN POTASSIUM 100 MG/1
100 TABLET ORAL DAILY
Qty: 30 TABLET | Refills: 0 | Status: SHIPPED | OUTPATIENT
Start: 2023-07-11 | End: 2023-07-14 | Stop reason: SDUPTHER

## 2023-07-10 RX ORDER — CARVEDILOL 25 MG/1
25 TABLET ORAL 2 TIMES DAILY WITH MEALS
Qty: 60 TABLET | Refills: 3 | Status: SHIPPED | OUTPATIENT
Start: 2023-07-10

## 2023-07-10 RX ORDER — NIFEDIPINE 60 MG/1
60 TABLET, EXTENDED RELEASE ORAL 2 TIMES DAILY
Qty: 30 TABLET | Refills: 3 | Status: CANCELLED | OUTPATIENT
Start: 2023-07-10

## 2023-07-10 RX ORDER — TRAMADOL HYDROCHLORIDE 50 MG/1
50 TABLET ORAL ONCE
Status: COMPLETED | OUTPATIENT
Start: 2023-07-10 | End: 2023-07-10

## 2023-07-10 RX ADMIN — NIFEDIPINE 60 MG: 60 TABLET, EXTENDED RELEASE ORAL at 08:06

## 2023-07-10 RX ADMIN — HYDRALAZINE HYDROCHLORIDE 100 MG: 25 TABLET, FILM COATED ORAL at 14:06

## 2023-07-10 RX ADMIN — TRAMADOL HYDROCHLORIDE 50 MG: 50 TABLET, FILM COATED ORAL at 02:34

## 2023-07-10 RX ADMIN — SUCRALFATE 1 G: 1 TABLET ORAL at 08:07

## 2023-07-10 RX ADMIN — LOSARTAN POTASSIUM 100 MG: 50 TABLET, FILM COATED ORAL at 08:07

## 2023-07-10 RX ADMIN — APIXABAN 5 MG: 5 TABLET, FILM COATED ORAL at 08:57

## 2023-07-10 RX ADMIN — SERTRALINE 50 MG: 50 TABLET, FILM COATED ORAL at 08:07

## 2023-07-10 RX ADMIN — HYDRALAZINE HYDROCHLORIDE 10 MG: 20 INJECTION INTRAMUSCULAR; INTRAVENOUS at 01:33

## 2023-07-10 RX ADMIN — SEVELAMER CARBONATE 800 MG: 800 TABLET, FILM COATED ORAL at 08:07

## 2023-07-10 RX ADMIN — PANTOPRAZOLE SODIUM 40 MG: 40 TABLET, DELAYED RELEASE ORAL at 06:12

## 2023-07-10 RX ADMIN — ROSUVASTATIN CALCIUM 10 MG: 10 TABLET, FILM COATED ORAL at 08:07

## 2023-07-10 RX ADMIN — HYDRALAZINE HYDROCHLORIDE 100 MG: 25 TABLET, FILM COATED ORAL at 08:06

## 2023-07-10 RX ADMIN — SUCRALFATE 1 G: 1 TABLET ORAL at 14:06

## 2023-07-10 RX ADMIN — FUROSEMIDE 80 MG: 40 TABLET ORAL at 08:06

## 2023-07-10 RX ADMIN — PROCHLORPERAZINE EDISYLATE 10 MG: 5 INJECTION INTRAMUSCULAR; INTRAVENOUS at 02:15

## 2023-07-10 RX ADMIN — ISOSORBIDE MONONITRATE 60 MG: 60 TABLET, EXTENDED RELEASE ORAL at 08:05

## 2023-07-10 RX ADMIN — HYDRALAZINE HYDROCHLORIDE 10 MG: 20 INJECTION INTRAMUSCULAR; INTRAVENOUS at 06:19

## 2023-07-10 RX ADMIN — SEVELAMER CARBONATE 800 MG: 800 TABLET, FILM COATED ORAL at 14:07

## 2023-07-10 RX ADMIN — SODIUM CHLORIDE, PRESERVATIVE FREE 10 ML: 5 INJECTION INTRAVENOUS at 08:10

## 2023-07-10 RX ADMIN — CARVEDILOL 6.25 MG: 6.25 TABLET, FILM COATED ORAL at 08:06

## 2023-07-10 ASSESSMENT — PAIN DESCRIPTION - LOCATION
LOCATION: FOOT;LEG;HEAD
LOCATION: FLANK;OTHER (COMMENT)

## 2023-07-10 ASSESSMENT — PAIN SCALES - GENERAL
PAINLEVEL_OUTOF10: 10
PAINLEVEL_OUTOF10: 0
PAINLEVEL_OUTOF10: 0
PAINLEVEL_OUTOF10: 7
PAINLEVEL_OUTOF10: 0

## 2023-07-10 ASSESSMENT — PAIN DESCRIPTION - ONSET: ONSET: OTHER (COMMENT)

## 2023-07-10 ASSESSMENT — PAIN DESCRIPTION - DESCRIPTORS
DESCRIPTORS: ACHING
DESCRIPTORS: SHARP

## 2023-07-10 ASSESSMENT — PAIN DESCRIPTION - PAIN TYPE: TYPE: ACUTE PAIN

## 2023-07-10 ASSESSMENT — PAIN DESCRIPTION - ORIENTATION: ORIENTATION: RIGHT

## 2023-07-10 ASSESSMENT — PAIN - FUNCTIONAL ASSESSMENT: PAIN_FUNCTIONAL_ASSESSMENT: ACTIVITIES ARE NOT PREVENTED

## 2023-07-10 NOTE — DISCHARGE INSTRUCTIONS
HOME DISCHARGE INSTRUCTIONS    Olamide De La Rosa / [id number] : 1960    Admission date: 2023 Discharge date: 7/10/2023     Please bring this form with you to show your care provider at your follow-up appointment. Primary care provider:  Chidi Sharp MD    Discharging provider:  Woodrow Bailey MD  - Family Medicine Resident  Kanika Armendariz MD - Family Medicine Attending      You have been admitted to the hospital with the following diagnoses:    ACUTE DIAGNOSES:  Hyperkalemia [E87.5]  Chest pain, unspecified type [R07.9]    . . . . . . . . . . . . . . . . . . . . . . . . . . . . . . . . . . . . . . . . . . . . . . . . . . . . . . . . . . . . . . . . . . . . . . . .   You are well enough to be discharged from the hospital. However, because you were inpatient in a hospital, you are at greater risk of having been exposed to the coronavirus. PLEASE stay inside and self-quarantine for 5 days to prevent further spread of the coronavirus. . . . . . . . . . . . . . . . . . . . . . . . . . . . . . . . . . . . . . . . . . . . . . . . . . . . . . . . . . . . . . . . . . . . . . . . Le[facility] Leon FOLLOW-UP CARE RECOMMENDATIONS:    Appointments  Future Appointments   Date Time Provider [address]   2023 10:30 AM MD ARLENE Headley AMB   2023  2:40 PM SAMIR Mccormick NP, CFM AMB            Follow-up tests needed: CBC, EGD - anemia, progression of erosive gastritis. CMP and renal function tests for ESRD. Liver function tests for possible cirrhosis. Pending test results: At the time of your discharge the following test results are still pending: none. Please make sure you review these results with your outpatient follow-up provider(s).     DIET/what to eat:  renal diet    ACTIVITY:  activity as tolerated    Wound care: none    Equipment needed:  none    Specific symptoms to watch for: chest pain, shortness of breath, fever, chills, nausea, vomiting, diarrhea, change in

## 2023-07-10 NOTE — PLAN OF CARE
Problem: Safety - Adult  Goal: Free from fall injury  7/10/2023 0510 by Merline Hemming, RN  Outcome: Progressing

## 2023-07-10 NOTE — PLAN OF CARE
Problem: Safety - Adult  Goal: Free from fall injury  7/10/2023 1715 by Pita Montgomery, RN  Outcome: [address] [facility] [address] Resolved Met  7/10/2023 0510 by Severiano Star, RN  Outcome: Progressing     Problem: Pain  Goal: Verbalizes/displays adequate comfort level or baseline comfort level  Outcome: HH/HSPC Resolved Met     Problem: Chronic Conditions and Co-morbidities  Goal: Patient's chronic conditions and co-morbidity symptoms are monitored and maintained or improved  Outcome: [address] [facility] [address] Resolved Met     Problem: Discharge Planning  Goal: Discharge to home or other facility with appropriate resources  Outcome: [address] Resolved Met

## 2023-07-10 NOTE — PROGRESS NOTES
[address], [address] [facility]   Office [phone]  Fax [phone]          Subjective / Objective     Subjective  Overnight Events: Patients hypertension somewhat lower with new med additions. Pt reports full body pain and 10/10 headache, tramadol given, pain resolved. This morning, patient reports body pain and headache feeling much improved. Respiratory:     Vitals:    07/10/23 0500   BP: (!) 162/73   Pulse: 72   Resp: 19   Temp:    SpO2: 92%     Physical Examination:   General appearance - alert, well appearing, and in no distress  Chest - clear to auscultation, no wheezes, rales or rhonchi, symmetric air entry  Heart - normal rate, regular rhythm, normal S1, S2, no rubs, clicks or gallops,   Abdomen - soft, mild epigastric tenderness  Neurological - alert, oriented, normal speech, no focal findings  Skin - warm, dry. No notable rashes  Extremities - b/l leg pain.  peripheral pulses normal, no pedal edema, no clubbing or cyanosis  Psychiatric - normal speech, flat affect    I/O:  Inpatient Medications    Current Facility-Administered Medications   Medication Dose Route Frequency    carvedilol (COREG) tablet 6.25 mg  6.25 mg Oral BID WC    NIFEdipine (PROCARDIA XL) extended release tablet 60 mg  60 mg Oral BID    losartan (COZAAR) tablet 100 mg  100 mg Oral Daily    hydrALAZINE (APRESOLINE) injection 10 mg  10 mg IntraVENous Q6H PRN    apixaban (ELIQUIS) tablet 5 mg  5 mg Oral BID    prochlorperazine (COMPAZINE) injection 10 mg  10 mg IntraVENous Q6H PRN    pantoprazole (PROTONIX) tablet 40 mg  40 mg Oral BID AC    acetaminophen (TYLENOL) tablet 650 mg  650 mg Oral Q4H PRN    sodium chloride flush 0.9 % injection 5-40 mL  5-40 mL IntraVENous 2 times per day    sodium chloride flush 0.9 % injection 5-40 mL  5-40 mL IntraVENous PRN    0.9 % sodium chloride infusion   IntraVENous PRN    polyethylene glycol (GLYCOLAX) packet 17 g  17 g Oral Daily PRN    insulin glargine (LANTUS) injection

## 2023-07-10 NOTE — DISCHARGE SUMMARY
Condition at discharge: Stable. Labs  Recent Labs     07/08/23  0216 07/10/23  0922   WBC 3.7* 3.8*   HGB 9.8* 9.4*   HCT 30.3* 28.9*   PLT 89* 101*     Recent Labs     07/07/23  2200 07/08/23  0216 07/09/23  0413 07/10/23  0605    139 137 138   K 3.7 3.5 3.7 3.9    103 105 104   CO2 31 29 29 27   BUN 6 8 18 29*   MG 2.2 2.2  --   --      Recent Labs     07/08/23 0216   ALT 19   GLOB 4.3*     No results for input(s): PH, PCO2, PO2, TNIPOC, INR, APTT, TIBC, FERR, GLUCPOC in the last 72 hours. Invalid input(s): TROIQ, PTP, FE, PSAT, GLPOC    Micro:  Results       Procedure Component Value Units Date/Time    Culture, Blood 1 [[id number]] Collected: 07/05/23 1842    Order Status: Completed Specimen: Blood Updated: 07/10/23 0536     Special Requests NO SPECIAL REQUESTS        Culture NO GROWTH 5 DAYS       Culture, Blood 2 [[id number]] Collected: 07/05/23 1842    Order Status: Completed Specimen: Blood Updated: 07/10/23 0536     Special Requests NO SPECIAL REQUESTS        Culture NO GROWTH 5 DAYS                Imaging:  CT Result (most recent):  CT HEAD WO CONTRAST 07/08/2023    Narrative  EXAM: CT HEAD WO CONTRAST    INDICATION: persistent HA with HTN urgency    COMPARISON: None. CONTRAST: None. TECHNIQUE: Unenhanced CT of the head was performed using 5 mm images. Brain and  bone windows were generated. Coronal and sagittal reformats. CT dose reduction  was achieved through use of a standardized protocol tailored for this  examination and automatic exposure control for dose modulation. FINDINGS:  The ventricles and sulci are normal in size, shape and configuration. There is  no significant white matter disease. There is no intracranial hemorrhage,  extra-axial collection, or mass effect. The basilar cisterns are open. No CT  evidence of acute infarct. The bone windows demonstrate no abnormalities. There is mild mucoperiosteal  thickening in the left maxillary sinus.  The visualized

## 2023-07-10 NOTE — PROGRESS NOTES
NEPHROLOGY PROGRESS NOTE     Patient: Jacquelyn Thao MRN: [id number]  PCP: Jose Payne MD   :     1960  Age:   58 y.o. Sex:  male          Assessment & Plan:     ESKD-HD MWF @  [facility])  Fluid overload  Hyperkalemia 7.4> Resolved  Bradycardia likely d/t hyper-K  AOCD  HTN> improving slowly      -seen on HD machine   -continue HD today (MWF)  -cont current BP med's  -continue on LIZZETH with HD  -plan d/w patient, and ICU RN         Subjective:   CC:follow up ESKD-HD  HPI: on HD machine and tolerating well.  Bp 150s    ROS:  Current Facility-Administered Medications   Medication Dose Route Frequency    carvedilol (COREG) tablet 25 mg  25 mg Oral BID WC    NIFEdipine (PROCARDIA XL) extended release tablet 60 mg  60 mg Oral BID    losartan (COZAAR) tablet 100 mg  100 mg Oral Daily    hydrALAZINE (APRESOLINE) injection 10 mg  10 mg IntraVENous Q6H PRN    apixaban (ELIQUIS) tablet 5 mg  5 mg Oral BID    prochlorperazine (COMPAZINE) injection 10 mg  10 mg IntraVENous Q6H PRN    pantoprazole (PROTONIX) tablet 40 mg  40 mg Oral BID AC    acetaminophen (TYLENOL) tablet 650 mg  650 mg Oral Q4H PRN    sodium chloride flush 0.9 % injection 5-40 mL  5-40 mL IntraVENous 2 times per day    sodium chloride flush 0.9 % injection 5-40 mL  5-40 mL IntraVENous PRN    0.9 % sodium chloride infusion   IntraVENous PRN    polyethylene glycol (GLYCOLAX) packet 17 g  17 g Oral Daily PRN    insulin glargine (LANTUS) injection vial 5 Units  5 Units SubCUTAneous Nightly    isosorbide mononitrate (IMDUR) extended release tablet 60 mg  60 mg Oral Daily    gabapentin (NEURONTIN) capsule 300 mg  300 mg Oral Nightly    rosuvastatin (CRESTOR) tablet 10 mg  10 mg Oral QAM    sertraline (ZOLOFT) tablet 50 mg  50 mg Oral Daily    sevelamer (RENVELA) tablet 800 mg  800 mg Oral TID WC    sucralfate (CARAFATE) tablet 1 g  1 g Oral TID    traZODone (DESYREL) tablet 50 mg  50 mg Oral Nightly PRN    epoetin regan-epbx (RETACRIT) injection 8,000

## 2023-07-10 NOTE — CARE COORDINATION
7/10/2023  3:28 PM  Pt emergently admitted 7/5/23 for chest pain, hyperkalemia  Update via chart review, DC order noted, pt medically stable for DC to home today.   Pt to resume HD at [facility]   Outpatient follow up w/ PCP, nephrology and GI  Pt uses nocturnal O2 at baseline through [address]   Pt's spouse will transport at [address]

## 2023-07-11 LAB
BACTERIA SPEC CULT: NORMAL
BACTERIA SPEC CULT: NORMAL
SERVICE CMNT-IMP: NORMAL
SERVICE CMNT-IMP: NORMAL

## 2023-07-14 DIAGNOSIS — G47.00 INSOMNIA, UNSPECIFIED: ICD-10-CM

## 2023-07-14 DIAGNOSIS — F32.A DEPRESSION, UNSPECIFIED: ICD-10-CM

## 2023-07-14 RX ORDER — LOSARTAN POTASSIUM 100 MG/1
100 TABLET ORAL DAILY
Qty: 90 TABLET | Refills: 1 | Status: SHIPPED | OUTPATIENT
Start: 2023-07-14

## 2023-07-14 RX ORDER — METOPROLOL SUCCINATE 100 MG/1
100 TABLET, EXTENDED RELEASE ORAL DAILY
Qty: 90 TABLET | Refills: 1 | Status: ON HOLD | OUTPATIENT
Start: 2023-07-14 | End: 2023-08-10 | Stop reason: HOSPADM

## 2023-07-14 RX ORDER — SUCRALFATE 1 G/1
1 TABLET ORAL 3 TIMES DAILY
Qty: 270 TABLET | Refills: 1 | Status: SHIPPED | OUTPATIENT
Start: 2023-07-14

## 2023-07-14 RX ORDER — TAMSULOSIN HYDROCHLORIDE 0.4 MG/1
0.4 CAPSULE ORAL NIGHTLY
Qty: 90 CAPSULE | Refills: 1 | Status: SHIPPED | OUTPATIENT
Start: 2023-07-14

## 2023-07-14 RX ORDER — ISOSORBIDE MONONITRATE 60 MG/1
60 TABLET, EXTENDED RELEASE ORAL DAILY
Qty: 90 TABLET | Refills: 1 | Status: SHIPPED | OUTPATIENT
Start: 2023-07-14

## 2023-07-14 RX ORDER — PANTOPRAZOLE SODIUM 40 MG/1
40 TABLET, DELAYED RELEASE ORAL DAILY
Qty: 90 TABLET | Refills: 1 | Status: SHIPPED | OUTPATIENT
Start: 2023-07-14

## 2023-07-14 RX ORDER — TRAZODONE HYDROCHLORIDE 50 MG/1
TABLET ORAL
Qty: 90 TABLET | Refills: 1 | Status: SHIPPED | OUTPATIENT
Start: 2023-07-14

## 2023-07-14 NOTE — TELEPHONE ENCOUNTER
A pharmacist from [facility] called stating they would like all of the pt meds sent to them. His old pharmacy are moving too slow. The meds are: amlodipine, gabapentin, isosorbide, metoprolol, pantoprazole, sertraline, sucralfate, tamsulosin, Trazdone, and losartan.      [address] [place] [address] [phone] - F [phone]

## 2023-07-14 NOTE — TELEPHONE ENCOUNTER
Edgar Frias MD  Medication pending filled by provider in office. Joanna Bullard NP and Claudeen Scales, MD  Upcoming appointment on 7/24/2023  No results displayed because visit has over 200 results.         Health Maintenance Due   Topic Date Due    Hepatitis A vaccine (1 of 2 - Risk 2-dose series) Never done    HIV screen  Never done    Shingles vaccine (1 of 2) Never done    Hepatitis B vaccine (1 of 3 - Risk Dialysis 4-dose series) Never done    Diabetic retinal exam  08/26/2020    COVID-19 Vaccine (2 - Pfizer series) 06/03/2021    Lipids  06/21/2023    Diabetic foot exam  07/21/2023     Pharmacy: [address] [place] [address] [phone] - F [phone]

## 2023-07-18 ENCOUNTER — OFFICE VISIT (OUTPATIENT)
Age: 63
End: 2023-07-18
Payer: MEDICAID

## 2023-07-18 VITALS
HEART RATE: 61 BPM | DIASTOLIC BLOOD PRESSURE: 75 MMHG | TEMPERATURE: 97.9 F | OXYGEN SATURATION: 98 % | BODY MASS INDEX: 24.64 KG/M2 | HEIGHT: 68 IN | WEIGHT: 162.6 LBS | SYSTOLIC BLOOD PRESSURE: 169 MMHG

## 2023-07-18 DIAGNOSIS — K76.1 CHRONIC PASSIVE HEPATIC CONGESTION: Primary | ICD-10-CM

## 2023-07-18 PROCEDURE — 3078F DIAST BP <80 MM HG: CPT | Performed by: INTERNAL MEDICINE

## 2023-07-18 PROCEDURE — 99204 OFFICE O/P NEW MOD 45 MIN: CPT | Performed by: INTERNAL MEDICINE

## 2023-07-18 PROCEDURE — 3074F SYST BP LT 130 MM HG: CPT | Performed by: INTERNAL MEDICINE

## 2023-07-18 ASSESSMENT — PATIENT HEALTH QUESTIONNAIRE - PHQ9
SUM OF ALL RESPONSES TO PHQ QUESTIONS 1-9: 0
1. LITTLE INTEREST OR PLEASURE IN DOING THINGS: 0
SUM OF ALL RESPONSES TO PHQ9 QUESTIONS 1 & 2: 0
2. FEELING DOWN, DEPRESSED OR HOPELESS: 0

## 2023-07-18 NOTE — PROGRESS NOTES
[address] [facility] 53 MD Abbie, FACP, [place], [place]      HARSHA Llamas, [facility]   Stacy Ruiz, [facility]   [place], FNP-C  [facility] FNP-C   Mckenzie Masters, AGPCNP-BC   Rosangela Godwin, FNP-BC      [address] [address], [place]   at [facility]   [address] [facility], [address] [facility]   [address], [address]   [phone]   FAX: [phone]   at [facility], [address] [facility], [address]   [phone]   FAX: [phone]       Patient Care Team:  Mere Howard MD as PCP - General    Patient Active Problem List   Diagnosis    Thrombocytopenia [facility])    Peripheral neuropathy    Type 2 diabetes mellitus ([address])    ESRD (end stage renal disease) on dialysis ([address])    Cirrhosis ([address])    Anxiety and depression    CHF NYHA class I, chronic, diastolic (HCC)    BPH (benign prostatic hyperplasia)    CAD (coronary artery disease)    GERD (gastroesophageal reflux disease)    HTN (hypertension)    Hyperlipidemia    Anemia associated with chronic renal failure    Chronic pain    Pleural effusion    Open wound of right heel    Osteomyelitis of ankle or foot, acute, right (HCC)    Constipation    Acute pulmonary embolism ([address])    Colitis    Chronic nonintractable headache    Hx pulmonary embolism       [facility] for management of what is thought to be passive hepatic congestion. The active problem list, all pertinent past medical history, medications, radiologic findings and laboratory findings related to the liver disorder were reviewed and discussed with the patient. The patient is a 58 y.o. male who was initially see by Hepatology when he was hospitalized for CAD and bilateral pleural effusions in 11/2021.      He had a liver biopsy and a kidney biopsy at

## 2023-07-19 LAB
ALBUMIN SERPL-MCNC: 3.8 G/DL (ref 3.5–5)
ALBUMIN/GLOB SERPL: 0.9 (ref 1.1–2.2)
ALP SERPL-CCNC: 300 U/L (ref 45–117)
ALT SERPL-CCNC: 40 U/L (ref 12–78)
ANION GAP SERPL CALC-SCNC: 6 MMOL/L (ref 5–15)
AST SERPL-CCNC: 19 U/L (ref 15–37)
BASOPHILS # BLD: 0 K/UL (ref 0–0.1)
BASOPHILS NFR BLD: 1 % (ref 0–1)
BILIRUB DIRECT SERPL-MCNC: 0.2 MG/DL (ref 0–0.2)
BILIRUB SERPL-MCNC: 0.5 MG/DL (ref 0.2–1)
BUN SERPL-MCNC: 53 MG/DL (ref 6–20)
BUN/CREAT SERPL: 10 (ref 12–20)
CALCIUM SERPL-MCNC: 8.9 MG/DL (ref 8.5–10.1)
CHLORIDE SERPL-SCNC: 99 MMOL/L (ref 97–108)
CO2 SERPL-SCNC: 28 MMOL/L (ref 21–32)
CREAT SERPL-MCNC: 5.05 MG/DL (ref 0.7–1.3)
DIFFERENTIAL METHOD BLD: ABNORMAL
EOSINOPHIL # BLD: 0.1 K/UL (ref 0–0.4)
EOSINOPHIL NFR BLD: 3 % (ref 0–7)
ERYTHROCYTE [DISTWIDTH] IN BLOOD BY AUTOMATED COUNT: 18.1 % (ref 11.5–14.5)
FERRITIN SERPL-MCNC: 480 NG/ML (ref 26–388)
GLOBULIN SER CALC-MCNC: 4.4 G/DL (ref 2–4)
GLUCOSE SERPL-MCNC: 444 MG/DL (ref 65–100)
HBV SURFACE AB SER QL: NONREACTIVE
HBV SURFACE AB SER-ACNC: <3.1 MIU/ML
HBV SURFACE AG SER QL: <0.1 INDEX
HBV SURFACE AG SER QL: NEGATIVE
HCT VFR BLD AUTO: 31.7 % (ref 36.6–50.3)
HGB BLD-MCNC: 10.1 G/DL (ref 12.1–17)
IMM GRANULOCYTES # BLD AUTO: 0 K/UL (ref 0–0.04)
IMM GRANULOCYTES NFR BLD AUTO: 0 % (ref 0–0.5)
INR PPP: 1.1 (ref 0.9–1.1)
IRON SATN MFR SERPL: 21 % (ref 20–50)
IRON SERPL-MCNC: 66 UG/DL (ref 35–150)
LYMPHOCYTES # BLD: 0.6 K/UL (ref 0.8–3.5)
LYMPHOCYTES NFR BLD: 20 % (ref 12–49)
MCH RBC QN AUTO: 29.8 PG (ref 26–34)
MCHC RBC AUTO-ENTMCNC: 31.9 G/DL (ref 30–36.5)
MCV RBC AUTO: 93.5 FL (ref 80–99)
MONOCYTES # BLD: 0.4 K/UL (ref 0–1)
MONOCYTES NFR BLD: 13 % (ref 5–13)
NEUTS SEG # BLD: 1.9 K/UL (ref 1.8–8)
NEUTS SEG NFR BLD: 63 % (ref 32–75)
NRBC # BLD: 0 K/UL (ref 0–0.01)
NRBC BLD-RTO: 0 PER 100 WBC
PLATELET # BLD AUTO: 93 K/UL (ref 150–400)
PMV BLD AUTO: 12 FL (ref 8.9–12.9)
POTASSIUM SERPL-SCNC: 4.3 MMOL/L (ref 3.5–5.1)
PROT SERPL-MCNC: 8.2 G/DL (ref 6.4–8.2)
PROTHROMBIN TIME: 11.3 SEC (ref 9–11.1)
RBC # BLD AUTO: 3.39 M/UL (ref 4.1–5.7)
RBC MORPH BLD: ABNORMAL
RBC MORPH BLD: ABNORMAL
SODIUM SERPL-SCNC: 133 MMOL/L (ref 136–145)
TIBC SERPL-MCNC: 309 UG/DL (ref 250–450)
WBC # BLD AUTO: 3 K/UL (ref 4.1–11.1)

## 2023-07-20 LAB
A1AT SERPL-MCNC: 171 MG/DL (ref 101–187)
HAV AB SER QL IA: POSITIVE
HBV CORE AB SERPL QL IA: NEGATIVE
HCV AB SERPL QL IA: NORMAL
HCV IGG SERPL QL IA: NON REACTIVE S/CO RATIO

## 2023-08-09 ENCOUNTER — HOSPITAL ENCOUNTER (INPATIENT)
Facility: HOSPITAL | Age: 63
LOS: 1 days | Discharge: HOME | DRG: 199 | End: 2023-08-10
Attending: STUDENT IN AN ORGANIZED HEALTH CARE EDUCATION/TRAINING PROGRAM | Admitting: FAMILY MEDICINE
Payer: MEDICAID

## 2023-08-09 ENCOUNTER — APPOINTMENT (OUTPATIENT)
Facility: HOSPITAL | Age: 63
DRG: 199 | End: 2023-08-09
Payer: MEDICAID

## 2023-08-09 DIAGNOSIS — J90 PLEURAL EFFUSION: ICD-10-CM

## 2023-08-09 DIAGNOSIS — I20.0 UNSTABLE ANGINA (HCC): Primary | ICD-10-CM

## 2023-08-09 DIAGNOSIS — I50.32 CHF NYHA CLASS I, CHRONIC, DIASTOLIC (HCC): ICD-10-CM

## 2023-08-09 PROBLEM — I16.0 HYPERTENSIVE URGENCY: Status: RESOLVED | Noted: 2023-08-09 | Resolved: 2023-08-09

## 2023-08-09 PROBLEM — I16.1 HYPERTENSIVE EMERGENCY: Status: ACTIVE | Noted: 2022-02-14

## 2023-08-09 PROBLEM — I16.0 HYPERTENSIVE URGENCY: Status: ACTIVE | Noted: 2023-08-09

## 2023-08-09 PROBLEM — E11.40 TYPE 2 DIABETES MELLITUS WITH DIABETIC NEUROPATHY (HCC): Status: ACTIVE | Noted: 2019-08-05

## 2023-08-09 LAB
ALBUMIN SERPL-MCNC: 3.4 G/DL (ref 3.5–5)
ALBUMIN/GLOB SERPL: 0.8 (ref 1.1–2.2)
ALP SERPL-CCNC: 146 U/L (ref 45–117)
ALT SERPL-CCNC: 22 U/L (ref 12–78)
ANION GAP SERPL CALC-SCNC: 11 MMOL/L (ref 5–15)
AST SERPL-CCNC: 20 U/L (ref 15–37)
BASOPHILS # BLD: 0 K/UL (ref 0–0.1)
BASOPHILS NFR BLD: 0 % (ref 0–1)
BILIRUB SERPL-MCNC: 0.6 MG/DL (ref 0.2–1)
BUN SERPL-MCNC: 55 MG/DL (ref 6–20)
BUN/CREAT SERPL: 7 (ref 12–20)
CALCIUM SERPL-MCNC: 8.8 MG/DL (ref 8.5–10.1)
CHLORIDE SERPL-SCNC: 99 MMOL/L (ref 97–108)
CO2 SERPL-SCNC: 24 MMOL/L (ref 21–32)
COMMENT:: NORMAL
CREAT SERPL-MCNC: 8.09 MG/DL (ref 0.7–1.3)
DIFFERENTIAL METHOD BLD: ABNORMAL
EOSINOPHIL # BLD: 0.1 K/UL (ref 0–0.4)
EOSINOPHIL NFR BLD: 3 % (ref 0–7)
ERYTHROCYTE [DISTWIDTH] IN BLOOD BY AUTOMATED COUNT: 16.5 % (ref 11.5–14.5)
GLOBULIN SER CALC-MCNC: 4.4 G/DL (ref 2–4)
GLUCOSE BLD STRIP.AUTO-MCNC: 108 MG/DL (ref 65–117)
GLUCOSE SERPL-MCNC: 164 MG/DL (ref 65–100)
HCT VFR BLD AUTO: 35.6 % (ref 36.6–50.3)
HGB BLD-MCNC: 12.1 G/DL (ref 12.1–17)
IMM GRANULOCYTES # BLD AUTO: 0 K/UL (ref 0–0.04)
IMM GRANULOCYTES NFR BLD AUTO: 0 % (ref 0–0.5)
LYMPHOCYTES # BLD: 0.9 K/UL (ref 0.8–3.5)
LYMPHOCYTES NFR BLD: 20 % (ref 12–49)
MCH RBC QN AUTO: 30.6 PG (ref 26–34)
MCHC RBC AUTO-ENTMCNC: 34 G/DL (ref 30–36.5)
MCV RBC AUTO: 89.9 FL (ref 80–99)
MONOCYTES # BLD: 0.6 K/UL (ref 0–1)
MONOCYTES NFR BLD: 13 % (ref 5–13)
NEUTS SEG # BLD: 3 K/UL (ref 1.8–8)
NEUTS SEG NFR BLD: 64 % (ref 32–75)
NRBC # BLD: 0 K/UL (ref 0–0.01)
NRBC BLD-RTO: 0 PER 100 WBC
PHOSPHATE SERPL-MCNC: 5.3 MG/DL (ref 2.6–4.7)
PLATELET # BLD AUTO: 108 K/UL (ref 150–400)
PMV BLD AUTO: 10.5 FL (ref 8.9–12.9)
POTASSIUM SERPL-SCNC: 5.1 MMOL/L (ref 3.5–5.1)
PROT SERPL-MCNC: 7.8 G/DL (ref 6.4–8.2)
RBC # BLD AUTO: 3.96 M/UL (ref 4.1–5.7)
SARS-COV-2 RDRP RESP QL NAA+PROBE: NOT DETECTED
SERVICE CMNT-IMP: NORMAL
SODIUM SERPL-SCNC: 134 MMOL/L (ref 136–145)
SOURCE: NORMAL
SPECIMEN HOLD: NORMAL
TROPONIN I SERPL HS-MCNC: 27 NG/L (ref 0–76)
TROPONIN I SERPL HS-MCNC: 29 NG/L (ref 0–76)
WBC # BLD AUTO: 4.7 K/UL (ref 4.1–11.1)

## 2023-08-09 PROCEDURE — 85025 COMPLETE CBC W/AUTO DIFF WBC: CPT

## 2023-08-09 PROCEDURE — 93005 ELECTROCARDIOGRAM TRACING: CPT | Performed by: STUDENT IN AN ORGANIZED HEALTH CARE EDUCATION/TRAINING PROGRAM

## 2023-08-09 PROCEDURE — 6370000000 HC RX 637 (ALT 250 FOR IP): Performed by: STUDENT IN AN ORGANIZED HEALTH CARE EDUCATION/TRAINING PROGRAM

## 2023-08-09 PROCEDURE — 80061 LIPID PANEL: CPT

## 2023-08-09 PROCEDURE — 71275 CT ANGIOGRAPHY CHEST: CPT

## 2023-08-09 PROCEDURE — 80053 COMPREHEN METABOLIC PANEL: CPT

## 2023-08-09 PROCEDURE — 94761 N-INVAS EAR/PLS OXIMETRY MLT: CPT

## 2023-08-09 PROCEDURE — 83036 HEMOGLOBIN GLYCOSYLATED A1C: CPT

## 2023-08-09 PROCEDURE — 36415 COLL VENOUS BLD VENIPUNCTURE: CPT

## 2023-08-09 PROCEDURE — 87635 SARS-COV-2 COVID-19 AMP PRB: CPT

## 2023-08-09 PROCEDURE — 6360000004 HC RX CONTRAST MEDICATION: Performed by: STUDENT IN AN ORGANIZED HEALTH CARE EDUCATION/TRAINING PROGRAM

## 2023-08-09 PROCEDURE — 84484 ASSAY OF TROPONIN QUANT: CPT

## 2023-08-09 PROCEDURE — 71046 X-RAY EXAM CHEST 2 VIEWS: CPT

## 2023-08-09 PROCEDURE — 84100 ASSAY OF PHOSPHORUS: CPT

## 2023-08-09 PROCEDURE — 82962 GLUCOSE BLOOD TEST: CPT

## 2023-08-09 PROCEDURE — 99285 EMERGENCY DEPT VISIT HI MDM: CPT

## 2023-08-09 PROCEDURE — 1100000000 HC RM PRIVATE

## 2023-08-09 PROCEDURE — 5A1D70Z PERFORMANCE OF URINARY FILTRATION, INTERMITTENT, LESS THAN 6 HOURS PER DAY: ICD-10-PCS | Performed by: INTERNAL MEDICINE

## 2023-08-09 PROCEDURE — 2580000003 HC RX 258: Performed by: STUDENT IN AN ORGANIZED HEALTH CARE EDUCATION/TRAINING PROGRAM

## 2023-08-09 RX ORDER — ONDANSETRON 2 MG/ML
4 INJECTION INTRAMUSCULAR; INTRAVENOUS EVERY 6 HOURS PRN
Status: DISCONTINUED | OUTPATIENT
Start: 2023-08-09 | End: 2023-08-10 | Stop reason: HOSPADM

## 2023-08-09 RX ORDER — TRAZODONE HYDROCHLORIDE 50 MG/1
50 TABLET ORAL NIGHTLY
Status: DISCONTINUED | OUTPATIENT
Start: 2023-08-09 | End: 2023-08-10 | Stop reason: HOSPADM

## 2023-08-09 RX ORDER — INSULIN LISPRO 100 [IU]/ML
0-4 INJECTION, SOLUTION INTRAVENOUS; SUBCUTANEOUS
Status: DISCONTINUED | OUTPATIENT
Start: 2023-08-09 | End: 2023-08-10 | Stop reason: HOSPADM

## 2023-08-09 RX ORDER — INSULIN GLARGINE 100 [IU]/ML
2 INJECTION, SOLUTION SUBCUTANEOUS NIGHTLY
Status: DISCONTINUED | OUTPATIENT
Start: 2023-08-09 | End: 2023-08-10 | Stop reason: HOSPADM

## 2023-08-09 RX ORDER — DEXTROSE MONOHYDRATE 100 MG/ML
INJECTION, SOLUTION INTRAVENOUS CONTINUOUS PRN
Status: DISCONTINUED | OUTPATIENT
Start: 2023-08-09 | End: 2023-08-10 | Stop reason: HOSPADM

## 2023-08-09 RX ORDER — CARVEDILOL 6.25 MG/1
25 TABLET ORAL 2 TIMES DAILY WITH MEALS
Status: DISCONTINUED | OUTPATIENT
Start: 2023-08-09 | End: 2023-08-10 | Stop reason: HOSPADM

## 2023-08-09 RX ORDER — HYDRALAZINE HYDROCHLORIDE 25 MG/1
100 TABLET, FILM COATED ORAL 3 TIMES DAILY
Status: DISCONTINUED | OUTPATIENT
Start: 2023-08-09 | End: 2023-08-10 | Stop reason: HOSPADM

## 2023-08-09 RX ORDER — ISOSORBIDE MONONITRATE 60 MG/1
60 TABLET, EXTENDED RELEASE ORAL DAILY
Status: DISCONTINUED | OUTPATIENT
Start: 2023-08-10 | End: 2023-08-10 | Stop reason: HOSPADM

## 2023-08-09 RX ORDER — FUROSEMIDE 40 MG/1
80 TABLET ORAL DAILY
Status: DISCONTINUED | OUTPATIENT
Start: 2023-08-10 | End: 2023-08-10 | Stop reason: HOSPADM

## 2023-08-09 RX ORDER — SEVELAMER CARBONATE 800 MG/1
800 TABLET, FILM COATED ORAL
Status: DISCONTINUED | OUTPATIENT
Start: 2023-08-09 | End: 2023-08-10 | Stop reason: HOSPADM

## 2023-08-09 RX ORDER — ACETAMINOPHEN 325 MG/1
650 TABLET ORAL EVERY 6 HOURS PRN
Status: DISCONTINUED | OUTPATIENT
Start: 2023-08-09 | End: 2023-08-10 | Stop reason: HOSPADM

## 2023-08-09 RX ORDER — TAMSULOSIN HYDROCHLORIDE 0.4 MG/1
0.4 CAPSULE ORAL NIGHTLY
Status: DISCONTINUED | OUTPATIENT
Start: 2023-08-09 | End: 2023-08-10 | Stop reason: HOSPADM

## 2023-08-09 RX ORDER — LIDOCAINE 4 G/G
1 PATCH TOPICAL EVERY 24 HOURS
Status: DISCONTINUED | OUTPATIENT
Start: 2023-08-09 | End: 2023-08-10 | Stop reason: HOSPADM

## 2023-08-09 RX ORDER — LOSARTAN POTASSIUM 50 MG/1
100 TABLET ORAL DAILY
Status: DISCONTINUED | OUTPATIENT
Start: 2023-08-10 | End: 2023-08-10 | Stop reason: HOSPADM

## 2023-08-09 RX ORDER — GABAPENTIN 100 MG/1
100 CAPSULE ORAL
Status: DISCONTINUED | OUTPATIENT
Start: 2023-08-09 | End: 2023-08-10 | Stop reason: HOSPADM

## 2023-08-09 RX ORDER — NIFEDIPINE 60 MG/1
60 TABLET, EXTENDED RELEASE ORAL 2 TIMES DAILY
Status: DISCONTINUED | OUTPATIENT
Start: 2023-08-10 | End: 2023-08-10 | Stop reason: HOSPADM

## 2023-08-09 RX ORDER — SODIUM CHLORIDE 0.9 % (FLUSH) 0.9 %
5-40 SYRINGE (ML) INJECTION PRN
Status: DISCONTINUED | OUTPATIENT
Start: 2023-08-09 | End: 2023-08-10 | Stop reason: HOSPADM

## 2023-08-09 RX ORDER — ROSUVASTATIN CALCIUM 10 MG/1
10 TABLET, COATED ORAL EVERY MORNING
Status: DISCONTINUED | OUTPATIENT
Start: 2023-08-10 | End: 2023-08-10 | Stop reason: HOSPADM

## 2023-08-09 RX ORDER — ASPIRIN 81 MG/1
324 TABLET, CHEWABLE ORAL ONCE
Status: DISCONTINUED | OUTPATIENT
Start: 2023-08-09 | End: 2023-08-09

## 2023-08-09 RX ORDER — HYDRALAZINE HYDROCHLORIDE 25 MG/1
100 TABLET, FILM COATED ORAL 3 TIMES DAILY
Status: DISCONTINUED | OUTPATIENT
Start: 2023-08-10 | End: 2023-08-09

## 2023-08-09 RX ORDER — SUCRALFATE 1 G/1
1 TABLET ORAL 3 TIMES DAILY
Status: DISCONTINUED | OUTPATIENT
Start: 2023-08-09 | End: 2023-08-10 | Stop reason: HOSPADM

## 2023-08-09 RX ORDER — ONDANSETRON 4 MG/1
4 TABLET, ORALLY DISINTEGRATING ORAL EVERY 8 HOURS PRN
Status: DISCONTINUED | OUTPATIENT
Start: 2023-08-09 | End: 2023-08-10 | Stop reason: HOSPADM

## 2023-08-09 RX ORDER — SODIUM CHLORIDE 0.9 % (FLUSH) 0.9 %
5-40 SYRINGE (ML) INJECTION EVERY 12 HOURS SCHEDULED
Status: DISCONTINUED | OUTPATIENT
Start: 2023-08-09 | End: 2023-08-10 | Stop reason: HOSPADM

## 2023-08-09 RX ORDER — PANTOPRAZOLE SODIUM 40 MG/1
40 TABLET, DELAYED RELEASE ORAL DAILY
Status: DISCONTINUED | OUTPATIENT
Start: 2023-08-10 | End: 2023-08-10 | Stop reason: HOSPADM

## 2023-08-09 RX ORDER — SODIUM CHLORIDE 9 MG/ML
INJECTION, SOLUTION INTRAVENOUS PRN
Status: DISCONTINUED | OUTPATIENT
Start: 2023-08-09 | End: 2023-08-10 | Stop reason: HOSPADM

## 2023-08-09 RX ORDER — POLYETHYLENE GLYCOL 3350 17 G/17G
17 POWDER, FOR SOLUTION ORAL DAILY PRN
Status: DISCONTINUED | OUTPATIENT
Start: 2023-08-09 | End: 2023-08-10 | Stop reason: HOSPADM

## 2023-08-09 RX ORDER — INSULIN LISPRO 100 [IU]/ML
0-4 INJECTION, SOLUTION INTRAVENOUS; SUBCUTANEOUS NIGHTLY
Status: DISCONTINUED | OUTPATIENT
Start: 2023-08-09 | End: 2023-08-10 | Stop reason: HOSPADM

## 2023-08-09 RX ORDER — ACETAMINOPHEN 650 MG/1
650 SUPPOSITORY RECTAL EVERY 6 HOURS PRN
Status: DISCONTINUED | OUTPATIENT
Start: 2023-08-09 | End: 2023-08-10 | Stop reason: HOSPADM

## 2023-08-09 RX ADMIN — SODIUM CHLORIDE, PRESERVATIVE FREE 10 ML: 5 INJECTION INTRAVENOUS at 21:14

## 2023-08-09 RX ADMIN — IOPAMIDOL 100 ML: 755 INJECTION, SOLUTION INTRAVENOUS at 18:44

## 2023-08-09 RX ADMIN — ACETAMINOPHEN 650 MG: 325 TABLET ORAL at 17:02

## 2023-08-09 RX ADMIN — TAMSULOSIN HYDROCHLORIDE 0.4 MG: 0.4 CAPSULE ORAL at 21:12

## 2023-08-09 RX ADMIN — INSULIN GLARGINE 2 UNITS: 100 INJECTION, SOLUTION SUBCUTANEOUS at 21:13

## 2023-08-09 RX ADMIN — APIXABAN 5 MG: 5 TABLET, FILM COATED ORAL at 21:11

## 2023-08-09 RX ADMIN — ASPIRIN 324 MG: 81 TABLET, CHEWABLE ORAL at 16:50

## 2023-08-09 RX ADMIN — SUCRALFATE 1 G: 1 TABLET ORAL at 17:02

## 2023-08-09 RX ADMIN — HYDRALAZINE HYDROCHLORIDE 100 MG: 25 TABLET, FILM COATED ORAL at 21:12

## 2023-08-09 RX ADMIN — TRAZODONE HYDROCHLORIDE 50 MG: 50 TABLET ORAL at 21:12

## 2023-08-09 RX ADMIN — CARVEDILOL 25 MG: 12.5 TABLET, FILM COATED ORAL at 16:48

## 2023-08-09 RX ADMIN — HYDRALAZINE HYDROCHLORIDE 100 MG: 25 TABLET, FILM COATED ORAL at 17:02

## 2023-08-09 RX ADMIN — SUCRALFATE 1 G: 1 TABLET ORAL at 21:12

## 2023-08-09 RX ADMIN — GABAPENTIN 100 MG: 100 CAPSULE ORAL at 21:12

## 2023-08-09 ASSESSMENT — PAIN - FUNCTIONAL ASSESSMENT: PAIN_FUNCTIONAL_ASSESSMENT: 0-10

## 2023-08-09 ASSESSMENT — PAIN DESCRIPTION - ORIENTATION: ORIENTATION: LEFT

## 2023-08-09 ASSESSMENT — ENCOUNTER SYMPTOMS
DIARRHEA: 1
SHORTNESS OF BREATH: 1
NAUSEA: 0
VOMITING: 0

## 2023-08-09 ASSESSMENT — PAIN SCALES - GENERAL: PAINLEVEL_OUTOF10: 8

## 2023-08-09 ASSESSMENT — HEART SCORE: ECG: 0

## 2023-08-09 ASSESSMENT — PAIN DESCRIPTION - LOCATION: LOCATION: CHEST

## 2023-08-09 ASSESSMENT — PAIN DESCRIPTION - DESCRIPTORS: DESCRIPTORS: SHARP

## 2023-08-09 NOTE — ED NOTES
1818: Call from [facility], patient is to have a CT w / wo contrast, scan to be done when HD is confirmed that it will be done this evening. [address] and still awaiting confirmation of when HD will be performed. 1830: Received call from [address], was informed that a nurse should be here at 2100 hrs and if they have not arrived by 2200 to call [address] back and they will send a nurse over. FP notified and are ok with the scan to be completed. CT notified.

## 2023-08-09 NOTE — ED PROVIDER NOTES
[facility] CARE TELE 2  EMERGENCY DEPARTMENT ENCOUNTER      Pt Name: Kayden Nicole  MRN: [id number]  [address] [facility] 1960  Date of evaluation: 8/9/2023  Provider: Jemal Blanco MD    CHIEF COMPLAINT       Chief Complaint   Patient presents with    Chest Pain         HISTORY OF PRESENT ILLNESS   (Location/Symptom, Timing/Onset, Context/Setting, Quality, Duration, Modifying Factors, Severity)  Note limiting factors. Kayden Nicole is a 58 y.o. male with history of ESRD on dialysis who presents to the emergency department with chief complaint of Chest Pain. Patient states he has been having chest pain for the past couple days, but got much worse earlier today before he got dialysis. Patient states the pain feels like a dull and pressure-like sensation, 8/10 in severity. Waxing and waning but today has been persistent. Patient states that there is no radiation to the arm or to the neck. Patient states that this is extremely severe pain. Patient does have a cardiac history, with 1 stent in the past.  He additionally has risk factors including hypertension    The history is provided by the patient. The history is limited by a language barrier. No  was used. Review of External Medical Records:     Nursing Notes were reviewed. REVIEW OF SYSTEMS    (2-9 systems for level 4, 10 or more for level 5)     Review of Systems   Respiratory:  Positive for chest tightness and shortness of breath. Cardiovascular:  Positive for chest pain. Except as noted above the remainder of the review of systems was reviewed and negative.        PAST MEDICAL HISTORY     Past Medical History:   Diagnosis Date    Anemia associated with chronic renal failure     Anxiety and depression     BPH (benign prostatic hyperplasia)     CAD (coronary artery disease)     CHF NYHA class I, chronic, diastolic (HCC)     Chronic pain     DM type 2 causing renal disease ([address])     DM type 2 causing vascular disease ([address])

## 2023-08-09 NOTE — ED TRIAGE NOTES
Patient arrived via EMS from dialysis with c/o chest pain that started yesterday. Chest pain described as sharp/burning and radiates into left shoulder. Patient did not start dialysis today before being transported.      324 ASA given en route       Hx CABG in 2020, MI 2021, ESRD m/w/f dialysis

## 2023-08-09 NOTE — H&P
[address]  [place] [address]   Office [phone], Fax [phone]        Admission H&P     Name: Heidi Suazo MRN: [id number]  Sex: Male   YOB: 1960  Age: 58 y.o. PCP: Yariel Tam MD     Source of Information: patient, medical records    Chief complaint: chest pain    History of Present Illness  Heidi Suazo is a 58 y.o. male with PMH ESRD (dialysis MWF), HFpEF, T2DM, HTN, erosive gastritis, pulmonary embolism (on eliquis 5 mg BID), alcoholic cirrhosis, thrombocytopenia, anemia who presents to the ER complaining of:      Patient reports chest pain that started yesterday. Describes the chest pain as sharp, burning that radiates to his L shoulder. He feels it go into his L shoulder blade. He went to dialysis today but was sent to ER for the chest pain. Reports continued pain with focus in the L shoulder blade. Reports worse with movement, walking short distances. No improvement with tylenol. Denies trauma or rash. Reports feeling flushed yesterday. Follows with pulmonology outpatient - last seen 4 months ago. Was recently at [facility] for pleural effusion drainage (about 3 months ago). Reports worsening SOB. Denies N/V, but endorses diarrhea earlier this week that has since resolved. Denies new HA or vision changes. In the ER: In the ED:  Vitals: Temp 98.1F   /82   HR 71   RR 26   SatO2  97% on RA  Labs: Na 134, BUN 55, Cr 8.09, , Trop 27, , Plt 108  Imaging: CXR L basilar atelectasis or minimal infiltrate with moderate L pleural effusions increased from prior study  Treatment: ASA 324mg    EKG: Normal sinus rhythm, non-ischemic      Review of Systems  Review of Systems   Constitutional:  Positive for activity change. Eyes:  Negative for visual disturbance. Respiratory:  Positive for shortness of breath. Cardiovascular:  Positive for chest pain. Gastrointestinal:  Positive for diarrhea.  Negative for nausea

## 2023-08-10 ENCOUNTER — APPOINTMENT (OUTPATIENT)
Facility: HOSPITAL | Age: 63
DRG: 199 | End: 2023-08-10
Attending: STUDENT IN AN ORGANIZED HEALTH CARE EDUCATION/TRAINING PROGRAM
Payer: MEDICAID

## 2023-08-10 VITALS
OXYGEN SATURATION: 96 % | TEMPERATURE: 97.8 F | RESPIRATION RATE: 15 BRPM | DIASTOLIC BLOOD PRESSURE: 74 MMHG | HEART RATE: 69 BPM | BODY MASS INDEX: 24.55 KG/M2 | SYSTOLIC BLOOD PRESSURE: 149 MMHG | WEIGHT: 162 LBS | HEIGHT: 68 IN

## 2023-08-10 LAB
ALBUMIN SERPL-MCNC: 3.6 G/DL (ref 3.5–5)
ALBUMIN/GLOB SERPL: 0.8 (ref 1.1–2.2)
ALP SERPL-CCNC: 106 U/L (ref 45–117)
ALT SERPL-CCNC: 22 U/L (ref 12–78)
ANION GAP SERPL CALC-SCNC: 7 MMOL/L (ref 5–15)
AST SERPL-CCNC: 18 U/L (ref 15–37)
BASOPHILS # BLD: 0 K/UL (ref 0–0.1)
BASOPHILS NFR BLD: 1 % (ref 0–1)
BILIRUB SERPL-MCNC: 0.5 MG/DL (ref 0.2–1)
BUN SERPL-MCNC: 25 MG/DL (ref 6–20)
BUN/CREAT SERPL: 5 (ref 12–20)
CALCIUM SERPL-MCNC: 9.1 MG/DL (ref 8.5–10.1)
CHLORIDE SERPL-SCNC: 100 MMOL/L (ref 97–108)
CHOLEST SERPL-MCNC: 132 MG/DL
CO2 SERPL-SCNC: 30 MMOL/L (ref 21–32)
CREAT SERPL-MCNC: 5.02 MG/DL (ref 0.7–1.3)
DIFFERENTIAL METHOD BLD: ABNORMAL
ECHO AV AREA PEAK VELOCITY: 1.2 CM2
ECHO AV AREA VTI: 1.2 CM2
ECHO AV AREA/BSA PEAK VELOCITY: 0.6 CM2/M2
ECHO AV AREA/BSA VTI: 0.6 CM2/M2
ECHO AV MEAN GRADIENT: 4 MMHG
ECHO AV MEAN VELOCITY: 1 M/S
ECHO AV PEAK GRADIENT: 8 MMHG
ECHO AV PEAK VELOCITY: 1.4 M/S
ECHO AV VELOCITY RATIO: 0.64
ECHO AV VTI: 23.4 CM
ECHO BSA: 1.88 M2
ECHO LA DIAMETER INDEX: 2.41 CM/M2
ECHO LA DIAMETER: 4.5 CM
ECHO LA VOL 2C: 44 ML (ref 18–58)
ECHO LA VOL 2C: 46 ML (ref 18–58)
ECHO LA VOL 4C: 62 ML (ref 18–58)
ECHO LA VOL 4C: 66 ML (ref 18–58)
ECHO LA VOL BP: 54 ML (ref 18–58)
ECHO LA VOL/BSA BIPLANE: 29 ML/M2 (ref 16–34)
ECHO LA VOLUME AREA LENGTH: 57 ML
ECHO LA VOLUME INDEX AREA LENGTH: 30 ML/M2 (ref 16–34)
ECHO LV E' LATERAL VELOCITY: 5 CM/S
ECHO LV E' SEPTAL VELOCITY: 4 CM/S
ECHO LV EDV A2C: 64 ML
ECHO LV EDV A4C: 95 ML
ECHO LV EDV BP: 88 ML (ref 67–155)
ECHO LV EDV INDEX A4C: 51 ML/M2
ECHO LV EDV INDEX BP: 47 ML/M2
ECHO LV EDV NDEX A2C: 34 ML/M2
ECHO LV EJECTION FRACTION A2C: 36 %
ECHO LV EJECTION FRACTION A4C: 59 %
ECHO LV EJECTION FRACTION BIPLANE: 51 % (ref 55–100)
ECHO LV ESV A2C: 41 ML
ECHO LV ESV A4C: 39 ML
ECHO LV ESV BP: 43 ML (ref 22–58)
ECHO LV ESV INDEX A2C: 22 ML/M2
ECHO LV ESV INDEX A4C: 21 ML/M2
ECHO LV ESV INDEX BP: 23 ML/M2
ECHO LV FRACTIONAL SHORTENING: 38 % (ref 28–44)
ECHO LV INTERNAL DIMENSION DIASTOLE INDEX: 2.41 CM/M2
ECHO LV INTERNAL DIMENSION DIASTOLIC: 4.5 CM (ref 4.2–5.9)
ECHO LV INTERNAL DIMENSION SYSTOLIC INDEX: 1.5 CM/M2
ECHO LV INTERNAL DIMENSION SYSTOLIC: 2.8 CM
ECHO LV IVSD: 1.1 CM (ref 0.6–1)
ECHO LV MASS 2D: 175 G (ref 88–224)
ECHO LV MASS INDEX 2D: 93.6 G/M2 (ref 49–115)
ECHO LV POSTERIOR WALL DIASTOLIC: 1.1 CM (ref 0.6–1)
ECHO LV RELATIVE WALL THICKNESS RATIO: 0.49
ECHO LVOT AREA: 2 CM2
ECHO LVOT AV VTI INDEX: 0.64
ECHO LVOT DIAM: 1.6 CM
ECHO LVOT MEAN GRADIENT: 1 MMHG
ECHO LVOT PEAK GRADIENT: 3 MMHG
ECHO LVOT PEAK VELOCITY: 0.9 M/S
ECHO LVOT STROKE VOLUME INDEX: 16 ML/M2
ECHO LVOT SV: 29.9 ML
ECHO LVOT VTI: 14.9 CM
ECHO MV A VELOCITY: 0.68 M/S
ECHO MV AREA VTI: 1.4 CM2
ECHO MV E DECELERATION TIME (DT): 256.5 MS
ECHO MV E VELOCITY: 0.57 M/S
ECHO MV E/A RATIO: 0.84
ECHO MV E/E' LATERAL: 11.4
ECHO MV E/E' RATIO (AVERAGED): 12.83
ECHO MV E/E' SEPTAL: 14.25
ECHO MV LVOT VTI INDEX: 1.47
ECHO MV MAX VELOCITY: 0.8 M/S
ECHO MV MEAN GRADIENT: 1 MMHG
ECHO MV MEAN VELOCITY: 0.5 M/S
ECHO MV PEAK GRADIENT: 3 MMHG
ECHO MV VTI: 21.9 CM
ECHO PV MAX VELOCITY: 1.3 M/S
ECHO PV PEAK GRADIENT: 7 MMHG
ECHO RV FREE WALL PEAK S': 11 CM/S
ECHO RV INTERNAL DIMENSION: 4.2 CM
ECHO RV TAPSE: 1.1 CM (ref 1.7–?)
ECHO TV REGURGITANT MAX VELOCITY: 2.12 M/S
ECHO TV REGURGITANT PEAK GRADIENT: 18 MMHG
EKG ATRIAL RATE: 70 BPM
EKG DIAGNOSIS: NORMAL
EKG P AXIS: 52 DEGREES
EKG P-R INTERVAL: 148 MS
EKG Q-T INTERVAL: 428 MS
EKG QRS DURATION: 88 MS
EKG QTC CALCULATION (BAZETT): 462 MS
EKG R AXIS: 23 DEGREES
EKG T AXIS: 60 DEGREES
EKG VENTRICULAR RATE: 70 BPM
EOSINOPHIL # BLD: 0.2 K/UL (ref 0–0.4)
EOSINOPHIL NFR BLD: 5 % (ref 0–7)
ERYTHROCYTE [DISTWIDTH] IN BLOOD BY AUTOMATED COUNT: 16.8 % (ref 11.5–14.5)
EST. AVERAGE GLUCOSE BLD GHB EST-MCNC: 166 MG/DL
GLOBULIN SER CALC-MCNC: 4.5 G/DL (ref 2–4)
GLUCOSE BLD STRIP.AUTO-MCNC: 166 MG/DL (ref 65–117)
GLUCOSE BLD STRIP.AUTO-MCNC: 93 MG/DL (ref 65–117)
GLUCOSE SERPL-MCNC: 90 MG/DL (ref 65–100)
HBA1C MFR BLD: 7.4 % (ref 4–5.6)
HCT VFR BLD AUTO: 37.6 % (ref 36.6–50.3)
HDLC SERPL-MCNC: 41 MG/DL
HDLC SERPL: 3.2 (ref 0–5)
HGB BLD-MCNC: 12.5 G/DL (ref 12.1–17)
IMM GRANULOCYTES # BLD AUTO: 0 K/UL (ref 0–0.04)
IMM GRANULOCYTES NFR BLD AUTO: 0 % (ref 0–0.5)
LDLC SERPL CALC-MCNC: 54.6 MG/DL (ref 0–100)
LYMPHOCYTES # BLD: 0.6 K/UL (ref 0.8–3.5)
LYMPHOCYTES NFR BLD: 18 % (ref 12–49)
MCH RBC QN AUTO: 30 PG (ref 26–34)
MCHC RBC AUTO-ENTMCNC: 33.2 G/DL (ref 30–36.5)
MCV RBC AUTO: 90.4 FL (ref 80–99)
MONOCYTES # BLD: 0.6 K/UL (ref 0–1)
MONOCYTES NFR BLD: 17 % (ref 5–13)
NEUTS SEG # BLD: 2.2 K/UL (ref 1.8–8)
NEUTS SEG NFR BLD: 59 % (ref 32–75)
NRBC # BLD: 0 K/UL (ref 0–0.01)
NRBC BLD-RTO: 0 PER 100 WBC
PLATELET # BLD AUTO: 122 K/UL (ref 150–400)
PMV BLD AUTO: 10.4 FL (ref 8.9–12.9)
POTASSIUM SERPL-SCNC: 3.9 MMOL/L (ref 3.5–5.1)
PROT SERPL-MCNC: 8.1 G/DL (ref 6.4–8.2)
RBC # BLD AUTO: 4.16 M/UL (ref 4.1–5.7)
RBC MORPH BLD: ABNORMAL
SERVICE CMNT-IMP: ABNORMAL
SERVICE CMNT-IMP: NORMAL
SODIUM SERPL-SCNC: 137 MMOL/L (ref 136–145)
TRIGL SERPL-MCNC: 182 MG/DL
VLDLC SERPL CALC-MCNC: 36.4 MG/DL
WBC # BLD AUTO: 3.6 K/UL (ref 4.1–11.1)

## 2023-08-10 PROCEDURE — 2580000003 HC RX 258: Performed by: STUDENT IN AN ORGANIZED HEALTH CARE EDUCATION/TRAINING PROGRAM

## 2023-08-10 PROCEDURE — 82962 GLUCOSE BLOOD TEST: CPT

## 2023-08-10 PROCEDURE — 6370000000 HC RX 637 (ALT 250 FOR IP): Performed by: STUDENT IN AN ORGANIZED HEALTH CARE EDUCATION/TRAINING PROGRAM

## 2023-08-10 PROCEDURE — 93010 ELECTROCARDIOGRAM REPORT: CPT | Performed by: INTERNAL MEDICINE

## 2023-08-10 PROCEDURE — 85025 COMPLETE CBC W/AUTO DIFF WBC: CPT

## 2023-08-10 PROCEDURE — 93306 TTE W/DOPPLER COMPLETE: CPT

## 2023-08-10 PROCEDURE — 36415 COLL VENOUS BLD VENIPUNCTURE: CPT

## 2023-08-10 PROCEDURE — 93306 TTE W/DOPPLER COMPLETE: CPT | Performed by: SPECIALIST

## 2023-08-10 PROCEDURE — 94761 N-INVAS EAR/PLS OXIMETRY MLT: CPT

## 2023-08-10 PROCEDURE — 99235 HOSP IP/OBS SAME DATE MOD 70: CPT | Performed by: FAMILY MEDICINE

## 2023-08-10 PROCEDURE — 80053 COMPREHEN METABOLIC PANEL: CPT

## 2023-08-10 RX ORDER — BUTALBITAL, ACETAMINOPHEN AND CAFFEINE 50; 325; 40 MG/1; MG/1; MG/1
1 TABLET ORAL EVERY 4 HOURS PRN
Status: CANCELLED | OUTPATIENT
Start: 2023-08-10

## 2023-08-10 RX ORDER — BUTALBITAL, ACETAMINOPHEN AND CAFFEINE 50; 325; 40 MG/1; MG/1; MG/1
1 TABLET ORAL ONCE
Status: COMPLETED | OUTPATIENT
Start: 2023-08-10 | End: 2023-08-10

## 2023-08-10 RX ADMIN — ISOSORBIDE MONONITRATE 60 MG: 60 TABLET, EXTENDED RELEASE ORAL at 08:41

## 2023-08-10 RX ADMIN — PANTOPRAZOLE SODIUM 40 MG: 40 TABLET, DELAYED RELEASE ORAL at 08:41

## 2023-08-10 RX ADMIN — SERTRALINE 50 MG: 50 TABLET, FILM COATED ORAL at 08:41

## 2023-08-10 RX ADMIN — FUROSEMIDE 80 MG: 40 TABLET ORAL at 08:41

## 2023-08-10 RX ADMIN — HYDRALAZINE HYDROCHLORIDE 100 MG: 25 TABLET, FILM COATED ORAL at 08:41

## 2023-08-10 RX ADMIN — NIFEDIPINE 60 MG: 60 TABLET, EXTENDED RELEASE ORAL at 08:41

## 2023-08-10 RX ADMIN — ACETAMINOPHEN 650 MG: 325 TABLET ORAL at 05:16

## 2023-08-10 RX ADMIN — BUTALBITAL, ACETAMINOPHEN, AND CAFFEINE 1 TABLET: 50; 325; 40 TABLET ORAL at 11:23

## 2023-08-10 RX ADMIN — SEVELAMER CARBONATE 800 MG: 800 TABLET, FILM COATED ORAL at 09:31

## 2023-08-10 RX ADMIN — CARVEDILOL 25 MG: 12.5 TABLET, FILM COATED ORAL at 08:41

## 2023-08-10 RX ADMIN — SODIUM CHLORIDE, PRESERVATIVE FREE 10 ML: 5 INJECTION INTRAVENOUS at 08:47

## 2023-08-10 RX ADMIN — HYDRALAZINE HYDROCHLORIDE 100 MG: 25 TABLET, FILM COATED ORAL at 13:13

## 2023-08-10 RX ADMIN — SUCRALFATE 1 G: 1 TABLET ORAL at 13:13

## 2023-08-10 RX ADMIN — APIXABAN 5 MG: 5 TABLET, FILM COATED ORAL at 08:41

## 2023-08-10 RX ADMIN — ROSUVASTATIN CALCIUM 10 MG: 10 TABLET, COATED ORAL at 09:31

## 2023-08-10 RX ADMIN — SEVELAMER CARBONATE 800 MG: 800 TABLET, FILM COATED ORAL at 11:23

## 2023-08-10 RX ADMIN — SUCRALFATE 1 G: 1 TABLET ORAL at 08:41

## 2023-08-10 RX ADMIN — LOSARTAN POTASSIUM 100 MG: 50 TABLET, FILM COATED ORAL at 08:41

## 2023-08-10 ASSESSMENT — PAIN DESCRIPTION - LOCATION
LOCATION: HEAD
LOCATION: HEAD

## 2023-08-10 ASSESSMENT — PAIN DESCRIPTION - ORIENTATION: ORIENTATION: ANTERIOR

## 2023-08-10 ASSESSMENT — PAIN SCALES - GENERAL
PAINLEVEL_OUTOF10: 8
PAINLEVEL_OUTOF10: 9

## 2023-08-10 ASSESSMENT — PAIN - FUNCTIONAL ASSESSMENT: PAIN_FUNCTIONAL_ASSESSMENT: ACTIVITIES ARE NOT PREVENTED

## 2023-08-10 ASSESSMENT — PAIN DESCRIPTION - DESCRIPTORS: DESCRIPTORS: PRESSURE

## 2023-08-10 NOTE — PROGRESS NOTES
Spiritual Care Assessment/Progress Note  [address] [facility]    Name: Yovani Ornelas MRN: [id number]    Age: 58 y.o. Sex: male   Language: English     Date: 8/10/2023            Total Time Calculated: 5 min              Spiritual Assessment begun in [facility] 3 PRO CARE TELE 2  Service Provided For[de-identified] Patient  Referral/Consult From[de-identified] Clergy/  Encounter Overview/Reason : Rituals, Rites and Sacraments    Spiritual beliefs:      [x] Involved in a yolanda tradition/spiritual practice: Katy Nix     [] Supported by a yolanda community:      [] Claims no spiritual orientation:      [] Seeking spiritual identity:           [] Adheres to an individual form of spirituality:      [] Not able to assess:                Identified resources for coping and support system:   Support System: Spouse       [x] Prayer                  [] Devotional reading               [x] Music                  [] Guided Imagery     [] Pet visits                                        [] Other: (COMMENT)     Specific area/focus of visit   Encounter:    Crisis:    Spiritual/Emotional needs: Type: Spiritual Support  Ritual, Rites and Sacraments: Type: Rastafari Communion  Grief, Loss, and Adjustments:    Ethics/Mediation:    Behavioral Health:    Palliative Care: Advance Care Planning:      Plan/Referrals: No future visits requested    Narrative: Mr. Navya Cox was sitting up in bed finishing his lunch. He said he was going home today. Prayer and communion offered. No other spiritual needs at this time.     CLINTON Connolly, RN, ACSW, LCSW   Page:  [phone]

## 2023-08-10 NOTE — PROGRESS NOTES
Affiliated with [facility]  [address], [address] [facility]   Office [phone], Fax [phone]       Subjective / Objective     Subjective:  Overnight events: Pt received dialysis overnight. Patient seen and examined at bedside. Reports mild headache. Reports pain in his L back, chronic and stable from yesterday. Denies CP or SOB. Vital Signs  Vitals:    08/10/23 0515   BP: (!) 158/77   Pulse: 70   Resp: 18   Temp:    SpO2:          Physical Exam  General: no acute distress. Alert. Cooperative. Head: Normocephalic. Atraumatic. Eyes: Conjunctiva pink. Sclera white. PERRL. Throat: Mucosa pink. Moist mucous membranes. Neck: Supple. Normal ROM. No stiffness. Respiratory: CTAB. No w/r/r/c.  Cardiovascular: RRR. Normal S1,S2. No m/r/g.   GI: + bowel sounds. Nontender. No rebound tenderness or guarding. Nondistended. Extremities:  no LE edema. Distal pulses present. Musculoskeletal: Full ROM in all extremities. Skin: Warm, dry. No rashes. Neuro: CN II-XII grossly intact. No functional neurologic deficit noted. I/O:    Intake/Output Summary (Last 24 hours) at 8/10/2023 0544  Last data filed at 8/10/2023 0215  Gross per 24 hour   Intake 500 ml   Output 3150 ml   Net -2650 ml        CBC:  Recent Labs     08/09/23  1309   WBC 4.7   HGB 12.1   HCT 35.6*   *       Metabolic Panel:  Recent Labs     08/09/23  1309 08/09/23  1653   *  --    K 5.1  --    CL 99  --    CO2 24  --    BUN 55*  --    PHOS  --  5.3*   ALT 22  --          Imaging  CTA CHEST W WO CONTRAST  Narrative: EXAM:  CTA CHEST W WO CONTRAST    INDICATION: Evaluate for pulmonary embolism, left-sided pleural effusion. COMPARISON: April 7, 2023    TECHNIQUE: Helical thin section chest CT following uneventful intravenous  administration of nonionic contrast 100 mL of isovue 370 according to  departmental PE protocol.  Coronal and sagittal

## 2023-08-10 NOTE — ED NOTES
Pt came off dialysis at this time; per dialysis RN labs will be skewed and to wait ideally 6 hours.       Prince Kemp RN  08/10/23 3698

## 2023-08-10 NOTE — PROGRESS NOTES
Physical Therapy  Spoke with nursing patient has been ambulating up ad radha without difficulty. We will complete the order. Thank you.   Nevaeh Alatorre PT,DPT,NCS,CLT

## 2023-08-10 NOTE — PROGRESS NOTES
Physician Progress Note      Yefri Huston  CSN #:                  [id number]  :                       1960  ADMIT DATE:       2023 12:44 PM  [address] [facility] DATE:  [facility]  PROVIDER #:        Ana Foster TEXT:    Patient was admitted with Hypertensive emergency. Pt noted to have \"BPH and   was treated with Flomax\" in H&P note on . ?If possible, please document in   progress notes and discharge summary if you are evaluating and/or treating any   of the following: The medical record reflects the following:  Risk Factors: Age 58 M  Clinical Indicators:  H&P noted \"Benign prostatic hypertrophy and was   treated with Flomax\"  Treatment: Flomax 0.4 mf daily, monitoring    Thank you,    Rusty Ocasio RN  CDI  Options provided:  -- BPH with partial/complete urinary obstruction  -- BPH with urinary retention without obstruction  -- BPH without lower urinary tract symptoms  -- Other - I will add my own diagnosis  -- Disagree - Not applicable / Not valid  -- Disagree - Clinically unable to determine / Unknown  -- Refer to Clinical Documentation Reviewer    PROVIDER RESPONSE TEXT:    This patient has BPH with urinary retention without obstruction.     Query created by: Rusty Ocasio on 8/10/2023 12:45 PM      Electronically signed by:  Georg Harada 8/10/2023 12:51 PM

## 2023-08-10 NOTE — PROGRESS NOTES
Nephrology Progress Note    Patient Name : Abdulkadir Ron      : 1960     MRN : [id number]  Date of Admission : 2023  Date of Servive : 08/10/23    CC:      Assessment and Plan   ESRD: MWF,,  HTN urgency  - Coreg 25 mg bid,lasix 80 mg daily, Hydralazine 100 mg tid, Imdur, Nifedipine 60 mg bid,Losartan  Mild Hyponatremia  CP: Radiation to back: CT couple months ago no Aortic dissection  - likely due to HTN urgency and volume     PLAN  HD tomorrow and MWF  [facility]          Interval History:  HD yest, 2.6 lit off, BP better but not the best    Review of Systems:      Current Medications:   Current Facility-Administered Medications   Medication Dose Route Frequency    sodium chloride flush 0.9 % injection 5-40 mL  5-40 mL IntraVENous 2 times per day    sodium chloride flush 0.9 % injection 5-40 mL  5-40 mL IntraVENous PRN    0.9 % sodium chloride infusion   IntraVENous PRN    ondansetron (ZOFRAN-ODT) disintegrating tablet 4 mg  4 mg Oral Q8H PRN    Or    ondansetron (ZOFRAN) injection 4 mg  4 mg IntraVENous Q6H PRN    polyethylene glycol (GLYCOLAX) packet 17 g  17 g Oral Daily PRN    acetaminophen (TYLENOL) tablet 650 mg  650 mg Oral Q6H PRN    Or    acetaminophen (TYLENOL) suppository 650 mg  650 mg Rectal Q6H PRN    glucose chewable tablet 16 g  4 tablet Oral PRN    dextrose bolus 10% 125 mL  125 mL IntraVENous PRN    Or    dextrose bolus 10% 250 mL  250 mL IntraVENous PRN    glucagon injection 1 mg  1 mg SubCUTAneous PRN    dextrose 10 % infusion   IntraVENous Continuous PRN    insulin lispro (HUMALOG) injection vial 0-4 Units  0-4 Units SubCUTAneous TID WC    insulin lispro (HUMALOG) injection vial 0-4 Units  0-4 Units SubCUTAneous Nightly    carvedilol (COREG) tablet 25 mg  25 mg Oral BID WC    furosemide (LASIX) tablet 80 mg  80 mg Oral Daily    gabapentin (NEURONTIN) capsule 100 mg  100 mg Oral Once per day on     insulin glargine (LANTUS) injection vial 2 Units  2

## 2023-08-10 NOTE — PLAN OF CARE
Problem: Safety - Adult  Goal: Free from fall injury  Outcome: Progressing     Problem: Pain  Goal: Verbalizes/displays adequate comfort level or baseline comfort level  Outcome: Progressing     Problem: Chronic Conditions and Co-morbidities  Goal: Patient's chronic conditions and co-morbidity symptoms are monitored and maintained or improved  Outcome: Progressing     Problem: Skin/Tissue Integrity  Goal: Absence of new skin breakdown  Description: 1. Monitor for areas of redness and/or skin breakdown  2. Assess vascular access sites hourly  3. Every 4-6 hours minimum:  Change oxygen saturation probe site  4. Every 4-6 hours:  If on nasal continuous positive airway pressure, respiratory therapy assess nares and determine need for appliance change or resting period.   Outcome: Progressing     Problem: Cardiovascular - Adult  Goal: Maintains optimal cardiac output and hemodynamic stability  Outcome: Progressing  Goal: Absence of cardiac dysrhythmias or at baseline  Outcome: Progressing     Problem: Skin/Tissue Integrity - Adult  Goal: Skin integrity remains intact  Outcome: Progressing  Goal: Incisions, wounds, or drain sites healing without S/S of infection  Outcome: Progressing  Goal: Oral mucous membranes remain intact  Outcome: Progressing     Problem: Musculoskeletal - Adult  Goal: Return mobility to safest level of function  Outcome: Progressing  Goal: Maintain proper alignment of affected body part  Outcome: Progressing     Problem: Gastrointestinal - Adult  Goal: Minimal or absence of nausea and vomiting  Outcome: Progressing  Goal: Maintains or returns to baseline bowel function  Outcome: Progressing  Goal: Maintains adequate nutritional intake  Outcome: Progressing     Problem: Infection - Adult  Goal: Absence of infection at discharge  Outcome: Progressing  Goal: Absence of infection during hospitalization  Outcome: Progressing  Goal: Absence of fever/infection during anticipated neutropenic

## 2023-08-10 NOTE — PROGRESS NOTES
Nurse handed patient a copy of discharge instructions which have been read and explained to patient. . New medications read and explained. Patient gives verbal understanding. Patient aware that prescriptions have been electronically sent to pharmacy. Opportunity for questions and clarification offered. Removed patient's IV access with no complications, VS stable, and patient sent with all belongings.

## 2023-08-10 NOTE — ED NOTES
Lynne Lagunas called back; stating they would be here in about 30-45 min for dialysis      [facility], RN  08/09/23 2029

## 2023-08-10 NOTE — ED NOTES
TRANSFER - OUT REPORT:    Verbal report given to [facility] on Holly Singh  being transferred to 3rd floor for routine progression of patient care       Report consisted of patient's Situation, Background, Assessment and   Recommendations(SBAR). Information from the following report(s) ED Encounter Summary and ED SBAR was reviewed with the receiving nurse. [place] Fall Assessment:    Presents to emergency department  because of falls (Syncope, seizure, or loss of consciousness): No  Age > 70: No  Altered Mental Status, Intoxication with alcohol or substance confusion (Disorientation, impaired judgment, poor safety awaremess, or inability to follow instructions): No  Impaired Mobility: Ambulates or transfers with assistive devices or assistance; Unable to ambulate or transer.: No             Lines:   Peripheral IV Right Antecubital (Active)   Site Assessment Clean, dry & intact 08/09/23 1308   Line Status Brisk blood return 08/09/23 1308   Phlebitis Assessment No symptoms 08/09/23 1308   Infiltration Assessment 0 08/09/23 1308   Dressing Status Clean, dry & intact 08/09/23 1308   Dressing Type Transparent 08/09/23 1308   Dressing Intervention New 08/09/23 1308        Opportunity for questions and clarification was provided.       Patient transported with:  Registered Nurse           Wilfrid Olivera RN  08/10/23 5681

## 2023-08-10 NOTE — PROGRESS NOTES
Discharge instructions were reviewed with patient. All questions were answered. Care Plans and Education were completed. Patient will be discharged home with his family but he has not been able to get in contact with them at this time. IV and telemetry monitor will be removed prior to discharge.

## 2023-08-10 NOTE — DISCHARGE INSTRUCTIONS
HOME DISCHARGE INSTRUCTIONS    Rodney Fair / [id number] : 1960    Admission date: 2023 Discharge date: 8/10/2023     Please bring this form with you to show your care provider at your follow-up appointment. Primary care provider: Kelsea Trinidad    Discharging provider:  Glynn Angela MD  - Family Medicine Resident  Dr. Stu Rendon. Attending, Family Medicine     You have been admitted to the hospital with the following diagnoses:    ACUTE DIAGNOSES:  Unstable angina ([address]) [I20.0]  Pleural effusion [J90]  Hypertensive emergency [I16.1]  CHF NYHA class I, chronic, diastolic ([address]) [Q87.62]    . . . . . . . . . . . . . . . . . . . . . . . . . . . . . . . . . . . . . . . . . . . . . . . . . . . . . . . . . . . . . . . . . . . . . . . Reina Swan FOLLOW-UP CARE RECOMMENDATIONS:  You are well enough to be discharged from the hospital. You were in the hospital for high blood pressures. You got better after getting dialysis and taking your antihypertensive medications. Please continue taking your blood pressure medication and going to your dialysis sessions. Please follow-up with your doctor to discuss if you are supposed to take metoprolol. [address], MD  [address] [place] [address]  [phone]    Go on 2023  Please go to your appointment with your PCP Dr. Ellyn Dover on  at 3:00PM.    Your lung doctor (pulmonologist)    Schedule an appointment as soon as possible for a visit  Please make an appointment to see your pulmonologist as soon as possible. Your cardiologist (heart doctor). Schedule an appointment as soon as possible for a visit  Please follow-up with your cardiologist as soon as possible. Aretha Carbajal MD  [address] [place] [address]  [phone]    Follow up  Please follow-up for your dialysis session tomorrow 23.      Future Appointments   Date Time Provider [address] [place] [address]   2023  9:00

## 2023-08-10 NOTE — CARE COORDINATION
Reason for Readmission:     chest pain         RUR Score/Risk Level:     26%    PCP: First and Last name:  Anatoliy White MD   Name of Practice:    Are you a current patient: Yes/No: yes   Approximate date of last visit: July 2023   Can you participate in a virtual visit with your PCP: yes    Is a Care Conference indicated:   No      Did you attend your follow up appointment (s): If not, why not:   No.  He was at HD which ran late and was not able to be seen by his PCP. Resources/supports as identified by patient/family:  wife, children, family members        Top Challenges facing patient (as identified by patient/family and CM): Finances/Medication cost?     No issues. He uses [employer] and has the pill packs delivered  Transportation     pt does not drive. His wife and children drive   Support system or lack thereof? See above  Living arrangements? 1 story house with 2 entry steps with hand rail on the R.    Self-care/ADLs/Cognition? Pt is A&O x4. He is independent with ADL's and uses a cane with ambulation. He also has a RW not used at this time. Current Advanced Directive/Advance Care Plan:             Plan for utilizing home health:   none             Transition of Care Plan:    Based on readmission, the patient's previous Plan of Care   has been evaluated and/or modified.  The current Transition of Care Plan is:         No CM needs  Family to transport him home at d/c  Pt to follow up OP with providers as scheduled    Readmission Assessment  Number of Days since last admission?: 8-30 days  Previous Disposition: Home with Family  Who is being Interviewed: Patient  What was the patient's/caregiver's perception as to why they think they needed to return back to the hospital?: Other (Comment) (had chest pain at HD)  Did you visit your Primary Care Physician after you left the hospital, before you returned this time?: No  Why weren't you able to visit your PCP?: Other

## 2023-08-11 ASSESSMENT — ENCOUNTER SYMPTOMS
SHORTNESS OF BREATH: 1
CHEST TIGHTNESS: 1

## 2023-08-13 PROBLEM — E11.9 TYPE 2 DIABETES MELLITUS (HCC): Status: ACTIVE | Noted: 2019-08-05

## 2023-08-13 PROBLEM — Z79.4 TYPE 2 DIABETES MELLITUS WITH OPHTHALMIC COMPLICATION, WITH LONG-TERM CURRENT USE OF INSULIN (HCC): Status: RESOLVED | Noted: 2018-11-25 | Resolved: 2023-08-13

## 2023-08-13 PROBLEM — G44.209 TENSION HEADACHE: Status: RESOLVED | Noted: 2023-06-07 | Resolved: 2023-08-13

## 2023-08-13 PROBLEM — T82.590A MALFUNCTION OF ARTERIOVENOUS DIALYSIS FISTULA (HCC): Status: RESOLVED | Noted: 2023-04-07 | Resolved: 2023-08-13

## 2023-08-13 PROBLEM — I16.1 HYPERTENSIVE EMERGENCY: Status: RESOLVED | Noted: 2022-02-14 | Resolved: 2023-08-13

## 2023-08-13 PROBLEM — M79.89 CALF SWELLING: Status: RESOLVED | Noted: 2022-07-16 | Resolved: 2023-08-13

## 2023-08-13 PROBLEM — E87.5 HYPERKALEMIA: Status: RESOLVED | Noted: 2023-06-07 | Resolved: 2023-08-13

## 2023-08-13 PROBLEM — E11.39 TYPE 2 DIABETES MELLITUS WITH OPHTHALMIC COMPLICATION, WITH LONG-TERM CURRENT USE OF INSULIN (HCC): Status: RESOLVED | Noted: 2018-11-25 | Resolved: 2023-08-13

## 2023-08-13 PROBLEM — I20.0 UNSTABLE ANGINA (HCC): Status: RESOLVED | Noted: 2023-08-09 | Resolved: 2023-08-13

## 2023-08-13 PROBLEM — A41.9 SEPSIS WITHOUT ACUTE ORGAN DYSFUNCTION (HCC): Status: RESOLVED | Noted: 2023-07-06 | Resolved: 2023-08-13

## 2023-08-13 PROBLEM — R10.13 ABDOMINAL PAIN, EPIGASTRIC: Status: RESOLVED | Noted: 2022-02-06 | Resolved: 2023-08-13

## 2023-08-13 PROBLEM — K29.60 EROSIVE GASTRITIS: Status: RESOLVED | Noted: 2023-06-07 | Resolved: 2023-08-13

## 2023-08-13 PROBLEM — R07.9 CHEST PAIN: Status: RESOLVED | Noted: 2023-06-05 | Resolved: 2023-08-13

## 2023-08-13 PROBLEM — N18.6 ESRD (END STAGE RENAL DISEASE) (HCC): Status: RESOLVED | Noted: 2023-05-15 | Resolved: 2023-08-13

## 2023-08-17 DIAGNOSIS — I10 ESSENTIAL (PRIMARY) HYPERTENSION: ICD-10-CM

## 2023-08-17 NOTE — TELEPHONE ENCOUNTER
PCP: Edgar Frias MD    Last appt: [unfilled]  Spoke with patient and he does not need refills. He received a 90 day supply + 1 refill from Dr. Alyson Corado last month to be dispensed at [facility]. We can disregard this order. Future Appointments   Date Time Provider [address]   8/24/2023  9:00 AM MD ARLENE Charles   8/24/2023  3:00 PM Joaquina Luevano, APRN - NP [facility]       Requested Prescriptions     Pending Prescriptions Disp Refills    losartan (COZAAR) 25 MG tablet [Pharmacy Med Name: LOSARTAN POTASSIUM 25 MG TAB] 90 tablet 1     Sig: TAKE 1 TABLET BY MOUTH DAILY.  INDICATIONS: KIDNEY DISEASE FROM DIABETES, HIGH BLOOD PRESSURE       Prior labs and Blood pressures:  BP Readings from Last 3 Encounters:   08/10/23 (!) 149/74   07/18/23 (!) 169/75   07/10/23 (!) 125/58     Lab Results   Component Value Date/Time     08/10/2023 08:01 AM    K 3.9 08/10/2023 08:01 AM     08/10/2023 08:01 AM    CO2 30 08/10/2023 08:01 AM    BUN 25 08/10/2023 08:01 AM    GFRAA 28 07/29/2022 03:11 AM     No results found for: HBA1C, UWR1WPPI  Lab Results   Component Value Date/Time    CHOL 132 08/09/2023 04:53 PM    HDL 41 08/09/2023 04:53 PM     No results found for: VITD3, VD3RIA    Lab Results   Component Value Date/Time    TSH 3.52 11/18/2021 10:03 AM

## 2023-08-18 RX ORDER — NIFEDIPINE 60 MG/1
TABLET, EXTENDED RELEASE ORAL
Qty: 30 TABLET | Refills: 3 | OUTPATIENT
Start: 2023-08-18

## 2023-08-21 RX ORDER — NIFEDIPINE 60 MG/1
60 TABLET, EXTENDED RELEASE ORAL 2 TIMES DAILY
Qty: 30 TABLET | Refills: 3 | Status: CANCELLED | OUTPATIENT
Start: 2023-08-21

## 2023-08-21 NOTE — TELEPHONE ENCOUNTER
PCP: Sherrie Ying MD    Last appt: 06/20/23        Future Appointments   Date Time Provider [address]   8/24/2023  9:00 AM MD ARLENE Davidson [facility]   8/24/2023  3:00 PM SAMIR Pillai - VENU LOPEZ [facility]       Requested Prescriptions     Pending Prescriptions Disp Refills    NIFEdipine (PROCARDIA XL) 60 MG extended release tablet [Pharmacy Med Name: nifedipine ER 60 mg tablet,extended release 24 hr] 30 tablet 3     Sig: TAKE 1 TABLET BY MOUTH TWICE DAILY    furosemide (LASIX) 80 MG tablet [Pharmacy Med Name: furosemide 80 mg tablet] 60 tablet 0     Sig: TAKE 1 TABLET BY MOUTH EVERY DAY       Prior labs and Blood pressures:  BP Readings from Last 3 Encounters:   08/10/23 (!) 149/74   07/18/23 (!) 169/75   07/10/23 (!) 125/58     Lab Results   Component Value Date/Time     08/10/2023 08:01 AM    K 3.9 08/10/2023 08:01 AM     08/10/2023 08:01 AM    CO2 30 08/10/2023 08:01 AM    BUN 25 08/10/2023 08:01 AM    GFRAA 28 07/29/2022 03:11 AM     No results found for: HBA1C, ROX9QRWG  Lab Results   Component Value Date/Time    CHOL 132 08/09/2023 04:53 PM    HDL 41 08/09/2023 04:53 PM     No results found for: VITD3, VD3RIA    Lab Results   Component Value Date/Time    TSH 3.52 11/18/2021 10:03 AM

## 2023-08-22 RX ORDER — NIFEDIPINE 60 MG/1
TABLET, EXTENDED RELEASE ORAL
Qty: 30 TABLET | Refills: 3 | OUTPATIENT
Start: 2023-08-22

## 2023-08-22 RX ORDER — FUROSEMIDE 80 MG
TABLET ORAL
Qty: 60 TABLET | Refills: 0 | Status: SHIPPED | OUTPATIENT
Start: 2023-08-22

## 2023-08-24 RX ORDER — LOSARTAN POTASSIUM 25 MG/1
TABLET ORAL
Qty: 90 TABLET | Refills: 1 | OUTPATIENT
Start: 2023-08-24

## 2023-08-24 RX ORDER — NIFEDIPINE 60 MG/1
TABLET, EXTENDED RELEASE ORAL
Qty: 30 TABLET | Refills: 3 | OUTPATIENT
Start: 2023-08-24

## 2023-08-28 RX ORDER — NIFEDIPINE 60 MG/1
60 TABLET, EXTENDED RELEASE ORAL 2 TIMES DAILY
Qty: 30 TABLET | Refills: 3 | Status: SHIPPED | OUTPATIENT
Start: 2023-08-28

## 2023-08-28 RX ORDER — NIFEDIPINE 60 MG/1
TABLET, EXTENDED RELEASE ORAL
Qty: 30 TABLET | Refills: 3 | OUTPATIENT
Start: 2023-08-28

## 2023-09-11 NOTE — PROGRESS NOTES
Bedside shift change report given to Jani Gill RN (oncoming nurse) by Roberto Ma (offgoing nurse). Report included the following information SBAR, Kardex, Intake/Output, MAR, Recent Results, and Med Rec Status. _                         Today's Date: 9/11/2023  Patient Name: Nishant Fraser  Date of admission: 9/7/2023 10:46 AM  Patient's age: 79 y. o., 1952  Admission Dx: Altered mental status [R41.82]  Generalized weakness [R53.1]  Elevated troponin [R77.8]      Requesting Physician: Lindy Browning MD    CHIEF COMPLAINT: Generalized weakness and fatigue. Failure to thrive. Progressive weakness of both arms. Lung mass. Pulm metastasis. History Obtained From:  patient, electronic medical record    HISTORY OF PRESENT ILLNESS:      The patient is a 79 y.o.  male who is admitted to the hospital for further management of altered mental status with worsening weakness and fatigue. He had multiple other findings including elevated troponin and abnormal imaging. The patient was recently diagnosed with lung mass based on screening CT scan due to history of smoking. He smoked average of 2 to 3 packs/day for more than 50 years. Recently down to about 12 to 15 cigarettes daily. CT scan showed suspicious mass in the lung and he was scheduled for biopsy at the [facility] in [facility]. It was not done yet. Patient had increasing weakness of both arms and hands over the last several months. He was using wheelchair and recently had difficulty operating and using upper extremities. 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 The patient had evaluation with MRI of the cervical spine which showed severe canal stenosis. He was evaluated by orthopedics with plan for possible intervention and surgery. Patient denies any chest pain. No cough, sputum or hemoptysis. No headaches. No other complaints. Past Medical History:   has a past medical history of CAD (coronary artery disease), Falls, Myocardial infarction ([address]), PVD (peripheral vascular disease) ([address]), and Urinary retention.     Past Surgical History:   has a past surgical history that includes Leg amputation below knee (Right) and Cardiac surgery. Family History: family history is not on file. Social History:   reports that he has been smoking cigarettes. He does not have any smokeless tobacco history on file. He reports that he does not currently use alcohol. He reports that he does not currently use drugs. Medications:    Prior to Admission medications    Medication Sig Start Date End Date Taking? Authorizing Provider   insulin aspart (NOVOLOG FLEXPEN) 100 UNIT/ML injection pen Inject into the skin 3 times daily (before meals) Sliding scale   Yes Historical Provider, MD   gabapentin (NEURONTIN) 600 MG tablet Take 1 tablet by mouth 2 times daily. Yes Historical Provider, MD   cephALEXin (KEFLEX) 500 MG capsule Take 1 capsule by mouth 3 times daily 9/1/23 9/11/23 Yes Historical Provider, MD   HYDROcodone-acetaminophen (NORCO) 5-325 MG per tablet Take 1 tablet by mouth every 4 hours as needed for Pain.  Max Daily Amount: 6 tablets   Yes Historical Provider, MD   cyclobenzaprine (FLEXERIL) 5 MG tablet Take 1 tablet by mouth 2 times daily as needed for Muscle spasms   Yes Historical Provider, MD   tamsulosin (FLOMAX) 0.4 MG capsule Take 1 capsule by mouth daily   Yes Historical Provider, MD   alogliptin (NESINA) 12.5 MG TABS tablet Take 1 tablet by mouth daily   Yes Historical Provider, MD   folic acid (FOLVITE) 1 MG tablet Take 1 tablet by mouth daily   Yes Historical Provider, MD   aspirin 81 MG EC tablet Take 1 tablet by mouth daily   Yes Historical Provider, MD   nicotine (NICODERM CQ) Place 1 patch onto the skin daily 21mg/24hr   Yes Historical Provider, MD   carvedilol (COREG) 12.5 MG tablet Take 1 tablet by mouth 2 times daily (with meals)   Yes Historical Provider, MD   atorvastatin (LIPITOR) 80 MG tablet Take 1 tablet by mouth daily   Yes Historical Provider, MD   apixaban (ELIQUIS) 5 MG TABS tablet Take by mouth 2 times daily   Yes Historical Provider, MD

## 2023-09-12 ENCOUNTER — TELEPHONE (OUTPATIENT)
Facility: CLINIC | Age: 63
End: 2023-09-12

## 2023-09-16 RX ORDER — APIXABAN 5 MG/1
5 TABLET, FILM COATED ORAL 2 TIMES DAILY
Qty: 120 TABLET | Refills: 0 | Status: SHIPPED | OUTPATIENT
Start: 2023-09-16

## 2023-10-12 NOTE — TELEPHONE ENCOUNTER
PCP: Kira Cee MD    Last appt: [unfilled]  No future appointments.     Requested Prescriptions     Pending Prescriptions Disp Refills    carvedilol (COREG) 25 MG tablet [Pharmacy Med Name: carvedilol 25 mg tablet] 60 tablet 3     Sig: TAKE 1 TABLET BY MOUTH TWICE DAILY WITH MEALS       Prior labs and Blood pressures:  BP Readings from Last 3 Encounters:   08/10/23 (!) 149/74   07/18/23 (!) 169/75   07/10/23 (!) 125/58     Lab Results   Component Value Date/Time     08/10/2023 08:01 AM    K 3.9 08/10/2023 08:01 AM     08/10/2023 08:01 AM    CO2 30 08/10/2023 08:01 AM    BUN 25 08/10/2023 08:01 AM    GFRAA 28 07/29/2022 03:11 AM     No results found for: \"HBA1C\", \"OTQ9BISD\"  Lab Results   Component Value Date/Time    CHOL 132 08/09/2023 04:53 PM    HDL 41 08/09/2023 04:53 PM     No results found for: \"VITD3\", \"VD3RIA\"    Lab Results   Component Value Date/Time    TSH 3.52 11/18/2021 10:03 AM

## 2023-10-14 RX ORDER — CARVEDILOL 25 MG/1
25 TABLET ORAL 2 TIMES DAILY WITH MEALS
Qty: 60 TABLET | Refills: 2 | Status: SHIPPED | OUTPATIENT
Start: 2023-10-14

## 2023-10-23 RX ORDER — NIFEDIPINE 60 MG/1
60 TABLET, EXTENDED RELEASE ORAL 2 TIMES DAILY
Qty: 30 TABLET | Refills: 3 | OUTPATIENT
Start: 2023-10-23

## 2023-10-23 NOTE — TELEPHONE ENCOUNTER
Call patient and see which of his BP meds he needs. Nicky is asking for nifedipine which we do not have on his list.  We have amlodipine listed. See if he needs anything else when you get him on the phone.

## 2023-10-24 RX ORDER — NIFEDIPINE 60 MG/1
60 TABLET, EXTENDED RELEASE ORAL 2 TIMES DAILY
Qty: 180 TABLET | Refills: 0 | Status: SHIPPED | OUTPATIENT
Start: 2023-10-24

## 2023-10-25 NOTE — DISCHARGE SUMMARY
[address] [place] [address]   Office [phone]  Fax [phone]       Discharge / Transfer / Off-Service Note     Name: Hillis Kocher MRN: [id number]  Sex: Male   YOB: 1960  Age: 58 y.o. PCP: Moira Mccloud MD     Date of admission: 6/5/2023  Date of discharge/transfer: 6/7/2023    Attending physician at admission: Dr. Venus Robison,   Attending physician at discharge/transfer: Dr. Venus Robison, DO  Resident physician at discharge/transfer: Lupe Flower DO     Consultants during hospitalization  None     Admission diagnoses   Hyperkalemia [E87.5]  Colitis [K52.9]  Acute pulmonary edema ([address] [J81.0]  Chest pain [R07.9]  Thrombocytopenia ([address] [D69.6]  ESRD on dialysis ([address] [N18.6, Z99.2]  Abdominal pain, unspecified abdominal location [R10.9]  Dyspnea, unspecified type [R06.00]  Chest pain, unspecified type [R07.9]    Recommended follow-up after discharge    1. PCP  2. Nephro/Dialysis  3. Hepatologist  4. Opthalmologist     Medication Changes/Patient Instructions:  - Please take gabapentin 100mg once per day after dialysis on Monday, Wednesday, Friday  - Please continue to take your carafate and acid blocking medication  - please  your fluid pill from the pharmacy  - Dialysis MWF  - See ophthalmologist tomorrow regarding diabetic retinopathy / glaucoma  - See Dr. Selene Frankel, Hepatology, regarding cirrhosis. Appointment scheduled    History of Present Illness  Per H/P: Hillis Kocher is a 58 y.o. male with  PMHx of ESRD (dialysis MWF), HFpEF, T2DM, HTN, erosisve gastritis, pulmonary embolism (on eliquis 5 mg BID), alcoholic cirrhosis, thrombocytopenia, anemia who is admitted for dyspnea and chest discomfort in the setting of missing dialysis. Also with epigastric pain in setting of chronic erosive gastritis. Ran out of lasix on Friday and did not take over the weekend. Endorses orthopnea.     Hospital course  Hillis Kocher was admitted into the Family Medicine Service from 6/5/2023 to Information: Selecting Yes will display possible errors in your note based on the variables you have selected. This validation is only offered as a suggestion for you. PLEASE NOTE THAT THE VALIDATION TEXT WILL BE REMOVED WHEN YOU FINALIZE YOUR NOTE. IF YOU WANT TO FAX A PRELIMINARY NOTE YOU WILL NEED TO TOGGLE THIS TO 'NO' IF YOU DO NOT WANT IT IN YOUR FAXED NOTE.

## 2023-11-13 RX ORDER — APIXABAN 5 MG/1
5 TABLET, FILM COATED ORAL 2 TIMES DAILY
Qty: 180 TABLET | Refills: 0 | Status: SHIPPED | OUTPATIENT
Start: 2023-11-13

## 2023-12-29 ENCOUNTER — TELEPHONE (OUTPATIENT)
Facility: CLINIC | Age: 63
End: 2023-12-29

## 2023-12-29 NOTE — TELEPHONE ENCOUNTER
----- Message from Candice Cortez sent at 11/21/2023  9:07 AM EST -----  Subject: Hospital Follow Up    QUESTIONS  What hospital was the Patient Dis[facility]  Date of Discharge? 2023-08-10  Discharge Location? Home  Reason for hospitalization as patient stated? Hypertensive emergency  What question does the patient have, if applicable?   ---------------------------------------------------------------------------  --------------  CALL BACK INFO  What is the best way for the office to contact you? OK to leave message on   voicemail  Preferred Call Back Phone Number? [phone]  ---------------------------------------------------------------------------  --------------  SCRIPT ANSWERS  Relationship to Patient?  Self

## 2024-01-09 ENCOUNTER — OFFICE VISIT (OUTPATIENT)
Facility: CLINIC | Age: 64
End: 2024-01-09
Payer: MEDICAID

## 2024-01-09 VITALS
TEMPERATURE: 97.8 F | RESPIRATION RATE: 16 BRPM | HEART RATE: 72 BPM | OXYGEN SATURATION: 97 % | HEIGHT: 68 IN | DIASTOLIC BLOOD PRESSURE: 77 MMHG | BODY MASS INDEX: 24.71 KG/M2 | WEIGHT: 163 LBS | SYSTOLIC BLOOD PRESSURE: 183 MMHG

## 2024-01-09 DIAGNOSIS — E11.22 TYPE 2 DIABETES MELLITUS WITH CHRONIC KIDNEY DISEASE ON CHRONIC DIALYSIS, WITH LONG-TERM CURRENT USE OF INSULIN (HCC): Primary | ICD-10-CM

## 2024-01-09 DIAGNOSIS — Z79.4 TYPE 2 DIABETES MELLITUS WITH CHRONIC KIDNEY DISEASE ON CHRONIC DIALYSIS, WITH LONG-TERM CURRENT USE OF INSULIN (HCC): Primary | ICD-10-CM

## 2024-01-09 DIAGNOSIS — N18.6 TYPE 2 DIABETES MELLITUS WITH CHRONIC KIDNEY DISEASE ON CHRONIC DIALYSIS, WITH LONG-TERM CURRENT USE OF INSULIN (HCC): Primary | ICD-10-CM

## 2024-01-09 DIAGNOSIS — K70.31 ALCOHOLIC CIRRHOSIS OF LIVER WITH ASCITES (HCC): ICD-10-CM

## 2024-01-09 DIAGNOSIS — Z99.2 TYPE 2 DIABETES MELLITUS WITH CHRONIC KIDNEY DISEASE ON CHRONIC DIALYSIS, WITH LONG-TERM CURRENT USE OF INSULIN (HCC): Primary | ICD-10-CM

## 2024-01-09 DIAGNOSIS — N18.6 END STAGE RENAL DISEASE (HCC): ICD-10-CM

## 2024-01-09 PROCEDURE — 3078F DIAST BP <80 MM HG: CPT | Performed by: NURSE PRACTITIONER

## 2024-01-09 PROCEDURE — 3077F SYST BP >= 140 MM HG: CPT | Performed by: NURSE PRACTITIONER

## 2024-01-09 PROCEDURE — 99214 OFFICE O/P EST MOD 30 MIN: CPT | Performed by: NURSE PRACTITIONER

## 2024-01-09 RX ORDER — INSULIN GLARGINE 100 [IU]/ML
5 INJECTION, SOLUTION SUBCUTANEOUS NIGHTLY
Qty: 10 ML | Refills: 0 | Status: SHIPPED | OUTPATIENT
Start: 2024-01-09

## 2024-01-09 SDOH — ECONOMIC STABILITY: FOOD INSECURITY: WITHIN THE PAST 12 MONTHS, YOU WORRIED THAT YOUR FOOD WOULD RUN OUT BEFORE YOU GOT MONEY TO BUY MORE.: NEVER TRUE

## 2024-01-09 SDOH — ECONOMIC STABILITY: FOOD INSECURITY: WITHIN THE PAST 12 MONTHS, THE FOOD YOU BOUGHT JUST DIDN'T LAST AND YOU DIDN'T HAVE MONEY TO GET MORE.: NEVER TRUE

## 2024-01-09 SDOH — ECONOMIC STABILITY: HOUSING INSECURITY
IN THE LAST 12 MONTHS, WAS THERE A TIME WHEN YOU DID NOT HAVE A STEADY PLACE TO SLEEP OR SLEPT IN A SHELTER (INCLUDING NOW)?: NO

## 2024-01-09 SDOH — ECONOMIC STABILITY: INCOME INSECURITY: HOW HARD IS IT FOR YOU TO PAY FOR THE VERY BASICS LIKE FOOD, HOUSING, MEDICAL CARE, AND HEATING?: NOT HARD AT ALL

## 2024-01-09 ASSESSMENT — PATIENT HEALTH QUESTIONNAIRE - PHQ9
2. FEELING DOWN, DEPRESSED OR HOPELESS: 1
SUM OF ALL RESPONSES TO PHQ QUESTIONS 1-9: 2
3. TROUBLE FALLING OR STAYING ASLEEP: 0
SUM OF ALL RESPONSES TO PHQ9 QUESTIONS 1 & 2: 2
8. MOVING OR SPEAKING SO SLOWLY THAT OTHER PEOPLE COULD HAVE NOTICED. OR THE OPPOSITE, BEING SO FIGETY OR RESTLESS THAT YOU HAVE BEEN MOVING AROUND A LOT MORE THAN USUAL: 0
9. THOUGHTS THAT YOU WOULD BE BETTER OFF DEAD, OR OF HURTING YOURSELF: 0
SUM OF ALL RESPONSES TO PHQ QUESTIONS 1-9: 2
5. POOR APPETITE OR OVEREATING: 0
1. LITTLE INTEREST OR PLEASURE IN DOING THINGS: 1
4. FEELING TIRED OR HAVING LITTLE ENERGY: 0
SUM OF ALL RESPONSES TO PHQ QUESTIONS 1-9: 2
SUM OF ALL RESPONSES TO PHQ QUESTIONS 1-9: 2
10. IF YOU CHECKED OFF ANY PROBLEMS, HOW DIFFICULT HAVE THESE PROBLEMS MADE IT FOR YOU TO DO YOUR WORK, TAKE CARE OF THINGS AT HOME, OR GET ALONG WITH OTHER PEOPLE: 0
7. TROUBLE CONCENTRATING ON THINGS, SUCH AS READING THE NEWSPAPER OR WATCHING TELEVISION: 0
6. FEELING BAD ABOUT YOURSELF - OR THAT YOU ARE A FAILURE OR HAVE LET YOURSELF OR YOUR FAMILY DOWN: 0

## 2024-01-09 ASSESSMENT — ENCOUNTER SYMPTOMS
COLOR CHANGE: 0
SHORTNESS OF BREATH: 0

## 2024-01-09 NOTE — PROGRESS NOTES
Assessment/Plan:     Hu was seen today for follow-up from hospital, neck pain, dizziness and medication refill.    Diagnoses and all orders for this visit:    Type 2 diabetes mellitus with chronic kidney disease on chronic dialysis, with long-term current use of insulin (HCC)  -     insulin glargine (LANTUS) 100 UNIT/ML injection vial; Inject 5 Units into the skin nightly  -     Comprehensive Metabolic Panel; Future  -     Hemoglobin A1C; Future  -     Hemoglobin A1C  -     Comprehensive Metabolic Panel  Patient was seen in office for medication refill for insulin. Patient had previously been prescribed Lantus as well as NovoLog from hospital. We have not done any insulin management in this office. He reports being out of his NovoLog for an undetermined amount of time. Looking back through her hospital records it appears he was given a sliding scale and discharged home. There has been no further follow up with us since. It has been a considerable amount of time since last A1c. He is on renal dialysis and does see the liver specialist. Patient does have current Lantus that he inject 5 units nightly. He reports checking his blood sugar but states that sometimes it is up and down but cannot give me a number. I asked if he was ever over 500 and he said no. I discussed risks of too high and too low blood sugars him when he should go to the emergency room. We will repeat A1c and metabolic panel today and have asked patient to schedule next week to discuss need for any sliding scale insulin based on latest A1c. He does have dialysis tomorrow. His blood pressure was elevated as it normally is prior to dialysis for the patient. We discussed healthy diet and better eating and strict medication adherence. I walked patient up and had him scheduled for next week on Tuesday.   End stage renal disease (HCC)    Alcoholic cirrhosis of liver with ascites (HCC)        No follow-up provider specified.    Discussed expected

## 2024-01-09 NOTE — PATIENT INSTRUCTIONS
We will need to see you again next week for follow up.  Log blood sugars with time of day, before or after meals.  Bring those with you to visit.  We will look at these and then be better able to set you up with a sliding scale for additional insulin use.

## 2024-01-09 NOTE — PROGRESS NOTES
1. Have you been to the ER, urgent care clinic since your last visit?  Hospitalized since your last visit?Yes When: 12/20/2023 Where: [facility] Reason for visit: Neck pain    2. Have you seen or consulted any other health care providers outside of the [facility] since your last visit?  Include any pap smears or colon screening. No    Chief Complaint   Patient presents with    Follow-Up from Hospital     Patient states also on dialysis Mondays, Wednesdays and Fridays.    Neck Pain    Dizziness    Medication Refill     Insulin     Health Maintenance Due   Topic Date Due    HIV screen  Never done    Hepatitis A vaccine (1 of 2 - Risk 2-dose series) Never done    Hepatitis B vaccine (1 of 3 - Risk Dialysis 4-dose series) Never done    Shingles vaccine (1 of 2) Never done    Pneumococcal 0-64 years Vaccine (2 - PCV) 11/14/2019    Diabetic retinal exam  08/26/2020    Respiratory Syncytial Virus (RSV) Pregnant or age 60 yrs+ (1 - 1-dose 60+ series) Never done    Diabetic foot exam  07/21/2023    Flu vaccine (1) 08/01/2023    COVID-19 Vaccine (2 - 2023-24 season) 09/01/2023     PHQ-9 Total Score: 2 (1/9/2024  2:39 PM)  Thoughts that you would be better off dead, or of hurting yourself in some way: 0 (1/9/2024  2:39 PM)        1/9/2024     2:00 PM   AMB Abuse Screening   Do you ever feel afraid of your partner? N   Are you in a relationship with someone who physically or mentally threatens you? N   Is it safe for you to go home? Y     Vitals:    01/09/24 1438   BP: (!) 183/77   Pulse: 72   Resp: 16   Temp: 97.8 °F (36.6 °C)   SpO2: 97%

## 2024-01-10 DIAGNOSIS — G47.00 INSOMNIA, UNSPECIFIED: ICD-10-CM

## 2024-01-10 DIAGNOSIS — F32.A DEPRESSION, UNSPECIFIED: ICD-10-CM

## 2024-01-10 LAB
ALBUMIN SERPL-MCNC: 3.7 G/DL (ref 3.5–5)
ALBUMIN/GLOB SERPL: 0.9 (ref 1.1–2.2)
ALP SERPL-CCNC: 138 U/L (ref 45–117)
ALT SERPL-CCNC: 21 U/L (ref 12–78)
ANION GAP SERPL CALC-SCNC: 9 MMOL/L (ref 5–15)
AST SERPL-CCNC: 12 U/L (ref 15–37)
BILIRUB SERPL-MCNC: 0.7 MG/DL (ref 0.2–1)
BUN SERPL-MCNC: 36 MG/DL (ref 6–20)
BUN/CREAT SERPL: 6 (ref 12–20)
CALCIUM SERPL-MCNC: 8.5 MG/DL (ref 8.5–10.1)
CHLORIDE SERPL-SCNC: 98 MMOL/L (ref 97–108)
CO2 SERPL-SCNC: 27 MMOL/L (ref 21–32)
CREAT SERPL-MCNC: 6.16 MG/DL (ref 0.7–1.3)
EST. AVERAGE GLUCOSE BLD GHB EST-MCNC: 154 MG/DL
GLOBULIN SER CALC-MCNC: 4 G/DL (ref 2–4)
GLUCOSE SERPL-MCNC: 260 MG/DL (ref 65–100)
HBA1C MFR BLD: 7 % (ref 4–5.6)
POTASSIUM SERPL-SCNC: 4.9 MMOL/L (ref 3.5–5.1)
PROT SERPL-MCNC: 7.7 G/DL (ref 6.4–8.2)
SODIUM SERPL-SCNC: 134 MMOL/L (ref 136–145)

## 2024-01-10 RX ORDER — PANTOPRAZOLE SODIUM 40 MG/1
40 TABLET, DELAYED RELEASE ORAL DAILY
Qty: 90 TABLET | Refills: 1 | Status: SHIPPED | OUTPATIENT
Start: 2024-01-10

## 2024-01-10 RX ORDER — LOSARTAN POTASSIUM 100 MG/1
100 TABLET ORAL DAILY
Qty: 90 TABLET | Refills: 1 | Status: SHIPPED | OUTPATIENT
Start: 2024-01-10

## 2024-01-10 RX ORDER — TAMSULOSIN HYDROCHLORIDE 0.4 MG/1
0.4 CAPSULE ORAL EVERY EVENING
Qty: 90 CAPSULE | Refills: 1 | Status: SHIPPED | OUTPATIENT
Start: 2024-01-10

## 2024-01-10 RX ORDER — SUCRALFATE 1 G/1
1 TABLET ORAL
Qty: 270 TABLET | Refills: 1 | Status: SHIPPED | OUTPATIENT
Start: 2024-01-10

## 2024-01-10 RX ORDER — ISOSORBIDE MONONITRATE 60 MG/1
60 TABLET, EXTENDED RELEASE ORAL DAILY
Qty: 90 TABLET | Refills: 1 | Status: SHIPPED | OUTPATIENT
Start: 2024-01-10

## 2024-01-10 RX ORDER — TRAZODONE HYDROCHLORIDE 50 MG/1
TABLET ORAL
Qty: 90 TABLET | Refills: 1 | Status: SHIPPED | OUTPATIENT
Start: 2024-01-10

## 2024-01-10 NOTE — TELEPHONE ENCOUNTER
PCP: Cheikh Fall MD    Last appt: [unfilled]  Patient was seen yesterday   Future Appointments   Date Time Provider Department Center   1/16/2024  1:00 PM Jac Torres, APRN - NP [facility]       Requested Prescriptions     Pending Prescriptions Disp Refills    losartan (COZAAR) 100 MG tablet [Pharmacy Med Name: losartan 100 mg tablet] 90 tablet 1     Sig: TAKE 1 TABLET BY MOUTH EVERY DAY    traZODone (DESYREL) 50 MG tablet [Pharmacy Med Name: trazodone 50 mg tablet] 90 tablet 1     Sig: TAKE 1 TABLET BY MOUTH AT BEDTIME    sucralfate (CARAFATE) 1 GM tablet [Pharmacy Med Name: sucralfate 1 gram tablet] 270 tablet 1     Sig: TAKE 1 TABLET BY MOUTH THREE TIMES DAILY WITH MEALS    tamsulosin (FLOMAX) 0.4 MG capsule [Pharmacy Med Name: tamsulosin 0.4 mg capsule] 90 capsule 1     Sig: TAKE 1 CAPSULE BY MOUTH EVERY EVENING    sertraline (ZOLOFT) 50 MG tablet [Pharmacy Med Name: sertraline 50 mg tablet] 90 tablet 1     Sig: TAKE 1 TABLET BY MOUTH EVERY DAY    pantoprazole (PROTONIX) 40 MG tablet [Pharmacy Med Name: pantoprazole 40 mg tablet,delayed release] 90 tablet 1     Sig: TAKE 1 TABLET BY MOUTH EVERY DAY    isosorbide mononitrate (IMDUR) 60 MG extended release tablet [Pharmacy Med Name: isosorbide mononitrate ER 60 mg tablet,extended release 24 hr] 90 tablet 1     Sig: TAKE 1 TABLET BY MOUTH EVERY DAY       Prior labs and Blood pressures:  BP Readings from Last 3 Encounters:   01/09/24 (!) 183/77   12/20/23 (!) 120/55   08/10/23 (!) 149/74     Lab Results   Component Value Date/Time     01/09/2024 03:29 PM    K 4.9 01/09/2024 03:29 PM    CL 98 01/09/2024 03:29 PM    CO2 27 01/09/2024 03:29 PM    BUN 36 01/09/2024 03:29 PM    GFRAA 28 07/29/2022 03:11 AM     No results found for: \"HBA1C\", \"TDD1GEUE\"  Lab Results   Component Value Date/Time    CHOL 132 08/09/2023 04:53 PM    HDL 41 08/09/2023 04:53 PM     No results found for: \"VITD3\", \"VD3RIA\"    Lab Results   Component Value Date/Time    TSH

## 2024-01-11 RX ORDER — CARVEDILOL 25 MG/1
25 TABLET ORAL 2 TIMES DAILY WITH MEALS
Qty: 60 TABLET | Refills: 2 | OUTPATIENT
Start: 2024-01-11

## 2024-01-16 ENCOUNTER — OFFICE VISIT (OUTPATIENT)
Facility: CLINIC | Age: 64
End: 2024-01-16
Payer: MEDICAID

## 2024-01-16 VITALS
OXYGEN SATURATION: 95 % | HEIGHT: 68 IN | WEIGHT: 163.8 LBS | RESPIRATION RATE: 20 BRPM | HEART RATE: 75 BPM | DIASTOLIC BLOOD PRESSURE: 71 MMHG | TEMPERATURE: 97.6 F | SYSTOLIC BLOOD PRESSURE: 169 MMHG | BODY MASS INDEX: 24.83 KG/M2

## 2024-01-16 DIAGNOSIS — Z99.2 TYPE 2 DIABETES MELLITUS WITH CHRONIC KIDNEY DISEASE ON CHRONIC DIALYSIS, WITH LONG-TERM CURRENT USE OF INSULIN (HCC): ICD-10-CM

## 2024-01-16 DIAGNOSIS — E11.22 TYPE 2 DIABETES MELLITUS WITH CHRONIC KIDNEY DISEASE ON CHRONIC DIALYSIS, WITH LONG-TERM CURRENT USE OF INSULIN (HCC): ICD-10-CM

## 2024-01-16 DIAGNOSIS — Z79.4 TYPE 2 DIABETES MELLITUS WITH CHRONIC KIDNEY DISEASE ON CHRONIC DIALYSIS, WITH LONG-TERM CURRENT USE OF INSULIN (HCC): ICD-10-CM

## 2024-01-16 DIAGNOSIS — N18.6 TYPE 2 DIABETES MELLITUS WITH CHRONIC KIDNEY DISEASE ON CHRONIC DIALYSIS, WITH LONG-TERM CURRENT USE OF INSULIN (HCC): ICD-10-CM

## 2024-01-16 PROCEDURE — 3051F HG A1C>EQUAL 7.0%<8.0%: CPT | Performed by: NURSE PRACTITIONER

## 2024-01-16 PROCEDURE — 99214 OFFICE O/P EST MOD 30 MIN: CPT | Performed by: NURSE PRACTITIONER

## 2024-01-16 PROCEDURE — 3077F SYST BP >= 140 MM HG: CPT | Performed by: NURSE PRACTITIONER

## 2024-01-16 PROCEDURE — 3078F DIAST BP <80 MM HG: CPT | Performed by: NURSE PRACTITIONER

## 2024-01-16 RX ORDER — INSULIN GLARGINE 100 [IU]/ML
5 INJECTION, SOLUTION SUBCUTANEOUS NIGHTLY
Qty: 10 ML | Refills: 1 | Status: SHIPPED | OUTPATIENT
Start: 2024-01-16 | End: 2024-01-16 | Stop reason: RX

## 2024-01-16 RX ORDER — NIFEDIPINE 60 MG/1
60 TABLET, EXTENDED RELEASE ORAL 2 TIMES DAILY
Qty: 180 TABLET | Refills: 0 | Status: SHIPPED | OUTPATIENT
Start: 2024-01-16

## 2024-01-16 RX ORDER — INSULIN GLARGINE 100 [IU]/ML
5 INJECTION, SOLUTION SUBCUTANEOUS NIGHTLY
Qty: 5 ADJUSTABLE DOSE PRE-FILLED PEN SYRINGE | Refills: 2 | Status: SHIPPED | OUTPATIENT
Start: 2024-01-16

## 2024-01-16 RX ORDER — PEN NEEDLE, DIABETIC 30 GX3/16"
NEEDLE, DISPOSABLE MISCELLANEOUS
Qty: 100 EACH | Refills: 1 | Status: SHIPPED | OUTPATIENT
Start: 2024-01-16

## 2024-01-16 ASSESSMENT — ENCOUNTER SYMPTOMS
COLOR CHANGE: 0
SHORTNESS OF BREATH: 0
GASTROINTESTINAL NEGATIVE: 1

## 2024-01-16 NOTE — PATIENT INSTRUCTIONS
Check blood sugar in the morning when fasting and record  Will need to see you again in one month to repeat labs and evaluate blood sugars  Call if medication is not available at pharmacy

## 2024-01-16 NOTE — PROGRESS NOTES
Assessment/Plan:     Hu was seen today for diabetes.    Diagnoses and all orders for this visit:    Type 2 diabetes mellitus with chronic kidney disease on chronic dialysis, with long-term current use of insulin (HCC)  -     insulin glargine (LANTUS) 100 UNIT/ML injection vial; Inject 5 Units into the skin nightly  -     Insulin Pen Needle (PEN NEEDLES) 32G X 4 MM MISC; Insulin needles for delivery    Patient was seen in office for lab follow up. Patient was previously seen for medication refill but had no current labs. Latest A1c was at 7. Patient of the time was taken only his nighttime insulin. Did a better control we will discontinue the sliding scale insulin at this time. Patient does do hemodialysis 3 days a week. Medication was previously refilled for his 5 units nightly at last visit. He reports their pharmacy is out of the medication. As of yesterday did not have insulin. Today we will send to a different pharmacy I've asked the patient to call if medication is not available. Patient agrees to check sugar daily when fasting and follow up in 1 month.     No follow-up provider specified.    Discussed expected course/resolution/complications of diagnosis in detail with patient.    Medication risks/benefits/costs/interactions/alternatives discussed with patient.    Pt was given after visit summary which includes diagnoses, current medications & vitals.   Pt expressed understanding with the diagnosis and plan        Subjective:      Hu Christiansen is a 63 y.o. male who presents for had concerns including Diabetes (Follow up diabetes).     Current Outpatient Medications   Medication Sig Dispense Refill   • insulin glargine (LANTUS) 100 UNIT/ML injection vial Inject 5 Units into the skin nightly 10 mL 1   • Insulin Pen Needle (PEN NEEDLES) 32G X 4 MM MISC Insulin needles for delivery 100 each 1   • NIFEdipine (PROCARDIA XL) 60 MG extended release tablet TAKE 1 TABLET BY MOUTH TWICE DAILY 180 tablet 0   • losartan

## 2024-01-16 NOTE — PROGRESS NOTES
dentified pt with two pt identifiers(name and ).    Chief Complaint   Patient presents with    Diabetes     Follow up diabetes        Health Maintenance Due   Topic    HIV screen     Hepatitis A vaccine (1 of 2 - Risk 2-dose series)    Hepatitis B vaccine (1 of 3 - Risk Dialysis 4-dose series)    Shingles vaccine (1 of 2)    Pneumococcal 0-64 years Vaccine (2 - PCV)    Diabetic retinal exam     Respiratory Syncytial Virus (RSV) Pregnant or age 60 yrs+ (1 - 1-dose 60+ series)    Diabetic foot exam     Flu vaccine (1)    COVID-19 Vaccine (2 -  season)       Wt Readings from Last 3 Encounters:   24 74.3 kg (163 lb 12.8 oz)   24 73.9 kg (163 lb)   23 74 kg (163 lb 2.3 oz)     Temp Readings from Last 3 Encounters:   24 97.6 °F (36.4 °C) (Temporal)   24 97.8 °F (36.6 °C) (Skin)   23 98.1 °F (36.7 °C) (Oral)     BP Readings from Last 3 Encounters:   24 (!) 174/74   24 (!) 183/77   23 (!) 120/55     Pulse Readings from Last 3 Encounters:   24 75   24 72   23 70           Coordination of Care Questionnaire:  :   1. \"Have you been to the ER, urgent care clinic since your last visit?  Hospitalized since your last visit?\" no    2. \"Have you seen or consulted any other health care providers outside of the [facility] since your last visit?\" no     3. For patients aged 45-75: Has the patient had a colonoscopy / FIT/ Cologuard? no      If the patient is female:    4. For patients aged 40-74: Has the patient had a mammogram within the past 2 years? no      5. For patients aged 21-65: Has the patient had a pap smear? no     3) Do you have an Advance Directive on file? no  Are you interested in receiving information about Advance Directives? no    Patient is accompanied by self I have received verbal consent from Hu Christiansen to discuss any/all medical information while they are present in the room.

## 2024-01-25 RX ORDER — CARVEDILOL 25 MG/1
25 TABLET ORAL 2 TIMES DAILY WITH MEALS
Qty: 180 TABLET | Refills: 1 | Status: SHIPPED | OUTPATIENT
Start: 2024-01-25

## 2024-02-27 RX ORDER — APIXABAN 5 MG/1
5 TABLET, FILM COATED ORAL 2 TIMES DAILY
Qty: 180 TABLET | Refills: 0 | OUTPATIENT
Start: 2024-02-27

## 2024-03-01 ENCOUNTER — TELEPHONE (OUTPATIENT)
Facility: CLINIC | Age: 64
End: 2024-03-01

## 2024-03-01 RX ORDER — APIXABAN 5 MG/1
5 TABLET, FILM COATED ORAL 2 TIMES DAILY
Qty: 180 TABLET | Refills: 0 | OUTPATIENT
Start: 2024-03-01

## 2024-03-01 NOTE — TELEPHONE ENCOUNTER
PCP: Cheikh Fall MD    Last appt: [unfilled]  Future Appointments   Date Time Provider Department Center   4/2/2024 11:00 AM Cheikh Fall MD [facility] AMB       Requested Prescriptions     Pending Prescriptions Disp Refills    apixaban (ELIQUIS) 5 MG TABS tablet 180 tablet 0     Sig: Take 1 tablet by mouth 2 times daily       Prior labs and Blood pressures:  BP Readings from Last 3 Encounters:   01/16/24 (!) 169/71   01/09/24 (!) 183/77   12/20/23 (!) 120/55     Lab Results   Component Value Date/Time     01/09/2024 03:29 PM    K 4.9 01/09/2024 03:29 PM    CL 98 01/09/2024 03:29 PM    CO2 27 01/09/2024 03:29 PM    BUN 36 01/09/2024 03:29 PM    GFRAA 28 07/29/2022 03:11 AM     No results found for: \"HBA1C\", \"OFX9SDZD\"  Lab Results   Component Value Date/Time    CHOL 132 08/09/2023 04:53 PM    HDL 41 08/09/2023 04:53 PM     No results found for: \"VITD3\", \"VD3RIA\"    Lab Results   Component Value Date/Time    TSH 3.52 11/18/2021 10:03 AM

## 2024-03-01 NOTE — TELEPHONE ENCOUNTER
Pt need a refill on his ELIQUIS 5 MG TABS tablet. [facility] keeps sending the request to [facility] even after giving them the right fax number.    [facility] - [place], [place] - [address] -  [phone] - F [phone] [[id number]]       Please advise

## 2024-03-05 ENCOUNTER — TELEPHONE (OUTPATIENT)
Facility: CLINIC | Age: 64
End: 2024-03-05

## 2024-03-05 NOTE — TELEPHONE ENCOUNTER
[facility] pharmacy would to know know if PCP meant to put pt on NIFEdipine (PROCARDIA XL) 60 MG extended release tablet and amodopine. Please call [phone] to discuss

## 2024-03-06 ENCOUNTER — APPOINTMENT (OUTPATIENT)
Facility: HOSPITAL | Age: 64
End: 2024-03-06
Payer: MEDICAID

## 2024-03-06 ENCOUNTER — HOSPITAL ENCOUNTER (EMERGENCY)
Facility: HOSPITAL | Age: 64
Discharge: HOME | End: 2024-03-06
Attending: EMERGENCY MEDICINE
Payer: MEDICAID

## 2024-03-06 DIAGNOSIS — R79.89 ELEVATED BRAIN NATRIURETIC PEPTIDE (BNP) LEVEL: ICD-10-CM

## 2024-03-06 DIAGNOSIS — N18.6 ESRD (END STAGE RENAL DISEASE) (HCC): Primary | ICD-10-CM

## 2024-03-06 LAB
ALBUMIN SERPL-MCNC: 3.6 G/DL (ref 3.5–5)
ALBUMIN/GLOB SERPL: 0.8 (ref 1.1–2.2)
ALP SERPL-CCNC: 101 U/L (ref 45–117)
ALT SERPL-CCNC: 16 U/L (ref 12–78)
ANION GAP SERPL CALC-SCNC: 13 MMOL/L (ref 5–15)
AST SERPL-CCNC: 9 U/L (ref 15–37)
BASOPHILS # BLD: 0 K/UL (ref 0–0.1)
BASOPHILS NFR BLD: 0 % (ref 0–1)
BILIRUB SERPL-MCNC: 0.6 MG/DL (ref 0.2–1)
BUN SERPL-MCNC: 92 MG/DL (ref 6–20)
BUN/CREAT SERPL: 9 (ref 12–20)
CALCIUM SERPL-MCNC: 8.5 MG/DL (ref 8.5–10.1)
CHLORIDE SERPL-SCNC: 99 MMOL/L (ref 97–108)
CO2 SERPL-SCNC: 20 MMOL/L (ref 21–32)
COMMENT:: NORMAL
COMMENT:: NORMAL
CREAT SERPL-MCNC: 10.8 MG/DL (ref 0.7–1.3)
DIFFERENTIAL METHOD BLD: ABNORMAL
EKG ATRIAL RATE: 56 BPM
EKG DIAGNOSIS: NORMAL
EKG P AXIS: 66 DEGREES
EKG P-R INTERVAL: 152 MS
EKG Q-T INTERVAL: 478 MS
EKG QRS DURATION: 98 MS
EKG QTC CALCULATION (BAZETT): 461 MS
EKG R AXIS: 59 DEGREES
EKG T AXIS: 104 DEGREES
EKG VENTRICULAR RATE: 56 BPM
EOSINOPHIL # BLD: 0.2 K/UL (ref 0–0.4)
EOSINOPHIL NFR BLD: 2 % (ref 0–7)
ERYTHROCYTE [DISTWIDTH] IN BLOOD BY AUTOMATED COUNT: 13 % (ref 11.5–14.5)
FLUAV AG NPH QL IA: NEGATIVE
FLUBV AG NOSE QL IA: NEGATIVE
GLOBULIN SER CALC-MCNC: 4.4 G/DL (ref 2–4)
GLUCOSE SERPL-MCNC: 74 MG/DL (ref 65–100)
HCT VFR BLD AUTO: 36.7 % (ref 36.6–50.3)
HGB BLD-MCNC: 12.7 G/DL (ref 12.1–17)
IMM GRANULOCYTES # BLD AUTO: 0.1 K/UL (ref 0–0.04)
IMM GRANULOCYTES NFR BLD AUTO: 1 % (ref 0–0.5)
LYMPHOCYTES # BLD: 1 K/UL (ref 0.8–3.5)
LYMPHOCYTES NFR BLD: 10 % (ref 12–49)
MAGNESIUM SERPL-MCNC: 2.8 MG/DL (ref 1.6–2.4)
MCH RBC QN AUTO: 31.6 PG (ref 26–34)
MCHC RBC AUTO-ENTMCNC: 34.6 G/DL (ref 30–36.5)
MCV RBC AUTO: 91.3 FL (ref 80–99)
MONOCYTES # BLD: 0.7 K/UL (ref 0–1)
MONOCYTES NFR BLD: 7 % (ref 5–13)
NEUTS SEG # BLD: 8.2 K/UL (ref 1.8–8)
NEUTS SEG NFR BLD: 81 % (ref 32–75)
NRBC # BLD: 0 K/UL (ref 0–0.01)
NRBC BLD-RTO: 0 PER 100 WBC
NT PRO BNP: 8587 PG/ML
PHOSPHATE SERPL-MCNC: 5.1 MG/DL (ref 2.6–4.7)
PLATELET # BLD AUTO: 143 K/UL (ref 150–400)
PMV BLD AUTO: 10.7 FL (ref 8.9–12.9)
POTASSIUM SERPL-SCNC: 4.4 MMOL/L (ref 3.5–5.1)
PROT SERPL-MCNC: 8 G/DL (ref 6.4–8.2)
RBC # BLD AUTO: 4.02 M/UL (ref 4.1–5.7)
SARS-COV-2 RDRP RESP QL NAA+PROBE: NOT DETECTED
SODIUM SERPL-SCNC: 132 MMOL/L (ref 136–145)
SOURCE: NORMAL
SPECIMEN HOLD: NORMAL
SPECIMEN HOLD: NORMAL
TROPONIN I SERPL HS-MCNC: 16 NG/L (ref 0–76)
TROPONIN I SERPL HS-MCNC: 21 NG/L (ref 0–76)
WBC # BLD AUTO: 10.1 K/UL (ref 4.1–11.1)

## 2024-03-06 PROCEDURE — 99285 EMERGENCY DEPT VISIT HI MDM: CPT

## 2024-03-06 PROCEDURE — 36415 COLL VENOUS BLD VENIPUNCTURE: CPT

## 2024-03-06 PROCEDURE — 84484 ASSAY OF TROPONIN QUANT: CPT

## 2024-03-06 PROCEDURE — 6360000002 HC RX W HCPCS: Performed by: INTERNAL MEDICINE

## 2024-03-06 PROCEDURE — 96365 THER/PROPH/DIAG IV INF INIT: CPT

## 2024-03-06 PROCEDURE — 87804 INFLUENZA ASSAY W/OPTIC: CPT

## 2024-03-06 PROCEDURE — 85025 COMPLETE CBC W/AUTO DIFF WBC: CPT

## 2024-03-06 PROCEDURE — 87635 SARS-COV-2 COVID-19 AMP PRB: CPT

## 2024-03-06 PROCEDURE — P9047 ALBUMIN (HUMAN), 25%, 50ML: HCPCS | Performed by: INTERNAL MEDICINE

## 2024-03-06 PROCEDURE — 83735 ASSAY OF MAGNESIUM: CPT

## 2024-03-06 PROCEDURE — 80053 COMPREHEN METABOLIC PANEL: CPT

## 2024-03-06 PROCEDURE — 93005 ELECTROCARDIOGRAM TRACING: CPT | Performed by: EMERGENCY MEDICINE

## 2024-03-06 PROCEDURE — 6370000000 HC RX 637 (ALT 250 FOR IP): Performed by: EMERGENCY MEDICINE

## 2024-03-06 PROCEDURE — 2580000003 HC RX 258: Performed by: EMERGENCY MEDICINE

## 2024-03-06 PROCEDURE — 83880 ASSAY OF NATRIURETIC PEPTIDE: CPT

## 2024-03-06 PROCEDURE — 71045 X-RAY EXAM CHEST 1 VIEW: CPT

## 2024-03-06 PROCEDURE — 84100 ASSAY OF PHOSPHORUS: CPT

## 2024-03-06 RX ORDER — ALBUMIN (HUMAN) 12.5 G/50ML
25 SOLUTION INTRAVENOUS AS NEEDED
Status: DISCONTINUED | OUTPATIENT
Start: 2024-03-06 | End: 2024-03-07 | Stop reason: HOSPADM

## 2024-03-06 RX ORDER — 0.9 % SODIUM CHLORIDE 0.9 %
500 INTRAVENOUS SOLUTION INTRAVENOUS ONCE
Status: COMPLETED | OUTPATIENT
Start: 2024-03-06 | End: 2024-03-06

## 2024-03-06 RX ORDER — ACETAMINOPHEN 500 MG
1000 TABLET ORAL
Status: COMPLETED | OUTPATIENT
Start: 2024-03-06 | End: 2024-03-06

## 2024-03-06 RX ADMIN — SODIUM CHLORIDE 500 ML: 9 INJECTION, SOLUTION INTRAVENOUS at 13:31

## 2024-03-06 RX ADMIN — ALBUMIN (HUMAN) 25 G: 0.25 INJECTION, SOLUTION INTRAVENOUS at 19:09

## 2024-03-06 RX ADMIN — ACETAMINOPHEN 1000 MG: 500 TABLET ORAL at 13:31

## 2024-03-06 ASSESSMENT — PAIN SCALES - GENERAL
PAINLEVEL_OUTOF10: 5
PAINLEVEL_OUTOF10: 0
PAINLEVEL_OUTOF10: 0

## 2024-03-06 ASSESSMENT — PAIN DESCRIPTION - LOCATION: LOCATION: OTHER (COMMENT)

## 2024-03-06 ASSESSMENT — PAIN DESCRIPTION - DESCRIPTORS: DESCRIPTORS: ACHING

## 2024-03-06 NOTE — ED TRIAGE NOTES
[facility] from dialysis with CC of:  SOB, CP, dizziness, general maise since Friday post dialysis. States sx have been getting progressively worse since.   Missed Monday, and Today  L arm fistula  Endorses \"black stool in morning\".  
minimum assist (75% patients effort)

## 2024-03-06 NOTE — CONSULTS
[address], [facility] [place] [address]  Phone: [phone]   [phone]    [email or web address]     Nephrology Progress Note    Patient Name : Hu Christiansen      : 1960     MRN : [id number]  Date of Admission : 3/6/2024  Date of Servive : 24    CC:  Follow up for ESRD       Assessment and Plan   ESRD F [facility] - Dr. Barbosa  Fluid overload  HTN  Mild hypoNa  Anemia of CKD  CP  Dizziness      Plan:  HD today x 3.5 hrs  UF 3kg as able     Interval History:  64 yo male with ESRD on HD MW presented with malaise, dizziness and CP after his HD on Friday.  Missed HD Monday and today.  Fluid overloaded on CXR.  K stable. BP stable.  No SOB, n/v reported.    Review of Systems: Pertinent items are noted in HPI.    Current Medications:   No current facility-administered medications for this encounter.     Current Outpatient Medications   Medication Sig    apixaban (ELIQUIS) 5 MG TABS tablet Take 1 tablet by mouth 2 times daily    carvedilol (COREG) 25 MG tablet TAKE 1 TABLET BY MOUTH TWICE DAILY WITH MEALS    NIFEdipine (PROCARDIA XL) 60 MG extended release tablet TAKE 1 TABLET BY MOUTH TWICE DAILY    Insulin Pen Needle (PEN NEEDLES) 32G X 4 MM MISC Insulin needles for delivery    insulin glargine (LANTUS SOLOSTAR) 100 UNIT/ML injection pen Inject 5 Units into the skin nightly    losartan (COZAAR) 100 MG tablet TAKE 1 TABLET BY MOUTH EVERY DAY    traZODone (DESYREL) 50 MG tablet TAKE 1 TABLET BY MOUTH AT BEDTIME    sucralfate (CARAFATE) 1 GM tablet TAKE 1 TABLET BY MOUTH THREE TIMES DAILY WITH MEALS    tamsulosin (FLOMAX) 0.4 MG capsule TAKE 1 CAPSULE BY MOUTH EVERY EVENING    sertraline (ZOLOFT) 50 MG tablet TAKE 1 TABLET BY MOUTH EVERY DAY    pantoprazole (PROTONIX) 40 MG tablet TAKE 1 TABLET BY MOUTH EVERY DAY    isosorbide mononitrate (IMDUR) 60 MG extended release tablet TAKE 1 TABLET BY MOUTH EVERY DAY    furosemide (LASIX) 80 MG

## 2024-03-06 NOTE — ED NOTES
Spoke with dialysis nurse. Pt scheduled for 1800 today. Pt updated on plan of care. Given lunch tray.

## 2024-03-06 NOTE — TELEPHONE ENCOUNTER
- [facility]     Patient is currently in the hospital, not sure if he will be on the same medication after discharged.        Michelle Dial LPN

## 2024-03-06 NOTE — ED PROVIDER NOTES
EMERGENCY DEPARTMENT PHYSICIAN NOTE     Patient: Hu Christiansen     Time of Service: 3/6/2024  7:08 AM     Chief complaint:   Chief Complaint   Patient presents with    Chest Pain    Shortness of Breath    Generalized Body Aches        HISTORY:  Patient is a 63 y.o. male who presents to the emergency department with complaints of chest pain.       Past Medical History:   Diagnosis Date    Anemia associated with chronic renal failure     Anxiety and depression     BPH (benign prostatic hyperplasia)     CAD (coronary artery disease)     CHF NYHA class I, chronic, diastolic (HCC)     Chronic pain     DM type 2 causing renal disease (HCC)     DM type 2 causing vascular disease (HCC)     ESRD on dialysis (HCC)     GERD (gastroesophageal reflux disease)     HTN (hypertension)     Hyperlipidemia     Thrombocytopenia (HCC)         Past Surgical History:   Procedure Laterality Date    ARTERIAL BYPASS SURGRY      IR NONTUNNELED VASCULAR CATHETER  11/19/2021    IR NONTUNNELED VASCULAR CATHETER 11/19/2021 [facility] RAD ANGIO IR    IR NONTUNNELED VASCULAR CATHETER  11/19/2021    IR NONTUNNELED VASCULAR CATHETER  4/7/2023    IR NONTUNNELED VASCULAR CATHETER  4/7/2023    IR NONTUNNELED VASCULAR CATHETER 4/7/2023 [facility] RAD ANGIO IR    IR TUNNELED CATHETER PLACEMENT GREATER THAN 5 YEARS  11/24/2021    IR TUNNELED CATHETER PLACEMENT GREATER THAN 5 YEARS 11/24/2021 [facility] RAD ANGIO IR    IR TUNNELED CATHETER PLACEMENT GREATER THAN 5 YEARS  11/24/2021    IR TUNNELED CATHETER PLACEMENT GREATER THAN 5 YEARS  4/11/2023    IR TUNNELED CATHETER PLACEMENT GREATER THAN 5 YEARS  4/11/2023    IR TUNNELED CATHETER PLACEMENT GREATER THAN 5 YEARS 4/11/2023 [facility] RAD ANGIO IR    OTHER SURGICAL HISTORY      5th toe Metatarsal amputation 12/2017         Family History   Problem Relation Age of Onset    No Known Problems Father     Diabetes Mother         Social History     Socioeconomic History    Marital status:    Tobacco Use    Smoking status: Former     typographical errors.    Amount and/or Complexity of Data Reviewed  Labs: ordered. Decision-making details documented in ED Course.  Radiology: ordered.  ECG/medicine tests: ordered.    Risk  OTC drugs.  Prescription drug management.          Procedures       DISPOSITION: Decision To Discharge 03/06/2024 11:05:05 PM    CLINICAL IMPRESSION:   1. ESRD (end stage renal disease) (HCC)    2. Elevated brain natriuretic peptide (BNP) level         Further personalized recommendations for outpatient care as below.    Key discharge instructions and summary of care provided in AVS:     Prescriptions provided to the patient:     Discharge Medication List as of 3/6/2024 11:11 PM           Ras Isaac DO   Emergency Medicine Attending Physician            Ras Isaac DO  03/11/24 0700

## 2024-03-07 VITALS
OXYGEN SATURATION: 99 % | RESPIRATION RATE: 19 BRPM | HEART RATE: 69 BPM | DIASTOLIC BLOOD PRESSURE: 60 MMHG | TEMPERATURE: 98 F | SYSTOLIC BLOOD PRESSURE: 148 MMHG

## 2024-03-07 NOTE — FLOWSHEET NOTE
Primary RN SBAR: Soumya Bagley RN  Patient Education: Procedure  Hospital associated wait time; reason: na  Hepatitis B Surface Ag   Date/Time Value Ref Range Status   07/18/2023 12:25 PM <0.10 Index Final     Hep B S Ab   Date/Time Value Ref Range Status   07/18/2023 12:25 PM <3.10 mIU/mL Final   CWOW 2/19/2024 HBSAG negative     03/06/24 1900   Vital Signs   BP (!) 104/54   Temp 97.3 °F (36.3 °C)   Pulse 52   SpO2 98 %   Pain Assessment   Pain Assessment None - Denies Pain   Pain Level 0   Treatment   Time On 1905   Treatment Goal 3000   Observations & Evaluations   Level of Consciousness 0   Oriented X 4   Heart Rhythm Irregular  (bradycardia)   Respiratory Quality/Effort Unlabored   O2 Device None (Room air)   Bilateral Breath Sounds Diminished   Skin Color Pink   Skin Condition/Temp Warm;Dry   Appetite Fair   Abdomen Inspection Flat;Soft   Bowel Sounds (All Quadrants) Present   Edema Right lower extremity;Left lower extremity   RLE Edema +1;Trace   LLE Edema +1;Trace   Technical Checks   Dialysis Machine No. 01   RO Machine Number 01   Dialyzer Lot No. [id number]   Tubing Lot Number [id number]   All Connections Secure Yes   NS Bag Yes   Saline Line Double Clamped Yes   Dialyzer Revaclear 300   Prime Volume (mL) 250 mL   ICEBOAT I;C;E;B;O;A;T   RO Machine Log Sheet Completed Yes   Machine Alarm Self Test Completed;Passed   Air Foam Detector Tested;Proper Function   Extracorporeal Circuit Tested for Integrity Yes   Machine Conductivity 14.2   Manual Ph 7.2   Bleach Test (Neg) Yes   Bath Temperature 98.6 °F (37 °C)   Dialysis Bath   K+ (Potassium) 2   Ca+ (Calcium) 2.5   Na+ (Sodium) 138   HCO3 (Bicarb) 35   Bicarbonate Concentrate Lot No. [id number]   Acid Concentrate Lot No. [id number]   Treatment Initiation   Dialyze Hours 3.5   Treatment  Initiation Universal Precautions maintained;Lines secured to patient;Connections secured;Prime given;Venous Parameters set;Arterial Parameters set;Air foam detector  engaged;Dialysate;Revaclear Dialyzer;REV-300   Access   Add Access? Yes   Hemodialysis Fistula/Graft Left Forearm   No placement date or time found.   Present on Admission/Arrival: Yes  Orientation: Left  Access Location: Forearm   Site Assessment [place]   Thrill Present   Bruit Present   Status Accessed   Venous Needle Size 15 G   Arterial Needle Size 15 G   Accessed By: missy jarvis   Access Attempts  1   Access Interventions Chlorhexidine;Aseptic Technique;Needles taped to patient      03/06/24 1907   During Hemodialysis Assessment   BP (!) 90/55   Pulse 52   Blood Flow Rate (ml/min) 400 ml/min   Arterial Pressure (mmHg) -180 mmHg   Venous Pressure (mmHg) 140   TMP 60      Comments HD STarted; albumin started   Access Visible Yes   Ultrafiltration Rate (ml/hr) 570 ml/hr   Ultrafiltration Removed (ml) 0 ml        03/06/24 2253   Vital Signs   BP (!) 148/60   Temp 98 °F (36.7 °C)   Pulse 69   Respirations 19   Pain Assessment   Pain Assessment None - Denies Pain   Pain Level 0   Post-Hemodialysis Assessment   Post-Treatment Procedures Blood returned   Machine Disinfection Process Acid/Vinegar Clean;Heat Disinfect;Exterior Machine Disinfection   Rinseback Volume (ml) 250 ml   Blood Volume Processed (Liters) 74.3 L   Dialyzer Clearance Clear   Duration of Treatment (minutes) 210 minutes   Heparin Amount Administered During Treatment (mL) 0 mL   Hemodialysis Intake (ml) 600 ml   Hemodialysis Output (ml) 3600 ml   NET Removed (ml) 3000   Tolerated Treatment Good   Interventions Taken Medication   Patient Response to Treatment patient bp increased   Bilateral Breath Sounds Diminished   Edema Left lower extremity;Right lower extremity   Observations & Evaluations   Level of Consciousness 0   Oriented X 4   Heart Rhythm Regular   Respiratory Quality/Effort Unlabored   O2 Device None (Room air)   Appetite Fair   Abdomen Inspection Flat;Rounded       HD treatment complete. All possible blood returned to the patient.

## 2024-03-07 NOTE — ED NOTES
Care assumed from Dr. Isaac, 63-year-old male presenting with complaints of shortness of breath in the setting of end-stage renal disease and having missed his dialysis session.  Pending completion of his dialysis, and discharged home.         Jm Gaviria MD  03/06/24 8621

## 2024-03-26 ENCOUNTER — TELEPHONE (OUTPATIENT)
Facility: CLINIC | Age: 64
End: 2024-03-26

## 2024-03-26 NOTE — TELEPHONE ENCOUNTER
Mandy pharmacist from [facility] pharmacy contacted the office regarding patient who is taking Amlodipine and  Nifedipine and states these medications are not to be taken together,please give clarification.

## 2024-04-03 ENCOUNTER — TELEPHONE (OUTPATIENT)
Facility: CLINIC | Age: 64
End: 2024-04-03

## 2024-04-03 NOTE — TELEPHONE ENCOUNTER
Spoke to Mandy Pharmacist at [facility].  I told her that he is on Procardia.  She stated he was just in the hospital.  She stated back in 2023, the hospital d/c records showed he was taking both back then.  Left a message on Mr. Christiansen again to call and schedule a hospital f/u.  Also, that we have listed he is only on procardia and should not take both amlodipine & procardia together.

## 2024-04-03 NOTE — TELEPHONE ENCOUNTER
Please advise if okay to take both medication amlodipine and Nifedipine at the same time.    Thank you

## 2024-04-03 NOTE — TELEPHONE ENCOUNTER
Need clarification on med. Can pt be on both Amlodopine and NIFEdipine (PROCARDIA XL) 60 MG at the same time? Please call [facility] pharmacy [phone].

## 2024-04-04 RX ORDER — INSULIN GLARGINE 100 [IU]/ML
INJECTION, SOLUTION SUBCUTANEOUS
Qty: 3 ADJUSTABLE DOSE PRE-FILLED PEN SYRINGE | Refills: 1 | Status: SHIPPED | OUTPATIENT
Start: 2024-04-04

## 2024-04-04 NOTE — TELEPHONE ENCOUNTER
Spoke with Pharmacy about medication.    Please call patient to schedule hospital follow up.  Medication will need more clarification with Dr. Fall.  It's important to get patient in office appointment.    Thank you

## 2024-04-04 NOTE — TELEPHONE ENCOUNTER
PCP: Cheikh Fall MD    Last appt: [unfilled]  No future appointments.    Requested Prescriptions     Pending Prescriptions Disp Refills    LANTUS SOLOSTAR 100 UNIT/ML injection pen [Pharmacy Med Name: LANTUS SOLOSTAR PEN INJ 3ML] 3 mL      Sig: ADMINISTER 5 UNITS UNDER THE SKIN EVERY NIGHT       Prior labs and Blood pressures:  BP Readings from Last 3 Encounters:   03/06/24 (!) 148/60   01/16/24 (!) 169/71   01/09/24 (!) 183/77     Lab Results   Component Value Date/Time     03/06/2024 07:28 AM    K 4.4 03/06/2024 07:28 AM    CL 99 03/06/2024 07:28 AM    CO2 20 03/06/2024 07:28 AM    BUN 92 03/06/2024 07:28 AM    GFRAA 28 07/29/2022 03:11 AM     No results found for: \"HBA1C\", \"[id number]\"  Lab Results   Component Value Date/Time    CHOL 132 08/09/2023 04:53 PM    HDL 41 08/09/2023 04:53 PM     No results found for: \"VITD3\", \"VD3RIA\"    Lab Results   Component Value Date/Time    TSH 3.52 11/18/2021 10:03 AM

## 2024-04-05 ENCOUNTER — TELEPHONE (OUTPATIENT)
Facility: CLINIC | Age: 64
End: 2024-04-05

## 2024-04-05 NOTE — TELEPHONE ENCOUNTER
Patient states this morning bp 85/55.  Pulse HR 78.    Patient states he was seen at Dialysis today. While at the visit, the nurses machines keep sounding alarms every 5 mins indicating low bp.     Patient states now BP is 175/93   HR: 84  (After Dialysis)    Patient denies sob, chest pain, blurry vision, headache, nausea, vomiting.   Patient states he will take his medication today because he did not take it this morning.  Patient instructed to followed provider order for bp medication.  Patient instructed to report to ER or call 911 if bp too low or if having any usual symptoms.    Patient verbalized understanding.    Front office please call patient to schedule appointment.    Thank you

## 2024-04-08 ENCOUNTER — APPOINTMENT (OUTPATIENT)
Facility: HOSPITAL | Age: 64
DRG: 720 | End: 2024-04-08
Payer: MEDICAID

## 2024-04-08 ENCOUNTER — HOSPITAL ENCOUNTER (EMERGENCY)
Facility: HOSPITAL | Age: 64
Discharge: HOME | DRG: 720 | End: 2024-04-09
Attending: EMERGENCY MEDICINE
Payer: MEDICAID

## 2024-04-08 VITALS
WEIGHT: 152.12 LBS | SYSTOLIC BLOOD PRESSURE: 191 MMHG | HEIGHT: 68 IN | HEART RATE: 80 BPM | TEMPERATURE: 98.4 F | DIASTOLIC BLOOD PRESSURE: 68 MMHG | RESPIRATION RATE: 16 BRPM | BODY MASS INDEX: 23.05 KG/M2 | OXYGEN SATURATION: 98 %

## 2024-04-08 DIAGNOSIS — K52.9 GASTROENTERITIS PRESUMED INFECTIOUS: Primary | ICD-10-CM

## 2024-04-08 DIAGNOSIS — N18.6 ESRD (END STAGE RENAL DISEASE) (HCC): ICD-10-CM

## 2024-04-08 LAB
ALBUMIN SERPL-MCNC: 3.6 G/DL (ref 3.5–5)
ALBUMIN/GLOB SERPL: 0.8 (ref 1.1–2.2)
ALP SERPL-CCNC: 114 U/L (ref 45–117)
ALT SERPL-CCNC: 18 U/L (ref 12–78)
ANION GAP SERPL CALC-SCNC: 14 MMOL/L (ref 5–15)
AST SERPL-CCNC: 11 U/L (ref 15–37)
BASOPHILS # BLD: 0 K/UL (ref 0–0.1)
BASOPHILS NFR BLD: 0 % (ref 0–1)
BILIRUB SERPL-MCNC: 0.8 MG/DL (ref 0.2–1)
BUN SERPL-MCNC: 76 MG/DL (ref 6–20)
BUN/CREAT SERPL: 8 (ref 12–20)
CALCIUM SERPL-MCNC: 8.6 MG/DL (ref 8.5–10.1)
CHLORIDE SERPL-SCNC: 97 MMOL/L (ref 97–108)
CO2 SERPL-SCNC: 19 MMOL/L (ref 21–32)
CREAT SERPL-MCNC: 9.01 MG/DL (ref 0.7–1.3)
DIFFERENTIAL METHOD BLD: ABNORMAL
EOSINOPHIL # BLD: 0.1 K/UL (ref 0–0.4)
EOSINOPHIL NFR BLD: 1 % (ref 0–7)
ERYTHROCYTE [DISTWIDTH] IN BLOOD BY AUTOMATED COUNT: 13.4 % (ref 11.5–14.5)
GLOBULIN SER CALC-MCNC: 4.4 G/DL (ref 2–4)
GLUCOSE SERPL-MCNC: 133 MG/DL (ref 65–100)
HCT VFR BLD AUTO: 33 % (ref 36.6–50.3)
HGB BLD-MCNC: 11.7 G/DL (ref 12.1–17)
IMM GRANULOCYTES # BLD AUTO: 0 K/UL (ref 0–0.04)
IMM GRANULOCYTES NFR BLD AUTO: 0 % (ref 0–0.5)
LIPASE SERPL-CCNC: 57 U/L (ref 13–75)
LYMPHOCYTES # BLD: 0.4 K/UL (ref 0.8–3.5)
LYMPHOCYTES NFR BLD: 7 % (ref 12–49)
MAGNESIUM SERPL-MCNC: 2.3 MG/DL (ref 1.6–2.4)
MCH RBC QN AUTO: 31.1 PG (ref 26–34)
MCHC RBC AUTO-ENTMCNC: 35.5 G/DL (ref 30–36.5)
MCV RBC AUTO: 87.8 FL (ref 80–99)
MONOCYTES # BLD: 0.2 K/UL (ref 0–1)
MONOCYTES NFR BLD: 4 % (ref 5–13)
NEUTS BAND NFR BLD MANUAL: 5 %
NEUTS SEG # BLD: 5.4 K/UL (ref 1.8–8)
NEUTS SEG NFR BLD: 83 % (ref 32–75)
NRBC # BLD: 0 K/UL (ref 0–0.01)
NRBC BLD-RTO: 0 PER 100 WBC
PLATELET # BLD AUTO: 93 K/UL (ref 150–400)
PMV BLD AUTO: 10.2 FL (ref 8.9–12.9)
POTASSIUM SERPL-SCNC: 4.4 MMOL/L (ref 3.5–5.1)
PROT SERPL-MCNC: 8 G/DL (ref 6.4–8.2)
RBC # BLD AUTO: 3.76 M/UL (ref 4.1–5.7)
RBC MORPH BLD: ABNORMAL
SODIUM SERPL-SCNC: 130 MMOL/L (ref 136–145)
TROPONIN I SERPL HS-MCNC: 22 NG/L (ref 0–76)
WBC # BLD AUTO: 6.1 K/UL (ref 4.1–11.1)

## 2024-04-08 PROCEDURE — 36415 COLL VENOUS BLD VENIPUNCTURE: CPT

## 2024-04-08 PROCEDURE — 83690 ASSAY OF LIPASE: CPT

## 2024-04-08 PROCEDURE — 84484 ASSAY OF TROPONIN QUANT: CPT

## 2024-04-08 PROCEDURE — 85025 COMPLETE CBC W/AUTO DIFF WBC: CPT

## 2024-04-08 PROCEDURE — 80053 COMPREHEN METABOLIC PANEL: CPT

## 2024-04-08 PROCEDURE — 96375 TX/PRO/DX INJ NEW DRUG ADDON: CPT

## 2024-04-08 PROCEDURE — 6360000002 HC RX W HCPCS: Performed by: EMERGENCY MEDICINE

## 2024-04-08 PROCEDURE — 99285 EMERGENCY DEPT VISIT HI MDM: CPT

## 2024-04-08 PROCEDURE — 2580000003 HC RX 258: Performed by: EMERGENCY MEDICINE

## 2024-04-08 PROCEDURE — 6370000000 HC RX 637 (ALT 250 FOR IP): Performed by: EMERGENCY MEDICINE

## 2024-04-08 PROCEDURE — 83735 ASSAY OF MAGNESIUM: CPT

## 2024-04-08 PROCEDURE — 71046 X-RAY EXAM CHEST 2 VIEWS: CPT

## 2024-04-08 PROCEDURE — 96374 THER/PROPH/DIAG INJ IV PUSH: CPT

## 2024-04-08 PROCEDURE — 93005 ELECTROCARDIOGRAM TRACING: CPT | Performed by: PHYSICIAN ASSISTANT

## 2024-04-08 RX ORDER — ACETAMINOPHEN 325 MG/1
650 TABLET ORAL ONCE
Status: COMPLETED | OUTPATIENT
Start: 2024-04-08 | End: 2024-04-08

## 2024-04-08 RX ORDER — HYDROCODONE BITARTRATE AND ACETAMINOPHEN 5; 325 MG/1; MG/1
1 TABLET ORAL
Status: COMPLETED | OUTPATIENT
Start: 2024-04-08 | End: 2024-04-08

## 2024-04-08 RX ORDER — HYDROCODONE BITARTRATE AND ACETAMINOPHEN 5; 325 MG/1; MG/1
1-2 TABLET ORAL EVERY 6 HOURS PRN
Qty: 12 TABLET | Refills: 0 | Status: ON HOLD | OUTPATIENT
Start: 2024-04-08 | End: 2024-04-12 | Stop reason: HOSPADM

## 2024-04-08 RX ORDER — DICYCLOMINE HYDROCHLORIDE 10 MG/1
10 CAPSULE ORAL EVERY 6 HOURS PRN
Qty: 28 CAPSULE | Refills: 0 | Status: SHIPPED | OUTPATIENT
Start: 2024-04-08 | End: 2024-04-15

## 2024-04-08 RX ORDER — DICYCLOMINE HCL 20 MG
20 TABLET ORAL ONCE
Status: COMPLETED | OUTPATIENT
Start: 2024-04-08 | End: 2024-04-08

## 2024-04-08 RX ORDER — 0.9 % SODIUM CHLORIDE 0.9 %
500 INTRAVENOUS SOLUTION INTRAVENOUS ONCE
Status: COMPLETED | OUTPATIENT
Start: 2024-04-08 | End: 2024-04-08

## 2024-04-08 RX ORDER — ONDANSETRON 2 MG/ML
4 INJECTION INTRAMUSCULAR; INTRAVENOUS EVERY 30 MIN PRN
Status: DISCONTINUED | OUTPATIENT
Start: 2024-04-08 | End: 2024-04-09 | Stop reason: HOSPADM

## 2024-04-08 RX ORDER — MORPHINE SULFATE 2 MG/ML
2 INJECTION, SOLUTION INTRAMUSCULAR; INTRAVENOUS
Status: COMPLETED | OUTPATIENT
Start: 2024-04-08 | End: 2024-04-08

## 2024-04-08 RX ORDER — ONDANSETRON 4 MG/1
4 TABLET, ORALLY DISINTEGRATING ORAL EVERY 6 HOURS PRN
Qty: 20 TABLET | Refills: 0 | Status: SHIPPED | OUTPATIENT
Start: 2024-04-08

## 2024-04-08 RX ADMIN — ACETAMINOPHEN 650 MG: 325 TABLET ORAL at 20:33

## 2024-04-08 RX ADMIN — HYDROCODONE BITARTRATE AND ACETAMINOPHEN 1 TABLET: 5; 325 TABLET ORAL at 20:33

## 2024-04-08 RX ADMIN — MORPHINE SULFATE 2 MG: 2 INJECTION, SOLUTION INTRAMUSCULAR; INTRAVENOUS at 22:26

## 2024-04-08 RX ADMIN — ONDANSETRON 4 MG: 2 INJECTION INTRAMUSCULAR; INTRAVENOUS at 20:31

## 2024-04-08 RX ADMIN — DICYCLOMINE HYDROCHLORIDE 20 MG: 20 TABLET ORAL at 22:25

## 2024-04-08 RX ADMIN — SODIUM CHLORIDE 500 ML: 9 INJECTION, SOLUTION INTRAVENOUS at 20:40

## 2024-04-08 ASSESSMENT — PAIN SCALES - GENERAL
PAINLEVEL_OUTOF10: 3
PAINLEVEL_OUTOF10: 10

## 2024-04-08 ASSESSMENT — PAIN DESCRIPTION - DESCRIPTORS: DESCRIPTORS: ACHING

## 2024-04-08 ASSESSMENT — PAIN DESCRIPTION - LOCATION: LOCATION: GENERALIZED

## 2024-04-08 ASSESSMENT — PAIN - FUNCTIONAL ASSESSMENT: PAIN_FUNCTIONAL_ASSESSMENT: 0-10

## 2024-04-09 DIAGNOSIS — F32.A DEPRESSION, UNSPECIFIED: ICD-10-CM

## 2024-04-09 DIAGNOSIS — G47.00 INSOMNIA, UNSPECIFIED: ICD-10-CM

## 2024-04-09 LAB
EKG ATRIAL RATE: 86 BPM
EKG DIAGNOSIS: NORMAL
EKG P AXIS: 62 DEGREES
EKG P-R INTERVAL: 136 MS
EKG Q-T INTERVAL: 372 MS
EKG QRS DURATION: 82 MS
EKG QTC CALCULATION (BAZETT): 445 MS
EKG R AXIS: 49 DEGREES
EKG T AXIS: 47 DEGREES
EKG VENTRICULAR RATE: 86 BPM

## 2024-04-09 PROCEDURE — 93010 ELECTROCARDIOGRAM REPORT: CPT | Performed by: INTERNAL MEDICINE

## 2024-04-09 RX ORDER — TRAZODONE HYDROCHLORIDE 50 MG/1
TABLET ORAL
Qty: 30 TABLET | Refills: 0 | OUTPATIENT
Start: 2024-04-09

## 2024-04-09 NOTE — ED NOTES
8:06 PM  I have evaluated the patient as the Provider in Rapid Medical Evaluation (RME). I have reviewed his vital signs and the triage nurse assessment. I have talked with the patient and any available family and advised that I am the provider in triage and have ordered the appropriate study to initiate their work up based on the clinical presentation during my assessment. I have advised that the patient will be accommodated in the Main ED as soon as possible. I have also requested to contact the triage nurse or myself immediately if the patient experiences any changes in their condition during this brief waiting period.    Diarrhea for a few days.  Pain all over.  Tachypneic.  Missed dialysis today.  Unsure of fever.  Feels SOB.      MANDEEP Ramirez Lindsay H, PA  04/08/24 2007

## 2024-04-09 NOTE — ED TRIAGE NOTES
Patient arrives to Triage via wheelchair with c/o diarrhea, bodyaches, and vomiting.    Patient reports that he was unable to go to Dialysis today due to him having diarrhea.

## 2024-04-09 NOTE — ED PROVIDER NOTES
[facility] EMERGENCY DEPT  EMERGENCY DEPARTMENT ENCOUNTER      Pt Name: Hu Christiansen  MRN: [id number]  Birthdate 1960  Date of evaluation: 4/8/2024  Provider: Fred Corrales MD    CHIEF COMPLAINT       Chief Complaint   Patient presents with    Generalized Body Aches    Abdominal Pain    Emesis         HISTORY OF PRESENT ILLNESS   (Location/Symptom, Timing/Onset, Context/Setting, Quality, Duration, Modifying Factors, Severity)  Note limiting factors.   HPI    63-year-old male with past medical history significant for ESRD on dialysis (Monday/Wednesday/Friday), CAD, diabetes, hypertension, presents to ED from home with complaint of 3 days of nausea, vomiting, diarrhea, as well as myalgias and fatigue.  He reports this began over the weekend.  He reports both episodes of emesis as well as multiple episodes of loose stool.  He has had multiple for members ill with similar symptoms preceding his.  He has not had loss of consciousness, chest pain, dyspnea.  He has been able to make urine.  He attempted to go to dialysis today, but due to his diarrhea, they are unable to dialyze him, and he returned home.  He last underwent dialysis on Friday and completed a full session.    Nursing Notes were reviewed.    REVIEW OF SYSTEMS    (2-9 systems for level 4, 10 or more for level 5)     Review of Systems    Except as noted above the remainder of the review of systems was reviewed and negative.       PAST MEDICAL HISTORY     Past Medical History:   Diagnosis Date    Anemia associated with chronic renal failure     Anxiety and depression     BPH (benign prostatic hyperplasia)     CAD (coronary artery disease)     CHF NYHA class I, chronic, diastolic (HCC)     Chronic pain     DM type 2 causing renal disease (HCC)     DM type 2 causing vascular disease (HCC)     ESRD on dialysis (HCC)     GERD (gastroesophageal reflux disease)     HTN (hypertension)     Hyperlipidemia     Thrombocytopenia (HCC)          SURGICAL HISTORY    details documented in ED Course.  Radiology:  Decision-making details documented in ED Course.    Risk  OTC drugs.  Prescription drug management.        ED Course as of 04/08/24 2332   Mon Apr 08, 2024 2027 EKG obtained at 2019. Per my read, noted with sinus rhythm at a rate of 86. Normal axis and intervals. Narrow complex QRS. No ST changes noted. No ectopy or ischemia noted.   [RS]   2107 Sodium(!): 130 [RS]   2107 Anion Gap: 14 [RS]   2107 CO2(!): 19 [RS]   2107 WBC: 6.1 [RS]   2107 Hemoglobin Quant(!): 11.7 [RS]   2146 XR CHEST (2 VW)  Perihilar atelectasis and interstitial prominence, significantly improved since  the prior study.   [RS]   2211 Pt reports nausea improved but ongoing cramping and myalgias. [RS]   2254 Improved with meds on reeval [RS]   2254 Discussed findings, likely diagnosis, likely course, symptomatic management w/ OTC +/- Rx meds, need for follow-up, and return precautions with patient   [RS]      ED Course User Index  [RS] Fred Corrales MD           CONSULTS:  None    PROCEDURES:  Unless otherwise noted below, none     Procedures      FINAL IMPRESSION      1. Gastroenteritis presumed infectious    2. ESRD (end stage renal disease) ([facility])          DISPOSITION/PLAN   DISPOSITION   Decision To Discharge  04/08/2024 10:54:37 PM      PATIENT REFERRED TO:  Ascencion Barbosa MD  [address] [place] [address] B  [facility] [address] [phone]    Call in 1 day        DISCHARGE MEDICATIONS:  New Prescriptions    DICYCLOMINE (BENTYL) 10 MG CAPSULE    Take 1 capsule by mouth every 6 hours as needed (abdominal cramping)    HYDROCODONE-ACETAMINOPHEN (LORCET) 5-325 MG PER TABLET    Take 1-2 tablets by mouth every 6 hours as needed for Pain (severe pain) for up to 3 days. Intended supply: 3 days. Take lowest dose possible to manage pain Max Daily Amount: 8 tablets    ONDANSETRON (ZOFRAN-ODT) 4 MG DISINTEGRATING TABLET    Place 1 tablet under the tongue every 6 hours as needed for Nausea or Vomiting

## 2024-04-10 ENCOUNTER — HOSPITAL ENCOUNTER (INPATIENT)
Facility: HOSPITAL | Age: 64
LOS: 2 days | Discharge: HOME | DRG: 720 | End: 2024-04-12
Attending: EMERGENCY MEDICINE | Admitting: FAMILY MEDICINE
Payer: MEDICAID

## 2024-04-10 ENCOUNTER — APPOINTMENT (OUTPATIENT)
Facility: HOSPITAL | Age: 64
DRG: 720 | End: 2024-04-10
Payer: MEDICAID

## 2024-04-10 DIAGNOSIS — K52.9 GASTROENTERITIS PRESUMED INFECTIOUS: ICD-10-CM

## 2024-04-10 DIAGNOSIS — T68.XXXA HYPOTHERMIA, INITIAL ENCOUNTER: ICD-10-CM

## 2024-04-10 DIAGNOSIS — R57.0 CARDIOGENIC SHOCK (HCC): Primary | ICD-10-CM

## 2024-04-10 DIAGNOSIS — E16.2 HYPOGLYCEMIA: ICD-10-CM

## 2024-04-10 DIAGNOSIS — Z99.2 ESRD NEEDING DIALYSIS (HCC): ICD-10-CM

## 2024-04-10 DIAGNOSIS — N18.6 ESRD NEEDING DIALYSIS (HCC): ICD-10-CM

## 2024-04-10 DIAGNOSIS — R57.9 SHOCK (HCC): ICD-10-CM

## 2024-04-10 LAB
ANION GAP BLD CALC-SCNC: 8 (ref 10–20)
ANION GAP SERPL CALC-SCNC: 15 MMOL/L (ref 5–15)
BASE DEFICIT BLD-SCNC: 10.9 MMOL/L
BASOPHILS # BLD: 0 K/UL (ref 0–0.1)
BASOPHILS NFR BLD: 0 % (ref 0–1)
BUN SERPL-MCNC: 90 MG/DL (ref 6–20)
BUN/CREAT SERPL: 8 (ref 12–20)
CA-I BLD-MCNC: 0.94 MMOL/L (ref 1.12–1.32)
CALCIUM SERPL-MCNC: 8 MG/DL (ref 8.5–10.1)
CHLORIDE BLD-SCNC: 107 MMOL/L (ref 100–108)
CHLORIDE SERPL-SCNC: 99 MMOL/L (ref 97–108)
CO2 BLD-SCNC: 14 MMOL/L (ref 19–24)
CO2 SERPL-SCNC: 17 MMOL/L (ref 21–32)
CREAT SERPL-MCNC: 11 MG/DL (ref 0.7–1.3)
CREAT UR-MCNC: 10.4 MG/DL (ref 0.6–1.3)
DIFFERENTIAL METHOD BLD: ABNORMAL
EOSINOPHIL # BLD: 0.1 K/UL (ref 0–0.4)
EOSINOPHIL NFR BLD: 2 % (ref 0–7)
ERYTHROCYTE [DISTWIDTH] IN BLOOD BY AUTOMATED COUNT: 13.6 % (ref 11.5–14.5)
ETHANOL SERPL-MCNC: <10 MG/DL (ref 0–0.08)
GLUCOSE BLD STRIP.AUTO-MCNC: 232 MG/DL (ref 65–117)
GLUCOSE BLD STRIP.AUTO-MCNC: 75 MG/DL (ref 74–106)
GLUCOSE SERPL-MCNC: 96 MG/DL (ref 65–100)
HCO3 BLDA-SCNC: 14 MMOL/L
HCT VFR BLD AUTO: 26.8 % (ref 36.6–50.3)
HGB BLD-MCNC: 9.3 G/DL (ref 12.1–17)
IMM GRANULOCYTES # BLD AUTO: 0 K/UL (ref 0–0.04)
IMM GRANULOCYTES NFR BLD AUTO: 0 % (ref 0–0.5)
LACTATE BLD-SCNC: 0.48 MMOL/L (ref 0.4–2)
LYMPHOCYTES # BLD: 0.5 K/UL (ref 0.8–3.5)
LYMPHOCYTES NFR BLD: 11 % (ref 12–49)
MAGNESIUM SERPL-MCNC: 2.4 MG/DL (ref 1.6–2.4)
MCH RBC QN AUTO: 30.9 PG (ref 26–34)
MCHC RBC AUTO-ENTMCNC: 34.7 G/DL (ref 30–36.5)
MCV RBC AUTO: 89 FL (ref 80–99)
MONOCYTES # BLD: 0.5 K/UL (ref 0–1)
MONOCYTES NFR BLD: 10 % (ref 5–13)
NEUTS SEG # BLD: 3.6 K/UL (ref 1.8–8)
NEUTS SEG NFR BLD: 77 % (ref 32–75)
NRBC # BLD: 0 K/UL (ref 0–0.01)
NRBC BLD-RTO: 0 PER 100 WBC
NT PRO BNP: ABNORMAL PG/ML
PCO2 BLDV: 26.7 MMHG (ref 41–51)
PH BLDV: 7.33 (ref 7.32–7.42)
PLATELET # BLD AUTO: 94 K/UL (ref 150–400)
PMV BLD AUTO: 10.3 FL (ref 8.9–12.9)
PO2 BLDV: 63 MMHG (ref 25–40)
POTASSIUM BLD-SCNC: 3.7 MMOL/L (ref 3.5–5.5)
POTASSIUM SERPL-SCNC: 4.1 MMOL/L (ref 3.5–5.1)
RBC # BLD AUTO: 3.01 M/UL (ref 4.1–5.7)
RBC MORPH BLD: ABNORMAL
SAO2 % BLD: 90 %
SERVICE CMNT-IMP: ABNORMAL
SERVICE CMNT-IMP: ABNORMAL
SODIUM BLD-SCNC: 129 MMOL/L (ref 136–145)
SODIUM SERPL-SCNC: 131 MMOL/L (ref 136–145)
SPECIMEN SITE: ABNORMAL
TROPONIN I SERPL HS-MCNC: 13 NG/L (ref 0–76)
TROPONIN I SERPL HS-MCNC: 14 NG/L (ref 0–76)
WBC # BLD AUTO: 4.7 K/UL (ref 4.1–11.1)

## 2024-04-10 PROCEDURE — 82962 GLUCOSE BLOOD TEST: CPT

## 2024-04-10 PROCEDURE — 96361 HYDRATE IV INFUSION ADD-ON: CPT

## 2024-04-10 PROCEDURE — 70450 CT HEAD/BRAIN W/O DYE: CPT

## 2024-04-10 PROCEDURE — 81001 URINALYSIS AUTO W/SCOPE: CPT

## 2024-04-10 PROCEDURE — 87040 BLOOD CULTURE FOR BACTERIA: CPT

## 2024-04-10 PROCEDURE — 83036 HEMOGLOBIN GLYCOSYLATED A1C: CPT

## 2024-04-10 PROCEDURE — 87086 URINE CULTURE/COLONY COUNT: CPT

## 2024-04-10 PROCEDURE — 71045 X-RAY EXAM CHEST 1 VIEW: CPT

## 2024-04-10 PROCEDURE — 93005 ELECTROCARDIOGRAM TRACING: CPT | Performed by: EMERGENCY MEDICINE

## 2024-04-10 PROCEDURE — 80048 BASIC METABOLIC PNL TOTAL CA: CPT

## 2024-04-10 PROCEDURE — 94761 N-INVAS EAR/PLS OXIMETRY MLT: CPT

## 2024-04-10 PROCEDURE — 2580000003 HC RX 258

## 2024-04-10 PROCEDURE — 2580000003 HC RX 258: Performed by: EMERGENCY MEDICINE

## 2024-04-10 PROCEDURE — 0202U NFCT DS 22 TRGT SARS-COV-2: CPT

## 2024-04-10 PROCEDURE — 02HV33Z INSERTION OF INFUSION DEVICE INTO SUPERIOR VENA CAVA, PERCUTANEOUS APPROACH: ICD-10-PCS | Performed by: INTERNAL MEDICINE

## 2024-04-10 PROCEDURE — 84484 ASSAY OF TROPONIN QUANT: CPT

## 2024-04-10 PROCEDURE — 85025 COMPLETE CBC W/AUTO DIFF WBC: CPT

## 2024-04-10 PROCEDURE — 36556 INSERT NON-TUNNEL CV CATH: CPT

## 2024-04-10 PROCEDURE — 80307 DRUG TEST PRSMV CHEM ANLYZR: CPT

## 2024-04-10 PROCEDURE — 6360000004 HC RX CONTRAST MEDICATION: Performed by: EMERGENCY MEDICINE

## 2024-04-10 PROCEDURE — 6360000002 HC RX W HCPCS

## 2024-04-10 PROCEDURE — 83880 ASSAY OF NATRIURETIC PEPTIDE: CPT

## 2024-04-10 PROCEDURE — 51702 INSERT TEMP BLADDER CATH: CPT

## 2024-04-10 PROCEDURE — 83735 ASSAY OF MAGNESIUM: CPT

## 2024-04-10 PROCEDURE — 2500000003 HC RX 250 WO HCPCS: Performed by: EMERGENCY MEDICINE

## 2024-04-10 PROCEDURE — 2000000000 HC ICU R&B

## 2024-04-10 PROCEDURE — 84132 ASSAY OF SERUM POTASSIUM: CPT

## 2024-04-10 PROCEDURE — 36415 COLL VENOUS BLD VENIPUNCTURE: CPT

## 2024-04-10 PROCEDURE — 96374 THER/PROPH/DIAG INJ IV PUSH: CPT

## 2024-04-10 PROCEDURE — 82803 BLOOD GASES ANY COMBINATION: CPT

## 2024-04-10 PROCEDURE — 82077 ASSAY SPEC XCP UR&BREATH IA: CPT

## 2024-04-10 PROCEDURE — 96375 TX/PRO/DX INJ NEW DRUG ADDON: CPT

## 2024-04-10 PROCEDURE — 96365 THER/PROPH/DIAG IV INF INIT: CPT

## 2024-04-10 PROCEDURE — 6360000002 HC RX W HCPCS: Performed by: EMERGENCY MEDICINE

## 2024-04-10 PROCEDURE — 82330 ASSAY OF CALCIUM: CPT

## 2024-04-10 PROCEDURE — 71275 CT ANGIOGRAPHY CHEST: CPT

## 2024-04-10 PROCEDURE — 99285 EMERGENCY DEPT VISIT HI MDM: CPT

## 2024-04-10 PROCEDURE — 82947 ASSAY GLUCOSE BLOOD QUANT: CPT

## 2024-04-10 PROCEDURE — 84295 ASSAY OF SERUM SODIUM: CPT

## 2024-04-10 RX ORDER — DOBUTAMINE HYDROCHLORIDE 200 MG/100ML
2.5-1 INJECTION INTRAVENOUS CONTINUOUS
Status: DISCONTINUED | OUTPATIENT
Start: 2024-04-10 | End: 2024-04-12

## 2024-04-10 RX ORDER — PROCHLORPERAZINE EDISYLATE 5 MG/ML
5 INJECTION INTRAMUSCULAR; INTRAVENOUS EVERY 6 HOURS PRN
Status: DISCONTINUED | OUTPATIENT
Start: 2024-04-10 | End: 2024-04-12 | Stop reason: HOSPADM

## 2024-04-10 RX ORDER — SEVELAMER CARBONATE 800 MG/1
800 TABLET, FILM COATED ORAL
Status: DISCONTINUED | OUTPATIENT
Start: 2024-04-11 | End: 2024-04-12 | Stop reason: HOSPADM

## 2024-04-10 RX ORDER — LOSARTAN POTASSIUM 50 MG/1
100 TABLET ORAL DAILY
Status: DISCONTINUED | OUTPATIENT
Start: 2024-04-11 | End: 2024-04-12

## 2024-04-10 RX ORDER — DEXTROSE MONOHYDRATE 100 MG/ML
INJECTION, SOLUTION INTRAVENOUS CONTINUOUS PRN
Status: DISCONTINUED | OUTPATIENT
Start: 2024-04-10 | End: 2024-04-12 | Stop reason: HOSPADM

## 2024-04-10 RX ORDER — NOREPINEPHRINE BITARTRATE 0.06 MG/ML
1-100 INJECTION, SOLUTION INTRAVENOUS CONTINUOUS
Status: DISCONTINUED | OUTPATIENT
Start: 2024-04-10 | End: 2024-04-11

## 2024-04-10 RX ORDER — ISOSORBIDE MONONITRATE 60 MG/1
60 TABLET, EXTENDED RELEASE ORAL DAILY
Status: DISCONTINUED | OUTPATIENT
Start: 2024-04-11 | End: 2024-04-12 | Stop reason: HOSPADM

## 2024-04-10 RX ORDER — TAMSULOSIN HYDROCHLORIDE 0.4 MG/1
0.4 CAPSULE ORAL EVERY EVENING
Status: DISCONTINUED | OUTPATIENT
Start: 2024-04-11 | End: 2024-04-12 | Stop reason: HOSPADM

## 2024-04-10 RX ORDER — ONDANSETRON 4 MG/1
4 TABLET, ORALLY DISINTEGRATING ORAL EVERY 8 HOURS PRN
Status: DISCONTINUED | OUTPATIENT
Start: 2024-04-10 | End: 2024-04-10

## 2024-04-10 RX ORDER — 0.9 % SODIUM CHLORIDE 0.9 %
500 INTRAVENOUS SOLUTION INTRAVENOUS ONCE
Status: COMPLETED | OUTPATIENT
Start: 2024-04-10 | End: 2024-04-10

## 2024-04-10 RX ORDER — TRAZODONE HYDROCHLORIDE 50 MG/1
50 TABLET ORAL NIGHTLY
Status: DISCONTINUED | OUTPATIENT
Start: 2024-04-10 | End: 2024-04-12 | Stop reason: HOSPADM

## 2024-04-10 RX ORDER — GABAPENTIN 100 MG/1
100 CAPSULE ORAL
Status: DISCONTINUED | OUTPATIENT
Start: 2024-04-11 | End: 2024-04-12 | Stop reason: HOSPADM

## 2024-04-10 RX ORDER — INSULIN LISPRO 100 [IU]/ML
0-4 INJECTION, SOLUTION INTRAVENOUS; SUBCUTANEOUS NIGHTLY
Status: DISCONTINUED | OUTPATIENT
Start: 2024-04-10 | End: 2024-04-12 | Stop reason: HOSPADM

## 2024-04-10 RX ORDER — ROSUVASTATIN CALCIUM 10 MG/1
10 TABLET, COATED ORAL EVERY MORNING
Status: DISCONTINUED | OUTPATIENT
Start: 2024-04-11 | End: 2024-04-10

## 2024-04-10 RX ORDER — INSULIN LISPRO 100 [IU]/ML
0-8 INJECTION, SOLUTION INTRAVENOUS; SUBCUTANEOUS
Status: DISCONTINUED | OUTPATIENT
Start: 2024-04-11 | End: 2024-04-12 | Stop reason: HOSPADM

## 2024-04-10 RX ORDER — SODIUM CHLORIDE 0.9 % (FLUSH) 0.9 %
5-40 SYRINGE (ML) INJECTION EVERY 12 HOURS SCHEDULED
Status: DISCONTINUED | OUTPATIENT
Start: 2024-04-10 | End: 2024-04-12 | Stop reason: HOSPADM

## 2024-04-10 RX ORDER — POLYETHYLENE GLYCOL 3350 17 G/17G
17 POWDER, FOR SOLUTION ORAL DAILY PRN
Status: DISCONTINUED | OUTPATIENT
Start: 2024-04-10 | End: 2024-04-12 | Stop reason: HOSPADM

## 2024-04-10 RX ORDER — CARVEDILOL 12.5 MG/1
25 TABLET ORAL 2 TIMES DAILY WITH MEALS
Status: DISCONTINUED | OUTPATIENT
Start: 2024-04-11 | End: 2024-04-12

## 2024-04-10 RX ORDER — NIFEDIPINE 60 MG/1
60 TABLET, EXTENDED RELEASE ORAL 2 TIMES DAILY
Status: DISCONTINUED | OUTPATIENT
Start: 2024-04-11 | End: 2024-04-12 | Stop reason: HOSPADM

## 2024-04-10 RX ORDER — SODIUM CHLORIDE 0.9 % (FLUSH) 0.9 %
5-40 SYRINGE (ML) INJECTION PRN
Status: DISCONTINUED | OUTPATIENT
Start: 2024-04-10 | End: 2024-04-12 | Stop reason: HOSPADM

## 2024-04-10 RX ORDER — ACETAMINOPHEN 325 MG/1
650 TABLET ORAL EVERY 6 HOURS PRN
Status: DISCONTINUED | OUTPATIENT
Start: 2024-04-10 | End: 2024-04-12 | Stop reason: HOSPADM

## 2024-04-10 RX ORDER — ONDANSETRON 2 MG/ML
4 INJECTION INTRAMUSCULAR; INTRAVENOUS EVERY 6 HOURS PRN
Status: DISCONTINUED | OUTPATIENT
Start: 2024-04-10 | End: 2024-04-10

## 2024-04-10 RX ORDER — ACETAMINOPHEN 650 MG/1
650 SUPPOSITORY RECTAL EVERY 6 HOURS PRN
Status: DISCONTINUED | OUTPATIENT
Start: 2024-04-10 | End: 2024-04-12 | Stop reason: HOSPADM

## 2024-04-10 RX ORDER — HYDRALAZINE HYDROCHLORIDE 25 MG/1
100 TABLET, FILM COATED ORAL 3 TIMES DAILY
Status: DISCONTINUED | OUTPATIENT
Start: 2024-04-11 | End: 2024-04-12 | Stop reason: HOSPADM

## 2024-04-10 RX ORDER — HEPARIN SODIUM 5000 [USP'U]/ML
5000 INJECTION, SOLUTION INTRAVENOUS; SUBCUTANEOUS EVERY 8 HOURS SCHEDULED
Status: DISCONTINUED | OUTPATIENT
Start: 2024-04-10 | End: 2024-04-12 | Stop reason: HOSPADM

## 2024-04-10 RX ORDER — SODIUM CHLORIDE 9 MG/ML
INJECTION, SOLUTION INTRAVENOUS PRN
Status: DISCONTINUED | OUTPATIENT
Start: 2024-04-10 | End: 2024-04-12 | Stop reason: HOSPADM

## 2024-04-10 RX ADMIN — DOBUTAMINE HYDROCHLORIDE 2.5 MCG/KG/MIN: 200 INJECTION INTRAVENOUS at 19:57

## 2024-04-10 RX ADMIN — VANCOMYCIN HYDROCHLORIDE 1750 MG: 5 INJECTION, POWDER, LYOPHILIZED, FOR SOLUTION INTRAVENOUS at 20:35

## 2024-04-10 RX ADMIN — DEXTROSE MONOHYDRATE 500 ML: 100 INJECTION, SOLUTION INTRAVENOUS at 18:38

## 2024-04-10 RX ADMIN — PIPERACILLIN SODIUM AND TAZOBACTAM SODIUM 4500 MG: 4; .5 INJECTION, POWDER, LYOPHILIZED, FOR SOLUTION INTRAVENOUS at 19:48

## 2024-04-10 RX ADMIN — SODIUM CHLORIDE, PRESERVATIVE FREE 10 ML: 5 INJECTION INTRAVENOUS at 22:18

## 2024-04-10 RX ADMIN — Medication 5 MCG/MIN: at 19:45

## 2024-04-10 RX ADMIN — HYDROMORPHONE HYDROCHLORIDE 0.5 MG: 1 INJECTION, SOLUTION INTRAMUSCULAR; INTRAVENOUS; SUBCUTANEOUS at 23:43

## 2024-04-10 RX ADMIN — SODIUM CHLORIDE 500 ML: 9 INJECTION, SOLUTION INTRAVENOUS at 18:17

## 2024-04-10 RX ADMIN — IOPAMIDOL 90 ML: 755 INJECTION, SOLUTION INTRAVENOUS at 19:10

## 2024-04-10 RX ADMIN — HEPARIN SODIUM 5000 UNITS: 5000 INJECTION INTRAVENOUS; SUBCUTANEOUS at 22:17

## 2024-04-10 ASSESSMENT — PAIN SCALES - GENERAL
PAINLEVEL_OUTOF10: 9
PAINLEVEL_OUTOF10: 9

## 2024-04-10 ASSESSMENT — PAIN DESCRIPTION - LOCATION: LOCATION: GENERALIZED

## 2024-04-10 ASSESSMENT — ENCOUNTER SYMPTOMS
DIARRHEA: 1
SHORTNESS OF BREATH: 0
SORE THROAT: 0
COUGH: 0
BLOOD IN STOOL: 0
ABDOMINAL PAIN: 1
NAUSEA: 1
VOMITING: 1

## 2024-04-10 NOTE — ED TRIAGE NOTES
Patient arrives via ems from home with co chest pain, hypotension, ams, and hypoglycemia.    Patient Romansh speaking, using .    Per ems patient was at dialysis today but sent to the ER because he looked unwell. Unable to complete dialysis. Patient was seen at [facility] and discharged and sent home. Per family patient was \"acting funny\" when he got home so they called EMS. Patient BG 45 for ems D10 given Prior to transport.     Patient HR 43 on arrival.

## 2024-04-10 NOTE — ED PROVIDER NOTES
evidence of pulmonary embolism.   No aortic aneurysm or dissection.      No additional acute process is identified in the chest, abdomen or pelvis.   Additional incidental/nonemergent findings are as described above.      CT HEAD WO CONTRAST   Final Result   No acute intracranial process.   There is no intracranial mass or hemorrhage.           LABS:  No results displayed because visit has over 200 results.            EMERGENCY DEPARTMENT COURSE and DIFFERENTIAL DIAGNOSIS/MDM:   Vitals:    Vitals:    04/12/24 1300 04/12/24 1315 04/12/24 1330 04/12/24 1345   BP: (!) 172/67 (!) 171/66 (!) 157/67 (!) 171/73   Pulse: 80 80 78 78   Resp: 14 12 11 17   Temp:       TempSrc:       SpO2: 91% 91% 92% 93%   Weight:       Height:               Medical Decision Making  63M w/ hx ESRD on HD, CAD, CHF, cirrhosis, anemia, PE on DOAC p/w AMS. Pt arrived via EMS after found to be hypoglycemic and very sleepy. Pt has missed multiple recent dialysis sessions.    Pt initially lethargic, hypotensive, bradycardic, hypothermic 91F, hypoglycemic. Bedside echo showed grossly depressed EF (previously nl 8/2023) w/o pericardial effusion and no free intra-abd fluid. EKG showing SB w/ prolonged Qtc but no ischemic changes. Labs show stable ESRD w/ normal K and AG. Lactate also WNL. Trop neg x1. CTH neg for acute findings including hemorrhage. CTA CAP showing diffuse colitis w/o dissection, aneurysm, SBO or evidence for ischemic colitis.    I placed RIJ CVC and now on levophed and dobutamine for cardiogenic and possibly septic shock. Mental status also now much improved. BS corrected and on D10. Got IVF and vanc/zosyn for suspected GI infectious source. I spoke w/ cardiology (Dr Grove) who recommends both pressors and supportive care. Also spoke w/ critical care and family medicine. ICU admit.    2045 ED Sepsis Documentation (2024)  - Broad Spectrum Antibiotics ordered: Zosyn and Vancomycin  - Repeat lactic acid: not indicated due to initial lactate  < 2  - Sepsis re-assessment performed 02/15/24 at time 2045 and clinical condition significantly improved.  No results for input(s): \"LACTSEPSIS\", \"LACTA\", \"LACTACIDWB\", \"LACACIDPL\", \"POCLACTIC\", \"CREATININE\", \"BILITOT\", \"INR\", \"PLT\", \"COVID19\" in the last 72 hours.Organ dysfunction (Lactic acid >2, Cr>2, bili >2, INR>1.5, Plt<100, BiPap, intubated) exclusions if applicable: ESRD    - Hypotension or Lactic Acidosis present (SBP<90, MAP<65, Lactate=4): YES  If YES:  - IVF: Limited IVF given (900) because patient has ESRD with GFR = 5 Total volume: 900 cc (cannot be 0)  - Persistent Hypotension despite IVF resuscitation: YES    - Vasopressors: Norepinephrine and Dobutamine            Amount and/or Complexity of Data Reviewed  Independent Historian: caregiver and EMS  External Data Reviewed: notes.  Labs: ordered. Decision-making details documented in ED Course.  Radiology: ordered and independent interpretation performed. Decision-making details documented in ED Course.  ECG/medicine tests: ordered and independent interpretation performed. Decision-making details documented in ED Course.    Risk  Prescription drug management.  Parenteral controlled substances.  Drug therapy requiring intensive monitoring for toxicity.  Decision regarding hospitalization.            ED Course            CONSULTS:  IP CONSULT TO CARDIOLOGY  IP CONSULT TO INTENSIVIST  IP CONSULT TO NEPHROLOGY  IP CONSULT TO PHARMACY  IP CONSULT HOME HEALTH    PROCEDURES:  Unless otherwise noted below, none     Central Line    Date/Time: 4/13/2024 10:44 AM    Performed by: Babar Solitraio MD  Authorized by: Lopez Cuellar MD    Consent:     Consent obtained:  Emergent situation    Risks, benefits, and alternatives were discussed: yes      Alternatives discussed:  No treatment  Universal protocol:     Patient identity confirmed:  Verbally with patient and hospital-assigned identification number  Pre-procedure details:     Indication(s): central venous

## 2024-04-11 ENCOUNTER — APPOINTMENT (OUTPATIENT)
Facility: HOSPITAL | Age: 64
DRG: 720 | End: 2024-04-11
Payer: MEDICAID

## 2024-04-11 PROBLEM — A08.4 VIRAL GASTROENTERITIS: Status: ACTIVE | Noted: 2024-04-11

## 2024-04-11 PROBLEM — N18.6 ESRD NEEDING DIALYSIS (HCC): Status: ACTIVE | Noted: 2024-04-11

## 2024-04-11 PROBLEM — E16.2 HYPOGLYCEMIA: Status: ACTIVE | Noted: 2024-04-11

## 2024-04-11 PROBLEM — Z99.2 ESRD NEEDING DIALYSIS (HCC): Status: ACTIVE | Noted: 2024-04-11

## 2024-04-11 PROBLEM — A41.9 SEPTIC SHOCK (HCC): Status: ACTIVE | Noted: 2024-04-11

## 2024-04-11 PROBLEM — R65.21 SEPTIC SHOCK (HCC): Status: ACTIVE | Noted: 2024-04-11

## 2024-04-11 PROBLEM — R57.0 CARDIOGENIC SHOCK (HCC): Status: ACTIVE | Noted: 2024-04-11

## 2024-04-11 LAB
ALBUMIN SERPL-MCNC: 2.8 G/DL (ref 3.5–5)
ALBUMIN/GLOB SERPL: 0.8 (ref 1.1–2.2)
ALP SERPL-CCNC: 64 U/L (ref 45–117)
ALT SERPL-CCNC: 16 U/L (ref 12–78)
AMORPH CRY URNS QL MICRO: ABNORMAL
AMPHET UR QL SCN: NEGATIVE
ANION GAP SERPL CALC-SCNC: 14 MMOL/L (ref 5–15)
APPEARANCE UR: ABNORMAL
AST SERPL-CCNC: 11 U/L (ref 15–37)
B PERT DNA SPEC QL NAA+PROBE: NOT DETECTED
BACTERIA URNS QL MICRO: ABNORMAL /HPF
BARBITURATES UR QL SCN: NEGATIVE
BASOPHILS # BLD: 0 K/UL (ref 0–0.1)
BASOPHILS NFR BLD: 0 % (ref 0–1)
BENZODIAZ UR QL: NEGATIVE
BILIRUB DIRECT SERPL-MCNC: 0.2 MG/DL (ref 0–0.2)
BILIRUB SERPL-MCNC: 0.8 MG/DL (ref 0.2–1)
BILIRUB UR QL: NEGATIVE
BORDETELLA PARAPERTUSSIS BY PCR: NOT DETECTED
BUN SERPL-MCNC: 92 MG/DL (ref 6–20)
BUN/CREAT SERPL: 8 (ref 12–20)
C COLI+JEJUNI TUF STL QL NAA+PROBE: POSITIVE
C DIFF GDH STL QL: NEGATIVE
C DIFF TOX A+B STL QL IA: NEGATIVE
C DIFF TOXIN INTERPRETATION: NORMAL
C PNEUM DNA SPEC QL NAA+PROBE: NOT DETECTED
CALCIUM SERPL-MCNC: 7.4 MG/DL (ref 8.5–10.1)
CANNABINOIDS UR QL SCN: NEGATIVE
CHLORIDE SERPL-SCNC: 98 MMOL/L (ref 97–108)
CK SERPL-CCNC: 71 U/L (ref 39–308)
CO2 SERPL-SCNC: 15 MMOL/L (ref 21–32)
COCAINE UR QL SCN: NEGATIVE
COLOR UR: ABNORMAL
CORTIS AM PEAK SERPL-MCNC: 24.8 UG/DL (ref 4.3–22.45)
CREAT SERPL-MCNC: 10.9 MG/DL (ref 0.7–1.3)
DIFFERENTIAL METHOD BLD: ABNORMAL
EC STX1+STX2 GENES STL QL NAA+PROBE: NEGATIVE
EOSINOPHIL # BLD: 0 K/UL (ref 0–0.4)
EOSINOPHIL NFR BLD: 0 % (ref 0–7)
EPITH CASTS URNS QL MICRO: ABNORMAL /LPF
EPITH CASTS URNS QL MICRO: ABNORMAL /LPF
ERYTHROCYTE [DISTWIDTH] IN BLOOD BY AUTOMATED COUNT: 13.8 % (ref 11.5–14.5)
EST. AVERAGE GLUCOSE BLD GHB EST-MCNC: 189 MG/DL
ETEC ELTA+ESTB GENES STL QL NAA+PROBE: NEGATIVE
FLUAV SUBTYP SPEC NAA+PROBE: NOT DETECTED
FLUBV RNA SPEC QL NAA+PROBE: NOT DETECTED
GLOBULIN SER CALC-MCNC: 3.5 G/DL (ref 2–4)
GLUCOSE BLD STRIP.AUTO-MCNC: 128 MG/DL (ref 65–117)
GLUCOSE BLD STRIP.AUTO-MCNC: 131 MG/DL (ref 65–117)
GLUCOSE BLD STRIP.AUTO-MCNC: 85 MG/DL (ref 65–117)
GLUCOSE BLD STRIP.AUTO-MCNC: 96 MG/DL (ref 65–117)
GLUCOSE SERPL-MCNC: 216 MG/DL (ref 65–100)
GLUCOSE UR STRIP.AUTO-MCNC: 100 MG/DL
GRAN CASTS URNS QL MICRO: ABNORMAL /LPF
HADV DNA SPEC QL NAA+PROBE: NOT DETECTED
HBA1C MFR BLD: 8.2 % (ref 4–5.6)
HBV SURFACE AG SER QL: <0.1 INDEX
HBV SURFACE AG SER QL: NEGATIVE
HCOV 229E RNA SPEC QL NAA+PROBE: NOT DETECTED
HCOV HKU1 RNA SPEC QL NAA+PROBE: NOT DETECTED
HCOV NL63 RNA SPEC QL NAA+PROBE: NOT DETECTED
HCOV OC43 RNA SPEC QL NAA+PROBE: NOT DETECTED
HCT VFR BLD AUTO: 26.4 % (ref 36.6–50.3)
HGB BLD-MCNC: 9.1 G/DL (ref 12.1–17)
HGB UR QL STRIP: NEGATIVE
HMPV RNA SPEC QL NAA+PROBE: NOT DETECTED
HPIV1 RNA SPEC QL NAA+PROBE: NOT DETECTED
HPIV2 RNA SPEC QL NAA+PROBE: NOT DETECTED
HPIV3 RNA SPEC QL NAA+PROBE: NOT DETECTED
HPIV4 RNA SPEC QL NAA+PROBE: NOT DETECTED
HYALINE CASTS URNS QL MICRO: ABNORMAL /LPF (ref 0–5)
IMM GRANULOCYTES # BLD AUTO: 0 K/UL (ref 0–0.04)
IMM GRANULOCYTES NFR BLD AUTO: 0 % (ref 0–0.5)
KETONES UR QL STRIP.AUTO: ABNORMAL MG/DL
LEUKOCYTE ESTERASE UR QL STRIP.AUTO: ABNORMAL
LYMPHOCYTES # BLD: 0.4 K/UL (ref 0.8–3.5)
LYMPHOCYTES NFR BLD: 9 % (ref 12–49)
Lab: ABNORMAL
M PNEUMO DNA SPEC QL NAA+PROBE: NOT DETECTED
MCH RBC QN AUTO: 31.1 PG (ref 26–34)
MCHC RBC AUTO-ENTMCNC: 34.5 G/DL (ref 30–36.5)
MCV RBC AUTO: 90.1 FL (ref 80–99)
METHADONE UR QL: NEGATIVE
MONOCYTES # BLD: 0.4 K/UL (ref 0–1)
MONOCYTES NFR BLD: 8 % (ref 5–13)
NEUTS SEG # BLD: 3.7 K/UL (ref 1.8–8)
NEUTS SEG NFR BLD: 83 % (ref 32–75)
NITRITE UR QL STRIP.AUTO: NEGATIVE
NRBC # BLD: 0 K/UL (ref 0–0.01)
NRBC BLD-RTO: 0 PER 100 WBC
OPIATES UR QL: POSITIVE
OTHER: ABNORMAL
P SHIGELLOIDES DNA STL QL NAA+PROBE: NEGATIVE
PCP UR QL: NEGATIVE
PH UR STRIP: 5 (ref 5–8)
PLATELET # BLD AUTO: 89 K/UL (ref 150–400)
PMV BLD AUTO: 10 FL (ref 8.9–12.9)
POTASSIUM SERPL-SCNC: 4.7 MMOL/L (ref 3.5–5.1)
PROT SERPL-MCNC: 6.3 G/DL (ref 6.4–8.2)
PROT UR STRIP-MCNC: >300 MG/DL
RBC # BLD AUTO: 2.93 M/UL (ref 4.1–5.7)
RBC #/AREA URNS HPF: ABNORMAL /HPF (ref 0–5)
RBC MORPH BLD: ABNORMAL
RSV RNA SPEC QL NAA+PROBE: NOT DETECTED
RV+EV RNA SPEC QL NAA+PROBE: NOT DETECTED
SALMONELLA SP SPAO STL QL NAA+PROBE: NEGATIVE
SARS-COV-2 RNA RESP QL NAA+PROBE: NOT DETECTED
SERVICE CMNT-IMP: ABNORMAL
SERVICE CMNT-IMP: ABNORMAL
SERVICE CMNT-IMP: NORMAL
SERVICE CMNT-IMP: NORMAL
SHIGELLA SP+EIEC IPAH STL QL NAA+PROBE: NEGATIVE
SODIUM SERPL-SCNC: 127 MMOL/L (ref 136–145)
SP GR UR REFRACTOMETRY: 1.02 (ref 1–1.03)
T4 FREE SERPL-MCNC: 0.9 NG/DL (ref 0.8–1.5)
TROPONIN I SERPL HS-MCNC: 13 NG/L (ref 0–76)
TSH SERPL DL<=0.05 MIU/L-ACNC: 1.09 UIU/ML (ref 0.36–3.74)
UROBILINOGEN UR QL STRIP.AUTO: 0.2 EU/DL (ref 0.2–1)
V CHOL+PARA+VUL DNA STL QL NAA+NON-PROBE: NEGATIVE
WBC # BLD AUTO: 4.5 K/UL (ref 4.1–11.1)
WBC #/AREA STL HPF: NORMAL /HPF (ref 0–4)
WBC URNS QL MICRO: ABNORMAL /HPF (ref 0–4)
Y ENTEROCOL DNA STL QL NAA+NON-PROBE: NEGATIVE

## 2024-04-11 PROCEDURE — 5A1D70Z PERFORMANCE OF URINARY FILTRATION, INTERMITTENT, LESS THAN 6 HOURS PER DAY: ICD-10-PCS | Performed by: FAMILY MEDICINE

## 2024-04-11 PROCEDURE — 82550 ASSAY OF CK (CPK): CPT

## 2024-04-11 PROCEDURE — 2580000003 HC RX 258: Performed by: FAMILY MEDICINE

## 2024-04-11 PROCEDURE — 82533 TOTAL CORTISOL: CPT

## 2024-04-11 PROCEDURE — 80076 HEPATIC FUNCTION PANEL: CPT

## 2024-04-11 PROCEDURE — 82962 GLUCOSE BLOOD TEST: CPT

## 2024-04-11 PROCEDURE — 84443 ASSAY THYROID STIM HORMONE: CPT

## 2024-04-11 PROCEDURE — C9113 INJ PANTOPRAZOLE SODIUM, VIA: HCPCS

## 2024-04-11 PROCEDURE — 87506 IADNA-DNA/RNA PROBE TQ 6-11: CPT

## 2024-04-11 PROCEDURE — 2580000003 HC RX 258

## 2024-04-11 PROCEDURE — 84439 ASSAY OF FREE THYROXINE: CPT

## 2024-04-11 PROCEDURE — A4216 STERILE WATER/SALINE, 10 ML: HCPCS

## 2024-04-11 PROCEDURE — 99223 1ST HOSP IP/OBS HIGH 75: CPT | Performed by: INTERNAL MEDICINE

## 2024-04-11 PROCEDURE — 84484 ASSAY OF TROPONIN QUANT: CPT

## 2024-04-11 PROCEDURE — 6360000002 HC RX W HCPCS: Performed by: FAMILY MEDICINE

## 2024-04-11 PROCEDURE — 97162 PT EVAL MOD COMPLEX 30 MIN: CPT

## 2024-04-11 PROCEDURE — 87340 HEPATITIS B SURFACE AG IA: CPT

## 2024-04-11 PROCEDURE — 36415 COLL VENOUS BLD VENIPUNCTURE: CPT

## 2024-04-11 PROCEDURE — 87324 CLOSTRIDIUM AG IA: CPT

## 2024-04-11 PROCEDURE — 97530 THERAPEUTIC ACTIVITIES: CPT

## 2024-04-11 PROCEDURE — 6360000002 HC RX W HCPCS

## 2024-04-11 PROCEDURE — 89055 LEUKOCYTE ASSESSMENT FECAL: CPT

## 2024-04-11 PROCEDURE — 93005 ELECTROCARDIOGRAM TRACING: CPT | Performed by: STUDENT IN AN ORGANIZED HEALTH CARE EDUCATION/TRAINING PROGRAM

## 2024-04-11 PROCEDURE — 85025 COMPLETE CBC W/AUTO DIFF WBC: CPT

## 2024-04-11 PROCEDURE — APPSS30 APP SPLIT SHARED TIME 16-30 MINUTES: Performed by: NURSE PRACTITIONER

## 2024-04-11 PROCEDURE — 80048 BASIC METABOLIC PNL TOTAL CA: CPT

## 2024-04-11 PROCEDURE — 6370000000 HC RX 637 (ALT 250 FOR IP)

## 2024-04-11 PROCEDURE — 87449 NOS EACH ORGANISM AG IA: CPT

## 2024-04-11 PROCEDURE — 2000000000 HC ICU R&B

## 2024-04-11 PROCEDURE — 99223 1ST HOSP IP/OBS HIGH 75: CPT | Performed by: FAMILY MEDICINE

## 2024-04-11 PROCEDURE — 90935 HEMODIALYSIS ONE EVALUATION: CPT

## 2024-04-11 RX ORDER — AZITHROMYCIN 250 MG/1
500 TABLET, FILM COATED ORAL
Status: DISCONTINUED | OUTPATIENT
Start: 2024-04-11 | End: 2024-04-12

## 2024-04-11 RX ORDER — NOREPINEPHRINE BITARTRATE 0.06 MG/ML
1-100 INJECTION, SOLUTION INTRAVENOUS CONTINUOUS
Status: DISCONTINUED | OUTPATIENT
Start: 2024-04-11 | End: 2024-04-12

## 2024-04-11 RX ORDER — HYDROXYZINE HYDROCHLORIDE 10 MG/1
10 TABLET, FILM COATED ORAL 2 TIMES DAILY PRN
Status: DISCONTINUED | OUTPATIENT
Start: 2024-04-11 | End: 2024-04-12 | Stop reason: HOSPADM

## 2024-04-11 RX ADMIN — VANCOMYCIN HYDROCHLORIDE 1000 MG: 1 INJECTION, POWDER, LYOPHILIZED, FOR SOLUTION INTRAVENOUS at 20:45

## 2024-04-11 RX ADMIN — ACETAMINOPHEN 650 MG: 325 TABLET ORAL at 09:57

## 2024-04-11 RX ADMIN — SODIUM CHLORIDE, PRESERVATIVE FREE 10 ML: 5 INJECTION INTRAVENOUS at 20:27

## 2024-04-11 RX ADMIN — HYDROMORPHONE HYDROCHLORIDE 0.5 MG: 1 INJECTION, SOLUTION INTRAMUSCULAR; INTRAVENOUS; SUBCUTANEOUS at 12:13

## 2024-04-11 RX ADMIN — PANTOPRAZOLE SODIUM 40 MG: 40 INJECTION, POWDER, FOR SOLUTION INTRAVENOUS at 08:42

## 2024-04-11 RX ADMIN — PROCHLORPERAZINE EDISYLATE 5 MG: 5 INJECTION INTRAMUSCULAR; INTRAVENOUS at 00:24

## 2024-04-11 RX ADMIN — AZITHROMYCIN DIHYDRATE 500 MG: 250 TABLET ORAL at 21:59

## 2024-04-11 RX ADMIN — HYDRALAZINE HYDROCHLORIDE 100 MG: 25 TABLET ORAL at 20:26

## 2024-04-11 RX ADMIN — PIPERACILLIN AND TAZOBACTAM 3375 MG: 3; .375 INJECTION, POWDER, LYOPHILIZED, FOR SOLUTION INTRAVENOUS at 16:25

## 2024-04-11 RX ADMIN — HYDROMORPHONE HYDROCHLORIDE 0.5 MG: 1 INJECTION, SOLUTION INTRAMUSCULAR; INTRAVENOUS; SUBCUTANEOUS at 20:32

## 2024-04-11 RX ADMIN — HYDROXYZINE HYDROCHLORIDE 10 MG: 10 TABLET ORAL at 19:28

## 2024-04-11 RX ADMIN — SODIUM CHLORIDE, PRESERVATIVE FREE 10 ML: 5 INJECTION INTRAVENOUS at 08:43

## 2024-04-11 RX ADMIN — PIPERACILLIN AND TAZOBACTAM 3375 MG: 3; .375 INJECTION, POWDER, LYOPHILIZED, FOR SOLUTION INTRAVENOUS at 04:53

## 2024-04-11 RX ADMIN — HYDROMORPHONE HYDROCHLORIDE 0.5 MG: 1 INJECTION, SOLUTION INTRAMUSCULAR; INTRAVENOUS; SUBCUTANEOUS at 07:04

## 2024-04-11 RX ADMIN — HYDROMORPHONE HYDROCHLORIDE 0.5 MG: 1 INJECTION, SOLUTION INTRAMUSCULAR; INTRAVENOUS; SUBCUTANEOUS at 16:30

## 2024-04-11 ASSESSMENT — PAIN SCALES - GENERAL
PAINLEVEL_OUTOF10: 6
PAINLEVEL_OUTOF10: 5
PAINLEVEL_OUTOF10: 5
PAINLEVEL_OUTOF10: 8
PAINLEVEL_OUTOF10: 10
PAINLEVEL_OUTOF10: 9
PAINLEVEL_OUTOF10: 6
PAINLEVEL_OUTOF10: 7

## 2024-04-11 ASSESSMENT — PAIN DESCRIPTION - LOCATION
LOCATION: GENERALIZED

## 2024-04-11 ASSESSMENT — PAIN DESCRIPTION - ONSET
ONSET: PROGRESSIVE
ONSET: ON-GOING

## 2024-04-11 ASSESSMENT — PAIN DESCRIPTION - DESCRIPTORS
DESCRIPTORS: ACHING

## 2024-04-11 NOTE — PROGRESS NOTES
[facility] visit  Mr. Christiansen was being attended to by the nurses.  He is Jew and has been visited by Sr. Fournier during previous hospital admissions.  He declined communion today. Prayer for his well-being offered outside his room. Will plan to visit next Tuesday.    CLINTON Stephenson Sr., RN, ACSW, LCSW   Page:  [phone]

## 2024-04-11 NOTE — PROGRESS NOTES
[facility] Dosing Services: Antimicrobial Stewardship Daily Doc  Consult for antibiotic dosing of vancomycin by Dr. Magana  Indication: Septic shock, hypothermia  Day of Therapy: 2    Ht Readings from Last 1 Encounters:   04/10/24 1.727 m (5' 8\")        Wt Readings from Last 1 Encounters:   04/10/24 69.4 kg (153 lb)      Vancomycin therapy:  Loading dose: Vancomycin 1,750 mg IV x1 dose given  Maintenance dose: dosing based on levels  Dose calculated to approximate a           a. Target AUC/MANNY of 400-600  Last level:  N/A  Plan: Plan dialysis today x 3 hrs then resume HD MWF per nephrology. Reviewed history- pt therapeutic w/ AM level ~17.3 mcg/ml post HD on 1000 mg MWF. Will plan for 1000 mg dose post HD today and tomorrow and random level for 4/15 AM if vancomycin continues.     Other Antimicrobial   (not dosed by pharmacist) Zosyn 3.375 g Q12 hrs (HD dosing)   Cultures 4/10 Bloodx2: Pending  4/10 Urine: Pending (UA 3+ bacteria)  4/10 MRSA: Pending   Serum Creatinine Lab Results   Component Value Date/Time    CREATININE 11.00 04/10/2024 06:35 PM    CREATININE 10.4 04/10/2024 06:25 PM      Creatinine Clearance Estimated Creatinine Clearance: 7 mL/min (A) (based on SCr of 11 mg/dL (H)).     Temp Temp: (!) 94 °F (34.4 °C) (Rectal)       WBC Lab Results   Component Value Date/Time    WBC 4.7 04/10/2024 06:35 PM      Procalcitonin No results found for: \"PROCAL\"   For Antifungals, Metronidazole and Nafcillin: Lab Results   Component Value Date/Time    ALT 18 04/08/2024 08:25 PM    AST 11 04/08/2024 08:25 PM        Thanks,   Eduardo Lau, SavannahD

## 2024-04-11 NOTE — PROGRESS NOTES
attended Interdisciplinary Team (IDT) rounds in ICU with medical/care team members where this patient's ongoing care was discussed.  Outcome:  updated on Pt., and Interdisciplinary Team aware of  availability for patient and/or family.  Thank you.    Chaplain Ruel Chou M.Div., BCC.   [phone]

## 2024-04-11 NOTE — FLOWSHEET NOTE
Primary RN SBAR: Anita Schroeder RN  Patient Education: Dialysis procedure, access care.  Comments: Pt is awake, alert and oriented. Not in acute distress. HepB antigen drawn and sent.  Hospital associated wait time; reason: none    Hepatitis B Surface Ag   Date/Time Value Ref Range Status   04/11/2024 02:15 PM <0.10 Index Final     Hep B S Ab   Date/Time Value Ref Range Status   07/18/2023 12:25 PM <3.10 mIU/mL Final         PRE HD TREATMENT   04/11/24 1420   Vital Signs   /62   Temp 98.2 °F (36.8 °C)   Pulse 63   Respirations 15   SpO2 94 %   Pain Assessment   Pain Assessment 0-10   Pain Level 5   Pain Location Generalized   Patient's Stated Pain Goal 0 - No pain   Treatment   Time On 1432   Time Off 1732   Treatment Goal 1L   Observations & Evaluations   Level of Consciousness 0   Oriented X 4   Heart Rhythm Regular   Respiratory Quality/Effort Unlabored   O2 Device None (Room air)   Bilateral Breath Sounds Clear   Skin Color   (appropriate for ethnicity)   Skin Condition/Temp Warm   Abdomen Inspection Soft   Edema None   Technical Checks   Dialysis Machine No. 04   RO Machine Number [id number]   Dialyzer Lot No. [id number]   Tubing Lot Number [id number]   All Connections Secure Yes   NS Bag Yes   Saline Line Double Clamped Yes   Dialyzer Revaclear 300   Prime Volume (mL) 300 mL   ICEBOAT I;C;E;B;O;A;T   RO Machine Log Sheet Completed Yes   Machine Alarm Self Test Completed;Passed   Air Foam Detector Tested;Proper Function;pH Reading   Extracorporeal Circuit Tested for Integrity Yes   Machine Conductivity 13.7   Manual Conductivity   (independent machind cond test passed)   Machine Ph   (independent machine pH test passed)   Bleach Test (Neg) Yes   Bath Temperature 98.6 °F (37 °C)   Dialysis Bath   K+ (Potassium) 2   Ca+ (Calcium) 2.5   Na+ (Sodium) 138   HCO3 (Bicarb) 35   Bicarbonate Concentrate Lot No. [id number]   Acid Concentrate Lot No. [id number]   Treatment Initiation   Dialyze Hours 3   Treatment

## 2024-04-11 NOTE — PLAN OF CARE
Problem: Discharge Planning  Goal: Discharge to home or other facility with appropriate resources  4/11/2024 0930 by Anita Schroeder RN  Outcome: Progressing  Flowsheets (Taken 4/11/2024 0815)  Discharge to home or other facility with appropriate resources:   Identify barriers to discharge with patient and caregiver   Identify discharge learning needs (meds, wound care, etc)   Arrange for needed discharge resources and transportation as appropriate   Refer to discharge planning if patient needs post-hospital services based on physician order or complex needs related to functional status, cognitive ability or social support system   Arrange for interpreters to assist at discharge as needed  4/11/2024 0336 by Debbie Borjas RN  Outcome: Progressing  Flowsheets (Taken 4/10/2024 2315)  Discharge to home or other facility with appropriate resources:   Identify barriers to discharge with patient and caregiver   Arrange for needed discharge resources and transportation as appropriate   Identify discharge learning needs (meds, wound care, etc)   Refer to discharge planning if patient needs post-hospital services based on physician order or complex needs related to functional status, cognitive ability or social support system     Problem: Pain  Goal: Verbalizes/displays adequate comfort level or baseline comfort level  4/11/2024 0930 by Anita Schroeder RN  Outcome: Progressing  4/11/2024 0336 by Debbie Borjas RN  Outcome: Progressing  Flowsheets (Taken 4/10/2024 2315)  Verbalizes/displays adequate comfort level or baseline comfort level:   Encourage patient to monitor pain and request assistance   Assess pain using appropriate pain scale   Administer analgesics based on type and severity of pain and evaluate response   Implement non-pharmacological measures as appropriate and evaluate response   Notify Licensed Independent Practitioner if interventions unsuccessful or patient reports new pain     Problem: Safety -

## 2024-04-11 NOTE — CARE COORDINATION
Home Health order received but have requested attending to please specify what type of home health needs pt has and I will set up services.    I am leaving order open for now.    Mago Puga RN

## 2024-04-11 NOTE — PROGRESS NOTES
Senior Resident Admission Note     CC: Diarrhea, AMS    HPI:  Hu Christiansen is a 63 y.o. male w/ a PMHX of ESRD who presents to the ER with diarreha x approximately one week. Was seen at [facility] ER x2 incuding this morning and sent home. Once home, became lethargic and confused, causing family to call EMS where he was found to be hypothermic and hypoglycemic.     Patient endorses abdominal pain, full body myalgias. Is A/Ox3, responding to questions appropriately.    Chart reviewed. Patient seen, examined, and discussed with Dr. Magana (PGY-1). See H&P for more details.    A/P: Admit to ICU. Code status: Full.    Shock: Cardiogenic vs Septic. Cardiogenic most likely at this time with grossly depressed EF on bedside echo. Viral cardiomyopathy vs takatsubos within differential. Currently on Dobutamine and Levophed. Searching for infectious source: with colonic wall thickening on CT. Patient does endorse abdominal pain and diarrhea. Viral infection higher on differential as of now but broadly treating with Vanc+Zosyn while continuing infectious work up  Abisai avila in place for hypothermia    Cardiac  Continue dobutamine and levophed drips  Cardiology consulted in ED  Obtain formal echocardiogram  Obtain BNP, anticipate this being elevated  Trend troponins to evaluate for ACS    Infectious: CT abdomen indicates colonic wall thickening within transverse and sigmoid colon. Viral gastroenteritis, Colitis top of differential  Vanc, Zosyn  RVP  Stool studies  Follow cultures  Obtain UA    AMS: Hypoglycemia has now resolve and patient seems to be back to baseline mental status. CT head NAP  UDS    ESRD  Consult nephro, needs dialysis (missed M,W this week)  Place braxton strict I/O's    I agree with remaining assessment and plan as documented in Dr. Magana's Full H&P.    I discussed this case with Dr. Flores, ICU on-call physician, and helped bedside to facilitate history and physical exam    Pt discussed with Dr. Alisson Antonio  (on-call attending physician).

## 2024-04-11 NOTE — CARE COORDINATION
04/11/24 1100   Service Assessment   Patient Orientation Alert and Oriented   Cognition Alert   History Provided By Patient;Medical Record   Primary Caregiver Family   Support Systems Spouse/Significant Other   Patient's Healthcare Decision Maker is: Legal Next of Kin  (wife ,Nidia Chaparro is NOK @ [phone])   PCP Verified by CM Yes  (Dr Cheikh Fall)   Last Visit to PCP Within last 6 months   Prior Functional Level Independent in ADLs/IADLs   Current Functional Level Independent in ADLs/IADLs   Can patient return to prior living arrangement Yes   Ability to make needs known: Good   Family able to assist with home care needs: Yes  (wife)   Would you like for me to discuss the discharge plan with any other family members/significant others, and if so, who? Yes  (my wife)   Financial Resources Medicaid  (has [facility] Medicaid)   Community Resources None   CM/SW Referral Disease Management Education   Social/Functional History   Lives With Spouse   Type of Home House   Home Layout One level   Home Access Stairs to enter with rails   Entrance Stairs - Number of Steps 4   Entrance Stairs - Rails Right   Bathroom Shower/Tub Tub/Shower unit   Bathroom Toilet Standard   Bathroom Equipment None   Home Equipment Cane;Walker, rolling   Receives Help From Family   ADL Assistance Independent   Homemaking Assistance Independent   Homemaking Responsibilities Yes   Ambulation Assistance Independent   Transfer Assistance Independent   Active  No   Patient's  Info pt.'s wife and children drive   Mode of Transportation Car   Occupation On disability   Discharge Planning   Type of Residence House     Pt lives with his wife and children in a one story home with 4 entry steps with a hand rail on the right.  Pt uses a cane @ times for ambulation.  Pt has a rolling walker he can use if needed.  Pt has had home oxygen-vendor not known    Diagnosis: Cardiogenic shock (HCC) [R57.0]  Shock (HCC) [R57.9]  Hypoglycemia  ZACRN  Case Management Department  Ph: [phone]

## 2024-04-11 NOTE — PROGRESS NOTES
Comprehensive Nutrition Assessment    Type and Reason for Visit:  Initial, Positive Nutrition Screen    Nutrition Recommendations/Plan:   Modify diet: Clear Liquid, NCS   - Eventual goal regular, may need phos restriction  Check phos   When diet advanced, Provide Nepro once daily to increase kcal/protein intake (420 kcal, 38 g Carbs, 19 g Protein)      Malnutrition Assessment:  Malnutrition Status:  At risk for malnutrition (Comment) (ESRD on HD, poor PO x ~4 days, unintentional wt loss) (04/11/24 1114)    Context:  Acute Illness     Findings of the 6 clinical characteristics of malnutrition:  Energy Intake:  75% or less of estimated energy requirements for 7 or more days  Weight Loss:  No significant weight loss     Body Fat Loss:  No significant body fat loss     Muscle Mass Loss:  No significant muscle mass loss    Fluid Accumulation:  No significant fluid accumulation     Strength:  Not Performed    Nutrition Assessment:     Patient is a 63 year old male admitted with Cardiogenic shock (HCC) [R57.0]  Shock (HCC) [R57.9]  Hypoglycemia [E16.2]  ESRD needing dialysis (HCC) [N18.6, Z99.2]  Hypothermia, initial encounter [T68.XXXA]. He  has a past medical history of Anemia associated with chronic renal failure, Anxiety and depression, BPH (benign prostatic hyperplasia), CAD (coronary artery disease), CHF NYHA class I, chronic, diastolic (HCC), Chronic pain, DM type 2 causing renal disease (HCC), DM type 2 causing vascular disease (HCC), ESRD on dialysis (HCC), GERD (gastroesophageal reflux disease), HTN (hypertension), Hyperlipidemia, and Thrombocytopenia (HCC).  MST for wt loss 2-13# and poor PO intake. Patient reports -160#, weighing less on HD days (MWF). Appears UBW closer to 160#, with weight loss of ~11# (6.7%) x 3 months, not clinically significant for timeframe. Reports nausea and diarrhea PTA, no emesis. Still nauseated now but just received zofran. Was on phone with friend prior to RD encounter,

## 2024-04-11 NOTE — H&P
Glucose 75 74 - 106 MG/DL    POC Creatinine 10.4 (H) 0.6 - 1.3 MG/DL    eGFR, POC 5 (L) >60 ml/min/1.73m2    POC Ionized Calcium 0.94 (L) 1.12 - 1.32 mmol/L    POC Lactic Acid 0.48 0.40 - 2.00 mmol/L    Source VENOUS BLOOD      Critical Value Read Back LIP    CBC with Auto Differential    Collection Time: 04/10/24  6:35 PM   Result Value Ref Range    WBC 4.7 4.1 - 11.1 K/uL    RBC 3.01 (L) 4.10 - 5.70 M/uL    Hemoglobin 9.3 (L) 12.1 - 17.0 g/dL    Hematocrit 26.8 (L) 36.6 - 50.3 %    MCV 89.0 80.0 - 99.0 FL    MCH 30.9 26.0 - 34.0 PG    MCHC 34.7 30.0 - 36.5 g/dL    RDW 13.6 11.5 - 14.5 %    Platelets 94 (L) 150 - 400 K/uL    MPV 10.3 8.9 - 12.9 FL    Nucleated RBCs 0.0 0  WBC    nRBC 0.00 0.00 - 0.01 K/uL    Neutrophils % 77 (H) 32 - 75 %    Lymphocytes % 11 (L) 12 - 49 %    Monocytes % 10 5 - 13 %    Eosinophils % 2 0 - 7 %    Basophils % 0 0 - 1 %    Immature Granulocytes % 0 0.0 - 0.5 %    Neutrophils Absolute 3.6 1.8 - 8.0 K/UL    Lymphocytes Absolute 0.5 (L) 0.8 - 3.5 K/UL    Monocytes Absolute 0.5 0.0 - 1.0 K/UL    Eosinophils Absolute 0.1 0.0 - 0.4 K/UL    Basophils Absolute 0.0 0.0 - 0.1 K/UL    Immature Granulocytes Absolute 0.0 0.00 - 0.04 K/UL    Differential Type SMEAR SCANNED      RBC Comment NORMOCYTIC, NORMOCHROMIC     BMP    Collection Time: 04/10/24  6:35 PM   Result Value Ref Range    Sodium 131 (L) 136 - 145 mmol/L    Potassium 4.1 3.5 - 5.1 mmol/L    Chloride 99 97 - 108 mmol/L    CO2 17 (L) 21 - 32 mmol/L    Anion Gap 15 5 - 15 mmol/L    Glucose 96 65 - 100 mg/dL    BUN 90 (H) 6 - 20 MG/DL    Creatinine 11.00 (H) 0.70 - 1.30 MG/DL    Bun/Cre Ratio 8 (L) 12 - 20      Est, Glom Filt Rate 5 (L) >60 ml/min/1.73m2    Calcium 8.0 (L) 8.5 - 10.1 MG/DL   Troponin    Collection Time: 04/10/24  6:35 PM   Result Value Ref Range    Troponin, High Sensitivity 14 0 - 76 ng/L   Magnesium    Collection Time: 04/10/24  6:35 PM   Result Value Ref Range    Magnesium 2.4 1.6 - 2.4 mg/dL   ETOH    Collection

## 2024-04-11 NOTE — PROGRESS NOTES
Critical Care Progress Note  Katarzyna Mendez MD          Date of Service:  2024  NAME:  Hu Christiansen  :  1960  MRN:  [id number]      Subjective/Hospital course:   HPI:  Pt is a 62 yo F w/ h/o CAD, CHF, DM2, HFpEF, PE on eiquis, erosive gastritis, alcoholic cirrhosis, GERD, HTN, ESRD on HD that was recently at the ED on  w/ n/v diarrhea and discharged home then returned earlier today with persisting sx's discharged, but then had continuing symptoms and returned this evening found to be hypoglycemic and hypothermic. Pt had missed his last 2 HD sessions. Pt was noted to be in shock and started on levophed and dobutamine drip after getting a central line placed. Pt was given vanc/zosyn, and 1L NS bolus, and given D10.     Interval history:  : Off of pressors. Mentation much improved. Reporting L flank and epigastric pain. Some nausea, no vomiting.       Problem list:     Patient Active Problem List   Diagnosis    Thrombocytopenia (HCC)    Peripheral neuropathy    Type 2 diabetes mellitus (HCC)    ESRD (end stage renal disease) on dialysis (HCC)    Cirrhosis (HCC)    Anxiety and depression    CHF NYHA class I, chronic, diastolic (HCC)    BPH (benign prostatic hyperplasia)    CAD (coronary artery disease)    GERD (gastroesophageal reflux disease)    HTN (hypertension)    Hyperlipidemia    Anemia associated with chronic renal failure    Chronic pain    Pleural effusion    Open wound of right heel    Osteomyelitis of ankle or foot, acute, right (HCC)    Constipation    Acute pulmonary embolism (HCC)    Colitis    Chronic nonintractable headache    Hx pulmonary embolism    Shock (HCC)    Hypoglycemia    Viral gastroenteritis    Septic shock (HCC)    ESRD needing dialysis (HCC)    Cardiogenic shock (HCC)       Assessment/Plan:   Assessment:  Shock, cardiogenic vs septic  Hypothermia  Hyponatremia  Metabolic acdosis  Erosive gastritis  HFpEF  CAD s/p CABG  ESRD on HD  EtOH cirrhosis  History  deep palpation, no rebounding or guarding   Musculoskeletal:         General: Normal range of motion.   Skin:     General: Skin is warm and dry.   Neurological:      General: No focal deficit present.      Mental Status: He is alert.         Labs and Imaging:   Reviewed.      Medications:     Current Facility-Administered Medications   Medication Dose Route Frequency    norepinephrine (LEVOPHED) 16 mg in sodium chloride 0.9 % 250 mL infusion  1-100 mcg/min IntraVENous Continuous    DOBUTamine (DOBUTREX) 500 mg in dextrose 5 % 250 mL infusion  2.5-10 mcg/kg/min IntraVENous Continuous    glucose chewable tablet 16 g  4 tablet Oral PRN    dextrose bolus 10% 125 mL  125 mL IntraVENous PRN    Or    dextrose bolus 10% 250 mL  250 mL IntraVENous PRN    glucagon injection 1 mg  1 mg SubCUTAneous PRN    dextrose 10 % infusion   IntraVENous Continuous PRN    insulin lispro (HUMALOG) injection vial 0-8 Units  0-8 Units SubCUTAneous TID WC    insulin lispro (HUMALOG) injection vial 0-4 Units  0-4 Units SubCUTAneous Nightly    [Held by provider] heparin (porcine) injection 5,000 Units  5,000 Units SubCUTAneous 3 times per day    sodium chloride flush 0.9 % injection 5-40 mL  5-40 mL IntraVENous 2 times per day    sodium chloride flush 0.9 % injection 5-40 mL  5-40 mL IntraVENous PRN    0.9 % sodium chloride infusion   IntraVENous PRN    polyethylene glycol (GLYCOLAX) packet 17 g  17 g Oral Daily PRN    acetaminophen (TYLENOL) tablet 650 mg  650 mg Oral Q6H PRN    Or    acetaminophen (TYLENOL) suppository 650 mg  650 mg Rectal Q6H PRN    [Held by provider] apixaban (ELIQUIS) tablet 5 mg  5 mg Oral BID    [Held by provider] carvedilol (COREG) tablet 25 mg  25 mg Oral BID with meals    [Held by provider] gabapentin (NEURONTIN) capsule 100 mg  100 mg Oral Once per day on Mon Wed Fri    [Held by provider] hydrALAZINE (APRESOLINE) tablet 100 mg  100 mg Oral TID    [Held by provider] isosorbide mononitrate (IMDUR) extended release

## 2024-04-11 NOTE — PLAN OF CARE
Problem: Physical Therapy - Adult  Goal: By Discharge: Performs mobility at highest level of function for planned discharge setting.  See evaluation for individualized goals.  Description: FUNCTIONAL STATUS PRIOR TO ADMISSION: Patient was modified independent using a single point cane for household functional mobility. He required frequent rest breaks and experience dyspnea on exertion for months prior to admission.    HOME SUPPORT PRIOR TO ADMISSION: The patient lived with family and did not require assistance.    Physical Therapy Goals  Initiated 4/11/2024  1.  Patient will move from supine to sit and sit to supine  in bed with supervision/set-up within 7 day(s).    2.  Patient will transfer from bed to chair and chair to bed with  using the least restrictive device within 7 day(s).  3.  Patient will perform sit to stand with supervision/set-up wsupervision/set-upithin 7 day(s).  4.  Patient will ambulate with supervision/set-up for 100 feet with the least restrictive device within 7 day(s).   5.  Patient will ascend/descend 4 stairs with one handrail(s) with minimal assistance/contact guard assist within 7 day(s).    Outcome: Progressing   PHYSICAL THERAPY EVALUATION    Patient: Hu Christiansen (63 y.o. male)  Date: 4/11/2024  Primary Diagnosis: Cardiogenic shock (HCC) [R57.0]  Shock (HCC) [R57.9]  Hypoglycemia [E16.2]  ESRD needing dialysis (HCC) [N18.6, Z99.2]  Hypothermia, initial encounter [T68.XXXA]       Precautions: Restrictions/Precautions: Fall Risk                      ASSESSMENT :   DEFICITS/IMPAIRMENTS:   The patient is limited by decreased functional mobility, strength, sensation, body mechanics, activity tolerance, endurance, increased pain levels on day 1 of admission with shock and hypoglycemia. Pt weaned off pressor support and cleared for mobility by RN.    Based on the impairments listed above he presents below baseline functional status with reports of 8/10 pain \"all over,\" whole body itching, and    Hearing  Hearing: Within functional limits    Vision/Perceptual:          Vision  Vision: Impaired       Strength:    Strength: Generally decreased, functional    Tone & Sensation:   Tone: Normal  Sensation: Impaired (B feet consistent with baseline)    Coordination:  Coordination: Within functional limits    Range Of Motion:  AROM: Within functional limits       Functional Mobility:  Bed Mobility:     Bed Mobility Training  Bed Mobility Training: Yes  Rolling: Modified independent;Adaptive equipment  Supine to Sit: Stand-by assistance;Adaptive equipment  Sit to Supine: Stand-by assistance;Adaptive equipment  Scooting: Supervision  Sits edge of bed x 4 mins    Transfers:     Transfer Training  Transfer Training: No  Balance:               Balance  Sitting: Intact  Ambulation/Gait Training:         N/T                                                                                                                                                                                                                                                                           [facility]®      Basic Mobility Inpatient Short Form (6-Clicks) Version 2    How much help is needed turning from your back to your side while in a flat bed without using bedrails?: A Little  How much help is needed moving from lying on your back to sitting on the side of a flat bed without using bedrails?: A Little  How much help is needed moving to and from a bed to a chair?: A Little (inferred)  How much help is needed standing up from a chair using your arms?: A Little (inferred)  How much help is needed walking in hospital room?: A Lot (inferred)  How much help is needed climbing 3-5 steps with a railing?: A Lot (inferred)    [facility] Inpatient Mobility Raw Score : 16  AM-PAC Inpatient T-Scale Score : 40.78     Cutoff score ?171,2,3 had higher odds of discharging home with home health or need of SNF/IPR.    1. Annetta Woodard

## 2024-04-11 NOTE — PLAN OF CARE
Problem: Discharge Planning  Goal: Discharge to home or other facility with appropriate resources  Outcome: Progressing  Flowsheets (Taken 4/10/2024 2315)  Discharge to home or other facility with appropriate resources:   Identify barriers to discharge with patient and caregiver   Arrange for needed discharge resources and transportation as appropriate   Identify discharge learning needs (meds, wound care, etc)   Refer to discharge planning if patient needs post-hospital services based on physician order or complex needs related to functional status, cognitive ability or social support system     Problem: Pain  Goal: Verbalizes/displays adequate comfort level or baseline comfort level  Outcome: Progressing  Flowsheets (Taken 4/10/2024 2315)  Verbalizes/displays adequate comfort level or baseline comfort level:   Encourage patient to monitor pain and request assistance   Assess pain using appropriate pain scale   Administer analgesics based on type and severity of pain and evaluate response   Implement non-pharmacological measures as appropriate and evaluate response   Notify Licensed Independent Practitioner if interventions unsuccessful or patient reports new pain     Problem: Safety - Adult  Goal: Free from fall injury  Outcome: Progressing     Problem: Chronic Conditions and Co-morbidities  Goal: Patient's chronic conditions and co-morbidity symptoms are monitored and maintained or improved  Outcome: Progressing

## 2024-04-11 NOTE — PROGRESS NOTES
Resident Progress Note in Brief    S: Patient seen and examined at bedside.   Patient is awake and alert  Pain is well controlled  Denies cp/sob/n/v   Pt is endorsing bilateral upper and lower extremity itchiness that worsened after dialysis. Reports this is chronic in nature but worse acutely.    O:  BP (!) 173/66   Pulse 87   Temp 98.2 °F (36.8 °C)   Resp 17   Ht 1.727 m (5' 8\")   Wt 69.4 kg (153 lb)   SpO2 97%   BMI 23.26 kg/m²     Physical Examination:   General appearance - alert, in no distress, L neck with dressing over central line, communicates well  Chest - clear to auscultation, no wheezes, rales or rhonchi, symmetric air entry  Heart - normal rate, regular rhythm, systolic murmur  Abdomen - soft, nontender, nondistended, no masses or organomegaly  Neurological - alert, oriented, normal speech, no focal findings  Extremities - peripheral pulses normal, no pedal edema, no clubbing or cyanosis, SCD in place    A/P:   Shock/Hypothermia: Resolved    Likely Viral Gastroenteritis: Continue abx for now, follow cultures, stool studies unrevealing thusfar    ESRD: S/p dialysis today    HTN: Re-started home hydral. Can consider re-starting other home meds as we trend BP's and patient begins to tolerate PO well    Itching: Likely uremic pruritus. Atarax 10mg BID prn    Continue to hold blood thinners with low platelets and significant bleeding from central line site last night (this bleeding has seemingly resolved on exam this evening, though). SCD's in place for DVT prophylaxis.    Please see the primary team's daily progress note for the patient's full plan.    Wilner Guerra DO  Family Medicine Resident    ADDENDUM 9:16 PM: Enteric stool panel showing campy positive. Adding on azithromycin 500mg PO q24hrs x 3 doses. Will leave vanc+zosyn orders for now as we await final cultures but these abx are likely unnecessary going forward.

## 2024-04-11 NOTE — CONSULTS
[facility] [place]    Hu Christiansen  YOB: 1960          Assessment & Plan:     ESRD on HD MWF at   Missed HD  Diarrhea  Low BP  DM2  Anemia    Rec:  HD today x 3 hours, then HD MWF  [facility] with HD  Reduce UF goal as he has had diarrhea and low BP       Subjective:   CC: ESRD  HPI: Pt of Dr. Barbosa with ESRD on HD MWF at  [facility] is admitted with diarrhea. He has missed two HD treatments due to illness this week. BP has been low.      Current Facility-Administered Medications   Medication Dose Route Frequency    norepinephrine (LEVOPHED) 16 mg in sodium chloride 0.9 % 250 mL infusion  1-100 mcg/min IntraVENous Continuous    DOBUTamine (DOBUTREX) 500 mg in dextrose 5 % 250 mL infusion  2.5-10 mcg/kg/min IntraVENous Continuous    glucose chewable tablet 16 g  4 tablet Oral PRN    dextrose bolus 10% 125 mL  125 mL IntraVENous PRN    Or    dextrose bolus 10% 250 mL  250 mL IntraVENous PRN    glucagon injection 1 mg  1 mg SubCUTAneous PRN    dextrose 10 % infusion   IntraVENous Continuous PRN    insulin lispro (HUMALOG) injection vial 0-8 Units  0-8 Units SubCUTAneous TID WC    insulin lispro (HUMALOG) injection vial 0-4 Units  0-4 Units SubCUTAneous Nightly    [Held by provider] heparin (porcine) injection 5,000 Units  5,000 Units SubCUTAneous 3 times per day    sodium chloride flush 0.9 % injection 5-40 mL  5-40 mL IntraVENous 2 times per day    sodium chloride flush 0.9 % injection 5-40 mL  5-40 mL IntraVENous PRN    0.9 % sodium chloride infusion   IntraVENous PRN    polyethylene glycol (GLYCOLAX) packet 17 g  17 g Oral Daily PRN    acetaminophen (TYLENOL) tablet 650 mg  650 mg Oral Q6H PRN    Or    acetaminophen (TYLENOL) suppository 650 mg  650 mg Rectal Q6H PRN    [Held by provider] apixaban (ELIQUIS) tablet 5 mg  5 mg Oral BID    [Held by provider] carvedilol (COREG) tablet 25 mg  25 mg Oral BID with meals    [Held by provider] gabapentin (NEURONTIN)

## 2024-04-11 NOTE — ED NOTES
Blood noted around central line dressing, causing the dressing to begin coming off, blood was also noted on pillow. Dressing changed with more transparent and tape. Skin cleaned. And pillow case changed. Patient tolerated well, resting comfortably.  
Kassi at bedside inserting central line at this time  
Patient placed on bear hugger  
Patient speaking with Family Medicine at this time reporting sick contacts Diarrhea and vomiting.     Patient states he has not had dialysis since last friday  
TRANSFER - OUT REPORT:    Verbal report given to Jace on Hu Christiansen  being transferred to 482 for routine progression of patient care       Report consisted of patient's Situation, Background, Assessment and   Recommendations(SBAR).     Information from the following report(s) ED Encounter Summary, ED SBAR, MAR, and Recent Results was reviewed with the receiving nurse.    Fowler Fall Assessment:    Presents to emergency department  because of falls (Syncope, seizure, or loss of consciousness): Yes  Age > 70: No  Altered Mental Status, Intoxication with alcohol or substance confusion (Disorientation, impaired judgment, poor safety awaremess, or inability to follow instructions): Yes  Impaired Mobility: Ambulates or transfers with assistive devices or assistance; Unable to ambulate or transer.: No  Nursing Judgement: Yes          Lines:   CVC Triple Lumen 04/10/24 Right Internal jugular (Active)       Peripheral IV 04/10/24 Distal;Right Cephalic (Active)       Peripheral IV 04/10/24 Right Cephalic (Active)       Peripheral IV 04/10/24 Proximal;Right Forearm (Active)        Opportunity for questions and clarification was provided.      Patient transported with:  Monitor and Registered Nurse        
levophed  
PANEL   CK   HEPATITIS B SURFACE ANTIGEN   BLOOD GAS, VENOUS   BASIC METABOLIC PANEL W/ REFLEX TO MG FOR LOW K   CBC WITH AUTO DIFFERENTIAL   PHOSPHORUS   POCT GLUCOSE   POCT GLUCOSE   POCT LACTIC ACID   POCT GLUCOSE   POCT GLUCOSE   POCT GLUCOSE   POCT GLUCOSE   POCT GLUCOSE   POCT GLUCOSE     XR CHEST PORTABLE   Final Result   1. Satisfactory positioning of right IJ central venous catheter. No   pneumothorax.   2. Unchanged mild pulmonary interstitial edema.         XR CHEST PORTABLE   Final Result      Interstitial edema/pulmonary edema pattern.         CTA CHEST ABDOMEN PELVIS W CONTRAST   Final Result   There is no evidence of aortic aneurysm or dissection.      There is mild atherosclerotic change.   The celiac and SMA are widely patent. 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   Mild ascending colonic wall thickening with transverse and sigmoid colonic wall   thickening present as well, mild infectious/inflammatory and ischemic colitis is   a differential consideration. No evidence of pulmonary embolism.   No aortic aneurysm or dissection.      No additional acute process is identified in the chest, abdomen or pelvis.   Additional incidental/nonemergent findings are as described above.      CT HEAD WO CONTRAST   Final Result   No acute intracranial process.   There is no intracranial mass or hemorrhage.               Diagnosis:   1. Cardiogenic shock (HCC)    2. Hypothermia, initial encounter    3. Hypoglycemia    4. ESRD needing dialysis (HCC)    5. Shock (HCC)        Disposition:   Admitted 04/10/2024 08:30:44 PM    Plan:   Admission to ICU    MD Remigio Parker Lindsay Nicole, MD  04/12/24 0026

## 2024-04-11 NOTE — CONSULTS
[facility]    Tele ICU TEAM Consult Note    Name: Hu Christiansen   : 1960   MRN: [id number]   Date: 4/10/2024           ICU Assessment     Shock   Hypothermia   ESRD on HD  Hyponatremia  Metabolic Acidosis   Alcoholic cirrhosis  Erosive gastritis  HFpEF  PE on eliquis  Anemia  Hypoglycemia            ICU Comprehensive Plan of Care:   NEURO: Stable, CT head neg on admit  - Limit sedates  - Monitor Neuro exam    CV:   # Shock: concern for cardiogenic shock, concern for possible viral related CM viral Takotsubo?, no obvious overdose of beta blocker or calcium channel blocker per pt. Unclear if contributing septic component   # Bradycardia:   # CAD: Trop neg x2, EKG in ER shows no obvious ST elevations  # HFpEF: Bedside echo shows EF 10-15%, BNP in ER 17K  (previous 14K) Previous echo was in  with normal EF  # HTN  - Holding home HTN meds  - Trop serial w/ EKG  - Levophed and dobutamine drip to Keep SBP>90. Would wean down levophed drip first  - Order Echo, will consult cardiology in AM  - Holding home BP meds  - Continuing abx vanco/zosyn  - Holding IVF resuscitation  - f/u cx's  - Will consider glucagon empiric dose if clinically indicated  - f/u AM cortisol, TSH, Utox   - Will consider stress dose steroids if needed  - Replace electrolytes to keep K>4, Mg>2    PULM:   # Acute hypoxemic respiratory failure: likely from a component of pulm edema, CT angio chest neg for PE  # h/o PE on eliquis  - Continue O2, wean down fio2 as tolerated to keep O2 sat>92%  - Continue abx as below  - HD per renal  - Will restart eliquis when clinically appropriate  - Ipratropium nebs if needed    GI:   # Erosive gastritis history  # Alcoholic cirrhosis  # Abd pain n/v: CT abd pelvis shows no acute intra-abd process  - Trend LFT  - Diet as below  - Continue ppi as below    RENAL/:   # ESRD on HD: currently no urgent need for dialysis  - HD per renal, if needed will consider CRRT  - Monitor Cr and UOP  - Pena in  administered.    CT dose reduction was achieved through use of a standardized protocol tailored  for this examination and automatic exposure control for dose modulation.  Adaptive statistical iterative reconstruction (ASIR) was utilized.  For the chest portion of the examination only;  3-D MIP reconstructed imaging was obtained.  FINDINGS:  VASCULATURE:  PE: This is a good quality study for the evaluation of pulmonary embolism to the  subsegmental arterial level. There is no pulmonary embolism to this level.    Mild aortic atherosclerotic change. Coronary artery disease. Descending thoracic  aorta is normal in caliber. Celiac and SMA origins are patent with mild  atherosclerotic change. Renal artery origins are patent. SMA is patent  proximally. Common iliac arteries and external iliac arteries are patent.  No aortic aneurysm or dissection.  MEDIASTINUM/HEART:  There is cardiomegaly. No hilar or mediastinal lymphadenopathy. No esophageal  mass. No endotracheal or endobronchial mass.  CORONARY ARTERY CALCIUM: Present.    PLEURA/LUNGS: No effusion or pneumothorax. Left lower lobe  atelectasis/consolidation, chronic in nature. Right basilar atelectasis is  improved compared to 2023 study  SOFT TISSUE/ AXILLA: No axillary adenopathy. No chest wall mass.  LIVER/GALLBLADDER: No mass or biliary dilatation. There is no intrahepatic duct  dilatation. The CBD is not dilated. There is no hepatic parenchymal mass.  Hepatic enhancement pattern is within normal limits. The portal vein is patent.  SPLEEN/PANCREAS: No mass. There is no pancreatic mass. There is no pancreatic  duct dilatation. There is no evidence of splenomegaly.  ADRENALS/KIDNEYS: No mass, calculus, or hydronephrosis. There is no adrenal  mass. There is no hydroureter or hydronephrosis. There is no perinephric mass.  No ureteral or bladder calculus.  STOMACH, COLON AND SMALL BOWEL: Colonic wall thickening with mild hyperemia of  the colonic mucosa. There is no

## 2024-04-11 NOTE — PROGRESS NOTES
Affiliated with [facility]  [id number] [id number]   Office [phone], Fax [phone]       Subjective / Objective     Subjective:  Overnight events: Noted to have some continuous bleeding on his central line site. Required multiple dressing changes. Off IV levophed for over 1 hour. Dobutamine weaned to 2.5mcg/kg/min.    Patient seen and examined at bedside. No acute complaints. Feeling somewhat better, but still reports diffuse abdominal tenderness and myalgias.       Vital Signs  Vitals:    04/11/24 0230   BP: (!) 133/55   Pulse: 71   Resp: 15   Temp:    SpO2: 94%         Physical Exam  General: no acute distress. Alert. Cooperative.    Head: Normocephalic. Atraumatic.   Central line on R neck with bleeding noted at site of insertion.    ENT:             Mucosa pink. Moist mucous membranes. Conjunctiva pink. Sclera white. PERRL.   Respiratory: CTAB. No w/r/r. No accessory muscle use.   Cardiovascular: RRR. Normal S1,S2. No m/r/g.    GI: + bowel sounds.Tenderness throughout, with predominance in epigastric region. No rebound tenderness or guarding. Nondistended.   Extremities: No  LE edema. Distal pulses present.    Skin:  Neuro: Warm. No rashes.  CN II-XII grossly intact. No functional neurologic deficit noted. Alert and oriented.         I/O:    Intake/Output Summary (Last 24 hours) at 4/11/2024 0310  Last data filed at 4/11/2024 0245  Gross per 24 hour   Intake 613.59 ml   Output 10 ml   Net 603.59 ml        CBC:  Recent Labs     04/08/24  2025 04/10/24  1835 04/11/24  0028   WBC 6.1 4.7 4.5   HGB 11.7* 9.3* 9.1*   HCT 33.0* 26.8* 26.4*   PLT 93* 94* 89*       Metabolic Panel:  Recent Labs     04/08/24  2025 04/10/24  1835 04/11/24  0028   * 131* 127*   K 4.4 4.1 4.7   CL 97 99 98   CO2 19* 17* 15*   BUN 76* 90* 92*   MG 2.3 2.4  --    ALT 18  --  16         Imaging  XR CHEST PORTABLE  Narrative: EXAM:   protocol tailored  for this examination and automatic exposure control for dose modulation.  Adaptive statistical iterative reconstruction (ASIR) was utilized.  For the chest portion of the examination only;  3-D MIP reconstructed imaging was obtained.  FINDINGS:  VASCULATURE:  PE: This is a good quality study for the evaluation of pulmonary embolism to the  subsegmental arterial level. There is no pulmonary embolism to this level.    Mild aortic atherosclerotic change. Coronary artery disease. Descending thoracic  aorta is normal in caliber. Celiac and SMA origins are patent with mild  atherosclerotic change. Renal artery origins are patent. SMA is patent  proximally. Common iliac arteries and external iliac arteries are patent.  No aortic aneurysm or dissection.  MEDIASTINUM/HEART:  There is cardiomegaly. No hilar or mediastinal lymphadenopathy. No esophageal  mass. No endotracheal or endobronchial mass.  CORONARY ARTERY CALCIUM: Present.    PLEURA/LUNGS: No effusion or pneumothorax. Left lower lobe  atelectasis/consolidation, chronic in nature. Right basilar atelectasis is  improved compared to 2023 study  SOFT TISSUE/ AXILLA: No axillary adenopathy. No chest wall mass.  LIVER/GALLBLADDER: No mass or biliary dilatation. There is no intrahepatic duct  dilatation. The CBD is not dilated. There is no hepatic parenchymal mass.  Hepatic enhancement pattern is within normal limits. The portal vein is patent.  SPLEEN/PANCREAS: No mass. There is no pancreatic mass. There is no pancreatic  duct dilatation. There is no evidence of splenomegaly.  ADRENALS/KIDNEYS: No mass, calculus, or hydronephrosis. There is no adrenal  mass. There is no hydroureter or hydronephrosis. There is no perinephric mass.  No ureteral or bladder calculus.  STOMACH, COLON AND SMALL BOWEL: Colonic wall thickening with mild hyperemia of  the colonic mucosa. There is no obstruction or free intraperitoneal air. There  is no evidence of incarceration

## 2024-04-11 NOTE — CONSULTS
Cardiology Attending:    Patient personally seen and examined. All the elements of history and examination were personally performed. Assessment and plan was discussed and agree as written above.           Assessment:    - Hypotension from viral illness with Nausea, Vomiting and Diarrhea  - HFpEF  - CAD, CABG history   - ESRD on HD  - Cirrhosis  - History of PE  - HTN      Plan:     - BP improved with hydration, vasopressors which are now off. Resume cardiac meds gradually as BP will allow.   - Echo pending. If LVEF normal then no further testing needed. Had LHC in  with open grafts.              ___________________________________________________    Yonis Villegas MD, [facility] CARDIOLOGY                    Cardiology Care Note     [x]Initial Encounter     []Follow-up    Patient Name: Hu Christiansen - :1960 - MRN:[id number]  Primary Cardiologist: prev VCS   Consulting Cardiologist: Yonis Villegas MD     Reason for encounter: Shock     HPI:       Hu Christiansen is a 63 y.o. male with PMH significant for CAD s/p CABG , MI , HFpEF, PE on Eliquis, HTN, DM, gastritis, ESRD on HD, alcoholic cirrhosis, anemia and thrombocytopenia.     Pt presented to the ED with complaints of progressive nausea, vomiting and diarrhea. Associated with generalized pain, myalgias. On EMS arrival, found to be hypoglycemic with BS in the 40's and with altered mental status. He is on HD but has missed last 2 dialysis sessions (hx of missing HD sessions per chart review). Denies any CP, SOB, fevers or chills. On admission, noted to be hypotensive, bradycardic with HR in the 40's and hypothermic with temp of 91F, placed on pressor support. Apparently bedside echo revealed decreased EF.     Of note, pt with multiple ER visits on chart review for CP and negative cardiac workup. Has not seen a cardiologist in the last couple of years.     Subjective:      Hu Christiansen reports feeling better.      Assessment and Plan  Coronal and  sagittal reconstructions were generated. Oral contrast was not administered.    CT dose reduction was achieved through use of a standardized protocol tailored  for this examination and automatic exposure control for dose modulation.  Adaptive statistical iterative reconstruction (ASIR) was utilized.  For the chest portion of the examination only;  3-D MIP reconstructed imaging was obtained.  FINDINGS:  VASCULATURE:  PE: This is a good quality study for the evaluation of pulmonary embolism to the  subsegmental arterial level. There is no pulmonary embolism to this level.    Mild aortic atherosclerotic change. Coronary artery disease. Descending thoracic  aorta is normal in caliber. Celiac and SMA origins are patent with mild  atherosclerotic change. Renal artery origins are patent. SMA is patent  proximally. Common iliac arteries and external iliac arteries are patent.  No aortic aneurysm or dissection.  MEDIASTINUM/HEART:  There is cardiomegaly. No hilar or mediastinal lymphadenopathy. No esophageal  mass. No endotracheal or endobronchial mass.  CORONARY ARTERY CALCIUM: Present.  PLEURA/LUNGS: No effusion or pneumothorax. Left lower lobe  atelectasis/consolidation, chronic in nature. Right basilar atelectasis is  improved compared to 2023 study  SOFT TISSUE/ AXILLA: No axillary adenopathy. No chest wall mass.  LIVER/GALLBLADDER: No mass or biliary dilatation. There is no intrahepatic duct  dilatation. The CBD is not dilated. There is no hepatic parenchymal mass.  Hepatic enhancement pattern is within normal limits. The portal vein is patent.  SPLEEN/PANCREAS: No mass. There is no pancreatic mass. There is no pancreatic  duct dilatation. There is no evidence of splenomegaly.  ADRENALS/KIDNEYS: No mass, calculus, or hydronephrosis. There is no adrenal  mass. There is no hydroureter or hydronephrosis. There is no perinephric mass.  No ureteral or bladder calculus.  STOMACH, COLON AND SMALL BOWEL: Colonic

## 2024-04-11 NOTE — PROGRESS NOTES
Occupational Therapy    Order received, chart reviewed in preparation for OT evaluation.  OT order has since been discontinued.  Please place a new OT order if indicated. Note patient is still being followed by PT.    Gary Valverde, OTR/L

## 2024-04-12 ENCOUNTER — TELEPHONE (OUTPATIENT)
Facility: CLINIC | Age: 64
End: 2024-04-12

## 2024-04-12 ENCOUNTER — APPOINTMENT (OUTPATIENT)
Facility: HOSPITAL | Age: 64
DRG: 720 | End: 2024-04-12
Payer: MEDICAID

## 2024-04-12 VITALS
SYSTOLIC BLOOD PRESSURE: 171 MMHG | BODY MASS INDEX: 22.52 KG/M2 | HEIGHT: 68 IN | TEMPERATURE: 99 F | WEIGHT: 148.59 LBS | OXYGEN SATURATION: 93 % | HEART RATE: 78 BPM | DIASTOLIC BLOOD PRESSURE: 73 MMHG | RESPIRATION RATE: 17 BRPM

## 2024-04-12 LAB
ANION GAP SERPL CALC-SCNC: 8 MMOL/L (ref 5–15)
BACTERIA SPEC CULT: ABNORMAL
BACTERIA SPEC CULT: ABNORMAL
BACTERIA SPEC CULT: NORMAL
BASOPHILS # BLD: 0 K/UL (ref 0–0.1)
BASOPHILS NFR BLD: 0 % (ref 0–1)
BUN SERPL-MCNC: 37 MG/DL (ref 6–20)
BUN/CREAT SERPL: 5 (ref 12–20)
CALCIUM SERPL-MCNC: 7.8 MG/DL (ref 8.5–10.1)
CHLORIDE SERPL-SCNC: 102 MMOL/L (ref 97–108)
CO2 SERPL-SCNC: 25 MMOL/L (ref 21–32)
CREAT SERPL-MCNC: 6.73 MG/DL (ref 0.7–1.3)
DIFFERENTIAL METHOD BLD: ABNORMAL
ECHO AO ARCH DIAM: 2.4 CM
ECHO AO ASC DIAM: 3 CM
ECHO AO ASCENDING AORTA INDEX: 1.65 CM/M2
ECHO AO ROOT DIAM: 2.9 CM
ECHO AO ROOT INDEX: 1.59 CM/M2
ECHO AV AREA PEAK VELOCITY: 2 CM2
ECHO AV AREA VTI: 2 CM2
ECHO AV AREA/BSA PEAK VELOCITY: 1.1 CM2/M2
ECHO AV AREA/BSA VTI: 1.1 CM2/M2
ECHO AV MEAN GRADIENT: 8 MMHG
ECHO AV MEAN VELOCITY: 1.3 M/S
ECHO AV PEAK GRADIENT: 16 MMHG
ECHO AV PEAK VELOCITY: 1.9 M/S
ECHO AV VELOCITY RATIO: 0.68
ECHO AV VTI: 42.7 CM
ECHO BSA: 1.82 M2
ECHO EST RA PRESSURE: 8 MMHG
ECHO LA DIAMETER INDEX: 2.31 CM/M2
ECHO LA DIAMETER: 4.2 CM
ECHO LA TO AORTIC ROOT RATIO: 1.45
ECHO LA VOL A-L A2C: 44 ML (ref 18–58)
ECHO LA VOL A-L A4C: 32 ML (ref 18–58)
ECHO LA VOL BP: 38 ML (ref 18–58)
ECHO LA VOL MOD A2C: 42 ML (ref 18–58)
ECHO LA VOL MOD A4C: 31 ML (ref 18–58)
ECHO LA VOL/BSA BIPLANE: 21 ML/M2 (ref 16–34)
ECHO LA VOLUME AREA LENGTH: 40 ML
ECHO LA VOLUME INDEX A-L A2C: 24 ML/M2 (ref 16–34)
ECHO LA VOLUME INDEX A-L A4C: 18 ML/M2 (ref 16–34)
ECHO LA VOLUME INDEX AREA LENGTH: 22 ML/M2 (ref 16–34)
ECHO LA VOLUME INDEX MOD A2C: 23 ML/M2 (ref 16–34)
ECHO LA VOLUME INDEX MOD A4C: 17 ML/M2 (ref 16–34)
ECHO LV E' LATERAL VELOCITY: 7 CM/S
ECHO LV E' SEPTAL VELOCITY: 4 CM/S
ECHO LV EDV A2C: 80 ML
ECHO LV EDV A4C: 152 ML
ECHO LV EDV BP: 119 ML (ref 67–155)
ECHO LV EDV INDEX A4C: 84 ML/M2
ECHO LV EDV INDEX BP: 65 ML/M2
ECHO LV EDV NDEX A2C: 44 ML/M2
ECHO LV EJECTION FRACTION A2C: 56 %
ECHO LV EJECTION FRACTION A4C: 75 %
ECHO LV EJECTION FRACTION BIPLANE: 70 % (ref 55–100)
ECHO LV ESV A2C: 35 ML
ECHO LV ESV A4C: 37 ML
ECHO LV ESV BP: 36 ML (ref 22–58)
ECHO LV ESV INDEX A2C: 19 ML/M2
ECHO LV ESV INDEX A4C: 20 ML/M2
ECHO LV ESV INDEX BP: 20 ML/M2
ECHO LV FRACTIONAL SHORTENING: 32 % (ref 28–44)
ECHO LV INTERNAL DIMENSION DIASTOLE INDEX: 3.08 CM/M2
ECHO LV INTERNAL DIMENSION DIASTOLIC: 5.6 CM (ref 4.2–5.9)
ECHO LV INTERNAL DIMENSION SYSTOLIC INDEX: 2.09 CM/M2
ECHO LV INTERNAL DIMENSION SYSTOLIC: 3.8 CM
ECHO LV IVSD: 0.7 CM (ref 0.6–1)
ECHO LV MASS 2D: 152.3 G (ref 88–224)
ECHO LV MASS INDEX 2D: 83.7 G/M2 (ref 49–115)
ECHO LV POSTERIOR WALL DIASTOLIC: 0.8 CM (ref 0.6–1)
ECHO LV RELATIVE WALL THICKNESS RATIO: 0.29
ECHO LVOT AREA: 3.1 CM2
ECHO LVOT AV VTI INDEX: 0.64
ECHO LVOT DIAM: 2 CM
ECHO LVOT MEAN GRADIENT: 3 MMHG
ECHO LVOT PEAK GRADIENT: 6 MMHG
ECHO LVOT PEAK VELOCITY: 1.3 M/S
ECHO LVOT STROKE VOLUME INDEX: 47.4 ML/M2
ECHO LVOT SV: 86.4 ML
ECHO LVOT VTI: 27.5 CM
ECHO MV A VELOCITY: 1.14 M/S
ECHO MV E DECELERATION TIME (DT): 144.1 MS
ECHO MV E VELOCITY: 0.98 M/S
ECHO MV E/A RATIO: 0.86
ECHO MV E/E' LATERAL: 14
ECHO MV E/E' RATIO (AVERAGED): 19.25
ECHO PULMONARY ARTERY END DIASTOLIC PRESSURE: 4 MMHG
ECHO PV MAX VELOCITY: 1.4 M/S
ECHO PV PEAK GRADIENT: 7 MMHG
ECHO PV REGURGITANT MAX VELOCITY: 1 M/S
ECHO RIGHT VENTRICULAR SYSTOLIC PRESSURE (RVSP): 59 MMHG
ECHO RV FREE WALL PEAK S': 8 CM/S
ECHO RV INTERNAL DIMENSION: 4.6 CM
ECHO RV TAPSE: 1.2 CM (ref 1.7–?)
ECHO TV REGURGITANT MAX VELOCITY: 3.58 M/S
ECHO TV REGURGITANT PEAK GRADIENT: 51 MMHG
EKG ATRIAL RATE: 43 BPM
EKG ATRIAL RATE: 68 BPM
EKG DIAGNOSIS: NORMAL
EKG DIAGNOSIS: NORMAL
EKG P AXIS: 54 DEGREES
EKG P AXIS: 64 DEGREES
EKG P-R INTERVAL: 152 MS
EKG P-R INTERVAL: 162 MS
EKG Q-T INTERVAL: 422 MS
EKG Q-T INTERVAL: 618 MS
EKG QRS DURATION: 112 MS
EKG QRS DURATION: 88 MS
EKG QTC CALCULATION (BAZETT): 448 MS
EKG QTC CALCULATION (BAZETT): 522 MS
EKG R AXIS: 16 DEGREES
EKG R AXIS: 31 DEGREES
EKG T AXIS: 55 DEGREES
EKG T AXIS: 63 DEGREES
EKG VENTRICULAR RATE: 43 BPM
EKG VENTRICULAR RATE: 68 BPM
EOSINOPHIL # BLD: 0.1 K/UL (ref 0–0.4)
EOSINOPHIL NFR BLD: 2 % (ref 0–7)
ERYTHROCYTE [DISTWIDTH] IN BLOOD BY AUTOMATED COUNT: 13.7 % (ref 11.5–14.5)
GLUCOSE BLD STRIP.AUTO-MCNC: 84 MG/DL (ref 65–117)
GLUCOSE BLD STRIP.AUTO-MCNC: 99 MG/DL (ref 65–117)
GLUCOSE SERPL-MCNC: 88 MG/DL (ref 65–100)
HCT VFR BLD AUTO: 24.4 % (ref 36.6–50.3)
HGB BLD-MCNC: 8.4 G/DL (ref 12.1–17)
IMM GRANULOCYTES # BLD AUTO: 0 K/UL (ref 0–0.04)
IMM GRANULOCYTES NFR BLD AUTO: 0 % (ref 0–0.5)
LYMPHOCYTES # BLD: 0.5 K/UL (ref 0.8–3.5)
LYMPHOCYTES NFR BLD: 15 % (ref 12–49)
MAGNESIUM SERPL-MCNC: 2 MG/DL (ref 1.6–2.4)
MCH RBC QN AUTO: 30.2 PG (ref 26–34)
MCHC RBC AUTO-ENTMCNC: 34.4 G/DL (ref 30–36.5)
MCV RBC AUTO: 87.8 FL (ref 80–99)
MONOCYTES # BLD: 0.4 K/UL (ref 0–1)
MONOCYTES NFR BLD: 12 % (ref 5–13)
NEUTS SEG # BLD: 2.6 K/UL (ref 1.8–8)
NEUTS SEG NFR BLD: 71 % (ref 32–75)
NRBC # BLD: 0 K/UL (ref 0–0.01)
NRBC BLD-RTO: 0 PER 100 WBC
PHOSPHATE SERPL-MCNC: 4.4 MG/DL (ref 2.6–4.7)
PLATELET # BLD AUTO: 86 K/UL (ref 150–400)
PMV BLD AUTO: 10.9 FL (ref 8.9–12.9)
POTASSIUM SERPL-SCNC: 3.5 MMOL/L (ref 3.5–5.1)
RBC # BLD AUTO: 2.78 M/UL (ref 4.1–5.7)
RBC MORPH BLD: ABNORMAL
SERVICE CMNT-IMP: ABNORMAL
SERVICE CMNT-IMP: NORMAL
SODIUM SERPL-SCNC: 135 MMOL/L (ref 136–145)
WBC # BLD AUTO: 3.6 K/UL (ref 4.1–11.1)

## 2024-04-12 PROCEDURE — 85025 COMPLETE CBC W/AUTO DIFF WBC: CPT

## 2024-04-12 PROCEDURE — 6370000000 HC RX 637 (ALT 250 FOR IP)

## 2024-04-12 PROCEDURE — 6360000002 HC RX W HCPCS

## 2024-04-12 PROCEDURE — C9113 INJ PANTOPRAZOLE SODIUM, VIA: HCPCS

## 2024-04-12 PROCEDURE — 2580000003 HC RX 258

## 2024-04-12 PROCEDURE — 6360000002 HC RX W HCPCS: Performed by: STUDENT IN AN ORGANIZED HEALTH CARE EDUCATION/TRAINING PROGRAM

## 2024-04-12 PROCEDURE — 93306 TTE W/DOPPLER COMPLETE: CPT

## 2024-04-12 PROCEDURE — 93010 ELECTROCARDIOGRAM REPORT: CPT | Performed by: INTERNAL MEDICINE

## 2024-04-12 PROCEDURE — 93306 TTE W/DOPPLER COMPLETE: CPT | Performed by: INTERNAL MEDICINE

## 2024-04-12 PROCEDURE — 84100 ASSAY OF PHOSPHORUS: CPT

## 2024-04-12 PROCEDURE — 6370000000 HC RX 637 (ALT 250 FOR IP): Performed by: NURSE PRACTITIONER

## 2024-04-12 PROCEDURE — 80048 BASIC METABOLIC PNL TOTAL CA: CPT

## 2024-04-12 PROCEDURE — 36415 COLL VENOUS BLD VENIPUNCTURE: CPT

## 2024-04-12 PROCEDURE — A4216 STERILE WATER/SALINE, 10 ML: HCPCS

## 2024-04-12 PROCEDURE — 90935 HEMODIALYSIS ONE EVALUATION: CPT

## 2024-04-12 PROCEDURE — 94761 N-INVAS EAR/PLS OXIMETRY MLT: CPT

## 2024-04-12 PROCEDURE — 99232 SBSQ HOSP IP/OBS MODERATE 35: CPT | Performed by: FAMILY MEDICINE

## 2024-04-12 PROCEDURE — APPSS30 APP SPLIT SHARED TIME 16-30 MINUTES: Performed by: NURSE PRACTITIONER

## 2024-04-12 PROCEDURE — 82962 GLUCOSE BLOOD TEST: CPT

## 2024-04-12 PROCEDURE — 99232 SBSQ HOSP IP/OBS MODERATE 35: CPT | Performed by: INTERNAL MEDICINE

## 2024-04-12 PROCEDURE — 83735 ASSAY OF MAGNESIUM: CPT

## 2024-04-12 RX ORDER — AZITHROMYCIN 250 MG/1
500 TABLET, FILM COATED ORAL
Status: DISCONTINUED | OUTPATIENT
Start: 2024-04-12 | End: 2024-04-12

## 2024-04-12 RX ORDER — LOSARTAN POTASSIUM 25 MG/1
25 TABLET ORAL DAILY
Status: DISCONTINUED | OUTPATIENT
Start: 2024-04-12 | End: 2024-04-12 | Stop reason: HOSPADM

## 2024-04-12 RX ORDER — PANTOPRAZOLE SODIUM 40 MG/1
40 TABLET, DELAYED RELEASE ORAL
Status: DISCONTINUED | OUTPATIENT
Start: 2024-04-13 | End: 2024-04-12 | Stop reason: HOSPADM

## 2024-04-12 RX ORDER — HYDROCODONE BITARTRATE AND ACETAMINOPHEN 5; 325 MG/1; MG/1
1-2 TABLET ORAL EVERY 6 HOURS PRN
Qty: 12 TABLET | Refills: 0 | Status: CANCELLED | OUTPATIENT
Start: 2024-04-12 | End: 2024-04-15

## 2024-04-12 RX ORDER — CARVEDILOL 12.5 MG/1
25 TABLET ORAL 2 TIMES DAILY WITH MEALS
Status: DISCONTINUED | OUTPATIENT
Start: 2024-04-12 | End: 2024-04-12 | Stop reason: HOSPADM

## 2024-04-12 RX ORDER — HYDRALAZINE HYDROCHLORIDE 20 MG/ML
10 INJECTION INTRAMUSCULAR; INTRAVENOUS EVERY 6 HOURS PRN
Status: DISCONTINUED | OUTPATIENT
Start: 2024-04-12 | End: 2024-04-12 | Stop reason: HOSPADM

## 2024-04-12 RX ORDER — HYDRALAZINE HYDROCHLORIDE 20 MG/ML
INJECTION INTRAMUSCULAR; INTRAVENOUS
Status: COMPLETED
Start: 2024-04-12 | End: 2024-04-12

## 2024-04-12 RX ORDER — CARVEDILOL 6.25 MG/1
6.25 TABLET ORAL 2 TIMES DAILY WITH MEALS
Status: DISCONTINUED | OUTPATIENT
Start: 2024-04-12 | End: 2024-04-12

## 2024-04-12 RX ORDER — AZITHROMYCIN 500 MG/1
500 TABLET, FILM COATED ORAL DAILY
Qty: 8 TABLET | Refills: 0 | Status: SHIPPED | OUTPATIENT
Start: 2024-04-13 | End: 2024-04-21

## 2024-04-12 RX ORDER — AZITHROMYCIN 250 MG/1
500 TABLET, FILM COATED ORAL DAILY
Status: DISCONTINUED | OUTPATIENT
Start: 2024-04-12 | End: 2024-04-12 | Stop reason: HOSPADM

## 2024-04-12 RX ADMIN — HYDROMORPHONE HYDROCHLORIDE 0.5 MG: 1 INJECTION, SOLUTION INTRAMUSCULAR; INTRAVENOUS; SUBCUTANEOUS at 01:35

## 2024-04-12 RX ADMIN — CARVEDILOL 6.25 MG: 6.25 TABLET, FILM COATED ORAL at 11:07

## 2024-04-12 RX ADMIN — HYDRALAZINE HYDROCHLORIDE 100 MG: 25 TABLET ORAL at 13:52

## 2024-04-12 RX ADMIN — HYDRALAZINE HYDROCHLORIDE 10 MG: 20 INJECTION INTRAMUSCULAR; INTRAVENOUS at 06:13

## 2024-04-12 RX ADMIN — PANTOPRAZOLE SODIUM 40 MG: 40 INJECTION, POWDER, FOR SOLUTION INTRAVENOUS at 11:07

## 2024-04-12 RX ADMIN — HYDROMORPHONE HYDROCHLORIDE 0.5 MG: 1 INJECTION, SOLUTION INTRAMUSCULAR; INTRAVENOUS; SUBCUTANEOUS at 12:27

## 2024-04-12 RX ADMIN — SODIUM CHLORIDE, PRESERVATIVE FREE 10 ML: 5 INJECTION INTRAVENOUS at 07:58

## 2024-04-12 RX ADMIN — HYDRALAZINE HYDROCHLORIDE 10 MG: 20 INJECTION, SOLUTION INTRAMUSCULAR; INTRAVENOUS at 06:13

## 2024-04-12 RX ADMIN — HYDRALAZINE HYDROCHLORIDE 10 MG: 20 INJECTION INTRAMUSCULAR; INTRAVENOUS at 12:30

## 2024-04-12 RX ADMIN — NIFEDIPINE 60 MG: 60 TABLET, EXTENDED RELEASE ORAL at 13:08

## 2024-04-12 RX ADMIN — ISOSORBIDE MONONITRATE 60 MG: 60 TABLET, EXTENDED RELEASE ORAL at 13:08

## 2024-04-12 RX ADMIN — AZITHROMYCIN DIHYDRATE 500 MG: 250 TABLET ORAL at 11:06

## 2024-04-12 RX ADMIN — PIPERACILLIN AND TAZOBACTAM 3375 MG: 3; .375 INJECTION, POWDER, LYOPHILIZED, FOR SOLUTION INTRAVENOUS at 03:46

## 2024-04-12 RX ADMIN — HYDROMORPHONE HYDROCHLORIDE 0.5 MG: 1 INJECTION, SOLUTION INTRAMUSCULAR; INTRAVENOUS; SUBCUTANEOUS at 07:57

## 2024-04-12 RX ADMIN — LOSARTAN POTASSIUM 25 MG: 25 TABLET, FILM COATED ORAL at 09:52

## 2024-04-12 RX ADMIN — HYDRALAZINE HYDROCHLORIDE 100 MG: 25 TABLET ORAL at 09:52

## 2024-04-12 ASSESSMENT — PAIN DESCRIPTION - DESCRIPTORS
DESCRIPTORS: ACHING

## 2024-04-12 ASSESSMENT — PAIN DESCRIPTION - LOCATION
LOCATION: GENERALIZED
LOCATION: NECK;HEAD
LOCATION: NECK
LOCATION: GENERALIZED

## 2024-04-12 ASSESSMENT — PAIN SCALES - GENERAL
PAINLEVEL_OUTOF10: 8

## 2024-04-12 NOTE — CARE COORDINATION
[facility] cannot accept pt .  HH referral sent to [facility] and [facility] hoping for sn acceptance.    Mago Puga RN

## 2024-04-12 NOTE — PLAN OF CARE
Problem: Discharge Planning  Goal: Discharge to home or other facility with appropriate resources  Outcome: Progressing  Flowsheets (Taken 4/11/2024 1930 by Madhavi Taylor, RN)  Discharge to home or other facility with appropriate resources:   Identify barriers to discharge with patient and caregiver   Arrange for needed discharge resources and transportation as appropriate     Problem: Pain  Goal: Verbalizes/displays adequate comfort level or baseline comfort level  Outcome: Progressing  Flowsheets (Taken 4/11/2024 1930 by Madhavi Taylor, RN)  Verbalizes/displays adequate comfort level or baseline comfort level:   Encourage patient to monitor pain and request assistance   Assess pain using appropriate pain scale     Problem: Safety - Adult  Goal: Free from fall injury  Outcome: Progressing     Problem: Chronic Conditions and Co-morbidities  Goal: Patient's chronic conditions and co-morbidity symptoms are monitored and maintained or improved  Outcome: Progressing  Flowsheets (Taken 4/11/2024 1930 by Madhavi Taylor RN)  Care Plan - Patient's Chronic Conditions and Co-Morbidity Symptoms are Monitored and Maintained or Improved: Monitor and assess patient's chronic conditions and comorbid symptoms for stability, deterioration, or improvement     Problem: Neurosensory - Adult  Goal: Achieves stable or improved neurological status  Outcome: Progressing  Flowsheets (Taken 4/11/2024 1930 by Madhavi Taylor RN)  Achieves stable or improved neurological status:   Assess for and report changes in neurological status   Initiate measures to prevent increased intracranial pressure     Problem: Respiratory - Adult  Goal: Achieves optimal ventilation and oxygenation  Outcome: Progressing  Flowsheets (Taken 4/11/2024 1930 by Madhavi Taylor, RN)  Achieves optimal ventilation and oxygenation:   Assess for changes in respiratory status   Assess for changes in mentation and behavior     Problem: Cardiovascular -

## 2024-04-12 NOTE — PROGRESS NOTES
[facility]    Hu Christiansen  YOB: 1960          Assessment & Plan:     ESRD on HD MWF at   Missed HD  Diarrhea  Low BP  DM2  Anemia    Rec:  Seen on HD [facility].   HD MWF  LIZZETH with HD  UF goal only 2 kg due to diarrhea/lowish bp       Subjective:   CC: ESRD  HPI: Seen on HD. [facility]      Current Facility-Administered Medications   Medication Dose Route Frequency    hydrALAZINE (APRESOLINE) injection 10 mg  10 mg IntraVENous Q6H PRN    azithromycin (ZITHROMAX) tablet 500 mg  500 mg Oral Daily    losartan (COZAAR) tablet 25 mg  25 mg Oral Daily    carvedilol (COREG) tablet 6.25 mg  6.25 mg Oral BID WC    norepinephrine (LEVOPHED) 16 mg in sodium chloride 0.9 % 250 mL infusion  1-100 mcg/min IntraVENous Continuous    epoetin regan-epbx (RETACRIT) injection 4,000 Units  4,000 Units SubCUTAneous Once per day on Mon Wed Fri    hydrOXYzine HCl (ATARAX) tablet 10 mg  10 mg Oral BID PRN    DOBUTamine (DOBUTREX) 500 mg in dextrose 5 % 250 mL infusion  2.5-10 mcg/kg/min IntraVENous Continuous    glucose chewable tablet 16 g  4 tablet Oral PRN    dextrose bolus 10% 125 mL  125 mL IntraVENous PRN    Or    dextrose bolus 10% 250 mL  250 mL IntraVENous PRN    glucagon injection 1 mg  1 mg SubCUTAneous PRN    dextrose 10 % infusion   IntraVENous Continuous PRN    insulin lispro (HUMALOG) injection vial 0-8 Units  0-8 Units SubCUTAneous TID WC    insulin lispro (HUMALOG) injection vial 0-4 Units  0-4 Units SubCUTAneous Nightly    [Held by provider] heparin (porcine) injection 5,000 Units  5,000 Units SubCUTAneous 3 times per day    sodium chloride flush 0.9 % injection 5-40 mL  5-40 mL IntraVENous 2 times per day    sodium chloride flush 0.9 % injection 5-40 mL  5-40 mL IntraVENous PRN    0.9 % sodium chloride infusion   IntraVENous PRN    polyethylene glycol (GLYCOLAX) packet 17 g  17 g Oral Daily PRN    acetaminophen (TYLENOL) tablet 650 mg  650 mg Oral Q6H

## 2024-04-12 NOTE — CARE COORDINATION
Transition of Care Plan:    RUR: 23%    Today:  Home health order for home PT received and sent through the connect care link to [facility] for home PT evaluation and treatment.    Referral sent to [facility] .Liason with [facility] will evaluate pt face to face today.    Updated clinicals faxed to [facility] where pt is established for OP HD.    Mago Puga RN

## 2024-04-12 NOTE — PROGRESS NOTES
Cardiology Attending:    Patient personally seen and examined. All the elements of history and examination were personally performed. Assessment and plan was discussed and agree as written above.           Assessment:     - Hypotension from viral illness with Nausea, Vomiting and Diarrhea  - HFpEF  - CAD, CABG history   - ESRD on HD  - Cirrhosis  - History of PE  - HTN        Plan:      - BP improved with hydration, vasopressors which are now off. BP now very high. Received BP meds in am. Now undergoing dialysis.     - Echo pending. If LVEF normal then no further testing needed. Had C in  with open grafts.             ___________________________________________________    Yonis Villegas MD, [facility] CARDIOLOGY                    Cardiology Care Note     []Initial Encounter     [x]Follow-up    Patient Name: Hu Christiansen - :1960 - MRN:[id number]  Primary Cardiologist: prev VCS   Consulting Cardiologist: Yonis Villegas MD     Reason for encounter: Shock     HPI:       Hu Christiansen is a 63 y.o. male with PMH significant for CAD s/p CABG , MI , HFpEF, PE on Eliquis, HTN, DM, gastritis, ESRD on HD, alcoholic cirrhosis, anemia and thrombocytopenia.     Pt presented to the ED with complaints of progressive nausea, vomiting and diarrhea. Associated with generalized pain, myalgias. On EMS arrival, found to be hypoglycemic with BS in the 40's and with altered mental status. He is on HD but has missed last 2 dialysis sessions (hx of missing HD sessions per chart review). Denies any CP, SOB, fevers or chills. On admission, noted to be hypotensive, bradycardic with HR in the 40's and hypothermic with temp of 91F, placed on pressor support. Apparently bedside echo revealed decreased EF.     Of note, pt with multiple ER visits on chart review for CP and negative cardiac workup. Has not seen a cardiologist in the last couple of years.     Subjective:      Hu Christiansen reports \"pain all over.\"      calculus.  STOMACH, COLON AND SMALL BOWEL: Colonic wall thickening with mild hyperemia of  the colonic mucosa. There is no obstruction or free intraperitoneal air. There  is no evidence of incarceration or obstruction. No mesenteric adenopathy.  APPENDIX: Unremarkable.  PERITONEUM/RETROPERITONEUM: There is no abdominal aortic aneurysm. No  significant lymphadenopathy.  URINARY BLADDER: No mass or calculus.  PELVIS: There is no pelvic adenopathy. There is no pelvic sidewall mass. There  is no inguinal adenopathy.  BONES: Chronic compression deformity at L2 no lytic or blastic lesions. No  evidence of acute fracture or dislocation.    Impression  There is no evidence of aortic aneurysm or dissection.    There is mild atherosclerotic change.  The celiac and SMA are widely patent. There is moderate to severe stenosis of  the inferior mesenteric artery with preserved contrast opacification of colic  vessels.  Mild ascending colonic wall thickening with transverse and sigmoid colonic wall  thickening present as well, mild infectious/inflammatory and ischemic colitis is  a differential consideration. No evidence of pulmonary embolism.  No aortic aneurysm or dissection.    No additional acute process is identified in the chest, abdomen or pelvis.  Additional incidental/nonemergent findings are as described above.      Lab Results   Component Value Date/Time     04/12/2024 02:56 AM    K 3.5 04/12/2024 02:56 AM     04/12/2024 02:56 AM    CO2 25 04/12/2024 02:56 AM    BUN 37 04/12/2024 02:56 AM    CREATININE 6.73 04/12/2024 02:56 AM    GLUCOSE 88 04/12/2024 02:56 AM    CALCIUM 7.8 04/12/2024 02:56 AM    LABGLOM 9 04/12/2024 02:56 AM      Lab Results   Component Value Date    BNP 17,400 (H) 04/07/2023     Lab Results   Component Value Date    WBC 3.6 (L) 04/12/2024    HGB 8.4 (L) 04/12/2024    HCT 24.4 (L) 04/12/2024    MCV 87.8 04/12/2024    PLT 86 (L) 04/12/2024     No results for input(s): \"CHOL\", \"HDLC\", \"LDLC\",

## 2024-04-12 NOTE — CARE COORDINATION
[facility] is closed permanently.  I talked with pt.'s  who gave names of home health companies covered.  Noone can accept pt.  I updated pt.'s nurse and attending.  Pt was walking in his room steadily to the bathroom.  I notified attending that no home health PT could be set up due to these obstacles.Pt id=s [email or web address] to be discharged per FP,    Mago Puga RN

## 2024-04-12 NOTE — PROGRESS NOTES
Pharmacy Dosing Services: 4/12/2024    The pharmacist has determined that this patient meets P & T approved criteria for conversion from IV to oral therapy for the following medication:Pantoprazole      The pharmacist has written the following order for the patient: Pantoprazole 40 mg ACB  The pharmacist will continue to monitor the patient's status and advise the physician if conversion back to IV therapy is recommended.    Thanks,   Eduardo Lau, [place]

## 2024-04-12 NOTE — PROGRESS NOTES
[id number] [id number]   Office [phone]  Fax [phone]          Subjective / Objective     Subjective  Overnight Events: campylobacter +, started on azithro   Patient seen and examined at bedside. Reports feeling improved this AM, diarrhea is improved. Endorses ongoing myalgias.     Vitals:    04/12/24 0900   BP: (!) 194/71   Pulse: 79   Resp:    Temp:    SpO2:      Physical Exam  General: no acute distress. Alert. Cooperative.    Head: Normocephalic. Atraumatic.   Central line on R neck with bleeding noted at site of insertion.    ENT:             Mucosa pink. Moist mucous membranes. Conjunctiva pink. Sclera white. PERRL.   Respiratory: CTAB. No w/r/r. No accessory muscle use.   Cardiovascular: RRR. Normal S1,S2. No m/r/g.    GI: + bowel sounds.mild tenderness throughout, No rebound tenderness or guarding. Nondistended.   Extremities: No  LE edema. Distal pulses present.    Skin:  Neuro: Warm. No rashes.  CN II-XII grossly intact. No functional neurologic deficit noted. Alert and oriented.        I/O:  04/11 0701 - 04/12 0700  In: 847   Out: 1705 [Urine:205]  Inpatient Medications  @[facility]@  Current Facility-Administered Medications   Medication Dose Route Frequency    hydrALAZINE (APRESOLINE) injection 10 mg  10 mg IntraVENous Q6H PRN    azithromycin (ZITHROMAX) tablet 500 mg  500 mg Oral Daily    losartan (COZAAR) tablet 25 mg  25 mg Oral Daily    carvedilol (COREG) tablet 6.25 mg  6.25 mg Oral BID WC    norepinephrine (LEVOPHED) 16 mg in sodium chloride 0.9 % 250 mL infusion  1-100 mcg/min IntraVENous Continuous    epoetin regan-epbx (RETACRIT) injection 4,000 Units  4,000 Units SubCUTAneous Once per day on Mon Wed Fri    hydrOXYzine HCl (ATARAX) tablet 10 mg  10 mg Oral BID PRN    DOBUTamine (DOBUTREX) 500 mg in dextrose 5 % 250 mL infusion  2.5-10 mcg/kg/min IntraVENous Continuous    glucose chewable tablet 16 g  4 tablet Oral PRN    dextrose bolus 10% 125 mL  125 mL  Medicine Resident       For Billing    Chief Complaint   Patient presents with    Altered Mental Status

## 2024-04-12 NOTE — PROGRESS NOTES
Physical Therapy Note:  Attempted to see patient for intervention. He deferred to ongoing diarrhea and now pending discharge. Per discussion with nursing, he has been ambulatory to/from the bathroom multiple times today without difficulty. Continue to recommend HHPT.   Will continue to follow if he remains.  Cheikh Cortez, PT

## 2024-04-12 NOTE — FLOWSHEET NOTE
set;Arterial Parameters set;Air foam detector engaged;Saline line double clamped;Dialyzer;Revaclear Dialyzer;REV-300   Hemodialysis Fistula/Graft Left Forearm   No placement date or time found.   Present on Admission/Arrival: Yes  Orientation: Left  Access Location: Forearm   Site Assessment Clean, dry & intact   Thrill Present   Bruit Present   Status Accessed   Venous Needle Size 15 G   Arterial Needle Size 15 G   Accessed By: RON Arnold RN   Access Attempts  1   Date of Last Dressing Change 04/12/24   Access Interventions Chlorhexidine;Aseptic Technique;Needles taped to patient   Dressing Intervention New   Dressing Status New dressing applied;Clean, dry & intact     Primary RN SBAR: SOY Harrison, RN  Comments:  achieved. Tolerated tx well, UF goal achieved. Medicated for hypertension. All blood returned 300 mls NS, lines flushed w/ NS, needles removed / tips intact x2 and hand held pressure applied until hemostasis achieved. Extended bleed time >10 mins. Dressing remains C/D/I on departure. +bruit/+thrill. No change in assessment post treatment. Pt remains in bed in lowest position, call bell in reach. Education & report with primary RN.      POST TREATMENT     04/12/24 1100   Vital Signs   BP (!) 195/79   Temp 98.9 °F (37.2 °C)   Pulse 81   Respirations 12   SpO2 92 %   Weight - Scale 67.4 kg (148 lb 9.4 oz)   Percent Weight Change -2.88   Pain Assessment   Pain Assessment None - Denies Pain   Post-Hemodialysis Assessment   Post-Treatment Procedures Blood returned;Access bleeding time > 10 minutes   Machine Disinfection Process Exterior Machine Disinfection   Rinseback Volume (ml) 300 ml   Blood Volume Processed (Liters) 90 L   Dialyzer Clearance Moderately streaked   Duration of Treatment (minutes) 240 minutes   Heparin Amount Administered During Treatment (mL) 0 mL   Hemodialysis Intake (ml) 500 ml   Hemodialysis Output (ml) 2500 ml   NET Removed (ml) 2000   Tolerated Treatment Good   Patient Response to  Treatment UF goal achieved   Edema None   Physician Notified No   Time Off 1100   Patient Disposition Remain in ICU/ED   Observations & Evaluations   Level of Consciousness 0   Oriented X 4   Heart Rhythm Regular   Respiratory Quality/Effort Unlabored   O2 Device None (Room air)   Skin Condition/Temp Dry;Warm   Abdomen Inspection Soft

## 2024-04-12 NOTE — PROGRESS NOTES
Physician Progress Note      PATIENT:               CINDY FANG  CSN #:                  [id number]  :                       1960  ADMIT DATE:       4/10/2024 6:16 PM  DISCH DATE:  RESPONDING  PROVIDER #:        SUDHEER HASTINGS          QUERY TEXT:    Good Afternoon    This patient admitted on --present for Cardiogenic vs. Septic   shock.    The medical record notes \"AMS\".    If possible, can you please further clarify the \"AMS\" and please document you   are evaluating and / or treating any of the following:  ]      The medical record reflects the following:  Risk Factors: ESRD on dialysis, Alcoholic cirrhosis, Viral gastroenteritis.  Clinical Indicators: H&P notes \"AMS\" --MD notes, \"AMS likely in the   setting of shock and hypoglycemia. Pt improved AMS s/o D 10, now oriented to   person, place and event but not time. On ---Attending notes \"UDS +   opioids though takes home opioids etoh negative.  Treatment: ICU level of care q 1 hour neuro checks, Ct head,  IV pressors, tx   hypoglycemia with D 10    Thank you  CHINYERE FaustinN,RN, CPHQ, CCDS, SMART  Options provided:  -- Septic encephalopathy  -- Toxic encephalopathy  -- Toxic metabolic encephalopathy  -- Other - I will add my own diagnosis  -- Disagree - Not applicable / Not valid  -- Disagree - Clinically unable to determine / Unknown  -- Refer to Clinical Documentation Reviewer    PROVIDER RESPONSE TEXT:    Initially presenting with AMS likely 2/2 hypoglycemia/toxic encephalopathy   quickly resolved with correction.    Query created by: Monica Freire on 2024 12:27 PM      Electronically signed by:  SUDHEER HASTINGS 2024 12:34 PM

## 2024-04-12 NOTE — DISCHARGE INSTRUCTIONS
HOME DISCHARGE INSTRUCTIONS    Hu Christiansen / [id number] : 1960    Admission date: 4/10/2024 Discharge date: 2024     Please bring this form with you to show your care provider at your follow-up appointment.    Primary care provider:  Cheikh Fall MD    Discharging provider:  Becky Chiu MD  - Family Medicine Resident  Hunter Sun MD - Family Medicine Attending      You have been admitted to the hospital with the following diagnoses:  You were admitted with shock and found to have a diarrheal infection. You were treated with IV antibiotics and were stable for discharge with further outpatient follow up.     ACUTE DIAGNOSES:  Cardiogenic shock (HCC) [R57.0]  Shock (HCC) [R57.9]  Hypoglycemia [E16.2]  ESRD needing dialysis (HCC) [N18.6, Z99.2]  Hypothermia, initial encounter [T68.XXXA]    . . . . . . . . . . . . . . . . . . . . . . . . . . . . . . . . . . . . . . . . . . . . . . . . . . . . . . . . . . . . . . . . . . . . . . . .   FOLLOW-UP CARE RECOMMENDATIONS:    Appointments  Future Appointments   Date Time Provider Department Center   5/10/2024  9:40 AM Yonis Villegas MD [facility]       Follow-up tests needed: none    Pending test results:   At the time of your discharge the following test results are still pending: none.   Please make sure you review these results with your outpatient follow-up provider(s).    DIET/what to eat:  cardiac diet and diabetic diet    ACTIVITY:  activity as tolerated    Wound care: keep wound clean and dry     Equipment needed:  none    Specific symptoms to watch for: chest pain, shortness of breath, fever, chills, nausea, vomiting, diarrhea, change in mentation, falling, weakness, bleeding.     What to do if new or unexpected symptoms occur?    If you experience any of the above symptoms (or should other concerns or questions arise after discharge) please call your primary care physician. Return to the emergency room if you cannot get hold of your

## 2024-04-12 NOTE — PROGRESS NOTES
Hospital follow up with PCP on Thursday May 2nd 2024 at 2:00 PM with Doctor Maya. Patients usual Doctor was booked up, so go them the earliest appointment available.    -Angela Odell  Case Management Specialist

## 2024-04-12 NOTE — DISCHARGE SUMMARY
Specimen: Blood Updated: 24 0558     Special Requests --        NO SPECIAL REQUESTS  RIGHT  Antecubital       Culture NO GROWTH 2 DAYS       Blood Culture 1 [[id number]] Collected: 04/10/24 1935    Order Status: Completed Specimen: Blood Updated: 24 0558     Special Requests --        NO SPECIAL REQUESTS  RIGHT  FOREARM       Culture NO GROWTH 2 DAYS       Enteric Bact Panel, DNA [[id number]] Collected: 24    Order Status: Canceled Specimen: Stool              Imagin/10/24    ECHO (TTE) COMPLETE (PRN CONTRAST/BUBBLE/STRAIN/3D) 2024 12:23 PM (Final)    Interpretation Summary    Left Ventricle: Normal left ventricular systolic function. EF by 2D Simpsons Biplane is 70%. Left ventricle size is normal. Normal wall thickness. Normal wall motion. Grade I diastolic dysfunction with normal LAP.    Tricuspid Valve: Moderately elevated RVSP, consistent with moderate pulmonary hypertension. The estimated RVSP is 59 mmHg.    Image quality is good. Procedure performed with the patient in a supine position.    Signed by: Yonis Villegas MD on 2024 12:23 PM      Procedures / Diagnostic Studies  Central line placement     Chronic diagnoses   Patient Active Problem List   Diagnosis    Thrombocytopenia (HCC)    Peripheral neuropathy    Type 2 diabetes mellitus (HCC)    ESRD (end stage renal disease) on dialysis (HCC)    Cirrhosis (HCC)    Anxiety and depression    CHF NYHA class I, chronic, diastolic (HCC)    BPH (benign prostatic hyperplasia)    CAD (coronary artery disease)    GERD (gastroesophageal reflux disease)    HTN (hypertension)    Hyperlipidemia    Anemia associated with chronic renal failure    Chronic pain    Pleural effusion    Open wound of right heel    Osteomyelitis of ankle or foot, acute, right (HCC)    Constipation    Acute pulmonary embolism (HCC)    Colitis    Chronic nonintractable headache    Hx pulmonary embolism    Shock (HCC)    Hypoglycemia    Viral gastroenteritis

## 2024-04-22 RX ORDER — NIFEDIPINE 60 MG/1
60 TABLET, EXTENDED RELEASE ORAL 2 TIMES DAILY
Qty: 180 TABLET | Refills: 0 | OUTPATIENT
Start: 2024-04-22

## 2024-04-25 RX ORDER — NIFEDIPINE 60 MG/1
60 TABLET, EXTENDED RELEASE ORAL 2 TIMES DAILY
Qty: 180 TABLET | Refills: 0 | OUTPATIENT
Start: 2024-04-25

## 2024-04-29 NOTE — PROGRESS NOTES
[facility] Dosing Services: Antimicrobial Stewardship Daily Doc  Consult for antibiotic dosing of vancomycin by Dr. Magana  Indication: sepsis  Day of Therapy: 1    Ht Readings from Last 1 Encounters:   04/10/24 1.727 m (5' 8\")        Wt Readings from Last 1 Encounters:   04/10/24 69.4 kg (153 lb)      Vancomycin therapy:  Loading dose: Vancomycin 1,750 mg IV x1 dose given  Maintenance dose: dosing based on levels  Dose calculated to approximate a           a. Target AUC/MANNY of 400-600          b. Trough of 15-20 mcg/mL   Last level:  mcg/mL  Plan:   Dose administration notes:     Other Antimicrobial   (not dosed by pharmacist)    Cultures    Serum Creatinine Lab Results   Component Value Date/Time    CREATININE 11.00 04/10/2024 06:35 PM    CREATININE 10.4 04/10/2024 06:25 PM      Creatinine Clearance Estimated Creatinine Clearance: 7 mL/min (A) (based on SCr of 11 mg/dL (H)).     Temp Temp: (!) 94 °F (34.4 °C) (Rectal)       WBC Lab Results   Component Value Date/Time    WBC 4.7 04/10/2024 06:35 PM      Procalcitonin No results found for: \"PROCAL\"   For Antifungals, Metronidazole and Nafcillin: Lab Results   Component Value Date/Time    ALT 18 04/08/2024 08:25 PM    AST 11 04/08/2024 08:25 PM        Pharmacist: DAO ANDREA [facility]   This is a 70 M w/ PMhx of ESRD via L AVF, CVA, R BKA, L AKA, bedbound, CAD, HTN, who is presenting to [facility] on 4/25/24 with vomiting.   In the ED, pt was febrile to 101.1, hypotensive to 66/42, hypoxic requiring 5L NC. Labs w/ mild leukocytosis to 11. Lactate 2.2, BCx w/ 4/4 MRSA.     #MRSA bacteremia, high grade  #Septic shock  #Lactic acidosis  #ESRD on HD via L AVF     Overall, 70 M w/ PMhx of ESRD via L AVF, CVA, R BKA, L AKA, bedbound, CAD, HTN admitted with high grade MRSA bacteremia, with no clear source.   CT A/P with stercoral colitis, lower chest with b/l PLEFF, L > R, possible consolidation?  Awaiting CT Chest, TTE/ISRAEL. Source may be PNA? unclear at this time, if bacteremia is persistent without clear source may need NM scan.   BCx from 4/27 1/2 positive     Plan:   1. C/w Vancomycin 1 g after HD, obtain trough prior to HD   2. Repeat BCx every 48 hours until negative, needs repeat today   3. Need ISRAEL   4. VA duplex of AVF wnl   5. Will likely need NM scan given persistent BCx+     Thank you for this consult. Inpatient ID team will follow.    Hector Umanzor M.D.  Attending Physician  Division of Infectious Diseases  Department of Medicine    Please contact through MS Teams message.  Office: [phone] (after 5 PM or weekend)   This is a 70 M w/ PMhx of ESRD via L AVF, CVA, R BKA, L AKA, bedbound, CAD, HTN, who is presenting to [facility] on 4/25/24 with vomiting.   In the ED, pt was febrile to 101.1, hypotensive to 66/42, hypoxic requiring 5L NC. Labs w/ mild leukocytosis to 11. Lactate 2.2, BCx w/ 4/4 MRSA.     #MRSA bacteremia, high grade  #Septic shock  #Lactic acidosis  #ESRD on HD via L AVF     Overall, 70 M w/ PMhx of ESRD via L AVF, CVA, R BKA, L AKA, bedbound, CAD, HTN admitted with high grade MRSA bacteremia, with no clear source.   CT A/P with stercoral colitis, lower chest with b/l PLEFF, L > R, possible consolidation?  Awaiting CT Chest, TTE/ISRAEL. Source may be PNA? unclear at this time, if bacteremia is persistent without clear source may need NM scan.   BCx from 4/27 1/2 positive     Plan:   1. C/w Vancomycin 750 mg after HD, obtain trough prior to HD   2. Repeat BCx every 48 hours until negative, needs repeat today   3. Need ISRAEL   4. VA duplex of AVF wnl   5. Will likely need NM scan given persistent BCx+     Thank you for this consult. Inpatient ID team will follow.    Hector Umanzor M.D.  Attending Physician  Division of Infectious Diseases  Department of Medicine    Please contact through MS Teams message.  Office: [phone] (after 5 PM or weekend)

## 2024-05-24 RX ORDER — NIFEDIPINE 60 MG/1
60 TABLET, EXTENDED RELEASE ORAL 2 TIMES DAILY
Qty: 180 TABLET | Refills: 0 | Status: SHIPPED | OUTPATIENT
Start: 2024-05-24

## 2024-07-09 RX ORDER — SUCRALFATE 1 G/1
1 TABLET ORAL
Qty: 270 TABLET | Refills: 0 | Status: SHIPPED | OUTPATIENT
Start: 2024-07-09

## 2024-07-09 RX ORDER — TAMSULOSIN HYDROCHLORIDE 0.4 MG/1
0.4 CAPSULE ORAL EVERY EVENING
Qty: 90 CAPSULE | Refills: 0 | Status: SHIPPED | OUTPATIENT
Start: 2024-07-09

## 2024-07-09 RX ORDER — ISOSORBIDE MONONITRATE 60 MG/1
60 TABLET, EXTENDED RELEASE ORAL DAILY
Qty: 90 TABLET | Refills: 0 | Status: SHIPPED | OUTPATIENT
Start: 2024-07-09

## 2024-07-09 RX ORDER — PANTOPRAZOLE SODIUM 40 MG/1
40 TABLET, DELAYED RELEASE ORAL DAILY
Qty: 90 TABLET | Refills: 0 | Status: SHIPPED | OUTPATIENT
Start: 2024-07-09

## 2024-07-09 RX ORDER — LOSARTAN POTASSIUM 100 MG/1
100 TABLET ORAL DAILY
Qty: 90 TABLET | Refills: 0 | Status: SHIPPED | OUTPATIENT
Start: 2024-07-09

## 2024-07-09 NOTE — TELEPHONE ENCOUNTER
Call patient.  I refilled their medication but they are due to be seen in the office.  Reason:  Follow up of meds and BP

## 2024-07-12 RX ORDER — NIFEDIPINE 60 MG/1
60 TABLET, EXTENDED RELEASE ORAL 2 TIMES DAILY
Qty: 180 TABLET | Refills: 0 | OUTPATIENT
Start: 2024-07-12

## 2024-07-12 RX ORDER — CARVEDILOL 25 MG/1
25 TABLET ORAL 2 TIMES DAILY WITH MEALS
Qty: 180 TABLET | Refills: 1 | OUTPATIENT
Start: 2024-07-12

## 2024-07-23 RX ORDER — CARVEDILOL 25 MG/1
25 TABLET ORAL 2 TIMES DAILY WITH MEALS
Qty: 180 TABLET | Refills: 0 | OUTPATIENT
Start: 2024-07-23

## 2024-07-23 RX ORDER — NIFEDIPINE 60 MG/1
60 TABLET, EXTENDED RELEASE ORAL 2 TIMES DAILY
Qty: 180 TABLET | Refills: 0 | OUTPATIENT
Start: 2024-07-23

## 2024-08-06 RX ORDER — APIXABAN 5 MG/1
5 TABLET, FILM COATED ORAL 2 TIMES DAILY
Qty: 180 TABLET | Refills: 1 | OUTPATIENT
Start: 2024-08-06

## 2024-08-07 NOTE — TELEPHONE ENCOUNTER
Call patient.  He has not been seen in our office as instructed so will need to get his medications elsewhere.  IF he is going to different providers, he should switch his refill requests to them so that the pharmacies do not send them to us.  ENMA

## 2024-08-07 NOTE — TELEPHONE ENCOUNTER
Identified patient with two person identifier . Patient informed he will not get any refills without an appointment. Patient transferred to  to make an appointment.

## 2024-08-08 ENCOUNTER — TELEPHONE (OUTPATIENT)
Facility: CLINIC | Age: 64
End: 2024-08-08

## 2024-08-08 ENCOUNTER — OFFICE VISIT (OUTPATIENT)
Facility: CLINIC | Age: 64
End: 2024-08-08
Payer: MEDICAID

## 2024-08-08 VITALS
HEIGHT: 68 IN | OXYGEN SATURATION: 98 % | HEART RATE: 73 BPM | SYSTOLIC BLOOD PRESSURE: 88 MMHG | BODY MASS INDEX: 23.64 KG/M2 | TEMPERATURE: 97 F | RESPIRATION RATE: 16 BRPM | WEIGHT: 156 LBS | DIASTOLIC BLOOD PRESSURE: 55 MMHG

## 2024-08-08 DIAGNOSIS — Z99.2 TYPE 2 DIABETES MELLITUS WITH CHRONIC KIDNEY DISEASE ON CHRONIC DIALYSIS, WITH LONG-TERM CURRENT USE OF INSULIN (HCC): Primary | ICD-10-CM

## 2024-08-08 DIAGNOSIS — F32.A ANXIETY AND DEPRESSION: ICD-10-CM

## 2024-08-08 DIAGNOSIS — G47.00 INSOMNIA, UNSPECIFIED TYPE: ICD-10-CM

## 2024-08-08 DIAGNOSIS — I95.89 OTHER SPECIFIED HYPOTENSION: ICD-10-CM

## 2024-08-08 DIAGNOSIS — R11.0 NAUSEA: ICD-10-CM

## 2024-08-08 DIAGNOSIS — F41.9 ANXIETY AND DEPRESSION: ICD-10-CM

## 2024-08-08 DIAGNOSIS — N18.6 TYPE 2 DIABETES MELLITUS WITH CHRONIC KIDNEY DISEASE ON CHRONIC DIALYSIS, WITH LONG-TERM CURRENT USE OF INSULIN (HCC): Primary | ICD-10-CM

## 2024-08-08 DIAGNOSIS — Z71.89 ACP (ADVANCE CARE PLANNING): ICD-10-CM

## 2024-08-08 DIAGNOSIS — E11.22 TYPE 2 DIABETES MELLITUS WITH CHRONIC KIDNEY DISEASE ON CHRONIC DIALYSIS, WITH LONG-TERM CURRENT USE OF INSULIN (HCC): Primary | ICD-10-CM

## 2024-08-08 DIAGNOSIS — Z79.4 TYPE 2 DIABETES MELLITUS WITH CHRONIC KIDNEY DISEASE ON CHRONIC DIALYSIS, WITH LONG-TERM CURRENT USE OF INSULIN (HCC): Primary | ICD-10-CM

## 2024-08-08 LAB
GLUCOSE, POC: ABNORMAL MG/DL
HBA1C MFR BLD: 10.4 %

## 2024-08-08 PROCEDURE — 99214 OFFICE O/P EST MOD 30 MIN: CPT | Performed by: NURSE PRACTITIONER

## 2024-08-08 PROCEDURE — 3052F HG A1C>EQUAL 8.0%<EQUAL 9.0%: CPT | Performed by: NURSE PRACTITIONER

## 2024-08-08 PROCEDURE — 3074F SYST BP LT 130 MM HG: CPT | Performed by: NURSE PRACTITIONER

## 2024-08-08 PROCEDURE — 82962 GLUCOSE BLOOD TEST: CPT | Performed by: NURSE PRACTITIONER

## 2024-08-08 PROCEDURE — 83036 HEMOGLOBIN GLYCOSYLATED A1C: CPT | Performed by: NURSE PRACTITIONER

## 2024-08-08 PROCEDURE — 3078F DIAST BP <80 MM HG: CPT | Performed by: NURSE PRACTITIONER

## 2024-08-08 RX ORDER — METOPROLOL SUCCINATE 25 MG/1
25 TABLET, EXTENDED RELEASE ORAL
COMMUNITY
Start: 2021-10-30

## 2024-08-08 RX ORDER — POTASSIUM CHLORIDE 20 MEQ/1
20 TABLET, EXTENDED RELEASE ORAL
COMMUNITY
Start: 2020-06-03

## 2024-08-08 RX ORDER — INSULIN GLARGINE 100 [IU]/ML
INJECTION, SOLUTION SUBCUTANEOUS
Qty: 3 ADJUSTABLE DOSE PRE-FILLED PEN SYRINGE | Refills: 1 | Status: SHIPPED | OUTPATIENT
Start: 2024-08-08

## 2024-08-08 RX ORDER — INSULIN ASPART 100 [IU]/ML
INJECTION, SOLUTION INTRAVENOUS; SUBCUTANEOUS
Qty: 5 ADJUSTABLE DOSE PRE-FILLED PEN SYRINGE | Refills: 3 | Status: SHIPPED | OUTPATIENT
Start: 2024-08-08

## 2024-08-08 RX ORDER — CLONIDINE HYDROCHLORIDE 0.3 MG/1
0.3 TABLET ORAL
COMMUNITY
Start: 2021-10-02

## 2024-08-08 RX ORDER — TRAZODONE HYDROCHLORIDE 50 MG/1
TABLET ORAL
Qty: 90 TABLET | Refills: 1 | Status: SHIPPED | OUTPATIENT
Start: 2024-08-08

## 2024-08-08 RX ORDER — ASPIRIN 81 MG/1
81 TABLET ORAL
COMMUNITY
Start: 2021-01-27

## 2024-08-08 RX ORDER — TRAMADOL HYDROCHLORIDE 50 MG/1
50 TABLET ORAL
COMMUNITY
Start: 2020-06-10 | End: 2024-08-08

## 2024-08-08 RX ORDER — BUSPIRONE HYDROCHLORIDE 5 MG/1
10 TABLET ORAL
COMMUNITY
Start: 2020-06-03

## 2024-08-08 RX ORDER — AMLODIPINE BESYLATE 10 MG/1
10 TABLET ORAL
COMMUNITY
Start: 2018-01-05

## 2024-08-08 RX ORDER — PEN NEEDLE, DIABETIC 30 GX3/16"
NEEDLE, DISPOSABLE MISCELLANEOUS
Qty: 100 EACH | Refills: 1 | Status: SHIPPED | OUTPATIENT
Start: 2024-08-08

## 2024-08-08 RX ORDER — ONDANSETRON 4 MG/1
4 TABLET, ORALLY DISINTEGRATING ORAL EVERY 6 HOURS PRN
Qty: 20 TABLET | Refills: 0 | Status: SHIPPED | OUTPATIENT
Start: 2024-08-08

## 2024-08-08 RX ORDER — HYDROCHLOROTHIAZIDE 25 MG/1
25 TABLET ORAL
COMMUNITY
Start: 2019-07-17

## 2024-08-08 RX ORDER — ARIPIPRAZOLE 5 MG/1
1 TABLET ORAL
COMMUNITY

## 2024-08-08 ASSESSMENT — ENCOUNTER SYMPTOMS: NAUSEA: 1

## 2024-08-08 NOTE — PROGRESS NOTES
History of present illness:Hu Christiansen is a 63 y.o. male presenting for medication refills, dizziness, nausea and vomiting for 1 day.    T2DM  -Has been out of medication for the past 3 days.   -When hospitalized at [facility] 1 month ago they changed his Lantus from 5 to 10 units at bedtime  -Novolog flexpen before meals, using sliding scale.  -Did not check his glucose today.    Depression and difficulty sleeping:  [facility]  Talks to his family if feeling very depressed        Review of Systems   Constitutional:  Positive for diaphoresis. Negative for fever.   Gastrointestinal:  Positive for nausea.   Neurological:  Positive for dizziness and light-headedness.           Past Medical History:   Diagnosis Date    Anemia associated with chronic renal failure     Anxiety and depression     BPH (benign prostatic hyperplasia)     CAD (coronary artery disease)     CHF NYHA class I, chronic, diastolic (HCC)     Chronic pain     DM type 2 causing renal disease (HCC)     DM type 2 causing vascular disease (HCC)     ESRD on dialysis (HCC)     GERD (gastroesophageal reflux disease)     HTN (hypertension)     Hyperlipidemia     Thrombocytopenia (HCC)      Past Surgical History:   Procedure Laterality Date    ARTERIAL BYPASS SURGRY      IR NONTUNNELED VASCULAR CATHETER  11/19/2021    IR NONTUNNELED VASCULAR CATHETER 11/19/2021 [facility]    IR NONTUNNELED VASCULAR CATHETER  11/19/2021    IR NONTUNNELED VASCULAR CATHETER  4/7/2023    IR NONTUNNELED VASCULAR CATHETER  4/7/2023    IR NONTUNNELED VASCULAR CATHETER 4/7/2023 [facility]    IR TUNNELED CATHETER PLACEMENT GREATER THAN 5 YEARS  11/24/2021    IR TUNNELED CATHETER PLACEMENT GREATER THAN 5 YEARS 11/24/2021 [facility]    IR TUNNELED CATHETER PLACEMENT GREATER THAN 5 YEARS  11/24/2021    IR TUNNELED CATHETER PLACEMENT GREATER THAN 5 YEARS  4/11/2023    IR TUNNELED CATHETER PLACEMENT GREATER THAN 5 YEARS  4/11/2023    IR

## 2024-08-08 NOTE — PATIENT INSTRUCTIONS

## 2024-08-08 NOTE — PROGRESS NOTES
\"Have you been to the ER, urgent care clinic since your last visit?  Hospitalized since your last visit?\"    NO    “Have you seen or consulted any other health care providers outside of [facility] since your last visit?”    NO        “Have you had a colorectal cancer screening such as a colonoscopy/FIT/Cologuard?    NO    No colonoscopy on file  No cologuard on file  Date of last FIT: 7/7/2023   No flexible sigmoidoscopy on file         Click Here for Release of Records Request

## 2024-08-08 NOTE — TELEPHONE ENCOUNTER
Pharmacy need to know sliding scale in order to dispense novolog please call pharmacy at [phone]

## 2024-08-08 NOTE — ASSESSMENT & PLAN NOTE
Patient with glucose >600 today, unable to read on glucometer. EMS called to transfer patient to hospital.  Hemoglobin A1C 10.1  Continue Novolog before meals using sliding scale  Continue Lantus 10 units before bedtime  Follow up 1 month

## 2024-08-20 DIAGNOSIS — Z79.4 TYPE 2 DIABETES MELLITUS WITH CHRONIC KIDNEY DISEASE ON CHRONIC DIALYSIS, WITH LONG-TERM CURRENT USE OF INSULIN (HCC): ICD-10-CM

## 2024-08-20 DIAGNOSIS — E11.22 TYPE 2 DIABETES MELLITUS WITH CHRONIC KIDNEY DISEASE ON CHRONIC DIALYSIS, WITH LONG-TERM CURRENT USE OF INSULIN (HCC): ICD-10-CM

## 2024-08-20 DIAGNOSIS — N18.6 TYPE 2 DIABETES MELLITUS WITH CHRONIC KIDNEY DISEASE ON CHRONIC DIALYSIS, WITH LONG-TERM CURRENT USE OF INSULIN (HCC): ICD-10-CM

## 2024-08-20 DIAGNOSIS — Z99.2 TYPE 2 DIABETES MELLITUS WITH CHRONIC KIDNEY DISEASE ON CHRONIC DIALYSIS, WITH LONG-TERM CURRENT USE OF INSULIN (HCC): ICD-10-CM

## 2024-08-21 RX ORDER — PEN NEEDLE, DIABETIC 32GX 5/32"
NEEDLE, DISPOSABLE MISCELLANEOUS
Qty: 100 EACH | Refills: 5 | Status: SHIPPED | OUTPATIENT
Start: 2024-08-21

## 2024-08-21 RX ORDER — APIXABAN 5 MG/1
5 TABLET, FILM COATED ORAL 2 TIMES DAILY
Qty: 180 TABLET | Refills: 1 | Status: SHIPPED | OUTPATIENT
Start: 2024-08-21

## 2024-08-22 ENCOUNTER — TELEPHONE (OUTPATIENT)
Facility: CLINIC | Age: 64
End: 2024-08-22

## 2024-08-22 NOTE — TELEPHONE ENCOUNTER
Miracle contacted the office regarding patient oxygen, no notes regarding oxygen. Requesting office notes so patient can get oxygen.

## 2024-08-27 NOTE — TELEPHONE ENCOUNTER
LM_ for Ayesha to call back    Request for O2 use will need to go to patient pulmonary / cardiologist provider ir if given in the hospital patient will need to have a follow-up visit

## 2024-09-26 ENCOUNTER — TELEPHONE (OUTPATIENT)
Facility: CLINIC | Age: 64
End: 2024-09-26

## 2024-09-26 ENCOUNTER — OFFICE VISIT (OUTPATIENT)
Facility: CLINIC | Age: 64
End: 2024-09-26
Payer: MEDICAID

## 2024-09-26 VITALS
WEIGHT: 164.2 LBS | RESPIRATION RATE: 17 BRPM | SYSTOLIC BLOOD PRESSURE: 171 MMHG | HEART RATE: 78 BPM | BODY MASS INDEX: 24.89 KG/M2 | TEMPERATURE: 98.5 F | HEIGHT: 68 IN | OXYGEN SATURATION: 98 % | DIASTOLIC BLOOD PRESSURE: 75 MMHG

## 2024-09-26 DIAGNOSIS — E11.22 TYPE 2 DIABETES MELLITUS WITH CHRONIC KIDNEY DISEASE ON CHRONIC DIALYSIS, WITH LONG-TERM CURRENT USE OF INSULIN (HCC): Primary | ICD-10-CM

## 2024-09-26 DIAGNOSIS — Z99.2 TYPE 2 DIABETES MELLITUS WITH CHRONIC KIDNEY DISEASE ON CHRONIC DIALYSIS, WITH LONG-TERM CURRENT USE OF INSULIN (HCC): Primary | ICD-10-CM

## 2024-09-26 DIAGNOSIS — Z79.4 TYPE 2 DIABETES MELLITUS WITH CHRONIC KIDNEY DISEASE ON CHRONIC DIALYSIS, WITH LONG-TERM CURRENT USE OF INSULIN (HCC): Primary | ICD-10-CM

## 2024-09-26 DIAGNOSIS — N18.6 END STAGE RENAL DISEASE (HCC): ICD-10-CM

## 2024-09-26 DIAGNOSIS — N18.6 TYPE 2 DIABETES MELLITUS WITH CHRONIC KIDNEY DISEASE ON CHRONIC DIALYSIS, WITH LONG-TERM CURRENT USE OF INSULIN (HCC): Primary | ICD-10-CM

## 2024-09-26 DIAGNOSIS — I10 PRIMARY HYPERTENSION: ICD-10-CM

## 2024-09-26 PROCEDURE — 3052F HG A1C>EQUAL 8.0%<EQUAL 9.0%: CPT | Performed by: FAMILY MEDICINE

## 2024-09-26 PROCEDURE — 3077F SYST BP >= 140 MM HG: CPT | Performed by: FAMILY MEDICINE

## 2024-09-26 PROCEDURE — 99214 OFFICE O/P EST MOD 30 MIN: CPT | Performed by: FAMILY MEDICINE

## 2024-09-26 PROCEDURE — 3078F DIAST BP <80 MM HG: CPT | Performed by: FAMILY MEDICINE

## 2024-09-26 RX ORDER — ACYCLOVIR 400 MG/1
1 TABLET ORAL 4 TIMES DAILY
Qty: 1 EACH | Refills: 0 | Status: SHIPPED | OUTPATIENT
Start: 2024-09-26

## 2024-09-26 RX ORDER — INSULIN ASPART 100 [IU]/ML
INJECTION, SOLUTION INTRAVENOUS; SUBCUTANEOUS
Qty: 5 ADJUSTABLE DOSE PRE-FILLED PEN SYRINGE | Refills: 3 | Status: SHIPPED | OUTPATIENT
Start: 2024-09-26

## 2024-09-26 RX ORDER — ACYCLOVIR 400 MG/1
1 TABLET ORAL
Qty: 9 EACH | Refills: 3 | Status: SHIPPED | OUTPATIENT
Start: 2024-09-26

## 2024-09-26 RX ORDER — INSULIN ASPART 100 [IU]/ML
INJECTION, SOLUTION INTRAVENOUS; SUBCUTANEOUS
Qty: 5 ADJUSTABLE DOSE PRE-FILLED PEN SYRINGE | Refills: 3 | Status: SHIPPED | OUTPATIENT
Start: 2024-09-26 | End: 2024-09-26 | Stop reason: SDUPTHER

## 2024-09-26 ASSESSMENT — ENCOUNTER SYMPTOMS
BLOOD IN STOOL: 0
SHORTNESS OF BREATH: 0

## 2024-10-04 ENCOUNTER — TELEPHONE (OUTPATIENT)
Facility: CLINIC | Age: 64
End: 2024-10-04

## 2024-10-04 NOTE — TELEPHONE ENCOUNTER
Identified patient with two person identifier. Patient informed cover my meds has not mad a decision yet if sensor will be covered or denied

## 2024-10-10 ENCOUNTER — TELEPHONE (OUTPATIENT)
Facility: CLINIC | Age: 64
End: 2024-10-10

## 2024-10-10 NOTE — TELEPHONE ENCOUNTER
Valerie from [facility] contacted the office regarding PA for the Dexacom7     [facility]  CB# [phone]

## 2024-10-18 ENCOUNTER — TELEPHONE (OUTPATIENT)
Facility: CLINIC | Age: 64
End: 2024-10-18

## 2024-10-18 NOTE — TELEPHONE ENCOUNTER
Valerie from [facility] contacted the office today regarding PA for the Dexacom7      Valerie [facility]  CB# [phone]

## 2024-10-21 RX ORDER — LOSARTAN POTASSIUM 100 MG/1
100 TABLET ORAL DAILY
Qty: 90 TABLET | Refills: 1 | Status: SHIPPED | OUTPATIENT
Start: 2024-10-21

## 2024-10-21 RX ORDER — TAMSULOSIN HYDROCHLORIDE 0.4 MG/1
0.4 CAPSULE ORAL EVERY EVENING
Qty: 90 CAPSULE | Refills: 1 | Status: SHIPPED | OUTPATIENT
Start: 2024-10-21

## 2024-10-21 RX ORDER — SUCRALFATE 1 G/1
1 TABLET ORAL
Qty: 270 TABLET | Refills: 1 | Status: SHIPPED | OUTPATIENT
Start: 2024-10-21

## 2024-10-21 RX ORDER — ISOSORBIDE MONONITRATE 60 MG/1
60 TABLET, EXTENDED RELEASE ORAL DAILY
Qty: 90 TABLET | Refills: 1 | Status: SHIPPED | OUTPATIENT
Start: 2024-10-21

## 2024-10-22 ENCOUNTER — OFFICE VISIT (OUTPATIENT)
Facility: CLINIC | Age: 64
End: 2024-10-22
Payer: MEDICAID

## 2024-10-22 VITALS
RESPIRATION RATE: 17 BRPM | HEIGHT: 68 IN | BODY MASS INDEX: 24.61 KG/M2 | SYSTOLIC BLOOD PRESSURE: 180 MMHG | TEMPERATURE: 98.6 F | HEART RATE: 80 BPM | WEIGHT: 162.4 LBS | DIASTOLIC BLOOD PRESSURE: 82 MMHG

## 2024-10-22 DIAGNOSIS — L23.9 ALLERGIC CONTACT DERMATITIS, UNSPECIFIED TRIGGER: ICD-10-CM

## 2024-10-22 DIAGNOSIS — Z99.2 TYPE 2 DIABETES MELLITUS WITH CHRONIC KIDNEY DISEASE ON CHRONIC DIALYSIS, WITH LONG-TERM CURRENT USE OF INSULIN (HCC): Primary | ICD-10-CM

## 2024-10-22 DIAGNOSIS — Z79.4 TYPE 2 DIABETES MELLITUS WITH CHRONIC KIDNEY DISEASE ON CHRONIC DIALYSIS, WITH LONG-TERM CURRENT USE OF INSULIN (HCC): Primary | ICD-10-CM

## 2024-10-22 DIAGNOSIS — E11.22 TYPE 2 DIABETES MELLITUS WITH CHRONIC KIDNEY DISEASE ON CHRONIC DIALYSIS, WITH LONG-TERM CURRENT USE OF INSULIN (HCC): Primary | ICD-10-CM

## 2024-10-22 DIAGNOSIS — N18.6 ESRD (END STAGE RENAL DISEASE) ON DIALYSIS (HCC): ICD-10-CM

## 2024-10-22 DIAGNOSIS — I10 PRIMARY HYPERTENSION: ICD-10-CM

## 2024-10-22 DIAGNOSIS — Z99.2 ESRD (END STAGE RENAL DISEASE) ON DIALYSIS (HCC): ICD-10-CM

## 2024-10-22 DIAGNOSIS — N18.6 TYPE 2 DIABETES MELLITUS WITH CHRONIC KIDNEY DISEASE ON CHRONIC DIALYSIS, WITH LONG-TERM CURRENT USE OF INSULIN (HCC): Primary | ICD-10-CM

## 2024-10-22 PROCEDURE — 3079F DIAST BP 80-89 MM HG: CPT | Performed by: FAMILY MEDICINE

## 2024-10-22 PROCEDURE — 99214 OFFICE O/P EST MOD 30 MIN: CPT | Performed by: FAMILY MEDICINE

## 2024-10-22 PROCEDURE — 3052F HG A1C>EQUAL 8.0%<EQUAL 9.0%: CPT | Performed by: FAMILY MEDICINE

## 2024-10-22 PROCEDURE — 3077F SYST BP >= 140 MM HG: CPT | Performed by: FAMILY MEDICINE

## 2024-10-22 RX ORDER — INSULIN GLARGINE 100 [IU]/ML
INJECTION, SOLUTION SUBCUTANEOUS
Qty: 3 ADJUSTABLE DOSE PRE-FILLED PEN SYRINGE | Refills: 1
Start: 2024-10-22

## 2024-10-22 RX ORDER — TRIAMCINOLONE ACETONIDE 1 MG/G
CREAM TOPICAL
Qty: 15 G | Refills: 1 | Status: SHIPPED | OUTPATIENT
Start: 2024-10-22

## 2024-10-22 ASSESSMENT — ENCOUNTER SYMPTOMS
BLOOD IN STOOL: 0
SHORTNESS OF BREATH: 0

## 2024-10-22 NOTE — PROGRESS NOTES
Identified patient with two patient identifiers (name and ). Reviewed chart in preparation for visit and have obtained necessary documentation.    Hu Christiansen is a 63 y.o. male  Chief Complaint   Patient presents with    Follow-up    Diabetes     BP (!) 180/82 (Site: Right Upper Arm, Position: Sitting, Cuff Size: Medium Adult)   Pulse 80   Temp 98.6 °F (37 °C) (Tympanic)   Resp 17   Ht 1.727 m (5' 8\")   Wt 73.7 kg (162 lb 6.4 oz)   BMI 24.69 kg/m²     1. Have you been to the ER, urgent care clinic since your last visit?  Hospitalized since your last visit?no    2. Have you seen or consulted any other health care providers outside of the [facility] since your last visit?  Include any pap smears or colon screening. no   
TUNNELED CATHETER PLACEMENT GREATER THAN 5 YEARS  4/11/2023    IR TUNNELED CATHETER PLACEMENT GREATER THAN 5 YEARS 4/11/2023 [facility]    OTHER SURGICAL HISTORY      5th toe Metatarsal amputation 12/2017       No relevant family history has been documented for this patient.   Social History       Tobacco History       Smoking Status  Former Quit Date  5/22/2001 Smoking Tobacco Type  Cigarettes quit in 5/22/2001   Pack Year History     Packs/Day From To Years    0 5/22/2001  23.4      Smokeless Tobacco Use  Never              Alcohol History       Alcohol Use Status  No              Drug Use       Drug Use Status  No              Sexual Activity       Sexually Active  Not Asked Birth Control/Protection  Condom                     Allergies     Allergies   Allergen Reactions    Basia Other (See Comments)     Abdominal pain

## 2024-10-22 NOTE — TELEPHONE ENCOUNTER
PCP: Cheikh Fall MD    Last appt: [unfilled]  Future Appointments   Date Time Provider Department Center   10/22/2024  1:00 PM Cheikh Fall MD [facility] DEP       Requested Prescriptions     Pending Prescriptions Disp Refills    NIFEdipine (PROCARDIA XL) 60 MG extended release tablet [Pharmacy Med Name: nifedipine ER 60 mg tablet,extended release 24 hr] 180 tablet 0     Sig: TAKE 1 TABLET BY MOUTH TWICE DAILY       Prior labs and Blood pressures:  BP Readings from Last 3 Encounters:   09/26/24 (!) 171/75   08/08/24 (!) 88/55   04/12/24 (!) 171/73     Lab Results   Component Value Date/Time     04/12/2024 02:56 AM    K 3.5 04/12/2024 02:56 AM     04/12/2024 02:56 AM    CO2 25 04/12/2024 02:56 AM    BUN 37 04/12/2024 02:56 AM    GFRAA 28 07/29/2022 03:11 AM     Lab Results   Component Value Date/Time    ELG1WVFW 10.4 08/08/2024 10:50 AM     Lab Results   Component Value Date/Time    CHOL 132 08/09/2023 04:53 PM    HDL 41 08/09/2023 04:53 PM    LDL 54.6 08/09/2023 04:53 PM    VLDL 36.4 08/09/2023 04:53 PM     No results found for: \"VITD3\"    Lab Results   Component Value Date/Time    TSH 1.09 04/11/2024 12:28 AM

## 2024-10-23 RX ORDER — NIFEDIPINE 60 MG/1
60 TABLET, EXTENDED RELEASE ORAL 2 TIMES DAILY
Qty: 180 TABLET | Refills: 0 | OUTPATIENT
Start: 2024-10-23

## 2024-10-24 RX ORDER — NIFEDIPINE 60 MG/1
60 TABLET, EXTENDED RELEASE ORAL 2 TIMES DAILY
Qty: 180 TABLET | Refills: 0 | OUTPATIENT
Start: 2024-10-24

## 2024-10-24 NOTE — TELEPHONE ENCOUNTER
Please call patient.  He was supposed to come see me with all of his meds so we could be sure what he is taking.  I'm not sure he is even taking this med.  H

## 2024-10-29 RX ORDER — NIFEDIPINE 60 MG/1
60 TABLET, EXTENDED RELEASE ORAL 2 TIMES DAILY
Qty: 180 TABLET | Refills: 0 | OUTPATIENT
Start: 2024-10-29

## 2024-10-31 ENCOUNTER — TELEPHONE (OUTPATIENT)
Facility: CLINIC | Age: 64
End: 2024-10-31

## 2024-10-31 NOTE — TELEPHONE ENCOUNTER
Spoke with Mr. Christiansen  1960     Appt was made for next to come into the clinic and bring all his current medication    Patient verbally agreed appt made for 11/5/24 @ 11:20      Michelle Dial LPN

## 2024-10-31 NOTE — TELEPHONE ENCOUNTER
----- Message from Dr. Cheikh Fall MD sent at 10/29/2024  5:52 AM EDT -----  Regarding: patient with med list problem  Call him.  I saw him last week.  He has multiple meds on his list that are contradictory and he was supposed to come back the next day with all of his meds so we could go over them and have an accurate list.  He never showed up.  Call and see if we can get him in somehow.

## 2024-11-05 ENCOUNTER — OFFICE VISIT (OUTPATIENT)
Facility: CLINIC | Age: 64
End: 2024-11-05
Payer: MEDICAID

## 2024-11-05 VITALS
TEMPERATURE: 97.8 F | DIASTOLIC BLOOD PRESSURE: 88 MMHG | HEIGHT: 68 IN | SYSTOLIC BLOOD PRESSURE: 181 MMHG | WEIGHT: 164.6 LBS | OXYGEN SATURATION: 99 % | HEART RATE: 67 BPM | BODY MASS INDEX: 24.95 KG/M2 | RESPIRATION RATE: 17 BRPM

## 2024-11-05 DIAGNOSIS — E11.21 TYPE 2 DIABETES MELLITUS WITH DIABETIC NEPHROPATHY, WITH LONG-TERM CURRENT USE OF INSULIN (HCC): Primary | ICD-10-CM

## 2024-11-05 DIAGNOSIS — Z79.4 TYPE 2 DIABETES MELLITUS WITH DIABETIC NEPHROPATHY, WITH LONG-TERM CURRENT USE OF INSULIN (HCC): Primary | ICD-10-CM

## 2024-11-05 DIAGNOSIS — I10 PRIMARY HYPERTENSION: ICD-10-CM

## 2024-11-05 PROCEDURE — 3079F DIAST BP 80-89 MM HG: CPT | Performed by: FAMILY MEDICINE

## 2024-11-05 PROCEDURE — 3077F SYST BP >= 140 MM HG: CPT | Performed by: FAMILY MEDICINE

## 2024-11-05 PROCEDURE — 99214 OFFICE O/P EST MOD 30 MIN: CPT | Performed by: FAMILY MEDICINE

## 2024-11-05 PROCEDURE — 3052F HG A1C>EQUAL 8.0%<EQUAL 9.0%: CPT | Performed by: FAMILY MEDICINE

## 2024-11-05 ASSESSMENT — ENCOUNTER SYMPTOMS: SHORTNESS OF BREATH: 0

## 2024-11-05 NOTE — PROGRESS NOTES
\"Have you been to the ER, urgent care clinic since your last visit?  Hospitalized since your last visit?\"    NO    “Have you seen or consulted any other health care providers outside our system since your last visit?”    NO      “Have you had a colorectal cancer screening such as a colonoscopy/FIT/Cologuard?    NO    No colonoscopy on file  No cologuard on file  Date of last FIT: 7/7/2023   No flexible sigmoidoscopy on file     “Have you had a diabetic eye exam?”    Yes [facility] on 10/18/24    Date of last diabetic eye exam: 8/26/2019

## 2025-02-12 NOTE — PROGRESS NOTES
[facility] Dosing Services: 7/21/22    Pharmacist Renal Dosing Progress Note for Dr Cate Gray    The following medication: cefepime was automatically dose-adjusted per [facility] P&T Committee Protocol, with respect to renal function. Pt Weight:   Wt Readings from Last 1 Encounters:   07/21/22 71.7 kg (158 lb)         Previous Regimen  2gm q8h   Serum Creatinine Lab Results   Component Value Date/Time    Creatinine 3.97 (H) 07/21/2022 01:47 PM       Creatinine Clearance Estimated Creatinine Clearance: 18.9 mL/min (A) (based on SCr of 3.97 mg/dL (H)). BUN Lab Results   Component Value Date/Time    BUN 34 (H) 07/21/2022 01:47 PM       Dosage changed to: 1gm q24h          Pharmacy to continue to monitor patient daily. Will make dosage adjustments based upon changing renal function.   Suleiman Howell, PHARMD. Contact information:  [phone] 12-Feb-2025 02:37

## 2025-02-23 NOTE — PROGRESS NOTES
Comprehensive Nutrition Assessment    Type and Reason for Visit: Initial, RD nutrition re-screen/LOS    Nutrition Recommendations/Plan:   Continue regular, 4 carb choice diet order  Provide Nepro once daily to increase kcal/protein intake (420 kcal, 38 g Carbs, 19 g Protein)   Check phos   Continue to monitor K - trending upwards, may request Low K Restriction on diet order     Malnutrition Assessment:  Malnutrition Status: Moderate malnutrition (07/28/22 1354)    Context:  Chronic illness     Findings of the 6 clinical characteristics of malnutrition:   Energy Intake:  No significant decrease in energy intake  Weight Loss:  Greater than 5% over 1 month     Body Fat Loss:  Mild body fat loss, Buccal region   Muscle Mass Loss:  Mild muscle mass loss, Clavicles (pectoralis &deltoids), Temples (temporalis)  Fluid Accumulation:  No significant fluid accumulation,     Strength:  Not performed     Nutrition Assessment:     Pt is a 64year old male admitted with Open wound of right heel [S91.301A]. He  has a past medical history of Anemia associated with chronic renal failure, Anxiety and depression, BPH (benign prostatic hyperplasia), CAD (coronary artery disease), CHF NYHA class I, chronic, diastolic (HCC), Chronic pain, DM type 2 causing renal disease ([address] DM type 2 causing vascular disease ([address] ESRD on dialysis ([address] GERD (gastroesophageal reflux disease), HTN (hypertension), Hyperlipidemia, and Thrombocytopenia ([address] RD screen for LOS. Patient states #, surprised at current weight being higher than this as he thought he had lost some weight. Previously, patient had lost 8# (5.5%) x 1 month, clinically significant for timeframe. Endorses upper abd pain but this goes away when he eats. No N/V/D at this time. No chewing/swallowing problems. NKFA. Patient reports appetite is so-so, provided meal preferences. PO intake averaging 50-75% per documentation.  Voices acceptance of ONS once daily to increase Primary team kcal/protein intake. Patient without significant edema - believe weight discrepancy to be 2/2 being bedscale weight. Last 3 Recorded Weights in this Encounter    07/23/22 0531 07/24/22 0602 07/27/22 1439   Weight: 70.2 kg (154 lb 12.2 oz) 71.5 kg (157 lb 10.1 oz) 78.7 kg (173 lb 6.4 oz)       Wt Readings from Last 10 Encounters:   07/27/22 78.7 kg (173 lb 6.4 oz)   07/19/22 72 kg (158 lb 11.7 oz)   06/21/22 72.3 kg (159 lb 8 oz)   04/14/22 61.7 kg (136 lb)   04/02/22 62.1 kg (136 lb 14.5 oz)   02/15/22 65.6 kg (144 lb 11.2 oz)   02/05/22 64.9 kg (143 lb)   12/01/21 71.4 kg (157 lb 6.5 oz)   08/07/20 83.6 kg (184 lb 3.2 oz)   11/22/19 73.5 kg (162 lb)     Patient Vitals for the past 168 hrs:   % Diet Eaten   07/24/22 1743 51 - 75%   07/24/22 1314 51 - 75%   07/24/22 0848 51 - 75%         Nutrition Related Findings:      Wound Type: None  Last Bowel Movement Date: 07/25/22  Stool Appearance: Soft  Edema:LLE: No Edema (7/27/2022  8:50 PM)  RLE: Other (comment) (unable to access) (7/27/2022  8:50 PM)      Nutr. Labs:    Lab Results   Component Value Date/Time    GFR est AA 36 (L) 07/28/2022 11:58 AM    GFR est non-AA 29 (L) 07/28/2022 11:58 AM    Creatinine 2.27 (H) 07/28/2022 11:58 AM    BUN 21 (H) 07/28/2022 11:58 AM    Sodium 136 07/28/2022 11:58 AM    Potassium 4.1 07/28/2022 11:58 AM    Chloride 103 07/28/2022 11:58 AM    CO2 29 07/28/2022 11:58 AM       Lab Results   Component Value Date/Time    Glucose 102 (H) 07/28/2022 11:58 AM    Glucose (POC) 112 07/28/2022 11:18 AM       Lab Results   Component Value Date/Time    Hemoglobin A1c 7.9 (H) 06/21/2022 02:38 PM       Nutr. Meds:  Norvasc, buspar, coreg, colace, ferrous sulfate, lasix, lantus, humalog, flagyl, Protonix, miralax PRN, Carafate, vancomycin      Current Nutrition Intake & Therapies:  Average Meal Intake: 51-75%  Average Supplement Intake: None ordered  ADULT DIET Regular; 4 carb choices (60 gm/meal);  No Concentrated Sweets  ADULT ORAL NUTRITION SUPPLEMENT Dinner; Renal Supplement    Anthropometric Measures:  Height: 5' 8\" (172.7 cm)  Ideal Body Weight (IBW): 154 lbs (70 kg)     Current Body Wt:  78.7 kg (173 lb 8 oz), 112.7 % IBW. Bed scale  Current BMI (kg/m2): 26.4  Usual Body Weight: 70.3 kg (155 lb)  % Weight Change (Calculated): 11.9  Weight Adjustment: No adjustment                 BMI Category: Overweight (BMI 25.0-29. 9)    Estimated Daily Nutrient Needs:  Energy Requirements Based On: Kcal/kg  Weight Used for Energy Requirements: Current  Energy (kcal/day): 7,637-6711 (25-30, ESRD)  Weight Used for Protein Requirements: Current  Protein (g/day): 78-94 (1.0-1.2 g/kg)  Method Used for Fluid Requirements: Standard renal  Fluid (ml/day): 1500 or per MD    Nutrition Diagnosis:   Inadequate protein-energy intake related to renal dysfunction, increased demand for energy/nutrients as evidenced by dialysis  Moderate malnutrition related to inadequate protein-energy intake, impaired nutrient utilization as evidenced by moderate muscle loss, mild loss of subcutaneous fat, weight loss    Nutrition Interventions:   Food and/or Nutrient Delivery: Continue current diet, Start oral nutrition supplement  Nutrition Education/Counseling: Survival skills/brief education completed  Coordination of Nutrition Care: Continue to monitor while inpatient, Interdisciplinary rounds  Plan of Care discussed with: IDR team    Goals:     Goals: Meet at least 75% of estimated needs, by next RD assessment       Nutrition Monitoring and Evaluation:   Behavioral-Environmental Outcomes: Knowledge or skill  Food/Nutrient Intake Outcomes: Food and nutrient intake, Supplement intake  Physical Signs/Symptoms Outcomes: Biochemical data, Fluid status or edema, Skin, Weight    Discharge Planning:    Continue oral nutrition supplement, Recommend pursue outpatient nutrition counseling    Jeferson Duenas MS, RD  Contact: Ext: [phone], or via [employer]

## 2025-08-26 ENCOUNTER — COMMUNITY OUTREACH (OUTPATIENT)
Facility: CLINIC | Age: 65
End: 2025-08-26

## (undated) DEVICE — BANDAGE,GAUZE,BULKEE II,4.5"X4.1YD,STRL: Brand: MEDLINE

## (undated) DEVICE — Device

## (undated) DEVICE — BNDG ELAS HK LOOP 4X5YD NS -- MATRIX

## (undated) DEVICE — EXTREMITY-SFMCASU: Brand: MEDLINE INDUSTRIES, INC.

## (undated) DEVICE — TUBING HYDR IRR --

## (undated) DEVICE — VERSAJET II HYDROSURGERY SYSTEM HANDSET, 45DEG 8MM EXACT: Brand: VERSAJET

## (undated) DEVICE — SWAB CULT DBL W/O CHAR RAYON TIP AMIES GEL CLMN FOR COLL

## (undated) DEVICE — CANISTER, RIGID, 3000CC: Brand: MEDLINE INDUSTRIES, INC.

## (undated) DEVICE — SOL IRRIGATION INJ NACL 0.9% 500ML BTL

## (undated) DEVICE — SUTURE VCRL SZ 3-0 L27IN ABSRB UD L19MM PS-2 3/8 CIR PRIM J427H

## (undated) DEVICE — PREMIUM WET SKIN PREP TRAY: Brand: MEDLINE INDUSTRIES, INC.

## (undated) DEVICE — PAD,ABDOMINAL,5"X9",ST,LF,25/BX: Brand: MEDLINE INDUSTRIES, INC.